# Patient Record
Sex: FEMALE | Race: WHITE | NOT HISPANIC OR LATINO | Employment: OTHER | URBAN - METROPOLITAN AREA
[De-identification: names, ages, dates, MRNs, and addresses within clinical notes are randomized per-mention and may not be internally consistent; named-entity substitution may affect disease eponyms.]

---

## 2017-01-03 ENCOUNTER — PATIENT MESSAGE (OUTPATIENT)
Dept: FAMILY MEDICINE | Facility: CLINIC | Age: 67
End: 2017-01-03

## 2017-01-03 DIAGNOSIS — F41.9 ANXIETY: ICD-10-CM

## 2017-01-03 DIAGNOSIS — G35 MULTIPLE SCLEROSIS: ICD-10-CM

## 2017-01-03 RX ORDER — CLONAZEPAM 1 MG/1
1 TABLET ORAL 2 TIMES DAILY
Qty: 60 TABLET | Refills: 5 | Status: CANCELLED | OUTPATIENT
Start: 2017-01-03

## 2017-01-05 ENCOUNTER — NURSE TRIAGE (OUTPATIENT)
Dept: ADMINISTRATIVE | Facility: CLINIC | Age: 67
End: 2017-01-05

## 2017-01-05 ENCOUNTER — PATIENT MESSAGE (OUTPATIENT)
Dept: NEUROLOGY | Facility: CLINIC | Age: 67
End: 2017-01-05

## 2017-01-05 DIAGNOSIS — F41.9 ANXIETY: ICD-10-CM

## 2017-01-05 DIAGNOSIS — G35 MULTIPLE SCLEROSIS: ICD-10-CM

## 2017-01-05 RX ORDER — CLONAZEPAM 1 MG/1
1 TABLET ORAL 2 TIMES DAILY
Qty: 60 TABLET | Refills: 5 | Status: SHIPPED | OUTPATIENT
Start: 2017-01-05 | End: 2017-01-05 | Stop reason: SDUPTHER

## 2017-01-05 NOTE — TELEPHONE ENCOUNTER
Called in the following prescription at the patient's request:    clonazePAM (KLONOPIN) 1 MG tablet 60 tablet 5 1/5/2017       Sig - Route: Take 1 tablet (1 mg total) by mouth 2 (two) times daily. - Oral

## 2017-01-05 NOTE — TELEPHONE ENCOUNTER
----- Message from Veronica Mccormick sent at 1/5/2017  1:16 PM CST -----  Contact: PT  Refill clonazePAM (KLONOPIN) 1 MG tablet    Pharmacy: [phone]    Patient is out of medication, and would like to have it called in today.

## 2017-01-06 RX ORDER — CLONAZEPAM 1 MG/1
TABLET ORAL
Qty: 60 TABLET | Refills: 2 | Status: SHIPPED | OUTPATIENT
Start: 2017-01-06 | End: 2017-06-22 | Stop reason: SDUPTHER

## 2017-01-06 NOTE — TELEPHONE ENCOUNTER
Reason for Disposition   Caller has medication question only, adult not sick, and triager answers question    Answer Assessment - Initial Assessment Questions   stated they have been having problems since xmas getting pt's scrip for clonazepam filled.    Protocols used: ST MEDICATION QUESTION CALL-A-AH    Advised per pt's chart the scrip for clonazepam was called to [facility] pharmacy this pm.

## 2017-01-10 ENCOUNTER — CLINICAL SUPPORT (OUTPATIENT)
Dept: REHABILITATION | Facility: HOSPITAL | Age: 67
End: 2017-01-10
Attending: PSYCHIATRY & NEUROLOGY
Payer: MEDICARE

## 2017-01-10 DIAGNOSIS — G35 MULTIPLE SCLEROSIS: ICD-10-CM

## 2017-01-10 DIAGNOSIS — R26.89 BALANCE PROBLEM: ICD-10-CM

## 2017-01-10 PROCEDURE — 97110 THERAPEUTIC EXERCISES: CPT | Mod: PN

## 2017-01-10 NOTE — PROGRESS NOTES
[id number]  Date of Treatment: 01/10/2017   Diagnosis:   Encounter Diagnoses   Name Primary?    Balance problem     Multiple sclerosis        Time in: 1307  Time Out: 1352  Total Treatment Time: 45      Subjective:    Alyssa reports no complaints.  Patient reports their pain to be 0/10 on a 0-10 scale with 0 being no pain and 10 being the worst pain imaginable.    Objective    Patient received individual therapy to increase strength, flexibility and balance with activities as follows:     Alyssa received therapeutic exercises to develop strength, flexibility and balance for 45 minutes including:     Bike x 15' level 2.5  Shuttle: 37# x 5 minutes, R, L, B  Sit-stand x 8, vc to lean trunk forward prior to standing  LAQ 3# x 30 B    Written Home Exercises Provided: cont HEP  Pt demo good understanding of the education provided. Alyssa demonstrated good return demonstration of activities.     Assessment:     Pt fatigued with therex today.  VC to lean forward prior to standing.  Pt able to return demonstrate.  Pt will continue to benefit from skilled PT intervention. Medical Necessity is demonstrated by:  Fall Risk, weakness.    Patient is making good progress towards established goals.      Plan:  Continue with established Plan of Care towards PT goals.

## 2017-01-13 ENCOUNTER — CLINICAL SUPPORT (OUTPATIENT)
Dept: REHABILITATION | Facility: HOSPITAL | Age: 67
End: 2017-01-13
Attending: PSYCHIATRY & NEUROLOGY
Payer: MEDICARE

## 2017-01-13 DIAGNOSIS — G35 MULTIPLE SCLEROSIS: ICD-10-CM

## 2017-01-13 DIAGNOSIS — R26.89 BALANCE PROBLEM: ICD-10-CM

## 2017-01-13 PROCEDURE — 97110 THERAPEUTIC EXERCISES: CPT | Mod: PN

## 2017-01-13 NOTE — PROGRESS NOTES
Name: Alyssa John  [facility] Number: [id number]  Date of Treatment: 01/13/2017   Diagnosis:   Encounter Diagnoses   Name Primary?    Balance problem     Multiple sclerosis        Time in: 1409  Time Out: 1457  Total Treatment Time: 43      Subjective:    Alyssa reports she loved the shuttle from last visit.  Patient reports their pain to be 0/10 on a 0-10 scale with 0 being no pain and 10 being the worst pain imaginable.    Objective    Patient received individual therapy to increase strength and balance with activities as follows:     Alyssa received therapeutic exercises to develop strength and balance for 43 minutes including:     Shuttle 31# x 12 minutes total, B, R, L  Bike x 15 minutes level 1.5  Sit-stand x 10, min-mod a  Clamshell BTB x 30      Written Home Exercises Provided: cont HEP  Pt demo good understanding of the education provided. Alyssa demonstrated good return demonstration of activities.     Assessment:     Pt was distracted today working with IPad, VC to start bike.  Pt also requested to continue to use shuttle, even with VC to stop so she would not fatigue.  Decreased activity today due to distraction.  Pt will continue to benefit from skilled PT intervention. Medical Necessity is demonstrated by:  Fall Risk, Unable to participate fully in daily activities and Weakness.    Patient is making fair progress towards established goals.      Plan:  Continue with established Plan of Care towards PT goals.

## 2017-01-17 ENCOUNTER — CLINICAL SUPPORT (OUTPATIENT)
Dept: REHABILITATION | Facility: HOSPITAL | Age: 67
End: 2017-01-17
Attending: PSYCHIATRY & NEUROLOGY
Payer: MEDICARE

## 2017-01-17 DIAGNOSIS — G35 MULTIPLE SCLEROSIS: ICD-10-CM

## 2017-01-17 DIAGNOSIS — R26.89 BALANCE PROBLEM: ICD-10-CM

## 2017-01-17 PROCEDURE — 97110 THERAPEUTIC EXERCISES: CPT | Mod: PN

## 2017-01-17 NOTE — PROGRESS NOTES
"Name: Alyssa John  [facility] Number: [id number]  Date of Treatment: 01/17/2017   Diagnosis:   Encounter Diagnoses   Name Primary?    Balance problem     Multiple sclerosis        Time in: 1408  Time Out: 1458  Total Treatment Time: 50      Subjective:    Alyssa reports no complaints. "I'm sleepy."  Patient reports their pain to be 0/10 on a 0-10 scale with 0 being no pain and 10 being the worst pain imaginable.    Objective    Patient received individual therapy to increase strength, endurance and balance with activities as follows:     Alyssa received therapeutic exercises to develop strength, endurance and balance for 50 minutes including:     Shuttle: 37# x 7 minutes, B, R, L  Bike x 15' level 2.5  Sit-stand x 10 trials total, min a to gain standing position  Ball squeeze x 30  LAq 2# x 30 B    Written Home Exercises Provided: cont HEP  Pt demo good understanding of the education provided. Alyssa demonstrated good return demonstration of activities.     Assessment:     L foot drop during ambulation.  Pt unable to maintain forward trunk with sit-stand.  Min a to bring trunk forward.  Pt will continue to benefit from skilled PT intervention. Medical Necessity is demonstrated by:  Fall Risk, Unable to participate fully in daily activities, Requires skilled supervision to complete and progress HEP and Weakness.    Patient is making good progress towards established goals.      Plan:  Continue with established Plan of Care towards PT goals.   "

## 2017-01-19 ENCOUNTER — LAB VISIT (OUTPATIENT)
Dept: LAB | Facility: HOSPITAL | Age: 67
End: 2017-01-19
Attending: PSYCHIATRY & NEUROLOGY
Payer: MEDICARE

## 2017-01-19 ENCOUNTER — INITIAL CONSULT (OUTPATIENT)
Dept: DERMATOLOGY | Facility: CLINIC | Age: 67
End: 2017-01-19
Payer: MEDICARE

## 2017-01-19 VITALS — HEIGHT: 63 IN | BODY MASS INDEX: 28.88 KG/M2 | WEIGHT: 163 LBS

## 2017-01-19 DIAGNOSIS — G35 MULTIPLE SCLEROSIS: ICD-10-CM

## 2017-01-19 DIAGNOSIS — L60.8: ICD-10-CM

## 2017-01-19 DIAGNOSIS — L72.0 EIC (EPIDERMAL INCLUSION CYST): Primary | ICD-10-CM

## 2017-01-19 LAB
ALBUMIN SERPL BCP-MCNC: 3.9 G/DL
ALP SERPL-CCNC: 115 U/L
ALT SERPL W/O P-5'-P-CCNC: 37 U/L
AST SERPL-CCNC: 25 U/L
BASOPHILS # BLD AUTO: 0.03 K/UL
BASOPHILS NFR BLD: 0.7 %
BILIRUB DIRECT SERPL-MCNC: 0.1 MG/DL
BILIRUB SERPL-MCNC: 0.3 MG/DL
DIFFERENTIAL METHOD: ABNORMAL
EOSINOPHIL # BLD AUTO: 0.3 K/UL
EOSINOPHIL NFR BLD: 5.5 %
ERYTHROCYTE [DISTWIDTH] IN BLOOD BY AUTOMATED COUNT: 13.5 %
HCT VFR BLD AUTO: 42.5 %
HGB BLD-MCNC: 13.5 G/DL
LYMPHOCYTES # BLD AUTO: 1.2 K/UL
LYMPHOCYTES NFR BLD: 27.2 %
MCH RBC QN AUTO: 28.6 PG
MCHC RBC AUTO-ENTMCNC: 31.8 %
MCV RBC AUTO: 90 FL
MONOCYTES # BLD AUTO: 0.4 K/UL
MONOCYTES NFR BLD: 7.7 %
NEUTROPHILS # BLD AUTO: 2.7 K/UL
NEUTROPHILS NFR BLD: 58.9 %
PLATELET # BLD AUTO: 173 K/UL
PMV BLD AUTO: 11.8 FL
PROT SERPL-MCNC: 6.8 G/DL
RBC # BLD AUTO: 4.72 M/UL
WBC # BLD AUTO: 4.52 K/UL

## 2017-01-19 PROCEDURE — 1126F AMNT PAIN NOTED NONE PRSNT: CPT | Mod: S$GLB,,, | Performed by: DERMATOLOGY

## 2017-01-19 PROCEDURE — 36415 COLL VENOUS BLD VENIPUNCTURE: CPT | Mod: PO

## 2017-01-19 PROCEDURE — 1160F RVW MEDS BY RX/DR IN RCRD: CPT | Mod: S$GLB,,, | Performed by: DERMATOLOGY

## 2017-01-19 PROCEDURE — 85025 COMPLETE CBC W/AUTO DIFF WBC: CPT

## 2017-01-19 PROCEDURE — 1159F MED LIST DOCD IN RCRD: CPT | Mod: S$GLB,,, | Performed by: DERMATOLOGY

## 2017-01-19 PROCEDURE — 99202 OFFICE O/P NEW SF 15 MIN: CPT | Mod: S$GLB,,, | Performed by: DERMATOLOGY

## 2017-01-19 PROCEDURE — 1157F ADVNC CARE PLAN IN RCRD: CPT | Mod: S$GLB,,, | Performed by: DERMATOLOGY

## 2017-01-19 PROCEDURE — 99999 PR PBB SHADOW E&M-EST. PATIENT-LVL II: CPT | Mod: PBBFAC,,, | Performed by: DERMATOLOGY

## 2017-01-19 PROCEDURE — 80076 HEPATIC FUNCTION PANEL: CPT

## 2017-01-19 NOTE — PROGRESS NOTES
Subjective:       Patient ID:  Alyssa John is a 66 y.o. female who presents for   Chief Complaint   Patient presents with    Cyst     Back of neck, 2 months, no tx    Nail Problem     Both hands, 3 months, redness, no tx     HPI Comments: Initial visit  Patient with MS and epilepsy  No h/o skin cancer  H/o moles removed decade ago, benign per patient      Cyst  - Initial  Affected locations: Back of neck.  Duration: 2 months  Signs / symptoms: asymptomatic  Severity: mild  Timing: constant  Aggravated by: nothing  Relieving factors/Treatments tried: nothing    Nail Problem  - Initial  Affected locations: left hand and right hand  Duration: 3 months  Signs / symptoms: redness  Severity: mild  Timing: constant  Aggravated by: nothing  Relieving factors/Treatments tried: nothing        Review of Systems   Constitutional: Negative for fever and chills.   Skin: Positive for activity-related sunscreen use. Negative for daily sunscreen use and recent sunburn.   Hematologic/Lymphatic: Bruises/bleeds easily (on plavix).        Objective:    Physical Exam   Constitutional: She appears well-developed and well-nourished. No distress.   Neurological: She is alert and oriented to person, place, and time. She is not disoriented.   Psychiatric: She has a normal mood and affect.   Skin:   Areas Examined (abnormalities noted in diagram):   Neck Inspection Performed  RUE Inspected  LUE Inspection Performed  Nails and Digits Inspection Performed                  Diagram Legend     Erythematous scaling macule/papule c/w actinic keratosis       Vascular papule c/w angioma      Pigmented verrucoid papule/plaque c/w seborrheic keratosis      Yellow umbilicated papule c/w sebaceous hyperplasia      Irregularly shaped tan macule c/w lentigo     1-2 mm smooth white papules consistent with Milia      Movable subcutaneous cyst with punctum c/w epidermal inclusion cyst      Subcutaneous movable cyst c/w pilar cyst      Firm pink to  brown papule c/w dermatofibroma      Pedunculated fleshy papule(s) c/w skin tag(s)      Evenly pigmented macule c/w junctional nevus     Mildly variegated pigmented, slightly irregular-bordered macule c/w mildly atypical nevus      Flesh colored to evenly pigmented papule c/w intradermal nevus       Pink pearly papule/plaque c/w basal cell carcinoma      Erythematous hyperkeratotic cursted plaque c/w SCC      Surgical scar with no sign of skin cancer recurrence      Open and closed comedones      Inflammatory papules and pustules      Verrucoid papule consistent consistent with wart     Erythematous eczematous patches and plaques     Dystrophic onycholytic nail with subungual debris c/w onychomycosis     Umbilicated papule    Erythematous-base heme-crusted tan verrucoid plaque consistent with inflamed seborrheic keratosis     Erythematous Silvery Scaling Plaque c/w Psoriasis     See annotation          Assessment / Plan:        EIC (epidermal inclusion cyst)  May elect to excise to avoid future rupture/inflammation\    Half-and-half nail  No h/o liver disease or CKD  Recent labs reviewed and reassuring  Longitudinal ridging and fragility is normal finding in aging           Return if symptoms worsen or fail to improve.

## 2017-01-19 NOTE — LETTER
January 19, 2017      April Edmondson MD  [address]  [place] [address]           [place] [facility]  [address]  [place] [address]  Phone: [phone]          Patient: Alyssa John   MR Number: [id number]   YOB: 1950   Date of Visit: 1/19/2017       Dear Dr. April Edmondson:    Thank you for referring Alyssa John to me for evaluation. Attached you will find relevant portions of my assessment and plan of care.    If you have questions, please do not hesitate to call me. I look forward to following Alyssa John along with you.    Sincerely,    Rachael Castillo MD    Enclosure  CC:  No Recipients    If you would like to receive this communication electronically, please contact [email or web address] or [phone] to request more information on [employer] Link access.    For providers and/or their staff who would like to refer a patient to [facility], please contact us through our one-stop-shop provider referral line, [facility], at [phone].    If you feel you have received this communication in error or would no longer like to receive these types of communications, please e-mail [email or web address]

## 2017-01-24 ENCOUNTER — INITIAL CONSULT (OUTPATIENT)
Dept: RHEUMATOLOGY | Facility: CLINIC | Age: 67
End: 2017-01-24
Payer: MEDICARE

## 2017-01-24 ENCOUNTER — DOCUMENTATION ONLY (OUTPATIENT)
Dept: FAMILY MEDICINE | Facility: CLINIC | Age: 67
End: 2017-01-24

## 2017-01-24 VITALS — TEMPERATURE: 99 F | DIASTOLIC BLOOD PRESSURE: 87 MMHG | SYSTOLIC BLOOD PRESSURE: 157 MMHG

## 2017-01-24 DIAGNOSIS — M79.10 MYALGIA: ICD-10-CM

## 2017-01-24 DIAGNOSIS — R76.8 RHEUMATOID FACTOR POSITIVE: ICD-10-CM

## 2017-01-24 DIAGNOSIS — R76.8 ANA POSITIVE: Primary | ICD-10-CM

## 2017-01-24 DIAGNOSIS — M65.321 TRIGGER INDEX FINGER OF RIGHT HAND: ICD-10-CM

## 2017-01-24 PROCEDURE — 3077F SYST BP >= 140 MM HG: CPT | Mod: S$GLB,,, | Performed by: INTERNAL MEDICINE

## 2017-01-24 PROCEDURE — 1157F ADVNC CARE PLAN IN RCRD: CPT | Mod: S$GLB,,, | Performed by: INTERNAL MEDICINE

## 2017-01-24 PROCEDURE — 1125F AMNT PAIN NOTED PAIN PRSNT: CPT | Mod: S$GLB,,, | Performed by: INTERNAL MEDICINE

## 2017-01-24 PROCEDURE — 3079F DIAST BP 80-89 MM HG: CPT | Mod: S$GLB,,, | Performed by: INTERNAL MEDICINE

## 2017-01-24 PROCEDURE — 1160F RVW MEDS BY RX/DR IN RCRD: CPT | Mod: S$GLB,,, | Performed by: INTERNAL MEDICINE

## 2017-01-24 PROCEDURE — 99204 OFFICE O/P NEW MOD 45 MIN: CPT | Mod: S$GLB,,, | Performed by: INTERNAL MEDICINE

## 2017-01-24 PROCEDURE — 99999 PR PBB SHADOW E&M-EST. PATIENT-LVL II: CPT | Mod: PBBFAC,,, | Performed by: INTERNAL MEDICINE

## 2017-01-24 PROCEDURE — 1159F MED LIST DOCD IN RCRD: CPT | Mod: S$GLB,,, | Performed by: INTERNAL MEDICINE

## 2017-01-24 RX ORDER — FERROUS SULFATE 325(65) MG
325 TABLET, DELAYED RELEASE (ENTERIC COATED) ORAL
COMMUNITY
End: 2018-12-11 | Stop reason: SDUPTHER

## 2017-01-24 ASSESSMENT — ROUTINE ASSESSMENT OF PATIENT INDEX DATA (RAPID3)
PAIN SCORE: 0
TOTAL RAPID3 SCORE: 1
PATIENT GLOBAL ASSESSMENT SCORE: 0
PSYCHOLOGICAL DISTRESS SCORE: 0
MDHAQ FUNCTION SCORE: .9

## 2017-01-24 NOTE — PROGRESS NOTES
Pre-Visit Chart Review  For Appointment Scheduled on 1/25/2017.    Health Maintenance Due   Topic Date Due    TETANUS VACCINE  09/21/1968    Pneumococcal (65+) (1 of 2 - PCV13) 09/21/2015

## 2017-01-24 NOTE — PROGRESS NOTES
01/24/17 1319   OTHER   MDHAQ Score 0.9   Psychologic Score 0   Pain Score 0   Global Health Score 0   RAPID3 Score 1

## 2017-01-24 NOTE — PROGRESS NOTES
Subjective:       Patient ID: Alyssa John is a 66 y.o. female.    Chief Complaint: Positive NAMITA 1:160 Homogeneous   HPI 66-year-old female with multiple co morbidities including multiple sclerosis, seizure disorder, coronary artery disease is seen in consultation for positive NAMITA 1:160 Homogeneous.   She c/o pain in hands for the last 2 years. Denies any joint pain and swelling. She does have R 2nd trigger finger    She denies fever, weight loss, dry eyes or mouth, photosensitivity, rash, ulcer, raynaud's phenomenon, alopecia, dysphagia, or blood in the stools  No miscarriages   Review of Systems   Constitutional: Positive for fatigue. Negative for fever.   HENT: Negative for ear discharge and ear pain.    Eyes: Negative for pain and redness.   Respiratory: Negative for cough and shortness of breath.    Cardiovascular: Negative for chest pain and palpitations.   Gastrointestinal: Negative for abdominal distention and abdominal pain.   Genitourinary: Negative for genital sores and hematuria.   Musculoskeletal: Positive for myalgias.   Neurological: Negative for tremors and seizures.   Psychiatric/Behavioral: Negative for agitation and hallucinations.         Objective:   There were no vitals taken for this visit.     Physical Exam   Constitutional: She is oriented to person, place, and time and well-developed, well-nourished, and in no distress.   HENT:   Head: Normocephalic and atraumatic.   Eyes: Conjunctivae and EOM are normal. Pupils are equal, round, and reactive to light.   Neck: Normal range of motion. Neck supple. No thyromegaly present.   Cardiovascular: Normal rate and regular rhythm.    Pulmonary/Chest: Effort normal and breath sounds normal.   Abdominal: Soft. Bowel sounds are normal. There is no hepatomegaly.   Neurological: She is alert and oriented to person, place, and time. She has normal sensation.   Skin: Skin is warm and dry.     Psychiatric: Memory and affect normal.   Musculoskeletal:  She exhibits no edema.   Right second trigger finger   ROM is within normal limits in all joints including shoulders, elbows, wrists, hands, hips, knees, ankles, feet and spine  Patient is non tender in all joints including shoulders, elbows, wrists, hands, hips, knees, ankles, feet and spine  No joint effusion  Strength is 5/5 in all ext, reflexes are 2+b/l                   Lab Results   Component Value Date    WBC 4.52 01/19/2017    HGB 13.5 01/19/2017    HCT 42.5 01/19/2017    MCV 90 01/19/2017     01/19/2017     CMP  Sodium   Date Value Ref Range Status   10/19/2016 143 136 - 145 mmol/L Final     Potassium   Date Value Ref Range Status   10/19/2016 3.7 3.5 - 5.1 mmol/L Final     Chloride   Date Value Ref Range Status   10/19/2016 113 (H) 95 - 110 mmol/L Final     CO2   Date Value Ref Range Status   10/19/2016 20 (L) 23 - 29 mmol/L Final     Carbon Monoxide, Blood   Date Value Ref Range Status   10/19/2016 2 % Final     Comment:     -------------------REFERENCE VALUE--------------------------  0-2 Normal (Non-smoker) , < or = 9 Normal (Smoker), > or   = 20 (Toxic concentration)  Test Performed by:  [place] [address]  : Adrien Norton II, M.D., Ph.D.       Glucose   Date Value Ref Range Status   10/19/2016 83 70 - 110 mg/dL Final     BUN, Bld   Date Value Ref Range Status   10/19/2016 16 8 - 23 mg/dL Final     Creatinine   Date Value Ref Range Status   12/07/2016 0.8 0.5 - 1.4 mg/dL Final   02/22/2013 0.8 0.5 - 1.4 mg/dL Final     Calcium   Date Value Ref Range Status   10/19/2016 8.5 (L) 8.7 - 10.5 mg/dL Final   02/22/2013 8.7 8.7 - 10.5 mg/dL Final     Total Protein   Date Value Ref Range Status   01/19/2017 6.8 6.0 - 8.4 g/dL Final     Albumin   Date Value Ref Range Status   01/19/2017 3.9 3.5 - 5.2 g/dL Final     ALBUMIN   Date Value Ref Range Status   02/22/2013 3.5 3.5 - 5.2 g/dL Final     Total Bilirubin    Date Value Ref Range Status   01/19/2017 0.3 0.1 - 1.0 mg/dL Final     Comment:     For infants and newborns, interpretation of results should be based  on gestational age, weight and in agreement with clinical  observations.  Premature Infant recommended reference ranges:  Up to 24 hours.............<8.0 mg/dL  Up to 48 hours............<12.0 mg/dL  3-5 days..................<15.0 mg/dL  6-29 days.................<15.0 mg/dL       Alkaline Phosphatase   Date Value Ref Range Status   01/19/2017 115 55 - 135 U/L Final   02/22/2013 107 55 - 135 U/L Final     AST   Date Value Ref Range Status   01/19/2017 25 10 - 40 U/L Final   02/22/2013 18 10 - 40 U/L Final     ALT   Date Value Ref Range Status   01/19/2017 37 10 - 44 U/L Final     Anion Gap   Date Value Ref Range Status   10/19/2016 10 8 - 16 mmol/L Final   02/22/2013 11 5 - 15 meq/L Final     eGFR if    Date Value Ref Range Status   12/07/2016 >60 >60 mL/min/1.73 m^2 Final     eGFR if non    Date Value Ref Range Status   12/07/2016 >60 >60 mL/min/1.73 m^2 Final     Comment:     Calculation used to obtain the estimated glomerular filtration  rate (eGFR) is the CKD-EPI equation. Since race is unknown   in our information system, the eGFR values for   -American and Non--American patients are given   for each creatinine result.       Lab Results   Component Value Date    SEDRATE 3 04/24/2014     No results found for: CRP  Results for JOHANNA LOPEZ (MRN [id number]) as of 1/24/2017 13:05   Ref. Range 10/19/2016 17:18   NAMITA HEP-2 Titer Unknown Positive 1:160 Ho...   Anti-SSA Antibody Latest Ref Range: 0.00 - 19.99 EU 0.68   Anti-SSA Interpretation Latest Ref Range: Negative  Negative   Anti-SSB Antibody Latest Ref Range: 0.00 - 19.99 EU 0.27   Anti-SSB Interpretation Latest Ref Range: Negative  Negative   ds DNA Ab Latest Ref Range: Negative 1:10  Negative 1:10   Anti Sm Antibody Latest Ref Range: 0.00 - 19.99 EU 1.86    Anti-Sm Interpretation Latest Ref Range: Negative  Negative   Anti Sm/RNP Antibody Latest Ref Range: 0.00 - 19.99 EU 0.70   Anti-Sm/RNP Interpretation Latest Ref Range: Negative  Negative   Rheumatoid Factor Latest Ref Range: 0.0 - 15.0 IU/mL 29.0 (H)     Radiology: I personally reviewed imaging  Reviewed right hand x-ray from 2016 in bilateral hand x-rays from 2013-revealed degenerative changes    Assessment:   Positive NAMITA 1:160 Homogeneous -no evidence of NAMITA associated connective tissue diseases on exam -will complete the blood work  Positive RF 29 -check CCP and anti-inflammatory markers  Right second trigger finger  Myalgia/fatigue-check CPK and aldolase  Plan:   Check , C3, C4, UA, UP/C,  antiphospholipid antibody,  Lupus anticoagulant beta-2 glycoprotein  Check CCP  Check CPK and aldolase  Advised to use for tape for trigger finger  Soak hands in warm water  Return to clinic after lab review

## 2017-01-24 NOTE — LETTER
January 24, 2017      April Edmondson MD  [address]  [place] [address]           [place] [facility]  [address]  [place] [address]  Phone: [phone]  Fax: [phone]          Patient: Alyssa John   MR Number: [id number]   YOB: 1950   Date of Visit: 1/24/2017       Dear Dr. April Edmondson:    Thank you for referring Alyssa John to me for evaluation. Attached you will find relevant portions of my assessment and plan of care.    If you have questions, please do not hesitate to call me. I look forward to following Alyssa John along with you.    Sincerely,    Jyotsna Dahl MD    Enclosure  CC:  No Recipients    If you would like to receive this communication electronically, please contact [email or web address] or [phone] to request more information on [facility] Link access.    For providers and/or their staff who would like to refer a patient to [facility], please contact us through our one-stop-shop provider referral line, [facility], at [phone].    If you feel you have received this communication in error or would no longer like to receive these types of communications, please e-mail [email or web address]

## 2017-01-24 NOTE — MR AVS SNAPSHOT
[place] [facility]  [address]  [place] [address]  Phone: [phone]  Fax: [phone]                  Alyssa John   2017 1:00 PM   Initial consult    Description:  Female : 1950   Provider:  Jyotsna Dahl MD   Department:  [place] [facility]           Diagnoses this Visit        Comments    NAMITA positive    -  Primary            To Do List           Future Appointments        Provider Department Dept Phone    2017 3:00 PM Sameera Osborne, PT [facility] [phone]    2017 1:45 PM GUILLERMO [facility] [phone]    2017 1:45 PM RESEARCH, NEUROLOGY-OP [facility] [phone]    2017 3:40 PM April Edmondson MD [place] [facility] [phone]      Goals (5 Years of Data)     None      [facility]     [facility] -  Assistance  Registered nurses in the [facility] provide clinical advisement, health education, appointment booking, and other advisory services.  Call for this free service at [phone].             Medications           Message regarding Medications     Verify the changes and/or additions to your medication regime listed below are the same as discussed with your clinician today.  If any of these changes or additions are incorrect, please notify your healthcare provider.             Verify that the below list of medications is an accurate representation of the medications you are currently taking.  If none reported, the list may be blank. If incorrect, please contact your healthcare provider. Carry this list with you in case of emergency.           Current Medications     ascorbic acid, vitamin C, (VITAMIN C) 100 MG tablet Take 100 mg by mouth once daily.    atorvastatin (LIPITOR) 40 MG tablet Take 1 tablet (40 mg total) by mouth once daily.    cholecalciferol, vitamin D3, (VITAMIN D3) 5,000 unit Tab Take 5,000 Units  by mouth once daily.    clonazePAM (KLONOPIN) 1 MG tablet TAKE 1 TABLET BY MOUTH TWICE A DAY    clopidogrel (PLAVIX) 75 mg tablet Take 75 mg by mouth once daily.      diazePAM (VALIUM) 5 MG tablet Take 1-2 tabs PO 1 hr prior to MRI as needed for anxiety    duloxetine (CYMBALTA) 20 MG capsule TAKE 1 CAPSULE EVERY DAY    famotidine (PEPCID) 20 MG tablet TAKE 1 TABLET BY MOUTH TWICE DAILY    ferrous sulfate 325 (65 FE) MG EC tablet Take 325 mg by mouth 3 (three) times daily with meals.    ibuprofen 200 mg Cap Take by mouth as needed.    modafinil (PROVIGIL) 100 MG Tab Take 1 tablet (100 mg total) by mouth 2 (two) times daily.    multivitamin with minerals (HAIR,SKIN AND NAILS) tablet Take 1 tablet by mouth once daily.    nitroGLYCERIN (NITROSTAT) 0.4 MG SL tablet Place 0.4 mg under the tongue every 5 (five) minutes as needed.      PSYLLIUM SEED, WITH DEXTROSE, (FIBER ORAL) Take by mouth 2 (two) times daily.    teriflunomide (AUBAGIO) 14 mg Tab Take 14 mg by mouth once daily.    topiramate (TOPAMAX) 50 MG tablet TAKE 1 TABLET (50 MG TOTAL) BY MOUTH 2 (TWO) TIMES DAILY.           Clinical Reference Information           Vital Signs - Last Recorded  Most recent update: 1/24/2017  1:15 PM by Heidi Oswald LPN    BP Temp                (!) 157/87 (BP Location: Right arm) 98.7 °F (37.1 °C) (Oral)          Blood Pressure          Most Recent Value    BP  (!)  157/87      Allergies as of 1/24/2017     Codeine    Decadron [Dexamethasone Sodium Phosphate]    Dilantin [Phenytoin Sodium Extended]    Tegretol [Carbamazepine]      Immunizations Administered on Date of Encounter - 1/24/2017     None      Orders Placed During Today's Visit     Future Labs/Procedures Expected by Expires    Aldolase  1/24/2017 3/25/2018    Beta-2 glycoprotein antibodies  1/24/2017 3/25/2018    C-reactive protein  1/24/2017 3/25/2018    C3 complement  1/24/2017 3/25/2018    C4 complement  1/24/2017 3/25/2018    Cardiolipin antibody  1/24/2017  3/25/2018    CK  1/24/2017 3/25/2018    Cyclic citrul peptide antibody, IgG  1/24/2017 3/25/2018    DRVVT  1/24/2017 3/25/2018    Protein / creatinine ratio, urine  1/24/2017 3/25/2018    Sedimentation rate, manual  1/24/2017 3/25/2018    Urinalysis  1/24/2017 3/25/2018

## 2017-01-25 ENCOUNTER — PATIENT MESSAGE (OUTPATIENT)
Dept: FAMILY MEDICINE | Facility: CLINIC | Age: 67
End: 2017-01-25

## 2017-01-25 ENCOUNTER — OFFICE VISIT (OUTPATIENT)
Dept: FAMILY MEDICINE | Facility: CLINIC | Age: 67
End: 2017-01-25
Payer: MEDICARE

## 2017-01-25 ENCOUNTER — HOSPITAL ENCOUNTER (OUTPATIENT)
Dept: RADIOLOGY | Facility: CLINIC | Age: 67
Discharge: HOME | End: 2017-01-25
Attending: FAMILY MEDICINE
Payer: MEDICARE

## 2017-01-25 VITALS
TEMPERATURE: 98 F | WEIGHT: 165.81 LBS | HEART RATE: 85 BPM | BODY MASS INDEX: 28.31 KG/M2 | DIASTOLIC BLOOD PRESSURE: 84 MMHG | HEIGHT: 64 IN | SYSTOLIC BLOOD PRESSURE: 127 MMHG

## 2017-01-25 DIAGNOSIS — J06.9 UPPER RESPIRATORY TRACT INFECTION, UNSPECIFIED TYPE: ICD-10-CM

## 2017-01-25 DIAGNOSIS — R53.82 CHRONIC FATIGUE: ICD-10-CM

## 2017-01-25 DIAGNOSIS — G35 MULTIPLE SCLEROSIS: ICD-10-CM

## 2017-01-25 PROCEDURE — 96372 THER/PROPH/DIAG INJ SC/IM: CPT | Mod: S$GLB,,, | Performed by: FAMILY MEDICINE

## 2017-01-25 PROCEDURE — 3079F DIAST BP 80-89 MM HG: CPT | Mod: S$GLB,,, | Performed by: FAMILY MEDICINE

## 2017-01-25 PROCEDURE — 3074F SYST BP LT 130 MM HG: CPT | Mod: S$GLB,,, | Performed by: FAMILY MEDICINE

## 2017-01-25 PROCEDURE — 99999 PR PBB SHADOW E&M-EST. PATIENT-LVL IV: CPT | Mod: PBBFAC,,, | Performed by: FAMILY MEDICINE

## 2017-01-25 PROCEDURE — 1159F MED LIST DOCD IN RCRD: CPT | Mod: S$GLB,,, | Performed by: FAMILY MEDICINE

## 2017-01-25 PROCEDURE — 99213 OFFICE O/P EST LOW 20 MIN: CPT | Mod: 25,S$GLB,, | Performed by: FAMILY MEDICINE

## 2017-01-25 PROCEDURE — 1125F AMNT PAIN NOTED PAIN PRSNT: CPT | Mod: S$GLB,,, | Performed by: FAMILY MEDICINE

## 2017-01-25 PROCEDURE — 1160F RVW MEDS BY RX/DR IN RCRD: CPT | Mod: S$GLB,,, | Performed by: FAMILY MEDICINE

## 2017-01-25 PROCEDURE — 1157F ADVNC CARE PLAN IN RCRD: CPT | Mod: S$GLB,,, | Performed by: FAMILY MEDICINE

## 2017-01-25 PROCEDURE — 71020 XR CHEST PA AND LATERAL: CPT | Mod: 26,,, | Performed by: RADIOLOGY

## 2017-01-25 RX ORDER — DEXAMETHASONE SODIUM PHOSPHATE 4 MG/ML
4 INJECTION, SOLUTION INTRA-ARTICULAR; INTRALESIONAL; INTRAMUSCULAR; INTRAVENOUS; SOFT TISSUE
Status: COMPLETED | OUTPATIENT
Start: 2017-01-25 | End: 2017-01-25

## 2017-01-25 RX ORDER — AZITHROMYCIN 250 MG/1
TABLET, FILM COATED ORAL
Qty: 6 TABLET | Refills: 0 | Status: SHIPPED | OUTPATIENT
Start: 2017-01-25 | End: 2017-03-10

## 2017-01-25 RX ORDER — MODAFINIL 100 MG/1
100 TABLET ORAL 2 TIMES DAILY
Qty: 60 TABLET | Refills: 5 | Status: SHIPPED | OUTPATIENT
Start: 2017-01-25 | End: 2017-08-06 | Stop reason: SDUPTHER

## 2017-01-25 RX ADMIN — DEXAMETHASONE SODIUM PHOSPHATE 4 MG: 4 INJECTION, SOLUTION INTRA-ARTICULAR; INTRALESIONAL; INTRAMUSCULAR; INTRAVENOUS; SOFT TISSUE at 04:01

## 2017-01-25 NOTE — MR AVS SNAPSHOT
[facility]  [address]  [place] [address]  Phone: [phone]  Fax: [phone]                  Alyssa John   2017 4:00 PM   Office Visit    Description:  Female : 1950   Provider:  Alvin Ansari MD   Department:  [facility]           Reason for Visit     Cough     Nasal Congestion           Diagnoses this Visit        Comments    Upper respiratory tract infection, unspecified type                To Do List           Future Appointments        Provider Department Dept Phone    2017 4:45 PM NMCH [id number] [facility] [phone]    2017 1:45 PM Risa Oshea PA-C [facility] Multiple S[facility] [phone]    2017 1:45 PM RESEARCH, NEUROLOGY-OP [facility] [phone]    2017 3:40 PM April Edmondson MD [facility] [phone]      Goals (5 Years of Data)     None       These Medications        Disp Refills Start End    azithromycin (Z-ANDREINA) 250 MG tablet 6 tablet 0 2017     Take 2 tablets by mouth on day 1; Take 1 tablet by mouth on days 2-5    Pharmacy: [facility]/pharmacy #[id number] - [place] - [address]  #: [phone]         [facility]     [facility] -  Assistance  Registered nurses in the [facility] provide clinical advisement, health education, appointment booking, and other advisory services.  Call for this free service at [phone].             Medications           Message regarding Medications     Verify the changes and/or additions to your medication regime listed below are the same as discussed with your clinician today.  If any of these changes or additions are incorrect, please notify your healthcare provider.        START taking these NEW medications        Refills    azithromycin (Z-ANDREINA) 250 MG tablet 0    Sig: Take 2 tablets by mouth on day 1; Take 1 tablet by mouth on days 2-5    Class: Normal      These  medications were administered today        Dose Freq    dexamethasone injection 4 mg 4 mg Clinic/HOD 1 time    Sig: Inject 1 mL (4 mg total) into the muscle one time.    Class: Normal    Route: Intramuscular      STOP taking these medications     diazePAM (VALIUM) 5 MG tablet Take 1-2 tabs PO 1 hr prior to MRI as needed for anxiety           Verify that the below list of medications is an accurate representation of the medications you are currently taking.  If none reported, the list may be blank. If incorrect, please contact your healthcare provider. Carry this list with you in case of emergency.           Current Medications     ascorbic acid, vitamin C, (VITAMIN C) 100 MG tablet Take 100 mg by mouth once daily.    atorvastatin (LIPITOR) 40 MG tablet Take 1 tablet (40 mg total) by mouth once daily.    cholecalciferol, vitamin D3, (VITAMIN D3) 5,000 unit Tab Take 5,000 Units by mouth once daily.    clonazePAM (KLONOPIN) 1 MG tablet TAKE 1 TABLET BY MOUTH TWICE A DAY    clopidogrel (PLAVIX) 75 mg tablet Take 75 mg by mouth once daily.      duloxetine (CYMBALTA) 20 MG capsule TAKE 1 CAPSULE EVERY DAY    famotidine (PEPCID) 20 MG tablet TAKE 1 TABLET BY MOUTH TWICE DAILY    ferrous sulfate 325 (65 FE) MG EC tablet Take 325 mg by mouth 3 (three) times daily with meals.    ibuprofen 200 mg Cap Take by mouth as needed.    multivitamin with minerals (HAIR,SKIN AND NAILS) tablet Take 1 tablet by mouth once daily.    nitroGLYCERIN (NITROSTAT) 0.4 MG SL tablet Place 0.4 mg under the tongue every 5 (five) minutes as needed.      PSYLLIUM SEED, WITH DEXTROSE, (FIBER ORAL) Take by mouth 2 (two) times daily.    teriflunomide (AUBAGIO) 14 mg Tab Take 14 mg by mouth once daily.    topiramate (TOPAMAX) 50 MG tablet TAKE 1 TABLET (50 MG TOTAL) BY MOUTH 2 (TWO) TIMES DAILY.    azithromycin (Z-ANRDEINA) 250 MG tablet Take 2 tablets by mouth on day 1; Take 1 tablet by mouth on days 2-5    modafinil (PROVIGIL) 100 MG Tab Take 1 tablet (100  "mg total) by mouth 2 (two) times daily.           Clinical Reference Information           Vital Signs - Last Recorded  Most recent update: 1/25/2017  4:14 PM by ZAKIYA An LPN    BP Pulse Temp Ht Wt BMI    127/84 (BP Location: Right arm, Patient Position: Sitting) 85 97.7 °F (36.5 °C) (Oral) 5' 3.5" (1.613 m) 75.2 kg (165 lb 12.6 oz) 28.91 kg/m2      Blood Pressure          Most Recent Value    BP  127/84      Allergies as of 1/25/2017     Codeine    Decadron [Dexamethasone Sodium Phosphate]    Dilantin [Phenytoin Sodium Extended]    Tegretol [Carbamazepine]      Immunizations Administered on Date of Encounter - 1/25/2017     None      Orders Placed During Today's Visit     Future Labs/Procedures Expected by Expires    X-Ray Chest PA And Lateral  1/25/2017 1/25/2018      "

## 2017-01-26 ENCOUNTER — DOCUMENTATION ONLY (OUTPATIENT)
Dept: NEUROLOGY | Facility: CLINIC | Age: 67
End: 2017-01-26

## 2017-01-26 NOTE — PROGRESS NOTES
"[facility]  Progress Note    Subjective:       Patient ID: Alyssa John is a 66 y.o. female.    Chief Complaint: Cough and Nasal Congestion    HPI66 y.o.female is here today complaining of cough, congestion, both is a drip, rhinorrhea, sinus pressure, and overall feeling ill.  A she has felt sick for the past 1 week.  Patient has been taking over-the-counter medications which have not helped her symptoms.  Patient denies any sick contacts.  Patient denies fever or chills.  No further complaints at today's visit.  Review of Systems   Constitutional: Negative for chills and fever.   HENT: Positive for congestion, postnasal drip, rhinorrhea and sinus pressure. Negative for ear pain, sneezing and sore throat.    Eyes: Negative for pain and visual disturbance.   Respiratory: Positive for cough. Negative for shortness of breath and wheezing.    Cardiovascular: Negative for chest pain, palpitations and leg swelling.   Gastrointestinal: Negative for abdominal pain, constipation, diarrhea, nausea and vomiting.   Endocrine: Negative.    Genitourinary: Negative for dysuria, frequency and urgency.   Musculoskeletal: Negative for arthralgias and myalgias.   Skin: Negative for rash.   Allergic/Immunologic: Negative.    Neurological: Negative for syncope and headaches.   Hematological: Negative.    Psychiatric/Behavioral: Negative.        Objective:      Vitals:    01/25/17 1613   BP: 127/84   BP Location: Right arm   Patient Position: Sitting   Pulse: 85   Temp: 97.7 °F (36.5 °C)   TempSrc: Oral   Weight: 75.2 kg (165 lb 12.6 oz)   Height: 5' 3.5" (1.613 m)     Body mass index is 28.91 kg/(m^2).  Physical Exam   Constitutional: She is oriented to person, place, and time. She appears well-developed and well-nourished.   Eyes: Conjunctivae are normal.   Cardiovascular: Normal rate, regular rhythm, normal heart sounds and intact distal pulses.  Exam reveals no gallop and no friction rub.    No murmur " heard.  Pulmonary/Chest: Effort normal and breath sounds normal. No respiratory distress. She has no wheezes. She has no rales. She exhibits no tenderness.   Abdominal: Soft.   Musculoskeletal: Normal range of motion.   Neurological: She is alert and oriented to person, place, and time.   Skin: She is not diaphoretic.   Psychiatric: She has a normal mood and affect. Her behavior is normal. Judgment and thought content normal.   Vitals reviewed.      Assessment:       1. Upper respiratory tract infection, unspecified type        Plan:       Upper respiratory tract infection, unspecified type  -     azithromycin (Z-ANDREINA) 250 MG tablet; Take 2 tablets by mouth on day 1; Take 1 tablet by mouth on days 2-5  Dispense: 6 tablet; Refill: 0  -     X-Ray Chest PA And Lateral; Future; Expected date: 1/25/17  -     dexamethasone injection 4 mg; Inject 1 mL (4 mg total) into the muscle one time.      Return if symptoms worsen or fail to improve.  Alvin Ansari MD  [facility]  1/26/2017 2:39 PM

## 2017-01-27 ENCOUNTER — TELEPHONE (OUTPATIENT)
Dept: FAMILY MEDICINE | Facility: CLINIC | Age: 67
End: 2017-01-27

## 2017-01-27 NOTE — TELEPHONE ENCOUNTER
----- Message from Alvin Ansari MD sent at 1/26/2017  2:55 PM CST -----  Please inform patient that chest x-ray is in within normal limits

## 2017-02-03 ENCOUNTER — CLINICAL SUPPORT (OUTPATIENT)
Dept: REHABILITATION | Facility: HOSPITAL | Age: 67
End: 2017-02-03
Attending: PSYCHIATRY & NEUROLOGY
Payer: MEDICARE

## 2017-02-03 DIAGNOSIS — R26.89 BALANCE PROBLEM: ICD-10-CM

## 2017-02-03 DIAGNOSIS — G35 MULTIPLE SCLEROSIS: ICD-10-CM

## 2017-02-03 PROCEDURE — G8979 MOBILITY GOAL STATUS: HCPCS | Mod: CI,PN

## 2017-02-03 PROCEDURE — 97116 GAIT TRAINING THERAPY: CPT | Mod: PN

## 2017-02-03 PROCEDURE — G8978 MOBILITY CURRENT STATUS: HCPCS | Mod: CJ,PN

## 2017-02-10 NOTE — PLAN OF CARE
Name: Alyssa John  Date:   2017  Primary Diagnosis: .  Problem List Items Addressed This Visit     Multiple sclerosis    Balance problem          Time in: 1010  Time Out: 1100  Total Treatment Time: 50  Group Time: 0      Subjective:    Patient reports improvement of symptoms. Reports she is feeling better after being unwell for a few weeks. Compliant with HEP.  Patient reports their pain to be 0/10 on a 0-10 scale with 0 being no pain and 10 being the worst pain imaginable.    Objective    Reassessment performed for POC update purposes:    Gait training: SPC at HHA x 1 with cues for step length and floor clearance     Lower Extremity Strength:  Right Lower Extremity: hip flex 4-/5, knee ext 4-/5, knee flex 4/5, ankle DF 3/5, PF 4/5  Left Lower Extremity: hip flex 4/5, knee ext/flex 4/5, ankle DF 3-/5, PF 4/5      Gait: c/ SPC x 200 ft at close SBA to light HHA; bilateral (L>R) foot drop notable     TU sec c/ SPC   5 time sit<>stand: 15 sec with bilateral UE support several attempts needed on 1 of the stands, no propping legs against chair      Lower Extremity Functional Scale (LEFS): 49/80  G code tool used: LEFS  Current modifier: CJ  Goal modifier: CI (updated from CJ)    Assessment:     Patient progressing with therapy, increasing TUG speed and 5x sit<>stand with improved transfer quality. Increased functional mobility per LEFS outcome measure as well. Pt continues with LE weakness, gait instability, impaired transfers, and impaired I/ADL's. Patient may benefit from skilled PT to address noted impairments and improve functional mobility.     Pt will continue to benefit from skilled PT intervention. Medical Necessity is demonstrated by: Fall Risk, Unable to participate fully in daily activities and Weakness.     Patient is making good progress towards established goals.    Plan:  Continue with established Plan of Care towards PT goals.     PHYSICAL THERAPY UPDATED PLAN OF CARE     Alyssa Flores  [place]  12/23/2016     Onset Date: Feb 2011  SOC Date: 11/03/16  Primary Diagnosis:        Problem List Items Addressed This Visit      Multiple sclerosis     Balance problem          Treatment Diagnosis: Gait instability  Precautions: falls  Visits from SOC: see EMR  Functional Level Prior to SOC: Assistive Device     Previous Short Term Goals Status: 1= ongoing 2= met  New Short Term Goals Status:  1=patient will improve hamstring flexibility by 3-5* 2= Patient will improve 5 time sit< <15 sec  Long Term Goal Status:  1) Pt will be I with HEP 2) Pt will return to community ambulation with strength improved by 1/2 muscle grade using LRAD 3) Pt will improve TUG to <14 sec 4) Pt will improve LEFS to <20% impairment  Reasons for Recertification of Therapy:  LE/trunk weakness, gait instability, impaired transfers, and impaired I/ADL's     Certification Period: 02/04/17 to 03/25/17  Recommended Treatment Plan: 2 times per week for 4 weeks: Gait Training, Group Therapy, Manual Therapy, Moist Heat/ Ice, Patient Education, Therapeutic Activites and Therapeutic Exercise     Thank you for referral.     Therapist's Name: Sameera Osborne, PT    I CERTIFY THE NEED FOR THESE SERVICES FURNISHED UNDER THIS PLAN OF TREATMENT AND WHILE UNDER MY CARE     Physician's comments: ________________________________________________________________________________________________________________________________________________        Physician's Name: ___________________________________

## 2017-02-13 RX ORDER — ATORVASTATIN CALCIUM 40 MG/1
TABLET, FILM COATED ORAL
Qty: 90 TABLET | Refills: 0 | Status: SHIPPED | OUTPATIENT
Start: 2017-02-13 | End: 2017-05-11 | Stop reason: SDUPTHER

## 2017-02-16 ENCOUNTER — RESEARCH ENCOUNTER (OUTPATIENT)
Dept: RESEARCH | Facility: HOSPITAL | Age: 67
End: 2017-02-16
Payer: MEDICARE

## 2017-02-16 ENCOUNTER — OFFICE VISIT (OUTPATIENT)
Dept: NEUROLOGY | Facility: CLINIC | Age: 67
End: 2017-02-16
Payer: MEDICARE

## 2017-02-16 VITALS — SYSTOLIC BLOOD PRESSURE: 134 MMHG | HEIGHT: 60 IN | DIASTOLIC BLOOD PRESSURE: 72 MMHG | HEART RATE: 80 BPM

## 2017-02-16 DIAGNOSIS — R56.9 SEIZURES: ICD-10-CM

## 2017-02-16 DIAGNOSIS — E55.9 VITAMIN D INSUFFICIENCY: ICD-10-CM

## 2017-02-16 DIAGNOSIS — Z79.899 ENCOUNTER FOR LONG-TERM (CURRENT) USE OF HIGH-RISK MEDICATION: ICD-10-CM

## 2017-02-16 DIAGNOSIS — R53.83 FATIGUE, UNSPECIFIED TYPE: ICD-10-CM

## 2017-02-16 DIAGNOSIS — G35 MULTIPLE SCLEROSIS: Primary | ICD-10-CM

## 2017-02-16 DIAGNOSIS — R26.89 BALANCE PROBLEM: ICD-10-CM

## 2017-02-16 DIAGNOSIS — Z71.89 COUNSELING REGARDING GOALS OF CARE: ICD-10-CM

## 2017-02-16 PROCEDURE — 1159F MED LIST DOCD IN RCRD: CPT | Mod: S$GLB,,, | Performed by: PHYSICIAN ASSISTANT

## 2017-02-16 PROCEDURE — 1160F RVW MEDS BY RX/DR IN RCRD: CPT | Mod: S$GLB,,, | Performed by: PHYSICIAN ASSISTANT

## 2017-02-16 PROCEDURE — 3078F DIAST BP <80 MM HG: CPT | Mod: S$GLB,,, | Performed by: PHYSICIAN ASSISTANT

## 2017-02-16 PROCEDURE — 1157F ADVNC CARE PLAN IN RCRD: CPT | Mod: S$GLB,,, | Performed by: PHYSICIAN ASSISTANT

## 2017-02-16 PROCEDURE — 99999 PR PBB SHADOW E&M-EST. PATIENT-LVL IV: CPT | Mod: PBBFAC,,, | Performed by: PHYSICIAN ASSISTANT

## 2017-02-16 PROCEDURE — 1126F AMNT PAIN NOTED NONE PRSNT: CPT | Mod: S$GLB,,, | Performed by: PHYSICIAN ASSISTANT

## 2017-02-16 PROCEDURE — 3075F SYST BP GE 130 - 139MM HG: CPT | Mod: S$GLB,,, | Performed by: PHYSICIAN ASSISTANT

## 2017-02-16 PROCEDURE — 99215 OFFICE O/P EST HI 40 MIN: CPT | Mod: S$GLB,,, | Performed by: PHYSICIAN ASSISTANT

## 2017-02-16 NOTE — PROGRESS NOTES
Subjective:       Patient ID: Alyssa John is a 66 y.o. female who presents today for a routine clinic visit for MS.    MS HPI:  · DMT: Aubagio  · Side effects from DMT? No  · Taking vitamin D3 as recommended? Yes -  Dose:  5,000 IU daily  · No longer have dizzy spells  · Feels like she is gaining weight  · She is going to PT  · She is using a pumice stone on the bottom of her feet nightly and feels this has helped with the pain in her feet at night--she is able to sleep better    SOCIAL HISTORY  Social History   Substance Use Topics    Smoking status: Former Smoker     Packs/day: 1.50     Quit date: 12/26/2006    Smokeless tobacco: Never Used    Alcohol use No     Living arrangements - the patient lives with her .  Employment Not Employed    MS ROS:  · Fatigue: Yes - taking 200mg Provigil in AM  · Bowel Dysfunction: Yes - bowels are improved, just taking fiber now not immodium   · Spasticity: No  · Visual Symptoms: No  · Cognitive: No  · Mood Disorder: Yes - Cymbalta  · Gait Disturbance: Yes-currently in PT  · Falls: No  · Hand Dysfunction: Yes - now seeing Rheumatology  · Sexual Dysfunction: Not Assessed  · Skin Breakdown: No  · Tremors: Yes - shaking noted mostly with meals  · Dysphagia: No  · Dysarthria: No  · Heat sensitivity: Yes   · Any un-met adaptive needs? No  · Copay Assist? Yes -   · Clinical Trial candidate? She is participating in the ESTEEM clinical trial at present      Objective:        1. 25 foot timed walk:14.2s with walker in October of 2014  Timed 25 Foot Walk: 10/27/2016 2/16/2017   Did patient wear an AFO? No No   Was assistive device used? Yes Yes   Assistive device used (toña one): Unilateral Assistance Unilateral Assistance   Unilateral device used Cane Cane   Time for 25 Foot Walk (seconds) 12.8 10.2   Time for 25 Foot Walk (seconds) 9 -       Neurologic Exam     Mental Status   Oriented to person, place, and time.   Follows 3 step commands.   Speech: speech is normal    Level of consciousness: alert  Normal comprehension.     Cranial Nerves     CN II   Visual acuity: (20/20 OU with Snellen hand held chart at 6 ft)    CN III, IV, VI   Pupils are equal, round, and reactive to light.  Extraocular motions are normal.     CN V   Facial sensation intact.     CN VII   Facial expression full, symmetric.     CN VIII   Hearing: intact (finger rub)    CN IX, X   Palate: symmetric    CN XI   CN XI normal.     CN XII   Tongue deviation: none    Motor Exam   Muscle bulk: normal  Overall muscle tone: normal  Right leg tone: increased  Left leg tone: increased    Strength   Right deltoid: 5/5  Left deltoid: 5/5  Right triceps: 5/5  Left triceps: 5/5  Right wrist extension: 5/5  Left wrist extension: 5/5  Right interossei: 5/5  Left interossei: 5/5  Right iliopsoas: 5/5  Left iliopsoas: 5/5  Right hamstrin/5  Left hamstrin/5  Right anterior tibial: 5/5  Left anterior tibial: 5/5  Right peroneal: 5/5  Left peroneal: 5/5    Sensory Exam   Light touch normal.   Right arm vibration: normal  Left arm vibration: normal  Right leg vibration: decreased from ankle  Left leg vibration: decreased from toes    Gait, Coordination, and Reflexes     Gait  Gait: wide-based    Coordination   Finger to nose coordination: abnormal  Tandem walking coordination: abnormal    Tremor   Resting tremor: absent  Action tremor: absent    Reflexes   Right brachioradialis: 2+  Left brachioradialis: 2+  Right biceps: 2+  Left biceps: 2+  Right triceps: 2+  Left triceps: 2+  Right patellar: 2+  Left patellar: 2+  Right achilles: 2+  Left achilles: 2+  Right plantar: normal  Left plantar: normal  Right ankle clonus: absent  Left ankle clonus: absent  Right pendular knee jerk: absent  Left pendular knee jerk: absent                  Imaging:     Results for orders placed during the hospital encounter of 16   MRI Brain W WO Contrast    Narrative Multiplanar, multisequence MR imaging of the brain was performed before  and after intravenous administration of Gadavist 7 mL.  The examination was acquired accordingly demyelinating disease protocol.    Comparison: 07/01/2015; no more recent comparison examinations are available.    FINDINGS: There is moderate, confluent T2 hyperintense white matter signal abnormality predominating within periventricular white matter as before.  Involvement along the callosal septal interface is present, and there is moderate thinning of the corpus callosum.  Numerous T1 hypointense foci are scattered within the periventricular white matter signal abnormality.  There has been no significant change in the distribution of involvement, and no new lesions are identified.  There is no associated enhancement or restricted diffusion to indicate active demyelination.    Moderate diffuse cerebral volume loss is present, above left expected for patient age.  There is compensatory enlargement of the ventricular system.  There is no mass or abnormal enhancement.  No evidence for hemorrhage.  The brain stem and cerebellum are unremarkable.  Bilateral cataract repair are noted.    Impression 1.  Stable, moderate white matter disease in a pattern compatible with multiple sclerosis.  No evidence for active demyelination.  2.  Moderate cerebral volume loss above that expected for patient age.      Electronically signed by: Baldo Salazar MD  Date:     12/07/16  Time:    13:51          Labs:     Lab Results   Component Value Date    MJDHYRKZ60ZT 53 10/19/2016    HQNCLGEJ84TC 78 06/13/2016    QVMOFUGJ91MR 59 04/07/2015     Lab Results   Component Value Date    JCVINDEX SEE COMMENT (A) 12/09/2015    JCVANTIBODY SEE COMMENT 12/09/2015     Lab Results   Component Value Date    FF5PXTOY 59.1 03/31/2016    ABSOLUTECD3 619 (L) 03/31/2016    NQ4VJTMB 19.9 03/31/2016    ABSOLUTECD8 208 03/31/2016    JU5JZKQE 39.1 03/31/2016    ABSOLUTECD4 409 03/31/2016    LABCD48 1.97 03/31/2016     Lab Results   Component Value Date    WBC  4.52 01/19/2017    RBC 4.72 01/19/2017    HGB 13.5 01/19/2017    HCT 42.5 01/19/2017    MCV 90 01/19/2017    MCH 28.6 01/19/2017    MCHC 31.8 (L) 01/19/2017    RDW 13.5 01/19/2017     01/19/2017    MPV 11.8 01/19/2017    GRAN 2.7 01/19/2017    GRAN 58.9 01/19/2017    LYMPH 1.2 01/19/2017    LYMPH 27.2 01/19/2017    MONO 0.4 01/19/2017    MONO 7.7 01/19/2017    EOS 0.3 01/19/2017    BASO 0.03 01/19/2017    EOSINOPHIL 5.5 01/19/2017    BASOPHIL 0.7 01/19/2017     Lab Results   Component Value Date    ALT 37 01/19/2017    AST 25 01/19/2017    ALKPHOS 115 01/19/2017    BILITOT 0.3 01/19/2017       Diagnosis/Assessment/Plan:    1. Multiple Sclerosis  · Assessment: Patient is stable both clinically and radiographically. In fact, her timed walk is improved from October 2014 to today by 4 sec.   · Imaging: MRI stable as above, with no new or enhancing lesions  · Disease Modifying Therapies: continue Aubagio and high dose Vit D3. Her safety labs are normal--we will continue to monitor. The patient was counseled about the importance of vitamin D supplementation in multiple sclerosis, and the fact that data suggests that high circulating blood levels of vitamin D has an ameliorating effect on the disease course in multiple sclerosis in general.      2. MS Symptom Assessment / Management  · Fatigue: continue Provigil  · Gait Disturbance: Continue with PT    Over 50% of this 40 minute visit was spent in direct face to face counseling of the patient and her  regarding her current symptoms and management of same.  Follow up in 4 months with Dr. Apodaca  Patient agreed to POC today.    Attending, Dr. Apodaca, was available during today's encounter. Any change to plan along with cosign to appear in the EMR.     Risa Oshea PA-C  [facility]      Encounter for long-term (current) use of high-risk medication    Counseling regarding goals of care    Multiple sclerosis    Seizures    Vitamin D insufficiency    Balance  problem    Fatigue, unspecified type

## 2017-02-16 NOTE — MR AVS SNAPSHOT
[facility]  [address]  [facility] [address]  Phone: [phone]                  Alyssa John   2017 1:45 PM   Office Visit    Description:  Female : 1950   Provider:  Risa Oshea PA-C   Department:  [facility]           Reason for Visit     Neurologic Problem           Diagnoses this Visit        Comments    Encounter for long-term (current) use of high-risk medication    -  Primary     Counseling regarding goals of care         Multiple sclerosis         Seizures         Vitamin D insufficiency         Balance problem         Fatigue, unspecified type                To Do List           Future Appointments        Provider Department Dept Phone    2017 3:00 PM Thao Coburn, PTA [facility] [phone]    2017 3:00 PM Gage Marcano, PT [facility] [phone]    2017 2:00 PM Thao Coburn, PTA [facility] [phone]    2017 4:00 PM Sameera Osborne, PT [facility] [phone]    3/3/2017 2:00 PM Danyelle Benoit, PTA [facility] [phone]      Goals (5 Years of Data)     None      Follow-Up and Disposition     Return in about 4 months (around 2017).      [facility]     [facility] -  Assistance  Registered nurses in the [facility] provide clinical advisement, health education, appointment booking, and other advisory services.  Call for this free service at [phone].             Medications           Message regarding Medications     Verify the changes and/or additions to your medication regime listed below are the same as discussed with your clinician today.  If any of these changes or additions are incorrect, please notify your healthcare provider.             Verify that the below list of medications is an accurate representation of the medications you are  "currently taking.  If none reported, the list may be blank. If incorrect, please contact your healthcare provider. Carry this list with you in case of emergency.           Current Medications     ascorbic acid, vitamin C, (VITAMIN C) 100 MG tablet Take 100 mg by mouth once daily.    atorvastatin (LIPITOR) 40 MG tablet TAKE 1 TABLET (40 MG TOTAL) BY MOUTH ONCE DAILY.    cholecalciferol, vitamin D3, (VITAMIN D3) 5,000 unit Tab Take 5,000 Units by mouth once daily.    clonazePAM (KLONOPIN) 1 MG tablet TAKE 1 TABLET BY MOUTH TWICE A DAY    clopidogrel (PLAVIX) 75 mg tablet Take 75 mg by mouth once daily.      duloxetine (CYMBALTA) 20 MG capsule TAKE 1 CAPSULE EVERY DAY    famotidine (PEPCID) 20 MG tablet TAKE 1 TABLET BY MOUTH TWICE DAILY    ferrous sulfate 325 (65 FE) MG EC tablet Take 325 mg by mouth 3 (three) times daily with meals.    ibuprofen 200 mg Cap Take by mouth as needed.    modafinil (PROVIGIL) 100 MG Tab Take 1 tablet (100 mg total) by mouth 2 (two) times daily.    multivitamin with minerals (HAIR,SKIN AND NAILS) tablet Take 1 tablet by mouth once daily.    nitroGLYCERIN (NITROSTAT) 0.4 MG SL tablet Place 0.4 mg under the tongue every 5 (five) minutes as needed.      PSYLLIUM SEED, WITH DEXTROSE, (FIBER ORAL) Take by mouth 2 (two) times daily.    teriflunomide (AUBAGIO) 14 mg Tab Take 14 mg by mouth once daily.    topiramate (TOPAMAX) 50 MG tablet TAKE 1 TABLET (50 MG TOTAL) BY MOUTH 2 (TWO) TIMES DAILY.    azithromycin (Z-ANDREINA) 250 MG tablet Take 2 tablets by mouth on day 1; Take 1 tablet by mouth on days 2-5           Clinical Reference Information           Your Vitals Were     BP Pulse Height             134/72 80 5' 0.35" (1.533 m)         Blood Pressure          Most Recent Value    BP  134/72      Allergies as of 2/16/2017     Codeine    Decadron [Dexamethasone Sodium Phosphate]    Dilantin [Phenytoin Sodium Extended]    Tegretol [Carbamazepine]      Immunizations Administered on Date of Encounter - " 2/16/2017     None      Language Assistance Services     ATTENTION: Language assistance services are available, free of charge. Please call [phone].      ATENCIÓN: Si habla diego, tiene a reeves disposición servicios gratuitos de asistencia lingüística. Llame al [phone].     CHÚ Ý: N?u b?n nói Ti?ng Vi?t, có các d?ch v? h? tr? ngôn ng? mi?n phí dành cho b?n. G?i s? [phone].         [facility] complies with applicable Federal civil rights laws and does not discriminate on the basis of race, color, national origin, age, disability, or sex.

## 2017-02-17 ENCOUNTER — CLINICAL SUPPORT (OUTPATIENT)
Dept: REHABILITATION | Facility: HOSPITAL | Age: 67
End: 2017-02-17
Attending: PSYCHIATRY & NEUROLOGY
Payer: MEDICARE

## 2017-02-17 DIAGNOSIS — G35 MULTIPLE SCLEROSIS: ICD-10-CM

## 2017-02-17 DIAGNOSIS — R26.89 BALANCE PROBLEM: ICD-10-CM

## 2017-02-17 PROCEDURE — 97110 THERAPEUTIC EXERCISES: CPT | Mod: PN

## 2017-02-17 NOTE — PROGRESS NOTES
Name: Alyssa John  [facility] Number: [id number]  Date of Treatment: 02/17/2017   Diagnosis:   Encounter Diagnoses   Name Primary?    Balance problem     Multiple sclerosis        Time in: 1508  Time Out: 1558  Total Treatment Time: 42      Subjective:    Alyssa reports no complaints.  Patient reports their pain to be 0/10 on a 0-10 scale with 0 being no pain and 10 being the worst pain imaginable.    Objective    Patient received individual therapy to increase strength, flexibility, posture and balance with activities as follows:     Alyssa received therapeutic exercises to develop strength, ROM, flexibility and balance for 42 minutes including:     Bike x 15 minutes level 1.5  Shuttle 37# x 8 minutes, B, R, L  Ball squeeze x 30  Clamshell BlkTB x 4 minutes  Sit-stand x 5, min a      Written Home Exercises Provided: cont HEP  Pt demo good understanding of the education provided. Alyssa demonstrated good return demonstration of activities.     Assessment:     Easily fatigued.    Pt will continue to benefit from skilled PT intervention. Medical Necessity is demonstrated by:  Fall Risk, Unable to participate fully in daily activities, Requires skilled supervision to complete and progress HEP and Weakness.    Patient is making good progress towards established goals.      Plan:  Continue with established Plan of Care towards PT goals.

## 2017-02-20 ENCOUNTER — TELEPHONE (OUTPATIENT)
Dept: RESEARCH | Facility: HOSPITAL | Age: 67
End: 2017-02-20

## 2017-02-20 NOTE — PROGRESS NOTES
..Esteem Scheduled  Visit  Subject seen in the clinic today by provider Risa Oshea.    Pregnancy  Subject did not report pregnancy at this visit    EDSS  Not completed at this visit     Diet and Exercise  Not addressed at this visit    MS Relapses  Subject did not have any MS relapses from     Hematology  Not completed at this visit     Chemistry  Not completed at this visit     Other Labs  Not completed at this visit     Urinalysis  Not completed at this visit     Con Meds   Reviewed with subject and updated     Adverse Events / Serious Adverse events   Subject did not repot any AE's or JAZMIN's    Esteem PRO booklet   Subject left without completing the  PRO's at this visit, will try to have subject come in and complete them or mail them to her home.

## 2017-02-21 ENCOUNTER — CLINICAL SUPPORT (OUTPATIENT)
Dept: REHABILITATION | Facility: HOSPITAL | Age: 67
End: 2017-02-21
Attending: PSYCHIATRY & NEUROLOGY
Payer: MEDICARE

## 2017-02-21 DIAGNOSIS — R26.81 GAIT INSTABILITY: Primary | ICD-10-CM

## 2017-02-21 PROCEDURE — 97110 THERAPEUTIC EXERCISES: CPT | Mod: PN

## 2017-02-21 NOTE — PROGRESS NOTES
TIME RECORD    Date:  02/21/2017    Start Time:  1508  Stop Time:  1553    PROCEDURES:    TIMED  Procedure Time Min.   TE Start:1508  Stop:1553 45    Start:  Stop:     Start:  Stop:     Start:  Stop:          UNTIMED  Procedure Time Min.    Start:  Stop:     Start:  Stop:      Total Timed Minutes:  45  Total Timed Units:  3  Total Untimed Units:  0  Charges Billed/# of units:  3      Progress/Current Status    Subjective:     Patient ID: Alyssa John is a 66 y.o. female.  Diagnosis:   1. Gait instability       Pain: 0 /10  No pain.    Objective:     TE for ROM, strength,gait, balance, gait training with cane.    Assessment:     Performed ex well.    Patient Education/Response:     Gait pattern ed, BIANCA as tolerated.    Plans and Goals:     Cont per POC.

## 2017-02-21 NOTE — PROGRESS NOTES
02/21/17 1600   Ankle Exercises   Double Leg Calf Raises Reps/Sets/Weight 30   Knee Exercises   Frontal Squats Reps/Sets/Weight 30   Tg/Shuttle/Reformer Squats Reps/Sets/Weight/Level 37# 6'   Additional Exercises   Additional Exercise bike 15' l2.5SHUTTLE 37# 6'.   Additional Exercise BALANCE EX, MARCHING 30/30   Additional Exercise GAIT TRAINING, SIT TO STAND

## 2017-02-24 ENCOUNTER — CLINICAL SUPPORT (OUTPATIENT)
Dept: REHABILITATION | Facility: HOSPITAL | Age: 67
End: 2017-02-24
Attending: PSYCHIATRY & NEUROLOGY
Payer: MEDICARE

## 2017-02-24 DIAGNOSIS — R26.89 BALANCE PROBLEM: ICD-10-CM

## 2017-02-24 DIAGNOSIS — G35 MULTIPLE SCLEROSIS: ICD-10-CM

## 2017-02-24 PROCEDURE — 97110 THERAPEUTIC EXERCISES: CPT | Mod: PN

## 2017-02-24 NOTE — PROGRESS NOTES
"Name: Alyssa John  [facility] Number: [id number]  Date of Treatment: 02/24/2017   Diagnosis:   Encounter Diagnoses   Name Primary?    Balance problem     Multiple sclerosis        Time in: 1105  Time Out: 1159  Total Treatment Time: 54      Subjective:    Alyssa reports "I'm tired today."  Patient reports their pain to be 0/10 on a 0-10 scale with 0 being no pain and 10 being the worst pain imaginable.    Objective    Patient received individual therapy to increase strength, flexibility and balance with activities as follows:     Alyssa received therapeutic exercises to develop strength, flexibility and balance for 54 minutes including:     Bike x 16 minutes level 2.5  Shuttle: 37# x 8 minutes, R, L, B  Clamshell BlkTB x 20  LAQ 2# x 30 B  Seated marches 2# x 30 B  Ball squeeze x 30  Sit-stand x  10, w/UE support    Written Home Exercises Provided: cont HEP  Pt demo good understanding of the education provided. Alyssa demonstrated good return demonstration of activities.     Assessment:     Pt did not want to participate in standing exercises in //bars today.  Pt will continue to benefit from skilled PT intervention. Medical Necessity is demonstrated by:  Fall Risk, Unable to participate fully in daily activities, Requires skilled supervision to complete and progress HEP and Weakness.    Patient is making good progress towards established goals.      Plan:  Continue with established Plan of Care towards PT goals.   "

## 2017-03-01 ENCOUNTER — DOCUMENTATION ONLY (OUTPATIENT)
Dept: NEUROLOGY | Facility: CLINIC | Age: 67
End: 2017-03-01

## 2017-03-03 ENCOUNTER — CLINICAL SUPPORT (OUTPATIENT)
Dept: REHABILITATION | Facility: HOSPITAL | Age: 67
End: 2017-03-03
Attending: PSYCHIATRY & NEUROLOGY
Payer: MEDICARE

## 2017-03-03 ENCOUNTER — OFFICE VISIT (OUTPATIENT)
Dept: OPTOMETRY | Facility: CLINIC | Age: 67
End: 2017-03-03
Payer: MEDICARE

## 2017-03-03 ENCOUNTER — PATIENT MESSAGE (OUTPATIENT)
Dept: FAMILY MEDICINE | Facility: CLINIC | Age: 67
End: 2017-03-03

## 2017-03-03 DIAGNOSIS — G35 MULTIPLE SCLEROSIS: ICD-10-CM

## 2017-03-03 DIAGNOSIS — R26.89 BALANCE PROBLEM: ICD-10-CM

## 2017-03-03 DIAGNOSIS — H04.123 DRY EYE SYNDROME, BILATERAL: Primary | ICD-10-CM

## 2017-03-03 PROCEDURE — 92002 INTRM OPH EXAM NEW PATIENT: CPT | Mod: S$GLB,,, | Performed by: OPTOMETRIST

## 2017-03-03 PROCEDURE — 97110 THERAPEUTIC EXERCISES: CPT | Mod: PN

## 2017-03-03 PROCEDURE — 99999 PR PBB SHADOW E&M-EST. PATIENT-LVL I: CPT | Mod: PBBFAC,,, | Performed by: OPTOMETRIST

## 2017-03-03 NOTE — PROGRESS NOTES
HPI     CC: Pt here today because over last 3 weeks had bump in left eye. Pt had   some irritation with the bump but now is better. Pt states left eye has   been watery. Pt left eye has been crusty when she wakes up.     (+) Cataract Sx  (+) DLE was when had cataract sx.     (-) pain / (+) discomfort  (-) headaches  (-) diplopia   (-) flashes / (-) floaters         Last edited by Kevin Eddy, OD on 3/3/2017 10:09 AM.     ROS     Positive for: Eyes    Negative for: Constitutional, Gastrointestinal, Neurological, Skin,   Genitourinary, Musculoskeletal, HENT, Endocrine, Cardiovascular,   Respiratory, Psychiatric, Allergic/Imm, Heme/Lymph    Last edited by Kevin Eddy, OD on 3/3/2017 10:09 AM. (History)        Assessment /Plan     For exam results, see Encounter Report.    Dry eye syndrome, bilateral      Bleph ou with RAVEN. No bump seen on eye today. Discussed findings and treatment options. Pt prefers to start with otc treatment. Start otc systane gel drop qid ou, caution transient blurring of vision with gel drops. Recommend lid scrubs qPM ou with baby shampoo or Ocusoft lid cleanser. Return if no improvement or worsening of symptoms. Otherwise, return when ready for comprehensive eye exam.

## 2017-03-03 NOTE — PROGRESS NOTES
Name: Alyssa John  [facility] Number: [id number]  Date of Treatment: 03/03/2017   Diagnosis:   Encounter Diagnoses   Name Primary?    Balance problem     Multiple sclerosis        Time in: 1415  Time Out: 1500  Total Treatment Time: 45      Subjective:    Alyssa reports pain R thigh after shuttle.  Patient reports their pain to be 0/10 on a 0-10 scale with 0 being no pain and 10 being the worst pain imaginable.  Pt arrived late for appt.  Objective    Patient received individual therapy to increase strength and endurance with activities as follows:     Alyssa received therapeutic exercises to develop strength and endurance for 45 minutes including:     Bike level 3 x 8 minutes  Shuttle: 43# x 2 minutes, 37# x 6 minutes B, R, L  Seated marches, laq 2# x 30 B each  Clamshell BTB x 30   Sit-stand 2 x 5, w/UE support  Ball squeeze x 30    Written Home Exercises Provided: cont HEP  Pt demo good understanding of the education provided. Alyssa demonstrated good return demonstration of activities.     Assessment:     Pt fatigued today.  Did not tolerate increased weight with shuttle.    Pt will continue to benefit from skilled PT intervention. Medical Necessity is demonstrated by:  Unable to participate fully in daily activities, Requires skilled supervision to complete and progress HEP and Weakness.    Patient is making good progress towards established goals.      Plan:  Continue with established Plan of Care towards PT goals.

## 2017-03-07 ENCOUNTER — CLINICAL SUPPORT (OUTPATIENT)
Dept: REHABILITATION | Facility: HOSPITAL | Age: 67
End: 2017-03-07
Attending: PSYCHIATRY & NEUROLOGY
Payer: MEDICARE

## 2017-03-07 DIAGNOSIS — R26.89 BALANCE PROBLEM: ICD-10-CM

## 2017-03-07 DIAGNOSIS — G35 MULTIPLE SCLEROSIS: ICD-10-CM

## 2017-03-07 PROCEDURE — 97110 THERAPEUTIC EXERCISES: CPT | Mod: PN

## 2017-03-07 NOTE — PROGRESS NOTES
Name: Alyssa John  [facility] Number: [id number]  Date of Treatment: 03/07/2017   Diagnosis:   Encounter Diagnoses   Name Primary?    Balance problem     Multiple sclerosis        Time in: 1405  Time Out: 1459  Total Treatment Time: 54      Subjective:    Alyssa reports no changes.  Patient reports their pain to be n/a/10 on a 0-10 scale with 0 being no pain and 10 being the worst pain imaginable.    Objective  Alyssa received therapeutic exercises to develop strength, endurance, ROM, flexibility, posture, core stabilization and balance for 54 minutes including:    Bike level 3, 2 miles, 15:40   Shuttle: 43# x 4 minutes B, 37# x 2 minutes R, L each   Seated marches, laq 2# x 30 B each   Clamshell BTB x 30    Sit-stand 2 x 5, w/UE support   Ball squeeze x 30    Written Home Exercises Provided: Patient reports non compliance with specific HEP, due to the fact that she has increased her activity level at home and she is doing laundry and is trying to dance safely and do more at home  Pt demo good understanding of the education provided. Alyssa demonstrated good return demonstration of activities.     Assessment:   Remains requiring CGA/Min A with sit to stand without UE A.    Pt will continue to benefit from skilled PT intervention. Medical Necessity is demonstrated by:  Continued inability to participate in vocational pursuits, Requires skilled supervision to complete and progress HEP and Weakness.    Patient is making fair progress towards established goals.    Plan:  Continue with established Plan of Care towards PT goals.

## 2017-03-09 ENCOUNTER — LAB VISIT (OUTPATIENT)
Dept: LAB | Facility: HOSPITAL | Age: 67
End: 2017-03-09
Attending: SPECIALIST
Payer: MEDICARE

## 2017-03-09 DIAGNOSIS — Z79.899 ENCOUNTER FOR LONG-TERM (CURRENT) USE OF OTHER MEDICATIONS: ICD-10-CM

## 2017-03-09 DIAGNOSIS — E78.9 UNSPECIFIED DISORDER OF LIPOID METABOLISM: ICD-10-CM

## 2017-03-09 LAB
ALT SERPL W/O P-5'-P-CCNC: 36 U/L
CHOLEST/HDLC SERPL: 3.9 {RATIO}
HDL/CHOLESTEROL RATIO: 25.7 %
HDLC SERPL-MCNC: 191 MG/DL
HDLC SERPL-MCNC: 49 MG/DL
LDLC SERPL CALC-MCNC: 101.8 MG/DL
NONHDLC SERPL-MCNC: 142 MG/DL
TRIGL SERPL-MCNC: 201 MG/DL

## 2017-03-09 PROCEDURE — 80061 LIPID PANEL: CPT

## 2017-03-09 PROCEDURE — 36415 COLL VENOUS BLD VENIPUNCTURE: CPT | Mod: PO

## 2017-03-09 PROCEDURE — 84460 ALANINE AMINO (ALT) (SGPT): CPT

## 2017-03-10 ENCOUNTER — TELEPHONE (OUTPATIENT)
Dept: FAMILY MEDICINE | Facility: CLINIC | Age: 67
End: 2017-03-10

## 2017-03-10 NOTE — TELEPHONE ENCOUNTER
Patient came to clinic complaints of lower leg left redness and pain in leg. No warmth noted to area. Notified patient to go to ER,patient states she will go to [place] urgent care where her insurance is accepted.

## 2017-03-14 ENCOUNTER — PATIENT MESSAGE (OUTPATIENT)
Dept: FAMILY MEDICINE | Facility: CLINIC | Age: 67
End: 2017-03-14

## 2017-03-14 NOTE — TELEPHONE ENCOUNTER
Called for pt--spoke to  ([facility]) scheduled ED follow up with Ms. Patel PA-C for this Thursday. Thanks, Qing

## 2017-03-15 ENCOUNTER — TELEPHONE (OUTPATIENT)
Dept: FAMILY MEDICINE | Facility: CLINIC | Age: 67
End: 2017-03-15

## 2017-03-15 ENCOUNTER — DOCUMENTATION ONLY (OUTPATIENT)
Dept: FAMILY MEDICINE | Facility: CLINIC | Age: 67
End: 2017-03-15

## 2017-03-15 NOTE — PROGRESS NOTES
Pre-Visit Chart Review  For Appointment Scheduled on 3/15/17    Health Maintenance Due   Topic Date Due    TETANUS VACCINE  09/21/1968    Pneumococcal (65+) (1 of 2 - PCV13) 09/21/2015

## 2017-03-15 NOTE — TELEPHONE ENCOUNTER
Patient appointment time was moved to 2:00 with Laee Breakfield on 3/15/17. Patient was okay with appointment time.

## 2017-03-16 ENCOUNTER — LAB VISIT (OUTPATIENT)
Dept: LAB | Facility: HOSPITAL | Age: 67
End: 2017-03-16
Attending: FAMILY MEDICINE
Payer: MEDICARE

## 2017-03-16 ENCOUNTER — OFFICE VISIT (OUTPATIENT)
Dept: FAMILY MEDICINE | Facility: CLINIC | Age: 67
End: 2017-03-16
Payer: MEDICARE

## 2017-03-16 ENCOUNTER — HOSPITAL ENCOUNTER (OUTPATIENT)
Dept: RADIOLOGY | Facility: HOSPITAL | Age: 67
Discharge: HOME | End: 2017-03-16
Attending: FAMILY MEDICINE
Payer: MEDICARE

## 2017-03-16 VITALS
DIASTOLIC BLOOD PRESSURE: 98 MMHG | HEIGHT: 63 IN | SYSTOLIC BLOOD PRESSURE: 142 MMHG | BODY MASS INDEX: 29.8 KG/M2 | HEART RATE: 92 BPM | WEIGHT: 168.19 LBS | TEMPERATURE: 98 F

## 2017-03-16 DIAGNOSIS — R60.0 PERIPHERAL EDEMA: ICD-10-CM

## 2017-03-16 DIAGNOSIS — R79.89 POSITIVE D DIMER: ICD-10-CM

## 2017-03-16 DIAGNOSIS — D69.6 DECREASED PLATELET COUNT: ICD-10-CM

## 2017-03-16 DIAGNOSIS — R60.0 PERIPHERAL EDEMA: Primary | ICD-10-CM

## 2017-03-16 DIAGNOSIS — I10 ESSENTIAL HYPERTENSION: ICD-10-CM

## 2017-03-16 LAB
BASOPHILS # BLD AUTO: 0.03 K/UL
BASOPHILS NFR BLD: 0.6 %
DIFFERENTIAL METHOD: NORMAL
EOSINOPHIL # BLD AUTO: 0.2 K/UL
EOSINOPHIL NFR BLD: 4.2 %
ERYTHROCYTE [DISTWIDTH] IN BLOOD BY AUTOMATED COUNT: 13.3 %
HCT VFR BLD AUTO: 42.7 %
HGB BLD-MCNC: 14 G/DL
LYMPHOCYTES # BLD AUTO: 1.3 K/UL
LYMPHOCYTES NFR BLD: 25.5 %
MCH RBC QN AUTO: 29.1 PG
MCHC RBC AUTO-ENTMCNC: 32.8 %
MCV RBC AUTO: 89 FL
MONOCYTES # BLD AUTO: 0.3 K/UL
MONOCYTES NFR BLD: 6.4 %
NEUTROPHILS # BLD AUTO: 3.1 K/UL
NEUTROPHILS NFR BLD: 63.1 %
PLATELET # BLD AUTO: 167 K/UL
PMV BLD AUTO: 12 FL
RBC # BLD AUTO: 4.81 M/UL
WBC # BLD AUTO: 4.98 K/UL

## 2017-03-16 PROCEDURE — 1160F RVW MEDS BY RX/DR IN RCRD: CPT | Mod: S$GLB,,, | Performed by: PHYSICIAN ASSISTANT

## 2017-03-16 PROCEDURE — 99999 PR PBB SHADOW E&M-EST. PATIENT-LVL IV: CPT | Mod: PBBFAC,,, | Performed by: PHYSICIAN ASSISTANT

## 2017-03-16 PROCEDURE — 1125F AMNT PAIN NOTED PAIN PRSNT: CPT | Mod: S$GLB,,, | Performed by: PHYSICIAN ASSISTANT

## 2017-03-16 PROCEDURE — 99213 OFFICE O/P EST LOW 20 MIN: CPT | Mod: S$GLB,,, | Performed by: PHYSICIAN ASSISTANT

## 2017-03-16 PROCEDURE — 93971 EXTREMITY STUDY: CPT | Mod: TC

## 2017-03-16 PROCEDURE — 1159F MED LIST DOCD IN RCRD: CPT | Mod: S$GLB,,, | Performed by: PHYSICIAN ASSISTANT

## 2017-03-16 PROCEDURE — 1157F ADVNC CARE PLAN IN RCRD: CPT | Mod: S$GLB,,, | Performed by: PHYSICIAN ASSISTANT

## 2017-03-16 PROCEDURE — 36415 COLL VENOUS BLD VENIPUNCTURE: CPT | Mod: PO

## 2017-03-16 PROCEDURE — 99499 UNLISTED E&M SERVICE: CPT | Mod: S$GLB,,, | Performed by: PHYSICIAN ASSISTANT

## 2017-03-16 PROCEDURE — 85379 FIBRIN DEGRADATION QUANT: CPT

## 2017-03-16 PROCEDURE — 3077F SYST BP >= 140 MM HG: CPT | Mod: S$GLB,,, | Performed by: PHYSICIAN ASSISTANT

## 2017-03-16 PROCEDURE — 93971 EXTREMITY STUDY: CPT | Mod: 26,,, | Performed by: RADIOLOGY

## 2017-03-16 PROCEDURE — 85025 COMPLETE CBC W/AUTO DIFF WBC: CPT

## 2017-03-16 PROCEDURE — 3080F DIAST BP >= 90 MM HG: CPT | Mod: S$GLB,,, | Performed by: PHYSICIAN ASSISTANT

## 2017-03-16 NOTE — PROGRESS NOTES
Subjective:       Patient ID: Alyssa John is a 66 y.o. female.    Chief Complaint: No chief complaint on file.    HPI Comments: Patient presents for ER follow up.  She was seen in the ER for unilateral leg swelling and redness.  She had elevated d dimer and ultrasound was negative for DVT.  She was told to have another US in 1 week.  She was given keflex .The leg is still swollen and red although it has improved slightly.     Review of Systems   Constitutional: Negative for appetite change, chills, diaphoresis, fatigue and fever.   Respiratory: Negative for chest tightness and shortness of breath.    Cardiovascular: Positive for leg swelling. Negative for chest pain and palpitations.   Musculoskeletal: Positive for myalgias.   Skin: Positive for color change.       Objective:      Physical Exam   Constitutional: She appears well-developed and well-nourished. No distress.   Cardiovascular: Normal rate, regular rhythm and normal heart sounds.    Pulses:       Dorsalis pedis pulses are 2+ on the right side, and 2+ on the left side.   Pulmonary/Chest: Effort normal and breath sounds normal.   Musculoskeletal:        Right lower leg: She exhibits edema (trace).        Left lower leg: She exhibits edema (1 + ).   Skin: There is erythema.   Mild erythema to bilateral shins    Vitals reviewed.      Assessment:       1. Peripheral edema, unilateral     2. Positive D dimer    3. Decreased platelet count    4. Essential hypertension        Plan:       Diagnoses and all orders for this visit:    Peripheral edema, unilateral   -     US Lower Extremity Veins Left; Future    Positive D dimer  -     D dimer, quantitative; Future    Decreased platelet count  -     CBC auto differential; Future  likely secondary to Plavix   Complete antibiotics as prescribed  If above is normal she will discuss further with her cardiologist (venous insufficiency suspected)    Further recommendations will be made based on results   Essential  hypertension   She declines nurse visit for repeat BP check as she states that her cardiologist manages BP   Patient readiness: acceptance and barriers:none    During the course of the visit the patient was educated and counseled about the following:     Hypertension:   Medication: no change.    Goals: Hypertension: Reduce Blood Pressure    Did patient meet goals/outcomes: No    The following self management tools provided: declined    Patient Instructions (the written plan) was given to the patient/family.     Time spent with patient: 30 minutes

## 2017-03-16 NOTE — MR AVS SNAPSHOT
[facility]  [address]  [place] [address]  Phone: [phone]  Fax: [phone]                  Alyssa John   3/16/2017 2:00 PM   Office Visit    Description:  Female : 1950   Provider:  LEO Virgen   Department:  [facility]           Diagnoses this Visit        Comments    Peripheral edema    -  Primary     Positive D dimer         Decreased platelet count                To Do List           Future Appointments        Provider Department Dept Phone    3/16/2017 3:30 PM LAB, [facility] [phone]    3/17/2017 3:00 PM Sameera Osborne PT [facility] [phone]    2017 3:40 PM April Edmondson MD [facility] [phone]    2017 1:30 PM RESEARCH, NEUROLOGY-OP [facility] [phone]    2017 1:40 PM Genny Apodaca MD [facility]- Multiple Sclerosis [phone]      Goals (5 Years of Data)     None      [facility]     [facility] Care [facility] -  Assistance  Registered nurses in the [facility] provide clinical advisement, health education, appointment booking, and other advisory services.  Call for this free service at [phone].             Medications           Message regarding Medications     Verify the changes and/or additions to your medication regime listed below are the same as discussed with your clinician today.  If any of these changes or additions are incorrect, please notify your healthcare provider.             Verify that the below list of medications is an accurate representation of the medications you are currently taking.  If none reported, the list may be blank. If incorrect, please contact your healthcare provider. Carry this list with you in case of emergency.           Current Medications     ascorbic acid, vitamin C, (VITAMIN C) 100 MG tablet Take 100 mg by mouth once daily.    atorvastatin (LIPITOR) 40  "MG tablet TAKE 1 TABLET (40 MG TOTAL) BY MOUTH ONCE DAILY.    cholecalciferol, vitamin D3, (VITAMIN D3) 5,000 unit Tab Take 5,000 Units by mouth once daily.    clonazePAM (KLONOPIN) 1 MG tablet TAKE 1 TABLET BY MOUTH TWICE A DAY    clopidogrel (PLAVIX) 75 mg tablet Take 75 mg by mouth once daily.      duloxetine (CYMBALTA) 20 MG capsule TAKE 1 CAPSULE EVERY DAY    famotidine (PEPCID) 20 MG tablet TAKE 1 TABLET BY MOUTH TWICE DAILY    ferrous sulfate 325 (65 FE) MG EC tablet Take 325 mg by mouth 3 (three) times daily with meals.    ibuprofen 200 mg Cap Take by mouth as needed.    modafinil (PROVIGIL) 100 MG Tab Take 1 tablet (100 mg total) by mouth 2 (two) times daily.    multivitamin with minerals (HAIR,SKIN AND NAILS) tablet Take 1 tablet by mouth once daily.    nitroGLYCERIN (NITROSTAT) 0.4 MG SL tablet Place 0.4 mg under the tongue every 5 (five) minutes as needed.      PSYLLIUM SEED, WITH DEXTROSE, (FIBER ORAL) Take by mouth 2 (two) times daily.    teriflunomide (AUBAGIO) 14 mg Tab Take 14 mg by mouth once daily.    topiramate (TOPAMAX) 50 MG tablet TAKE 1 TABLET (50 MG TOTAL) BY MOUTH 2 (TWO) TIMES DAILY.           Clinical Reference Information           Your Vitals Were     BP Pulse Temp Height Weight BMI    142/98 (BP Method: Manual) 92 97.7 °F (36.5 °C) (Oral) 5' 3" (1.6 m) 76.3 kg (168 lb 3.4 oz) 29.8 kg/m2      Blood Pressure          Most Recent Value    BP  (!)  142/98      Allergies as of 3/16/2017     Codeine    Decadron [Dexamethasone Sodium Phosphate]    Dilantin [Phenytoin Sodium Extended]    Tegretol [Carbamazepine]      Immunizations Administered on Date of Encounter - 3/16/2017     None      Orders Placed During Today's Visit     Future Labs/Procedures Expected by Expires    CBC auto differential  3/16/2017 3/16/2018    D dimer, quantitative  3/16/2017 5/15/2018    US Lower Extremity Veins Left  3/16/2017 3/16/2018      Language Assistance Services     ATTENTION: Language assistance services are " available, free of charge. Please call [phone].      ATENCIÓN: Si habla diego, tiene a reeves disposición servicios gratuitos de asistencia lingüística. Llame al [phone].     CHÚ Ý: N?u b?n nói Ti?ng Vi?t, có các d?ch v? h? tr? ngôn ng? mi?n phí dành cho b?n. G?i s? [phone].         [facility] complies with applicable Federal civil rights laws and does not discriminate on the basis of race, color, national origin, age, disability, or sex.

## 2017-03-17 LAB — D DIMER PPP IA.FEU-MCNC: 0.55 MG/L FEU

## 2017-03-31 ENCOUNTER — CLINICAL SUPPORT (OUTPATIENT)
Dept: REHABILITATION | Facility: HOSPITAL | Age: 67
End: 2017-03-31
Attending: PSYCHIATRY & NEUROLOGY
Payer: MEDICARE

## 2017-03-31 DIAGNOSIS — R26.89 BALANCE PROBLEM: ICD-10-CM

## 2017-03-31 DIAGNOSIS — G35 MULTIPLE SCLEROSIS: ICD-10-CM

## 2017-03-31 PROCEDURE — G8979 MOBILITY GOAL STATUS: HCPCS | Mod: CI,PN

## 2017-03-31 PROCEDURE — G8978 MOBILITY CURRENT STATUS: HCPCS | Mod: CJ,PN

## 2017-03-31 NOTE — PLAN OF CARE
Name: Alyssa John  Date:   2017  Primary Diagnosis: .  Problem List Items Addressed This Visit     Multiple sclerosis    Balance problem          Time in: 0808  Time Out: 0850  Total Treatment Time: 42  Group Time: 0      Subjective:    Patient reports she still does not know why she has LLE swelling. She has seen her PCP and cardiologist with no explanation. However, she is now starting to feel better and swelling is decreased. Tired today from being up most of the night.  Patient reports their pain to be 0/10 on a 0-10 scale with 0 being no pain and 10 being the worst pain imaginable.    Objective    Reassessment performed for POC update purposes:     Gait training: SPC at SBA to HHA x 1 with cues for step length and floor clearance, L>R foot drop notable with occasional scuffing of floor     Lower Extremity Strength:  Right Lower Extremity: hip flex 4-/5, knee ext 4/5, knee flex 4/5, ankle DF 3+/5, PF 4+/5  Left Lower Extremity: hip flex 4/5, knee ext/flex 4/5, ankle DF 3/5, PF 4/5     TU sec c/ SPC   5 time sit<>stand: 12 sec with bilateral UE support, no propping legs against chair. Pt c/ one stand not fully upright c/ LOB back to chair     Lower Extremity Functional Scale (LEFS): 54/80  G code tool used: LEFS  Current modifier: CJ  Goal modifier: CI     Assessment:      Patient progressing with therapy program, although she has been less active in the last 3 weeks due to LLE swelling, DVT ruled out and cleared by cardiologistand 5x sit<>stand with improved transfer quality. Increased functional mobility per LEFS outcome measure as well. Pt continues with LE weakness, gait instability, impaired transfers, and impaired I/ADL's. Patient may benefit from skilled PT to address noted impairments and improve functional mobility.      Pt will continue to benefit from skilled PT intervention. Medical Necessity is demonstrated by: Fall Risk, Unable to participate fully in daily activities and  Weakness.      Patient is making good progress towards established goals.     Plan:  Continue with established Plan of Care towards PT goals.      PHYSICAL THERAPY UPDATED PLAN OF CARE      Alyssa John  03/31/17      Onset Date: Feb 2011  SOC Date: 11/03/16  Primary Diagnosis:          Problem List Items Addressed This Visit       Multiple sclerosis      Balance problem            Treatment Diagnosis: Gait instability  Precautions: falls  Visits from SOC: see EMR  Functional Level Prior to SOC: Assistive Device      Previous Short Term Goals Status: 1= ongoing 2= met  New Short Term Goals Status:  1=patient will improve hamstring flexibility by 3-5* 2= Patient will improve 5 time sit< <12 sec  Long Term Goal Status:  1) Pt will be I with HEP 2) Pt will return to community ambulation with strength improved by 1/2 muscle grade using LRAD 3) Pt will improve TUG to <14 sec 4) Pt will improve LEFS to <20% impairment  Reasons for Recertification of Therapy:  LE/trunk weakness, gait instability, impaired transfers, and impaired I/ADL's      Certification Period: 03/31/17 to 05/26/17  Recommended Treatment Plan: 2 times per week for 4 weeks: Gait Training, Group Therapy, Manual Therapy, Moist Heat/ Ice, Patient Education, Therapeutic Activites and Therapeutic Exercise      Thank you for referral.      Therapist's Name: Sameera Osborne, PT     I CERTIFY THE NEED FOR THESE SERVICES FURNISHED UNDER THIS PLAN OF TREATMENT AND WHILE UNDER MY CARE      Physician's comments: ________________________________________________________________________________________________________________________________________________          Physician's Name: ___________________________________

## 2017-04-03 ENCOUNTER — DOCUMENTATION ONLY (OUTPATIENT)
Dept: NEUROLOGY | Facility: CLINIC | Age: 67
End: 2017-04-03

## 2017-04-17 ENCOUNTER — PATIENT MESSAGE (OUTPATIENT)
Dept: NEUROLOGY | Facility: CLINIC | Age: 67
End: 2017-04-17

## 2017-04-18 ENCOUNTER — OFFICE VISIT (OUTPATIENT)
Dept: DERMATOLOGY | Facility: CLINIC | Age: 67
End: 2017-04-18
Payer: MEDICARE

## 2017-04-18 VITALS — BODY MASS INDEX: 29.95 KG/M2 | WEIGHT: 169 LBS | HEIGHT: 63 IN

## 2017-04-18 DIAGNOSIS — L72.0 EIC (EPIDERMAL INCLUSION CYST): Primary | ICD-10-CM

## 2017-04-18 PROCEDURE — 12041 INTMD RPR N-HF/GENIT 2.5CM/<: CPT | Mod: S$GLB,,, | Performed by: DERMATOLOGY

## 2017-04-18 PROCEDURE — 88304 TISSUE EXAM BY PATHOLOGIST: CPT | Performed by: PATHOLOGY

## 2017-04-18 PROCEDURE — 11422 EXC H-F-NK-SP B9+MARG 1.1-2: CPT | Mod: 51,S$GLB,, | Performed by: DERMATOLOGY

## 2017-04-18 PROCEDURE — 99499 UNLISTED E&M SERVICE: CPT | Mod: S$GLB,,, | Performed by: DERMATOLOGY

## 2017-04-18 PROCEDURE — 99999 PR PBB SHADOW E&M-EST. PATIENT-LVL III: CPT | Mod: PBBFAC,,, | Performed by: DERMATOLOGY

## 2017-04-18 NOTE — PATIENT INSTRUCTIONS
Surgery Wound Care    Your doctor has performed an excision today.  A bandage and vaseline ointment has been placed over the site.  This should remain in place for 24 hours.  It is recommended that you keep the area dry for the first 24 hours.  After 24 hours, you may remove the band aid and wash the area with warm soap and water and apply Vaseline jelly or aquaphore.  Many patients prefer to use Neosporin or Bacitracin ointment.  This is acceptable; however know that you can develop an allergy to this medication even if you have used it safely for years.  It is important to keep the area moist.  Letting it dry out and get air slows healing time, will worsen the scar, and make it more difficult to remove the stitches if they were placed.        If you notice increasing redness, tenderness, pain, or yellow drainage at the biopsy or surgical site, please notify your doctor.  These are signs of an infection.    If your biopsy/surgical site is bleeding, apply firm pressure for 15 minutes straight.  Repeat for another 15 minutes, if it is still bleeding.   If the surgical site continues to bleed, then please contact your doctor.     [facility]  [facility] DERMATOLOGY  [address] [place] [address]  Dept: [phone]

## 2017-04-18 NOTE — PROGRESS NOTES
Subjective:       Patient ID:  Alyssa John is a 66 y.o. female who presents for   Chief Complaint   Patient presents with    Procedure     cyst on neck     HPI Comments: Pt here for excision of symptomatic EIC posterior neck.  Feeling well today.   Denies pacemaker, defibrillator ; on plavix   No additional cutaneous complaints.         Review of Systems   Constitutional: Negative for fever and chills.        Objective:    Physical Exam   Constitutional: She appears well-developed and well-nourished. No distress.   Neurological: She is alert and oriented to person, place, and time. She is not disoriented.   Psychiatric: She has a normal mood and affect.   Skin:   Areas Examined (abnormalities noted in diagram):   Neck Inspection Performed              Diagram Legend     Erythematous scaling macule/papule c/w actinic keratosis       Vascular papule c/w angioma      Pigmented verrucoid papule/plaque c/w seborrheic keratosis      Yellow umbilicated papule c/w sebaceous hyperplasia      Irregularly shaped tan macule c/w lentigo     1-2 mm smooth white papules consistent with Milia      Movable subcutaneous cyst with punctum c/w epidermal inclusion cyst      Subcutaneous movable cyst c/w pilar cyst      Firm pink to brown papule c/w dermatofibroma      Pedunculated fleshy papule(s) c/w skin tag(s)      Evenly pigmented macule c/w junctional nevus     Mildly variegated pigmented, slightly irregular-bordered macule c/w mildly atypical nevus      Flesh colored to evenly pigmented papule c/w intradermal nevus       Pink pearly papule/plaque c/w basal cell carcinoma      Erythematous hyperkeratotic cursted plaque c/w SCC      Surgical scar with no sign of skin cancer recurrence      Open and closed comedones      Inflammatory papules and pustules      Verrucoid papule consistent consistent with wart     Erythematous eczematous patches and plaques     Dystrophic onycholytic nail with subungual debris c/w  onychomycosis     Umbilicated papule    Erythematous-base heme-crusted tan verrucoid plaque consistent with inflamed seborrheic keratosis     Erythematous Silvery Scaling Plaque c/w Psoriasis     See annotation      Assessment / Plan:      Pathology Orders:      Normal Orders This Visit    Tissue Specimen To Pathology, Dermatology     Questions:    Directional Terms:  Other(comment)    Clinical information:  EIC vs other    Specific Site:  posterior neck        EIC (epidermal inclusion cyst)  -     Tissue Specimen To Pathology, Dermatology    PREPARATION: The diagnosis, procedure, alternatives, benefits and risks, including but not limited to: infection, bleeding/bruising, drug reactions, pain, scar or cosmetic defect, local sensation disturbances, wound dehiscence (separation of wound edges after sutures removed) and/or recurrence of present condition were explained to the patient. The patient elected to proceed.  Patient's identity was verified using 2 patient identifiers and the side and site was verified.  Time out period with surgeon, assistant and patient in surgical suite was taken.    PROCEDURE: The location noted above was prepped and draped in the usual sterile fashion. The area was anesthetized with intradermal buffered xylocaine. A fusiform elliptical excision was done with #15 blade carried down to cyst wall, and dissection was carried out around the cyst wall.  Electrocoagulation was used to obtain hemostasis. Blood loss was minimal. The wound was then approximated in a layered fashion with subcutaneous and intradermal sutures of 3.0 Monocryl, approximately 3 and 3 in number, and the wound was then superficially closed with simple interrupted sutures of 3.0 Prolene.   Lesion size: 1.5 cm  Final incision length: 1.5cm           Return in about 6 months (around 10/18/2017).

## 2017-04-25 ENCOUNTER — CLINICAL SUPPORT (OUTPATIENT)
Dept: REHABILITATION | Facility: HOSPITAL | Age: 67
End: 2017-04-25
Attending: PSYCHIATRY & NEUROLOGY
Payer: MEDICARE

## 2017-04-25 DIAGNOSIS — R26.89 BALANCE PROBLEM: ICD-10-CM

## 2017-04-25 DIAGNOSIS — G35 MULTIPLE SCLEROSIS: ICD-10-CM

## 2017-04-25 PROCEDURE — 97110 THERAPEUTIC EXERCISES: CPT | Mod: PN

## 2017-04-26 NOTE — PROGRESS NOTES
Name: Alyssa John  Date:   04/25/2017  Primary Diagnosis: .  Problem List Items Addressed This Visit     Multiple sclerosis    Balance problem          Time in: 1511  Time Out: 1552  Total Treatment Time: 42  Group Time: 0      Subjective:    Patient reports feeling very tired last night from staying up all night waiting for her  to get home from his job.  Patient reports their pain to be 0/10 on a 0-10 scale with 0 being no pain and 10 being the worst pain imaginable.    Objective    Patient received individual therapy to address strength and endurance with 0 other patients with activities as follows:     Patient received therapeutic exercises to develop strength and endurance for 42 minutes including:     Bike x 15 min at L3  Shuttle 43# B for 4 min, 37# for 2 min L/R each  Seated ball squeezes 3x10  Gait training: SPC at SBA to HHA x 1 with cues for step length and floor clearance, L>R foot drop notable with occasional scuffing of floor    Assessment:     Limited endurance today secondary to fatigue level.  Pt will continue to benefit from skilled PT intervention. Medical Necessity is demonstrated by:  Fall Risk and Weakness.    Patient is making good progress towards established goals.    Plan:  Continue with established Plan of Care towards PT goals.

## 2017-04-28 ENCOUNTER — CLINICAL SUPPORT (OUTPATIENT)
Dept: REHABILITATION | Facility: HOSPITAL | Age: 67
End: 2017-04-28
Attending: PSYCHIATRY & NEUROLOGY
Payer: MEDICARE

## 2017-04-28 ENCOUNTER — CLINICAL SUPPORT (OUTPATIENT)
Dept: DERMATOLOGY | Facility: CLINIC | Age: 67
End: 2017-04-28
Payer: MEDICARE

## 2017-04-28 VITALS — HEIGHT: 63 IN | WEIGHT: 169 LBS | BODY MASS INDEX: 29.95 KG/M2

## 2017-04-28 DIAGNOSIS — R26.89 BALANCE PROBLEM: ICD-10-CM

## 2017-04-28 DIAGNOSIS — G35 MULTIPLE SCLEROSIS: ICD-10-CM

## 2017-04-28 PROCEDURE — 99999 PR PBB SHADOW E&M-EST. PATIENT-LVL II: CPT | Mod: PBBFAC,,,

## 2017-04-28 PROCEDURE — 97110 THERAPEUTIC EXERCISES: CPT | Mod: PN

## 2017-04-28 NOTE — PROGRESS NOTES
Name: Alyssa John  [facility] Number: [id number]  Date of Treatment: 04/28/2017   Diagnosis:   Encounter Diagnoses   Name Primary?    Balance problem     Multiple sclerosis        Time in: 1402  Time Out: 1458  Total Treatment Time: 46      Subjective:    Alyssa reports no complaints.  Patient reports their pain to be 0/10 on a 0-10 scale with 0 being no pain and 10 being the worst pain imaginable.    Objective    Patient received individual therapy to increase strength, endurance, flexibility and balance with activities as follows:     Alyssa received therapeutic exercises to develop strength, endurance, flexibility and balance for 46 minutes including:     Bike x 15 minutes level 3  Shuttle x 2 minutes 37#, pt requested to use restroom, did not want to continue shuttle after restroom  Manual hamstring stretch 3 x 30 sec B  LAQ, marches 3# x 30 B each  Sit-stand 3 x 5 trials, min a for balance, VC to lean with nose over toes  UBE  3/1    Written Home Exercises Provided: cont HEP  Pt demo good understanding of the education provided. Alyssa demonstrated fair return demonstration of activities.     Assessment:     Pt with difficulty leaning forward prior to standing.  Pt will continue to benefit from skilled PT intervention. Medical Necessity is demonstrated by:  Fall Risk, Unable to participate fully in daily activities, Requires skilled supervision to complete and progress HEP and Weakness.    Patient is making good progress towards established goals.      Plan:  Continue with established Plan of Care towards PT goals.

## 2017-04-28 NOTE — PROGRESS NOTES
Patient presents for suture removal. The wound is well healed without signs of infection.  The sutures are removed. Wound care and activity instructions given. Return prn.        FINAL PATHOLOGIC DIAGNOSIS   1. Skin, posterior neck, excision:   - FOLLICULAR CYST, INFUNDIBULAR TYPE.   MICROSCOPIC DESCRIPTION: Sections show a cyst lined by keratinizing squamous epithelium showing a visible   granular layer and filled with loose laminated keratin.   Diagnosed by: Frances Preciado M.D.   (Electronically Signed: 2017-04-26 12:38:59)

## 2017-05-02 ENCOUNTER — CLINICAL SUPPORT (OUTPATIENT)
Dept: REHABILITATION | Facility: HOSPITAL | Age: 67
End: 2017-05-02
Attending: PSYCHIATRY & NEUROLOGY
Payer: MEDICARE

## 2017-05-02 DIAGNOSIS — G35 MULTIPLE SCLEROSIS: ICD-10-CM

## 2017-05-02 DIAGNOSIS — R26.89 BALANCE PROBLEM: ICD-10-CM

## 2017-05-02 PROCEDURE — 97110 THERAPEUTIC EXERCISES: CPT | Mod: PN

## 2017-05-02 NOTE — PROGRESS NOTES
"Name: Alyssa John  [facility] Number: [id number]  Date of Treatment: 05/02/2017   Diagnosis:   Encounter Diagnoses   Name Primary?    Balance problem     Multiple sclerosis        Time in: 3:15  Time Out: 4:00   Total Treatment Time: 45      Subjective:    Alyssa reports "I'm tired today."  Patient reports their pain to be 0/10 on a 0-10 scale with 0 being no pain and 10 being the worst pain imaginable.  Pt arrived late for appt.  Objective    Patient received individual therapy to increase strength and endurance with activities as follows:     Alyssa received therapeutic exercises to develop strength and endurance for 45 minutes including:     Shuttle #37 bilateral 6'  Shuttle #37 R/L each 2'  LAQ #2 cuff weight 30x bilateral  Seated March #2 cuff weight 30x bilateral  Bike level 3: 15'  Sit to Stand 2 x 5    Written Home Exercises Provided: Continue HEP  Pt demo good understanding of the education provided. Alyssa demonstrated good return demonstration of activities.     Assessment:       Pt will continue to benefit from skilled PT intervention. Medical Necessity is demonstrated by:  Unable to participate fully in daily activities and Weakness.    Patient is making good progress towards established goals.    Patient improved sit to stand by sitting 3x without UE. Pt required V/C and tactile cues to complete sit to stand due to fatigue.       Plan:  Continue with established Plan of Care towards PT goals.   I certify that I was present in the room directing the student in service delivery and guiding them using my skilled judgment. As the co-signing therapist I have reviewed the students documentation and am responsible for the treatment, assessment, and plan.       "

## 2017-05-05 ENCOUNTER — CLINICAL SUPPORT (OUTPATIENT)
Dept: REHABILITATION | Facility: HOSPITAL | Age: 67
End: 2017-05-05
Attending: PSYCHIATRY & NEUROLOGY
Payer: MEDICARE

## 2017-05-05 DIAGNOSIS — G35 MULTIPLE SCLEROSIS: ICD-10-CM

## 2017-05-05 DIAGNOSIS — R26.89 BALANCE PROBLEM: ICD-10-CM

## 2017-05-05 PROCEDURE — 97110 THERAPEUTIC EXERCISES: CPT | Mod: PN

## 2017-05-05 NOTE — PROGRESS NOTES
Name: Alyssa John  Date:   05/05/2017  Primary Diagnosis: .  Problem List Items Addressed This Visit     Multiple sclerosis    Balance problem          Time in: 1508  Time Out: 1458  Total Treatment Time: 1545  Group Time: 0      Subjective:    Patient reports feeling very tired since she is out of one of her medications.  Patient reports their pain to be 0/10 on a 0-10 scale with 0 being no pain and 10 being the worst pain imaginable.    Objective    Patient received individual therapy to address strength, endurance, ROM and flexibility with 0 other patients with activities as follows:     Patient received therapeutic exercises to develop strength, endurance, ROM and flexibility for 47 minutes including:     Bike x 15 min at L3  Shuttle 37# x 6 min BLE  Shuttle 37# x 2 min L/R each  Sit<>stand 5 x 2    Assessment:     Patient tolerating treatment with decreased endurance. Decreased stability notable with ambulation, more assist needed for safety.    Pt will continue to benefit from skilled PT intervention. Medical Necessity is demonstrated by:  Fall Risk, Unable to participate fully in daily activities and Weakness.    Patient is making good progress towards established goals.    Plan:  Continue with established Plan of Care towards PT goals.

## 2017-05-12 ENCOUNTER — CLINICAL SUPPORT (OUTPATIENT)
Dept: REHABILITATION | Facility: HOSPITAL | Age: 67
End: 2017-05-12
Attending: PSYCHIATRY & NEUROLOGY
Payer: MEDICARE

## 2017-05-12 DIAGNOSIS — R26.89 BALANCE PROBLEM: ICD-10-CM

## 2017-05-12 DIAGNOSIS — G35 MULTIPLE SCLEROSIS: ICD-10-CM

## 2017-05-12 PROCEDURE — 97110 THERAPEUTIC EXERCISES: CPT | Mod: PN

## 2017-05-12 RX ORDER — ATORVASTATIN CALCIUM 40 MG/1
TABLET, FILM COATED ORAL
Qty: 90 TABLET | Refills: 0 | Status: SHIPPED | OUTPATIENT
Start: 2017-05-12 | End: 2017-08-06 | Stop reason: SDUPTHER

## 2017-05-12 NOTE — PROGRESS NOTES
[id number]  Date of Treatment: 05/12/2017   Diagnosis:   Encounter Diagnoses   Name Primary?    Balance problem     Multiple sclerosis        Time in: 1415  Time Out: 1500  Total Treatment Time: 45      Subjective:    Alyssa reports she's feeling better today.  Pt reports she has received her new medication.  Patient reports their pain to be n/a/10 on a 0-10 scale with 0 being no pain and 10 being the worst pain imaginable.    Objective    Patient received individual therapy to increase strength, endurance and ROM with activities as follows:     Alyssa received therapeutic exercises to develop strength, endurance and ROM for 45 minutes including:     Bike level 3.5 x 16'  UBE 3' / 3'  Shuttle bilateral leg press #37 x 10'    Written Home Exercises Provided: Continue HEP  Pt demo good understanding of the education provided. Alyssa demonstrated good return demonstration of activities.     Assessment:     Pt was able to increase resistance on bike to 3.5 and rode 2.15 miles without increase of s/s. Pt began to fatigue on last minute of UBE. Pt required HHA to ambulate to shuttle. Pt tolerated exercises well and is continuing to progress toward goals.     Pt will continue to benefit from skilled PT intervention. Medical Necessity is demonstrated by:  Unable to participate fully in daily activities, Requires skilled supervision to complete and progress HEP and Weakness.    Patient is making good progress towards established goals.      Plan:  Continue with established Plan of Care towards PT goals.   I certify that I was present in the room directing the student in service delivery and guiding them using my skilled judgment. As the co-signing therapist I have reviewed the students documentation and am responsible for the treatment, assessment, and plan.

## 2017-05-16 ENCOUNTER — CLINICAL SUPPORT (OUTPATIENT)
Dept: REHABILITATION | Facility: HOSPITAL | Age: 67
End: 2017-05-16
Attending: PSYCHIATRY & NEUROLOGY
Payer: MEDICARE

## 2017-05-16 DIAGNOSIS — R26.89 BALANCE PROBLEM: ICD-10-CM

## 2017-05-16 DIAGNOSIS — G35 MULTIPLE SCLEROSIS: ICD-10-CM

## 2017-05-16 PROCEDURE — 97110 THERAPEUTIC EXERCISES: CPT | Mod: PN

## 2017-05-16 NOTE — PROGRESS NOTES
Name: Alyssa John  [facility] Number: [id number]  Date of Treatment: 05/16/2017   Diagnosis:   Encounter Diagnoses   Name Primary?    Balance problem     Multiple sclerosis        Time in: 1408  Time Out: 1500  Total Treatment Time: 48      Subjective:    Alyssa reports no complaints.  Patient reports their pain to be 0/10 on a 0-10 scale with 0 being no pain and 10 being the worst pain imaginable.    Objective    Patient received individual therapy to increase strength, endurance and balance, gait with activities as follows:     Alyssa received therapeutic exercises to develop strength, endurance and balance, gait for 48 minutes including:     Bike x 15 minutes level 3.5, 2.25 miles  UBE 3/3  Shuttle: 37# B x 4 minutes R/L x 2 minutes total  UBE 3' rero, 5' forward  Sit-stand x 10    Written Home Exercises Provided: cont HEP  Pt demo good understanding of the education provided. Alyssa demonstrated good return demonstration of activities.     Assessment:     Sit-stand cga-min a, UE support needed.  Pt tends to lose balance posteriorly with sit-stand.  Pt will continue to benefit from skilled PT intervention. Medical Necessity is demonstrated by:  Fall Risk, Unable to participate fully in daily activities, Requires skilled supervision to complete and progress HEP and Weakness.    Patient is making good progress towards established goals.      Plan:  Continue with established Plan of Care towards PT goals.

## 2017-05-20 DIAGNOSIS — M79.2 NEUROPATHIC PAIN: ICD-10-CM

## 2017-05-21 RX ORDER — DULOXETIN HYDROCHLORIDE 20 MG/1
CAPSULE, DELAYED RELEASE ORAL
Qty: 90 CAPSULE | Refills: 1 | Status: SHIPPED | OUTPATIENT
Start: 2017-05-21 | End: 2017-11-24 | Stop reason: SDUPTHER

## 2017-05-23 ENCOUNTER — TELEPHONE (OUTPATIENT)
Dept: NEUROLOGY | Facility: CLINIC | Age: 67
End: 2017-05-23

## 2017-05-25 DIAGNOSIS — R56.9 CONVULSIONS, UNSPECIFIED CONVULSION TYPE: ICD-10-CM

## 2017-05-25 RX ORDER — TOPIRAMATE 50 MG/1
TABLET, FILM COATED ORAL
Qty: 60 TABLET | Refills: 5 | Status: SHIPPED | OUTPATIENT
Start: 2017-05-25 | End: 2017-11-15 | Stop reason: SDUPTHER

## 2017-05-26 ENCOUNTER — CLINICAL SUPPORT (OUTPATIENT)
Dept: REHABILITATION | Facility: HOSPITAL | Age: 67
End: 2017-05-26
Attending: PSYCHIATRY & NEUROLOGY
Payer: MEDICARE

## 2017-05-26 DIAGNOSIS — R26.89 BALANCE PROBLEM: ICD-10-CM

## 2017-05-26 DIAGNOSIS — G35 MULTIPLE SCLEROSIS: ICD-10-CM

## 2017-05-26 PROCEDURE — G8979 MOBILITY GOAL STATUS: HCPCS | Mod: CI,PN

## 2017-05-26 PROCEDURE — 97530 THERAPEUTIC ACTIVITIES: CPT | Mod: PN

## 2017-05-26 PROCEDURE — G8978 MOBILITY CURRENT STATUS: HCPCS | Mod: CJ,PN

## 2017-05-29 NOTE — PLAN OF CARE
TIME RECORD    Date: 05/26/2017    Start Time: 0805  Stop Time: 0855    PROCEDURES:    TIMED  Procedure Time Min.   TA Start:0827  Stop:0855 28    Start:  Stop:     Start:  Stop:     Start:  Stop:          UNTIMED  Procedure Time Min.   Reassessment Start:0805  Stop:0826 21    Start:  Stop:      Total Timed Minutes: 28  Total Timed Units:  2  Total Untimed Units:  0  Charges Billed/# of units:  2    PHYSICAL THERAPY UPDATED PLAN OF CARE    Alyssa John  05/26/2017    Onset Date:  Feb 2011  SOC Date:  11/03/16  Primary Diagnosis:    Problem List Items Addressed This Visit     Multiple sclerosis    Balance problem      Other Visit Diagnoses    None.       Treatment Diagnosis:  Gait instability  Precautions:  Falls,  Visits from SOC:  See EMR  Functional Level Prior to SOC: Assistive Device    Updated Assessment:    S: Pt states continued improvement with therapy, feels her mobility is slowly getting better.    O: Reassessment performed for POC update purposes, f/b therapeutic activities to address functional mobility:     Lower Extremity Strength:  Right Lower Extremity: hip flex 4-/5, knee ext 4/5, knee flex 4+/5, ankle DF 4-/5, PF 4+/5  Left Lower Extremity: hip flex 4/5, knee ext/flex 4/5, ankle DF 3/5, PF 4/5    Lower Extremity Functional Scale (LEFS): 49/80  G code tool used: LEFS  Current modifier: CJ  Goal modifier: CI    Gait training: SPC at SBA initially, CGA/HHA at end of session 2/2 fatigue; cues for step length and floor clearance, L>R foot drop notable with occasional drag    TUG (3 trials): 14.56 sec c/ SPC   5 time sit<>stand (4 trials): 10.89 sec with bilateral UE support, several LOB/uncontrolled descents  **cueing needed for technique in sit<>stands, avoidance of propping LE's against chair        A: Patient progressing with therapy program, improvements notable with LE strength, 5x sit<>stand test, and TUG. Pt continues with LE weakness, gait instability, impaired transfers, and impaired  I/ADL's. Patient may benefit from skilled PT to address noted impairments and improve functional mobility.     Pt will continue to benefit from skilled PT intervention. Medical Necessity is demonstrated by: Fall Risk, Unable to participate fully in daily activities and Weakness.     Patient is making good progress towards established goals.     P: Continue with established Plan of Care towards PT goals      Goals from Previous POC:  New Short Term Goals Status:  1=patient will improve hamstring flexibility by 3-5* 2= Patient will improve 5 time sit< <12 sec  Long Term Goal Status:  1) Pt will be I with HEP 2) Pt will return to community ambulation with strength improved by 1/2 muscle grade using LRAD 3) Pt will improve TUG to <14 sec 4) Pt will improve LEFS to <20% impairment    Previous Short Term Goals Status:   1= ongoing 2= met  New Short Term Goals:   1) Pt will improve hamstring flexibility by 3-5* 2) Pt will achieve 5x sit<>stand test in <11 sec with <3 LOB  Long Term Goals:   modified:  1) Pt will be I with HEP 2) Pt will return to community ambulation with strength improved by 1/2 muscle grade using LRAD 3) Pt will improve TUG to <14 sec 4) Pt will improve LEFS to <20% impairment 5) Pt will report increased standing time  Reasons for Recertification of Therapy:   LE/trunk weakness, postural abnormality, gait instability, decreased static/dynamic balance, impaired transfers, and impaired I/ADL's       Certification Period: 05/27/17 to 06/30/17  Recommended Treatment Plan: 2 times per week for 4 weeks: Gait Training, Group Therapy, Manual Therapy, Moist Heat/ Ice, Patient Education, Therapeutic Activites and Therapeutic Exercise  Other Recommendations:     Thank you for referral.    Therapist's Name: Sameera Osborne, PT  05/26/2017     I CERTIFY THE NEED FOR THESE SERVICES FURNISHED UNDER THIS PLAN OF TREATMENT AND WHILE UNDER MY CARE    Physician's comments:  ________________________________________________________________________________________________________________________________________________      Physician's Name: ___________________________________

## 2017-06-03 ENCOUNTER — DOCUMENTATION ONLY (OUTPATIENT)
Dept: FAMILY MEDICINE | Facility: CLINIC | Age: 67
End: 2017-06-03

## 2017-06-03 NOTE — PROGRESS NOTES
Pre-Visit Chart Review  For Appointment Scheduled on 6/6/17.    Health Maintenance Due   Topic Date Due    TETANUS VACCINE  09/21/1968    Pneumococcal (65+) (1 of 2 - PCV13) 09/21/2015

## 2017-06-05 ENCOUNTER — CLINICAL SUPPORT (OUTPATIENT)
Dept: REHABILITATION | Facility: HOSPITAL | Age: 67
End: 2017-06-05
Attending: PSYCHIATRY & NEUROLOGY
Payer: MEDICARE

## 2017-06-05 DIAGNOSIS — G35 MULTIPLE SCLEROSIS: ICD-10-CM

## 2017-06-05 DIAGNOSIS — R26.89 BALANCE PROBLEM: ICD-10-CM

## 2017-06-05 PROCEDURE — 97110 THERAPEUTIC EXERCISES: CPT | Mod: PN

## 2017-06-05 RX ORDER — TERIFLUNOMIDE 14 MG/1
TABLET, FILM COATED ORAL
Qty: 84 TABLET | Refills: 4 | Status: SHIPPED | OUTPATIENT
Start: 2017-06-05 | End: 2017-11-28 | Stop reason: SDUPTHER

## 2017-06-05 NOTE — PROGRESS NOTES
2Name: Alyssa Flores [facility] Number: [id number]  Date of Treatment: 06/05/2017   Diagnosis:   Encounter Diagnoses   Name Primary?    Balance problem     Multiple sclerosis        Time in: 1410  Time Out: 1510  Total Treatment Time: 60    Subjective:    Alyssa reports improvement of symptoms.  Patient reports their pain to be 0/10 on a 0-10 scale with 0 being no pain and 10 being the worst pain imaginable.    Objective    Alyssa received therapeutic exercises to develop strength and endurance for 60 minutes including:     Bike x 15 minutes level 3.5, 2.21 miles   UBE 3/3   Shuttle: 37#  x 4 minutes B   Shuttle: R/L x 2 minutes side   UBE 3' rero, 5' forward   Sit-stand from chair x 10   Ball squeeze 30 x   Hip Abdwith BTB x 30       Written Home Exercises Provided: continue with cuirrent  Pt demo fair understanding of the education provided. Alyssa demonstrated fair return demonstration of activities.     Assessment:   Patient has low floor clearance with LLE. This makes her at risk for falls.    Pt will continue to benefit from skilled PT intervention. Medical Necessity is demonstrated by:  Unable to participate fully in daily activities, Requires skilled supervision to complete and progress HEP and Weakness.    Patient is making fair progress towards established goals.      Plan:  Continue with established Plan of Care towards PT goals.

## 2017-06-09 ENCOUNTER — TELEPHONE (OUTPATIENT)
Dept: NEUROLOGY | Facility: CLINIC | Age: 67
End: 2017-06-09

## 2017-06-09 ENCOUNTER — CLINICAL SUPPORT (OUTPATIENT)
Dept: REHABILITATION | Facility: HOSPITAL | Age: 67
End: 2017-06-09
Attending: PSYCHIATRY & NEUROLOGY
Payer: MEDICARE

## 2017-06-09 DIAGNOSIS — G35 MULTIPLE SCLEROSIS: ICD-10-CM

## 2017-06-09 DIAGNOSIS — R26.89 BALANCE PROBLEM: ICD-10-CM

## 2017-06-09 PROCEDURE — 97110 THERAPEUTIC EXERCISES: CPT | Mod: PN

## 2017-06-09 NOTE — PROGRESS NOTES
"Name: Alyssa John  [facility] Number: [id number]  Date of Treatment: 06/09/2017   Diagnosis: No diagnosis found.    Time in: 1405  Time Out: 1500  Total Treatment Time: 55      Subjective:    Alyssa reports she has been using RW around the house more.  "I can go faster with the walker."  Patient reports their pain to be 0/10 on a 0-10 scale with 0 being no pain and 10 being the worst pain imaginable.    Objective    Patient received individual therapy to increase strength, endurance and balance, gait with activities as follows:     Alyssa received therapeutic exercises to develop strength, endurance and balance, gait for 55 minutes including:     Bike x 15 minutes, level 3.5  Sit-stand x 15, with rest break  Manual hamstring stretch 3 x 30 sec B  UBE 4/4   Shuttle: 37# B x 4 minutes, R/L x 2 minutes each    Written Home Exercises Provided: cont HEP  Pt demo good understanding of the education provided. Alyssa demonstrated fair return demonstration of activities.     Assessment:     Good tolerance for activity.  Fatigues easily.  Pt will continue to benefit from skilled PT intervention. Medical Necessity is demonstrated by:  Fall Risk, Unable to participate fully in daily activities, Requires skilled supervision to complete and progress HEP and Weakness.    Patient is making good progress towards established goals.      Plan:  Continue with established Plan of Care towards PT goals.   "

## 2017-06-09 NOTE — TELEPHONE ENCOUNTER
----- Message from Coretta Simmons sent at 6/9/2017  2:42 PM CDT -----  Contact: Ap  [phone]  Ap is calling to let Kelsey know that his wife will be available Monday or Tuesday to get the Lab that needs to be scheduled.

## 2017-06-13 ENCOUNTER — LAB VISIT (OUTPATIENT)
Dept: LAB | Facility: HOSPITAL | Age: 67
End: 2017-06-13
Attending: PSYCHIATRY & NEUROLOGY
Payer: MEDICARE

## 2017-06-13 DIAGNOSIS — G35 MULTIPLE SCLEROSIS: ICD-10-CM

## 2017-06-13 LAB
ALBUMIN SERPL BCP-MCNC: 4 G/DL
ALP SERPL-CCNC: 131 U/L
ALT SERPL W/O P-5'-P-CCNC: 26 U/L
AST SERPL-CCNC: 21 U/L
BASOPHILS # BLD AUTO: 0.03 K/UL
BASOPHILS NFR BLD: 0.6 %
BILIRUB DIRECT SERPL-MCNC: 0.2 MG/DL
BILIRUB SERPL-MCNC: 0.3 MG/DL
DIFFERENTIAL METHOD: ABNORMAL
EOSINOPHIL # BLD AUTO: 0.3 K/UL
EOSINOPHIL NFR BLD: 4.6 %
ERYTHROCYTE [DISTWIDTH] IN BLOOD BY AUTOMATED COUNT: 13.2 %
HCT VFR BLD AUTO: 44.4 %
HGB BLD-MCNC: 14.1 G/DL
LYMPHOCYTES # BLD AUTO: 1.6 K/UL
LYMPHOCYTES NFR BLD: 30.3 %
MCH RBC QN AUTO: 28.9 PG
MCHC RBC AUTO-ENTMCNC: 31.8 %
MCV RBC AUTO: 91 FL
MONOCYTES # BLD AUTO: 0.4 K/UL
MONOCYTES NFR BLD: 7.9 %
NEUTROPHILS # BLD AUTO: 3.1 K/UL
NEUTROPHILS NFR BLD: 56.4 %
PLATELET # BLD AUTO: 163 K/UL
PMV BLD AUTO: 11.6 FL
PROT SERPL-MCNC: 7.2 G/DL
RBC # BLD AUTO: 4.88 M/UL
WBC # BLD AUTO: 5.41 K/UL

## 2017-06-13 PROCEDURE — 80076 HEPATIC FUNCTION PANEL: CPT

## 2017-06-13 PROCEDURE — 85025 COMPLETE CBC W/AUTO DIFF WBC: CPT

## 2017-06-13 PROCEDURE — 36415 COLL VENOUS BLD VENIPUNCTURE: CPT | Mod: PO

## 2017-06-16 ENCOUNTER — RESEARCH ENCOUNTER (OUTPATIENT)
Dept: RESEARCH | Facility: HOSPITAL | Age: 67
End: 2017-06-16
Payer: MEDICARE

## 2017-06-16 ENCOUNTER — OFFICE VISIT (OUTPATIENT)
Dept: NEUROLOGY | Facility: CLINIC | Age: 67
End: 2017-06-16
Payer: MEDICARE

## 2017-06-16 ENCOUNTER — CLINICAL SUPPORT (OUTPATIENT)
Dept: REHABILITATION | Facility: HOSPITAL | Age: 67
End: 2017-06-16
Attending: PSYCHIATRY & NEUROLOGY
Payer: MEDICARE

## 2017-06-16 VITALS
HEART RATE: 72 BPM | BODY MASS INDEX: 29.57 KG/M2 | HEIGHT: 63 IN | DIASTOLIC BLOOD PRESSURE: 78 MMHG | SYSTOLIC BLOOD PRESSURE: 119 MMHG | WEIGHT: 166.88 LBS

## 2017-06-16 DIAGNOSIS — G35 MULTIPLE SCLEROSIS: ICD-10-CM

## 2017-06-16 DIAGNOSIS — Z71.89 COUNSELING AND COORDINATION OF CARE: Primary | ICD-10-CM

## 2017-06-16 DIAGNOSIS — R26.89 BALANCE PROBLEM: ICD-10-CM

## 2017-06-16 DIAGNOSIS — F41.9 ANXIETY: ICD-10-CM

## 2017-06-16 DIAGNOSIS — Z29.89 PROPHYLACTIC IMMUNOTHERAPY: ICD-10-CM

## 2017-06-16 DIAGNOSIS — Z79.899 H/O LONG-TERM TREATMENT WITH HIGH-RISK MEDICATION: ICD-10-CM

## 2017-06-16 PROCEDURE — 1126F AMNT PAIN NOTED NONE PRSNT: CPT | Mod: S$GLB,,, | Performed by: PSYCHIATRY & NEUROLOGY

## 2017-06-16 PROCEDURE — 99215 OFFICE O/P EST HI 40 MIN: CPT | Mod: S$GLB,,, | Performed by: PSYCHIATRY & NEUROLOGY

## 2017-06-16 PROCEDURE — 1159F MED LIST DOCD IN RCRD: CPT | Mod: S$GLB,,, | Performed by: PSYCHIATRY & NEUROLOGY

## 2017-06-16 PROCEDURE — 99999 PR PBB SHADOW E&M-EST. PATIENT-LVL III: CPT | Mod: PBBFAC,,, | Performed by: PSYCHIATRY & NEUROLOGY

## 2017-06-16 PROCEDURE — 97110 THERAPEUTIC EXERCISES: CPT | Mod: PN

## 2017-06-16 PROCEDURE — 99499 UNLISTED E&M SERVICE: CPT | Mod: S$GLB,,, | Performed by: PSYCHIATRY & NEUROLOGY

## 2017-06-16 NOTE — PROGRESS NOTES
Subjective:       Patient ID: Alyssa John is a 66 y.o. female who presents today for a routine clinic visit for MS.  She is here with her .      MS HPI:  · DMT: Aubagio  · Side effects from DMT? No  · Taking vitamin D3 as recommended? Yes -  Dose: 5,000 IU daily  · She feels that PT ([facility] [place]) has been very helpful.  Now uses a cane mostly  · Overall feels pretty stable  · On Topamax and Klonopin for seizure prevention; has not had a seizure since 1990    SOCIAL HISTORY  Social History   Substance Use Topics    Smoking status: Former Smoker     Packs/day: 1.50     Quit date: 12/26/2006    Smokeless tobacco: Never Used    Alcohol use No     Living arrangements - the patient lives with her .  Employment N/A    MS ROS:  · Fatigue: Yes - on Provigil 200mg daily;  Very helpful;   · Sleep Disturbance: No  · Bladder Dysfunction: No  · Bowel Dysfunction: No  · Spasticity: No  · Visual Symptoms: No  · Cognitive: No  · Mood Disorder: Yes - on Cymbalta; feels mood is steady;   · Gait Disturbance: Yes - feels its better; walks with cane; in PT currently in [place];   · Falls: No  · Hand Dysfunction: Yes - the tremor;   · Pain: some mild pain in her feet; feels it may be arthritis;  Mild; just a nuissance;   · Sexual Dysfunction: Not Assessed  · Skin Breakdown: No  · Tremors: Yes - has tremor in both hands; feels it when she is eating;   · Dysphagia:  No  · Dysarthria:  No  · Heat sensitivity:  No  · Any un-met adaptive needs? No  · Copay Assist?  Yes - $0  · Clinical Trial candidate? No        Objective:        Neurologic Exam    1. 25 foot timed walk: 8.82 seconds with cane!  Was 10.2 seconds with cane last visit.   Timed 25 Foot Walk: 10/27/2016 2/16/2017   Did patient wear an AFO? No No   Was assistive device used? Yes Yes   Assistive device used (toña one): Unilateral Assistance Unilateral Assistance   Unilateral device used Cane Cane   Time for 25 Foot Walk (seconds) 12.8 10.2   Time for  25 Foot Walk (seconds) 9 -         Labs:     Lab Results   Component Value Date    XHCHXLSJ44SB 53 10/19/2016    LCJUTJCG95WZ 78 06/13/2016    RSDHPWZO55JA 59 04/07/2015     Lab Results   Component Value Date    JCVINDEX SEE COMMENT (A) 12/09/2015    JCVANTIBODY SEE COMMENT 12/09/2015     Lab Results   Component Value Date    EC4XYYLO 59.1 03/31/2016    ABSOLUTECD3 619 (L) 03/31/2016    WS1GEZKI 19.9 03/31/2016    ABSOLUTECD8 208 03/31/2016    QX4DKKOZ 39.1 03/31/2016    ABSOLUTECD4 409 03/31/2016    LABCD48 1.97 03/31/2016       Diagnosis/Assessment/Plan:    1. Multiple Sclerosis  · Assessment: Pt continues to improve; exercise has been very helpful to her.   · Imaging: will defer MRI brain this year given clinical improvement, history of stable MRIs, and age;   · Disease Modifying Therapies: continue Aubagio;  Labs today; The patient was counseled about the risks associated with immune suppressive therapy, including a higher risk of serious infections and malignancy, as well as the importance of avoiding all live virus vaccines while on immune suppressive medication.  Will continue to follow labs as per protocol    2. MS Symptom Assessment / Management  · Gait Disturbance: continue PT; gave info on Yoga on both [facility] and the [facility];       3. Seizure: on Topamax and Klonopin; stable ; requests refill on Klonopin;     Follow up with Risa Oshea PA-C in 4 mo    Over 50% of this 40 minute visit was spent in direct face to face counseling of the patient about her MS and the management of her various symptoms.        Anxiety    Multiple sclerosis  -     CBC auto differential; Future; Expected date: 06/16/2017  -     Hepatic function panel; Future

## 2017-06-16 NOTE — PROGRESS NOTES
[id number]  Date of Treatment: 06/16/2017   Diagnosis:   Encounter Diagnoses   Name Primary?    Balance problem     Multiple sclerosis        Time in: 1514  Time Out: 1600  Total Treatment Time: 45      Subjective:    Alyssa reports she is excited because she went to the doctor today and her timed walking test was 8.8.  She reports this is the best she has ever done.  Patient reports their pain to be 0/10 on a 0-10 scale with 0 being no pain and 10 being the worst pain imaginable.    Objective    Patient received individual therapy to increase strength, endurance and balance, gait with activities as follows:     Alyssa received therapeutic exercises to develop strength, endurance and balance, gait for 45 minutes including:     Bike x 10 minutes level 3.5  Sit-stand x 20, with rest breaks, vc to lean more forward with sitting  UBE 6/4  Shuttle 37# x 4' B, 2' R, 2' L    Written Home Exercises Provided: cont HEP  Pt demo good understanding of the education provided. Alyssa demonstrated good return demonstration of activities.     Assessment:     Good tolerance for activity.  UE support needed with sit-stand.    Pt will continue to benefit from skilled PT intervention. Medical Necessity is demonstrated by:  Fall Risk, Unable to participate fully in daily activities, Requires skilled supervision to complete and progress HEP and Weakness.    Patient is making good progress towards established goals.      Plan:  Continue with established Plan of Care towards PT goals.

## 2017-06-16 NOTE — Clinical Note
MEJIA Ulrich, pt requests more Klonopin BID for seizure; fine in general, but I see that we last refilled in Jan and gave 2 refills, so should have been done in early April. Asked her about this, and she cannot explain the gap; denies that anyone else has prescribed it; they are going home to look at the details on the prescription, and will let us know; I'm fine with prescribing the med in general, but concerned they are also getting from another prescriber or something;

## 2017-06-19 NOTE — PROGRESS NOTES
..Esteem Scheduled  Visit  Subject seen in the clinic today by provider Genny Apodaca MD.    Pregnancy  Subject did not report pregnancy at this visit.    EDSS  Not completed at this visit.    Diet and Exercise  Not addressed at this visit.    MS Relapses  Subject did not have any MS relapses from last visit 02/16/2017 with Risa Oshea PA-C.     Hematology  Completed on 06/13/2017; reviewed and entered into RDC by CRC.    Chemistry  Completed 03/10/2017; entered into RDC by CRC.     Other Labs  Not done at this visit.     Urinalysis  Not done at this visit.     Con Meds   Reviewed with subject and updated.    Adverse Events / Serious Adverse events   Subject did not repot any AE's or JAZMIN's.    Esteem PRO booklet   Subject completed, reviewed and sent to [facility]ste[facility] monitor by CRC.

## 2017-06-20 ENCOUNTER — CLINICAL SUPPORT (OUTPATIENT)
Dept: REHABILITATION | Facility: HOSPITAL | Age: 67
End: 2017-06-20
Attending: PSYCHIATRY & NEUROLOGY
Payer: MEDICARE

## 2017-06-20 DIAGNOSIS — G35 MULTIPLE SCLEROSIS: ICD-10-CM

## 2017-06-20 DIAGNOSIS — R26.89 BALANCE PROBLEM: ICD-10-CM

## 2017-06-20 PROCEDURE — 97110 THERAPEUTIC EXERCISES: CPT | Mod: PN

## 2017-06-20 NOTE — PROGRESS NOTES
Name: Alyssa John  [facility] Number: [id number]  Date of Treatment: 06/20/2017   Diagnosis:   Encounter Diagnoses   Name Primary?    Balance problem     Multiple sclerosis        Time in: 1410  Time Out: 1500  Total Treatment Time: 50      Subjective:    Alyssa reports no complaints.  Patient reports their pain to be n/a/10 on a 0-10 scale with 0 being no pain and 10 being the worst pain imaginable.    Objective    Patient received individual therapy to increase strength and balance, gait with activities as follows:     Alyssa received therapeutic exercises to develop strength and balance, gait for 50 minutes including:     Bike x 10 minutes level 4  Sit-stand x 20, with rest breaks, vc to lean more forward with sitting  UBE 5.5/4.5  Shuttle 37# x 4' B, 2' R, 2' L    Written Home Exercises Provided: cont HEP  Pt demo good understanding of the education provided. Alyssa demonstrated good return demonstration of activities.     Assessment:     Good tolerance for activity.  Pt continues with decreased step length, fall risk.  Strength and endurance improving.  Pt will continue to benefit from skilled PT intervention. Medical Necessity is demonstrated by:  Fall Risk, Unable to participate fully in daily activities, Requires skilled supervision to complete and progress HEP and Weakness.    Patient is making good progress towards established goals.      Plan:  Continue with established Plan of Care towards PT goals.

## 2017-06-22 DIAGNOSIS — F41.9 ANXIETY: ICD-10-CM

## 2017-06-22 DIAGNOSIS — G35 MULTIPLE SCLEROSIS: ICD-10-CM

## 2017-06-23 ENCOUNTER — CLINICAL SUPPORT (OUTPATIENT)
Dept: REHABILITATION | Facility: HOSPITAL | Age: 67
End: 2017-06-23
Attending: PSYCHIATRY & NEUROLOGY
Payer: MEDICARE

## 2017-06-23 DIAGNOSIS — G35 MULTIPLE SCLEROSIS: ICD-10-CM

## 2017-06-23 DIAGNOSIS — R26.89 BALANCE PROBLEM: ICD-10-CM

## 2017-06-23 PROCEDURE — 97110 THERAPEUTIC EXERCISES: CPT | Mod: KX,PN

## 2017-06-23 RX ORDER — CLONAZEPAM 1 MG/1
1 TABLET ORAL 2 TIMES DAILY
Qty: 60 TABLET | Refills: 5 | Status: SHIPPED | OUTPATIENT
Start: 2017-06-23 | End: 2017-11-13 | Stop reason: SDUPTHER

## 2017-06-23 NOTE — PROGRESS NOTES
"Name: Alyssa John  Date:   06/23/2017  Primary Diagnosis: .  Problem List Items Addressed This Visit     Multiple sclerosis    Balance problem      Other Visit Diagnoses    None.         Time in: 1420  Time Out: 1455  Total Treatment Time: 35  Group Time: 0      Subjective:    Patient reports no new s/s.  Patient reports their pain to be 0/10  on a 0-10 scale with 0 being no pain and 10 being the worst pain imaginable.    Objective    Patient received individual therapy to address strength, endurance, ROM and flexibility with 0 other patients with activities as follows:     Patient received therapeutic exercises to develop strength, endurance, ROM and flexibility for 35 minutes including:     Shuttle 37# x 4 min B, 37# x 2 min single, alternating L/R each  Sit<>stand 2x10 c/ cues on controlling descent  Bike x 10 min L4    Assessment:     Patient tolerating treatment well. Working on mechanics of stand<sit tranfers due to patient stating she feels "like a robot." Late for session today.    Pt will continue to benefit from skilled PT intervention. Medical Necessity is demonstrated by:  Fall Risk, Unable to participate fully in daily activities and Weakness.    Patient is making good progress towards established goals.    Plan:  Continue with established Plan of Care towards PT goals.     PT/PTA Danyelle GRAHAM met face to face to discuss patient's treatment plan and progress towards established goals.  Treatment will be continued as described in initial report/eval and progress notes.  Patient will be seen by physical therapist every sixth visit and minimally once per month.    Additional information: Pt likely to be discharged soon per results on reassessment   "

## 2017-06-30 ENCOUNTER — CLINICAL SUPPORT (OUTPATIENT)
Dept: REHABILITATION | Facility: HOSPITAL | Age: 67
End: 2017-06-30
Attending: PSYCHIATRY & NEUROLOGY
Payer: MEDICARE

## 2017-06-30 DIAGNOSIS — R26.89 BALANCE PROBLEM: ICD-10-CM

## 2017-06-30 DIAGNOSIS — G35 MULTIPLE SCLEROSIS: ICD-10-CM

## 2017-06-30 PROCEDURE — 97110 THERAPEUTIC EXERCISES: CPT | Mod: KX,PN

## 2017-06-30 NOTE — PLAN OF CARE
"REHAB SERVICES OUTPATIENT DISCHARGE SUMMARY  Physical Therapy      Name:  Alyssa John  Date:  06/30/2017  Date of Evaluation:  11/03/2016  Physician:  Dr. Apodaca  Total # Of Visits:  25  Cancelled:  See EMR  No Shows:  See EMR  Diagnosis:    1. Balance problem     2. Multiple sclerosis         Physical/Functional Status:      S: Pt reports she has more difficulty with sitting down than standing up, "I feel like a robot.". Reports compliance with HEP.     O: Patient participating in therapeutic exercise as follows to address strength, endurance for 20 minutes:    Shuttle 37# x 2 min B, 37# x 2 min single, alternating L/R each    5x sit<>stand (2 trials): 17 sec, then 15 sec with bilateral UE support, no LOB notable this date, no uncontrolled descents  TUG (2 trials) using SPC at SBA; 17 sec, 13 sec (with uncontrolled descent)    Gait: HHA c./ SPC ambulating lobby<>gym, SBA with SPC short distances in gym, decreased step length bilaterally with decreased L>R foot clearance with intermittent left foot drag    Lower Extremity Strength:  Right Lower Extremity: hip flex 4-/5, knee ext 4/5, knee flex 4+/5, ankle DF 4-/5, PF 4+/5  Left Lower Extremity: hip flex 4/5, knee ext/flex 4/5, ankle DF 3/5, PF 4/5     A: Patient has progressed well with therapy, TUG has improved by 1 second. Pt has now reached a plateau with strength and functional improvement. Pt agrees to go to a HEP and go to the gym ([employer]) for a few months and then return to PT as needed. Discussed d/c plans with patient and , who both verbalized understanding and agreement, all questions answered.     P: D/C to HEP    The patient is to be discharged from our Therapy service for the following reason(s):  Patient has reached the maximum rehab potential for the present time    Degree of Goal Achievement:  Patient has partially met goals secondary to functional plateau    Patient Education:  HEP    Discharge Plan:  Home Program:      "

## 2017-08-06 DIAGNOSIS — G35 MULTIPLE SCLEROSIS: ICD-10-CM

## 2017-08-06 DIAGNOSIS — R53.82 CHRONIC FATIGUE: ICD-10-CM

## 2017-08-07 RX ORDER — MODAFINIL 100 MG/1
TABLET ORAL
Qty: 60 TABLET | Refills: 5 | Status: SHIPPED | OUTPATIENT
Start: 2017-08-07 | End: 2018-02-19 | Stop reason: SDUPTHER

## 2017-08-07 RX ORDER — ATORVASTATIN CALCIUM 40 MG/1
TABLET, FILM COATED ORAL
Qty: 90 TABLET | Refills: 0 | Status: SHIPPED | OUTPATIENT
Start: 2017-08-07 | End: 2017-11-02 | Stop reason: SDUPTHER

## 2017-09-14 ENCOUNTER — LAB VISIT (OUTPATIENT)
Dept: LAB | Facility: HOSPITAL | Age: 67
End: 2017-09-14
Attending: PSYCHIATRY & NEUROLOGY
Payer: MEDICARE

## 2017-09-14 DIAGNOSIS — I25.2 OLD MYOCARDIAL INFARCTION: Primary | ICD-10-CM

## 2017-09-14 DIAGNOSIS — G35 MULTIPLE SCLEROSIS: ICD-10-CM

## 2017-09-14 DIAGNOSIS — R73.09 OTHER ABNORMAL GLUCOSE: ICD-10-CM

## 2017-09-14 DIAGNOSIS — E78.2 MIXED HYPERLIPIDEMIA: ICD-10-CM

## 2017-09-14 DIAGNOSIS — I49.8 NODAL RHYTHM DISORDER: ICD-10-CM

## 2017-09-14 LAB
ALBUMIN SERPL BCP-MCNC: 3.6 G/DL
ALP SERPL-CCNC: 173 U/L
ALT SERPL W/O P-5'-P-CCNC: 31 U/L
AST SERPL-CCNC: 21 U/L
BASOPHILS # BLD AUTO: 0.03 K/UL
BASOPHILS NFR BLD: 0.4 %
BILIRUB DIRECT SERPL-MCNC: 0.1 MG/DL
BILIRUB SERPL-MCNC: 0.4 MG/DL
DIFFERENTIAL METHOD: ABNORMAL
EOSINOPHIL # BLD AUTO: 0.3 K/UL
EOSINOPHIL NFR BLD: 3.8 %
ERYTHROCYTE [DISTWIDTH] IN BLOOD BY AUTOMATED COUNT: 13.4 %
HCT VFR BLD AUTO: 42.3 %
HGB BLD-MCNC: 13.4 G/DL
LYMPHOCYTES # BLD AUTO: 1.1 K/UL
LYMPHOCYTES NFR BLD: 16.1 %
MCH RBC QN AUTO: 29 PG
MCHC RBC AUTO-ENTMCNC: 31.7 G/DL
MCV RBC AUTO: 92 FL
MONOCYTES # BLD AUTO: 0.5 K/UL
MONOCYTES NFR BLD: 6.7 %
NEUTROPHILS # BLD AUTO: 5 K/UL
NEUTROPHILS NFR BLD: 73 %
PLATELET # BLD AUTO: 210 K/UL
PMV BLD AUTO: 11.3 FL
PROT SERPL-MCNC: 7 G/DL
RBC # BLD AUTO: 4.62 M/UL
WBC # BLD AUTO: 6.9 K/UL

## 2017-09-14 PROCEDURE — 80076 HEPATIC FUNCTION PANEL: CPT

## 2017-09-14 PROCEDURE — 85025 COMPLETE CBC W/AUTO DIFF WBC: CPT

## 2017-09-14 PROCEDURE — 36415 COLL VENOUS BLD VENIPUNCTURE: CPT | Mod: PO

## 2017-09-18 ENCOUNTER — PATIENT MESSAGE (OUTPATIENT)
Dept: NEUROLOGY | Facility: CLINIC | Age: 67
End: 2017-09-18

## 2017-10-11 ENCOUNTER — PATIENT MESSAGE (OUTPATIENT)
Dept: FAMILY MEDICINE | Facility: CLINIC | Age: 67
End: 2017-10-11

## 2017-10-12 ENCOUNTER — PATIENT MESSAGE (OUTPATIENT)
Dept: FAMILY MEDICINE | Facility: CLINIC | Age: 67
End: 2017-10-12

## 2017-10-12 ENCOUNTER — OFFICE VISIT (OUTPATIENT)
Dept: FAMILY MEDICINE | Facility: CLINIC | Age: 67
End: 2017-10-12
Payer: MEDICARE

## 2017-10-12 VITALS
HEIGHT: 63 IN | TEMPERATURE: 98 F | DIASTOLIC BLOOD PRESSURE: 87 MMHG | SYSTOLIC BLOOD PRESSURE: 140 MMHG | HEART RATE: 91 BPM | WEIGHT: 171.06 LBS | BODY MASS INDEX: 30.31 KG/M2

## 2017-10-12 DIAGNOSIS — M70.22 OLECRANON BURSITIS OF LEFT ELBOW: Primary | ICD-10-CM

## 2017-10-12 PROCEDURE — 99214 OFFICE O/P EST MOD 30 MIN: CPT | Mod: S$GLB,,, | Performed by: FAMILY MEDICINE

## 2017-10-12 PROCEDURE — 99499 UNLISTED E&M SERVICE: CPT | Mod: S$PBB,,, | Performed by: FAMILY MEDICINE

## 2017-10-12 PROCEDURE — 99999 PR PBB SHADOW E&M-EST. PATIENT-LVL III: CPT | Mod: PBBFAC,,, | Performed by: FAMILY MEDICINE

## 2017-10-12 NOTE — PROGRESS NOTES
Subjective:       Patient ID: Alyssa John is a 67 y.o. female.    Chief Complaint: lump on elbow (left)    Left elbow has swelling over it; noted 2 weeks ago.  She has MS and uses here elbow/forearam to help herself getup from sitting.  Otherwise, no overt trauma event.      Elbow Injury   This is a new problem. The current episode started 1 to 4 weeks ago. The problem occurs constantly. The problem has been unchanged. Pertinent negatives include no abdominal pain, chest pain, fever, nausea or rash. Exacerbated by: Pressure on area. She has tried NSAIDs for the symptoms. The treatment provided no relief.     Review of Systems   Constitutional: Negative for fever.   Respiratory: Negative for shortness of breath.    Cardiovascular: Negative for chest pain.   Gastrointestinal: Negative for abdominal pain and nausea.   Skin: Negative for rash.   All other systems reviewed and are negative.      Objective:      Physical Exam   Constitutional: She appears well-developed. No distress.   Cardiovascular: Normal rate and regular rhythm.    No murmur heard.  Pulmonary/Chest: Effort normal and breath sounds normal.   Musculoskeletal:        Left elbow: She exhibits swelling (Olecranon bursa is distended with fluid.). No radial head, no medial epicondyle and no lateral epicondyle tenderness noted.   Skin: Skin is warm and dry. No erythema.       Assessment:       1. Olecranon bursitis of left elbow        Plan:         Alyssa was seen today for lump on elbow.    Diagnoses and all orders for this visit:    Olecranon bursitis of left elbow  -     Ambulatory referral to Orthopedics    Rest arm/keep off elbow.RX given for a protective elbow pad/cup type orthotic.  Moist heat to area TID.  If no results in 2 weeks, consider Ortho appt.

## 2017-10-17 ENCOUNTER — TELEPHONE (OUTPATIENT)
Dept: FAMILY MEDICINE | Facility: CLINIC | Age: 67
End: 2017-10-17

## 2017-10-17 NOTE — TELEPHONE ENCOUNTER
----- Message from Osman Neff sent at 10/17/2017 12:34 PM CDT -----  Contact: [facility]- [facility] Laureano received referral for the patient. They do not have any demographics to schedule patient. Please fax to [phone] and phone [phone] thanks!

## 2017-11-02 RX ORDER — ATORVASTATIN CALCIUM 40 MG/1
TABLET, FILM COATED ORAL
Qty: 90 TABLET | Refills: 0 | Status: SHIPPED | OUTPATIENT
Start: 2017-11-02 | End: 2018-02-05 | Stop reason: SDUPTHER

## 2017-11-07 ENCOUNTER — OFFICE VISIT (OUTPATIENT)
Dept: FAMILY MEDICINE | Facility: CLINIC | Age: 67
End: 2017-11-07
Payer: MEDICARE

## 2017-11-07 VITALS
HEART RATE: 80 BPM | HEIGHT: 63 IN | SYSTOLIC BLOOD PRESSURE: 150 MMHG | DIASTOLIC BLOOD PRESSURE: 90 MMHG | TEMPERATURE: 98 F

## 2017-11-07 DIAGNOSIS — R05.9 COUGH: ICD-10-CM

## 2017-11-07 DIAGNOSIS — J40 BRONCHITIS: Primary | ICD-10-CM

## 2017-11-07 DIAGNOSIS — J30.89 ENVIRONMENTAL AND SEASONAL ALLERGIES: ICD-10-CM

## 2017-11-07 PROCEDURE — 99999 PR PBB SHADOW E&M-EST. PATIENT-LVL III: CPT | Mod: PBBFAC,,, | Performed by: NURSE PRACTITIONER

## 2017-11-07 PROCEDURE — 99213 OFFICE O/P EST LOW 20 MIN: CPT | Mod: S$GLB,,, | Performed by: NURSE PRACTITIONER

## 2017-11-07 RX ORDER — ALBUTEROL SULFATE 90 UG/1
2 AEROSOL, METERED RESPIRATORY (INHALATION) EVERY 6 HOURS PRN
Qty: 8 G | Refills: 0 | Status: SHIPPED | OUTPATIENT
Start: 2017-11-07 | End: 2017-12-15 | Stop reason: SDUPTHER

## 2017-11-07 RX ORDER — MONTELUKAST SODIUM 10 MG/1
10 TABLET ORAL NIGHTLY
Qty: 30 TABLET | Refills: 0 | Status: SHIPPED | OUTPATIENT
Start: 2017-11-07 | End: 2017-12-07

## 2017-11-07 RX ORDER — AZITHROMYCIN 250 MG/1
TABLET, FILM COATED ORAL
Qty: 6 TABLET | Refills: 0 | Status: SHIPPED | OUTPATIENT
Start: 2017-11-07 | End: 2017-11-13

## 2017-11-07 NOTE — PATIENT INSTRUCTIONS
What Is Acute Bronchitis?  Acute bronchitis is when the airways in your lungs (bronchial tubes) become red and swollen (inflamed). It is usually caused by a viral infection. But it can also occur because of a bacteria or allergen. Symptoms include a cough that produces yellow or greenish mucus and can last for days or sometimes weeks.  Inside healthy lungs    Air travels in and out of the lungs through the airways. The linings of these airways produce sticky mucus. This mucus traps particles that enter the lungs. Tiny structures called cilia then sweep the particles out of the airways.     Healthy airway: Airways are normally open. Air moves in and out easily.      Healthy cilia: Tiny, hairlike cilia sweep mucus and particles up and out of the airways.   Lungs with bronchitis  Bronchitis often occurs with a cold or the flu virus. The airways become inflamed (red and swollen). There is a deep hacking cough from the extra mucus. Other symptoms may include:  · Wheezing  · Chest discomfort  · Shortness of breath  · Mild fever  A second infection, this time due to bacteria, may then occur. And airways irritated by allergens or smoke are more likely to get infected.        Inflamed airway: Inflammation and extra mucus narrow the airway, causing shortness of breath.      Impaired cilia: Extra mucus impairs cilia, causing congestion and wheezing. Smoking makes the problem worse.   Making a diagnosis  A physical exam, health history, and certain tests help your healthcare provider make the diagnosis.  Health history  Your healthcare provider will ask you about your symptoms.  The exam  Your provider listens to your chest for signs of congestion. He or she may also check your ears, nose, and throat.  Possible tests  · A sputum test for bacteria. This requires a sample of mucus from your lungs.  · A nasal or throat swab. This tests to see if you have a bacterial infection.  · A chest X-ray. This is done if your healthcare  provider thinks you have pneumonia.  · Tests to check for an underlying condition. Other tests may be done to check for things such as allergies, asthma, or COPD (chronic obstructive pulmonary disease). You may need to see a specialist for more lung function testing.  Treating a cough  The main treatment for bronchitis is easing symptoms. Avoiding smoke, allergens, and other things that trigger coughing can often help. If the infection is bacterial, you may be given antibiotics. During the illness, it's important to get plenty of sleep. To ease symptoms:  · Dont smoke. Also avoid secondhand smoke.  · Use a humidifier. Or try breathing in steam from a hot shower. This may help loosen mucus.  · Drink a lot of water and juice. They can soothe the throat and may help thin mucus.  · Sit up or use extra pillows when in bed. This helps to lessen coughing and congestion.  · Ask your provider about using medicine. Ask about using cough medicine, pain and fever medicine, or a decongestant.  Antibiotics  Most cases of bronchitis are caused by cold or flu viruses. They dont need antibiotics to treat them, even if your mucus is thick and green or yellow. Antibiotics dont treat viral illness and antibiotics have not been shown to have any benefit in cases of acute bronchitis. Taking antibiotics when they are not needed increases your risk of getting an infection later that is antibiotic-resistant. Antibiotics can also cause severe cases of diarrhea that require other antibiotics to treat.  It is important that you accept your healthcare provider's opinion to not use antibiotics. Your provider will prescribe antibiotics if the infection is caused by bacteria. If they are prescribed:  · Take all of the medicine. Take the medicine until it is used up, even if symptoms have improved. If you dont, the bronchitis may come back.  · Take the medicines as directed. For instance, some medicines should be taken with food.  · Ask about  side effects. Ask your provider or pharmacist what side effects are common, and what to do about them.  Follow-up care  You should see your provider again in 2 to 3 weeks. By this time, symptoms should have improved. An infection that lasts longer may mean you have a more serious problem.  Prevention  · Avoid tobacco smoke. If you smoke, quit. Stay away from smoky places. Ask friends and family not to smoke around you, or in your home or car.  · Get checked for allergies.  · Ask your provider about getting a yearly flu shot. Also ask about pneumococcal or pneumonia shots.  · Wash your hands often. This helps reduce the chance of picking up viruses that cause colds and flu.  Call your healthcare provider if:  · Symptoms worsen, or you have new symptoms  · Breathing problems worsen or  become severe  · Symptoms dont get better within a week, or within 3 days of taking antibiotics   Date Last Reviewed: 2/1/2017  © 8845-2073 [employer], [facility]. [address] [place] [address], [place] [address]. All rights reserved. This information is not intended as a substitute for professional medical care. Always follow your healthcare professional's instructions.

## 2017-11-07 NOTE — PROGRESS NOTES
"Subjective:       Patient ID: Alyssa John is a 67 y.o. female.    Chief Complaint: Cough (x1 month)     is with patient in clinic and reports patient was previously taking Singulair however, has not taken it for several months.  Patient reports he throat discomfort is more "raspy voice" .  Denies any other acute complaints.  No changes in her chronic complaints of fatigue, muscle weakness, gait disturbance.        Cough   This is a new problem. The current episode started 1 to 4 weeks ago. The problem has been gradually worsening. The problem occurs every few hours. The cough is non-productive. Associated symptoms include myalgias, postnasal drip, a sore throat and wheezing. Pertinent negatives include no chest pain, chills, ear congestion, ear pain, fever, headaches, heartburn, hemoptysis, nasal congestion, rash, rhinorrhea, shortness of breath, sweats or weight loss. Nothing aggravates the symptoms. She has tried rest, body position changes and OTC cough suppressant (antihistamine) for the symptoms. The treatment provided no relief. Her past medical history is significant for environmental allergies. Multiple Sclerosis      Review of Systems   Constitutional: Positive for fatigue (chronic). Negative for chills, fever and weight loss.   HENT: Positive for postnasal drip, sore throat and voice change (raspy , hoarse voice. ). Negative for congestion, drooling, ear pain, rhinorrhea, sinus pain, sinus pressure, sneezing and trouble swallowing.    Eyes: Negative for pain and discharge.   Respiratory: Positive for cough and wheezing. Negative for hemoptysis, choking, chest tightness, shortness of breath and stridor.    Cardiovascular: Positive for leg swelling (intermittent left lower extremity swelling without change ). Negative for chest pain and palpitations.   Gastrointestinal: Negative for abdominal pain, blood in stool, constipation, diarrhea (intermittent chronic diarrhea), heartburn, nausea, " rectal pain and vomiting.   Genitourinary: Negative for difficulty urinating and dysuria.   Musculoskeletal: Positive for gait problem (walks with cane for support ) and myalgias.   Skin: Negative for rash.   Allergic/Immunologic: Positive for environmental allergies.   Neurological: Negative for dizziness and headaches.   Hematological: Negative for adenopathy.       Objective:      Physical Exam   Constitutional: She is oriented to person, place, and time. She appears well-developed and well-nourished. No distress.   HENT:   Head: Normocephalic and atraumatic.   Right Ear: External ear normal.   Left Ear: External ear normal.   Nose: Nose normal.   Mouth/Throat: No oropharyngeal exudate.   Oral mucosa pink and moist with uvula absent.  Scant amount PND posterior pharynx.  Airway patent, tongue midline.    Eyes: Pupils are equal, round, and reactive to light. Right eye exhibits no discharge. Left eye exhibits no discharge. No scleral icterus.   Neck: Normal range of motion. Neck supple. No tracheal deviation present.   Cardiovascular: Normal rate, regular rhythm, normal heart sounds and intact distal pulses.    Pulmonary/Chest: Effort normal. No respiratory distress. She has wheezes (few scattered inspiratory wheezes anteriorly ). She has no rales. She exhibits no tenderness.   Abdominal: Soft.   Lymphadenopathy:     She has no cervical adenopathy.   Neurological: She is alert and oriented to person, place, and time.   Skin: Skin is warm and dry. Capillary refill takes less than 2 seconds. She is not diaphoretic.   Psychiatric: She has a normal mood and affect.   Nursing note and vitals reviewed.      Assessment:       1. Bronchitis    2. Cough    3. Environmental and seasonal allergies        Plan:       Bronchitis  -     azithromycin (Z-ANDREINA) 250 MG tablet; Take 2 tablets by mouth on day 1; Take 1 tablet by mouth on days 2-5  Dispense: 6 tablet; Refill: 0  -     albuterol 90 mcg/actuation inhaler; Inhale 2 puffs  into the lungs every 6 (six) hours as needed for Wheezing or Shortness of Breath. Rescue  Dispense: 8 g; Refill: 0  -     montelukast (SINGULAIR) 10 mg tablet; Take 1 tablet (10 mg total) by mouth every evening.  Dispense: 30 tablet; Refill: 0    Cough  -     azithromycin (Z-ANDREINA) 250 MG tablet; Take 2 tablets by mouth on day 1; Take 1 tablet by mouth on days 2-5  Dispense: 6 tablet; Refill: 0  -     albuterol 90 mcg/actuation inhaler; Inhale 2 puffs into the lungs every 6 (six) hours as needed for Wheezing or Shortness of Breath. Rescue  Dispense: 8 g; Refill: 0  -     montelukast (SINGULAIR) 10 mg tablet; Take 1 tablet (10 mg total) by mouth every evening.  Dispense: 30 tablet; Refill: 0    Environmental and seasonal allergies  -     azithromycin (Z-ANDREINA) 250 MG tablet; Take 2 tablets by mouth on day 1; Take 1 tablet by mouth on days 2-5  Dispense: 6 tablet; Refill: 0  -     albuterol 90 mcg/actuation inhaler; Inhale 2 puffs into the lungs every 6 (six) hours as needed for Wheezing or Shortness of Breath. Rescue  Dispense: 8 g; Refill: 0  -     montelukast (SINGULAIR) 10 mg tablet; Take 1 tablet (10 mg total) by mouth every evening.  Dispense: 30 tablet; Refill: 0     Given patient's ongoing symptoms greater than 1 month and chronic comorbid conditions will treat with zithromax and provide refill on Singulair.  Instructed patient to use OTC antihistamine of her choice as per label instructions daily for allergy sxs.  Will provide albuterol inhaler to use for further symptom relief.  Pt. Is to increase oral fluids and rest today.  She is to f/u with her MS specialist on Monday as scheduled.      I have reviewed the patient's past medical/surgical and social histories and updated as appropriate. Medications were reviewed and discussed as appropriate including side effects and risks versus benefit.     Plan of care was reviewed and agreed upon with the patient.  An opportunity to ask questions was provided and  explanation given. Patient verbalized understanding on all information reviewed and discussed.  The patient will follow up at her routinely scheduled appointment with PCP or sooner if needed. If symptoms worsen patient may call for ASAP appointment or report to the emergency department for further evaluation.

## 2017-11-09 ENCOUNTER — PATIENT MESSAGE (OUTPATIENT)
Dept: FAMILY MEDICINE | Facility: CLINIC | Age: 67
End: 2017-11-09

## 2017-11-13 ENCOUNTER — RESEARCH ENCOUNTER (OUTPATIENT)
Dept: RESEARCH | Facility: HOSPITAL | Age: 67
End: 2017-11-13
Payer: MEDICARE

## 2017-11-13 ENCOUNTER — OFFICE VISIT (OUTPATIENT)
Dept: NEUROLOGY | Facility: CLINIC | Age: 67
End: 2017-11-13
Payer: MEDICARE

## 2017-11-13 VITALS — BODY MASS INDEX: 31.3 KG/M2 | WEIGHT: 176.63 LBS | HEIGHT: 63 IN

## 2017-11-13 DIAGNOSIS — Z79.899 ENCOUNTER FOR LONG-TERM (CURRENT) USE OF HIGH-RISK MEDICATION: ICD-10-CM

## 2017-11-13 DIAGNOSIS — F41.9 ANXIETY: ICD-10-CM

## 2017-11-13 DIAGNOSIS — R53.83 FATIGUE, UNSPECIFIED TYPE: ICD-10-CM

## 2017-11-13 DIAGNOSIS — Z71.89 COUNSELING REGARDING GOALS OF CARE: ICD-10-CM

## 2017-11-13 DIAGNOSIS — R26.9 GAIT DISTURBANCE: ICD-10-CM

## 2017-11-13 DIAGNOSIS — G35 MULTIPLE SCLEROSIS: Primary | ICD-10-CM

## 2017-11-13 DIAGNOSIS — R56.9 SEIZURES: ICD-10-CM

## 2017-11-13 PROCEDURE — 99999 PR PBB SHADOW E&M-EST. PATIENT-LVL III: CPT | Mod: PBBFAC,,, | Performed by: PHYSICIAN ASSISTANT

## 2017-11-13 PROCEDURE — 99214 OFFICE O/P EST MOD 30 MIN: CPT | Mod: S$GLB,,, | Performed by: PHYSICIAN ASSISTANT

## 2017-11-13 PROCEDURE — 99499 UNLISTED E&M SERVICE: CPT | Mod: S$GLB,,, | Performed by: PHYSICIAN ASSISTANT

## 2017-11-13 RX ORDER — CLONAZEPAM 1 MG/1
1 TABLET ORAL 2 TIMES DAILY
Qty: 180 TABLET | Refills: 1 | Status: SHIPPED | OUTPATIENT
Start: 2017-11-13 | End: 2018-06-06 | Stop reason: SDUPTHER

## 2017-11-13 RX ORDER — METHYLPREDNISOLONE 4 MG/1
TABLET ORAL
Qty: 1 PACKAGE | Refills: 0 | Status: SHIPPED | OUTPATIENT
Start: 2017-11-13 | End: 2018-12-11

## 2017-11-13 NOTE — PROGRESS NOTES
Subjective:       Patient ID: Alyssa John is a 67 y.o. female who presents today with her  for a routine clinic visit for MS.    MS HPI:  · DMT: Aubagio  · Side effects from DMT? No  · Taking vitamin D3 as recommended? Yes -  Dose: 5,000 IU daily  · Patient is not feeling any improvement from Z-jerson given by PCP office. She stated they were considering Medrol dose Jerson but not yet given   · Patient states that she is not able to return to  until the new year  · Patient has just returned from vacation in [place] and stated she didn't use her wheelchair at all.   · She feels she is having difficulty with L foot at present--arthritic pain/edema  · topamax and Klonopin for seizure management--no seizures    SOCIAL HISTORY  Social History   Substance Use Topics    Smoking status: Former Smoker     Packs/day: 1.50     Quit date: 12/26/2006    Smokeless tobacco: Never Used    Alcohol use No     Living arrangements - the patient lives with .  Employment N/A    MS ROS:  · Fatigue: Yes - Provigil daily 200mg  · Sleep Disturbance: NO  · Bladder Dysfunction: NO  · Bowel Dysfunction:NO  · Spasticity:NO  · Visual Symptoms: NO  · Cognitive: NO  · Mood Disorder:Stable on Cymbalta  · Gait Disturbance: Yes - as above. Walking primarily with cane  · Falls: No  · Hand Dysfunction: Yes, tremors  · Sexual Dysfunction: Not Assessed  · Skin Breakdown:NO  · Tremors: Yes,tremors in both hands  · Dysphagia: NO  · Dysarthria:  NO  · Heat sensitivity:  NO  · Any un-met adaptive needs? NO  · Copay Assist?  Yes            Objective:        1. 25 foot timed walk: 8.7s today with cane, was 8.82s last visit    Timed 25 Foot Walk: 10/27/2016 2/16/2017   Did patient wear an AFO? No No   Was assistive device used? Yes Yes   Assistive device used (toña one): Unilateral Assistance Unilateral Assistance   Unilateral device used Cane Cane   Time for 25 Foot Walk (seconds) 12.8 10.2   Time for 25 Foot Walk (seconds) 9 -      Neurologic Exam          Imaging:     Results for orders placed during the hospital encounter of 12/07/16   MRI Brain W WO Contrast    Impression 1.  Stable, moderate white matter disease in a pattern compatible with multiple sclerosis.  No evidence for active demyelination.  2.  Moderate cerebral volume loss above that expected for patient age.      Electronically signed by: Baldo Salazar MD  Date:     12/07/16  Time:    13:51      No results found for this or any previous visit.  No results found for this or any previous visit.      Labs:       Lab Results   Component Value Date    TTCBNIUI39YD 53 10/19/2016    EZKAOBRD27VH 78 06/13/2016    EUZACDYH68BN 59 04/07/2015     Lab Results   Component Value Date    JCVINDEX SEE COMMENT (A) 12/09/2015    JCVANTIBODY SEE COMMENT 12/09/2015     Lab Results   Component Value Date    IH0LZPSZ 59.1 03/31/2016    ABSOLUTECD3 619 (L) 03/31/2016    KJ1SCGZQ 19.9 03/31/2016    ABSOLUTECD8 208 03/31/2016    UZ1FJTSD 39.1 03/31/2016    ABSOLUTECD4 409 03/31/2016    LABCD48 1.97 03/31/2016     Lab Results   Component Value Date    WBC 6.90 09/14/2017    WBC 6.84 09/14/2017    RBC 4.62 09/14/2017    RBC 4.69 09/14/2017    HGB 13.4 09/14/2017    HGB 13.4 09/14/2017    HCT 42.3 09/14/2017    HCT 42.8 09/14/2017    MCV 92 09/14/2017    MCV 91 09/14/2017    MCH 29.0 09/14/2017    MCH 28.6 09/14/2017    MCHC 31.7 (L) 09/14/2017    MCHC 31.3 (L) 09/14/2017    RDW 13.4 09/14/2017    RDW 13.4 09/14/2017     09/14/2017     09/14/2017    MPV 11.3 09/14/2017    MPV 11.1 09/14/2017    GRAN 5.0 09/14/2017    GRAN 73.0 09/14/2017    GRAN 4.9 09/14/2017    GRAN 71.8 09/14/2017    LYMPH 1.1 09/14/2017    LYMPH 16.1 (L) 09/14/2017    LYMPH 1.1 09/14/2017    LYMPH 16.4 (L) 09/14/2017    MONO 0.5 09/14/2017    MONO 6.7 09/14/2017    MONO 0.5 09/14/2017    MONO 7.2 09/14/2017    EOS 0.3 09/14/2017    EOS 0.3 09/14/2017    BASO 0.03 09/14/2017    BASO 0.03 09/14/2017    EOSINOPHIL 3.8  09/14/2017    EOSINOPHIL 4.1 09/14/2017    BASOPHIL 0.4 09/14/2017    BASOPHIL 0.4 09/14/2017       Sodium   Date Value Ref Range Status   09/14/2017 149 (H) 136 - 145 mmol/L Final     Potassium   Date Value Ref Range Status   09/14/2017 4.2 3.5 - 5.1 mmol/L Final     Chloride   Date Value Ref Range Status   09/14/2017 115 (H) 95 - 110 mmol/L Final     CO2   Date Value Ref Range Status   09/14/2017 22 (L) 23 - 29 mmol/L Final     Carbon Monoxide, Blood   Date Value Ref Range Status   10/19/2016 2 % Final     Comment:     -------------------REFERENCE VALUE--------------------------  0-2 Normal (Non-smoker) , < or = 9 Normal (Smoker), > or   = 20 (Toxic concentration)  Test Performed by:  [facility] [place] [address]  : Adrien Norton II, M.D., Ph.D.       Glucose   Date Value Ref Range Status   09/14/2017 90 70 - 110 mg/dL Final     BUN, Bld   Date Value Ref Range Status   09/14/2017 19 8 - 23 mg/dL Final     Creatinine   Date Value Ref Range Status   09/14/2017 0.8 0.5 - 1.4 mg/dL Final   02/22/2013 0.8 0.5 - 1.4 mg/dL Final     Calcium   Date Value Ref Range Status   09/14/2017 9.0 8.7 - 10.5 mg/dL Final   02/22/2013 8.7 8.7 - 10.5 mg/dL Final     Total Protein   Date Value Ref Range Status   09/14/2017 7.0 6.0 - 8.4 g/dL Final   09/14/2017 7.0 6.0 - 8.4 g/dL Final     Albumin   Date Value Ref Range Status   09/14/2017 3.6 3.5 - 5.2 g/dL Final   09/14/2017 3.5 3.5 - 5.2 g/dL Final     Total Bilirubin   Date Value Ref Range Status   09/14/2017 0.4 0.1 - 1.0 mg/dL Final     Comment:     For infants and newborns, interpretation of results should be based  on gestational age, weight and in agreement with clinical  observations.  Premature Infant recommended reference ranges:  Up to 24 hours.............<8.0 mg/dL  Up to 48 hours............<12.0 mg/dL  3-5 days..................<15.0 mg/dL  6-29 days.................<15.0 mg/dL      09/14/2017 0.4 0.1 - 1.0 mg/dL Final     Comment:     For infants and newborns, interpretation of results should be based  on gestational age, weight and in agreement with clinical  observations.  Premature Infant recommended reference ranges:  Up to 24 hours.............<8.0 mg/dL  Up to 48 hours............<12.0 mg/dL  3-5 days..................<15.0 mg/dL  6-29 days.................<15.0 mg/dL       Alkaline Phosphatase   Date Value Ref Range Status   09/14/2017 173 (H) 55 - 135 U/L Final   09/14/2017 173 (H) 55 - 135 U/L Final   02/22/2013 107 55 - 135 U/L Final     AST   Date Value Ref Range Status   09/14/2017 21 10 - 40 U/L Final   09/14/2017 21 10 - 40 U/L Final   02/22/2013 18 10 - 40 U/L Final     ALT   Date Value Ref Range Status   09/14/2017 31 10 - 44 U/L Final   09/14/2017 30 10 - 44 U/L Final     Anion Gap   Date Value Ref Range Status   09/14/2017 12 8 - 16 mmol/L Final   02/22/2013 11 5 - 15 meq/L Final     eGFR if    Date Value Ref Range Status   09/14/2017 >60.0 >60 mL/min/1.73 m^2 Final     eGFR if non    Date Value Ref Range Status   09/14/2017 >60.0 >60 mL/min/1.73 m^2 Final     Comment:     Calculation used to obtain the estimated glomerular filtration  rate (eGFR) is the CKD-EPI equation. Since race is unknown   in our information system, the eGFR values for   -American and Non--American patients are given   for each creatinine result.           Diagnosis/Assessment/Plan:    1. Multiple Sclerosis  · Assessment: Pt has remained stable,  exercise has been very helpful to her and I have encouraged her to continue to exercise daily.   · Imaging: will defer MRI brain this year given clinical improvement, history of stable MRIs, and age;         Disease Modifying Therapies: continue Aubagio;  labs in Sept stable; The patient was counseled about the risks associated with immune suppressive therapy, including a higher risk of serious infections and  malignancy, as well as the importance of avoiding all live virus vaccines while on immune suppressive medication.  Will continue to follow labs as per protocol. Will check Vit D3 lab next visit  2. MS Symptom Assessment / Management   No changes to management of MS symptoms today           3.  Seizure: Patient requests refill on Klonopin today.     Over 50% of this 30 minute visit was spent in direct face to face counseling of the patient regarding her current symptoms and management of same, review of last labs.  Return in about 6 months (around 5/13/2018) for follow up with Dr. Apodaca.  Patient agreed to POC today.    Attending, Dr. Apodaca, was available during today's encounter. Any change to plan along with cosign to appear in the EMR.     Risa Oshea PA-C  [facility]        Multiple sclerosis  -     clonazePAM (KLONOPIN) 1 MG tablet; Take 1 tablet (1 mg total) by mouth 2 (two) times daily.  Dispense: 180 tablet; Refill: 1    Anxiety  -     clonazePAM (KLONOPIN) 1 MG tablet; Take 1 tablet (1 mg total) by mouth 2 (two) times daily.  Dispense: 180 tablet; Refill: 1    Counseling regarding goals of care    Encounter for long-term (current) use of high-risk medication    Gait disturbance    Fatigue, unspecified type    Seizures    Other orders  -     methylPREDNISolone (MEDROL DOSEPACK) 4 mg tablet; use as directed  Dispense: 1 Package; Refill: 0

## 2017-11-13 NOTE — PROGRESS NOTES
Esteem Scheduled  Visit  Subject was seen seen in the clinic today by provider Risa Oshea PA-C. Her  was also present for today's visit. Subject still wishes to participate in the ESTEEM trial. I informed the subject that there has been an amendment to the consent form. The changes were read aloud to the subject; she verbalized her understanding. Subject signed the consent form and was provided a copy for her records. Original document was scanned and uploaded into EPIC - available in [employer]. [employer] was assigned to the subject to receive reimbursement for participation in the trial. Payment was provided retrospectively.     Pregnancy  Subject did not report pregnancy at this visit.    EDSS  Not completed at this visit.    Diet and Exercise  Not addressed at this visit.    MS Relapses  Subject did not have any MS relapses from date of last visit, 06/16/2017.     Hematology  Completed on date of service, 09/14/2017 - reviewed and entered into RCD by [facility].    Chemistry  Completed on date of service, 09/14/2017 - reviewed and entered into RDC by [facility].     Other Labs  Lipid Panel/Hemoglobin A1C/Hepatic Function Panel  Completed on date of service, 09/14/2017 - reviewed and entered (as appropriate) into RDC by [facility].    Urinalysis  Not done.     Con Meds   Reviewed with subject and updated. See below:   There are no discontinued medications.    Current Outpatient Prescriptions:     albuterol 90 mcg/actuation inhaler, Inhale 2 puffs into the lungs every 6 (six) hours as needed for Wheezing or Shortness of Breath. Rescue, Disp: 8 g, Rfl: 0    ascorbic acid, vitamin C, (VITAMIN C) 100 MG tablet, Take 100 mg by mouth once daily., Disp: , Rfl:     atorvastatin (LIPITOR) 40 MG tablet, TAKE 1 TABLET (40 MG TOTAL) BY MOUTH ONCE DAILY., Disp: 90 tablet, Rfl: 0    AUBAGIO 14 mg Tab, TAKE 1 TABLET BY MOUTH ONCE DAILY, Disp: 84 tablet, Rfl: 4    azithromycin (Z-ANDREINA) 250 MG tablet, Take 2 tablets by mouth on day 1; Take 1  tablet by mouth on days 2-5, Disp: 6 tablet, Rfl: 0    cholecalciferol, vitamin D3, (VITAMIN D3) 5,000 unit Tab, Take 5,000 Units by mouth once daily., Disp: , Rfl:     clonazePAM (KLONOPIN) 1 MG tablet, Take 1 tablet (1 mg total) by mouth 2 (two) times daily., Disp: 180 tablet, Rfl: 1    clopidogrel (PLAVIX) 75 mg tablet, Take 75 mg by mouth once daily.  , Disp: , Rfl:     duloxetine (CYMBALTA) 20 MG capsule, TAKE 1 CAPSULE EVERY DAY, Disp: 90 capsule, Rfl: 1    famotidine (PEPCID) 20 MG tablet, TAKE 1 TABLET BY MOUTH TWICE DAILY (Patient taking differently: TAKE 1 TABLET BY MOUTH TWICE DAILY AS NEEDED), Disp: 60 tablet, Rfl: 0    ferrous sulfate 325 (65 FE) MG EC tablet, Take 325 mg by mouth 3 (three) times daily with meals., Disp: , Rfl:     ibuprofen 200 mg Cap, Take by mouth as needed., Disp: , Rfl:     methylPREDNISolone (MEDROL DOSEPACK) 4 mg tablet, use as directed, Disp: 1 Package, Rfl: 0    modafinil (PROVIGIL) 100 MG Tab, TAKE 1 TABLET BY MOUTH TWICE DAILY, Disp: 60 tablet, Rfl: 5    montelukast (SINGULAIR) 10 mg tablet, Take 1 tablet (10 mg total) by mouth every evening., Disp: 30 tablet, Rfl: 0    multivitamin with minerals (HAIR,SKIN AND NAILS) tablet, Take 1 tablet by mouth once daily., Disp: , Rfl:     nitroGLYCERIN (NITROSTAT) 0.4 MG SL tablet, Place 0.4 mg under the tongue every 5 (five) minutes as needed.  , Disp: , Rfl:     PSYLLIUM SEED, WITH DEXTROSE, (FIBER ORAL), Take by mouth 2 (two) times daily., Disp: , Rfl:     topiramate (TOPAMAX) 50 MG tablet, TAKE 1 TABLET (50 MG TOTAL) BY MOUTH 2 (TWO) TIMES DAILY., Disp: 60 tablet, Rfl: 5    Adverse Events / Serious Adverse events   Subject did report the following at this visit:   Bronchitis, Cough, Environmental/Seasonal Allergies, and Olecranon Bursitis of left elbow.     Esteem PRO booklet   Subject completed, with assistance from her . Reviewed and sent to Esteem monitor by [facility].

## 2017-11-13 NOTE — Clinical Note
Virgilio Edmondson, I have seen Alyssa in clinic today. She was recently seen in your clinic for bronchitis, and is still not feeling very well. She is wondering if she may have a Medrol Dose Jerson. From our standpoint we are certainly fine with this.   Risa

## 2017-11-14 ENCOUNTER — TELEPHONE (OUTPATIENT)
Dept: FAMILY MEDICINE | Facility: CLINIC | Age: 67
End: 2017-11-14

## 2017-11-14 NOTE — TELEPHONE ENCOUNTER
----- Message from April Edmondson MD sent at 11/13/2017  2:13 PM CST -----  Please see note from neurology, patient seen by delilah Gaytan medrol pack, sent to pharmacy. Needs to have follow up clinic appointment if still not feeling well.    April  ----- Message -----  From: Risa Oshea PA-C  Sent: 11/13/2017   2:10 PM  To: April Edmondson MD    Hi Dr. Edmondson,  I have seen Alyssa in clinic today. She was recently seen in your clinic for bronchitis, and is still not feeling very well. She is wondering if she may have a Medrol Dose Jerson. From our standpoint we are certainly fine with this.     Risa

## 2017-11-15 DIAGNOSIS — R56.9 CONVULSIONS, UNSPECIFIED CONVULSION TYPE: ICD-10-CM

## 2017-11-15 RX ORDER — TOPIRAMATE 50 MG/1
TABLET, FILM COATED ORAL
Qty: 60 TABLET | Refills: 5 | Status: SHIPPED | OUTPATIENT
Start: 2017-11-15 | End: 2018-03-28 | Stop reason: SDUPTHER

## 2017-11-16 DIAGNOSIS — M25.522 LEFT ELBOW PAIN: Primary | ICD-10-CM

## 2017-11-17 ENCOUNTER — OFFICE VISIT (OUTPATIENT)
Dept: ORTHOPEDICS | Facility: CLINIC | Age: 67
End: 2017-11-17
Payer: MEDICARE

## 2017-11-17 ENCOUNTER — HOSPITAL ENCOUNTER (OUTPATIENT)
Dept: RADIOLOGY | Facility: HOSPITAL | Age: 67
Discharge: HOME | End: 2017-11-17
Attending: ORTHOPAEDIC SURGERY
Payer: MEDICARE

## 2017-11-17 VITALS
HEIGHT: 63 IN | HEART RATE: 89 BPM | WEIGHT: 176.56 LBS | DIASTOLIC BLOOD PRESSURE: 72 MMHG | SYSTOLIC BLOOD PRESSURE: 152 MMHG | BODY MASS INDEX: 31.29 KG/M2

## 2017-11-17 DIAGNOSIS — M25.522 LEFT ELBOW PAIN: ICD-10-CM

## 2017-11-17 DIAGNOSIS — M70.22 OLECRANON BURSITIS OF LEFT ELBOW: Primary | ICD-10-CM

## 2017-11-17 PROCEDURE — 99203 OFFICE O/P NEW LOW 30 MIN: CPT | Mod: 25,S$GLB,, | Performed by: ORTHOPAEDIC SURGERY

## 2017-11-17 PROCEDURE — 99999 PR PBB SHADOW E&M-EST. PATIENT-LVL III: CPT | Mod: PBBFAC,,, | Performed by: ORTHOPAEDIC SURGERY

## 2017-11-17 PROCEDURE — 73080 X-RAY EXAM OF ELBOW: CPT | Mod: TC,PN,LT

## 2017-11-17 PROCEDURE — 73080 X-RAY EXAM OF ELBOW: CPT | Mod: 26,LT,, | Performed by: RADIOLOGY

## 2017-11-17 PROCEDURE — 20605 DRAIN/INJ JOINT/BURSA W/O US: CPT | Mod: LT,S$GLB,, | Performed by: ORTHOPAEDIC SURGERY

## 2017-11-17 RX ADMIN — TRIAMCINOLONE ACETONIDE 40 MG: 40 INJECTION, SUSPENSION INTRA-ARTICULAR; INTRAMUSCULAR at 10:11

## 2017-11-17 NOTE — LETTER
November 20, 2017      Ty Pimentel MD  [address]  [facility] [id number] [place] [address] [address]  [facility] [address]  Phone: [phone]          Patient: Alyssa John   MR Number: [id number]   YOB: 1950   Date of Visit: 11/17/2017       Dear Dr. Ty Pimentel:    Thank you for referring Alyssa John to me for evaluation. Attached you will find relevant portions of my assessment and plan of care.    If you have questions, please do not hesitate to call me. I look forward to following Alyssa John along with you.    Sincerely,    Gary Steinberg MD    Enclosure  CC:  No Recipients    If you would like to receive this communication electronically, please contact [email or web address] or [phone] to request more information on [employer] Link access.    For providers and/or their staff who would like to refer a patient to [facility], please contact us through our one-stop-shop provider referral line, [facility], at [phone].    If you feel you have received this communication in error or would no longer like to receive these types of communications, please e-mail [email or web address]

## 2017-11-20 RX ORDER — TRIAMCINOLONE ACETONIDE 40 MG/ML
40 INJECTION, SUSPENSION INTRA-ARTICULAR; INTRAMUSCULAR
Status: DISCONTINUED | OUTPATIENT
Start: 2017-11-17 | End: 2017-11-21 | Stop reason: HOSPADM

## 2017-11-21 NOTE — PROGRESS NOTES
Past Medical History:   Diagnosis Date    Arthritis     Coronary artery disease     Encounter for blood transfusion     Epilepsy     GERD (gastroesophageal reflux disease)     Hypertension     MI, old     MS (multiple sclerosis)     Seizures     epilepsy       Past Surgical History:   Procedure Laterality Date    APPENDECTOMY      BACK SURGERY      L5 discectomy    COLONOSCOPY N/A 9/16/2015    Procedure: COLONOSCOPY;  Surgeon: Henry Malcolm MD;  Location: [facility];  Service: Endoscopy;  Laterality: N/A;    CORONARY STENT PLACEMENT      right knee arthroscopy         Current Outpatient Prescriptions   Medication Sig    albuterol 90 mcg/actuation inhaler Inhale 2 puffs into the lungs every 6 (six) hours as needed for Wheezing or Shortness of Breath. Rescue    ascorbic acid, vitamin C, (VITAMIN C) 100 MG tablet Take 100 mg by mouth once daily.    atorvastatin (LIPITOR) 40 MG tablet TAKE 1 TABLET (40 MG TOTAL) BY MOUTH ONCE DAILY.    AUBAGIO 14 mg Tab TAKE 1 TABLET BY MOUTH ONCE DAILY    cholecalciferol, vitamin D3, (VITAMIN D3) 5,000 unit Tab Take 5,000 Units by mouth once daily.    clonazePAM (KLONOPIN) 1 MG tablet Take 1 tablet (1 mg total) by mouth 2 (two) times daily.    clopidogrel (PLAVIX) 75 mg tablet Take 75 mg by mouth once daily.      duloxetine (CYMBALTA) 20 MG capsule TAKE 1 CAPSULE EVERY DAY    famotidine (PEPCID) 20 MG tablet TAKE 1 TABLET BY MOUTH TWICE DAILY (Patient taking differently: TAKE 1 TABLET BY MOUTH TWICE DAILY AS NEEDED)    ferrous sulfate 325 (65 FE) MG EC tablet Take 325 mg by mouth 3 (three) times daily with meals.    ibuprofen 200 mg Cap Take by mouth as needed.    methylPREDNISolone (MEDROL DOSEPACK) 4 mg tablet use as directed    modafinil (PROVIGIL) 100 MG Tab TAKE 1 TABLET BY MOUTH TWICE DAILY    montelukast (SINGULAIR) 10 mg tablet Take 1 tablet (10 mg total) by mouth every evening.    multivitamin with minerals (HAIR,SKIN AND NAILS) tablet Take 1  tablet by mouth once daily.    nitroGLYCERIN (NITROSTAT) 0.4 MG SL tablet Place 0.4 mg under the tongue every 5 (five) minutes as needed.      PSYLLIUM SEED, WITH DEXTROSE, (FIBER ORAL) Take by mouth 2 (two) times daily.    topiramate (TOPAMAX) 50 MG tablet TAKE 1 TABLET (50 MG TOTAL) BY MOUTH 2 (TWO) TIMES DAILY.     No current facility-administered medications for this visit.        Review of patient's allergies indicates:   Allergen Reactions    Codeine     Decadron [dexamethasone sodium phosphate]      Caused intense mood swings after taking for a long period    Dilantin [phenytoin sodium extended]     Tegretol [carbamazepine]        Family History   Problem Relation Age of Onset    Scleroderma Mother     Heart disease Father      MI, CAD    Hyperlipidemia Sister     Cancer Neg Hx     Diabetes Neg Hx     Stroke Neg Hx     Melanoma Neg Hx     Psoriasis Neg Hx     Lupus Neg Hx     Eczema Neg Hx        Social History     Social History    Marital status:      Spouse name: N/A    Number of children: N/A    Years of education: N/A     Occupational History    Not on file.     Social History Main Topics    Smoking status: Former Smoker     Packs/day: 1.50     Quit date: 12/26/2006    Smokeless tobacco: Never Used    Alcohol use No    Drug use: No    Sexual activity: Not Currently     Partners: Male     Other Topics Concern    Not on file     Social History Narrative    No narrative on file       Chief Complaint:   Chief Complaint   Patient presents with    Left Elbow - Pain       Consulting Physician: Ty Pimentel MD    History of present illness:    This is a 67 y.o. female who complains of left elbow pain for months. Swelling. Pain 0/10 today unless she hits bursa. No injury.    Review of Systems:    Constitution: Denies chills, fever, and sweats.  HENT: Denies headaches or blurry vision.  Cardiovascular: Denies chest pain or irregular heart beat.  Respiratory: Denies cough  "or shortness of breath.  Gastrointestinal: Denies abdominal pain, nausea, or vomiting.  Musculoskeletal:  Denies muscle cramps.  Neurological: Denies dizziness or focal weakness.  Psychiatric/Behavioral: Normal mental status.  Hematologic/Lymphatic: Denies bleeding problem or easy bruising/bleeding.  Skin: Denies rash or suspicious lesions.    Examination:    Vital Signs:    Vitals:    11/17/17 1415   BP: (!) 152/72   Pulse: 89   Weight: 80.1 kg (176 lb 9.4 oz)   Height: 5' 3" (1.6 m)   PainSc: 0-No pain   PainLoc: Elbow       Body mass index is 31.28 kg/m².    This a well-developed, well nourished patient in no acute distress.    Alert and oriented x 3 and cooperative to examination.       Physical Exam: Left Elbow Exam    Skin  Scars:   None  Rash:   None    Inspection  Erythema:  None  Bruising:  None  Swelling:  olecranon bursa  Masses:  None  Lymphadenopathy: None    Range of Motion  Flexion:  160°  Extension:  0°  Supination:  80°  Pronation:  80°    Tenderness  Medial Epicondyle: None  Lateral Epicondyle: None  Olecranon:  yes    Stability  Valgus Stress:  Stable  Varus Stress:  Stable    Strength:  Normal  Sensation:  Intact  Cubital Tinel's:     Negative    Vascular  Pulses:  Palpable  Capillary Refill: Normal          Imaging: X-rays ordered and reviewed today personally of the left elbow are normal.        Assessment: Olecranon bursitis of left elbow  -     Intermediate Joint Aspiration/Injection        Plan:  We drained and injected the bursa and applied compression dressing. Would like her to change but continue dressing for a week. PRN.      DISCLAIMER: This note may have been dictated using voice recognition software and may contain grammatical errors.     NOTE: Consult report sent to referring provider via [employer] EMR.  "

## 2017-11-21 NOTE — PROCEDURES
Intermediate Joint Aspiration/Injection  Date/Time: 11/17/2017 10:12 PM  Performed by: YOLANDA RAMIREZ  Authorized by: YOLANDA RAMIREZ     Consent Done?:  Yes (Verbal)  Indications:  Pain  Timeout: Prior to procedure the correct patient, procedure, and site was verified      Location:  Elbow  Site:  L olecranon bursa  Prep: Patient was prepped and draped in usual sterile fashion    Approach:  Posterior  Medications:  40 mg triamcinolone acetonide 40 mg/mL  Aspirate amount (ml):  5  Aspirate:  Blood-tinged

## 2017-11-24 DIAGNOSIS — M79.2 NEUROPATHIC PAIN: ICD-10-CM

## 2017-11-27 RX ORDER — DULOXETIN HYDROCHLORIDE 20 MG/1
CAPSULE, DELAYED RELEASE ORAL
Qty: 90 CAPSULE | Refills: 1 | Status: SHIPPED | OUTPATIENT
Start: 2017-11-27 | End: 2018-05-16 | Stop reason: SDUPTHER

## 2017-11-28 ENCOUNTER — PATIENT MESSAGE (OUTPATIENT)
Dept: NEUROLOGY | Facility: CLINIC | Age: 67
End: 2017-11-28

## 2017-11-28 DIAGNOSIS — G35 MULTIPLE SCLEROSIS: Primary | ICD-10-CM

## 2017-11-28 RX ORDER — TERIFLUNOMIDE 14 MG/1
1 TABLET, FILM COATED ORAL DAILY
Qty: 30 TABLET | Refills: 0 | Status: SHIPPED | OUTPATIENT
Start: 2017-11-28 | End: 2018-12-11

## 2017-11-28 NOTE — TELEPHONE ENCOUNTER
Call placed to  and spoke with Ashlee. She states that pt is being evaluated for PAP Aubagio and once rx is received from this office will be dispensed to pt.    Call placed to pt and informed her of the above. States she is relieved. She will also get with her  regarding having Aubagio safety labs done in December and let us know.

## 2017-11-30 ENCOUNTER — DOCUMENTATION ONLY (OUTPATIENT)
Dept: NEUROLOGY | Facility: CLINIC | Age: 67
End: 2017-11-30

## 2017-12-05 ENCOUNTER — PATIENT MESSAGE (OUTPATIENT)
Dept: NEUROLOGY | Facility: CLINIC | Age: 67
End: 2017-12-05

## 2017-12-08 ENCOUNTER — PATIENT MESSAGE (OUTPATIENT)
Dept: NEUROLOGY | Facility: CLINIC | Age: 67
End: 2017-12-08

## 2017-12-14 ENCOUNTER — LAB VISIT (OUTPATIENT)
Dept: LAB | Facility: HOSPITAL | Age: 67
End: 2017-12-14
Attending: PSYCHIATRY & NEUROLOGY
Payer: MEDICARE

## 2017-12-14 DIAGNOSIS — G35 MULTIPLE SCLEROSIS: ICD-10-CM

## 2017-12-14 LAB
ALBUMIN SERPL BCP-MCNC: 3.9 G/DL
ALP SERPL-CCNC: 146 U/L
ALT SERPL W/O P-5'-P-CCNC: 39 U/L
AST SERPL-CCNC: 33 U/L
BASOPHILS # BLD AUTO: 0.06 K/UL
BASOPHILS NFR BLD: 1 %
BILIRUB DIRECT SERPL-MCNC: 0.2 MG/DL
BILIRUB SERPL-MCNC: 0.4 MG/DL
DIFFERENTIAL METHOD: ABNORMAL
EOSINOPHIL # BLD AUTO: 0.2 K/UL
EOSINOPHIL NFR BLD: 3.9 %
ERYTHROCYTE [DISTWIDTH] IN BLOOD BY AUTOMATED COUNT: 13.7 %
HCT VFR BLD AUTO: 43.6 %
HGB BLD-MCNC: 13.6 G/DL
IMM GRANULOCYTES # BLD AUTO: 0.01 K/UL
IMM GRANULOCYTES NFR BLD AUTO: 0.2 %
LYMPHOCYTES # BLD AUTO: 1.4 K/UL
LYMPHOCYTES NFR BLD: 21.8 %
MCH RBC QN AUTO: 28 PG
MCHC RBC AUTO-ENTMCNC: 31.2 G/DL
MCV RBC AUTO: 90 FL
MONOCYTES # BLD AUTO: 0.4 K/UL
MONOCYTES NFR BLD: 6.3 %
NEUTROPHILS # BLD AUTO: 4.1 K/UL
NEUTROPHILS NFR BLD: 66.8 %
NRBC BLD-RTO: 0 /100 WBC
PLATELET # BLD AUTO: 196 K/UL
PMV BLD AUTO: 11.8 FL
PROT SERPL-MCNC: 7.1 G/DL
RBC # BLD AUTO: 4.86 M/UL
WBC # BLD AUTO: 6.19 K/UL

## 2017-12-14 PROCEDURE — 80076 HEPATIC FUNCTION PANEL: CPT

## 2017-12-14 PROCEDURE — 36415 COLL VENOUS BLD VENIPUNCTURE: CPT | Mod: PO

## 2017-12-14 PROCEDURE — 85025 COMPLETE CBC W/AUTO DIFF WBC: CPT

## 2017-12-15 ENCOUNTER — OFFICE VISIT (OUTPATIENT)
Dept: FAMILY MEDICINE | Facility: CLINIC | Age: 67
End: 2017-12-15
Payer: MEDICARE

## 2017-12-15 ENCOUNTER — HOSPITAL ENCOUNTER (OUTPATIENT)
Dept: RADIOLOGY | Facility: CLINIC | Age: 67
Discharge: HOME | End: 2017-12-15
Attending: NURSE PRACTITIONER
Payer: MEDICARE

## 2017-12-15 VITALS
SYSTOLIC BLOOD PRESSURE: 125 MMHG | OXYGEN SATURATION: 98 % | WEIGHT: 176.56 LBS | TEMPERATURE: 98 F | DIASTOLIC BLOOD PRESSURE: 81 MMHG | HEIGHT: 63 IN | HEART RATE: 86 BPM | BODY MASS INDEX: 31.29 KG/M2

## 2017-12-15 DIAGNOSIS — I10 ESSENTIAL HYPERTENSION: ICD-10-CM

## 2017-12-15 DIAGNOSIS — R49.9 CHANGE IN VOICE: ICD-10-CM

## 2017-12-15 DIAGNOSIS — R05.9 COUGH: Primary | ICD-10-CM

## 2017-12-15 DIAGNOSIS — J30.89 ENVIRONMENTAL AND SEASONAL ALLERGIES: ICD-10-CM

## 2017-12-15 DIAGNOSIS — R05.9 COUGH: ICD-10-CM

## 2017-12-15 PROBLEM — J06.9 URI (UPPER RESPIRATORY INFECTION): Status: RESOLVED | Noted: 2017-01-25 | Resolved: 2017-12-15

## 2017-12-15 PROCEDURE — 99499 UNLISTED E&M SERVICE: CPT | Mod: S$GLB,,, | Performed by: NURSE PRACTITIONER

## 2017-12-15 PROCEDURE — 99999 PR PBB SHADOW E&M-EST. PATIENT-LVL V: CPT | Mod: PBBFAC,,, | Performed by: NURSE PRACTITIONER

## 2017-12-15 PROCEDURE — 71020 XR CHEST PA AND LATERAL: CPT | Mod: TC,PO

## 2017-12-15 PROCEDURE — 71020 XR CHEST PA AND LATERAL: CPT | Mod: 26,,, | Performed by: RADIOLOGY

## 2017-12-15 PROCEDURE — 99213 OFFICE O/P EST LOW 20 MIN: CPT | Mod: S$GLB,,, | Performed by: NURSE PRACTITIONER

## 2017-12-15 RX ORDER — CETIRIZINE HYDROCHLORIDE 10 MG/1
10 TABLET ORAL DAILY
COMMUNITY
Start: 2017-12-15 | End: 2019-04-29 | Stop reason: CLARIF

## 2017-12-15 RX ORDER — FLUTICASONE PROPIONATE 50 MCG
2 SPRAY, SUSPENSION (ML) NASAL DAILY
Qty: 1 BOTTLE | Refills: 5 | Status: SHIPPED | OUTPATIENT
Start: 2017-12-15 | End: 2018-06-13

## 2017-12-15 RX ORDER — ALBUTEROL SULFATE 90 UG/1
2 AEROSOL, METERED RESPIRATORY (INHALATION) EVERY 6 HOURS PRN
Qty: 8 G | Refills: 0 | Status: SHIPPED | OUTPATIENT
Start: 2017-12-15 | End: 2019-03-13 | Stop reason: SDUPTHER

## 2017-12-15 NOTE — PROGRESS NOTES
Subjective:       Patient ID: Alyssa John is a 67 y.o. female.    Chief Complaint: Sinus Problem and Cough (chest congestion)    Cough   This is a chronic problem. The current episode started more than 1 month ago. The problem has been gradually improving. The problem occurs every few minutes. The cough is non-productive. Associated symptoms include postnasal drip. Pertinent negatives include no chest pain, chills, ear congestion, ear pain, fever, headaches, rhinorrhea, sore throat, shortness of breath or wheezing. The symptoms are aggravated by lying down. She has tried a beta-agonist inhaler and leukotriene antagonists (azithromycin) for the symptoms. The treatment provided no relief. There is no history of asthma or COPD.     Review of Systems   Constitutional: Negative for chills and fever.   HENT: Positive for postnasal drip and voice change. Negative for congestion, ear discharge, ear pain, rhinorrhea, sinus pain, sinus pressure and sore throat.    Respiratory: Positive for cough. Negative for shortness of breath and wheezing.    Cardiovascular: Negative for chest pain.   Gastrointestinal: Negative for abdominal pain.        No acid reflux or indigestion   Neurological: Negative for headaches.       Objective:      Physical Exam   Constitutional: She is oriented to person, place, and time. She appears well-nourished. No distress.   HENT:   Head: Normocephalic and atraumatic.   Right Ear: Tympanic membrane and external ear normal.   Left Ear: Tympanic membrane and external ear normal.   Mouth/Throat: Oropharynx is clear and moist. No oropharyngeal exudate.   Eyes: Pupils are equal, round, and reactive to light. Right eye exhibits no discharge. Left eye exhibits no discharge.   Neck: Neck supple. No thyromegaly present.   Cardiovascular: Normal rate and regular rhythm.  Exam reveals no gallop and no friction rub.    No murmur heard.  Pulmonary/Chest: Effort normal and breath sounds normal. No  respiratory distress. She has no wheezes. She has no rales.   Abdominal: Soft. She exhibits no distension. There is no tenderness.   Lymphadenopathy:     She has no cervical adenopathy.   Neurological: She is alert and oriented to person, place, and time. Coordination normal.   Skin: Skin is warm and dry.   Psychiatric: She has a normal mood and affect. Her behavior is normal. Thought content normal.   Vitals reviewed.          Current Outpatient Prescriptions:     albuterol 90 mcg/actuation inhaler, Inhale 2 puffs into the lungs every 6 (six) hours as needed for Wheezing or Shortness of Breath. Rescue, Disp: 8 g, Rfl: 0    ascorbic acid, vitamin C, (VITAMIN C) 100 MG tablet, Take 100 mg by mouth once daily., Disp: , Rfl:     atorvastatin (LIPITOR) 40 MG tablet, TAKE 1 TABLET (40 MG TOTAL) BY MOUTH ONCE DAILY., Disp: 90 tablet, Rfl: 0    AUBAGIO 14 mg Tab, Take 1 tablet by mouth once daily., Disp: 30 tablet, Rfl: 0    cetirizine (ZYRTEC) 10 MG tablet, Take 1 tablet (10 mg total) by mouth once daily., Disp: , Rfl:     cholecalciferol, vitamin D3, (VITAMIN D3) 5,000 unit Tab, Take 5,000 Units by mouth once daily., Disp: , Rfl:     clonazePAM (KLONOPIN) 1 MG tablet, Take 1 tablet (1 mg total) by mouth 2 (two) times daily., Disp: 180 tablet, Rfl: 1    clopidogrel (PLAVIX) 75 mg tablet, Take 75 mg by mouth once daily.  , Disp: , Rfl:     DULoxetine (CYMBALTA) 20 MG capsule, TAKE 1 CAPSULE EVERY DAY, Disp: 90 capsule, Rfl: 1    famotidine (PEPCID) 20 MG tablet, TAKE 1 TABLET BY MOUTH TWICE DAILY (Patient taking differently: TAKE 1 TABLET BY MOUTH TWICE DAILY AS NEEDED), Disp: 60 tablet, Rfl: 0    ferrous sulfate 325 (65 FE) MG EC tablet, Take 325 mg by mouth 3 (three) times daily with meals., Disp: , Rfl:     fluticasone (FLONASE) 50 mcg/actuation nasal spray, 2 sprays by Each Nare route once daily., Disp: 1 Bottle, Rfl: 5    ibuprofen 200 mg Cap, Take by mouth as needed., Disp: , Rfl:     methylPREDNISolone  (MEDROL DOSEPACK) 4 mg tablet, use as directed, Disp: 1 Package, Rfl: 0    modafinil (PROVIGIL) 100 MG Tab, TAKE 1 TABLET BY MOUTH TWICE DAILY, Disp: 60 tablet, Rfl: 5    multivitamin with minerals (HAIR,SKIN AND NAILS) tablet, Take 1 tablet by mouth once daily., Disp: , Rfl:     nitroGLYCERIN (NITROSTAT) 0.4 MG SL tablet, Place 0.4 mg under the tongue every 5 (five) minutes as needed.  , Disp: , Rfl:     PSYLLIUM SEED, WITH DEXTROSE, (FIBER ORAL), Take by mouth 2 (two) times daily., Disp: , Rfl:     topiramate (TOPAMAX) 50 MG tablet, TAKE 1 TABLET (50 MG TOTAL) BY MOUTH 2 (TWO) TIMES DAILY., Disp: 60 tablet, Rfl: 5  Assessment:       1. Cough    2. Essential hypertension    3. Environmental and seasonal allergies    4. Change in voice        Plan:       Cough  Possibly due to post nasal drip.  If no improvement with treatment   -     X-Ray Chest PA And Lateral; Future; Expected date: 12/15/2017  -     albuterol 90 mcg/actuation inhaler; Inhale 2 puffs into the lungs every 6 (six) hours as needed for Wheezing or Shortness of Breath. Rescue  Dispense: 8 g; Refill: 0  -     cetirizine (ZYRTEC) 10 MG tablet; Take 1 tablet (10 mg total) by mouth once daily.  -     fluticasone (FLONASE) 50 mcg/actuation nasal spray; 2 sprays by Each Nare route once daily.  Dispense: 1 Bottle; Refill: 5    Essential hypertension  Stable on current medication.    Environmental and seasonal allergies  -     albuterol 90 mcg/actuation inhaler; Inhale 2 puffs into the lungs every 6 (six) hours as needed for Wheezing or Shortness of Breath. Rescue  Dispense: 8 g; Refill: 0    Change in voice  Will treat post nasal drip.  If no improvement consider ENT referral.    Patient readiness: acceptance and barriers:none    During the course of the visit the patient was educated and counseled about the following:     Hypertension:   Medication: no change.    Goals: Hypertension: Reduce Blood Pressure    Did patient meet goals/outcomes: Yes    The  following self management tools provided: declined    Patient Instructions (the written plan) was given to the patient/family.     Time spent with patient: 15 minutes    Barriers to medications present (no )    Adverse reactions to current medications (no)    Over the counter medications reviewed (Yes)

## 2017-12-27 ENCOUNTER — PATIENT MESSAGE (OUTPATIENT)
Dept: NEUROLOGY | Facility: CLINIC | Age: 67
End: 2017-12-27

## 2018-01-02 ENCOUNTER — PATIENT MESSAGE (OUTPATIENT)
Dept: NEUROLOGY | Facility: CLINIC | Age: 68
End: 2018-01-02

## 2018-01-15 ENCOUNTER — DOCUMENTATION ONLY (OUTPATIENT)
Dept: NEUROLOGY | Facility: CLINIC | Age: 68
End: 2018-01-15

## 2018-01-17 ENCOUNTER — PATIENT MESSAGE (OUTPATIENT)
Dept: NEUROLOGY | Facility: CLINIC | Age: 68
End: 2018-01-17

## 2018-01-23 ENCOUNTER — DOCUMENTATION ONLY (OUTPATIENT)
Dept: NEUROLOGY | Facility: CLINIC | Age: 68
End: 2018-01-23

## 2018-01-23 NOTE — PROGRESS NOTES
Received letter of approval for temporary supply of Augagio 14MG tablet while waiting on PA approval

## 2018-02-05 RX ORDER — ATORVASTATIN CALCIUM 40 MG/1
40 TABLET, FILM COATED ORAL DAILY
Qty: 90 TABLET | Refills: 3 | Status: SHIPPED | OUTPATIENT
Start: 2018-02-05 | End: 2019-01-22 | Stop reason: SDUPTHER

## 2018-02-06 ENCOUNTER — HOSPITAL ENCOUNTER (EMERGENCY)
Facility: HOSPITAL | Age: 68
Discharge: HOME | End: 2018-02-07
Attending: EMERGENCY MEDICINE
Payer: MEDICARE

## 2018-02-06 ENCOUNTER — PATIENT MESSAGE (OUTPATIENT)
Dept: NEUROLOGY | Facility: CLINIC | Age: 68
End: 2018-02-06

## 2018-02-06 DIAGNOSIS — K57.30 DIVERTICULOSIS OF LARGE INTESTINE WITHOUT HEMORRHAGE: ICD-10-CM

## 2018-02-06 DIAGNOSIS — I10 HYPERTENSION: ICD-10-CM

## 2018-02-06 DIAGNOSIS — R10.9 ACUTE LEFT FLANK PAIN: Primary | ICD-10-CM

## 2018-02-06 PROCEDURE — 99285 EMERGENCY DEPT VISIT HI MDM: CPT | Mod: 25

## 2018-02-06 PROCEDURE — 96365 THER/PROPH/DIAG IV INF INIT: CPT

## 2018-02-06 PROCEDURE — 96375 TX/PRO/DX INJ NEW DRUG ADDON: CPT

## 2018-02-07 VITALS
WEIGHT: 161 LBS | HEIGHT: 64 IN | SYSTOLIC BLOOD PRESSURE: 148 MMHG | RESPIRATION RATE: 18 BRPM | BODY MASS INDEX: 27.49 KG/M2 | TEMPERATURE: 98 F | OXYGEN SATURATION: 90 % | DIASTOLIC BLOOD PRESSURE: 70 MMHG | HEART RATE: 74 BPM

## 2018-02-07 LAB
ALBUMIN SERPL BCP-MCNC: 3.8 G/DL
ALP SERPL-CCNC: 152 U/L
ALT SERPL W/O P-5'-P-CCNC: 37 U/L
ANION GAP SERPL CALC-SCNC: 10 MMOL/L
AST SERPL-CCNC: 25 U/L
BASOPHILS # BLD AUTO: 0.1 K/UL
BASOPHILS NFR BLD: 1.8 %
BILIRUB SERPL-MCNC: 0.3 MG/DL
BILIRUB UR QL STRIP: NEGATIVE
BUN SERPL-MCNC: 19 MG/DL
CALCIUM SERPL-MCNC: 9 MG/DL
CHLORIDE SERPL-SCNC: 112 MMOL/L
CLARITY UR: CLEAR
CO2 SERPL-SCNC: 22 MMOL/L
COLOR UR: YELLOW
CREAT SERPL-MCNC: 0.8 MG/DL
DIFFERENTIAL METHOD: ABNORMAL
EOSINOPHIL # BLD AUTO: 0.3 K/UL
EOSINOPHIL NFR BLD: 4 %
ERYTHROCYTE [DISTWIDTH] IN BLOOD BY AUTOMATED COUNT: 14.3 %
EST. GFR  (AFRICAN AMERICAN): >60 ML/MIN/1.73 M^2
EST. GFR  (NON AFRICAN AMERICAN): >60 ML/MIN/1.73 M^2
GLUCOSE SERPL-MCNC: 107 MG/DL
GLUCOSE UR QL STRIP: NEGATIVE
HCT VFR BLD AUTO: 41.6 %
HGB BLD-MCNC: 13.5 G/DL
HGB UR QL STRIP: NEGATIVE
KETONES UR QL STRIP: NEGATIVE
LEUKOCYTE ESTERASE UR QL STRIP: NEGATIVE
LIPASE SERPL-CCNC: 23 U/L
LYMPHOCYTES # BLD AUTO: 1.3 K/UL
LYMPHOCYTES NFR BLD: 21.5 %
MCH RBC QN AUTO: 28.1 PG
MCHC RBC AUTO-ENTMCNC: 32.5 G/DL
MCV RBC AUTO: 87 FL
MONOCYTES # BLD AUTO: 0.4 K/UL
MONOCYTES NFR BLD: 6.7 %
NEUTROPHILS # BLD AUTO: 4.1 K/UL
NEUTROPHILS NFR BLD: 66 %
NITRITE UR QL STRIP: NEGATIVE
PH UR STRIP: 7 [PH] (ref 5–8)
PLATELET # BLD AUTO: 186 K/UL
PMV BLD AUTO: 8.8 FL
POTASSIUM SERPL-SCNC: 3.7 MMOL/L
PROT SERPL-MCNC: 7 G/DL
PROT UR QL STRIP: NEGATIVE
RBC # BLD AUTO: 4.8 M/UL
SODIUM SERPL-SCNC: 144 MMOL/L
SP GR UR STRIP: 1.02 (ref 1–1.03)
URN SPEC COLLECT METH UR: NORMAL
UROBILINOGEN UR STRIP-ACNC: NEGATIVE EU/DL
WBC # BLD AUTO: 6.3 K/UL

## 2018-02-07 PROCEDURE — 83690 ASSAY OF LIPASE: CPT

## 2018-02-07 PROCEDURE — 25000003 PHARM REV CODE 250: Performed by: EMERGENCY MEDICINE

## 2018-02-07 PROCEDURE — 85025 COMPLETE CBC W/AUTO DIFF WBC: CPT

## 2018-02-07 PROCEDURE — 80053 COMPREHEN METABOLIC PANEL: CPT

## 2018-02-07 PROCEDURE — 63600175 PHARM REV CODE 636 W HCPCS: Performed by: EMERGENCY MEDICINE

## 2018-02-07 PROCEDURE — 25500020 PHARM REV CODE 255

## 2018-02-07 PROCEDURE — 36415 COLL VENOUS BLD VENIPUNCTURE: CPT

## 2018-02-07 PROCEDURE — 81003 URINALYSIS AUTO W/O SCOPE: CPT

## 2018-02-07 PROCEDURE — 93005 ELECTROCARDIOGRAM TRACING: CPT

## 2018-02-07 RX ORDER — PROMETHAZINE HYDROCHLORIDE 25 MG/1
25 TABLET ORAL EVERY 6 HOURS PRN
Qty: 15 TABLET | Refills: 0 | Status: SHIPPED | OUTPATIENT
Start: 2018-02-07 | End: 2019-04-29 | Stop reason: CLARIF

## 2018-02-07 RX ORDER — ACETAMINOPHEN 10 MG/ML
1000 INJECTION, SOLUTION INTRAVENOUS EVERY 8 HOURS
Status: DISCONTINUED | OUTPATIENT
Start: 2018-02-07 | End: 2018-02-07 | Stop reason: HOSPADM

## 2018-02-07 RX ORDER — PROMETHAZINE HYDROCHLORIDE 12.5 MG/1
12.5 TABLET ORAL
Status: COMPLETED | OUTPATIENT
Start: 2018-02-07 | End: 2018-02-07

## 2018-02-07 RX ORDER — IBUPROFEN 600 MG/1
600 TABLET ORAL EVERY 6 HOURS PRN
Qty: 20 TABLET | Refills: 0 | Status: SHIPPED | OUTPATIENT
Start: 2018-02-07 | End: 2019-08-20 | Stop reason: ALTCHOICE

## 2018-02-07 RX ORDER — METRONIDAZOLE 500 MG/1
500 TABLET ORAL
Status: COMPLETED | OUTPATIENT
Start: 2018-02-07 | End: 2018-02-07

## 2018-02-07 RX ORDER — METRONIDAZOLE 500 MG/1
500 TABLET ORAL 3 TIMES DAILY
Qty: 21 TABLET | Refills: 0 | Status: SHIPPED | OUTPATIENT
Start: 2018-02-07 | End: 2018-02-14

## 2018-02-07 RX ORDER — SODIUM CHLORIDE 9 MG/ML
INJECTION, SOLUTION INTRAVENOUS
Status: DISCONTINUED
Start: 2018-02-07 | End: 2018-02-07 | Stop reason: HOSPADM

## 2018-02-07 RX ORDER — CIPROFLOXACIN 500 MG/1
500 TABLET ORAL
Status: COMPLETED | OUTPATIENT
Start: 2018-02-07 | End: 2018-02-07

## 2018-02-07 RX ORDER — OXYCODONE AND ACETAMINOPHEN 5; 325 MG/1; MG/1
1 TABLET ORAL EVERY 12 HOURS PRN
Qty: 6 TABLET | Refills: 0 | Status: SHIPPED | OUTPATIENT
Start: 2018-02-07 | End: 2018-02-10

## 2018-02-07 RX ORDER — ACETAMINOPHEN 10 MG/ML
INJECTION, SOLUTION INTRAVENOUS
Status: COMPLETED
Start: 2018-02-07 | End: 2018-02-07

## 2018-02-07 RX ORDER — CIPROFLOXACIN 500 MG/1
500 TABLET ORAL 2 TIMES DAILY
Qty: 20 TABLET | Refills: 0 | Status: SHIPPED | OUTPATIENT
Start: 2018-02-07 | End: 2018-02-17

## 2018-02-07 RX ORDER — KETOROLAC TROMETHAMINE 30 MG/ML
15 INJECTION, SOLUTION INTRAMUSCULAR; INTRAVENOUS
Status: COMPLETED | OUTPATIENT
Start: 2018-02-07 | End: 2018-02-07

## 2018-02-07 RX ORDER — HYDROMORPHONE HYDROCHLORIDE 2 MG/ML
0.5 INJECTION, SOLUTION INTRAMUSCULAR; INTRAVENOUS; SUBCUTANEOUS
Status: COMPLETED | OUTPATIENT
Start: 2018-02-07 | End: 2018-02-07

## 2018-02-07 RX ADMIN — HYDROMORPHONE HYDROCHLORIDE 0.5 MG: 2 INJECTION, SOLUTION INTRAMUSCULAR; INTRAVENOUS; SUBCUTANEOUS at 02:02

## 2018-02-07 RX ADMIN — PROMETHAZINE HYDROCHLORIDE 12.5 MG: 12.5 TABLET ORAL at 12:02

## 2018-02-07 RX ADMIN — METRONIDAZOLE 500 MG: 500 TABLET ORAL at 02:02

## 2018-02-07 RX ADMIN — IOHEXOL 75 ML: 350 INJECTION, SOLUTION INTRAVENOUS at 01:02

## 2018-02-07 RX ADMIN — CIPROFLOXACIN 500 MG: 500 TABLET, FILM COATED ORAL at 02:02

## 2018-02-07 RX ADMIN — SODIUM CHLORIDE 500 ML: 0.9 INJECTION, SOLUTION INTRAVENOUS at 12:02

## 2018-02-07 RX ADMIN — KETOROLAC TROMETHAMINE 15 MG: 30 INJECTION, SOLUTION INTRAMUSCULAR at 02:02

## 2018-02-07 RX ADMIN — ACETAMINOPHEN 1000 MG: 10 INJECTION, SOLUTION INTRAVENOUS at 12:02

## 2018-02-07 NOTE — ED NOTES
Pt presents to ER for evaluation of left lower abdominal pain started /20 minutes PTA. Pt also endorses nausea, diarrhea, and 9/10 pain. Pt denies chest pain, SOB, pancreatic hx.

## 2018-02-07 NOTE — ED NOTES
"Pt states pain is 6/10, and states "getting better". Pt asleep when walked into room. MD aware.   "

## 2018-02-07 NOTE — ED PROVIDER NOTES
Encounter Date: 2/6/2018    SCRIBE #1 NOTE: Carine MATA, am scribing for, and in the presence of, Dr. Hanson.       History     Chief Complaint   Patient presents with    Abdominal Pain     sudden and sharp on left side started 10 minutes ago       02/06/2018 11:53 PM     Chief complaint: Abdominal Pain      Alyssa John is a 67 y.o. female with PMHx of MS, seizures, CAD, MI, HTN, epilepsy, and GERD who presents to the ED with acute onset of left sided abdominal pain immediately PTA. Associating symptoms includes nausea. She states pain is sharp and denies pain radiates to her back or chest. She denies any recent diarrhea or constipation. She also denies any urinary symptoms. The patient endorses having a similar abdominal pain 2 weeks ago, but states pain was present for less than a minute. She endorses having normal bowel movement and last one was earlier today. The patient reports taking ibuprofen regularly for pain and had ibuprofen a few hours ago. She denies SOB, chest pain, and onset of any other symptoms. She denies history of kidney stones and vaginal discharge. Per , the patient is currently taking medications for MS and epilepsy. SHx includes appendectomy, coronary stent placement, and colonoscopy.       The history is provided by the patient and the spouse.     Review of patient's allergies indicates:   Allergen Reactions    Codeine     Dilantin [phenytoin sodium extended]     Tegretol [carbamazepine]      Past Medical History:   Diagnosis Date    Arthritis     Coronary artery disease     Encounter for blood transfusion     Epilepsy     GERD (gastroesophageal reflux disease)     Hypertension     MI, old     MS (multiple sclerosis)     Seizures     epilepsy     Past Surgical History:   Procedure Laterality Date    APPENDECTOMY      BACK SURGERY      L5 discectomy    COLONOSCOPY N/A 9/16/2015    Procedure: COLONOSCOPY;  Surgeon: Henry Malcolm MD;  Location: [facility]  ENDO;  Service: Endoscopy;  Laterality: N/A;    CORONARY STENT PLACEMENT      right knee arthroscopy       Family History   Problem Relation Age of Onset    Scleroderma Mother     Heart disease Father      MI, CAD    Hyperlipidemia Sister     Cancer Neg Hx     Diabetes Neg Hx     Stroke Neg Hx     Melanoma Neg Hx     Psoriasis Neg Hx     Lupus Neg Hx     Eczema Neg Hx      Social History   Substance Use Topics    Smoking status: Former Smoker     Packs/day: 1.50     Quit date: 12/26/2006    Smokeless tobacco: Never Used    Alcohol use No     Review of Systems   Constitutional: Negative for fever.   Eyes: Negative for visual disturbance.   Respiratory: Negative for apnea and shortness of breath.    Cardiovascular: Negative for chest pain and palpitations.   Gastrointestinal: Positive for abdominal pain and nausea. Negative for abdominal distention.   Genitourinary: Negative for difficulty urinating.   Musculoskeletal: Negative for neck pain.   Skin: Negative for pallor and rash.   Neurological: Negative for headaches.   Hematological: Does not bruise/bleed easily.   Psychiatric/Behavioral: Negative for agitation.       Physical Exam     Initial Vitals [02/06/18 2314]   BP Pulse Resp Temp SpO2   (!) 241/101 79 18 97.6 °F (36.4 °C) 98 %      MAP       147.67         Physical Exam    Nursing note and vitals reviewed.  Constitutional: She appears well-developed and well-nourished. She is not diaphoretic. She is Obese . No distress.   Appears stressed.    HENT:   Head: Normocephalic and atraumatic.   Eyes: Conjunctivae and EOM are normal.   Neck: Normal range of motion. Neck supple. No thyroid mass present.   Cardiovascular: Normal rate and regular rhythm. Exam reveals no friction rub.    No murmur heard.  Pulmonary/Chest: Breath sounds normal. No respiratory distress. She has no wheezes. She has no rales.   Abdominal: Soft. Normal appearance and bowel sounds are normal. There is tenderness in the left  lower quadrant.   LLQ and left lateral abdominal pain without overlying skin changes.   Musculoskeletal: Normal range of motion. She exhibits no edema or tenderness.   Neurological: She is alert and oriented to person, place, and time. She has normal strength. No cranial nerve deficit or sensory deficit.   Skin: Skin is warm and dry. No rash noted. No erythema.   Psychiatric: She has a normal mood and affect. Her speech is normal. Cognition and memory are normal.         ED Course   Procedures  Labs Reviewed - No data to display  EKG Readings: (Independently Interpreted)   Normal sinus rhythm, 79 bpm, borderline left axis deviation, normal intervals, anterior infarct, age undetermined, no STEMI          Medical Decision Making:   History:   Old Medical Records: I decided to obtain old medical records.  Differential Diagnosis:   DDX include, but are not limited to, diverticulitis, pancreatitis, atypical ACS, gastritis, colitis, kidney stone disease, pyelonephritis, prodromal shingles pain, AAA  Clinical Tests:   Lab Tests: Reviewed and Ordered  Radiological Study: Ordered and Reviewed  ED Management:    This patient was interviewed and examined emergently.  Vital signs are stable.  She has acute onset left flank pain without a surgical abdomen at this time.  Patient denies being able to take anything with codeine or hydrocodone and received multiple rounds of medication with significant improvement.  Workup, including CT scan of the abdomen and pelvis with IV dye is noted to be largely unremarkable with the exception of extensive diverticular disease without acute focal areas of information and cholelithiasis without evidence of progressing acute cholecystitis.  She'll be started on oral Flagyl and ciprofloxacin for potential coverage of early diverticulitis and tolerated these medications in the ER.  She'll be discharged with a perception for low-dose Percocet and will be asked to take his only as needed when  anti-inflammatories are not improving pain.  She'll be asked follow-up with her primary care doctor soon as possible regarding potential improvement or to return to the ER for any new, concerning, or worsening symptoms.  I discussed with patient and  that evaluation in the ED does not suggest any emergent or life threatening condition medical condition requiring immediate intervention beyond what was provided in the ED, and I believe patient is safe for discharge.  Regardless, an unremarkable evaluation in the ED does not preclude the development or presence of a serious of life threatening condition. As such, patient was instructed to return immediately for any worsening or change in current symptoms.  They expressed understanding and she was discharged in stable condition with a recommendation to follow-up with her primary care doctor as soon as possible.            Scribe Attestation:   Scribe #1: I performed the above scribed service and the documentation accurately describes the services I performed. I attest to the accuracy of the note.      I, Dr. Giuseppe Hanson, personally performed the services described in this documentation. All medical record entries made by the scribe were at my direction and in my presence.  I have reviewed the chart and agree that the record reflects my personal performance and is accurate and complete. Giuseppe Hanson MD.  12:42 AM 02/07/2018          ED Course      Clinical Impression:   The primary encounter diagnosis was Acute left flank pain. Diagnoses of Hypertension and Diverticulosis of large intestine without hemorrhage were also pertinent to this visit.    Disposition:   Disposition: Discharged  Condition: Stable                        Giuseppe Hanson MD  02/07/18 0621

## 2018-02-14 ENCOUNTER — DOCUMENTATION ONLY (OUTPATIENT)
Dept: NEUROLOGY | Facility: CLINIC | Age: 68
End: 2018-02-14

## 2018-02-19 DIAGNOSIS — R53.82 CHRONIC FATIGUE: ICD-10-CM

## 2018-02-19 DIAGNOSIS — G35 MULTIPLE SCLEROSIS: ICD-10-CM

## 2018-02-19 RX ORDER — MODAFINIL 100 MG/1
100 TABLET ORAL 2 TIMES DAILY
Qty: 60 TABLET | Refills: 5 | Status: SHIPPED | OUTPATIENT
Start: 2018-02-19 | End: 2018-02-22 | Stop reason: SDUPTHER

## 2018-02-22 ENCOUNTER — TELEPHONE (OUTPATIENT)
Dept: NEUROLOGY | Facility: CLINIC | Age: 68
End: 2018-02-22

## 2018-02-22 RX ORDER — MODAFINIL 200 MG/1
100 TABLET ORAL 2 TIMES DAILY
COMMUNITY
End: 2018-03-28

## 2018-02-22 NOTE — TELEPHONE ENCOUNTER
Received fax from [facility] informing us pt's insurance will not cover 2 tablets per day. They asked if they could dispense 200 mg tablets for her to take 1/2 tablet (100mg) BID. Risa okayed this. I called pharmacy and provided verbal for them to dispense modafinil 200 mg tablet with directions to take 1/2 tablet (100 mg) BID. Alyssa is also aware of the change and verbalized understanding.

## 2018-02-26 ENCOUNTER — TELEPHONE (OUTPATIENT)
Dept: NEUROLOGY | Facility: CLINIC | Age: 68
End: 2018-02-26

## 2018-02-26 NOTE — TELEPHONE ENCOUNTER
Call placed to [employer] and spoke with Martha. She states Aubagio has been denied. Denial letter and appeal instructions to be faxed to this office.

## 2018-02-28 NOTE — TELEPHONE ENCOUNTER
ELIS left for Leanne to call this office back.    Fax received from [employer]. Aubagio PA approved from 2/8/18-12/31/18.

## 2018-02-28 NOTE — TELEPHONE ENCOUNTER
----- Message from Kary Marshall sent at 2/28/2018  9:16 AM CST -----  Contact: Leanne ([facility]) [phone]  Leanne called to speak to someone regarding the patient's medication. Please contact her to discuss further.

## 2018-03-28 ENCOUNTER — PATIENT MESSAGE (OUTPATIENT)
Dept: NEUROLOGY | Facility: CLINIC | Age: 68
End: 2018-03-28

## 2018-03-28 DIAGNOSIS — G35 MULTIPLE SCLEROSIS: ICD-10-CM

## 2018-03-28 DIAGNOSIS — R56.9 CONVULSIONS, UNSPECIFIED CONVULSION TYPE: ICD-10-CM

## 2018-03-28 DIAGNOSIS — R53.82 CHRONIC FATIGUE: Primary | ICD-10-CM

## 2018-03-28 RX ORDER — MODAFINIL 100 MG/1
100 TABLET ORAL 2 TIMES DAILY
Qty: 180 TABLET | Refills: 1 | Status: SHIPPED | OUTPATIENT
Start: 2018-03-28 | End: 2018-10-03 | Stop reason: SDUPTHER

## 2018-03-28 RX ORDER — TOPIRAMATE 50 MG/1
50 TABLET, FILM COATED ORAL 2 TIMES DAILY
Qty: 180 TABLET | Refills: 1 | Status: SHIPPED | OUTPATIENT
Start: 2018-03-28 | End: 2018-09-29 | Stop reason: SDUPTHER

## 2018-05-08 ENCOUNTER — PATIENT MESSAGE (OUTPATIENT)
Dept: NEUROLOGY | Facility: CLINIC | Age: 68
End: 2018-05-08

## 2018-05-09 ENCOUNTER — PATIENT MESSAGE (OUTPATIENT)
Dept: NEUROLOGY | Facility: CLINIC | Age: 68
End: 2018-05-09

## 2018-05-10 ENCOUNTER — PATIENT MESSAGE (OUTPATIENT)
Dept: NEUROLOGY | Facility: CLINIC | Age: 68
End: 2018-05-10

## 2018-05-16 DIAGNOSIS — M79.2 NEUROPATHIC PAIN: ICD-10-CM

## 2018-05-16 RX ORDER — DULOXETIN HYDROCHLORIDE 20 MG/1
CAPSULE, DELAYED RELEASE ORAL
Qty: 90 CAPSULE | Refills: 1 | Status: SHIPPED | OUTPATIENT
Start: 2018-05-16 | End: 2018-05-21 | Stop reason: SDUPTHER

## 2018-05-21 ENCOUNTER — OFFICE VISIT (OUTPATIENT)
Dept: NEUROLOGY | Facility: CLINIC | Age: 68
End: 2018-05-21
Payer: MEDICARE

## 2018-05-21 VITALS
SYSTOLIC BLOOD PRESSURE: 116 MMHG | HEIGHT: 64 IN | HEART RATE: 78 BPM | BODY MASS INDEX: 28.17 KG/M2 | DIASTOLIC BLOOD PRESSURE: 79 MMHG | WEIGHT: 165 LBS

## 2018-05-21 DIAGNOSIS — F41.9 ANXIETY: ICD-10-CM

## 2018-05-21 DIAGNOSIS — Z29.89 PROPHYLACTIC IMMUNOTHERAPY: ICD-10-CM

## 2018-05-21 DIAGNOSIS — E55.9 VITAMIN D DEFICIENCY: ICD-10-CM

## 2018-05-21 DIAGNOSIS — R26.9 GAIT DISTURBANCE: ICD-10-CM

## 2018-05-21 DIAGNOSIS — R53.83 FATIGUE, UNSPECIFIED TYPE: ICD-10-CM

## 2018-05-21 DIAGNOSIS — M79.2 NEUROPATHIC PAIN: ICD-10-CM

## 2018-05-21 DIAGNOSIS — Z71.89 COUNSELING REGARDING GOALS OF CARE: ICD-10-CM

## 2018-05-21 DIAGNOSIS — Z79.899 ENCOUNTER FOR LONG-TERM (CURRENT) USE OF HIGH-RISK MEDICATION: ICD-10-CM

## 2018-05-21 DIAGNOSIS — E53.8 B12 DEFICIENCY: ICD-10-CM

## 2018-05-21 DIAGNOSIS — F32.89 OTHER DEPRESSION: Primary | ICD-10-CM

## 2018-05-21 DIAGNOSIS — G35 MULTIPLE SCLEROSIS: ICD-10-CM

## 2018-05-21 PROCEDURE — 3074F SYST BP LT 130 MM HG: CPT | Mod: CPTII,S$GLB,, | Performed by: PSYCHIATRY & NEUROLOGY

## 2018-05-21 PROCEDURE — 99999 PR PBB SHADOW E&M-EST. PATIENT-LVL III: CPT | Mod: PBBFAC,,, | Performed by: PSYCHIATRY & NEUROLOGY

## 2018-05-21 PROCEDURE — 99499 UNLISTED E&M SERVICE: CPT | Mod: S$GLB,,, | Performed by: PSYCHIATRY & NEUROLOGY

## 2018-05-21 PROCEDURE — 3078F DIAST BP <80 MM HG: CPT | Mod: CPTII,S$GLB,, | Performed by: PSYCHIATRY & NEUROLOGY

## 2018-05-21 PROCEDURE — 99215 OFFICE O/P EST HI 40 MIN: CPT | Mod: S$GLB,,, | Performed by: PSYCHIATRY & NEUROLOGY

## 2018-05-21 RX ORDER — GABAPENTIN 100 MG/1
CAPSULE ORAL
Qty: 120 CAPSULE | Refills: 11 | Status: SHIPPED | OUTPATIENT
Start: 2018-05-21 | End: 2018-12-11

## 2018-05-21 RX ORDER — DULOXETIN HYDROCHLORIDE 20 MG/1
60 CAPSULE, DELAYED RELEASE ORAL DAILY
Qty: 90 CAPSULE | Refills: 5 | Status: SHIPPED | OUTPATIENT
Start: 2018-05-21 | End: 2018-12-04 | Stop reason: SDUPTHER

## 2018-05-21 NOTE — PROGRESS NOTES
Subjective:       Patient ID: Alyssa John is a 67 y.o. female who presents today for a routine clinic visit for MS.        MS HPI:  · DMT: Aubagio  · Side effects from DMT? Foot pain  · Taking vitamin D3 as recommended? Yes;   · Pt states she's been depressed since September;  She feels isolated socially ;  · She is having new foot pain on Aubagio--new in the past 5 months; pain is severe; tried Lyrica, but became swollen;  Has not tried gabapentin;   · Pt states her sister recently was diagnosed with dementia; she is worried that she will get dementia    SOCIAL HISTORY  Social History   Substance Use Topics    Smoking status: Former Smoker     Packs/day: 1.50     Quit date: 12/26/2006    Smokeless tobacco: Never Used    Alcohol use No     Living arrangements - the patient lives with their spouse.  Employment : no        Objective:      Neurologic Exam      Pt defers exam today    Imaging:     Results for orders placed during the hospital encounter of 12/07/16   MRI Brain W WO Contrast    Impression 1.  Stable, moderate white matter disease in a pattern compatible with multiple sclerosis.  No evidence for active demyelination.  2.  Moderate cerebral volume loss above that expected for patient age.      Electronically signed by: Baldo Salazar MD  Date:     12/07/16  Time:    13:51              Labs:     Lab Results   Component Value Date    USKLZTVG87HV 53 10/19/2016    TSORTHJE14DH 78 06/13/2016    BYSFSIIF82KJ 59 04/07/2015     Lab Results   Component Value Date    JCVINDEX SEE COMMENT (A) 12/09/2015    JCVANTIBODY SEE COMMENT 12/09/2015     Lab Results   Component Value Date    IG8NOHDQ 59.1 03/31/2016    ABSOLUTECD3 619 (L) 03/31/2016    OO9HPDFF 19.9 03/31/2016    ABSOLUTECD8 208 03/31/2016    OH5SXXQB 39.1 03/31/2016    ABSOLUTECD4 409 03/31/2016    LABCD48 1.97 03/31/2016     Lab Results   Component Value Date    WBC 6.30 02/07/2018    RBC 4.80 02/07/2018    HGB 13.5 02/07/2018    HCT 41.6  02/07/2018    MCV 87 02/07/2018    MCH 28.1 02/07/2018    MCHC 32.5 02/07/2018    RDW 14.3 02/07/2018     02/07/2018    MPV 8.8 (L) 02/07/2018    GRAN 4.1 02/07/2018    GRAN 66.0 02/07/2018    LYMPH 1.3 02/07/2018    LYMPH 21.5 02/07/2018    MONO 0.4 02/07/2018    MONO 6.7 02/07/2018    EOS 0.3 02/07/2018    BASO 0.10 02/07/2018    EOSINOPHIL 4.0 02/07/2018    BASOPHIL 1.8 02/07/2018     Sodium   Date Value Ref Range Status   02/07/2018 144 136 - 145 mmol/L Final     Potassium   Date Value Ref Range Status   02/07/2018 3.7 3.5 - 5.1 mmol/L Final     Chloride   Date Value Ref Range Status   02/07/2018 112 (H) 95 - 110 mmol/L Final     CO2   Date Value Ref Range Status   02/07/2018 22 (L) 23 - 29 mmol/L Final     Carbon Monoxide, Blood   Date Value Ref Range Status   10/19/2016 2 % Final     Comment:     -------------------REFERENCE VALUE--------------------------  0-2 Normal (Non-smoker) , < or = 9 Normal (Smoker), > or   = 20 (Toxic concentration)  Test Performed by:  [place] [id number]  : Adrien Norton II, M.D., Ph.D.       Glucose   Date Value Ref Range Status   02/07/2018 107 70 - 110 mg/dL Final     BUN, Bld   Date Value Ref Range Status   02/07/2018 19 8 - 23 mg/dL Final     Creatinine   Date Value Ref Range Status   02/07/2018 0.8 0.5 - 1.4 mg/dL Final   02/22/2013 0.8 0.5 - 1.4 mg/dL Final     Calcium   Date Value Ref Range Status   02/07/2018 9.0 8.7 - 10.5 mg/dL Final   02/22/2013 8.7 8.7 - 10.5 mg/dL Final     Total Protein   Date Value Ref Range Status   02/07/2018 7.0 6.0 - 8.4 g/dL Final     Albumin   Date Value Ref Range Status   02/07/2018 3.8 3.5 - 5.2 g/dL Final     Total Bilirubin   Date Value Ref Range Status   02/07/2018 0.3 0.1 - 1.0 mg/dL Final     Comment:     For infants and newborns, interpretation of results should be based  on gestational age, weight and in agreement with  clinical  observations.  Premature Infant recommended reference ranges:  Up to 24 hours.............<8.0 mg/dL  Up to 48 hours............<12.0 mg/dL  3-5 days..................<15.0 mg/dL  6-29 days.................<15.0 mg/dL       Alkaline Phosphatase   Date Value Ref Range Status   02/07/2018 152 (H) 55 - 135 U/L Final   02/22/2013 107 55 - 135 U/L Final     AST   Date Value Ref Range Status   02/07/2018 25 10 - 40 U/L Final   02/22/2013 18 10 - 40 U/L Final     ALT   Date Value Ref Range Status   02/07/2018 37 10 - 44 U/L Final     Anion Gap   Date Value Ref Range Status   02/07/2018 10 8 - 16 mmol/L Final   02/22/2013 11 5 - 15 meq/L Final     eGFR if    Date Value Ref Range Status   02/07/2018 >60 >60 mL/min/1.73 m^2 Final     eGFR if non    Date Value Ref Range Status   02/07/2018 >60 >60 mL/min/1.73 m^2 Final     Comment:     Calculation used to obtain the estimated glomerular filtration  rate (eGFR) is the CKD-EPI equation.            Diagnosis/Assessment/Plan:    1. Multiple Sclerosis  · Assessment: Foot pain may be secondary to Aubagio; advised pt to hold Aubagio for now;   · Disease Modifying Therapies: hold Aubagio for now;  Check labs today; if pain improves over next 2 mo will change DMt    2. MS Symptom Assessment / Management  · Mood Disorder: she defers a referral to psychiatry;  Will increase Cymbalta to 60mg/day; Pt met with LCSGERARDO Shah; pt to start PT   · Gait Disturbance: PT ordered**   · Pain: nerve pain; d/c Aubagio; will start gabapentin; Cymbalta increased      Short-sequent f/u 2 mo with Risa Oshea PA-C      Over 50% of this 40 minute visit was spent in direct face to face counseling of the patient about MS, DMT considerations, and MS symptom management.         Other depression  -     DULoxetine (CYMBALTA) 20 MG capsule; Take 3 capsules (60 mg total) by mouth once daily.  Dispense: 90 capsule; Refill: 5    Neuropathic pain  -     DULoxetine  (CYMBALTA) 20 MG capsule; Take 3 capsules (60 mg total) by mouth once daily.  Dispense: 90 capsule; Refill: 5  -     gabapentin (NEURONTIN) 100 MG capsule; Take 1 tab po in AM and at noon, and take 3 tabs po qhs  Dispense: 120 capsule; Refill: 11    Gait disturbance  -     Ambulatory Referral to Physical/Occupational Therapy    Multiple sclerosis  -     Ambulatory Referral to Physical/Occupational Therapy  -     CBC auto differential; Future; Expected date: 05/21/2018  -     Comprehensive metabolic panel; Future  -     Vitamin B12; Future; Expected date: 05/21/2018  -     Vitamin D; Future    Vitamin D deficiency  -     Vitamin D; Future    B12 deficiency  -     CBC auto differential; Future; Expected date: 05/21/2018  -     Vitamin B12; Future; Expected date: 05/21/2018

## 2018-06-04 ENCOUNTER — CLINICAL SUPPORT (OUTPATIENT)
Dept: REHABILITATION | Facility: HOSPITAL | Age: 68
End: 2018-06-04
Payer: MEDICARE

## 2018-06-04 DIAGNOSIS — R26.89 BALANCE PROBLEM: ICD-10-CM

## 2018-06-04 DIAGNOSIS — G35 MULTIPLE SCLEROSIS: ICD-10-CM

## 2018-06-04 PROCEDURE — G8979 MOBILITY GOAL STATUS: HCPCS | Mod: CJ,PN

## 2018-06-04 PROCEDURE — G8978 MOBILITY CURRENT STATUS: HCPCS | Mod: CL,PN

## 2018-06-04 PROCEDURE — 97162 PT EVAL MOD COMPLEX 30 MIN: CPT | Mod: PN

## 2018-06-04 RX ORDER — TERIFLUNOMIDE 14 MG/1
TABLET, FILM COATED ORAL
Qty: 84 TABLET | Refills: 4 | OUTPATIENT
Start: 2018-06-04

## 2018-06-04 NOTE — PLAN OF CARE
TIME RECORD    06/04/2018    Start Time:  0911   Stop Time:  1001    PROCEDURES:    TIMED  Procedure Time Min.    Start:  Stop:     Start:  Stop:     Start:  Stop:     Start:  Stop:          UNTIMED  Procedure Time Min.   Evaluation Start:  Stop:     Start:  Stop:      Total Timed Minutes:  0  Total Timed Units:  0  Total Untimed Units:  1  Charges Billed/# of units:  1    OUTPATIENT PHYSICAL THERAPY   PATIENT EVALUATION  Onset Date: 2000  Problem List Items Addressed This Visit     Multiple sclerosis    Balance problem          Medical Diagnosis:   R26.9 (ICD-10-CM) - Gait disturbance   G35 (ICD-10-CM) - Multiple sclerosis     Treatment Diagnosis: Gait instability    Past Medical History:   Diagnosis Date    Arthritis     Coronary artery disease     Encounter for blood transfusion     Epilepsy     GERD (gastroesophageal reflux disease)     Hypertension     MI, old     MS (multiple sclerosis)     Seizures     epilepsy       Past Surgical History:   Procedure Laterality Date    APPENDECTOMY      BACK SURGERY      L5 discectomy    COLONOSCOPY N/A 9/16/2015    Procedure: COLONOSCOPY;  Surgeon: Henry Malcolm MD;  Location: [facility];  Service: Endoscopy;  Laterality: N/A;    CORONARY STENT PLACEMENT      right knee arthroscopy         has a current medication list which includes the following prescription(s): albuterol, ascorbic acid (vitamin c), atorvastatin, aubagio, cetirizine, cholecalciferol (vitamin d3), clonazepam, clopidogrel, duloxetine, famotidine, ferrous sulfate, fluticasone, gabapentin, ibuprofen, methylprednisolone, multivitamin with minerals, nitroglycerin, promethazine, psyllium seed (with dextrose), and topiramate.    Precautions: falls, cardiac, seizures  Surgical history: see above  Prior Therapy: yes  History of Present Illness: Pt presenting to physical therapy with c/o LE weakness and gait instability. Diagnosis MS in 2001. Recently taken off of disease-modifying medication  "Aubagio medication due to foot pain. States increased leg weakness and reduced functional mobility compared to last time she received physical therapy. Also reports recent depression in conjunction with increased stress. Has declined psychiatry referral.    Prior Level of Function: Assistive Device  Social History:                Lives with  and daughter who is handicapped              Living environment: [facility]                          Stairs to enter home: none                          Railings: N/A              Bathroom setup: tub shower with shower chair, grab bars present; elevated toilet              Transportation: family provides transportation               Leisure activities/hobbies:e Enjoys walking but unable to do so due to weakness/balance deficits     Current level of function: Ambulatory with SPC, also periodically uses RW. Home responsibilities include cleaning bedroom and bathroom (cleans counters and toilet). Unable to clean floors or any other heavy household chores  Current equipment: SPC, RW  Equipment owned (not used): w/c, rollator  Functional Deficits Leading to Referral/Nature of Injury:  weakness, impaired endurance, impaired self care skills, impaired functional mobility, gait instability, impaired balance, decreased lower extremity function and decreased ROM  Patient Therapy Goals: "I want to be physically better. I want to get happy again."     Patient Identified Problem List:    1) Decreased standing tolerance to 2-3 min; unable to stand long enough to brush her teeth due to leg fatigue  2) Increased difficulty with household chores like laundry and vacuuming ( has bought her a light sweeper)      Subjective     Alyssa Flores Dora states no pain at this time. Denies any suicidal or self-harming thoughts/intentions.     Objective     Posture/Appearance:Fwd head position, rounded shoulders; PPT in seated; mild trunk lean to left  Sensation: WFL  DTRs:    Range of " Motion/Strength:    Lower Extremity Range of Motion:  Right Lower Extremity: decreased ankle DF by ~10*  Left Lower Extremity: decreased ankle DF by ~10*     Lower Extremity Strength:  Right Lower Extremity: grossly 4-/5 to 4/5 except ankle DF 3-/5  Left Lower Extremity: grossly 4-/5 to 4/5 except ankle DF 2+/5     Flexibility: ROM as per above, tightness bilateral gastrocs, hamstrings  Gait: c/ SPC at close SBA to light HHA; bilateral foot drop notable  Transfers: Sit<>stand CGA  Special Tests:   5x sit to stand (timed) test: 14 sec  Functional Outcome Measure: Lower Extremity Functional Scale (LEFS): 23/80  G code tool used: LEFS (mobility)  Current modifier: CL  Goal modifier: CJ  Treatment: Educated on role/goal PT, POC.  Pt verbalizing understanding and agreement.     Assessment        Initial Assessment (Pertinent finding, problem list and factors affecting outcome): Patient is a 68 yo F presenting to PT with c/o bilateral LE weakness, decreased balance, and impaired functional mobility. Notable impairments include decreased strength, impaired balance, gait instability, and impaired functional mobility including transfers, standing balance, and gait. Patient would benefit from skilled PT to address notable impairments and improve functional mobility to [facility].     Rehab Potiential: good     Short Term Goals (3 Weeks): 1) Pt will initiate HEP 2) Patient will improve strength by 1/2 muscle grade   Long Term Goals (6 Weeks): 1) Pt will be I with HEP 2) Pt will be able to tolerate standing 5 min in order to facilitate ADL's like brushing teeth 3) Pt will improve 5x sit to stand (timed) test to 11 sec or better 4) Pt will improve LEFS score to <40% impairment      Plan     Certification Period: 06/04/2018 to 07/28/18  Recommended Treatment Plan: 2 times per week for 6 weeks: Gait Training, Manual Therapy, Moist Heat/ Ice, Neuromuscular Re-ed, Patient Education, Therapeutic Activites and Therapeutic  Exercise      Thank you for referral.    Therapist: IKER Montaño CERTIFY THE NEED FOR THESE SERVICES FURNISHED UNDER THIS PLAN OF TREATMENT AND WHILE UNDER MY CARE    Physician's comments: ________________________________________________________________________________________________________________________________________________      Physician's Name: ___________________________________

## 2018-06-06 DIAGNOSIS — F41.9 ANXIETY: ICD-10-CM

## 2018-06-06 DIAGNOSIS — G35 MULTIPLE SCLEROSIS: ICD-10-CM

## 2018-06-06 RX ORDER — CLONAZEPAM 1 MG/1
1 TABLET ORAL 2 TIMES DAILY
Qty: 180 TABLET | OUTPATIENT
Start: 2018-06-06

## 2018-06-06 RX ORDER — CLONAZEPAM 1 MG/1
1 TABLET ORAL 2 TIMES DAILY
Qty: 180 TABLET | Refills: 1 | Status: SHIPPED | OUTPATIENT
Start: 2018-06-06 | End: 2018-12-11 | Stop reason: SDUPTHER

## 2018-06-08 ENCOUNTER — CLINICAL SUPPORT (OUTPATIENT)
Dept: REHABILITATION | Facility: HOSPITAL | Age: 68
End: 2018-06-08
Payer: MEDICARE

## 2018-06-08 DIAGNOSIS — R26.89 BALANCE PROBLEM: ICD-10-CM

## 2018-06-08 DIAGNOSIS — G35 MULTIPLE SCLEROSIS: ICD-10-CM

## 2018-06-08 PROCEDURE — 97110 THERAPEUTIC EXERCISES: CPT | Mod: PN

## 2018-06-08 NOTE — PROGRESS NOTES
[id number]  Date of Treatment: 06/08/2018   Diagnosis:   Encounter Diagnoses   Name Primary?    Balance problem     Multiple sclerosis        Time in: 1005  Time Out: 1055  Total Treatment Time: 48      Subjective:    Alyssa reports no complaints.  Patient reports their pain to be 0/10 on a 0-10 scale with 0 being no pain and 10 being the worst pain imaginable.    Objective    Patient received individual therapy to increase strength and endurance with activities as follows:     Alyssa received therapeutic exercises to develop strength and endurance for 48 minutes including:     Bike x 14 minutes   Shuttle: 37# x 4 minutes  LAQ x 30 B  Marches x 30 B  Clamshell RTB x 30    Written Home Exercises Provided: cont HEP  Pt demo good understanding of the education provided. Alyssa demonstrated fair return demonstration of activities.     Assessment:     Pt fatigued with activity.  Pt needed HHA assistance with ambulation, along with her SC.  Pt will continue to benefit from skilled PT intervention. Medical Necessity is demonstrated by:  Fall Risk, Unable to participate fully in daily activities, Requires skilled supervision to complete and progress HEP and Weakness.    Patient is making fair progress towards established goals.      Plan:  Continue with established Plan of Care towards PT goals.

## 2018-06-12 ENCOUNTER — CLINICAL SUPPORT (OUTPATIENT)
Dept: REHABILITATION | Facility: HOSPITAL | Age: 68
End: 2018-06-12
Attending: TRANSPLANT SURGERY
Payer: MEDICARE

## 2018-06-12 DIAGNOSIS — R26.89 BALANCE PROBLEM: ICD-10-CM

## 2018-06-12 DIAGNOSIS — G35 MULTIPLE SCLEROSIS: ICD-10-CM

## 2018-06-12 PROCEDURE — 97110 THERAPEUTIC EXERCISES: CPT | Mod: PN

## 2018-06-13 NOTE — PROGRESS NOTES
Name: Alyssa John  Date:   06/12/2018  Primary Diagnosis: .  Problem List Items Addressed This Visit     Multiple sclerosis    Balance problem          Time in: 1512  Time Out: 1602  Total Treatment Time: 50  Group Time: 0      Subjective:    Patient reports falling a few days ago, has some soreness in her left hip. Did not seek medical attention. Patient reports their pain to be 1-2/10 on a 0-10 scale with 0 being no pain and 10 being the worst pain imaginable.    Objective  Patient received individual therapy to address strength, endurance, ROM and flexibility with 0 other patients with activities as follows:      Patient received therapeutic exercises to develop strength, endurance, ROM and flexibility for 50 minutes including:      Bike x 15 min   Sit<>stand 5 reps x 2  LAQ x 30 L/R  Marches x 30 L/R     Gait: 80 ft x 2 using SPC at min A    Assessment:     Improved tolerance to activity today.     Pt will continue to benefit from skilled PT intervention. Medical Necessity is demonstrated by:  Fall Risk, Unable to participate fully in daily activities, Requires skilled supervision to complete and progress HEP and Weakness.    Patient is making good progress towards established goals.    Plan:  Continue with established Plan of Care towards PT goals.

## 2018-06-15 ENCOUNTER — CLINICAL SUPPORT (OUTPATIENT)
Dept: REHABILITATION | Facility: HOSPITAL | Age: 68
End: 2018-06-15
Payer: MEDICARE

## 2018-06-15 DIAGNOSIS — G35 MULTIPLE SCLEROSIS: ICD-10-CM

## 2018-06-15 DIAGNOSIS — R26.89 BALANCE PROBLEM: ICD-10-CM

## 2018-06-15 PROCEDURE — 97110 THERAPEUTIC EXERCISES: CPT | Mod: PN

## 2018-06-15 NOTE — PROGRESS NOTES
Name: Alyssa John  [facility] Number: [id number]  Date of Treatment: 06/15/2018   Diagnosis:   Encounter Diagnoses   Name Primary?    Balance problem     Multiple sclerosis        Time in: 1105  Time Out: 1202  Total Treatment Time: 57      Subjective:    Alyssa reports she is feeling better.  Patient reports their pain to be 0/10 on a 0-10 scale with 0 being no pain and 10 being the worst pain imaginable.    Objective    Patient received individual therapy to increase strength, endurance and posture with activities as follows:     Alyssa received therapeutic exercises to develop strength, flexibility, posture, core stabilization and balance for 57 minutes including:     Bike x 10 minutes, 1.42 miles  Sit-stand x 10, min a for balance upon standing, rest break needed during exercise due to fatigue  dotCloudls GTB x 30  LAQ x 30 B  Ball squeeze x 30  Shuttle: 37# x 6 minutes: B, R, L        Written Home Exercises Provided: cont HEP  Pt demo good understanding of the education provided. Alyssa demonstrated fair return demonstration of activities.     Assessment:     Pt held onto therapists arm when ambulating.  VC to increase step length LLE.  Pt's L toes caught on floor during ambulation, min a to catch balance.  Pt will continue to benefit from skilled PT intervention. Medical Necessity is demonstrated by:  Fall Risk, Requires skilled supervision to complete and progress HEP and Weakness.    Patient is making fair progress towards established goals.      Plan:  Continue with established Plan of Care towards PT goals.

## 2018-06-18 ENCOUNTER — PATIENT MESSAGE (OUTPATIENT)
Dept: NEUROLOGY | Facility: CLINIC | Age: 68
End: 2018-06-18

## 2018-06-19 ENCOUNTER — CLINICAL SUPPORT (OUTPATIENT)
Dept: REHABILITATION | Facility: HOSPITAL | Age: 68
End: 2018-06-19
Payer: MEDICARE

## 2018-06-19 DIAGNOSIS — G35 MULTIPLE SCLEROSIS: ICD-10-CM

## 2018-06-19 DIAGNOSIS — R26.89 BALANCE PROBLEM: ICD-10-CM

## 2018-06-19 PROCEDURE — 97110 THERAPEUTIC EXERCISES: CPT | Mod: PN

## 2018-06-19 NOTE — PROGRESS NOTES
"Name: Alyssa John  [facility] Number: [id number]  Date of Treatment: 06/19/2018   Diagnosis:   Encounter Diagnoses   Name Primary?    Balance problem     Multiple sclerosis        Time in: 1410  Time Out: 1510  Total Treatment Time: 60 minutes    Subjective:    Alyssa states that as of 03/31/18, she has been taken off her medication.  She reports increased difficulty in walking and noticeable increases in weakness, "in my left leg since I stopped therapy last time."  She states that she wants to work on her posture, "to stop leaning to my left so much."  Patient reports their pain to be 0/10 on a 0-10 scale with 0 being no pain and 10 being the worst pain imaginable.   Brought to therapy per .    Objective:    Patient received individual therapy to increase strength, endurance, posture and core stabilization with activities as follows:     Alyssa received therapeutic exercises to develop strength, endurance, posture and core stabilization and balance for 60 minutes including:     Gait:  Patient ambulated from reception to therapy gym 80' using R spc with CGA to Min A holding PTA's arm    Patient demonstrated Step-to pattern, L LE with drag   Posture:  In standing and sitting:  Patient demonstrated R side QL weakness, L Trendelenberg lean.    Bike x 10'  Seated hamstring stretches 3/30s L/R  LAQ 10/3 L/R  Seated clamshells B 10/3 RTB  Seated ballsqueeze hip aDd 10/3  Sit-stands from armchair with B UE support and SBA with cues for improved posture, extra time for posture correction. NOTE that pt's R posterior LE was in contact with chair for support throughout.     Pt demo fair understanding of the education provided. Alyssa demonstrated fair return demonstration of activities.  Issued RTB  for HEP with instruction to keep away from small children, animals, and anyone with latex allergies. Pt verbalizes understanding.     Assessment:     Alyssa presents with postural and gait pattern weaknesses that put " her at increased fall risk.  See Objective.  Pt expressed concerned for her posture and walking. During seated exercises, SPTA educated pt on ways to improve sitting posture and gave instruction on core stabilization exercises in sitting to support lumbar spine and improve postural alignment.  Pt maintained improved postural awareness in seated exercises with frequent VC and demonstrations. PTA had to insist on gait belt use and safer assistance positions during therapy session today.  Pt demonstrated understanding and compliance with reluctance. Increased LE L drag on way out of clinic. Used w/c to get pt safely in car after seated rest in waiting room.        Pt will continue to benefit from skilled PT intervention. Medical Necessity is demonstrated by:  Fall Risk, Unable to participate fully in daily activities, Requires skilled supervision to complete and progress HEP and Weakness.    Patient is making good progress towards established goals.    Plan:  Continue with established Plan of Care towards PT goals.     I certify that I was present in the room directing the student in service delivery and guiding them using my skilled judgment. As the co-signing therapist I have reviewed the students documentation and am responsible for the treatment, assessment, and plan.    Thao Coburn, PTA

## 2018-06-20 ENCOUNTER — PATIENT MESSAGE (OUTPATIENT)
Dept: FAMILY MEDICINE | Facility: CLINIC | Age: 68
End: 2018-06-20

## 2018-06-22 ENCOUNTER — CLINICAL SUPPORT (OUTPATIENT)
Dept: REHABILITATION | Facility: HOSPITAL | Age: 68
End: 2018-06-22
Payer: MEDICARE

## 2018-06-22 DIAGNOSIS — R26.89 BALANCE PROBLEM: ICD-10-CM

## 2018-06-22 DIAGNOSIS — G35 MULTIPLE SCLEROSIS: ICD-10-CM

## 2018-06-22 PROCEDURE — 97110 THERAPEUTIC EXERCISES: CPT | Mod: PN

## 2018-06-22 NOTE — PROGRESS NOTES
Name: Alyssa John  Date:   06/22/2018  Primary Diagnosis: .  Problem List Items Addressed This Visit     Multiple sclerosis    Balance problem          Time in: 1015  Time Out: 1100  Total Treatment Time: 45  Group Time: 0      Subjective:    Patient reports continued fatigue after being off of her medication.  Patient reports their pain to be /10 on a 0-10 scale with 0 being no pain and 10 being the worst pain imaginable.    Objective    Patient received individual therapy to address strength, endurance, ROM and flexibility with 0 other patients with activities as follows:     Patient received therapeutic exercises to develop strength, endurance, ROM and flexibility for 45 minutes including:     Bike x 10 min (rest break after 5 min)   Sit<>stand 5 reps x 2  LAQ x 30 L/R  Marches x 30 L/R    Gait: using SPC at min/mod A from therapist; increased L foot drag    Assessment:     Pt with worsened mobility today and decreased tolerance to activity, needing increased rest breaks.     Pt will continue to benefit from skilled PT intervention. Medical Necessity is demonstrated by:  Fall Risk, Unable to participate fully in daily activities, Requires skilled supervision to complete and progress HEP and Weakness.    Patient is making fair progress towards established goals.    Plan:  Continue with established Plan of Care towards PT goals.

## 2018-06-23 ENCOUNTER — PATIENT MESSAGE (OUTPATIENT)
Dept: FAMILY MEDICINE | Facility: CLINIC | Age: 68
End: 2018-06-23

## 2018-06-26 ENCOUNTER — CLINICAL SUPPORT (OUTPATIENT)
Dept: REHABILITATION | Facility: HOSPITAL | Age: 68
End: 2018-06-26
Attending: PSYCHIATRY & NEUROLOGY
Payer: MEDICARE

## 2018-06-26 DIAGNOSIS — G35 MULTIPLE SCLEROSIS: ICD-10-CM

## 2018-06-26 DIAGNOSIS — R26.89 BALANCE PROBLEM: ICD-10-CM

## 2018-06-26 PROCEDURE — 97110 THERAPEUTIC EXERCISES: CPT | Mod: PN

## 2018-06-26 NOTE — PROGRESS NOTES
"Name: Alyssa John  [facility] Number: [id number]  Date of Treatment: 06/26/2018   Diagnosis:   Encounter Diagnoses   Name Primary?    Balance problem     Multiple sclerosis        Time in: 1410  Time Out: 1500  Total Treatment Time: 50      Subjective:    Alyssa reports "It's getting harder and harder to get around:.  Patient reports their pain to be 0/10 on a 0-10 scale with 0 being no pain and 10 being the worst pain imaginable.    Objective    Patient received individual therapy to increase strength and endurance with activities as follows:     Alyssa received therapeutic exercises to develop strength and endurance for 50 minutes including:     Bike x 10 minutes level 1  Shuttle: 37# x 5 minutes  Sit-stand x 7  laq x 30 B  Seated marches x 20 B  Clamshell GTB x 30  Ball squeeze x 30    Written Home Exercises Provided: cont HEP  Pt demo good understanding of the education provided. Alyssa demonstrated fair return demonstration of activities.     Assessment:     Pt leaning on therapists' arm during ambulation.  Pt with decreased heel strike LLE. VC to increase step length L, pt unable to perform.   Pt will continue to benefit from skilled PT intervention. Medical Necessity is demonstrated by:  Fall Risk, Unable to participate fully in daily activities, Requires skilled supervision to complete and progress HEP and Weakness.    Patient is making fair progress towards established goals.      Plan:  Continue with established Plan of Care towards PT goals.   "

## 2018-06-29 ENCOUNTER — CLINICAL SUPPORT (OUTPATIENT)
Dept: REHABILITATION | Facility: HOSPITAL | Age: 68
End: 2018-06-29
Payer: MEDICARE

## 2018-06-29 DIAGNOSIS — R26.89 BALANCE PROBLEM: ICD-10-CM

## 2018-06-29 DIAGNOSIS — G35 MULTIPLE SCLEROSIS: ICD-10-CM

## 2018-06-29 PROCEDURE — 97110 THERAPEUTIC EXERCISES: CPT | Mod: PN

## 2018-06-29 NOTE — PROGRESS NOTES
"Name: Alyssa John  [facility] Number: [id number]  Date of Treatment: 06/29/2018   Diagnosis:   Encounter Diagnoses   Name Primary?    Balance problem     Multiple sclerosis        Time in: 1510  Time Out: 1604  Total Treatment Time: 54      Subjective:    Alyssa reports "I've stopped taking some of my medicine".  Patient reports their pain to be 0/10 on a 0-10 scale with 0 being no pain and 10 being the worst pain imaginable.    Objective    Patient received individual therapy to increase strength and endurance with activities as follows:     Alyssa received therapeutic exercises to develop strength and endurance for 54 minutes including:     Bike x 10 minutes  Shuttle: 37# B x 5 minutes  LAQ x 30 B  Seated marches x 20 B  Ball squeeze x 30  Clamshells GTB x 30  HR/TR x 20 B  Hamstring sets with bolster on floor behind heels x 20 B    Written Home Exercises Provided: cont HEP  Pt demo fair understanding of the education provided. Alyssa demonstrated fair return demonstration of activities.     Assessment:     Pt holds therapists' arm during ambulation.  Decreased heel strike LLE, VC to increase step length.  Pt able to return demonstrate 3-4 steps before stepping with decreased step length again.  Pt will continue to benefit from skilled PT intervention. Medical Necessity is demonstrated by:  Fall Risk, Unable to participate fully in daily activities, Requires skilled supervision to complete and progress HEP and Weakness.    Patient is making fair progress towards established goals.      Plan:  Continue with established Plan of Care towards PT goals.   "

## 2018-07-03 ENCOUNTER — CLINICAL SUPPORT (OUTPATIENT)
Dept: REHABILITATION | Facility: HOSPITAL | Age: 68
End: 2018-07-03
Payer: MEDICARE

## 2018-07-03 DIAGNOSIS — G35 MULTIPLE SCLEROSIS: ICD-10-CM

## 2018-07-03 DIAGNOSIS — R26.89 BALANCE PROBLEM: ICD-10-CM

## 2018-07-03 PROCEDURE — 97110 THERAPEUTIC EXERCISES: CPT | Mod: PN

## 2018-07-03 NOTE — PROGRESS NOTES
Name: Alyssa John  [facility] Number: [id number]  Date of Treatment: 07/03/2018   Diagnosis:   Encounter Diagnoses   Name Primary?    Balance problem     Multiple sclerosis        Time in: 1600  Time Out: 1700  Total Treatment Time: 60      Subjective:    Alyssa reports no complaints.  Patient reports their pain to be 0/10 on a 0-10 scale with 0 being no pain and 10 being the worst pain imaginable.    Objective    Patient received individual therapy to increase strength, flexibility and balance, gait with activities as follows:     Alyssa received therapeutic exercises to develop strength, flexibility and balance, gait for 60 minutes including:   \  Bike x 10 minutes, level 1  Sit-stand x 8, min a  Ball squeeze x 30  Clamshell GTB, BTB x 30 each  LAQ x 30 B  Seated marches x 30 B  Hamstring sets x 30    Written Home Exercises Provided: cont HEP  Pt demo fair understanding of the education provided. Alyssa demonstrated fair return demonstration of activities.     Assessment:     Decreased step length LLE.  VC to take larger step L, pt able to take 2-3 steps before reverting to decreased step length again.  L>R weakness noted.  Pt will continue to benefit from skilled PT intervention. Medical Necessity is demonstrated by:  Fall Risk, Unable to participate fully in daily activities, Requires skilled supervision to complete and progress HEP and Weakness.    Patient is making fair progress towards established goals.      Plan:  Continue with established Plan of Care towards PT goals.

## 2018-07-06 ENCOUNTER — CLINICAL SUPPORT (OUTPATIENT)
Dept: REHABILITATION | Facility: HOSPITAL | Age: 68
End: 2018-07-06
Payer: MEDICARE

## 2018-07-06 DIAGNOSIS — G35 MULTIPLE SCLEROSIS: ICD-10-CM

## 2018-07-06 DIAGNOSIS — R26.89 BALANCE PROBLEM: ICD-10-CM

## 2018-07-06 PROCEDURE — G8978 MOBILITY CURRENT STATUS: HCPCS | Mod: CL,PN

## 2018-07-06 PROCEDURE — 97110 THERAPEUTIC EXERCISES: CPT | Mod: PN

## 2018-07-06 PROCEDURE — G8979 MOBILITY GOAL STATUS: HCPCS | Mod: CJ,PN

## 2018-07-06 NOTE — PROGRESS NOTES
Name: Alyssa John  [facility] Number: [id number]  Date of Treatment: 07/06/2018   Diagnosis:   Encounter Diagnoses   Name Primary?    Balance problem     Multiple sclerosis        Time in: 1110  Time Out: 1200  Total Treatment Time: 50      Subjective:    Alyssa reports no complaints.  Patient reports their pain to be 0/10 on a 0-10 scale with 0 being no pain and 10 being the worst pain imaginable.    Objective    Patient received individual therapy to increase strength, endurance, flexibility and balance, gait with activities as follows:     Alyssa received therapeutic exercises to develop strength, endurance, flexibility and balance, gait for 50 minutes including:     Bike x 10 minutes  Shuttle: 37# B x 5 minutes  LAQ x 30 B  Seated marches x 20 B  Ball squeeze x 30  Clamshells BTB x 30      Written Home Exercises Provided: cont HEP, reviewed lateral trunk and head shift with , possibly getting mirror while pt is sitting on couch to assist with midline head and trunk  Pt demo fair understanding of the education provided. Alyssa demonstrated fair return demonstration of activities.     Assessment:     Pt leans head to R with sitting.  Mirror for biofeedback needed to get to midline.  Extra time needed upon standing.  Decreased step length, L toes drag.  Difficulty making full step, heel strike LLE.    Pt will continue to benefit from skilled PT intervention. Medical Necessity is demonstrated by:  Fall Risk, Unable to participate fully in daily activities, Requires skilled supervision to complete and progress HEP and Weakness.    Patient is making fair progress towards established goals.      Plan:  Continue with established Plan of Care towards PT goals.

## 2018-07-10 ENCOUNTER — CLINICAL SUPPORT (OUTPATIENT)
Dept: REHABILITATION | Facility: HOSPITAL | Age: 68
End: 2018-07-10
Attending: PSYCHIATRY & NEUROLOGY
Payer: MEDICARE

## 2018-07-10 DIAGNOSIS — G35 MULTIPLE SCLEROSIS: ICD-10-CM

## 2018-07-10 DIAGNOSIS — R26.89 BALANCE PROBLEM: ICD-10-CM

## 2018-07-10 PROCEDURE — 97110 THERAPEUTIC EXERCISES: CPT | Mod: PN

## 2018-07-10 NOTE — PROGRESS NOTES
Name: Alyssa John  Date:   07/10/2018  Primary Diagnosis: .  Problem List Items Addressed This Visit     Multiple sclerosis    Balance problem          Time in: 1405  Time Out: 1500  Total Treatment Time: 55  Group Time: 0      Subjective:    Patient reports improvement of symptoms. Reports she is starting to feel better, was about ready to call Dr. Apodaca before that.  Patient reports their pain to be /10 on a 0-10 scale with 0 being no pain and 10 being the worst pain imaginable.    Objective    Patient received individual therapy to address strength, endurance, ROM and flexibility with 0 other patients with activities as follows:     Patient received therapeutic exercises to develop strength, endurance, ROM and flexibility for 55 minutes including:     Bike x 10 min L1    Shuttle leg press 37# B x 5 min    Seated therex:   Marches x 20 L/R   Hip adduction ball squeezes x 30   Hip abduction clamshells BTB  x 30    Sit<>stand 5 reps x 2 trials at CGA/min A      Assessment:     Improved gait pattern and tolerance to therex today. Pushing chair back with legs during sit<>stand practice, needing physical blocking for safety.    Pt will continue to benefit from skilled PT intervention. Medical Necessity is demonstrated by:  Fall Risk, Unable to participate in daily activities, Unable to participate fully in daily activities, Requires skilled supervision to complete and progress HEP and Weakness.    Patient is making good progress towards established goals.    Plan:  Continue with established Plan of Care towards PT goals.

## 2018-07-17 ENCOUNTER — CLINICAL SUPPORT (OUTPATIENT)
Dept: REHABILITATION | Facility: HOSPITAL | Age: 68
End: 2018-07-17
Payer: MEDICARE

## 2018-07-17 DIAGNOSIS — R26.89 BALANCE PROBLEM: ICD-10-CM

## 2018-07-17 DIAGNOSIS — G35 MULTIPLE SCLEROSIS: ICD-10-CM

## 2018-07-17 PROCEDURE — 97110 THERAPEUTIC EXERCISES: CPT | Mod: PN

## 2018-07-17 NOTE — PROGRESS NOTES
Name: Alyssa John  [facility] Number: [id number]  Date of Treatment: 07/17/2018   Diagnosis:   Encounter Diagnoses   Name Primary?    Balance problem     Multiple sclerosis        Time in: 1411  Time Out: 1458  Total Treatment Time: 47      Subjective:    Alyssa reports no changes.  Patient reports their pain to be 0/10 on a 0-10 scale with 0 being no pain and 10 being the worst pain imaginable.    Objective    Patient received individual therapy to increase strength and balance, gait with activities as follows:     Alyssa received therapeutic exercises to develop strength and balance, gait for 47 minutes including:     Bike x 11 minutes  LAQ x 30 B  Ball squeeze x 30  Seated marches x 30 B  Clamshells BlkTB x 30  Shuttle 37# x 5 minutes  Sit-stand x 12  Hamstring stretch 3 x 30 sec L manual      Written Home Exercises Provided: cont HEP  Pt demo good understanding of the education provided. Alyssa demonstrated fair return demonstration of activities.     Assessment:     Pt tends to pull on therapists' arm for balance during ambulation.  Decreased L hip flexion and heel strike L.  Knees flexed during standing and ambulation.   Pt will continue to benefit from skilled PT intervention. Medical Necessity is demonstrated by:  Fall Risk, Unable to participate fully in daily activities, Requires skilled supervision to complete and progress HEP and Weakness.    Patient is making fair progress towards established goals.      Plan:  Continue with established Plan of Care towards PT goals.

## 2018-07-19 ENCOUNTER — LAB VISIT (OUTPATIENT)
Dept: LAB | Facility: HOSPITAL | Age: 68
End: 2018-07-19
Attending: PSYCHIATRY & NEUROLOGY
Payer: MEDICARE

## 2018-07-19 DIAGNOSIS — G35 MULTIPLE SCLEROSIS: ICD-10-CM

## 2018-07-19 DIAGNOSIS — E55.9 VITAMIN D DEFICIENCY: ICD-10-CM

## 2018-07-19 DIAGNOSIS — E53.8 B12 DEFICIENCY: ICD-10-CM

## 2018-07-19 LAB
25(OH)D3+25(OH)D2 SERPL-MCNC: 68 NG/ML
ALBUMIN SERPL BCP-MCNC: 3.9 G/DL
ALP SERPL-CCNC: 123 U/L
ALT SERPL W/O P-5'-P-CCNC: 26 U/L
ANION GAP SERPL CALC-SCNC: 8 MMOL/L
AST SERPL-CCNC: 21 U/L
BASOPHILS # BLD AUTO: 0.05 K/UL
BASOPHILS NFR BLD: 0.8 %
BILIRUB SERPL-MCNC: 0.6 MG/DL
BUN SERPL-MCNC: 18 MG/DL
CALCIUM SERPL-MCNC: 9 MG/DL
CHLORIDE SERPL-SCNC: 110 MMOL/L
CO2 SERPL-SCNC: 23 MMOL/L
CREAT SERPL-MCNC: 0.9 MG/DL
DIFFERENTIAL METHOD: NORMAL
EOSINOPHIL # BLD AUTO: 0.3 K/UL
EOSINOPHIL NFR BLD: 5.6 %
ERYTHROCYTE [DISTWIDTH] IN BLOOD BY AUTOMATED COUNT: 12.8 %
EST. GFR  (AFRICAN AMERICAN): >60 ML/MIN/1.73 M^2
EST. GFR  (NON AFRICAN AMERICAN): >60 ML/MIN/1.73 M^2
GLUCOSE SERPL-MCNC: 155 MG/DL
HCT VFR BLD AUTO: 40.3 %
HGB BLD-MCNC: 13 G/DL
IMM GRANULOCYTES # BLD AUTO: 0.02 K/UL
IMM GRANULOCYTES NFR BLD AUTO: 0.3 %
LYMPHOCYTES # BLD AUTO: 1.4 K/UL
LYMPHOCYTES NFR BLD: 23.7 %
MCH RBC QN AUTO: 29.7 PG
MCHC RBC AUTO-ENTMCNC: 32.3 G/DL
MCV RBC AUTO: 92 FL
MONOCYTES # BLD AUTO: 0.3 K/UL
MONOCYTES NFR BLD: 5.1 %
NEUTROPHILS # BLD AUTO: 3.8 K/UL
NEUTROPHILS NFR BLD: 64.5 %
NRBC BLD-RTO: 0 /100 WBC
PLATELET # BLD AUTO: 181 K/UL
PMV BLD AUTO: 11.6 FL
POTASSIUM SERPL-SCNC: 3.7 MMOL/L
PROT SERPL-MCNC: 6.6 G/DL
RBC # BLD AUTO: 4.38 M/UL
SODIUM SERPL-SCNC: 141 MMOL/L
VIT B12 SERPL-MCNC: 506 PG/ML
WBC # BLD AUTO: 5.94 K/UL

## 2018-07-19 PROCEDURE — 82306 VITAMIN D 25 HYDROXY: CPT

## 2018-07-19 PROCEDURE — 80053 COMPREHEN METABOLIC PANEL: CPT

## 2018-07-19 PROCEDURE — 36415 COLL VENOUS BLD VENIPUNCTURE: CPT | Mod: PO

## 2018-07-19 PROCEDURE — 85025 COMPLETE CBC W/AUTO DIFF WBC: CPT

## 2018-07-19 PROCEDURE — 82607 VITAMIN B-12: CPT

## 2018-07-20 ENCOUNTER — CLINICAL SUPPORT (OUTPATIENT)
Dept: REHABILITATION | Facility: HOSPITAL | Age: 68
End: 2018-07-20
Attending: PSYCHIATRY & NEUROLOGY
Payer: MEDICARE

## 2018-07-20 ENCOUNTER — TELEPHONE (OUTPATIENT)
Dept: NEUROLOGY | Facility: CLINIC | Age: 68
End: 2018-07-20

## 2018-07-20 DIAGNOSIS — R26.89 BALANCE PROBLEM: ICD-10-CM

## 2018-07-20 DIAGNOSIS — G35 MULTIPLE SCLEROSIS: Primary | ICD-10-CM

## 2018-07-20 DIAGNOSIS — G35 MULTIPLE SCLEROSIS: ICD-10-CM

## 2018-07-20 PROCEDURE — G8979 MOBILITY GOAL STATUS: HCPCS | Mod: CJ,PN

## 2018-07-20 PROCEDURE — G8978 MOBILITY CURRENT STATUS: HCPCS | Mod: CL,PN

## 2018-07-20 NOTE — TELEPHONE ENCOUNTER
Hello,      I am working on a referral for continuation of therapy for mrn [id number]. I need current signed orders or cosigned/attestment POC with short and long term goals, (signed),   from the doctor.      Please in basket message me when orders are in system so I can process referral.        Thank you,   Vicky Cornejo

## 2018-07-20 NOTE — PLAN OF CARE
PHYSICAL THERAPY UPDATED PLAN OF CARE    Alyssa John  07/20/2018    Onset Date:  2000  SOC Date:  06/04/18  Primary Diagnosis:    Problem List Items Addressed This Visit     Multiple sclerosis    Balance problem        Treatment Diagnosis:  Gait instability  Precautions:  Falls, cardiac, seizures  Visits from SOC:  See EMR  Functional Level Prior to SOC:  Assistive Device    Updated Assessment:    S: Pt reporting improvement in energy level and strength compared to her physical status 2-3 weeks ago. Still feels very unsteady and needs her walker. Will discuss medication regimen at her next neurology followup, which is next week. Overall still feels significant functional decline since stopping Aubagio.    O: Reassessment performed for POC update purposes:    Gait: with SPC at A x 1, bilateral foot drop notable with decreased L>R heel strike and clearance. Less foot drag today compared to the last few weeks.    Lower Extremity Strength:  Right Lower Extremity: hip flex 4/5, knee ext 4-/5, knee flex 4/5, ankle DF 3-/5  Left Lower Extremity: hip flex 4/5, knee ext/flex 4-/5, ankle DF 2+/5     5x sit to stand (timed) test: 18 sec using UE's    Lower Extremity Functional Scale (LEFS): 23/80  G code tool used: LEFS (mobility)  Current modifier: CL  Goal modifier: CJ      A: Pt currently progressing with therapy program, improved strength and gait quality noted today. No improvement in timed sit<>stand test today, however pt demonstrated improved quality and speed at the last 2 treatment sessions. Over the course of the last 6 weeks of therapy, pt initially showing functional decline and requiring significantly more assistance walking. Over the past 2 weeks, pt has started to improve in these areas. Pt continues to demonstrate significant gait instability and reports overall reduced mobility since stopping Aubagio, although she feels an improvement in the last 2 weeks. Pt would benefit from further PT services  to continue addressing noted impairments and improve safe functional mobility towards PLOF.    P: Continue as per POC    Goals from Previous POC:  Short Term Goals (3 Weeks): 1) Pt will initiate HEP 2) Patient will improve strength by 1/2 muscle grade   Long Term Goals (6 Weeks): 1) Pt will be I with HEP 2) Pt will be able to tolerate standing 5 min in order to facilitate ADL's like brushing teeth 3) Pt will improve 5x sit to stand (timed) test to 11 sec or better 4) Pt will improve LEFS score to <40% impairment       Previous Short Term Goals Status:   1= not met 2= ongoing  New Short Term Goals:   1) Pt will increase LE strength by 1/2 muscle grade 2) Pt will initiate HEP  Long Term Goals:   continue per initial plan of care. 1) Pt will be I with HEP 2) Pt will be able to tolerate standing 5 min in order to facilitate ADL's like brushing teeth 3) Pt will improve 5x sit to stand (timed) test to 11 sec or better 4) Pt will improve LEFS score to <40% impairment  Reasons for Recertification of Therapy:    weakness, impaired endurance, impaired functional mobility, gait instability, impaired balance, decreased lower extremity function and impaired coordination    Certification Period: 07/29/18 to 09/10/18  Recommended Treatment Plan: 2 times per week for 6 weeks: Gait Training, Manual Therapy, Moist Heat/ Ice, Neuromuscular Re-ed, Patient Education, Therapeutic Activites and Therapeutic Exercise      Thank you for referral.    Therapist's Name: Sameera Osborne, PT  07/20/2018    I CERTIFY THE NEED FOR THESE SERVICES FURNISHED UNDER THIS PLAN OF TREATMENT AND WHILE UNDER MY CARE    Physician's comments: ________________________________________________________________________________________________________________________________________________      Physician's Name: ___________________________________

## 2018-07-26 ENCOUNTER — OFFICE VISIT (OUTPATIENT)
Dept: NEUROLOGY | Facility: CLINIC | Age: 68
End: 2018-07-26
Payer: MEDICARE

## 2018-07-26 VITALS
HEART RATE: 74 BPM | BODY MASS INDEX: 30.35 KG/M2 | SYSTOLIC BLOOD PRESSURE: 131 MMHG | WEIGHT: 171.31 LBS | HEIGHT: 63 IN | DIASTOLIC BLOOD PRESSURE: 70 MMHG

## 2018-07-26 DIAGNOSIS — R26.9 NEUROLOGIC GAIT DYSFUNCTION: ICD-10-CM

## 2018-07-26 DIAGNOSIS — F40.240 CLAUSTROPHOBIA: ICD-10-CM

## 2018-07-26 DIAGNOSIS — G35 MULTIPLE SCLEROSIS: Primary | ICD-10-CM

## 2018-07-26 DIAGNOSIS — Z71.89 COUNSELING REGARDING GOALS OF CARE: ICD-10-CM

## 2018-07-26 PROCEDURE — 99214 OFFICE O/P EST MOD 30 MIN: CPT | Mod: S$GLB,,, | Performed by: PHYSICIAN ASSISTANT

## 2018-07-26 PROCEDURE — 3078F DIAST BP <80 MM HG: CPT | Mod: CPTII,S$GLB,, | Performed by: PHYSICIAN ASSISTANT

## 2018-07-26 PROCEDURE — 3075F SYST BP GE 130 - 139MM HG: CPT | Mod: CPTII,S$GLB,, | Performed by: PHYSICIAN ASSISTANT

## 2018-07-26 PROCEDURE — 99999 PR PBB SHADOW E&M-EST. PATIENT-LVL III: CPT | Mod: PBBFAC,,, | Performed by: PHYSICIAN ASSISTANT

## 2018-07-26 RX ORDER — DIAZEPAM 5 MG/1
TABLET ORAL
Qty: 2 TABLET | Refills: 0 | Status: SHIPPED | OUTPATIENT
Start: 2018-07-26 | End: 2018-12-11 | Stop reason: SDUPTHER

## 2018-07-26 NOTE — PROGRESS NOTES
"Subjective:       Patient ID: Alyssa John is a 67 y.o. female who presents today for a routine clinic visit for MS.      MS HPI:  · DMT: Aubagio(not taking at present-held after last visit due to neuropathy symptoms)  · Side effects from DMT? Yes   · Taking vitamin D3 as recommended? Yes - 5,000IU Dose:   · One fall in the past year  · No pain--feels like stopping Aubagio has relieved many of her symptoms  · Currently in PT--feels very unsteady. She is unable to walk without walker, she is unable to stand for brief periods  · She has stopped gabapentin due to dizziness  · Getting more difficult to dress LE particularly L side. She continues to experience L LE edema    SOCIAL HISTORY  Social History   Substance Use Topics    Smoking status: Former Smoker     Packs/day: 1.50     Quit date: 12/26/2006    Smokeless tobacco: Never Used    Alcohol use No     Living arrangements - the patient lives with their spouse.  Employment  Not employed    MS ROS:  ·   · Sleep Disturbance: No--bit of a night person. Goes to bed until 1AM and will sleep for 10 hours  · Gait Disturbance: Yes - uses transport chair for long distances, walker in home,   · Hand Dysfunction: Yes - hand will intermittently "flip"  · Pain: Yes - Cymbalta 30mg QD only  · Tremors: Yes - tremor at times during feeding          Objective:        1. 25 foot timed walk: 8.7s today with cane, was 8.82s last visit; 13.5s with U-step  Timed 25 Foot Walk: 10/27/2016 2/16/2017   Did patient wear an AFO? No No   Was assistive device used? Yes Yes   Assistive device used (toña one): Unilateral Assistance Unilateral Assistance   Unilateral device used Cane Cane   Time for 25 Foot Walk (seconds) 12.8 10.2   Time for 25 Foot Walk (seconds) 9 -       Neurologic Exam          Imaging:     Results for orders placed during the hospital encounter of 12/07/16   MRI Brain W WO Contrast    Impression 1.  Stable, moderate white matter disease in a pattern compatible " with multiple sclerosis.  No evidence for active demyelination.  2.  Moderate cerebral volume loss above that expected for patient age.      Electronically signed by: Baldo Salazar MD  Date:     12/07/16  Time:    13:51        Labs:     Lab Results   Component Value Date    FKGSZYDC61YI 68 07/19/2018    MMNAAXGW45PY 53 10/19/2016    VTUSJXSN38HC 78 06/13/2016     Lab Results   Component Value Date    JCVINDEX SEE COMMENT (A) 12/09/2015    JCVANTIBODY SEE COMMENT 12/09/2015     Lab Results   Component Value Date    CH1WLJVS 59.1 03/31/2016    ABSOLUTECD3 619 (L) 03/31/2016    PV8DATXH 19.9 03/31/2016    ABSOLUTECD8 208 03/31/2016    AV3DUQPM 39.1 03/31/2016    ABSOLUTECD4 409 03/31/2016    LABCD48 1.97 03/31/2016     Lab Results   Component Value Date    WBC 5.94 07/19/2018    RBC 4.38 07/19/2018    HGB 13.0 07/19/2018    HCT 40.3 07/19/2018    MCV 92 07/19/2018    MCH 29.7 07/19/2018    MCHC 32.3 07/19/2018    RDW 12.8 07/19/2018     07/19/2018    MPV 11.6 07/19/2018    GRAN 3.8 07/19/2018    GRAN 64.5 07/19/2018    LYMPH 1.4 07/19/2018    LYMPH 23.7 07/19/2018    MONO 0.3 07/19/2018    MONO 5.1 07/19/2018    EOS 0.3 07/19/2018    BASO 0.05 07/19/2018    EOSINOPHIL 5.6 07/19/2018    BASOPHIL 0.8 07/19/2018     Sodium   Date Value Ref Range Status   07/19/2018 141 136 - 145 mmol/L Final     Potassium   Date Value Ref Range Status   07/19/2018 3.7 3.5 - 5.1 mmol/L Final     Chloride   Date Value Ref Range Status   07/19/2018 110 95 - 110 mmol/L Final     CO2   Date Value Ref Range Status   07/19/2018 23 23 - 29 mmol/L Final     Carbon Monoxide, Blood   Date Value Ref Range Status   10/19/2016 2 % Final     Comment:     -------------------REFERENCE VALUE--------------------------  0-2 Normal (Non-smoker) , < or = 9 Normal (Smoker), > or   = 20 (Toxic concentration)  Test Performed by:  [address] [address]  : Adrien BURRELL  HEMANTH Norton M.D., Ph.D.       Glucose   Date Value Ref Range Status   07/19/2018 155 (H) 70 - 110 mg/dL Final     BUN, Bld   Date Value Ref Range Status   07/19/2018 18 8 - 23 mg/dL Final     Creatinine   Date Value Ref Range Status   07/19/2018 0.9 0.5 - 1.4 mg/dL Final   02/22/2013 0.8 0.5 - 1.4 mg/dL Final     Calcium   Date Value Ref Range Status   07/19/2018 9.0 8.7 - 10.5 mg/dL Final   02/22/2013 8.7 8.7 - 10.5 mg/dL Final     Total Protein   Date Value Ref Range Status   07/19/2018 6.6 6.0 - 8.4 g/dL Final     Albumin   Date Value Ref Range Status   07/19/2018 3.9 3.5 - 5.2 g/dL Final     Total Bilirubin   Date Value Ref Range Status   07/19/2018 0.6 0.1 - 1.0 mg/dL Final     Comment:     For infants and newborns, interpretation of results should be based  on gestational age, weight and in agreement with clinical  observations.  Premature Infant recommended reference ranges:  Up to 24 hours.............<8.0 mg/dL  Up to 48 hours............<12.0 mg/dL  3-5 days..................<15.0 mg/dL  6-29 days.................<15.0 mg/dL       Alkaline Phosphatase   Date Value Ref Range Status   07/19/2018 123 55 - 135 U/L Final   02/22/2013 107 55 - 135 U/L Final     AST   Date Value Ref Range Status   07/19/2018 21 10 - 40 U/L Final   02/22/2013 18 10 - 40 U/L Final     ALT   Date Value Ref Range Status   07/19/2018 26 10 - 44 U/L Final     Anion Gap   Date Value Ref Range Status   07/19/2018 8 8 - 16 mmol/L Final   02/22/2013 11 5 - 15 meq/L Final     eGFR if    Date Value Ref Range Status   07/19/2018 >60.0 >60 mL/min/1.73 m^2 Final     eGFR if non    Date Value Ref Range Status   07/19/2018 >60.0 >60 mL/min/1.73 m^2 Final     Comment:     Calculation used to obtain the estimated glomerular filtration  rate (eGFR) is the CKD-EPI equation.        Diagnosis/Assessment/Plan:    1. Multiple Sclerosis  · Assessment: Her symptoms have improved off Aubagio; we will consider alternative DMT.  She will return to clinic in 2 weeks to discuss alternatives after MRI's.   · Imaging:Brain and C-spine MRI ordered(last MRI 2016)--will give Valium for MRI  · Disease Modifying Therapies: Not currently on DMT, but will continue with VitD3.    2. MS Symptom Assessment / Management  · Gait Disturbance: Will continue PT, recommended compression sock for L LE      Over 50% of this 30 minute visit was spent in direct face to face counseling of the patient about MS, DMT considerations, and MS symptom management.   Follow-up in about 2 weeks (around 8/9/2018) for follow up with me.  Patient agreed to POC today.    Attending, Dr. Apodaca, was available during today's encounter. Any change to plan along with cosign to appear in the EMR.     Risa Oshea PA-C  [facility]        Multiple sclerosis  -     MRI Brain Demyelinating W W/O Contrast; Future; Expected date: 07/26/2018  -     MRI Cervical Spine Demyelinating W W/O Contrast; Future; Expected date: 07/26/2018  -     diazePAM (VALIUM) 5 MG tablet; Take 1 to 2 tabs po 2 hours prior to MRI  Dispense: 2 tablet; Refill: 0    Counseling regarding goals of care    Neurologic gait dysfunction    Claustrophobia

## 2018-07-26 NOTE — Clinical Note
Diana Serrano  Alyssa and her  are here in clinic today and have voiced that they would like to return Alyssa's care to you if possible. They have messaged about this, but I suspect that it was not clear that they wanted to return under your care vs. just a visit temporarily.  They said they are not displeased with Dr. Edmondson, but just appreciate your attention to detail. They have asked if you wouldn't mind reaching out to discuss.  Thank you,  Risa Oshea PA-C [facility]

## 2018-07-31 ENCOUNTER — PATIENT MESSAGE (OUTPATIENT)
Dept: NEUROLOGY | Facility: CLINIC | Age: 68
End: 2018-07-31

## 2018-08-02 ENCOUNTER — HOSPITAL ENCOUNTER (OUTPATIENT)
Dept: RADIOLOGY | Facility: HOSPITAL | Age: 68
Discharge: HOME | End: 2018-08-02
Attending: PHYSICIAN ASSISTANT
Payer: MEDICARE

## 2018-08-02 DIAGNOSIS — G35 MULTIPLE SCLEROSIS: ICD-10-CM

## 2018-08-02 PROCEDURE — A9585 GADOBUTROL INJECTION: HCPCS | Performed by: PHYSICIAN ASSISTANT

## 2018-08-02 PROCEDURE — 72156 MRI NECK SPINE W/O & W/DYE: CPT | Mod: 26,,, | Performed by: RADIOLOGY

## 2018-08-02 PROCEDURE — 25500020 PHARM REV CODE 255: Performed by: PHYSICIAN ASSISTANT

## 2018-08-02 PROCEDURE — 70553 MRI BRAIN STEM W/O & W/DYE: CPT | Mod: TC

## 2018-08-02 PROCEDURE — 72156 MRI NECK SPINE W/O & W/DYE: CPT | Mod: TC

## 2018-08-02 PROCEDURE — 70553 MRI BRAIN STEM W/O & W/DYE: CPT | Mod: 26,,, | Performed by: RADIOLOGY

## 2018-08-02 RX ORDER — GADOBUTROL 604.72 MG/ML
8 INJECTION INTRAVENOUS
Status: COMPLETED | OUTPATIENT
Start: 2018-08-02 | End: 2018-08-02

## 2018-08-02 RX ADMIN — GADOBUTROL 8 ML: 604.72 INJECTION INTRAVENOUS at 05:08

## 2018-08-06 ENCOUNTER — PATIENT MESSAGE (OUTPATIENT)
Dept: NEUROLOGY | Facility: CLINIC | Age: 68
End: 2018-08-06

## 2018-08-09 ENCOUNTER — OFFICE VISIT (OUTPATIENT)
Dept: NEUROLOGY | Facility: CLINIC | Age: 68
End: 2018-08-09
Payer: MEDICARE

## 2018-08-09 VITALS
BODY MASS INDEX: 30.35 KG/M2 | HEIGHT: 63 IN | DIASTOLIC BLOOD PRESSURE: 80 MMHG | WEIGHT: 171.31 LBS | SYSTOLIC BLOOD PRESSURE: 152 MMHG | HEART RATE: 70 BPM

## 2018-08-09 DIAGNOSIS — Z71.89 COUNSELING REGARDING GOALS OF CARE: ICD-10-CM

## 2018-08-09 DIAGNOSIS — M48.02 NEURAL FORAMINAL STENOSIS OF CERVICAL SPINE: ICD-10-CM

## 2018-08-09 DIAGNOSIS — M19.90 ARTHRITIS: ICD-10-CM

## 2018-08-09 DIAGNOSIS — G35 MULTIPLE SCLEROSIS: Primary | ICD-10-CM

## 2018-08-09 DIAGNOSIS — I10 ESSENTIAL HYPERTENSION: ICD-10-CM

## 2018-08-09 DIAGNOSIS — K21.9 GASTROESOPHAGEAL REFLUX DISEASE, ESOPHAGITIS PRESENCE NOT SPECIFIED: ICD-10-CM

## 2018-08-09 DIAGNOSIS — I25.2 MI, OLD: ICD-10-CM

## 2018-08-09 DIAGNOSIS — G62.9 NEUROPATHY: ICD-10-CM

## 2018-08-09 DIAGNOSIS — R56.9 SEIZURES: ICD-10-CM

## 2018-08-09 PROCEDURE — 99499 UNLISTED E&M SERVICE: CPT | Mod: S$GLB,,, | Performed by: PHYSICIAN ASSISTANT

## 2018-08-09 PROCEDURE — 99214 OFFICE O/P EST MOD 30 MIN: CPT | Mod: S$GLB,,, | Performed by: PHYSICIAN ASSISTANT

## 2018-08-09 PROCEDURE — 3077F SYST BP >= 140 MM HG: CPT | Mod: CPTII,S$GLB,, | Performed by: PHYSICIAN ASSISTANT

## 2018-08-09 PROCEDURE — 99999 PR PBB SHADOW E&M-EST. PATIENT-LVL IV: CPT | Mod: PBBFAC,,, | Performed by: PHYSICIAN ASSISTANT

## 2018-08-09 PROCEDURE — 3079F DIAST BP 80-89 MM HG: CPT | Mod: CPTII,S$GLB,, | Performed by: PHYSICIAN ASSISTANT

## 2018-08-09 NOTE — PROGRESS NOTES
Subjective:       Patient ID: Alyssa John is a 67 y.o. female who presents today with her  for a routine clinic visit to review MRI      MS HPI:  · DMT: no DMT  · Side effects from DMT? N/A  · Taking vitamin D3 as recommended? No Dose:   She states again that her neuropathy symptom seems to have improved off [place], but are still present  She feels worse off DMT in general    SOCIAL HISTORY  Social History     Tobacco Use    Smoking status: Former Smoker     Packs/day: 1.50     Last attempt to quit: 2006     Years since quittin.6    Smokeless tobacco: Never Used   Substance Use Topics    Alcohol use: No     Alcohol/week: 0.0 oz    Drug use: No     Living arrangements - the patient lives with their spouse.  Employment     MS ROS:  As above        Objective:        1. 25 foot timed walk: 13.5s with U-step last visit; 13.2s with U-step today  Timed 25 Foot Walk: 10/27/2016 2017   Did patient wear an AFO? No No   Was assistive device used? Yes Yes   Assistive device used (toña one): Unilateral Assistance Unilateral Assistance   Unilateral device used Cane Cane   Time for 25 Foot Walk (seconds) 12.8 10.2   Time for 25 Foot Walk (seconds) 9 -       Neurologic Exam          Imaging:     Results for orders placed during the hospital encounter of 16   MRI Brain W WO Contrast    Impression 1.  Stable, moderate white matter disease in a pattern compatible with multiple sclerosis.  No evidence for active demyelination.  2.  Moderate cerebral volume loss above that expected for patient age.      Electronically signed by: Baldo Salzaar MD  Date:     16  Time:    13:51          Results for orders placed during the hospital encounter of 18   MRI Brain Demyelinating W W/O Contrast    Impression Brain appears stable from prior examination of 2016, again demonstrating findings consistent with the reported history of multiple sclerosis.  No new or enhancing lesions to  specifically indicate ongoing or active demyelination.    Electronically signed by resident: Preston Luong  Date:    08/03/2018  Time:    08:37    Electronically signed by: Jarad Martins MD  Date:    08/03/2018  Time:    10:40     Results for orders placed during the hospital encounter of 08/02/18   MRI Cervical Spine Demyelinating W W/O Contrast    Impression Craniocervical junction and cervical cord appear unchanged from prior examination although is July 2014 again demonstrating findings consistent with the reported history of multiple sclerosis.  No new enhancing lesions to indicate ongoing active demyelination.    Additional T2/STIR signal involving the right aspect thoracic cord posterior to T1 and T2 vertebrae without abnormal postcontrast enhancement to indicate active demyelination.    Electronically signed by resident: Preston Luong  Date:    08/03/2018  Time:    09:53    Electronically signed by: Anirudh Castellanos MD  Date:    08/03/2018  Time:    11:50     C4-C5: Posterior disc osteophyte complex formation and uncovertebral spurring resulting in mild spinal canal stenosis and severe left neural foraminal narrowing.    C5-C6: Posterior disc osteophyte complex formation and uncovertebral spurring resulting in severe right-sided neural foraminal narrowing and mild spinal canal stenosis.      Labs:     Lab Results   Component Value Date    OIMKMPQJ15LL 68 07/19/2018    XDBVHUZF74KB 53 10/19/2016    UXCQPQLV17QF 78 06/13/2016     Lab Results   Component Value Date    JCVINDEX SEE COMMENT (A) 12/09/2015    JCVANTIBODY SEE COMMENT 12/09/2015     Lab Results   Component Value Date    HT8MJKEK 59.1 03/31/2016    ABSOLUTECD3 619 (L) 03/31/2016    RX3QDUWJ 19.9 03/31/2016    ABSOLUTECD8 208 03/31/2016    PR5ZQXTT 39.1 03/31/2016    ABSOLUTECD4 409 03/31/2016    LABCD48 1.97 03/31/2016     Lab Results   Component Value Date    WBC 5.94 07/19/2018    RBC 4.38 07/19/2018    HGB 13.0 07/19/2018    HCT 40.3 07/19/2018     MCV 92 07/19/2018    MCH 29.7 07/19/2018    MCHC 32.3 07/19/2018    RDW 12.8 07/19/2018     07/19/2018    MPV 11.6 07/19/2018    GRAN 3.8 07/19/2018    GRAN 64.5 07/19/2018    LYMPH 1.4 07/19/2018    LYMPH 23.7 07/19/2018    MONO 0.3 07/19/2018    MONO 5.1 07/19/2018    EOS 0.3 07/19/2018    BASO 0.05 07/19/2018    EOSINOPHIL 5.6 07/19/2018    BASOPHIL 0.8 07/19/2018     Sodium   Date Value Ref Range Status   07/19/2018 141 136 - 145 mmol/L Final     Potassium   Date Value Ref Range Status   07/19/2018 3.7 3.5 - 5.1 mmol/L Final     Chloride   Date Value Ref Range Status   07/19/2018 110 95 - 110 mmol/L Final     CO2   Date Value Ref Range Status   07/19/2018 23 23 - 29 mmol/L Final     Carbon Monoxide, Blood   Date Value Ref Range Status   10/19/2016 2 % Final     Comment:     -------------------REFERENCE VALUE--------------------------  0-2 Normal (Non-smoker) , < or = 9 Normal (Smoker), > or   = 20 (Toxic concentration)  Test Performed by:  [place] [address]  : Adrien Norton II, M.D., Ph.D.       Glucose   Date Value Ref Range Status   07/19/2018 155 (H) 70 - 110 mg/dL Final     BUN, Bld   Date Value Ref Range Status   07/19/2018 18 8 - 23 mg/dL Final     Creatinine   Date Value Ref Range Status   07/19/2018 0.9 0.5 - 1.4 mg/dL Final   02/22/2013 0.8 0.5 - 1.4 mg/dL Final     Calcium   Date Value Ref Range Status   07/19/2018 9.0 8.7 - 10.5 mg/dL Final   02/22/2013 8.7 8.7 - 10.5 mg/dL Final     Total Protein   Date Value Ref Range Status   07/19/2018 6.6 6.0 - 8.4 g/dL Final     Albumin   Date Value Ref Range Status   07/19/2018 3.9 3.5 - 5.2 g/dL Final     Total Bilirubin   Date Value Ref Range Status   07/19/2018 0.6 0.1 - 1.0 mg/dL Final     Comment:     For infants and newborns, interpretation of results should be based  on gestational age, weight and in agreement with clinical  observations.  Premature  Infant recommended reference ranges:  Up to 24 hours.............<8.0 mg/dL  Up to 48 hours............<12.0 mg/dL  3-5 days..................<15.0 mg/dL  6-29 days.................<15.0 mg/dL       Alkaline Phosphatase   Date Value Ref Range Status   07/19/2018 123 55 - 135 U/L Final   02/22/2013 107 55 - 135 U/L Final     AST   Date Value Ref Range Status   07/19/2018 21 10 - 40 U/L Final   02/22/2013 18 10 - 40 U/L Final     ALT   Date Value Ref Range Status   07/19/2018 26 10 - 44 U/L Final     Anion Gap   Date Value Ref Range Status   07/19/2018 8 8 - 16 mmol/L Final   02/22/2013 11 5 - 15 meq/L Final     eGFR if    Date Value Ref Range Status   07/19/2018 >60.0 >60 mL/min/1.73 m^2 Final     eGFR if non    Date Value Ref Range Status   07/19/2018 >60.0 >60 mL/min/1.73 m^2 Final     Comment:     Calculation used to obtain the estimated glomerular filtration  rate (eGFR) is the CKD-EPI equation.          Diagnosis/Assessment/Plan:    1. Multiple Sclerosis  · Assessment: Her timed walk remains stable from last visit; however, is significantly slower than prior. Her Brain and C-spine MRI's are stable from an MS perspective. She does have severe foraminal stenosis noted in C-spine at C 4-5 and C 5-6 levels-I'd like to move ahead with NCS to assess for any radiculopathy in UE's and peripheral neuropathy in LE's.   · Imaging:as noted above, stable from MS perspective(reviewed by myself and Dr. Apodaca. Reviewed with patient and her  today in clinic  · Disease Modifying Therapies: We will hold on DMT for now, with her age and co-morbidities I am less inclined to initiate immunosuppressive therapies. I would like the results of NCS before proceeding with DMT      Over 50% of this 35 minute visit was spent in direct face to face counseling of the patient about MS, DMT considerations, and MS symptom management.     Follow up after EMG with myself or Dr. Apodaca  Patient agreed to POC  today.    Attending, Dr. Apodaca, was available during today's encounter and participated in the medical decision making. Any change to plan along with cosign to appear in the EMR.     Risa Oshea PA-C  [facility]      Multiple sclerosis  -     EMG W/ ULTRASOUND AND NERVE CONDUCTION TEST 4 Extremities; Future    Neural foraminal stenosis of cervical spine  -     EMG W/ ULTRASOUND AND NERVE CONDUCTION TEST 4 Extremities; Future    Neuropathy  -     EMG W/ ULTRASOUND AND NERVE CONDUCTION TEST 4 Extremities; Future    Counseling regarding goals of care    Seizures    Essential hypertension    MI, old    Gastroesophageal reflux disease, esophagitis presence not specified    Arthritis

## 2018-08-09 NOTE — PATIENT INSTRUCTIONS
Having EMG and NCS Tests  You will be having electromyography (EMG) and nerve conduction studies (NCS) to measure muscle and nerve function. In most cases, both tests are done. NCS is most often done first. You will be asked to lie on an exam table with a blanket over you. You may have one or both of the following:    Nerve conduction study (NCS)  During NCS, mild electrical currents are used to test how fast impulses move along your nerves. The healthcare provider will put small metal disks (electrodes) on your skin on the area of your body being tested. This will be done using water-based gel or paste. A doctor or technologist will apply mild electrical currents to your skin. Your muscles will twitch, but the test wont harm you. Currents are usually applied to the same area several times. Usually the intensity of the electrical stimulation is increased on each body part. Despite some increasing discomfort that varies from person to person, the electrical shock is not dangerous. The test may continue on other parts of your body unless the reason for doing the test is limited to a small part of the body.  Electromyography (EMG)  Most of the electrodes will be removed for EMG. The doctor will clean the area being tested with alcohol. A very fine needle will be put into the muscles in this region. When the needle is inserted, you may feel as if your skin is being pinched. Try to relax and do as instructed, since you will be asked to relax and contract the muscle being tested. Following instructions will allow your doctor to interpret the test results.  Let the technologist know  For your safety and for the success of your test, tell the technologist if you:  · Have any bleeding problems.  · Take blood thinners (anticoagulants) or other medications, including aspirin.  · Have any immune system problems.  · Have had neck or back surgery.  You may also be asked questions about your overall health.  Before the  test  Prepare for your test as instructed. Shower or bathe, but don't use powder, oil, or lotion. Your skin should be clean and free of excess oil. Wear loose clothes. But know that you may be asked to change into a hospital gown. The entire test will take about 60 minutes. Be sure to allow extra time to check in.  After your test  Before you leave, all electrodes will be removed. You can then get right back to your normal routine. If you feel tired or have some discomfort, take it easy. If you were told to stop taking any medicines for your test, ask when you can start taking them again. Your doctor will let you know when your test results are ready.  Date Last Reviewed: 9/12/2015 © 2000-2017 [employer]. [address] [place] [address], [place] [address]. All rights reserved. This information is not intended as a substitute for professional medical care. Always follow your healthcare professional's instructions.

## 2018-08-16 ENCOUNTER — PATIENT MESSAGE (OUTPATIENT)
Dept: NEUROLOGY | Facility: CLINIC | Age: 68
End: 2018-08-16

## 2018-08-16 DIAGNOSIS — Z12.39 BREAST CANCER SCREENING: ICD-10-CM

## 2018-08-17 ENCOUNTER — PATIENT MESSAGE (OUTPATIENT)
Dept: NEUROLOGY | Facility: CLINIC | Age: 68
End: 2018-08-17

## 2018-08-20 ENCOUNTER — CLINICAL SUPPORT (OUTPATIENT)
Dept: REHABILITATION | Facility: HOSPITAL | Age: 68
End: 2018-08-20
Attending: PSYCHIATRY & NEUROLOGY
Payer: MEDICARE

## 2018-08-20 DIAGNOSIS — R26.89 BALANCE PROBLEM: ICD-10-CM

## 2018-08-20 DIAGNOSIS — G35 MULTIPLE SCLEROSIS: ICD-10-CM

## 2018-08-20 PROCEDURE — 97110 THERAPEUTIC EXERCISES: CPT | Mod: PN

## 2018-08-20 NOTE — PROGRESS NOTES
Name: Alyssa John  Date:   08/20/2018  Primary Diagnosis: .  Problem List Items Addressed This Visit     Multiple sclerosis    Balance problem          Time in: 1412  Time Out: 1501  Total Treatment Time: 49  Group Time: 0      Subjective:    Patient reports functional decline during the weeks that she was waiting for authorization to return to PT. Now having a lot more weakness and decreased ability to stand and walk. Reports doctor will be ordering a nerve study for her.  Patient reports their pain to be 0/10 on a 0-10 scale with 0 being no pain and 10 being the worst pain imaginable.    Objective    Patient received individual therapy to address strength, endurance, ROM and flexibility with 0 other patients with activities as follows:     Patient received therapeutic exercises to develop strength, endurance, ROM and flexibility for 49 minutes including:     Bike x 10 min L1    Seated exercises:   Hip adduction ball squeezes x 30   LAQ x 30 L/R   Hip marches x 30 L/R    Assessment:     Pt arriving to session in w/c. Able to ambulate short distance in gym from w/c to bike at min A using SPC. Limited tolerance to exercises. Pt fatigued at end of session.    Pt will continue to benefit from skilled PT intervention. Medical Necessity is demonstrated by:  Fall Risk, Unable to participate fully in daily activities, Requires skilled supervision to complete and progress HEP and Weakness.    Patient is making poor progress towards established goals.    Plan:  Continue with established Plan of Care towards PT goals.     PT/PTA Danyelle Berkowitz met face to face to discuss patient's treatment plan and progress towards established goals.  Treatment will be modified due to patient's decline in strength and mobility during absence from PT--progression as tolerated.  Patient will be seen by physical therapist every sixth visit and minimally once per month.

## 2018-08-21 ENCOUNTER — PROCEDURE VISIT (OUTPATIENT)
Dept: NEUROLOGY | Facility: CLINIC | Age: 68
End: 2018-08-21
Payer: MEDICARE

## 2018-08-21 ENCOUNTER — PATIENT MESSAGE (OUTPATIENT)
Dept: NEUROLOGY | Facility: CLINIC | Age: 68
End: 2018-08-21

## 2018-08-21 DIAGNOSIS — M48.02 NEURAL FORAMINAL STENOSIS OF CERVICAL SPINE: ICD-10-CM

## 2018-08-21 DIAGNOSIS — G35 MULTIPLE SCLEROSIS: ICD-10-CM

## 2018-08-21 DIAGNOSIS — G62.9 NEUROPATHY: ICD-10-CM

## 2018-08-21 DIAGNOSIS — G56.01 CARPAL TUNNEL SYNDROME OF RIGHT WRIST: Primary | ICD-10-CM

## 2018-08-21 PROCEDURE — 95886 MUSC TEST DONE W/N TEST COMP: CPT | Mod: S$GLB,,, | Performed by: PSYCHIATRY & NEUROLOGY

## 2018-08-21 PROCEDURE — 95913 NRV CNDJ TEST 13/> STUDIES: CPT | Mod: S$GLB,,, | Performed by: PSYCHIATRY & NEUROLOGY

## 2018-08-24 ENCOUNTER — CLINICAL SUPPORT (OUTPATIENT)
Dept: REHABILITATION | Facility: HOSPITAL | Age: 68
End: 2018-08-24
Attending: PSYCHIATRY & NEUROLOGY
Payer: MEDICARE

## 2018-08-24 DIAGNOSIS — R26.89 BALANCE PROBLEM: ICD-10-CM

## 2018-08-24 DIAGNOSIS — G35 MULTIPLE SCLEROSIS: ICD-10-CM

## 2018-08-24 PROCEDURE — 97110 THERAPEUTIC EXERCISES: CPT | Mod: PN

## 2018-08-28 ENCOUNTER — CLINICAL SUPPORT (OUTPATIENT)
Dept: REHABILITATION | Facility: HOSPITAL | Age: 68
End: 2018-08-28
Attending: PSYCHIATRY & NEUROLOGY
Payer: MEDICARE

## 2018-08-28 DIAGNOSIS — G35 MULTIPLE SCLEROSIS: ICD-10-CM

## 2018-08-28 DIAGNOSIS — R26.89 BALANCE PROBLEM: ICD-10-CM

## 2018-08-28 PROCEDURE — 97110 THERAPEUTIC EXERCISES: CPT | Mod: PN

## 2018-08-28 NOTE — PROGRESS NOTES
Name: Alyssa John  Date:   08/28/2018  Primary Diagnosis: .  Problem List Items Addressed This Visit     Multiple sclerosis    Balance problem          Time in: 1415  Time Out: 1500  Total Treatment Time: 45  Group Time: 0      Subjective:    Patient reports continued weakness and fatigue.  Patient reports their pain to be 0/10 on a 0-10 scale with 0 being no pain and 10 being the worst pain imaginable.    Objective    Patient received individual therapy to address strength, endurance, ROM and flexibility with 0 other patients with activities as follows:     Patient received therapeutic exercises to develop strength, endurance, ROM and flexibility for 28 minutes including:     Scifit stepper x 10 min L1  Hip adduction ball squeezes x 30  LAQ x 30 L/R  Hip marches x 30 L/R    Assessment:     Pt with significant fatigue following scifit activity, unable to progress exercises this date. Pt refused to attempt gait training due to fatigue.    Pt will continue to benefit from skilled PT intervention. Medical Necessity is demonstrated by:  Fall Risk, Unable to participate fully in daily activities, Requires skilled supervision to complete and progress HEP and Weakness.    Patient is making fair progress towards established goals.    Plan:  Continue with established Plan of Care towards PT goals.

## 2018-09-04 ENCOUNTER — CLINICAL SUPPORT (OUTPATIENT)
Dept: REHABILITATION | Facility: HOSPITAL | Age: 68
End: 2018-09-04
Payer: MEDICARE

## 2018-09-04 DIAGNOSIS — G35 MULTIPLE SCLEROSIS: ICD-10-CM

## 2018-09-04 DIAGNOSIS — R26.89 BALANCE PROBLEM: ICD-10-CM

## 2018-09-04 PROCEDURE — 97110 THERAPEUTIC EXERCISES: CPT | Mod: PN

## 2018-09-07 ENCOUNTER — CLINICAL SUPPORT (OUTPATIENT)
Dept: REHABILITATION | Facility: HOSPITAL | Age: 68
End: 2018-09-07
Payer: MEDICARE

## 2018-09-07 DIAGNOSIS — G35 MULTIPLE SCLEROSIS: ICD-10-CM

## 2018-09-07 DIAGNOSIS — R26.89 BALANCE PROBLEM: ICD-10-CM

## 2018-09-07 PROCEDURE — 97110 THERAPEUTIC EXERCISES: CPT | Mod: PN

## 2018-09-07 NOTE — PROGRESS NOTES
Name: Alyssa John  Date:   09/07/2018  Primary Diagnosis: .  Problem List Items Addressed This Visit     Multiple sclerosis    Balance problem          Time in: 1400  Time Out: 1445  Total Treatment Time: 45  Group Time: 0      Subjective:    Patient reports she was so tired she had to take a 3 hr nap after last treatment session.  Patient reports their pain to be 0/10 on a 0-10 scale with 0 being no pain and 10 being the worst pain imaginable.    Objective    Patient received individual therapy to address strength, endurance, ROM and flexibility with 0 other patients with activities as follows:     Patient received therapeutic exercises to develop strength, endurance, ROM and flexibility for 45 minutes including:     Seated therex:     LAQ x 30 L/R  Hip marches x 30 L/R    Sit<>stand x 10 at CGA/min A    Gait: 170 ft c/ SPC at HHA/min A with L>R decreased step length and clearance    Shuttle leg press 37# x 5 min    Gait 2nd trial: 80 ft at HHA/min A to lobby at end of session    Assessment:     Improvement in gait quality and distance this date.     Pt will continue to benefit from skilled PT intervention. Medical Necessity is demonstrated by:  Fall Risk, Unable to participate fully in daily activities, Requires skilled supervision to complete and progress HEP and Weakness.    Patient is making good progress towards established goals.    Plan:  Continue with established Plan of Care towards PT goals.

## 2018-09-10 ENCOUNTER — CLINICAL SUPPORT (OUTPATIENT)
Dept: REHABILITATION | Facility: HOSPITAL | Age: 68
End: 2018-09-10
Payer: MEDICARE

## 2018-09-10 DIAGNOSIS — R26.89 BALANCE PROBLEM: ICD-10-CM

## 2018-09-10 DIAGNOSIS — G35 MULTIPLE SCLEROSIS: ICD-10-CM

## 2018-09-10 PROCEDURE — 97110 THERAPEUTIC EXERCISES: CPT | Mod: PN

## 2018-09-10 NOTE — PROGRESS NOTES
"Name: Alyssa John  [facility] Number: [id number]  Date of Treatment: 09/10/2018   Diagnosis:   Encounter Diagnoses   Name Primary?    Balance problem     Multiple sclerosis        Time in: 1405  Time Out: 1455  Total Treatment Time: 46      Subjective:    Alyssa reports "walking just wears me out".  Patient reports their pain to be n/a/10 on a 0-10 scale with 0 being no pain and 10 being the worst pain imaginable.    Objective    Patient received individual therapy to increase strength and gait, balance with activities as follows:     Alyssa received therapeutic exercises to develop strength and balance, gait for 46 minutes including:     Bike x 10 minutes level 1.5  Bridge x 15  Travis bridge x 15 R/L each  SLR 2 x 10 B  Ball squeeze x 30  Clamshell GTB x 30  LAQ x 30 B    Written Home Exercises Provided: cont HEP  Pt demo good understanding of the education provided. Alyssa demonstrated fair return demonstration of activities.     Assessment:     Pt leans shoulders to R and head to L while on bike.  VC/TC to adjust to midline.  Pt reported feeling "so crooked" when adjusted to proper position.  Pt ambulated around gym to restroom with SC and pt holding therapists' arm.  L>R weaker.    Pt will continue to benefit from skilled PT intervention. Medical Necessity is demonstrated by:  Fall Risk, Unable to participate fully in daily activities, Requires skilled supervision to complete and progress HEP and Weakness.    Patient is making good progress towards established goals.      Plan:  Continue with established Plan of Care towards PT goals.   "

## 2018-09-11 NOTE — PROGRESS NOTES
Name: Alyssa John  Date:   09/11/2018  Primary Diagnosis: .  Problem List Items Addressed This Visit     Multiple sclerosis    Balance problem          Time in: 1512  Time Out: 1602  Total Treatment Time: 50  Group Time: 0      Subjective:    Patient reports continued weakness and decreased endurance. Has been trying to start walking at home more.  Patient reports their pain to be 0/10 on a 0-10 scale with 0 being no pain and 10 being the worst pain imaginable.    Objective  Patient received individual therapy to address strength, endurance, ROM and flexibility with 0 other patients with activities as follows:      Patient received therapeutic exercises to develop strength, endurance, ROM and flexibility for 45 minutes including:      Seated therex:      Bike x 10 min     LAQ x 30 L/R  Hip marches x 30 L/R     Sit<>stand x 10 at CGA/min A     Gait: 170 ft c/ SPC at mod/min A with L>R decreased step length and clearance     Assessment:     Improvement in gait distance this session, however needing mod/min A due to instability.    Pt will continue to benefit from skilled PT intervention. Medical Necessity is demonstrated by:  Fall Risk, Unable to participate in daily activities, Unable to participate fully in daily activities and Weakness.    Patient is making good progress towards established goals.    Plan:  Continue with established Plan of Care towards PT goals.

## 2018-09-14 ENCOUNTER — CLINICAL SUPPORT (OUTPATIENT)
Dept: REHABILITATION | Facility: HOSPITAL | Age: 68
End: 2018-09-14
Attending: PSYCHIATRY & NEUROLOGY
Payer: MEDICARE

## 2018-09-14 DIAGNOSIS — R26.89 BALANCE PROBLEM: ICD-10-CM

## 2018-09-14 DIAGNOSIS — G35 MULTIPLE SCLEROSIS: ICD-10-CM

## 2018-09-14 PROCEDURE — 97110 THERAPEUTIC EXERCISES: CPT | Mod: PN

## 2018-09-14 NOTE — PROGRESS NOTES
"Name: Alyssa John  [facility] Number: [id number]  Date of Treatment: 09/14/2018   Diagnosis:   Encounter Diagnoses   Name Primary?    Balance problem     Multiple sclerosis        Time in: 1410  Time Out: 1500  Total Treatment Time: 50      Subjective:    Alyssa reports "I am so tired after my therapy session, I go home and sleep for hours."  Patient reports their pain to be 0/10 on a 0-10 scale with 0 being no pain and 10 being the worst pain imaginable.    Objective  Alyssa received therapeutic exercises to develop strength, endurance, posture and core stabilization for 50 minutes including:   Bike x 10 minutes level 1.5  Ball squeeze x 30  Clamshell GTB x 30  LAQ x 30 B  Shuttle 37# 6'    Written Home Exercises Provided: Gentle therex at home daily  Pt demo fair understanding of the education provided. Alyssa demonstrated fair return demonstration of activities.     Assessment:   Pt leans shoulders to R and head to L while on bike.  VC/TC to adjust to midline, continuously.  Frequent rest 2* fatigue.   Preferenece of WC over ambulation 2* to fatigue level.    Pt will continue to benefit from skilled PT intervention. Medical Necessity is demonstrated by:  Fall Risk, Continued inability to participate in vocational pursuits, Pain limits function of effected part for some activities, Unable to participate fully in daily activities, Requires skilled supervision to complete and progress HEP and Weakness.    Patient is making fair progress towards established goals.    Plan:  Continue with established Plan of Care towards PT goals.   "

## 2018-09-18 ENCOUNTER — CLINICAL SUPPORT (OUTPATIENT)
Dept: REHABILITATION | Facility: HOSPITAL | Age: 68
End: 2018-09-18
Payer: MEDICARE

## 2018-09-18 DIAGNOSIS — R26.89 BALANCE PROBLEM: ICD-10-CM

## 2018-09-18 DIAGNOSIS — G35 MULTIPLE SCLEROSIS: ICD-10-CM

## 2018-09-18 PROCEDURE — 97110 THERAPEUTIC EXERCISES: CPT | Mod: PN

## 2018-09-18 NOTE — PROGRESS NOTES
Name: Alyssa John  Date:   09/18/2018  Primary Diagnosis: .  Problem List Items Addressed This Visit     Multiple sclerosis    Balance problem          Time in: 1414  Time Out: 1505  Total Treatment Time: 51  Group Time: 0      Subjective:    Patient reports not feeling fatigued after last treatment session. However, after the visit before last, she was exhausted for over 2 days.  Patient reports their pain to be 0/10 on a 0-10 scale with 0 being no pain and 10 being the worst pain imaginable.    Objective    Patient received individual therapy to address strength, endurance, ROM and flexibility with 0 other patients with activities as follows:     Patient received therapeutic exercises to develop strength, endurance, ROM and flexibility for 51 minutes including:     Shuttle leg press 37# B x 5 min    Seated exercises:   Hip adduction ball squeezes x 30   LAQ 1# x 20 L/R   Hip marches 0# x 30 L/R    Sit<>stand with no UE support 5 reps x 3 trials at CGA/min A     Gait: 15 ft, 25 ft, 120 ft using SPC at HHA/min A with cues for L foot clearance and upright posture    Standing therex in // bars: minisquats x 10, marches x 20, side stepping 8 ft x 6    Assessment:     Occasional cueing needed for sitting upright in chair. Good tolerance for added exercises today, pt enjoying exercises in // bars. No sitting break needed during standing activities. However pt with significant fatigue walking to lobby. Upon approaching chair, pt reaching forward for armrests and turning to sit before fully reaching chair, resulting in uncontrolled descent and min A for safety.    Pt will continue to benefit from skilled PT intervention. Medical Necessity is demonstrated by:  Fall Risk, Unable to participate fully in daily activities, Requires skilled supervision to complete and progress HEP and Weakness.    Patient is making good progress towards established goals.    Plan:  Continue with established Plan of Care towards PT  goals.

## 2018-09-21 ENCOUNTER — CLINICAL SUPPORT (OUTPATIENT)
Dept: REHABILITATION | Facility: HOSPITAL | Age: 68
End: 2018-09-21
Payer: MEDICARE

## 2018-09-21 DIAGNOSIS — G35 MULTIPLE SCLEROSIS: ICD-10-CM

## 2018-09-21 DIAGNOSIS — R26.89 BALANCE PROBLEM: ICD-10-CM

## 2018-09-21 PROCEDURE — 97110 THERAPEUTIC EXERCISES: CPT | Mod: PN

## 2018-09-21 NOTE — PROGRESS NOTES
Name: Alyssa John  Date:   09/21/2018  Primary Diagnosis: .  Problem List Items Addressed This Visit     Multiple sclerosis    Balance problem          Time in: 1412  Time Out: 1500  Total Treatment Time: 48  Group Time: 0      Subjective:    Patient reports improvement of symptoms. Feels like the exercises she did last visit in the parallel bars really helped her. Patient reports their pain to be /10 on a 0-10 scale with 0 being no pain and 10 being the worst pain imaginable.    Objective    Patient received individual therapy to address strength, endurance, ROM and flexibility with 0 other patients with activities as follows:     Patient received therapeutic exercises to develop strength, endurance, ROM and flexibility for 48 minutes including:     Gait 25 ft, 120 ft at [facility]/min A using RW with cues for L foot clearance and posture    Shuttle leg press: 37# B x 5 min     Standing therex in // bars: sidestepping leading L/R 5 ft x 6, marches x 60 (with 2 standing rest breaks), L/R, hip ABD x 5 L/R    Seated therex: LAQ x 30 L/R    Assessment:     Cues needed for correcting lateral lean in chair. Pt with improved gait quality (L foot clearance) at end of session.     Pt will continue to benefit from skilled PT intervention. Medical Necessity is demonstrated by:  Fall Risk, Unable to participate fully in daily activities, Requires skilled supervision to complete and progress HEP and Weakness.    Patient is making good progress towards established goals.    Plan:  Continue with established Plan of Care towards PT goals.

## 2018-09-25 ENCOUNTER — CLINICAL SUPPORT (OUTPATIENT)
Dept: REHABILITATION | Facility: HOSPITAL | Age: 68
End: 2018-09-25
Payer: MEDICARE

## 2018-09-25 DIAGNOSIS — G35 MULTIPLE SCLEROSIS: ICD-10-CM

## 2018-09-25 DIAGNOSIS — R26.89 BALANCE PROBLEM: ICD-10-CM

## 2018-09-25 PROCEDURE — G8978 MOBILITY CURRENT STATUS: HCPCS | Mod: CL,PN

## 2018-09-25 PROCEDURE — 97110 THERAPEUTIC EXERCISES: CPT | Mod: KX,PN

## 2018-09-25 PROCEDURE — G8979 MOBILITY GOAL STATUS: HCPCS | Mod: CJ,PN

## 2018-09-25 NOTE — PROGRESS NOTES
Name: Alyssa John  Date:   09/25/2018  Primary Diagnosis: .  Problem List Items Addressed This Visit     Multiple sclerosis    Balance problem          Time in: 1417  Time Out: 1505  Total Treatment Time: 48  Group Time: 0      Subjective:    Patient reports low energy level today.  Patient reports their pain to be /10 on a 0-10 scale with 0 being no pain and 10 being the worst pain imaginable.    Objective    Patient received individual therapy to address strength, endurance, ROM and flexibility with 0 other patients with activities as follows:     Patient received therapeutic exercises to develop strength, endurance, ROM and flexibility for 48 minutes including:     Bike x 10 min L1.5  Hip adduction ball squeezes x 30  LAQ x 30 L/R  Hip marches x 30 L/R    Sit to stand: 5 x 2 with no UE support and cues for upright posture    Shuttle leg press squats 37# B x 3 min     // bars: side stepping 8 ft x 6, hip marches x 30, hip ABD x 5 L/R    Gait: small distances within clinic and 120 ft with SPC at [facility] x 1, cues for L foot clearance    Lower Extremity Functional Scale (LEFS): 30/80  G code tool used: LEFS  Current modifier: CL  Goal modifier: CJ    Assessment:     Good tolerance for therex. Fatigued with gait training. Needing cues for upright posture during seated activities.    Pt will continue to benefit from skilled PT intervention. Medical Necessity is demonstrated by:  Fall Risk, Unable to participate in daily activities, Unable to participate fully in daily activities, Requires skilled supervision to complete and progress HEP and Weakness.    Patient is making good progress towards established goals.    Plan:  Continue with established Plan of Care towards PT goals.

## 2018-09-26 DIAGNOSIS — M25.531 WRIST PAIN, RIGHT: Primary | ICD-10-CM

## 2018-09-27 ENCOUNTER — OFFICE VISIT (OUTPATIENT)
Dept: ORTHOPEDICS | Facility: CLINIC | Age: 68
End: 2018-09-27
Payer: MEDICARE

## 2018-09-27 ENCOUNTER — HOSPITAL ENCOUNTER (OUTPATIENT)
Dept: RADIOLOGY | Facility: OTHER | Age: 68
Discharge: HOME | End: 2018-09-27
Attending: PHYSICIAN ASSISTANT
Payer: MEDICARE

## 2018-09-27 VITALS
HEIGHT: 63 IN | BODY MASS INDEX: 30.3 KG/M2 | HEART RATE: 84 BPM | DIASTOLIC BLOOD PRESSURE: 83 MMHG | WEIGHT: 171 LBS | SYSTOLIC BLOOD PRESSURE: 165 MMHG

## 2018-09-27 DIAGNOSIS — G56.01 CARPAL TUNNEL SYNDROME ON RIGHT: Primary | ICD-10-CM

## 2018-09-27 DIAGNOSIS — M25.531 WRIST PAIN, RIGHT: ICD-10-CM

## 2018-09-27 PROCEDURE — 3079F DIAST BP 80-89 MM HG: CPT | Mod: CPTII,,, | Performed by: PHYSICIAN ASSISTANT

## 2018-09-27 PROCEDURE — 73110 X-RAY EXAM OF WRIST: CPT | Mod: TC,FY,RT

## 2018-09-27 PROCEDURE — 73110 X-RAY EXAM OF WRIST: CPT | Mod: 26,RT,, | Performed by: RADIOLOGY

## 2018-09-27 PROCEDURE — 99999 PR PBB SHADOW E&M-EST. PATIENT-LVL III: CPT | Mod: PBBFAC,,, | Performed by: PHYSICIAN ASSISTANT

## 2018-09-27 PROCEDURE — 3288F FALL RISK ASSESSMENT DOCD: CPT | Mod: CPTII,,, | Performed by: PHYSICIAN ASSISTANT

## 2018-09-27 PROCEDURE — 1100F PTFALLS ASSESS-DOCD GE2>/YR: CPT | Mod: CPTII,,, | Performed by: PHYSICIAN ASSISTANT

## 2018-09-27 PROCEDURE — 99213 OFFICE O/P EST LOW 20 MIN: CPT | Mod: PBBFAC,25 | Performed by: PHYSICIAN ASSISTANT

## 2018-09-27 PROCEDURE — 3077F SYST BP >= 140 MM HG: CPT | Mod: CPTII,,, | Performed by: PHYSICIAN ASSISTANT

## 2018-09-27 PROCEDURE — 99214 OFFICE O/P EST MOD 30 MIN: CPT | Mod: S$PBB,,, | Performed by: PHYSICIAN ASSISTANT

## 2018-09-27 NOTE — LETTER
September 28, 2018      Orville Soto MD  [address] [facility] [address]           [facility]  [address], [address]  [facility] [address]  Phone: [phone]          Patient: Alyssa John   MR Number: [id number]   YOB: 1950   Date of Visit: 9/27/2018       Dear Dr. Orville Soto:    Thank you for referring Alyssa John to me for evaluation. Attached you will find relevant portions of my assessment and plan of care.    If you have questions, please do not hesitate to call me. I look forward to following Alyssa John along with you.    Sincerely,    Chitra Larsen PA-C    Enclosure  CC:  No Recipients    If you would like to receive this communication electronically, please contact [email or web address] or [phone] to request more information on [employer] access.    For providers and/or their staff who would like to refer a patient to [facility], please contact us through our one-stop-shop provider referral line, [facility], at [phone].    If you feel you have received this communication in error or would no longer like to receive these types of communications, please e-mail [email or web address]

## 2018-09-27 NOTE — PROGRESS NOTES
Subjective:      Patient ID: Alyssa John is a 68 y.o. female.    Chief Complaint: Pain of the Right Hand      HPI  Alyssa John is a 68 y.o. female with MS presenting today for right carpal tunnel syndrome. An EMG was ordered by her neurologist which revealed mild to moderately severe right carpal tunnel. She denies numbness or tingling. She has noted muscle atrophy over the right thenar eminence. She does note difficulty writing. She denies pain in the hand. She occasionally has a tremor in the hand.      Review of patient's allergies indicates:   Allergen Reactions    Codeine     Dilantin [phenytoin sodium extended]     Tegretol [carbamazepine]          Current Outpatient Medications   Medication Sig Dispense Refill    albuterol 90 mcg/actuation inhaler Inhale 2 puffs into the lungs every 6 (six) hours as needed for Wheezing or Shortness of Breath. Rescue 8 g 0    ascorbic acid, vitamin C, (VITAMIN C) 100 MG tablet Take 100 mg by mouth once daily.      atorvastatin (LIPITOR) 40 MG tablet Take 1 tablet (40 mg total) by mouth once daily. 90 tablet 3    cetirizine (ZYRTEC) 10 MG tablet Take 1 tablet (10 mg total) by mouth once daily.      cholecalciferol, vitamin D3, (VITAMIN D3) 5,000 unit Tab Take 5,000 Units by mouth once daily.      clonazePAM (KLONOPIN) 1 MG tablet Take 1 tablet (1 mg total) by mouth 2 (two) times daily. 180 tablet 1    clopidogrel (PLAVIX) 75 mg tablet Take 75 mg by mouth once daily.        diazePAM (VALIUM) 5 MG tablet Take 1 to 2 tabs po 2 hours prior to MRI 2 tablet 0    DULoxetine (CYMBALTA) 20 MG capsule Take 3 capsules (60 mg total) by mouth once daily. (Patient taking differently: Take 60 mg by mouth once daily. Take 1 tablet daily) 90 capsule 5    famotidine (PEPCID) 20 MG tablet TAKE 1 TABLET BY MOUTH TWICE DAILY (Patient taking differently: TAKE 1 TABLET BY MOUTH TWICE DAILY AS NEEDED) 60 tablet 0    ferrous sulfate 325 (65 FE) MG EC tablet Take 325 mg  by mouth 3 (three) times daily with meals.      gabapentin (NEURONTIN) 100 MG capsule Take 1 tab po in AM and at noon, and take 3 tabs po qhs 120 capsule 11    ibuprofen (ADVIL,MOTRIN) 600 MG tablet Take 1 tablet (600 mg total) by mouth every 6 (six) hours as needed. 20 tablet 0    methylPREDNISolone (MEDROL DOSEPACK) 4 mg tablet use as directed 1 Package 0    multivitamin with minerals (HAIR,SKIN AND NAILS) tablet Take 1 tablet by mouth once daily.      nitroGLYCERIN (NITROSTAT) 0.4 MG SL tablet Place 0.4 mg under the tongue every 5 (five) minutes as needed.        promethazine (PHENERGAN) 25 MG tablet Take 1 tablet (25 mg total) by mouth every 6 (six) hours as needed. 15 tablet 0    PSYLLIUM SEED, WITH DEXTROSE, (FIBER ORAL) Take by mouth 2 (two) times daily.      topiramate (TOPAMAX) 50 MG tablet Take 1 tablet (50 mg total) by mouth 2 (two) times daily. 180 tablet 1    AUBAGIO 14 mg Tab Take 1 tablet by mouth once daily. 30 tablet 0     No current facility-administered medications for this visit.        Past Medical History:   Diagnosis Date    Arthritis     Coronary artery disease     Encounter for blood transfusion     Epilepsy     GERD (gastroesophageal reflux disease)     Hypertension     MI, old     MS (multiple sclerosis)     Seizures     epilepsy       Past Surgical History:   Procedure Laterality Date    APPENDECTOMY      BACK SURGERY      L5 discectomy    COLONOSCOPY N/A 9/16/2015    Procedure: COLONOSCOPY;  Surgeon: Henry Malcolm MD;  Location: [facility];  Service: Endoscopy;  Laterality: N/A;    COLONOSCOPY N/A 9/16/2015    Performed by Henry Malcolm MD at [facility] ENDO    CORONARY STENT PLACEMENT      FLUORO URODYNAMIC STUDY (FUDS) N/A 12/9/2014    Per[facility] at [facility] OR [facility] F[facility]    right knee arthroscopy         Review of Systems:  Constitutional: Negative for chills and fever.   Respiratory: Negative for cough and shortness of breath.   "  Gastrointestinal: Negative for nausea and vomiting.   Skin: Negative for rash.   Neurological: Negative for dizziness and headaches.   Psychiatric/Behavioral: Negative for depression.   MSK as in HPI       OBJECTIVE:     PHYSICAL EXAM:  BP (!) 165/83   Pulse 84   Ht 5' 3" (1.6 m)   Wt 77.6 kg (171 lb)   BMI 30.29 kg/m²     GEN:  NAD, well-developed, well-groomed.  NEURO: Awake, alert, and oriented. Normal attention and concentration.    PSYCH: Normal mood and affect. Behavior is normal.  HEENT: No cervical lymphadenopathy noted.  CARDIOVASCULAR: Radial pulses 2+ bilaterally. No LE edema noted.  PULMONARY: Breath sounds normal. No respiratory distress.  SKIN: Intact, no rashes.      MSK:   RUE:  Good active ROM of the wrist and fingers. AIN/PIN/Radial/Median/Ulnar Nerves assessed in isolation without deficit. Radial & Ulnar arteries palpated 2+. Capillary Refill <3s. Negative tinels and durkans on todays exam.      EMG 8/21/18  Impression   This is an abnormal study. There is electrophysiologic evidence of:   1. A mild to moderately severe right carpal tunnel syndrome;   2. Chronic denervation in the right C7 myotome;   3. Chronic denervation in the right L2 though L4 myotomes.       RADIOGRAPHS:  Xray bl wrists 9/27/18  FINDINGS:  No fracture or dislocation.  There is widening of the scapholunate interval with rotary subluxation of the lunate.  There are severe degenerative changes of the radiocarpal joint and moderate degenerative changes of the base of thumb joint.  Soft tissues are unremarkable.    Comments: I have personally reviewed the imaging and I agree with the above radiologist's report.    ASSESSMENT/PLAN:       ICD-10-CM ICD-9-CM   1. Carpal tunnel syndrome on right G56.01 354.0       Plan:   -treatment options reviewed. Pt does wish to proceed with right carpal tunnel release due to thenar atrophy. She understands EMG also revealed cervical problems. Consents reviewed and signed in clinic today " and all questions were answered.   -RTC post op        The patient indicates understanding of these issues and agrees to the plan.    Chitra Larsen PA-C  [facility]   [facility] [place]

## 2018-09-28 ENCOUNTER — CLINICAL SUPPORT (OUTPATIENT)
Dept: REHABILITATION | Facility: HOSPITAL | Age: 68
End: 2018-09-28
Payer: MEDICARE

## 2018-09-28 DIAGNOSIS — R26.89 BALANCE PROBLEM: ICD-10-CM

## 2018-09-28 DIAGNOSIS — G35 MULTIPLE SCLEROSIS: ICD-10-CM

## 2018-09-28 PROCEDURE — 97110 THERAPEUTIC EXERCISES: CPT | Mod: KX,PN

## 2018-09-29 DIAGNOSIS — R56.9 CONVULSIONS, UNSPECIFIED CONVULSION TYPE: ICD-10-CM

## 2018-10-01 RX ORDER — TOPIRAMATE 50 MG/1
50 TABLET, FILM COATED ORAL 2 TIMES DAILY
Qty: 180 TABLET | Refills: 1 | Status: SHIPPED | OUTPATIENT
Start: 2018-10-01 | End: 2019-02-15 | Stop reason: SDUPTHER

## 2018-10-01 NOTE — PLAN OF CARE
PHYSICAL THERAPY UPDATED PLAN OF CARE    Alyssa John  09/28/2018    Onset Date:  2000  SOC Date:  06/04/2018  Primary Diagnosis:    Problem List Items Addressed This Visit     Multiple sclerosis    Balance problem        Treatment Diagnosis:  Gait instability  Precautions:  Falls, cardiac, seizures  Visits from SOC:  See EMR  Functional Level Prior to SOC:  Assistive Device    Updated Assessment:    S: Pt reports gradual improvement in standing tolerance at home, for example standing at her sink to brush her teeth. Feels like the exercises she does in the parallel bars is really helping her. States her  mentioned she is starting to walk better at home.    O: Reassessment performed for POC update purposes:    Gait: small distances within clinic and 120 ft at end of session using SPC at [facility] x 1, bilateral foot drop notable with decreased L>R heel strike and clearance.     Lower Extremity Strength:  Right Lower Extremity: hip flex 4+/5, knee ext 4-/5, knee flex 4/5, ankle DF 3-/5  Left Lower Extremity: hip flex 4/5, knee ext 4-/5, knee flex 4/5, ankle DF 2+/5     5x sit to stand (timed) test: 20 sec using UE's     Patient participating in therapeutic exercise as follows to address strengthening/balance for 35 minutes:    Sit to stand without UE usage: 5 reps x 2 with cues for upright posture  Shuttle leg press squats 37# x 4 min B LE's  // bars: fwd/bwd walking 8 ft x 2, side steps 8 ft x 4, marches x 20 L/R       A: Following last POC update, patient with a 1 month delay waiting for insurance authorization and in the meantime experienced a functional decline. Upon return to clinic, pt with decreased standing and gait tolerance, using w/c for home and community mobility. She was able to ambulate only very short distances in PT and with much more assistance needed. She is now improving steadily with strength and functional mobility and getting closer to her previous level of activity. She is now able to  walk 120 ft using SPC at [facility] x 1 and progressed to standing exercises in the // bars. She continues to have difficulty ambulating around her house and with standing tolerance, for example feels very unsteady while standing brief periods of time at her sink to brush her teeth. Patient may benefit from skilled PT to address noted impairments and improve safe functional mobility.    P: Continue as per POC    Goals from Previous POC:  Short Term Goals:   1) Pt will increase LE strength by 1/2 muscle grade 2) Pt will initiate HEP  Long Term Goals:   continue per initial plan of care. 1) Pt will be I with HEP 2) Pt will be able to tolerate standing 5 min in order to facilitate ADL's like brushing teeth 3) Pt will improve 5x sit to stand (timed) test to 11 sec or better 4) Pt will improve LEFS score to <40% impairment    Previous Short Term Goals Status:   1= partially met 2= met  New Short Term Goals:   1) Pt will increase LE strength by 1/2 muscle grade 2) Pt will be able to ambulate 150 + ft using SPC at [facility] x 1  Long Term Goals:   continue per initial plan of care. 1) Pt will be I with HEP 2) Pt will be able to tolerate standing 5 min in order to facilitate ADL's like brushing teeth 3) Pt will improve 5x sit to stand (timed) test to 11 sec or better 4) Pt will improve LEFS score to <40% impairment  Reasons for Recertification of Therapy:    weakness, impaired endurance, impaired functional mobility, gait instability, impaired balance, decreased lower extremity function and decreased ROM    Certification Period: 10/1/18 to 11/23/18  Recommended Treatment Plan: 2 times per week for 6 weeks: Gait Training, Manual Therapy, Moist Heat/ Ice, Neuromuscular Re-ed, Patient Education, Therapeutic Activites and Therapeutic Exercise      Thank you for referral.    Therapist's Name: Sameera Osborne, PT  09/30/2018    I CERTIFY THE NEED FOR THESE SERVICES FURNISHED UNDER THIS PLAN OF TREATMENT AND WHILE UNDER MY CARE    Physician's  comments: ________________________________________________________________________________________________________________________________________________      Physician's Name: ___________________________________

## 2018-10-02 ENCOUNTER — CLINICAL SUPPORT (OUTPATIENT)
Dept: REHABILITATION | Facility: HOSPITAL | Age: 68
End: 2018-10-02
Attending: PSYCHIATRY & NEUROLOGY
Payer: MEDICARE

## 2018-10-02 DIAGNOSIS — R26.89 BALANCE PROBLEM: ICD-10-CM

## 2018-10-02 DIAGNOSIS — G35 MULTIPLE SCLEROSIS: ICD-10-CM

## 2018-10-02 PROCEDURE — 97530 THERAPEUTIC ACTIVITIES: CPT | Mod: PN

## 2018-10-02 PROCEDURE — 97116 GAIT TRAINING THERAPY: CPT | Mod: KX,PN,59

## 2018-10-02 PROCEDURE — 97110 THERAPEUTIC EXERCISES: CPT | Mod: PN

## 2018-10-02 NOTE — PROGRESS NOTES
"Name: Alyssa John  [facility] Number: [id number]  Date of Treatment: 10/02/2018   Diagnosis:   Encounter Diagnoses   Name Primary?    Balance problem     Multiple sclerosis        Time in: 1413  Time Out: 1458  Total Treatment Time: 45      Subjective:    Alyssa reports "I like walking on those things (//bars).  I think it helps me walk better".  Patient reports their pain to be 0/10 on a 0-10 scale with 0 being no pain and 10 being the worst pain imaginable.    Objective    Patient received individual therapy to increase strength and blaance with activities as follows:     Alyssa received therapeutic exercises to develop strength and balance, gait for 20 minutes including:     Bike x 10 minutes level 1.5  Sit-stand x 14, UE support needed    Patient participated in dynamic functional therapeutic activities to improve functional performance for 15 minutes. Including:     /'/bars:   Side stepping 5' x 2 R/L each  Forward hip flexion, backward hip extension x 10-15 B each  Marches x 10 B  HR x 10  B  Forward walking 5' x 4  Balance activities: walking and small lunges    Gait training 100' w/SC, min a- cga, VC to  LLE during ambulation x 10 minutes    Written Home Exercises Provided: cont HEP  Pt demo good understanding of the education provided. Alyssa demonstrated fair return demonstration of activities.     Assessment:     Lateral trunk shift to L, heal laterally shifts to R.  VC/TC several times while on bike to sit in neutral.  VC to  L LE during ambulation.  Pt will continue to benefit from skilled PT intervention. Medical Necessity is demonstrated by:  Fall Risk.    Patient is making fair progress towards established goals.      Plan:  Continue with established Plan of Care towards PT goals.   "

## 2018-10-03 DIAGNOSIS — R53.82 CHRONIC FATIGUE: ICD-10-CM

## 2018-10-03 DIAGNOSIS — G35 MULTIPLE SCLEROSIS: ICD-10-CM

## 2018-10-03 RX ORDER — MODAFINIL 100 MG/1
100 TABLET ORAL 2 TIMES DAILY
Qty: 180 TABLET | Refills: 0 | Status: SHIPPED | OUTPATIENT
Start: 2018-10-03 | End: 2019-01-07 | Stop reason: SDUPTHER

## 2018-10-09 ENCOUNTER — HOSPITAL ENCOUNTER (OUTPATIENT)
Dept: RADIOLOGY | Facility: CLINIC | Age: 68
Discharge: HOME | End: 2018-10-09
Attending: FAMILY MEDICINE
Payer: MEDICARE

## 2018-10-09 ENCOUNTER — CLINICAL SUPPORT (OUTPATIENT)
Dept: REHABILITATION | Facility: HOSPITAL | Age: 68
End: 2018-10-09
Attending: PSYCHIATRY & NEUROLOGY
Payer: MEDICARE

## 2018-10-09 DIAGNOSIS — Z12.39 BREAST CANCER SCREENING: ICD-10-CM

## 2018-10-09 DIAGNOSIS — G35 MULTIPLE SCLEROSIS: ICD-10-CM

## 2018-10-09 DIAGNOSIS — R26.89 BALANCE PROBLEM: ICD-10-CM

## 2018-10-09 PROCEDURE — 97110 THERAPEUTIC EXERCISES: CPT | Mod: KX,PN

## 2018-10-09 PROCEDURE — 77067 SCR MAMMO BI INCL CAD: CPT | Mod: 26,,, | Performed by: RADIOLOGY

## 2018-10-09 PROCEDURE — 77063 BREAST TOMOSYNTHESIS BI: CPT | Mod: TC,PO

## 2018-10-09 PROCEDURE — 77063 BREAST TOMOSYNTHESIS BI: CPT | Mod: 26,,, | Performed by: RADIOLOGY

## 2018-10-09 PROCEDURE — 77067 SCR MAMMO BI INCL CAD: CPT | Mod: TC,PO

## 2018-10-10 NOTE — PROGRESS NOTES
Name: Alyssa John  Date:   10/09/2018  Primary Diagnosis: .  Problem List Items Addressed This Visit     Multiple sclerosis    Balance problem          Time in: 1405  Time Out: 1455  Total Treatment Time: 50  Group Time: 0  No charge: 15 min      Subjective:    Patient reports improvement of symptoms.  Patient reports their pain to be /10 on a 0-10 scale with 0 being no pain and 10 being the worst pain imaginable.    Objective    Patient received individual therapy to address strength, endurance, ROM and flexibility with 0 other patients with activities as follows:      Patient received therapeutic exercises to develop strength, endurance, ROM and flexibility for 52 minutes including:      Bike x 10 min     Shuttle 37# B x 6 min, 25# x 30 L/R     Seated therex: Hip adduction ball squeezes x 30, LAQ x 30, hip marches x 30     5x sit to stand: x 2     Gait training c/ SPC 80  ft c/ cues for foot clearance    Assessment:         Pt will continue to benefit from skilled PT intervention. Medical Necessity is demonstrated by:  Fall Risk and Weakness.    Patient is making good progress towards established goals.    Plan:  Continue with established Plan of Care towards PT goals.

## 2018-10-16 ENCOUNTER — CLINICAL SUPPORT (OUTPATIENT)
Dept: REHABILITATION | Facility: HOSPITAL | Age: 68
End: 2018-10-16
Attending: PSYCHIATRY & NEUROLOGY
Payer: MEDICARE

## 2018-10-16 DIAGNOSIS — R26.89 BALANCE PROBLEM: ICD-10-CM

## 2018-10-16 DIAGNOSIS — G35 MULTIPLE SCLEROSIS: ICD-10-CM

## 2018-10-16 PROCEDURE — 97110 THERAPEUTIC EXERCISES: CPT | Mod: PN

## 2018-10-17 NOTE — PROGRESS NOTES
Name: Alyssa John  Date:   10/17/2018  Primary Diagnosis: .  Problem List Items Addressed This Visit     Multiple sclerosis    Balance problem          Time in: 1410  Time Out: 1500  Total Treatment Time: 50  Group Time: 0      Subjective:    Patient reports improvement of symptoms.  Patient reports their pain to be /10 on a 0-10 scale with 0 being no pain and 10 being the worst pain imaginable.    Objective      Patient received individual therapy to address strength, endurance, ROM and flexibility with 0 other patients with activities as follows:      Patient received therapeutic exercises to develop strength, endurance, ROM and flexibility for 52 minutes including:      Bike x 10 min     Shuttle 37# B x 6 min, 25# x 30 L/R    5x sit to stand: 2 reps     Seated therex: Hip adduction ball squeezes x 30, LAQ x 30, hip marches x 30     Gait training c/ SPC short distances in clinic c/ cues for foot clearance    Assessment:       Pt will continue to benefit from skilled PT intervention. Medical Necessity is demonstrated by:  Fall Risk and Weakness.    Patient is making good progress towards established goals.    Plan:  Continue with established Plan of Care towards PT goals.

## 2018-10-23 ENCOUNTER — CLINICAL SUPPORT (OUTPATIENT)
Dept: REHABILITATION | Facility: HOSPITAL | Age: 68
End: 2018-10-23
Attending: PSYCHIATRY & NEUROLOGY
Payer: MEDICARE

## 2018-10-23 DIAGNOSIS — R26.89 BALANCE PROBLEM: ICD-10-CM

## 2018-10-23 DIAGNOSIS — G35 MULTIPLE SCLEROSIS: ICD-10-CM

## 2018-10-23 PROCEDURE — 97110 THERAPEUTIC EXERCISES: CPT | Mod: PN

## 2018-10-23 NOTE — PROGRESS NOTES
Name: Alyssa John  Date:   10/23/2018  Primary Diagnosis: .  Problem List Items Addressed This Visit     Multiple sclerosis    Balance problem          Time in: 1408  Time Out: 1500  Total Treatment Time: 52  Group Time: 0      Subjective:    Patient reports improvement of symptoms.  Patient reports their pain to be /10 on a 0-10 scale with 0 being no pain and 10 being the worst pain imaginable.    Objective  Patient received individual therapy to address strength, endurance, ROM and flexibility with 0 other patients with activities as follows:      Patient received therapeutic exercises to develop strength, endurance, ROM and flexibility for 52 minutes including:      Bike x 10 min     Shuttle 37# B x 6 min, 25# x 30 L/R     Seated therex: Hip adduction ball squeezes x 30, LAQ x 30, hip marches x 30     // bars: fwd & retro walking 8 ft x 4, sidestepping 8 ft x 4, hip marches x 20     Gait training c/ SPC 40 ft c/ cues for foot clearance    Assessment:         Pt will continue to benefit from skilled PT intervention. Medical Necessity is demonstrated by:  Fall Risk and Weakness.    Patient is making good progress towards established goals.    Plan:  Continue with established Plan of Care towards PT goals.

## 2018-10-26 ENCOUNTER — CLINICAL SUPPORT (OUTPATIENT)
Dept: REHABILITATION | Facility: HOSPITAL | Age: 68
End: 2018-10-26
Attending: PSYCHIATRY & NEUROLOGY
Payer: MEDICARE

## 2018-10-26 DIAGNOSIS — R26.89 BALANCE PROBLEM: ICD-10-CM

## 2018-10-26 DIAGNOSIS — G35 MULTIPLE SCLEROSIS: ICD-10-CM

## 2018-10-26 PROCEDURE — 97110 THERAPEUTIC EXERCISES: CPT | Mod: PN

## 2018-10-26 NOTE — PROGRESS NOTES
Name: Alyssa John  Date:   10/26/2018  Primary Diagnosis: .  Problem List Items Addressed This Visit     Multiple sclerosis    Balance problem           Time in: 1406  Time Out: 1458  Total Treatment Time: 52  Group Time: 0      Subjective:    Patient reports improvement of symptoms.  Patient reports their pain to be /10 on a 0-10 scale with 0 being no pain and 10 being the worst pain imaginable.    Objective    Patient received individual therapy to address strength, endurance, ROM and flexibility with 0 other patients with activities as follows:     Patient received therapeutic exercises to develop strength, endurance, ROM and flexibility for 52 minutes including:     Bike x 10 min    Shuttle 37# B x 6 min, 25# x 30 L/R    Seated therex: Hip adduction ball squeezes x 30, LAQ x 30, hip marches x 30    // bars: fwd & retro walking 8 ft x 4, sidestepping 5 ft x 6, hip marches x 30    Gait training c/ SPC 40 ft c/ cues for foot clearance    Assessment:     Good quality on gait training at end of session.     Pt will continue to benefit from skilled PT intervention. Medical Necessity is demonstrated by:  Fall Risk, Unable to participate fully in daily activities and Weakness.    Patient is making good progress towards established goals.    Plan:  Continue with established Plan of Care towards PT goals.

## 2018-11-01 ENCOUNTER — CLINICAL SUPPORT (OUTPATIENT)
Dept: REHABILITATION | Facility: HOSPITAL | Age: 68
End: 2018-11-01
Attending: PSYCHIATRY & NEUROLOGY
Payer: MEDICARE

## 2018-11-01 DIAGNOSIS — G35 MULTIPLE SCLEROSIS: ICD-10-CM

## 2018-11-01 DIAGNOSIS — R26.89 BALANCE PROBLEM: ICD-10-CM

## 2018-11-01 PROCEDURE — 97110 THERAPEUTIC EXERCISES: CPT | Mod: PN

## 2018-11-01 NOTE — PROGRESS NOTES
Name: Alyssa John  Date:   11/01/2018  Primary Diagnosis: .  Problem List Items Addressed This Visit     Multiple sclerosis    Balance problem          Time in: 1515  Time Out: 1600  Total Treatment Time: 45  Group Time: 0      Subjective:    Patient reports increased fatigue today. Still hasn't contacted Dr. Apodaca's office for an appt.  Patient reports their pain to be /10 on a 0-10 scale with 0 being no pain and 10 being the worst pain imaginable.    Objective    Patient received individual therapy to address strength, endurance, ROM and flexibility with 0 other patients with activities as follows:     Patient received therapeutic exercises to develop strength, endurance, ROM and flexibility for 40 minutes including:     Shuttle leg press 37# B x 5 min, 25# x 30 L/R each    Sit to stand with no UE support: x 8 c/ occasional min A    // bars: sidestepping 8 ft x 4, fwd marches 8 ft x 4    Gait: 20 ft c SPC at [facility] c/ cues for LLE clearance    Assessment:     Pt fatigued this session. Verbal cueing for L foot clearance during // bars activity and pt demonstrating carryover in gait training following this.    Pt will continue to benefit from skilled PT intervention. Medical Necessity is demonstrated by:  Fall Risk, Unable to participate fully in daily activities and Weakness.    Patient is making fair and good progress towards established goals.    Plan:  Continue with established Plan of Care towards PT goals.

## 2018-11-06 ENCOUNTER — CLINICAL SUPPORT (OUTPATIENT)
Dept: REHABILITATION | Facility: HOSPITAL | Age: 68
End: 2018-11-06
Attending: PSYCHIATRY & NEUROLOGY
Payer: MEDICARE

## 2018-11-06 DIAGNOSIS — G35 MULTIPLE SCLEROSIS: ICD-10-CM

## 2018-11-06 DIAGNOSIS — R26.89 BALANCE PROBLEM: ICD-10-CM

## 2018-11-06 PROCEDURE — 97110 THERAPEUTIC EXERCISES: CPT | Mod: PN

## 2018-11-06 NOTE — PROGRESS NOTES
Name: Alyssa John  [facility] Number: [id number]  Date of Treatment: 11/06/2018   Diagnosis:   Encounter Diagnoses   Name Primary?    Balance problem     Multiple sclerosis        Time in: 1511  Time Out: 1600  Total Treatment Time: 49    Subjective:    Alyssa reports no pain. Has been having fatigue in LE's and hasn't discussed with Dr. Apodaca yet. Presents in w/c asnd accompanied by . Late arrival.     Objective:    Patient received individual therapy to increase strength, endurance, ROM, flexibility, posture and core stabilization with activities as follows:     Alyssa received therapeutic exercises to develop strength, endurance, ROM, flexibility, posture and core stabilization for 49 minutes including:     Transfers w/c-armchair min A    Sit-stands x 10 with min A and cues for fwd trunk incline and proper weight shifting    Seated TE 10/3 B LE's :     LAQ     Hip flexion     DF    High level balance ex's in // bars:     Sidestepping     Fwd walking     Backward walking      Gait training 30' with R spc and CGA L HHA and cues for improved posture and increased L LE clearance    Continue HEP daily.    Pt demo good understanding of the education provided. Patient demonstrated good return demonstration of activities.     Assessment:     Quickly fatigues with seated ex's but improves with standing ex's.     Pt will continue to benefit from skilled PT intervention. Medical Necessity is demonstrated by:  Requires skilled supervision to complete and progress HEP and Weakness.    Patient is making good progress towards established goals.    Plan:    Continue with established Plan of Care towards PT goals.

## 2018-11-09 ENCOUNTER — CLINICAL SUPPORT (OUTPATIENT)
Dept: REHABILITATION | Facility: HOSPITAL | Age: 68
End: 2018-11-09
Attending: PSYCHIATRY & NEUROLOGY
Payer: MEDICARE

## 2018-11-09 DIAGNOSIS — G35 MULTIPLE SCLEROSIS: ICD-10-CM

## 2018-11-09 DIAGNOSIS — R26.89 BALANCE PROBLEM: ICD-10-CM

## 2018-11-09 PROCEDURE — 97110 THERAPEUTIC EXERCISES: CPT | Mod: PN

## 2018-11-09 PROCEDURE — 97530 THERAPEUTIC ACTIVITIES: CPT | Mod: PN

## 2018-11-09 PROCEDURE — 97116 GAIT TRAINING THERAPY: CPT | Mod: PN

## 2018-11-09 NOTE — PROGRESS NOTES
"Name: Alyssa John  [facility] Number: [id number]  Date of Treatment: 11/09/2018   Diagnosis:   Encounter Diagnoses   Name Primary?    Balance problem     Multiple sclerosis        Time in: 1410  Time Out: 1455  Total Treatment Time: 45      Subjective:    Alyssa reports "I think I'm coming down with something".  Patient reports their pain to be 0/10 on a 0-10 scale with 0 being no pain and 10 being the worst pain imaginable.    Objective    Patient received individual therapy to increase strength with activities as follows:     Alyssa received therapeutic exercises to develop strength and balance for 15 minutes including:     Bike x 10 minutes  Shuttle 37# B    Gait training 60' w/SC, HHA x 10 minutes    Patient participated in dynamic functional therapeutic activities to improve functional performance for 20 minutes. Including:     Lateral stepping 5' x 4  Standing hip abd 2 x 15 B  Standing marches 2 x 15 B    Written Home Exercises Provided: cont HEP  Pt demo good understanding of the education provided. Alyssa demonstrated fair return demonstration of activities.     Assessment:     Pt fatigued today.  No lOB with activity.  Pt will continue to benefit from skilled PT intervention. Medical Necessity is demonstrated by:  Fall Risk, Unable to participate fully in daily activities, Requires skilled supervision to complete and progress HEP and Weakness.    Patient is making fair progress towards established goals.      Plan:  Continue with established Plan of Care towards PT goals.   "

## 2018-11-16 ENCOUNTER — CLINICAL SUPPORT (OUTPATIENT)
Dept: REHABILITATION | Facility: HOSPITAL | Age: 68
End: 2018-11-16
Attending: PSYCHIATRY & NEUROLOGY
Payer: MEDICARE

## 2018-11-16 DIAGNOSIS — G35 MULTIPLE SCLEROSIS: ICD-10-CM

## 2018-11-16 DIAGNOSIS — R26.89 BALANCE PROBLEM: ICD-10-CM

## 2018-11-16 PROCEDURE — 97110 THERAPEUTIC EXERCISES: CPT | Mod: KX,PN

## 2018-11-16 NOTE — PROGRESS NOTES
Name: Alyssa John  [facility] Number: [id number]  Date of Treatment: 11/16/2018   Diagnosis:   Encounter Diagnoses   Name Primary?    Balance problem     Multiple sclerosis        Time in: 1406  Time Out: 1500  Total Treatment Time: 54    Subjective:    Alyssa reports no pain. L LE weakness and edema which she states is her norm. Via w/c with . .    Objective:    Patient received individual therapy to increase strength, endurance, ROM, flexibility, posture and core stabilization with activities as follows:     Alyssa received therapeutic exercises to develop strength, endurance, ROM, flexibility, posture and core stabilization for 54 minutes including:     Transfers w/c-armchair min A     Sit-stands x 10 with min A and cues for fwd trunk incline and proper weight shifting     Seated TE 10/3 B LE's :     LAQ     Hip flexion     DF     Ballsqueezes     Clamshells B GTB     High level balance ex's in // bars:     Sidestepping     Fwd walking     Backward walking       Gait training 40' x 2, 20' x 2 with R spc and CGA L HHA and cues for improved posture and increased L LE clearance       Continue HEP daily.    Pt demo good understanding of the education provided. Patient demonstrated good return demonstration of activities.     Assessment:     Posterior LOB with stand-sit which improves with tc and vc for fwd trunk incline.     Pt will continue to benefit from skilled PT intervention. Medical Necessity is demonstrated by:  Requires skilled supervision to complete and progress HEP and Weakness.    Patient is making good progress towards established goals.    Plan:    Continue with established Plan of Care towards PT goals.

## 2018-11-20 ENCOUNTER — CLINICAL SUPPORT (OUTPATIENT)
Dept: REHABILITATION | Facility: HOSPITAL | Age: 68
End: 2018-11-20
Attending: PSYCHIATRY & NEUROLOGY
Payer: MEDICARE

## 2018-11-20 DIAGNOSIS — G35 MULTIPLE SCLEROSIS: ICD-10-CM

## 2018-11-20 DIAGNOSIS — R26.89 BALANCE PROBLEM: ICD-10-CM

## 2018-11-20 PROCEDURE — 97530 THERAPEUTIC ACTIVITIES: CPT | Mod: KX,PN

## 2018-11-20 PROCEDURE — G8978 MOBILITY CURRENT STATUS: HCPCS | Mod: CM,PN

## 2018-11-20 PROCEDURE — G8979 MOBILITY GOAL STATUS: HCPCS | Mod: CJ,PN

## 2018-12-02 DIAGNOSIS — M79.2 NEUROPATHIC PAIN: ICD-10-CM

## 2018-12-03 NOTE — PLAN OF CARE
PHYSICAL THERAPY UPDATED PLAN OF CARE    Alyssa John  11/20/2018    Onset Date:  2000  SOC Date:  06/04/18  Primary Diagnosis:    Problem List Items Addressed This Visit     Multiple sclerosis    Balance problem        Treatment Diagnosis:  Gait instability  Precautions:  Falls, cardiac, seizures  Visits from SOC:  See EMR  Functional Level Prior to SOC:  Assistive Device    Updated Assessment:    S: Reports she is starting to improve with standing tolerance at home, but still having a problem with leaning when sitting or standing. Reports better tolerance to therapy sessions recently compared to a few weeks ago, not feeling the same level fatigue lasting until the next day.     O: Reassessment performed for POC update purposes:    Static standing balance: 5 min at [facility] with mirror placement for visual cueing for posture    Gait: SPC at A x 1 with decreased L>R and clearance, improves with cueing.     Lower Extremity Strength:  Right Lower Extremity: hip flex 4+/5, knee ext 4/5, knee flex 4+/5, ankle DF 3/5  Left Lower Extremity: hip flex 4/5, knee ext 4/5, knee flex 4/5, ankle DF 3-/5     5x sit to stand (timed) test: 20 sec using UE's    Lower Extremity Functional Scale (LEFS): 14/80  G code tool used: LEFS  Current modifier: CM  Goal modifier: CJ     Patient participating in therapeutic activities as follows to address strengthening/balance for 35 minutes:     Sit to stand without UE usage: 5 reps x 3 with cues for upright posture  Static standing x 5 min at [facility] with mirror placement for visual cueing       A: Improvement noted today with LE strength and static standing tolerance with no UE support. She continues to feel unsteady when attempting to stand and brush her teeth, or other standing activities that require more dynamic balance. Pt improving with tolerance to therapy sessions, no longer having the same level of fatigue lasting through the next day. Education in therapy has been  emphasizing to patient the need for rest breaks to avoid exhaustion while at the same time focusing on quality of transfers and gait to increase safe mobility. Patient may benefit from continued skilled PT to address noted impairments and improve safe functional mobility.     P: Continue as per POC    Goals from Previous POC:  New Short Term Goals:   1) Pt will increase LE strength by 1/2 muscle grade 2) Pt will be able to ambulate 150 + ft using SPC at A x 1  Long Term Goals:   continue per initial plan of care. 1) Pt will be I with HEP 2) Pt will be able to tolerate standing 5 min in order to facilitate ADL's like brushing teeth 3) Pt will improve 5x sit to stand (timed) test to 11 sec or better 4) Pt will improve LEFS score to <40% impairment      Previous Short Term Goals Status:   1=partially met  2=not met   New Short Term Goals:   1)  Pt will increase LE strength by 1/2 muscle grade 3) Pt will be able to sit unsupported for 5 min with minimal lateral lean, mirror placed for verbal cues  Long Term Goals:   modified:  1) Pt will be I with HEP 2) Pt will be able to tolerate standing 5 min in order to facilitate ADL's like brushing teeth 3) Pt will improve 5x sit to stand (timed) test to 11 sec or better 4) Pt will improve LEFS score to <40% impairment  Reasons for Recertification of Therapy:    weakness, impaired endurance, impaired functional mobility, gait instability, impaired balance and decreased lower extremity function    Certification Period: 11/24/18 to 01/25/19  Recommended Treatment Plan: 2 times per week for 6 weeks: Gait Training, Manual Therapy, Moist Heat/ Ice, Neuromuscular Re-ed, Patient Education, Therapeutic Activites and Therapeutic Exercise      Thank you for referral.    Therapist's Name: Sameera Osborne, PT  11/20/2018    I CERTIFY THE NEED FOR THESE SERVICES FURNISHED UNDER THIS PLAN OF TREATMENT AND WHILE UNDER MY CARE    Physician's comments:  ________________________________________________________________________________________________________________________________________________      Physician's Name: ___________________________________

## 2018-12-04 RX ORDER — DULOXETIN HYDROCHLORIDE 20 MG/1
CAPSULE, DELAYED RELEASE ORAL
Qty: 90 CAPSULE | Refills: 1 | Status: SHIPPED | OUTPATIENT
Start: 2018-12-04 | End: 2019-05-20

## 2018-12-11 ENCOUNTER — HOSPITAL ENCOUNTER (OUTPATIENT)
Dept: RADIOLOGY | Facility: HOSPITAL | Age: 68
Discharge: HOME | End: 2018-12-11
Attending: PHYSICIAN ASSISTANT
Payer: MEDICARE

## 2018-12-11 ENCOUNTER — OFFICE VISIT (OUTPATIENT)
Dept: NEUROLOGY | Facility: CLINIC | Age: 68
End: 2018-12-11
Payer: MEDICARE

## 2018-12-11 ENCOUNTER — RESEARCH ENCOUNTER (OUTPATIENT)
Dept: RESEARCH | Facility: HOSPITAL | Age: 68
End: 2018-12-11
Payer: MEDICARE

## 2018-12-11 VITALS
SYSTOLIC BLOOD PRESSURE: 142 MMHG | HEART RATE: 75 BPM | DIASTOLIC BLOOD PRESSURE: 85 MMHG | BODY MASS INDEX: 30.11 KG/M2 | WEIGHT: 170 LBS

## 2018-12-11 DIAGNOSIS — F41.9 ANXIETY: ICD-10-CM

## 2018-12-11 DIAGNOSIS — R05.9 COUGH: ICD-10-CM

## 2018-12-11 DIAGNOSIS — G35 MULTIPLE SCLEROSIS: Primary | ICD-10-CM

## 2018-12-11 DIAGNOSIS — G35 MULTIPLE SCLEROSIS: ICD-10-CM

## 2018-12-11 DIAGNOSIS — R26.9 NEUROLOGIC GAIT DYSFUNCTION: ICD-10-CM

## 2018-12-11 DIAGNOSIS — R53.1 WEAKNESS: ICD-10-CM

## 2018-12-11 DIAGNOSIS — Z71.89 COUNSELING REGARDING GOALS OF CARE: ICD-10-CM

## 2018-12-11 DIAGNOSIS — Z91.81 HISTORY OF RECENT FALL: ICD-10-CM

## 2018-12-11 DIAGNOSIS — R56.9 SEIZURES: ICD-10-CM

## 2018-12-11 PROCEDURE — 71046 X-RAY EXAM CHEST 2 VIEWS: CPT | Mod: TC

## 2018-12-11 PROCEDURE — A9585 GADOBUTROL INJECTION: HCPCS | Performed by: PHYSICIAN ASSISTANT

## 2018-12-11 PROCEDURE — 25500020 PHARM REV CODE 255: Performed by: PHYSICIAN ASSISTANT

## 2018-12-11 PROCEDURE — 3077F SYST BP >= 140 MM HG: CPT | Mod: CPTII,S$GLB,, | Performed by: PHYSICIAN ASSISTANT

## 2018-12-11 PROCEDURE — 72157 MRI CHEST SPINE W/O & W/DYE: CPT | Mod: TC

## 2018-12-11 PROCEDURE — 72156 MRI NECK SPINE W/O & W/DYE: CPT | Mod: 26,,, | Performed by: RADIOLOGY

## 2018-12-11 PROCEDURE — 70553 MRI BRAIN STEM W/O & W/DYE: CPT | Mod: TC

## 2018-12-11 PROCEDURE — 71046 X-RAY EXAM CHEST 2 VIEWS: CPT | Mod: 26,,, | Performed by: RADIOLOGY

## 2018-12-11 PROCEDURE — 99499 UNLISTED E&M SERVICE: CPT | Mod: S$GLB,,, | Performed by: PHYSICIAN ASSISTANT

## 2018-12-11 PROCEDURE — 3288F FALL RISK ASSESSMENT DOCD: CPT | Mod: CPTII,S$GLB,, | Performed by: PHYSICIAN ASSISTANT

## 2018-12-11 PROCEDURE — 1100F PTFALLS ASSESS-DOCD GE2>/YR: CPT | Mod: CPTII,S$GLB,, | Performed by: PHYSICIAN ASSISTANT

## 2018-12-11 PROCEDURE — 99999 PR PBB SHADOW E&M-EST. PATIENT-LVL V: CPT | Mod: PBBFAC,,, | Performed by: PHYSICIAN ASSISTANT

## 2018-12-11 PROCEDURE — 72157 MRI CHEST SPINE W/O & W/DYE: CPT | Mod: 26,,, | Performed by: RADIOLOGY

## 2018-12-11 PROCEDURE — 72156 MRI NECK SPINE W/O & W/DYE: CPT | Mod: TC

## 2018-12-11 PROCEDURE — 70553 MRI BRAIN STEM W/O & W/DYE: CPT | Mod: 26,,, | Performed by: RADIOLOGY

## 2018-12-11 PROCEDURE — 99215 OFFICE O/P EST HI 40 MIN: CPT | Mod: S$GLB,,, | Performed by: PHYSICIAN ASSISTANT

## 2018-12-11 PROCEDURE — 3079F DIAST BP 80-89 MM HG: CPT | Mod: CPTII,S$GLB,, | Performed by: PHYSICIAN ASSISTANT

## 2018-12-11 RX ORDER — DIAZEPAM 5 MG/1
TABLET ORAL
Qty: 2 TABLET | Refills: 0 | Status: SHIPPED | OUTPATIENT
Start: 2018-12-11 | End: 2019-03-12 | Stop reason: ALTCHOICE

## 2018-12-11 RX ORDER — GADOBUTROL 604.72 MG/ML
8 INJECTION INTRAVENOUS
Status: COMPLETED | OUTPATIENT
Start: 2018-12-11 | End: 2018-12-11

## 2018-12-11 RX ORDER — CLONAZEPAM 1 MG/1
1 TABLET ORAL 2 TIMES DAILY
Qty: 180 TABLET | Refills: 1 | Status: SHIPPED | OUTPATIENT
Start: 2018-12-11 | End: 2019-06-20 | Stop reason: SDUPTHER

## 2018-12-11 RX ADMIN — GADOBUTROL 8 ML: 604.72 INJECTION INTRAVENOUS at 06:12

## 2018-12-11 NOTE — PROGRESS NOTES
Subjective:       Patient ID: Alyssa John is a 68 y.o. female who presents today for a routine clinic visit for MS.      MS HPI:  · DMT: none at present, discontinued Aubagio (May 21, 2018) secondary to PN  · Side effects from DMT? No  · Taking vitamin D3 as recommended? Yes - 5,000IU/ Dose:   · Has seen ortho regarding CTS and planning for release  · PT outpatient-last visit about 3 weeks ago.  Patient is planning to restart PT later this week  · She feels like she bruised ribs when she fell 3 weeks ago. She states they are improving but still very tender on L side.  and patient states she has had a cough of late.   · She feels like her walking is worse,  notices that she is not advancing the L LE when she walks. Therefore, it's taking longer to walk the same distance get worn out(functional endurance), having trouble with sitting balance --leans left in general. They state walk has gotten worse off DMT and not just in the last couple of weeks    SOCIAL HISTORY  Social History     Tobacco Use    Smoking status: Former Smoker     Packs/day: 1.50     Last attempt to quit: 2006     Years since quittin.9    Smokeless tobacco: Never Used   Substance Use Topics    Alcohol use: No     Alcohol/week: 0.0 oz    Drug use: No     Living arrangements - the patient lives with their spouse.  Employment     MS ROS:  · Fatigue: Yes - Provigil daily 200mg  · Sleep Disturbance: NO  · Bladder Dysfunction: NO  · Bowel Dysfunction:NO  · Spasticity:NO  · Visual Symptoms: NO  · Cognitive: NO  · Mood Disorder:Stable on Cymbalta  · Gait Disturbance: Yes - as above.   · Falls: No  · Hand Dysfunction: Yes, tremors  · Sexual Dysfunction: Not Assessed  · Skin Breakdown:NO  · Tremors: Yes,tremors in both hands  · Dysphagia: NO  · Dysarthria:  NO  · Heat sensitivity:  NO  · Any un-met adaptive needs? NO  · Copay Assist?  Yes        Objective:        1. 25 foot timed walk: 34.3s today with U-step; 13.5s with  U-step last visit; 13.2s with U-step today  Timed 25 Foot Walk: 10/27/2016 2017   Did patient wear an AFO? No No   Was assistive device used? Yes Yes   Assistive device used (toña one): Unilateral Assistance Unilateral Assistance   Unilateral device used Cane Cane   Time for 25 Foot Walk (seconds) 12.8 10.2   Time for 25 Foot Walk (seconds) 9 -       Neurologic Exam     Mental Status   Oriented to person, place, and time.   Speech: speech is normal   Level of consciousness: alert  Normal comprehension.     Motor Exam   Muscle bulk: normal  Right leg tone: increased  Left leg tone: increased    Strength   Right iliopsoas: 5/5  Left iliopsoas: 4/5  Right hamstrin/5  Left hamstrin/5  Right anterior tibial: 5/5  Left anterior tibial: 4/5  Right peroneal: 5/5  Left peroneal: 4/5    Sensory Exam   Right arm vibration: decreased from fingers  Left arm vibration: decreased from fingers  Right leg vibration: decreased from toes  Left leg vibration: decreased from toes  Right leg pinprick: impaired mid calf distal-more L than R.  Left leg pinprick: impaired mid-calf distal-greater L than R.    Gait, Coordination, and Reflexes     Gait  Gait: wide-based (step together gait with L. )    Reflexes   Right patellar: 3+  Left patellar: 3+  Right achilles: 3+  Left achilles: 3+  Right ankle clonus: absent  Left ankle clonus: present            Imaging:       Results for orders placed during the hospital encounter of 18   MRI Brain Demyelinating W W/O Contrast    Impression Brain appears stable from prior examination of 2016, again demonstrating findings consistent with the reported history of multiple sclerosis.  No new or enhancing lesions to specifically indicate ongoing or active demyelination.    Electronically signed by resident: Preston Luong  Date:    2018  Time:    08:37    Electronically signed by: Jarad Martins MD  Date:    2018  Time:    10:40     Results for orders placed during  the hospital encounter of 08/02/18   MRI Cervical Spine Demyelinating W W/O Contrast    Impression Craniocervical junction and cervical cord appear unchanged from prior examination although is July 2014 again demonstrating findings consistent with the reported history of multiple sclerosis.  No new enhancing lesions to indicate ongoing active demyelination.    Additional T2/STIR signal involving the right aspect thoracic cord posterior to T1 and T2 vertebrae without abnormal postcontrast enhancement to indicate active demyelination.    Electronically signed by resident: Preston Luong  Date:    08/03/2018  Time:    09:53    Electronically signed by: Anirudh Castellanos MD  Date:    08/03/2018  Time:    11:50     No results found for this or any previous visit.      Labs:     Lab Results   Component Value Date    YGHZPSXL69YI 68 07/19/2018    HQWYVQLE54QG 53 10/19/2016    MZMAYIUN35RJ 78 06/13/2016     Lab Results   Component Value Date    JCVINDEX SEE COMMENT (A) 12/09/2015    JCVANTIBODY SEE COMMENT 12/09/2015     Lab Results   Component Value Date    LB5SCERP 59.1 03/31/2016    ABSOLUTECD3 619 (L) 03/31/2016    FP3EUHDL 19.9 03/31/2016    ABSOLUTECD8 208 03/31/2016    GQ5HMLPX 39.1 03/31/2016    ABSOLUTECD4 409 03/31/2016    LABCD48 1.97 03/31/2016     Lab Results   Component Value Date    WBC 6.10 10/09/2018    RBC 4.59 10/09/2018    HGB 13.3 10/09/2018    HCT 42.3 10/09/2018    MCV 92 10/09/2018    MCH 29.0 10/09/2018    MCHC 31.4 (L) 10/09/2018    RDW 13.0 10/09/2018     10/09/2018    MPV 11.1 10/09/2018    GRAN 4.0 10/09/2018    GRAN 65.4 10/09/2018    LYMPH 1.4 10/09/2018    LYMPH 23.0 10/09/2018    MONO 0.4 10/09/2018    MONO 6.7 10/09/2018    EOS 0.2 10/09/2018    BASO 0.05 10/09/2018    EOSINOPHIL 3.6 10/09/2018    BASOPHIL 0.8 10/09/2018     Sodium   Date Value Ref Range Status   10/09/2018 146 (H) 136 - 145 mmol/L Final     Potassium   Date Value Ref Range Status   10/09/2018 4.1 3.5 - 5.1 mmol/L  Final     Chloride   Date Value Ref Range Status   10/09/2018 113 (H) 95 - 110 mmol/L Final     CO2   Date Value Ref Range Status   10/09/2018 24 23 - 29 mmol/L Final     Carbon Monoxide, Blood   Date Value Ref Range Status   10/19/2016 2 % Final     Comment:     -------------------REFERENCE VALUE--------------------------  0-2 Normal (Non-smoker) , < or = 9 Normal (Smoker), > or   = 20 (Toxic concentration)  Test Performed by:  [facility] [place] [address]  : Adrien Norton II, M.D., Ph.D.       Glucose   Date Value Ref Range Status   10/09/2018 96 70 - 110 mg/dL Final     BUN, Bld   Date Value Ref Range Status   10/09/2018 17 8 - 23 mg/dL Final     Creatinine   Date Value Ref Range Status   10/09/2018 0.8 0.5 - 1.4 mg/dL Final   02/22/2013 0.8 0.5 - 1.4 mg/dL Final     Calcium   Date Value Ref Range Status   10/09/2018 9.2 8.7 - 10.5 mg/dL Final   02/22/2013 8.7 8.7 - 10.5 mg/dL Final     Total Protein   Date Value Ref Range Status   10/09/2018 6.7 6.0 - 8.4 g/dL Final     Albumin   Date Value Ref Range Status   10/09/2018 3.8 3.5 - 5.2 g/dL Final     Total Bilirubin   Date Value Ref Range Status   10/09/2018 0.4 0.1 - 1.0 mg/dL Final     Comment:     For infants and newborns, interpretation of results should be based  on gestational age, weight and in agreement with clinical  observations.  Premature Infant recommended reference ranges:  Up to 24 hours.............<8.0 mg/dL  Up to 48 hours............<12.0 mg/dL  3-5 days..................<15.0 mg/dL  6-29 days.................<15.0 mg/dL       Alkaline Phosphatase   Date Value Ref Range Status   10/09/2018 132 55 - 135 U/L Final   02/22/2013 107 55 - 135 U/L Final     AST   Date Value Ref Range Status   10/09/2018 26 10 - 40 U/L Final   02/22/2013 18 10 - 40 U/L Final     ALT   Date Value Ref Range Status   10/09/2018 27 10 - 44 U/L Final     Anion Gap   Date Value Ref Range  Status   10/09/2018 9 8 - 16 mmol/L Final   02/22/2013 11 5 - 15 meq/L Final     eGFR if    Date Value Ref Range Status   10/09/2018 >60.0 >60 mL/min/1.73 m^2 Final     eGFR if non    Date Value Ref Range Status   10/09/2018 >60.0 >60 mL/min/1.73 m^2 Final     Comment:     Calculation used to obtain the estimated glomerular filtration  rate (eGFR) is the CKD-EPI equation.        Diagnosis/Assessment/Plan:    1. Multiple Sclerosis  · Assessment: Patients timed walk has significantly changed from last visit. Will assess for infection and get STAT MRI's of Brain, C and T spine. I am concerned for worsening MS as she is not on DMT. She also has history of recent fall 3 weeks ago which of course complicates exam.   · Imaging: STAT MRI's-sent Valium for MRI to [facility] pharmacy  · Disease Modifying Therapies: Patient is not on DMT currently apart from Vit D3. Will have quick follow up and will consider DMT at that time.     2. MS Symptom Assessment / Management  · Mood Disorder: refilled clonazepam today-sent to [facility] pharmacy per request  · Clinical Trial: met with CRC, Ty Lawton ([facility] trial)    Over 50% of this 60 minute visit was spent in direct face to face with the patient about MS, DMT considerations, need for urgent evaluation via MRI/labs, and MS symptom management.   Follow-up in about 1 week (around 12/18/2018) for follow up with me.  Patient agreed to POC today.    Attending, Dr. Apodaca, was available during today's encounter.     [facility]      Problem List Items Addressed This Visit        Neuro    Multiple sclerosis - Primary    Overview     Dr. Apodaca, neurology         Relevant Medications    clonazePAM (KLONOPIN) 1 MG tablet    Other Relevant Orders    CBC auto differential    Comprehensive metabolic panel    Urinalysis, Reflex to Urine Culture Urine, Clean Catch    MRI Brain Demyelinating W W/O Contrast    MRI Thoracic Spine Demyelinating W W/O  Contrast    MRI Cervical Spine Demyelinating W W/O Contrast    X-Ray Chest PA And Lateral    Urinalysis    Urine culture    Seizures    Overview     Epilepsy, Dr. Apodaca, neurology           Other Visit Diagnoses     Neurologic gait dysfunction        Relevant Orders    CBC auto differential    Comprehensive metabolic panel    Urinalysis, Reflex to Urine Culture Urine, Clean Catch    MRI Brain Demyelinating W W/O Contrast    MRI Thoracic Spine Demyelinating W W/O Contrast    MRI Cervical Spine Demyelinating W W/O Contrast    X-Ray Chest PA And Lateral    Cough        Relevant Orders    X-Ray Chest PA And Lateral    History of recent fall        Relevant Orders    X-Ray Chest PA And Lateral    Anxiety        Relevant Medications    clonazePAM (KLONOPIN) 1 MG tablet    Counseling regarding goals of care        Weakness         Relevant Orders    Urine culture

## 2018-12-11 NOTE — PROGRESS NOTES
[facility] Unplanned/Scheduled  Visit  Subject seen in the clinic today by Risa Oshea and Ty Lawton.     Pregnancy  Subject did not report pregnancy at this visit    EDSS  Not completed at this visit. 25 foot walk test completed per SOC.     Diet and Exercise  Not addressed at this visit    MS Relapses  Subject did not have any MS relapses from     Hematology  Completed on date of service and reviewed and entered into EDC by CRC    Chemistry  Completed and entered into EDC.    Other Labs  Not completed.    Urinalysis  Completed and entered into EDC.    Con Meds   Reviewed with subject and updated     Adverse Events / Serious Adverse events   Subject did not repot any AE's or JAZMIN's    [facility] PRO booklet   Subject completed, reviewed and sent to [facility] monitor by CRC

## 2018-12-14 ENCOUNTER — CLINICAL SUPPORT (OUTPATIENT)
Dept: REHABILITATION | Facility: HOSPITAL | Age: 68
End: 2018-12-14
Attending: PSYCHIATRY & NEUROLOGY
Payer: MEDICARE

## 2018-12-14 DIAGNOSIS — G35 MULTIPLE SCLEROSIS: ICD-10-CM

## 2018-12-14 DIAGNOSIS — R26.89 BALANCE PROBLEM: ICD-10-CM

## 2018-12-14 PROCEDURE — 97110 THERAPEUTIC EXERCISES: CPT | Mod: KX,PN

## 2018-12-14 NOTE — PROGRESS NOTES
Name: Alyssa John  Date:   12/14/2018  Primary Diagnosis: .  Problem List Items Addressed This Visit     Multiple sclerosis    Balance problem          Time in: 1520  Time Out: 1558  Total Treatment Time: 38  Group Time: 0      Subjective:    Patient reports falling 3 weeks ago. States she was walking out of her bathroom with her walker and fell while turning. She bruised her left ribs, which is causing ongoing pain. Chest xray showing no fractures. She also underwent MRI testing which show no progression of MS. States she continues to feel discouraged about her mobility.  Patient reports their pain to be /10 on a 0-10 scale with 0 being no pain and 10 being the worst pain imaginable.    Objective    Patient received individual therapy to address strength, endurance, ROM and flexibility with 0 other patients with activities as follows:     Patient received therapeutic exercises to develop strength, endurance, ROM and flexibility for 38 minutes including:     Seated therex: x 30 reps L/R   Ankle DF/PF   Hip adduction ball squeezes   LAQ   Hip marches    Gait: 28 ft at min/mod A using SPC    // bars: sidestepping, fwd/bwd gait with BUE support    Assessment:     Pt needing cues for L foot clearance and posture during gait and standing activities. Pt with decreased endurance this date as well as limitations due to rib pain.     Pt will continue to benefit from skilled PT intervention. Medical Necessity is demonstrated by:  Fall Risk, Pain limits function of effected part for some activities, Unable to participate fully in daily activities, Requires skilled supervision to complete and progress HEP and Weakness.    Patient is making fair progress towards established goals.    Plan:  Continue with established Plan of Care towards PT goals.

## 2018-12-18 ENCOUNTER — OFFICE VISIT (OUTPATIENT)
Dept: NEUROLOGY | Facility: CLINIC | Age: 68
End: 2018-12-18
Payer: MEDICARE

## 2018-12-18 ENCOUNTER — RESEARCH ENCOUNTER (OUTPATIENT)
Dept: RESEARCH | Facility: HOSPITAL | Age: 68
End: 2018-12-18
Payer: MEDICARE

## 2018-12-18 VITALS
HEIGHT: 63 IN | DIASTOLIC BLOOD PRESSURE: 77 MMHG | BODY MASS INDEX: 29.95 KG/M2 | SYSTOLIC BLOOD PRESSURE: 129 MMHG | HEART RATE: 77 BPM | WEIGHT: 169 LBS

## 2018-12-18 DIAGNOSIS — M48.02 NEURAL FORAMINAL STENOSIS OF CERVICAL SPINE: ICD-10-CM

## 2018-12-18 DIAGNOSIS — R26.9 NEUROLOGIC GAIT DYSFUNCTION: ICD-10-CM

## 2018-12-18 DIAGNOSIS — G56.01 CARPAL TUNNEL SYNDROME OF RIGHT WRIST: ICD-10-CM

## 2018-12-18 DIAGNOSIS — G35 MULTIPLE SCLEROSIS: Primary | ICD-10-CM

## 2018-12-18 DIAGNOSIS — I10 ESSENTIAL HYPERTENSION: ICD-10-CM

## 2018-12-18 DIAGNOSIS — R56.9 SEIZURES: ICD-10-CM

## 2018-12-18 DIAGNOSIS — Z91.81 HISTORY OF RECENT FALL: ICD-10-CM

## 2018-12-18 PROCEDURE — 3078F DIAST BP <80 MM HG: CPT | Mod: CPTII,S$GLB,, | Performed by: PHYSICIAN ASSISTANT

## 2018-12-18 PROCEDURE — 3288F FALL RISK ASSESSMENT DOCD: CPT | Mod: CPTII,S$GLB,, | Performed by: PHYSICIAN ASSISTANT

## 2018-12-18 PROCEDURE — 1100F PTFALLS ASSESS-DOCD GE2>/YR: CPT | Mod: CPTII,S$GLB,, | Performed by: PHYSICIAN ASSISTANT

## 2018-12-18 PROCEDURE — 99999 PR PBB SHADOW E&M-EST. PATIENT-LVL III: CPT | Mod: PBBFAC,,, | Performed by: PHYSICIAN ASSISTANT

## 2018-12-18 PROCEDURE — 3074F SYST BP LT 130 MM HG: CPT | Mod: CPTII,S$GLB,, | Performed by: PHYSICIAN ASSISTANT

## 2018-12-18 PROCEDURE — 99215 OFFICE O/P EST HI 40 MIN: CPT | Mod: S$GLB,,, | Performed by: PHYSICIAN ASSISTANT

## 2018-12-18 NOTE — PROGRESS NOTES
[facility] Unplanned/Scheduled  Visit  Subject seen in the clinic today by Risa Oshea and Ty Lawton. She returns to clinic as instructed for a 1 week F/U appointment.     Pregnancy  Subject did not report pregnancy at this visit    EDSS  Not completed at this visit     Diet and Exercise  Not addressed at this visit    MS Relapses  Subject did not have any MS relapses from     Hematology  Completed on date of service,                 reviewed and entered into RCD by CRC    Chemistry  Not done     Other Labs  Absoulte CD8   Completed on date of service,                 reviewed and entered into RDC by CRC    Urinalysis  Not done     Con Meds   Reviewed with subject and updated     Adverse Events / Serious Adverse events   Subject did not repot any AE's or JAZMIN's    [facility] PRO booklet   Subject completed, reviewed and sent to [facility] monitor by CRC

## 2018-12-18 NOTE — PROGRESS NOTES
[facility] Unplanned/Scheduled  Visit  Subject seen in the clinic today by Risa Oshea and Ty Lawton for a 1 week follow up from last visit.      Pregnancy  Subject did not report pregnancy at this visit    EDSS  Not completed at this visit    Diet and Exercise  Not addressed at this visit    MS Relapses  Subject did not have any MS relapses from     Hematology  Not done at this visit    Chemistry  Not done at this visit    Other Labs  Not done at this visit    Urinalysis  Not done     Con Meds   Reviewed with subject and updated     Adverse Events / Serious Adverse events   Subject did not repot any AE's or JAZMIN's    [facility] PRO booklet   Subject completed, reviewed and sent to [facility] monitor by Ty MOHR

## 2018-12-20 NOTE — PROGRESS NOTES
Subjective:       Patient ID: Alyssa John is a 68 y.o. female who presents today with her  for a routine clinic visit for MS.      MS HPI:  · DMT: not on DMT  · Side effects from DMT? N/A  · Taking vitamin D3 as recommended? Yes    · Patient states she is walking better now. Her ribs are feeling better as well but not completely back to normal from her fall  ·  states that she is not active at home, but does participate in a lot of computer games for cognitive stimulation.  · She is going to proceed with surgery for carpel tunnel in the new year, then has plans to see NS to evaluate degenerative changes in neck as well.    SOCIAL HISTORY  Social History     Tobacco Use    Smoking status: Former Smoker     Packs/day: 1.50     Last attempt to quit: 2006     Years since quittin.9    Smokeless tobacco: Never Used   Substance Use Topics    Alcohol use: No     Alcohol/week: 0.0 oz    Drug use: No     Living arrangements - the patient lives with their spouse.  Employment     MS ROS:  As above        Objective:        1. 25 foot timed walk:  Today: 14.6s with U-step; 34.3s last visit with U-step; 13.5s with U-step last visit; 13.2s with U-step today  Timed 25 Foot Walk: 10/27/2016 2017   Did patient wear an AFO? No No   Was assistive device used? Yes Yes   Assistive device used (toña one): Unilateral Assistance Unilateral Assistance   Unilateral device used Cane Cane   Time for 25 Foot Walk (seconds) 12.8 10.2   Time for 25 Foot Walk (seconds) 9 -       Neurologic Exam          Imaging:       Results for orders placed during the hospital encounter of 18   MRI Brain Demyelinating W W/O Contrast    Impression Unchanged pronounced burden of supratentorial white matter disease, with appearance in keeping with history of multiple sclerosis.  No new or enhancing lesions to suggest active demyelination.    Partially imaged lesion at the craniocervical junction, with spinal lesions  better delineated on concurrent dedicated spine MRIs.    Electronically signed by resident: Baldo Krause  Date:    12/12/2018  Time:    09:34    Electronically signed by: Moo Michele MD  Date:    12/12/2018  Time:    15:28     Results for orders placed during the hospital encounter of 12/11/18   MRI Cervical Spine Demyelinating W W/O Contrast    Impression Multiple unchanged foci of T2 hyperintense signal throughout the spinal cord extending from the cranial cervical junction throughout the thoracic spinal cord.  Lesion at T9-10 was not included in the field of view of prior imaging; however, all other lesions are unchanged from 08/02/2018.  No enhancing lesions to suggest active demyelination.    Mild multilevel disc/endplate degeneration, with mild spinal canal stenoses in the cervical spine and severe foraminal stenosis on the left at C4-5 and on the right at C5-6.    Electronically signed by resident: Baldo Krause  Date:    12/12/2018  Time:    09:12    Electronically signed by: Moo Michele MD  Date:    12/12/2018  Time:    15:24     Results for orders placed during the hospital encounter of 12/11/18   MRI Thoracic Spine Demyelinating W W/O Contrast    Impression Multiple unchanged foci of T2 hyperintense signal throughout the spinal cord extending from the cranial cervical junction throughout the thoracic spinal cord.  Lesion at T9-10 was not included in the field of view of prior imaging; however, all other lesions are unchanged from 08/02/2018.  No enhancing lesions to suggest active demyelination.    Mild multilevel disc/endplate degeneration, with mild spinal canal stenoses in the cervical spine and severe foraminal stenosis on the left at C4-5 and on the right at C5-6.    Electronically signed by resident: Baldo Krause  Date:    12/12/2018  Time:    09:12    Electronically signed by: Moo Michele MD  Date:    12/12/2018  Time:    15:24         Labs:     Lab Results   Component Value Date    PEDGMAMX26BZ  68 07/19/2018    OUUSIOAN34UO 53 10/19/2016    UULZGGOZ16IH 78 06/13/2016     Lab Results   Component Value Date    JCVINDEX SEE COMMENT (A) 12/09/2015    JCVANTIBODY SEE COMMENT 12/09/2015     Lab Results   Component Value Date    XL2ZSYQB 59.1 03/31/2016    ABSOLUTECD3 619 (L) 03/31/2016    PE0PLPJG 19.9 03/31/2016    ABSOLUTECD8 208 03/31/2016    QN4GACGQ 39.1 03/31/2016    ABSOLUTECD4 409 03/31/2016    LABCD48 1.97 03/31/2016     Lab Results   Component Value Date    WBC 6.95 12/11/2018    RBC 4.68 12/11/2018    HGB 13.8 12/11/2018    HCT 42.2 12/11/2018    MCV 90 12/11/2018    MCH 29.5 12/11/2018    MCHC 32.7 12/11/2018    RDW 12.5 12/11/2018     12/11/2018    MPV 10.7 12/11/2018    GRAN 4.6 12/11/2018    GRAN 66.7 12/11/2018    LYMPH 1.7 12/11/2018    LYMPH 23.7 12/11/2018    MONO 0.4 12/11/2018    MONO 5.8 12/11/2018    EOS 0.2 12/11/2018    BASO 0.05 12/11/2018    EOSINOPHIL 3.0 12/11/2018    BASOPHIL 0.7 12/11/2018     Sodium   Date Value Ref Range Status   12/11/2018 140 136 - 145 mmol/L Final     Potassium   Date Value Ref Range Status   12/11/2018 3.7 3.5 - 5.1 mmol/L Final     Chloride   Date Value Ref Range Status   12/11/2018 108 95 - 110 mmol/L Final     CO2   Date Value Ref Range Status   12/11/2018 23 23 - 29 mmol/L Final     Carbon Monoxide, Blood   Date Value Ref Range Status   10/19/2016 2 % Final     Comment:     -------------------REFERENCE VALUE--------------------------  0-2 Normal (Non-smoker) , < or = 9 Normal (Smoker), > or   = 20 (Toxic concentration)  Test Performed by:  [facility] - [address] [address]  : Adrien Norton II, M.D., Ph.D.       Glucose   Date Value Ref Range Status   12/11/2018 95 70 - 110 mg/dL Final     BUN, Bld   Date Value Ref Range Status   12/11/2018 17 8 - 23 mg/dL Final     Creatinine   Date Value Ref Range Status   12/11/2018 0.7 0.5 - 1.4 mg/dL Final   02/22/2013 0.8 0.5 -  1.4 mg/dL Final     Calcium   Date Value Ref Range Status   12/11/2018 9.5 8.7 - 10.5 mg/dL Final   02/22/2013 8.7 8.7 - 10.5 mg/dL Final     Total Protein   Date Value Ref Range Status   12/11/2018 7.1 6.0 - 8.4 g/dL Final     Albumin   Date Value Ref Range Status   12/11/2018 3.9 3.5 - 5.2 g/dL Final     Total Bilirubin   Date Value Ref Range Status   12/11/2018 0.3 0.1 - 1.0 mg/dL Final     Comment:     For infants and newborns, interpretation of results should be based  on gestational age, weight and in agreement with clinical  observations.  Premature Infant recommended reference ranges:  Up to 24 hours.............<8.0 mg/dL  Up to 48 hours............<12.0 mg/dL  3-5 days..................<15.0 mg/dL  6-29 days.................<15.0 mg/dL       Alkaline Phosphatase   Date Value Ref Range Status   12/11/2018 152 (H) 55 - 135 U/L Final   02/22/2013 107 55 - 135 U/L Final     AST   Date Value Ref Range Status   12/11/2018 29 10 - 40 U/L Final   02/22/2013 18 10 - 40 U/L Final     ALT   Date Value Ref Range Status   12/11/2018 40 10 - 44 U/L Final     Anion Gap   Date Value Ref Range Status   12/11/2018 9 8 - 16 mmol/L Final   02/22/2013 11 5 - 15 meq/L Final     eGFR if    Date Value Ref Range Status   12/11/2018 >60.0 >60 mL/min/1.73 m^2 Final     eGFR if non    Date Value Ref Range Status   12/11/2018 >60.0 >60 mL/min/1.73 m^2 Final     Comment:     Calculation used to obtain the estimated glomerular filtration  rate (eGFR) is the CKD-EPI equation.        Diagnosis/Assessment/Plan:    1. Multiple Sclerosis  · Assessment:Patient presents for follow up after MRI's. Her timed walk is much improved and her MRI's are stable from an MS perspective. However, she does overall feels worse off DMT. We will plan to follow her closely in clinic.   · Imaging: MRI's stable and reviewed with patient and  in clinic today. She does have fairly significant degenerative changes in C-spine  and recommend NS for further review. She wants to address carpal tunnel surgery first.   · Disease Modifying Therapies: We discussed that Copaxone would be a viable option as she feels that she is declining off DMT. To be recalled, she experienced NP pain on Aubagio and lymphopenia on Tecfidera. In consideration of her age and history of lymphopenia, Copaxone would be a viable option as it does not suppress the immune system. A PARQ discussion was held concerning use of Copaxone and all questions/concerns were addressed to patient's and 's satisfaction. I have provided written information for them to review. She will consider and let us know if she'd like to move forward. She is slightly hesitant due to the method of administration.   2. MS Symptom Assessment / Management       No changes to management of care today.       Over 50% of this 40 minute visit was spent in direct face to face with the patient about MS, DMT considerations, and MS symptom management.   Follow-up in about 4 weeks (around 1/15/2019) for follow up with me.  Patient agreed to POC today.    Attending, Dr. Apodaca, was available during today's encounter.     Risa Oshea PA-C  [facility]      Problem List Items Addressed This Visit        Neuro    Multiple sclerosis - Primary    Overview     Dr. Apodaca, neurology         Seizures    Overview     Epilepsy, Dr. Apodaca, neurology            Cardiac/Vascular    Hypertension      Other Visit Diagnoses     Neurologic gait dysfunction        History of recent fall        Neural foraminal stenosis of cervical spine        Carpal tunnel syndrome of right wrist

## 2018-12-26 ENCOUNTER — PATIENT MESSAGE (OUTPATIENT)
Dept: NEUROLOGY | Facility: CLINIC | Age: 68
End: 2018-12-26

## 2018-12-27 ENCOUNTER — CLINICAL SUPPORT (OUTPATIENT)
Dept: REHABILITATION | Facility: HOSPITAL | Age: 68
End: 2018-12-27
Attending: PSYCHIATRY & NEUROLOGY
Payer: MEDICARE

## 2018-12-27 DIAGNOSIS — G35 MULTIPLE SCLEROSIS: ICD-10-CM

## 2018-12-27 DIAGNOSIS — R26.89 BALANCE PROBLEM: ICD-10-CM

## 2018-12-27 PROCEDURE — 97110 THERAPEUTIC EXERCISES: CPT | Mod: KX,PN

## 2018-12-27 NOTE — PATIENT INSTRUCTIONS
High Stepping in Place (Sitting)        Sitting, alternately lift knees as high as possible. Keep torso erect.  Repeat ____ times, each leg.    Copyright © [employer]. All rights reserved.

## 2018-12-27 NOTE — PROGRESS NOTES
Name: Alyssa John  [facility] Number: [id number]  Date of Treatment: 12/27/2018   Diagnosis:   Encounter Diagnoses   Name Primary?    Balance problem     Multiple sclerosis        Time in: 1610   Time Out: 1705  Total Treatment Time: 55    Subjective:    Alyssa reports no pain. Presents in w/c with .     Objective:    Patient received individual therapy to increase strength, endurance, ROM, flexibility, posture and core stabilization  with activities as follows:     Alyssa received therapeutic exercises to develop strength, endurance, ROM, flexibility, posture and core stabilization for 55 minutes including:     Sit-stands 5 reps x 3 trials with min A  Standing acts in // bars:     Fwd walking     Backward walking     Side stepping     Hip aBd 10/3    Seated ex's 10/3:     MARGE     March     BARBARA Rudolph RTB       Gait training with spc and HHDENISE 20'    Written and pictorial HEP of ex's in EPIC reviewed and issued to pt. Pt instructed to perform HEP daily and stop if symptoms increase.     Pt demo good understanding of the education provided. Patient demonstrated good return demonstration of activities.     Assessment:     Gait limited by endurance.     Pt will continue to benefit from skilled PT intervention. Medical Necessity is demonstrated by:  Requires skilled supervision to complete and progress HEP and Weakness.    Patient is making good progress towards established goals.    Plan:    Continue with established Plan of Care towards PT goals.

## 2019-01-04 ENCOUNTER — CLINICAL SUPPORT (OUTPATIENT)
Dept: REHABILITATION | Facility: HOSPITAL | Age: 69
End: 2019-01-04
Attending: PSYCHIATRY & NEUROLOGY
Payer: MEDICARE

## 2019-01-04 DIAGNOSIS — G35 MULTIPLE SCLEROSIS: ICD-10-CM

## 2019-01-04 DIAGNOSIS — R26.89 BALANCE PROBLEM: ICD-10-CM

## 2019-01-04 PROCEDURE — 97116 GAIT TRAINING THERAPY: CPT | Mod: PN

## 2019-01-04 PROCEDURE — 97110 THERAPEUTIC EXERCISES: CPT | Mod: PN

## 2019-01-04 NOTE — PROGRESS NOTES
Name: Alyssa John  [facility] Number: [id number]  Date of Treatment: 01/04/2019   Diagnosis:   Encounter Diagnoses   Name Primary?    Balance problem     Multiple sclerosis        Time in: 1505  Time Out: 1600  Total Treatment Time: 55      Subjective:    Alyssa reports no significant changes, states she is performing her HEP.  Patient reports their pain to be n/a/10 on a 0-10 scale with 0 being no pain and 10 being the worst pain imaginable.    Objective  Alyssa received therapeutic exercises to develop strength and endurance for 25 minutes including:   LAQ 3/10  March 3/10  DF 3/10  Ball squeezes 3/10  Clams RTB 3/10           Patient participated in dynamic functional therapeutic activities to improve functional performance for 30  minutes. Including:   Sit-stands 5 reps x 3 trials with VC's for proper technique and CGA    Standing acts in // bars:     Fwd walking 10ft x 4     Backward walking 10ft x 4     Side stepping 10ft x 4     Hip aBd 10/3    Gait training with spc and HHA 80ft with close WC follow    Written Home Exercises Provided: Cont with HEP  Pt demo good understanding of the education provided. Alyssa demonstrated good return demonstration of activities.     Assessment:   Patient required frequent sitting rests with standing therex, which were performed first 2* patients known fatigue level.  No balance deficits in // bars. Weak with sit to stands, fatigued quickly, during sit to stand transfer patients B hips IR and knees touch.  Patient performed seated therex well, however 2* fatigue, she still remained with frequent rests/water breaks.    During rests and water breaks patient was educated on energy conservation with daily tasks from showering to trying tasks in the kitchen.    Tolerated treatment fairly well, with max fatigue at end.    Pt will continue to benefit from skilled PT intervention. Medical Necessity is demonstrated by:  Fall Risk, Continued inability to participate in vocational  pursuits, Pain limits function of effected part for some activities, Unable to participate fully in daily activities, Requires skilled supervision to complete and progress HEP and Weakness.    Patient is making fair progress towards established goals.    Plan:  Continue with established Plan of Care towards PT goals.

## 2019-01-07 DIAGNOSIS — G35 MULTIPLE SCLEROSIS: ICD-10-CM

## 2019-01-07 DIAGNOSIS — R53.82 CHRONIC FATIGUE: ICD-10-CM

## 2019-01-08 RX ORDER — MODAFINIL 100 MG/1
TABLET ORAL
Qty: 180 TABLET | Refills: 0 | Status: SHIPPED | OUTPATIENT
Start: 2019-01-08 | End: 2019-04-11 | Stop reason: SDUPTHER

## 2019-01-15 ENCOUNTER — OFFICE VISIT (OUTPATIENT)
Dept: URGENT CARE | Facility: CLINIC | Age: 69
End: 2019-01-15
Payer: MEDICARE

## 2019-01-15 ENCOUNTER — TELEPHONE (OUTPATIENT)
Dept: FAMILY MEDICINE | Facility: CLINIC | Age: 69
End: 2019-01-15

## 2019-01-15 VITALS
BODY MASS INDEX: 29.58 KG/M2 | HEART RATE: 113 BPM | OXYGEN SATURATION: 96 % | RESPIRATION RATE: 16 BRPM | DIASTOLIC BLOOD PRESSURE: 79 MMHG | TEMPERATURE: 101 F | WEIGHT: 167 LBS | SYSTOLIC BLOOD PRESSURE: 139 MMHG

## 2019-01-15 DIAGNOSIS — J40 BRONCHITIS: Primary | ICD-10-CM

## 2019-01-15 DIAGNOSIS — R50.9 FEVER, UNSPECIFIED FEVER CAUSE: ICD-10-CM

## 2019-01-15 LAB
CTP QC/QA: YES
FLUAV AG NPH QL: NEGATIVE
FLUBV AG NPH QL: NEGATIVE

## 2019-01-15 PROCEDURE — 3075F PR MOST RECENT SYSTOLIC BLOOD PRESS GE 130-139MM HG: ICD-10-PCS | Mod: CPTII,S$GLB,, | Performed by: NURSE PRACTITIONER

## 2019-01-15 PROCEDURE — 99214 OFFICE O/P EST MOD 30 MIN: CPT | Mod: 25,S$GLB,, | Performed by: NURSE PRACTITIONER

## 2019-01-15 PROCEDURE — 99214 PR OFFICE/OUTPT VISIT, EST, LEVL IV, 30-39 MIN: ICD-10-PCS | Mod: 25,S$GLB,, | Performed by: NURSE PRACTITIONER

## 2019-01-15 PROCEDURE — 96372 PR INJECTION,THERAP/PROPH/DIAG2ST, IM OR SUBCUT: ICD-10-PCS | Mod: S$GLB,,, | Performed by: NURSE PRACTITIONER

## 2019-01-15 PROCEDURE — 1100F PTFALLS ASSESS-DOCD GE2>/YR: CPT | Mod: CPTII,S$GLB,, | Performed by: NURSE PRACTITIONER

## 2019-01-15 PROCEDURE — 87804 POCT INFLUENZA A/B: ICD-10-PCS | Mod: QW,,, | Performed by: NURSE PRACTITIONER

## 2019-01-15 PROCEDURE — 71046 PR XRAY, CHEST, 2 VIEWS: ICD-10-PCS | Mod: S$GLB,,, | Performed by: NURSE PRACTITIONER

## 2019-01-15 PROCEDURE — 87804 INFLUENZA ASSAY W/OPTIC: CPT | Mod: QW,,, | Performed by: NURSE PRACTITIONER

## 2019-01-15 PROCEDURE — 3078F DIAST BP <80 MM HG: CPT | Mod: CPTII,S$GLB,, | Performed by: NURSE PRACTITIONER

## 2019-01-15 PROCEDURE — 3075F SYST BP GE 130 - 139MM HG: CPT | Mod: CPTII,S$GLB,, | Performed by: NURSE PRACTITIONER

## 2019-01-15 PROCEDURE — 3288F PR FALLS RISK ASSESSMENT DOCUMENTED: ICD-10-PCS | Mod: CPTII,S$GLB,, | Performed by: NURSE PRACTITIONER

## 2019-01-15 PROCEDURE — 3288F FALL RISK ASSESSMENT DOCD: CPT | Mod: CPTII,S$GLB,, | Performed by: NURSE PRACTITIONER

## 2019-01-15 PROCEDURE — 3078F PR MOST RECENT DIASTOLIC BLOOD PRESSURE < 80 MM HG: ICD-10-PCS | Mod: CPTII,S$GLB,, | Performed by: NURSE PRACTITIONER

## 2019-01-15 PROCEDURE — 71046 X-RAY EXAM CHEST 2 VIEWS: CPT | Mod: S$GLB,,, | Performed by: NURSE PRACTITIONER

## 2019-01-15 PROCEDURE — 1100F PR PT FALLS ASSESS DOC 2+ FALLS/FALL W/INJURY/YR: ICD-10-PCS | Mod: CPTII,S$GLB,, | Performed by: NURSE PRACTITIONER

## 2019-01-15 PROCEDURE — 96372 THER/PROPH/DIAG INJ SC/IM: CPT | Mod: S$GLB,,, | Performed by: NURSE PRACTITIONER

## 2019-01-15 RX ORDER — PREDNISONE 20 MG/1
20 TABLET ORAL 2 TIMES DAILY
Qty: 10 TABLET | Refills: 0 | Status: SHIPPED | OUTPATIENT
Start: 2019-01-15 | End: 2019-01-20

## 2019-01-15 RX ORDER — AZITHROMYCIN 250 MG/1
TABLET, FILM COATED ORAL
Qty: 6 TABLET | Refills: 0 | Status: SHIPPED | OUTPATIENT
Start: 2019-01-15 | End: 2019-02-05

## 2019-01-15 RX ORDER — FLUTICASONE PROPIONATE 50 MCG
2 SPRAY, SUSPENSION (ML) NASAL 2 TIMES DAILY
Qty: 1 BOTTLE | Refills: 0 | Status: SHIPPED | OUTPATIENT
Start: 2019-01-15 | End: 2020-03-08 | Stop reason: ALTCHOICE

## 2019-01-15 RX ORDER — PROMETHAZINE HYDROCHLORIDE AND DEXTROMETHORPHAN HYDROBROMIDE 6.25; 15 MG/5ML; MG/5ML
5 SYRUP ORAL EVERY 4 HOURS PRN
Qty: 240 ML | Refills: 0 | Status: SHIPPED | OUTPATIENT
Start: 2019-01-15 | End: 2019-01-25

## 2019-01-15 RX ORDER — DEXAMETHASONE SODIUM PHOSPHATE 4 MG/ML
4 INJECTION, SOLUTION INTRA-ARTICULAR; INTRALESIONAL; INTRAMUSCULAR; INTRAVENOUS; SOFT TISSUE
Status: COMPLETED | OUTPATIENT
Start: 2019-01-15 | End: 2019-01-15

## 2019-01-15 RX ORDER — CETIRIZINE HYDROCHLORIDE 10 MG/1
10 TABLET ORAL DAILY
Qty: 30 TABLET | Refills: 2 | Status: SHIPPED | OUTPATIENT
Start: 2019-01-15 | End: 2020-03-08 | Stop reason: ALTCHOICE

## 2019-01-15 RX ORDER — ALBUTEROL SULFATE 90 UG/1
2 AEROSOL, METERED RESPIRATORY (INHALATION) EVERY 6 HOURS PRN
Qty: 18 G | Refills: 0 | Status: SHIPPED | OUTPATIENT
Start: 2019-01-15 | End: 2019-02-19 | Stop reason: SDUPTHER

## 2019-01-15 RX ADMIN — DEXAMETHASONE SODIUM PHOSPHATE 4 MG: 4 INJECTION, SOLUTION INTRA-ARTICULAR; INTRALESIONAL; INTRAMUSCULAR; INTRAVENOUS; SOFT TISSUE at 12:01

## 2019-01-15 NOTE — TELEPHONE ENCOUNTER
Pt  stated he was going to take her to urgent care            ----- Message from RT Lisa sent at 1/15/2019  8:47 AM CST -----  Contact: Vi,,[phone]   Sol[place],,[phone], requesting an appt for the pt to be worked in today for being very congested, coughing up greenish mucus, and mobility issues getting worst, thanks.

## 2019-01-15 NOTE — PATIENT INSTRUCTIONS
Symptomatic treatment:    Alternate Tylenol and Ibuprofen every 3 hrs  salt water gargles to soothe throat  Honey/lemon water to soothe throat  Cold-eeze helps to reduce the duration of URI symptoms  Elderberry to reduce duration of URI symptoms  Cepachol helps to numb the discomfort in throat  Nasal saline spray reduces inflammation and dryness  Warm face compresses/hot showers as often as you can to open sinuses   Vicks vapor rub at night  Flonase OTC or Nasacort OTC  Simple foods like chicken noodle soup help hydrate  Delsym helps with coughing at night  Zyrtec/Claritin during the day and Benadryl at night may help if allergy component   Zantac will help if there is reflux from the post nasal drip  Rest as much as you can  Your symptoms will likely last 5-7 days, maybe longer depending on how it affects your body.  You are contagious 5-7, so minimize contact with others to reduce the spread to others and stay home from work or school as we discussed. Dehydration is preventable but is one of the main reasons why you will feel so badly. Drink pedialyte, gatorade or propel. Stay hydrated.  Antibiotics are not needed unless a complication(such as Otitis Media, Bacterial sinus infection or pneumonia). Taking antibiotics for Flu/Cold is not supported by evidencebased medicine and can expose you to unnecessary side effects of the medication, such as anaphylaxis.   If you experience any:  Chest pain, shortness of breath, wheezing or difficulty breathing  Severe headache, face, neck or ear pain  New rash  Fever over 101.5º F (38.6 C) for more than three days  Confusion, behavior change or seizure  Severe weakness or dizziness  Go to ER        Bronchitis, Antibiotic Treatment (Adult)    Bronchitis is an infection of the air passages (bronchial tubes) in your lungs. It often occurs when you have a cold. This illness is contagious during the first few days and is spread through the air by coughing and sneezing, or by direct  contact (touching the sick person and then touching your own eyes, nose, or mouth).  Symptoms of bronchitis include cough with mucus (phlegm) and low-grade fever. Bronchitis usually lasts 7 to 14 days. Mild cases can be treated with simple home remedies. More severe infection is treated with an antibiotic.  Home care  Follow these guidelines when caring for yourself at home:  · If your symptoms are severe, rest at home for the first 2 to 3 days. When you go back to your usual activities, don't let yourself get too tired.  · Do not smoke. Also avoid being exposed to secondhand smoke.  · You may use over-the-counter medicines to control fever or pain, unless another medicine was prescribed. (Note: If you have chronic liver or kidney disease or have ever had a stomach ulcer or gastrointestinal bleeding, talk with your healthcare provider before using these medicines. Also talk to your provider if you are taking medicine to prevent blood clots.) Aspirin should never be given to anyone younger than 18 years of age who is ill with a viral infection or fever. It may cause severe liver or brain damage.  · Your appetite may be poor, so a light diet is fine. Avoid dehydration by drinking 6 to 8 glasses of fluids per day (such as water, soft drinks, sports drinks, juices, tea, or soup). Extra fluids will help loosen secretions in the nose and lungs.  · Over-the-counter cough, cold, and sore-throat medicines will not shorten the length of the illness, but they may be helpful to reduce symptoms. (Note: Do not use decongestants if you have high blood pressure.)  · Finish all antibiotic medicine. Do this even if you are feeling better after only a few days.  Follow-up care  Follow up with your healthcare provider, or as advised. If you had an X-ray or ECG (electrocardiogram), a specialist will review it. You will be notified of any new findings that may affect your care.  Note: If you are age 65 or older, or if you have a chronic  lung disease or condition that affects your immune system, or you smoke, talk to your healthcare provider about having pneumococcal vaccinations and a yearly influenza vaccination (flu shot).  When to seek medical advice  Call your healthcare provider right away if any of these occur:  · Fever of 100.4°F (38°C) or higher  · Coughing up increased amounts of colored sputum  · Weakness, drowsiness, headache, facial pain, ear pain, or a stiff neck  Call 911, or get immediate medical care  Contact emergency services right away if any of these occur.  · Coughing up blood  · Worsening weakness, drowsiness, headache, or stiff neck  · Trouble breathing, wheezing, or pain with breathing  Date Last Reviewed: 9/13/2015  © 6349-2572 [employer]. [address] [place] [address], [place] [address]. All rights reserved. This information is not intended as a substitute for professional medical care. Always follow your healthcare professional's instructions.

## 2019-01-15 NOTE — PROGRESS NOTES
Subjective:       Patient ID: Alyssa John is a 68 y.o. female.    Vitals:  weight is 75.8 kg (167 lb). Her oral temperature is 100.9 °F (38.3 °C) (abnormal). Her blood pressure is 139/79 and her pulse is 113 (abnormal). Her respiration is 16 and oxygen saturation is 96%.     Chief Complaint: Sinusitis    Cough x 5 days, +fever and congestion      Sinusitis   This is a new problem. The current episode started in the past 7 days. The problem has been gradually worsening since onset. The maximum temperature recorded prior to her arrival was 101 - 101.9 F. Her pain is at a severity of 6/10. The pain is mild. Associated symptoms include chills, congestion, coughing, headaches, sinus pressure, sneezing and a sore throat. Pertinent negatives include no shortness of breath. (Productive cough , fatigue, unable to walk like usually, weakness, ) Treatments tried: ASA, nyquil, fexafenadrine,flonase,OTC  The treatment provided mild relief.       Constitution: Positive for chills and fatigue. Negative for fever.   HENT: Positive for congestion, postnasal drip, sinus pain, sinus pressure and sore throat.    Neck: Negative for painful lymph nodes.   Cardiovascular: Negative for chest pain and leg swelling.   Eyes: Negative for double vision and blurred vision.   Respiratory: Positive for cough. Negative for shortness of breath.    Gastrointestinal: Negative for nausea, vomiting and diarrhea.   Genitourinary: Negative for dysuria, frequency, urgency and history of kidney stones.   Musculoskeletal: Negative for joint pain, joint swelling, muscle cramps and muscle ache.   Skin: Negative for color change, pale, rash and bruising.   Allergic/Immunologic: Positive for sneezing. Negative for seasonal allergies.   Neurological: Positive for headaches. Negative for dizziness, history of vertigo, light-headedness and passing out.   Hematologic/Lymphatic: Negative for swollen lymph nodes.   Psychiatric/Behavioral: Negative for  nervous/anxious, sleep disturbance and depression. The patient is not nervous/anxious.        Objective:      Physical Exam   Constitutional: She is oriented to person, place, and time. Vital signs are normal. She appears well-developed and well-nourished. She is cooperative.   HENT:   Head: Normocephalic.   Right Ear: Hearing, external ear and ear canal normal. A middle ear effusion is present.   Left Ear: Hearing, external ear and ear canal normal. A middle ear effusion is present.   Nose: Mucosal edema and rhinorrhea present. Right sinus exhibits maxillary sinus tenderness. Left sinus exhibits maxillary sinus tenderness.   Mouth/Throat: Uvula is midline and mucous membranes are normal. Posterior oropharyngeal erythema present.   Eyes: Conjunctivae, EOM and lids are normal. Pupils are equal, round, and reactive to light.   Neck: Trachea normal, normal range of motion, full passive range of motion without pain and phonation normal. Neck supple.   Cardiovascular: Normal rate, regular rhythm, normal heart sounds, intact distal pulses and normal pulses.   Pulmonary/Chest: Effort normal and breath sounds normal.   Abdominal: Soft. Normal appearance, normal aorta and bowel sounds are normal. There is no tenderness.   Musculoskeletal: Normal range of motion.   Neurological: She is alert and oriented to person, place, and time. She has normal strength. GCS eye subscore is 4. GCS verbal subscore is 5. GCS motor subscore is 6.   Skin: Skin is warm, dry and intact. Capillary refill takes less than 2 seconds.   Psychiatric: She has a normal mood and affect. Her speech is normal and behavior is normal. Judgment and thought content normal. Cognition and memory are normal.       Assessment:       1. Bronchitis    2. Fever, unspecified fever cause        Plan:         Bronchitis  -     POCT Influenza A/B  -     XR CHEST PA AND LATERAL; Future; Expected date: 01/15/2019  -     dexamethasone injection 4 mg  -     albuterol  (VENTOLIN HFA) 90 mcg/actuation inhaler; Inhale 2 puffs into the lungs every 6 (six) hours as needed for Wheezing. Rescue  Dispense: 18 g; Refill: 0  -     azithromycin (Z-ANDREINA) 250 MG tablet; Take two tablets on day one by mouth, then one tablet by mouth on days 2-5  Dispense: 6 tablet; Refill: 0  -     cetirizine (ZYRTEC) 10 MG tablet; Take 1 tablet (10 mg total) by mouth once daily.  Dispense: 30 tablet; Refill: 2  -     fluticasone (FLONASE) 50 mcg/actuation nasal spray; 2 sprays (100 mcg total) by Each Nare route 2 (two) times daily.  Dispense: 1 Bottle; Refill: 0  -     predniSONE (DELTASONE) 20 MG tablet; Take 1 tablet (20 mg total) by mouth 2 (two) times daily. for 5 days  Dispense: 10 tablet; Refill: 0  -     promethazine-dextromethorphan (PROMETHAZINE-DM) 6.25-15 mg/5 mL Syrp; Take 5 mLs by mouth every 4 (four) hours as needed.  Dispense: 240 mL; Refill: 0    Fever, unspecified fever cause  -     POCT Influenza A/B  -     XR CHEST PA AND LATERAL; Future; Expected date: 01/15/2019

## 2019-01-22 RX ORDER — ATORVASTATIN CALCIUM 40 MG/1
40 TABLET, FILM COATED ORAL DAILY
Qty: 90 TABLET | Refills: 0 | Status: SHIPPED | OUTPATIENT
Start: 2019-01-22 | End: 2019-04-26 | Stop reason: SDUPTHER

## 2019-02-05 ENCOUNTER — OFFICE VISIT (OUTPATIENT)
Dept: FAMILY MEDICINE | Facility: CLINIC | Age: 69
End: 2019-02-05
Payer: MEDICARE

## 2019-02-05 VITALS
OXYGEN SATURATION: 95 % | TEMPERATURE: 98 F | HEIGHT: 63 IN | DIASTOLIC BLOOD PRESSURE: 79 MMHG | BODY MASS INDEX: 28.87 KG/M2 | HEART RATE: 86 BPM | SYSTOLIC BLOOD PRESSURE: 119 MMHG | WEIGHT: 162.94 LBS

## 2019-02-05 DIAGNOSIS — H65.91 RIGHT OTITIS MEDIA WITH EFFUSION: Primary | ICD-10-CM

## 2019-02-05 DIAGNOSIS — J02.9 SORE THROAT: ICD-10-CM

## 2019-02-05 LAB
INFLUENZA A, MOLECULAR: NEGATIVE
INFLUENZA B, MOLECULAR: NEGATIVE
SPECIMEN SOURCE: NORMAL

## 2019-02-05 PROCEDURE — 87502 INFLUENZA DNA AMP PROBE: CPT | Mod: PO

## 2019-02-05 PROCEDURE — 99212 OFFICE O/P EST SF 10 MIN: CPT | Mod: S$GLB,,, | Performed by: NURSE PRACTITIONER

## 2019-02-05 PROCEDURE — 99999 PR PBB SHADOW E&M-EST. PATIENT-LVL IV: ICD-10-PCS | Mod: PBBFAC,,, | Performed by: NURSE PRACTITIONER

## 2019-02-05 PROCEDURE — 3074F SYST BP LT 130 MM HG: CPT | Mod: CPTII,S$GLB,, | Performed by: NURSE PRACTITIONER

## 2019-02-05 PROCEDURE — 3074F PR MOST RECENT SYSTOLIC BLOOD PRESSURE < 130 MM HG: ICD-10-PCS | Mod: CPTII,S$GLB,, | Performed by: NURSE PRACTITIONER

## 2019-02-05 PROCEDURE — 1101F PR PT FALLS ASSESS DOC 0-1 FALLS W/OUT INJ PAST YR: ICD-10-PCS | Mod: CPTII,S$GLB,, | Performed by: NURSE PRACTITIONER

## 2019-02-05 PROCEDURE — 1101F PT FALLS ASSESS-DOCD LE1/YR: CPT | Mod: CPTII,S$GLB,, | Performed by: NURSE PRACTITIONER

## 2019-02-05 PROCEDURE — 99212 PR OFFICE/OUTPT VISIT, EST, LEVL II, 10-19 MIN: ICD-10-PCS | Mod: S$GLB,,, | Performed by: NURSE PRACTITIONER

## 2019-02-05 PROCEDURE — 3078F DIAST BP <80 MM HG: CPT | Mod: CPTII,S$GLB,, | Performed by: NURSE PRACTITIONER

## 2019-02-05 PROCEDURE — 99999 PR PBB SHADOW E&M-EST. PATIENT-LVL IV: CPT | Mod: PBBFAC,,, | Performed by: NURSE PRACTITIONER

## 2019-02-05 PROCEDURE — 3078F PR MOST RECENT DIASTOLIC BLOOD PRESSURE < 80 MM HG: ICD-10-PCS | Mod: CPTII,S$GLB,, | Performed by: NURSE PRACTITIONER

## 2019-02-05 RX ORDER — AMOXICILLIN AND CLAVULANATE POTASSIUM 875; 125 MG/1; MG/1
1 TABLET, FILM COATED ORAL 2 TIMES DAILY
Qty: 14 TABLET | Refills: 0 | Status: SHIPPED | OUTPATIENT
Start: 2019-02-05 | End: 2019-02-12

## 2019-02-05 NOTE — PROGRESS NOTES
"Subjective:       Patient ID: Alyssa John is a 68 y.o. female.    Chief Complaint: Otalgia    Ms. John is a 68 year old female who presents today for ear pain. She was seen 1/15/19 at an urgent care and treated for sinusitis. Prescribed z-pack, prednisone, promethazine for cough, zyrtec, Flonase, albuterol. Negative flu swab at that time. Completed medicine as prescribed. Is not using flonase or zyrtec. Right ear is very painful, started about 1-2 weeks ago. Sore throat began around the same time. Describes it as "scratchy". Pain 4/10. History of MS,  reports that since this started, she has not been able to ambulate with her walker as well. Using wheelchair at home to transfer from room to room. Vitals reviewed, afebrile.       Otalgia    There is pain in the right ear. This is a new problem. The current episode started 1 to 4 weeks ago. The problem occurs constantly. The problem has been gradually worsening. There has been no fever. The pain is at a severity of 4/10. The pain is mild. Associated symptoms include a sore throat. Pertinent negatives include no coughing or headaches. She has tried nothing for the symptoms.     Patient Active Problem List   Diagnosis    Multiple sclerosis    CAD (coronary artery disease)    Hyperlipidemia with target LDL less than 70    Vitamin D insufficiency    Balance problem    Fatigue    Seizures    MI, old    Hypertension    GERD (gastroesophageal reflux disease)    Arthritis    Encounter for long-term (current) use of high-risk medication    Gait instability       Review of Systems   Constitutional: Positive for activity change and fatigue. Negative for chills and fever.   HENT: Positive for ear pain and sore throat. Negative for sinus pressure and sinus pain.    Respiratory: Negative for cough, shortness of breath and wheezing.    Cardiovascular: Negative for chest pain and palpitations.   Musculoskeletal: Positive for gait problem.        Hx " of multiple sclerosis      Neurological: Positive for weakness. Negative for dizziness and headaches.       Objective:      Physical Exam   Constitutional: Vital signs are normal. She has a sickly appearance.   HENT:   Right Ear: External ear and ear canal normal.   Left Ear: Tympanic membrane, external ear and ear canal normal.   Dull TM  Grimacing during examination    Eyes: Pupils are equal, round, and reactive to light.   Cardiovascular: Normal rate, regular rhythm and normal heart sounds.   No murmur heard.  Pulmonary/Chest: Effort normal and breath sounds normal. She has no wheezes.   Lymphadenopathy:     She has cervical adenopathy.        Right cervical: Superficial cervical adenopathy present.   Neurological: She is alert.       Assessment:       1. Right otitis media with effusion    2. Sore throat        Plan:       Alyssa was seen today for otalgia.    Diagnoses and all orders for this visit:    Right otitis media with effusion  -     amoxicillin-clavulanate 875-125mg (AUGMENTIN) 875-125 mg per tablet; Take 1 tablet by mouth 2 (two) times daily. for 7 days  F/U 1 week  Continue Flonase and zyrtec     Sore throat  -     POCT Rapid Strep A  -     Influenza A & B by Molecular  Strep negative   Flu negative   Salt water gargles, throat lozenges for symptomatic relief     F/U 1 week

## 2019-02-15 ENCOUNTER — OFFICE VISIT (OUTPATIENT)
Dept: NEUROLOGY | Facility: CLINIC | Age: 69
End: 2019-02-15
Payer: MEDICARE

## 2019-02-15 VITALS
DIASTOLIC BLOOD PRESSURE: 73 MMHG | BODY MASS INDEX: 29.77 KG/M2 | HEART RATE: 77 BPM | HEIGHT: 63 IN | WEIGHT: 168 LBS | SYSTOLIC BLOOD PRESSURE: 125 MMHG

## 2019-02-15 DIAGNOSIS — R26.9 NEUROLOGIC GAIT DYSFUNCTION: ICD-10-CM

## 2019-02-15 DIAGNOSIS — R53.83 FATIGUE, UNSPECIFIED TYPE: ICD-10-CM

## 2019-02-15 DIAGNOSIS — Z29.89 PROPHYLACTIC IMMUNOTHERAPY: ICD-10-CM

## 2019-02-15 DIAGNOSIS — E55.9 VITAMIN D INSUFFICIENCY: ICD-10-CM

## 2019-02-15 DIAGNOSIS — Z71.89 COUNSELING REGARDING GOALS OF CARE: ICD-10-CM

## 2019-02-15 DIAGNOSIS — G35 MULTIPLE SCLEROSIS: Primary | ICD-10-CM

## 2019-02-15 DIAGNOSIS — R56.9 CONVULSIONS, UNSPECIFIED CONVULSION TYPE: ICD-10-CM

## 2019-02-15 PROCEDURE — 99999 PR PBB SHADOW E&M-EST. PATIENT-LVL V: ICD-10-PCS | Mod: PBBFAC,,, | Performed by: PHYSICIAN ASSISTANT

## 2019-02-15 PROCEDURE — 1101F PT FALLS ASSESS-DOCD LE1/YR: CPT | Mod: CPTII,S$GLB,, | Performed by: PHYSICIAN ASSISTANT

## 2019-02-15 PROCEDURE — 3074F SYST BP LT 130 MM HG: CPT | Mod: CPTII,S$GLB,, | Performed by: PHYSICIAN ASSISTANT

## 2019-02-15 PROCEDURE — 99499 RISK ADDL DX/OHS AUDIT: ICD-10-PCS | Mod: S$GLB,,, | Performed by: PHYSICIAN ASSISTANT

## 2019-02-15 PROCEDURE — 99999 PR PBB SHADOW E&M-EST. PATIENT-LVL V: CPT | Mod: PBBFAC,,, | Performed by: PHYSICIAN ASSISTANT

## 2019-02-15 PROCEDURE — 99499 UNLISTED E&M SERVICE: CPT | Mod: S$GLB,,, | Performed by: PHYSICIAN ASSISTANT

## 2019-02-15 PROCEDURE — 99215 PR OFFICE/OUTPT VISIT, EST, LEVL V, 40-54 MIN: ICD-10-PCS | Mod: S$GLB,,, | Performed by: PHYSICIAN ASSISTANT

## 2019-02-15 PROCEDURE — 99215 OFFICE O/P EST HI 40 MIN: CPT | Mod: S$GLB,,, | Performed by: PHYSICIAN ASSISTANT

## 2019-02-15 PROCEDURE — 3078F PR MOST RECENT DIASTOLIC BLOOD PRESSURE < 80 MM HG: ICD-10-PCS | Mod: CPTII,S$GLB,, | Performed by: PHYSICIAN ASSISTANT

## 2019-02-15 PROCEDURE — 3078F DIAST BP <80 MM HG: CPT | Mod: CPTII,S$GLB,, | Performed by: PHYSICIAN ASSISTANT

## 2019-02-15 PROCEDURE — 3074F PR MOST RECENT SYSTOLIC BLOOD PRESSURE < 130 MM HG: ICD-10-PCS | Mod: CPTII,S$GLB,, | Performed by: PHYSICIAN ASSISTANT

## 2019-02-15 PROCEDURE — 1101F PR PT FALLS ASSESS DOC 0-1 FALLS W/OUT INJ PAST YR: ICD-10-PCS | Mod: CPTII,S$GLB,, | Performed by: PHYSICIAN ASSISTANT

## 2019-02-15 RX ORDER — TOPIRAMATE 50 MG/1
50 TABLET, FILM COATED ORAL 2 TIMES DAILY
Qty: 180 TABLET | Refills: 1 | Status: SHIPPED | OUTPATIENT
Start: 2019-02-15 | End: 2019-10-06 | Stop reason: SDUPTHER

## 2019-02-15 RX ORDER — GLATIRAMER 40 MG/ML
40 INJECTION, SOLUTION SUBCUTANEOUS
Qty: 36 SYRINGE | Refills: 1 | Status: SHIPPED | OUTPATIENT
Start: 2019-02-15 | End: 2019-09-05

## 2019-02-15 NOTE — PROGRESS NOTES
"Subjective:       Patient ID: Alyssa John is a 68 y.o. female who presents today with her  for a routine clinic visit for MS.      MS HPI:  · DMT: none at present(discontinued Aubagio in May 2018)  · Side effects from DMT? N/A  · Taking vitamin D3 as recommended? No- stopped most vitamins   · Patient has had several falls, and has been ill for the last several weeks--bronchitis. She has an appointment for follow up next week.    · She was not able to attend therapy during the time of illness  · She is ready to start Copaxone  ·  has started on new diet limiting meat and gluten-she has lost some weight, but overall is not happy with new diet.  is concerned she may have "leaky gut syndrome"      SOCIAL HISTORY  Social History     Tobacco Use    Smoking status: Former Smoker     Packs/day: 1.50     Last attempt to quit: 2006     Years since quittin.1    Smokeless tobacco: Never Used   Substance Use Topics    Alcohol use: No     Alcohol/week: 0.0 oz    Drug use: No     Living arrangements - the patient lives with their spouse.  Employment     MS ROS:  As above        Objective:        1. 25 foot timed walk:  Timed 25 Foot Walk: 10/27/2016 2017   Did patient wear an AFO? No No   Was assistive device used? Yes Yes   Assistive device used (toña one): Unilateral Assistance Unilateral Assistance   Unilateral device used Cane Cane   Time for 25 Foot Walk (seconds) 12.8 10.2   Time for 25 Foot Walk (seconds) 9 -       Neurologic Exam      Stood with me today in clinic--min A for standing. L LE(Knee) does not fully extend. She stands with narrow base of support    Imaging:       Results for orders placed during the hospital encounter of 18   MRI Brain Demyelinating W W/O Contrast    Impression Unchanged pronounced burden of supratentorial white matter disease, with appearance in keeping with history of multiple sclerosis.  No new or enhancing lesions to suggest " active demyelination.    Partially imaged lesion at the craniocervical junction, with spinal lesions better delineated on concurrent dedicated spine MRIs.    Electronically signed by resident: Baldo Krause  Date:    12/12/2018  Time:    09:34    Electronically signed by: Moo Michele MD  Date:    12/12/2018  Time:    15:28     Results for orders placed during the hospital encounter of 12/11/18   MRI Cervical Spine Demyelinating W W/O Contrast    Impression Multiple unchanged foci of T2 hyperintense signal throughout the spinal cord extending from the cranial cervical junction throughout the thoracic spinal cord.  Lesion at T9-10 was not included in the field of view of prior imaging; however, all other lesions are unchanged from 08/02/2018.  No enhancing lesions to suggest active demyelination.    Mild multilevel disc/endplate degeneration, with mild spinal canal stenoses in the cervical spine and severe foraminal stenosis on the left at C4-5 and on the right at C5-6.    Electronically signed by resident: Baldo Krause  Date:    12/12/2018  Time:    09:12    Electronically signed by: Moo Michele MD  Date:    12/12/2018  Time:    15:24     Results for orders placed during the hospital encounter of 12/11/18   MRI Thoracic Spine Demyelinating W W/O Contrast    Impression Multiple unchanged foci of T2 hyperintense signal throughout the spinal cord extending from the cranial cervical junction throughout the thoracic spinal cord.  Lesion at T9-10 was not included in the field of view of prior imaging; however, all other lesions are unchanged from 08/02/2018.  No enhancing lesions to suggest active demyelination.    Mild multilevel disc/endplate degeneration, with mild spinal canal stenoses in the cervical spine and severe foraminal stenosis on the left at C4-5 and on the right at C5-6.    Electronically signed by resident: Baldo Krause  Date:    12/12/2018  Time:    09:12    Electronically signed by: Moo Michele  MD  Date:    12/12/2018  Time:    15:24         Labs:     Lab Results   Component Value Date    QTFDDCSS68DE 68 07/19/2018    YBRHATGH13EQ 53 10/19/2016    LSXDBFUF36JP 78 06/13/2016     Lab Results   Component Value Date    JCVINDEX SEE COMMENT (A) 12/09/2015    JCVANTIBODY SEE COMMENT 12/09/2015     Lab Results   Component Value Date    LS5IZMZD 59.1 03/31/2016    ABSOLUTECD3 619 (L) 03/31/2016    LT4OBKUC 19.9 03/31/2016    ABSOLUTECD8 208 03/31/2016    JS7GAULS 39.1 03/31/2016    ABSOLUTECD4 409 03/31/2016    LABCD48 1.97 03/31/2016     Lab Results   Component Value Date    WBC 6.95 12/11/2018    RBC 4.68 12/11/2018    HGB 13.8 12/11/2018    HCT 42.2 12/11/2018    MCV 90 12/11/2018    MCH 29.5 12/11/2018    MCHC 32.7 12/11/2018    RDW 12.5 12/11/2018     12/11/2018    MPV 10.7 12/11/2018    GRAN 4.6 12/11/2018    GRAN 66.7 12/11/2018    LYMPH 1.7 12/11/2018    LYMPH 23.7 12/11/2018    MONO 0.4 12/11/2018    MONO 5.8 12/11/2018    EOS 0.2 12/11/2018    BASO 0.05 12/11/2018    EOSINOPHIL 3.0 12/11/2018    BASOPHIL 0.7 12/11/2018     Sodium   Date Value Ref Range Status   12/11/2018 140 136 - 145 mmol/L Final     Potassium   Date Value Ref Range Status   12/11/2018 3.7 3.5 - 5.1 mmol/L Final     Chloride   Date Value Ref Range Status   12/11/2018 108 95 - 110 mmol/L Final     CO2   Date Value Ref Range Status   12/11/2018 23 23 - 29 mmol/L Final     Carbon Monoxide, Blood   Date Value Ref Range Status   10/19/2016 2 % Final     Comment:     -------------------REFERENCE VALUE--------------------------  0-2 Normal (Non-smoker) , < or = 9 Normal (Smoker), > or   = 20 (Toxic concentration)  Test Performed by:  [place] [address]  : Adrien Norton II, M.D., Ph.D.       Glucose   Date Value Ref Range Status   12/11/2018 95 70 - 110 mg/dL Final     BUN, Bld   Date Value Ref Range Status   12/11/2018 17 8 - 23 mg/dL Final      Creatinine   Date Value Ref Range Status   12/11/2018 0.7 0.5 - 1.4 mg/dL Final   02/22/2013 0.8 0.5 - 1.4 mg/dL Final     Calcium   Date Value Ref Range Status   12/11/2018 9.5 8.7 - 10.5 mg/dL Final   02/22/2013 8.7 8.7 - 10.5 mg/dL Final     Total Protein   Date Value Ref Range Status   12/11/2018 7.1 6.0 - 8.4 g/dL Final     Albumin   Date Value Ref Range Status   12/11/2018 3.9 3.5 - 5.2 g/dL Final     Total Bilirubin   Date Value Ref Range Status   12/11/2018 0.3 0.1 - 1.0 mg/dL Final     Comment:     For infants and newborns, interpretation of results should be based  on gestational age, weight and in agreement with clinical  observations.  Premature Infant recommended reference ranges:  Up to 24 hours.............<8.0 mg/dL  Up to 48 hours............<12.0 mg/dL  3-5 days..................<15.0 mg/dL  6-29 days.................<15.0 mg/dL       Alkaline Phosphatase   Date Value Ref Range Status   12/11/2018 152 (H) 55 - 135 U/L Final   02/22/2013 107 55 - 135 U/L Final     AST   Date Value Ref Range Status   12/11/2018 29 10 - 40 U/L Final   02/22/2013 18 10 - 40 U/L Final     ALT   Date Value Ref Range Status   12/11/2018 40 10 - 44 U/L Final     Anion Gap   Date Value Ref Range Status   12/11/2018 9 8 - 16 mmol/L Final   02/22/2013 11 5 - 15 meq/L Final     eGFR if    Date Value Ref Range Status   12/11/2018 >60.0 >60 mL/min/1.73 m^2 Final     eGFR if non    Date Value Ref Range Status   12/11/2018 >60.0 >60 mL/min/1.73 m^2 Final     Comment:     Calculation used to obtain the estimated glomerular filtration  rate (eGFR) is the CKD-EPI equation.        Diagnosis/Assessment/Plan:    1. Multiple Sclerosis  · Assessment: Patient presents today to further discuss DMT. She has not been walking much over the past month, stating she has been ill with bronchitis(she continues to have a cough in clinic) and is on antibiotics currently. I suspect due to her illness over this last  month that she is experiencing some pseudo relapse; however, she also feels like she has been worse off DMT. So, I do feel it's important to restart and Copaxone is viable choice for her as it does not suppress the immune system-to be recalled he experienced adverse side effects from Aubagio and Tecfidera.   · Imaging:full imaging in December 2018-stable  · Disease Modifying Therapies: Patient is ready to initiate Copaxone as discussed last visit. We reviewed dosing, insurance process, s/e profile again. Copaxone sent to [facility]. I also advised restarting Vit D3,  as more and more data suggests that high circulating blood levels of vitamin D has an ameliorating effect on the disease course in multiple sclerosis in general.        2. MS Symptom Assessment / Management  · Gait Disturbance: will restart PT. I encouraged standing at kitchen sink at home(with w/c behind) focusing on posture and standing endurance with  assist.  · Clinical Trials: provided website www.clinicaltrials.gov for their exploration--they are interested in clinical trials related to diet and MS        Over 50% of this 40 minute visit was spent in direct face to face with the patient about MS, DMT considerations, and MS symptom management.   Follow-up in about 3 months (around 5/15/2019) for follow up with Dr. Apodaca.  Patient agreed to POC today.    Attending, Dr. Apodaca, was available during today's encounter.     Risa Oshea PA-C  [facility]      Problem List Items Addressed This Visit        Neuro    Multiple sclerosis - Primary    Overview     Dr. Apodaca, neurology         Relevant Medications    glatiramer (COPAXONE) 40 mg/mL Syrg injection    Other Relevant Orders    Ambulatory Referral to Physical/Occupational Therapy       Endocrine    Vitamin D insufficiency       Other    Fatigue      Other Visit Diagnoses     Neurologic gait dysfunction        Relevant Orders    Ambulatory Referral to Physical/Occupational Therapy     Convulsions, unspecified convulsion type        Relevant Medications    topiramate (TOPAMAX) 50 MG tablet    Counseling regarding goals of care        Prophylactic immunotherapy

## 2019-02-19 ENCOUNTER — TELEPHONE (OUTPATIENT)
Dept: PHARMACY | Facility: CLINIC | Age: 69
End: 2019-02-19

## 2019-02-19 ENCOUNTER — OFFICE VISIT (OUTPATIENT)
Dept: FAMILY MEDICINE | Facility: CLINIC | Age: 69
End: 2019-02-19
Payer: MEDICARE

## 2019-02-19 VITALS
HEART RATE: 71 BPM | OXYGEN SATURATION: 96 % | TEMPERATURE: 98 F | DIASTOLIC BLOOD PRESSURE: 75 MMHG | SYSTOLIC BLOOD PRESSURE: 124 MMHG | WEIGHT: 170.44 LBS | HEIGHT: 63 IN | BODY MASS INDEX: 30.2 KG/M2

## 2019-02-19 DIAGNOSIS — I10 ESSENTIAL HYPERTENSION: ICD-10-CM

## 2019-02-19 DIAGNOSIS — J30.9 ALLERGIC RHINITIS, UNSPECIFIED SEASONALITY, UNSPECIFIED TRIGGER: ICD-10-CM

## 2019-02-19 DIAGNOSIS — J40 BRONCHITIS: Primary | ICD-10-CM

## 2019-02-19 PROCEDURE — 1101F PR PT FALLS ASSESS DOC 0-1 FALLS W/OUT INJ PAST YR: ICD-10-PCS | Mod: CPTII,S$GLB,, | Performed by: NURSE PRACTITIONER

## 2019-02-19 PROCEDURE — 3078F DIAST BP <80 MM HG: CPT | Mod: CPTII,S$GLB,, | Performed by: NURSE PRACTITIONER

## 2019-02-19 PROCEDURE — 1101F PT FALLS ASSESS-DOCD LE1/YR: CPT | Mod: CPTII,S$GLB,, | Performed by: NURSE PRACTITIONER

## 2019-02-19 PROCEDURE — 99999 PR PBB SHADOW E&M-EST. PATIENT-LVL III: ICD-10-PCS | Mod: PBBFAC,,, | Performed by: NURSE PRACTITIONER

## 2019-02-19 PROCEDURE — 3078F PR MOST RECENT DIASTOLIC BLOOD PRESSURE < 80 MM HG: ICD-10-PCS | Mod: CPTII,S$GLB,, | Performed by: NURSE PRACTITIONER

## 2019-02-19 PROCEDURE — 99212 OFFICE O/P EST SF 10 MIN: CPT | Mod: S$GLB,,, | Performed by: NURSE PRACTITIONER

## 2019-02-19 PROCEDURE — 99999 PR PBB SHADOW E&M-EST. PATIENT-LVL III: CPT | Mod: PBBFAC,,, | Performed by: NURSE PRACTITIONER

## 2019-02-19 PROCEDURE — 99212 PR OFFICE/OUTPT VISIT, EST, LEVL II, 10-19 MIN: ICD-10-PCS | Mod: S$GLB,,, | Performed by: NURSE PRACTITIONER

## 2019-02-19 PROCEDURE — 3074F PR MOST RECENT SYSTOLIC BLOOD PRESSURE < 130 MM HG: ICD-10-PCS | Mod: CPTII,S$GLB,, | Performed by: NURSE PRACTITIONER

## 2019-02-19 PROCEDURE — 3074F SYST BP LT 130 MM HG: CPT | Mod: CPTII,S$GLB,, | Performed by: NURSE PRACTITIONER

## 2019-02-19 RX ORDER — ALBUTEROL SULFATE 90 UG/1
2 AEROSOL, METERED RESPIRATORY (INHALATION) EVERY 6 HOURS PRN
Qty: 18 G | Refills: 0 | Status: SHIPPED | OUTPATIENT
Start: 2019-02-19 | End: 2019-03-13 | Stop reason: SDUPTHER

## 2019-02-19 RX ORDER — PREDNISONE 10 MG/1
TABLET ORAL
Qty: 22 TABLET | Refills: 0 | Status: SHIPPED | OUTPATIENT
Start: 2019-02-19 | End: 2019-04-29 | Stop reason: CLARIF

## 2019-02-19 NOTE — PROGRESS NOTES
Subjective:       Patient ID: Alyssa John is a 68 y.o. female.    Chief Complaint: Follow-up    Ms. John presents today for a F/U after being treated for otitis media. Her ear is feeling much better. She is still having a wet cough that is worse at night. Has been using cough drops, prescribed albuterol inhaler, zyrtec and nasal spray. None of this has helped. Her energy is better, but she is still not feeling herself.       Patient Active Problem List   Diagnosis    Multiple sclerosis    CAD (coronary artery disease)    Hyperlipidemia with target LDL less than 70    Vitamin D insufficiency    Balance problem    Fatigue    Seizures    MI, old    Hypertension    GERD (gastroesophageal reflux disease)    Arthritis    Encounter for long-term (current) use of high-risk medication    Gait instability       Review of Systems   Constitutional: Positive for fatigue. Negative for chills and fever.   HENT: Positive for postnasal drip. Negative for congestion, ear pain, sinus pressure and sinus pain.    Respiratory: Positive for cough (wet ). Negative for shortness of breath and wheezing.        Objective:      Physical Exam   Constitutional: She is oriented to person, place, and time. She appears well-developed and well-nourished.   HENT:   Head: Normocephalic and atraumatic.   Neck: Neck supple.   Cardiovascular: Normal rate, regular rhythm and normal heart sounds.   No murmur heard.  Pulmonary/Chest: Effort normal. She has wheezes in the right upper field, the right lower field, the left upper field and the left lower field.   Musculoskeletal: She exhibits no edema.   Lymphadenopathy:     She has no cervical adenopathy.   Neurological: She is alert and oriented to person, place, and time.   Skin: Skin is warm and dry.   Psychiatric: She has a normal mood and affect.   Nursing note and vitals reviewed.      Assessment:       1. Bronchitis    2. Allergic rhinitis, unspecified seasonality,  unspecified trigger    3. Essential hypertension        Plan:       Alyssa was seen today for follow-up.    Diagnoses and all orders for this visit:    Bronchitis  -     predniSONE (DELTASONE) 10 MG tablet; Take 4 tabs (40mg) for 3 days THEN 2 tabs (20mg) for 3 days THEN 1 tab (10mg) for 4 days  -     albuterol (VENTOLIN HFA) 90 mcg/actuation inhaler; Inhale 2 puffs into the lungs every 6 (six) hours as needed for Wheezing. Rescue  Longer course of tapering steriods  Continue albuterol inhaler every 4-6 hours    Allergic rhinitis, unspecified seasonality, unspecified trigger  Recommend otc non-sedating antihistamine such as Loratadine and/or steroid nasal spray such as Flonase as directed and as needed.  Please return to clinic if symptoms persist after these interventions.  Consider Claritin instead of zyrtec    Essential hypertension  Controlled, continue current drug regimen    F/U end of next week

## 2019-02-25 ENCOUNTER — OFFICE VISIT (OUTPATIENT)
Dept: URGENT CARE | Facility: CLINIC | Age: 69
End: 2019-02-25
Payer: MEDICARE

## 2019-02-25 VITALS
SYSTOLIC BLOOD PRESSURE: 117 MMHG | HEIGHT: 63 IN | DIASTOLIC BLOOD PRESSURE: 76 MMHG | RESPIRATION RATE: 16 BRPM | HEART RATE: 79 BPM | OXYGEN SATURATION: 93 % | BODY MASS INDEX: 29.77 KG/M2 | TEMPERATURE: 98 F | WEIGHT: 168 LBS

## 2019-02-25 DIAGNOSIS — R05.9 COUGH: Primary | ICD-10-CM

## 2019-02-25 DIAGNOSIS — J20.9 ACUTE BRONCHITIS, UNSPECIFIED ORGANISM: ICD-10-CM

## 2019-02-25 PROCEDURE — 3074F SYST BP LT 130 MM HG: CPT | Mod: CPTII,S$GLB,, | Performed by: NURSE PRACTITIONER

## 2019-02-25 PROCEDURE — 99214 PR OFFICE/OUTPT VISIT, EST, LEVL IV, 30-39 MIN: ICD-10-PCS | Mod: 25,S$GLB,, | Performed by: NURSE PRACTITIONER

## 2019-02-25 PROCEDURE — 1101F PT FALLS ASSESS-DOCD LE1/YR: CPT | Mod: CPTII,S$GLB,, | Performed by: NURSE PRACTITIONER

## 2019-02-25 PROCEDURE — 71046 X-RAY EXAM CHEST 2 VIEWS: CPT | Mod: S$GLB,,, | Performed by: NURSE PRACTITIONER

## 2019-02-25 PROCEDURE — 71046 PR XRAY, CHEST, 2 VIEWS: ICD-10-PCS | Mod: S$GLB,,, | Performed by: NURSE PRACTITIONER

## 2019-02-25 PROCEDURE — 94640 AIRWAY INHALATION TREATMENT: CPT | Mod: S$GLB,,, | Performed by: NURSE PRACTITIONER

## 2019-02-25 PROCEDURE — 3078F DIAST BP <80 MM HG: CPT | Mod: CPTII,S$GLB,, | Performed by: NURSE PRACTITIONER

## 2019-02-25 PROCEDURE — 3078F PR MOST RECENT DIASTOLIC BLOOD PRESSURE < 80 MM HG: ICD-10-PCS | Mod: CPTII,S$GLB,, | Performed by: NURSE PRACTITIONER

## 2019-02-25 PROCEDURE — 99214 OFFICE O/P EST MOD 30 MIN: CPT | Mod: 25,S$GLB,, | Performed by: NURSE PRACTITIONER

## 2019-02-25 PROCEDURE — 1101F PR PT FALLS ASSESS DOC 0-1 FALLS W/OUT INJ PAST YR: ICD-10-PCS | Mod: CPTII,S$GLB,, | Performed by: NURSE PRACTITIONER

## 2019-02-25 PROCEDURE — 3074F PR MOST RECENT SYSTOLIC BLOOD PRESSURE < 130 MM HG: ICD-10-PCS | Mod: CPTII,S$GLB,, | Performed by: NURSE PRACTITIONER

## 2019-02-25 PROCEDURE — 94640 PR INHAL RX, AIRWAY OBST/DX SPUTUM INDUCT: ICD-10-PCS | Mod: S$GLB,,, | Performed by: NURSE PRACTITIONER

## 2019-02-25 RX ORDER — AMOXICILLIN 875 MG/1
875 TABLET, FILM COATED ORAL 2 TIMES DAILY
Qty: 20 TABLET | Refills: 0 | Status: SHIPPED | OUTPATIENT
Start: 2019-02-25 | End: 2019-03-07

## 2019-02-25 RX ORDER — PROMETHAZINE HYDROCHLORIDE AND DEXTROMETHORPHAN HYDROBROMIDE 6.25; 15 MG/5ML; MG/5ML
5 SYRUP ORAL EVERY 6 HOURS PRN
Qty: 240 ML | Refills: 0 | Status: SHIPPED | OUTPATIENT
Start: 2019-02-25 | End: 2019-03-07

## 2019-02-25 RX ORDER — IPRATROPIUM BROMIDE AND ALBUTEROL SULFATE 2.5; .5 MG/3ML; MG/3ML
3 SOLUTION RESPIRATORY (INHALATION)
Status: COMPLETED | OUTPATIENT
Start: 2019-02-25 | End: 2019-02-25

## 2019-02-25 RX ADMIN — IPRATROPIUM BROMIDE AND ALBUTEROL SULFATE 3 ML: 2.5; .5 SOLUTION RESPIRATORY (INHALATION) at 04:02

## 2019-02-25 NOTE — PROGRESS NOTES
"Subjective:       Patient ID: Alyssa John is a 68 y.o. female.    Vitals:  height is 5' 3" (1.6 m) and weight is 76.2 kg (168 lb). Her oral temperature is 97.9 °F (36.6 °C). Her blood pressure is 117/76 and her pulse is 79. Her respiration is 16 and oxygen saturation is 93 (abnormal)%.     Chief Complaint: Cough and Otalgia    Cough   This is a new problem. The current episode started more than 1 month ago. The problem has been gradually worsening. The problem occurs constantly. The cough is productive of sputum. Associated symptoms include ear pain, headaches, nasal congestion and a sore throat. Nothing aggravates the symptoms. She has tried prescription cough suppressant, oral steroids and OTC cough suppressant for the symptoms. The treatment provided no relief.   Otalgia    There is pain in both ears. This is a new problem. The current episode started 1 to 4 weeks ago. The problem occurs constantly. The problem has been gradually worsening. There has been no fever. Associated symptoms include coughing, headaches and a sore throat. She has tried nothing for the symptoms. The treatment provided no relief.       Constitution: Negative.   HENT: Positive for ear pain and sore throat.    Neck: negative.   Cardiovascular: Negative.    Eyes: Negative.    Respiratory: Positive for cough and sputum production.    Gastrointestinal: Negative.    Endocrine: negative.   Genitourinary: Negative.    Musculoskeletal: Negative.    Skin: Negative.    Neurological: Positive for headaches.   Psychiatric/Behavioral: Negative.        Objective:      Physical Exam   Constitutional: She is oriented to person, place, and time. She appears well-developed and well-nourished. She is cooperative.  Non-toxic appearance. She does not appear ill. No distress.   HENT:   Head: Normocephalic and atraumatic.   Right Ear: Hearing, tympanic membrane, external ear and ear canal normal.   Left Ear: Hearing, tympanic membrane, external ear and " ear canal normal.   Nose: Nose normal. No mucosal edema, rhinorrhea or nasal deformity. No epistaxis. Right sinus exhibits no maxillary sinus tenderness and no frontal sinus tenderness. Left sinus exhibits no maxillary sinus tenderness and no frontal sinus tenderness.   Mouth/Throat: Uvula is midline, oropharynx is clear and moist and mucous membranes are normal. No trismus in the jaw. Normal dentition. No uvula swelling. No posterior oropharyngeal erythema.   Eyes: Conjunctivae and lids are normal. No scleral icterus.   Sclera clear bilat   Neck: Trachea normal, full passive range of motion without pain and phonation normal. Neck supple.   Cardiovascular: Normal rate, regular rhythm, normal heart sounds, intact distal pulses and normal pulses.   Pulmonary/Chest: Effort normal and breath sounds normal. No respiratory distress.   Abdominal: Soft. Normal appearance and bowel sounds are normal. She exhibits no distension. There is no tenderness.   Musculoskeletal: Normal range of motion. She exhibits no edema or deformity.   Neurological: She is alert and oriented to person, place, and time. She exhibits normal muscle tone. Coordination normal.   Skin: Skin is warm, dry and intact. Capillary refill takes less than 2 seconds. She is not diaphoretic. No pallor.   Psychiatric: She has a normal mood and affect. Her speech is normal and behavior is normal. Judgment and thought content normal. Cognition and memory are normal.   Nursing note and vitals reviewed.      Assessment:       1. Cough    2. Acute bronchitis, unspecified organism        Plan:     cxr negative. No respiratory distress or hypoxia. Influenza negative. Patient requesting amoxicillin. Rx: amoxicillin and promethazine dm. Oxygen saturation at discharge 97%.    Cough  -     XR CHEST PA AND LATERAL; Future; Expected date: 02/25/2019  -     albuterol-ipratropium 2.5 mg-0.5 mg/3 mL nebulizer solution 3 mL  -     amoxicillin (AMOXIL) 875 MG tablet; Take 1 tablet  (875 mg total) by mouth 2 (two) times daily. for 10 days  Dispense: 20 tablet; Refill: 0  -     promethazine-dextromethorphan (PROMETHAZINE-DM) 6.25-15 mg/5 mL Syrp; Take 5 mLs by mouth every 6 (six) hours as needed.  Dispense: 240 mL; Refill: 0    Acute bronchitis, unspecified organism  -     amoxicillin (AMOXIL) 875 MG tablet; Take 1 tablet (875 mg total) by mouth 2 (two) times daily. for 10 days  Dispense: 20 tablet; Refill: 0  -     promethazine-dextromethorphan (PROMETHAZINE-DM) 6.25-15 mg/5 mL Syrp; Take 5 mLs by mouth every 6 (six) hours as needed.  Dispense: 240 mL; Refill: 0

## 2019-02-28 NOTE — TELEPHONE ENCOUNTER
DOCUMENTATION ONLY:  Prior Authorization for Galtiramer approved from 02/26/2019 to 12/31/2019    Case Id: [id number]    Co-pay: $1,671.49    Patient Assistance IS required  [employer]

## 2019-03-01 ENCOUNTER — OFFICE VISIT (OUTPATIENT)
Dept: FAMILY MEDICINE | Facility: CLINIC | Age: 69
End: 2019-03-01
Payer: MEDICARE

## 2019-03-01 VITALS
OXYGEN SATURATION: 96 % | BODY MASS INDEX: 29.72 KG/M2 | SYSTOLIC BLOOD PRESSURE: 138 MMHG | TEMPERATURE: 98 F | HEART RATE: 78 BPM | DIASTOLIC BLOOD PRESSURE: 76 MMHG | HEIGHT: 63 IN | WEIGHT: 167.75 LBS

## 2019-03-01 DIAGNOSIS — J40 BRONCHITIS: Primary | ICD-10-CM

## 2019-03-01 DIAGNOSIS — R26.81 GAIT INSTABILITY: ICD-10-CM

## 2019-03-01 DIAGNOSIS — I10 ESSENTIAL HYPERTENSION: ICD-10-CM

## 2019-03-01 PROCEDURE — 3075F SYST BP GE 130 - 139MM HG: CPT | Mod: CPTII,S$GLB,, | Performed by: NURSE PRACTITIONER

## 2019-03-01 PROCEDURE — 99212 OFFICE O/P EST SF 10 MIN: CPT | Mod: S$GLB,,, | Performed by: NURSE PRACTITIONER

## 2019-03-01 PROCEDURE — 1101F PT FALLS ASSESS-DOCD LE1/YR: CPT | Mod: CPTII,S$GLB,, | Performed by: NURSE PRACTITIONER

## 2019-03-01 PROCEDURE — 3075F PR MOST RECENT SYSTOLIC BLOOD PRESS GE 130-139MM HG: ICD-10-PCS | Mod: CPTII,S$GLB,, | Performed by: NURSE PRACTITIONER

## 2019-03-01 PROCEDURE — 1101F PR PT FALLS ASSESS DOC 0-1 FALLS W/OUT INJ PAST YR: ICD-10-PCS | Mod: CPTII,S$GLB,, | Performed by: NURSE PRACTITIONER

## 2019-03-01 PROCEDURE — 3078F DIAST BP <80 MM HG: CPT | Mod: CPTII,S$GLB,, | Performed by: NURSE PRACTITIONER

## 2019-03-01 PROCEDURE — 99999 PR PBB SHADOW E&M-EST. PATIENT-LVL III: CPT | Mod: PBBFAC,,, | Performed by: NURSE PRACTITIONER

## 2019-03-01 PROCEDURE — 99212 PR OFFICE/OUTPT VISIT, EST, LEVL II, 10-19 MIN: ICD-10-PCS | Mod: S$GLB,,, | Performed by: NURSE PRACTITIONER

## 2019-03-01 PROCEDURE — 3078F PR MOST RECENT DIASTOLIC BLOOD PRESSURE < 80 MM HG: ICD-10-PCS | Mod: CPTII,S$GLB,, | Performed by: NURSE PRACTITIONER

## 2019-03-01 PROCEDURE — 99999 PR PBB SHADOW E&M-EST. PATIENT-LVL III: ICD-10-PCS | Mod: PBBFAC,,, | Performed by: NURSE PRACTITIONER

## 2019-03-02 NOTE — PROGRESS NOTES
Subjective:       Patient ID: Alyssa John is a 68 y.o. female.    Chief Complaint: Follow-up    Ms. John presents today for a 2 week F/U appointment. She completed prednisone taper prescribed at last appointment, but her  reports that she began to decline. Was seen at urgent care on Monday, prescribed amoxicillin and cough medication for acute bronchitis. She is feeling much better today. She is looking forward to getting back to PT, as she has not been able to participate for the last month.      Patient Active Problem List   Diagnosis    Multiple sclerosis    CAD (coronary artery disease)    Hyperlipidemia with target LDL less than 70    Vitamin D insufficiency    Balance problem    Fatigue    Seizures    MI, old    Hypertension    GERD (gastroesophageal reflux disease)    Arthritis    Encounter for long-term (current) use of high-risk medication    Gait instability     Review of Systems   Constitutional: Positive for fatigue (improving ). Negative for chills and fever.   HENT: Negative for congestion, ear pain, postnasal drip, sinus pressure and sinus pain.    Respiratory: Negative for cough, wheezing and stridor.    Cardiovascular: Negative for chest pain and palpitations.   Neurological: Positive for weakness (increased due to illness).       Objective:      Physical Exam   Constitutional: She is oriented to person, place, and time. She appears well-developed and well-nourished.   Cardiovascular: Normal rate, regular rhythm and normal heart sounds.   Pulmonary/Chest: Effort normal and breath sounds normal. She has no wheezes.   Neurological: She is alert and oriented to person, place, and time.   Skin: Skin is warm and dry.   Psychiatric: She has a normal mood and affect.   Nursing note and vitals reviewed.      Assessment:       1. Bronchitis    2. Gait instability    3. Essential hypertension        Plan:       Alyssa was seen today for follow-up.    Diagnoses and all orders  for this visit:    Bronchitis  Continue current regimen  Notify clinic if symptoms fail to improve    Gait instability  Participate in PT as able     Essential hypertension  Controlled     F/U PRN

## 2019-03-04 ENCOUNTER — TELEPHONE (OUTPATIENT)
Dept: PHARMACY | Facility: CLINIC | Age: 69
End: 2019-03-04

## 2019-03-04 NOTE — TELEPHONE ENCOUNTER
FOR DOCUMENTATION ONLY:    Financial Assistance for Copaxone approved from 1/1/2019 to 12/31/2019    Source: CALE David    ID: [id number]  BIN: [id number]  PCN: [id number]  GRP: [id number]    Amount: No Max

## 2019-03-12 ENCOUNTER — TELEPHONE (OUTPATIENT)
Dept: PHARMACY | Facility: CLINIC | Age: 69
End: 2019-03-12

## 2019-03-12 NOTE — TELEPHONE ENCOUNTER
Initial Glatiramer 40mg consult completed on 3/11. Glatiramer will be shipped on 3/12 to arrive at patient's home on 3/13 via [employer]. $0 copay. Patient intends to start Glatiramer once received.  Provided MYLAN Nurse Educator number: [phone]. Address confirmed, CC on file. Confirmed 2 patient identifiers - name and . Therapy Appropriate.    --Injection experience: none    Counseled patient on administration directions:   Inject Glatiramer into the skin 3 times per week (separate by 48 hours)   Missed doses: Patient may dose next day, shift all injections that week, then resume normal dose schedule next week. A partial dose should never be repeated.    Take out of the refrigerator 30-60 minutes prior to injection.   Wash hands before and after injection.   Monthly RX will come with gauze, Band-Aids, and alcohol swabs.   Patient may inject in either the tops of  thighs or abdomen- but at least 2 inches away from the belly button, upper hips, but always below the waist, or the outer or back part of his/ her upper arm.   Patient is to wipe down the injection site with the alcohol pad, wait to dry.  Pinch about a 2 inch fold of skin between the thumb and index finger, insert the needle at a 90 degree angle straight into the skin.  Hold the syringe steady and slowly push down the plunger, then remove the needle.    Patient will use sharps container; once full, per JOCELINE sosa, he/she may lock the sharps container and place in the trash. He/ She can then contact the Pharmacy and we will replace the sharps at no additional charge.    Patient was counseled on possible side effects:  · Injection site reaction: redness, soreness, itching, bruising, lipoatrophy, swelling, lumps, pain and rash  · Flushing, chest pain, palpitations, anxiety, dyspnea, constriction of the throat, and urticaria. These symptoms are generally transient and self-limited and do not require specific treatment.   · may occur early or may have  their onset several months after the initiation of treatment  · Nausea and vomiting, upset stomach, weakness    Consultation included: indication; goals of treatment; administration; storage and handling; side effects; how to handle side effects; the importance of compliance. Patient understands to report any medication changes to [facility] and provider. All questions answered and addressed to patients satisfaction. I will f/u with her in 1 week from start, [facility] to contact patient in 3 weeks for refills.     InBasket sent at 11:40 am on 3/12/19    Keron Medina, PharmD  Clinical Pharmacist   [facility]   P: [phone]

## 2019-03-13 DIAGNOSIS — J40 BRONCHITIS: ICD-10-CM

## 2019-03-13 RX ORDER — ALBUTEROL SULFATE 90 UG/1
AEROSOL, METERED RESPIRATORY (INHALATION)
Qty: 1 INHALER | Refills: 1 | Status: SHIPPED | OUTPATIENT
Start: 2019-03-13 | End: 2020-03-08 | Stop reason: ALTCHOICE

## 2019-03-19 ENCOUNTER — CLINICAL SUPPORT (OUTPATIENT)
Dept: REHABILITATION | Facility: HOSPITAL | Age: 69
End: 2019-03-19
Attending: PSYCHIATRY & NEUROLOGY
Payer: MEDICARE

## 2019-03-19 DIAGNOSIS — G35 MULTIPLE SCLEROSIS: ICD-10-CM

## 2019-03-19 DIAGNOSIS — R26.89 BALANCE PROBLEM: ICD-10-CM

## 2019-03-19 PROCEDURE — 97162 PT EVAL MOD COMPLEX 30 MIN: CPT | Mod: PN

## 2019-03-19 NOTE — PLAN OF CARE
TIME RECORD    03/19/2019    Start Time:  1210   Stop Time:  1258    PROCEDURES:    TIMED  Procedure Time Min.    Start:  Stop:     Start:  Stop:     Start:  Stop:     Start:  Stop:          UNTIMED  Procedure Time Min.   Evaluation Start:  Stop:     Start:  Stop:      Total Timed Minutes:  0  Total Timed Units:  0  Total Untimed Units:  1  Charges Billed/# of units:  1    OUTPATIENT PHYSICAL THERAPY   PATIENT EVALUATION  Onset Date: 2000  Problem List Items Addressed This Visit     Multiple sclerosis    Balance problem          Medical Diagnosis: G35 (ICD-10-CM) - Multiple sclerosis  Treatment Diagnosis: gait instability     Past Medical History:   Diagnosis Date    Arthritis     Coronary artery disease     Encounter for blood transfusion     Epilepsy     GERD (gastroesophageal reflux disease)     Hypertension     MI, old     MS (multiple sclerosis)     Seizures     epilepsy       Past Surgical History:   Procedure Laterality Date    APPENDECTOMY      BACK SURGERY      L5 discectomy    BREAST BIOPSY Right 15 yrs ago    benign    COLONOSCOPY N/A 9/16/2015    Performed by Henry Malcolm MD at [facility] ENDO    CORONARY STENT PLACEMENT      FLUORO URODYNAMIC STUDY (FUDS) N/A 12/9/2014    Performed by [facility] at [facility] OR [address] [place] [address]    right knee arthroscopy         has a current medication list which includes the following prescription(s): atorvastatin, cetirizine, cetirizine, cholecalciferol (vitamin d3), clonazepam, clopidogrel, duloxetine, famotidine, fluticasone, glatiramer, ibuprofen, modafinil, nitroglycerin, prednisone, promethazine, psyllium seed (with dextrose), topiramate, and ventolin hfa.    Precautions: falls, cardiac, seizures  Surgical history: see above  Prior Therapy: yes  History of Present Illness: Pt presenting to physical therapy with c/o LE weakness and gait instability. Diagnosis MS in 2001. She was taken off of disease-modifying medication Aubagio last year due to foot  pain. Over the last year, she has experienced decline in functional mobility. She will be starting a new immunomodulating medication next week.     Prior Level of Function: Assistive Device  Social History:                Lives with  and daughter who is handicapped              Living environment: [facility]                          Stairs to enter home: none                          Railings: N/A              Bathroom setup: tub shower with shower chair, grab bars present; elevated toilet              Transportation: family provides transportation               Leisure activities/hobbies:e Enjoys walking but unable to do so due to weakness/balance deficits     Current level of function: Ambulatory with RW household mobility; w/c for community mobility  Current equipment: RW, w/c  Equipment owned (not used): w/c, rollator  Functional Deficits Leading to Referral/Nature of Injury:  weakness, impaired endurance, impaired self care skills, impaired functional mobility, gait instability, impaired balance, decreased lower extremity function and decreased ROM  Patient Therapy Goals: Improve functional mobility/walkin      Subjective     Alyssa Sandra John states she is no longer using SPC at home due to weakness. Reports she is unable to walk from her bedroom to the living room with RW.      Objective     Posture/Appearance:Fwd head position, rounded shoulders; PPT in seated; trunk lean to left  Sensation: WFL  Range of Motion/Strength:     Lower Extremity Range of Motion:  Right Lower Extremity: decreased ankle DF by ~10*  Left Lower Extremity: decreased ankle DF by ~10*     Lower Extremity Strength:  Right Lower Extremity: grossly 4-/5 to 4/5  Left Lower Extremity: grossly 4-/5 to 4/5     Flexibility: ROM as per above, tightness bilateral gastrocs, hamstrings  Gait: c/ RW at CGA/min A; bilateral foot drop notable  Transfers: Sit<>stand CGA/min A  Functional Outcome Measure:   [place] Mobility Index:   FIM  mobility/gait: score=1  G code tool used: FIM mobility/gait  Current modifier: CN  Goal modifier: CM  Treatment: Educated on role/goal PT, POC.  Pt verbalizing understanding and agreement.        Assessment       Initial Assessment (Pertinent finding, problem list and factors affecting outcome): Patient presents to PT with c/o weakness and gait instability. Notable impairments include weakness, impaired endurance, impaired functional mobility, gait instability, impaired balance and decreased lower extremity function, and impaired functional mobility. Patient would benefit from skilled PT to address notable impairments and improve functional mobility to [facility].      Rehab Potiential: good      Short Term Goals (3 Weeks): 1) Pt will initiate HEP 2) Patient will improve strength 1/2 muscle grade    Long Term Goals (6 Weeks): 1) Pt will be I with HEP 2) Pt will ambulate 50 ft with RW at [facility] 3) Pt will improve sit to stand transfers to [facility]/SBA    Plan     Certification Period: 03/19/2019 to 05/20/2019  Recommended Treatment Plan: 2 times per week for 6 weeks: Gait Training, Manual Therapy, Moist Heat/ Ice, Patient Education, Therapeutic Activites and Therapeutic Exercise      Thank you for referral.    Therapist: Sameera Osborne, PT    I CERTIFY THE NEED FOR THESE SERVICES FURNISHED UNDER THIS PLAN OF TREATMENT AND WHILE UNDER MY CARE    Physician's comments: ________________________________________________________________________________________________________________________________________________      Physician's Name: ___________________________________

## 2019-03-26 ENCOUNTER — CLINICAL SUPPORT (OUTPATIENT)
Dept: REHABILITATION | Facility: HOSPITAL | Age: 69
End: 2019-03-26
Attending: PSYCHIATRY & NEUROLOGY
Payer: MEDICARE

## 2019-03-26 DIAGNOSIS — R26.89 BALANCE PROBLEM: ICD-10-CM

## 2019-03-26 DIAGNOSIS — G35 MULTIPLE SCLEROSIS: ICD-10-CM

## 2019-03-26 PROCEDURE — 97110 THERAPEUTIC EXERCISES: CPT | Mod: PN

## 2019-03-26 NOTE — PROGRESS NOTES
Name: Alyssa John  Date:   03/26/2019  Primary Diagnosis: .  Problem List Items Addressed This Visit     Multiple sclerosis    Balance problem          Time in: 1512  Time Out: 1549  Total Treatment Time: 37  Group Time: 0      Subjective:    Patient reports she has been trying to walk around the house as much as she can with her walker. Getting an injection for MS tomorrow and is nervous about it.  Patient reports their pain to be /10 on a 0-10 scale with 0 being no pain and 10 being the worst pain imaginable.    Objective    Patient received individual therapy to address strength, endurance, ROM and flexibility with 0 other patients with activities as follows:     Patient received therapeutic exercises to develop strength, endurance, ROM and flexibility for 37 minutes including:     Bike x 10 min (.59 mile)    Seated therex: x 30   HR/TR    LAQ   Hip adduction ball squeezes   Hip marches      Assessment:     Patient needing min A transferring from w/c to stationary bike. Session ended early due to patient needing to use the restroom.    Pt will continue to benefit from skilled PT intervention. Medical Necessity is demonstrated by:  Fall Risk, Unable to participate fully in daily activities and Weakness.    Patient is making good progress towards established goals.    Plan:  Continue with established Plan of Care towards PT goals.

## 2019-03-29 ENCOUNTER — CLINICAL SUPPORT (OUTPATIENT)
Dept: REHABILITATION | Facility: HOSPITAL | Age: 69
End: 2019-03-29
Attending: PSYCHIATRY & NEUROLOGY
Payer: MEDICARE

## 2019-03-29 DIAGNOSIS — G35 MULTIPLE SCLEROSIS: ICD-10-CM

## 2019-03-29 DIAGNOSIS — R26.89 BALANCE PROBLEM: ICD-10-CM

## 2019-03-29 PROCEDURE — 97110 THERAPEUTIC EXERCISES: CPT | Mod: PN

## 2019-03-29 NOTE — PROGRESS NOTES
"Name: Alyssa John  [facility] Number: [id number]  Date of Treatment: 03/29/2019   Diagnosis:   Encounter Diagnoses   Name Primary?    Balance problem     Multiple sclerosis        Time in: 1512  Time Out: 1558  Total Treatment Time: 46        Subjective:    Alyssa reports "I am having a hard time with sit to stand transfers."  Patient reports their pain to be n/a/10 on a 0-10 scale with 0 being no pain and 10 being the worst pain imaginable.    Objective  Alyssa received therapeutic exercises to develop strength, endurance, ROM, flexibility, posture and core stabilization for 46 minutes including:   Bike 10' .75mile  Seated therex: x 30              HR/TR               LAQ              Hip adduction ball squeezes              Hip marches   CLAM with RTB   Shuttle x 4' 31#   Calf raises on shuttle 2/10    Mod-Max A with t/f from WC<>bike and shuttle x 4    Written Home Exercises Provided: Cont with HEP  Pt demo good understanding of the education provided. Alyssa demonstrated good return demonstration of activities.     Assessment:   Patient remains with increased weakness and fatigue.  Very short steps with transfers and difficulty with weight shifting.    Pt will continue to benefit from skilled PT intervention. Medical Necessity is demonstrated by:  Fall Risk, Continued inability to participate in vocational pursuits, Unable to participate fully in daily activities, Requires skilled supervision to complete and progress HEP, Weakness and Edema.    Patient is making fair progress towards established goals.      Plan:  Continue with established Plan of Care towards PT goals.   "

## 2019-04-02 ENCOUNTER — TELEPHONE (OUTPATIENT)
Dept: REHABILITATION | Facility: HOSPITAL | Age: 69
End: 2019-04-02

## 2019-04-03 ENCOUNTER — TELEPHONE (OUTPATIENT)
Dept: PHARMACY | Facility: CLINIC | Age: 69
End: 2019-04-03

## 2019-04-03 NOTE — TELEPHONE ENCOUNTER
Called to follow up with patient, spoke with Giuseppe. The patient has just started on glatiramer last week (3/25/19) so does not need her next refill at this time. The lag time was due to scheduling nurse training for the whisperject auto injector which has been working well. Patient only had one instance of injection site reaction which went away quickly. [facility] will follow up with patient further at next refill.     Sean Mcmillan, PharmD  Clinical Pharmacist  [facility]  P: [phone]

## 2019-04-04 ENCOUNTER — CLINICAL SUPPORT (OUTPATIENT)
Dept: REHABILITATION | Facility: HOSPITAL | Age: 69
End: 2019-04-04
Attending: PSYCHIATRY & NEUROLOGY
Payer: MEDICARE

## 2019-04-04 DIAGNOSIS — R26.89 BALANCE PROBLEM: ICD-10-CM

## 2019-04-04 DIAGNOSIS — G35 MULTIPLE SCLEROSIS: ICD-10-CM

## 2019-04-04 PROCEDURE — 97110 THERAPEUTIC EXERCISES: CPT | Mod: PN

## 2019-04-04 NOTE — PROGRESS NOTES
"Name: Alyssa John  Date:   04/04/2019  Primary Diagnosis: .  Problem List Items Addressed This Visit     Multiple sclerosis    Balance problem          Time in: 1508  Time Out: 1557  Total Treatment Time: 49  Group Time: 0      Subjective:    Patient reports "I had a dream I was walking." Reports difficulty with sit to stand transfers.  Patient reports their pain to be 0/10 on a 0-10 scale with 0 being no pain and 10 being the worst pain imaginable.    Objective    Patient received individual therapy to address strength, endurance, ROM and flexibility with 0 other patients with activities as follows:     Patient received therapeutic exercises to develop strength, endurance, ROM and flexibility for 0 minutes including:     Bike x 13 min (.76 mile)  Gait training 24 ft at mod A using RW  LAQ x 30 L/R  Standing hip flexion x 30 L/R  Hip adduction ball squeezes x 30  // bars:    HR x 20   Standing hip ABD x 10 L/R   Fwd gait 10 ft x 2    Sit to stand with B UE support: x 5 at min/mod A    Assessment:     Patient with improved step length and clearance the further she ambulated during gait training, cueing for upright posture. Patient needing verbal and tactile cueing for scooting to edge of seat, placement of feet, and trunk momentum for sit to stand technique. Patient showing significant improvement with cueing.     Pt will continue to benefit from skilled PT intervention. Medical Necessity is demonstrated by:  Fall Risk, Unable to participate fully in daily activities, Requires skilled supervision to complete and progress HEP and Weakness.    Patient is making good progress towards established goals.    Plan:  Continue with established Plan of Care towards PT goals.   "

## 2019-04-09 ENCOUNTER — CLINICAL SUPPORT (OUTPATIENT)
Dept: REHABILITATION | Facility: HOSPITAL | Age: 69
End: 2019-04-09
Attending: PSYCHIATRY & NEUROLOGY
Payer: MEDICARE

## 2019-04-09 DIAGNOSIS — R26.89 BALANCE PROBLEM: ICD-10-CM

## 2019-04-09 DIAGNOSIS — G35 MULTIPLE SCLEROSIS: ICD-10-CM

## 2019-04-09 PROCEDURE — 97110 THERAPEUTIC EXERCISES: CPT | Mod: PN

## 2019-04-09 NOTE — PROGRESS NOTES
"Name: Alyssa John  [facility] Number: [id number]  Date of Treatment: 04/09/2019   Diagnosis:   Encounter Diagnoses   Name Primary?    Balance problem     Multiple sclerosis        Time in: 1505  Time Out: 1602  Total Treatment Time: 57      Subjective:    Alyssa reports "I couldn't lift my legs up to get into the tub today, I have a chair in there but I couldn't lift my legs, particularly the left one.  I fell walking out of the bathroom the other day, I have a grab bar that helps me stand up form the toilet, then I turn around and hold onto the counter to get out, but it was at the door, it took me too long to get to the wheelchair and I just couldn't make it."  Patient reports their pain to be 0/10 on a 0-10 scale with 0 being no pain and 10 being the worst pain imaginable.    Objective  Alyssa received therapeutic exercises to develop strength, endurance, ROM and flexibility for 57 minutes including:   NuStep 12' L3 502 steps. 50 steps per minute  Seated marching 2/10 R/L  Ball squeeze 2/10  Seated CLAM 2/10 with RTB  Sit to stand 5 x with mod max A  Shuttle 31# 3/10    Written Home Exercises Provided: Cont with HEP  Pt demo good understanding of the education provided. Alyssa demonstrated good return demonstration of activities.     Assessment:   Patients wheelchair locks not locking appropriately, discussed with .  Patient remains weak and quickly fatigued with activity.    Pt will continue to benefit from skilled PT intervention. Medical Necessity is demonstrated by:  Fall Risk, Continued inability to participate in vocational pursuits, Pain limits function of effected part for some activities, Unable to participate fully in daily activities, Requires skilled supervision to complete and progress HEP and Weakness.    Patient is making fair progress towards established goals.    Plan:  Continue with established Plan of Care towards PT goals.   "

## 2019-04-11 DIAGNOSIS — G35 MULTIPLE SCLEROSIS: ICD-10-CM

## 2019-04-11 DIAGNOSIS — R53.82 CHRONIC FATIGUE: ICD-10-CM

## 2019-04-12 ENCOUNTER — TELEPHONE (OUTPATIENT)
Dept: PHARMACY | Facility: CLINIC | Age: 69
End: 2019-04-12

## 2019-04-12 ENCOUNTER — CLINICAL SUPPORT (OUTPATIENT)
Dept: REHABILITATION | Facility: HOSPITAL | Age: 69
End: 2019-04-12
Attending: PSYCHIATRY & NEUROLOGY
Payer: MEDICARE

## 2019-04-12 DIAGNOSIS — G35 MULTIPLE SCLEROSIS: ICD-10-CM

## 2019-04-12 DIAGNOSIS — R26.89 BALANCE PROBLEM: ICD-10-CM

## 2019-04-12 PROCEDURE — 97110 THERAPEUTIC EXERCISES: CPT | Mod: PN

## 2019-04-12 RX ORDER — MODAFINIL 100 MG/1
TABLET ORAL
Qty: 180 TABLET | Refills: 0 | Status: SHIPPED | OUTPATIENT
Start: 2019-04-12 | End: 2019-08-14 | Stop reason: SDUPTHER

## 2019-04-12 NOTE — TELEPHONE ENCOUNTER
Refill call for Glatiramer. Verified to ship medication to patient's home for delivery on Thursday 4/18/19. $0.00 copay at 004.    Christopher Roque, PharmD  Clinical Pharmacist  [facility]  P: [phone]

## 2019-04-12 NOTE — PROGRESS NOTES
"Name: Alyssa John  [facility] Number: [id number]  Date of Treatment: 04/12/2019   Diagnosis:   Encounter Diagnoses   Name Primary?    Balance problem     Multiple sclerosis        Time in: 1410  Time Out: 1505  Total Treatment Time: 55      Subjective:    Alyssa reports "I don't usually hurt too bad."  Patient reports their pain to be 0/10 on a 0-10 scale with 0 being no pain and 10 being the worst pain imaginable.    Objective  Alyssa received therapeutic exercises to develop strength, endurance, ROM, flexibility and posture for 55 minutes including:   NuStep 12' L3 710 steps. 59 steps per minute   Seated marching 3/10 R/L   Ball squeeze 3/10   Seated CLAM 3/10 with RTB   LAQ 2/10   Shuttle 31# 3/10   Pt with CGA to Min A with tf's today      Written Home Exercises Provided: Cont with HEP  Pt demo good understanding of the education provided. Alyssa demonstrated good return demonstration of activities.     Assessment:   Patient with increased clearance of L LE with therex today, decreased assistance with transfers required today.      Pt will continue to benefit from skilled PT intervention. Medical Necessity is demonstrated by:  Fall Risk, Continued inability to participate in vocational pursuits, Unable to participate fully in daily activities, Requires skilled supervision to complete and progress HEP, Weakness and Edema.    Patient is making fair progress towards established goals.    Plan:  Continue with established Plan of Care towards PT goals.   "

## 2019-04-23 ENCOUNTER — CLINICAL SUPPORT (OUTPATIENT)
Dept: REHABILITATION | Facility: HOSPITAL | Age: 69
End: 2019-04-23
Attending: PSYCHIATRY & NEUROLOGY
Payer: MEDICARE

## 2019-04-23 DIAGNOSIS — R26.89 BALANCE PROBLEM: ICD-10-CM

## 2019-04-23 DIAGNOSIS — G35 MULTIPLE SCLEROSIS: ICD-10-CM

## 2019-04-23 PROCEDURE — 97110 THERAPEUTIC EXERCISES: CPT | Mod: PN

## 2019-04-23 NOTE — PROGRESS NOTES
"Name: Alyssa John  [facility] Number: [id number]  Date of Treatment: 2019   Diagnosis:   Encounter Diagnoses   Name Primary?    Balance problem     Multiple sclerosis        Time in: 1506  Time Out: 1605  Total Treatment Time: 55      Subjective:    Alyssa reports "My sister is going to be going out of town to house sit for someone so I am going to be home by myself and I need to do be able to do things for myself."  Patient reports their pain to be 0/10 on a 0-10 scale with 0 being no pain and 10 being the worst pain imaginable.    Objective  Alyssa received therapeutic exercises to develop strength, endurance, ROM, flexibility, posture and core stabilization for 55 minutes includin# on shuttle x 6'  12' on NuStep 570 steps, L3, with UE's  Ball squeeze 3/10  Seated CLAM with RTB 3/10  Marching 3/10  LAQ 3/10    Written Home Exercises Provided: Cont with HEP  Pt demo good understanding of the education provided. Alyssa demonstrated good return demonstration of activities.     Assessment:   Patient with decreased endurance performing NuStep today.  Requires min- max A to t/f sitting to standing, with assistance controlling descent.    Pt will continue to benefit from skilled PT intervention. Medical Necessity is demonstrated by:  Fall Risk, Continued inability to participate in vocational pursuits, Pain limits function of effected part for some activities, Unable to participate fully in daily activities, Requires skilled supervision to complete and progress HEP and Weakness.    Patient is making fair progress towards established goals.    Plan:  Continue with established Plan of Care towards PT goals.   "

## 2019-04-25 ENCOUNTER — DOCUMENTATION ONLY (OUTPATIENT)
Dept: NEUROLOGY | Facility: CLINIC | Age: 69
End: 2019-04-25

## 2019-04-25 NOTE — PROGRESS NOTES
Notified via fax from [facility] that p'ts glatiramer injection training was done on 3/27/19.

## 2019-04-26 ENCOUNTER — CLINICAL SUPPORT (OUTPATIENT)
Dept: REHABILITATION | Facility: HOSPITAL | Age: 69
End: 2019-04-26
Attending: PSYCHIATRY & NEUROLOGY
Payer: MEDICARE

## 2019-04-26 DIAGNOSIS — R26.89 BALANCE PROBLEM: ICD-10-CM

## 2019-04-26 DIAGNOSIS — G35 MULTIPLE SCLEROSIS: ICD-10-CM

## 2019-04-26 PROCEDURE — 97110 THERAPEUTIC EXERCISES: CPT | Mod: PN

## 2019-04-26 RX ORDER — ATORVASTATIN CALCIUM 40 MG/1
TABLET, FILM COATED ORAL
Qty: 90 TABLET | Refills: 0 | Status: SHIPPED | OUTPATIENT
Start: 2019-04-26 | End: 2019-09-04 | Stop reason: SDUPTHER

## 2019-04-26 NOTE — PROGRESS NOTES
Name: Alyssa John  [facility] Number: [id number]  Date of Treatment: 2019   Diagnosis:   Encounter Diagnoses   Name Primary?    Balance problem     Multiple sclerosis        Time in: 1504  Time Out: 1600  Total Treatment Time: 56      Subjective:    Alyssa reports she does not have a regular wheelchair with large wheels she can use her UE's to help propel, only has transportation wheelchair and she has to depend on someone else to push her or try using her LE's to propel.  Patient reports their pain to be n/a/10 on a 0-10 scale with 0 being no pain and 10 being the worst pain imaginable.    Objective  Alyssa received therapeutic exercises to develop strength, endurance, ROM, flexibility, posture and core stabilization for 56 minutes includin' on NuStep 803 steps, L3, with UE's  Gait training in // bars fwd/bwd 10 x 2 each; L/R side stepping 10 x each  Sit to stand 2 x 5 with max A to Dep A  37# on shuttle x 6'      Written Home Exercises Provided: Cont with HEP  Pt demo good understanding of the education provided. Alyssa demonstrated good return demonstration of activities.     Assessment:   Fatigued quickly with side stepping, and gait training in general.  Short shuffling steps with gait.  COG not within BELLE with static standing with sit to stands    Pt will continue to benefit from skilled PT intervention. Medical Necessity is demonstrated by:  Fall Risk, Continued inability to participate in vocational pursuits, Pain limits function of effected part for some activities, Requires skilled supervision to complete and progress HEP and Weakness.    Patient is making fair progress towards established goals.    Plan:  Continue with established Plan of Care towards PT goals.

## 2019-04-29 ENCOUNTER — HOSPITAL ENCOUNTER (OUTPATIENT)
Facility: HOSPITAL | Age: 69
Discharge: HOME | End: 2019-05-01
Attending: EMERGENCY MEDICINE | Admitting: INTERNAL MEDICINE
Payer: MEDICARE

## 2019-04-29 DIAGNOSIS — I25.10 CORONARY ARTERY DISEASE INVOLVING NATIVE CORONARY ARTERY OF NATIVE HEART WITHOUT ANGINA PECTORIS: ICD-10-CM

## 2019-04-29 DIAGNOSIS — G35 MULTIPLE SCLEROSIS: ICD-10-CM

## 2019-04-29 DIAGNOSIS — G89.29 CHRONIC PAIN: ICD-10-CM

## 2019-04-29 DIAGNOSIS — R07.9 CHEST PAIN: Primary | ICD-10-CM

## 2019-04-29 LAB
ALBUMIN SERPL BCP-MCNC: 3.9 G/DL (ref 3.5–5.2)
ALP SERPL-CCNC: 164 U/L (ref 55–135)
ALT SERPL W/O P-5'-P-CCNC: 24 U/L (ref 10–44)
ANION GAP SERPL CALC-SCNC: 13 MMOL/L (ref 8–16)
AST SERPL-CCNC: 20 U/L (ref 10–40)
BASOPHILS # BLD AUTO: 0 K/UL (ref 0–0.2)
BASOPHILS NFR BLD: 0.3 % (ref 0–1.9)
BILIRUB SERPL-MCNC: 0.6 MG/DL (ref 0.1–1)
BNP SERPL-MCNC: 11 PG/ML (ref 0–99)
BUN SERPL-MCNC: 13 MG/DL (ref 8–23)
CALCIUM SERPL-MCNC: 9.8 MG/DL (ref 8.7–10.5)
CHLORIDE SERPL-SCNC: 112 MMOL/L (ref 95–110)
CO2 SERPL-SCNC: 19 MMOL/L (ref 23–29)
CREAT SERPL-MCNC: 0.7 MG/DL (ref 0.5–1.4)
DIFFERENTIAL METHOD: ABNORMAL
EOSINOPHIL # BLD AUTO: 0.1 K/UL (ref 0–0.5)
EOSINOPHIL NFR BLD: 1 % (ref 0–8)
ERYTHROCYTE [DISTWIDTH] IN BLOOD BY AUTOMATED COUNT: 13.6 % (ref 11.5–14.5)
EST. GFR  (AFRICAN AMERICAN): >60 ML/MIN/1.73 M^2
EST. GFR  (NON AFRICAN AMERICAN): >60 ML/MIN/1.73 M^2
GLUCOSE SERPL-MCNC: 104 MG/DL (ref 70–110)
HCT VFR BLD AUTO: 46 % (ref 37–48.5)
HGB BLD-MCNC: 15 G/DL (ref 12–16)
LYMPHOCYTES # BLD AUTO: 1.3 K/UL (ref 1–4.8)
LYMPHOCYTES NFR BLD: 10.5 % (ref 18–48)
MCH RBC QN AUTO: 28.2 PG (ref 27–31)
MCHC RBC AUTO-ENTMCNC: 32.5 G/DL (ref 32–36)
MCV RBC AUTO: 87 FL (ref 82–98)
MONOCYTES # BLD AUTO: 0.4 K/UL (ref 0.3–1)
MONOCYTES NFR BLD: 3 % (ref 4–15)
NEUTROPHILS # BLD AUTO: 10.6 K/UL (ref 1.8–7.7)
NEUTROPHILS NFR BLD: 85.2 % (ref 38–73)
PLATELET # BLD AUTO: 216 K/UL (ref 150–350)
PMV BLD AUTO: 9 FL (ref 9.2–12.9)
POTASSIUM SERPL-SCNC: 3.6 MMOL/L (ref 3.5–5.1)
PROT SERPL-MCNC: 7.3 G/DL (ref 6–8.4)
RBC # BLD AUTO: 5.3 M/UL (ref 4–5.4)
SODIUM SERPL-SCNC: 144 MMOL/L (ref 136–145)
TROPONIN I SERPL DL<=0.01 NG/ML-MCNC: <0.006 NG/ML (ref 0–0.03)
WBC # BLD AUTO: 12.5 K/UL (ref 3.9–12.7)

## 2019-04-29 PROCEDURE — 93010 ELECTROCARDIOGRAM REPORT: CPT | Mod: ,,, | Performed by: INTERNAL MEDICINE

## 2019-04-29 PROCEDURE — 83880 ASSAY OF NATRIURETIC PEPTIDE: CPT

## 2019-04-29 PROCEDURE — 93010 EKG 12-LEAD: ICD-10-PCS | Mod: ,,, | Performed by: INTERNAL MEDICINE

## 2019-04-29 PROCEDURE — 93005 ELECTROCARDIOGRAM TRACING: CPT

## 2019-04-29 PROCEDURE — 36415 COLL VENOUS BLD VENIPUNCTURE: CPT

## 2019-04-29 PROCEDURE — 96360 HYDRATION IV INFUSION INIT: CPT

## 2019-04-29 PROCEDURE — 63600175 PHARM REV CODE 636 W HCPCS: Performed by: EMERGENCY MEDICINE

## 2019-04-29 PROCEDURE — 96361 HYDRATE IV INFUSION ADD-ON: CPT

## 2019-04-29 PROCEDURE — 99285 EMERGENCY DEPT VISIT HI MDM: CPT | Mod: 25

## 2019-04-29 PROCEDURE — 25500020 PHARM REV CODE 255: Performed by: EMERGENCY MEDICINE

## 2019-04-29 PROCEDURE — 85025 COMPLETE CBC W/AUTO DIFF WBC: CPT

## 2019-04-29 PROCEDURE — 80053 COMPREHEN METABOLIC PANEL: CPT

## 2019-04-29 PROCEDURE — G0378 HOSPITAL OBSERVATION PER HR: HCPCS

## 2019-04-29 PROCEDURE — 84484 ASSAY OF TROPONIN QUANT: CPT

## 2019-04-29 PROCEDURE — 25000003 PHARM REV CODE 250: Performed by: EMERGENCY MEDICINE

## 2019-04-29 RX ORDER — NITROGLYCERIN 0.4 MG/1
0.4 TABLET SUBLINGUAL
Status: COMPLETED | OUTPATIENT
Start: 2019-04-29 | End: 2019-04-29

## 2019-04-29 RX ORDER — ASPIRIN 325 MG
325 TABLET ORAL
Status: COMPLETED | OUTPATIENT
Start: 2019-04-29 | End: 2019-04-29

## 2019-04-29 RX ORDER — FAMOTIDINE 20 MG/1
20 TABLET, FILM COATED ORAL 2 TIMES DAILY PRN
COMMUNITY

## 2019-04-29 RX ADMIN — NITROGLYCERIN 0.4 MG: 0.4 TABLET, ORALLY DISINTEGRATING SUBLINGUAL at 08:04

## 2019-04-29 RX ADMIN — IOHEXOL 100 ML: 350 INJECTION, SOLUTION INTRAVENOUS at 10:04

## 2019-04-29 RX ADMIN — LIDOCAINE HYDROCHLORIDE: 20 SOLUTION ORAL; TOPICAL at 10:04

## 2019-04-29 RX ADMIN — ASPIRIN 325 MG ORAL TABLET 325 MG: 325 PILL ORAL at 08:04

## 2019-04-29 RX ADMIN — SODIUM CHLORIDE 1000 ML: 0.9 INJECTION, SOLUTION INTRAVENOUS at 09:04

## 2019-04-30 ENCOUNTER — TELEPHONE (OUTPATIENT)
Dept: NEUROLOGY | Facility: CLINIC | Age: 69
End: 2019-04-30

## 2019-04-30 LAB
ANION GAP SERPL CALC-SCNC: 9 MMOL/L (ref 8–16)
BASOPHILS # BLD AUTO: 0 K/UL (ref 0–0.2)
BASOPHILS NFR BLD: 0.3 % (ref 0–1.9)
BUN SERPL-MCNC: 13 MG/DL (ref 8–23)
CALCIUM SERPL-MCNC: 8.8 MG/DL (ref 8.7–10.5)
CHLORIDE SERPL-SCNC: 113 MMOL/L (ref 95–110)
CHOLEST SERPL-MCNC: 145 MG/DL (ref 120–199)
CHOLEST/HDLC SERPL: 3.8 {RATIO} (ref 2–5)
CO2 SERPL-SCNC: 21 MMOL/L (ref 23–29)
CREAT SERPL-MCNC: 0.7 MG/DL (ref 0.5–1.4)
D DIMER PPP IA.FEU-MCNC: 0.93 MG/L FEU
DIFFERENTIAL METHOD: ABNORMAL
EOSINOPHIL # BLD AUTO: 0.1 K/UL (ref 0–0.5)
EOSINOPHIL NFR BLD: 0.8 % (ref 0–8)
ERYTHROCYTE [DISTWIDTH] IN BLOOD BY AUTOMATED COUNT: 13.3 % (ref 11.5–14.5)
EST. GFR  (AFRICAN AMERICAN): >60 ML/MIN/1.73 M^2
EST. GFR  (NON AFRICAN AMERICAN): >60 ML/MIN/1.73 M^2
GLUCOSE SERPL-MCNC: 113 MG/DL (ref 70–110)
HCT VFR BLD AUTO: 39.5 % (ref 37–48.5)
HDLC SERPL-MCNC: 38 MG/DL (ref 40–75)
HDLC SERPL: 26.2 % (ref 20–50)
HGB BLD-MCNC: 12.9 G/DL (ref 12–16)
LDLC SERPL CALC-MCNC: 77.6 MG/DL (ref 63–159)
LYMPHOCYTES # BLD AUTO: 1.3 K/UL (ref 1–4.8)
LYMPHOCYTES NFR BLD: 15.4 % (ref 18–48)
MCH RBC QN AUTO: 28 PG (ref 27–31)
MCHC RBC AUTO-ENTMCNC: 32.7 G/DL (ref 32–36)
MCV RBC AUTO: 86 FL (ref 82–98)
MONOCYTES # BLD AUTO: 0.6 K/UL (ref 0.3–1)
MONOCYTES NFR BLD: 7.1 % (ref 4–15)
NEUTROPHILS # BLD AUTO: 6.4 K/UL (ref 1.8–7.7)
NEUTROPHILS NFR BLD: 76.4 % (ref 38–73)
NONHDLC SERPL-MCNC: 107 MG/DL
PLATELET # BLD AUTO: 192 K/UL (ref 150–350)
PMV BLD AUTO: 8.9 FL (ref 9.2–12.9)
POTASSIUM SERPL-SCNC: 3.3 MMOL/L (ref 3.5–5.1)
RBC # BLD AUTO: 4.61 M/UL (ref 4–5.4)
SODIUM SERPL-SCNC: 143 MMOL/L (ref 136–145)
TRIGL SERPL-MCNC: 147 MG/DL (ref 30–150)
TROPONIN I SERPL DL<=0.01 NG/ML-MCNC: <0.006 NG/ML (ref 0–0.03)
WBC # BLD AUTO: 8.4 K/UL (ref 3.9–12.7)

## 2019-04-30 PROCEDURE — G0378 HOSPITAL OBSERVATION PER HR: HCPCS

## 2019-04-30 PROCEDURE — 25500020 PHARM REV CODE 255: Performed by: SPECIALIST

## 2019-04-30 PROCEDURE — 85379 FIBRIN DEGRADATION QUANT: CPT

## 2019-04-30 PROCEDURE — 84484 ASSAY OF TROPONIN QUANT: CPT | Mod: 91

## 2019-04-30 PROCEDURE — 97162 PT EVAL MOD COMPLEX 30 MIN: CPT

## 2019-04-30 PROCEDURE — 94761 N-INVAS EAR/PLS OXIMETRY MLT: CPT

## 2019-04-30 PROCEDURE — 85025 COMPLETE CBC W/AUTO DIFF WBC: CPT

## 2019-04-30 PROCEDURE — 36415 COLL VENOUS BLD VENIPUNCTURE: CPT

## 2019-04-30 PROCEDURE — 93005 ELECTROCARDIOGRAM TRACING: CPT

## 2019-04-30 PROCEDURE — 97116 GAIT TRAINING THERAPY: CPT | Mod: 59

## 2019-04-30 PROCEDURE — 97530 THERAPEUTIC ACTIVITIES: CPT

## 2019-04-30 PROCEDURE — 25000003 PHARM REV CODE 250: Performed by: NURSE PRACTITIONER

## 2019-04-30 PROCEDURE — 25000003 PHARM REV CODE 250: Performed by: INTERNAL MEDICINE

## 2019-04-30 PROCEDURE — G8978 MOBILITY CURRENT STATUS: HCPCS | Mod: CL

## 2019-04-30 PROCEDURE — 80048 BASIC METABOLIC PNL TOTAL CA: CPT

## 2019-04-30 PROCEDURE — 80061 LIPID PANEL: CPT

## 2019-04-30 PROCEDURE — G8979 MOBILITY GOAL STATUS: HCPCS | Mod: CK

## 2019-04-30 RX ORDER — ONDANSETRON 2 MG/ML
4 INJECTION INTRAMUSCULAR; INTRAVENOUS EVERY 6 HOURS PRN
Status: DISCONTINUED | OUTPATIENT
Start: 2019-04-30 | End: 2019-05-01 | Stop reason: HOSPADM

## 2019-04-30 RX ORDER — FAMOTIDINE 20 MG/1
20 TABLET, FILM COATED ORAL 2 TIMES DAILY PRN
Status: DISCONTINUED | OUTPATIENT
Start: 2019-04-30 | End: 2019-05-01 | Stop reason: HOSPADM

## 2019-04-30 RX ORDER — CLOPIDOGREL BISULFATE 75 MG/1
75 TABLET ORAL DAILY
Status: DISCONTINUED | OUTPATIENT
Start: 2019-04-30 | End: 2019-05-01 | Stop reason: HOSPADM

## 2019-04-30 RX ORDER — POTASSIUM CHLORIDE 20 MEQ/1
40 TABLET, EXTENDED RELEASE ORAL ONCE
Status: COMPLETED | OUTPATIENT
Start: 2019-04-30 | End: 2019-04-30

## 2019-04-30 RX ORDER — AMOXICILLIN 250 MG
1 CAPSULE ORAL 2 TIMES DAILY PRN
Status: DISCONTINUED | OUTPATIENT
Start: 2019-04-30 | End: 2019-05-01 | Stop reason: HOSPADM

## 2019-04-30 RX ORDER — CLONAZEPAM 1 MG/1
1 TABLET ORAL 2 TIMES DAILY
Status: DISCONTINUED | OUTPATIENT
Start: 2019-04-30 | End: 2019-05-01 | Stop reason: HOSPADM

## 2019-04-30 RX ORDER — ATORVASTATIN CALCIUM 40 MG/1
40 TABLET, FILM COATED ORAL DAILY
Status: DISCONTINUED | OUTPATIENT
Start: 2019-04-30 | End: 2019-05-01 | Stop reason: HOSPADM

## 2019-04-30 RX ORDER — ACETAMINOPHEN 500 MG
5000 TABLET ORAL DAILY
Status: DISCONTINUED | OUTPATIENT
Start: 2019-04-30 | End: 2019-05-01 | Stop reason: HOSPADM

## 2019-04-30 RX ORDER — CETIRIZINE HYDROCHLORIDE 10 MG/1
10 TABLET ORAL DAILY PRN
Status: DISCONTINUED | OUTPATIENT
Start: 2019-04-30 | End: 2019-05-01 | Stop reason: HOSPADM

## 2019-04-30 RX ORDER — MODAFINIL 100 MG/1
100 TABLET ORAL 2 TIMES DAILY
Status: DISCONTINUED | OUTPATIENT
Start: 2019-04-30 | End: 2019-05-01 | Stop reason: HOSPADM

## 2019-04-30 RX ORDER — IBUPROFEN 600 MG/1
600 TABLET ORAL EVERY 6 HOURS PRN
Status: DISCONTINUED | OUTPATIENT
Start: 2019-04-30 | End: 2019-05-01 | Stop reason: HOSPADM

## 2019-04-30 RX ORDER — SODIUM CHLORIDE 0.9 % (FLUSH) 0.9 %
10 SYRINGE (ML) INJECTION
Status: DISCONTINUED | OUTPATIENT
Start: 2019-04-30 | End: 2019-05-01 | Stop reason: HOSPADM

## 2019-04-30 RX ORDER — NITROGLYCERIN 0.4 MG/1
0.4 TABLET SUBLINGUAL EVERY 5 MIN PRN
Status: DISCONTINUED | OUTPATIENT
Start: 2019-04-30 | End: 2019-05-01 | Stop reason: HOSPADM

## 2019-04-30 RX ORDER — DULOXETIN HYDROCHLORIDE 20 MG/1
20 CAPSULE, DELAYED RELEASE ORAL DAILY
Status: DISCONTINUED | OUTPATIENT
Start: 2019-04-30 | End: 2019-05-01 | Stop reason: HOSPADM

## 2019-04-30 RX ORDER — TOPIRAMATE 25 MG/1
50 TABLET ORAL 2 TIMES DAILY
Status: DISCONTINUED | OUTPATIENT
Start: 2019-04-30 | End: 2019-05-01 | Stop reason: HOSPADM

## 2019-04-30 RX ADMIN — TOPIRAMATE 50 MG: 25 TABLET, FILM COATED ORAL at 09:04

## 2019-04-30 RX ADMIN — SULFUR HEXAFLUORIDE 2.4 ML: KIT at 02:04

## 2019-04-30 RX ADMIN — MODAFINIL 100 MG: 100 TABLET ORAL at 10:04

## 2019-04-30 RX ADMIN — IBUPROFEN 600 MG: 600 TABLET, FILM COATED ORAL at 07:04

## 2019-04-30 RX ADMIN — CLONAZEPAM 1 MG: 1 TABLET ORAL at 01:04

## 2019-04-30 RX ADMIN — CHOLECALCIFEROL TAB 125 MCG (5000 UNIT) 5000 UNITS: 125 TAB at 09:04

## 2019-04-30 RX ADMIN — CLOPIDOGREL BISULFATE 75 MG: 75 TABLET ORAL at 09:04

## 2019-04-30 RX ADMIN — CLONAZEPAM 1 MG: 1 TABLET ORAL at 09:04

## 2019-04-30 RX ADMIN — CLONAZEPAM 1 MG: 1 TABLET ORAL at 10:04

## 2019-04-30 RX ADMIN — FAMOTIDINE 20 MG: 20 TABLET ORAL at 07:04

## 2019-04-30 RX ADMIN — TOPIRAMATE 50 MG: 25 TABLET, FILM COATED ORAL at 01:04

## 2019-04-30 RX ADMIN — ATORVASTATIN CALCIUM 40 MG: 40 TABLET, FILM COATED ORAL at 09:04

## 2019-04-30 RX ADMIN — POTASSIUM CHLORIDE 40 MEQ: 20 TABLET, EXTENDED RELEASE ORAL at 12:04

## 2019-04-30 RX ADMIN — DULOXETINE HYDROCHLORIDE 20 MG: 20 CAPSULE, DELAYED RELEASE ORAL at 09:04

## 2019-04-30 NOTE — TELEPHONE ENCOUNTER
----- Message from Mulugeta Pradhan sent at 4/30/2019  9:49 AM CDT -----  Needs Advice    Reason for call: Mr. John states pt was admitted to the hospital last night due to chest pains, Mr. John states the chest pains happened 3 hours after copaxone injection. Mr. John states ED wants to rule out blood clot in lung, due to pain being persistent, and wants to complete a test, but Mr. John states pt has claustrophobia and is asking which antianxiety medication was prescribed previously so they can let the hospital staff know so she can take it for the test        Communication Preference: Mr. John () @ [phone]    Additional Information:

## 2019-04-30 NOTE — TELEPHONE ENCOUNTER
Call returned to pt's  and advised pt normally takes valium 5mg-2 tabs for MRI claustrophobia.     States that pt started having chest pains 3 hours or more after her Copaxone injection yesterday. Advised that likely not related to injection since so much time passed before pain started. Pt is also still experiencing intermittent pain. States that tests are being run to determine cause.

## 2019-04-30 NOTE — ED PROVIDER NOTES
Encounter Date: 4/29/2019    SCRIBE #1 NOTE: I, Roberth Canas, am scribing for, and in the presence of, Dr. Campoverde.       History     Chief Complaint   Patient presents with    Chest Pain     chest hurts with a deep breath. No fever.      04/29/2019 7:59 PM    The pt is a 68 y.o. female with a past medical history of CAD, MI, HTN, MS, and seizures presenting to the ED with a sudden onset of acute chest pain beginning 2 hrs ago. She stated this is the first occurrence of symptoms and unlike her previous MI. The pt described the chest pain as a constant mid-sternal pain that is worsened with taking deep breaths. Associated symptom of SOB. The pt denied nausea, vomiting, dysuria, hematuria, and diaphoresis. She noted no improvement of symptoms with NTG and baby aspirin. The pt denied recent long travel. She denied history of heart failure and GERD. The pt noted she recently began taking copaxone within the past month. She has a history of cigarette smoking.The pt has a history of back surgery, coronary stent placement, and R knee surgery.    The history is provided by the patient.     Review of patient's allergies indicates:   Allergen Reactions    Codeine     Dilantin [phenytoin sodium extended]     Phenobarbital     Tegretol [carbamazepine]      Past Medical History:   Diagnosis Date    Arthritis     Coronary artery disease     Encounter for blood transfusion     Epilepsy     GERD (gastroesophageal reflux disease)     Hypertension     MI, old     MS (multiple sclerosis)     Seizures     epilepsy     Past Surgical History:   Procedure Laterality Date    APPENDECTOMY      BACK SURGERY      L5 discectomy    BREAST BIOPSY Right 15 yrs ago    benign    COLONOSCOPY N/A 9/16/2015    Performed by [facility]olm [facility]O    CORONARY STENT PLACEMENT      FLUORO URODYNAMIC STUDY (FUDS) N/A 12/9/2014    Per[facility]    right knee arthroscopy       Family  History   Problem Relation Age of Onset    Scleroderma Mother     Heart disease Father         MI, CAD    Hyperlipidemia Sister     Cancer Neg Hx     Diabetes Neg Hx     Stroke Neg Hx     Melanoma Neg Hx     Psoriasis Neg Hx     Lupus Neg Hx     Eczema Neg Hx      Social History     Tobacco Use    Smoking status: Former Smoker     Packs/day: 1.50     Last attempt to quit: 2006     Years since quittin.3    Smokeless tobacco: Never Used   Substance Use Topics    Alcohol use: No     Alcohol/week: 0.0 oz    Drug use: No     Review of Systems   Constitutional: Negative for activity change, diaphoresis and fever.   HENT: Negative for ear pain, rhinorrhea, sore throat and trouble swallowing.    Eyes: Negative for pain and visual disturbance.   Respiratory: Positive for shortness of breath. Negative for cough and stridor.    Cardiovascular: Positive for chest pain.   Gastrointestinal: Negative for abdominal pain, blood in stool, diarrhea, nausea and vomiting.   Genitourinary: Negative for dysuria, hematuria, vaginal bleeding and vaginal discharge.   Musculoskeletal: Negative for gait problem.   Skin: Negative for rash.   Neurological: Negative for seizures and headaches.   Psychiatric/Behavioral: Negative for hallucinations and suicidal ideas.       Physical Exam     Initial Vitals [19]   BP Pulse Resp Temp SpO2   132/69 86 -- 97.8 °F (36.6 °C) 98 %      MAP       --         Physical Exam    Nursing note and vitals reviewed.  Constitutional: She appears well-developed. No distress.   HENT:   Head: Normocephalic and atraumatic.   Nose: Nose normal.   Eyes: EOM are normal.   Neck: Normal range of motion. Neck supple.   Cardiovascular: Normal rate, regular rhythm, normal heart sounds and intact distal pulses. Exam reveals no gallop and no friction rub.    No murmur heard.  Pulmonary/Chest: Breath sounds normal. No stridor. No respiratory distress. She has no wheezes. She has no rhonchi. She  has no rales.   Abdominal: Soft. Bowel sounds are normal. There is no tenderness.   Musculoskeletal: Normal range of motion.   Neurological: She is alert and oriented to person, place, and time. No cranial nerve deficit.   Skin: Skin is warm and dry. No rash noted.   Psychiatric: She has a normal mood and affect.         ED Course   Procedures  Labs Reviewed   CBC W/ AUTO DIFFERENTIAL - Abnormal; Notable for the following components:       Result Value    MPV 9.0 (*)     Gran # (ANC) 10.6 (*)     Gran% 85.2 (*)     Lymph% 10.5 (*)     Mono% 3.0 (*)     All other components within normal limits   COMPREHENSIVE METABOLIC PANEL - Abnormal; Notable for the following components:    Chloride 112 (*)     CO2 19 (*)     Alkaline Phosphatase 164 (*)     All other components within normal limits   TROPONIN I   B-TYPE NATRIURETIC PEPTIDE   TROPONIN I   D DIMER, QUANTITATIVE     EKG Readings: (Independently Interpreted)   Initial Reading: No STEMI. Rhythm: Normal Sinus Rhythm. Heart Rate: 85.   T wave inversion V3-V5  Normal intervals       Imaging Results          X-Ray Chest AP Portable (In process)                  Medical Decision Making:   History:   Old Medical Records: I decided to obtain old medical records.  Independently Interpreted Test(s):   I have ordered and independently interpreted EKG Reading(s) - see summary below  Clinical Tests:   Lab Tests: Reviewed and Ordered  Radiological Study: Reviewed and Ordered  Medical Tests: Reviewed and Ordered            Scribe Attestation:   Scribe #1: I performed the above scribed service and the documentation accurately describes the services I performed. I attest to the accuracy of the note.      Attending Attestation:     Physician Attestation for Scribe:    I, Dr. Yobani Campoverde, personally performed the services described in this documentation.   All medical record entries made by the scribe were at my direction and in my presence.   I have reviewed the chart and agree  that the record is accurate and complete.   Yobani Campoverde MD  6:20 AM 04/30/2019     DISCLAIMER: This note was prepared with [employer] Naturally Speaking voice recognition transcription software. Garbled syntax, mangled pronouns, and other bizarre constructions may be attributed to that software system.          ED Course as of Apr 30 0000   Mon Apr 29, 2019 2032 68-year-old female past medical history of MS, epilepsy, CAD status post stent presents today with pleuritic chest pain x1 hour. Afebrile.  Vital signs stable. EKG with normal sinus rhythm no STEMI.  Patient reports chest pain is different than when she had her MI in the past however patient has significant history so will initiate ACS workup.  Given pleuritic nature and left lower extremity swelling patient is intermediate risk so will also get CT Pe.    [BD]   2216 Patient refused CT PE.  Will admit for ACS rule out and add on D-dimer.   [BD]      ED Course User Index  [BD] Yobani Campoverde MD     Clinical Impression:       ICD-10-CM ICD-9-CM   1. Chest pain R07.9 786.50         Disposition:   Disposition: Admitted                        Yobnai Campoverde MD  04/30/19 0622

## 2019-04-30 NOTE — PLAN OF CARE
Problem: Physical Therapy Goal  Goal: Physical Therapy Goal  Goals to be met by: 2019     Patient will increase functional independence with mobility by performin. Supine to sit with MInimal Assistance  2. Sit to stand transfer with Minimal Assistance  3. Bed to chair transfer with Minimal Assistance using Rolling Walker  4. Gait  x 50 feet with Moderate Assistance using Rolling Walker.   5. Lower extremity exercise program x20 reps    Outcome: Ongoing (interventions implemented as appropriate)  PT eval and treat completed. Gait 15 ft with HHA/ RW mod assist with small shuffling steps with her own foot wear. OOB to chair. Pt ambulated to bathroom with urge for BM but unsuccessful. Spouse present and assisting with care

## 2019-04-30 NOTE — CONSULTS
Dictated.  Musculoskeletal pain.   IWMI and RCA stent 2011. HLD.  EKG today.  Echo anterior fat pad . No effusion.

## 2019-04-30 NOTE — H&P
[facility] Hospital Medicine  History & Physical    Patient Name: Alyssa John  MRN: [id number]  Admission Date: 4/29/2019  Attending Physician: Vonda Mariano MD   Primary Care Provider: April Edmondson MD         Patient information was obtained from patient, spouse/SO, past medical records and ER records.     Subjective:     Principal Problem:Chest pain    Chief Complaint:   Chief Complaint   Patient presents with    Chest Pain     chest hurts with a deep breath. No fever.         HPI: Ms. John is a 69yo F with a PMH of CAD/MI, HTN, Seizure Disorder, GERD, and MS. She presents to the ED with c/o Chest pain. It started today after taking a shower to her mid chest. It's constant and non-radiating. Deep breathing makes it worst and nothing makes it better. She is unable to describe the pain. While in the ED, he labs and CXR were unremarkable. A Chest CTA was ordered, but she refused because a larger IV would need to be inserted. She currently c/o the same pain. The nurse is at bedside giving her a GI cocktail to drink. Her spouse is at bedside.    Past Medical History:   Diagnosis Date    Arthritis     Coronary artery disease     Encounter for blood transfusion     Epilepsy     GERD (gastroesophageal reflux disease)     Hypertension     MI, old     MS (multiple sclerosis)     Seizures     epilepsy       Past Surgical History:   Procedure Laterality Date    APPENDECTOMY      BACK SURGERY      L5 discectomy    BREAST BIOPSY Right 15 yrs ago    benign    COLONOSCOPY N/A 9/16/2015    [facility]    CORONARY STENT PLACEMENT      FLUORO URODYNAMIC STUDY (FUDS) N/A 12/9/2014    Performed by [facility] at [facility] OR [address] [place] [address]    right knee arthroscopy         Review of patient's allergies indicates:   Allergen Reactions    Codeine     Dilantin [phenytoin sodium extended]     Phenobarbital     Tegretol [carbamazepine]        No  current facility-administered medications on file prior to encounter.      Current Outpatient Medications on File Prior to Encounter   Medication Sig    atorvastatin (LIPITOR) 40 MG tablet TAKE 1 TABLET BY MOUTH EVERY DAY    cetirizine (ZYRTEC) 10 MG tablet Take 1 tablet (10 mg total) by mouth once daily. (Patient taking differently: Take 10 mg by mouth daily as needed for Allergies or Rhinitis. )    cholecalciferol, vitamin D3, (VITAMIN D3) 5,000 unit Tab Take 5,000 Units by mouth once daily.    clonazePAM (KLONOPIN) 1 MG tablet Take 1 tablet (1 mg total) by mouth 2 (two) times daily.    clopidogrel (PLAVIX) 75 mg tablet Take 75 mg by mouth once daily.      DULoxetine (CYMBALTA) 20 MG capsule TAKE 1 CAPSULE EVERY DAY    famotidine (PEPCID) 20 MG tablet Take 20 mg by mouth 2 (two) times daily as needed for Heartburn.    fluticasone (FLONASE) 50 mcg/actuation nasal spray 2 sprays (100 mcg total) by Each Nare route 2 (two) times daily. (Patient taking differently: 2 sprays by Each Nare route 2 (two) times daily as needed for Rhinitis. )    glatiramer (COPAXONE) 40 mg/mL Syrg injection Inject 40 mg into the skin 3 (three) times a week. (Patient taking differently: Inject 40 mg into the skin 3 (three) times a week. Mon Wed Fri)    ibuprofen (ADVIL,MOTRIN) 600 MG tablet Take 1 tablet (600 mg total) by mouth every 6 (six) hours as needed. (Patient taking differently: Take 600 mg by mouth every 6 (six) hours as needed for Pain. )    modafinil (PROVIGIL) 100 MG Tab TAKE 1 TABLET BY MOUTH TWICE A DAY    nitroGLYCERIN (NITROSTAT) 0.4 MG SL tablet Place 0.4 mg under the tongue every 5 (five) minutes as needed for Chest pain.     PSYLLIUM SEED, WITH DEXTROSE, (FIBER ORAL) Take by mouth 2 (two) times daily.    topiramate (TOPAMAX) 50 MG tablet Take 1 tablet (50 mg total) by mouth 2 (two) times daily.    VENTOLIN HFA 90 mcg/actuation inhaler INHALE 2 PUFFS INTO THE LUNGS EVERY 6 (SIX) HOURS AS NEEDED FOR WHEEZING.  RESCUE     Family History     Problem Relation (Age of Onset)    Mother:   Father:      Heart disease Father    Hyperlipidemia Sister    Scleroderma Mother        Tobacco Use    Smoking status: Former Smoker     Packs/day: 1.50     Last attempt to quit: 2006     Years since quittin.3    Smokeless tobacco: Never Used   Substance and Sexual Activity    Alcohol use: No     Alcohol/week: 0.0 oz    Drug use: No    Sexual activity: Not Currently     Partners: Male     Review of Systems   Constitutional: Negative for diaphoresis, fatigue and fever.   HENT: Negative for congestion.    Respiratory: Negative for cough and shortness of breath.    Cardiovascular: Positive for chest pain. Negative for palpitations.   Gastrointestinal: Negative for abdominal pain, diarrhea, nausea and vomiting.   Genitourinary: Negative for dysuria.   Musculoskeletal: Negative for arthralgias.   Skin: Negative for wound.   Neurological: Negative for dizziness, syncope and headaches.   Hematological: Does not bruise/bleed easily.   Psychiatric/Behavioral: Negative for confusion and hallucinations.     Objective:     Vital Signs (Most Recent):  Temp: 97.5 °F (36.4 °C) (19)  Pulse: 106 (19)  Resp: 20 (19)  BP: (!) 162/84 (19)  SpO2: 96 % (19) Vital Signs (24h Range):  Temp:  [97.5 °F (36.4 °C)-97.8 °F (36.6 °C)] 97.5 °F (36.4 °C)  Pulse:  [] 106  Resp:  [20] 20  SpO2:  [91 %-100 %] 96 %  BP: ()/(54-91) 162/84     Weight: 71.2 kg (157 lb)  Body mass index is 27.81 kg/m².    Physical Exam   Constitutional: She is oriented to person, place, and time. No distress.   HENT:   Head: Normocephalic.   Eyes: Pupils are equal, round, and reactive to light.   Neck: Normal range of motion. Neck supple. No JVD present. No tracheal deviation present.   Cardiovascular: Normal rate, regular rhythm, normal heart sounds and intact distal pulses.   No murmur heard.  NSR  on telemetry   Pulmonary/Chest: Effort normal and breath sounds normal. No respiratory distress.   Abdominal: Soft. Bowel sounds are normal. She exhibits no distension. There is no tenderness.   Musculoskeletal: Normal range of motion. She exhibits no edema.   Generalized weakness   Neurological: She is alert and oriented to person, place, and time. No cranial nerve deficit.   Skin: Skin is warm and dry. Capillary refill takes less than 2 seconds.   Psychiatric: She has a normal mood and affect. Her behavior is normal. Judgment and thought content normal.         CRANIAL NERVES     CN III, IV, VI   Pupils are equal, round, and reactive to light.       Significant Labs:   CBC:   Recent Labs   Lab 04/29/19 2103   WBC 12.50   HGB 15.0   HCT 46.0        CMP:   Recent Labs   Lab 04/29/19 2103      K 3.6   *   CO2 19*      BUN 13   CREATININE 0.7   CALCIUM 9.8   PROT 7.3   ALBUMIN 3.9   BILITOT 0.6   ALKPHOS 164*   AST 20   ALT 24   ANIONGAP 13   EGFRNONAA >60     Cardiac Markers:   Recent Labs   Lab 04/29/19 2103   BNP 11     Troponin:   Recent Labs   Lab 04/29/19 2103 04/30/19  0004   TROPONINI <0.006 <0.006       Significant Imaging:     CXR: Reviewed film-- looks ok.    Assessment/Plan:     * Chest pain  Given ASA 325mg in ED  Drinking GI Cocktail now  Troponin negative--continue to trend  Monitor on telemetry  Continue chronic statin and plavix  Not on chronic ASA  D-dimer pending  Obtain Fasting Lipid Panel    Hypertension  Chronic/Controlled. Continue chronic medications, adjusting as needed.      Seizures  Stable/Chronic. Continue Topamax and Klonopin      CAD (coronary artery disease)  Continue statin and plavix  Not on chronic ASA      Multiple sclerosis  Chronic  Gets Glatiramer Sq QMWF  PT Consult        VTE Risk Mitigation (From admission, onward)        Ordered     IP VTE LOW RISK PATIENT  Once      04/30/19 0050          Lucy Ball NP  Department of Hospital Medicine    [facility]    4/30/19: D-dimer 0.93. Appears to be chronically elevated. Do not see where she had any further testing. VQ scan ordered for this AM.    Addendum:  Patient seen and examined.  I agree with the findings, assessment and plan as outlined in the note by NP.  Patient is a 68-year-old  female with past medical history significant for multiple sclerosis, prior history of coronary artery disease status post acute myocardial infarction, status post coronary artery stent placement, hypertension, gastroesophageal reflux disease and history of seizure disorder.  Part of the history obtained from patient's  at bedside.  Patient was admitted with atypical chest pain which is mostly pleuritic, nonradiating, substernal in location.  Patient refused to have large bore IV access therefore did not agree for CT scan of the chest for pulmonary embolism evaluation.  D-dimer is mildly elevated.  Patient is scheduled for V/Q scan.  Will consult patient's cardiologist Dr. Koby Mcmillan and obtain 2D echocardiogram.  Patient denies any gastroesophageal reflux related symptoms at this time. Supplemental O2 via nasal canula; titrate O2 saturation to >92%. Serial Cardiac enzymes × 3. Tele-monitoring.   EKG shows normal sinus rhythm, 85 beats per minute, old inferior wall myocardial infarction changes. Use Nitroglycerine PRN Chest pain. Na restriction <2g/d.  Continue patient's routine medications as before.  Observe fall precautions.  Consult Physical therapy for evaluation and treatment.  Maintain gastric ulcer prophylaxis and deep venous thrombosis prophylaxis during this hospitalization.

## 2019-04-30 NOTE — ASSESSMENT & PLAN NOTE
Given ASA 325mg in ED  Drinking GI Cocktail now  Troponin negative--continue to trend  Monitor on telemetry  Continue chronic statin and plavix  Not on chronic ASA  D-dimer pending  Obtain Fasting Lipid Panel

## 2019-04-30 NOTE — PT/OT/SLP EVAL
Physical Therapy Evaluation    Patient Name:  Alyssa John   MRN:  [id number]    Recommendations:     Discharge Recommendations:  outpatient PT   Discharge Equipment Recommendations: none   Barriers to discharge: None    Assessment:     Alyssa John is a 68 y.o. female admitted with a medical diagnosis of Chest pain.  She presents with the following impairments/functional limitations:  weakness, impaired endurance, gait instability, impaired functional mobilty, impaired self care skills, impaired balance, decreased lower extremity function . Pt with hx of MS since 2010 and being cared for by her spouse at home. Pt attends OP PT at [facility] 2x week. Pt ambulatory short distance with assist from spouse. Pt has wheelchair at home. Pt to benefit from continued therapies.    Rehab Prognosis: Fair; patient would benefit from acute skilled PT services to address these deficits and reach maximum level of function.    Recent Surgery: * No surgery found *      Plan:     During this hospitalization, patient to be seen 6 x/week to address the identified rehab impairments via gait training, therapeutic activities, therapeutic exercises and progress toward the following goals:    · Plan of Care Expires:  05/17/19    Subjective   Pt denies CP  Chief Complaint: weakness, hot and does not tolerate socks on  Spouse also stated is claustrophobic  Patient/Family Comments/goals: get home today  Pain/Comfort:  · Pain Rating 1: 0/10    Patients cultural, spiritual, Congregation conflicts given the current situation:      Living Environment:  Home with spouse- assist for all mobility  Prior to admission, patients level of function was ambulatory short distance with spouse.  Equipment used at home: wheelchair.  DME owned (not currently used): .  Upon discharge, patient will have assistance from spouse.    Objective:     Communicated with nurse Bassett prior to session.  Patient found HOB elevated with telemetry  upon PT entry  to room.    General Precautions: Standard, fall   Orthopedic Precautions:N/A   Braces: N/A     Exams:  · RLE ROM: WFL  · RLE Strength: Deficits: 3+/5  · LLE ROM: WFL  · LLE Strength: Deficits: 3-/5    Functional Mobility:  · Bed Mobility:     · Rolling Right: minimum assistance  · Scooting: moderate assistance  · Supine to Sit: moderate assistance  · Transfers:     · Sit to Stand:  moderate assistance with hand-held assist  · Bed to Chair: moderate assistance with  hand-held assist  using  Stand Pivot  · Toilet Transfer: moderate assistance with  grab bars  using  Stand Pivot  · Gait: 15ft with RW mod assist with small shuffling steps      Therapeutic Activities and Exercises:   EOB sitting with assist for midline sitting, pt leans back with fatigue  Completed LE's thera ex with LLE weakness  Standing with mod assist with urge to use bathroom  Pt ambulated to bathroom with mod assist- no BM  Pt ambulated back with RW mod assist And allowed to sit in reclining chair  Spouse at bedside      AM-PAC 6 CLICK MOBILITY  Total Score:13     Patient left up in chair with all lines intact, call button in reach and spouse present.    GOALS:   Multidisciplinary Problems     Physical Therapy Goals        Problem: Physical Therapy Goal    Goal Priority Disciplines Outcome Goal Variances Interventions   Physical Therapy Goal     PT, PT/OT Ongoing (interventions implemented as appropriate)     Description:  Goals to be met by: 2019     Patient will increase functional independence with mobility by performin. Supine to sit with MInimal Assistance  2. Sit to stand transfer with Minimal Assistance  3. Bed to chair transfer with Minimal Assistance using Rolling Walker  4. Gait  x 50 feet with Moderate Assistance using Rolling Walker.   5. Lower extremity exercise program x20 reps                      History:     Past Medical History:   Diagnosis Date    Arthritis     Coronary artery disease     Encounter for blood  transfusion     Epilepsy     GERD (gastroesophageal reflux disease)     Hypertension     MI, old     MS (multiple sclerosis)     Seizures     epilepsy       Past Surgical History:   Procedure Laterality Date    APPENDECTOMY      BACK SURGERY      L5 discectomy    BREAST BIOPSY Right 15 yrs ago    benign    COLONOSCOPY N/A 9/16/2015    [facility]O    CORONARY STENT PLACEMENT      FLUORO URODYNAMIC STUDY (FUDS) N/A 12/9/2014    Per[facility] by [facility]    right knee arthroscopy         Time Tracking:     PT Received On: 04/30/19  PT Start Time: 0946     PT Stop Time: 1047  PT Total Time (min): 61 min     Billable Minutes: Evaluation 10, Gait Training 15 and Therapeutic Activity 36      Kaitlynn Frias, PT  04/30/2019

## 2019-04-30 NOTE — SUBJECTIVE & OBJECTIVE
Past Medical History:   Diagnosis Date    Arthritis     Coronary artery disease     Encounter for blood transfusion     Epilepsy     GERD (gastroesophageal reflux disease)     Hypertension     MI, old     MS (multiple sclerosis)     Seizures     epilepsy       Past Surgical History:   Procedure Laterality Date    APPENDECTOMY      BACK SURGERY      L5 discectomy    BREAST BIOPSY Right 15 yrs ago    benign    COLONOSCOPY N/A 9/16/2015    Performed by Henry Malcolm MD at [facility]    CORONARY STENT PLACEMENT      FLUORO URODYNAMIC STUDY (FUDS) N/A 12/9/2014    Performed by Ivy Brenner MD at [facility] OR [address] [place] [address]    right knee arthroscopy         Review of patient's allergies indicates:   Allergen Reactions    Codeine     Dilantin [phenytoin sodium extended]     Phenobarbital     Tegretol [carbamazepine]        No current facility-administered medications on file prior to encounter.      Current Outpatient Medications on File Prior to Encounter   Medication Sig    atorvastatin (LIPITOR) 40 MG tablet TAKE 1 TABLET BY MOUTH EVERY DAY    cetirizine (ZYRTEC) 10 MG tablet Take 1 tablet (10 mg total) by mouth once daily. (Patient taking differently: Take 10 mg by mouth daily as needed for Allergies or Rhinitis. )    cholecalciferol, vitamin D3, (VITAMIN D3) 5,000 unit Tab Take 5,000 Units by mouth once daily.    clonazePAM (KLONOPIN) 1 MG tablet Take 1 tablet (1 mg total) by mouth 2 (two) times daily.    clopidogrel (PLAVIX) 75 mg tablet Take 75 mg by mouth once daily.      DULoxetine (CYMBALTA) 20 MG capsule TAKE 1 CAPSULE EVERY DAY    famotidine (PEPCID) 20 MG tablet Take 20 mg by mouth 2 (two) times daily as needed for Heartburn.    fluticasone (FLONASE) 50 mcg/actuation nasal spray 2 sprays (100 mcg total) by Each Nare route 2 (two) times daily. (Patient taking differently: 2 sprays by Each Nare route 2 (two) times daily as needed for Rhinitis. )    glatiramer (COPAXONE) 40 mg/mL Syrg  injection Inject 40 mg into the skin 3 (three) times a week. (Patient taking differently: Inject 40 mg into the skin 3 (three) times a week. )    ibuprofen (ADVIL,MOTRIN) 600 MG tablet Take 1 tablet (600 mg total) by mouth every 6 (six) hours as needed. (Patient taking differently: Take 600 mg by mouth every 6 (six) hours as needed for Pain. )    modafinil (PROVIGIL) 100 MG Tab TAKE 1 TABLET BY MOUTH TWICE A DAY    nitroGLYCERIN (NITROSTAT) 0.4 MG SL tablet Place 0.4 mg under the tongue every 5 (five) minutes as needed for Chest pain.     PSYLLIUM SEED, WITH DEXTROSE, (FIBER ORAL) Take by mouth 2 (two) times daily.    topiramate (TOPAMAX) 50 MG tablet Take 1 tablet (50 mg total) by mouth 2 (two) times daily.    VENTOLIN HFA 90 mcg/actuation inhaler INHALE 2 PUFFS INTO THE LUNGS EVERY 6 (SIX) HOURS AS NEEDED FOR WHEEZING. RESCUE     Family History     Problem Relation (Age of Onset)    Heart disease Father    Hyperlipidemia Sister    Scleroderma Mother        Tobacco Use    Smoking status: Former Smoker     Packs/day: 1.50     Last attempt to quit: 2006     Years since quittin.3    Smokeless tobacco: Never Used   Substance and Sexual Activity    Alcohol use: No     Alcohol/week: 0.0 oz    Drug use: No    Sexual activity: Not Currently     Partners: Male     Review of Systems   Constitutional: Negative for diaphoresis, fatigue and fever.   HENT: Negative for congestion.    Respiratory: Negative for cough and shortness of breath.    Cardiovascular: Positive for chest pain. Negative for palpitations.   Gastrointestinal: Negative for abdominal pain, diarrhea, nausea and vomiting.   Genitourinary: Negative for dysuria.   Musculoskeletal: Negative for arthralgias.   Skin: Negative for wound.   Neurological: Negative for dizziness, syncope and headaches.   Hematological: Does not bruise/bleed easily.   Psychiatric/Behavioral: Negative for confusion and hallucinations.     Objective:      Vital Signs (Most Recent):  Temp: 97.5 °F (36.4 °C) (04/30/19 0048)  Pulse: 106 (04/30/19 0048)  Resp: 20 (04/30/19 0048)  BP: (!) 162/84 (04/30/19 0048)  SpO2: 96 % (04/30/19 0048) Vital Signs (24h Range):  Temp:  [97.5 °F (36.4 °C)-97.8 °F (36.6 °C)] 97.5 °F (36.4 °C)  Pulse:  [] 106  Resp:  [20] 20  SpO2:  [91 %-100 %] 96 %  BP: ()/(54-91) 162/84     Weight: 71.2 kg (157 lb)  Body mass index is 27.81 kg/m².    Physical Exam   Constitutional: She is oriented to person, place, and time. No distress.   HENT:   Head: Normocephalic.   Eyes: Pupils are equal, round, and reactive to light.   Neck: Normal range of motion. Neck supple. No JVD present. No tracheal deviation present.   Cardiovascular: Normal rate, regular rhythm, normal heart sounds and intact distal pulses.   No murmur heard.  NSR on telemetry   Pulmonary/Chest: Effort normal and breath sounds normal. No respiratory distress.   Abdominal: Soft. Bowel sounds are normal. She exhibits no distension. There is no tenderness.   Musculoskeletal: Normal range of motion. She exhibits no edema.   Generalized weakness   Neurological: She is alert and oriented to person, place, and time. No cranial nerve deficit.   Skin: Skin is warm and dry. Capillary refill takes less than 2 seconds.   Psychiatric: She has a normal mood and affect. Her behavior is normal. Judgment and thought content normal.         CRANIAL NERVES     CN III, IV, VI   Pupils are equal, round, and reactive to light.       Significant Labs:   CBC:   Recent Labs   Lab 04/29/19 2103   WBC 12.50   HGB 15.0   HCT 46.0        CMP:   Recent Labs   Lab 04/29/19 2103      K 3.6   *   CO2 19*      BUN 13   CREATININE 0.7   CALCIUM 9.8   PROT 7.3   ALBUMIN 3.9   BILITOT 0.6   ALKPHOS 164*   AST 20   ALT 24   ANIONGAP 13   EGFRNONAA >60     Cardiac Markers:   Recent Labs   Lab 04/29/19  2103   BNP 11     Troponin:   Recent Labs   Lab 04/29/19 2103 04/30/19  0004    TROPONINI <0.006 <0.006       Significant Imaging:     CXR: Reviewed film-- looks ok.

## 2019-04-30 NOTE — PLAN OF CARE
Problem: Adult Inpatient Plan of Care  Goal: Plan of Care Review  Outcome: Ongoing (interventions implemented as appropriate)  Patient alert and oriented resting in bed. NAD. Denies pain or SOB. VSS. Plan of care reviewed with patient. Verbalizes understanding.Call light in reach. Pt free from fall or injury. Will monitor.

## 2019-04-30 NOTE — HPI
Ms. John is a 67yo F with a PMH of CAD/MI, HTN, Seizure Disorder, GERD, and MS. She presents to the ED with c/o Chest pain. It started today after taking a shower to her mid chest. It's constant and non-radiating. Deep breathing makes it worst and nothing makes it better. She is unable to describe the pain. While in the ED, he labs and CXR were unremarkable. A Chest CTA was ordered, but she refused because a larger IV would need to be inserted. She currently c/o the same pain. The nurse is at bedside giving her a GI cocktail to drink. Her spouse is at bedside.

## 2019-04-30 NOTE — PLAN OF CARE
04/30/19 1235   LEUNG Message   Medicare Outpatient and Observation Notification regarding financial responsibility Given to patient/caregiver;Explained to patient/caregiver;Signed/date by patient/caregiver   Date LEUNG was signed 04/30/19   Time LEUNG was signed 1234

## 2019-04-30 NOTE — PLAN OF CARE
04/30/19 1609   PRE-TX-O2   O2 Device (Oxygen Therapy) room air   SpO2 100 %   Pulse Oximetry Type Intermittent   $ Pulse Oximetry - Multiple Charge Pulse Oximetry - Multiple

## 2019-04-30 NOTE — PLAN OF CARE
Problem: Adult Inpatient Plan of Care  Goal: Plan of Care Review  Outcome: Ongoing (interventions implemented as appropriate)  Pt lying in bed, respirations even and unlabored, no acute distress noted.  She had a VQ scan and TTE completed this shift.  Dr. Mcmillan consulted to follow.  She ambulated with physical therapy a short distance today, tolerated well.  She denies pain or discomfort at this time.  Side rails up times two, bed low and locked, call light within reach.

## 2019-05-01 VITALS
WEIGHT: 157 LBS | BODY MASS INDEX: 27.82 KG/M2 | HEIGHT: 63 IN | SYSTOLIC BLOOD PRESSURE: 124 MMHG | TEMPERATURE: 97 F | RESPIRATION RATE: 18 BRPM | DIASTOLIC BLOOD PRESSURE: 85 MMHG | HEART RATE: 73 BPM | OXYGEN SATURATION: 96 %

## 2019-05-01 LAB
AV INDEX (PROSTH): 0.89
AV MEAN GRADIENT: 3.53 MMHG
AV PEAK GRADIENT: 5.66 MMHG
AV VALVE AREA: 2.68 CM2
AV VELOCITY RATIO: 0.85
BSA FOR ECHO PROCEDURE: 1.78 M2
CV ECHO LV RWT: 0.42 CM
DOP CALC AO PEAK VEL: 1.19 M/S
DOP CALC AO VTI: 20.42 CM
DOP CALC LVOT AREA: 3.02 CM2
DOP CALC LVOT DIAMETER: 1.96 CM
DOP CALC LVOT PEAK VEL: 1.01 M/S
DOP CALC LVOT STROKE VOLUME: 54.67 CM3
DOP CALCLVOT PEAK VEL VTI: 18.13 CM
E WAVE DECELERATION TIME: 148.44 MSEC
E/A RATIO: 1.04
E/E' RATIO: 8.35
ECHO LV POSTERIOR WALL: 0.75 CM (ref 0.6–1.1)
FRACTIONAL SHORTENING: 34 % (ref 28–44)
INTERVENTRICULAR SEPTUM: 0.81 CM (ref 0.6–1.1)
IVRT: 0.11 MSEC
LEFT ATRIUM SIZE: 3.1 CM
LEFT INTERNAL DIMENSION IN SYSTOLE: 2.33 CM (ref 2.1–4)
LEFT VENTRICLE DIASTOLIC VOLUME INDEX: 29.89 ML/M2
LEFT VENTRICLE DIASTOLIC VOLUME: 52.15 ML
LEFT VENTRICLE MASS INDEX: 42.4 G/M2
LEFT VENTRICLE SYSTOLIC VOLUME INDEX: 10.7 ML/M2
LEFT VENTRICLE SYSTOLIC VOLUME: 18.7 ML
LEFT VENTRICULAR INTERNAL DIMENSION IN DIASTOLE: 3.54 CM (ref 3.5–6)
LEFT VENTRICULAR MASS: 74.06 G
LV LATERAL E/E' RATIO: 7.1
LV SEPTAL E/E' RATIO: 10.14
MV A" WAVE DURATION": 118 MSEC
MV PEAK A VEL: 0.68 M/S
MV PEAK E VEL: 0.71 M/S
PULM VEIN A" WAVE DURATION": 83 MSEC
PV PEAK VELOCITY: 0.89 CM/S
RA PRESSURE: 3 MMHG
RIGHT VENTRICULAR END-DIASTOLIC DIMENSION: 3.21 CM
TDI LATERAL: 0.1
TDI SEPTAL: 0.07
TDI: 0.09
TRICUSPID ANNULAR PLANE SYSTOLIC EXCURSION: 2.12 CM

## 2019-05-01 PROCEDURE — 94761 N-INVAS EAR/PLS OXIMETRY MLT: CPT

## 2019-05-01 PROCEDURE — G0378 HOSPITAL OBSERVATION PER HR: HCPCS

## 2019-05-01 PROCEDURE — 25000003 PHARM REV CODE 250: Performed by: NURSE PRACTITIONER

## 2019-05-01 PROCEDURE — 97116 GAIT TRAINING THERAPY: CPT

## 2019-05-01 RX ADMIN — CLONAZEPAM 1 MG: 1 TABLET ORAL at 09:05

## 2019-05-01 RX ADMIN — TOPIRAMATE 50 MG: 25 TABLET, FILM COATED ORAL at 09:05

## 2019-05-01 RX ADMIN — CHOLECALCIFEROL TAB 125 MCG (5000 UNIT) 5000 UNITS: 125 TAB at 09:05

## 2019-05-01 RX ADMIN — MODAFINIL 100 MG: 100 TABLET ORAL at 09:05

## 2019-05-01 RX ADMIN — CLOPIDOGREL BISULFATE 75 MG: 75 TABLET ORAL at 09:05

## 2019-05-01 RX ADMIN — ATORVASTATIN CALCIUM 40 MG: 40 TABLET, FILM COATED ORAL at 09:05

## 2019-05-01 RX ADMIN — DULOXETINE HYDROCHLORIDE 20 MG: 20 CAPSULE, DELAYED RELEASE ORAL at 09:05

## 2019-05-01 NOTE — PROGRESS NOTES
Progress Note  Cardiology    Admit Date: 4/29/2019   LOS: 0 days     Follow-up For:  Chest pain    Scheduled Meds:   atorvastatin  40 mg Oral Daily    cholecalciferol (vitamin D3)  5,000 Units Oral Daily    clonazePAM  1 mg Oral BID    clopidogrel  75 mg Oral Daily    DULoxetine  20 mg Oral Daily    modafinil  100 mg Oral BID    topiramate  50 mg Oral BID     Continuous Infusions:  PRN Meds:cetirizine, famotidine, ibuprofen, nitroGLYCERIN, ondansetron, senna-docusate 8.6-50 mg, sodium chloride 0.9%    Review of patient's allergies indicates:   Allergen Reactions    Codeine     Dilantin [phenytoin sodium extended]     Phenobarbital     Tegretol [carbamazepine]        SUBJECTIVE:     Interval History: Patient no cp.    Review of Systems  Respiratory: negative for cough, sputum and wheezing  Cardiovascular: negative for chest pain, chest pressure/discomfort, orthopnea and paroxysmal nocturnal dyspnea    OBJECTIVE:     Vital Signs (Most Recent)  Temp: 96.6 °F (35.9 °C) (05/01/19 0748)  Pulse: 77 (05/01/19 0748)  Resp: 20 (05/01/19 0748)  BP: 132/68 (05/01/19 0748)  SpO2: 100 % (05/01/19 0845)    Vital Signs Range (Last 24H):  Temp:  [96.1 °F (35.6 °C)-97.8 °F (36.6 °C)]   Pulse:  [65-85]   Resp:  [18-20]   BP: (101-135)/(57-68)   SpO2:  [92 %-100 %]       Physical Exam:  Neck: no carotid bruit, no JVD and supple, symmetrical, trachea midline  Lungs: clear to auscultation bilaterally, normal respiratory effort  Heart: regular rate and rhythm, S1, S2 normal, no murmur, click, rub or gallop and minimal tenderness on palpation of CC jtns.  Abdomen: soft, non-tender; bowel sounds normal; no masses,  no organomegaly  Extremities: Extremities normal, atraumatic, no cyanosis, clubbing, or edema    Recent Results (from the past 24 hour(s))   Transthoracic echo (TTE) 2D with Color Flow    Collection Time: 04/30/19  2:20 PM   Result Value Ref Range    BSA 1.78 m2    TDI SEPTAL 0.07     LV LATERAL E/E' RATIO 7.10     LV  "SEPTAL E/E' RATIO 10.14     TDI LATERAL 0.10     PV PEAK VELOCITY 0.89 cm/s    LVIDD 3.54 3.5 - 6.0 cm    IVS 0.81 0.6 - 1.1 cm    PW 0.75 0.6 - 1.1 cm    LVIDS 2.33 2.1 - 4.0 cm    FS 34 28 - 44 %    LV mass 74.06 g    RVDD 3.21 cm    TAPSE 2.12 cm    Left Ventricle Relative Wall Thickness 0.42 cm    AV mean gradient 3.53 mmHg    AV valve area 2.68 cm2    AV Velocity Ratio 0.85     AV index (prosthetic) 0.89     E/A ratio 1.04     Mean e' 0.09     E wave decelartion time 148.44 msec    IVRT 0.11 msec    MV "A" wave duration 118.00 msec    Pulm vein "A" wave 83.00 msec    LVOT diameter 1.96 cm    LVOT area 3.02 cm2    LVOT peak moshe 0.5524875182 m/s    LVOT peak VTI 18.13 cm    Ao peak moshe 1.19 m/s    Ao VTI 20.42 cm    LVOT stroke volume 54.67 cm3    AV peak gradient 5.66 mmHg    E/E' ratio 8.35     MV Peak E Moshe 0.71 m/s    MV Peak A Moshe 0.68 m/s    LV Systolic Volume 18.70 mL    LV Systolic Volume Index 10.7 mL/m2    LV Diastolic Volume 52.15 mL    LV Diastolic Volume Index 29.89 mL/m2    LV Mass Index 42.4 g/m2    Right Atrial Pressure (from IVC) 3 mmHg    LA size 3.10 cm       Diagnostic Results:  Labs: Reviewed    ASSESSMENT/PLAN:   Ambulated with PT. CV stable.    Plan: see me in office in 2 weeks. Call if recurrent symptoms.  "

## 2019-05-01 NOTE — DISCHARGE SUMMARY
Discharge Summary  Hospital Medicine    Admit Date: 4/29/2019    Date and Time: 5/1/20191:59 PM    Discharge Attending Physician: Vonda Mariano MD    Primary Care Physician: Aprli Edmondson MD    Diagnoses:  Active Hospital Problems    Diagnosis  POA    *Chest pain [R07.9], atypical  Yes    Seizures [R56.9]  Yes     Epilepsy, Dr. Apodaca, neurology    Hypertension [I10]  Yes    Multiple sclerosis [G35]  Yes     Dr. Apodaca, neurology    CAD (coronary artery disease) [I25.10]  Yes     Dr. Mcmillan, cardiology     Discharged Condition: Good    Hospital Course:   Ms. John is a 69yo F with a PMH of CAD/MI, HTN, Seizure Disorder, GERD, and MS. She presented to the ED with c/o Chest pain. It started on the day of presentation after taking a shower to her mid chest. It's constant and non-radiating. Deep breathing was making it worst and nothing makes it better. She was unable to describe the pain. Patient was admitted to Hospitalist medicine service. Patient was monitored on telemetry medical floor, serial cardiac enzymes remained negative. Patient's symptoms resolved.  Patient was cleared for discharge by Dr. Koby Mcmillan. Patient was discharged home in stable condition with following discharge plan of care.     Consults: Dr. ASHANTI Mcmillan    Significant Diagnostic Studies:   VQ scan: Low probability for pulmonary embolus.    CXR: No acute cardiopulmonary disease.    ECHO:  · Normal left ventricular systolic function. The estimated ejection fraction is 64%  · Normal LV diastolic function.  · Normal right ventricular systolic function.  · Anterior echo free space - probable pericardial fat pad,.  · Normal central venous pressure (3 mm Hg).    Special Treatments/Procedures: None  Disposition: Home or Self Care    Medications:  Reconciled Home Medications:   Current Discharge Medication List      CONTINUE these medications which have NOT CHANGED    Details   atorvastatin (LIPITOR) 40 MG tablet TAKE 1 TABLET BY MOUTH EVERY  DAY  Qty: 90 tablet, Refills: 0      cetirizine (ZYRTEC) 10 MG tablet Take 1 tablet (10 mg total) by mouth once daily.  Qty: 30 tablet, Refills: 2    Associated Diagnoses: Bronchitis      cholecalciferol, vitamin D3, (VITAMIN D3) 5,000 unit Tab Take 5,000 Units by mouth once daily.      clonazePAM (KLONOPIN) 1 MG tablet Take 1 tablet (1 mg total) by mouth 2 (two) times daily.  Qty: 180 tablet, Refills: 1    Comments: This request is for a new prescription for a controlled substance as required by Federal/State law..  Associated Diagnoses: Anxiety; Multiple sclerosis      clopidogrel (PLAVIX) 75 mg tablet Take 75 mg by mouth once daily.        DULoxetine (CYMBALTA) 20 MG capsule TAKE 1 CAPSULE EVERY DAY  Qty: 90 capsule, Refills: 1    Associated Diagnoses: Neuropathic pain      famotidine (PEPCID) 20 MG tablet Take 20 mg by mouth 2 (two) times daily as needed for Heartburn.      fluticasone (FLONASE) 50 mcg/actuation nasal spray 2 sprays (100 mcg total) by Each Nare route 2 (two) times daily.  Qty: 1 Bottle, Refills: 0    Associated Diagnoses: Bronchitis      glatiramer (COPAXONE) 40 mg/mL Syrg injection Inject 40 mg into the skin 3 (three) times a week.  Qty: 36 Syringe, Refills: 1    Associated Diagnoses: Multiple sclerosis      ibuprofen (ADVIL,MOTRIN) 600 MG tablet Take 1 tablet (600 mg total) by mouth every 6 (six) hours as needed.  Qty: 20 tablet, Refills: 0      modafinil (PROVIGIL) 100 MG Tab TAKE 1 TABLET BY MOUTH TWICE A DAY  Qty: 180 tablet, Refills: 0    Comments: Not to exceed 5 additional fills before 07/07/2019  Associated Diagnoses: Chronic fatigue; Multiple sclerosis      nitroGLYCERIN (NITROSTAT) 0.4 MG SL tablet Place 0.4 mg under the tongue every 5 (five) minutes as needed for Chest pain.       PSYLLIUM SEED, WITH DEXTROSE, (FIBER ORAL) Take by mouth 2 (two) times daily.      topiramate (TOPAMAX) 50 MG tablet Take 1 tablet (50 mg total) by mouth 2 (two) times daily.  Qty: 180 tablet, Refills: 1     Associated Diagnoses: Convulsions, unspecified convulsion type      VENTOLIN HFA 90 mcg/actuation inhaler INHALE 2 PUFFS INTO THE LUNGS EVERY 6 (SIX) HOURS AS NEEDED FOR WHEEZING. RESCUE  Qty: 1 Inhaler, Refills: 1    Associated Diagnoses: Bronchitis           Discharge Procedure Orders   Diet Cardiac     Other restrictions (specify):   Order Comments: PLEASE OBSERVE FALL PRECAUTIONS     Call MD for:   Order Comments: For worsening symptoms, chest pain, shortness of breath, increased abdominal pain, high grade fever, stroke or stroke like symptoms, immediately go to the nearest Emergency Room or call 911 as soon as possible.     Follow-up Information     April Edmondson MD In 1 week.    Specialty:  Family Medicine  Contact information:  [address] [facility]  [place] [id number] [phone]             Reji Mcmillan MD In 2 weeks.    Specialty:  Cardiology  Contact information:  [address] [facility]  [address]  [place] [address]  [phone]                 Time spent on the discharge of patient: 26 minutes  Patient was seen and examined on the date of discharge and determined to be suitable for discharge.

## 2019-05-01 NOTE — PLAN OF CARE
Problem: Physical Therapy Goal  Goal: Physical Therapy Goal  Goals to be met by: 2019     Patient will increase functional independence with mobility by performin. Supine to sit with MInimal Assistance  2. Sit to stand transfer with Minimal Assistance  3. Bed to chair transfer with Minimal Assistance using Rolling Walker  4. Gait  x 50 feet with Moderate Assistance using Rolling Walker.   5. Lower extremity exercise program x20 reps     Outcome: Ongoing (interventions implemented as appropriate)  PT for bed mobility, transfers and gait

## 2019-05-01 NOTE — CONSULTS
HISTORY OF PRESENT ILLNESS:  This 68-year-old lady is admitted with chest   discomfort.  The patient reports that she got off her shower bench and noted   severe retrosternal pushing type discomfort that persisted through the night and   earlier today; the pain is somewhat better today, but persists.  It is   aggravated by deep breathing.  She denies nausea, vomiting or water brash.  She   denies abdominal pain or diarrhea.  The patient reports that she has had   bronchitis and wheezing for approximately 3 months.  The cough, however, has   been better over the last 2 to 2 weeks; she has not been coughing anymore than   usual.  She has never had similar pain before.  A CTA was planned, but the   patient declined.  Lung V/Q scan is pending at this time.  She reports low-grade   temperature in the 99 degree F range for the last day or two.  She denies   myalgias.  No chills or rigors.  She states that the present pain is not similar   to her AMI pain.    The patient sustained an anterolateral wall myocardial infarction in 2011.  A   Promus 3.5/15.0 mm stent was deployed after a total proximal RCA occlusion was   recanalized.  She had a temporary pacemaker at that time, due to bradycardia and   hypotension, which was removed at the end of the PCI.  There was mild   hypokinesia of the posterobasal segment.  There was minimal disease in the left   coronary arterial system; left anterior descending artery was a relatively short   vessel.  A Lexiscan Myoview stress test in 2013, was unremarkable -- no scar or   thrombus.  She had ultrasound of the lower extremities in 2017, which was   unremarkable for -- no evidence of DVT.    PAST MEDICAL HISTORY AND PAST SURGICAL HISTORY:  Dyslipidemia.  Grand mal   seizures around 1998.  Right knee arthroscopy; appendectomy; benign breast   biopsies and C sections.  Normal colonoscopy.  Bilateral cataract extraction and   intraocular lens implantation.  ITP in 2008.  The patient has  multiple   sclerosis additionally.    SOCIAL HISTORY:  The patient stopped smoking around .  She used to be a   2-pack per day smoker; she started smoking at age 19.  She used to work as a   .  She has an occasional drink.    FAMILY HISTORY:  Father  at 65 of myocardial infarction.  Mother    at 66 of DM.  She has 3 sisters and a brother.    REVIEW OF SYSTEMS:  The patient has sinus headaches and postnasal drip.  Seizure   disorder.  No history of TIA or CVA.  Her chronic cough/bronchitis has   improved.  She denies any current wheezing.  She reports tingling and numbness   in the feet.  She ambulates with a walker.    MEDICATIONS:  Atorvastatin 40 mg daily, Zyrtec 10 mg daily, vitamin D3 5000   units daily, clonazepam 1 mg b.i.d., Plavix 75 mg daily, Cymbalta 20 mg daily,   famotidine 20 mg b.i.d. p.r.n., Flonase 2 puffs b.i.d., Glatiramer 40 mg subQ 3   times a week.  Ibuprofen 600 mg every 6 hours.  Modafinil 100 mg b.i.d.,   nitroglycerin 0.4 mg p.r.n., fiber oral b.i.d., Topamax 50 mg daily and Ventolin   2 puffs every 6 hours p.r.n.    PHYSICAL EXAMINATION:  GENERAL:  This is a well-built white lady in no acute distress.  VITAL SIGNS:  Her blood pressure is 154/67, respirations are 20 per minute,   temperature is 99.4 degrees F, O2 sats are 94% and heart rate is 100 per minute.  EYES:  No conjunctival pallor or scleral icterus.  THROAT:  A complete upper plate is noted.  There were no masses.  NECK:  Carotids are equal without bruits.  There is no jugular venous distention   or thyromegaly.  CHEST:  Air entry is equal bilaterally.  There were no rales or rhonchi.  There   is acute tenderness on palpation of the mid sternum and costochondral junctions   -- it does reproduce the patient's chest pain.  HEART:  S1 and S2 are well heard.  There were no murmurs, gallops or rubs.  ABDOMEN:  Soft.  Liver edge is not palpable.  EXTREMITIES:  No clubbing, cyanosis or edema.    DIAGNOSTIC  STUDIES AND LABORATORY DATA:  ECG reveals sinus rhythm, possible   inferior scar, T-wave inversion in V4 and V5; artifact in V6.  Chest x-ray was   reviewed and is unremarkable.  There is no evidence of pulmonary edema or   pleural effusion.  Hemoglobin is 12.9, hematocrit 39.5, WBC count at admit was   12,500 and this morning 8040, neutrophils 85% and lymphocytes 11%.  Sodium is   144, potassium 3.6, BUN 13, creatinine 0.7, glucose 104 and 113, alkaline   phosphatase is 164, total protein 7.3 and albumin 3.9.  AST and ALT are within   normal limits.  Cholesterol is 145, triglycerides 147, HDL 38 and LDL 78.    Troponins are 0.006 x3.  BNP is 11.    IMPRESSION:  1.  Chest pain -- probably musculoskeletal pain; mild temperature elevation.  2.  Inferior wall myocardial infarction in 2011, and Promus stent.  3.  Multiple sclerosis in 1990; grand mal seizures.  4.  Dyslipidemia.  5.  Hyperlipidemia; ITP in 2008.    PLAN:  Echocardiogram is being performed.  She appears to have an anterior echo free space -- probably pericardial fat pad rather than fluid.  EKG will be   repeated today.  I do not feel the patient has ACS; the pain is not of ischemic   origin.  She is acutely tender on the chest wall and it appears to reproduce the   patient's chest pain.  It is unclear if she strained her chest wall getting off   her shower bench; the patient is limited physically secondary to her multiple   sclerosis.  She might benefit from nonsteroidals.    Thank you for asking me to evaluate Ms. John.  Hopefully, she can be   discharged in the morning.      MT/IN  dd: 04/30/2019 14:03:54 (CDT)  td: 04/30/2019 23:13:15 (CDT)  Doc ID   #[id number]  Job ID #[id number]    CC:

## 2019-05-01 NOTE — PT/OT/SLP PROGRESS
"Physical Therapy Treatment    Patient Name:  Alyssa John   MRN:  [id number]    Recommendations:     Discharge Recommendations:  outpatient PT       Assessment:     Alyssa John is a 68 y.o. female admitted with a medical diagnosis of Chest pain.  She presents with the following impairments/functional limitations:  weakness, impaired endurance, impaired self care skills, impaired functional mobilty, gait instability, impaired balance, decreased lower extremity function, decreased ROM .    Rehab Prognosis: Good; patient would benefit from acute skilled PT services to address these deficits and reach maximum level of function.    Recent Surgery: * No surgery found *      Plan:     During this hospitalization, patient to be seen 6 x/week to address the identified rehab impairments via gait training, therapeutic activities, therapeutic exercises and progress toward the following goals:    · Plan of Care Expires:  05/17/19    Subjective     Chief Complaint: "I can't walk"  Patient/Family Comments/goals: agreeable to PT, reports she goes to outpatient therapy every Tuesday and Friday  Pain/Comfort:  · Pain Rating 1: 0/10      Objective:     Communicated with nurse Bassett prior to session.  Patient found supine with telemetry upon PT entry to room.     General Precautions: Standard, fall   Orthopedic Precautions:N/A   Braces: N/A     Functional Mobility:  · Bed Mobility:     · Scooting: moderate assistance  · Bridging: A of 2 utilizing draw sheet to HOB  · Supine to Sit: moderate assistance  · Sit to Supine: moderate assistance    · Transfers:     · Sit to Stand:  moderate assistance and maximal assistance with rolling walker    · Gait: 60' with min A using RW. assistance for RW navigation and cueing for increased step length.      Therapeutic Activities and Exercises:   pt assisted BTB post gait with mod A.     Patient left supine with all lines intact, call button in reach and nurse Danyelle " notified..    GOALS:   Multidisciplinary Problems     Physical Therapy Goals        Problem: Physical Therapy Goal    Goal Priority Disciplines Outcome Goal Variances Interventions   Physical Therapy Goal     PT, PT/OT Ongoing (interventions implemented as appropriate)     Description:  Goals to be met by: 2019     Patient will increase functional independence with mobility by performin. Supine to sit with MInimal Assistance  2. Sit to stand transfer with Minimal Assistance  3. Bed to chair transfer with Minimal Assistance using Rolling Walker  4. Gait  x 50 feet with Moderate Assistance using Rolling Walker.   5. Lower extremity exercise program x20 reps                      Time Tracking:     PT Received On: 19  PT Start Time: 1100     PT Stop Time: 1118  PT Total Time (min): 18 min     Billable Minutes: Gait Training 10 and Therapeutic Activity 8    Treatment Type: Treatment  PT/PTA: PTA     PTA Visit Number: 1     Holly Hopkins PTA  2019

## 2019-05-01 NOTE — NURSING
Pt's medication and discharge instructions given and reviewed, understanding verbalized.  PIV and telemetry monitor removed.  Vitals stable.  Discharged home with her .

## 2019-05-02 NOTE — PLAN OF CARE
05/02/19 1617   Final Note   Assessment Type Final Discharge Note   Anticipated Discharge Disposition Home

## 2019-05-03 ENCOUNTER — CLINICAL SUPPORT (OUTPATIENT)
Dept: REHABILITATION | Facility: HOSPITAL | Age: 69
End: 2019-05-03
Attending: PHYSICIAN ASSISTANT
Payer: MEDICARE

## 2019-05-03 DIAGNOSIS — G35 MULTIPLE SCLEROSIS: ICD-10-CM

## 2019-05-03 DIAGNOSIS — R26.89 BALANCE PROBLEM: ICD-10-CM

## 2019-05-03 PROCEDURE — 97110 THERAPEUTIC EXERCISES: CPT | Mod: PN

## 2019-05-03 NOTE — PROGRESS NOTES
Name: Alyssa John  Date:   05/03/2019  Primary Diagnosis: .  Problem List Items Addressed This Visit     Multiple sclerosis    Balance problem          Time in: 1520  Time Out: 1600  Total Treatment Time: 40  Group Time: 0      Subjective:    Patient reports improvement of symptoms.  Patient reports their pain to be 0/10 on a 0-10 scale with 0 being no pain and 10 being the worst pain imaginable.    Objective    Patient received individual therapy to address strength, endurance, ROM and flexibility with 0 other patients with activities as follows:     Patient received therapeutic exercises to develop strength, endurance, ROM and flexibility for 45 minutes including:     Nustep L3 x 12 min UE's/LE's; cues for upright posture    // bars: fwd/bwd gait 10 ft x 2, sidestepping 10 ft x 2; cues for food clearance/posture    Sit to stand: x 9 with mod/max A    Gait: 32 ft at min/mod A c/ w/c follow for safety; cues for L foot clearance    Assessment:     Good tolerance for activities this date, improved sit to stand transfers with cueing    Pt will continue to benefit from skilled PT intervention. Medical Necessity is demonstrated by:  Fall Risk, Unable to participate fully in daily activities, Requires skilled supervision to complete and progress HEP and Weakness.    Patient is making good progress towards established goals.    Plan:  Continue with established Plan of Care towards PT goals.

## 2019-05-07 ENCOUNTER — CLINICAL SUPPORT (OUTPATIENT)
Dept: REHABILITATION | Facility: HOSPITAL | Age: 69
End: 2019-05-07
Attending: PHYSICIAN ASSISTANT
Payer: MEDICARE

## 2019-05-07 DIAGNOSIS — R26.89 BALANCE PROBLEM: ICD-10-CM

## 2019-05-07 DIAGNOSIS — G35 MULTIPLE SCLEROSIS: ICD-10-CM

## 2019-05-07 PROCEDURE — 97110 THERAPEUTIC EXERCISES: CPT | Mod: PN

## 2019-05-07 NOTE — PROGRESS NOTES
Name: Alyssa John  [facility] Number: [id number]  Date of Treatment: 2019   Diagnosis:   Encounter Diagnoses   Name Primary?    Balance problem     Multiple sclerosis        Time in: 1513  Time Out: 1608  Total Treatment Time: 45      Subjective:    Alyssa reports having aching bones, arrived to therapy without SPC, reports she wouldn't be able to use it if she had it because her bones are aching so much from  and sister helping her with transfers.  Patient reports their pain to be 5/10 on a 0-10 scale with 0 being no pain and 10 being the worst pain imaginable.    Objective  Patient performed therapeutic exercises to develop strength, endurance, ROM, flexibility, posture and core stabilization for 45 minutes includin' on NuStep 403 steps, L3, with UE's   Ball squeeze 3/10   Seated CLAM with GTB 3/10   Passive hamstring, calf, and piriformis stretches 3/30s R/L       Written Home Exercises Provided: Cont with HEP within tolerance level; advised patient and  to obtain a gait belt to help with transfers and to avoid pullling on patients arms.  Pt demo fair understanding of the education provided. Alyssa demonstrated fair return demonstration of activities.     Assessment:   Decreased strength with transfers today, poor endurance.  Family appeared open to transfer education.    Pt will continue to benefit from skilled PT intervention. Medical Necessity is demonstrated by:  Fall Risk, Continued inability to participate in vocational pursuits, Pain limits function of effected part for some activities, Unable to participate fully in daily activities, Requires skilled supervision to complete and progress HEP and Weakness.    Patient is making fair progress towards established goals.    Plan:  Continue with established Plan of Care towards PT goals.

## 2019-05-08 ENCOUNTER — TELEPHONE (OUTPATIENT)
Dept: PHARMACY | Facility: CLINIC | Age: 69
End: 2019-05-08

## 2019-05-10 ENCOUNTER — CLINICAL SUPPORT (OUTPATIENT)
Dept: REHABILITATION | Facility: HOSPITAL | Age: 69
End: 2019-05-10
Attending: PHYSICIAN ASSISTANT
Payer: MEDICARE

## 2019-05-10 DIAGNOSIS — G35 MULTIPLE SCLEROSIS: ICD-10-CM

## 2019-05-10 DIAGNOSIS — R26.89 BALANCE PROBLEM: ICD-10-CM

## 2019-05-10 PROCEDURE — G8979 MOBILITY GOAL STATUS: HCPCS | Mod: CM,PN

## 2019-05-10 PROCEDURE — G8978 MOBILITY CURRENT STATUS: HCPCS | Mod: CN,PN

## 2019-05-10 PROCEDURE — 97530 THERAPEUTIC ACTIVITIES: CPT | Mod: PN

## 2019-05-10 NOTE — PLAN OF CARE
"PHYSICAL THERAPY UPDATED PLAN OF CARE    Alyssa Jhon  05/10/2019    Onset Date:  Dx MS in 2000, worsening mobility in last ~6 months  SOC Date:  03/19/2019  Primary Diagnosis:    Problem List Items Addressed This Visit     Multiple sclerosis    Balance problem        Treatment Diagnosis:  Gait instability   Precautions:  Falls, cardiac, seizures  Visits from SOC:  11  Functional Level Prior to SOC:  Assistive Device    Updated Assessment:    S: Pt reports difficulty and poor endurance with standing, particularly in the bathroom when  is assisting her to the toilet. When she stands up, her  has to support her while simultaneously assisting her with lower body dressing. She often ends up losing balance and sitting back in the w/c. Reports continued fatigue with ADL's and functional mobility. She is unsure if the new medication she is on is making a difference in her MS symptoms. Will be seeing Dr. Apodaca in the next week.    O: Reassessment performed for POC update purposes:    Lower Extremity Range of Motion:  Right Lower Extremity: decreased ankle DF by ~10*  Left Lower Extremity: decreased ankle DF by ~10*     Lower Extremity Strength:  Right Lower Extremity: 4-/5 to 4/5  Left Lower Extremity: 4-/5 to 4/5    Posture: with persistent left lateral lean in sitting; requires cues to correct as she has minimal insight into impairment  Transfers: sit to stand initially max A, improving to mod A with cues for scooting to edge of chair, placement of feet, and trunk translatio    Gait: As of treatment 05/03/2019, pt able to ambulate 32 ft at min/mod A c/ w/c follow for safety. Improved quality with frequent verbal cues for L foot clearance    FIM mobility/gait: score=1  G code tool used: FIM mobility/gait  Current modifier: CN  Goal modifier: CM      Patient participating in therapeutic activities as follows to address functional mobility/balance for 26 minutes:  Transfer w/c <> 18" chair at mod/max " "A  Sit to stand x 10 reps with 1 rest break 2* fatigue       A: Pt with improved gait distance compared to SOC. However, she requires more assistance with transfers in the past few visits 2* fatigue. Had discussion with pt and spouse today about moderating the intensity of PT treatments if she is getting too fatigued afterwards. Also educated pt and  on technique with gait belt for safety as well as importance of upright posture in standing to increase tolerance and stability. Reviewed sit to stand transfer technique as practiced in today's therapy session. Pt would benefit from further PT services to address functional mobility. We will focus treatment on sitting and standing balance as well as transfers in order to improve home safety. We will also incorporate more caregiver education to reduce risk of falls.     P: Continue as per POC, emphasis on sitting balance, standing tolerance, and transfers    Goals from Previous POC:     Short Term Goals (3 Weeks): 1) Pt will initiate HEP 2) Patient will improve strength 1/2 muscle grade    Long Term Goals (6 Weeks): 1) Pt will be I with HEP 2) Pt will ambulate 50 ft with RW at [facility] 3) Pt will improve sit to stand transfers to CGA/SBA    Previous Short Term Goals Status:   1= not met 2= partially met  New Short Term Goals:   1) Pt will initiate HEP 2) Patient will improve strength 1/2 muscle grade  3) Pt will perform sit to stand transfer at min A  Long Term Goals:   modified:  : 1) Pt will be I with HEP 2) Pt will be able to stand 3 min with B UE support at [facility] 3) Pt will improve sit to stand transfers to CGA/SBA from 18" chair 4) Pt will maintain sitting balance at SBA with B UE support x 5 min  Reasons for Recertification of Therapy:    weakness, impaired endurance, impaired functional mobility, gait instability, impaired balance, decreased lower extremity function and decreased safety awareness    Certification Period: 05/21/2019 to 07/21/2019  Recommended " Treatment Plan: 2 times per week for 6 weeks: Gait Training, Manual Therapy, Moist Heat/ Ice, Neuromuscular Re-ed, Patient Education, Therapeutic Activites and Therapeutic Exercise      Thank you for referral.    Therapist's Name: Sameera Osborne, PT  05/10/2019    I CERTIFY THE NEED FOR THESE SERVICES FURNISHED UNDER THIS PLAN OF TREATMENT AND WHILE UNDER MY CARE    Physician's comments: ________________________________________________________________________________________________________________________________________________      Physician's Name: ___________________________________

## 2019-05-15 ENCOUNTER — TELEPHONE (OUTPATIENT)
Dept: NEUROLOGY | Facility: CLINIC | Age: 69
End: 2019-05-15

## 2019-05-15 NOTE — TELEPHONE ENCOUNTER
Pt is currently admitted in [facility]. I had low BP, is disoriented, has diarrhea, cold and weak. Pt would try to get from the restroom at home and her legs would give out. MRI is planned for this afternoon. Labs/ua done. UA showed increased WBC, started on antibiotics. K low, getting K supplement. No fever. Pt's  questions if this could be related to copaxone. Informed pt's  the symptoms not likely due to Copaxone. He would like to ask Dr Apodaca if pt should continue her Copaxone while hospitalized.

## 2019-05-15 NOTE — TELEPHONE ENCOUNTER
----- Message from Melissa Hemphill sent at 5/15/2019  3:58 PM CDT -----  Contact: Joseph () @ [phone]  Calling to inform Dr. Apodaca that the patient has been admitted to [facility]. Please call.

## 2019-05-16 ENCOUNTER — PATIENT MESSAGE (OUTPATIENT)
Dept: NEUROLOGY | Facility: CLINIC | Age: 69
End: 2019-05-16

## 2019-05-20 ENCOUNTER — OFFICE VISIT (OUTPATIENT)
Dept: NEUROLOGY | Facility: CLINIC | Age: 69
End: 2019-05-20
Payer: MEDICARE

## 2019-05-20 VITALS
HEIGHT: 63 IN | SYSTOLIC BLOOD PRESSURE: 111 MMHG | HEART RATE: 99 BPM | DIASTOLIC BLOOD PRESSURE: 78 MMHG | BODY MASS INDEX: 27.81 KG/M2

## 2019-05-20 DIAGNOSIS — G35 MS (MULTIPLE SCLEROSIS): Primary | ICD-10-CM

## 2019-05-20 DIAGNOSIS — M79.2 NEUROPATHIC PAIN: ICD-10-CM

## 2019-05-20 DIAGNOSIS — F32.A DEPRESSION, UNSPECIFIED DEPRESSION TYPE: ICD-10-CM

## 2019-05-20 DIAGNOSIS — Z71.89 COUNSELING REGARDING GOALS OF CARE: ICD-10-CM

## 2019-05-20 DIAGNOSIS — Z74.09 IMMOBILITY: ICD-10-CM

## 2019-05-20 PROCEDURE — 1101F PR PT FALLS ASSESS DOC 0-1 FALLS W/OUT INJ PAST YR: ICD-10-PCS | Mod: CPTII,S$GLB,, | Performed by: PSYCHIATRY & NEUROLOGY

## 2019-05-20 PROCEDURE — 99999 PR PBB SHADOW E&M-EST. PATIENT-LVL IV: ICD-10-PCS | Mod: PBBFAC,,, | Performed by: PSYCHIATRY & NEUROLOGY

## 2019-05-20 PROCEDURE — G0180 PR HOME HEALTH MD CERTIFICATION: ICD-10-PCS | Mod: ,,, | Performed by: PSYCHIATRY & NEUROLOGY

## 2019-05-20 PROCEDURE — 3078F DIAST BP <80 MM HG: CPT | Mod: CPTII,S$GLB,, | Performed by: PSYCHIATRY & NEUROLOGY

## 2019-05-20 PROCEDURE — 3074F SYST BP LT 130 MM HG: CPT | Mod: CPTII,S$GLB,, | Performed by: PSYCHIATRY & NEUROLOGY

## 2019-05-20 PROCEDURE — G0180 MD CERTIFICATION HHA PATIENT: HCPCS | Mod: ,,, | Performed by: PSYCHIATRY & NEUROLOGY

## 2019-05-20 PROCEDURE — 99999 PR PBB SHADOW E&M-EST. PATIENT-LVL IV: CPT | Mod: PBBFAC,,, | Performed by: PSYCHIATRY & NEUROLOGY

## 2019-05-20 PROCEDURE — 99499 RISK ADDL DX/OHS AUDIT: ICD-10-PCS | Mod: S$GLB,,, | Performed by: PSYCHIATRY & NEUROLOGY

## 2019-05-20 PROCEDURE — 1101F PT FALLS ASSESS-DOCD LE1/YR: CPT | Mod: CPTII,S$GLB,, | Performed by: PSYCHIATRY & NEUROLOGY

## 2019-05-20 PROCEDURE — 99215 PR OFFICE/OUTPT VISIT, EST, LEVL V, 40-54 MIN: ICD-10-PCS | Mod: S$GLB,,, | Performed by: PSYCHIATRY & NEUROLOGY

## 2019-05-20 PROCEDURE — 99499 UNLISTED E&M SERVICE: CPT | Mod: S$GLB,,, | Performed by: PSYCHIATRY & NEUROLOGY

## 2019-05-20 PROCEDURE — 99215 OFFICE O/P EST HI 40 MIN: CPT | Mod: S$GLB,,, | Performed by: PSYCHIATRY & NEUROLOGY

## 2019-05-20 PROCEDURE — 3074F PR MOST RECENT SYSTOLIC BLOOD PRESSURE < 130 MM HG: ICD-10-PCS | Mod: CPTII,S$GLB,, | Performed by: PSYCHIATRY & NEUROLOGY

## 2019-05-20 PROCEDURE — 3078F PR MOST RECENT DIASTOLIC BLOOD PRESSURE < 80 MM HG: ICD-10-PCS | Mod: CPTII,S$GLB,, | Performed by: PSYCHIATRY & NEUROLOGY

## 2019-05-20 RX ORDER — METRONIDAZOLE 500 MG/1
TABLET ORAL
COMMUNITY
Start: 2019-05-18 | End: 2019-08-20 | Stop reason: ALTCHOICE

## 2019-05-20 RX ORDER — LEVOFLOXACIN 750 MG/1
750 TABLET ORAL DAILY
COMMUNITY
Start: 2019-05-18 | End: 2019-08-20 | Stop reason: ALTCHOICE

## 2019-05-20 RX ORDER — DULOXETIN HYDROCHLORIDE 30 MG/1
30 CAPSULE, DELAYED RELEASE ORAL DAILY
Qty: 30 CAPSULE | Refills: 5 | Status: SHIPPED | OUTPATIENT
Start: 2019-05-20 | End: 2019-06-04 | Stop reason: SDUPTHER

## 2019-05-20 NOTE — PROGRESS NOTES
Subjective:       Patient ID: Alyssa John is a 68 y.o. female who presents today for a routine clinic visit for MS.  Pt is accompanied by her .     MS HPI:  · DMT: copaxone for past 6-7 weeks;   · Side effects from DMT? Yes - injection site erythema that resolves with an ice pack  · Taking vitamin D3 as recommended? Yes Dose: 5000IU daily. Last vitamin D level was 68 in 2018  · Patient feels that she was more functional on Aubagio which was discontinued for neuropathic symptoms.   · Last fall, off DMT, she started becoming more fatigued, short winded, and started using the wheelchair inside the house. Prior to this, would use cane or walker at home. Earlier this year had bronchitis - symptoms reported to have lasted for couple of months which made symptoms much worse  · Was in outpatient PT but currently in home health PT  · Two hospital admissions since last visit - one in Late April for chest pain that seemed pleuritic, V/Q scan was negative and cardiac workup was otherwise negatuve  · She then had a second admission to [facility] for hypotension and was determined to leukocytosis and elevated lactic acid. Treated with levofloxacin and metronidazole.    SOCIAL HISTORY  Social History     Tobacco Use    Smoking status: Former Smoker     Packs/day: 1.50     Last attempt to quit: 2006     Years since quittin.4    Smokeless tobacco: Never Used   Substance Use Topics    Alcohol use: No     Alcohol/week: 0.0 oz    Drug use: No     Living arrangements - the patient lives with their family.    MS ROS:  · Fatigue: Yes - Provigil 200mg daily  · Sleep Disturbance: Yes - has trouble getting to sleep and patient thinks that impaired mobility in bed is probably contributing some. Does snore at night but has had sleep evaluation in the past which was reported to have been negative for PARMINDER   · Bladder Dysfunction: Yes. Has symptoms of both urgency but also trouble emptying her  bladder as well.  · Bowel Dysfunction: Yes. But much improved on antibiotics  · Spasticity: No  · Visual Symptoms: No  · Cognitive: Has some word finding difficulty and short term memory issues.  · Mood Disorder: Cymbalta 20mg daily  · Gait Disturbance: Yes -    · Falls: No  · Hand Dysfunction: Yes, tremors  · Sexual Dysfunction: Not Assessed  · Skin Breakdown:NO  · Tremors: Yes,tremors in both hands, action.   · Dysphagia: No  · Dysarthria:  No  · Heat sensitivity: Yes  · Any un-met adaptive needs? NO  · Copay Assist?  Yes. 0$ copay         Objective:      25 foot timed walk: unable tdoay    Neurologic Exam      No JOSE LUIS  Strength: 3/5 in flexors of LLE, 4/5 in flexors of RLE  SENS: moderate decrease vibration in all 4 limbs distally    Imaging:     Results for orders placed during the hospital encounter of 12/11/18   MRI Brain Demyelinating W W/O Contrast    Impression Unchanged pronounced burden of supratentorial white matter disease, with appearance in keeping with history of multiple sclerosis.  No new or enhancing lesions to suggest active demyelination.    Partially imaged lesion at the craniocervical junction, with spinal lesions better delineated on concurrent dedicated spine MRIs.    Electronically signed by resident: Baldo Krause  Date:    12/12/2018  Time:    09:34    Electronically signed by: Moo Micheel MD  Date:    12/12/2018  Time:    15:28     Results for orders placed during the hospital encounter of 12/11/18   MRI Cervical Spine Demyelinating W W/O Contrast    Impression Multiple unchanged foci of T2 hyperintense signal throughout the spinal cord extending from the cranial cervical junction throughout the thoracic spinal cord.  Lesion at T9-10 was not included in the field of view of prior imaging; however, all other lesions are unchanged from 08/02/2018.  No enhancing lesions to suggest active demyelination.    Mild multilevel disc/endplate degeneration, with mild spinal canal stenoses in the  cervical spine and severe foraminal stenosis on the left at C4-5 and on the right at C5-6.    Electronically signed by resident: Baldo Krause  Date:    12/12/2018  Time:    09:12    Electronically signed by: Moo Michele MD  Date:    12/12/2018  Time:    15:24     Results for orders placed during the hospital encounter of 12/11/18   MRI Thoracic Spine Demyelinating W W/O Contrast    Impression Multiple unchanged foci of T2 hyperintense signal throughout the spinal cord extending from the cranial cervical junction throughout the thoracic spinal cord.  Lesion at T9-10 was not included in the field of view of prior imaging; however, all other lesions are unchanged from 08/02/2018.  No enhancing lesions to suggest active demyelination.    Mild multilevel disc/endplate degeneration, with mild spinal canal stenoses in the cervical spine and severe foraminal stenosis on the left at C4-5 and on the right at C5-6.    Electronically signed by resident: Baldo Krause  Date:    12/12/2018  Time:    09:12    Electronically signed by: Moo Michele MD  Date:    12/12/2018  Time:    15:24         Labs:     Lab Results   Component Value Date    GVDYWRJK99BF 68 07/19/2018    JGBNMNXZ98MG 53 10/19/2016    AYVKDSGI24CY 78 06/13/2016     Lab Results   Component Value Date    JCVINDEX SEE COMMENT (A) 12/09/2015    JCVANTIBODY SEE COMMENT 12/09/2015     Lab Results   Component Value Date    VZ2MPSQC 59.1 03/31/2016    ABSOLUTECD3 619 (L) 03/31/2016    ZR8XDDPE 19.9 03/31/2016    ABSOLUTECD8 208 03/31/2016    ZH5WLEGD 39.1 03/31/2016    ABSOLUTECD4 409 03/31/2016    LABCD48 1.97 03/31/2016     Lab Results   Component Value Date    WBC 8.40 04/30/2019    RBC 4.61 04/30/2019    HGB 12.9 04/30/2019    HCT 39.5 04/30/2019    MCV 86 04/30/2019    MCH 28.0 04/30/2019    MCHC 32.7 04/30/2019    RDW 13.3 04/30/2019     04/30/2019    MPV 8.9 (L) 04/30/2019    GRAN 6.4 04/30/2019    GRAN 76.4 (H) 04/30/2019    LYMPH 1.3 04/30/2019    LYMPH  15.4 (L) 04/30/2019    MONO 0.6 04/30/2019    MONO 7.1 04/30/2019    EOS 0.1 04/30/2019    BASO 0.00 04/30/2019    EOSINOPHIL 0.8 04/30/2019    BASOPHIL 0.3 04/30/2019     Sodium   Date Value Ref Range Status   04/30/2019 143 136 - 145 mmol/L Final     Potassium   Date Value Ref Range Status   04/30/2019 3.3 (L) 3.5 - 5.1 mmol/L Final     Chloride   Date Value Ref Range Status   04/30/2019 113 (H) 95 - 110 mmol/L Final     CO2   Date Value Ref Range Status   04/30/2019 21 (L) 23 - 29 mmol/L Final     Carbon Monoxide, Blood   Date Value Ref Range Status   10/19/2016 2 % Final     Comment:     -------------------REFERENCE VALUE--------------------------  0-2 Normal (Non-smoker) , < or = 9 Normal (Smoker), > or   = 20 (Toxic concentration)  Test Performed by:  [facility] [place] [address]  : Adrien Norton II, M.D., Ph.D.       Glucose   Date Value Ref Range Status   04/30/2019 113 (H) 70 - 110 mg/dL Final     BUN, Bld   Date Value Ref Range Status   04/30/2019 13 8 - 23 mg/dL Final     Creatinine   Date Value Ref Range Status   04/30/2019 0.7 0.5 - 1.4 mg/dL Final   02/22/2013 0.8 0.5 - 1.4 mg/dL Final     Calcium   Date Value Ref Range Status   04/30/2019 8.8 8.7 - 10.5 mg/dL Final   02/22/2013 8.7 8.7 - 10.5 mg/dL Final     Total Protein   Date Value Ref Range Status   04/29/2019 7.3 6.0 - 8.4 g/dL Final     Albumin   Date Value Ref Range Status   04/29/2019 3.9 3.5 - 5.2 g/dL Final     Total Bilirubin   Date Value Ref Range Status   04/29/2019 0.6 0.1 - 1.0 mg/dL Final     Comment:     For infants and newborns, interpretation of results should be based  on gestational age, weight and in agreement with clinical  observations.  Premature Infant recommended reference ranges:  Up to 24 hours.............<8.0 mg/dL  Up to 48 hours............<12.0 mg/dL  3-5 days..................<15.0 mg/dL  6-29 days.................<15.0 mg/dL        Alkaline Phosphatase   Date Value Ref Range Status   04/29/2019 164 (H) 55 - 135 U/L Final   02/22/2013 107 55 - 135 U/L Final     AST   Date Value Ref Range Status   04/29/2019 20 10 - 40 U/L Final   02/22/2013 18 10 - 40 U/L Final     ALT   Date Value Ref Range Status   04/29/2019 24 10 - 44 U/L Final     Anion Gap   Date Value Ref Range Status   04/30/2019 9 8 - 16 mmol/L Final   02/22/2013 11 5 - 15 meq/L Final     eGFR if    Date Value Ref Range Status   04/30/2019 >60 >60 mL/min/1.73 m^2 Final     eGFR if non    Date Value Ref Range Status   04/30/2019 >60 >60 mL/min/1.73 m^2 Final     Comment:     Calculation used to obtain the estimated glomerular filtration  rate (eGFR) is the CKD-EPI equation.          Diagnosis/Assessment/Plan:    1. Multiple Sclerosis  · Assessment: Patient has had multiple hospitalizations since last visit, and feel strongly that much of her debility currently is deconditioning. continue Copaxone; I reminded her that her timed walk in December had improved significantly to 14.6 seconds with U-step walker; majority of visit today spend discussing importance of rehab  · Disease Modifying Therapies: continue Copaxone and vitamin D. She had severe neuropathy sx with Aubagio    2. MS Symptom Assessment / Management  · Sleep Disturbance: start MgGycinate; consider Trazodone if this does not work;    · Gait Disturbance: refer to [facility]  · Mood: will increase Cymbalta to 30mg/day  · No other changes to regimen described in ROS above         Our visit today lasted 40 minutes, and 100% of this time was spent face to face with the patient. Over 50% of this visit included discussion of the treatment plan/medication changes/symptom management/exam findings/imaging results/coordination of care. The patient agrees with the plan of care.         Problem List Items Addressed This Visit     None      Visit Diagnoses     MS (multiple sclerosis)    -  Primary     Relevant Orders    Ambulatory Referral to Physical Medicine Rehab    Immobility        Relevant Orders    Ambulatory Referral to Physical Medicine Rehab    Neuropathic pain        Relevant Medications    DULoxetine (CYMBALTA) 30 MG capsule    Depression, unspecified depression type        Relevant Medications    DULoxetine (CYMBALTA) 30 MG capsule

## 2019-06-03 DIAGNOSIS — M79.2 NEUROPATHIC PAIN: ICD-10-CM

## 2019-06-04 RX ORDER — DULOXETIN HYDROCHLORIDE 20 MG/1
CAPSULE, DELAYED RELEASE ORAL
Qty: 90 CAPSULE | Refills: 1 | Status: SHIPPED | OUTPATIENT
Start: 2019-06-04 | End: 2020-11-11 | Stop reason: SDUPTHER

## 2019-06-05 ENCOUNTER — TELEPHONE (OUTPATIENT)
Dept: PHARMACY | Facility: CLINIC | Age: 69
End: 2019-06-05

## 2019-06-17 ENCOUNTER — INITIAL CONSULT (OUTPATIENT)
Dept: NEUROLOGY | Facility: CLINIC | Age: 69
End: 2019-06-17
Payer: MEDICARE

## 2019-06-17 DIAGNOSIS — F32.A DEPRESSION, UNSPECIFIED DEPRESSION TYPE: ICD-10-CM

## 2019-06-17 DIAGNOSIS — R26.9 NEUROLOGIC GAIT DYSFUNCTION: ICD-10-CM

## 2019-06-17 DIAGNOSIS — G35 MULTIPLE SCLEROSIS: ICD-10-CM

## 2019-06-17 DIAGNOSIS — Z74.09 IMMOBILITY: ICD-10-CM

## 2019-06-17 DIAGNOSIS — G35 MS (MULTIPLE SCLEROSIS): Primary | ICD-10-CM

## 2019-06-17 DIAGNOSIS — R53.83 FATIGUE, UNSPECIFIED TYPE: ICD-10-CM

## 2019-06-17 PROCEDURE — 99205 PR OFFICE/OUTPT VISIT, NEW, LEVL V, 60-74 MIN: ICD-10-PCS | Mod: S$GLB,,, | Performed by: PHYSICAL MEDICINE & REHABILITATION

## 2019-06-17 PROCEDURE — 99205 OFFICE O/P NEW HI 60 MIN: CPT | Mod: S$GLB,,, | Performed by: PHYSICAL MEDICINE & REHABILITATION

## 2019-06-17 PROCEDURE — 1101F PR PT FALLS ASSESS DOC 0-1 FALLS W/OUT INJ PAST YR: ICD-10-PCS | Mod: CPTII,S$GLB,, | Performed by: PHYSICAL MEDICINE & REHABILITATION

## 2019-06-17 PROCEDURE — 1101F PT FALLS ASSESS-DOCD LE1/YR: CPT | Mod: CPTII,S$GLB,, | Performed by: PHYSICAL MEDICINE & REHABILITATION

## 2019-06-17 NOTE — LETTER
June 24, 2019      Genny Apodaca MD  [address]  [facility] [address]           [facility]  [address]  [facility] [address]  Phone: [phone]          Patient: Alyssa John   MR Number: [id number]   YOB: 1950   Date of Visit: 6/17/2019       Dear Dr. Genny Apodaca:    Thank you for referring Alyssa John to me for evaluation. Attached you will find relevant portions of my assessment and plan of care.    If you have questions, please do not hesitate to call me. I look forward to following Alyssa John along with you.    Sincerely,    Yessi Dash MD    Enclosure  CC:  No Recipients    If you would like to receive this communication electronically, please contact [email or web address] or [phone] to request more information on [employer] access.    For providers and/or their staff who would like to refer a patient to [facility], please contact us through our one-stop-shop provider referral line, [facility], at [phone].    If you feel you have received this communication in error or would no longer like to receive these types of communications, please e-mail [email or web address]

## 2019-06-17 NOTE — PROGRESS NOTES
"  Initial PM&R Clinic Evaluation  CC: Impaired mobility   Referral: Dr. Apodaca     HPI: Alyssa John is a 68 y.o. female who presents today for an initial evaluation of the above complaint.    MS Dx in 2000    Falls- last fall was Sat, trying to get out of bathroom, and sister was trying to help. Was on the bathroom floor, and crawled to bedroom floor.    Infrequent falls, usually associated with transfers.   Bowel - stable, continent. Accidents sometimes associated with urgency.   Bladder - stable, continent. Accidents sometimes associated with urgency.   Diet - eating well  - Anti Inflammatory diet, Plant Based   Mood - stable, on Cymbalta   Sleep - stable    Function - describes complete assistance for transfers.  washes hair, and full assistance for ADLs.   Uses Walker in therapy. Needs assistance to get from sit to stand, and then can walk maybe the distance of a hallway, and then needs assistance to come from stand to sit.     · 5/1 Transfers:   Sit to Stand:  moderate assistance and maximal assistance with rolling walker Gait: 60' with min A using RW. assistance for RW navigation and cueing for increased step length.        Patient and family reports a decline in function since beginning of year, and has had 2 visits to ED with admissions, one for infection and one for chest pain. Has start in Home Health.      Sister also lives at home and assists on the weekends.     Before the beginning of the year, could walk with a walker in the house, go to the bathroom, and dress her self.     DME - transport WC, Shower chair, walker, cane       Tobacco:  denies  ETOH:  denies  Other drug use: denies    For planning mobility device -   Door - 29"  Bedroom 271/2  Bath 22"      Past Medical History:   Diagnosis Date    Arthritis     Coronary artery disease     Encounter for blood transfusion     Epilepsy     GERD (gastroesophageal reflux disease)     Hypertension     MI, old     MS (multiple " sclerosis)     Seizures     epilepsy     Past Surgical History:   Procedure Laterality Date    APPENDECTOMY      BACK SURGERY      L5 discectomy    BREAST BIOPSY Right 15 yrs ago    benign    COLONOSCOPY N/A 9/16/2015    [facility]    CORONARY STENT PLACEMENT      FLUORO URODYNAMIC STUDY (FUDS) N/A 12/9/2014    Performed [facility] vIy Brenner [facility] at [facility] F[facility]    right knee arthroscopy       Current Outpatient Medications on File Prior to Visit   Medication Sig Dispense Refill    atorvastatin (LIPITOR) 40 MG tablet TAKE 1 TABLET BY MOUTH EVERY DAY 90 tablet 0    cetirizine (ZYRTEC) 10 MG tablet Take 1 tablet (10 mg total) by mouth once daily. (Patient taking differently: Take 10 mg by mouth daily as needed for Allergies or Rhinitis. ) 30 tablet 2    cholecalciferol, vitamin D3, (VITAMIN D3) 5,000 unit Tab Take 5,000 Units by mouth once daily.      clonazePAM (KLONOPIN) 1 MG tablet Take 1 tablet (1 mg total) by mouth 2 (two) times daily. 180 tablet 1    clopidogrel (PLAVIX) 75 mg tablet Take 75 mg by mouth once daily.        DULoxetine (CYMBALTA) 20 MG capsule TAKE 1 CAPSULE BY MOUTH EVERY DAY 90 capsule 1    famotidine (PEPCID) 20 MG tablet Take 20 mg by mouth 2 (two) times daily as needed for Heartburn.      fluticasone (FLONASE) 50 mcg/actuation nasal spray 2 sprays (100 mcg total) by Each Nare route 2 (two) times daily. (Patient taking differently: 2 sprays by Each Nare route 2 (two) times daily as needed for Rhinitis. ) 1 Bottle 0    glatiramer (COPAXONE) 40 mg/mL Syrg injection Inject 40 mg into the skin 3 (three) times a week. (Patient taking differently: Inject 40 mg into the skin 3 (three) times a week. Mon Wed Fri) 36 Syringe 1    ibuprofen (ADVIL,MOTRIN) 600 MG tablet Take 1 tablet (600 mg total) by mouth every 6 (six) hours as needed. (Patient taking differently: Take 600 mg by mouth every 6 (six) hours as needed for Pain. ) 20 tablet 0     levoFLOXacin (LEVAQUIN) 750 MG tablet Take 750 mg by mouth once daily.      metroNIDAZOLE (FLAGYL) 500 MG tablet       modafinil (PROVIGIL) 100 MG Tab TAKE 1 TABLET BY MOUTH TWICE A  tablet 0    nitroGLYCERIN (NITROSTAT) 0.4 MG SL tablet Place 0.4 mg under the tongue every 5 (five) minutes as needed for Chest pain.       PSYLLIUM SEED, WITH DEXTROSE, (FIBER ORAL) Take by mouth 2 (two) times daily.      topiramate (TOPAMAX) 50 MG tablet Take 1 tablet (50 mg total) by mouth 2 (two) times daily. 180 tablet 1    VENTOLIN HFA 90 mcg/actuation inhaler INHALE 2 PUFFS INTO THE LUNGS EVERY 6 (SIX) HOURS AS NEEDED FOR WHEEZING. RESCUE 1 Inhaler 1     No current facility-administered medications on file prior to visit.      Review of patient's allergies indicates:   Allergen Reactions    Codeine     Dilantin [phenytoin sodium extended]     Phenobarbital     Tegretol [carbamazepine]        Family History:   Family History   Problem Relation Age of Onset    Scleroderma Mother     Heart disease Father         MI, CAD    Hyperlipidemia Sister     Cancer Neg Hx     Diabetes Neg Hx     Stroke Neg Hx     Melanoma Neg Hx     Psoriasis Neg Hx     Lupus Neg Hx     Eczema Neg Hx            ROS:   CONSTITUTIONAL:  (-) weight change, fever, chills, night sweats   EYES:  (-)  double vision (-) blurry vision  EARS, NOSE, THROAT: (-) dysphagia (-) hearing loss  RESPIRATORY: (-)   shortness of breath  (-) cough  CARDIOVASCULAR (-) chest pain  (-) swelling  GENITOURINARY  (-) bladder incontinence  (-) hematuria  GASTROINTESTINAL(-)  bowel incontinence (-) nausea/vomiting   ENDOCRINE  (-)  heat or cold intolerance  (-) hair loss  PSYCHIATRIC  (-) depression  (-) anxiety  HEMATOL/LYMPHATIC (-) easy bruising (-) enlarged lymph nodes  ALLERGIC/IMMUN  (-) seasonal allergies (-) allergies to environmental stimuli    SKIN (-) changes (-) rashes (-) wounds  The rest of the review of systems is unremarkable.         Pertinent  Prior Work Up (Imaging/EMGs):  Study Date Pertinent findings                      Physical Exam -   Gen: Well developed female in no acute distress  Neuro: Awake, alert, oriented x 3   Speech - fluent   Followed all commands    Mood/affect: pleasant and appropriate      Cardiovascular:   (- ) edema             Skin:  no skin breakdown   MMT - ROM at shoulder near complete with increased time.   R HF 4/5, L 3/5   R KE 5/5, L 4/5   R DF / PF 5/5, L 4/5, DF/PF    - Hoffmans: Negative bilaterally     Impression: 68 y.o. female with impaired mobility and ADLs in setting of MS   - patient has had a recent decline       Plan:  Diagnostics: none at this time   Medications: No new Rx given     Function - Patient with functional decline noted (was Mod/Max for transfers and now TA). Unclear reason for this as well, as if there is another etiology causing falls. In addition will benefit from Mobility eval/planning. Will recommend inpatient rehab.      Other: Discussed fall prevention     Follow up:      Yessi Dash MD

## 2019-06-20 DIAGNOSIS — F41.9 ANXIETY: ICD-10-CM

## 2019-06-20 DIAGNOSIS — G35 MULTIPLE SCLEROSIS: ICD-10-CM

## 2019-06-21 RX ORDER — CLONAZEPAM 1 MG/1
TABLET ORAL
Qty: 180 TABLET | Refills: 1 | Status: SHIPPED | OUTPATIENT
Start: 2019-06-21 | End: 2020-01-16

## 2019-07-10 ENCOUNTER — HOSPITAL ENCOUNTER (OUTPATIENT)
Dept: RADIOLOGY | Facility: HOSPITAL | Age: 69
Discharge: HOME | End: 2019-07-10
Attending: NURSE PRACTITIONER
Payer: MEDICARE

## 2019-07-10 DIAGNOSIS — M79.89 LEG SWELLING: ICD-10-CM

## 2019-07-10 DIAGNOSIS — R22.42 LOCALIZED SWELLING, MASS AND LUMP, LEFT LOWER LIMB: ICD-10-CM

## 2019-07-10 PROCEDURE — 93971 EXTREMITY STUDY: CPT | Mod: TC,LT

## 2019-07-10 PROCEDURE — 93971 US LOWER EXTREMITY VEINS LEFT: ICD-10-PCS | Mod: 26,LT,, | Performed by: RADIOLOGY

## 2019-07-10 PROCEDURE — 93971 EXTREMITY STUDY: CPT | Mod: 26,LT,, | Performed by: RADIOLOGY

## 2019-07-17 ENCOUNTER — HOSPITAL ENCOUNTER (OUTPATIENT)
Dept: RADIOLOGY | Facility: HOSPITAL | Age: 69
Discharge: HOME | End: 2019-07-17
Attending: PHYSICAL MEDICINE & REHABILITATION
Payer: MEDICARE

## 2019-07-17 PROCEDURE — 74019 RADEX ABDOMEN 2 VIEWS: CPT | Mod: 26,,, | Performed by: RADIOLOGY

## 2019-07-17 PROCEDURE — 74019 XR ABDOMEN FLAT AND ERECT: ICD-10-PCS | Mod: 26,,, | Performed by: RADIOLOGY

## 2019-07-23 PROCEDURE — G0180 MD CERTIFICATION HHA PATIENT: HCPCS | Mod: ,,, | Performed by: PHYSICAL MEDICINE & REHABILITATION

## 2019-07-23 PROCEDURE — G0180 PR HOME HEALTH MD CERTIFICATION: ICD-10-PCS | Mod: ,,, | Performed by: PHYSICAL MEDICINE & REHABILITATION

## 2019-07-31 ENCOUNTER — TELEPHONE (OUTPATIENT)
Dept: HOME HEALTH SERVICES | Facility: HOSPITAL | Age: 69
End: 2019-07-31
Payer: MEDICARE

## 2019-07-31 ENCOUNTER — EXTERNAL HOME HEALTH (OUTPATIENT)
Dept: HOME HEALTH SERVICES | Facility: HOSPITAL | Age: 69
End: 2019-07-31
Payer: MEDICARE

## 2019-08-08 NOTE — TELEPHONE ENCOUNTER
FOR DOCUMENTATION ONLY:    Financial Assistance for Copaxone approved from 4/30/2019 to 4/29/2020    Source: ASHISH David     ID: [id number]  BIN: [id number]  PCN: [id number]  GRP: [id number]    Amount: $5,362

## 2019-08-14 ENCOUNTER — EXTERNAL HOME HEALTH (OUTPATIENT)
Dept: HOME HEALTH SERVICES | Facility: HOSPITAL | Age: 69
End: 2019-08-14
Payer: MEDICARE

## 2019-08-14 DIAGNOSIS — G35 MULTIPLE SCLEROSIS: ICD-10-CM

## 2019-08-14 DIAGNOSIS — R53.82 CHRONIC FATIGUE: ICD-10-CM

## 2019-08-16 RX ORDER — MODAFINIL 100 MG/1
TABLET ORAL
Qty: 180 TABLET | Refills: 0 | Status: SHIPPED | OUTPATIENT
Start: 2019-08-16 | End: 2019-12-03 | Stop reason: SDUPTHER

## 2019-08-20 ENCOUNTER — LAB VISIT (OUTPATIENT)
Dept: LAB | Facility: HOSPITAL | Age: 69
End: 2019-08-20
Payer: MEDICARE

## 2019-08-20 ENCOUNTER — OFFICE VISIT (OUTPATIENT)
Dept: NEUROLOGY | Facility: CLINIC | Age: 69
End: 2019-08-20
Payer: MEDICARE

## 2019-08-20 VITALS
WEIGHT: 134.94 LBS | DIASTOLIC BLOOD PRESSURE: 76 MMHG | HEIGHT: 63 IN | HEART RATE: 81 BPM | BODY MASS INDEX: 23.91 KG/M2 | SYSTOLIC BLOOD PRESSURE: 118 MMHG

## 2019-08-20 DIAGNOSIS — R26.9 NEUROLOGIC GAIT DYSFUNCTION: ICD-10-CM

## 2019-08-20 DIAGNOSIS — E55.9 VITAMIN D INSUFFICIENCY: ICD-10-CM

## 2019-08-20 DIAGNOSIS — R29.898 WEAKNESS OF LEFT UPPER EXTREMITY: ICD-10-CM

## 2019-08-20 DIAGNOSIS — G35 MULTIPLE SCLEROSIS: Primary | ICD-10-CM

## 2019-08-20 DIAGNOSIS — G35 MULTIPLE SCLEROSIS: ICD-10-CM

## 2019-08-20 DIAGNOSIS — R53.82 CHRONIC FATIGUE: ICD-10-CM

## 2019-08-20 DIAGNOSIS — Z29.89 PROPHYLACTIC IMMUNOTHERAPY: ICD-10-CM

## 2019-08-20 DIAGNOSIS — F32.A DEPRESSION, UNSPECIFIED DEPRESSION TYPE: ICD-10-CM

## 2019-08-20 DIAGNOSIS — Z71.89 COUNSELING REGARDING GOALS OF CARE: ICD-10-CM

## 2019-08-20 LAB
25(OH)D3+25(OH)D2 SERPL-MCNC: 97 NG/ML (ref 30–96)
TSH SERPL DL<=0.005 MIU/L-ACNC: 1.04 UIU/ML (ref 0.4–4)

## 2019-08-20 PROCEDURE — 99499 UNLISTED E&M SERVICE: CPT | Mod: S$GLB,,, | Performed by: PHYSICIAN ASSISTANT

## 2019-08-20 PROCEDURE — 99999 PR PBB SHADOW E&M-EST. PATIENT-LVL IV: ICD-10-PCS | Mod: PBBFAC,,, | Performed by: PHYSICIAN ASSISTANT

## 2019-08-20 PROCEDURE — 3078F DIAST BP <80 MM HG: CPT | Mod: CPTII,S$GLB,, | Performed by: PHYSICIAN ASSISTANT

## 2019-08-20 PROCEDURE — 99999 PR PBB SHADOW E&M-EST. PATIENT-LVL IV: CPT | Mod: PBBFAC,,, | Performed by: PHYSICIAN ASSISTANT

## 2019-08-20 PROCEDURE — 3078F PR MOST RECENT DIASTOLIC BLOOD PRESSURE < 80 MM HG: ICD-10-PCS | Mod: CPTII,S$GLB,, | Performed by: PHYSICIAN ASSISTANT

## 2019-08-20 PROCEDURE — 36415 COLL VENOUS BLD VENIPUNCTURE: CPT

## 2019-08-20 PROCEDURE — 99214 OFFICE O/P EST MOD 30 MIN: CPT | Mod: S$GLB,,, | Performed by: PHYSICIAN ASSISTANT

## 2019-08-20 PROCEDURE — 82306 VITAMIN D 25 HYDROXY: CPT

## 2019-08-20 PROCEDURE — 99214 PR OFFICE/OUTPT VISIT, EST, LEVL IV, 30-39 MIN: ICD-10-PCS | Mod: S$GLB,,, | Performed by: PHYSICIAN ASSISTANT

## 2019-08-20 PROCEDURE — 3074F SYST BP LT 130 MM HG: CPT | Mod: CPTII,S$GLB,, | Performed by: PHYSICIAN ASSISTANT

## 2019-08-20 PROCEDURE — 84443 ASSAY THYROID STIM HORMONE: CPT

## 2019-08-20 PROCEDURE — 1101F PR PT FALLS ASSESS DOC 0-1 FALLS W/OUT INJ PAST YR: ICD-10-PCS | Mod: CPTII,S$GLB,, | Performed by: PHYSICIAN ASSISTANT

## 2019-08-20 PROCEDURE — 3074F PR MOST RECENT SYSTOLIC BLOOD PRESSURE < 130 MM HG: ICD-10-PCS | Mod: CPTII,S$GLB,, | Performed by: PHYSICIAN ASSISTANT

## 2019-08-20 PROCEDURE — 99499 RISK ADDL DX/OHS AUDIT: ICD-10-PCS | Mod: S$GLB,,, | Performed by: PHYSICIAN ASSISTANT

## 2019-08-20 PROCEDURE — 1101F PT FALLS ASSESS-DOCD LE1/YR: CPT | Mod: CPTII,S$GLB,, | Performed by: PHYSICIAN ASSISTANT

## 2019-08-20 NOTE — PROGRESS NOTES
Subjective:       Patient ID: Alyssa John is a 68 y.o. female who presents today with her  for a routine clinic visit for MS.      MS HPI:  · DMT: glatiramer   · Side effects from DMT? No  · Taking vitamin D3 as recommended? Yes - Vit D3   · She is taking some steps with a walker and very close supervision/assist of   · Her HH is ending tomorrow and she is requesting to go to outpatient therapy  · Aaron with [facility] is coming out to home tomorrow for wheelchair discussion    SOCIAL HISTORY  Social History     Tobacco Use    Smoking status: Former Smoker     Packs/day: 1.50     Last attempt to quit: 2006     Years since quittin.6    Smokeless tobacco: Never Used   Substance Use Topics    Alcohol use: No     Alcohol/week: 0.0 oz    Drug use: No     Living arrangements - the patient lives with their family.  Employment     MS ROS:    · Fatigue: Yes - Provigil 200mg daily  · Sleep Disturbance: Yes - has trouble getting to sleep and patient thinks that impaired mobility in bed is probably contributing some. Does snore at night but has had sleep evaluation in the past which was reported to have been negative for PARMINDER   · Bladder Dysfunction: Yes. Has symptoms of both urgency but also trouble emptying her bladder as well.  · Bowel Dysfunction: Yes. But much improved on antibiotics  · Spasticity: No  ·   · Cognitive: Has some word finding difficulty and short term memory issues.  · Mood Disorder: Cymbalta 30 mg daily  · Gait Disturbance: Yes -as above--HH ending and she would like more therapy,  notes difficulty with trunk balance    · Falls: No  · Hand Dysfunction: Yes, tremors  · Sexual Dysfunction: Not Assessed  · Skin Breakdown:NO  · Tremors: Yes,tremors in both hands, action.   · Dysphagia: No  · Dysarthria:  No  · Heat sensitivity: Yes  · Any un-met adaptive needs? NO  · Copay Assist?  Yes. 0$ copay            Objective:        1. 25 foot timed walk: did not walk today,  but did stand three times with Min A and mod v/c's for proper positioning. Patient able to stand for 15-30s without assist of a person using U-step walker  Timed 25 Foot Walk: 10/27/2016 2017   Did patient wear an AFO? No No   Was assistive device used? Yes Yes   Assistive device used (toña one): Unilateral Assistance Unilateral Assistance   Unilateral device used Cane Cane   Time for 25 Foot Walk (seconds) 12.8 10.2   Time for 25 Foot Walk (seconds) 9 -       Neurologic Exam     Mental Status   Oriented to person, place, and time.   Speech: speech is normal   Level of consciousness: alert    Cranial Nerves     CN VIII   Hearing: intact (to conversation)    Motor Exam   Muscle bulk: normal    Strength   Right deltoid: 5/5  Left deltoid: 5/5  Right triceps: 5/5  Left triceps: 4/5  Right wrist extension: 5/5  Left wrist extension: 4/5  Right interossei: 5/5  Left interossei: 4/5  Right iliopsoas: 5/5  Left iliopsoas: 4/5  Right hamstrin/5  Left hamstrin/5  Right anterior tibial: 5/5  Left anterior tibial: 4/5            Imaging:       Results for orders placed during the hospital encounter of 18   MRI Brain Demyelinating W W/O Contrast    Impression Unchanged pronounced burden of supratentorial white matter disease, with appearance in keeping with history of multiple sclerosis.  No new or enhancing lesions to suggest active demyelination.    Partially imaged lesion at the craniocervical junction, with spinal lesions better delineated on concurrent dedicated spine MRIs.    Electronically signed by resident: Baldo Krause  Date:    2018  Time:    09:34    Electronically signed by: Moo Michele MD  Date:    2018  Time:    15:28     Results for orders placed during the hospital encounter of 18   MRI Cervical Spine Demyelinating W W/O Contrast    Impression Multiple unchanged foci of T2 hyperintense signal throughout the spinal cord extending from the cranial cervical junction throughout  the thoracic spinal cord.  Lesion at T9-10 was not included in the field of view of prior imaging; however, all other lesions are unchanged from 08/02/2018.  No enhancing lesions to suggest active demyelination.    Mild multilevel disc/endplate degeneration, with mild spinal canal stenoses in the cervical spine and severe foraminal stenosis on the left at C4-5 and on the right at C5-6.    Electronically signed by resident: Baldo Krause  Date:    12/12/2018  Time:    09:12    Electronically signed by: Moo Michele MD  Date:    12/12/2018  Time:    15:24     Results for orders placed during the hospital encounter of 12/11/18   MRI Thoracic Spine Demyelinating W W/O Contrast    Impression Multiple unchanged foci of T2 hyperintense signal throughout the spinal cord extending from the cranial cervical junction throughout the thoracic spinal cord.  Lesion at T9-10 was not included in the field of view of prior imaging; however, all other lesions are unchanged from 08/02/2018.  No enhancing lesions to suggest active demyelination.    Mild multilevel disc/endplate degeneration, with mild spinal canal stenoses in the cervical spine and severe foraminal stenosis on the left at C4-5 and on the right at C5-6.    Electronically signed by resident: Baldo Krause  Date:    12/12/2018  Time:    09:12    Electronically signed by: Moo Michele MD  Date:    12/12/2018  Time:    15:24       Labs:     Lab Results   Component Value Date    PBSPMIZJ88AF 68 07/19/2018    XEHGCJSH13QE 53 10/19/2016    HZZPSWSQ60AN 78 06/13/2016     Lab Results   Component Value Date    JCVINDEX SEE COMMENT (A) 12/09/2015    JCVANTIBODY SEE COMMENT 12/09/2015     Lab Results   Component Value Date    MI3URDQQ 59.1 03/31/2016    ABSOLUTECD3 619 (L) 03/31/2016    YQ3FXVCU 19.9 03/31/2016    ABSOLUTECD8 208 03/31/2016    AK9RNTWJ 39.1 03/31/2016    ABSOLUTECD4 409 03/31/2016    LABCD48 1.97 03/31/2016     Lab Results   Component Value Date    WBC 8.40 04/30/2019     RBC 4.61 04/30/2019    HGB 12.9 04/30/2019    HCT 39.5 04/30/2019    MCV 86 04/30/2019    MCH 28.0 04/30/2019    MCHC 32.7 04/30/2019    RDW 13.3 04/30/2019     04/30/2019    MPV 8.9 (L) 04/30/2019    GRAN 6.4 04/30/2019    GRAN 76.4 (H) 04/30/2019    LYMPH 1.3 04/30/2019    LYMPH 15.4 (L) 04/30/2019    MONO 0.6 04/30/2019    MONO 7.1 04/30/2019    EOS 0.1 04/30/2019    BASO 0.00 04/30/2019    EOSINOPHIL 0.8 04/30/2019    BASOPHIL 0.3 04/30/2019     Sodium   Date Value Ref Range Status   04/30/2019 143 136 - 145 mmol/L Final     Potassium   Date Value Ref Range Status   04/30/2019 3.3 (L) 3.5 - 5.1 mmol/L Final     Chloride   Date Value Ref Range Status   04/30/2019 113 (H) 95 - 110 mmol/L Final     CO2   Date Value Ref Range Status   04/30/2019 21 (L) 23 - 29 mmol/L Final     Carbon Monoxide, Blood   Date Value Ref Range Status   10/19/2016 2 % Final     Comment:     -------------------REFERENCE VALUE--------------------------  0-2 Normal (Non-smoker) , < or = 9 Normal (Smoker), > or   = 20 (Toxic concentration)  Test Performed by:  [facility] [place] [address]  : Adrien Norton II, M.D., Ph.D.       Glucose   Date Value Ref Range Status   04/30/2019 113 (H) 70 - 110 mg/dL Final     BUN, Bld   Date Value Ref Range Status   04/30/2019 13 8 - 23 mg/dL Final     Creatinine   Date Value Ref Range Status   04/30/2019 0.7 0.5 - 1.4 mg/dL Final   02/22/2013 0.8 0.5 - 1.4 mg/dL Final     Calcium   Date Value Ref Range Status   04/30/2019 8.8 8.7 - 10.5 mg/dL Final   02/22/2013 8.7 8.7 - 10.5 mg/dL Final     Total Protein   Date Value Ref Range Status   04/29/2019 7.3 6.0 - 8.4 g/dL Final     Albumin   Date Value Ref Range Status   04/29/2019 3.9 3.5 - 5.2 g/dL Final     Total Bilirubin   Date Value Ref Range Status   04/29/2019 0.6 0.1 - 1.0 mg/dL Final     Comment:     For infants and newborns, interpretation of results should  be based  on gestational age, weight and in agreement with clinical  observations.  Premature Infant recommended reference ranges:  Up to 24 hours.............<8.0 mg/dL  Up to 48 hours............<12.0 mg/dL  3-5 days..................<15.0 mg/dL  6-29 days.................<15.0 mg/dL       Alkaline Phosphatase   Date Value Ref Range Status   04/29/2019 164 (H) 55 - 135 U/L Final   02/22/2013 107 55 - 135 U/L Final     AST   Date Value Ref Range Status   04/29/2019 20 10 - 40 U/L Final   02/22/2013 18 10 - 40 U/L Final     ALT   Date Value Ref Range Status   04/29/2019 24 10 - 44 U/L Final     Anion Gap   Date Value Ref Range Status   04/30/2019 9 8 - 16 mmol/L Final   02/22/2013 11 5 - 15 meq/L Final     eGFR if    Date Value Ref Range Status   04/30/2019 >60 >60 mL/min/1.73 m^2 Final     eGFR if non    Date Value Ref Range Status   04/30/2019 >60 >60 mL/min/1.73 m^2 Final     Comment:     Calculation used to obtain the estimated glomerular filtration  rate (eGFR) is the CKD-EPI equation.        Diagnosis/Assessment/Plan:   1.  Multiple Sclerosis  · Assessment: Patient was able to stand today in clinic several times with assist --feel strongly that she needs intensive outpatient therapy both OT and PT due to L sided weakness and  trunk instability. To be recalled she was ambulating in December of last year.   · Disease Modifying Therapies: continue Copaxone and vitamin D. She had severe neuropathy sx with Aubagio     2.   MS Symptom Assessment / Management  ·   · Gait Disturbance: referral to outpatient PT [place]--L UE weakness, core stability, gait training, balance retraining  · UE dysfunction: referral placed to outpatient OT -L UE weakness, core stability  · No other changes to regimen described in ROS above       Our visit today lasted 35 minutes, and 100% of this time was spent face to face with the patient. Over 50% of this visit included discussion of the treatment  plan/medication changes/symptom management/exam findings/imaging results/coordination of care. The patient agrees with the plan of care.     Follow up in about 3 months (around 11/20/2019) for follow up with me.  Patient agreed to POC today.    Attending, Dr. Apodaca, was available during today's encounter.     Risa Oshea PA-C  [facility]      Problem List Items Addressed This Visit        Neuro    Multiple sclerosis - Primary    Overview     Dr. Apodaca, neurology         Relevant Orders    TSH    Vitamin D    Ambulatory Referral to Physical/Occupational Therapy    Ambulatory consult to Occupational Therapy       Endocrine    Vitamin D insufficiency    Relevant Orders    Vitamin D       Other    Fatigue    Relevant Orders    TSH      Other Visit Diagnoses     Neurologic gait dysfunction        Relevant Orders    Ambulatory Referral to Physical/Occupational Therapy    Ambulatory consult to Occupational Therapy    Weakness of left upper extremity        Relevant Orders    Ambulatory consult to Occupational Therapy    Counseling regarding goals of care        Prophylactic immunotherapy        Depression, unspecified depression type

## 2019-08-22 ENCOUNTER — TELEPHONE (OUTPATIENT)
Dept: HOME HEALTH SERVICES | Facility: HOSPITAL | Age: 69
End: 2019-08-22

## 2019-09-03 ENCOUNTER — OFFICE VISIT (OUTPATIENT)
Dept: OPTOMETRY | Facility: CLINIC | Age: 69
End: 2019-09-03
Payer: MEDICARE

## 2019-09-03 DIAGNOSIS — H01.006 BLEPHARITIS OF BOTH EYES, UNSPECIFIED EYELID, UNSPECIFIED TYPE: Primary | ICD-10-CM

## 2019-09-03 DIAGNOSIS — H01.003 BLEPHARITIS OF BOTH EYES, UNSPECIFIED EYELID, UNSPECIFIED TYPE: Primary | ICD-10-CM

## 2019-09-03 PROCEDURE — 92012 INTRM OPH EXAM EST PATIENT: CPT | Mod: S$GLB,,, | Performed by: OPTOMETRIST

## 2019-09-03 PROCEDURE — 99999 PR PBB SHADOW E&M-EST. PATIENT-LVL II: CPT | Mod: PBBFAC,,, | Performed by: OPTOMETRIST

## 2019-09-03 PROCEDURE — 99999 PR PBB SHADOW E&M-EST. PATIENT-LVL II: ICD-10-PCS | Mod: PBBFAC,,, | Performed by: OPTOMETRIST

## 2019-09-03 PROCEDURE — 92012 PR EYE EXAM, EST PATIENT,INTERMED: ICD-10-PCS | Mod: S$GLB,,, | Performed by: OPTOMETRIST

## 2019-09-03 RX ORDER — NEOMYCIN SULFATE, POLYMYXIN B SULFATE, AND DEXAMETHASONE 3.5; 10000; 1 MG/G; [USP'U]/G; MG/G
OINTMENT OPHTHALMIC
Qty: 1 TUBE | Refills: 0 | Status: SHIPPED | OUTPATIENT
Start: 2019-09-03 | End: 2019-11-27

## 2019-09-03 NOTE — PROGRESS NOTES
HPI     Eye Pain      Additional comments: pt c/o irritation, burning & pain on & off w/ some   crusting in morning x 2 weeks -- +Systane gtts qhs & cold compresses              Comments     Bump on lower left lid by the nose for few weeks.          Last edited by Kevin Eddy, OD on 9/3/2019  3:17 PM. (History)            Assessment /Plan     For exam results, see Encounter Report.    Blepharitis of both eyes, unspecified eyelid, unspecified type  -     neomycin-polymyxin-dexamethasone (DEXACINE) 3.5 mg/g-10,000 unit/g-0.1 % Oint; 1/4 inch ribbon on upper and lower affected lids twice daily  Dispense: 1 Tube; Refill: 0      Discussed findings and treatment options. Start gentle lid scrubs with warm water and baby shampoo twice daily. After lid scrub, apply maxitrol ointment on upper and lower affected lids twice daily x 1 week. Return if worsening or no resolution.

## 2019-09-04 ENCOUNTER — PATIENT MESSAGE (OUTPATIENT)
Dept: NEUROLOGY | Facility: CLINIC | Age: 69
End: 2019-09-04

## 2019-09-04 RX ORDER — ATORVASTATIN CALCIUM 40 MG/1
40 TABLET, FILM COATED ORAL DAILY
Qty: 90 TABLET | Refills: 0 | Status: SHIPPED | OUTPATIENT
Start: 2019-09-04 | End: 2019-11-30 | Stop reason: SDUPTHER

## 2019-09-05 ENCOUNTER — CLINICAL SUPPORT (OUTPATIENT)
Dept: REHABILITATION | Facility: HOSPITAL | Age: 69
End: 2019-09-05
Payer: MEDICARE

## 2019-09-05 DIAGNOSIS — R53.1 WEAKNESS GENERALIZED: ICD-10-CM

## 2019-09-05 DIAGNOSIS — Z78.9 IMPAIRED INSTRUMENTAL ACTIVITIES OF DAILY LIVING (IADL): ICD-10-CM

## 2019-09-05 DIAGNOSIS — G35 MULTIPLE SCLEROSIS: ICD-10-CM

## 2019-09-05 DIAGNOSIS — Z78.9 IMPAIRED MOBILITY AND ADLS: Primary | ICD-10-CM

## 2019-09-05 DIAGNOSIS — Z74.09 IMPAIRED MOBILITY AND ADLS: Primary | ICD-10-CM

## 2019-09-05 DIAGNOSIS — R27.8 DECREASED COORDINATION: ICD-10-CM

## 2019-09-05 PROCEDURE — 97165 OT EVAL LOW COMPLEX 30 MIN: CPT | Mod: PN

## 2019-09-05 RX ORDER — GLATIRAMER 40 MG/ML
40 INJECTION, SOLUTION SUBCUTANEOUS
Qty: 36 SYRINGE | Refills: 1 | Status: SHIPPED | OUTPATIENT
Start: 2019-09-06 | End: 2020-02-24

## 2019-09-05 NOTE — PLAN OF CARE
Occupational Therapy   Evaluation    Alyssa John   MRN: [id number]   Referring Physician: Risa Oshea PA-C     Date: 09/05/2019    Start Time:  1205  Stop Time:  1313        UNTIMED  Procedure Time Min.   OT eval (low complexity) Start:  Stop: 68   Total Timed Minutes:  0  Total Timed Units:  0  Total Untimed Units:  1  Charges Billed/# of units:  1    Past Medical History:   Diagnosis Date    Arthritis     Coronary artery disease     Encounter for blood transfusion     Epilepsy     GERD (gastroesophageal reflux disease)     Hypertension     MI, old     MS (multiple sclerosis)     Seizures     epilepsy     Past Surgical History:   Procedure Laterality Date    APPENDECTOMY      BACK SURGERY      L5 discectomy    BREAST BIOPSY Right 15 yrs ago    benign    CATARACT EXTRACTION Bilateral     COLONOSCOPY N/A 9/16/2015    Performed by [facility] Malcolm [facility] at [facility] END[facility]    CORONARY STENT PLACEMENT      FLUORO URODYNAMIC STUDY (FUDS) N/A 12/9/2014    [facility]    right knee arthroscopy       Review of patient's allergies indicates:   Allergen Reactions    Codeine      Other reaction(s): throat tightness    Dilantin [phenytoin sodium extended]     Phenobarbital     Tegretol [carbamazepine]        Current Outpatient Medications:     atorvastatin (LIPITOR) 40 MG tablet, Take 1 tablet (40 mg total) by mouth once daily., Disp: 90 tablet, Rfl: 0    cetirizine (ZYRTEC) 10 MG tablet, Take 1 tablet (10 mg total) by mouth once daily. (Patient taking differently: Take 10 mg by mouth daily as needed for Allergies or Rhinitis. ), Disp: 30 tablet, Rfl: 2    cholecalciferol, vitamin D3, (VITAMIN D3) 5,000 unit Tab, Take 5,000 Units by mouth once daily., Disp: , Rfl:     clonazePAM (KLONOPIN) 1 MG tablet, TAKE 1 TABLET BY MOUTH TWICE A DAY, Disp: 180 tablet, Rfl: 1    clopidogrel (PLAVIX) 75 mg tablet, Take 75 mg by mouth once daily.  , Disp: , Rfl:      DULoxetine (CYMBALTA) 20 MG capsule, TAKE 1 CAPSULE BY MOUTH EVERY DAY, Disp: 90 capsule, Rfl: 1    famotidine (PEPCID) 20 MG tablet, Take 20 mg by mouth 2 (two) times daily as needed for Heartburn., Disp: , Rfl:     fluticasone (FLONASE) 50 mcg/actuation nasal spray, 2 sprays (100 mcg total) by Each Nare route 2 (two) times daily. (Patient taking differently: 2 sprays by Each Nare route 2 (two) times daily as needed for Rhinitis. ), Disp: 1 Bottle, Rfl: 0    glatiramer (COPAXONE) 40 mg/mL Syrg injection, Inject 40 mg into the skin 3 (three) times a week. (Patient taking differently: Inject 40 mg into the skin 3 (three) times a week. Mon Wed Fri), Disp: 36 Syringe, Rfl: 1    modafinil (PROVIGIL) 100 MG Tab, TAKE 1 TABLET BY MOUTH TWICE A DAY, Disp: 180 tablet, Rfl: 0    neomycin-polymyxin-dexamethasone (DEXACINE) 3.5 mg/g-10,000 unit/g-0.1 % Oint, 1/4 inch ribbon on upper and lower affected lids twice daily, Disp: 1 Tube, Rfl: 0    nitroGLYCERIN (NITROSTAT) 0.4 MG SL tablet, Place 0.4 mg under the tongue every 5 (five) minutes as needed for Chest pain. , Disp: , Rfl:     PSYLLIUM SEED, WITH DEXTROSE, (FIBER ORAL), Take by mouth 2 (two) times daily., Disp: , Rfl:     topiramate (TOPAMAX) 50 MG tablet, Take 1 tablet (50 mg total) by mouth 2 (two) times daily., Disp: 180 tablet, Rfl: 1    VENTOLIN HFA 90 mcg/actuation inhaler, INHALE 2 PUFFS INTO THE LUNGS EVERY 6 (SIX) HOURS AS NEEDED FOR WHEEZING. RESCUE, Disp: 1 Inhaler, Rfl: 1    Signs of Abuse: none    Nutrition: WFL    Diagnosis: Multiple Sclerosis, neurologic gait, L UE weakness  Encounter Diagnoses   Name Primary?    Impaired mobility and ADLs Yes    Decreased coordination     Weakness generalized     Impaired instrumental activities of daily living (IADL)      Onset date: January 2019  Orders: Eval and Treat    Precautions: universal, fall  MRI Brain 12/11/18 Impression: Unchanged pronounced burden of supratentorial white matter disease, with  "appearance in keeping with history of multiple sclerosis.  No new or enhancing lesions to suggest active demyelination.    Partially imaged lesion at the craniocervical junction, with spinal lesions better delineated on concurrent dedicated spine MRIs.    Dominant hand: right    Subjective:     Chief Complaint: loss of independence compared to pre-bronchitis    History of Current Situation, including Past Treatment:     Per pt, "I have MS, I was doing fine until I got bronchitis (about the first of the year). I was coming to therapy and I was doing OK, everything was going fine." Difficulty with shaking interferes with ability to eat.    Per spouse, "She went from being able to sometimes going without the walker to not being able to stand on her own, transfer on her own, complete dependency. The bronchitis lasted 2-3 months and she had to be hospitalized a couple of times. She had some home health, was rehospitalized, went to inpatient rehab for 2 weeks ([facility]), did some more home health and now she's here. The doctor thinks since there are no new lesions, we should be able to get back where she was."    Social Hx: Lives with spouse, 1 story home, 0 SHERYL, w/c accessible (except for bathroom)    Prior Level of Function: independent with BADLs with walker prior to bronchitis except for assist with bra    Current Level of Function: assist with dressing; more help if time constrained; trunk control needs improvement; assist with bath    Occupation/hobbies/homemaking: play skip-abbi, reading, iPad games, pays bills on iPad, going out to eat on Thursday's; she assists with dishes  Driving status: stopped driving prior to bronchitis    DME: trying to get a standard wheelchair. She has a transport chair; rolling walker; single point cane; has 3in1 but not used; TTB   Home access: handicap height toilet, grab bar in bathroom; tub/shower combo w/ TTB w/ curtain, HHSH and grab bars    Pain: 0 /10 at start/end of tx, though " "she did feel sore this morning - when she woke, she thought she had maybe fallen out of bed and pulled herself back into bed based on soreness    Patient goals: per spouse, get back to pre-bronchitis functioning  Be able to go to the bathroom jay (pt ID'd this as a goal with prompting from OT)    Objective:     Cognition/Perception:   Hearing:  Grossly intact  Communication:  Grossly intact, some increased time for processing. Spouse will answer questions for her.  Vision: See Below  Oriented: Person, Place and Situation, month & year  Attention:  Min decreased  Follows Commands: Follows one-step commands  Memory:  impaired  Problem Solving:  Min decreased  Neglect:  None noted  Safety/Judgment/Insight:  Appears grossly intact; to be further assessed as appropriate  Coping skills/emotional control: Appropriate to situation    Visual/perceptual: Alyssa reports no visual changes related to MS, no recent visual changes. She had cataract surgery "years ago." To be further assessed as necessary.     Physical:  Posture: Rounded shoulder, Head forward, Posterior pelvic tilt, Slouched posture and Trunk deviated right  Joint integrity/ Subluxation: no subluxation noted  Skin integrity: Visible skin intact  Edema: none noted    Sensation: Alyssa reports no concerns with sensation  Light touch: pavan UE intact and symmetrical    UE Movement and Coordination:  Hand Domination:  right  Affected Extremity:  Bilateral though she c/o more L concerns than R    Tone:  Normal - no increase in muscle noted noted during eval. She does not c/o waking at night with either hand in a fist or other concerns indicating an increase in tone.    GMC: Pavan UE max impaired  Box & Block Test: R=26; L=27  [norms for women aged 65-69: R=72.0 +/-6.2; L=71.3 +/-7.7 (Parveen et al, 1985)]    FMC: impaired  9-Hole Peg Test: will assess as GMC improves  [norms for women aged 66-70: R=19.90 +/- 3.15s; L=21.44s +/-3.97s (Dora et al, 2003)]    Range of " "Motion/ Strength (tested seated):  R UE: sh flex=135a, 145p; min decreased fxl ER; abd moves into scaption ~105*  L UE: sh flex=108a, 120p; mod decreased fxl ER; abd moves into scaption ~105*    Spouse notes tightness in thighs and adductors are noted to be very tight. Please see PT eval for details.  Mod-max decreased AROM in pelvic tilting a/p; max impaired lateral tilting (worse on L than R)    Hand Strength   (lbs) Right Left    1 10   14     2 11   15     3 13  16    AVG 11.33  15     [norms for women aged 65-69: R=49.6 +/-9.7; L=41.0 +/-8.2 (Parveen et al, 1985)]    Pinch testing deferred. Will assess at status update as coordination improves.    Balance:  Static Sitting:  poor w/ posterior R lean  Static Standing:  Poor - requires at least min A    Functional Mobility:  Mod A w/c<>EOM SPT      AM-PAC 6 CLICK ADL  How much help from another person does this patient currently need?  1 = Unable, Total/Dependent Assistance  2 = A lot, Maximum/Moderate Assistance  3 = A little, Minimum/Contact Guard/Supervision  4 = None, Modified [place]/Independent    Eating: 3       Grooming: 3      UB Dressing: 3     LB Dressin      Bathin      Toiletin  Total: 15    AM-PAC Raw Score CMS "G-Code Modifier Level of Impairment Assistance   6 % Total / Unable   7 - 9 CM 80 - 100% Maximal Assist   10-14 CL 60 - 80% Moderate Assist   15 - 19 CK 40 - 60% Moderate Assist   20 - 22 CJ 20 - 40% Minimal Assist   23 CI 1-20% SBA / CGA   24 CH 0% Independent/ Mod I      Very slow with BADLs, though I did not time today. CGA R shoe, max A L shoe; SBA t-shirt    IADL's:  Limited participation in homecare/cooking/laundry tasks at this time. To be further assessed.  Use of telephone: to be assessed   Money management:  Pt and spouse state pt pays bills and they are good with this arrangement. Spouse insists that she pays the bills "the day they come in" and he has no concerns with her ability in this area. "   Medication management: spouse sets up weekly med organizer. She goes to organizer to take her meds, but has forgotten a couple of times in the past week.    Assessment:       Alyssa John presents with a low complexity OT evaluation. She required expanded review of medical history and occupational profile. Pt w/ deficits as noted below limit her ability to engage at her prior level of function. Clinical decision making required low analytical complexity due to occupational profile factors, data from problem-focused assessments, several treatment options and comorbidities affecting occupational performance, including (but not limited to) arthritis, hypertension, CAD and epilepsy. No modifications were required to complete the assessment. It is believed that with skilled occupational therapy and a progressive home program that Alyssa can improve her function to allow for increased participation in her valued roles of independent adult, spouse.     PROBLEM LIST/IMPAIRMENTS:   weakness, impaired endurance, impaired self care skills, impaired functional mobility, gait instability, impaired balance, impaired cognition, decreased coordination and decreased upper extremity function    Patient Education/Response:     1. Pt/family ed re: OT role and POC.     Plans and Goals:     LTG GOALS:  Time frame: 12 weeks (9/5/19-11/28/19)  1) Pt will be mod I with dressing for clothing except for fasteners (buttons/zippers/hooks)  2) Pt will be supervision with bathing, seated; SBA for TTB t/f.  3) Pt will be mod I and related t/f.  4) Pt will improve GMC, as evidenced by box and block scores of 45 or better on pavna UE.  5) Pt will participate in at least one IADL task in the home on a daily basis (dishes, laundry, etc)    9HPT to be assessed and FMC goal to be added.      STG Goals:  Time frame: 4 weeks (9/5/19-10/3/19)  1) Alyssa will be mod I with initial HEP.  2) Pt will t/f to toilet w/ min A or better.  3) Alyssa will  be able to stand and manage her clothing for toileting w/ CGA  4) Pavan UE GMC to improve, as evidenced by box & block scores of 31 or better, pavan UE.       Patient/caregiver understands and agrees with plan of care.    Certification Period: 12 weeks (9/5/19-11/28/19)  Outpatient occupational therapy 2  times weekly for 12 weeks  to include: Therapeutic Exercise, Functional Activities, Patient Education, Home Exercise Program, ADL Training, Transfer/Mobility Training, Electrical Stimulation/TENS/Interferential, Moist Heat/Ice/Paraffin, Sensory/Neuromuscular Reeducation, Functional Standing, Cognitive Preceptual Retraining and Manual Therapy, as well as any other treatment approaches deemed necessary to assist Alyssa in meeting her goals.    Sameera Montague, KIKE, LOTR  9/5/2019        I certify the need for these services furnished under this plan of treatment and while under my care.  ____________________________________ Physician/Referring     ____________________   Practitioner Date of Signature

## 2019-09-06 ENCOUNTER — CLINICAL SUPPORT (OUTPATIENT)
Dept: REHABILITATION | Facility: HOSPITAL | Age: 69
End: 2019-09-06
Payer: MEDICARE

## 2019-09-06 ENCOUNTER — TELEPHONE (OUTPATIENT)
Dept: PHARMACY | Facility: CLINIC | Age: 69
End: 2019-09-06

## 2019-09-06 DIAGNOSIS — Z74.09 IMPAIRED FUNCTIONAL MOBILITY, BALANCE, AND ENDURANCE: ICD-10-CM

## 2019-09-06 DIAGNOSIS — R26.81 GAIT INSTABILITY: ICD-10-CM

## 2019-09-06 DIAGNOSIS — R26.9 NEUROLOGIC GAIT DYSFUNCTION: Primary | ICD-10-CM

## 2019-09-06 PROCEDURE — 97163 PT EVAL HIGH COMPLEX 45 MIN: CPT | Mod: PN

## 2019-09-06 NOTE — PLAN OF CARE
"  TIME RECORD    Date: 09/06/2019    Start Time:  1415  Stop Time:  1500    PROCEDURES:    TIMED  Procedure Time Min.     Therex Start: 1452  Stop: 1500   8    Start:  Stop:     Start:  Stop:     Start:  Stop:          UNTIMED  Procedure Time Min.     Initial evaluation Start:  1415  Stop:  1450   35    Start:  Stop:      Total Timed Minutes:  8  Total Timed Units:  1  Total Untimed Units:  1  Charges Billed/# of units:  2    OUTPATIENT PHYSICAL THERAPY   PATIENT EVALUATION    Onset Date: 6/2019  Primary Diagnosis:   1. Neurologic gait dysfunction     2. Impaired functional mobility, balance, and endurance       Treatment Diagnosis: Functional mobility/gait deficits,   Past Medical History:   Diagnosis Date    Arthritis     Coronary artery disease     Encounter for blood transfusion     Epilepsy     GERD (gastroesophageal reflux disease)     Hypertension     MI, old     MS (multiple sclerosis)     Seizures     epilepsy     Precautions: Fall risk  Prior Therapy: Yes  Medications: Alyssa John has a current medication list which includes the following prescription(s): atorvastatin, cetirizine, cholecalciferol (vitamin d3), clonazepam, clopidogrel, duloxetine, famotidine, fluticasone propionate, glatiramer, modafinil, neomycin-polymyxin-dexamethasone, nitroglycerin, psyllium seed (with dextrose), topiramate, and ventolin hfa.  Nutrition:  Normal  History of Present Illness: Pt presents to PT with history of MS.  She has undergone several bouts of PT over the years with the most recent OP being at this facility ending mid May 2019.  Shortly after her last visit, she was admitted to hospital with chest pain.  No specific cause found at that time but was thought to be due to "soreness from all the coughing caused by the bronchitis" she had been dealing with since 1/2019.  Hospitalized ~ 1 week.  About 2 weeks after discharge she was readmitted to hospital with "some sort of infection".  Was hospitalized " "~ 1 week.  Follow up with Dr. Minaya.  Was subsequently admitted to inpatient rehab unit for 2 weeks (received PT, OT, and ST).  Was discharged to home with her family and home health therapies.  Was discharged from home health therapies about 3 weeks ago.  She is now referred to PT.      Prior Level of Function: Assistive Device Rolling walker or cane.    Social History: Lives with  (caregiver).  Also has pt's sister and adult daughter living with them.    Place of Residence (Steps/Adaptations): 1 story home with no steps or ramp.    Functional Deficits Leading to Referral/Nature of Injury: Unable to walk, increased difficulty with transfers, Difficulty with sitting balance/trunk control, tendency to lean backward when standing.    Patient Therapy Goals: To get out of the house, to be able to walk and do for myself.      Subjective     Alyssa John states "I don't like the diet he's feeding me because it is whole foods/plant based".  Pt reports that she is taking a new medication for MS but "it hasn't kicked in yet".    Pain: No pain reported.    Objective     Posture: Sitting pelvic obliquity with R pelvis higher than L, L lateral shift, decreased lumbar lordosis, increased thoracic kyphosis, moderate rounded shoulder and forward head posture.  Standing:  Requires assistance.  Tendency to lean backward  Palpation: N/A  Sensation: No reported paresthesias.    DTRs:  Not tested this date  Range of Motion/Strength: B LE grossly WFL in sitting, except hip external rotation which is significantly limited B L>R.  Supine hip abd   R active 17*, passive 20*, MMT 3/5; L active 12*, passive 15*, MMT 3-/5 - 3/5  In Hook lying pt is unable to externally rotate hips beyond 15*.    Strength trunk grossly 3/5, hips grossly 3/5-3+/5, knees 3/5-3+/5, ankles 2+/5-3-/5.      Flexibility: Hamstring length R 120*, L 118*; piriformis R minimal to moderate tightness, L moderate tightness.  Gastocs R moderate tightness, " L severe tightness.    Gait: Unable to ambulate at this time  Analysis: Unable to assess.    Bed Mobility:Assistance - moderate assistance for rolling and scooting. Increased assistance required for mobility on narrow surface.    Transfers: Assistance - Mod assistance for sit <> stand and for stand pivot required.    Special Tests: Balance Static sitting WFL,  Dynamic balance fair.  Static standing:  Mod assistance required to achieve and maintain standing posture, dynamic unable to assess.    Other: N/A  Treatment: PT evaluation completed.  Educated pt/ in role of PT and proposed POC.  Verbalized understanding.  Initiate exercise instruction in Hooklying butterfly stretch 3x30s Pt received passive hip intermal rotators.  Educated pt in importance of consistent low load activity, stretching.  Verbalized understanding.      Assessment       Initial Assessment (Pertinent finding, problem list and factors affecting outcome): Pt presents to PT with recent decline of function due to chronic medical condition.  Problems include impaired posture, decreased LE flexibility, decreased LE strength, decreased hip ROM, decreased ankle ROM.  These problems contribute to increased impairment in functional mobility and prevents ambulation.  She should benefit from skilled PT services to address these deficits.    Rehab Potiential: fair    Short Term Goals (4 Weeks):    1) Facilitate improved sitting posture   2) Facilitate improved LE flexibility   3) Pt will perform sit<>stand with minimal to moderate assistance   4) Pt will stand without posterior lean 5 of 10 trials  Long Term Goals (8 Weeks):    1) Pt will progress to ambulation over level surface x 15'    2) Pt will stand with slight UE support   3) Pt will perform toilet transfer with minimal assistance   4) Pt/family will be independent in HEP.      Plan     Certification Period: 09/06/2019 to 11/1/2019  Recommended Treatment Plan: 2 times per week for 8 weeks: Gait  Training, Manual Therapy, Moist Heat/ Ice, Neuromuscular Re-ed, Orthotic Management and Training, Patient Education, Therapeutic Activites and Therapeutic Exercise  Other Recommendations: N/A      Therapist: Juliana Marin PT    I CERTIFY THE NEED FOR THESE SERVICES FURNISHED UNDER THIS PLAN OF TREATMENT AND WHILE UNDER MY CARE    Physician's comments: ________________________________________________________________________________________________________________________________________________      Physician's Name: ___________________________________

## 2019-09-06 NOTE — PROGRESS NOTES
Occupational Therapy   Evaluation    Alyssa John   MRN: [id number]     OT eval complete. Please see attached POC for details. Thank you for consult!    G-Codes: self-care ([facility]=15)  Current: 40%-60% (CK)  Goal: 20%-40% (CJ)    KIKE Oneil LOTR  9/5/2019

## 2019-09-09 ENCOUNTER — CLINICAL SUPPORT (OUTPATIENT)
Dept: REHABILITATION | Facility: HOSPITAL | Age: 69
End: 2019-09-09
Attending: PHYSICIAN ASSISTANT
Payer: MEDICARE

## 2019-09-09 DIAGNOSIS — Z78.9 IMPAIRED INSTRUMENTAL ACTIVITIES OF DAILY LIVING (IADL): ICD-10-CM

## 2019-09-09 DIAGNOSIS — R53.1 WEAKNESS GENERALIZED: ICD-10-CM

## 2019-09-09 DIAGNOSIS — Z74.09 IMPAIRED MOBILITY AND ADLS: Primary | ICD-10-CM

## 2019-09-09 DIAGNOSIS — Z78.9 IMPAIRED MOBILITY AND ADLS: Primary | ICD-10-CM

## 2019-09-09 DIAGNOSIS — R27.8 DECREASED COORDINATION: ICD-10-CM

## 2019-09-09 PROCEDURE — 97530 THERAPEUTIC ACTIVITIES: CPT | Mod: PN

## 2019-09-09 NOTE — PROGRESS NOTES
"Occupational Therapy    Date:  09/09/2019  Start Time:  1105  Stop Time:  1208    TIMED  Procedure Time Min.   TherAct (4) Start:  Stop: 63   Total Timed Minutes:  63  Total Timed Units:  4  Total Untimed Units:  0  Charges Billed/# of units:  4    Progress/Current Status    Subjective:     Patient ID: Alyssa John is a 68 y.o. female.  Diagnosis: MS  1. Impaired mobility and ADLs     2. Decreased coordination     3. Weakness generalized     4. Impaired instrumental activities of daily living (IADL)       Pain: 0 /10 "I really don't ever have pain."  Alyssa reports having a busy weekend. She states she helped with meal prep by telling her , "I would eat what he was cooking." Nonetheless, she also stated, "I did my own bath today." Spouse helped pt w/ in/out on TTB.    Objective:     Appears to have had a busy weekend. She states she helped with meal prep by telling her  she would eat what he was cooking. "I did my own bath today." Spouse helped pt w/ in/out on TTB    Mod A SPT w/c<>mat w/ max cues for sequencing and advancing feet. Posterior COG noted and pt was unable to correct with cues.   Seated at EOM, facilitation of a/p pelvic tilt, R/L lateral pelvic tilt. Points of contact at low back for anterior tilt, superior shoulder for posterior tilt, appropriate QL and opposite shoulder for lateral tilting. Hands at sides in position of support to facilitate scapulo-pelvic rhythm.    Posterior lean continues and L lateral flare noted at ribcage.     Sit->supine w/ min A, [place]-CGA supine->sit    R UE: PROM ER@0*abd=87, ER@45*abd=81, abd=WFL, ER@90*abd=79; SLIR=50; fxl IR=L3  L UE: PROM ER@0*abd=81, ER@45*abd=78, abd=98, ER@90*abd=86; SLIR=52; fxl IR=L4    Gentle mobilization and stretching of soft-tissues throughout the shoulder, including levator, serratus anterior, subscapularis, anterior complex, & lats.  Mobilization to improve R ribcage vertical alignment. She was able to improve sitting " position briefly after mobilization but unable to sustain.     AROM mandeep scap retraction w/ tactile facilitation x10.    Assessment:     Poor core strength and coordination limits function in all activiites, including transfers and use of mandeep UE. Improving core mobility and strength should allow for improved stability in transfers, as well as improved function in mandeep UE during ADLs. Alyssa demo'd the ability to complete the pelvic exercises today, though with decreased ROM. We will continue to address this. It is believed that with skilled occupational therapy and a progressive home program that Alyssa can improve her function to allow for increased participation in her valued roles of independent adult, spouse.      PROBLEM LIST/IMPAIRMENTS:   weakness, impaired endurance, impaired self care skills, impaired functional mobility, gait instability, impaired balance, impaired cognition, decreased coordination and decreased upper extremity function     LTG GOALS:  Time frame: 12 weeks (9/5/19-11/28/19)  1) Pt will be mod I with dressing for clothing except for fasteners (buttons/zippers/hooks)  2) Pt will be supervision with bathing, seated; SBA for TTB t/f.  3) Pt will be mod I and related t/f.  4) Pt will improve GMC, as evidenced by box and block scores of 45 or better on mandeep UE.  5) Pt will participate in at least one IADL task in the home on a daily basis (dishes, laundry, etc)  9HPT to be assessed and FMC goal to be added.      STG Goals:  Time frame: 4 weeks (9/5/19-10/3/19)  1) Alyssa will be mod I with initial HEP.  2) Pt will t/f to toilet w/ min A or better.  3) Alyssa will be able to stand and manage her clothing for toileting w/ CGA  4) Mandeep UE GMC to improve, as evidenced by box & block scores of 31 or better, mandeep UE.    Patient Education/Response:     Pelvic tilting exs. Handout given. She v/u.    Plans and Goals:     Review pelvic tilting exs. Mobilize pelvis to increase ROM; continue mobilization of  mumtaz.    Sameera Montague, MOT, LOTR  9/9/2019

## 2019-09-13 ENCOUNTER — CLINICAL SUPPORT (OUTPATIENT)
Dept: REHABILITATION | Facility: HOSPITAL | Age: 69
End: 2019-09-13
Payer: MEDICARE

## 2019-09-13 DIAGNOSIS — Z74.09 IMPAIRED FUNCTIONAL MOBILITY, BALANCE, AND ENDURANCE: ICD-10-CM

## 2019-09-13 PROCEDURE — 97110 THERAPEUTIC EXERCISES: CPT | Mod: PN

## 2019-09-13 NOTE — PROGRESS NOTES
"Name: Alyssa John  Date:   09/13/2019  Primary Diagnosis: .  Problem List Items Addressed This Visit     Impaired functional mobility, balance, and endurance          Time in: 0915  Time Out: 1000  Total Treatment Time: 45  Group Time: 0      Subjective:    Patient reports "I can't even walk now." Pt reports working on transfers and leg exercises with HH PT, had not initiated any gait training. Reports she is going to be getting a wheelchair soon, unsure of delivery date. States fatigue and soreness following OT session earlier this week.  Patient reports their pain to be 0/10 on a 0-10 scale with 0 being no pain and 10 being the worst pain imaginable.    Objective    Patient received individual therapy to address strength, endurance and posture with 0 other patients with activities as follows:     Patient received therapeutic exercises to develop strength, endurance and posture for 45 minutes including:     Transfers mod A c/ cues for technique/sequencing  Scooting on stationary bike mod/max A to correct positioning and decrease L lateral lean     Stationary bike x 10 min L1    Seated therex: x 20   Heel/toe raises   Hip adduction ball squeezes   LAQ    Hip marches    // bars: sit to stand mod A, 3 static standing trials x 1 min each at mod A      Assessment:     Pt needing rest breaks enforced due to fatigue.     Pt will continue to benefit from skilled PT intervention. Medical Necessity is demonstrated by:  Fall Risk, Unable to participate fully in daily activities, Requires skilled supervision to complete and progress HEP and Weakness.    Patient is making good progress towards established goals.    Plan:  Continue with established Plan of Care towards PT goals.     "

## 2019-09-16 ENCOUNTER — CLINICAL SUPPORT (OUTPATIENT)
Dept: REHABILITATION | Facility: HOSPITAL | Age: 69
End: 2019-09-16
Attending: PHYSICIAN ASSISTANT
Payer: MEDICARE

## 2019-09-16 DIAGNOSIS — Z74.09 IMPAIRED FUNCTIONAL MOBILITY, BALANCE, AND ENDURANCE: ICD-10-CM

## 2019-09-16 PROCEDURE — 97110 THERAPEUTIC EXERCISES: CPT | Mod: PN

## 2019-09-16 NOTE — PROGRESS NOTES
Name: Alyssa John  Date:   09/16/2019  Primary Diagnosis: .  Problem List Items Addressed This Visit     Impaired functional mobility, balance, and endurance          Time in: 1510  Time Out: 1600  Total Treatment Time: 50  Group Time: 0      Subjective:    Patient reports being very sore all over her body after her last treatment session.  Patient reports their pain to be 0/10 on a 0-10 scale with 0 being no pain and 10 being the worst pain imaginable.    Objective    Patient received individual therapy to address strength, endurance, ROM and flexibility with 0 other patients with activities as follows:     Patient received therapeutic exercises to develop strength, endurance, ROM and flexibility for 50 minutes including:     Transfers mod A c/ cues for technique/sequencing  Scooting on stationary bike mod/max A to correct positioning and decrease L lateral lean     Stationary bike x 10 min L1    Seated therex: x 20   Heel/toe raises   Hip adduction ball squeezes   LAQ    Hip marches    Shuttle leg press 37# B LE's 2 min x 4 trials    // bars: sit to stand mod A, 3 static standing trials x 1 min each at mod A      Assessment:     Cues for sequencing/hand placement and turning feet for transfers.     Pt will continue to benefit from skilled PT intervention. Medical Necessity is demonstrated by:  Fall Risk, Unable to participate fully in daily activities, Requires skilled supervision to complete and progress HEP and Weakness.    Patient is making good progress towards established goals.    Plan:  Continue with established Plan of Care towards PT goals.

## 2019-09-19 ENCOUNTER — CLINICAL SUPPORT (OUTPATIENT)
Dept: REHABILITATION | Facility: HOSPITAL | Age: 69
End: 2019-09-19
Attending: PHYSICIAN ASSISTANT
Payer: MEDICARE

## 2019-09-19 DIAGNOSIS — Z74.09 IMPAIRED FUNCTIONAL MOBILITY, BALANCE, AND ENDURANCE: ICD-10-CM

## 2019-09-19 DIAGNOSIS — R53.1 WEAKNESS GENERALIZED: ICD-10-CM

## 2019-09-19 DIAGNOSIS — Z78.9 IMPAIRED INSTRUMENTAL ACTIVITIES OF DAILY LIVING (IADL): ICD-10-CM

## 2019-09-19 DIAGNOSIS — R27.8 DECREASED COORDINATION: ICD-10-CM

## 2019-09-19 DIAGNOSIS — Z74.09 IMPAIRED MOBILITY AND ADLS: Primary | ICD-10-CM

## 2019-09-19 DIAGNOSIS — Z78.9 IMPAIRED MOBILITY AND ADLS: Primary | ICD-10-CM

## 2019-09-19 PROCEDURE — 97110 THERAPEUTIC EXERCISES: CPT | Mod: KX,PN

## 2019-09-19 PROCEDURE — 97530 THERAPEUTIC ACTIVITIES: CPT | Mod: PN

## 2019-09-19 NOTE — PROGRESS NOTES
"Occupational Therapy    Date:  09/19/2019  Start Time:  1200  Stop Time:  1305    TIMED  Procedure Time Min.   TherAct (4) Start:  Stop: 65   Total Timed Minutes:  65  Total Timed Units:  4  Total Untimed Units:  0  Charges Billed/# of units:  4    Progress/Current Status    Subjective:     Patient ID: Alyssa John is a 68 y.o. female.  Diagnosis: MS  1. Impaired mobility and ADLs     2. Impaired functional mobility, balance, and endurance     3. Decreased coordination     4. Weakness generalized     5. Impaired instrumental activities of daily living (IADL)       Pain:     "I'm feeling really good today. I find it so hard to sit up straight. I had a hard time with the exercises you wanted me to do."  Spouse asks how he can better help her do the pelvic exercises.  He also asks about continued posterior lean during sit<>stand.    Objective:     Seated at EOM, facilitation of a/p pelvic tilt, R/L lateral pelvic tilt. Points of contact at low back for anterior tilt, superior shoulder for posterior tilt, appropriate QL and opposite shoulder for lateral tilting. Hands at sides in position of support to facilitate scapulo-pelvic rhythm. Ed spouse how exercise should look and how he can facilitate the right movements at home. Increased time spent on pelvic tilting to ensure that pt/spouse could safely and effectively complete at home.    Sit<>stand w/ mod A - Alyssa unable to come to full stand w/ posterior COG and hip/knee flexion. Positioned chair or stool in front of pt to encourage increased forward lean. Repeated 2x5.  T/f w/c<>mat w/ min-mod A; pt's hands on OT's elbows, cues to advance feet.  Hamstring stretching - trialed 2 positions and issued for HEP. Completed 3x10s on each leg.  Hip flexor stretch seated & supine. Seated stretch required max A from OT for positioning. She was able to complete supine stretch without difficulty. Ed pt and spouse how to set-up at home.    Assessment:     Improved AROM " noted in pelvic tilting today, though she still has difficulty coordinating the movements. Spouse and pt seem to have a better understanding of how to do pelvic tilting exs after education. As previously noted, poor core strength and coordination limits function in all activiites, including transfers and use of mandeep UE. Improving core mobility and strength should allow for improved stability in transfers, as well as improved function in mandeep UE during ADLs. Alyssa demo'd the ability to complete the pelvic exercises today, though with decreased ROM. We will continue to address this. It is believed that with skilled occupational therapy and a progressive home program that Alyssa can improve her function to allow for increased participation in her valued roles of independent adult, spouse.      PROBLEM LIST/IMPAIRMENTS:   weakness, impaired endurance, impaired self care skills, impaired functional mobility, gait instability, impaired balance, impaired cognition, decreased coordination and decreased upper extremity function     LTG GOALS:  Time frame: 12 weeks (9/5/19-11/28/19)  1) Pt will be mod I with dressing for clothing except for fasteners (buttons/zippers/hooks)  2) Pt will be supervision with bathing, seated; SBA for TTB t/f.  3) Pt will be mod I and related t/f.  4) Pt will improve GMC, as evidenced by box and block scores of 45 or better on mandeep UE.  5) Pt will participate in at least one IADL task in the home on a daily basis (dishes, laundry, etc)  9HPT to be assessed and FMC goal to be added.      STG Goals:  Time frame: 4 weeks (9/5/19-10/3/19)  1) Alyssa will be mod I with initial HEP.  2) Pt will t/f to toilet w/ min A or better.  3) Alyssa will be able to stand and manage her clothing for toileting w/ CGA  4) Mandeep UE GMC to improve, as evidenced by box & block scores of 31 or better, mandeep UE.    Patient Education/Response:     As noted above. New handout for hamstring and hip flexor stretching.     Plans and  Goals:     Review new stretches. Continue working on pelvic facilitation and mobilization.     KIKE Oneil, SIMA  9/19/2019

## 2019-09-19 NOTE — PROGRESS NOTES
Name: Alyssa John  Date:   09/19/2019  Primary Diagnosis: .  Problem List Items Addressed This Visit     Impaired functional mobility, balance, and endurance          Time in: 1106  Time Out: 1200  Total Treatment Time: 54  Group Time: 0      Subjective:    Patient reports improvement of symptoms.  reports that after last treatment, she was wiped out for the rest of the day but fine the next day. Patient reports their pain to be 0/10 on a 0-10 scale with 0 being no pain and 10 being the worst pain imaginable.    Objective    Patient received individual therapy to address strength, endurance, ROM and flexibility with 0 other patients with activities as follows:     Patient received therapeutic exercises to develop strength, endurance, ROM and flexibility for 54 minutes including:     Transfers: mod A with cues for hand placement/sequencing    Bike x 10 min seat 3    Seated therex: x 20   HR/TR   LAQ   Hip marches   Hip adduction ball squeezes    Gait: // bars 6 ft x 2 trials, min/mod A with bilateral UE support and w/c follow    Sit to stand: x 5 min>mod A    Static standing 1 min x 3 trials; mod A with cues for upright posture    Assessment:     Posterior lean with standing trials in // bars, needing verbal/tactile cues to partially correct    Pt will continue to benefit from skilled PT intervention. Medical Necessity is demonstrated by:  Fall Risk, Unable to participate fully in daily activities, Requires skilled supervision to complete and progress HEP and Weakness.    Patient is making good progress towards established goals.    Plan:  Continue with established Plan of Care towards PT goals.

## 2019-09-23 ENCOUNTER — CLINICAL SUPPORT (OUTPATIENT)
Dept: REHABILITATION | Facility: HOSPITAL | Age: 69
End: 2019-09-23
Payer: MEDICARE

## 2019-09-23 DIAGNOSIS — Z78.9 IMPAIRED INSTRUMENTAL ACTIVITIES OF DAILY LIVING (IADL): ICD-10-CM

## 2019-09-23 DIAGNOSIS — Z74.09 IMPAIRED FUNCTIONAL MOBILITY, BALANCE, AND ENDURANCE: ICD-10-CM

## 2019-09-23 DIAGNOSIS — R27.8 DECREASED COORDINATION: ICD-10-CM

## 2019-09-23 DIAGNOSIS — Z78.9 IMPAIRED MOBILITY AND ADLS: Primary | ICD-10-CM

## 2019-09-23 DIAGNOSIS — Z74.09 IMPAIRED MOBILITY AND ADLS: Primary | ICD-10-CM

## 2019-09-23 DIAGNOSIS — R53.1 WEAKNESS GENERALIZED: ICD-10-CM

## 2019-09-23 PROCEDURE — 97110 THERAPEUTIC EXERCISES: CPT | Mod: PN

## 2019-09-23 PROCEDURE — 97530 THERAPEUTIC ACTIVITIES: CPT | Mod: PN

## 2019-09-23 NOTE — PATIENT INSTRUCTIONS
Antiemboli: Isometric        Pull toes toward left knee, tense muscles on front of thigh and simultaneously squeeze buttocks. Keep leg and buttock flat on floor. Hold _5___ seconds.  Repeat __10__ times per set. Do _2___ sets per session. Do _1-2___ sessions per day.     [email or web address]     Copyright © [employer]. All rights reserved.   Self-Mobilization: Heel Slide (Supine)        Slide left heel toward buttocks until a gentle stretch is felt.   Repeat __10__ times per set. Do __2__ sets per session. Do __1-2__ sessions per day.     [email or web address]     Copyright © [employer]. All rights reserved.   Hip Abduction / Adduction: with Extended Knee (Supine)        Bring left leg out to side and return. Keep knee straight.  Repeat __10__ times per set. Do __2__ sets per session. Do _1-2___ sessions per day.     [email or web address]     Copyright © [employer]. All rights reserved.   Strengthening: Hip Adduction - Isometric        With ball or folded pillow between knees, squeeze knees together. Hold _5___ seconds.  Repeat _10___ times per set. Do __2__ sets per session. Do _1-2___ sessions per day.     [email or web address]     Copyright © [employer]. All rights reserved.   Strengthening: Hip Abductor - Resisted        With band looped around both legs above knees, push thighs apart.  Repeat _10___ times per set. Do _2___ sets per session. Do __1-2__ sessions per day.     https://[email or web address]     Copyright © [employer]. All rights reserved.   Stretching: Inner Thigh / Groin        DO THIS EXERCISE LYING ON YOUR BACK. Place heels together and pull feet toward groin until stretch is felt in groin and inner thigh. Hold _30___ seconds.  Repeat __3__ times per set. Do __1__ sets per session. Do _1-2___ sessions per day.     [email or web address]     Copyright © [employer]. All rights reserved.

## 2019-09-23 NOTE — PROGRESS NOTES
Name: Alyssa John  [facility] Number: [id number]  Date of Treatment: 09/23/2019   Diagnosis:   Encounter Diagnosis   Name Primary?    Impaired functional mobility, balance, and endurance        Time in: 1210  Time Out: 1305  Total Treatment Time: 55    Subjective:    Alyssa reports no pain. Presents in w/c with .      Objective:    Patient received individual therapy to increase strength, endurance, ROM, flexibility, posture and core stabilization with activities as follows:     Alyssa received therapeutic exercises to develop strength, endurance, ROM, flexibility, posture and core stabilization for 55 minutes including:     Transfers sit-stand from w/c with min A  Transfers w/c-bike min A  Static standing in // bars CGA to min A with tc, vc for midline to correct posterior drift  Lateral standing weight shifting 30  Gait training fwd/back in // bars 16' fwd/back, no seated rest in between  Seated LAQ 10/3 L/R  Shuttle B leg press 37# 10/3  Gait training with rw x 15' with CG/min A and vc for increased L LE clearance.     Continue HEP daily. Written and pictorial HEP of ex's in [employer] and will issue paper handout next session. Pt instructed to perform HEP daily and stop if symptoms increase.     Pt demo good understanding of the education provided. Patient demonstrated good return demonstration of activities.     Assessment:     Weight shifting improves with rw usage vs // bars. Progressing well without pain reported.     Pt will continue to benefit from skilled PT intervention. Medical Necessity is demonstrated by:  Requires skilled supervision to complete and progress HEP and Weakness.    Patient is making good progress towards established goals.    Plan:    Continue with established Plan of Care towards PT goals.

## 2019-09-23 NOTE — PROGRESS NOTES
"Occupational Therapy    Date:  09/23/2019  Start Time:  1205  Stop Time:  1305    TIMED  Procedure Time Min.   TherAct (4) Start:  Stop: 60   Total Timed Minutes:  60  Total Timed Units:  4  Total Untimed Units:  0  Charges Billed/# of units:  4    Progress/Current Status    Subjective:     Patient ID: Alyssa John is a 69 y.o. female.  Diagnosis: MS  1. Impaired mobility and ADLs     2. Decreased coordination     3. Weakness generalized     4. Impaired instrumental activities of daily living (IADL)       Pain:   "I did some of the stretches. I don't feel as sore as I did last week. I tried to do the pelvic tilts, but I just don't think I'm doing them right."    Objective:     T/f w/c<>mat, stand-pivot, w/ min A w/ mod cues for bring hips over feet to improve center of gravity  review of stretches given last week: Hamstring stretches, 3x20s per LE, sitting at EOM; Hip flexor stretches, supine w/ LE off edge of mat. During stretch to hip flexors, Alyssa completed snow angels with min A to work to max available ROM. OT also completed gentle soft-tissue mobilization to release pectoral mm and increase ROM toward abduction, repeating snow handles again after mobilization.    Seated at EOM, facilitation of a/p pelvic tilt, R/L lateral pelvic tilt. Points of contact at low back for anterior tilt, superior shoulder for posterior tilt, appropriate QL and opposite shoulder for lateral tilting. Hands at sides in position of support to facilitate scapulo-pelvic rhythm.     Mandeep scap retraction seated at EOM w/ min tactile cues, 3x10.     Assessment:     Despite reported difficulty with pelvic exercises at home, Alyssa does demo increased ROM today and improved indep w/ completing the exercises. Introduced snow angles and scap retraction. L sh abd is limited by tightness in the pectoral mm. As previously noted, poor core strength and coordination limits function in all activiites, including transfers and use of mandeep UE. " Improving core mobility and strength should allow for improved stability in transfers, as well as improved function in mandeep UE during ADLs. Alyssa demo'd the ability to complete the pelvic exercises today, though with decreased ROM. We will continue to address this. It is believed that with skilled occupational therapy and a progressive home program that Alyssa can improve her function to allow for increased participation in her valued roles of independent adult, spouse.      PROBLEM LIST/IMPAIRMENTS:   weakness, impaired endurance, impaired self care skills, impaired functional mobility, gait instability, impaired balance, impaired cognition, decreased coordination and decreased upper extremity function     LTG GOALS:  Time frame: 12 weeks (9/5/19-11/28/19)  1) Pt will be mod I with dressing for clothing except for fasteners (buttons/zippers/hooks)  2) Pt will be supervision with bathing, seated; SBA for TTB t/f.  3) Pt will be mod I and related t/f.  4) Pt will improve GMC, as evidenced by box and block scores of 45 or better on mandeep UE.  5) Pt will participate in at least one IADL task in the home on a daily basis (dishes, laundry, etc)  9HPT to be assessed and FMC goal to be added.      STG Goals:  Time frame: 4 weeks (9/5/19-10/3/19)  1) Alyssa will be mod I with initial HEP.  2) Pt will t/f to toilet w/ min A or better.  3) Alyssa will be able to stand and manage her clothing for toileting w/ CGA  4) Mandeep UE GMC to improve, as evidenced by box & block scores of 31 or better, mandeep UE.    Patient Education/Response:     As noted above. New handout snow angels and mandeep scap retraction.     Plans and Goals:     Review new exs. Continue working on pelvic facilitation and mobilization.     Sameera Montague, MOT, LOTR  9/23/2019

## 2019-09-25 ENCOUNTER — PATIENT MESSAGE (OUTPATIENT)
Dept: NEUROLOGY | Facility: CLINIC | Age: 69
End: 2019-09-25

## 2019-09-26 ENCOUNTER — CLINICAL SUPPORT (OUTPATIENT)
Dept: REHABILITATION | Facility: HOSPITAL | Age: 69
End: 2019-09-26
Payer: MEDICARE

## 2019-09-26 DIAGNOSIS — Z78.9 IMPAIRED MOBILITY AND ADLS: Primary | ICD-10-CM

## 2019-09-26 DIAGNOSIS — R27.8 DECREASED COORDINATION: ICD-10-CM

## 2019-09-26 DIAGNOSIS — Z78.9 IMPAIRED INSTRUMENTAL ACTIVITIES OF DAILY LIVING (IADL): ICD-10-CM

## 2019-09-26 DIAGNOSIS — Z74.09 IMPAIRED MOBILITY AND ADLS: Primary | ICD-10-CM

## 2019-09-26 DIAGNOSIS — Z74.09 IMPAIRED FUNCTIONAL MOBILITY, BALANCE, AND ENDURANCE: ICD-10-CM

## 2019-09-26 DIAGNOSIS — R53.1 WEAKNESS GENERALIZED: ICD-10-CM

## 2019-09-26 PROCEDURE — 97530 THERAPEUTIC ACTIVITIES: CPT | Mod: PN

## 2019-09-26 PROCEDURE — 97110 THERAPEUTIC EXERCISES: CPT | Mod: KX,PN

## 2019-09-26 NOTE — PROGRESS NOTES
Name: Alyssa John  Date:   09/26/2019  Primary Diagnosis: .  Problem List Items Addressed This Visit     Impaired functional mobility, balance, and endurance          Time in: 1205  Time Out: 1255  Total Treatment Time: 50  Group Time: 0      Subjective:    Patient reports she was denied a manual w/c since the doctor who assessed her recommended a power chair. Patient reports their pain to be 0/10 on a 0-10 scale with 0 being no pain and 10 being the worst pain imaginable.    Objective    Patient received individual therapy to address strength, endurance, ROM and flexibility with 0 other patients with activities as follows:     Patient received therapeutic exercises to develop strength, endurance, ROM and flexibility for 50 minutes including:     Transfers w/c<>bike, w/c<>shuttle min/mod A with cues for sequencing    Shuttle leg press 37# x 30 B    Stationary bike x 10 min at seat 3 with verbal/tactile cues for midlne sitting     Sit to stand transfers min/mod A with cues for scooting to edge of chair and positioning of feet    // bars static stand x 1 min with lateral weight shifting at min/mod A    Gait: 55 ft c/ RW at CGA/min A with w/c follow and cues for upright posture and L foot clearance      Assessment:     More assistance needed for transfers this date, possibly due to participating in OT prior to session. Improved posture with decreased posterior lean while gait training compared to standing in // bars. Improved LLE clearance initially with gait training, which decreased as she covered more distance & became fatigued.     Pt will continue to benefit from skilled PT intervention. Medical Necessity is demonstrated by:  Fall Risk, Unable to participate fully in daily activities, Requires skilled supervision to complete and progress HEP and Weakness.    Patient is making good progress towards established goals.    Plan:  Continue with established Plan of Care towards PT goals.

## 2019-09-26 NOTE — PROGRESS NOTES
"Occupational Therapy    Date:  09/26/2019  Start Time:  1205  Stop Time:  1305    TIMED  Procedure Time Min.   TherAct (4) Start:  Stop: 60   Total Timed Minutes:  60  Total Timed Units:  4  Total Untimed Units:  0  Charges Billed/# of units:  4    Progress/Current Status    Subjective:     Patient ID: Alyssa John is a 69 y.o. female.  Diagnosis: MS  1. Impaired mobility and ADLs     2. Impaired functional mobility, balance, and endurance     3. Decreased coordination     4. Weakness generalized     5. Impaired instrumental activities of daily living (IADL)       Pain:   Alyssa states she feels good today. She worked on snow angles and pavan scap retraction. She did not work "very much" on pelvic movements.  Alyssa states she likes to garden and would be interested in doing some of her therapy at our raised garden beds.      Objective:     T/f w/c->mat, stand-pivot, w/ max A w/ mod cues for bring hips over feet to improve center of gravity  Hamstring stretch at EOM, 3x30s per leg   Sit<>stand with increased hip extension compared to prior to hamstring stretches.  Gentle mobilization and stretching of soft-tissues throughout pavan shoulders, including levator, serratus anterior, subscapularis, anterior complex, pec major/minor, lats and combined internal rotators to allow for increased PROM.  Supine snow angles w/ AAROM as necessary to encourage increased movement throughout full range, 4x5 reps  Scooting at EOM w/ CGA, cues to push through feet to lift buttocks - moved down ~3/4 length of mat to the right and to the left.  T/F back to w/ w/ min-mod A, cues for being "up tall"  Standing at garden w/ max cues for straightening legs and tucking her bottom under.    Assessment:     Increased ability to produce "up tall" positioning in standing at garden compared to inside clinic. Alyssa costa's increased ROM (on visual inspection) for shoulder abduction compared to previous session. However, seated she remains very " tight in her shoulders and very rounded. As previously noted, poor core strength and coordination limits function in all activiites, including transfers and use of pavan UE. Improving core mobility and strength should allow for improved stability in transfers, as well as improved function in pavan UE during ADLs. Alyssa demo'd the ability to complete the pelvic exercises today, though with decreased ROM. We will continue to address this. It is believed that with skilled occupational therapy and a progressive home program that Alyssa can improve her function to allow for increased participation in her valued roles of independent adult, spouse.      PROBLEM LIST/IMPAIRMENTS:   weakness, impaired endurance, impaired self care skills, impaired functional mobility, gait instability, impaired balance, impaired cognition, decreased coordination and decreased upper extremity function     LTG GOALS:  Time frame: 12 weeks (9/5/19-11/28/19)  1) Pt will be mod I with dressing for clothing except for fasteners (buttons/zippers/hooks)  2) Pt will be supervision with bathing, seated; SBA for TTB t/f.  3) Pt will be mod I and related t/f.  4) Pt will improve GMC, as evidenced by box and block scores of 45 or better on pavan UE.  5) Pt will participate in at least one IADL task in the home on a daily basis (dishes, laundry, etc)  9HPT to be assessed and FMC goal to be added.      STG Goals:  Time frame: 4 weeks (9/5/19-10/3/19)  1) Alyssa will be mod I with initial HEP.  2) Pt will t/f to toilet w/ min A or better.  3) Alyssa will be able to stand and manage her clothing for toileting w/ CGA  4) Pavan UE GMC to improve, as evidenced by box & block scores of 31 or better, pavan UE.    Patient Education/Response:     Continue HEP as previous.     Plans and Goals:     Continue stretches, UB ROM; continue standing for work in raised bed gardens.     Sameera Montague, KIKE, LOTR  9/26/2019

## 2019-09-30 ENCOUNTER — CLINICAL SUPPORT (OUTPATIENT)
Dept: REHABILITATION | Facility: HOSPITAL | Age: 69
End: 2019-09-30
Payer: MEDICARE

## 2019-09-30 DIAGNOSIS — Z74.09 IMPAIRED MOBILITY AND ADLS: Primary | ICD-10-CM

## 2019-09-30 DIAGNOSIS — R27.8 DECREASED COORDINATION: ICD-10-CM

## 2019-09-30 DIAGNOSIS — R53.1 WEAKNESS GENERALIZED: ICD-10-CM

## 2019-09-30 DIAGNOSIS — Z74.09 IMPAIRED FUNCTIONAL MOBILITY, BALANCE, AND ENDURANCE: ICD-10-CM

## 2019-09-30 DIAGNOSIS — Z78.9 IMPAIRED MOBILITY AND ADLS: Primary | ICD-10-CM

## 2019-09-30 DIAGNOSIS — Z78.9 IMPAIRED INSTRUMENTAL ACTIVITIES OF DAILY LIVING (IADL): ICD-10-CM

## 2019-09-30 PROCEDURE — 97110 THERAPEUTIC EXERCISES: CPT | Mod: PN

## 2019-09-30 PROCEDURE — 97530 THERAPEUTIC ACTIVITIES: CPT | Mod: PN

## 2019-09-30 NOTE — PROGRESS NOTES
"Name: Alyssa John  [facility] Number: [id number]  Date of Treatment: 09/30/2019   Diagnosis:   Encounter Diagnosis   Name Primary?    Impaired functional mobility, balance, and endurance        Time in: 1305  Time Out: 1358  Total Treatment Time: 53      Subjective:    Alyssa reports "No pain today, I was sore over the weekend from trying to make the motion of jumping jacks when laying in bed."  Patient reports their pain to be 0/10 on a 0-10 scale with 0 being no pain and 10 being the worst pain imaginable.    Objective  Alyssa received therapeutic exercises to develop strength, endurance, ROM, flexibility and posture for 53 minutes including:   Transfers w/c<>bike, w/c<>shuttle min/mod A with cues for sequencing     Shuttle leg press 37# x 30 B     Stationary bike x 10 min at seat 3 with verbal/tactile cues for midlne sitting      Sit to stand transfers min/mod A with cues for scooting to edge of chair and positioning of feet     // bars static stand x 1 min with lateral weight shifting at min/mod A; Gait in //  Bars 10ft fwd x 3, 15ft bwd     Written Home Exercises Provided: Reviewed HEP with patient and distributed RTB  Pt demo good understanding of the education provided. Alyssa demonstrated good return demonstration of activities.     Assessment:   Patient remains requiring VC's for foot placement with transfer techniques and gait training     Pt will continue to benefit from skilled PT intervention. Medical Necessity is demonstrated by:  Continued inability to participate in vocational pursuits, Unable to participate fully in daily activities, Requires skilled supervision to complete and progress HEP and Weakness.    Patient is making fair progress towards established goals.    Plan:  Continue with established Plan of Care towards PT goals.   "

## 2019-09-30 NOTE — PROGRESS NOTES
Occupational Therapy    Date:  09/30/2019  Start Time:  1405  Stop Time:  1505    TIMED  Procedure Time Min.   TherAct (4) Start:  Stop: 60   Total Timed Minutes:  60  Total Timed Units:  4  Total Untimed Units:  0  Charges Billed/# of units:  4    Progress/Current Status    Subjective:     Patient ID: Alyssa John is a 69 y.o. female.  Diagnosis: MS  1. Impaired mobility and ADLs     2. Decreased coordination     3. Weakness generalized     4. Impaired instrumental activities of daily living (IADL)       Pain:   Alyssa stated that she was trying snow angels at home on the mat that her  built for her that is in her living room.    Objective:     -T/f w/c->mat, step-transfer, w/ min>mod A w/ mod cues for bring hips over feet to improve center of gravity, stand tall, and sequencing of feet and when to step with which foot. Alyssa took 3 steps w/ R foot and 2 steps w/ L foot during her t/f to the mat and then again back to the w/c.  -Hamstring stretch at EOM, 3x30s per leg   -Gentle mobilization and stretching of soft-tissues including levator, pec major/minor, and hip flexors to allow for increased AROM during snow angels.  -stretching of hip flexors one a time supine w/ one leg bent w/ heel on the mat and the working leg off the mat and bent at the knee to facilitate max stretch against gravity. 5 mins each side.  -Supine snow angles w/ AROM and stopping when arms came off the mat 4x 5 reps.Alyssa was able to self correct when her arms came off of the mat. RAMOS instructed Alyssa to incorporate LE through snow hannah mvmt but she was unable due to rubber soles of shoes not being able to slide on the mat. Alyssa will bring socks to next tx so we can explore LE abduction on the mat.  -During snow angels and being supine on mat bolster applied between the LE to facilitate correct posture and abd of L LE and to counteract the IR that is natural for the L hip.  -Scooting at EOM w/ points of contact to bring  "one hip forward at a time to get to EOM. RAMOS w/ vc for Alyssa to lean to R and push L hip forward, lean to L and push R hip forward, hand placement closer to EOM to facilitate nose over toes.        Assessment:     Alyssa retains a "C" shaped posture when lying supine on the mat. Her LE and neck both curve to the R, creating extension along her L side and flexion along her R side. RAMOS instructed Alyssa to have a mindful thought to tilt her head to the L to counteract the flexion that is occurring. Seated she remains very tight in her shoulders and very rounded. As previously noted, poor core strength and coordination limits function in all activiites, including transfers and use of pavan UE. Improving core mobility and strength should allow for improved stability in transfers, as well as improved function in pavan UE during ADLs.  It is believed that with skilled occupational therapy and a progressive home program that Alyssa can improve her function to allow for increased participation in her valued roles of independent adult, spouse.      PROBLEM LIST/IMPAIRMENTS:   weakness, impaired endurance, impaired self care skills, impaired functional mobility, gait instability, impaired balance, impaired cognition, decreased coordination and decreased upper extremity function     LTG GOALS:  Time frame: 12 weeks (9/5/19-11/28/19)  1) Pt will be mod I with dressing for clothing except for fasteners (buttons/zippers/hooks)  2) Pt will be supervision with bathing, seated; SBA for TTB t/f.  3) Pt will be mod I and related t/f.  4) Pt will improve GMC, as evidenced by box and block scores of 45 or better on pavan UE.  5) Pt will participate in at least one IADL task in the home on a daily basis (dishes, laundry, etc)  9HPT to be assessed and FMC goal to be added.      STG Goals:  Time frame: 4 weeks (9/5/19-10/3/19)  1) Alyssa will be mod I with initial HEP.  2) Pt will t/f to toilet w/ min A or better.  3) Alyssa will be able to " stand and manage her clothing for toileting w/ CGA  4) Pavan UE GMC to improve, as evidenced by box & block scores of 31 or better, pavan UE.    Patient Education/Response:     Continue HEP as previous. Bring socks to next session.    Plans and Goals:     Continue stretches, UB ROM; continue standing for work in raised bed garden.    Jesika RAMOS/L9/30/2019      I certify that I completed a client care conference with the [facility] and provided close client care during this treatment.  KIKE Oneil, SIMA

## 2019-10-01 ENCOUNTER — PATIENT MESSAGE (OUTPATIENT)
Dept: NEUROLOGY | Facility: CLINIC | Age: 69
End: 2019-10-01

## 2019-10-01 DIAGNOSIS — G35 MULTIPLE SCLEROSIS: Primary | ICD-10-CM

## 2019-10-02 ENCOUNTER — CLINICAL SUPPORT (OUTPATIENT)
Dept: REHABILITATION | Facility: HOSPITAL | Age: 69
End: 2019-10-02
Payer: MEDICARE

## 2019-10-02 ENCOUNTER — CLINICAL SUPPORT (OUTPATIENT)
Dept: REHABILITATION | Facility: HOSPITAL | Age: 69
End: 2019-10-02
Attending: PHYSICIAN ASSISTANT
Payer: MEDICARE

## 2019-10-02 DIAGNOSIS — Z74.09 IMPAIRED FUNCTIONAL MOBILITY, BALANCE, AND ENDURANCE: ICD-10-CM

## 2019-10-02 DIAGNOSIS — Z74.09 IMPAIRED MOBILITY AND ADLS: Primary | ICD-10-CM

## 2019-10-02 DIAGNOSIS — Z78.9 IMPAIRED MOBILITY AND ADLS: Primary | ICD-10-CM

## 2019-10-02 PROCEDURE — 97530 THERAPEUTIC ACTIVITIES: CPT | Mod: PN

## 2019-10-02 PROCEDURE — 97110 THERAPEUTIC EXERCISES: CPT | Mod: PN

## 2019-10-02 NOTE — PROGRESS NOTES
"Occupational Therapy    Date:  10/02/2019  Start Time:  1205  Stop Time:  1305    TIMED  Procedure Time Min.   TherAct (4) Start:  Stop: 60   Total Timed Minutes:  60  Total Timed Units:  4  Total Untimed Units:  0  Charges Billed/# of units:  4    Progress/Current Status    Subjective:     Patient ID: Alyssa John is a 69 y.o. female.  Diagnosis: MS  No diagnosis found.  Pain:    -L stated that she did the snow hannah arms and hip extension stretches (one LE at a time hanging off mat and 1 LE w/ knee bent and foot on mat for support) at home.    Objective:   -chair>mat transfer w/o RW, Mod A to rise 2/2 transport chair arm rests too high for proper rise, and Mod A to maintain standing, vc for foot placement, and to reach back for mat before descent.   -While seated EOM we trained on proper rising technique, nose over toes, both hands push off from starting surface, follow forward with the head before standing up and placing hands on RW, and stand tall once standing. pt v/u and completed 5 sit<>stand correctly with more fatigue at the 5th attempt.   -Pt then completed a full cycle of mat<>chair w/ step transfer w/ RW and CGA and vc for foot placement and to pull hips under torso to keep standing tall.   -pt supine for stretching of hip flexors one a time supine w/ one leg bent w/ heel on the mat and the working leg off the mat and bent at the knee to facilitate max stretch against gravity. 5 mins each side.  -in order for pt to move from laying supine horizontally to laying supine vertically on the mat RAMOS assisted her to quadriped. L was able to get up onto her hands and push her elbows straight; required physical assist, and points of contact at lateral aspect of R knee and lateral aspect of L hip to facilitate rising to knees and full quadriped. L was able to take 4 "steps" w/ each limb to situate herself verticaly on the mat and then lower down to it.   -once L sidelying on may pt required vc to open " "and drop R LE to the R side to facilitate rolling and supine position.   -supine snow angels w/ only LE x 5 (pt did bring socks to session to be able to complete this activity) and obtaining abd of 45* on the 5th trial.  -Scooting at EOM w/ points of contact to bring one hip forward at a time to get to EOM. RAMOS w/ vc for Alyssa to lean to R and push L hip forward, lean to L and push R hip forward, hand placement closer to EOM to facilitate nose over toes.  -mat>chair w/o RW, Min A to rise and Mod A to maintain standing during step t/f w. VC for foot placement.        Assessment:     -pt is not using a RW at home for tranfers, only to ambulate down the schaffer (w/  follwing w/ transport chair behind her). Pt began to correct form of sit<>stand w/ RW and would benefit from practice reps at every OT tx to enforce proper sequencing and technique and to increase (I) in ADLs and IADLs in the home. Pt still requiring VC to "stand up tall" and needs VC for foot placement and stepping during step transfer. Pt would benefit from  coming to nect tx session and seeing RAMOS take pt through transfer and then replicating it for RAMOS numerous times so that it can be accomplished at home in the same manner, providing consistent transfer pattern for the pt to follow and perform.     . As previously noted, poor core strength and coordination limits function in all activiites, including transfers and use of pavan UE. Improving core mobility and strength should allow for improved stability in transfers, as well as improved function in pavan UE during ADLs.  It is believed that with skilled occupational therapy and a progressive home program that Alsysa can improve her function to allow for increased participation in her valued roles of independent adult, spouse.      PROBLEM LIST/IMPAIRMENTS:   weakness, impaired endurance, impaired self care skills, impaired functional mobility, gait instability, impaired balance, impaired " cognition, decreased coordination and decreased upper extremity function     LTG GOALS:  Time frame: 12 weeks (9/5/19-11/28/19)  1) Pt will be mod I with dressing for clothing except for fasteners (buttons/zippers/hooks)  2) Pt will be supervision with bathing, seated; SBA for TTB t/f.  3) Pt will be mod I and related t/f.  4) Pt will improve GMC, as evidenced by box and block scores of 45 or better on pavan UE.  5) Pt will participate in at least one IADL task in the home on a daily basis (dishes, laundry, etc)  9HPT to be assessed and FMC goal to be added.      STG Goals:  Time frame: 4 weeks (9/5/19-10/3/19)  1) Alyssa will be mod I with initial HEP.  2) Pt will t/f to toilet w/ min A or better.  3) Alyssa will be able to stand and manage her clothing for toileting w/ CGA  4) Pavan UE GMC to improve, as evidenced by box & block scores of 31 or better, paavn UE.    Patient Education/Response:     -RAMOS ed pt that she is completing hip flexor stretching correctly.   -RAMOS ed pt on pushing from start surface to rise and nose over toes follow through to standing; imporance of this and demo'd for pt that it is impossible to try to stand without leaning forward at the waist and having nose over toes, she won't fall as long as she stands up after pushing forward off start surface.   -pt v/u and will continue HEP.      Plans and Goals:     Continue stretches, UE ROM; continue standing to prepare  for work in raised bed garden.    Jesika RAMOS/L10/2/2019      I certify that I completed a client care conference with the hospitals and provided close client care during this treatment.  KIKE Oneil, SIMA

## 2019-10-02 NOTE — PROGRESS NOTES
Name: Alyssa John  [facility] Number: [id number]  Date of Treatment: 10/02/2019   Diagnosis:   Encounter Diagnosis   Name Primary?    Impaired functional mobility, balance, and endurance        Time in: 1302  Time Out: 1357  Total Treatment Time: 55      Subjective:    Alyssa reports no changes in health, however states she has convinced her  she cannot follow the vegan diet he has her on and she needs the protein to gain strength.  Patient reports their pain to be 0/10 on a 0-10 scale with 0 being no pain and 10 being the worst pain imaginable.    Objective  Alyssa received therapeutic exercises to develop strength, endurance, ROM, flexibility, posture and core stabilization for 55 minutes including:  Stationary bike x 13 min at seat 3 with verbal/tactile cues for midlne sitting      Sit to stand transfers min/mod A with cues for scooting to edge of chair and positioning of feet, mod A to t/f WC<>EOM for 15':  ALt UE flexion  Ball squeeze 30  Seated CLAM 30 with GTB  Marching 30   TrA sitting in WC with tball 30     // bars static stand x 1 min with lateral weight shifting at min/mod A; Gait in //  Bars 10ft fwd x 3, 10ft bwd, lateral stepping 10ft L/R x 3.      Written Home Exercises Provided: Cont with HEP  Pt demo good understanding of the education provided. Alyssa demonstrated good return demonstration of activities.     Assessment:   Patient is completely dependent in WC as it is a transport chair with no way for patient to propel self, would benefit from manual wheelchair where she can use B UE and LE's to assist with increased I with mobility.  Core stability very weak with EOM sitting, flexed trunk, weak abdominals and trunk    Pt will continue to benefit from skilled PT intervention. Medical Necessity is demonstrated by:  Fall Risk, Continued inability to participate in vocational pursuits, Unable to participate fully in daily activities, Requires skilled supervision to complete and progress  HEP and Weakness.    Patient is making fair progress towards established goals.    Plan:  Continue with established Plan of Care towards PT goals.

## 2019-10-04 NOTE — TELEPHONE ENCOUNTER
SW contacted [facility] [phone] regarding pt's concerns with her power wheelchair.  Spoke to Mirna in the rehab department and she states pt would need a new eval and order from a physician for custom manual wheelchair per further evaluation of PT.  This would need to go through [facility], first, then to [facility].  Will communicate with pt.

## 2019-10-06 DIAGNOSIS — R56.9 CONVULSIONS, UNSPECIFIED CONVULSION TYPE: ICD-10-CM

## 2019-10-07 ENCOUNTER — CLINICAL SUPPORT (OUTPATIENT)
Dept: REHABILITATION | Facility: HOSPITAL | Age: 69
End: 2019-10-07
Attending: PHYSICIAN ASSISTANT
Payer: MEDICARE

## 2019-10-07 DIAGNOSIS — R26.89 BALANCE PROBLEM: ICD-10-CM

## 2019-10-07 DIAGNOSIS — Z78.9 IMPAIRED MOBILITY AND ADLS: Primary | ICD-10-CM

## 2019-10-07 DIAGNOSIS — Z74.09 IMPAIRED FUNCTIONAL MOBILITY, BALANCE, AND ENDURANCE: ICD-10-CM

## 2019-10-07 DIAGNOSIS — Z74.09 IMPAIRED MOBILITY AND ADLS: Primary | ICD-10-CM

## 2019-10-07 DIAGNOSIS — Z78.9 IMPAIRED INSTRUMENTAL ACTIVITIES OF DAILY LIVING (IADL): ICD-10-CM

## 2019-10-07 DIAGNOSIS — R27.8 DECREASED COORDINATION: ICD-10-CM

## 2019-10-07 DIAGNOSIS — R53.1 WEAKNESS GENERALIZED: ICD-10-CM

## 2019-10-07 PROCEDURE — 97530 THERAPEUTIC ACTIVITIES: CPT | Mod: PN

## 2019-10-07 PROCEDURE — G8987 SELF CARE CURRENT STATUS: HCPCS | Mod: CK,PN

## 2019-10-07 PROCEDURE — G8988 SELF CARE GOAL STATUS: HCPCS | Mod: CJ,PN

## 2019-10-07 PROCEDURE — 97110 THERAPEUTIC EXERCISES: CPT | Mod: PN

## 2019-10-07 NOTE — PROGRESS NOTES
"Name: Alyssa John  [facility] Number: [id number]  Date of Treatment: 10/07/2019   Diagnosis:   Encounter Diagnosis   Name Primary?    Impaired functional mobility, balance, and endurance        Time in: 1305  Time Out: 1403  Total Treatment Time: 58      Subjective:    Alyssa reports having a headache today, declined offering of water, stated "I am fine."  Patient reports their pain to be 0/10 on a 0-10 scale with 0 being no pain and 10 being the worst pain imaginable.    Objective  Alyssa received therapeutic exercises to develop strength, endurance, ROM, flexibility, posture and core stabilization for 58 minutes including:   Stationary bike x 13 min at seat 3 with verbal/tactile cues for midlne sitting      Sit to stand transfers min/mod A with cues for scooting to edge of chair and positioning of feet, mod A to t/f WC<>EOM for 15':  Ball squeeze 30  Seated CLAM 30 with GTB  Marching in sititng 30   Ball squeeze 30  TrA sitting in WC with tball 30     // bars static stand x 1 min with lateral weight shifting at min/mod A; Gait in //  Bars 10ft fwd x 3, 10ft bwd x 3, lateral stepping 10ft L/R x 3.    Written Home Exercises Provided: Cont with HEP  Pt demo good understanding of the education provided. Alyssa demonstrated fair return demonstration of activities.     Assessment:   Patient very easily distracted with slow processing of details.  Weak in B LEs with decreased coordination as well as slow pace with movement, with increased risk for falls.  Posterior lean with standing.    Pt will continue to benefit from skilled PT intervention. Medical Necessity is demonstrated by:  Fall Risk, Continued inability to participate in vocational pursuits, Pain limits function of effected part for some activities, Unable to participate fully in daily activities, Requires skilled supervision to complete and progress HEP and Weakness.    Patient is making fair progress towards established goal    Plan:  Continue with " established Plan of Care towards PT goals.

## 2019-10-07 NOTE — PROGRESS NOTES
"Occupational Therapy- Treatment and Status Update    Date:  10/07/2019  Start Time:  1215  Stop Time:  1300    TIMED  Procedure Time Min.   TherAct (3) Start:  Stop: 45   Total Timed Minutes:  45  Total Timed Units:  3  Total Untimed Units:  0  Charges Billed/# of units:  3    Progress/Current Status    Subjective:     Patient ID: Alyssa John is a 69 y.o. female.  Diagnosis: MS  1. Impaired mobility and ADLs     2. Impaired functional mobility, balance, and endurance     3. Decreased coordination     4. Weakness generalized     5. Impaired instrumental activities of daily living (IADL)     6. Balance problem       Pain:   No c/o pain. She states she is doing stretches at home.    Objective:     OT again mentioned using a pillow behind the back "at all times" while in transport chair w/ pt and spouse to encourage upright posture and increased awareness of hip positioning.  Stand-pivot T/f w/ mod A, unable to straighten legs/ align hips/knees/ankels  pavan LE hamstring stretch w/ cues for technique, 3x30s each  Gentle mobilization and stretching of soft-tissues throughout the L shoulder, including levator, serratus anterior, subscapularis, anterior complex, pec major, lats and combined internal rotators to allow for increased PROM; also perf'd stretching to R lats.  Doffed shoes, donned socks w/ increased time and SBA at EOM except for max assist for donning L sock. Min A for doffing L sock.  Sit->supine w/ SBA  Snow angels for arms and legs, having her move arms first and then legs, allowing her to focus on each one at a time for increased overall movement.  Supine->sit w/ min A and cues  Stand-pivot t/f back to w/c w/ min A, mod cues for sequencing. Improved ability to straighten legs.       AM-PAC 6 CLICK ADL  How much help from another person does this patient currently need?  1 = Unable, Total/Dependent Assistance  2 = A lot, Maximum/Moderate Assistance  3 = A little, Minimum/Contact Guard/Supervision  4 " "= None, Modified Bell/Independent    Eating: 3       Grooming: 3      UB Dressing: 3     LB Dressin      Bathin      Toiletin  Total: 15    AM-PAC Raw Score CMS "G-Code Modifier Level of Impairment Assistance   6 % Total / Unable   7 - 9 CM 80 - 100% Maximal Assist   10-14 CL 60 - 80% Moderate Assist   15 - 19 CK 40 - 60% Moderate Assist   20 - 22 CJ 20 - 40% Minimal Assist   23 CI 1-20% SBA / CGA   24 CH 0% Independent/ Mod I       Assessment:     At this point, Alyssa has demonstrated small amounts of progress. She has met 1/4 STGs. Alyssa remains extremely tight in her hamstrings and hips, limiting independence in transfers and LB dressing. As previously noted, poor core strength and coordination limits function in all activities, including transfers and use of pavan UE. Improving core mobility and strength should allow for improved stability in transfers, as well as improved function in pavan UE during ADLs.  It is believed that with skilled occupational therapy and a progressive home program that Alyssa can improve her function to allow for increased participation in her valued roles of independent adult, spouse.      PROBLEM LIST/IMPAIRMENTS:   weakness, impaired endurance, impaired self care skills, impaired functional mobility, gait instability, impaired balance, impaired cognition, decreased coordination and decreased upper extremity function     LTG GOALS:  Time frame: 12 weeks (19-19)  1) Pt will be mod I with dressing for clothing except for fasteners (buttons/zippers/hooks)  2) Pt will be supervision with bathing, seated; SBA for TTB t/f.  3) Pt will be mod I and related t/f.  4) Pt will improve GMC, as evidenced by box and block scores of 45 or better on pavan UE.  5) Pt will participate in at least one IADL task in the home on a daily basis (dishes, laundry, etc)  9HPT to be assessed and FMC goal to be added.      STG Goals:  Time frame: 4 weeks (19-10/3/19)  1) " Alyssa will be mod I with initial HEP. (MET with assistance of spouse)  2) Pt will t/f to toilet w/ min A or better.  3) Alyssa will be able to stand and manage her clothing for toileting w/ CGA  4) Pavan UE GMC to improve, as evidenced by box & block scores of 31 or better, pavan UE.    Patient Education/Response:     Continue HEP as previous.     Plans and Goals:     Continue stretches, UB ROM; continue standing for work in raised bed garden.    G-Codes: self-care ([facility])  Current: 40%-60% (CK)  Goal: 20%-40% (CJ)    Sameera Montague, KIKE, JOSIAHR  10/7/2019

## 2019-10-08 ENCOUNTER — TELEPHONE (OUTPATIENT)
Dept: PHARMACY | Facility: CLINIC | Age: 69
End: 2019-10-08

## 2019-10-14 ENCOUNTER — CLINICAL SUPPORT (OUTPATIENT)
Dept: REHABILITATION | Facility: HOSPITAL | Age: 69
End: 2019-10-14
Attending: PHYSICIAN ASSISTANT
Payer: MEDICARE

## 2019-10-14 DIAGNOSIS — Z74.09 IMPAIRED MOBILITY AND ADLS: Primary | ICD-10-CM

## 2019-10-14 DIAGNOSIS — Z78.9 IMPAIRED INSTRUMENTAL ACTIVITIES OF DAILY LIVING (IADL): ICD-10-CM

## 2019-10-14 DIAGNOSIS — Z74.09 IMPAIRED FUNCTIONAL MOBILITY, BALANCE, AND ENDURANCE: ICD-10-CM

## 2019-10-14 DIAGNOSIS — R53.1 WEAKNESS GENERALIZED: ICD-10-CM

## 2019-10-14 DIAGNOSIS — R27.8 DECREASED COORDINATION: ICD-10-CM

## 2019-10-14 DIAGNOSIS — Z78.9 IMPAIRED MOBILITY AND ADLS: Primary | ICD-10-CM

## 2019-10-14 PROCEDURE — 97110 THERAPEUTIC EXERCISES: CPT | Mod: KX,PN

## 2019-10-14 PROCEDURE — 97530 THERAPEUTIC ACTIVITIES: CPT | Mod: PN

## 2019-10-14 RX ORDER — TOPIRAMATE 50 MG/1
TABLET, FILM COATED ORAL
Qty: 180 TABLET | Refills: 1 | Status: SHIPPED | OUTPATIENT
Start: 2019-10-14 | End: 2020-07-08 | Stop reason: SDUPTHER

## 2019-10-14 NOTE — PROGRESS NOTES
"Occupational Therapy- Treatment     Date:  10/14/2019  Start Time:  1315  Stop Time:  1400    TIMED  Procedure Time Min.   TherAct (3) Start:  Stop: 45   Total Timed Minutes:  45  Total Timed Units:  3  Total Untimed Units:  0  Charges Billed/# of units:  3    Progress/Current Status    Subjective:     Patient ID: Alyssa John is a 69 y.o. female.  Diagnosis: MS  1. Impaired mobility and ADLs     2. Impaired functional mobility, balance, and endurance     3. Decreased coordination     4. Weakness generalized     5. Impaired instrumental activities of daily living (IADL)       Pain:   No c/o pain, feeling better, but she does feel weaker than she did before getting sick last week.     Objective:     Assist for repositioning in chair multiple times throughout session.  Outside, standing at raised-bed garden x~3, ~3, ~2, ~2 minutes each with rest breaks between. Min A and max cues for increasing knee extension and tucking hips under to stand "up tall". During stand, she used one hand to support herself on garden bed and used the other to pull weeds with and without tools.  Due to fatigue, returned indoors. Mod A t/f w/c<>UBE chair, stand-pivot t/f w/ max cues for straightening legs and sequencing steps. UBE x3.5 min forward/ 3.5 min back w/ pavan UE, level 1.0 to increase activity tolerance.    Assessment:     Alyssa missed her 2nd session last week due to illness and presents today more weak and with increased tightness in her hamstrings and hip flexors than at her last visit, which affected her ability to stand. However, due to enjoyment in the garden, we tried to spend as much time as possible standing outside. As previously noted, poor core strength and coordination limits function in all activities, including transfers and use of pavan UE. Improving core mobility and strength should allow for improved stability in transfers, as well as improved function in pavan UE during ADLs.  It is believed that with skilled " occupational therapy and a progressive home program that lAyssa can improve her function to allow for increased participation in her valued roles of independent adult, spouse.      PROBLEM LIST/IMPAIRMENTS:   weakness, impaired endurance, impaired self care skills, impaired functional mobility, gait instability, impaired balance, impaired cognition, decreased coordination and decreased upper extremity function     LTG GOALS:  Time frame: 12 weeks (9/5/19-11/28/19)  1) Pt will be mod I with dressing for clothing except for fasteners (buttons/zippers/hooks)  2) Pt will be supervision with bathing, seated; SBA for TTB t/f.  3) Pt will be mod I and related t/f.  4) Pt will improve GMC, as evidenced by box and block scores of 45 or better on pavan UE.  5) Pt will participate in at least one IADL task in the home on a daily basis (dishes, laundry, etc)  9HPT to be assessed and FMC goal to be added.      STG Goals:  Time frame: 4 weeks (9/5/19-10/3/19)  1) Alyssa will be mod I with initial HEP. (MET with assistance of spouse)  2) Pt will t/f to toilet w/ min A or better.  3) Alyssa will be able to stand and manage her clothing for toileting w/ CGA  4) Pavan UE GMC to improve, as evidenced by box & block scores of 31 or better, pavan UE.    Patient Education/Response:     Continue HEP as previous.     Plans and Goals:     Continue stretches, UB ROM; continue standing for work in raised bed garden.    Sameera Montague, KIKE, LOTR  10/14/2019

## 2019-10-15 NOTE — PROGRESS NOTES
Name: Alyssa John  Date:   10/14/2019  Primary Diagnosis: .  Problem List Items Addressed This Visit     Impaired functional mobility, balance, and endurance          Time in: 1405  Time Out: 1555  Total Treatment Time: 50  Group Time: 0      Subjective:    Patient reports she was not approved for a manual wheelchair, instead a power chair was recommended to weakness. Reports she does not want a power chair due to concerns over damaging walls in house as well as being afraid she will become weaker.  Patient reports their pain to be 0/10 on a 0-10 scale with 0 being no pain and 10 being the worst pain imaginable.    Objective    Patient received individual therapy to address strength, endurance, ROM and flexibility with 0 other patients with activities as follows:     Patient received therapeutic exercises to develop strength, endurance, ROM and flexibility for 50 minutes including:     Transfers transport chair <>stationary bike min/mod A with cues for sequencing, scooting to edge of seat, and placement of feet    Stationary bike x 11 min at L1 with seat position 3, verbal/tactile cues for lateral trunk lean    Transfers transport chair<>standard w/c mod A with cues for sequencing, scooting to edge of seat, and placement of feet    W/c mobility: 20 ft, 15 ft at mod A with verbal/manual cues for technique, navigation    // bars: static standing 30 sec with cueing to correct posterior lean   Gait fwd 10 ft min/mod A with B UE support and w/c follow, cues for floor clearance and posture      Assessment:     Pt fatiguing quickly with activities this date. Informed by OT that patient had been working on standing at raised [id number] bed for gardening and then used the arm bike prior to PT appointment. Difficulty maintaining constant motion on stationary bike, which improved mildly after raising seat to top position. Patient expressing goal of being able to use a standard w/c, therefore w/c mobility training  initiated. Patient needing mod to max cueing for technique and increased time for cognitive processing. Moderately improved performance after adding leg rests from patient's transport chair. Unable to lock brakes on either side. Discussed gait training with walker next visit, as pt demonstrating significant fatigue after OT and PT activities this date.     Pt will continue to benefit from skilled PT intervention. Medical Necessity is demonstrated by:  Fall Risk, Unable to participate fully in daily activities, Requires skilled supervision to complete and progress HEP and Weakness.    Patient is making fair progress towards established goals.    Plan:  Continue with established Plan of Care towards PT goals.

## 2019-10-16 ENCOUNTER — CLINICAL SUPPORT (OUTPATIENT)
Dept: REHABILITATION | Facility: HOSPITAL | Age: 69
End: 2019-10-16
Attending: PHYSICIAN ASSISTANT
Payer: MEDICARE

## 2019-10-16 DIAGNOSIS — Z78.9 IMPAIRED INSTRUMENTAL ACTIVITIES OF DAILY LIVING (IADL): ICD-10-CM

## 2019-10-16 DIAGNOSIS — Z78.9 IMPAIRED MOBILITY AND ADLS: Primary | ICD-10-CM

## 2019-10-16 DIAGNOSIS — Z74.09 IMPAIRED FUNCTIONAL MOBILITY, BALANCE, AND ENDURANCE: ICD-10-CM

## 2019-10-16 DIAGNOSIS — R53.1 WEAKNESS GENERALIZED: ICD-10-CM

## 2019-10-16 DIAGNOSIS — Z74.09 IMPAIRED MOBILITY AND ADLS: Primary | ICD-10-CM

## 2019-10-16 DIAGNOSIS — R27.8 DECREASED COORDINATION: ICD-10-CM

## 2019-10-16 PROCEDURE — 97530 THERAPEUTIC ACTIVITIES: CPT | Mod: PN

## 2019-10-16 PROCEDURE — 97110 THERAPEUTIC EXERCISES: CPT | Mod: PN

## 2019-10-16 NOTE — PROGRESS NOTES
"Name: Alyssa John  [facility] Number: [id number]  Date of Treatment: 10/16/2019   Diagnosis:   Encounter Diagnosis   Name Primary?    Impaired functional mobility, balance, and endurance        Time in: 1505  Time Out: 1600  Total Treatment Time: 55    Subjective:    Alyssa reports no pain at present. Presents in transport w/c with , just finished OT session. "I want to walk again."    Objective:    Patient received individual therapy to increase strength, endurance, ROM, flexibility, posture and core stabilization with activities as follows:     Alyssa received therapeutic exercises to develop strength, endurance, ROM, flexibility, posture and core stabilization for 55 minutes including:     Bike 10' L1  Transfers w/c-bike-w/c-shuttle with min to CGA with rw and vc for fwd weight translation for COG over BELLE.   Standing balance static in // bars CGA   Balance and gait training in // bars with CGA/min A:     Fwd walking     Backward walking     Sidestepping     Hip aBd L/R 10/10  Gait training with rw and CG/min A and vc for technique and safety x 30' and 20'  Shuttle B leg press 31# 10/3     Continue HEP daily.    Pt demo good understanding of the education provided. Patient demonstrated good return demonstration of activities.     Assessment:     Self-guided ex's today per pt tolerance. Limited standing tolerance and requires vc for pacing. Tendency to place her feet anterior to BELLE when standing in // bars.    Pt will continue to benefit from skilled PT intervention. Medical Necessity is demonstrated by:  Requires skilled supervision to complete and progress HEP and Weakness.    Patient is making good progress towards established goals.    Plan:    Continue with established Plan of Care towards PT goals.     "

## 2019-10-16 NOTE — PROGRESS NOTES
"Occupational Therapy- Treatment     Date:  10/16/2019  Start Time:  1310  Stop Time:  1400    TIMED  Procedure Time Min.   TherAct (3) Start:  Stop: 50   Total Timed Minutes:  50  Total Timed Units:  3  Total Untimed Units:  0  Charges Billed/# of units:  3    Progress/Current Status    Subjective:     Patient ID: Alyssa John is a 69 y.o. female.  Diagnosis: MS  1. Impaired mobility and ADLs     2. Impaired functional mobility, balance, and endurance     3. Decreased coordination     4. Weakness generalized     5. Impaired instrumental activities of daily living (IADL)       Pain:   No c/o pain, feeling better, but she does feel weaker than she did before getting sick last week.     Objective:     Hamstring stretch 5x10s per LE to increase indep w/ stand  SPT w/ min A w/c->mat to the R, pt holding OT's elbows.  EOM stretch to hip adductors; turn for supported lunge to stretch hip flexors in each direction, 2x~15sec.  Facilitation of a/p pelvic tilt, R/L lateral pelvic tilt. Increased time spent with this activity to increase pelvic AROM control. Points of contact at low back and sternum for anterior tilting, superior shoulder for posterior tilting, appropriate QL and opposite rib cage for lateral tilting. Hands at sides in position of support to facilitate scapulo-pelvic rhythm.  Seated shldr abd/add, pavan UE AROM 2x10 w/ max cues to keep arms "back" and not move toward flexion as she raises her arms.  Lateral shifting of trunk w/ max A, limited by pelvic positioning despite max cues and assist to optimize positioning.     Assessment:     Good participation today. Improved form in standing after stretching compared to performance at last visit. Pelvic movement remains limited and weak. As previously noted, poor core strength and coordination limits function in all activities, including transfers and use of pavan UE. Improving core mobility and strength should allow for improved stability in transfers, as well " as improved function in pavan UE during ADLs.  It is believed that with skilled occupational therapy and a progressive home program that Alyssa can improve her function to allow for increased participation in her valued roles of independent adult, spouse.      PROBLEM LIST/IMPAIRMENTS:   weakness, impaired endurance, impaired self care skills, impaired functional mobility, gait instability, impaired balance, impaired cognition, decreased coordination and decreased upper extremity function     LTG GOALS:  Time frame: 12 weeks (9/5/19-11/28/19)  1) Pt will be mod I with dressing for clothing except for fasteners (buttons/zippers/hooks)  2) Pt will be supervision with bathing, seated; SBA for TTB t/f.  3) Pt will be mod I and related t/f.  4) Pt will improve GMC, as evidenced by box and block scores of 45 or better on pavan UE.  5) Pt will participate in at least one IADL task in the home on a daily basis (dishes, laundry, etc)  9HPT to be assessed and FMC goal to be added.      STG Goals:  Time frame: 4 weeks (9/5/19-10/3/19)  1) Alyssa will be mod I with initial HEP. (MET with assistance of spouse)  2) Pt will t/f to toilet w/ min A or better.  3) Alyssa will be able to stand and manage her clothing for toileting w/ CGA  4) Pavan UE GMC to improve, as evidenced by box & block scores of 31 or better, pavan UE.    Patient Education/Response:     Continue HEP as previous.     Plans and Goals:     Continue stretches, standing for work in raised bed garden.    Sameera Montague, KIKE, LOTR  10/16/2019

## 2019-10-18 ENCOUNTER — TELEPHONE (OUTPATIENT)
Dept: REHABILITATION | Facility: HOSPITAL | Age: 69
End: 2019-10-18

## 2019-10-21 ENCOUNTER — CLINICAL SUPPORT (OUTPATIENT)
Dept: REHABILITATION | Facility: HOSPITAL | Age: 69
End: 2019-10-21
Attending: PHYSICIAN ASSISTANT
Payer: MEDICARE

## 2019-10-21 DIAGNOSIS — R27.8 DECREASED COORDINATION: ICD-10-CM

## 2019-10-21 DIAGNOSIS — Z78.9 IMPAIRED INSTRUMENTAL ACTIVITIES OF DAILY LIVING (IADL): ICD-10-CM

## 2019-10-21 DIAGNOSIS — Z74.09 IMPAIRED FUNCTIONAL MOBILITY, BALANCE, AND ENDURANCE: ICD-10-CM

## 2019-10-21 DIAGNOSIS — R26.89 BALANCE PROBLEM: ICD-10-CM

## 2019-10-21 DIAGNOSIS — Z74.09 IMPAIRED MOBILITY AND ADLS: Primary | ICD-10-CM

## 2019-10-21 DIAGNOSIS — R53.1 WEAKNESS GENERALIZED: ICD-10-CM

## 2019-10-21 DIAGNOSIS — Z78.9 IMPAIRED MOBILITY AND ADLS: Primary | ICD-10-CM

## 2019-10-21 PROCEDURE — 97530 THERAPEUTIC ACTIVITIES: CPT | Mod: PN

## 2019-10-21 PROCEDURE — 97110 THERAPEUTIC EXERCISES: CPT | Mod: PN

## 2019-10-21 NOTE — PROGRESS NOTES
"Name: Alyssa John  [facility] Number: [id number]  Date of Treatment: 10/21/2019   Diagnosis:   Encounter Diagnosis   Name Primary?    Impaired functional mobility, balance, and endurance        Time in: 1403  Time Out: 1505  Total Treatment Time: 62    Subjective:    Alyssa reports no pain. "I feel good." Presents in manual w/c with , just finished OT session.     Objective:    Patient received individual therapy to increase strength, endurance, ROM, flexibility, posture and core stabilization with activities as follows:     Alyssa received therapeutic exercises to develop strength, endurance, ROM, flexibility, posture and core stabilization for 62 minutes including:     Bike 10' L1  Transfers w/c-bike-w/c with min to CGA with rw and vc for fwd weight translation for COG over BELLE.   Standing balance static in // bars CGA   Balance and gait training in // bars with CGA/min A:     Fwd walking     Sidestepping     Hip aBd L/R 10/10  Seated LAQ 10/3  Seated DF 10/3  Gait training with rw and CG/min A and vc for technique and safety x 30' and 100'    Continue HEP daily.    Pt demo good understanding of the education provided. Patient demonstrated good return demonstration of activities.     Assessment:     Decreased activity tolerance today although pt reports she is feeling good. Pt with increased LE dragging L>R, and knee flexion with stance with minimal correction with vc.     Pt will continue to benefit from skilled PT intervention. Medical Necessity is demonstrated by:  Requires skilled supervision to complete and progress HEP and Weakness.    Patient is making good progress towards established goals.    Plan:    Continue with established Plan of Care towards PT goals.     "

## 2019-10-21 NOTE — PROGRESS NOTES
"Occupational Therapy- Treatment     Date:  10/21/2019  Start Time:  1232  Stop Time:  1340    TIMED  Procedure Time Min.   TherAct (4) Start:  Stop: 68   Total Timed Minutes:  68  Total Timed Units:  5  Total Untimed Units:  0  Charges Billed/# of units:  5    Progress/Current Status    Subjective:     Patient ID: Alyssa John is a 69 y.o. female.  Diagnosis: MS  1. Impaired mobility and ADLs     2. Decreased coordination     3. Weakness generalized     4. Balance problem     5. Impaired instrumental activities of daily living (IADL)       Pain:   No c/o pain, only tightness. Alyssa stated that "I have to do these every day or I'd be so tight." In reference to her hamstring stretches.    Objective:     -Step transfer w/ min A w/c->mat to the L, pt holding RAMOS's elbows and with 10 steps utilized to center herself to the mat before sitting.  -Hamstring stretch 3x30s per LE to increase indep w/ stand  -Alyssa demo'd a/p pelvic tilt incorrectly x 10 stating that "I though I was doing it" with points of contact at low back and sternum for anterior tilting and superior shoulder for posterior tilting Alyssa was not able to correctly perform the a/p pelvic tilt.   -Facilitation, R/L lateral pelvic tilt. Increased time (>20min) spent with this activity to increase pelvic AROM control with points of contact at appropriate QL and opposite rib cage for lateral tilting. Hands at sides in position of support to facilitate scapulo-pelvic rhythm.   -EOM bridging x 10 trials to position herself further up on the mat, ensuring the mat was at the back of her knees to facilitate a deeper stretch in her hip adductors.  -Supine EOM stretch to hip flexors while stretching hip adductors w/ bolster x 10 min. While supine on the mat Alyssa completed 3 sets of 5 each snow angels w/ her UE, slowly and controlled and keeping them on the mat, attaining ~100* abduction.  -sit>supine Min A; supine>sit Mod A, w/ vc to find the ground w/ " her feet and to breath slowly (she was scared that I would let go of her hands while she was trying to pull up).  - transfer from mat to w/c Mod A 2* fatigue from session and requiring max verbal cues for foot placement and sequence: stop leaning back, tuck pelvis under, stand tall, look up.      Assessment:     Alyssa required extra time today to process directional instruction; she even stated the direction aloud before she attempted the exercise and was not moving the correct (L or R) hip, shoulder, head lean, etc, each time. Alyssa's posterior lean is inhibiting her from attaining correct posture in sitting and standing and is a deficit to her transfers and self care. She was performing her lateral and a/p pelvic tilting incorrectly at home. We reviewed and practiced all pelvic tilting today in session and focused ~15 mins on L lateral pelvic tilt. Alyssa required Max verbal cues and points of contact as well as a full length mirror in front of her to be able to complete L lateral pelvic tilting during exercises. When Alyssa scooted, one hip at a time to the EOM she was able to actively and independently perform the L lateral pelvic tilt correctly. I pointed this out to her and made her watch herself do it correctly in the mirror. Alyssa v/u of the difference in the her correct and incorrect form. Her first transfer from w/c>mat was greatly improved and required less vc than any prior t/f seen. Eulalios pelvic movement remains limited and weak.  It is believed that with skilled occupational therapy and a progressive home program that Alyssa can improve her function to allow for increased participation in her valued roles of independent adult, spouse.      PROBLEM LIST/IMPAIRMENTS:   weakness, impaired endurance, impaired self care skills, impaired functional mobility, gait instability, impaired balance, impaired cognition, decreased coordination and decreased upper extremity function     LTG GOALS:  Time frame:  12 weeks (9/5/19-11/28/19)  1) Pt will be mod I with dressing for clothing except for fasteners (buttons/zippers/hooks)  2) Pt will be supervision with bathing, seated; SBA for TTB t/f.  3) Pt will be mod I and related t/f.  4) Pt will improve GMC, as evidenced by box and block scores of 45 or better on mandeep UE.  5) Pt will participate in at least one IADL task in the home on a daily basis (dishes, laundry, etc)  9HPT to be assessed and FMC goal to be added.      STG Goals:  Time frame: 4 weeks (9/5/19-10/3/19)  1) Alyssa will be mod I with initial HEP. (MET with assistance of spouse)  2) Pt will t/f to toilet w/ min A or better.  3) Alyssa will be able to stand and manage her clothing for toileting w/ CGA  4) Mandeep UE GMC to improve, as evidenced by box & block scores of 31 or better, mandeep UE.    Patient Education/Response:     Pt educated of correct L/R lateral pelvic tilting and to be aware that she is completing them properly at home.  Continue HEP as previous.     Plans and Goals:     Continue stretches, standing for work in raised bed garden.    BUCKY Umana  10/21/2019

## 2019-10-23 ENCOUNTER — CLINICAL SUPPORT (OUTPATIENT)
Dept: REHABILITATION | Facility: HOSPITAL | Age: 69
End: 2019-10-23
Attending: PHYSICIAN ASSISTANT
Payer: MEDICARE

## 2019-10-23 DIAGNOSIS — R53.1 WEAKNESS GENERALIZED: ICD-10-CM

## 2019-10-23 DIAGNOSIS — Z78.9 IMPAIRED MOBILITY AND ADLS: Primary | ICD-10-CM

## 2019-10-23 DIAGNOSIS — R27.8 DECREASED COORDINATION: ICD-10-CM

## 2019-10-23 DIAGNOSIS — Z74.09 IMPAIRED FUNCTIONAL MOBILITY, BALANCE, AND ENDURANCE: ICD-10-CM

## 2019-10-23 DIAGNOSIS — R26.89 BALANCE PROBLEM: ICD-10-CM

## 2019-10-23 DIAGNOSIS — Z78.9 IMPAIRED INSTRUMENTAL ACTIVITIES OF DAILY LIVING (IADL): ICD-10-CM

## 2019-10-23 DIAGNOSIS — Z74.09 IMPAIRED MOBILITY AND ADLS: Primary | ICD-10-CM

## 2019-10-23 PROCEDURE — 97110 THERAPEUTIC EXERCISES: CPT | Mod: PN

## 2019-10-23 PROCEDURE — 97530 THERAPEUTIC ACTIVITIES: CPT | Mod: PN

## 2019-10-23 NOTE — PROGRESS NOTES
Name: Alyssa John  [facility] Number: [id number]  Date of Treatment: 10/23/2019   Diagnosis:   Encounter Diagnosis   Name Primary?    Impaired functional mobility, balance, and endurance        Time in: 1307  Time Out: 1400  Total Treatment Time: 53        Subjective:    Alyssa reports no pain today.  Patient reports their pain to be 0/10 on a 0-10 scale with 0 being no pain and 10 being the worst pain imaginable.    Objective    Alyssa received therapeutic exercises to develop strength, endurance, ROM, flexibility, posture and core stabilization for 53 minutes including:    FOTO completed visit #10    Sit to stand min/CGA  Gait training in // bars 10' x 3 forward/back with SBA  Side stepping // bars 10' x 2  Standing ham curls 2 x 10 reps  Standing abd 2 x 10 reps  HR 2 x 10 reps  Seated Ball squeezes x 20 reps  LAQ x 20 reps 2# B LE  hip abd RTB x 20 reps  Hip flex x 20 reps  Shuttle 37# x 30 reps        Pt demo good understanding of the education provided. Alyssa demonstrated good return demonstration of activities.     Assessment:   Verbal cues to lean forward when standing. Increased time needed for transfers.    Pt will continue to benefit from skilled PT intervention. Medical Necessity is demonstrated by:  Fall Risk, Unable to participate in daily activities, Continued inability to participate in vocational pursuits, Pain limits function of effected part for some activities, Unable to participate fully in daily activities, Requires skilled supervision to complete and progress HEP, Weakness and Edema.    Patient is making fair progress towards established goals.          Plan:  Continue with established Plan of Care towards PT goals.

## 2019-10-23 NOTE — PROGRESS NOTES
"Occupational Therapy- Treatment     Date:  10/23/2019  Start Time:  1410  Stop Time:  1500    TIMED  Procedure Time Min.   TherAct (3) Start:  Stop: 50   Total Timed Minutes:  50  Total Timed Units:  3  Total Untimed Units:  0  Charges Billed/# of units:  3    Progress/Current Status    Subjective:     Patient ID: Alyssa John is a 69 y.o. female.  Diagnosis: MS  1. Impaired mobility and ADLs     2. Decreased coordination     3. Weakness generalized     4. Balance problem     5. Impaired instrumental activities of daily living (IADL)       Pain:   Alyssa is upset and distracted because Giuseppe is supposed to have gall bladder sx, but she doesn't know when.  "I would like to be able to stand up from the w/c, because when I go to the bathroom, I can not stand up by myself."    Objective:     SPT w/ min A w/c->mat to the R, pt holding OT's elbows, leaning back, not standing fully upright.  pavan scap retraction AROM w/ facilitation of rhomboids, 2x10 at EOM.  Seated warrior I x2 in each direction w/ max A for positioning to stretch hip flexors and increase strength/coordination of core.  Throughout tx, worked on shifting COG to the front and R to improve postural control. She needed max cues to correct posture.  Sit<>stand at EOM x3. Ed pt re: concerns with decreased alignment of shoulder, hips, knees, ankles limiting ability to stand without assistance.   Hamstring stretches at EOM 2x20s per LE.   Repeated sit<>stand with no significant change in standing ability.  Squat-pivot t/f's mat<>w/c x2 w/ CGA-min A to practice ability to get to toilet without assistance. Pt ed that OT did a significant amount of work to stabilize the w/c and she is not to stand at home without assistance. She v/u.     Assessment:     Despite stretches and continued ed about correcting seated and standing COG for improved postural control, Alyssa continues to require max cues to be aware of her sitting positions/ standing posture. Pelvic " movement remains limited and weak. As previously noted, poor core strength and coordination limits function in all activities, including transfers and use of pavan UE. Improving core mobility and strength should allow for improved stability in transfers, as well as improved function in pavan UE during ADLs.  It is believed that with skilled occupational therapy and a progressive home program that Alyssa can improve her function to allow for increased participation in her valued roles of independent adult, spouse.      PROBLEM LIST/IMPAIRMENTS:   weakness, impaired endurance, impaired self care skills, impaired functional mobility, gait instability, impaired balance, impaired cognition, decreased coordination and decreased upper extremity function     LTG GOALS:  Time frame: 12 weeks (9/5/19-11/28/19)  1) Pt will be mod I with dressing for clothing except for fasteners (buttons/zippers/hooks)  2) Pt will be supervision with bathing, seated; SBA for TTB t/f.  3) Pt will be mod I and related t/f.  4) Pt will improve GMC, as evidenced by box and block scores of 45 or better on pavan UE.  5) Pt will participate in at least one IADL task in the home on a daily basis (dishes, laundry, etc)  9HPT to be assessed and FMC goal to be added.      STG Goals:  Time frame: 4 weeks (9/5/19-10/3/19)  1) Alyssa will be mod I with initial HEP. (MET with assistance of spouse)  2) Pt will t/f to toilet w/ min A or better.  3) Alyssa will be able to stand and manage her clothing for toileting w/ CGA  4) Pavan UE GMC to improve, as evidenced by box & block scores of 31 or better, pavan UE.    Patient Education/Response:     Continue HEP as previous.     Plans and Goals:     Continue stretches to increase ability to stand with CGA; when appropriate, standing for work in raised bed garden.  Status Update next week.    KIKE Oneil, SIMA  10/23/2019

## 2019-10-28 ENCOUNTER — CLINICAL SUPPORT (OUTPATIENT)
Dept: REHABILITATION | Facility: HOSPITAL | Age: 69
End: 2019-10-28
Attending: PHYSICIAN ASSISTANT
Payer: MEDICARE

## 2019-10-28 DIAGNOSIS — Z74.09 IMPAIRED FUNCTIONAL MOBILITY, BALANCE, AND ENDURANCE: ICD-10-CM

## 2019-10-28 PROCEDURE — 97110 THERAPEUTIC EXERCISES: CPT | Mod: KX,PN

## 2019-10-28 PROCEDURE — 97116 GAIT TRAINING THERAPY: CPT | Mod: KX,PN

## 2019-10-30 ENCOUNTER — CLINICAL SUPPORT (OUTPATIENT)
Dept: REHABILITATION | Facility: HOSPITAL | Age: 69
End: 2019-10-30
Attending: PHYSICIAN ASSISTANT
Payer: MEDICARE

## 2019-10-30 ENCOUNTER — TELEPHONE (OUTPATIENT)
Dept: NEUROLOGY | Facility: CLINIC | Age: 69
End: 2019-10-30

## 2019-10-30 DIAGNOSIS — R26.89 BALANCE PROBLEM: ICD-10-CM

## 2019-10-30 DIAGNOSIS — Z78.9 IMPAIRED MOBILITY AND ADLS: Primary | ICD-10-CM

## 2019-10-30 DIAGNOSIS — R53.1 WEAKNESS GENERALIZED: ICD-10-CM

## 2019-10-30 DIAGNOSIS — Z78.9 IMPAIRED INSTRUMENTAL ACTIVITIES OF DAILY LIVING (IADL): ICD-10-CM

## 2019-10-30 DIAGNOSIS — Z74.09 IMPAIRED MOBILITY AND ADLS: Primary | ICD-10-CM

## 2019-10-30 DIAGNOSIS — R27.8 DECREASED COORDINATION: ICD-10-CM

## 2019-10-30 PROCEDURE — G8988 SELF CARE GOAL STATUS: HCPCS | Mod: CJ,PN

## 2019-10-30 PROCEDURE — G8987 SELF CARE CURRENT STATUS: HCPCS | Mod: CK,PN

## 2019-10-30 PROCEDURE — 97530 THERAPEUTIC ACTIVITIES: CPT | Mod: PN

## 2019-10-30 NOTE — PROGRESS NOTES
"Occupational Therapy- Treatment and Status Update    Date:  10/30/2019  Start Time:  1412  Stop Time:  1508    TIMED  Procedure Time Min.   TherAct (4) Start:  Stop: 56   Total Timed Minutes:  56  Total Timed Units:  4  Total Untimed Units:  0  Charges Billed/# of units:  4    Progress/Current Status    Subjective:     Patient ID: Alyssa John is a 69 y.o. female.  Diagnosis: MS  1. Impaired mobility and ADLs     2. Decreased coordination     3. Weakness generalized     4. Impaired instrumental activities of daily living (IADL)     5. Balance problem       Pain:     No c/o pain.  She states that she is doing better and feels "more confident" with going to the bathroom and "I'm not so afraid; I don't feel as tired." She complains that she gets water on the floor when showering with TTB.    Objective:     SPT w/ min A w/c->mat to the R, pt holding OT's elbows, leaning back, not standing fully upright.  Ed that she can cut 2 vertical slits in her shower curtain liner to slip into TTB, decreasing spillage. She v/u.    Box and Block [facility] assessment: (R) 37 (L) 32 (compared to 26 & 27, respectively at eval)  [norms for women aged 65-69: R=72.0 +/-6.2; L=71.3 +/-7.7 (Parveen et al, 1985)]    9 Hole Peg Test: (R) 45.31 tremor noted; (L) 50.55 (this is first assessment of 9HPT)  [norms for women aged 66-70: R=19.90 +/- 3.15s; L=21.44s +/-3.97s (Dora et al, 2003)]    R sh flex AROM=140 (compared to 135 at eval)  L sh flex AROM=113 (compared to 108 at eval)    Pelvic AROM: Min  Decreased anterior/ posterior pelvic tilting; mod-max decr R lateral tilting; min decreased L lateral tilting (improved from mod-max throughout)    Hand Strength   (lbs) Right Left   1 15 16   2 22 19   3 25 20   avg 17.3 (compared to 11.33 on eval) 18.3 (compared to 15 on eval)   [norms for women aged 65-69: R=49.6 +/-9.7; L=41.0 +/-8.2 (Parveen et al, 1985)]    Static sitting balance: fair (-) as she is still with a posterior lean, " SBAR Zero Suicide Handoff Report    Situation-  · Silviano Herrmann, 28 year old, 1991.  Primary reason for admission:  Pt admitted voluntarily for alchool detox, presenting in ER with AMBER .409, sharing drinking 1.75L vodka/day \"easily.\" Pt shares he began drinking alcohol around age 17 after a break up with a girlfriend, although use increased over the past 3 years. Pt shares there had been times at work where alcohol had been smelled on his breath and has had several instances of \"tragedies\" or incidents of risky behaviors due to his alcohol use. Pt's major stressor recently has been work, feeling under-appreciated and over-worked.   · [facility], Dr. Garzon is referring and may be contacted at [phone] questions  · Is the transportation arranged for the patient to get the level of care? No    Background-  PRIMARY DIAGNOSIS: Alcohol use disorder, severe   SECONDARY DIAGNOSIS: Alcohol withdrawal, uncomplicated  Major depressive disorder, recurrent, moderate  Unspecified anxiety disorder  · Legal status: Voluntary  · Symptoms on admission: Anxiety, Indecision, Worry, Irritability, Lack of Emotional Support, Social Withdrawl, Negative Thoughts, Sad/Tearful, Urges to Use Alcohol/Drugs, Poor Judgement/Impulsive Behavior  · Treatment response: Pt began on sertraline and hydroxyzine, responding well. Pt was disengaged in group programming and somewhat engaged individually.   · Past medical history that may be affecting behavioral health care: None  · Allergies: has No Known Allergies.  · Pain level/approaches to pain: Pain level undetermined - sleeping/no report of pain  · Mental status/emotional state:   · Insight: (wdl)  · Judgment: (wdl)  · Reliability: (wdl)  · Affect/Behavior: Anxious, Depressed, Isolative  · Mood: Anxious  · Code status: Prior  · Vitals signs trends & at the time of report / Oxygen level (for inpatient treatment only)   Temp: 97.6 °F (36.4 °C)  Pulse: 84  BP: 138/88  Resp: 16  SpO2:  "but it is better than noted on eval.    AM-PAC 6 CLICK ADL  How much help from another person does this patient currently need?  1 = Unable, Total/Dependent Assistance  2 = A lot, Maximum/Moderate Assistance  3 = A little, Minimum/Contact Guard/Supervision  4 = None, Modified [place]/Independent    Eating: 3   (she complains of the R hand shaking when she eats, so she uses the L hand to hold the right arm still while she feeds herself w/ the R UE. She demo'd this to OT.)  Grooming: 3      UB Dressing: 3     LB Dressin      Bathing: 3      Toiletin  Total: 16    AM-PAC Raw Score CMS "G-Code Modifier Level of Impairment Assistance   6 % Total / Unable   7 - 9 CM 80 - 100% Maximal Assist   10-14 CL 60 - 80% Moderate Assist   15 - 19 CK 40 - 60% Moderate Assist   20 - 22 CJ 20 - 40% Minimal Assist   23 CI 1-20% SBA / CGA   24 CH 0% Independent/ Mod I      Stand-pivot t/f w/c <> toilet w/ min A.  Standing for clothing management w/ min A for balance, max cues to shift COG forward to increase stability in stand.  Attempted t/f to a facility jesus w/c today. It was quite short for her and she required mod A to rise from the chair.    Assessment:     Alyssa has met 3/4 STGs and is progressing, though slowly, toward all LTGs. She continues to have difficulty monitoring and correcting her posture, which affects her participation in all activities. Nonetheless, her posture is improving, as are all other measurements assessed today. It is believed that with skilled occupational therapy and a progressive home program that Alyssa can improve her function to allow for increased participation in her valued roles of independent adult, spouse.      PROBLEM LIST/IMPAIRMENTS:   weakness, impaired endurance, impaired self care skills, impaired functional mobility, gait instability, impaired balance, impaired cognition, decreased coordination and decreased upper extremity function     LTG GOALS:  Time frame: 12 weeks " 100 %  · Medical/physical limitations that may affect treatment: None     Assessment-  Overall, Silviano Herrmann is improving in mood, is less tearful today. Pt's detox symptoms are resolved although reported trouble sleeping night prior to discharge and minimized his symptoms during this hospitalization.  Pt was isolative to his room, reading and playing solitaire, sharing \"this place is misery\". Pt somewhat disengaged from reality, has a major focus on obtaining his phone and shares having 30,000 followers on Twitter. Pt did not engage in group programming, but did engage somewhat individually. Pt ultimately decided on a goal of total sobriety which is a change in motivation from admission although did not agree to be referred for treatment programming. Pt was provided a work excuse from Dr. Garzon and also submitted disability claim paperwork which was given to case management.    Recommendation-  · Refer to patients safety plan  · Refer to AVS for current medications and follow up appointments  · Activity/mobility requirements: ambulatory  Pt agrees to follow-up with Charan Dawson MD at [facility] for medications although is undecided about further treatment. Pt was provided information on [facility], IOP, individual therapists and AA meetings. Pt discusses wanting to attend several AA meetings throughout the week and connecting with a sober friend. Pt's parents are fairly supportive.     KANDI Giron   (9/5/19-11/28/19)  1) Pt will be mod I with dressing for clothing except for fasteners (buttons/zippers/hooks) (progressing)  2) Pt will be supervision with bathing, seated; SBA for TTB t/f. (progressing)  3) Pt will be mod I for toileting and related t/f. (progressing)  4) Pt will improve GMC, as evidenced by box and block scores of 45 or better on pavan UE. (progressing)  5) Pt will participate in at least one IADL task in the home on a daily basis (dishes, laundry, etc) (progressing)  New Goal:  6) Pt to improve FMC bilaterally, as evidenced by a 5 sec improvement in times on the 9HPT - R=40s or better; L=45s or better.        STG Goals:  Time frame: 4 weeks (9/5/19-10/3/19)  1) Alyssa will be mod I with initial HEP. (MET with assistance of spouse)  2) Pt will t/f to toilet w/ min A or better. (MET)  3) Alyssa will be able to stand and manage her clothing for toileting w/ CGA (progressing)  4) Pavan UE GMC to improve, as evidenced by box & block scores of 31 or better, pavan UE. (MET)    Patient Education/Response:     Progress toward goals; continue HEP as previous.     Plans and Goals:       Continue working on postural control in sit and stand; stretches to increase ability to stand with CGA; when appropriate, standing for work in raised bed garden.      Sameera Montague, KIKE, LOTR  10/30/2019

## 2019-10-30 NOTE — TELEPHONE ENCOUNTER
SW phoned pt and pt's  answered.  He was aware of pt's request to [facility] for an order for a standard WC and believes pt may be confused or forgetting her upcoming WC evaluation with Dr. Jarquin in early February.  SW explained that they could seek financial assistance for a standard [facility] or [facility], but that going through insurance would likely mean that [facility] would not pay for the custom chair that she'll likely be recommended for in February.   expressed understanding and SW agreed to send a portal message to them with contact information for the [facility] and [facility].

## 2019-10-30 NOTE — TELEPHONE ENCOUNTER
----- Message from Melita Blanca sent at 10/30/2019  9:52 AM CDT -----  Contact: [facility] at [phone]//[place]/member services dept  Paulie pt-Pt is requestng a standard wheel chair.  Will need most recent clinical notes and an order faxed to them  Fax is [phone]  Directly to member services

## 2019-10-31 ENCOUNTER — TELEPHONE (OUTPATIENT)
Dept: PHARMACY | Facility: CLINIC | Age: 69
End: 2019-10-31

## 2019-11-01 NOTE — PLAN OF CARE
PHYSICAL THERAPY UPDATED PLAN OF CARE    Alyssa John  10/28/2019    Onset Date:  6/2019  SOC Date:  9/6/2019  Primary Diagnosis:    Problem List Items Addressed This Visit     Impaired functional mobility, balance, and endurance        Treatment Diagnosis:  Functional mobility/gait deficits  Precautions:  falls  Visits from SOC:  14  Functional Level Prior to SOC:  Unable to walk, assistance for transfers, difficulty with sitting balance/trunk control, tendency to lean backwards when standing    Updated Assessment:    S: Reports improvement in functional mobility, now able to stand up in the bathroom with/without assist and ambulate to the doorway using grab bar and sink/vanity to her transport chair. Reports pain in tailbone area after falling the other day. Rates pain as up to 6-7/10 with certain movements Reports she was walking while holding onto her transport chair as support when she fell down onto her buttocks. Reports she did not get medical attention since she didn't start to hurt right away.     O: Reassessment performed for POC update purposes:    LE strength:  R hip flex 3+/5, knee ext 3+/5, knee flex 3/5, ankle DF 3/5  L hip flex 3/5, knee ext 3+/5, knee flex 3/5, ankle DF 3-/5    Transfers: min to mod A with cues for hand placement/sequencing and weight translation center of gravity over base of support  Gait: 62 ft using RW at CGA/min A with transport chair follow, cues for LLE clearance    Patient participating in therapeutic exercise as follows to address strengthening/gait for 9 minutes including shuttle leg press 37# 30 x 2 B LE's with rest breaks    A: Pt with improving strength and functional mobility compared to SOC. Patient may benefit from continued PT to address noted impairments and improve functional mobility.    P: Continue as per POC    Goals from Previous POC:  Short Term Goals (4 Weeks):               1) Facilitate improved sitting posture              2) Facilitate improved  LE flexibility              3) Pt will perform sit<>stand with minimal to moderate assistance              4) Pt will stand without posterior lean 5 of 10 trials  Long Term Goals (8 Weeks):               1) Pt will progress to ambulation over level surface x 15'               2) Pt will stand with slight UE support              3) Pt will perform toilet transfer with minimal assistance              4) Pt/family will be independent in HEP.       Previous Short Term Goals Status:   1= met 2= met 3=met 4=not met  New Short Term Goals:   1) Pt will stand without posterior lean 5 of 10 trials   Long Term Goals:   modified:     1) Pt will perform sit to stand transfer with minimum to contact guard assistance              2) Pt will stand with slight UE support              3) Pt will perform toilet transfer with minimal assistance              4) Pt/family will be independent in HEP.    Reasons for Recertification of Therapy:    weakness, impaired endurance, impaired functional mobility, gait instability, impaired balance, decreased lower extremity function, decreased safety awareness and decreased ROM    Certification Period: 11/4/2019 to 12/13/2019  Recommended Treatment Plan: 2 times per week for 6 weeks: Gait Training, Manual Therapy, Moist Heat/ Ice, Neuromuscular Re-ed, Patient Education, Therapeutic Activites and Therapeutic Exercise      Thank you for referral.    Therapist's Name: Sameera Osborne, PT  10/28/2019  I CERTIFY THE NEED FOR THESE SERVICES FURNISHED UNDER THIS PLAN OF TREATMENT AND WHILE UNDER MY CARE    Physician's comments: ________________________________________________________________________________________________________________________________________________      Physician's Name: ___________________________________

## 2019-11-04 ENCOUNTER — CLINICAL SUPPORT (OUTPATIENT)
Dept: REHABILITATION | Facility: HOSPITAL | Age: 69
End: 2019-11-04
Attending: PHYSICIAN ASSISTANT
Payer: MEDICARE

## 2019-11-04 DIAGNOSIS — Z78.9 IMPAIRED INSTRUMENTAL ACTIVITIES OF DAILY LIVING (IADL): ICD-10-CM

## 2019-11-04 DIAGNOSIS — R26.89 BALANCE PROBLEM: ICD-10-CM

## 2019-11-04 DIAGNOSIS — Z78.9 IMPAIRED MOBILITY AND ADLS: Primary | ICD-10-CM

## 2019-11-04 DIAGNOSIS — Z74.09 IMPAIRED MOBILITY AND ADLS: Primary | ICD-10-CM

## 2019-11-04 DIAGNOSIS — R27.8 DECREASED COORDINATION: ICD-10-CM

## 2019-11-04 DIAGNOSIS — R53.1 WEAKNESS GENERALIZED: ICD-10-CM

## 2019-11-04 PROCEDURE — 97530 THERAPEUTIC ACTIVITIES: CPT | Mod: PN

## 2019-11-04 NOTE — PROGRESS NOTES
"Occupational Therapy- Treatment     Date:  11/04/2019  Start Time: 1306   Stop Time:  1408    TIMED  Procedure Time Min.   TherAct (4) Start:  Stop: 62 min   Total Timed Minutes:  62  Total Timed Units:  4  Total Untimed Units:  0  Charges Billed/# of units:  4    Progress/Current Status    Subjective:     Patient ID: Alyssa John is a 69 y.o. female.  Diagnosis: MS  1. Impaired mobility and ADLs     2. Weakness generalized     3. Decreased coordination     4. Impaired instrumental activities of daily living (IADL)     5. Balance problem       Pain:   "I got a new chair!"  No c/o pain.  Pt stated that to get to the grab bar on the wall in front of the toilet its a "far piece". Family training with , Giuseppe for keeping RW in the bathroom to aid in ambulation in midline and safety.    Objective:     -w/c>mat w/ RW, Min A and verbal cues to push from w/c, tuck pelvis under midline and stand tall. Once Alyssa was standing tall with the RW she side stepped x3 toward that mat and turned to align herself to sit. Upon decent required verbal cues for hand placement, to reach back toward surface before sitting. Alyssa v/u.  -Hamstring stretch 3 x 30 sec each LE using a 10 in stool.  -Modified warrior one, seated on mat w/ rear arm flexed and front arm on mat to support an "up tall" posture. 3 x 1-2 mins each side. Holding the stretch did not start until Alyssa was in proper position with trunk rotated, hips square to the working side, rear LE reaching distally with hip dropping to facilitate hip abduction; RAMOS with points of contact at corresponding QL, lateral flexed shoulder, dorsal rear ankle and at medial distal rear thigh.  - Hip flexor stretch supine on mat with knees bent allowing feet to dangle to floor x 5 mins. RAMOS placed R UE in 90*abd, L UE adducted to trunk, verbal cues for Alyssa to tilt her head to the left, and physical assist at lateral R hip and superior L shoulder to move pt to midline. " "Pressure applied to Mandeep distal thigh to facilitate lenghtening of hip flexors. Alyssa was able to complete supine>sit w/ supervision and no assistive devices, with hands on lap, after stretch was complete.  -Sit<>stand training, from mat w/ RW and using technique for hands under thighs (while on the toilet) to facilitate momentum to rise. Alyssa required Max verbal cues and tactile cues at QLs, and top of the L quad to decrease fear/anxiety and facilitate transfer. 5 trials, each trail less verbal and tactile cues required.  -mat>w/c squat pivot technique supervision, verbal cues to facilitate proper sitting posture and alignment once seated in w/c. Alyssa is able to open and close her leg rests on her new chair without assist.      Assessment:     Alyssa requires less assist to stand from her new transport chair than in her previous transport chair. The arm rests are at an appropriate level for her height and facilitate a proper rise. Hamstring stretch of the L LE is more difficult 2* Alyssa's R posterolateral lean and she requires VC (and intermittent actile cues at lateral R shoulder) to focus on leaning to the L to counteract this. RAMOS ed pt that she must FOCUS to over correct to the L to address this poor posture. Pt v/u.  Alyssa responded well to the modified warrior one seated stretch and with each trial became more able to attain the correct positioning with less physical assist from RAMOS. She likes the stretch and states "I feel like I can do anything." Alyssa is more confident during sit<>stand when she has RW as a destination to reach for and when she has hands at either side pushing from start surface. CGA also decreases her fear, but is not necessary after 3rd trial due to proper technique and increased confidence. Alyssa will continue to benefit from skilled therapy services to increase daily function and fulfill rewarding occupational roles at home.       PROBLEM LIST/IMPAIRMENTS:   weakness, " "impaired endurance, impaired self care skills, impaired functional mobility, gait instability, impaired balance, impaired cognition, decreased coordination and decreased upper extremity function     LTG GOALS:  Time frame: 12 weeks (9/5/19-11/28/19)  1) Pt will be mod I with dressing for clothing except for fasteners (buttons/zippers/hooks) (progressing)  2) Pt will be supervision with bathing, seated; SBA for TTB t/f. (progressing)  3) Pt will be mod I for toileting and related t/f. (progressing)  4) Pt will improve GMC, as evidenced by box and block scores of 45 or better on pavan UE. (progressing)  5) Pt will participate in at least one IADL task in the home on a daily basis (dishes, laundry, etc) (progressing)  New Goal:  6) Pt to improve FMC bilaterally, as evidenced by a 5 sec improvement in times on the 9HPT - R=40s or better; L=45s or better.        STG Goals:  Time frame: 4 weeks (9/5/19-10/3/19)  1) Alyssa will be mod I with initial HEP. (MET with assistance of spouse)  2) Pt will t/f to toilet w/ min A or better. (MET)  3) Alyssa will be able to stand and manage her clothing for toileting w/ CGA (progressing)  4) Pavan UE GMC to improve, as evidenced by box & block scores of 31 or better, pavan UE. (MET)    Patient Education/Response:   -RW in bathroom to increase sit<>stand at midline, increase safety, and decrease "furniture walking"; Giuseppe to supervise, provide CGA>Callie as needed. Both pt and  v/u and agree to trial this at home and provide feedback at next session.  Progress toward goals; continue HEP as previous.     Plans and Goals:     Continue working on postural control in sit and stand; stretches to increase ability to stand with CGA; when appropriate, standing for work in raised bed garden.    BUCKY Umana    11/4/2019   "

## 2019-11-06 ENCOUNTER — CLINICAL SUPPORT (OUTPATIENT)
Dept: REHABILITATION | Facility: HOSPITAL | Age: 69
End: 2019-11-06
Attending: PHYSICIAN ASSISTANT
Payer: MEDICARE

## 2019-11-06 DIAGNOSIS — Z78.9 IMPAIRED MOBILITY AND ADLS: Primary | ICD-10-CM

## 2019-11-06 DIAGNOSIS — Z74.09 IMPAIRED MOBILITY AND ADLS: Primary | ICD-10-CM

## 2019-11-06 DIAGNOSIS — R53.1 WEAKNESS GENERALIZED: ICD-10-CM

## 2019-11-06 DIAGNOSIS — Z78.9 IMPAIRED INSTRUMENTAL ACTIVITIES OF DAILY LIVING (IADL): ICD-10-CM

## 2019-11-06 DIAGNOSIS — R26.89 BALANCE PROBLEM: ICD-10-CM

## 2019-11-06 DIAGNOSIS — R27.8 DECREASED COORDINATION: ICD-10-CM

## 2019-11-06 PROCEDURE — 97530 THERAPEUTIC ACTIVITIES: CPT | Mod: PN

## 2019-11-06 NOTE — PROGRESS NOTES
"Occupational Therapy- Treatment     Date:  11/06/2019  Start Time: 1406   Stop Time:  1436    TIMED  Procedure Time Min.   TherAct (2) Start:  Stop: 25 min   Total Timed Minutes:  25  Total Timed Units:  2  Total Untimed Units:  0  Charges Billed/# of units:  2    Progress/Current Status    Subjective:     Patient ID: Alyssa John is a 69 y.o. female.  Diagnosis: MS  1. Impaired mobility and ADLs     2. Weakness generalized     3. Decreased coordination     4. Impaired instrumental activities of daily living (IADL)     5. Balance problem       Pain:   Pt/spouse report that they used technique from RAMOS at last visit to position w/c at bathroom door, stand with RW in bathroom and then amb to toilet w/ RW. They said it worked well.  "I don't feel A-1 right now. My stomach is giving me aches... I would call it worry" (that she is going to have diarrhea.) This started today while she was taking a bath (about an hour ago).    Objective:     136/80, HR=77    OT suggested toileting and transfer 1) for practice and 2) just in case she actually needed to go. With w/c positioned in doorway, pt stood with RW in bathroom (to simulate home set-up) w/ CGA, amb to toilet w/ CGA. Stood w/ RW and min A for pants management as she could not unbutton/unzip pants.  Seated toileting w/ mod I for hygiene. Standing for pants management w/ min A for balance and min A for button/zipper. CGA-min A amb w/ RW to sink and then to w/c. CGA for balance at sink to clean hands. After sitting, pt stated she thought she needed to go home and session was terminated. Spouse ed re: pt's condition and concerns.    Assessment:     Limited activities completed in session as pt not feeling well. Use of RW in bathroom appears to be effective for encouraging increased safety and stability during amb to toilet as long as spouse is with her and with mod cues for standing "up tall". Alyssa will continue to benefit from skilled therapy services to " increase daily function and fulfill rewarding occupational roles at home.       PROBLEM LIST/IMPAIRMENTS:   weakness, impaired endurance, impaired self care skills, impaired functional mobility, gait instability, impaired balance, impaired cognition, decreased coordination and decreased upper extremity function     LTG GOALS:  Time frame: 12 weeks (9/5/19-11/28/19)  1) Pt will be mod I with dressing for clothing except for fasteners (buttons/zippers/hooks) (progressing)  2) Pt will be supervision with bathing, seated; SBA for TTB t/f. (progressing)  3) Pt will be mod I for toileting and related t/f. (progressing)  4) Pt will improve GMC, as evidenced by box and block scores of 45 or better on mandeep UE. (progressing)  5) Pt will participate in at least one IADL task in the home on a daily basis (dishes, laundry, etc) (progressing)  New Goal:  6) Pt to improve FMC bilaterally, as evidenced by a 5 sec improvement in times on the 9HPT - R=40s or better; L=45s or better.        STG Goals:  Time frame: 4 weeks (9/5/19-10/3/19)  1) Alyssa will be mod I with initial HEP. (MET with assistance of spouse)  2) Pt will t/f to toilet w/ min A or better. (MET)  3) Alyssa will be able to stand and manage her clothing for toileting w/ CGA (progressing)  4) Mandeep UE GMC to improve, as evidenced by box & block scores of 31 or better, mandeep UE. (MET)    Patient Education/Response:   -continue use of RW in bathroom   Progress toward goals; continue HEP as previous.     Plans and Goals:     Continue working on postural control in sit and stand; stretches to increase ability to stand with CGA; when appropriate, standing for work in raised bed garden.    Sameera Montague OT  11/6/2019

## 2019-11-08 ENCOUNTER — TELEPHONE (OUTPATIENT)
Dept: NEUROLOGY | Facility: CLINIC | Age: 69
End: 2019-11-08

## 2019-11-08 NOTE — TELEPHONE ENCOUNTER
SW phoned Diana of [facility] and explained why will not provide order for standard WC; pt is about to have a mobility evaluation for a custom manual WC and we don't want insurance to deny payment for that because of recent payment for a standard WC.

## 2019-11-08 NOTE — TELEPHONE ENCOUNTER
----- Message from Maisha Risa sent at 11/8/2019 10:40 AM CST -----  Contact: Jil wren/ [employer] tel:[phone] / fax no:[phone]   Caller is requesting a wheelchair .   Standard Wheelchair.    Pls fax the order to [employer]  [phone] .

## 2019-11-12 ENCOUNTER — CLINICAL SUPPORT (OUTPATIENT)
Dept: REHABILITATION | Facility: HOSPITAL | Age: 69
End: 2019-11-12
Attending: PHYSICIAN ASSISTANT
Payer: MEDICARE

## 2019-11-12 ENCOUNTER — PATIENT MESSAGE (OUTPATIENT)
Dept: OPTOMETRY | Facility: CLINIC | Age: 69
End: 2019-11-12

## 2019-11-12 DIAGNOSIS — Z78.9 IMPAIRED MOBILITY AND ADLS: Primary | ICD-10-CM

## 2019-11-12 DIAGNOSIS — R26.89 BALANCE PROBLEM: ICD-10-CM

## 2019-11-12 DIAGNOSIS — Z74.09 IMPAIRED FUNCTIONAL MOBILITY, BALANCE, AND ENDURANCE: ICD-10-CM

## 2019-11-12 DIAGNOSIS — R53.1 WEAKNESS GENERALIZED: ICD-10-CM

## 2019-11-12 DIAGNOSIS — Z74.09 IMPAIRED MOBILITY AND ADLS: Primary | ICD-10-CM

## 2019-11-12 DIAGNOSIS — Z78.9 IMPAIRED INSTRUMENTAL ACTIVITIES OF DAILY LIVING (IADL): ICD-10-CM

## 2019-11-12 DIAGNOSIS — R27.8 DECREASED COORDINATION: ICD-10-CM

## 2019-11-12 PROCEDURE — 97530 THERAPEUTIC ACTIVITIES: CPT | Mod: PN

## 2019-11-12 PROCEDURE — 97110 THERAPEUTIC EXERCISES: CPT | Mod: PN

## 2019-11-12 NOTE — PROGRESS NOTES
"Occupational Therapy- Treatment     Date:  11/12/2019  Start Time: 1115   Stop Time:  1200    TIMED  Procedure Time Min.   TherAct (3) Start:  Stop: 45 min   Total Timed Minutes:  45  Total Timed Units:  3  Total Untimed Units:  0  Charges Billed/# of units:  3      Progress/Current Status    Subjective:     Patient ID: Alyssa John is a 69 y.o. female.  Diagnosis: MS  1. Impaired mobility and ADLs     2. Weakness generalized     3. Decreased coordination     4. Impaired instrumental activities of daily living (IADL)     5. Balance problem       Pain:   "I hate the cold weather."   "I've been using the walker a lot at home. I've been getting around the house a little bit."  She feels much better than last week. No c/o pain.    Objective:     Cues to wait until on the mat for doffing jacket so she didn't have to work around the wheelchair.  SPT w/ RW and CGA w/c->mat to the right.  Pt doffed jacket w/ mod I seated at the EOM. And then folded it.    Ed re: goal to increase IADL participation. Pt v/u and in agreement.    Standing w/ RW and CGA x3m30s and sorting playing cards w/ the L UE.    Modified [place] II seated at EOM - starting with wide stance, turning to one side and positioning in lunge, allowing for stretch to hip flexors, 2x10s in each direction    Standing w/ RW and min A x3m30s to play solitaire w/ pavan UE and only intermittent UE support.    Mod verbal and visual cues (via mirror) for sitting posture as she was consistently leaning back to the L. Pt encouraged to pay attention to whether her weight even when seated (as opposed to being "heavy on the left" due to left lean.     Gentle mobilization and stretching of soft-tissues throughout the shoulder, including levator, serratus anterior, subscapularis, anterior complex, pec major, lats and combined internal rotators to allow for increased PROM.    Seated PNF reaching w/ L UE D2 (x15) and D1 (x10) for moving targets w/ focus on starting with " weight centered and then shifting weight to appropriately for retrieving or placing the target item (as opposed to leaning).    SPT back to w/c to the left w/ RW, CGA and min cues.    Assessment:     Significant improvement in participation compared to previous session as pt feeling better. Increased indep in t/f's w/ RW as compared to having pt hold the elbows of someone assisting her - she is better able to keep her weight centered, keeping her on her feet, as opposed to having a posterior COG. She does still need CGA for safety. Alyssa will continue to benefit from skilled therapy services to increase daily function and fulfill rewarding occupational roles at home.     PROBLEM LIST/IMPAIRMENTS:   weakness, impaired endurance, impaired self care skills, impaired functional mobility, gait instability, impaired balance, impaired cognition, decreased coordination and decreased upper extremity function     LTG GOALS:  Time frame: 12 weeks (9/5/19-11/28/19)  1) Pt will be mod I with dressing for clothing except for fasteners (buttons/zippers/hooks) (progressing 11/12/2019)  2) Pt will be supervision with bathing, seated; SBA for TTB t/f. (progressing 11/12/2019)  3) Pt will be mod I for toileting and related t/f. (progressing 11/12/2019)  4) Pt will improve GMC, as evidenced by box and block scores of 45 or better on mandeep UE. (progressing 11/12/2019)  5) Pt will participate in at least one IADL task in the home on a daily basis (dishes, laundry, etc) (progressing 11/12/2019)  6) Pt to improve FMC bilaterally, as evidenced by a 5 sec improvement in times on the 9HPT - R=40s or better; L=45s or better. (progressing 11/12/2019)      STG Goals:  Time frame: 4 weeks (9/5/19-10/3/19)  1) Alyssa will be mod I with initial HEP. (MET with assistance of spouse)  2) Pt will t/f to toilet w/ min A or better. (MET)  3) Alyssa will be able to stand and manage her clothing for toileting w/ CGA (progressing)  4) Mandeep UE GMC to improve,  as evidenced by box & block scores of 31 or better, pavan HOSKINS. (MET)    Patient Education/Response:   -continue use of RW in bathroom   Progress toward goals; continue HEP as previous.     Plans and Goals:     Continue working on postural control in sit and stand; stretches to increase ability to stand with CGA; when warmer, standing for work in raised bed garden.    Sameera Montague OT  11/12/2019

## 2019-11-12 NOTE — PROGRESS NOTES
"Name: Alyssa John  [facility] Number: [id number]  Date of Treatment: 11/12/2019   Diagnosis:   Encounter Diagnosis   Name Primary?    Impaired functional mobility, balance, and endurance        Time in: 1405  Time Out: 1500  Total Treatment Time: 55      Subjective:    Alyssa reports "I feel good today."  Patient reports their pain to be 0/10 on a 0-10 scale with 0 being no pain and 10 being the worst pain imaginable.    Objective  Alyssa received therapeutic exercises to develop strength, endurance, ROM and flexibility for 55 minutes including:  Bike 15'  // bars:    Fwd steps 10ft x 4              Side steps 10ft x 4  Toe raises x 20  Sit to stand x 5 with min A and VC's to stand upright vs flexed trunk positioning  Shuttle 31# 5'    Written Home Exercises Provided: Cont with HEP  Pt demo good understanding of the education provided. Alyssa demonstrated good return demonstration of activities.     Assessment:   Patient with increased endurance with todays exercises/gait training. Remains with flexed trunk posture that requires verbal and tactile cues to correct    Pt will continue to benefit from skilled PT intervention. Medical Necessity is demonstrated by:  Fall Risk, Continued inability to participate in vocational pursuits, Pain limits function of effected part for some activities, Requires skilled supervision to complete and progress HEP and Weakness.    Patient is making good progress towards established goals.    Plan:  Continue with established Plan of Care towards PT goals.   "

## 2019-11-14 ENCOUNTER — CLINICAL SUPPORT (OUTPATIENT)
Dept: REHABILITATION | Facility: HOSPITAL | Age: 69
End: 2019-11-14
Attending: PHYSICIAN ASSISTANT
Payer: MEDICARE

## 2019-11-14 DIAGNOSIS — R27.8 DECREASED COORDINATION: ICD-10-CM

## 2019-11-14 DIAGNOSIS — Z78.9 IMPAIRED INSTRUMENTAL ACTIVITIES OF DAILY LIVING (IADL): ICD-10-CM

## 2019-11-14 DIAGNOSIS — Z74.09 IMPAIRED MOBILITY AND ADLS: Primary | ICD-10-CM

## 2019-11-14 DIAGNOSIS — R53.1 WEAKNESS GENERALIZED: ICD-10-CM

## 2019-11-14 DIAGNOSIS — Z78.9 IMPAIRED MOBILITY AND ADLS: Primary | ICD-10-CM

## 2019-11-14 DIAGNOSIS — R26.89 BALANCE PROBLEM: ICD-10-CM

## 2019-11-14 DIAGNOSIS — Z74.09 IMPAIRED FUNCTIONAL MOBILITY, BALANCE, AND ENDURANCE: ICD-10-CM

## 2019-11-14 PROCEDURE — 97110 THERAPEUTIC EXERCISES: CPT | Mod: KX,PN

## 2019-11-14 PROCEDURE — 97530 THERAPEUTIC ACTIVITIES: CPT | Mod: KX,PN

## 2019-11-14 NOTE — PROGRESS NOTES
"Occupational Therapy- Treatment     Date:  11/14/2019  Start Time: 1107   Stop Time:  1200    TIMED  Procedure Time Min.   TherAct (4) Start:  Stop: 53   Total Timed Minutes:  53   Total Timed Units:  4  Total Untimed Units:  0  Charges Billed/# of units:  4    Progress/Current Status    Subjective:     Patient ID: Alyssa John is a 69 y.o. female.  Diagnosis: MS  1. Impaired mobility and ADLs     2. Weakness generalized     3. Decreased coordination     4. Impaired instrumental activities of daily living (IADL)     5. Balance problem       Pain:   No c/o pain today.   She cleaned the vanity yesterday (as part of IADL goal).  She continues to be more active at home with the use of the RW.    Objective:     SPT w/ RW and CGA w/c->mat to the right.  Hamstring stretch, 3x30s w/ each LE to increase indep w/ stand and sitting posture. Min cues for positioning self during stretch, leg propped onto 10" stool.  Stretch to R lats via sidelying w/ head propped on R hand.  Modified [place] II seated at EOM - starting with wide stance, turning to one side and positioning in lunge, allowing for stretch to hip flexors, 2x10s in each direction.  Standing x3 min during L UE fxl use for serial opposition to move large marge one at a time (x8 groups), as well as for picking up and placing large marge one at a time in 8 groups of 4 to increase finger<>palm translation for in-hand manipulation.  Trials oval-8 orthoses to L UE IF, LF, RF and SF due to pt's PIP hyperextension during functional use of the L UE. However, while they prevented hyperextension, they did not increase flexion, but then PIP's remained in neutral.  Amb about 15' to hallway w/ RW, CGA and mod cues to  the L LE and keep her hips tucked under.     Assessment:     Sit<>stand and t/f's continue to become steadier w/ consistent use of the RW. During sustained standing, she does continue to have a posterior COG, which decreases her safety and " independence. She may benefit from a modified oval-8 type of orthosis that places the PIP in slight flexion (as opposed to neutral) to increase functional use of the L UE w/ proper mechanics. She still needs CGA for safety. Alyssa will continue to benefit from skilled therapy services to increase daily function and fulfill rewarding occupational roles at home. Continuation of therapy at this point is medically necessary to increase postural control during ADLs, increasing safety and decreasing risk of fall.     PROBLEM LIST/IMPAIRMENTS:   weakness, impaired endurance, impaired self care skills, impaired functional mobility, gait instability, impaired balance, impaired cognition, decreased coordination and decreased upper extremity function     LTG GOALS:  Time frame: 12 weeks (9/5/19-11/28/19)  1) Pt will be mod I with dressing for clothing except for fasteners (buttons/zippers/hooks) (progressing 11/14/2019)  2) Pt will be supervision with bathing, seated; SBA for TTB t/f. (progressing 11/14/2019)  3) Pt will be mod I for toileting and related t/f. (progressing 11/14/2019)  4) Pt will improve GMC, as evidenced by box and block scores of 45 or better on mandeep UE. (progressing 11/14/2019)  5) Pt will participate in at least one IADL task in the home on a daily basis (dishes, laundry, etc) (progressing 11/14/2019)  6) Pt to improve FMC bilaterally, as evidenced by a 5 sec improvement in times on the 9HPT - R=40s or better; L=45s or better. (progressing 11/14/2019)      STG Goals:  Time frame: 4 weeks (9/5/19-10/3/19)  1) Alyssa will be mod I with initial HEP. (MET with assistance of spouse)  2) Pt will t/f to toilet w/ min A or better. (MET)  3) Alyssa will be able to stand and manage her clothing for toileting w/ CGA (progressing)  4) Mandeep UE GMC to improve, as evidenced by box & block scores of 31 or better, mandeep UE. (MET)    Patient Education/Response:     -continue use of RW at home, as well as daily participation in  at least one IADL task.   Progress toward goals; continue HEP as previous.     Plans and Goals:     Continue working on postural control in sit and stand, as well as L UE function.     Sameera Montague OT  11/14/2019

## 2019-11-15 NOTE — PROGRESS NOTES
Name: Alyssa John  Date:   11/14/2019  Primary Diagnosis: .  Problem List Items Addressed This Visit     Impaired functional mobility, balance, and endurance          Time in: 1205  Time Out: 1259  Total Treatment Time: 54  Group Time: 0      Subjective:    Patient reports doing better on transfers at home. She is no longer ambulating without assistance at home.  Patient reports their pain to be 0/10 on a 0-10 scale with 0 being no pain and 10 being the worst pain imaginable.    Objective    Patient received individual therapy to address strength, endurance, ROM and flexibility with 0 other patients with activities as follows:     Patient received therapeutic exercises to develop strength, endurance, ROM and flexibility for 54 minutes including:     Wc<>shuttle transfer min A with cues for hand placement/technique    Shuttle leg press: 31 lb x 5 min B LE's    // bars:    Standing weight shifts    Fwd gait 8 ft x 2   Side stepping 8 ft x 2    Nustep L2 x 10 min UE's/LE's    Sit to stand transfer x 5 min to mod A with cues for scooting to edge of seat, feet placement, and trunk momentum    Assessment:     Continues to need cues for hand placement/sequencing for safe transfers. Improved sit to stand transfers after practicing with cueing. Cues needed frequently for upright posture during standing activities.  Pt will continue to benefit from skilled PT intervention. Medical Necessity is demonstrated by:  Fall Risk, Unable to participate fully in daily activities, Requires skilled supervision to complete and progress HEP and Weakness.    Patient is making good progress towards established goals.    Plan:  Continue with established Plan of Care towards PT goals.

## 2019-11-19 ENCOUNTER — CLINICAL SUPPORT (OUTPATIENT)
Dept: REHABILITATION | Facility: HOSPITAL | Age: 69
End: 2019-11-19
Attending: PHYSICIAN ASSISTANT
Payer: MEDICARE

## 2019-11-19 DIAGNOSIS — Z74.09 IMPAIRED FUNCTIONAL MOBILITY, BALANCE, AND ENDURANCE: ICD-10-CM

## 2019-11-19 DIAGNOSIS — Z74.09 IMPAIRED MOBILITY AND ADLS: Primary | ICD-10-CM

## 2019-11-19 DIAGNOSIS — Z78.9 IMPAIRED INSTRUMENTAL ACTIVITIES OF DAILY LIVING (IADL): ICD-10-CM

## 2019-11-19 DIAGNOSIS — R26.89 BALANCE PROBLEM: ICD-10-CM

## 2019-11-19 DIAGNOSIS — R27.8 DECREASED COORDINATION: ICD-10-CM

## 2019-11-19 DIAGNOSIS — Z78.9 IMPAIRED MOBILITY AND ADLS: Primary | ICD-10-CM

## 2019-11-19 DIAGNOSIS — R53.1 WEAKNESS GENERALIZED: ICD-10-CM

## 2019-11-19 PROCEDURE — 97530 THERAPEUTIC ACTIVITIES: CPT | Mod: KX,PN

## 2019-11-19 PROCEDURE — 97110 THERAPEUTIC EXERCISES: CPT | Mod: PN

## 2019-11-19 NOTE — PROGRESS NOTES
"Name: Alyssa John  [facility] Number: [id number]  Date of Treatment: 11/19/2019   Diagnosis:   Encounter Diagnosis   Name Primary?    Impaired functional mobility, balance, and endurance        Time in: 1403  Time Out: 1505  Total Treatment Time: 62    Subjective:    Alyssa reports no pain. "I feel good." Presents in manual w/c with , just finished OT session.     Objective:    Patient received individual therapy to increase strength, endurance, ROM, flexibility, posture and core stabilization with activities as follows:     Alyssa received therapeutic exercises to develop strength, endurance, ROM, flexibility, posture and core stabilization for 62 minutes including:     Bike 10' L1  Standing balance static in // bars CGA-min A  Hip aBd L/R 10/2 standing  Static standing balance in // bars CGA to min A  Minisquats 15 B in // bars  Lat weight shifting in // bars 15 L/R  Standing march 15 L/R in // bars  Seated march 15 L/R  Seated LAQ 15 L/R  Seated DF 15 B  Gait training with rw and CG/min A and vc for technique and safety x 114' and vc, tc for fwd weight translation    Continue HEP daily.    Pt demo good understanding of the education provided. Patient demonstrated good return demonstration of activities.     Assessment:     Decreased activity tolerance today although pt reports she is feeling good. Pt with increased LE dragging L>R, and knee flexion with stance with minimal correction with vc.     Pt will continue to benefit from skilled PT intervention. Medical Necessity is demonstrated by:  Requires skilled supervision to complete and progress HEP and Weakness.    Patient is making good progress towards established goals.    Plan:    Continue with established Plan of Care towards PT goals.     "

## 2019-11-19 NOTE — PROGRESS NOTES
"Occupational Therapy- Treatment     Date:  11/19/2019  Start Time: 1305   Stop Time:  1400    TIMED  Procedure Time Min.   TherAct (4) Start:  Stop: 55   Total Timed Minutes:  55  Total Timed Units:  4  Total Untimed Units:  0  Charges Billed/# of units:  4    Progress/Current Status    Subjective:     Patient ID: Alyssa John is a 69 y.o. female.  Diagnosis: MS  1. Impaired mobility and ADLs     2. Weakness generalized     3. Decreased coordination     4. Impaired instrumental activities of daily living (IADL)     5. Balance problem       Pain:   No c/o pain today.   "I helped my sister  - brought her water, medicine and turned on the TV for her. I'm trying to walk with the walker more at home." Giuseppe has been with her when she's been walking.    Objective:     SPT w/ RW and CGA w/c->mat to the Right.  Pt ed re: "face & brace" and "2 hands or hands" on RW during functional retrieval of items.  Retrieved items x3 w/ CGA and min cues for hand placement and then returned them to the same place, making two trips around the room w/ the RW. She required mod cues to  the L foot during amb and to not allow feet to scissor.    Seated at EOM - Ipsilateral/contralateral reaching w/ R UE requiring trunk rotation posterior and R/ forward and L (providing weight shifting to the right and elongation of the R lats) to retrieve and place items. 4x10 reps.    Confirmed sizing for oval-8 orthoses:   L IF=7; L LF=8; L RF=6; L SF=4  R LF =9 (at pt's request)  Gave pt sizing information so she could order.  Wearing oval-8's, pt completed lock and key board to increase FMC in the L UE as well as pavan UE integration.  On t/f back to w/c to the left, pt w/ posterior LOB requiring mod A to recover. Pt ed that she continues to need someone with her when she is up with the walker. She v/u.     Assessment:       Alyssa continues to have improved stand balance and tolerance with the RW than she does without. Nonetheless, when " fatigued, her posterior COG increases, leading to high potential for falls. CGA is still needed for safety when up w/ RW. L UE function is somewhat improved with oval-8 orthoses providing improved mechanics and preventing PIP hyperextension. Alyssa will continue to benefit from skilled therapy services to increase daily function and fulfill rewarding occupational roles at home. Continuation of therapy at this point is medically necessary to increase postural control during ADLs, increasing safety and decreasing risk of fall.     PROBLEM LIST/IMPAIRMENTS:   weakness, impaired endurance, impaired self care skills, impaired functional mobility, gait instability, impaired balance, impaired cognition, decreased coordination and decreased upper extremity function     LTG GOALS:  Time frame: 12 weeks (9/5/19-11/28/19)  1) Pt will be mod I with dressing for clothing except for fasteners (buttons/zippers/hooks) (progressing 11/19/2019)  2) Pt will be supervision with bathing, seated; SBA for TTB t/f. (progressing 11/19/2019)  3) Pt will be mod I for toileting and related t/f. (progressing 11/19/2019)  4) Pt will improve GMC, as evidenced by box and block scores of 45 or better on mandeep UE. (progressing 11/19/2019)  5) Pt will participate in at least one IADL task in the home on a daily basis (dishes, laundry, etc) (progressing 11/19/2019)  6) Pt to improve FMC bilaterally, as evidenced by a 5 sec improvement in times on the 9HPT - R=40s or better; L=45s or better. (progressing 11/19/2019)      STG Goals:  Time frame: 4 weeks (9/5/19-10/3/19)  1) Alyssa will be mod I with initial HEP. (MET with assistance of spouse)  2) Pt will t/f to toilet w/ min A or better. (MET)  3) Alyssa will be able to stand and manage her clothing for toileting w/ CGA (progressing)  4) Mandeep UE GMC to improve, as evidenced by box & block scores of 31 or better, mandeep UE. (MET)    Patient Education/Response:     -continue use of RW at home, as well as daily  participation in at least one IADL task.   Progress toward goals; continue HEP as previous.     Plans and Goals:     Continue working on postural control in sit and stand, as well as L UE function and lengthening R side of trunk during functional tasks.     Sameera Montague OT  11/19/2019

## 2019-11-21 ENCOUNTER — OFFICE VISIT (OUTPATIENT)
Dept: NEUROLOGY | Facility: CLINIC | Age: 69
End: 2019-11-21
Payer: MEDICARE

## 2019-11-21 VITALS
WEIGHT: 134.94 LBS | BODY MASS INDEX: 23.91 KG/M2 | HEIGHT: 63 IN | HEART RATE: 81 BPM | SYSTOLIC BLOOD PRESSURE: 136 MMHG | DIASTOLIC BLOOD PRESSURE: 81 MMHG

## 2019-11-21 DIAGNOSIS — G35 MULTIPLE SCLEROSIS: Primary | ICD-10-CM

## 2019-11-21 DIAGNOSIS — E55.9 VITAMIN D INSUFFICIENCY: ICD-10-CM

## 2019-11-21 DIAGNOSIS — I10 ESSENTIAL HYPERTENSION: ICD-10-CM

## 2019-11-21 DIAGNOSIS — R26.9 NEUROLOGIC GAIT DYSFUNCTION: ICD-10-CM

## 2019-11-21 DIAGNOSIS — Z71.89 COUNSELING REGARDING GOALS OF CARE: ICD-10-CM

## 2019-11-21 DIAGNOSIS — R56.9 SEIZURES: ICD-10-CM

## 2019-11-21 DIAGNOSIS — Z29.89 PROPHYLACTIC IMMUNOTHERAPY: ICD-10-CM

## 2019-11-21 DIAGNOSIS — R53.82 CHRONIC FATIGUE: ICD-10-CM

## 2019-11-21 PROCEDURE — 1100F PR PT FALLS ASSESS DOC 2+ FALLS/FALL W/INJURY/YR: ICD-10-PCS | Mod: CPTII,S$GLB,, | Performed by: PHYSICIAN ASSISTANT

## 2019-11-21 PROCEDURE — 1126F PR PAIN SEVERITY QUANTIFIED, NO PAIN PRESENT: ICD-10-PCS | Mod: S$GLB,,, | Performed by: PHYSICIAN ASSISTANT

## 2019-11-21 PROCEDURE — 3288F FALL RISK ASSESSMENT DOCD: CPT | Mod: CPTII,S$GLB,, | Performed by: PHYSICIAN ASSISTANT

## 2019-11-21 PROCEDURE — 99215 OFFICE O/P EST HI 40 MIN: CPT | Mod: S$GLB,,, | Performed by: PHYSICIAN ASSISTANT

## 2019-11-21 PROCEDURE — 99215 PR OFFICE/OUTPT VISIT, EST, LEVL V, 40-54 MIN: ICD-10-PCS | Mod: S$GLB,,, | Performed by: PHYSICIAN ASSISTANT

## 2019-11-21 PROCEDURE — 1159F PR MEDICATION LIST DOCUMENTED IN MEDICAL RECORD: ICD-10-PCS | Mod: S$GLB,,, | Performed by: PHYSICIAN ASSISTANT

## 2019-11-21 PROCEDURE — 1126F AMNT PAIN NOTED NONE PRSNT: CPT | Mod: S$GLB,,, | Performed by: PHYSICIAN ASSISTANT

## 2019-11-21 PROCEDURE — 99499 UNLISTED E&M SERVICE: CPT | Mod: S$GLB,,, | Performed by: PHYSICIAN ASSISTANT

## 2019-11-21 PROCEDURE — 1100F PTFALLS ASSESS-DOCD GE2>/YR: CPT | Mod: CPTII,S$GLB,, | Performed by: PHYSICIAN ASSISTANT

## 2019-11-21 PROCEDURE — 3288F PR FALLS RISK ASSESSMENT DOCUMENTED: ICD-10-PCS | Mod: CPTII,S$GLB,, | Performed by: PHYSICIAN ASSISTANT

## 2019-11-21 PROCEDURE — 3079F PR MOST RECENT DIASTOLIC BLOOD PRESSURE 80-89 MM HG: ICD-10-PCS | Mod: CPTII,S$GLB,, | Performed by: PHYSICIAN ASSISTANT

## 2019-11-21 PROCEDURE — 3075F PR MOST RECENT SYSTOLIC BLOOD PRESS GE 130-139MM HG: ICD-10-PCS | Mod: CPTII,S$GLB,, | Performed by: PHYSICIAN ASSISTANT

## 2019-11-21 PROCEDURE — 99999 PR PBB SHADOW E&M-EST. PATIENT-LVL III: ICD-10-PCS | Mod: PBBFAC,,, | Performed by: PHYSICIAN ASSISTANT

## 2019-11-21 PROCEDURE — 3075F SYST BP GE 130 - 139MM HG: CPT | Mod: CPTII,S$GLB,, | Performed by: PHYSICIAN ASSISTANT

## 2019-11-21 PROCEDURE — 99499 RISK ADDL DX/OHS AUDIT: ICD-10-PCS | Mod: S$GLB,,, | Performed by: PHYSICIAN ASSISTANT

## 2019-11-21 PROCEDURE — 1159F MED LIST DOCD IN RCRD: CPT | Mod: S$GLB,,, | Performed by: PHYSICIAN ASSISTANT

## 2019-11-21 PROCEDURE — 99999 PR PBB SHADOW E&M-EST. PATIENT-LVL III: CPT | Mod: PBBFAC,,, | Performed by: PHYSICIAN ASSISTANT

## 2019-11-21 PROCEDURE — 3079F DIAST BP 80-89 MM HG: CPT | Mod: CPTII,S$GLB,, | Performed by: PHYSICIAN ASSISTANT

## 2019-11-21 NOTE — Clinical Note
Just wanted to thank you gusean for all the great work you are doing with Alyssa!  She is here in clinic today and is doing soooooo much better than our last visit!!Risa

## 2019-11-21 NOTE — PROGRESS NOTES
Subjective:        Patient ID: Alyssa John is a 69 y.o. female who presents today with her  for a routine clinic visit for MS.  [facility] visit to [facility] in August 2019 with this provider.     MS HPI:  · DMT: glatiramer acetate  · Side effects from DMT? No  · Taking vitamin D3 as recommended? 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 Prior episodes several weeks prior. Feeling urgency    Medications:  Current Outpatient Medications   Medication Sig    atorvastatin (LIPITOR) 40 MG tablet Take 1 tablet (40 mg total) by mouth once daily.    cetirizine (ZYRTEC) 10 MG tablet Take 1 tablet (10 mg total) by mouth once daily. (Patient taking differently: Take 10 mg by mouth daily as needed for Allergies or Rhinitis. )    cholecalciferol, vitamin D3, (VITAMIN D3) 5,000 unit Tab Take 5,000 Units by mouth once daily.    clonazePAM (KLONOPIN) 1 MG tablet TAKE 1 TABLET BY MOUTH TWICE A DAY    clopidogrel (PLAVIX) 75 mg tablet Take 75 mg by mouth once daily.      DULoxetine (CYMBALTA) 20 MG capsule TAKE 1 CAPSULE BY MOUTH EVERY DAY    famotidine (PEPCID) 20 MG tablet Take 20 mg by mouth 2 (two) times daily as needed for Heartburn.    fluticasone (FLONASE) 50 mcg/actuation nasal spray 2 sprays (100 mcg total) by Each Nare route 2 (two) times daily. (Patient taking differently: 2 sprays by Each Nare route 2 (two) times daily as needed for Rhinitis. )    glatiramer (COPAXONE) 40 mg/mL injection Inject 40 mg into the skin 3 (three) times a week.    modafinil (PROVIGIL) 100 MG Tab TAKE 1 TABLET BY MOUTH TWICE A DAY    neomycin-polymyxin-dexamethasone (DEXACINE) 3.5 mg/g-10,000 unit/g-0.1 % Oint 1/4 inch ribbon on upper and lower affected  lids twice daily    topiramate (TOPAMAX) 50 MG tablet TAKE 1 TABLET BY MOUTH TWICE A DAY    nitroGLYCERIN (NITROSTAT) 0.4 MG SL tablet Place 0.4 mg under the tongue every 5 (five) minutes as needed for Chest pain.     PSYLLIUM SEED, WITH DEXTROSE, (FIBER ORAL) Take by mouth 2 (two) times daily.    VENTOLIN HFA 90 mcg/actuation inhaler INHALE 2 PUFFS INTO THE LUNGS EVERY 6 (SIX) HOURS AS NEEDED FOR WHEEZING. RESCUE (Patient not taking: Reported on 2019)     No current facility-administered medications for this visit.        SOCIAL HISTORY  Social History     Tobacco Use    Smoking status: Former Smoker     Packs/day: 1.50     Last attempt to quit: 2006     Years since quittin.9    Smokeless tobacco: Never Used   Substance Use Topics    Alcohol use: No     Alcohol/week: 0.0 standard drinks    Drug use: No       Living arrangements - the patient lives with their spouse.    MS ROS:    As above-         Objective:        1. 25 foot timed walk:did not perform timed walk previous visit, but did stand several times with assist.   Timed 25 Foot Walk: 2019   Did patient wear an AFO? No No   Was assistive device used? Yes Yes   Assistive device used (toña one): Unilateral Assistance Bilateral Assistance   Unilateral device used Cane -   Bilateral device used - Walker/Rollator   Time for 25 Foot Walk (seconds) 10.2 30.7   Time for 25 Foot Walk (seconds) - -         Neurologic Exam     Mental Status   Oriented to person, place, and time.   Speech: speech is normal   Level of consciousness: alert    Motor Exam   Muscle bulk: normal    Strength   Right iliopsoas: 5/5  Left iliopsoas: 4/5  Right quadriceps: 5/5  Left quadriceps: 5/5  Right hamstrin/5  Left hamstrin/5  Right anterior tibial: 5/5  Left anterior tibial: 4/5        Imaging:       Results for orders placed during the hospital encounter of 18   MRI Brain Demyelinating W W/O Contrast    Impression Unchanged pronounced  burden of supratentorial white matter disease, with appearance in keeping with history of multiple sclerosis.  No new or enhancing lesions to suggest active demyelination.    Partially imaged lesion at the craniocervical junction, with spinal lesions better delineated on concurrent dedicated spine MRIs.    Electronically signed by resident: Baldo Krause  Date:    12/12/2018  Time:    09:34    Electronically signed by: Moo Michele MD  Date:    12/12/2018  Time:    15:28     Results for orders placed during the hospital encounter of 12/11/18   MRI Cervical Spine Demyelinating W W/O Contrast    Impression Multiple unchanged foci of T2 hyperintense signal throughout the spinal cord extending from the cranial cervical junction throughout the thoracic spinal cord.  Lesion at T9-10 was not included in the field of view of prior imaging; however, all other lesions are unchanged from 08/02/2018.  No enhancing lesions to suggest active demyelination.    Mild multilevel disc/endplate degeneration, with mild spinal canal stenoses in the cervical spine and severe foraminal stenosis on the left at C4-5 and on the right at C5-6.    Electronically signed by resident: Baldo Krause  Date:    12/12/2018  Time:    09:12    Electronically signed by: Moo Michele MD  Date:    12/12/2018  Time:    15:24     Results for orders placed during the hospital encounter of 12/11/18   MRI Thoracic Spine Demyelinating W W/O Contrast    Impression Multiple unchanged foci of T2 hyperintense signal throughout the spinal cord extending from the cranial cervical junction throughout the thoracic spinal cord.  Lesion at T9-10 was not included in the field of view of prior imaging; however, all other lesions are unchanged from 08/02/2018.  No enhancing lesions to suggest active demyelination.    Mild multilevel disc/endplate degeneration, with mild spinal canal stenoses in the cervical spine and severe foraminal stenosis on the left at C4-5 and on the  right at C5-6.    Electronically signed by resident: Baldo Krause  Date:    12/12/2018  Time:    09:12    Electronically signed by: Moo Michele MD  Date:    12/12/2018  Time:    15:24     External MRI done 5/2019(see media)    Labs:     Lab Results   Component Value Date    MDPANCNO31IG 97 (H) 08/20/2019    RHQCRJWU00TW 68 07/19/2018    CFZQTNPD21JJ 53 10/19/2016     Lab Results   Component Value Date    JCVINDEX SEE COMMENT (A) 12/09/2015    JCVANTIBODY SEE COMMENT 12/09/2015     Lab Results   Component Value Date    JO2HHURD 59.1 03/31/2016    ABSOLUTECD3 619 (L) 03/31/2016    RF6YSPLG 19.9 03/31/2016    ABSOLUTECD8 208 03/31/2016    MM3VSLNR 39.1 03/31/2016    ABSOLUTECD4 409 03/31/2016    LABCD48 1.97 03/31/2016     Lab Results   Component Value Date    WBC 8.40 04/30/2019    RBC 4.61 04/30/2019    HGB 12.9 04/30/2019    HCT 39.5 04/30/2019    MCV 86 04/30/2019    MCH 28.0 04/30/2019    MCHC 32.7 04/30/2019    RDW 13.3 04/30/2019     04/30/2019    MPV 8.9 (L) 04/30/2019    GRAN 6.4 04/30/2019    GRAN 76.4 (H) 04/30/2019    LYMPH 1.3 04/30/2019    LYMPH 15.4 (L) 04/30/2019    MONO 0.6 04/30/2019    MONO 7.1 04/30/2019    EOS 0.1 04/30/2019    BASO 0.00 04/30/2019    EOSINOPHIL 0.8 04/30/2019    BASOPHIL 0.3 04/30/2019     Sodium   Date Value Ref Range Status   04/30/2019 143 136 - 145 mmol/L Final     Potassium   Date Value Ref Range Status   04/30/2019 3.3 (L) 3.5 - 5.1 mmol/L Final     Chloride   Date Value Ref Range Status   04/30/2019 113 (H) 95 - 110 mmol/L Final     CO2   Date Value Ref Range Status   04/30/2019 21 (L) 23 - 29 mmol/L Final     Carbon Monoxide, Blood   Date Value Ref Range Status   10/19/2016 2 % Final     Comment:     -------------------REFERENCE VALUE--------------------------  0-2 Normal (Non-smoker) , < or = 9 Normal (Smoker), > or   = 20 (Toxic concentration)  Test Performed by:  [facility] - [address]  [address]  : Adrien Norton II, M.D., Ph.D.       Glucose   Date Value Ref Range Status   04/30/2019 113 (H) 70 - 110 mg/dL Final     BUN, Bld   Date Value Ref Range Status   04/30/2019 13 8 - 23 mg/dL Final     Creatinine   Date Value Ref Range Status   04/30/2019 0.7 0.5 - 1.4 mg/dL Final   02/22/2013 0.8 0.5 - 1.4 mg/dL Final     Calcium   Date Value Ref Range Status   04/30/2019 8.8 8.7 - 10.5 mg/dL Final   02/22/2013 8.7 8.7 - 10.5 mg/dL Final     Total Protein   Date Value Ref Range Status   04/29/2019 7.3 6.0 - 8.4 g/dL Final     Albumin   Date Value Ref Range Status   04/29/2019 3.9 3.5 - 5.2 g/dL Final     Total Bilirubin   Date Value Ref Range Status   04/29/2019 0.6 0.1 - 1.0 mg/dL Final     Comment:     For infants and newborns, interpretation of results should be based  on gestational age, weight and in agreement with clinical  observations.  Premature Infant recommended reference ranges:  Up to 24 hours.............<8.0 mg/dL  Up to 48 hours............<12.0 mg/dL  3-5 days..................<15.0 mg/dL  6-29 days.................<15.0 mg/dL       Alkaline Phosphatase   Date Value Ref Range Status   04/29/2019 164 (H) 55 - 135 U/L Final   02/22/2013 107 55 - 135 U/L Final     AST   Date Value Ref Range Status   04/29/2019 20 10 - 40 U/L Final   02/22/2013 18 10 - 40 U/L Final     ALT   Date Value Ref Range Status   04/29/2019 24 10 - 44 U/L Final     Anion Gap   Date Value Ref Range Status   04/30/2019 9 8 - 16 mmol/L Final   02/22/2013 11 5 - 15 meq/L Final     eGFR if    Date Value Ref Range Status   04/30/2019 >60 >60 mL/min/1.73 m^2 Final     eGFR if non    Date Value Ref Range Status   04/30/2019 >60 >60 mL/min/1.73 m^2 Final     Comment:     Calculation used to obtain the estimated glomerular filtration  rate (eGFR) is the CKD-EPI equation.         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 Patient was able to perform timed walk today where she was not last visit 3 months ago. No doubt outpatient therapy is proving very beneficial for her. She does continue to fatigue with ambulation(noted L LE fatigue with multiple attempts of timed walk) and unsupported sitting. Continue OT and PT outpatient   Change in Symptom Management Comments:  Discussed bowel dysfunction--increased water, fiber, positioning.    Additional Impression:      Over 50% of this 40 minute visit was spent in direct face to face counseling of the patient about MS, DMT considerations, and MS symptom management.     Problem List Items Addressed This Visit     None      Follow up in about 3 months (around 2/21/2020) for follow up with Dr. Apodaca.  Patient agreed to POC today.    Attending, Dr. Apodaca, was available during today's encounter.     Risa Oshea PA-C  [facility]]      Risa Oshea PA-C

## 2019-11-22 ENCOUNTER — CLINICAL SUPPORT (OUTPATIENT)
Dept: REHABILITATION | Facility: HOSPITAL | Age: 69
End: 2019-11-22
Attending: PHYSICIAN ASSISTANT
Payer: MEDICARE

## 2019-11-22 ENCOUNTER — RESEARCH ENCOUNTER (OUTPATIENT)
Dept: RESEARCH | Facility: HOSPITAL | Age: 69
End: 2019-11-22

## 2019-11-22 DIAGNOSIS — Z74.09 IMPAIRED MOBILITY AND ADLS: Primary | ICD-10-CM

## 2019-11-22 DIAGNOSIS — Z78.9 IMPAIRED INSTRUMENTAL ACTIVITIES OF DAILY LIVING (IADL): ICD-10-CM

## 2019-11-22 DIAGNOSIS — Z74.09 IMPAIRED FUNCTIONAL MOBILITY, BALANCE, AND ENDURANCE: ICD-10-CM

## 2019-11-22 DIAGNOSIS — R27.8 DECREASED COORDINATION: ICD-10-CM

## 2019-11-22 DIAGNOSIS — R26.89 BALANCE PROBLEM: ICD-10-CM

## 2019-11-22 DIAGNOSIS — R53.1 WEAKNESS GENERALIZED: ICD-10-CM

## 2019-11-22 DIAGNOSIS — Z78.9 IMPAIRED MOBILITY AND ADLS: Primary | ICD-10-CM

## 2019-11-22 PROCEDURE — 97110 THERAPEUTIC EXERCISES: CPT | Mod: KX,PN

## 2019-11-22 PROCEDURE — 97530 THERAPEUTIC ACTIVITIES: CPT | Mod: KX,PN

## 2019-11-22 NOTE — PROGRESS NOTES
Name: Alyssa John  [facility] Number: [id number]  Date of Treatment: 11/22/2019   Diagnosis:   Encounter Diagnosis   Name Primary?    Impaired functional mobility, balance, and endurance        Time in: 1510  Time Out: 1600  Total Treatment Time: 50  Group Time: 0      Subjective:    Alyssa reports she fell in her bathroom 2 days ago. Reports she found out she can take her walker into the bathroom, so decided she would try walking out of the bathroom on her own. She stepped out of the shower and was ambulating across the floor when she lost her balance. Denies any injury from the fall.  Patient reports their pain to be 0/10 on a 0-10 scale with 0 being no pain and 10 being the worst pain imaginable.    Objective    Patient received individual therapy to increase strength, endurance, ROM and flexibility with 0 patients with activities as follows:     Alyssa received therapeutic exercises to develop strength, endurance, ROM and flexibility for 50 minutes including:     Transfers w/c<>nustep min A with cues for sequencing and hand placement    Nustep L1 x 10 min UEs/LE's    Shuttle leg press: 31# x 5 min LE's only    // bars:   Static standing min/mod A   Standing marches x 15 L/R   Standing hip ABD x 15 L/R   Minisquats x 15   Side stepping 8 ft x 2   Gait fwd 8 ft x 4 CGA/min A    Seated therex:   LAQ x 20 L/R   Hip adduction ball squeezes x 20    Gait: 60 ft c/ RW at CGA/min A with cues for LLE clearance and upright posture, w/c follow for safety    Assessment:     Improved posture with cueing during // bars standing activities.  Improved L foot clearance with increased distance.   Pt will continue to benefit from skilled PT intervention. Medical Necessity is demonstrated by:  Fall Risk, Unable to participate fully in daily activities, Requires skilled supervision to complete and progress HEP and Weakness.    Patient is making good progress towards established goals.      Plan:  Continue with established Plan  of Care towards PT goals.

## 2019-11-22 NOTE — PROGRESS NOTES
[facility] Unplanned/Scheduled Visit   Subject seen at clinic on 11/21/2019 by provider, Risa Oshea, and by CRC, April Oreilly    Pregnancy Status  Subject did not report pregnancy at this visit.     EDSS  Not completed at this visit. 25 foot walk was completed: 30.7s  (with bilateral assistive device/walker)     Diet and Exercise    Not discussed during this visit.    MS Relapse  Subject has not experienced any MS relapses since last visit.     Hematology, Chemistry, and Other Labs   Not completed at this visit.     Urinalysis   Not completed at this visit.     Con Meds  Subject has no new medications since last visit.     Adverse Events/ Serious Adverse Events   Subject did not report any AE's or JAZMIN's.     [facility] PRO Booklet  Subject completed, reviewed and sent to [facility] monitor by CRC, April Oreilly, on date of service.

## 2019-11-23 NOTE — PROGRESS NOTES
"Occupational Therapy- Treatment     Date:  11/22/2019  Start Time:  1405   Stop Time:  1505    TIMED  Procedure Time Min.   TherAct (4) Start:  Stop: 60   Total Timed Minutes:  60  Total Timed Units:  4  Total Untimed Units:  0  Charges Billed/# of units:  4    Progress/Current Status    Subjective:     Patient ID: Alyssa John is a 69 y.o. female.  Diagnosis: MS  1. Impaired mobility and ADLs     2. Weakness generalized     3. Decreased coordination     4. Impaired instrumental activities of daily living (IADL)     5. Balance problem       Pain:   No c/o pain today.   "Did you see the note from Risa?!" Alyssa was excited that her neurology team noted how much she had improved since the last visit.     Objective:     T/F w/c<>mat w/ RW and CGA    Modified Clinton II seated at EOM - starting with wide stance, turning to one side and positioning in lunge, front arm reaching overhead (as well as back arm if tolerated) allowing for stretch to hip flexors, 4x10s in each direction. Two sets completed with OT while spouse filmed and was educated to technique. Two sets completed with spouse to ensure that he was comfortable with the technique. Min cues given for the sets that pt and spouse completed together.    L UE forward & contralateral reaching to move items and increase functional use of the L UE, engaging obliques, 2x12 reps    Seated at EOM - Ipsilateral/contralateral reaching w/ R UE requiring trunk rotation posterior and R/ forward and L (providing weight shifting to the right, elongation of the R lats, and engagement of the obliques) to retrieve and place items. 2x12 reps.     strengthening with yellow putty.    Amb to PT gym w/ RW and CGA, mod cues for L LE step through.    Assessment:     Improved balance noted today compared to previous session. Alyssa seemed excited about her spouse getting involved with the modified yoga-type stretching that we've been doing. She may actually benefit from " additional similar exercises as she seems motivated to work on these and they will improve core strength and control, which she needs to increase overall function. Alyssa will continue to benefit from skilled therapy services to increase daily function and fulfill rewarding occupational roles at home. Continuation of therapy at this point is medically necessary to increase postural control during ADLs, increasing safety and decreasing risk of fall.     PROBLEM LIST/IMPAIRMENTS:   weakness, impaired endurance, impaired self care skills, impaired functional mobility, gait instability, impaired balance, impaired cognition, decreased coordination and decreased upper extremity function     LTG GOALS:  Time frame: 12 weeks (9/5/19-11/28/19)  1) Pt will be mod I with dressing for clothing except for fasteners (buttons/zippers/hooks) (progressing 11/22/2019)  2) Pt will be supervision with bathing, seated; SBA for TTB t/f. (progressing 11/22/2019)  3) Pt will be mod I for toileting and related t/f. (progressing 11/22/2019)  4) Pt will improve GMC, as evidenced by box and block scores of 45 or better on mandeep UE. (progressing 11/22/2019)  5) Pt will participate in at least one IADL task in the home on a daily basis (dishes, laundry, etc) (progressing 11/22/2019)  6) Pt to improve FMC bilaterally, as evidenced by a 5 sec improvement in times on the 9HPT - R=40s or better; L=45s or better. (progressing 11/22/2019)      STG Goals:  Time frame: 4 weeks (9/5/19-10/3/19)  1) Alyssa will be mod I with initial HEP. (MET with assistance of spouse)  2) Pt will t/f to toilet w/ min A or better. (MET)  3) Alyssa will be able to stand and manage her clothing for toileting w/ CGA (progressing)  4) Mandeep UE GMC to improve, as evidenced by box & block scores of 31 or better, mandeep UE. (MET)    Patient Education/Response:     -modified yoga pose at home;  strengthening with yellow putty.  Progress toward goals; also continue HEP as previous.      Plans and Goals:     POC update next session.   Continue working on postural control in sit and stand, as well as L UE function and lengthening R side of trunk during functional tasks.     Sameera Montague OT  11/22/2019

## 2019-11-26 ENCOUNTER — CLINICAL SUPPORT (OUTPATIENT)
Dept: REHABILITATION | Facility: HOSPITAL | Age: 69
End: 2019-11-26
Attending: PHYSICIAN ASSISTANT
Payer: MEDICARE

## 2019-11-26 DIAGNOSIS — Z78.9 IMPAIRED MOBILITY AND ADLS: Primary | ICD-10-CM

## 2019-11-26 DIAGNOSIS — Z74.09 IMPAIRED MOBILITY AND ADLS: Primary | ICD-10-CM

## 2019-11-26 DIAGNOSIS — R26.89 BALANCE PROBLEM: ICD-10-CM

## 2019-11-26 DIAGNOSIS — Z78.9 IMPAIRED INSTRUMENTAL ACTIVITIES OF DAILY LIVING (IADL): ICD-10-CM

## 2019-11-26 DIAGNOSIS — R27.8 DECREASED COORDINATION: ICD-10-CM

## 2019-11-26 DIAGNOSIS — R53.1 WEAKNESS GENERALIZED: ICD-10-CM

## 2019-11-26 PROCEDURE — G8988 SELF CARE GOAL STATUS: HCPCS | Mod: KX,CJ,PN

## 2019-11-26 PROCEDURE — 97530 THERAPEUTIC ACTIVITIES: CPT | Mod: KX,PN

## 2019-11-26 PROCEDURE — G8987 SELF CARE CURRENT STATUS: HCPCS | Mod: KX,CK,PN

## 2019-11-26 NOTE — PROGRESS NOTES
Occupational Therapy  Updated Plan of Care    Alyssa John  MRN: [id number]    Plan of care update completed. Please see attached POC for details. Thank you for consult!    G-Codes: self-care ([facility])  Current: 40%-60% (CK)  Goal: 20%-40% (CJ)    KIKE Oneil LOTR  11/26/2019

## 2019-11-26 NOTE — PLAN OF CARE
"Date: 2019    Start Time:  1303  Stop Time:  1405    TIMED  Procedure Time Min.   TherAct (4) Start:  Stop: 62   Total Timed Minutes:  62  Total Timed Units:  4  Total Untimed Units:  0  Charges Billed/# of units:  4    OCCUPATIONAL THERAPY UPDATED PLAN OF TREATMENT    Patient name: Alyssa John  Onset Date:  2019  SOC Date:  2019  Primary Diagnosis:  Multiple Sclerosis, neurologic gait, L UE weakness  Encounter Diagnoses   Name Primary?    Impaired mobility and ADLs Yes    Weakness generalized     Decreased coordination     Impaired instrumental activities of daily living (IADL)     Balance problem      Certification Period:  2019 to 2019  Precautions:  Standard, fall  Visits from SOC:  20  Functional Level Prior to SOC:  Prior to her illness, Alyssa was able to stand and transfer on her own (sometimes without the walker). She was able to toilet and dress without assistance (except for her bra). At time of eval, Alyssa required assist with all ADLs: mod-max A w/ dressing, bathing & toileting; total A for bathroom mobility. AM-PAC=15. She consistently had poor posture in her w/c.    Updated Assessment:    Alyssa presented to therapy not feeling well today. She c/o gastric issues this morning and cancelled the PT appointment that was scheduled just prior to her OT appointment because she wasn't feeling well. Regarding ADLs, she stated, "this morning was not good."    AM-PAC 6 CLICK ADL  How much help from another person does this patient currently need?  1 = Unable, Total/Dependent Assistance  2 = A lot, Maximum/Moderate Assistance  3 = A little, Minimum/Contact Guard/Supervision  4 = None, Modified [place]/Independent    Eating: 3       Grooming: 3      UB Dressing: 3     LB Dressin      Bathing: 3      Toiletin (she did complete pants management w/ CGA in clinic today.)  Total: 16    AM-PAC Raw Score CMS "G-Code Modifier Level of Impairment Assistance   6 CN " "100% Total / Unable   7 - 9 CM 80 - 100% Maximal Assist   10-14 CL 60 - 80% Moderate Assist   15 - 19 CK 40 - 60% Moderate Assist   20 - 22 CJ 20 - 40% Minimal Assist   23 CI 1-20% SBA / CGA   24 CH 0% Independent/ Mod I     IADL's: she is following OT instructions and participating in at least one 3IADL activity daily, including tasks like cleaning the counter, folding clothes, etc.    Med Management: Giuseppe still sets up her weekly med organizer. Alyssa states she is remember to take her pills as prescribed from the organizer more consistently than she was 12 weeks ago.    Functional Mobility: Min A amb in/out of bathroom w/ RW, mod cues to stand "up tall".    Box and Block GMC assessment: (R) 43 (L) 36  [norms for women aged 65-69: R=72.0 +/-6.2; L=71.3 +/-7.7 (Parveen et al, 1985)]   (Improved from R=26 & L=27 on eval.)    9 Hole Peg Test: (R) 39.25s (L) 62.33s  [norms for women aged 66-70: R=19.90 +/- 3.15s; L=21.44s +/-3.97s (Dora et al, 2003)]  (Compared to R=45.31s & L=50.55s on 10/31/19)    Range of Motion:  L sh flex=128a, 140p; she is still moving from abduction to scaption ~105* AROM.    Pelvic mobility is still min decreased with anterior, posterior, R lateral and L lateral tilting.    Hand Strength   (lbs) Right Left   1 13 21   2 16 19   3 14 23   avg 14.3 21   [norms for women aged 65-69: R=49.6 +/-9.7; L=41.0 +/-8.2 (Erroltz et al, 1985)]   (compared to R=11.33 & L=15 on eval)    Pinch Right Left   Lateral 8.5 5.5   Tip (2 point) 3.5 3   3 jaw zohra 5.5 4     Balance:  Static Sitting = fair (+) at EOM and requiring min-mod cues for "up tall"  Dynamic Sitting = fair (+)  Static Standing = poor - she requires CGA (for ~ the 1st 2 minutes)-min A and max cues for keeping her weight centered over her feet and keeping her knees straight with the RW, freddy when fatigued.    Previous Goals' Status:   LTG GOALS:  Time frame: 12 weeks (9/5/19-11/28/19)  1) Pt will be mod I with dressing for clothing " except for fasteners (buttons/zippers/hooks) (progressing 11/26/2019)  2) Pt will be supervision with bathing, seated; SBA for TTB t/f. (progressing 11/26/2019)  3) Pt will be mod I for toileting and related t/f. (progressing 11/26/2019)  4) Pt will improve GMC, as evidenced by box and block scores of 45 or better on mandeep UE. (progressing 11/26/2019)  5) Pt will participate in at least one IADL task in the home on a daily basis (dishes, laundry, etc) (progressing 11/26/2019)  6) Pt to improve FMC bilaterally, as evidenced by a 5 sec improvement in times on the 9HPT - R=40s or better; L=45s or better. (progressing 11/26/2019)      STG Goals:  Time frame: 4 weeks (9/5/19-10/3/19)  1) Alyssa will be mod I with initial HEP. (MET with assistance of spouse)  2) Pt will t/f to toilet w/ min A or better. (MET)  3) Alyssa will be able to stand and manage her clothing for toileting w/ CGA (MET 11/26/2019)  4) Mandeep UE GMC to improve, as evidenced by box & block scores of 31 or better, mandeep UE. (MET)    New Goals (to be achieved by end of cert. Period):    1) Alyssa will complete bathroom mobility for toileting w/ SBA and RW.  2) Alyssa will be able to stand w/ SBA and unilateral support for 10 minutes to increase indep w/ IADLs.  3) Alyssa will be able to complete a 5-pose flow of chair yoga with mod I to increase core control.    Long Term Goal Status: Continue per initial plan of care    Reasons for Recertification of Therapy:  Alyssa had made slow but consistent progress over the past 12 weeks. She seems genuinely excited by participation in OT, especially when doing exercises that are modifications of yoga poses. She has met 4/4 STGs and demonstrated progress on all LTG's. Her independence with transfers has improved significantly and she is now able to complete bathroom mobility with her RW and min A from her spouse. Continuation of therapy at this point is medically necessary to increase postural control during ADLs,  increasing safety and decreasing risk of fall, as well as to maximize her independence with all activities.    Recommended Treatment Plan: Therapeutic Exercise, Functional Activities, Therapeutic taping, chair yoga, Patient Education, Home Exercise Program, ADL Training, Transfer/Mobility Training, Moist Heat/Ice/Paraffin, Sensory/Neuromuscular Reeducation, Functional Standing, Cognitive Preceptual Retraining and Manual Therapy    Therapist's Name: KIKE Oneil LOTR       Date: 11/26/2019       I CERTIFY THE NEED FOR THESE SERVICES FURNISHED UNDER THIS PLAN OF TREATMENT AND WHILE UNDER MY CARE    Physician's comments: ________________________________________________________________________________________________________________________________________________      Physician's Name (print): ___________________________________    Physician's Signature: _______________________________________________    Date: ________________________

## 2019-11-27 ENCOUNTER — OFFICE VISIT (OUTPATIENT)
Dept: OPTOMETRY | Facility: CLINIC | Age: 69
End: 2019-11-27
Payer: MEDICARE

## 2019-11-27 DIAGNOSIS — H57.89 EYE IRRITATION: Primary | ICD-10-CM

## 2019-11-27 PROCEDURE — 92012 PR EYE EXAM, EST PATIENT,INTERMED: ICD-10-PCS | Mod: S$GLB,,, | Performed by: OPTOMETRIST

## 2019-11-27 PROCEDURE — 99999 PR PBB SHADOW E&M-EST. PATIENT-LVL II: CPT | Mod: PBBFAC,,, | Performed by: OPTOMETRIST

## 2019-11-27 PROCEDURE — 99999 PR PBB SHADOW E&M-EST. PATIENT-LVL II: ICD-10-PCS | Mod: PBBFAC,,, | Performed by: OPTOMETRIST

## 2019-11-27 PROCEDURE — 92012 INTRM OPH EXAM EST PATIENT: CPT | Mod: S$GLB,,, | Performed by: OPTOMETRIST

## 2019-11-27 RX ORDER — PREDNISOLONE ACETATE 10 MG/ML
SUSPENSION/ DROPS OPHTHALMIC
Qty: 5 ML | Refills: 1 | Status: SHIPPED | OUTPATIENT
Start: 2019-11-27 | End: 2020-02-03 | Stop reason: ALTCHOICE

## 2019-11-27 NOTE — PROGRESS NOTES
HPI     DLS: 9/3/19      Pt c/o red, itchy, and irritated OU. States OS is worse than OD. Rubbing   them a lot because they are bothering her. Using systane gtts OU QHS. W/   relief. Using a warm compress sometimes but not consistent.      Last edited by Argelia Jensen on 11/27/2019  1:57 PM. (History)            Assessment /Plan     For exam results, see Encounter Report.    Eye irritation  -     prednisoLONE acetate (PRED FORTE) 1 % DrpS; 1 gt qid OU x 1 wk, decrease to 1 gt tid OU x 1 wk, decrease to 1 gt bid OU x 1 wk,  decrease to 1 gt qd OU x 1 wk, then d/c gtt  Dispense: 5 mL; Refill: 1      Discussed findings and treatment options. Continue otc systane four times daily in both eyes. Start Pred Forte: 1 gt qid OU x 1 week, then decrease to 1 gt tid OU x 1 week, then decrease to 1 gt bid OU x 1 week, then decrease to 1 gt qd OU x 1 week, then d/c drops. Pt states BP well controlled. Recommend short term dose of otc sudafed as directed for sinus congestion. F/u with pcp or ENT prn. Return if worsening or no resolution.

## 2019-11-29 ENCOUNTER — CLINICAL SUPPORT (OUTPATIENT)
Dept: REHABILITATION | Facility: HOSPITAL | Age: 69
End: 2019-11-29
Attending: PHYSICIAN ASSISTANT
Payer: MEDICARE

## 2019-11-29 DIAGNOSIS — R27.8 DECREASED COORDINATION: ICD-10-CM

## 2019-11-29 DIAGNOSIS — R53.1 WEAKNESS GENERALIZED: ICD-10-CM

## 2019-11-29 DIAGNOSIS — Z78.9 IMPAIRED MOBILITY AND ADLS: Primary | ICD-10-CM

## 2019-11-29 DIAGNOSIS — Z74.09 IMPAIRED MOBILITY AND ADLS: Primary | ICD-10-CM

## 2019-11-29 DIAGNOSIS — Z78.9 IMPAIRED INSTRUMENTAL ACTIVITIES OF DAILY LIVING (IADL): ICD-10-CM

## 2019-11-29 DIAGNOSIS — R26.89 BALANCE PROBLEM: ICD-10-CM

## 2019-11-29 DIAGNOSIS — Z74.09 IMPAIRED FUNCTIONAL MOBILITY, BALANCE, AND ENDURANCE: ICD-10-CM

## 2019-11-29 PROCEDURE — 97110 THERAPEUTIC EXERCISES: CPT | Mod: KX,PN

## 2019-11-29 PROCEDURE — 97530 THERAPEUTIC ACTIVITIES: CPT | Mod: KX,PN

## 2019-11-29 NOTE — PROGRESS NOTES
"Occupational Therapy- Treatment     Date:  11/29/2019  Start Time:  1310   Stop Time:  1405    TIMED  Procedure Time Min.   TherAct (2) Start:  Stop: 30   Total Timed Minutes:  30 (30 min 1:1; 25 min w/ supervision)  Total Timed Units:  2  Total Untimed Units:  0  Charges Billed/# of units:  2    Progress/Current Status    Subjective:     Patient ID: Alyssa John is a 69 y.o. female.  Diagnosis: MS  1. Impaired mobility and ADLs     2. Weakness generalized     3. Decreased coordination     4. Impaired instrumental activities of daily living (IADL)     5. Balance problem       Pain:   No c/o pain today.   Alyssa expressed that she wants to show her friends her "yoga" video.    Objective:     T/F w/c<>mat w/ RW and min A on the 3rd attempt. On the 1st two attempts she had a posterior LOB requiring assist to lower back to the chair and her right hip flexed, bringing the foot forward.     R & L LE hamstring stretching, 3x10s on each LE.    Modified [place] I seated at EOM - starting with wide stance, turning to one side and positioning in lunge, front arm reaching overhead  allowing for stretch to hip flexors, 2x10s in each direction.  Max A given for positioning of the legs and turning of the hips (which is typical for her in this activity.) She was able to reach overhead w/ the front arm w/ max cues to maximize her reaching.    L UE FMC and in-hand manipulation:  Serial opposition 10x4 w/ large marge, 12x4 w/ small marge & 11x4 w/ marbles.  Picking up and placing items one at a time in groups to increase finger<>palm translation for improved in-hand manipulation: large marge 5x4, small marge 8x6, marbles 8.5.    Seated at EOM, pavan UE reaching overhead x10s w/ attention to activation of core x4.    Assessment:     Alyssa tolerated the FMC activities well today but had a decrease in coordination for transfer, limiting her safety. She appeared to enjoy the overhead reaching activity and we will " "incorporate it into her "yoga flow". We need to continue to work on Alyssa's ability to reposition herself so that she can move through these poses without assistance, as well as to improve her safety and independence with functional mobility. Alyssa will continue to benefit from skilled therapy services to increase daily function and fulfill rewarding occupational roles at home. Continuation of therapy at this point is medically necessary to increase postural control during ADLs, increasing safety and decreasing risk of fall.     PROBLEM LIST/IMPAIRMENTS:   weakness, impaired endurance, impaired self care skills, impaired functional mobility, gait instability, impaired balance, impaired cognition, decreased coordination and decreased upper extremity function     LTG GOALS:  Time frame: 12 weeks (9/5/19-11/28/19)  1) Pt will be mod I with dressing for clothing except for fasteners (buttons/zippers/hooks) (progressing 11/29/2019)  2) Pt will be supervision with bathing, seated; SBA for TTB t/f. (progressing 11/29/2019)  3) Pt will be mod I for toileting and related t/f. (progressing 11/29/2019)  4) Pt will improve GMC, as evidenced by box and block scores of 45 or better on mandeep UE. (progressing 11/29/2019)  5) Pt will participate in at least one IADL task in the home on a daily basis (dishes, laundry, etc) (progressing 11/29/2019)  6) Pt to improve FMC bilaterally, as evidenced by a 5 sec improvement in times on the 9HPT - R=40s or better; L=45s or better. (progressing 11/29/2019)      STG Goals:  Time frame: 4 weeks (9/5/19-10/3/19)  1) Alyssa will be mod I with initial HEP. (MET with assistance of spouse)  2) Pt will t/f to toilet w/ min A or better. (MET)  3) Alyssa will be able to stand and manage her clothing for toileting w/ CGA (progressing)  4) Mandeep UE GMC to improve, as evidenced by box & block scores of 31 or better, mandeep UE. (MET)    New Goals (to be achieved by end of cert. Period):  (11/26/19-2/18/19)  1) " Alyssa will complete bathroom mobility for toileting w/ SBA and RW.  2) Alyssa will be able to stand w/ SBA and unilateral support for 10 minutes to increase indep w/ IADLs.  3) Alyssa will be able to complete a 5-pose flow of chair yoga with mod I to increase core control.        Patient Education/Response:     Progress toward goals; also continue HEP as previous.     Plans and Goals:     Continue working on postural control in sit and stand, as well as L UE function and lengthening R side of trunk during functional tasks.     Sameera Montague OT  11/29/2019

## 2019-11-29 NOTE — PROGRESS NOTES
Name: Alyssa John  [facility] Number: [id number]  Date of Treatment: 11/29/2019   Diagnosis:   Encounter Diagnosis   Name Primary?    Impaired functional mobility, balance, and endurance        Time in: 1410  Time Out: 1455  Total Treatment Time: 45  Group Time: 0      Subjective:    Alyssa reports no new complaints.  Patient reports their pain to be 0/10 on a 0-10 scale with 0 being no pain and 10 being the worst pain imaginable.    Objective    Patient received individual therapy to increase strength, endurance, ROM and flexibility with 0 patients with activities as follows:     Alyssa received therapeutic exercises to develop strength, endurance, ROM and flexibility for 45 minutes including:     Transfer w/c<>nustep min/mod A with cues for sequencing/hand placement    Nustep L1 x 10 min UE's/LE's    Standing // bars:   Static standing balance min A with bilateral UE support   Gait fwd 8 ft x 2 with cues for trunk/LE extension, LLE clearance   Marches x 15   Hip ABD x 20 L/R      Seated therex:   LAQ x 20 L/R   Hip adduction ball squeezes x 20    Assessment:     Patient with increased distractibility and fatigue this date, limited tolerance to exercise. Pt c/o R lateral knee/lateral lower leg pain following standing hip abduction. No pain after sitting down. Pt reports she did have arthroscopic surgery on that knee years ago, but felt like the surgery was performed closer to her kneecap. Non-tender to palpation R lateral joint line. Mildly tender at fibular head and proximal peroneal. Educated patient and spouse to apply cold pack x 10-20 min if symptoms continue later today. They verbalized understanding/agreement.    Pt will continue to benefit from skilled PT intervention. Medical Necessity is demonstrated by:  Fall Risk, Pain limits function of effected part for some activities, Unable to participate fully in daily activities and Weakness.    Patient is making good progress towards established  goals.      Plan:  Continue with established Plan of Care towards PT goals.

## 2019-12-02 RX ORDER — ATORVASTATIN CALCIUM 40 MG/1
TABLET, FILM COATED ORAL
Qty: 90 TABLET | Refills: 0 | Status: SHIPPED | OUTPATIENT
Start: 2019-12-02

## 2019-12-03 ENCOUNTER — CLINICAL SUPPORT (OUTPATIENT)
Dept: REHABILITATION | Facility: HOSPITAL | Age: 69
End: 2019-12-03
Attending: PHYSICIAN ASSISTANT
Payer: MEDICARE

## 2019-12-03 DIAGNOSIS — R26.89 BALANCE PROBLEM: ICD-10-CM

## 2019-12-03 DIAGNOSIS — R27.8 DECREASED COORDINATION: ICD-10-CM

## 2019-12-03 DIAGNOSIS — G35 MULTIPLE SCLEROSIS: ICD-10-CM

## 2019-12-03 DIAGNOSIS — R53.82 CHRONIC FATIGUE: ICD-10-CM

## 2019-12-03 DIAGNOSIS — Z74.09 IMPAIRED MOBILITY AND ADLS: Primary | ICD-10-CM

## 2019-12-03 DIAGNOSIS — Z74.09 IMPAIRED FUNCTIONAL MOBILITY, BALANCE, AND ENDURANCE: ICD-10-CM

## 2019-12-03 DIAGNOSIS — R53.1 WEAKNESS GENERALIZED: ICD-10-CM

## 2019-12-03 DIAGNOSIS — Z78.9 IMPAIRED MOBILITY AND ADLS: Primary | ICD-10-CM

## 2019-12-03 DIAGNOSIS — Z78.9 IMPAIRED INSTRUMENTAL ACTIVITIES OF DAILY LIVING (IADL): ICD-10-CM

## 2019-12-03 PROCEDURE — 97110 THERAPEUTIC EXERCISES: CPT | Mod: PN

## 2019-12-03 PROCEDURE — 97530 THERAPEUTIC ACTIVITIES: CPT | Mod: KX,PN

## 2019-12-03 RX ORDER — MODAFINIL 100 MG/1
TABLET ORAL
Qty: 180 TABLET | Refills: 0 | Status: SHIPPED | OUTPATIENT
Start: 2019-12-03 | End: 2020-03-19

## 2019-12-03 NOTE — PROGRESS NOTES
Name: Alyssa John  [facility] Number: [id number]  Date of Treatment: 12/03/2019   Diagnosis:   Encounter Diagnosis   Name Primary?    Impaired functional mobility, balance, and endurance        Time in: 1400  Time Out: 1457  Total Treatment Time: 57  Group Time: 0      Subjective:    Alyssa reports no new complaints.  Patient reports their pain to be 0/10 on a 0-10 scale with 0 being no pain and 10 being the worst pain imaginable.    Objective    Patient received individual therapy to increase strength, endurance, ROM and flexibility with 0 patients with activities as follows:     Alyssa received therapeutic exercises to develop strength, endurance, ROM and flexibility for 57 minutes including:   Gait with RW with WC follow 75ft, slow pace, hips IR  Transfer w/c<>nustep min/mod A with cues for sequencing/hand placement  Nustep L8 x 15 min UE's/LE's  Standing // bars:   Static standing balance min A with bilateral UE support   Gait fwd 8 ft x 2 with cues for trunk/LE extension, LLE clearance  Shuttle 62# 5'      Assessment:   Increased endurance with therex and gait, with decreased fatigue today.  Remains requiring max VCs for safety    Pt will continue to benefit from skilled PT intervention. Medical Necessity is demonstrated by:  Fall Risk, Pain limits function of effected part for some activities, Unable to participate fully in daily activities and Weakness.    Patient is making good progress towards established goals.      Plan:  Continue with established Plan of Care towards PT goals.    ANXIOUS/AGITATED

## 2019-12-03 NOTE — PROGRESS NOTES
"Occupational Therapy- Treatment     Date:  12/03/2019  Start Time:  1307   Stop Time:  1400    TIMED  Procedure Time Min.   TherAct (4) Start:  Stop: 53   Total Timed Minutes:  53  Total Timed Units:  4  Total Untimed Units:  0  Charges Billed/# of units:  4    Progress/Current Status    Subjective:     Patient ID: Alyssa John is a 69 y.o. female.  Diagnosis: MS  1. Impaired mobility and ADLs     2. Weakness generalized     3. Decreased coordination     4. Impaired instrumental activities of daily living (IADL)     5. Balance problem       Pain:   No c/o pain today.   Alyssa expressed that she is still pleased with the "yoga" activities we have been doing in therapy.     Objective:     T/F w/c<>mat w/ RW and min A. Increased time required for sit-> stand from w/c and from mat. Still having concerns with posterior COG.    R & L LE hamstring stretching, 3x20s on each LE.    Modified [place] I seated at EOM - starting with wide stance, turning to one side and positioning in lunge, front arm reaching overhead  allowing for stretch to hip flexors, 3x10s in each direction.  Mod A given for positioning of the legs and turning of the hips (which is typical for her in this activity.) She was able to reach overhead w/ the front arm w/ mod cues to maximize her reaching. Alyssa was encouraged to take a more active role in repositioning herself today and we reviewed her optimal positioning at each stage of the movement.    Seated at EOM, pt reached pavan forward and then overhead x3, holding position x~10s. She was encouraged to reach for the ceiling and to look up at the ceiling while she was reaching. She was also encouraged to keep her elbows straight and arms close to her head. Decreased control noted in the L UE compared to the R UE.    Supervision for seated scooting on the mat to move forward and backward. Attempted cross-leg sitting on mat, but she was unable.    Min A for sit<>quadruped<>kneel on " "mat.    Quadruped for single limb raise, 2x5s each w/ CGA-min A w/ support at L posterior elbow and trunk. She was unable to lift the R LE on the 2nd attempt. When she asked why, OT ed re: use of the L side for stabilization and if the L side is weak, then it can't stabilize sufficiently to raise the R LE. She v/u.    Kneel w/ CGA, mod cues to tuck hips in, allowing for an "up tall" posture. She brought arms overhead x3 for 10s w/ CGA in effort to work towards a modified baby back bend, increasing core strength and stability.    L L LE positioned for hamstring stretching while w/ L UE completed forward and contralateral reaching 8x3 with a 1/2# wrist weight.      Assessment:     Alyssa did remarkably well with very challenging activities today, with assistance. She has limited activation in her hamstrings and glutes, as well as difficulty stabilizing herself with her core. Overall, this limits her independence and contributes to her safety deficits. She continues to enjoy the modified yoga poses, which increases her active participation in therapy. We need to continue to work on Alyssa's ability to reposition herself so that she can move through these poses without assistance, as well as to improve her safety and independence with functional mobility. Alyssa will continue to benefit from skilled therapy services to increase daily function and fulfill rewarding occupational roles at home. Continuation of therapy at this point is medically necessary to increase postural control during ADLs, increasing safety and decreasing risk of fall.     PROBLEM LIST/IMPAIRMENTS:   weakness, impaired endurance, impaired self care skills, impaired functional mobility, gait instability, impaired balance, impaired cognition, decreased coordination and decreased upper extremity function     LTG GOALS:  Time frame: 12 weeks (9/5/19-11/28/19)  1) Pt will be mod I with dressing for clothing except for fasteners (buttons/zippers/hooks) " (progressing 12/3/2019)  2) Pt will be supervision with bathing, seated; SBA for TTB t/f. (progressing 12/3/2019)  3) Pt will be mod I for toileting and related t/f. (progressing 12/3/2019)  4) Pt will improve GMC, as evidenced by box and block scores of 45 or better on mandeep UE. (progressing 12/3/2019)  5) Pt will participate in at least one IADL task in the home on a daily basis (dishes, laundry, etc) (progressing 12/3/2019)  6) Pt to improve FMC bilaterally, as evidenced by a 5 sec improvement in times on the 9HPT - R=40s or better; L=45s or better. (progressing 12/3/2019)      STG Goals:  Time frame: 4 weeks (9/5/19-10/3/19)  1) Alyssa will be mod I with initial HEP. (MET with assistance of spouse)  2) Pt will t/f to toilet w/ min A or better. (MET)  3) Alyssa will be able to stand and manage her clothing for toileting w/ CGA (progressing 12/4/2019 )  4) Mandeep UE GMC to improve, as evidenced by box & block scores of 31 or better, mandeep UE. (MET)    New Goals (to be achieved by end of cert. Period):  (11/26/19-2/18/19)  1) Alyssa will complete bathroom mobility for toileting w/ SBA and RW.  2) Alyssa will be able to stand w/ SBA and unilateral support for 10 minutes to increase indep w/ IADLs.  3) Alyssa will be able to complete a 5-pose flow of chair yoga with mod I to increase core control.        Patient Education/Response:     Progress toward goals; also continue HEP as previous. DO NOT ATTEMPT kneeling or quadruped at home. She v/u.    Plans and Goals:     Continue working on postural control in sit, stand, kneeling and quadruped as well as L UE function and lengthening R side of trunk and L hamstrings during functional tasks.     Sameera Montague, OT  12/3/2019

## 2019-12-05 ENCOUNTER — CLINICAL SUPPORT (OUTPATIENT)
Dept: REHABILITATION | Facility: HOSPITAL | Age: 69
End: 2019-12-05
Attending: PHYSICIAN ASSISTANT
Payer: MEDICARE

## 2019-12-05 DIAGNOSIS — R26.89 BALANCE PROBLEM: ICD-10-CM

## 2019-12-05 DIAGNOSIS — Z78.9 IMPAIRED INSTRUMENTAL ACTIVITIES OF DAILY LIVING (IADL): ICD-10-CM

## 2019-12-05 DIAGNOSIS — R53.1 WEAKNESS GENERALIZED: ICD-10-CM

## 2019-12-05 DIAGNOSIS — Z78.9 IMPAIRED MOBILITY AND ADLS: Primary | ICD-10-CM

## 2019-12-05 DIAGNOSIS — R27.8 DECREASED COORDINATION: ICD-10-CM

## 2019-12-05 DIAGNOSIS — Z74.09 IMPAIRED MOBILITY AND ADLS: Primary | ICD-10-CM

## 2019-12-05 DIAGNOSIS — Z74.09 IMPAIRED FUNCTIONAL MOBILITY, BALANCE, AND ENDURANCE: ICD-10-CM

## 2019-12-05 PROCEDURE — 97110 THERAPEUTIC EXERCISES: CPT | Mod: PN

## 2019-12-05 PROCEDURE — 97530 THERAPEUTIC ACTIVITIES: CPT | Mod: KX,PN

## 2019-12-05 PROCEDURE — 97116 GAIT TRAINING THERAPY: CPT | Mod: PN,59

## 2019-12-05 NOTE — PROGRESS NOTES
Name: Alyssa John  Clinic Number: [id number]  Date of Treatment: 12/05/2019   Diagnosis:   Encounter Diagnosis   Name Primary?    Impaired functional mobility, balance, and endurance        Time in: 1400  Time Out: 1501  Total Treatment Time: 61  Group Time: 0      Subjective:    Alyssa reports no new complaints, OT reports to PTA that patient is more fatigued today than previous visit.  Patient reports their pain to be 0/10 on a 0-10 scale with 0 being no pain and 10 being the worst pain imaginable.    Objective  Alyssa received therapeutic exercises to develop strength, endurance, ROM and flexibility for 61 minutes including:    Gait with RW with WC follow 175ft, slow pace, hips IR, max VC's to increase L step length and make even as R step length   Transfer w/c<>nustep min/mod A with cues to scoot to edge of seat and push from arm rests before reaching for RW   Nustep L8 x 15 min UE's/LE's  Standing // bars:   Static standing balance min A with bilateral UE support    Gait fwd/bwd 8 ft x 2 with cues for trunk/LE extension, LLE clearance  Seated TE:   Ball squeeze 30   Seated CLAM with RTB 30    Assessment:   Increased fatigue with therex and gait today vs last tx session.  Remains requiring max VCs for safety    Pt will continue to benefit from skilled PT intervention. Medical Necessity is demonstrated by:  Fall Risk, Pain limits function of effected part for some activities, Unable to participate fully in daily activities and Weakness.    Patient is making good progress towards established goals.      Plan:  Continue with established Plan of Care towards PT goals.

## 2019-12-05 NOTE — PROGRESS NOTES
"Occupational Therapy- Treatment     Date:  12/05/2019  Start Time:  1310   Stop Time:  1400    TIMED  Procedure Time Min.   TherAct (3) Start:  Stop: 50   Total Timed Minutes:  50  Total Timed Units:  3  Total Untimed Units:  0  Charges Billed/# of units:  3    Progress/Current Status    Subjective:     Patient ID: Alyssa John is a 69 y.o. female.  Diagnosis: MS  1. Impaired mobility and ADLs     2. Weakness generalized     3. Decreased coordination     4. Impaired instrumental activities of daily living (IADL)     5. Balance problem       Pain:   No c/o pain today.   She c/o min fatigue.    Objective:     T/F w/c<>mat w/ RW and min A. Still having concerns with posterior COG.    R & L LE hamstring stretching, 3x20s on each LE.    Modified [place] I seated at EOM - starting with wide stance, turning to one side and positioning in lunge, front arm reaching overhead, allowing for stretch to hip flexors, 3x15s in each direction.  Mod A given for positioning of the legs and turning of the hips (which is typical for her in this activity.) She was able to reach overhead w/ the front arm w/ mod cues to maximize her reaching. Alyssa was encouraged to take a more active role in repositioning herself today and we reviewed her optimal positioning at each stage of the movement.    Seated at EOM, pt reached pavan forward and then overhead x3, holding position x~10s. She was encouraged to reach for the ceiling and to look up at the ceiling while she was reaching. She was also encouraged to keep her elbows straight and arms close to her head. Decreased control noted in the L UE compared to the R UE.    Supervision for seated scooting on the mat to move forward and backward.     Min A for sit->supine; mod A for supine->prone; total A for prone->quadruped.  Due to effort required to get to quadruped, we did not attempt limb raises.     Kneel w/ CGA, mod cues to tuck hips in, allowing for an "up tall" posture. Did not bring " "arms overhead today due to fatigue.    Assessment:     Despite Alyssa's positive attitude and no c/o fatigue, she appeared more physically fatigued during today's treatment compared to the prior session. Ed w/ pt and spouse to take a little more time to rest and recover from today's session. Ed re: looking for "tell" sign that indicates she is getting close to fatigue. They v/u.     As previously noted, Alyssa has limited activation in her hamstrings and glutes, as well as difficulty stabilizing herself with her core. Overall, this limits her independence and contributes to her safety deficits. She continues to enjoy the modified yoga poses, which increases her active participation in therapy. We need to continue to work on Alyssa's ability to reposition herself so that she can move through these poses without assistance, as well as to improve her safety and independence with functional mobility. Alyssa will continue to benefit from skilled therapy services to increase daily function and fulfill rewarding occupational roles at home. Continuation of therapy at this point is medically necessary to increase postural control during ADLs, increasing safety and decreasing risk of fall.     PROBLEM LIST/IMPAIRMENTS:   weakness, impaired endurance, impaired self care skills, impaired functional mobility, gait instability, impaired balance, impaired cognition, decreased coordination and decreased upper extremity function     LTG GOALS:    1) Pt will be mod I with dressing for clothing except for fasteners (buttons/zippers/hooks) (progressing 12/5/2019)  2) Pt will be supervision with bathing, seated; SBA for TTB t/f. (progressing 12/5/2019)  3) Pt will be mod I for toileting and related t/f. (progressing 12/5/2019)  4) Pt will improve GMC, as evidenced by box and block scores of 45 or better on pavan UE. (progressing 12/5/2019)  5) Pt will participate in at least one IADL task in the home on a daily basis (dishes, laundry, " etc) (progressing 12/5/2019)  6) Pt to improve FMC bilaterally, as evidenced by a 5 sec improvement in times on the 9HPT - R=40s or better; L=45s or better. (progressing 12/5/2019)      STG Goals:   1) Alyssa will be mod I with initial HEP. (MET with assistance of spouse)  2) Pt will t/f to toilet w/ min A or better. (MET)  3) Alyssa will be able to stand and manage her clothing for toileting w/ CGA (progressing 12/5/2019 )  4) Mandeep UE GMC to improve, as evidenced by box & block scores of 31 or better, mandeep UE. (MET)    New Goals (to be achieved by end of cert. Period):  (11/26/19-2/18/19)  1) Alyssa will complete bathroom mobility for toileting w/ SBA and RW.  2) Alyssa will be able to stand w/ SBA and unilateral support for 10 minutes to increase indep w/ IADLs.  3) Alyssa will be able to complete a 5-pose flow of chair yoga with mod I to increase core control.      Patient Education/Response:     Progress toward goals; also continue HEP as previous. DO NOT ATTEMPT kneeling or quadruped at home. Ed as above w/ spouse and pt re: fatigue. They v/u.     Plans and Goals:     Continue working on postural control in sit, stand, kneeling and quadruped as well as L UE function and lengthening R side of trunk and L hamstrings during functional tasks.     Sameera Montague, OT  12/5/2019

## 2019-12-12 ENCOUNTER — CLINICAL SUPPORT (OUTPATIENT)
Dept: REHABILITATION | Facility: HOSPITAL | Age: 69
End: 2019-12-12
Attending: PHYSICIAN ASSISTANT
Payer: MEDICARE

## 2019-12-12 DIAGNOSIS — Z74.09 IMPAIRED FUNCTIONAL MOBILITY, BALANCE, AND ENDURANCE: ICD-10-CM

## 2019-12-12 PROCEDURE — 97110 THERAPEUTIC EXERCISES: CPT | Mod: KX,PN

## 2019-12-12 PROCEDURE — 97530 THERAPEUTIC ACTIVITIES: CPT | Mod: PN

## 2019-12-12 NOTE — PROGRESS NOTES
"Occupational Therapy- Treatment     Date:  12/12/2019  Start Time:  1310   Stop Time:  1420    TIMED  Procedure Time Min.   TherAct (4) Start:  Stop: 70   Total Timed Minutes:  70  Total Timed Units:  4  Total Untimed Units: 10  Charges Billed/# of units:  4    Progress/Current Status    Subjective:     Patient ID: Alyssa John is a 69 y.o. female.  Diagnosis: MS  No diagnosis found.  Pain:   No c/o pain today.   Patient reports: "I really like the yoga poses. I really want to be able to do them at home."  Functional change: RAMOS noted that sit<>stand is better with RW and demo than without.  Concerns since last visit:     Objective:     Modified [place] I seated at EOM - starting with wide stance, turning to one side and positioning in lunge, front arm reaching overhead, allowing for stretch to hip flexors, 10x15s in each direction.  Mod A given for positioning of the legs and turning of the hips (which is typical for her in this activity.) She was able to reach overhead w/ the front arm w/ mod cues to maximize her reaching. Alyssa was encouraged to take a more active role in repositioning herself today.. and we reviewed her optimal positioning at each stage of the movement.    Seated at EOM, pt reached B UE forward and then overhead x5, holding position x~10s. She was encouraged to reach for the ceiling and to look up at the ceiling while she was reaching. She was also encouraged to keep her elbows straight and arms close to her head. She required intermittent assist to keep feet flat on the ground by applying downward pressure at the tops of the knee(s). Decreased control noted in the L UE compared to the R UE. Alyssa required constant verbal cues today for up tall posture.  Full length mirrors placed for alyssa to have a front and profile view of herself to assess and correct her posture before beginning the pose. Despite the mirrors being present Alyssa requires Mod tactile cues and verbal cues for " "posture.    R & L LE hamstring stretching, 2 x 30s on each LE.    w/c<>mat w/ RW and min A. Still having concerns with posterior COG.      Assessment:        As previously noted, Alyssa has limited activation in her hamstrings and glutes, as well as difficulty stabilizing herself with her core. Overall, this limits her independence and contributes to her safety deficits. She requires more assist to rise during her transfers than to move after rising. She moves slowly and carefully during her transfers usually, but when transferring back to her w/c today at end of session she did not flex at the hips before sitting and had a "scary" sit. She continues to enjoy the modified yoga poses, which increases her active participation in therapy. We need to continue to work on Alyssa's ability to reposition herself so that she can move through these poses without assistance, as well as to improve her safety and independence with functional mobility. Alyssa has poor recall of the sequencing of these poses and every session requires demo and assist to perform them, thus the addition of the profile full length mirror today.  Alyssa will continue to benefit from skilled therapy services to increase daily function and fulfill rewarding occupational roles at home. Continuation of therapy at this point is medically necessary to increase postural control during ADLs, increasing safety and decreasing risk of fall.     PROBLEM LIST/IMPAIRMENTS:   weakness, impaired endurance, impaired self care skills, impaired functional mobility, gait instability, impaired balance, impaired cognition, decreased coordination and decreased upper extremity function     LTG GOALS:    1) Pt will be mod I with dressing for clothing except for fasteners (buttons/zippers/hooks) (progressing 12/12/2019)  2) Pt will be supervision with bathing, seated; SBA for TTB t/f. (progressing 12/12/2019)  3) Pt will be mod I for toileting and related t/f. (progressing " 12/12/2019)  4) Pt will improve GMC, as evidenced by box and block scores of 45 or better on mandeep UE. (progressing 12/12/2019)  5) Pt will participate in at least one IADL task in the home on a daily basis (dishes, laundry, etc) (progressing 12/12/2019)  6) Pt to improve FMC bilaterally, as evidenced by a 5 sec improvement in times on the 9HPT - R=40s or better; L=45s or better. (progressing 12/12/2019)      STG Goals:   1) Alyssa will be mod I with initial HEP. (MET with assistance of spouse)  2) Pt will t/f to toilet w/ min A or better. (MET)  3) Alyssa will be able to stand and manage her clothing for toileting w/ CGA (progressing 12/12/2019 )  4) Mandeep UE GMC to improve, as evidenced by box & block scores of 31 or better, mandeep UE. (MET)    New Goals (to be achieved by end of cert. Period):  (11/26/19-2/18/19)  1) Alyssa will complete bathroom mobility for toileting w/ SBA and RW.  2) Alyssa will be able to stand w/ SBA and unilateral support for 10 minutes to increase indep w/ IADLs.  3) Alyssa will be able to complete a 5-pose flow of chair yoga with mod I to increase core control.      Patient Education/Response:     Progress toward goals; also continue HEP as previous. DO NOT ATTEMPT kneeling, quadruped, or modified yoga poses at home.     Plans and Goals:     Continue working on postural control in sit, stand, kneeling and quadruped as well as L UE function and lengthening R side of trunk and L hamstrings during functional tasks.     BUCKY Umana  12/12/2019

## 2019-12-13 NOTE — PLAN OF CARE
PHYSICAL THERAPY UPDATED PLAN OF CARE    Alyssa John  2019    Onset Date:  2019  SOC Date:  2019  Primary Diagnosis:         Problem List Items Addressed This Visit           Impaired functional mobility, balance, and endurance           Treatment Diagnosis:  Functional mobility/gait deficits  Precautions:  falls  Visits from SOC:  14  Functional Level Prior to SOC:  Unable to walk, assistance for transfers, difficulty with sitting balance/trunk control, tendency to lean backwards when standing     Updated Assessment:    S: Continues to improve with transfers at home per patient and .    O: Reassessment performed for POC update purposes:    LE strength:  R hip flex 3+/5, knee ext 4-/5, knee flex 3+/5, ankle DF 3/5  L hip flex 3+/5, knee ext 3+/5, knee flex 3+/5, ankle DF 3-/5    Patient participating in therapeutic exercise as follows to address strength/balance/endurance for 40 minutes:    Transfer w/c<>nustep min A with cues for sequencing, weight translation  Nustep L1 x 10 min LE's only wit cues for midline sitting  // bars: static standing balance, marches, sidestepping  Seated LAQ x 30  Seated ankle DF x 30  Gait trainin ft CGA/min A using RW, w/c follow for safety, cues for L foot clearance    A: Pt with improving strength and functional mobility compared to SOC. Able to ambulate up to 114 ft in previous session. Slowly improving safety awareness. Patient may benefit from continued PT to address noted impairments and improve safe functional mobility.    P: Continue as per POC    Goals from Previous POC:  New Short Term Goals:   1) Pt will stand without posterior lean 5 of 10 trials   Long Term Goals:   modified:                1) Pt will perform sit to stand transfer with minimum to contact guard assistance              2) Pt will stand with slight UE support              3) Pt will perform toilet transfer with minimal assistance              4) Pt/family will be independent  in HEP.      Previous Short Term Goals Status:   1= partially met/ongoing   New Short Term Goals:   1) Pt will stand without posterior lean 5 of 10 trials  2) Pt will improve LE strength 1/2 muscle grade  Long Term Goals:   continue per initial plan of care.  Reasons for Recertification of Therapy:   weakness, impaired endurance, impaired functional mobility, gait instability, impaired balance, decreased lower extremity function, decreased safety awareness and decreased ROM    Certification Period: 12/14/2019 to 2/30/2020  Recommended Treatment Plan: 2 times per week for 6 weeks: Gait Training, Manual Therapy, Moist Heat/ Ice, Neuromuscular Re-ed, Patient Education, Therapeutic Activites and Therapeutic Exercise      Thank you for referral.    Therapist's Name: Sameera Osborne, PT  12/12/2019    I CERTIFY THE NEED FOR THESE SERVICES FURNISHED UNDER THIS PLAN OF TREATMENT AND WHILE UNDER MY CARE    Physician's comments: ________________________________________________________________________________________________________________________________________________      Physician's Name: ___________________________________

## 2019-12-16 ENCOUNTER — CLINICAL SUPPORT (OUTPATIENT)
Dept: REHABILITATION | Facility: HOSPITAL | Age: 69
End: 2019-12-16
Attending: PHYSICIAN ASSISTANT
Payer: MEDICARE

## 2019-12-16 ENCOUNTER — TELEPHONE (OUTPATIENT)
Dept: PHARMACY | Facility: CLINIC | Age: 69
End: 2019-12-16

## 2019-12-16 DIAGNOSIS — Z74.09 IMPAIRED MOBILITY AND ADLS: Primary | ICD-10-CM

## 2019-12-16 DIAGNOSIS — Z78.9 IMPAIRED MOBILITY AND ADLS: Primary | ICD-10-CM

## 2019-12-16 DIAGNOSIS — R27.8 DECREASED COORDINATION: ICD-10-CM

## 2019-12-16 DIAGNOSIS — R53.1 WEAKNESS GENERALIZED: ICD-10-CM

## 2019-12-16 DIAGNOSIS — Z78.9 IMPAIRED INSTRUMENTAL ACTIVITIES OF DAILY LIVING (IADL): ICD-10-CM

## 2019-12-16 DIAGNOSIS — R26.89 BALANCE PROBLEM: ICD-10-CM

## 2019-12-16 PROCEDURE — 97530 THERAPEUTIC ACTIVITIES: CPT | Mod: KX,PN

## 2019-12-16 NOTE — PROGRESS NOTES
"Occupational Therapy- Treatment     Date:  12/16/2019  Start Time:  1315   Stop Time:  1410    TIMED  Procedure Time Min.   TherAct (4) Start:  Stop: 55   Total Timed Minutes:  55  Total Timed Units:  4  Total Untimed Units: 0  Charges Billed/# of units:  4    Progress/Current Status    Subjective:     Patient ID: Alyssa John is a 69 y.o. female.  Diagnosis: MS  1. Impaired mobility and ADLs     2. Weakness generalized     3. Decreased coordination     4. Impaired instrumental activities of daily living (IADL)     5. Balance problem       Pain: No c/o pain today.   Patient reports: "I thought I couldn't do any of the yoga poses at home!"   Functional change: Alyssa relies on her spouse to assist with having difficulty with shoes   Concerns since last visit: none    Objective:     Hamstring stretch to each LE 3x30s  Piriformis stretch to each LE, 3x30s to increase ease of LB dressing  Doff/don shoes from figure-4 position. She was able to successfully don the R shoe twice. However, she had significant difficulty with 1) keeping the L LE in figure-4 positioning, 2) pushing her foot into the shoe without pushing down the back of the shoe. OT demo'd and issued medium shoe horn. However, even after demo, Alyssa required mod-max A for use of the shoe-horn w/ the L shoe. OT ed spouse how to use shoe-horn and also showed what Alyssa was doing wrong so he would be able to help her correct this at home.    Modified [place] I seated at EOM - starting with wide stance, turning to one side and positioning in lunge, front arm reaching overhead, allowing for stretch to hip flexors, 10x15s in each direction.  Min-mod A given for positioning of the legs and turning of the hips. She was able to reach overhead w/ the front arm w/ mod cues to maximize her reaching. Due to tightness in the hip flexors, she was limited in how far back she could extend the front leg. However, she demo'd increased independence with positioning " herself compared to the previous visit.     Min A sit->stand from w/c and SPT to mat w/ RW.  CGA sit->stand from mat and SPT to w/c w/ RW.    Ed pt and spouse that she can do the modified [place] I at home since we have trained her spouse on how to help with this. They v/u.     Assessment:        Alyssa roe improved indep w/ positioning herself for the modified [place] I positioning today, indicating increasing mobility and strength compared to previous visits. However, tightness and decreased activation in her hamstrings and hip flexors limits her core control and stabilization and decreases her independence and safety with all activities. [place] with LB dressing is limited by these same concerns and she had difficulty sequencing successful use of the shoe horn. Alyssa will continue to benefit from skilled therapy services to increase daily function and fulfill rewarding occupational roles at home. Continuation of therapy at this point is medically necessary to increase postural control during ADLs, increasing safety and decreasing risk of fall.     PROBLEM LIST/IMPAIRMENTS:   weakness, impaired endurance, impaired self care skills, impaired functional mobility, gait instability, impaired balance, impaired cognition, decreased coordination and decreased upper extremity function     LTG GOALS:    1) Pt will be mod I with dressing for clothing except for fasteners (buttons/zippers/hooks) (progressing 12/16/2019)  2) Pt will be supervision with bathing, seated; SBA for TTB t/f. (progressing 12/16/2019)  3) Pt will be mod I for toileting and related t/f. (progressing 12/16/2019)  4) Pt will improve GMC, as evidenced by box and block scores of 45 or better on pavan UE. (progressing 12/16/2019)  5) Pt will participate in at least one IADL task in the home on a daily basis (dishes, laundry, etc) (progressing 12/16/2019)  6) Pt to improve FMC bilaterally, as evidenced by a 5 sec improvement in times on the 9HPT -  R=40s or better; L=45s or better. (progressing 12/16/2019)      STG Goals:   1) Alyssa will be mod I with initial HEP. (MET with assistance of spouse)  2) Pt will t/f to toilet w/ min A or better. (MET)  3) Alyssa will be able to stand and manage her clothing for toileting w/ CGA (progressing 12/16/2019 )  4) Mandeep UE GMC to improve, as evidenced by box & block scores of 31 or better, mandeep UE. (MET)    New Goals (to be achieved by end of cert. Period):  (11/26/19-2/18/19)  1) Alyssa will complete bathroom mobility for toileting w/ SBA and RW.  2) Alyssa will be able to stand w/ SBA and unilateral support for 10 minutes to increase indep w/ IADLs.  3) Alyssa will be able to complete a 5-pose flow of chair yoga with mod I to increase core control.      Patient Education/Response:     Progress toward goals; also continue HEP as previous. DO NOT ATTEMPT kneeling or quadruped. OK to do seated modified yoga poses at home with help from spouse as necessary.    Plans and Goals:     Continue working on postural control in sit, stand, kneeling and quadruped as well as L UE function and lengthening R side of trunk and L hamstrings during functional tasks. LB dressing.    Sameera Montague OT  12/16/2019

## 2019-12-23 ENCOUNTER — CLINICAL SUPPORT (OUTPATIENT)
Dept: REHABILITATION | Facility: HOSPITAL | Age: 69
End: 2019-12-23
Attending: PHYSICIAN ASSISTANT
Payer: MEDICARE

## 2019-12-23 DIAGNOSIS — Z74.09 IMPAIRED FUNCTIONAL MOBILITY, BALANCE, AND ENDURANCE: ICD-10-CM

## 2019-12-23 PROCEDURE — 97530 THERAPEUTIC ACTIVITIES: CPT | Mod: KX,PN

## 2019-12-23 NOTE — PROGRESS NOTES
"Occupational Therapy- Treatment     Date:  12/23/2019  Start Time:  1505   Stop Time:  1610    TIMED  Procedure Time Min.   TherAct (4) Start:  Stop: 65   Total Timed Minutes:  65  Total Timed Units:  4  Total Untimed Units: 0  Charges Billed/# of units:  4    Progress/Current Status    Subjective:     Patient ID: Alyssa John is a 69 y.o. female.  Diagnosis: MS  1. Impaired functional mobility, balance, and endurance       Pain: No c/o pain today.   Patient reports: "It's been difficult since Giuseppe got home from the hospital."  Functional change: Alyssa relies on her spouse to assist with having difficulty with shoes   Concerns since last visit: none    Objective:     Hamstring stretch to each LE 3x30s  LB dress: She was able to successfully doff the R and L shoes before beginning EOM modified yoga poses. She remarked that she liked doing the poses in her socks today because it assisted her in sliding her front leg back further, opening her hips wider and allowing the front hip to really drop down off the mat before pivoting to extend front arm and then back arm over head.    Modified [place] I seated at EOM - starting with wide stance, turning to one side and positioning in lunge, front arm reaching overhead, allowing for stretch to hip flexors, 5x15s in each direction.  Min A given for positioning of the legs and turning of the hips to the L. She was able to reach overhead w/ the front arm and back arm toward the L today w/o losing midline and w/ 1 vc to maximize her reaching.  Due to tightness in the hip flexors, she was limited in how far back she could extend the front leg, though it is more limited in the L hip flexor, visible in the warrior I pose facing the R. Alyssa required Mod VC and tactile cues for positioning of the pose to the R. However, she demo'd increased independence with positioning herself compared to the previous visit.     After AROM was complete Alsysa donned shoes using the medium " "shoe horn. Mod I to the R LE but had significant difficulty with 1) keeping the L LE in figure-4 positioning, 2) pushing her foot into the shoe without pushing down the back of the shoe. However, even after demo, Alyssa required mod-max A for use of the shoe-horn w/ the L shoe. She was unable to maintain figure 4 position w/ L LE today and required increased time with this ADL.    CGA sit->stand from w/c and SPT to mat w/ RW w/ vc for up tall posture and to turn and sit on the mat (as she was trying to take a walk around the room).  CGA sit->stand from mat and SPT to w/c w/ RW w/ vc for up tall posture; "look up, shoulders back, tuck your pelvis"     Assessment:        Alyssa demo'd improved indep w/ positioning herself for the modified Warrior I positioning today when facing L, but was unable to get herself into start position facing the R without vc. She was also able to maintain midline during hamstring stretch w/o verbal or tactile cues to do so, indicating increasing mobility and strength compared to previous visits. However, tightness and decreased activation in her hamstrings and hip flexors limits her core control and stabilization and decreases her independence and safety with all activities. [place] with LB dressing is limited by these same concerns and she had difficulty sequencing successful use of the shoe horn. She is most hindered by the dead weight of her L foot as it bends down the back of the shoe, creating a barrier that the shoe horn cannot fix, and requiring Mod/Max A for completion. She is unable to figure 4 with the L LE, and this is something we are addressing each session. Alyssa will continue to benefit from skilled therapy services to increase daily function and fulfill rewarding occupational roles at home. Continuation of therapy at this point is medically necessary to increase postural control during ADLs, increasing safety and decreasing risk of fall.     PROBLEM LIST/IMPAIRMENTS: "   weakness, impaired endurance, impaired self care skills, impaired functional mobility, gait instability, impaired balance, impaired cognition, decreased coordination and decreased upper extremity function     LTG GOALS:    1) Pt will be mod I with dressing for clothing except for fasteners (buttons/zippers/hooks) (progressing 12/23/2019)  2) Pt will be supervision with bathing, seated; SBA for TTB t/f. (progressing 12/23/2019)  3) Pt will be mod I for toileting and related t/f. (progressing 12/23/2019)  4) Pt will improve GMC, as evidenced by box and block scores of 45 or better on mandeep UE. (progressing 12/23/2019)  5) Pt will participate in at least one IADL task in the home on a daily basis (dishes, laundry, etc) (progressing 12/23/2019)  6) Pt to improve FMC bilaterally, as evidenced by a 5 sec improvement in times on the 9HPT - R=40s or better; L=45s or better. (progressing 12/23/2019)      STG Goals:   1) Alyssa will be mod I with initial HEP. (MET with assistance of spouse)  2) Pt will t/f to toilet w/ min A or better. (MET)  3) Alyssa will be able to stand and manage her clothing for toileting w/ CGA (progressing 12/23/2019 )  4) Mandeep UE GMC to improve, as evidenced by box & block scores of 31 or better, mandeep UE. (MET)    New Goals (to be achieved by end of cert. Period):  (11/26/19-2/18/19)  1) Alyssa will complete bathroom mobility for toileting w/ SBA and RW.  2) Alyssa will be able to stand w/ SBA and unilateral support for 10 minutes to increase indep w/ IADLs.  3) Alyssa will be able to complete a 5-pose flow of chair yoga with mod I to increase core control.      Patient Education/Response:     Progress toward goals; also continue HEP as previous. DO NOT ATTEMPT kneeling or quadruped. OK to do seated modified yoga poses at home with help from spouse as necessary.    Plans and Goals:     Continue working on postural control in sit, stand, kneeling and quadruped as well as L UE function and lengthening R  side of trunk and L hamstrings during functional tasks. LB dressing.    BUCKY Umana  12/23/2019

## 2019-12-24 ENCOUNTER — CLINICAL SUPPORT (OUTPATIENT)
Dept: REHABILITATION | Facility: HOSPITAL | Age: 69
End: 2019-12-24
Attending: PHYSICIAN ASSISTANT
Payer: MEDICARE

## 2019-12-24 DIAGNOSIS — Z74.09 IMPAIRED MOBILITY AND ADLS: ICD-10-CM

## 2019-12-24 DIAGNOSIS — R27.8 DECREASED COORDINATION: ICD-10-CM

## 2019-12-24 DIAGNOSIS — Z74.09 IMPAIRED FUNCTIONAL MOBILITY, BALANCE, AND ENDURANCE: Primary | ICD-10-CM

## 2019-12-24 DIAGNOSIS — R26.89 BALANCE PROBLEM: ICD-10-CM

## 2019-12-24 DIAGNOSIS — Z78.9 IMPAIRED INSTRUMENTAL ACTIVITIES OF DAILY LIVING (IADL): ICD-10-CM

## 2019-12-24 DIAGNOSIS — Z78.9 IMPAIRED MOBILITY AND ADLS: ICD-10-CM

## 2019-12-24 DIAGNOSIS — R53.1 WEAKNESS GENERALIZED: ICD-10-CM

## 2019-12-24 PROCEDURE — 97530 THERAPEUTIC ACTIVITIES: CPT | Mod: KX,PN

## 2019-12-24 NOTE — PROGRESS NOTES
"Occupational Therapy- Treatment     Date:  12/24/2019  Start Time:  1315   Stop Time:  1410    TIMED  Procedure Time Min.   TherAct (4) Start:  Stop: 55   Total Timed Minutes:  55  Total Timed Units:  4  Total Untimed Units: 0  Charges Billed/# of units:  4    Progress/Current Status    Subjective:     Patient ID: Alyssa John is a 69 y.o. female.  Diagnosis: MS  1. Impaired functional mobility, balance, and endurance     2. Impaired mobility and ADLs     3. Weakness generalized     4. Decreased coordination     5. Impaired instrumental activities of daily living (IADL)     6. Balance problem       Pain: No c/o pain today.   Patient reports: She frustrated with her spouse.   Functional change: Spouse reports she is better able to raise her legs to get them over the edge of the tub!  Concerns since last visit: none    Objective:     Hamstring stretch to each LE 3x30s  Repositioning feet forward and back x10 to increase strength and coordination in hip flexors. Progressed to 2" step and completed 3x10 w/ cues to lift feet high enough that they didn't drag on the corner of the step.  Seated baby back bend 5x5s w/ pavan UE overhead, mod cues for anterior pelvic tilt and up tall posture.  Doffed shoes w/ supervision.  Scooting back and forward on mat w/ pavan UE and LE to lift bottom.  pavan hamstring stretching in long sit, 2x15s, w/ min A  Sit to quadruped w/ min A. In quadruped, perf'd cat/cow x10 w/ min A and max cues. Attempted baby back bend x2 from knees. However, shortening of hip flexors limited her ability to tuck her hips under and extend at her low back, as well as decreasing the height she was able to raise her arms.  Cosmo R shoe w/ shoe horn x2 and min A; ocsmo L shoe w/ shoe horn x2 and mod-min A.    Modified [place] I seated at EOM - with shoe on plant foot and sock on foot sliding back to increase ability to extend the knee. Started with wide stance, turned to one side and positioned in lunge, " front arm reaching overhead, allowing for stretch to hip flexors, 3x15s in each direction.  Min A given for positioning of the legs and turning of the hips to the L. She was able to reach overhead w/ the front arm and back arm toward the L today w/o losing midline and w/ 1 vc to maximize her reaching with stabilization to the R leg. To the right, she was only able to raise the left arm and keep her balance.     Assessment:      Up tall posture limited by shortening of L LE hamstring and R lats. [place] in yoga positioning is limited by decreased muscle length and weakness in her hip flexors, hamstrings, glutes. These concerns also decrease independence in all other activities as well, especially related to transfers. Nonetheless, Alyssa's spouse notes increased independence with tub transfers at home and Alyssa is better able to more difficult activities like quadruped in therapy today compared to at start of care. Additionally, her ability to complete figure-4 positioning for LB dressing is improving with pavan LE. Alyssa will continue to benefit from skilled therapy services to increase daily function and fulfill rewarding occupational roles at home. Continuation of therapy at this point is medically necessary to increase postural control during ADLs, increasing safety and decreasing risk of fall.     PROBLEM LIST/IMPAIRMENTS:   weakness, impaired endurance, impaired self care skills, impaired functional mobility, gait instability, impaired balance, impaired cognition, decreased coordination and decreased upper extremity function     LTG GOALS:    1) Pt will be mod I with dressing for clothing except for fasteners (buttons/zippers/hooks) (progressing 12/24/2019)  2) Pt will be supervision with bathing, seated; SBA for TTB t/f. (progressing 12/24/2019)  3) Pt will be mod I for toileting and related t/f. (progressing 12/24/2019)  4) Pt will improve GMC, as evidenced by box and block scores of 45 or better on pavan  UE. (progressing 12/24/2019)  5) Pt will participate in at least one IADL task in the home on a daily basis (dishes, laundry, etc) (progressing 12/24/2019)  6) Pt to improve FMC bilaterally, as evidenced by a 5 sec improvement in times on the 9HPT - R=40s or better; L=45s or better. (progressing 12/24/2019)      STG Goals:   1) Alyssa will be mod I with initial HEP. (MET with assistance of spouse)  2) Pt will t/f to toilet w/ min A or better. (MET)  3) Alyssa will be able to stand and manage her clothing for toileting w/ CGA (progressing 12/24/2019 )  4) Mandeep UE GMC to improve, as evidenced by box & block scores of 31 or better, mandeep UE. (MET)    New Goals (to be achieved by end of cert. Period):  (11/26/19-2/18/19)  1) Alyssa will complete bathroom mobility for toileting w/ SBA and RW. (progressing 12/24/2019)  2) Alyssa will be able to stand w/ SBA and unilateral support for 10 minutes to increase indep w/ IADLs. (progressing 12/24/2019)  3) Alyssa will be able to complete a 5-pose flow of chair yoga with mod I to increase core control. (progressing 12/24/2019 )      Patient Education/Response:     Progress toward goals; also continue HEP as previous. DO NOT ATTEMPT kneeling or quadruped. OK to do seated modified yoga poses at home with help from spouse as necessary.    Plans and Goals:     Continue working on postural control in sit, stand, kneeling and quadruped as well as L UE function and lengthening R side of trunk and L hamstrings during functional tasks. LB dressing.    Sameera Montague OT  12/24/2019

## 2020-01-02 ENCOUNTER — TELEPHONE (OUTPATIENT)
Dept: PHARMACY | Facility: CLINIC | Age: 70
End: 2020-01-02

## 2020-01-02 NOTE — TELEPHONE ENCOUNTER
Copaxone refill call. Patient confirmed the need of refill, but has new insurance. I advise the patient once their new insurance information has beed updated [facility] will call back to confirm medication order. Patient has 1 dose on hand for 1/3.

## 2020-01-03 NOTE — TELEPHONE ENCOUNTER
FOR DOCUMENTATION ONLY:    Financial Assistance for [place] approved from 12/4/2019 to 12/3/2020    Source: [email or web address]    ID: [id number]  BIN: [id number]  PCN: [id number]  GRP: [id number]    Amount: $6,000/yr

## 2020-01-10 ENCOUNTER — TELEPHONE (OUTPATIENT)
Dept: FAMILY MEDICINE | Facility: CLINIC | Age: 70
End: 2020-01-10

## 2020-01-10 NOTE — TELEPHONE ENCOUNTER
Patient has only seen Ms Toledo for an illness in 3/2019. Advised she should get this order from her PCP.    Nam states she received a card stating her PCP would be Dr Altman. Informed patient that she would need to have an OV with Dr Altman to [facility] before he would be able to order any testing tor her. Scheduled OV for patient on 3/9/20.

## 2020-01-10 NOTE — TELEPHONE ENCOUNTER
----- Message from Anisa Lund sent at 1/10/2020  2:52 PM CST -----  Contact: self  Patient is requesting that you put orders in the system for her to have a mammo and then call her back to let her know at [phone] (home).  Thanks

## 2020-01-14 ENCOUNTER — CLINICAL SUPPORT (OUTPATIENT)
Dept: REHABILITATION | Facility: HOSPITAL | Age: 70
End: 2020-01-14
Attending: PHYSICIAN ASSISTANT
Payer: MEDICARE

## 2020-01-14 DIAGNOSIS — R27.8 DECREASED COORDINATION: ICD-10-CM

## 2020-01-14 DIAGNOSIS — R26.89 BALANCE PROBLEM: ICD-10-CM

## 2020-01-14 DIAGNOSIS — Z74.09 IMPAIRED MOBILITY AND ADLS: Primary | ICD-10-CM

## 2020-01-14 DIAGNOSIS — Z78.9 IMPAIRED MOBILITY AND ADLS: Primary | ICD-10-CM

## 2020-01-14 DIAGNOSIS — R53.1 WEAKNESS GENERALIZED: ICD-10-CM

## 2020-01-14 DIAGNOSIS — Z78.9 IMPAIRED INSTRUMENTAL ACTIVITIES OF DAILY LIVING (IADL): ICD-10-CM

## 2020-01-14 PROCEDURE — 97530 THERAPEUTIC ACTIVITIES: CPT | Mod: PN

## 2020-01-14 NOTE — PROGRESS NOTES
"Occupational Therapy- Treatment     Date:  01/14/2020  Start Time:  1410   Stop Time:  1500    TIMED  Procedure Time Min.   TherAct (3) Start:  Stop: 50   Total Timed Minutes:  50  Total Timed Units:  3  Total Untimed Units: 0  Charges Billed/# of units:  3    Progress/Current Status    Subjective:     Patient ID: Alyssa John is a 69 y.o. female.  Diagnosis: MS  1. Impaired mobility and ADLs     2. Weakness generalized     3. Decreased coordination     4. Impaired instrumental activities of daily living (IADL)     5. Balance problem       Pain: No c/o pain today.   Patient reports: She has been working on walking to the bathroom with the walker (distance unknown)   Functional change: she has had difficulty doing her "yoga" because her spouse has been having some abdominal discomfort since his surgery. She has been working on stretching.   Concerns since last visit: none    Objective:     W/c<>mat SPT w/ RW and CGA (start of session) - min A (end of session w/ increased posterior lean)   -amb ~20' w/ CGA from w/c to mat  -Mod cues for postural control at EOM.  -Simulated TTB t/f at EOM w/ CGA-supervision for scooting, improved performance with mod cues for technique. Two trials. She was able to lift her legs on both trials in each direction without assistance.  -Facilitation of a/p pelvic tilt, R/L lateral pelvic tilt. Points of contact primarily at L QL for R lateral tilting. Otherwise, she required mod cues for moving through max available range of motion. Hands at sides in position of support to facilitate scapulo-pelvic rhythm.  -Mandeep overhead reaching seated at EOM w/ focus on posture and holding position for 5 seconds to increase trunk control.  -Repeated lateral tilting w/ max A for R lateral tilting.  -attempted stretching R lats with leaning to the right on mat and "sweeping" legs to the left. She did not notice any sensation of stretching.  -Repeated lateral tilting w/ movement of contralateral " hand to glutes to simulate toileting hygiene. She initially required max A for the lateral tilt, but improved with practice and practical application of the task.    Assessment:      Alyssa continues to have difficulty with postural control. Despite this being her first therapy visit since 12/24, she does demo significant improvement in the ability to left her legs into and out of the tub. She has had some difficulty with follow-through of HEP due to spouse's medical concerns and decreased ability to assist her. She also shows the ability to progress in independence with toileting hygiene based on her performance today. Alyssa will continue to benefit from skilled therapy services to increase daily function and fulfill rewarding occupational roles at home. Continuation of therapy at this point is medically necessary to increase postural control during ADLs, increasing safety and decreasing risk of fall.     PROBLEM LIST/IMPAIRMENTS:   weakness, impaired endurance, impaired self care skills, impaired functional mobility, gait instability, impaired balance, impaired cognition, decreased coordination and decreased upper extremity function     LTG GOALS:    1) Pt will be mod I with dressing for clothing except for fasteners (buttons/zippers/hooks) (progressing 1/14/2020)  2) Pt will be supervision with bathing, seated; SBA for TTB t/f. (progressing 1/14/2020)  3) Pt will be mod I for toileting and related t/f. (progressing 1/14/2020)  4) Pt will improve GMC, as evidenced by box and block scores of 45 or better on pavan UE. (progressing 1/14/2020)  5) Pt will participate in at least one IADL task in the home on a daily basis (dishes, laundry, etc) (progressing 1/14/2020)  6) Pt to improve FMC bilaterally, as evidenced by a 5 sec improvement in times on the 9HPT - R=40s or better; L=45s or better. (progressing 1/14/2020)      STG Goals:   1) Alyssa will be mod I with initial HEP. (MET with assistance of spouse)  2) Pt will  t/f to toilet w/ min A or better. (MET)  3) Alyssa will be able to stand and manage her clothing for toileting w/ CGA (progressing 1/14/2020 )  4) Mandeep UE GMC to improve, as evidenced by box & block scores of 31 or better, mandeep UE. (MET)    New Goals (to be achieved by end of cert. Period):  (11/26/19-2/18/19)  1) Alyssa will complete bathroom mobility for toileting w/ SBA and RW. (progressing 1/14/2020)  2) Alyssa will be able to stand w/ SBA and unilateral support for 10 minutes to increase indep w/ IADLs. (progressing 1/14/2020)  3) Alyssa will be able to complete a 5-pose flow of chair yoga with mod I to increase core control. (progressing 1/14/2020 )      Patient Education/Response:     Ed that she still needs someone with her at all times when standing. She v/u.   Progress toward goals; also continue HEP as previous. DO NOT ATTEMPT kneeling or quadruped. OK to do seated modified yoga poses at home with help from spouse as necessary.    Plans and Goals:     Continue working on postural control in sit, stand, kneeling and quadruped as well as L UE function and lengthening R side of trunk and L hamstrings during functional tasks. LB dressing. Repeat practice for toileting hygiene.    Sameera Montague, OT  1/14/2020

## 2020-01-16 ENCOUNTER — CLINICAL SUPPORT (OUTPATIENT)
Dept: REHABILITATION | Facility: HOSPITAL | Age: 70
End: 2020-01-16
Attending: PHYSICIAN ASSISTANT
Payer: MEDICARE

## 2020-01-16 DIAGNOSIS — Z78.9 IMPAIRED MOBILITY AND ADLS: Primary | ICD-10-CM

## 2020-01-16 DIAGNOSIS — Z78.9 IMPAIRED INSTRUMENTAL ACTIVITIES OF DAILY LIVING (IADL): ICD-10-CM

## 2020-01-16 DIAGNOSIS — R53.1 WEAKNESS GENERALIZED: ICD-10-CM

## 2020-01-16 DIAGNOSIS — R27.8 DECREASED COORDINATION: ICD-10-CM

## 2020-01-16 DIAGNOSIS — R26.89 BALANCE PROBLEM: ICD-10-CM

## 2020-01-16 DIAGNOSIS — Z74.09 IMPAIRED MOBILITY AND ADLS: Primary | ICD-10-CM

## 2020-01-16 DIAGNOSIS — Z74.09 IMPAIRED FUNCTIONAL MOBILITY, BALANCE, AND ENDURANCE: ICD-10-CM

## 2020-01-16 DIAGNOSIS — G35 MULTIPLE SCLEROSIS: ICD-10-CM

## 2020-01-16 DIAGNOSIS — F41.9 ANXIETY: ICD-10-CM

## 2020-01-16 PROCEDURE — 97530 THERAPEUTIC ACTIVITIES: CPT | Mod: PN

## 2020-01-16 PROCEDURE — 97110 THERAPEUTIC EXERCISES: CPT | Mod: PN

## 2020-01-16 RX ORDER — CLONAZEPAM 1 MG/1
TABLET ORAL
Qty: 180 TABLET | Refills: 1 | Status: SHIPPED | OUTPATIENT
Start: 2020-01-16 | End: 2020-02-24 | Stop reason: SDUPTHER

## 2020-01-16 NOTE — PROGRESS NOTES
[id number]  Date of Treatment: 01/16/2020   Diagnosis:   Encounter Diagnosis   Name Primary?    Impaired functional mobility, balance, and endurance        Time in: 1416  Time Out: 1455  Total Treatment Time: 39  Group Time: 0      Subjective:    Alyssa reports feeling tired from OT treatment.  Patient reports their pain to be /10 on a 0-10 scale with 0 being no pain and 10 being the worst pain imaginable.    Objective    Patient received individual therapy to increase strength, endurance, ROM and flexibility with 0 patients with activities as follows:     Alyssa received therapeutic exercises to develop strength, endurance, ROM and flexibility for 39 minutes including:     Transfer w/c<>nustep min/mod A with cues for sequencing, weight translation  Nustep L1 x 10 min LE's only with cues for midline sitting  Shuttle leg press squats 31# x 6 min B LE's  LAQ x 20 L/R    Assessment:     Patient later to start PT session secondary to toileting after OT treatment. Patient with decreased tolerance to activity, requiring more frequent rest breaks due to fatigue.   Pt will continue to benefit from skilled PT intervention. Medical Necessity is demonstrated by:  Fall Risk, Unable to participate fully in daily activities and Weakness.    Patient is making good progress towards established goals.      Plan:  Continue with established Plan of Care towards PT goals.

## 2020-01-16 NOTE — PROGRESS NOTES
Occupational Therapy- Treatment     Date:  01/16/2020  Start Time:  1306   Stop Time:  1415    TIMED  Procedure Time Min.   TherAct (5) Start:  Stop: 69   Total Timed Minutes:  68  Total Timed Units:  5  Total Untimed Units: 0  Charges Billed/# of units:  5    Progress/Current Status    Subjective:     Patient ID: Alyssa John is a 69 y.o. female.  Diagnosis: MS  1. Impaired mobility and ADLs     2. Weakness generalized     3. Decreased coordination     4. Impaired instrumental activities of daily living (IADL)     5. Balance problem       Pain: No c/o pain today.   Patient reports: She used the hip hike method to clean her bottom in the shower yesterday!   Functional change: see above   Concerns since last visit: none    Objective:     -W/c<>mat SPT w/ RW and CGA (start of session).   -scooting up/down length of mat x1 in each direction w/ mod cues for sequencing and making large and effective movements.  -Facilitation of a/p pelvic tilt, R/L lateral pelvic tilt. Points of contact at L QL for lateral tilting. Hands at sides in position of support to facilitate scapulo-pelvic rhythm. Mod cues for moving through full available range of motion.  -simulated dickson-anal hygiene via lateral pelvic tilting and reaching back/under bottom as far as possible.  -mobilization to improve R lateral tilt  -pavan scap retraction, 3x5 AROM w/ max cues for moving through FULL available ROM  -doffed shoes w/ SBA and min cues for most efficient technique. Doffed & donned R sock w/ SBA. Donned R shoe w/ LHSH. Deferred L sock due to need for toileting. She donned the L shoe w/ min A and mod cues for best techniques with LHSH.   -amb to toilet w/ CGA and RW. Toilet t/f w/ min A and RW. Min A hiking clothing over hips w/ min A for balance due to posterior COG. Min A for BM hygiene. OT provided increased assistance for threading clothes over feet due to time limitation and pt late for PT.  -She completed grooming seated w/ set-up  assist.  -amb back to w/c w/ RW and min A due to posterior COG and mod cues for picking up the L LE, as well as for completing her turn in order to safely sit    Assessment:      Despite continued concerns with postural control, she demo'd good participation today. Decreased independence and safety at end of session before/after toileting due to fatigue and stress related to incontinent BM. However, today's performance was improved compared to previous session toileting. Alyssa will continue to benefit from skilled therapy services to increase daily function and fulfill rewarding occupational roles at home. Continuation of therapy at this point is medically necessary to increase postural control during ADLs, increasing safety and decreasing risk of fall.     PROBLEM LIST/IMPAIRMENTS:   weakness, impaired endurance, impaired self care skills, impaired functional mobility, gait instability, impaired balance, impaired cognition, decreased coordination and decreased upper extremity function     LTG GOALS:    1) Pt will be mod I with dressing for clothing except for fasteners (buttons/zippers/hooks) (progressing 1/16/2020)  2) Pt will be supervision with bathing, seated; SBA for TTB t/f. (progressing 1/16/2020)  3) Pt will be mod I for toileting and related t/f. (progressing 1/16/2020)  4) Pt will improve GMC, as evidenced by box and block scores of 45 or better on mandeep UE. (progressing 1/16/2020)  5) Pt will participate in at least one IADL task in the home on a daily basis (dishes, laundry, etc) (progressing 1/16/2020)  6) Pt to improve FMC bilaterally, as evidenced by a 5 sec improvement in times on the 9HPT - R=40s or better; L=45s or better. (progressing 1/16/2020)      STG Goals:   1) Alyssa will be mod I with initial HEP. (MET with assistance of spouse)  2) Pt will t/f to toilet w/ min A or better. (MET)  3) Alyssa will be able to stand and manage her clothing for toileting w/ CGA (progressing 1/16/2020 )  4) Mandeep  UE GMC to improve, as evidenced by box & block scores of 31 or better, pavan UE. (MET)    New Goals (to be achieved by end of cert. Period):  (11/26/19-2/18/19)  1) Alyssa will complete bathroom mobility for toileting w/ SBA and RW. (progressing 1/16/2020)  2) Alyssa will be able to stand w/ SBA and unilateral support for 10 minutes to increase indep w/ IADLs. (progressing 1/16/2020)  3) Alyssa will be able to complete a 5-pose flow of chair yoga with mod I to increase core control. (progressing 1/16/2020 )      Patient Education/Response:     As completed in today's session, she needs to attempt all hygiene before asking for assist from family. She v/u.  Progress toward goals; also continue HEP as previous. DO NOT ATTEMPT kneeling or quadruped. OK to do seated modified yoga poses at home with help from spouse as necessary.    Plans and Goals:     Continue working on postural control in sit, stand, kneeling and quadruped as well as L UE function and lengthening R side of trunk and L hamstrings during functional tasks. LB dressing. Repeat practice for toileting hygiene.    Sameera Montague, OT  1/16/2020

## 2020-01-20 NOTE — PLAN OF CARE
Monitor Summary:     SR 63-75  .16/.08/.34     PT/PTA Thao Coburn met face to face to discuss patient's treatment plan and progress towards established goals.  Treatment will be continued as described in initial report/eval and progress notes.  Patient will be seen by physical therapist every sixth visit and minimally once per month.

## 2020-01-21 ENCOUNTER — CLINICAL SUPPORT (OUTPATIENT)
Dept: REHABILITATION | Facility: HOSPITAL | Age: 70
End: 2020-01-21
Attending: PHYSICIAN ASSISTANT
Payer: MEDICARE

## 2020-01-21 DIAGNOSIS — Z78.9 IMPAIRED MOBILITY AND ADLS: Primary | ICD-10-CM

## 2020-01-21 DIAGNOSIS — Z74.09 IMPAIRED FUNCTIONAL MOBILITY, BALANCE, AND ENDURANCE: ICD-10-CM

## 2020-01-21 DIAGNOSIS — Z74.09 IMPAIRED MOBILITY AND ADLS: Primary | ICD-10-CM

## 2020-01-21 DIAGNOSIS — R27.8 DECREASED COORDINATION: ICD-10-CM

## 2020-01-21 DIAGNOSIS — R53.1 WEAKNESS GENERALIZED: ICD-10-CM

## 2020-01-21 DIAGNOSIS — Z78.9 IMPAIRED INSTRUMENTAL ACTIVITIES OF DAILY LIVING (IADL): ICD-10-CM

## 2020-01-21 DIAGNOSIS — R26.89 BALANCE PROBLEM: ICD-10-CM

## 2020-01-21 PROCEDURE — 97110 THERAPEUTIC EXERCISES: CPT | Mod: PN,CQ

## 2020-01-21 PROCEDURE — 97530 THERAPEUTIC ACTIVITIES: CPT | Mod: PN

## 2020-01-21 NOTE — PROGRESS NOTES
Occupational Therapy- Treatment     Date:  01/21/2020  Start Time:  1408   Stop Time:  1500    TIMED  Procedure Time Min.   TherAct (3) Start:  Stop: 52   Total Timed Minutes:  52  Total Timed Units:  3  Total Untimed Units: 0  Charges Billed/# of units:  3    Progress/Current Status    Subjective:     Patient ID: Alyssa John is a 69 y.o. female.  Diagnosis: MS  1. Impaired mobility and ADLs     2. Weakness generalized     3. Decreased coordination     4. Impaired instrumental activities of daily living (IADL)     5. Balance problem       Pain: No c/o pain today.   Patient reports: she wasn't able to work on some of her exercises because Giuseppe couldn't help   Functional change: see above   Concerns since last visit: soreness in L shoulder with movement; no c/o pain.     Objective:     -W/c<>mat SPT w/ RW and CGA (start of session). On return to w/c at end of session, she required CGA except for posterior lean/ LOB requring total A to control before she was positioned to sit in her chair.  -Seated EOM, OT perf'd gentle mobilization and stretching of soft-tissues throughout the shoulder, including levator, serratus anterior, subscapularis, anterior complex, lats and combined internal rotators to allow for increased PROM.  -Seated baby back-bend x5, holding for about 15s. Mod verbal and tactile cues for best position and full effort in reaching.  -modified seated Warrior I positioning at EOM w/ min-mod A for moving into best position, 2x30s in each position.  AROM a/p pelvic tilt, R/L lateral pelvic tilt. Points of contact at low back for anterior tilt. Hands at sides in position of support to facilitate scapulo-pelvic rhythm.   -gentle mobilization to increase lumbar and thoracic extension. Slight improvement in AROM of pelvic tilting after mobs.  -repeated baby back-bend x5 w/ slight improvement in performance.   -Pt still w/ posterior L lean at EOM despite all efforts. So, with assist of a 2nd person for  "safety, we positioned Alyssa on the orange therapy ball and worked on maintaining center of gravity to improve trunk control. Overall, she required min A and still had a fairly posterior orientation. With cues, she was able to complete a L hip hike on the ball, pushing her weight more to the R hip and improving her COG. Max A for t/f on/off ball w/ assist of 2nd for safety as noted.  -seated at EOM, continued working on hip hike to shift weight to the right, improving COG and "up tall" positioning.  -AROM pavan scap retraction x10.    Assessment:      Alyssa was fatigued by end of today's session. She continues to have posterior L leaning in her sitting posture, but was able to recognize having increased weight through the R pelvis today, possibly indicating increased awareness of her COG and therefore, an increased ability to improve her seated positioning. OT ed Alyssa that she needs to complete the HEP she can safely complete even if Giuseppe is not available to assist. She v/u, but may benefit from making a daily schedule for her HEP, which we can addressed in our next session.  Alyssa will continue to benefit from skilled therapy services to increase daily function and fulfill rewarding occupational roles at home. Continuation of therapy at this point is medically necessary to increase postural control during ADLs, increasing safety and decreasing risk of fall.     PROBLEM LIST/IMPAIRMENTS:   weakness, impaired endurance, impaired self care skills, impaired functional mobility, gait instability, impaired balance, impaired cognition, decreased coordination and decreased upper extremity function     LTG GOALS:    1) Pt will be mod I with dressing for clothing except for fasteners (buttons/zippers/hooks) (progressing 1/21/2020)  2) Pt will be supervision with bathing, seated; SBA for TTB t/f. (progressing 1/21/2020)  3) Pt will be mod I for toileting and related t/f. (progressing 1/21/2020)  4) Pt will improve GMC, as " "evidenced by box and block scores of 45 or better on mandeep UE. (progressing 1/21/2020)  5) Pt will participate in at least one IADL task in the home on a daily basis (dishes, laundry, etc) (progressing 1/21/2020)  6) Pt to improve FMC bilaterally, as evidenced by a 5 sec improvement in times on the 9HPT - R=40s or better; L=45s or better. (progressing 1/21/2020)      STG Goals:   1) Alyssa will be mod I with initial HEP. (MET with assistance of spouse)  2) Pt will t/f to toilet w/ min A or better. (MET)  3) Alyssa will be able to stand and manage her clothing for toileting w/ CGA (progressing 1/21/2020 )  4) Mandeep UE GMC to improve, as evidenced by box & block scores of 31 or better, mandeep UE. (MET)    New Goals (to be achieved by end of cert. Period):  (11/26/19-2/18/19)  1) Alyssa will complete bathroom mobility for toileting w/ SBA and RW. (progressing 1/21/2020)  2) Alyssa will be able to stand w/ SBA and unilateral support for 10 minutes to increase indep w/ IADLs. (progressing 1/21/2020)  3) Alyssa will be able to complete a 5-pose flow of chair yoga with mod I to increase core control. (progressing 1/21/2020 )      Patient Education/Response:     Increase participation in HEP for "safe" activities, even if spouse is not available to assist. She v/u.  Progress toward goals; also continue HEP as previous. DO NOT ATTEMPT kneeling or quadruped. OK to do seated modified yoga poses at home with help from spouse as necessary.    Plans and Goals:     Continue working on postural control in sit, stand, kneeling and quadruped as well as L UE function and lengthening R side of trunk and L hamstrings during functional tasks. LB dressing. Repeat practice for toileting hygiene. Write out an HEP schedule to increase consistency in compliance?    Sameera Montague, OT  1/21/2020   "

## 2020-01-21 NOTE — PROGRESS NOTES
Name: Alyssa John  Clinic Number: [id number]  Date of Treatment: 01/21/2020   Diagnosis:   Encounter Diagnosis   Name Primary?    Impaired functional mobility, balance, and endurance        Time in: 1307  Time Out: 1405  Total Treatment Time: 58    Subjective:    Via w/c, brought to clinic per . Patient reports no pain.     Objective:    Patient received individual therapy to increase strength, endurance, ROM and flexibility with activities as follows:     Alyssa received therapeutic exercises to develop strength, endurance, ROM and flexibility for 58 minutes including:     Transfer w/c<>nustep min/mod A with cues for sequencing, weight translation  Nustep L1 x 10 min LE's only with cues for midline sitting  Static standing in // bars CG/SBA with vc for weight shifting  // bars:     Sidestepping     Fwd tandem     Backward tandem      Fwd walking     Backward walking     Lateral weight shifting     Fwd weight shifting  LAQ x 20 L/R  Gait training 75' with rw and CG/min A and constant vc for fwd weight shifting and increased clearance L LE, improved posture to correct fwd trunk fleixon    Assessment:     Limited B TKE with standing but does correct with extensive vc but limited carryover. Improved endurance today with minimal seated rests and able to perform seated ex's with rests. Extra time spent in standing balance, working on fwd weight translation for COG over BELLE.      Pt will continue to benefit from skilled PT intervention. Medical Necessity is demonstrated by:  Fall Risk, Unable to participate fully in daily activities and Weakness.    Patient is making good progress towards established goals.      Plan:  Continue with established Plan of Care towards PT goals.

## 2020-01-28 ENCOUNTER — CLINICAL SUPPORT (OUTPATIENT)
Dept: REHABILITATION | Facility: HOSPITAL | Age: 70
End: 2020-01-28
Attending: PHYSICIAN ASSISTANT
Payer: MEDICARE

## 2020-01-28 DIAGNOSIS — Z74.09 IMPAIRED FUNCTIONAL MOBILITY, BALANCE, AND ENDURANCE: Primary | ICD-10-CM

## 2020-01-28 DIAGNOSIS — Z74.09 IMPAIRED FUNCTIONAL MOBILITY, BALANCE, AND ENDURANCE: ICD-10-CM

## 2020-01-28 PROCEDURE — 97110 THERAPEUTIC EXERCISES: CPT | Mod: PN

## 2020-01-28 PROCEDURE — 97530 THERAPEUTIC ACTIVITIES: CPT | Mod: PN,CO

## 2020-01-29 NOTE — PROGRESS NOTES
Name: Alyssa John  [facility] Number: [id number]  Date of Treatment: 1/28/2020  Diagnosis:   Encounter Diagnosis   Name Primary?    Impaired functional mobility, balance, and endurance        Time in: 1302  Time Out: 1400  Total Treatment Time: 58  Group Time: 0      Subjective:    Alyssa reports improvement of symptoms. Feeling really good toay  Patient reports their pain to be /10 on a 0-10 scale with 0 being no pain and 10 being the worst pain imaginable.    Objective    Patient received individual therapy to increase strength, endurance and ROM with 0 patients with activities as follows:     Alyssa received therapeutic exercises to develop strength, endurance, ROM, posture and core stabilization for 58 minutes including:     Transfer w/c<>stationary bike min/mod A with cues for sequencing and safety  Stationary bike x 15 min L1    // bars: static standing CGA/min A with cues for upright posture and weight shift    Standing activities in // bars:   Fwd gait 8 ft x 4   Bwd gait 8 ft x 4   Side stepping 8 ft x 4   Lateral weight shifting x 20    LAQ 2 x 10 with cues for terminal knee extension    Gait: 56  ft with RW at CGA/min A with cues for maintaining L foot clearance, posture    Assessment:     Patient with improvement of L foot clearance initially with gait, but quickly needing cues to maintain. Good tolerance for exercises, decreased posterior lean noted in standing activities. Also improved sitting posture compared to previous visits.     Pt will continue to benefit from skilled PT intervention. Medical Necessity is demonstrated by:  Fall Risk, Unable to participate in daily activities and Weakness.    Patient is making good progress towards established goals.      Plan:  Continue with established Plan of Care towards PT goals.

## 2020-01-29 NOTE — PROGRESS NOTES
"Occupational Therapy- Treatment     Date:  01/29/2020  Start Time:  1405    Stop Time:  1505    TIMED  Procedure Time Min.   TherAct (4) Start:  Stop: 60   Total Timed Minutes:  60  Total Timed Units:  4  Total Untimed Units: 0  Charges Billed/# of units:  4    Progress/Current Status    Subjective:     Patient ID: Alyssa John is a 69 y.o. female.  Diagnosis: MS  1. Impaired functional mobility, balance, and endurance       Pain: No c/o pain today.   Patient reports: "I have been avoiding coke and drinking only water. I am going to that bathroom a lot more."  Functional change: Alyssa states that transfers seem easier and she would like to walk down the schaffer with RW more often but has to wait for Giuseppe to be there with her.  Concerns since last visit: none.    Objective:     -Alyssa transitioned from PT ambulating w/ RW and CGA and then seated on the mat.  -hamstring stretch on 4" stool 2 x 30s R/L LE.  -modified seated Warrior I positioning at EOM w/ min-mod A for moving into best position, 5x30s in each position. Pt requires constanst vc for breathing through holding poses rather than holding her breath.   -Pt still w/ posterior L lean at EOM despite all efforts. We incorporated a seated spinal twist x 5 to each side, with hand placement one at opposite knee and one behind her and a 15s hold. VC for rounded shoulders, hiked shoulders, hip rotation, and breathing.  -supine, knees at 90* off mat with feet touching the floor, bolster between thighs to keep them from adducting and to maintain hip width, one pillow under traps and shoulders and another on top of that one behind head, RAMOS facilitating midline with trunk and shoulders; Alyssa tolerated 5 mins.   -mat>w/c CGA without AE, RAMOS with tactile cues at underarms and hips for uptall posture and verbal cues for foot sequencing as well as motivation that she could complete without AE as long as she pushed off from start surface.    Assessment:    " "  Alyssa felt really good about today's session. She has decreased the contracted "C" shape that was present in her torso upon beginning sessions with OT. During supine midline training today her "C curve" was much improved (more like a parenthesis instead of a C) and RAMOS more easily facilitated midline with tactile cues at the shoulders, hips, and Alyssa maintained 5 mins. Alyssa learned a new seated pose today that may prove helpful in attaining her COG and decreasing posterior lean. She has improved seated Warrior I with the exception that she doesn't remember how to "start it" without verbal and tactile cues for recall.  RAMOS ed Alyssa that she needs to complete the HEP she can safely complete even if Giuseppe is not available to assist. She v/u, but may benefit from making a daily schedule for her HEP, which can be addressed in next session. Alyssa will continue to benefit from skilled therapy services to increase daily function and fulfill rewarding occupational roles at home. Continuation of therapy at this point is medically necessary to increase postural control during ADLs, increasing safety and decreasing risk of fall.     PROBLEM LIST/IMPAIRMENTS:   weakness, impaired endurance, impaired self care skills, impaired functional mobility, gait instability, impaired balance, impaired cognition, decreased coordination and decreased upper extremity function     LTG GOALS:    1) Pt will be mod I with dressing for clothing except for fasteners (buttons/zippers/hooks) (progressing 1/29/2020)  2) Pt will be supervision with bathing, seated; SBA for TTB t/f. (progressing 1/29/2020)  3) Pt will be mod I for toileting and related t/f. (progressing 1/29/2020)  4) Pt will improve GMC, as evidenced by box and block scores of 45 or better on pavan UE. (progressing 1/29/2020)  5) Pt will participate in at least one IADL task in the home on a daily basis (dishes, laundry, etc) (progressing 1/29/2020)  6) Pt to improve FMC " "bilaterally, as evidenced by a 5 sec improvement in times on the 9HPT - R=40s or better; L=45s or better. (progressing 1/29/2020)      STG Goals:   1) Alyssa will be mod I with initial HEP. (MET with assistance of spouse)  2) Pt will t/f to toilet w/ min A or better. (MET)  3) Alyssa will be able to stand and manage her clothing for toileting w/ CGA (progressing 1/29/2020 )  4) Pavan UE GMC to improve, as evidenced by box & block scores of 31 or better, pavan UE. (MET)    New Goals (to be achieved by end of cert. Period):  (11/26/19-2/18/19)  1) Alyssa will complete bathroom mobility for toileting w/ SBA and RW. (progressing 1/29/2020)  2) Alyssa will be able to stand w/ SBA and unilateral support for 10 minutes to increase indep w/ IADLs. (progressing 1/29/2020)  3) Alyssa will be able to complete a 5-pose flow of chair yoga with mod I to increase core control. (progressing 1/29/2020 )      Patient Education/Response:     Increase participation in HEP for "safe" activities, even if spouse is not available to assist. She v/u.  Progress toward goals; also continue HEP as previous. DO NOT ATTEMPT kneeling or quadruped. OK to do seated modified yoga poses at home with help from spouse as necessary.    Plans and Goals:     Continue working on postural control in sit, stand, kneeling and quadruped as well as L UE function and lengthening R side of trunk and L hamstrings during functional tasks. LB dressing. Repeat practice for toileting hygiene. Write out an HEP schedule to increase consistency in compliance?    RACHEL Umana/LUIGI  1/29/2020   "

## 2020-01-30 ENCOUNTER — CLINICAL SUPPORT (OUTPATIENT)
Dept: REHABILITATION | Facility: HOSPITAL | Age: 70
End: 2020-01-30
Attending: PHYSICIAN ASSISTANT
Payer: MEDICARE

## 2020-01-30 DIAGNOSIS — Z78.9 IMPAIRED MOBILITY AND ADLS: Primary | ICD-10-CM

## 2020-01-30 DIAGNOSIS — R26.89 BALANCE PROBLEM: ICD-10-CM

## 2020-01-30 DIAGNOSIS — Z78.9 IMPAIRED INSTRUMENTAL ACTIVITIES OF DAILY LIVING (IADL): ICD-10-CM

## 2020-01-30 DIAGNOSIS — Z74.09 IMPAIRED FUNCTIONAL MOBILITY, BALANCE, AND ENDURANCE: ICD-10-CM

## 2020-01-30 DIAGNOSIS — Z74.09 IMPAIRED MOBILITY AND ADLS: Primary | ICD-10-CM

## 2020-01-30 DIAGNOSIS — R53.1 WEAKNESS GENERALIZED: ICD-10-CM

## 2020-01-30 DIAGNOSIS — R27.8 DECREASED COORDINATION: ICD-10-CM

## 2020-01-30 PROCEDURE — 97110 THERAPEUTIC EXERCISES: CPT | Mod: PN,CQ

## 2020-01-30 PROCEDURE — 97530 THERAPEUTIC ACTIVITIES: CPT | Mod: PN

## 2020-01-30 NOTE — PROGRESS NOTES
Name: Alyssa John  [facility] Number: [id number]  Date of Treatment: 01/30/2020   Diagnosis:   Encounter Diagnosis   Name Primary?    Impaired functional mobility, balance, and endurance        Time in: 1405  Time Out: 1500  Total Treatment Time: 55    Subjective:    Patient seen today post OT visit, no complaints of pain, using transport WC.    Objective:    Alyssa received therapeutic exercises to develop strength, endurance, ROM and flexibility for 55 minutes including:     Transfer w/c<>bike min/mod A with cues for scooting to edge of chair, sequence required for transfer, weight translation with transfer L<>R feet to advance contralateral LE  Bike L1.5 x 15 min LE's only, with verbal and tactile cues for midline sitting, to increase core strength and endurance with less support than WC  Static standing in // bars CG/SBA with vc/tc for weight shifting  // bars 10ft x 3 each:     Sidestepping     Fwd walking     Backward walking     Lateral weight shifting     Fwd weight shifting  LAQ x 20 L/R  Marching in sitting x 20 L/R    Assessment:   Pt insistent on riding bike vs. nustep prior to therex. L foot coming out of foot pedal with strap several times requiring assist to replace it.  Patient requiring VC's frequently with gait to fully extend knee and hips with weight shifting     Pt will continue to benefit from skilled PT intervention. Medical Necessity is demonstrated by:  Fall Risk, Unable to participate fully in daily activities and Weakness.    Patient is making good progress towards established goals.      Plan:  Continue with therex, balance training, endurance training, as well as transfer training per POC

## 2020-01-30 NOTE — PROGRESS NOTES
"Occupational Therapy- Treatment     Date:  01/30/2020  Start Time:  1315    Stop Time:  1400    TIMED  Procedure Time Min.   TherAct (3) Start:  Stop: 45   Total Timed Minutes:  45  Total Timed Units:  3  Total Untimed Units: 0  Charges Billed/# of units:  3    Progress/Current Status    Subjective:     Patient ID: Alyssa John is a 69 y.o. female.  Diagnosis: MS  1. Impaired mobility and ADLs     2. Weakness generalized     3. Decreased coordination     4. Impaired instrumental activities of daily living (IADL)     5. Balance problem       Pain: No c/o pain today.   Patient reports: "I have so much trouble getting up. I keep falling backward."   Functional change: "That chair is so uncomfortable" (re: her w/c). Alyssa states she has been trying to not sit in her w/c.   Concerns since last visit: none.    Objective:     Alyssa attempted to describe her daily schedule:   most days, she gets up at 11 except Tuesdays at 0730 to meet friends for breakfast. She tries to complete showers on off-therapy days.They try to go to bed by 12.  Rise, try to eat breakfast, watch some of "morning arlyn" which was recorded earlier that morning, get dressed ("depending on how I feel"). "I don't have much of a schedule to be honest with you."    During discussion, she was unable to consistently divide her attention between describing her day and monitoring/ correcting her posture, requiring mod verbal and tactile cues for posture correction, including bringing chest up and out to facilitate anterior pelvic tilt, small R lateral pelvic tilt, bringing pavan shoulders (but R more than L) back and down.    Seated baby back-bend 5x10s.  Seated thoracic rotation, 5x5s in each direction  Seated spinal extension, 5x5s  Seated side angle pose, 5x10s in each direction.     W/c<>mat t/f w/ RW and CGA.    Assessment:      Increased awareness of COG in today's session during t/f. Slowly improving awareness of posture, though Alyssa still has " a very difficult time monitoring her posture during other tasks. She seemed very pleased with the fact that we were able to add the remainder of her chair yoga movements today. She did require max cues for form during the exercises, but gave great effort and seemed to enjoy today's session. Alyssa will continue to benefit from skilled therapy services to increase daily function and fulfill rewarding occupational roles at home. Continuation of therapy at this point is medically necessary to increase postural control during ADLs, increasing safety and decreasing risk of fall.     PROBLEM LIST/IMPAIRMENTS:   weakness, impaired endurance, impaired self care skills, impaired functional mobility, gait instability, impaired balance, impaired cognition, decreased coordination and decreased upper extremity function     LTG GOALS:    1) Pt will be mod I with dressing for clothing except for fasteners (buttons/zippers/hooks) (progressing 1/30/2020)  2) Pt will be supervision with bathing, seated; SBA for TTB t/f. (progressing 1/30/2020)  3) Pt will be mod I for toileting and related t/f. (progressing 1/30/2020)  4) Pt will improve GMC, as evidenced by box and block scores of 45 or better on mandeep UE. (progressing 1/30/2020)  5) Pt will participate in at least one IADL task in the home on a daily basis (dishes, laundry, etc) (progressing 1/30/2020)  6) Pt to improve FMC bilaterally, as evidenced by a 5 sec improvement in times on the 9HPT - R=40s or better; L=45s or better. (progressing 1/30/2020)      STG Goals:   1) Alyssa will be mod I with initial HEP. (MET with assistance of spouse)  2) Pt will t/f to toilet w/ min A or better. (MET)  3) Alyssa will be able to stand and manage her clothing for toileting w/ CGA (progressing 1/30/2020 )  4) Mandeep UE GMC to improve, as evidenced by box & block scores of 31 or better, mandeep UE. (MET)    New Goals (to be achieved by end of cert. Period):  (11/26/19-2/18/19)  1) Alyssa will complete  "bathroom mobility for toileting w/ SBA and RW. (progressing 1/30/2020)  2) Alyssa will be able to stand w/ SBA and unilateral support for 10 minutes to increase indep w/ IADLs. (progressing 1/30/2020)  3) Alyssa will be able to complete a 5-pose flow of chair yoga with mod I to increase core control. (progressing 1/30/2020 )      Patient Education/Response:     Written HEP given for all yoga exs. See "patient instructions."   Progress toward goals; also continue HEP as previous. DO NOT ATTEMPT kneeling or quadruped.   Ed pt and spouse to work on making a scheduled/ dedicating a time at home to work on HEP, since she was fairly vague about her daily routine in today's session.    Plans and Goals:     Try yoga 'flow' of the 6 exercises and work on remembering routine. See if Giuseppe and Alyssa were able to designate a time for therapy exercises and work that into her daily schedule. Continue working on postural control in sit, stand, kneeling and quadruped as well as L UE function and lengthening R side of trunk and L hamstrings during functional tasks. LB dressing. Repeat practice for toileting hygiene.   Sameera Montague, OT  1/30/2020   "

## 2020-02-03 ENCOUNTER — OFFICE VISIT (OUTPATIENT)
Dept: OPHTHALMOLOGY | Facility: CLINIC | Age: 70
End: 2020-02-03
Payer: MEDICARE

## 2020-02-03 DIAGNOSIS — H02.886 MEIBOMIAN GLAND DYSFUNCTION (MGD) OF BOTH EYES: Primary | ICD-10-CM

## 2020-02-03 DIAGNOSIS — H02.883 MEIBOMIAN GLAND DYSFUNCTION (MGD) OF BOTH EYES: Primary | ICD-10-CM

## 2020-02-03 PROCEDURE — 92012 PR EYE EXAM, EST PATIENT,INTERMED: ICD-10-PCS | Mod: S$GLB,,, | Performed by: OPHTHALMOLOGY

## 2020-02-03 PROCEDURE — 92012 INTRM OPH EXAM EST PATIENT: CPT | Mod: S$GLB,,, | Performed by: OPHTHALMOLOGY

## 2020-02-03 PROCEDURE — 99999 PR PBB SHADOW E&M-EST. PATIENT-LVL III: ICD-10-PCS | Mod: PBBFAC,,, | Performed by: OPHTHALMOLOGY

## 2020-02-03 PROCEDURE — 99999 PR PBB SHADOW E&M-EST. PATIENT-LVL III: CPT | Mod: PBBFAC,,, | Performed by: OPHTHALMOLOGY

## 2020-02-03 RX ORDER — DOXYCYCLINE HYCLATE 100 MG
100 TABLET ORAL 2 TIMES DAILY
Qty: 28 TABLET | Refills: 0 | Status: SHIPPED | OUTPATIENT
Start: 2020-02-03 | End: 2020-02-17

## 2020-02-03 NOTE — PATIENT INSTRUCTIONS
Long-term:  1. Continue warm compresses and eyelid cleansing - see handout.   2. Flaxseed oil capsules by mouth - up to 6 per day  3. Continue artificial tears.     Short term:  1. Doxycycline pills 2x per day for 2 weeks  2. Antibiotic eye ointment at bedtime until gone.

## 2020-02-03 NOTE — PROGRESS NOTES
HPI     Pt c/o eye pain, pressure, red, crusty, scratchy OU. States comes and   goes x 1 month. Using refresh w/ relief. Just not constant relief. Using   gtts BID OU.     Agree with above. Stopped using baby shampoo to clean the lids, not sure   when, at least a month ago. Using cold compresses, not warm anymore.     Last edited by Karma Santana MD on 2/3/2020 11:39 AM.   (History)            Assessment /Plan     For exam results, see Encounter Report.    Meibomian gland dysfunction (MGD) of both eyes  -     doxycycline (VIBRA-TABS) 100 MG tablet; Take 1 tablet (100 mg total) by mouth 2 (two) times daily. for 14 days  Dispense: 28 tablet; Refill: 0  -     sulfacetaminde-prednisolone 10-0.2% (BLEPHAMIDE) 10-0.2 % Oint; Place into both eyes every evening.  Dispense: 1 Tube; Refill: 0        Too tender today to attempt lid massage.     Long-term:  1. Continue warm compresses and eyelid cleansing - see handout.   2. Flaxseed oil capsules by mouth - up to 6 per day  3. Continue artificial tears.     Short term:  1. Doxycycline pills 2x per day for 2 weeks  2. Antibiotic eye ointment at bedtime until gone. (Maxitrol is on backorder).

## 2020-02-04 ENCOUNTER — CLINICAL SUPPORT (OUTPATIENT)
Dept: REHABILITATION | Facility: HOSPITAL | Age: 70
End: 2020-02-04
Attending: PHYSICIAN ASSISTANT
Payer: MEDICARE

## 2020-02-04 DIAGNOSIS — R27.8 DECREASED COORDINATION: ICD-10-CM

## 2020-02-04 DIAGNOSIS — Z74.09 IMPAIRED MOBILITY AND ADLS: Primary | ICD-10-CM

## 2020-02-04 DIAGNOSIS — Z78.9 IMPAIRED MOBILITY AND ADLS: Primary | ICD-10-CM

## 2020-02-04 DIAGNOSIS — R26.89 BALANCE PROBLEM: ICD-10-CM

## 2020-02-04 DIAGNOSIS — Z78.9 IMPAIRED INSTRUMENTAL ACTIVITIES OF DAILY LIVING (IADL): ICD-10-CM

## 2020-02-04 DIAGNOSIS — R53.1 WEAKNESS GENERALIZED: ICD-10-CM

## 2020-02-04 DIAGNOSIS — Z74.09 IMPAIRED FUNCTIONAL MOBILITY, BALANCE, AND ENDURANCE: ICD-10-CM

## 2020-02-04 PROCEDURE — 97530 THERAPEUTIC ACTIVITIES: CPT | Mod: PN

## 2020-02-04 PROCEDURE — 97110 THERAPEUTIC EXERCISES: CPT | Mod: PN

## 2020-02-04 NOTE — PROGRESS NOTES
"Occupational Therapy- Treatment     Date:  02/04/2020  Start Time:  1407    Stop Time:  1502    TIMED  Procedure Time Min.   TherAct (4) Start:  Stop: 55   Total Timed Minutes:  55  Total Timed Units:  4  Total Untimed Units: 0  Charges Billed/# of units:  4    Progress/Current Status    Subjective:     Patient ID: Alyssa John is a 69 y.o. female.  Diagnosis: MS  1. Impaired mobility and ADLs     2. Weakness generalized     3. Decreased coordination     4. Impaired instrumental activities of daily living (IADL)     5. Balance problem       Pain: No c/o pain today.   Patient reports: She does c/o stomach upset after starting new supplement last night. Also, she says, "it's real hard to make a schedule."  Functional change: "I did all my exercises except for one of them" (though she can't remember which). However, she only did all of the exercises one day.    Concerns since last visit: she c/o fatigue after tx Thursday.     Objective:     Seated at EOM, OT explained the seated "yoga flow" with 6 different positions that she will move through (all of which she already knows), including hamstring stretch, baby back bend, thoracic rotation, crescent lunge (previously referred to as the modified Warrior I), side angle pose and spinal extension. She held each position for a count of 10, except for spinal extension for 5. She was encouraged to breathe throughout.    She completed each of these in order for the right side and then for the left side x2 sets.    Mod cues for positioning for hamstring stretching to increase anterior pelvic tilt and keeping back straight when leaning down and not to round into stretch.  Verbal encouragement for maximizing effort and reach during baby back bend.  Min cues for maintaining upright positioning during thoracic rotation.  Mod A for crescent lunge  Mod cues for best positioning during side angle pose.  Min verbal cues for safest positioning during spinal extension.    After 2 " "sets, she c/o fatigue.    Due to difficulty with recalling order of the exercises and her professed memory difficulties, we played "memory" seated w/ the table positioned to her right. She used the L UE to reach contralaterally, requiring a weight shift forward and right, to turn the cards. She participating in one game with 8 pairs and one with 12 pairs and we completed the game in an appropriate amount of time and she was able to recall card positioning as well as OT.    Cues throughout resting moments for monitoring posture.     W/c<>mat t/f w/ RW and CGA.    Assessment:      Awareness of COG during t/f w/ RW continues to improve, but Alyssa has minimal awareness of when she reverts to posterior pelvic tilt & posterior/L lean while seated. Nonetheless, she is completely engaged during the yoga flow exercises and reports much enjoyment with these exercises. She may benefit from doing some of the flow exercises or functional activity on the therapy ball to increase core control and posture during sitting. Alyssa will continue to benefit from skilled therapy services to increase daily function and fulfill rewarding occupational roles at home. Continuation of therapy at this point is medically necessary to increase postural control during ADLs, increasing safety and decreasing risk of fall.     PROBLEM LIST/IMPAIRMENTS:   weakness, impaired endurance, impaired self care skills, impaired functional mobility, gait instability, impaired balance, impaired cognition, decreased coordination and decreased upper extremity function     LTG GOALS:    1) Pt will be mod I with dressing for clothing except for fasteners (buttons/zippers/hooks) (progressing 2/4/2020)  2) Pt will be supervision with bathing, seated; SBA for TTB t/f. (progressing 2/4/2020)  3) Pt will be mod I for toileting and related t/f. (progressing 2/4/2020)  4) Pt will improve GMC, as evidenced by box and block scores of 45 or better on pavan UE. (progressing " "2/4/2020)  5) Pt will participate in at least one IADL task in the home on a daily basis (dishes, laundry, etc) (progressing 2/4/2020)  6) Pt to improve FMC bilaterally, as evidenced by a 5 sec improvement in times on the 9HPT - R=40s or better; L=45s or better. (progressing 2/4/2020)      STG Goals:   1) Alyssa will be mod I with initial HEP. (MET with assistance of spouse)  2) Pt will t/f to toilet w/ min A or better. (MET)  3) Alyssa will be able to stand and manage her clothing for toileting w/ CGA (progressing 2/4/2020 )  4) Pavan UE GMC to improve, as evidenced by box & block scores of 31 or better, pavan UE. (MET)    New Goals (to be achieved by end of cert. Period):  (11/26/19-2/18/19)  1) Alyssa will complete bathroom mobility for toileting w/ SBA and RW. (progressing 2/4/2020)  2) Alyssa will be able to stand w/ SBA and unilateral support for 10 minutes to increase indep w/ IADLs. (progressing 2/4/2020)  3) Alyssa will be able to complete a 5-pose flow of chair yoga with mod I to increase core control. (progressing 2/4/2020 )      Patient Education/Response:     Written HEP given for all yoga exs at last visit. See "patient instructions."   Progress toward goals; also continue HEP as previous. DO NOT ATTEMPT kneeling or quadruped.   Ed pt and spouse to work on making a scheduled/ dedicating a time at home to work on HEP, since she was fairly vague about her daily routine in today's session.    Plans and Goals:     Continue yoga 'flow' of the 6 exercises and work on remembering routine. See if Giuseppe and Alyssa were able to designate a time for therapy exercises and work that into her daily schedule. Consider seated tasks on therapy ball to work on awareness of sitting posture. LB dressing. Repeat practice for toileting hygiene.     Sameera Montague OT  2/4/2020   "

## 2020-02-05 ENCOUNTER — TELEPHONE (OUTPATIENT)
Dept: PHARMACY | Facility: CLINIC | Age: 70
End: 2020-02-05

## 2020-02-05 ENCOUNTER — INITIAL CONSULT (OUTPATIENT)
Dept: PHYSICAL MEDICINE AND REHAB | Facility: CLINIC | Age: 70
End: 2020-02-05
Payer: MEDICARE

## 2020-02-05 VITALS
BODY MASS INDEX: 23.98 KG/M2 | HEIGHT: 64 IN | WEIGHT: 140.44 LBS | DIASTOLIC BLOOD PRESSURE: 74 MMHG | SYSTOLIC BLOOD PRESSURE: 136 MMHG | HEART RATE: 77 BPM

## 2020-02-05 DIAGNOSIS — Z78.9 IMPAIRED MOBILITY AND ADLS: ICD-10-CM

## 2020-02-05 DIAGNOSIS — G35 MS (MULTIPLE SCLEROSIS): Primary | ICD-10-CM

## 2020-02-05 DIAGNOSIS — R29.6 FREQUENT FALLS: ICD-10-CM

## 2020-02-05 DIAGNOSIS — R60.0 BILATERAL LEG EDEMA: ICD-10-CM

## 2020-02-05 DIAGNOSIS — R26.9 GAIT DISORDER: ICD-10-CM

## 2020-02-05 DIAGNOSIS — Z74.09 IMPAIRED MOBILITY AND ADLS: ICD-10-CM

## 2020-02-05 PROCEDURE — 3288F PR FALLS RISK ASSESSMENT DOCUMENTED: ICD-10-PCS | Mod: CPTII,S$GLB,, | Performed by: PHYSICAL MEDICINE & REHABILITATION

## 2020-02-05 PROCEDURE — 3075F SYST BP GE 130 - 139MM HG: CPT | Mod: CPTII,S$GLB,, | Performed by: PHYSICAL MEDICINE & REHABILITATION

## 2020-02-05 PROCEDURE — 1100F PTFALLS ASSESS-DOCD GE2>/YR: CPT | Mod: CPTII,S$GLB,, | Performed by: PHYSICAL MEDICINE & REHABILITATION

## 2020-02-05 PROCEDURE — 3078F DIAST BP <80 MM HG: CPT | Mod: CPTII,S$GLB,, | Performed by: PHYSICAL MEDICINE & REHABILITATION

## 2020-02-05 PROCEDURE — 3288F FALL RISK ASSESSMENT DOCD: CPT | Mod: CPTII,S$GLB,, | Performed by: PHYSICAL MEDICINE & REHABILITATION

## 2020-02-05 PROCEDURE — 1159F PR MEDICATION LIST DOCUMENTED IN MEDICAL RECORD: ICD-10-PCS | Mod: S$GLB,,, | Performed by: PHYSICAL MEDICINE & REHABILITATION

## 2020-02-05 PROCEDURE — 99999 PR PBB SHADOW E&M-EST. PATIENT-LVL II: CPT | Mod: PBBFAC,,, | Performed by: PHYSICAL MEDICINE & REHABILITATION

## 2020-02-05 PROCEDURE — 3075F PR MOST RECENT SYSTOLIC BLOOD PRESS GE 130-139MM HG: ICD-10-PCS | Mod: CPTII,S$GLB,, | Performed by: PHYSICAL MEDICINE & REHABILITATION

## 2020-02-05 PROCEDURE — 1100F PR PT FALLS ASSESS DOC 2+ FALLS/FALL W/INJURY/YR: ICD-10-PCS | Mod: CPTII,S$GLB,, | Performed by: PHYSICAL MEDICINE & REHABILITATION

## 2020-02-05 PROCEDURE — 1159F MED LIST DOCD IN RCRD: CPT | Mod: S$GLB,,, | Performed by: PHYSICAL MEDICINE & REHABILITATION

## 2020-02-05 PROCEDURE — 99215 PR OFFICE/OUTPT VISIT, EST, LEVL V, 40-54 MIN: ICD-10-PCS | Mod: S$GLB,,, | Performed by: PHYSICAL MEDICINE & REHABILITATION

## 2020-02-05 PROCEDURE — 99215 OFFICE O/P EST HI 40 MIN: CPT | Mod: S$GLB,,, | Performed by: PHYSICAL MEDICINE & REHABILITATION

## 2020-02-05 PROCEDURE — 3078F PR MOST RECENT DIASTOLIC BLOOD PRESSURE < 80 MM HG: ICD-10-PCS | Mod: CPTII,S$GLB,, | Performed by: PHYSICAL MEDICINE & REHABILITATION

## 2020-02-05 PROCEDURE — 99999 PR PBB SHADOW E&M-EST. PATIENT-LVL II: ICD-10-PCS | Mod: PBBFAC,,, | Performed by: PHYSICAL MEDICINE & REHABILITATION

## 2020-02-05 NOTE — LETTER
February 5, 2020      Risa Oshea PA-C  [address]  [facility] [address]           [facility]  [address]  [facility] [address]  Phone: [phone]          Patient: Alyssa John   MR Number: [id number]   YOB: 1950   Date of Visit: 2/5/2020       Dear Risa Oshea:    Thank you for referring Alyssa John to me for evaluation. Attached you will find relevant portions of my assessment and plan of care.    If you have questions, please do not hesitate to call me. I look forward to following Alyssa John along with you.    Sincerely,    Raimundo Jarquin MD    Enclosure  CC:  No Recipients    If you would like to receive this communication electronically, please contact [email or web address] or [phone] to request more information on [employer] Link access.    For providers and/or their staff who would like to refer a patient to [facility], please contact us through our one-stop-shop provider referral line, [facility], at [phone].    If you feel you have received this communication in error or would no longer like to receive these types of communications, please e-mail [email or web address]

## 2020-02-05 NOTE — TELEPHONE ENCOUNTER
Call to complete followup for Glatopa. Patient's  answered the phone and was able to get patient on the line via speakerphone. Patient states she is doing well overall, with no side effects or missed doses. She has continued to take her medication three times weekly. She prefers to use the Glatopa autoinjector to administer her Glatopa. She is having no pain with her MS at this time. She has being going to her physical therapy, which she attended this week. She feels her condition is stable at this time. She is having no increase in symptoms or MS flares lately. She rates her QoL highly, a 10/10. Patient and  had no further questions or concerns.     Sean Mcmillan, PharmD  Clinical Pharmacist  [facility]  P: [phone]

## 2020-02-05 NOTE — PROGRESS NOTES
Name: Alyssa John  [facility] Number: [id number]  Date of Treatment: 02/04/2020   Diagnosis:   Encounter Diagnosis   Name Primary?    Impaired functional mobility, balance, and endurance        Time in: 1507  Time Out: 1550  Total Treatment Time: 43  Group Time: 0      Subjective:    Alyssa reports feeling well today. Has an appointment to get a new wheelchair tomorrow. Reports she has trouble with losing balance backwards when walking at home.  Patient reports their pain to be 0/10 on a 0-10 scale with 0 being no pain and 10 being the worst pain imaginable.    Objective    Patient received individual therapy to increase strength, endurance, ROM and flexibility with 0 patients with activities as follows:     Alyssa received therapeutic exercises to develop strength, endurance, ROM, flexibility and posture for 43 minutes including:     Transfer transport w/c<>stationary bike min/mod A with cues for sequencing: scooting to edge of seat, hand and feet placement, weight translation to shift feet   Stationary bike x 15 min L 1.5 seat position 1, verbal and tactile cues for midline sitting  Shuttle leg press squats 37# x 5 min B LE's  // bars: 10 ft x 4 each for side stepping, fwd/bwd gait with frequent cueing for posture, floor clearance, and attention to task      Assessment:     L foot slipping from stationary bike pedal once, needing assistance to return to position. Patient declining gait training at end of session due to fatigue level, treatment ended 10 minutes early. Reminded patient to only ambulate at home with spouse present for safety reasons.    Pt will continue to benefit from skilled PT intervention. Medical Necessity is demonstrated by:  Fall Risk, Unable to participate fully in daily activities, Requires skilled supervision to complete and progress HEP and Weakness.    Patient is making good progress towards established goals.      Plan:  Continue with established Plan of Care towards PT goals.

## 2020-02-05 NOTE — PROGRESS NOTES
Subjective:       Patient ID: Alyssa John is a 69 y.o. female.    Chief Complaint: No chief complaint on file.    HPI     Mrs. John is a 69-year-old white female who is presenting to the Physical Medicine Clinic for prescription of a wheelchair.  She has history of multiple sclerosis diagnosed in 2000, she use disorder, CAD status post MI and arthritis.    The patient lives with her  in a single-story home with 1 small threshold to enter.  She is independent with feeding herself but cannot prepare the meals.  She is independent with dressing toileting and bathing with transfer tub bench but it takes her a long time.  She can ambulate around the house with a rolling walker.  She is restricted in her distance by her lower extremity weakness and by fatigue.  She has history of frequent falls but so far without any significant injuries.  She was able to propel a manual wheelchair with physical therapy reportedly about  feet.  She is looking to get her own.  She is not interested in getting a power wheelchair at this point..        Past Medical History:   Diagnosis Date    Arthritis     Coronary artery disease     Encounter for blood transfusion     Epilepsy     GERD (gastroesophageal reflux disease)     Hypertension     MI, old     MS (multiple sclerosis)     Seizures     epilepsy       Review of Systems   Constitutional: Positive for fatigue.   Eyes: Positive for visual disturbance.   Respiratory: Negative for shortness of breath.    Cardiovascular: Negative for chest pain.   Gastrointestinal: Negative for nausea and vomiting.   Genitourinary: Negative for difficulty urinating.   Musculoskeletal: Positive for gait problem. Negative for arthralgias, back pain and neck pain.   Neurological: Positive for dizziness and weakness. Negative for headaches.   Psychiatric/Behavioral: Negative for behavioral problems and sleep disturbance.       Objective:      Physical Exam   Constitutional:  She is oriented to person, place, and time. She appears well-developed. No distress.   Coming to the clinic in at[place] wheelchair propelled by      ANJEL:   Head: Normocephalic and atraumatic.   Neck:   Mild decrease ROM.   Cardiovascular: Normal rate, regular rhythm and normal heart sounds.   Pulmonary/Chest: Effort normal and breath sounds normal.   Abdominal: Soft.   Musculoskeletal:   BUE:  ROM:   RUE: full.   LUE: full.  Strength:    RUE: 4/5 at shoulder abduction, 4+ elbow flexion, 4+ elbow extension, 4+ hand .   LUE: 4/5 at shoulder abduction, 4+ elbow flexion, 4+ elbow extension, 4+ hand .  Sensation to pinprick:   RUE: intact.   LUE: intact.  DTR:    RUE: +1 biceps, +1 triceps.   LUE:  +1 biceps, +1 triceps.      BLE:  ROM:   RLE: full.   LLE: full.  Strength:    RLE: 4+/5 at hip flexion, 4+ knee extension, 4+ ankle DF, 4+ ankle PF.   LLE: 4+/5 at hip flexion, 4+ knee extension, 4+ ankle DF,  4+ ankle PF.  Sensation to pinprick:     RLE: intact.      LLE: intact.   DTR:     RLE: +1 knee, +1 ankle.    LLE: +1 knee, +1 ankle.  Mild BLE edema.    -ve tenderness over lumbar spine.     Neurological: She is alert and oriented to person, place, and time.   Skin: Skin is warm.   Psychiatric: She has a normal mood and affect.   Vitals reviewed.      Assessment:       1. MS (multiple sclerosis)    2. Impaired mobility and ADLs    3. Gait disorder    4. Frequent falls    5. Bilateral leg edema        Plan:       - A prescription for a manual wheelchair was generated, with elevating/swing away leg rests due to edema, desk length/removal arm rests for easy year feeding and desktop activities, and gel cushion due to weakness/fatigue and inability to do efficient pressure reliefs consistently.  - She can return to the [facility] Clinic as needed.

## 2020-02-07 ENCOUNTER — OFFICE VISIT (OUTPATIENT)
Dept: FAMILY MEDICINE | Facility: CLINIC | Age: 70
End: 2020-02-07
Payer: MEDICARE

## 2020-02-07 VITALS
TEMPERATURE: 98 F | WEIGHT: 139 LBS | HEIGHT: 64 IN | BODY MASS INDEX: 23.73 KG/M2 | SYSTOLIC BLOOD PRESSURE: 124 MMHG | HEART RATE: 85 BPM | DIASTOLIC BLOOD PRESSURE: 68 MMHG

## 2020-02-07 DIAGNOSIS — F39 UNSPECIFIED MOOD (AFFECTIVE) DISORDER: ICD-10-CM

## 2020-02-07 DIAGNOSIS — D69.6 DECREASED PLATELET COUNT: ICD-10-CM

## 2020-02-07 DIAGNOSIS — R56.9 SEIZURES: ICD-10-CM

## 2020-02-07 DIAGNOSIS — H65.01 RIGHT ACUTE SEROUS OTITIS MEDIA, RECURRENCE NOT SPECIFIED: Primary | ICD-10-CM

## 2020-02-07 PROCEDURE — 1101F PT FALLS ASSESS-DOCD LE1/YR: CPT | Mod: CPTII,S$GLB,, | Performed by: NURSE PRACTITIONER

## 2020-02-07 PROCEDURE — 1125F PR PAIN SEVERITY QUANTIFIED, PAIN PRESENT: ICD-10-PCS | Mod: S$GLB,,, | Performed by: NURSE PRACTITIONER

## 2020-02-07 PROCEDURE — 3074F SYST BP LT 130 MM HG: CPT | Mod: CPTII,S$GLB,, | Performed by: NURSE PRACTITIONER

## 2020-02-07 PROCEDURE — 99999 PR PBB SHADOW E&M-EST. PATIENT-LVL III: ICD-10-PCS | Mod: PBBFAC,,, | Performed by: NURSE PRACTITIONER

## 2020-02-07 PROCEDURE — 1125F AMNT PAIN NOTED PAIN PRSNT: CPT | Mod: S$GLB,,, | Performed by: NURSE PRACTITIONER

## 2020-02-07 PROCEDURE — 3078F DIAST BP <80 MM HG: CPT | Mod: CPTII,S$GLB,, | Performed by: NURSE PRACTITIONER

## 2020-02-07 PROCEDURE — 99499 RISK ADDL DX/OHS AUDIT: ICD-10-PCS | Mod: S$GLB,,, | Performed by: NURSE PRACTITIONER

## 2020-02-07 PROCEDURE — 3078F PR MOST RECENT DIASTOLIC BLOOD PRESSURE < 80 MM HG: ICD-10-PCS | Mod: CPTII,S$GLB,, | Performed by: NURSE PRACTITIONER

## 2020-02-07 PROCEDURE — 1159F MED LIST DOCD IN RCRD: CPT | Mod: S$GLB,,, | Performed by: NURSE PRACTITIONER

## 2020-02-07 PROCEDURE — 99214 OFFICE O/P EST MOD 30 MIN: CPT | Mod: S$GLB,,, | Performed by: NURSE PRACTITIONER

## 2020-02-07 PROCEDURE — 1159F PR MEDICATION LIST DOCUMENTED IN MEDICAL RECORD: ICD-10-PCS | Mod: S$GLB,,, | Performed by: NURSE PRACTITIONER

## 2020-02-07 PROCEDURE — 99999 PR PBB SHADOW E&M-EST. PATIENT-LVL III: CPT | Mod: PBBFAC,,, | Performed by: NURSE PRACTITIONER

## 2020-02-07 PROCEDURE — 99214 PR OFFICE/OUTPT VISIT, EST, LEVL IV, 30-39 MIN: ICD-10-PCS | Mod: S$GLB,,, | Performed by: NURSE PRACTITIONER

## 2020-02-07 PROCEDURE — 1101F PR PT FALLS ASSESS DOC 0-1 FALLS W/OUT INJ PAST YR: ICD-10-PCS | Mod: CPTII,S$GLB,, | Performed by: NURSE PRACTITIONER

## 2020-02-07 PROCEDURE — 3074F PR MOST RECENT SYSTOLIC BLOOD PRESSURE < 130 MM HG: ICD-10-PCS | Mod: CPTII,S$GLB,, | Performed by: NURSE PRACTITIONER

## 2020-02-07 PROCEDURE — 99499 UNLISTED E&M SERVICE: CPT | Mod: S$GLB,,, | Performed by: NURSE PRACTITIONER

## 2020-02-07 NOTE — PATIENT INSTRUCTIONS

## 2020-02-07 NOTE — PROGRESS NOTES
Subjective:       Patient ID: Alyssa John is a 69 y.o. female.    Chief Complaint: Otalgia    Otalgia    There is pain in the right ear. This is a new problem. The current episode started in the past 7 days. The problem occurs constantly. The problem has been unchanged. There has been no fever. The pain is at a severity of 4/10. The pain is mild. Pertinent negatives include no coughing, ear discharge, headaches, hearing loss, neck pain, rash, rhinorrhea, sore throat or vomiting. She has tried nothing for the symptoms.       Past Medical History:   Diagnosis Date    Arthritis     Coronary artery disease     Encounter for blood transfusion     Epilepsy     GERD (gastroesophageal reflux disease)     Hypertension     MI, old     MS (multiple sclerosis)     Seizures     epilepsy       Review of patient's allergies indicates:   Allergen Reactions    Codeine      Other reaction(s): throat tightness    Dilantin [phenytoin sodium extended]     Phenobarbital     Tegretol [carbamazepine]          Current Outpatient Medications:     atorvastatin (LIPITOR) 40 MG tablet, TAKE 1 TABLET BY MOUTH EVERY DAY, Disp: 90 tablet, Rfl: 0    cholecalciferol, vitamin D3, (VITAMIN D3) 5,000 unit Tab, Take 5,000 Units by mouth once daily., Disp: , Rfl:     clonazePAM (KLONOPIN) 1 MG tablet, TAKE 1 TABLET BY MOUTH TWICE A DAY, Disp: 180 tablet, Rfl: 1    clopidogrel (PLAVIX) 75 mg tablet, Take 75 mg by mouth once daily.  , Disp: , Rfl:     doxycycline (VIBRA-TABS) 100 MG tablet, Take 1 tablet (100 mg total) by mouth 2 (two) times daily. for 14 days, Disp: 28 tablet, Rfl: 0    DULoxetine (CYMBALTA) 20 MG capsule, TAKE 1 CAPSULE BY MOUTH EVERY DAY, Disp: 90 capsule, Rfl: 1    glatiramer (COPAXONE) 40 mg/mL injection, Inject 40 mg into the skin 3 (three) times a week., Disp: 36 Syringe, Rfl: 1    modafinil (PROVIGIL) 100 MG Tab, TAKE 1 TABLET BY MOUTH TWICE A DAY, Disp: 180 tablet, Rfl: 0    nitroGLYCERIN  (NITROSTAT) 0.4 MG SL tablet, Place 0.4 mg under the tongue every 5 (five) minutes as needed for Chest pain. , Disp: , Rfl:     PSYLLIUM SEED, WITH DEXTROSE, (FIBER ORAL), Take by mouth 2 (two) times daily., Disp: , Rfl:     topiramate (TOPAMAX) 50 MG tablet, TAKE 1 TABLET BY MOUTH TWICE A DAY, Disp: 180 tablet, Rfl: 1    cetirizine (ZYRTEC) 10 MG tablet, Take 1 tablet (10 mg total) by mouth once daily. (Patient taking differently: Take 10 mg by mouth daily as needed for Allergies or Rhinitis. ), Disp: 30 tablet, Rfl: 2    famotidine (PEPCID) 20 MG tablet, Take 20 mg by mouth 2 (two) times daily as needed for Heartburn., Disp: , Rfl:     fluticasone (FLONASE) 50 mcg/actuation nasal spray, 2 sprays (100 mcg total) by Each Nare route 2 (two) times daily. (Patient taking differently: 2 sprays by Each Nare route 2 (two) times daily as needed for Rhinitis. ), Disp: 1 Bottle, Rfl: 0    neomycin-polymyxin-hydrocortisone (CORTISPORIN) 3.5-10,000-1 mg/mL-unit/mL-% otic suspension, Place 3 drops into the right ear 4 (four) times daily. for 7 days, Disp: 6 mL, Rfl: 0    sulfacetaminde-prednisolone 10-0.2% (BLEPHAMIDE) 10-0.2 % Oint, Place into both eyes every evening., Disp: 1 Tube, Rfl: 0    VENTOLIN HFA 90 mcg/actuation inhaler, INHALE 2 PUFFS INTO THE LUNGS EVERY 6 (SIX) HOURS AS NEEDED FOR WHEEZING. RESCUE (Patient not taking: Reported on 11/21/2019), Disp: 1 Inhaler, Rfl: 1    Review of Systems   Constitutional: Negative for unexpected weight change.   HENT: Positive for ear pain. Negative for ear discharge, hearing loss, rhinorrhea, sore throat and trouble swallowing.    Eyes: Negative for visual disturbance.   Respiratory: Negative for cough and shortness of breath.    Cardiovascular: Negative for chest pain, palpitations and leg swelling.   Gastrointestinal: Negative for blood in stool and vomiting.   Genitourinary: Negative for hematuria.   Musculoskeletal: Negative for neck pain.   Skin: Negative for rash.  "  Allergic/Immunologic: Negative for immunocompromised state.   Neurological: Negative for headaches.   Hematological: Does not bruise/bleed easily.   Psychiatric/Behavioral: Negative for agitation. The patient is not nervous/anxious.        Objective:      /68 (BP Location: Right arm, Patient Position: Sitting, BP Method: Medium (Manual))   Pulse 85   Temp 97.5 °F (36.4 °C) (Oral)   Ht 5' 3.5" (1.613 m)   Wt 63 kg (139 lb)   BMI 24.24 kg/m²   Physical Exam   Constitutional: She is oriented to person, place, and time. She appears well-developed and well-nourished.   Eyes: Pupils are equal, round, and reactive to light. EOM are normal.   Neck: Normal range of motion.   Cardiovascular: Normal rate, regular rhythm and normal heart sounds.   Pulmonary/Chest: Effort normal and breath sounds normal.   Abdominal: Soft. Bowel sounds are normal.   Musculoskeletal: Normal range of motion.   Neurological: She is alert and oriented to person, place, and time.   Skin: Skin is warm and dry.   Psychiatric: She has a normal mood and affect. Her behavior is normal. Judgment and thought content normal.       Assessment:       1. Right acute serous otitis media, recurrence not specified    2. Decreased platelet count    3. Unspecified mood (affective) disorder    4. Seizures    5. BMI 24.0-24.9, adult        Plan:       Right acute serous otitis media, recurrence not specified  -     Discontinue: ciprofloxacin-hydrocortisone 0.2-1% (CIPRO HC OTIC) otic suspension; Place 3 drops into the left ear 2 (two) times daily.  Dispense: 10 mL; Refill: 1    Decreased platelet count  Stable,   Unspecified mood (affective) disorder  Stable, continue medication  Seizures  Stable, continue medication  BMI 24.0-24.9, adult  Healthy weight in patinet        Time spent with patient: 20 minutes    Patient with be reevaluated in 3 months or sooner prn    Greater than 50% of this visit was spent counseling as described in above " documentation:Yes

## 2020-02-08 ENCOUNTER — TELEPHONE (OUTPATIENT)
Dept: FAMILY MEDICINE | Facility: CLINIC | Age: 70
End: 2020-02-08

## 2020-02-08 DIAGNOSIS — H65.01 RIGHT ACUTE SEROUS OTITIS MEDIA, RECURRENCE NOT SPECIFIED: Primary | ICD-10-CM

## 2020-02-08 NOTE — TELEPHONE ENCOUNTER
----- Message from Renate Carrasco sent at 2/8/2020  8:16 AM CST -----  Contact: Joseph  Type: Needs Medical Advice    Who Called:  Patient's  Joseph  Symptoms (please be specific):    How long has patient had these symptoms:    Pharmacy name and phone #:  [facility]  Best Call Back Number: [phone]  Additional Information: requesting a call back stated ear drop medication is too expensive requesting a cheaper medication

## 2020-02-10 RX ORDER — NEOMYCIN SULFATE, POLYMYXIN B SULFATE AND HYDROCORTISONE 10; 3.5; 1 MG/ML; MG/ML; [USP'U]/ML
3 SUSPENSION/ DROPS AURICULAR (OTIC) 4 TIMES DAILY
Qty: 6 ML | Refills: 0 | Status: SHIPPED | OUTPATIENT
Start: 2020-02-10 | End: 2020-02-17

## 2020-02-11 ENCOUNTER — CLINICAL SUPPORT (OUTPATIENT)
Dept: REHABILITATION | Facility: HOSPITAL | Age: 70
End: 2020-02-11
Attending: PHYSICIAN ASSISTANT
Payer: MEDICARE

## 2020-02-11 DIAGNOSIS — Z74.09 IMPAIRED FUNCTIONAL MOBILITY, BALANCE, AND ENDURANCE: ICD-10-CM

## 2020-02-11 DIAGNOSIS — Z78.9 IMPAIRED INSTRUMENTAL ACTIVITIES OF DAILY LIVING (IADL): ICD-10-CM

## 2020-02-11 DIAGNOSIS — R27.8 DECREASED COORDINATION: ICD-10-CM

## 2020-02-11 DIAGNOSIS — R26.89 BALANCE PROBLEM: ICD-10-CM

## 2020-02-11 DIAGNOSIS — Z78.9 IMPAIRED MOBILITY AND ADLS: Primary | ICD-10-CM

## 2020-02-11 DIAGNOSIS — Z74.09 IMPAIRED MOBILITY AND ADLS: Primary | ICD-10-CM

## 2020-02-11 DIAGNOSIS — R53.1 WEAKNESS GENERALIZED: ICD-10-CM

## 2020-02-11 PROCEDURE — 97530 THERAPEUTIC ACTIVITIES: CPT | Mod: PN

## 2020-02-11 PROCEDURE — 97116 GAIT TRAINING THERAPY: CPT | Mod: PN,CQ,59

## 2020-02-11 PROCEDURE — 97110 THERAPEUTIC EXERCISES: CPT | Mod: PN,CQ

## 2020-02-11 NOTE — PROGRESS NOTES
"Occupational Therapy- Treatment     Date:  02/11/2020  Start Time:  1300    Stop Time:  1400    TIMED  Procedure Time Min.   TherAct (4) Start:  Stop: 55   Total Timed Minutes:  55  Total Timed Units:  4  Total Untimed Units: 0  Charges Billed/# of units:  4    Progress/Current Status    Subjective:     Patient ID: Alyssa John is a 69 y.o. female.  Diagnosis: MS  1. Impaired mobility and ADLs     2. Weakness generalized     3. Decreased coordination     4. Impaired instrumental activities of daily living (IADL)     5. Balance problem       Pain: No c/o pain today.   Patient reports: Feeling "much better" today (as she missed last visit for an earache.) She reports doing her yoga exercises only on one day due not feeling well overall since last visit with earache.  Functional change: "I helped Giuseppe with the dishes."    Concerns since last visit: she c/o fatigue after tx Thursday.     Objective:     T/F w/c<>mat w/ CGA and RW, cues to stand tall.    Seated at EOM, OT reviewed the seated "yoga flow" for 6 different positions, including hamstring stretch, baby back bend, thoracic rotation, crescent lunge (previously referred to as the modified Warrior I), side angle pose and spinal extension. She held each position for a count of 10, except for spinal extension for 5. She was encouraged to breathe throughout.    She completed each of these in order with increased time.  However, after 1.5 sets, she c/o fatigue.     Mod cues for positioning for hamstring stretching to increase anterior pelvic tilt and keeping back straight when leaning down and not to round into stretch.  Verbal encouragement for maximizing effort and reach during baby back bend.  Used block for front hand to increase upright positioning during thoracic rotation.  Mod A for crescent lunge  Mod cues for best positioning during side angle pose.  Min verbal cues for safest positioning during spinal extension.    Due to her c/o fatigue, we " completed the session playing jenga w/ mod cues for postural control (including R/L weight shift) and Alyssa alternating hands each time it was her turn.    Assessment:      Decreased tolerance for yoga flow compared to last visit, but she has been ill and thus, no compliant with her HEP. She does appear to continue to enjoy the exercises and can continue using them to improve self-awareness, core control, posture and readiness for all other activities. Furthermore, despite being ill, she does report increased function at home, assisting with washing the dishes!    Alyssa will continue to benefit from skilled therapy services to increase daily function and fulfill rewarding occupational roles at home. Continuation of therapy at this point is medically necessary to increase postural control during ADLs, increasing safety and decreasing risk of fall.     PROBLEM LIST/IMPAIRMENTS:   weakness, impaired endurance, impaired self care skills, impaired functional mobility, gait instability, impaired balance, impaired cognition, decreased coordination and decreased upper extremity function     LTG GOALS:    1) Pt will be mod I with dressing for clothing except for fasteners (buttons/zippers/hooks) (progressing 2/11/2020)  2) Pt will be supervision with bathing, seated; SBA for TTB t/f. (progressing 2/11/2020)  3) Pt will be mod I for toileting and related t/f. (progressing 2/11/2020)  4) Pt will improve GMC, as evidenced by box and block scores of 45 or better on pavan UE. (progressing 2/11/2020)  5) Pt will participate in at least one IADL task in the home on a daily basis (dishes, laundry, etc) (progressing 2/11/2020)  6) Pt to improve FMC bilaterally, as evidenced by a 5 sec improvement in times on the 9HPT - R=40s or better; L=45s or better. (progressing 2/11/2020)      STG Goals:   1) Alyssa will be mod I with initial HEP. (MET with assistance of spouse)  2) Pt will t/f to toilet w/ min A or better. (MET)  3) Alyssa will  "be able to stand and manage her clothing for toileting w/ CGA (progressing 2/11/2020 )  4) Mandeep UE GMC to improve, as evidenced by box & block scores of 31 or better, mandeep UE. (MET)    New Goals (to be achieved by end of cert. Period):   1) Alyssa will complete bathroom mobility for toileting w/ SBA and RW. (progressing 2/11/2020)  2) Alyssa will be able to stand w/ SBA and unilateral support for 10 minutes to increase indep w/ IADLs. (progressing 2/11/2020)  3) Alyssa will be able to complete a 5-pose flow of chair yoga with mod I to increase core control. (progressing 2/11/2020 )      Patient Education/Response:     Written HEP given for all yoga exs at last visit. See "patient instructions."   Progress toward goals; also continue HEP as previous. DO NOT ATTEMPT kneeling or quadruped.   Ed pt and spouse to work on making a scheduled/ dedicating a time at home to work on HEP, since she was fairly vague about her daily routine in today's session.    Plans and Goals:     Continue yoga 'flow' of the 6 exercises and work on remembering routine. LB dressing. Repeat practice for toileting hygiene.     Sameera Montague, OT  2/11/2020   "

## 2020-02-11 NOTE — PROGRESS NOTES
Name: Alyssa John  [facility] Number: [id number]  Date of Treatment: 02/11/2020   Diagnosis:   Encounter Diagnosis   Name Primary?    Impaired functional mobility, balance, and endurance        Time in: 1400  Time Out: 1500  Total Treatment Time: 60      Subjective:    Alyssa reports feeling good after OT visit, reports wanting to walk after doing yoga in OT. Patient reports their pain to be 0/10 on a 0-10 scale with 0 being no pain and 10 being the worst pain imaginable.    Objective    Patient received individual therapy to increase strength, endurance, ROM and flexibility with 0 patients with activities as follows:     Alyssa received therapeutic exercises to develop strength, endurance, ROM, flexibility and posture for 50 minutes including:     Transfer transport w/c<>stationary bike min/mod A with cues for sequencing: scooting to edge of seat, hand and feet placement, weight translation to shift feet   Stationary bike x 15 min L 1.5 seat position 1, verbal and tactile cues for midline sitting  Shuttle leg press squats 37# x 30 B LE's    Gait x 10' with RW and CGA/Min A 35ft, VC's to advance L LE step and shorten R LE step to advance in RW. Sit to stand from  with gait x 4 with Min/Mod A      Assessment:   L foot without slipping from pedal, became fatigued with gait which was performed before bike to focus on gait and save energy for bike, quickly fatigued after gait and bike, and after shuttle patient was transferred back to  with mod A with increased fatigue.     Pt will continue to benefit from skilled PT intervention. Medical Necessity is demonstrated by:  Fall Risk, Unable to participate fully in daily activities, Requires skilled supervision to complete and progress HEP and Weakness.    Patient is making good progress towards established goals.      Plan:  Continue with established Plan of Care towards PT goals.

## 2020-02-13 ENCOUNTER — CLINICAL SUPPORT (OUTPATIENT)
Dept: REHABILITATION | Facility: HOSPITAL | Age: 70
End: 2020-02-13
Attending: PHYSICIAN ASSISTANT
Payer: MEDICARE

## 2020-02-13 DIAGNOSIS — Z74.09 IMPAIRED FUNCTIONAL MOBILITY, BALANCE, AND ENDURANCE: Primary | ICD-10-CM

## 2020-02-13 DIAGNOSIS — Z74.09 IMPAIRED FUNCTIONAL MOBILITY, BALANCE, AND ENDURANCE: ICD-10-CM

## 2020-02-13 PROCEDURE — 97530 THERAPEUTIC ACTIVITIES: CPT | Mod: PN,CO

## 2020-02-13 PROCEDURE — 97110 THERAPEUTIC EXERCISES: CPT | Mod: PN,CQ

## 2020-02-13 PROCEDURE — 97116 GAIT TRAINING THERAPY: CPT | Mod: PN,CQ

## 2020-02-13 NOTE — PROGRESS NOTES
"Name: Alyssa John  [facility] Number: [id number]  Date of Treatment: 02/13/2020   Diagnosis:   Encounter Diagnosis   Name Primary?    Impaired functional mobility, balance, and endurance        Time in: 1400  Time Out: 1500  Total Treatment Time: 60      Subjective:    Alyssa reports feeling good after OT visit, reports wanting to walk after doing yoga in OT. Patient reports their pain to be 0/10 on a 0-10 scale with 0 being no pain and 10 being the worst pain imaginable.    Objective    Patient received individual therapy to increase strength, endurance, ROM and flexibility with 0 patients with activities as follows:     Alyssa received therapeutic exercises to develop strength, endurance, ROM, flexibility and posture for 45 minutes including:     Transfer transport w/c<>stationary bike min/mod A with cues for sequencing: scooting to edge of seat, hand and feet placement, weight translation to shift feet   Stationary bike x 15 min L 1.5 seat position 1, verbal and tactile cues for midline sitting for core stability and endurance improvement  Shuttle leg press squats 37# x 30 B LE's    Wheelchair mobility with alternating UE/LE assistance alternating x 100ft    Patient participated in gait training x 15' as follows:  Gait x 357', 30' with RW and CGA VC's to advance L LE step and shorten R LE step to advance in RW.  Ascend 6 4" steps, descend 4 6" steps with Min A for safety    Sit to stand from WC with gait x 4 with Min/Mod A 2* on fatigue level    Assessment:   L foot without slipping from pedal x 1, gait performed before bike again this visit.  Patient with significantly increased endurance and safety.  Remains requiring WC follow, and CGA/VC's to advance L LE fwd of R LE and to extend LE's as to not walk with B knees in flexion and IR.  Tolerated steps with min A for safety.  Patient highly motivated with todays progress. Decreased rest breaks required today.    Pt will continue to benefit from skilled PT " intervention. Medical Necessity is demonstrated by:  Fall Risk, Unable to participate fully in daily activities, Requires skilled supervision to complete and progress HEP and Weakness.    Patient is making good progress towards established goals.      Plan:  Continue with established Plan of Care towards PT goals.

## 2020-02-14 NOTE — PROGRESS NOTES
"Occupational Therapy- Treatment     Date:  02/13/2020  Start Time:  1308    Stop Time:  1402    TIMED  Procedure Time Min.   TherAct (3) Start:  Stop: 50   Total Timed Minutes:  50  Total Timed Units:  3  Total Untimed Units: 0  Charges Billed/# of units:  3    Progress/Current Status    Subjective:     Patient ID: Alyssa John is a 69 y.o. female.  Diagnosis: MS  1. Impaired functional mobility, balance, and endurance       Pain: No c/o pain today.   Patient reports: "I really like my yoga flow! I feel accomplished, though it is so hard to remember the order."  Functional change: nothing new   Concerns since last visit: none.     Objective:     T/F w/c<>mat w/ CGA and RW, no VC today! Much improved!    Seated at EOM, RAMOS reviewed the seated "yoga flow" for 6 different positions, including hamstring stretch, baby back bend, thoracic rotation, crescent lunge (previously referred to as the modified Warrior I), side angle pose and spinal extension. She held each position for a count of 10, except for spinal extension for 5. She was encouraged to breathe throughout. Alyssa required clues/cues to remind her of the next position in her flow.  She completed two full flows, with increased time and verbal and tactile cues to obtain correct positioning; during the last flow increased her spinal extension to 10sec both times.    -Used block for front hand to increase upright positioning during thoracic rotation; required VC each time to only place front hand on block and keep  aligned with trunk.  -Min A for crescent lunge- POC at hips to facilitate full rotation.  -Min cues for best positioning during side angle pose.  -Min verbal cues for safest positioning during spinal extension.    -2 rounds of memory using 28 cards each trial to improve memory recall for seated yoga flow. 2nd trial improved by winning 7 pairs as opposed to first trial of winning 3 pairs.  -transitioned to PT from EOM.    Assessment:    "    Increased tolerance for yoga flow compared to last visit. Alyssa required Mod VC to find midline throughout session and instructed to look in mirror to self correct, especially while seated EOM for memory games. She verbally expressed her enjoyment of the exercises and can continue using them to improve self-awareness, core control, posture and readiness for all other activities. Yoga Flow handout was given to Alyssa, she read and reviewed instructions/positions with RAMOS.    Alyssa will continue to benefit from skilled therapy services to increase daily function and fulfill rewarding occupational roles at home. Continuation of therapy at this point is medically necessary to increase postural control during ADLs, increasing safety and decreasing risk of fall.     PROBLEM LIST/IMPAIRMENTS:   weakness, impaired endurance, impaired self care skills, impaired functional mobility, gait instability, impaired balance, impaired cognition, decreased coordination and decreased upper extremity function     LTG GOALS:    1) Pt will be mod I with dressing for clothing except for fasteners (buttons/zippers/hooks) (progressing 2/14/2020)  2) Pt will be supervision with bathing, seated; SBA for TTB t/f. (progressing 2/14/2020)  3) Pt will be mod I for toileting and related t/f. (progressing 2/14/2020)  4) Pt will improve GMC, as evidenced by box and block scores of 45 or better on pavan UE. (progressing 2/14/2020)  5) Pt will participate in at least one IADL task in the home on a daily basis (dishes, laundry, etc) (progressing 2/14/2020)  6) Pt to improve FMC bilaterally, as evidenced by a 5 sec improvement in times on the 9HPT - R=40s or better; L=45s or better. (progressing 2/14/2020)      STG Goals:   1) Alyssa will be mod I with initial HEP. (MET with assistance of spouse)  2) Pt will t/f to toilet w/ min A or better. (MET)  3) Alyssa will be able to stand and manage her clothing for toileting w/ CGA (progressing 2/14/2020  ")  4) Mandeep UE GMC to improve, as evidenced by box & block scores of 31 or better, mandeep UE. (MET)    New Goals (to be achieved by end of cert. Period):   1) Alyssa will complete bathroom mobility for toileting w/ SBA and RW. (progressing 2/14/2020)  2) Alyssa will be able to stand w/ SBA and unilateral support for 10 minutes to increase indep w/ IADLs. (progressing 2/14/2020)  3) Alyssa will be able to complete a 5-pose flow of chair yoga with mod I to increase core control. (progressing 2/14/2020 )      Patient Education/Response:     Written HEP given for all yoga exs at last visit. See "patient instructions."   Progress toward goals; also continue HEP as previous. DO NOT ATTEMPT kneeling or quadruped.   Ed pt and spouse to work on making a scheduled/ dedicating a time at home to work on HEP, since she was fairly vague about her daily routine in today's session.    Plans and Goals:     Continue yoga 'flow' of the 6 exercises and work on remembering routine. LB dressing. Repeat practice for toileting hygiene.     BUCKY Umana  2/13/2020   "

## 2020-02-17 PROBLEM — F39 UNSPECIFIED MOOD (AFFECTIVE) DISORDER: Status: ACTIVE | Noted: 2020-02-17

## 2020-02-17 PROBLEM — D69.6 DECREASED PLATELET COUNT: Status: ACTIVE | Noted: 2020-02-17

## 2020-02-18 ENCOUNTER — CLINICAL SUPPORT (OUTPATIENT)
Dept: REHABILITATION | Facility: HOSPITAL | Age: 70
End: 2020-02-18
Attending: PHYSICIAN ASSISTANT
Payer: MEDICARE

## 2020-02-18 DIAGNOSIS — Z74.09 IMPAIRED FUNCTIONAL MOBILITY, BALANCE, AND ENDURANCE: Primary | ICD-10-CM

## 2020-02-18 DIAGNOSIS — Z74.09 IMPAIRED FUNCTIONAL MOBILITY, BALANCE, AND ENDURANCE: ICD-10-CM

## 2020-02-18 PROCEDURE — 97530 THERAPEUTIC ACTIVITIES: CPT | Mod: PN,CO

## 2020-02-18 PROCEDURE — 97110 THERAPEUTIC EXERCISES: CPT | Mod: PN

## 2020-02-18 NOTE — PROGRESS NOTES
"Occupational Therapy- Treatment     Date:  02/18/2020  Start Time:  1310   Stop Time:  1355    TIMED  Procedure Time Min.   TherAct (3) Start:  Stop: 45   Total Timed Minutes:  45  Total Timed Units:  3  Total Untimed Units: 0  Charges Billed/# of units:  3    Progress/Current Status    Subjective:     Patient ID: Alyssa John is a 69 y.o. female.  Diagnosis: MS  1. Impaired functional mobility, balance, and endurance       Pain: No c/o pain today.   Patient reports: "I stand the most when I am at therapy."  Functional change: I am helping Giuseppe with the laundry.  Concerns since last visit: none.     Objective:     T/F w/c<>mat w/ CGA and RW, no VC today! Much improved!    Seated at EOM, RACHEL reviewed the seated "yoga flow" for 6 different positions, including hamstring stretch, baby back bend, thoracic rotation, crescent lunge (previously referred to as the modified Warrior I), side angle pose and spinal extension. She held each position for a count of 10 and spinal extension for a count of 15. She was encouraged to breathe throughout. Alyssa required clues/cues to remind her of the next position in her flow.  She completed two full flows, with increased time and verbal and tactile cues to obtain correct positioning.    -Used block for front hand to increase upright positioning during thoracic rotation; required VC each time to only place front hand on block and keep  aligned with trunk.  -Min A for crescent lunge- POC at hips to facilitate full rotation, VC for outside raised arm in forward flexion and inside support arm on the mat closer to trunk to facilitate lengthening up tall through the spine during the pose.  -Mod cues for best positioning during side angle pose (Alyssa keeps trying to cross support arm to opposite thigh and is not opening hips wide enough).  -Min verbal cues for safest positioning during spinal extension.    -transferred to PT from EOM.    Assessment:       Alyssa's first " flow (to the R and then to the L) was more automatic and required less corrections/VC for proper technique. Before she entered into her second full flow RAMOS lead her in PLB at EOM with visualization of the flow.The second flow proved to be more time consuming and difficult to compete as Alyssa was over thinking it. Alyssa required Mod VC to find midline throughout session and instructed to look in mirror to self correct, especially while seated EOM after each spinal extension. Alyssa continues to enjoy and benefit from the flow exercises as she increases core stability, strength, control, and balance, as well as UE and LE meaningful function and mobility. Her static and synamic posture is improving. She reports that she is helping with the Laundry in her lap and that some one pushes her in her transport chair to the machines to carry out the laundering task. She insists that due to the falls she has had in the house with the RW on the carpet her  and sister do not like her to use the RW in the house. She stated there are plans to replace the carpet when finances allow.    Alyssa will continue to benefit from skilled therapy services to increase daily function and fulfill rewarding occupational roles at home. Continuation of therapy at this point is medically necessary to increase postural control during ADLs, increasing safety and decreasing risk of fall.     PROBLEM LIST/IMPAIRMENTS:   weakness, impaired endurance, impaired self care skills, impaired functional mobility, gait instability, impaired balance, impaired cognition, decreased coordination and decreased upper extremity function     LTG GOALS:    1) Pt will be mod I with dressing for clothing except for fasteners (buttons/zippers/hooks) (progressing 2/18/2020)  2) Pt will be supervision with bathing, seated; SBA for TTB t/f. (progressing 2/18/2020)  3) Pt will be mod I for toileting and related t/f. (progressing 2/18/2020)  4) Pt will improve GMC,  "as evidenced by box and block scores of 45 or better on mandeep UE. (progressing 2/18/2020)  5) Pt will participate in at least one IADL task in the home on a daily basis (dishes, laundry, etc) (progressing 2/18/2020)  6) Pt to improve FMC bilaterally, as evidenced by a 5 sec improvement in times on the 9HPT - R=40s or better; L=45s or better. (progressing 2/18/2020)      STG Goals:   1) Alyssa will be mod I with initial HEP. (MET with assistance of spouse)  2) Pt will t/f to toilet w/ min A or better. (MET)  3) Alyssa will be able to stand and manage her clothing for toileting w/ CGA (progressing 2/18/2020 )  4) Mandeep UE GMC to improve, as evidenced by box & block scores of 31 or better, mandeep UE. (MET)    New Goals (to be achieved by end of cert. Period):   1) Alyssa will complete bathroom mobility for toileting w/ SBA and RW. (progressing 2/18/2020)  2) Alyssa will be able to stand w/ SBA and unilateral support for 10 minutes to increase indep w/ IADLs. (progressing 2/18/2020)  3) Alyssa will be able to complete a 5-pose flow of chair yoga with mod I to increase core control. (progressing 2/18/2020 )      Patient Education/Response:     Written HEP given for all yoga exs at last visit. See "patient instructions."   Progress toward goals; also continue HEP as previous. DO NOT ATTEMPT kneeling or quadruped.   Ed pt and spouse to work on making a scheduled/ dedicating a time at home to work on HEP, since she was fairly vague about her daily routine in today's session.    Plans and Goals:     Continue yoga 'flow' of the 6 exercises and work on remembering routine. LB dressing. Repeat practice for toileting hygiene.     BUCKY Umana  2/18/2020   "

## 2020-02-19 NOTE — PLAN OF CARE
PHYSICAL THERAPY UPDATED PLAN OF CARE    Alyssa John  2020    Onset Date:  2019  SOC Date:   2019  Primary Diagnosis:    Problem List Items Addressed This Visit     Impaired functional mobility, balance, and endurance        Treatment Diagnosis:  Functional mobility/gait deficits  Precautions:  falls  Visits from SOC:  22  Functional Level Prior to SOC:  Unable to walk, assistance for transfers, difficulty with sitting balance/trunk control, tendency to lean backwards when standing    Updated Assessment:    S: Continues to improve with transfers and balance at home per patient and . Patient states she still has a lot of trouble walking at home due to carpets. No falls since last update.       O:  Reassessment performed for POC update purposes:     LE strength:  R hip flex 4/5, knee ext 4-/5, knee flex 4/5, ankle DF 3+/5  L hip flex 4-/5, knee ext 3+/5, knee flex 4-/5, ankle DF 3/5    Tinetti combined score 3/28=high fall risk     Patient participating in therapeutic exercise as follows to address strength/balance/endurance for 41 minutes:     Transfer w/c<>stationary bike min/mod A with cues for sequencing, weight translation  Stationary bike L1.5 x 15 min with verbal/tactile cues for midline sitting  Gait trainin ft CGA using RW, w/c follow for safety, cues for L foot clearance and posture     A: Patient has attended 8 out of 12 prescribed visits since last POC update. Pt continues to demonstrate overall improvement with strength and functional mobility with therapy program. Able to ambulate up to 357 ft in previous therapy session. She has also initiated modified stair training. Patient with slowly improving safety awareness and core/trunk strength, contributing to improving balance. Patient may benefit from continued PT to address noted impairments and improve safe functional mobility.     P: Continue as per POC    Goals from Previous POC:  Short term goals:    1) Pt will stand  without posterior lean 5 of 10 trials    2) Pt will improve LE strength 1/2 muscle grade    Long term goals:  1) Pt will perform sit to stand transfer with minimum to contact guard assistance  2) Pt will stand with slight UE support  3) Pt will perform toilet transfer with minimal assistance  4) Pt/family will be independent in HEP.       Previous Short Term Goals Status:   1= partially met/ongoing 2= partially met/ongoing  New Short Term Goals:     1) Pt will stand without posterior lean 5 of 10 trials    2) Pt will perform sit to stand x 5 reps with min A  3) Pt will improve LE strength by 1/2 muscle grade  Long Term Goals:     1) Pt will consistently perform sit to stand transfers with minimum to contact guard assistance  2) Pt will stand with slight UE support  3) Pt will perform toilet transfer with minimal assistance  4) Pt/family will be independent in HEP.  Reasons for Recertification of Therapy:    weakness, impaired endurance, impaired functional mobility, gait instability, impaired balance, decreased lower extremity function and decreased safety awareness    Certification Period: 3/1/2020 to 4/24/2020  Recommended Treatment Plan: 2 times per week for 6 weeks: Gait Training, Manual Therapy, Moist Heat/ Ice, Neuromuscular Re-ed, Orthotic Management and Training, Patient Education, Therapeutic Activites and Therapeutic Exercise      Thank you for referral.    Therapist's Name: Sameera Osborne, PT  02/18/2020    I CERTIFY THE NEED FOR THESE SERVICES FURNISHED UNDER THIS PLAN OF TREATMENT AND WHILE UNDER MY CARE    Physician's comments: ________________________________________________________________________________________________________________________________________________      Physician's Name: ___________________________________

## 2020-02-20 ENCOUNTER — LAB VISIT (OUTPATIENT)
Dept: LAB | Facility: HOSPITAL | Age: 70
End: 2020-02-20
Attending: SPECIALIST
Payer: MEDICARE

## 2020-02-20 ENCOUNTER — CLINICAL SUPPORT (OUTPATIENT)
Dept: REHABILITATION | Facility: HOSPITAL | Age: 70
End: 2020-02-20
Attending: PHYSICIAN ASSISTANT
Payer: MEDICARE

## 2020-02-20 DIAGNOSIS — Z79.899 ENCOUNTER FOR LONG-TERM (CURRENT) USE OF OTHER MEDICATIONS: ICD-10-CM

## 2020-02-20 DIAGNOSIS — Z78.9 IMPAIRED INSTRUMENTAL ACTIVITIES OF DAILY LIVING (IADL): ICD-10-CM

## 2020-02-20 DIAGNOSIS — R27.8 DECREASED COORDINATION: ICD-10-CM

## 2020-02-20 DIAGNOSIS — Z74.09 IMPAIRED MOBILITY AND ADLS: Primary | ICD-10-CM

## 2020-02-20 DIAGNOSIS — E78.9 DISORDER OF LIPOPROTEIN AND LIPID METABOLISM: ICD-10-CM

## 2020-02-20 DIAGNOSIS — R53.1 WEAKNESS GENERALIZED: ICD-10-CM

## 2020-02-20 DIAGNOSIS — R07.89 OTHER CHEST PAIN: ICD-10-CM

## 2020-02-20 DIAGNOSIS — R26.89 BALANCE PROBLEM: ICD-10-CM

## 2020-02-20 DIAGNOSIS — I20.0 INTERMEDIATE CORONARY SYNDROME: Primary | ICD-10-CM

## 2020-02-20 DIAGNOSIS — Z78.9 IMPAIRED MOBILITY AND ADLS: Primary | ICD-10-CM

## 2020-02-20 LAB
ALBUMIN SERPL BCP-MCNC: 3 G/DL (ref 3.5–5.2)
ALP SERPL-CCNC: 142 U/L (ref 55–135)
ALT SERPL W/O P-5'-P-CCNC: 15 U/L (ref 10–44)
ANION GAP SERPL CALC-SCNC: 7 MMOL/L (ref 8–16)
AST SERPL-CCNC: 16 U/L (ref 10–40)
BASOPHILS # BLD AUTO: 0.05 K/UL (ref 0–0.2)
BASOPHILS NFR BLD: 0.9 % (ref 0–1.9)
BILIRUB SERPL-MCNC: 0.3 MG/DL (ref 0.1–1)
BUN SERPL-MCNC: 20 MG/DL (ref 8–23)
CALCIUM SERPL-MCNC: 7.9 MG/DL (ref 8.7–10.5)
CHLORIDE SERPL-SCNC: 111 MMOL/L (ref 95–110)
CHOLEST SERPL-MCNC: 165 MG/DL (ref 120–199)
CHOLEST/HDLC SERPL: 2.8 {RATIO} (ref 2–5)
CO2 SERPL-SCNC: 24 MMOL/L (ref 23–29)
CREAT SERPL-MCNC: 0.7 MG/DL (ref 0.5–1.4)
DIFFERENTIAL METHOD: ABNORMAL
EOSINOPHIL # BLD AUTO: 0.2 K/UL (ref 0–0.5)
EOSINOPHIL NFR BLD: 3.2 % (ref 0–8)
ERYTHROCYTE [DISTWIDTH] IN BLOOD BY AUTOMATED COUNT: 13.5 % (ref 11.5–14.5)
EST. GFR  (AFRICAN AMERICAN): >60 ML/MIN/1.73 M^2
EST. GFR  (NON AFRICAN AMERICAN): >60 ML/MIN/1.73 M^2
GLUCOSE SERPL-MCNC: 81 MG/DL (ref 70–110)
HCT VFR BLD AUTO: 44.6 % (ref 37–48.5)
HDLC SERPL-MCNC: 60 MG/DL (ref 40–75)
HDLC SERPL: 36.4 % (ref 20–50)
HGB BLD-MCNC: 13.5 G/DL (ref 12–16)
IMM GRANULOCYTES # BLD AUTO: 0.02 K/UL (ref 0–0.04)
IMM GRANULOCYTES NFR BLD AUTO: 0.4 % (ref 0–0.5)
LDLC SERPL CALC-MCNC: 84 MG/DL (ref 63–159)
LYMPHOCYTES # BLD AUTO: 1.4 K/UL (ref 1–4.8)
LYMPHOCYTES NFR BLD: 27.2 % (ref 18–48)
MCH RBC QN AUTO: 27.6 PG (ref 27–31)
MCHC RBC AUTO-ENTMCNC: 30.3 G/DL (ref 32–36)
MCV RBC AUTO: 91 FL (ref 82–98)
MONOCYTES # BLD AUTO: 0.3 K/UL (ref 0.3–1)
MONOCYTES NFR BLD: 6 % (ref 4–15)
NEUTROPHILS # BLD AUTO: 3.3 K/UL (ref 1.8–7.7)
NEUTROPHILS NFR BLD: 62.3 % (ref 38–73)
NONHDLC SERPL-MCNC: 105 MG/DL
NRBC BLD-RTO: 0 /100 WBC
PLATELET # BLD AUTO: 176 K/UL (ref 150–350)
PMV BLD AUTO: 10.8 FL (ref 9.2–12.9)
POTASSIUM SERPL-SCNC: 3.9 MMOL/L (ref 3.5–5.1)
PROT SERPL-MCNC: 6 G/DL (ref 6–8.4)
RBC # BLD AUTO: 4.9 M/UL (ref 4–5.4)
SODIUM SERPL-SCNC: 142 MMOL/L (ref 136–145)
TRIGL SERPL-MCNC: 105 MG/DL (ref 30–150)
WBC # BLD AUTO: 5.3 K/UL (ref 3.9–12.7)

## 2020-02-20 PROCEDURE — 85025 COMPLETE CBC W/AUTO DIFF WBC: CPT

## 2020-02-20 PROCEDURE — 97530 THERAPEUTIC ACTIVITIES: CPT | Mod: PN

## 2020-02-20 PROCEDURE — 36415 COLL VENOUS BLD VENIPUNCTURE: CPT | Mod: PO

## 2020-02-20 PROCEDURE — 80053 COMPREHEN METABOLIC PANEL: CPT

## 2020-02-20 PROCEDURE — 80061 LIPID PANEL: CPT

## 2020-02-24 ENCOUNTER — OFFICE VISIT (OUTPATIENT)
Dept: NEUROLOGY | Facility: CLINIC | Age: 70
End: 2020-02-24
Payer: MEDICARE

## 2020-02-24 VITALS
WEIGHT: 138.31 LBS | DIASTOLIC BLOOD PRESSURE: 75 MMHG | HEIGHT: 64 IN | HEART RATE: 77 BPM | BODY MASS INDEX: 23.61 KG/M2 | SYSTOLIC BLOOD PRESSURE: 153 MMHG

## 2020-02-24 DIAGNOSIS — G35 MULTIPLE SCLEROSIS: ICD-10-CM

## 2020-02-24 DIAGNOSIS — R26.9 GAIT DISTURBANCE: ICD-10-CM

## 2020-02-24 DIAGNOSIS — F41.9 ANXIETY: ICD-10-CM

## 2020-02-24 DIAGNOSIS — G89.29 CHRONIC NONINTRACTABLE HEADACHE, UNSPECIFIED HEADACHE TYPE: Primary | ICD-10-CM

## 2020-02-24 DIAGNOSIS — I65.21 INTERNAL CAROTID ARTERY STENOSIS, RIGHT: ICD-10-CM

## 2020-02-24 DIAGNOSIS — R25.1 TREMOR: ICD-10-CM

## 2020-02-24 DIAGNOSIS — Z87.898 HISTORY OF SEIZURE: ICD-10-CM

## 2020-02-24 DIAGNOSIS — Z71.89 COUNSELING REGARDING GOALS OF CARE: ICD-10-CM

## 2020-02-24 DIAGNOSIS — R51.9 CHRONIC NONINTRACTABLE HEADACHE, UNSPECIFIED HEADACHE TYPE: Primary | ICD-10-CM

## 2020-02-24 PROCEDURE — 1126F PR PAIN SEVERITY QUANTIFIED, NO PAIN PRESENT: ICD-10-PCS | Mod: S$GLB,,, | Performed by: PSYCHIATRY & NEUROLOGY

## 2020-02-24 PROCEDURE — 1101F PT FALLS ASSESS-DOCD LE1/YR: CPT | Mod: CPTII,S$GLB,, | Performed by: PSYCHIATRY & NEUROLOGY

## 2020-02-24 PROCEDURE — 1126F AMNT PAIN NOTED NONE PRSNT: CPT | Mod: S$GLB,,, | Performed by: PSYCHIATRY & NEUROLOGY

## 2020-02-24 PROCEDURE — 99499 RISK ADDL DX/OHS AUDIT: ICD-10-PCS | Mod: S$GLB,,, | Performed by: PSYCHIATRY & NEUROLOGY

## 2020-02-24 PROCEDURE — 1101F PR PT FALLS ASSESS DOC 0-1 FALLS W/OUT INJ PAST YR: ICD-10-PCS | Mod: CPTII,S$GLB,, | Performed by: PSYCHIATRY & NEUROLOGY

## 2020-02-24 PROCEDURE — 99215 PR OFFICE/OUTPT VISIT, EST, LEVL V, 40-54 MIN: ICD-10-PCS | Mod: S$GLB,,, | Performed by: PSYCHIATRY & NEUROLOGY

## 2020-02-24 PROCEDURE — 3077F SYST BP >= 140 MM HG: CPT | Mod: CPTII,S$GLB,, | Performed by: PSYCHIATRY & NEUROLOGY

## 2020-02-24 PROCEDURE — 99999 PR PBB SHADOW E&M-EST. PATIENT-LVL IV: ICD-10-PCS | Mod: PBBFAC,,, | Performed by: PSYCHIATRY & NEUROLOGY

## 2020-02-24 PROCEDURE — 1159F MED LIST DOCD IN RCRD: CPT | Mod: S$GLB,,, | Performed by: PSYCHIATRY & NEUROLOGY

## 2020-02-24 PROCEDURE — 99215 OFFICE O/P EST HI 40 MIN: CPT | Mod: S$GLB,,, | Performed by: PSYCHIATRY & NEUROLOGY

## 2020-02-24 PROCEDURE — 3078F DIAST BP <80 MM HG: CPT | Mod: CPTII,S$GLB,, | Performed by: PSYCHIATRY & NEUROLOGY

## 2020-02-24 PROCEDURE — 3078F PR MOST RECENT DIASTOLIC BLOOD PRESSURE < 80 MM HG: ICD-10-PCS | Mod: CPTII,S$GLB,, | Performed by: PSYCHIATRY & NEUROLOGY

## 2020-02-24 PROCEDURE — 99499 UNLISTED E&M SERVICE: CPT | Mod: S$GLB,,, | Performed by: PSYCHIATRY & NEUROLOGY

## 2020-02-24 PROCEDURE — 99999 PR PBB SHADOW E&M-EST. PATIENT-LVL IV: CPT | Mod: PBBFAC,,, | Performed by: PSYCHIATRY & NEUROLOGY

## 2020-02-24 PROCEDURE — 1159F PR MEDICATION LIST DOCUMENTED IN MEDICAL RECORD: ICD-10-PCS | Mod: S$GLB,,, | Performed by: PSYCHIATRY & NEUROLOGY

## 2020-02-24 PROCEDURE — 3077F PR MOST RECENT SYSTOLIC BLOOD PRESSURE >= 140 MM HG: ICD-10-PCS | Mod: CPTII,S$GLB,, | Performed by: PSYCHIATRY & NEUROLOGY

## 2020-02-24 RX ORDER — CLONAZEPAM 1 MG/1
1 TABLET ORAL 2 TIMES DAILY
Qty: 180 TABLET | Refills: 0 | Status: SHIPPED | OUTPATIENT
Start: 2020-02-24 | End: 2020-07-24

## 2020-02-24 NOTE — PROGRESS NOTES
Subjective:       Patient ID: Alyssa John is a 69 y.o. female who presents today for a routine clinic visit for MS.      MS HPI:  · DMT: glatiramer acetate  · Side effects from DMT? No  · Taking vitamin D3 as recommended? Yes -  Dose: 5000 units daily  · She is in physical and occupational therapy 2 times per week but in the process of having a re-evaluation. Her last session was last week. She believes therapy has been very helpful.   · She has not had any falls since last visit.  · She reports bowel incontinence had improved but last night had an episode of incontinence with loose stool.   · Her  thinks that her gait and transfers are better since therapy. She is able to bathe without assistance. She was not able to do this prior to the therapy.   · She reports headaches for many years that occur daily. She takes aspirin 325mg (2-4 tablets per day for headache).  · She reports taking topamax and clonazepam for seizures. She recalls being on tegretol in the past which was switched to clonazepam and topamax due to decrease in blood cell counts.     SOCIAL HISTORY  Social History     Tobacco Use    Smoking status: Former Smoker     Packs/day: 1.50     Last attempt to quit: 2006     Years since quittin.1    Smokeless tobacco: Never Used   Substance Use Topics    Alcohol use: No     Alcohol/week: 0.0 standard drinks    Drug use: No     REVIEW OF SYMPTOMS 2020   Do you feel abnormally tired on most days? Yes   Do you feel you generally sleep well? Yes   Do you have difficulty controlling your bladder?  No   Do you have difficulty controlling your bowels?  Yes   Do you have frequent muscle cramps, tightness or spasms in your limbs?  No   Do you have new visual symptoms?  No   Do you have worsening difficulty with your memory or thinking? Yes   Do you have worsening symptoms of anxiety or depression?  No   For patients who walk, Do you have more difficulty walking?  No   Have you  fallen since your last visit?  No   For patients who use wheelchairs: Do you have any skin wounds or breakdown? No   Do you have difficulty using your hands?  Yes   Do you have shooting or burning pain? No   If you are sexually active, are you using birth control? Y/N  N/A Not Applicable   Do you often choke when swallowing liquids or solid food?  No   Do you experience worsening symptoms when overheated? No   Do you need any new equipment such as a wheelchair, walker or shower chair? No   Do you receive co-pay financial assistance for your principal MS medicine? Yes   Would you be interested in participating in an MS research trial in the future? Not Applicable   For patients on Gilenya, Tecfidera, Aubagio, Rituxan, Ocrevus, Tysabri, Lemtrada or Methotrexate, are you aware that you should NOT receive live virus vaccines?  Not Applicable   Do you feel you have adequate family/friend support?  Yes   Do you have health insurance?   Yes   Are you currently employed? No   Do you receive SSDI/SSI?  No   Do you use marijuana or cannabis products? No   Have you been diagnosed with a urinary tract infection since your last visit here? No   Have you been diagnosed with a respiratory tract infection since your last visit here? No   Have you been to the emergency room since your last visit here? No   Have you been hospitalized since your last visit here?  No           Objective:        1. 25 foot timed walk:12.79 (left leg drags)  Timed 25 Foot Walk: 2/16/2017 11/21/2019   Did patient wear an AFO? No No   Was assistive device used? Yes Yes   Assistive device used (toña one): Unilateral Assistance Bilateral Assistance   Unilateral device used Cane -   Bilateral device used - Walker/Rollator   Time for 25 Foot Walk (seconds) 10.2 30.7   Time for 25 Foot Walk (seconds) - -     Neurologic Exam        In general, the patient is well nourished.    No bruits. Fundi are normal bilaterally.    MENTAL STATUS: language is fluent, normal  verbal comprehension, short-term and remote memory is intact, attention is normal, patient is alert and oriented x 3    CRANIAL NERVE EXAM:  There is no JOSE LUIS.  Extraocular muscles are intact. Pupils are equal, round, and reactive to light. No facial asymmetry. Facial sensation is intact bilaterally. There is no dysarthria. Uvula is midline, and palate moves symmetrically. Shoulder shrug intact bilaterlly. Tongue protrusion is midline. Hearing is grossly intact. Neck is supple.     MOTOR EXAM: 5/5 strength in upper extremities except mild weakness distally in left hand; 5/5 in left hip flexion.     REFLEXES: 2+ and symmetric throughout in all four extremeties    SENSORY EXAM: Normal to light touch    COORDINATION: Normal finger-to-nose exam; mild postural and action tremor in upper extremities L>R    GAIT: Uses rolling walker; Left leg drags     Imaging:     No results found for this or any previous visit.  No results found for this or any previous visit.  No results found for this or any previous visit.  Results for orders placed during the hospital encounter of 12/11/18   MRI Brain Demyelinating W W/O Contrast    Impression Unchanged pronounced burden of supratentorial white matter disease, with appearance in keeping with history of multiple sclerosis.  No new or enhancing lesions to suggest active demyelination.    Partially imaged lesion at the craniocervical junction, with spinal lesions better delineated on concurrent dedicated spine MRIs.    Electronically signed by resident: Baldo Krause  Date:    12/12/2018  Time:    09:34    Electronically signed by: Moo Michele MD  Date:    12/12/2018  Time:    15:28     Results for orders placed during the hospital encounter of 12/11/18   MRI Cervical Spine Demyelinating W W/O Contrast    Impression Multiple unchanged foci of T2 hyperintense signal throughout the spinal cord extending from the cranial cervical junction throughout the thoracic spinal cord.  Lesion at T9-10  was not included in the field of view of prior imaging; however, all other lesions are unchanged from 08/02/2018.  No enhancing lesions to suggest active demyelination.    Mild multilevel disc/endplate degeneration, with mild spinal canal stenoses in the cervical spine and severe foraminal stenosis on the left at C4-5 and on the right at C5-6.    Electronically signed by resident: Baldo Krause  Date:    12/12/2018  Time:    09:12    Electronically signed by: Moo Michele MD  Date:    12/12/2018  Time:    15:24     Results for orders placed during the hospital encounter of 12/11/18   MRI Thoracic Spine Demyelinating W W/O Contrast    Impression Multiple unchanged foci of T2 hyperintense signal throughout the spinal cord extending from the cranial cervical junction throughout the thoracic spinal cord.  Lesion at T9-10 was not included in the field of view of prior imaging; however, all other lesions are unchanged from 08/02/2018.  No enhancing lesions to suggest active demyelination.    Mild multilevel disc/endplate degeneration, with mild spinal canal stenoses in the cervical spine and severe foraminal stenosis on the left at C4-5 and on the right at C5-6.    Electronically signed by resident: Baldo Krause  Date:    12/12/2018  Time:    09:12    Electronically signed by: Moo Michele MD  Date:    12/12/2018  Time:    15:24       Labs:     Lab Results   Component Value Date    GIDUWQNX41KL 97 (H) 08/20/2019    KMRCJFIQ96OM 68 07/19/2018    VYTLLKQT54CN 53 10/19/2016     Lab Results   Component Value Date    JCVINDEX SEE COMMENT (A) 12/09/2015    JCVANTIBODY SEE COMMENT 12/09/2015     Lab Results   Component Value Date    YD1UDGIA 59.1 03/31/2016    ABSOLUTECD3 619 (L) 03/31/2016    KN8NPAAV 19.9 03/31/2016    ABSOLUTECD8 208 03/31/2016    SU0RBPPF 39.1 03/31/2016    ABSOLUTECD4 409 03/31/2016    LABCD48 1.97 03/31/2016     Lab Results   Component Value Date    WBC 5.30 02/20/2020    RBC 4.90 02/20/2020    HGB 13.5  02/20/2020    HCT 44.6 02/20/2020    MCV 91 02/20/2020    MCH 27.6 02/20/2020    MCHC 30.3 (L) 02/20/2020    RDW 13.5 02/20/2020     02/20/2020    MPV 10.8 02/20/2020    GRAN 3.3 02/20/2020    GRAN 62.3 02/20/2020    LYMPH 1.4 02/20/2020    LYMPH 27.2 02/20/2020    MONO 0.3 02/20/2020    MONO 6.0 02/20/2020    EOS 0.2 02/20/2020    BASO 0.05 02/20/2020    EOSINOPHIL 3.2 02/20/2020    BASOPHIL 0.9 02/20/2020     Sodium   Date Value Ref Range Status   02/20/2020 142 136 - 145 mmol/L Final     Potassium   Date Value Ref Range Status   02/20/2020 3.9 3.5 - 5.1 mmol/L Final     Chloride   Date Value Ref Range Status   02/20/2020 111 (H) 95 - 110 mmol/L Final     CO2   Date Value Ref Range Status   02/20/2020 24 23 - 29 mmol/L Final     Carbon Monoxide, Blood   Date Value Ref Range Status   10/19/2016 2 % Final     Comment:     -------------------REFERENCE VALUE--------------------------  0-2 Normal (Non-smoker) , < or = 9 Normal (Smoker), > or   = 20 (Toxic concentration)  Test Performed by:  [facility] [place] [address]  : Adrien Norton II, M.D., Ph.D.       Glucose   Date Value Ref Range Status   02/20/2020 81 70 - 110 mg/dL Final     BUN, Bld   Date Value Ref Range Status   02/20/2020 20 8 - 23 mg/dL Final     Creatinine   Date Value Ref Range Status   02/20/2020 0.7 0.5 - 1.4 mg/dL Final   02/22/2013 0.8 0.5 - 1.4 mg/dL Final     Calcium   Date Value Ref Range Status   02/20/2020 7.9 (L) 8.7 - 10.5 mg/dL Final   02/22/2013 8.7 8.7 - 10.5 mg/dL Final     Total Protein   Date Value Ref Range Status   02/20/2020 6.0 6.0 - 8.4 g/dL Final     Albumin   Date Value Ref Range Status   02/20/2020 3.0 (L) 3.5 - 5.2 g/dL Final     Total Bilirubin   Date Value Ref Range Status   02/20/2020 0.3 0.1 - 1.0 mg/dL Final     Comment:     For infants and newborns, interpretation of results should be based  on gestational age, weight and in  agreement with clinical  observations.  Premature Infant recommended reference ranges:  Up to 24 hours.............<8.0 mg/dL  Up to 48 hours............<12.0 mg/dL  3-5 days..................<15.0 mg/dL  6-29 days.................<15.0 mg/dL       Alkaline Phosphatase   Date Value Ref Range Status   02/20/2020 142 (H) 55 - 135 U/L Final   02/22/2013 107 55 - 135 U/L Final     AST   Date Value Ref Range Status   02/20/2020 16 10 - 40 U/L Final   02/22/2013 18 10 - 40 U/L Final     ALT   Date Value Ref Range Status   02/20/2020 15 10 - 44 U/L Final     Anion Gap   Date Value Ref Range Status   02/20/2020 7 (L) 8 - 16 mmol/L Final   02/22/2013 11 5 - 15 meq/L Final     eGFR if    Date Value Ref Range Status   02/20/2020 >60.0 >60 mL/min/1.73 m^2 Final     eGFR if non    Date Value Ref Range Status   02/20/2020 >60.0 >60 mL/min/1.73 m^2 Final     Comment:     Calculation used to obtain the estimated glomerular filtration  rate (eGFR) is the CKD-EPI equation.                    Diagnosis/Assessment/Plan:    1. Multiple Sclerosis  · Assessment:stable on Copaxone. She has had a significant improvement in her timed walk this visit compared to the last visit which might suggest a functional component.   · Imaging:Images in May 2021  · Disease Modifying Therapies:copaxone and vit D    2. MS Symptom Assessment / Management  · Gait disturbance - continue PT  · Anxiety: clonazepam    3. Chronic headaches  · Referral to neurology  · She reports topamax is for seizures but we discussed it could potentially help for headaches  · Advised against aspirin use for headache management     4. History of seizures  · Continue topamax  · We discussed that clonazepam is not ideal for treatment of epilepsy     5. Tremor  · Referral to neurology  · Clonazepam could be helping.       6. Right internal carotid artery stenosis, asymptomatic  · Continue follow-up with PCP for continued modification of stroke risk  factors.     Our visit today lasted 40 minutes, and 100% of this time was spent face to face with the patient. Over 50% of this visit included discussion of the treatment plan/medication changes/symptom management/exam findings/imaging results/coordination of care. The patient agrees with the plan of care.         Problem List Items Addressed This Visit     None      Visit Diagnoses     Chronic nonintractable headache, unspecified headache type    -  Primary    Relevant Orders    Ambulatory referral/consult to Neurology    Gait disturbance        Relevant Orders    Ambulatory referral/consult to Physical/Occupational Therapy    Multiple sclerosis        Relevant Medications    clonazePAM (KLONOPIN) 1 MG tablet    Other Relevant Orders    Ambulatory referral/consult to Physical/Occupational Therapy    Tremor        Relevant Medications    clonazePAM (KLONOPIN) 1 MG tablet    Other Relevant Orders    Ambulatory referral/consult to Physical/Occupational Therapy    Internal carotid artery stenosis, right        Anxiety        History of seizure        Relevant Medications    clonazePAM (KLONOPIN) 1 MG tablet          Damion Charles MD  Neuroimmunology/MS Fellow  [facility]    MS CENTER STAFF ATTESTATION  I have personally seen and examined the patient along with MS Fellow Dr. Charles, and agree with assessment and recommendations.      Genny Apodaca MD  [facility]

## 2020-02-26 NOTE — PROGRESS NOTES
Occupational Therapy- Treatment     Date:  2/20/2020  Start Time:  1310    Stop Time:  1415    TIMED  Procedure Time Min.   TherAct (4) Start:  Stop: 55   Total Timed Minutes:  55  Total Timed Units:  4  Total Untimed Units: 0  Charges Billed/# of units:  4    Progress/Current Status    Subjective:     Patient ID: Alyssa John is a 69 y.o. female.  Diagnosis: MS  1. Impaired mobility and ADLs     2. Weakness generalized     3. Decreased coordination     4. Impaired instrumental activities of daily living (IADL)     5. Balance problem       Pain: No c/o pain today.   Patient reports: No significant concerns today.  Functional change: She reports actively participating in at least one IADL every day. Often, more than one.   Concerns since last visit: n/a    Objective:     Measurements for POC update. See attached.     Assessment:      Alyssa continues to slowly progress and is appropriate for continuation of skilled occupational therapy. Please see attached POC.     Alyssa will continue to benefit from skilled therapy services to increase daily function and fulfill rewarding occupational roles at home. Continuation of therapy at this point is medically necessary to increase postural control during ADLs, increasing safety and decreasing risk of fall.     PROBLEM LIST/IMPAIRMENTS:   weakness, impaired endurance, impaired self care skills, impaired functional mobility, gait instability, impaired balance, impaired cognition, decreased coordination and decreased upper extremity function     LTG GOALS:    1) Pt will be mod I with dressing for clothing except for fasteners (buttons/zippers/hooks) (progressing 2/26/2020)  2) Pt will be supervision with bathing, seated; SBA for TTB t/f. (progressing 2/26/2020)  3) Pt will be mod I for toileting and related t/f. (progressing 2/26/2020)  4) Pt will improve GMC, as evidenced by box and block scores of 45 or better on pavan UE. (progressing 2/26/2020)  5) Pt will  participate in at least one IADL task in the home on a daily basis (dishes, laundry, etc) (progressing 2/26/2020)  6) Pt to improve FMC bilaterally, as evidenced by a 5 sec improvement in times on the 9HPT - R=40s or better; L=45s or better. (progressing 2/26/2020)      STG Goals:   1) Alyssa will be mod I with initial HEP. (MET with assistance of spouse)  2) Pt will t/f to toilet w/ min A or better. (MET)  3) Alyssa will be able to stand and manage her clothing for toileting w/ CGA (progressing 2/26/2020 )  4) Mandeep UE GMC to improve, as evidenced by box & block scores of 31 or better, mandeep UE. (MET)    New Goals (to be achieved by end of cert. Period):   1) Alyssa will complete bathroom mobility for toileting w/ SBA and RW. (progressing 2/26/2020)  2) Alyssa will be able to stand w/ SBA and unilateral support for 10 minutes to increase indep w/ IADLs. (progressing 2/26/2020)  3) Alyssa will be able to complete a 5-pose flow of chair yoga with mod I to increase core control. (progressing 2/26/2020 )      Patient Education/Response:     Continue HEP for IADL participation, yoga flow completion.    Plans and Goals:     See attached POC update.     Sameera Montague OT  2/20/2020

## 2020-02-26 NOTE — PLAN OF CARE
"  Outpatient Therapy Updated Plan of Care     Visit Date: 2020    Name: Alyssa John  Clinic Number: [id number]    Therapy Diagnosis:   Encounter Diagnoses   Name Primary?    Impaired mobility and ADLs Yes    Weakness generalized     Decreased coordination     Impaired instrumental activities of daily living (IADL)     Balance problem      Physician: Risa Oshea PA-C    Physician Orders: OT Eval & Treat  Medical Diagnosis from Referral: Multiple Sclerosis, neurologic gait, L UE weakness  Evaluation Date: 2019  Current Certification Period:    Authorization Period Expiration: 2020  Plan of Care Expiration: 2020  Visit # / Visits authorized:     Precautions: Standard and Fall  Functional Level Prior to Evaluation:  Prior to her illness, Alyssa was able to stand and transfer on her own (sometimes without the walker.) She was able to toilet & dress without assistance (except for her bra). At time of eval, Alyssa required assist with all ADLs: mod-max A w/ dressing, bathing & toileting; total A for bathroom mobility. AM-PAC=15. She consistently had poor posture in her w/c.    Subjective     Update: Alyssa reports she enjoys her "yoga" and she feels like she has better posture. HEP compliance for the yoga flow is inconsistent as she feels most comfortable when Giuseppe is home. She reports daily participation in IADL tasks at home. She has been using the putty for  and pinch strengthening.    Objective     Update:  AM-PAC 6 CLICK ADL  How much help from another person does this patient currently need?  1 = Unable, Total/Dependent Assistance  2 = A lot, Maximum/Moderate Assistance  3 = A little, Minimum/Contact Guard/Supervision  4 = None, Modified Iron/Independent    Eating: 3 (sometimes assist to cut food)       Grooming: 3      UB Dressing: 3     LB Dressin      Bathing: 3      Toileting: 3 (CGA to hike pants)  Total: 17    AM-PAC Raw Score CMS " ""G-Code Modifier Level of Impairment Assistance   6 % Total / Unable   7 - 9 CM 80 - 100% Maximal Assist   10-14 CL 60 - 80% Moderate Assist   15 - 19 CK 40 - 60% Moderate Assist   20 - 22 CJ 20 - 40% Minimal Assist   23 CI 1-20% SBA / CGA   24 CH 0% Independent/ Mod I     Box and Block [facility] [place]: (R) 39 w/ tremor, two trials (L) 38  [norms for women aged 65-69: R=72.0 +/-6.2; L=71.3 +/-7.7 (Mathiowetz et al, 1985)]    9 Hole Peg Test: (R) 47.12s w/ tremor (L) 48.54s  [norms for women aged 66-70: R=19.90 +/- 3.15s; L=21.44s +/-3.97s (Dora et al, 2003)]     Hand Strength   (lbs) Right Left   1 32 37   2 30 38   3 30 35   avg 30.67 16.67   [norms for women aged 65-69: R=49.6 +/-9.7; L=41.0 +/-8.2 (Sundarwetz et al, 1985)]    Standing x10 min w/ bilateral and intermittent unilateral support to play [employer]uaidi Dache. Max cues for calculations of score (but she reports never having played [employer] prior to today).    Though we did not do it today, she is physically able to complete a 6-position yoga flow (one set on the R and one set on the L: 12 total moves). However, she requires mod-max cues for sequencing the moves and for best performance throughout.    Assessment     Update: Alyssa's  bilaterally has improved significantly. Coordination in the L UE has progressed and is currently close to reaching her related goals. Of concern is a tremor in the R UE during GMC and FMC tasks. She is able to stand for 10 minutes with CGA though she requires max cues for encouragement and sustaining posture. She also still required bilateral UE support mostly w/ intermittent unilateral UE support. Alyssa can continue to benefit from skilled OT to maximize her functional independence so that she can safely go to the bathroom without assist, decreasing her burden of care.    Previous Goals Status:    LTG GOALS:    1) Pt will be mod I with dressing for clothing except for fasteners (buttons/zippers/hooks) (progressing " 2/20/2020)  2) Pt will be supervision with bathing, seated; SBA for TTB t/f. (Divided into 2 goals)   Pt will be supervision for bathing (met 2/20/2020)   Pt will be SBA for TTB t/f (progressing 2/20/2020)  3) Pt will be mod I for toileting and related t/f. (progressing 2/20/2020)  4) Pt will improve GMC, as evidenced by box and block scores of 45 or better on mandeep UE. (progressing 2/20/2020)  5) Pt will participate in at least one IADL task in the home on a daily basis (dishes, laundry, etc) (MET per pt report 2/20/2020)   6) Pt to improve FMC bilaterally, as evidenced by a 5 sec improvement in times on the 9HPT - R=40s or better; L=45s or better. (progressing 2/20/2020)      STG Goals:   1) Alyssa will be mod I with initial HEP. (MET with assistance of spouse)  2) Pt will t/f to toilet w/ min A or better. (MET)  3) Alyssa will be able to stand and manage her clothing for toileting w/ CGA (MET 2/20/2020)   4) Mandeep UE GMC to improve, as evidenced by box & block scores of 31 or better, mandeep UE. (MET)    New Goals (to be achieved by end of cert. Period):   1) Alyssa will complete bathroom mobility for toileting w/ SBA and RW. (progressing 2/20/2020)  2) Alyssa will be able to stand w/ SBA and unilateral support for 10 minutes to increase indep w/ IADLs. (progressing 2/20/2020)  3) Alyssa will be able to complete a 5-pose flow of chair yoga with mod I to increase core control. (progressing 2/20/2020 )    Long Term Goal Status:   continue per initial plan of care.  Add new goal that Alyssa will be able to cut her own food with mandeep UE using fork and knife.    Reasons for Recertification of Therapy:   Alyssa has met 3 additional goals. The R UE tremor is new and can continue to be addressed by OT to ensure independence with feeding as pt is right handed. As noted above, Alyssa is slowly progressing toward goals. Postural support and standing balance are slowly improving, but continued therapy would allow Alyssa to further  improve so that she can go to the bathroom without assistance, as well as continuing to address coordination.    Plan     Updated Certification Period: 2/18/2020 to 5/12/2020  Recommended Treatment Plan: 2 times per week for 12 weeks: Manual Therapy, Therapeutic Taping, Moist Heat/ Ice, Neuromuscular Re-ed, Orthotic Management and Training, Paraffin, Patient Education, Self Care, Therapeutic Activites and Therapeutic Exercise    Sameera Montague, OT  2/20/2020      I CERTIFY THE NEED FOR THESE SERVICES FURNISHED UNDER THIS PLAN OF TREATMENT AND WHILE UNDER MY CARE    Physician's comments:        Physician's Signature: ___________________________________________________

## 2020-02-27 ENCOUNTER — TELEPHONE (OUTPATIENT)
Dept: PHARMACY | Facility: CLINIC | Age: 70
End: 2020-02-27

## 2020-02-27 RX ORDER — GLATIRAMER 40 MG/ML
40 INJECTION, SOLUTION SUBCUTANEOUS
Qty: 36 SYRINGE | Refills: 1 | Status: SHIPPED | OUTPATIENT
Start: 2020-02-28 | End: 2021-12-10 | Stop reason: SDUPTHER

## 2020-02-27 NOTE — TELEPHONE ENCOUNTER
RX call attempt 1 regarding Glatopa refill from [facility]. Patients  reached-- shipping out  for  arrival with patients husbands consent. Copay of 0.00 @ 004. Patients  stated they were not in need of any supplies with this refill shipment. Address and  confirmed. Patient has 1 dose of medication on hand at this time for Friday, . Patient has not started any new medications, has had no missed doses and no side effects present. Patient is currently taking the medication as directed by doctors instruction, Inject 40 mg into the skin 3 (three) times a week [M,W,F]. Patient does have a safe place in their residence to keep medication at desired temperature away from small children and pets. Patient also does have the capability of contacting 911 in the event of an emergency. Patients  states they do not have any questions or concerns at this time. Patients next needed injection is scheduled for Monday, 3/2.

## 2020-02-28 ENCOUNTER — CLINICAL SUPPORT (OUTPATIENT)
Dept: REHABILITATION | Facility: HOSPITAL | Age: 70
End: 2020-02-28
Attending: PHYSICIAN ASSISTANT
Payer: MEDICARE

## 2020-02-28 DIAGNOSIS — Z74.09 IMPAIRED FUNCTIONAL MOBILITY, BALANCE, AND ENDURANCE: ICD-10-CM

## 2020-02-28 DIAGNOSIS — Z78.9 IMPAIRED MOBILITY AND ADLS: Primary | ICD-10-CM

## 2020-02-28 DIAGNOSIS — R53.1 WEAKNESS GENERALIZED: ICD-10-CM

## 2020-02-28 DIAGNOSIS — R26.89 BALANCE PROBLEM: ICD-10-CM

## 2020-02-28 DIAGNOSIS — Z78.9 IMPAIRED INSTRUMENTAL ACTIVITIES OF DAILY LIVING (IADL): ICD-10-CM

## 2020-02-28 DIAGNOSIS — R27.8 DECREASED COORDINATION: ICD-10-CM

## 2020-02-28 DIAGNOSIS — Z74.09 IMPAIRED MOBILITY AND ADLS: Primary | ICD-10-CM

## 2020-02-28 PROCEDURE — 97530 THERAPEUTIC ACTIVITIES: CPT | Mod: PN

## 2020-02-28 PROCEDURE — 97110 THERAPEUTIC EXERCISES: CPT | Mod: PN,CQ

## 2020-02-28 PROCEDURE — 97116 GAIT TRAINING THERAPY: CPT | Mod: PN,CQ

## 2020-02-28 NOTE — PROGRESS NOTES
Name: Alyssa John  [facility] Number: [id number]  Date of Treatment: 02/28/2020   Diagnosis:   Encounter Diagnosis   Name Primary?    Impaired functional mobility, balance, and endurance        Time in: 1400  Time Out: 1500  Total Treatment Time: 60      Subjective:    Alyssa reports feeling good after OT visit, reports wanting to walk after doing yoga in OT. Patient reports their pain to be 0/10 on a 0-10 scale with 0 being no pain and 10 being the worst pain imaginable.    Objective    Patient received individual therapy to increase strength, endurance, ROM and flexibility with 0 patients with activities as follows:     Alyssa received therapeutic exercises to develop strength, endurance, ROM, flexibility and posture for 45 minutes including:     Transfer transport w/c<>stationary bike min/mod A with cues for sequencing: scooting to edge of seat, hand and feet placement, weight translation to shift feet   Stationary bike x 10 min L 1.5 seat position 1, verbal and tactile cues for midline sitting for core stability and endurance improvement  Passive hamstring stretch 3/30s R/L      Wheelchair mobility with alternating UE/LE assistance alternating x 100ft x 8'    Patient participated in gait training x 15' as follows:  Gait x 360'RW and CGA VC's to advance L LE step and shorten R LE step to advance in RW.    Sit to stand from WC with gait x 4 with Min/Mod A 2* on fatigue level    Assessment:   Remains requiring WC follow, and CGA/VC's to advance L LE fwd of R LE and to extend LE's as to not walk with B knees in flexion and IR.  Tolerated steps with min A for safety.  Patient highly motivated with todays progress. Max VC's when patient in WC to sit evenly without shifting upper body to L and LE's to the R.     Pt will continue to benefit from skilled PT intervention. Medical Necessity is demonstrated by:  Fall Risk, Unable to participate fully in daily activities, Requires skilled supervision to complete and  progress HEP and Weakness.    Patient is making good progress towards established goals.      Plan:  Continue with established Plan of Care towards PT goals.

## 2020-02-28 NOTE — PROGRESS NOTES
"Occupational Therapy- Treatment     Date:  2/28/2020   Start Time:  1305  Stop Time:  1410    TIMED  Procedure Time Min.   TherAct (4) Start:  Stop: 60   Total Timed Minutes:  60  Total Timed Units:  4  Total Untimed Units: 0  Charges Billed/# of units:  4    Progress/Current Status    Subjective:     Patient ID: Alyssa John is a 69 y.o. female.  Diagnosis: MS  1. Impaired mobility and ADLs     2. Weakness generalized     3. Decreased coordination     4. Impaired instrumental activities of daily living (IADL)     5. Balance problem       Pain: No c/o pain today.   Patient reports: No significant concerns today.  Functional change: Alyssa reports that Dr. Apodaca was pleased with her progress. No significant functional change compared to previous session.   Concerns since last visit: n/a    Objective:     Therapeutic Activities x60 minutes    W/C<>mat SPT w/ RW and CGA. Max cues for positioning prior to stand to ensure nose over toes. Practiced forward and backward scooting in the w/c to improve control, ensuring she was lifting her bottom when she scooted in each direction.  At EOM, Alyssa doffed shoes and socks w/ SBA and increased time.  In bare feet on yoga mat, Alyssa compelted 6-position yoga flow w/ max verbal and mod tactile cues to move through sequence correctly, to position correctly for each. Photo used to explain positioning in hamstring stretch as she tends to round her back and bend her knee. Ed with photo of pt doing exercise how she needs to straighten the leg more and keep the trunk straight, allowing for an "L" shape for a more effective stretch. She demo'd increased understanding with improved performance after ed with photo.   Standing pavan UE overhead reach w/ mod A for stand due to hip flexion, decreased activation of quads and knee flexion resulting in a posterior COG, max v/c for correcting posture and fully reaching over head.  With min A, Alyssa was able to scoot backward and up on " to a block. From there, she sat cross-legged with assist for maintaining the postiion due to tightness throughout the pelvis.  At EOM, she was encouraged to assume figure 4 position to don socks and shoes, rather than bending down to her feet. She was able to don the R sock/shoe w/ SBA, but required min A for keeping the L ankle over the R knee and for donning the L sock, even after positioning was modified to prop leg on mat in hip ER.  CGA for stand balance to adjust pants, though we had to stand x3 to ensure good standing posture.    Assessment:      Alyssa had difficulty with yoga flow today. We spent a lot of time ensuring that she could position correctly for each move. This was also the first time she's done it with bare feet. Anticipate that bare feet may have thrown her off a little, but she was fully engaged and gave full effort to all activities.     Alyssa will continue to benefit from skilled therapy services to increase daily function and fulfill rewarding occupational roles at home. Continuation of therapy at this point is medically necessary to increase postural control during ADLs, increasing safety and decreasing risk of fall.     PROBLEM LIST/IMPAIRMENTS:   weakness, impaired endurance, impaired self care skills, impaired functional mobility, gait instability, impaired balance, impaired cognition, decreased coordination and decreased upper extremity function     LTG GOALS:    1) Pt will be mod I with dressing for clothing except for fasteners (buttons/zippers/hooks) (progressing 2/28/2020)  2) SBA for TTB t/f. (progressing 2/28/2020)  3) Pt will be mod I for toileting and related t/f. (progressing 2/28/2020)  4) Pt will improve GMC, as evidenced by box and block scores of 45 or better on pavan UE. (progressing 2/28/2020)  5) Pt to improve FMC bilaterally, as evidenced by a 5 sec improvement in times on the 9HPT - R=40s or better; L=45s or better. (progressing 2/28/2020)  6) Alyssa will complete  bathroom mobility for toileting w/ SBA and RW. (progressing 2/28/2020)  7) Alyssa will be able to stand w/ SBA and unilateral support for 10 minutes to increase indep w/ IADLs. (progressing 2/28/2020)  8) Alyssa will be able to complete a 5-pose flow of chair yoga with mod I to increase core control. (progressing 2/28/2020 )  9) Alyssa will be able to cut her own food with mandeep UE using fork and knife. (progressing 2/28/2020)  10) Pt will participate in at least one IADL task in the home on a daily basis (dishes, laundry, etc) (MET per pt report 2/20/2020)   11) Pt will be supervision with bathing, seated (MET 2/20/2020)    STG Goals:   1) Alyssa will be able to stand and manage her clothing for toileting w/ CGA (progressing 2/28/2020 )  2) Mandeep UE GMC to improve, as evidenced by box & block scores of 31 or better, mandeep UE. (MET)  3) Alyssa will be mod I with initial HEP. (MET with assistance of spouse)  4) Pt will t/f to toilet w/ min A or better. (MET)        Patient Education/Response:     Continue HEP for IADL participation, yoga flow completion.    Plans and Goals:     Standing tolerance; yoga; cutting food.    Sameera Montague, OT  2/20/2020

## 2020-03-03 ENCOUNTER — CLINICAL SUPPORT (OUTPATIENT)
Dept: REHABILITATION | Facility: HOSPITAL | Age: 70
End: 2020-03-03
Attending: PHYSICIAN ASSISTANT
Payer: MEDICARE

## 2020-03-03 ENCOUNTER — TELEPHONE (OUTPATIENT)
Dept: OPHTHALMOLOGY | Facility: CLINIC | Age: 70
End: 2020-03-03

## 2020-03-03 DIAGNOSIS — Z74.09 IMPAIRED FUNCTIONAL MOBILITY, BALANCE, AND ENDURANCE: ICD-10-CM

## 2020-03-03 DIAGNOSIS — Z74.09 IMPAIRED MOBILITY AND ADLS: Primary | ICD-10-CM

## 2020-03-03 DIAGNOSIS — R27.8 DECREASED COORDINATION: ICD-10-CM

## 2020-03-03 DIAGNOSIS — Z78.9 IMPAIRED MOBILITY AND ADLS: Primary | ICD-10-CM

## 2020-03-03 DIAGNOSIS — Z78.9 IMPAIRED INSTRUMENTAL ACTIVITIES OF DAILY LIVING (IADL): ICD-10-CM

## 2020-03-03 DIAGNOSIS — R26.89 BALANCE PROBLEM: ICD-10-CM

## 2020-03-03 DIAGNOSIS — R53.1 WEAKNESS GENERALIZED: ICD-10-CM

## 2020-03-03 PROCEDURE — 97530 THERAPEUTIC ACTIVITIES: CPT | Mod: PN

## 2020-03-03 PROCEDURE — 97110 THERAPEUTIC EXERCISES: CPT | Mod: PN

## 2020-03-03 RX ORDER — NEOMYCIN SULFATE, POLYMYXIN B SULFATE, AND DEXAMETHASONE 3.5; 10000; 1 MG/G; [USP'U]/G; MG/G
OINTMENT OPHTHALMIC NIGHTLY
Qty: 1 TUBE | Refills: 0 | Status: SHIPPED | OUTPATIENT
Start: 2020-03-03 | End: 2020-06-26

## 2020-03-03 NOTE — PROGRESS NOTES
"Occupational Therapy- Treatment     Date:  3/3/2020   Start Time:  1405  Stop Time:  1500    TIMED  Procedure Time Min.   TherAct (4) Start:  Stop: 55   Total Timed Minutes:  55  Total Timed Units:  4  Total Untimed Units: 0  Charges Billed/# of units:  4    Progress/Current Status    Subjective:     Patient ID: Alyssa John is a 69 y.o. female.  Diagnosis: MS  1. Impaired mobility and ADLs     2. Weakness generalized     3. Decreased coordination     4. Impaired instrumental activities of daily living (IADL)     5. Balance problem       Pain: No c/o pain today.   Patient reports: No significant concerns today.  Functional change: "I have done so much! I have done 5 loads of laundry. I cleaned the kitchen. I mean, I worked!"   Concerns since last visit: n/a    Objective:     Therapeutic Activities x55 minutes to improve activity tolerance, balance, range of motion for increased independence in all functional activities.    W/C<>mat SPT w/ RW and CGA. Max cues for positioning prior to stand to ensure nose over toes. However, she scooted to edge of chair w/ mod I.  At EOM, OT encouraged pt to attempt child's pose yoga positioned. With CGA-min A for balance, she stood, turned and climbed onto the mat into quadruped. However, limited PROM in knee flexion, hip flexion and thoracic extension prevented her from going fully into this position.  Seated on mat with soles of feet touching/ hips in external rotation, we worked on forward reaching w/ pavan UE as a hip opener.  Seated at EOM, she completed her 6-position yoga flow w/ max cues. OT introduced slow & deep breathing into the exercises today, which may have served as a distraction for her. Extensive ed re: functional implications of each of the yoga positions that were chosen and how they help her in her everyday life and why she needs to ensure she's doing them as correctly as possible. Standing w/ CGA and RW, min cues for standing posture.  She doffed shoes " and socks easily, but needed significantly increased time to don them (L more than R), even with cue to prop LUIGI FRANCISCO on mat if she was having difficulty attaining figure-4 positioning. OT eventually assisted because pt needed to get to PT.    Assessment:      Alyssa is still requiring mod-max cues for yoga flow. Though she was better able to state which move came next as we progressed through the exercises, she had difficulty with moving through full range of motion throughout the move. She's easily distracted. Additionally, on several moves (hamstring stretch in particular), OT had her focus more on breathing than counting in an attempt to help her move further into the stretch. She continues to require cues about positioning for the hamstring stretch. Focus on breath and not just counting may have decreased her independence with the activity. However, stand and transfer were improved today.     Alyssa will continue to benefit from skilled therapy services to increase daily function and fulfill rewarding occupational roles at home. Continuation of therapy at this point is medically necessary to increase postural control during ADLs, increasing safety and decreasing risk of fall.     PROBLEM LIST/IMPAIRMENTS:   weakness, impaired endurance, impaired self care skills, impaired functional mobility, gait instability, impaired balance, impaired cognition, decreased coordination and decreased upper extremity function     LTG GOALS:    1) Pt will be mod I with dressing for clothing except for fasteners (buttons/zippers/hooks) (progressing 3/3/2020)  2) SBA for TTB t/f. (progressing 3/3/2020)  3) Pt will be mod I for toileting and related t/f. (progressing 3/3/2020)  4) Pt will improve GMC, as evidenced by box and block scores of 45 or better on pavan UE. (progressing 3/3/2020)  5) Pt to improve FMC bilaterally, as evidenced by a 5 sec improvement in times on the 9HPT - R=40s or better; L=45s or better. (progressing  3/3/2020)  6) Alyssa will complete bathroom mobility for toileting w/ SBA and RW. (progressing 2/28/2020)  7) Alyssa will be able to stand w/ SBA and unilateral support for 10 minutes to increase indep w/ IADLs. (progressing 2/28/2020)  8) Alyssa will be able to complete a 5-pose flow of chair yoga with mod I to increase core control. (progressing 2/28/2020 )  9) Alyssa will be able to cut her own food with mandeep UE using fork and knife. (progressing 3/3/2020)  10) Pt will participate in at least one IADL task in the home on a daily basis (dishes, laundry, etc) (MET per pt report 2/20/2020)   11) Pt will be supervision with bathing, seated (MET 2/20/2020)    STG Goals:   1) Alyssa will be able to stand and manage her clothing for toileting w/ CGA (progressing 3/3/2020 )  2) Mandeep UE GMC to improve, as evidenced by box & block scores of 31 or better, mandeep UE. (MET)  3) Alyssa will be mod I with initial HEP. (MET with assistance of spouse)  4) Pt will t/f to toilet w/ min A or better. (MET)        Patient Education/Response:     Continue HEP for IADL participation, yoga flow completion.    Plans and Goals:     Standing tolerance for activity; yoga; cutting food.    Sameera Montague, OT  3/3/2020

## 2020-03-03 NOTE — TELEPHONE ENCOUNTER
----- Message from Patt Hassan sent at 3/3/2020  1:24 PM CST -----  Contact: Pt  Type:  Pharmacy Calling to Clarify an RX    Name of Caller:  Bhumi  Pharmacy Name:  [facility]  Prescription Name:   sulfacetaminde-prednisolone 10-0.2% (BLEPHAMIDE) 10-0.2 % Oint  What do they need to clarify?:  can not supply this med. Please send in a med to replace   Best Call Back Number:  [phone]  Additional Information:       Please advise. Thank you

## 2020-03-04 NOTE — PROGRESS NOTES
Name: Alyssa John  [facility] Number: [id number]  Date of Treatment: 03/10/2020   Diagnosis:   Encounter Diagnosis   Name Primary?    Impaired functional mobility, balance, and endurance        Time in: 1511  Time Out: 1600  Total Treatment Time: 49  Group Time: 0      Subjective:    Alyssa reports improvement of symptoms.  Patient reports their pain to be /10 on a 0-10 scale with 0 being no pain and 10 being the worst pain imaginable.    Objective    Patient received individual therapy to increase strength, endurance, ROM and flexibility with 0 patients with activities as follows:     Alyssa received therapeutic exercises to develop strength and endurance for 49 minutes including:     Transport w/c<>stationary bike min A with cues for sequencing; scooting to edge of seat, hand/feet placement, weight shifting    Stationary bike L1.5 x 11 min with occasional cues for midline sitting    // bars:    Sit to stand x 4 with min/mod A from w/c with cueing   fwd/bwd gait 10 ft x 4, side stepping 10 ft x 4 with cues for LLE clearance, upright posture, marches  Seated ball squeezes x 30      Assessment:     Improved transfer onto/off of stationary bike at beginning of session. Pt fatigued at end of session, declined gait training using RW after standing activities in // bars.     Patient is making good progress towards established goals.    Plan:  Continue with established Plan of Care towards PT goals.

## 2020-03-06 ENCOUNTER — CLINICAL SUPPORT (OUTPATIENT)
Dept: REHABILITATION | Facility: HOSPITAL | Age: 70
End: 2020-03-06
Attending: PHYSICIAN ASSISTANT
Payer: MEDICARE

## 2020-03-06 DIAGNOSIS — R26.89 BALANCE PROBLEM: ICD-10-CM

## 2020-03-06 DIAGNOSIS — Z78.9 IMPAIRED MOBILITY AND ADLS: Primary | ICD-10-CM

## 2020-03-06 DIAGNOSIS — Z74.09 IMPAIRED FUNCTIONAL MOBILITY, BALANCE, AND ENDURANCE: ICD-10-CM

## 2020-03-06 DIAGNOSIS — R26.9 GAIT DISTURBANCE: ICD-10-CM

## 2020-03-06 DIAGNOSIS — R27.8 DECREASED COORDINATION: ICD-10-CM

## 2020-03-06 DIAGNOSIS — G35 MULTIPLE SCLEROSIS: ICD-10-CM

## 2020-03-06 DIAGNOSIS — Z74.09 IMPAIRED MOBILITY AND ADLS: Primary | ICD-10-CM

## 2020-03-06 DIAGNOSIS — R25.1 TREMOR: ICD-10-CM

## 2020-03-06 DIAGNOSIS — Z78.9 IMPAIRED INSTRUMENTAL ACTIVITIES OF DAILY LIVING (IADL): ICD-10-CM

## 2020-03-06 DIAGNOSIS — R53.1 WEAKNESS GENERALIZED: ICD-10-CM

## 2020-03-06 PROCEDURE — 97530 THERAPEUTIC ACTIVITIES: CPT | Mod: PN

## 2020-03-06 PROCEDURE — 97110 THERAPEUTIC EXERCISES: CPT | Mod: PN,CQ

## 2020-03-06 NOTE — PROGRESS NOTES
Occupational Therapy- Treatment     Date:  3/6/2020   Start Time:  1410  Stop Time:  1500    TIMED  Procedure Time Min.   TherAct (3) Start:  Stop: 50   Total Timed Minutes:  50  Total Timed Units:  3  Total Untimed Units: 0  Charges Billed/# of units:  3    Progress/Current Status    Subjective:     Patient ID: Alyssa John is a 69 y.o. female.  Diagnosis: MS  1. Impaired mobility and ADLs     2. Weakness generalized     3. Decreased coordination     4. Impaired instrumental activities of daily living (IADL)     5. Balance problem       Pain: No c/o pain today.   Patient reports: Her sister passed away on Wednesday and she learned of her passing yesterday. At this point, there are no services planned.   Functional change: Giuseppe reports she got herself out of bed and used the walker to go to the bathroom, complete her toileting, and return to the bed without any assistance at all this morning.    Concerns since last visit: n/a    Objective:     Therapeutic Activities x50 minutes to improve activity tolerance, balance, range of motion for increased independence in all functional activities.    W/C->mat SPT w/ RW and supervision.    Seated at EOM, she completed her 6-position yoga flow w/ mod verbal and tactile cues for maximizing best positioning, making sure she was counting and/or breathing deeply as appropriate. Cues to scoot forward for hamstring stretch, but then she completed w/ mod I.    Seated at EOM for cutting yellow putty x10 w/ pavan UE and fork/knife. Repeated w/ red putty to increase challenge. When R UE tremor increased during cutting task, we used WB and finger flicking to attempt to control tremor. No significant difference noted with the use of either strategy noted during today's task.     Mat->w/c SPT w/ RW and SBA at end of session.     Assessment:      Slight improvement for yoga flow today with mod cues overall. R UE tremor interferes with functional tasks like cutting her food. Although  she didn't demo a significant difference in tremor with the use of WB or finger flicking for this task, we will continue to assess benefit during other tasks. Continued tightness in L hamstrings and hip flexors interfere with independence in all activities and decrease safety. Will continue to address.     Alyssa will continue to benefit from skilled therapy services to increase daily function and fulfill rewarding occupational roles at home. Continuation of therapy at this point is medically necessary to increase postural control during ADLs, increasing safety and decreasing risk of fall.     PROBLEM LIST/IMPAIRMENTS:   weakness, impaired endurance, impaired self care skills, impaired functional mobility, gait instability, impaired balance, impaired cognition, decreased coordination and decreased upper extremity function     LTG GOALS:    1) Pt will be mod I with dressing for clothing except for fasteners (buttons/zippers/hooks) (progressing 3/6/2020)  2) SBA for TTB t/f. (progressing 3/6/2020)  3) Pt will be mod I for toileting and related t/f. (progressing 3/6/2020)  4) Pt will improve GMC, as evidenced by box and block scores of 45 or better on pavan UE. (progressing 3/6/2020)  5) Pt to improve FMC bilaterally, as evidenced by a 5 sec improvement in times on the 9HPT - R=40s or better; L=45s or better. (progressing 3/6/2020)  6) Alyssa will complete bathroom mobility for toileting w/ SBA and RW. (progressing 2/28/2020)  7) Alyssa will be able to stand w/ SBA and unilateral support for 10 minutes to increase indep w/ IADLs. (progressing 2/28/2020)  8) Alyssa will be able to complete a 5-pose flow of chair yoga with mod I to increase core control. (progressing 2/28/2020 )  9) Alyssa will be able to cut her own food with pavan UE using fork and knife. (progressing 3/6/2020)  10) Pt will participate in at least one IADL task in the home on a daily basis (dishes, laundry, etc) (MET per pt report 2/20/2020)   11) Pt will  be supervision with bathing, seated (MET 2/20/2020)    STG Goals:   1) Alyssa will be able to stand and manage her clothing for toileting w/ CGA (progressing 3/6/2020 )  2) Mandeep UE GMC to improve, as evidenced by box & block scores of 31 or better, mandeep UE. (MET)  3) Alyssa will be mod I with initial HEP. (MET with assistance of spouse)  4) Pt will t/f to toilet w/ min A or better. (MET)        Patient Education/Response:     Ed to ensure she does a daily self-assessment upon getting out of bed as MS symptoms are variable and we want to make sure she is safe when getting up and going to the bathroom. She v/u.  Continue HEP for IADL participation, yoga flow completion.    Plans and Goals:     Standing tolerance for activity; yoga.     Sameera Montague, OT  3/6/2020

## 2020-03-06 NOTE — PROGRESS NOTES
"Name: Alyssa John  [facility] Number: [id number]  Date of Treatment: 03/06/2020   Diagnosis:   Encounter Diagnoses   Name Primary?    Gait disturbance     Multiple sclerosis     Tremor     Impaired functional mobility, balance, and endurance        Time in: 1400  Time Out: 1500  Total Treatment Time: 60      Subjective:    Alyssa reports feeling good after OT visit, reports wanting to walk after doing yoga in OT. Patient reports their pain to be 0/10 on a 0-10 scale with 0 being no pain and 10 being the worst pain imaginable.    Objective    Patient received individual therapy to increase strength, endurance, ROM and flexibility with 0 patients with activities as follows:     Alyssa received therapeutic exercises to develop strength, endurance, ROM, flexibility and posture for 55' minutes including:     Transfer transport w/c<>stationary bike min/mod A with cues for sequencing: scooting to edge of seat, hand and feet placement, weight translation to shift feet      Patient participated in gait training x 40' as follows:  Gait x 602'RW and CGA VC's to advance L LE step and shorten R LE step to advance in RW, with 1 sitting rest. And ascending 6-4" steps, and descending 4-6" steps    Bathroom mobility with mod A into and out of, and CGA while patient washed hands, S for donning/doffing LE clothing.    Sit to stand from WC with gait x 4 with Min/Mod A 2* on fatigue level    Assessment:   Remains requiring WC follow, with CGA, using VC's to advance L LE fwd of R LE and to extend LE's as to not walk with B knees in flexion and IR as patient begins to fatigue.  Tolerated steps with min A for safety.  Patient highly motivated with todays progress. Max VC's cont to be req'd when patient in WC to sit evenly without shifting upper body to L and LE's to the R.     Pt will continue to benefit from skilled PT intervention. Medical Necessity is demonstrated by:  Fall Risk, Unable to participate fully in daily activities, " Requires skilled supervision to complete and progress HEP and Weakness.    Patient is making good progress towards established goals.      Plan:  Continue with gait and LE strengthening/stretching to meet goals set in POC

## 2020-03-08 PROBLEM — R51.9 CHRONIC NONINTRACTABLE HEADACHE: Status: ACTIVE | Noted: 2020-03-08

## 2020-03-08 PROBLEM — R25.1 TREMOR: Status: ACTIVE | Noted: 2020-03-08

## 2020-03-08 PROBLEM — D69.6 DECREASED PLATELET COUNT: Status: RESOLVED | Noted: 2020-02-17 | Resolved: 2020-03-08

## 2020-03-08 PROBLEM — Z87.891 FORMER SMOKER, STOPPED SMOKING IN DISTANT PAST: Status: ACTIVE | Noted: 2020-03-08

## 2020-03-08 PROBLEM — I25.2 HISTORY OF MI (MYOCARDIAL INFARCTION): Status: ACTIVE | Noted: 2020-03-08

## 2020-03-08 PROBLEM — F32.A ANXIETY AND DEPRESSION: Status: ACTIVE | Noted: 2020-03-08

## 2020-03-08 PROBLEM — I65.21 INTERNAL CAROTID ARTERY STENOSIS, RIGHT: Status: ACTIVE | Noted: 2020-03-08

## 2020-03-08 PROBLEM — M85.80 OSTEOPENIA DETERMINED BY X-RAY: Status: ACTIVE | Noted: 2020-03-08

## 2020-03-08 PROBLEM — F41.9 ANXIETY AND DEPRESSION: Status: ACTIVE | Noted: 2020-03-08

## 2020-03-08 PROBLEM — G89.29 CHRONIC NONINTRACTABLE HEADACHE: Status: ACTIVE | Noted: 2020-03-08

## 2020-03-09 ENCOUNTER — HOSPITAL ENCOUNTER (OUTPATIENT)
Dept: RADIOLOGY | Facility: CLINIC | Age: 70
Discharge: HOME | End: 2020-03-09
Attending: FAMILY MEDICINE
Payer: MEDICARE

## 2020-03-09 DIAGNOSIS — Z12.31 ENCOUNTER FOR SCREENING MAMMOGRAM FOR MALIGNANT NEOPLASM OF BREAST: ICD-10-CM

## 2020-03-09 DIAGNOSIS — Z12.39 SCREENING FOR BREAST CANCER: Primary | ICD-10-CM

## 2020-03-09 DIAGNOSIS — Z12.39 SCREENING FOR BREAST CANCER: ICD-10-CM

## 2020-03-09 PROCEDURE — 77063 BREAST TOMOSYNTHESIS BI: CPT | Mod: 26,,, | Performed by: RADIOLOGY

## 2020-03-09 PROCEDURE — 77063 MAMMO DIGITAL SCREENING BILAT WITH TOMOSYNTHESIS_CAD: ICD-10-PCS | Mod: 26,,, | Performed by: RADIOLOGY

## 2020-03-09 PROCEDURE — 77067 MAMMO DIGITAL SCREENING BILAT WITH TOMOSYNTHESIS_CAD: ICD-10-PCS | Mod: 26,,, | Performed by: RADIOLOGY

## 2020-03-09 PROCEDURE — 77067 SCR MAMMO BI INCL CAD: CPT | Mod: 26,,, | Performed by: RADIOLOGY

## 2020-03-09 PROCEDURE — 77067 SCR MAMMO BI INCL CAD: CPT | Mod: TC,PO

## 2020-03-10 ENCOUNTER — CLINICAL SUPPORT (OUTPATIENT)
Dept: REHABILITATION | Facility: HOSPITAL | Age: 70
End: 2020-03-10
Attending: PHYSICIAN ASSISTANT
Payer: MEDICARE

## 2020-03-10 DIAGNOSIS — Z74.09 IMPAIRED MOBILITY AND ADLS: Primary | ICD-10-CM

## 2020-03-10 DIAGNOSIS — Z78.9 IMPAIRED MOBILITY AND ADLS: Primary | ICD-10-CM

## 2020-03-10 DIAGNOSIS — R26.89 BALANCE PROBLEM: ICD-10-CM

## 2020-03-10 DIAGNOSIS — R27.8 DECREASED COORDINATION: ICD-10-CM

## 2020-03-10 DIAGNOSIS — R53.1 WEAKNESS GENERALIZED: ICD-10-CM

## 2020-03-10 DIAGNOSIS — Z78.9 IMPAIRED INSTRUMENTAL ACTIVITIES OF DAILY LIVING (IADL): ICD-10-CM

## 2020-03-10 DIAGNOSIS — Z74.09 IMPAIRED FUNCTIONAL MOBILITY, BALANCE, AND ENDURANCE: ICD-10-CM

## 2020-03-10 PROCEDURE — 97530 THERAPEUTIC ACTIVITIES: CPT | Mod: PN

## 2020-03-10 PROCEDURE — 97110 THERAPEUTIC EXERCISES: CPT | Mod: PN,CQ

## 2020-03-10 PROCEDURE — 97116 GAIT TRAINING THERAPY: CPT | Mod: PN,CQ

## 2020-03-10 NOTE — PROGRESS NOTES
"Name: Alyssa John  [facility] Number: [id number]  Date of Treatment: 03/10/2020   Diagnosis:   Encounter Diagnosis   Name Primary?    Impaired functional mobility, balance, and endurance        Time in: 1300  Time Out: 1400  Total Treatment Time: 60      Subjective:    Alyssa reports no increase in s/s. Patient reports their pain to be 0/10 on a 0-10 scale with 0 being no pain and 10 being the worst pain imaginable.    Objective    Patient received individual therapy to increase strength, endurance, ROM and flexibility with 0 patients with activities as follows:     Alyssa received therapeutic exercises to develop strength, endurance, ROM, flexibility and posture for 20' minutes including:     Transfer transport w/c<>stationary bike min/mod A with cues for sequencing: scooting to edge of seat and cues to lean forward and rode 10' without rest breaks      Patient participated in gait training x 40' as follows:  Gait x 604' RW and CGA VC's to advance L LE step and shorten R LE step to advance in RW, with 1 sitting rest. And ascending 6-4" steps, and descending 4-6" steps    Sit to stand from WC with gait x 4 with Min/Mod A 2* on fatigue level    Assessment:   Remains requiring WC follow, with CGA, using VC's to advance L LE fwd of R LE and to extend LE's as to not walk with B knees in flexion and IR as patient begins to fatigue.  Tolerated steps with min A for safety.  Patient highly motivated with todays progress. Max VC's cont to be req'd when patient in WC to sit evenly without shifting upper body to L and LE's to the R. 1 seated rest with gait, endurance improving with pace slowly improving.    Pt will continue to benefit from skilled PT intervention. Medical Necessity is demonstrated by:  Fall Risk, Unable to participate fully in daily activities, Requires skilled supervision to complete and progress HEP and Weakness.    Patient is making good progress towards established goals.      Plan:  Continue with gait " and LE strengthening/stretching to meet goals set in POC

## 2020-03-11 NOTE — PROGRESS NOTES
Occupational Therapy- Treatment     Date:  3/10/2020   Start Time:  1405  Stop Time:  1500    TIMED  Procedure Time Min.   TherAct (4) Start:  Stop: 55   Total Timed Minutes:  55  Total Timed Units:  4  Total Untimed Units: 0  Charges Billed/# of units:  4    Progress/Current Status    Subjective:     Patient ID: Alyssa John is a 69 y.o. female.  Diagnosis: MS  1. Impaired mobility and ADLs     2. Weakness generalized     3. Decreased coordination     4. Impaired instrumental activities of daily living (IADL)     5. Balance problem       Pain: No c/o pain today.   Patient reports: She has been switching IADL activity days and therapy activity days. On the therapy activity days, she does do 1 or 2 ADLs.  PTA reports pt walked >600' in PT today.  Functional change: Standing better at home per pt report.  Concerns since last visit: n/a    Objective:     Therapeutic Activities x55 minutes to improve activity tolerance, balance, range of motion for increased independence in all functional activities.    W/C->mat SPT w/ RW and SBA.     Seated at EOM w/ mod cues for awareness of posture as she was leaning L & posterior.   Sidelying resting on elbow for stretching of R lats w/ cues for deep breathing and allowing R side of ribs to expand.  Seated at EOM, she completed her 6-position yoga flow x2 in each direction w/ max verbal and tactile cues for maximizing best positioning, making sure she was counting and/or breathing deeply as appropriate.     Standing x7 min w/ pavan UE support on RW and intermittent unilateral support to allow for playing Clever[employer] (to work on dividing attention between standing balance and task, as well as to work on pavan UE FMC and R UE handwriting). She required CGA-min A for balance, as well as max cues for attention to balance, increasing knee extension and alignment during stand. sk.     Mat->w/c SPT w/ RW and CGA at end of session.     Assessment:      Alyssa appeared fatigued in today's  session. She had difficulty with her yoga flow today, requiring increased cues compared to previous session. She continues to have difficulty dividing her attention between standing balance and any other activity. However, she participated in all activities and continues to appear eager to get stronger. Decreased memory is an impediment to progress with independence in her yoga flow. Continued tightness in L hamstrings and hip flexors interfere with independence in all activities and decrease safety. Will continue to address.     Alyssa will continue to benefit from skilled therapy services to increase daily function and fulfill rewarding occupational roles at home. Continuation of therapy at this point is medically necessary to increase postural control during ADLs, increasing safety and decreasing risk of fall.     PROBLEM LIST/IMPAIRMENTS:   weakness, impaired endurance, impaired self care skills, impaired functional mobility, gait instability, impaired balance, impaired cognition, decreased coordination and decreased upper extremity function     LTG GOALS:    1) Pt will be mod I with dressing for clothing except for fasteners (buttons/zippers/hooks) (progressing 3/10/2020)  2) SBA for TTB t/f. (progressing 3/10/2020)  3) Pt will be mod I for toileting and related t/f. (progressing 3/10/2020)  4) Pt will improve GMC, as evidenced by box and block scores of 45 or better on pavan UE. (progressing 3/10/2020)  5) Pt to improve FMC bilaterally, as evidenced by a 5 sec improvement in times on the 9HPT - R=40s or better; L=45s or better. (progressing 3/10/2020)  6) Alyssa will complete bathroom mobility for toileting w/ SBA and RW. (progressing 2/28/2020)  7) Alyssa will be able to stand w/ SBA and unilateral support for 10 minutes to increase indep w/ IADLs. (progressing 2/28/2020)  8) Alyssa will be able to complete a 5-pose flow of chair yoga with mod I to increase core control. (progressing 2/28/2020 )  9) Alyssa will  be able to cut her own food with mandeep UE using fork and knife. (progressing 3/10/2020)  10) Pt will participate in at least one IADL task in the home on a daily basis (dishes, laundry, etc) (MET per pt report 2/20/2020)   11) Pt will be supervision with bathing, seated (MET 2/20/2020)    STG Goals:   1) Alyssa will be able to stand and manage her clothing for toileting w/ CGA (progressing 3/10/2020 )  2) Mandeep UE GMC to improve, as evidenced by box & block scores of 31 or better, mandeep UE. (MET)  3) Alyssa will be mod I with initial HEP. (MET with assistance of spouse)  4) Pt will t/f to toilet w/ min A or better. (MET)        Patient Education/Response:     Continue HEP for IADL participation, yoga flow completion.    Plans and Goals:     Standing tolerance for activity; yoga.     Sameera Montague OT  3/10/2020

## 2020-03-13 ENCOUNTER — CLINICAL SUPPORT (OUTPATIENT)
Dept: REHABILITATION | Facility: HOSPITAL | Age: 70
End: 2020-03-13
Attending: PHYSICIAN ASSISTANT
Payer: MEDICARE

## 2020-03-13 DIAGNOSIS — Z74.09 IMPAIRED FUNCTIONAL MOBILITY, BALANCE, AND ENDURANCE: ICD-10-CM

## 2020-03-13 DIAGNOSIS — R26.89 BALANCE PROBLEM: ICD-10-CM

## 2020-03-13 DIAGNOSIS — R53.1 WEAKNESS GENERALIZED: ICD-10-CM

## 2020-03-13 DIAGNOSIS — Z78.9 IMPAIRED INSTRUMENTAL ACTIVITIES OF DAILY LIVING (IADL): ICD-10-CM

## 2020-03-13 DIAGNOSIS — Z78.9 IMPAIRED MOBILITY AND ADLS: Primary | ICD-10-CM

## 2020-03-13 DIAGNOSIS — Z74.09 IMPAIRED MOBILITY AND ADLS: Primary | ICD-10-CM

## 2020-03-13 DIAGNOSIS — R27.8 DECREASED COORDINATION: ICD-10-CM

## 2020-03-13 PROCEDURE — 97110 THERAPEUTIC EXERCISES: CPT | Mod: PN

## 2020-03-13 PROCEDURE — 97530 THERAPEUTIC ACTIVITIES: CPT | Mod: PN

## 2020-03-13 NOTE — PROGRESS NOTES
"Occupational Therapy- Treatment     Date:  3/13/2020   Start Time:  1305  Stop Time:  1400    TIMED  Procedure Time Min.   TherAct (4) Start:  Stop: 55   Total Timed Minutes:  55  Total Timed Units:  4  Total Untimed Units: 0  Charges Billed/# of units:  4    Progress/Current Status    Subjective:     Patient ID: Alyssa John is a 69 y.o. female.  Diagnosis: MS  1. Impaired mobility and ADLs     2. Weakness generalized     3. Decreased coordination     4. Impaired instrumental activities of daily living (IADL)     5. Balance problem       Pain: No c/o pain today.   Patient reports: She got her "sit up tall" device, but apparently it has to be calibrated so she wants to bring it to her next session so I can help her set it up. "I really enjoy our time together."  Functional change: She is trying to sit up taller, states she has worked on HEP.   Concerns since last visit: n/a    Objective:     Therapeutic Activities x55 minutes to improve activity tolerance, balance, range of motion for increased independence in all functional activities.    W/C->mat SPT w/ RW and SBA.     Seated at EOM, she completed her 6-position yoga flow x1 in each direction w/ max verbal and mod tactile cues for maximizing best positioning, making sure she was counting and/or breathing deeply as appropriate.     Sit<>stand from low mat x3 w/ SBA & RW.    Standing to simulate movements necessary for clothing management during toileting. Min cues to ensure "balance first", but overall, she completed tasks w/ SBA.    Repeated yoga flow.    Assessment:      Alyssa costa'karolyn improved balance and independence during sit<>stand today, as well as during a simulation of clothing management in stand. She continues to have difficulty with her yoga flow, requiring mod-max cues for positioning and max effort, though she clearly enjoys this aspect of our therapy. Decreased memory is an impediment to progress with independence in her yoga flow. Continued " tightness in L hamstrings and hip flexors interfere with independence in all activities and decrease safety. Will continue to address.     Alyssa will continue to benefit from skilled therapy services to increase daily function and fulfill rewarding occupational roles at home. Continuation of therapy at this point is medically necessary to increase postural control during ADLs, increasing safety and decreasing risk of fall.     PROBLEM LIST/IMPAIRMENTS:   weakness, impaired endurance, impaired self care skills, impaired functional mobility, gait instability, impaired balance, impaired cognition, decreased coordination and decreased upper extremity function     LTG GOALS:    1) Pt will be mod I with dressing for clothing except for fasteners (buttons/zippers/hooks) (progressing 3/13/2020)  2) SBA for TTB t/f. (progressing 3/13/2020)  3) Pt will be mod I for toileting and related t/f. (progressing 3/13/2020)  4) Pt will improve GMC, as evidenced by box and block scores of 45 or better on pavan UE. (progressing 3/13/2020)  5) Pt to improve FMC bilaterally, as evidenced by a 5 sec improvement in times on the 9HPT - R=40s or better; L=45s or better. (progressing 3/13/2020)  6) Alyssa will complete bathroom mobility for toileting w/ SBA and RW. (progressing 2/28/2020)  7) Alyssa will be able to stand w/ SBA and unilateral support for 10 minutes to increase indep w/ IADLs. (progressing 2/28/2020)  8) Alyssa will be able to complete a 5-pose flow of chair yoga with mod I to increase core control. (progressing 2/28/2020 )  9) Alyssa will be able to cut her own food with pavan UE using fork and knife. (progressing 3/13/2020)  10) Pt will participate in at least one IADL task in the home on a daily basis (dishes, laundry, etc) (MET per pt report 2/20/2020)   11) Pt will be supervision with bathing, seated (MET 2/20/2020)    STG Goals:   1) Alyssa will be able to stand and manage her clothing for toileting w/ CGA (progressing  3/13/2020 )  2) Mandeep UE GMC to improve, as evidenced by box & block scores of 31 or better, mandeep UE. (MET)  3) Alyssa will be mod I with initial HEP. (MET with assistance of spouse)  4) Pt will t/f to toilet w/ min A or better. (MET)        Patient Education/Response:     Continue HEP for IADL participation, yoga flow completion.    Plans and Goals:   Actual clothing management for toileting; yoga.     Sameera Montague OT  3/13/2020

## 2020-03-18 ENCOUNTER — TELEPHONE (OUTPATIENT)
Dept: REHABILITATION | Facility: HOSPITAL | Age: 70
End: 2020-03-18

## 2020-03-18 NOTE — TELEPHONE ENCOUNTER
"OT called pt, discussed with spouse. OT recommended Alyssa not return to therapy until the Covid-19 pandemic has passed as she is at high risk for illness and physical decline if she becomes ill.    She has an extensive HEP from OT (including her yoga flow) and a PT HEP as well. I also recommended that she walk in the house & participate in IADLs on a daily basis.    OT will continue to check in with them.    Giuseppe did say that Alyssa has had what appears to be a cold, but is doing better today. OT encouraged them to call MD if she experiences any difficulty breathing. He v/u.    OT notified  to cancel all appointments and placed pt's plan of care on "hold."  "

## 2020-03-19 DIAGNOSIS — G35 MULTIPLE SCLEROSIS: ICD-10-CM

## 2020-03-19 DIAGNOSIS — R53.82 CHRONIC FATIGUE: ICD-10-CM

## 2020-03-19 RX ORDER — MODAFINIL 100 MG/1
TABLET ORAL
Qty: 180 TABLET | Refills: 0 | Status: SHIPPED | OUTPATIENT
Start: 2020-03-19 | End: 2020-06-28

## 2020-03-23 ENCOUNTER — TELEPHONE (OUTPATIENT)
Dept: PHARMACY | Facility: CLINIC | Age: 70
End: 2020-03-23

## 2020-03-26 ENCOUNTER — TELEPHONE (OUTPATIENT)
Dept: REHABILITATION | Facility: HOSPITAL | Age: 70
End: 2020-03-26

## 2020-03-26 ENCOUNTER — DOCUMENTATION ONLY (OUTPATIENT)
Dept: NEUROLOGY | Facility: CLINIC | Age: 70
End: 2020-03-26

## 2020-03-26 NOTE — TELEPHONE ENCOUNTER
"OT spoke with Giuseppe and Alyssa. They have been doing exercises. She got her "yoga shoes" and she loves them. She also got a yoga block.    She just got done showering and she washed herself and "mostly" dried herself.     She is going to do a virtual visit for her MD visit.  Alyssa understands to stay active at home and engage in activity to keep herself busy and work on endurance.    Alyssa is agreeable to f/u next week.  "

## 2020-03-27 ENCOUNTER — TELEPHONE (OUTPATIENT)
Dept: REHABILITATION | Facility: HOSPITAL | Age: 70
End: 2020-03-27

## 2020-03-27 NOTE — TELEPHONE ENCOUNTER
LONG ARC QUAD - LAQ - HIGH SEAT -  Repeat 10 Times, Hold 2 Seconds, Complete 2 Sets, Perform 1, Times a Day        SEATED MARCHING -  Repeat 10 Times, Hold 2 Seconds, Complete 2 Sets, Perform 1, Times a Day        BALL SQUEEZE - SEATED -  Repeat 10 Times, Hold 5 Seconds, Complete 2 Sets, Perform 1, Times a Day        ELASTIC BAND - SEATED CLAMS - HIP ABDUCTION -  Repeat 10 Times, Hold 2 Seconds, Complete 2 Sets, Perform 1, Times a Day        TOES RAISES - DORSIFLEXION - BOTH -  Repeat 10 Times, Hold 2 Seconds, Complete 2 Sets, Perform 1, Times a Day        HEEL RAISES - PLANTARFLEXION - BILATERAL -  Repeat 10 Times, Hold 2 Seconds, Complete 2 Sets, Perform 1, Times a Day      Copyright © 4882-2153 [employer]

## 2020-04-01 ENCOUNTER — TELEPHONE (OUTPATIENT)
Dept: FAMILY MEDICINE | Facility: CLINIC | Age: 70
End: 2020-04-01

## 2020-04-01 NOTE — TELEPHONE ENCOUNTER
Call Reason: Rescheduling Due to COVID-19     Patient appointment has been converted into video visit

## 2020-04-03 ENCOUNTER — TELEPHONE (OUTPATIENT)
Dept: REHABILITATION | Facility: HOSPITAL | Age: 70
End: 2020-04-03

## 2020-04-03 ENCOUNTER — DOCUMENTATION ONLY (OUTPATIENT)
Dept: NEUROLOGY | Facility: CLINIC | Age: 70
End: 2020-04-03

## 2020-04-06 ENCOUNTER — TELEPHONE (OUTPATIENT)
Dept: REHABILITATION | Facility: HOSPITAL | Age: 70
End: 2020-04-06

## 2020-04-08 ENCOUNTER — TELEPHONE (OUTPATIENT)
Dept: REHABILITATION | Facility: HOSPITAL | Age: 70
End: 2020-04-08

## 2020-04-15 ENCOUNTER — TELEPHONE (OUTPATIENT)
Dept: REHABILITATION | Facility: HOSPITAL | Age: 70
End: 2020-04-15

## 2020-04-20 ENCOUNTER — TELEPHONE (OUTPATIENT)
Dept: PHARMACY | Facility: CLINIC | Age: 70
End: 2020-04-20

## 2020-04-21 NOTE — TELEPHONE ENCOUNTER
RX incoming call regarding Copaxone @ [facility]. Shipping out  for  arrival with patients consent. Copay of $0.00 @ 004. Address and  confirmed. Patient has 2 doses on hand at this time. Patient has not started any new medications, has had no missed doses and no side effects present. Patient is currently taking the medication as directed by doctors instruction. Patient states they do not have any questions or concerns at this time. Patients next injection is scheduled for Wednesday,.

## 2020-04-22 ENCOUNTER — TELEPHONE (OUTPATIENT)
Dept: REHABILITATION | Facility: HOSPITAL | Age: 70
End: 2020-04-22

## 2020-04-23 NOTE — TELEPHONE ENCOUNTER
Addendum 4/23/20: Pt left message last evening stating she was feeling good, doing some exercises (but not all the yoga ones) and anxious to return to therapy.

## 2020-04-28 ENCOUNTER — TELEPHONE (OUTPATIENT)
Dept: PHARMACY | Facility: CLINIC | Age: 70
End: 2020-04-28

## 2020-04-28 NOTE — TELEPHONE ENCOUNTER
"Call for Glatopa follow up. Patient's  answered, verified name and .  states that patient is doing well, having missed no doses. They are still administering with autoinjector. No pain associated with MS.  states that they are staying holed up at home.  reports no new medications, no new symptoms, or flares. He rates the patient's QoL a 10/10 as far has "other health conditions" but rates her MS a 7/10. No other questions or concerns.    Sean Mcmillan, PharmD  Clinical Pharmacist  [facility]  P: [phone]    "

## 2020-05-13 ENCOUNTER — TELEPHONE (OUTPATIENT)
Dept: REHABILITATION | Facility: HOSPITAL | Age: 70
End: 2020-05-13

## 2020-05-13 NOTE — TELEPHONE ENCOUNTER
OT called pt re: potential return to therapy. Pt is interested in returning to in-person therapy and expresses no concerns re: safety. OT explained changes in schedule and check-in procedure to decrease potential for viral exposure. Pt confirmed that she wants to be here and would like to come on Tuesday.    OT confirmed with PT that pt is also appropriate for return to therapy and requested  to call pt tomorrow.    Sameera Montague, KIKE, SIMA

## 2020-05-19 ENCOUNTER — CLINICAL SUPPORT (OUTPATIENT)
Dept: REHABILITATION | Facility: HOSPITAL | Age: 70
End: 2020-05-19
Payer: MEDICARE

## 2020-05-19 DIAGNOSIS — R53.1 WEAKNESS GENERALIZED: ICD-10-CM

## 2020-05-19 DIAGNOSIS — R27.8 COORDINATION IMPAIRMENT: Chronic | ICD-10-CM

## 2020-05-19 DIAGNOSIS — Z78.9 IMPAIRED MOBILITY AND ADLS: Primary | ICD-10-CM

## 2020-05-19 DIAGNOSIS — Z74.09 IMPAIRED MOBILITY AND ADLS: Primary | ICD-10-CM

## 2020-05-19 DIAGNOSIS — Z78.9 IMPAIRED INSTRUMENTAL ACTIVITIES OF DAILY LIVING (IADL): ICD-10-CM

## 2020-05-19 DIAGNOSIS — R27.8 DECREASED COORDINATION: ICD-10-CM

## 2020-05-19 DIAGNOSIS — Z74.09 IMPAIRED FUNCTIONAL MOBILITY, BALANCE, AND ENDURANCE: ICD-10-CM

## 2020-05-19 DIAGNOSIS — R26.89 BALANCE PROBLEM: ICD-10-CM

## 2020-05-19 PROCEDURE — 97530 THERAPEUTIC ACTIVITIES: CPT | Mod: PN

## 2020-05-19 NOTE — PLAN OF CARE
"  Outpatient Therapy Updated Plan of Care     Visit Date: 2020    Name: Alyssa John  Clinic Number: [id number]    Therapy Diagnosis:   Encounter Diagnoses   Name Primary?    Impaired mobility and ADLs Yes    Weakness generalized     Decreased coordination     Impaired instrumental activities of daily living (IADL)     Balance problem     Coordination impairment      Physician: Risa Oshea PA-C    Physician Orders: OT Eval & Treat  Medical Diagnosis from Referral: Multiple Sclerosis, neurologic gait, L UE weakness  Evaluation Date: 2019  Current Certification Period:    Authorization Period Expiration: 2020  Plan of Care Expiration: 2020  Visit # / Visits authorized:     Precautions: Standard & Fall   Functional Level Prior to Evaluation:  Prior to her illness, Alyssa was able to stand and transfer on her own (sometimes without the walker.) She was able to toilet & dress without assistance (except for her bra). At time of eval, Alyssa required assist with all ADLs: mod-max A w/ dressing, bathing & toileting; total A for bathroom mobility. AM-PAC=15. She consistently had poor posture in her w/c.    Subjective     Update: "We got a dog. I throw the ball to her. I'm getting a bidet installed. It's my mother's day gift. I've been spending time on the patio." Doing HEP "some, but not a lot."  She's been having an earache, mostly at night, which is interrupting her sleep.     I can't put on my necklaces.  I can sweep from the chair, but I can't use the dustpan.    Objective     Update:   AM-PAC 6 CLICK ADL  How much help from another person does this patient currently need?  1 = Unable, Total/Dependent Assistance  2 = A lot, Maximum/Moderate Assistance  3 = A little, Minimum/Contact Guard/Supervision  4 = None, Modified Heard/Independent    Eating: 3       Grooming: 3      UB Dressing: 3     LB Dressin      Bathing: 3      Toileting: 3  Total: 17  Re: " "toileting, pt states she is able to hike pants, but then also states she requires help to pull them up when dressing    AM-PAC Raw Score CMS "G-Code Modifier Level of Impairment Assistance   6 % Total / Unable   7 - 9 CM 80 - 100% Maximal Assist   10-14 CL 60 - 80% Moderate Assist   15 - 19 CK 40 - 60% Moderate Assist   20 - 22 CJ 20 - 40% Minimal Assist   23 CI 1-20% SBA / CGA   24 CH 0% Independent/ Mod I     Box and Block GMC assessment: (R) 46 w/ tremor (L) 46 (improved from 39 & 38, respectively)  [norms for women aged 65-69: R=72.0 +/-6.2; L=71.3 +/-7.7 (Sundarwetz et al, 1985)]    9 Hole Peg Test: (R) 53.43s w/ tremor (L) 45.17s (R w/ decline from 47.12s; L w/ improvement from 48.54s)  [norms for women aged 66-70: R=19.90 +/- 3.15s; L=21.44s +/-3.97s (Dora et al, 2003)]     Hand Strength   (lbs) Right Left   1 14 20   2 11 24   3 13 18   avg 12.67 20.67   [norms for women aged 65-69: R=49.6 +/-9.7; L=41.0 +/-8.2 (Mathiowetz et al, 1985)]    Transfer w/ CGA, stooped posture. Pt seated with asymmetrical posture that appears more pronounced than in March.    Assessment     Update: Alyssa has not been seen by OT since 3/13/2020. While OT was able to keep in contact with her via telephone during the time of quarantine, ability to follow through with HEP was limited due to concerns with balance making it unsafe for her to complete yoga flow without assistance. Due to decreased cognition, especially for memory and initiation, compliance with remainder of HEP was limited. Surprisingly, she demo'd improvement in gross motor coordination in pavan UE. However, she demonstrated functional declines with FMC in the R UE, bilateral , postural control and stability in her transfer with the walker.    Previous Goals Status:     LTG GOALS:    1) Pt will be mod I with dressing for clothing except for fasteners (buttons/zippers/hooks) (progressing 5/19/2020)  2) SBA for TTB t/f. (progressing 5/19/2020)  3) Pt will " be mod I for toileting and related t/f. (progressing 5/19/2020)  4) Pt will improve GMC, as evidenced by box and block scores of 45 or better on mandeep UE. (MET 5/19/2020)  5) Pt to improve FMC bilaterally, as evidenced by a 5 sec improvement in times on the 9HPT - R=40s or better; L=45s or better. (progressing 5/19/2020)  6) Alyssa will complete bathroom mobility for toileting w/ SBA and RW. (progressing 5/19/2020)  7) Alyssa will be able to stand w/ SBA and unilateral support for 10 minutes to increase indep w/ IADLs. (progressing 5/19/2020)  8) Alyssa will be able to complete a 5-pose flow of chair yoga with mod I to increase core control. (progressing 5/19/2020)  9) Alyssa will be able to cut her own food with mandeep UE using fork and knife. (progressing 5/19/2020)  10) Pt will participate in at least one IADL task in the home on a daily basis (dishes, laundry, etc) (MET per pt report 2/20/2020)   11) Pt will be supervision with bathing, seated (MET 2/20/2020)     STG Goals:   1) Alyssa will be able to stand and manage her clothing for toileting w/ CGA (progressing 5/19/2020)  2) Mandeep UE GMC to improve, as evidenced by box & block scores of 31 or better, mandeep UE. (MET)  3) Alyssa will be mod I with initial HEP. (MET with assistance of spouse)  4) Pt will t/f to toilet w/ min A or better. (MET)    Long Term Goal Status:   continue per initial plan of care.    Reasons for Recertification of Therapy:   As noted, Alyssa has not been seen for occupational therapy since 3/13/20 due to the COVID-19 pandemic. Despite this, she met her goal for GMC. Nonetheless, she also demo'd a significant decline  strength, a decline in R UE FMC (with increased tremor) and a decline in activity tolerance. Additionally, her trunk alignment and control appear worse than they did in March. Alyssa benefits from the consistency of coming to therapy and, as she has demonstrated in the past, she shows functional improvement during participation  in therapy. We will work toward previously set goals.    Plan     Updated Certification Period: 5/19/2020 to 7/17/20   Recommended Treatment Plan: 2 times per week for 8 weeks: Manual Therapy, Moist Heat/ Ice, Neuromuscular Re-ed, Orthotic Management and Training, Patient Education, Self Care, Therapeutic Activites and Therapeutic Exercise      Sameera Montague OT  5/19/2020      I CERTIFY THE NEED FOR THESE SERVICES FURNISHED UNDER THIS PLAN OF TREATMENT AND WHILE UNDER MY CARE    Physician's comments:        Physician's Signature: ___________________________________________________

## 2020-05-19 NOTE — PROGRESS NOTES
Occupational Therapy  Updated Plan of Care    Alyssa John  MRN: [id number]    Plan of care update completed. Please see attached POC for details. Thank you for consult!    KIKE Oneil LOTR  5/19/2020

## 2020-05-20 ENCOUNTER — TELEPHONE (OUTPATIENT)
Dept: PHARMACY | Facility: CLINIC | Age: 70
End: 2020-05-20

## 2020-05-20 NOTE — PLAN OF CARE
"TIME RECORD    Date: 2020    Start Time:  1350  Stop Time:  1425    PROCEDURES:    TIMED  Procedure Time Min.    Start:  Stop:     Start:  Stop:     Start:  Stop:     Start:  Stop:          UNTIMED  Procedure Time Min.    Start:  Stop:     Start:  Stop:      Total Timed Minutes:  0  Total Timed Units:  0  Total Untimed Units:  0  Charges Billed/# of units:  0    PHYSICAL THERAPY UPDATED PLAN OF TREATMENT    Patient name: Alyssa John  Onset Date:  2019  SOC Date:   2019  Primary Diagnosis:    1. Impaired functional mobility, balance, and endurance       Treatment Diagnosis:  Functional mobility/gait deficits  Certification Period:  3/1/2020 to 2020  Precautions:  Falls, immunocompromised  Visits from SOC:  See EMR  Functional Level Prior to SOC:  Unable to walk, assistance for transfers, difficulty with sitting balance/trunk control, tendency to lean backwards when standing    Subjective: Patient reports she has been moving around "ok" at home, not using w/c because she doesn't like it. No pain reported today.    Objective: Reassessment for POC update purposes    LE strength:  R hip flex 4/5, knee ext 4-/5, knee flex 4-/5, ankle DF 3+/5  L hip flex 4-/5, knee ext 3+/5, knee flex 4-/5, ankle DF 3/5     Tinetti combined score 3/28=high fall risk     Patient participating in therapeutic exercise as follows to address strength/balance/endurance for 41 minutes:     Transfer sit to stand mod A with RW, cues for fwd trunk motion, sitting edge of seat, and placement of feet. Once standing, vc's for correcting posterior lean    // bars: sit t<>stand with mod A and cues to push from arrests, reaching back for chair before sitting    8 ft fwd/bwd gait with min A and cues    8 ft sidestepping L/R with CGA/min A and cues  Gait trainin ft CGA/min A using RW, w/c follow for safety, cues for L foot clearance and posture. Very slow wu additional cues for AD management and turns.     Updated " Assessment: Patient unable to attend PT since 3/13/20 due to covid-19 crisis and not appropriate for virtual visits due to fall risk. Pt would benefit from continued physical therapy to address weakness, impaired endurance, gait instability, and decreased functional mobility.      Previous Short Term Goals Status:   Not met secondary to hiatus in PT visits  New Short Term Goals:     1) Pt will stand without posterior lean 5 of 10 trials    2) Pt will perform sit to stand x 5 reps with min A  3) Pt will improve LE strength by 1/2 muscle grade  Long Term Goals:     1) Pt will consistently perform sit to stand transfers with minimum to contact guard assistance  2) Pt will stand with slight UE support  3) Pt will perform toilet transfer with minimal assistance  4) Pt/family will be independent in HEP.  Reasons for Recertification of Therapy:     weakness, impaired endurance, impaired functional mobility, gait instability, impaired balance, decreased lower extremity function and decreased safety awareness    Certification Period: 4/25/2020 to 7/24/2020  Recommended Treatment Plan: 2 times per week for 8 weeks: Gait Training, Manual Therapy, Moist Heat/ Ice, Patient Education, Therapeutic Activites and Therapeutic Exercise        Therapist's Name: Sameera Osborne, PT   Date: 05/20/2020    I CERTIFY THE NEED FOR THESE SERVICES FURNISHED UNDER THIS PLAN OF TREATMENT AND WHILE UNDER MY CARE    Physician's comments: ________________________________________________________________________________________________________________________________________________      Physician's Name: ___________________________________

## 2020-05-20 NOTE — TELEPHONE ENCOUNTER
Prior Authorization has been obtained for the patients Glatopa. We will be assisting the patient in applying to [employer] Patient Assistance Program. Will send message to Dr Risa Oshea and staff that we will be faxing the Doctor portion of application for required signature.

## 2020-05-20 NOTE — TELEPHONE ENCOUNTER
Refill call regarding copaxone, spoke to . Patient in need of a refill, will ship  to arrive  copay $0 (one time use credit card). Patient has not started any new medications including OTC drugs. Patient has not had any medication/ dose or instruction changes. No new allergies or side effects reported with this shipment. Medication is being taken as prescribed by physician and properly stored. Two patient identifiers:  and Address verified. Patient has no questions or concerns for RPH. No sharps needed. Patient has two injections on hand for WF. HAI training with [facility]AN[facility]

## 2020-05-24 PROBLEM — R27.8 COORDINATION IMPAIRMENT: Chronic | Status: ACTIVE | Noted: 2020-05-24

## 2020-05-24 PROBLEM — Z78.9 IMPAIRED MOBILITY AND ADLS: Chronic | Status: ACTIVE | Noted: 2020-05-24

## 2020-05-24 PROBLEM — Z74.09 IMPAIRED MOBILITY AND ADLS: Chronic | Status: ACTIVE | Noted: 2020-05-24

## 2020-05-24 PROBLEM — Z78.9 IMPAIRED INSTRUMENTAL ACTIVITIES OF DAILY LIVING (IADL): Chronic | Status: ACTIVE | Noted: 2020-05-24

## 2020-05-27 ENCOUNTER — OFFICE VISIT (OUTPATIENT)
Dept: NEUROLOGY | Facility: CLINIC | Age: 70
End: 2020-05-27
Payer: MEDICARE

## 2020-05-27 VITALS
BODY MASS INDEX: 23.64 KG/M2 | TEMPERATURE: 98 F | DIASTOLIC BLOOD PRESSURE: 70 MMHG | HEART RATE: 68 BPM | RESPIRATION RATE: 14 BRPM | HEIGHT: 64 IN | WEIGHT: 138.44 LBS | SYSTOLIC BLOOD PRESSURE: 100 MMHG

## 2020-05-27 DIAGNOSIS — G35 MULTIPLE SCLEROSIS: ICD-10-CM

## 2020-05-27 DIAGNOSIS — I25.10 CORONARY ARTERY DISEASE WITHOUT ANGINA PECTORIS, UNSPECIFIED VESSEL OR LESION TYPE, UNSPECIFIED WHETHER NATIVE OR TRANSPLANTED HEART: ICD-10-CM

## 2020-05-27 DIAGNOSIS — I25.2 HISTORY OF MI (MYOCARDIAL INFARCTION): ICD-10-CM

## 2020-05-27 DIAGNOSIS — G43.719 INTRACTABLE CHRONIC MIGRAINE WITHOUT AURA AND WITHOUT STATUS MIGRAINOSUS: Primary | ICD-10-CM

## 2020-05-27 DIAGNOSIS — R56.9 SEIZURES: ICD-10-CM

## 2020-05-27 PROCEDURE — 3078F DIAST BP <80 MM HG: CPT | Mod: CPTII,S$GLB,, | Performed by: PSYCHIATRY & NEUROLOGY

## 2020-05-27 PROCEDURE — 3074F SYST BP LT 130 MM HG: CPT | Mod: CPTII,S$GLB,, | Performed by: PSYCHIATRY & NEUROLOGY

## 2020-05-27 PROCEDURE — 99999 PR PBB SHADOW E&M-EST. PATIENT-LVL V: CPT | Mod: PBBFAC,,, | Performed by: PSYCHIATRY & NEUROLOGY

## 2020-05-27 PROCEDURE — 99215 PR OFFICE/OUTPT VISIT, EST, LEVL V, 40-54 MIN: ICD-10-PCS | Mod: S$GLB,,, | Performed by: PSYCHIATRY & NEUROLOGY

## 2020-05-27 PROCEDURE — 3074F PR MOST RECENT SYSTOLIC BLOOD PRESSURE < 130 MM HG: ICD-10-PCS | Mod: CPTII,S$GLB,, | Performed by: PSYCHIATRY & NEUROLOGY

## 2020-05-27 PROCEDURE — 99215 OFFICE O/P EST HI 40 MIN: CPT | Mod: S$GLB,,, | Performed by: PSYCHIATRY & NEUROLOGY

## 2020-05-27 PROCEDURE — 1126F PR PAIN SEVERITY QUANTIFIED, NO PAIN PRESENT: ICD-10-PCS | Mod: S$GLB,,, | Performed by: PSYCHIATRY & NEUROLOGY

## 2020-05-27 PROCEDURE — 1101F PT FALLS ASSESS-DOCD LE1/YR: CPT | Mod: CPTII,S$GLB,, | Performed by: PSYCHIATRY & NEUROLOGY

## 2020-05-27 PROCEDURE — 1126F AMNT PAIN NOTED NONE PRSNT: CPT | Mod: S$GLB,,, | Performed by: PSYCHIATRY & NEUROLOGY

## 2020-05-27 PROCEDURE — 1159F PR MEDICATION LIST DOCUMENTED IN MEDICAL RECORD: ICD-10-PCS | Mod: S$GLB,,, | Performed by: PSYCHIATRY & NEUROLOGY

## 2020-05-27 PROCEDURE — 1159F MED LIST DOCD IN RCRD: CPT | Mod: S$GLB,,, | Performed by: PSYCHIATRY & NEUROLOGY

## 2020-05-27 PROCEDURE — 1101F PR PT FALLS ASSESS DOC 0-1 FALLS W/OUT INJ PAST YR: ICD-10-PCS | Mod: CPTII,S$GLB,, | Performed by: PSYCHIATRY & NEUROLOGY

## 2020-05-27 PROCEDURE — 3078F PR MOST RECENT DIASTOLIC BLOOD PRESSURE < 80 MM HG: ICD-10-PCS | Mod: CPTII,S$GLB,, | Performed by: PSYCHIATRY & NEUROLOGY

## 2020-05-27 PROCEDURE — 99999 PR PBB SHADOW E&M-EST. PATIENT-LVL V: ICD-10-PCS | Mod: PBBFAC,,, | Performed by: PSYCHIATRY & NEUROLOGY

## 2020-05-27 RX ORDER — PROCHLORPERAZINE MALEATE 10 MG
10 TABLET ORAL EVERY 6 HOURS PRN
Qty: 120 TABLET | Refills: 3 | Status: SHIPPED | OUTPATIENT
Start: 2020-05-27 | End: 2021-03-29

## 2020-05-27 NOTE — PATIENT INSTRUCTIONS
Please call our clinic at [phone] or send a message to Dr. Perea on the [facility] portal if there are any changes to the plan described below, for example,if you are not contacted for the requested tests, referral(s) within one week, if you are unable to receive the medications prescribed, or if you feel you need to change the treatment course for any reason.     TESTING:  -- none    REFERRALS:  -- none    PREVENTION (use daily regardless of headache):  -- seek authorization to being Botox treatments for chronic migraine    AS-NEEDED TREATMENT (use total no more than 10 days per month unless otherwise stated):  -- start Ubrelvy 50mg at onset of headache - may repeat once in 2 hours - safe to take every day but you will only receive 10 per month  -- Start Compazine 10 mg, half or full tablet up to 4 times a day as needed for mild headache (nausea pill that also helps headache) - OK to use every day

## 2020-05-27 NOTE — TELEPHONE ENCOUNTER
Good afternoon Risa Oshea and staff,    I wanted to check and see if this patient has a Service Request Form on file for her Glatopa? She is in the process of obtaining 's assistance for this prescription and they do require the service request form to be on file.     Let me know if you need for me to complete a form and fax it to the office for a signature. Thank you!    Deysi Sharif  [facility] Financial Assistance Team  [phone]

## 2020-05-27 NOTE — LETTER
May 27, 2020      Damion Charles MD  [address]  [facility] [address]           [facility]  [address] [place] [address]  Phone: [phone]  Fax: [phone]          Patient: Alyssa John   MR Number: [id number]   YOB: 1950   Date of Visit: 5/27/2020       Dear Dr. Damion Charles:    Thank you for referring Alyssa John to me for evaluation. Attached you will find relevant portions of my assessment and plan of care.    If you have questions, please do not hesitate to call me. I look forward to following Alyssa John along with you.    Sincerely,    Janet Perea MD    Enclosure  CC:  No Recipients    If you would like to receive this communication electronically, please contact [email or web address] or [phone] to request more information on [employer] Link access.    For providers and/or their staff who would like to refer a patient to [facility], please contact us through our one-stop-shop provider referral line, [facility], at [phone].    If you feel you have received this communication in error or would no longer like to receive these types of communications, please e-mail [email or web address]

## 2020-05-27 NOTE — PROGRESS NOTES
Date of service: 5/27/2020  Referring provider: Dr. Damion Charles    Subjective:      Chief complaint: Migraine       Patient ID: Alyssa John is a 69 y.o. lady with multiple sclerosis on Copaxone, CAD, hypertension, hyperlipidemia, presenting for new patient evaluation of headache, also followed by [facility]    History of Present Illness    ORIGINAL HEADACHE HISTORY - 05/27/2020  Age at onset and course over time:  Reports the headaches began many years ago, seems like for ever.  She reports that have been daily from onset.  She thinks they started around the time that she developed epilepsy and had multiple grand mal seizures with head trauma.  She had been seizure-free since the 1980s.  She has no family history of migraine that she knows of.  Location:  Center of the forehead  Quality:  Pressure, tight, throbbing  Severity:  Current 8, best 8, worst 8  Duration:  Min before medication  Frequency:  Daily; she has never pain-free  Headaches awaken at night?:    Worst time of day:   Associated with: [x] photophobia [x]  phonophobia [] osmophobia [x] blurred vision  [] double vision [x] loss of appetite [] nausea [] vomiting [] dizziness [x] vertigo  [] tinnitus [x] irritability [x] sinus pressure [] problems with concentration   [] neck tightness   Alleviated by:  [] sleep [x] darkness [x] massage [] heat [] ice [x] medication  Exacerbated by:  [x] fatigue [x] light [x] noise [] smells [] coughing [] sneezing  [] bending over [] ovulation [] menses [] alcohol [x] change in weather [x]  stress  Ipsilateral autonomic: [] nasal congestion [] lacrimation [] ptosis [] injection [] edema [] foreign body sensation [] ear fullness   ICP:  [] transient visual obscurations  [] tinnitus   [] positional headache  [x] non-positional   Sleep habits:  She describes her sleep is good  Caffeine intake:  Less than 1 cup of coffee a day  Gyn status (if female):  Menopausal  MIDAS: 60    Current acute  treatment:  Aspirin 7 days a week    (plavix)    Current prevention:  Topiramate 50 mg twice a day (seizures)  Duloxetine 20 mg daily    Previously tried/failed acute treatment:  Aspirin  Tylenol  Ibuprofen     Previously tried/failed preventative treatment:  Carbamazepine  Dilantin  Phenobarbital  Gabapentin  Metoprolol    Review of patient's allergies indicates:   Allergen Reactions    Codeine      Other reaction(s): throat tightness    Dilantin [phenytoin sodium extended]     Phenobarbital     Tegretol [carbamazepine]      Current Outpatient Medications   Medication Sig Dispense Refill    atorvastatin (LIPITOR) 40 MG tablet TAKE 1 TABLET BY MOUTH EVERY DAY 90 tablet 0    cholecalciferol, vitamin D3, (VITAMIN D3) 5,000 unit Tab Take 5,000 Units by mouth once daily.      clonazePAM (KLONOPIN) 1 MG tablet Take 1 tablet (1 mg total) by mouth 2 (two) times daily. 180 tablet 0    clopidogrel (PLAVIX) 75 mg tablet Take 75 mg by mouth once daily.        DULoxetine (CYMBALTA) 20 MG capsule TAKE 1 CAPSULE BY MOUTH EVERY DAY 90 capsule 1    famotidine (PEPCID) 20 MG tablet Take 20 mg by mouth 2 (two) times daily as needed for Heartburn.      glatiramer (COPAXONE) 40 mg/mL injection Inject 40 mg into the skin 3 (three) times a week. 36 Syringe 1    modafiniL (PROVIGIL) 100 MG Tab TAKE 1 TABLET BY MOUTH TWICE A  tablet 0    nitroGLYCERIN (NITROSTAT) 0.4 MG SL tablet Place 0.4 mg under the tongue every 5 (five) minutes as needed for Chest pain.       topiramate (TOPAMAX) 50 MG tablet TAKE 1 TABLET BY MOUTH TWICE A  tablet 1    neomycin-polymyxin-dexamethasone (DEXACINE) 3.5 mg/g-10,000 unit/g-0.1 % Oint Place into both eyes every evening. (Patient not taking: Reported on 5/27/2020) 1 Tube 0    prochlorperazine (COMPAZINE) 10 MG tablet Take 1 tablet (10 mg total) by mouth every 6 (six) hours as needed (migraine or nausea). 120 tablet 3    PSYLLIUM SEED, WITH DEXTROSE, (FIBER ORAL) Take by mouth 2  (two) times daily.      ubrogepant (UBRELVY)tablet 50 mg Take 1 tablet (50 mg total) by mouth once as needed for Migraine (may repeat once in 2 hours). 10 tablet 11     No current facility-administered medications for this visit.        Past Medical History  Past Medical History:   Diagnosis Date    Arthritis     Coronary artery disease     Encounter for blood transfusion     Epilepsy     GERD (gastroesophageal reflux disease)     Headache     Hypertension     MI, old     MS (multiple sclerosis)     Seizures     epilepsy       Past Surgical History  Past Surgical History:   Procedure Laterality Date    APPENDECTOMY      BACK SURGERY      L5 discectomy    BREAST BIOPSY Right 15 yrs ago    benign    CATARACT EXTRACTION Bilateral     COLONOSCOPY N/A 2015    Procedure: COLONOSCOPY;  Surgeon: Henry Malcolm MD;  Location: [facility];  Service: Endoscopy;  Laterality: N/A;    CORONARY STENT PLACEMENT      right knee arthroscopy         Family History  Family History   Problem Relation Age of Onset    Scleroderma Mother     Heart disease Father         MI, CAD    Hyperlipidemia Sister     Cancer Neg Hx     Diabetes Neg Hx     Stroke Neg Hx     Melanoma Neg Hx     Psoriasis Neg Hx     Lupus Neg Hx     Eczema Neg Hx        Social History  Social History     Socioeconomic History    Marital status:      Spouse name: Not on file    Number of children: Not on file    Years of education: Not on file    Highest education level: Not on file   Occupational History    Not on file   Social Needs    Financial resource strain: Not on file    Food insecurity:     Worry: Not on file     Inability: Not on file    Transportation needs:     Medical: Not on file     Non-medical: Not on file   Tobacco Use    Smoking status: Former Smoker     Packs/day: 1.50     Last attempt to quit: 2006     Years since quittin.4    Smokeless tobacco: Never Used   Substance and Sexual Activity     Alcohol use: No     Alcohol/week: 0.0 standard drinks    Drug use: No    Sexual activity: Not Currently     Partners: Male   Lifestyle    Physical activity:     Days per week: Not on file     Minutes per session: Not on file    Stress: Not on file   Relationships    Social connections:     Talks on phone: Not on file     Gets together: Not on file     Attends [employer] service: Not on file     Active member of club or organization: Not on file     Attends meetings of clubs or organizations: Not on file     Relationship status: Not on file   Other Topics Concern    Are you pregnant or think you may be? Not Asked    Breast-feeding Not Asked   Social History Narrative    Not on file        Review of Systems  14-point review of systems as follows:   No check toña indicates NEGATIVE response   Constitutional: [] weight loss, [] change to appetite   Eyes: [] change in vision, [] double vision   Ears, nose, mouth, throat: [] frequent nose bleeds, [] ringing in the ears   Respiratory: [x] cough, [] wheezing   Cardiovascular: [] chest pain, [] palpitations   Gastrointestinal: [] jaundice, [] nausea/vomiting   Genitourinary: [x] incontinence, [] burning with urination   Hematologic/lymphatic: [x] easy bruising/bleeding, [] night sweats   Neurological: [x] numbness, [x] weakness   Endocrine: [] fatigue, [x] heat/cold intolerance   Allergy/Immunologic: [] fevers, [] chills   Musculoskeletal: [] muscle pain, [x] joint pain   Psychiatric: [] thoughts of harming self/others, [x] depression   Integumentary: [] rashes, [x] sores that do not heal     Objective:        Vitals:    05/27/20 1013   BP: 100/70   Pulse: 68   Resp: 14   Temp: 97.8 °F (36.6 °C)     Body mass index is 24.14 kg/m².    05/27/2020  Constitutional:  Mild psychomotor slowing evident    Eyes: optic discs are flat and sharp bilaterally     Cardiovascular: regular rate and rhythm, no murmurs appreciated    Respiratory: unlabored respirations, breath  sounds normal bilaterally    Gastrointestinal: no visible abdominal masses, no guarding, no visible hernia    Musculoskeletal:  The patient is in a wheelchair     Spine:   CERVICAL SPINE:  ROM:  Poor  MUSCLE SPASM:  Mild diffuse   FACET LOADING: no   SPURLING: no  DIRK / PIERCE tender:  Mild bilateral  The supraorbital, supratrochlear, regular temporal nerves are mildly tender bilateral     Neurologic:   Cortical functions: recent and remote memory intact, normal attention span and concentration, speech fluent, adequate fund of knowledge   Tremor present in the upper extremities    Data Review:     I have personally reviewed the referring provider's notes, labs, & imaging made available to me today.      RADIOLOGY STUDIES:  I have personally reviewed the pertinent images performed.       Results for orders placed or performed during the hospital encounter of 12/07/16   MRI Brain W WO Contrast    Narrative    Multiplanar, multisequence MR imaging of the brain was performed before and after intravenous administration of Gadavist 7 mL.  The examination was acquired accordingly demyelinating disease protocol.    Comparison: 07/01/2015; no more recent comparison examinations are available.    FINDINGS: There is moderate, confluent T2 hyperintense white matter signal abnormality predominating within periventricular white matter as before.  Involvement along the callosal septal interface is present, and there is moderate thinning of the corpus callosum.  Numerous T1 hypointense foci are scattered within the periventricular white matter signal abnormality.  There has been no significant change in the distribution of involvement, and no new lesions are identified.  There is no associated enhancement or restricted diffusion to indicate active demyelination.    Moderate diffuse cerebral volume loss is present, above left expected for patient age.  There is compensatory enlargement of the ventricular system.  There is no mass or  abnormal enhancement.  No evidence for hemorrhage.  The brain stem and cerebellum are unremarkable.  Bilateral cataract repair are noted.    Impression    1.  Stable, moderate white matter disease in a pattern compatible with multiple sclerosis.  No evidence for active demyelination.  2.  Moderate cerebral volume loss above that expected for patient age.      Electronically signed by: Baldo Salazar MD  Date:     12/07/16  Time:    13:51      *Note: Due to a large number of results and/or encounters for the requested time period, some results have not been displayed. A complete set of results can be found in Results Review.       Lab Results   Component Value Date    BNP 11 04/29/2019     02/20/2020    K 3.9 02/20/2020     (H) 02/20/2020    CO2 24 02/20/2020    CO2 2 10/19/2016    BUN 20 02/20/2020    CREATININE 0.7 02/20/2020    CREATININE 0.8 02/22/2013    GLU 81 02/20/2020    HGBA1C 4.9 09/14/2017    AST 16 02/20/2020    AST 18 02/22/2013    ALT 15 02/20/2020    ALBUMIN 3.0 (L) 02/20/2020    PROT 6.0 02/20/2020    BILITOT 0.3 02/20/2020    CHOL 165 02/20/2020    HDL 60 02/20/2020    LDLCALC 84.0 02/20/2020    TRIG 105 02/20/2020       Lab Results   Component Value Date    WBC 5.30 02/20/2020    HGB 13.5 02/20/2020    HCT 44.6 02/20/2020    MCV 91 02/20/2020     02/20/2020       Lab Results   Component Value Date    TSH 1.042 08/20/2019           Assessment & Plan:       Problem List Items Addressed This Visit        Neuro    Intractable chronic migraine without aura and without status migrainosus - Primary    Overview     Given failure of multiple or preventives in the past, I recommended either monoclonal antibody or Botox especially given longstanding daily frequency.  She had a difficult time deciding between the 2 and I extensively explained the risks and benefits and expected course for both.  Ultimately she decided to proceed with Botox.    The patient has chronic migraines (G43.719)  and suffers from headaches more than 15 days a month lasting more than 4 hours a day with no relief of symptoms despite trying multiple medications for at least 3 months, if tolerated, including but not limited to   anti-epileptics - topiramate, carbamazepine, Dilantin, phenobarbital, gabapentin  beta blockers  - metoprolol  calcium channel blockers - blood pressure and pulse too low, contraindicated  Antidepressants - duloxetine    Botox treatment was approved for chronic migraines in October 2010. The patient will be an ideal candidate for Botox. We are planning for 3 treatments 3 months apart and aiming for at least 50% improvement in the symptoms. If we see no improvement after 3 treatments, we will discontinue the injections.    ---    She has history of myocardial infarction precluding the use of triptans or DHE, for this reason we will pursue Ubrelvy which carries no Cardiovascular warnings.  As she is having daily headaches which risks medication overuse headache, I will also prescribe Compazine which may be used on a daily basis without rebound risk however we have to watch her for worsening tremor           Relevant Medications    ubrogepant (UBRELVY)tablet 50 mg    prochlorperazine (COMPAZINE) 10 MG tablet    Other Relevant Orders    Prior authorization Order    Multiple sclerosis    Overview     Dr. Apodaca, neurology  On copaxone         Seizures    Overview     Epilepsy, Dr. Apodaca, neurology            Cardiac/Vascular    CAD (coronary artery disease)    Overview     Dr. Mcmillan, cardiology         Current Assessment & Plan     Limits the use for triptans, proceed with Ubrelvy         History of MI (myocardial infarction)    Current Assessment & Plan     Limits use of triptans, proceed with Ubrelvy  [place]mit[place] may be too strong but also option                   Please call our clinic at [phone] or send a message to Dr. Perea on the [employer] portal if there are any changes to the plan described  below, for example,if you are not contacted for the requested tests, referral(s) within one week, if you are unable to receive the medications prescribed, or if you feel you need to change the treatment course for any reason.     TESTING:  -- none    REFERRALS:  -- none    PREVENTION (use daily regardless of headache):  -- seek authorization to being Botox treatments for chronic migraine    AS-NEEDED TREATMENT (use total no more than 10 days per month unless otherwise stated):  -- start Ubrelvy 50mg at onset of headache - may repeat once in 2 hours - safe to take every day but you will only receive 10 per month  -- Start Compazine 10 mg, half or full tablet up to 4 times a day as needed for mild headache (nausea pill that also helps headache) - OK to use every day      Follow up in about 1 week (around 6/3/2020) for next botox.     A total of 45 minutes were spent face to face with the patient and more than half of this time was spent coordinating care and/or counseling.       Janet Perea MD

## 2020-05-28 ENCOUNTER — TELEPHONE (OUTPATIENT)
Dept: PHARMACY | Facility: CLINIC | Age: 70
End: 2020-05-28

## 2020-05-28 NOTE — TELEPHONE ENCOUNTER
The prior authorization for Alyssa Sandra Dora's Ubrelvy 50mg has been submitted on 5/28/2020 @ 2:53pm.  It can take up to 72 hours for a decision to be rendered from the insurance.  Patient is aware of PA and process.  Please let me know if you have any questions.    Thank You!   Sridevi Asif CPhT, B.A  Patient Care Advocate   [facility]  Phone: [phone] Ext [phone]  Fax: [phone]

## 2020-05-28 NOTE — TELEPHONE ENCOUNTER
Good Afternoon,     The prior authorization for Alyssa John's Ubrelvy 50mg prescription has been APPROVED FROM 5/28/2020 TO 12/31/2020 with copayment of $120.64.       We were unable to reach patient on 5/28/2020.  A voicemail was left for the patient of the prior authorization status along with an appropriate pharmacy phone number should he/she have questions.     If there are any additional questions or concerns, please contact me.    Thank You!   Sridevi Asif CPhT, ANALILIA.A  Patient Care Advocate   [facility]  Phone: [phone] Ext [phone]  Fax: [phone]

## 2020-05-29 ENCOUNTER — OFFICE VISIT (OUTPATIENT)
Dept: FAMILY MEDICINE | Facility: CLINIC | Age: 70
End: 2020-05-29
Payer: MEDICARE

## 2020-05-29 VITALS
TEMPERATURE: 97 F | WEIGHT: 151.44 LBS | HEIGHT: 63 IN | SYSTOLIC BLOOD PRESSURE: 138 MMHG | OXYGEN SATURATION: 96 % | BODY MASS INDEX: 26.83 KG/M2 | DIASTOLIC BLOOD PRESSURE: 70 MMHG | HEART RATE: 75 BPM

## 2020-05-29 DIAGNOSIS — H61.22 IMPACTED CERUMEN OF LEFT EAR: ICD-10-CM

## 2020-05-29 DIAGNOSIS — H92.02 LEFT EAR PAIN: Primary | ICD-10-CM

## 2020-05-29 PROCEDURE — 1125F AMNT PAIN NOTED PAIN PRSNT: CPT | Mod: S$GLB,,, | Performed by: NURSE PRACTITIONER

## 2020-05-29 PROCEDURE — 99999 PR PBB SHADOW E&M-EST. PATIENT-LVL IV: ICD-10-PCS | Mod: PBBFAC,,, | Performed by: NURSE PRACTITIONER

## 2020-05-29 PROCEDURE — 1159F MED LIST DOCD IN RCRD: CPT | Mod: S$GLB,,, | Performed by: NURSE PRACTITIONER

## 2020-05-29 PROCEDURE — 1101F PR PT FALLS ASSESS DOC 0-1 FALLS W/OUT INJ PAST YR: ICD-10-PCS | Mod: CPTII,S$GLB,, | Performed by: NURSE PRACTITIONER

## 2020-05-29 PROCEDURE — 1101F PT FALLS ASSESS-DOCD LE1/YR: CPT | Mod: CPTII,S$GLB,, | Performed by: NURSE PRACTITIONER

## 2020-05-29 PROCEDURE — 3075F PR MOST RECENT SYSTOLIC BLOOD PRESS GE 130-139MM HG: ICD-10-PCS | Mod: CPTII,S$GLB,, | Performed by: NURSE PRACTITIONER

## 2020-05-29 PROCEDURE — 1159F PR MEDICATION LIST DOCUMENTED IN MEDICAL RECORD: ICD-10-PCS | Mod: S$GLB,,, | Performed by: NURSE PRACTITIONER

## 2020-05-29 PROCEDURE — 3078F DIAST BP <80 MM HG: CPT | Mod: CPTII,S$GLB,, | Performed by: NURSE PRACTITIONER

## 2020-05-29 PROCEDURE — 3078F PR MOST RECENT DIASTOLIC BLOOD PRESSURE < 80 MM HG: ICD-10-PCS | Mod: CPTII,S$GLB,, | Performed by: NURSE PRACTITIONER

## 2020-05-29 PROCEDURE — 99214 PR OFFICE/OUTPT VISIT, EST, LEVL IV, 30-39 MIN: ICD-10-PCS | Mod: S$GLB,,, | Performed by: NURSE PRACTITIONER

## 2020-05-29 PROCEDURE — 1125F PR PAIN SEVERITY QUANTIFIED, PAIN PRESENT: ICD-10-PCS | Mod: S$GLB,,, | Performed by: NURSE PRACTITIONER

## 2020-05-29 PROCEDURE — 99214 OFFICE O/P EST MOD 30 MIN: CPT | Mod: S$GLB,,, | Performed by: NURSE PRACTITIONER

## 2020-05-29 PROCEDURE — 3075F SYST BP GE 130 - 139MM HG: CPT | Mod: CPTII,S$GLB,, | Performed by: NURSE PRACTITIONER

## 2020-05-29 PROCEDURE — 99999 PR PBB SHADOW E&M-EST. PATIENT-LVL IV: CPT | Mod: PBBFAC,,, | Performed by: NURSE PRACTITIONER

## 2020-05-29 NOTE — PROGRESS NOTES
This dictation has been generated using Modal Fluency Dictation some phonetic errors may occur. Please contact author for clarification if needed.     Problem List Items Addressed This Visit     None      Visit Diagnoses     Left ear pain    -  Primary    Impacted cerumen of left ear            Patient's symptoms improved with ear irrigation.  No further meds at this time.  I gave her directions on a follow-up with us Monday if needed.  We do have virtual visits available on Saturday.  I would consider a Ciprodex or antibiotic steroid combination drop as next option.  Did not see anything behind the eardrum not sure the benefit of any oral antibiotic without fever developing.    No follow-ups on file.    ________________________________________________________________  ________________________________________________________________      Chief Complaint   Patient presents with    Otalgia     History of present illness  This 69 y.o. presents today for complaint of left ear pain.  Patient was originally seen February 7th for this issue.  Has been treated since then with ear drop antibiotic and an oral antibiotic.  Had some brief improvement.  Notes relapsing symptoms since the COVID locked down.  Recent flare of ear pain.  Denies drainage.  Denies any to problems.  Has had some sinus congestion.  Limited review of systems negative.  No fever chills.  No chest pain or shortness of breath.  No cough.  No loss of taste or smell  Past medical and social history reviewed    Past Medical History:   Diagnosis Date    Arthritis     Coronary artery disease     Encounter for blood transfusion     Epilepsy     GERD (gastroesophageal reflux disease)     Headache     Hypertension     MI, old     MS (multiple sclerosis)     Seizures     epilepsy       Past Surgical History:   Procedure Laterality Date    APPENDECTOMY      BACK SURGERY      L5 discectomy    BREAST BIOPSY Right 15 yrs ago    benign    CATARACT  EXTRACTION Bilateral     COLONOSCOPY N/A 2015    Procedure: COLONOSCOPY;  Surgeon: Henry Malcolm MD;  Location: [facility];  Service: Endoscopy;  Laterality: N/A;    CORONARY STENT PLACEMENT      right knee arthroscopy         Family History   Problem Relation Age of Onset    Scleroderma Mother     Heart disease Father         MI, CAD    Hyperlipidemia Sister     Cancer Neg Hx     Diabetes Neg Hx     Stroke Neg Hx     Melanoma Neg Hx     Psoriasis Neg Hx     Lupus Neg Hx     Eczema Neg Hx        Social History     Socioeconomic History    Marital status:      Spouse name: Not on file    Number of children: Not on file    Years of education: Not on file    Highest education level: Not on file   Occupational History    Not on file   Social Needs    Financial resource strain: Not on file    Food insecurity:     Worry: Not on file     Inability: Not on file    Transportation needs:     Medical: Not on file     Non-medical: Not on file   Tobacco Use    Smoking status: Former Smoker     Packs/day: 1.50     Last attempt to quit: 2006     Years since quittin.4    Smokeless tobacco: Never Used   Substance and Sexual Activity    Alcohol use: No     Alcohol/week: 0.0 standard drinks    Drug use: No    Sexual activity: Not Currently     Partners: Male   Lifestyle    Physical activity:     Days per week: Not on file     Minutes per session: Not on file    Stress: Not on file   Relationships    Social connections:     Talks on phone: Not on file     Gets together: Not on file     Attends [employer] service: Not on file     Active member of club or organization: Not on file     Attends meetings of clubs or organizations: Not on file     Relationship status: Not on file   Other Topics Concern    Are you pregnant or think you may be? Not Asked    Breast-feeding Not Asked   Social History Narrative    Not on file       Current Outpatient Medications   Medication Sig Dispense  Refill    atorvastatin (LIPITOR) 40 MG tablet TAKE 1 TABLET BY MOUTH EVERY DAY 90 tablet 0    cholecalciferol, vitamin D3, (VITAMIN D3) 5,000 unit Tab Take 5,000 Units by mouth once daily.      clonazePAM (KLONOPIN) 1 MG tablet Take 1 tablet (1 mg total) by mouth 2 (two) times daily. 180 tablet 0    clopidogrel (PLAVIX) 75 mg tablet Take 75 mg by mouth once daily.        DULoxetine (CYMBALTA) 20 MG capsule TAKE 1 CAPSULE BY MOUTH EVERY DAY 90 capsule 1    famotidine (PEPCID) 20 MG tablet Take 20 mg by mouth 2 (two) times daily as needed for Heartburn.      glatiramer (COPAXONE) 40 mg/mL injection Inject 40 mg into the skin 3 (three) times a week. 36 Syringe 1    modafiniL (PROVIGIL) 100 MG Tab TAKE 1 TABLET BY MOUTH TWICE A  tablet 0    neomycin-polymyxin-dexamethasone (DEXACINE) 3.5 mg/g-10,000 unit/g-0.1 % Oint Place into both eyes every evening. 1 Tube 0    nitroGLYCERIN (NITROSTAT) 0.4 MG SL tablet Place 0.4 mg under the tongue every 5 (five) minutes as needed for Chest pain.       prochlorperazine (COMPAZINE) 10 MG tablet Take 1 tablet (10 mg total) by mouth every 6 (six) hours as needed (migraine or nausea). 120 tablet 3    PSYLLIUM SEED, WITH DEXTROSE, (FIBER ORAL) Take by mouth 2 (two) times daily.      topiramate (TOPAMAX) 50 MG tablet TAKE 1 TABLET BY MOUTH TWICE A  tablet 1     No current facility-administered medications for this visit.        Review of patient's allergies indicates:   Allergen Reactions    Codeine      Other reaction(s): throat tightness    Dilantin [phenytoin sodium extended]     Phenobarbital     Tegretol [carbamazepine]        Physical examination  Vitals Reviewed  Gen. Well-dressed well-nourished   Skin warm dry and intact.  No rashes noted.  HEENT.  Scant cerumen noted in the lower portion of the auditory canal on the left.  Unable to completely visualize the TM.  Post irrigation  TM intact bilateral with normal light reflex.  No mastoid tenderness  during percussion.  Nares patent bilateral.  Pharynx is unremarkable.  No maxillary or frontal sinus tenderness when percussed.    Neck is supple without adenopathy  Chest.  Respirations are even unlabored.  Lungs are clear to auscultation.  Cardiac regular rate and rhythm.  No chest wall adenopathy noted.  Neuro. Awake alert oriented x4.  Normal judgment and cognition noted.  Extremities no clubbing cyanosis or edema noted.     Call or return to clinic prn if these symptoms worsen or fail to improve as anticipated.  In excessive of 25 minutes spent with patient with greater than 50% of time dedicated to education on symptoms, diagnosis, treatments, and coordination of care. Ear care. Ear cleaning.

## 2020-06-02 ENCOUNTER — CLINICAL SUPPORT (OUTPATIENT)
Dept: REHABILITATION | Facility: HOSPITAL | Age: 70
End: 2020-06-02
Payer: MEDICARE

## 2020-06-02 DIAGNOSIS — Z78.9 IMPAIRED INSTRUMENTAL ACTIVITIES OF DAILY LIVING (IADL): ICD-10-CM

## 2020-06-02 DIAGNOSIS — Z74.09 IMPAIRED MOBILITY AND ADLS: ICD-10-CM

## 2020-06-02 DIAGNOSIS — Z74.09 IMPAIRED FUNCTIONAL MOBILITY, BALANCE, AND ENDURANCE: Primary | ICD-10-CM

## 2020-06-02 DIAGNOSIS — Z78.9 IMPAIRED MOBILITY AND ADLS: ICD-10-CM

## 2020-06-02 DIAGNOSIS — Z74.09 IMPAIRED FUNCTIONAL MOBILITY, BALANCE, AND ENDURANCE: ICD-10-CM

## 2020-06-02 DIAGNOSIS — R53.82 CHRONIC FATIGUE: ICD-10-CM

## 2020-06-02 DIAGNOSIS — R27.8 COORDINATION IMPAIRMENT: ICD-10-CM

## 2020-06-02 PROCEDURE — 97530 THERAPEUTIC ACTIVITIES: CPT | Mod: PN

## 2020-06-02 PROCEDURE — 97110 THERAPEUTIC EXERCISES: CPT | Mod: PN

## 2020-06-02 PROCEDURE — 97112 NEUROMUSCULAR REEDUCATION: CPT | Mod: PN

## 2020-06-02 NOTE — PROGRESS NOTES
"  Occupational Therapy Treatment Note     Date: 6/2/2020  Name: Alyssa John  Clinic Number: [id number]    Therapy Diagnosis:   Encounter Diagnoses   Name Primary?    Impaired mobility and ADLs     Coordination impairment     Impaired instrumental activities of daily living (IADL)     Impaired functional mobility, balance, and endurance Yes    Chronic fatigue      Physician: Risa Oshea PA-C    Physician Orders: OT Eval & Treat  Medical Diagnosis from Referral: Multiple Sclerosis, neurologic gait, L UE weakness  Evaluation Date: 9/5/2019  Surgical Procedure and Date: n/a, n/a  Insurance Authorization Period Expiration: ?  Plan of Care Certification Period: 7/17/2020  Date of Return to MD: 6/17 w/ Dr. Perea for Botox for headaches; 6/24 w/ Risa Oshea NP    Visit # / Visits authorized: ?  Time In:1045  Time Out: 1130  Total Billable Time: 45 minutes    Precautions:  Standard and Fall      Subjective     Pt reports: she was not compliant with home exercise program given last session - "I've been having an earache for months." She says she needs to see an ENT and is going to make an appointment. OT educated pt to call Dr. Spencer to get a referral.    Response to previous treatment: no concerns  Functional change: ear pain is waking her at night    Pain: 0/10 at time of visit  Location:  n/a     Objective     Alyssa received the following manual therapy techniques for 0 minutes:   -n/a    lAyssa received therapeutic exercises for 0 minutes including:  -n/a    Alyssa participated in dynamic functional therapeutic activities to improve functional performance for 15 minutes, including:  -cutting yellow putty to increase indep w/ self-feeding. Used built-up utensils with good results. OT printed information on obtaining built-up utensils and gave info to pt. Also ed spouse.     Alyssa participated in neuromuscular re-education activities to improve: Balance, Coordination, Kinesthetic Sense, " Proprioception and Posture for 30 minutes. The following activities were included:  -Completed 6-step yoga flow in each direction x2 w/ mod cues and min assist to maximize accuracy of positioning and give full effort. Please see previous notes for exact sequence of yoga flow. Increased time is required.    Home Exercises and Education Provided     Education provided:   - Extensive ed re: need to be compliant with HEP. She is able to do yoga flow without physical assistance, though her form could be improved. She needs to ask family to assist with making sure she is doing the exercises correctly and in the right order. She v/u.  - Progress towards goals     Written Home Exercises Provided: Patient instructed to cont prior HEP.  Exercises were reviewed and Alyssa was able to demonstrate them prior to the end of the session.  Alyssa demonstrated good  understanding of the HEP provided.   .   See EMR under Patient Instructions for exercises provided previously.        Assessment     Pt would continue to benefit from skilled OT. Though she hasn't been consistent with her HEP over the quarantine, she is able to complete the yoga flow without physical assistance, though she does require mod cues for sequencing the flow.     Alyssa is progressing slowly towards her goals and there are no updates to goals at this time. Pt prognosis is Fair.     Pt will continue to benefit from skilled outpatient occupational therapy to address the deficits listed in the problem list on initial evaluation provide pt/family education and to maximize pt's level of independence in the home and community environment.     Anticipated barriers to occupational therapy: decreased memory    Pt's spiritual, cultural and educational needs considered and pt agreeable to plan of care and goals.    Goals:  LTG GOALS:    1) Pt will be mod I with dressing for clothing except for fasteners (buttons/zippers/hooks) (progressing 6/2/2020)   2) SBA for TTB t/f.  (progressing 6/2/20)   3) Pt will be mod I for toileting and related t/f. (progressing 6/2/20)   4) Pt will improve GMC, as evidenced by box and block scores of 45 or better on mandeep UE. (MET 5/19/2020)  5) Pt to improve FMC bilaterally, as evidenced by a 5 sec improvement in times on the 9HPT - R=40s or better; L=45s or better. (progressing 6/2/20)   6) Alyssa will complete bathroom mobility for toileting w/ SBA and RW. (progressing 6/2/20)   7) Alyssa will be able to stand w/ SBA and unilateral support for 10 minutes to increase indep w/ IADLs. (progressing 6/2/20)   8) Alyssa will be able to complete a 5-pose flow of chair yoga with mod I to increase core control. (progressing 6/2/20)  9) Alyssa will be able to cut her own food with mandeep UE using fork and knife. (progressing 6/2/20)   10) Pt will participate in at least one IADL task in the home on a daily basis (dishes, laundry, etc) (MET per pt report 2/20/2020)   11) Pt will be supervision with bathing, seated (MET 2/20/2020)     STG Goals:   1) Alyssa will be able to stand and manage her clothing for toileting w/ CGA (progressing 6/2/20)   2) Mandeep UE GMC to improve, as evidenced by box & block scores of 31 or better, mandeep UE. (MET)  3) Alyssa will be mod I with initial HEP. (MET with assistance of spouse)  4) Pt will t/f to toilet w/ min A or better. (MET)    Plan     Updates/Grading for next session: decrease assistance for flow; standing tolerance.      Sameera Montague, OT

## 2020-06-02 NOTE — PROGRESS NOTES
Name: Alyssa John  [facility] Number: [id number]  Date of Treatment: 06/02/2020   Diagnosis:   Encounter Diagnosis   Name Primary?    Impaired functional mobility, balance, and endurance        Time in: 1015  Time Out: 1045  Total Treatment Time: 30  Group Time: 0      Subjective:    Alyssa reports she seems to be tolerating recent therapy visits better than in the past, not as fatigued.  Patient reports their pain to be 0/10 on a 0-10 scale with 0 being no pain and 10 being the worst pain imaginable.    Objective    Patient received individual therapy to increase strength and endurance with 0 patients with activities as follows:     Alyssa received therapeutic exercises to develop strength and endurance for 30 minutes including:     Transfer transport chair<>stationary bike min A with cues for trunk motion  Stationary bike L1 x 10 min for endurance purposes, cues for midline sitting  Hip adduction ball squeezes x 20    // bars:   Sit to stand transfer min A with cues   Fwd gait   Sidestepping with manual/verbal cues    Sit to stand transfer: min/mod A with RW, cues for trunk motion     Gait: 55 ft ft with RW, CGA with w/c follow for safety; cues for L foot clearance, posture    Assessment:     Good tolerance for today's activities, improved L foot clearance.  Pt will continue to benefit from skilled PT intervention. Medical Necessity is demonstrated by:  Fall Risk and Weakness.    Patient is making good progress towards established goals.        Plan:  Continue with established Plan of Care towards PT goals.

## 2020-06-03 NOTE — TELEPHONE ENCOUNTER
MEJIA: Glatopa assistance approved by [facility] until 12/31/20.    The patient has been notified of their approval, and confirmed that they did receive her first delivery of the medication. Please follow up with [employer] for all future refills through the patient's enrollment.      Thanks,  Deysi Sharif CPhT.  OSP [phone]

## 2020-06-08 ENCOUNTER — TELEPHONE (OUTPATIENT)
Dept: NEUROLOGY | Facility: CLINIC | Age: 70
End: 2020-06-08

## 2020-06-08 NOTE — TELEPHONE ENCOUNTER
Called for pt, spoke to . Advised that we need to reschedule appt with Dr. Perea on 6/17/2020. Dr. Perea will not be in the office that day.  states that he needs to call back to reschedule, he was not at home. Can reschedule to either Dr. Perea or Amparo El NP.Thanks, Qing

## 2020-06-09 ENCOUNTER — TELEPHONE (OUTPATIENT)
Dept: NEUROLOGY | Facility: CLINIC | Age: 70
End: 2020-06-09

## 2020-06-09 ENCOUNTER — CLINICAL SUPPORT (OUTPATIENT)
Dept: REHABILITATION | Facility: HOSPITAL | Age: 70
End: 2020-06-09
Payer: MEDICARE

## 2020-06-09 DIAGNOSIS — Z78.9 IMPAIRED MOBILITY AND ADLS: ICD-10-CM

## 2020-06-09 DIAGNOSIS — Z74.09 IMPAIRED FUNCTIONAL MOBILITY, BALANCE, AND ENDURANCE: ICD-10-CM

## 2020-06-09 DIAGNOSIS — R27.8 COORDINATION IMPAIRMENT: ICD-10-CM

## 2020-06-09 DIAGNOSIS — R53.82 CHRONIC FATIGUE: ICD-10-CM

## 2020-06-09 DIAGNOSIS — Z78.9 IMPAIRED INSTRUMENTAL ACTIVITIES OF DAILY LIVING (IADL): ICD-10-CM

## 2020-06-09 DIAGNOSIS — Z74.09 IMPAIRED FUNCTIONAL MOBILITY, BALANCE, AND ENDURANCE: Primary | ICD-10-CM

## 2020-06-09 DIAGNOSIS — Z74.09 IMPAIRED MOBILITY AND ADLS: ICD-10-CM

## 2020-06-09 PROCEDURE — 97110 THERAPEUTIC EXERCISES: CPT | Mod: PN

## 2020-06-09 PROCEDURE — 97535 SELF CARE MNGMENT TRAINING: CPT | Mod: PN

## 2020-06-09 PROCEDURE — 97112 NEUROMUSCULAR REEDUCATION: CPT | Mod: PN

## 2020-06-09 NOTE — TELEPHONE ENCOUNTER
Called for pt, spoke to  again. Advised that we need to reschedule appt with Dr. Perea on 6/17/2020. Dr. Perea will not be in the office that day. Rescheduled to 7/8/2020. Thanks, Qing

## 2020-06-09 NOTE — PROGRESS NOTES
Physical Therapy Daily Treatment Note     Time In: 1019  Time Out: 1045  Total Billable Time: 36 minutes    Name: Alyssa John  Clinic Number: [id number]    Therapy Diagnosis:   Encounter Diagnosis   Name Primary?    Impaired functional mobility, balance, and endurance      Physician: Risa Oshea PA-C    Visit Date: 6/9/2020    Physician Orders: PT Eval and Treat  Medical Diagnosis: G35 (ICD-10-CM) - Multiple sclerosis  Evaluation Date: 03/19/2019  Authorization Period Expiration: 06/26/2020  Plan of Care Certification Period: 07/24/2020  Visit #/Visits authorized: 2/ 12     Precautions: Falls, immunocompromised    Subjective     Pt reports: feeling good. States she has started doing squats using grab bar after stepping out of the shower. Reports spouse is sometimes with her when she does this.  She  compliant with home exercise program.  Response to previous treatment: no complaints  Functional change: no    Pain: 0/10  Location:     Objective     Alyssa received therapeutic exercises to develop strength, balance, and endurance for 45 minutes including:    Transfer transport chair<>stationary bike min A with cues for trunk motion  Stationary bike L1 x 10 min for endurance purposes, cues for midline sitting     // bars:              Sit to stand transfer min A with cues              Fwd gait/Bwd gait              Sidestepping with manual/verbal cues     Sit to stand transfer: min/mod A with RW, cues for trunk motion                Gait: 52 ft ft with RW, CGA with w/c follow for safety; cues for L foot clearance, posture  Stationary bike x 10 min L1.5 for endurance purposes, 2-3 cues for midline sitting      Education provided:     - Educated patient on avoiding squats while unsupervised due to safety concerns  - Continue with current home exercise program as instructed  - Discussed monitoring symptoms and stopping activities which cause increased pain      Assessment     Good tolerance for exercises  with improved L foot clearance while ambulating with cues.  Alyssa is progressing well towards her goals.   Pt prognosis is Good.     Pt will continue to benefit from skilled outpatient physical therapy to address the deficits listed in the problem list box on initial evaluation, provide pt/family education and to maximize pt's level of independence in the home and community environment.     Pt's spiritual, cultural and educational needs considered and pt agreeable to plan of care and goals.     Anticipated barriers to physical therapy:     New Short Term Goals:     1) Pt will stand without posterior lean 5 of 10 trials    2) Pt will perform sit to stand x 5 reps with min A  3) Pt will improve LE strength by 1/2 muscle grade  Long Term Goals:     1) Pt will consistently perform sit to stand transfers with minimum to contact guard assistance  2) Pt will stand with slight UE support  3) Pt will perform toilet transfer with minimal assistance  4) Pt/family will be independent in HEP.    Short Term Goal Status:  1) Not met  2) Ongoing  3) Ongoing    Long Term Goal Status:  1) Not met  2) Not met  3) Not met  4) Not met      Plan     Continue with established Plan of Care towards PT goals. Progression of strengthening, balance, transfers/gait as per patient tolerance.

## 2020-06-09 NOTE — PROGRESS NOTES
"  Occupational Therapy Treatment Note     Date: 6/9/2020  Name: Alyssa John  Clinic Number: [id number]    Therapy Diagnosis:   Encounter Diagnoses   Name Primary?    Impaired mobility and ADLs     Coordination impairment     Impaired instrumental activities of daily living (IADL)     Impaired functional mobility, balance, and endurance Yes    Chronic fatigue      Physician: Risa Oshea PA-C    Physician Orders: OT Eval & Treat  Medical Diagnosis from Referral: Multiple Sclerosis, neurologic gait, L UE weakness  Evaluation Date: 9/5/2019  Surgical Procedure and Date: n/a, n/a  Insurance Authorization Period Expiration: 6/30/2020  Plan of Care Certification Period: 7/17/2020  Date of Return to MD: 6/17 w/ Dr. Perea for Botox for headaches; 6/24 w/ Risa Oshea NP    Visit # / Visits authorized: 7/12  Time In:1047  Time Out: 1130  Total Billable Time: 43 minutes    Precautions:  Standard and Fall      Subjective     Pt reports: she was compliant with home exercise program given last session!!!  She also got a set of built-up utensils and states they are working well. Ear pain is improved.    Response to previous treatment: no concerns  Functional change: sleeping better at night; she was able to cut chicken/dumplings, lasagna, ribs    Pain: 0/10 at time of visit "my hands don't hurt when I have these things on" (oval-8 orthoses)  Location:  n/a     Objective     Alyssa received the following manual therapy techniques for 0 minutes:   -n/a    Alyssa received therapeutic exercises for 0 minutes including:  -n/a    Alyssa participated in dynamic functional therapeutic activities to improve functional performance for 0 minutes, including:  -n/a    Alyssa participated in neuromuscular re-education activities to improve: Balance, Coordination, Kinesthetic Sense, Proprioception and Posture for 30 minutes. The following activities were included:  -Completed 6-step yoga flow in each direction x1 w/ mod cues " and min assist to maximize accuracy of positioning. Please see previous notes for exact sequence of yoga flow. Increased time is required.      Alyssa participated in functional self-care activities to improve functional performance for 13 minutes, including:  -doff/don slip-on shoes w/ SBA for the R shoe and min A for the L  -Pants management for toileting w/ min A and multiple sits due to decreased standing balance and activity tolerance.    Home Exercises and Education Provided     Education provided:   - Reviewed extensive ed re: need to be compliant with HEP. She is able to do yoga flow without physical assistance, though her form could be improved. She needs to ask family to assist with making sure she is doing the exercises correctly and in the right order. She v/u.  - Progress towards goals     Written Home Exercises Provided: Patient instructed to cont prior HEP.  Exercises were reviewed and Alyssa was able to demonstrate them prior to the end of the session.  Alyssa demonstrated good  understanding of the HEP provided.   .   See EMR under Patient Instructions for exercises provided previously.        Assessment     Pt would continue to benefit from skilled OT. Improved compliance with the HEP over the weekend. Nonetheless, Alyssa continues to require mod cues as recall decreases, especially when she is fatigued.    Alyssa is progressing slowly towards her goals and there are no updates to goals at this time. Pt prognosis is Fair.     Pt will continue to benefit from skilled outpatient occupational therapy to address the deficits listed in the problem list on initial evaluation provide pt/family education and to maximize pt's level of independence in the home and community environment.     Anticipated barriers to occupational therapy: decreased memory    Pt's spiritual, cultural and educational needs considered and pt agreeable to plan of care and goals.    Goals:  LTG GOALS:    1) Pt will be mod I with  dressing for clothing except for fasteners (buttons/zippers/hooks) (progressing 6/2/2020)   2) SBA for TTB t/f. (progressing 6/2/20)   3) Pt will be mod I for toileting and related t/f. (progressing 6/2/20)   4) Pt will improve GMC, as evidenced by box and block scores of 45 or better on mandeep UE. (MET 5/19/2020)  5) Pt to improve FMC bilaterally, as evidenced by a 5 sec improvement in times on the 9HPT - R=40s or better; L=45s or better. (progressing 6/2/20)   6) Alyssa will complete bathroom mobility for toileting w/ SBA and RW. (progressing 6/2/20)   7) Alyssa will be able to stand w/ SBA and unilateral support for 10 minutes to increase indep w/ IADLs. (progressing 6/2/20)   8) Alyssa will be able to complete a 5-pose flow of chair yoga with mod I to increase core control. (progressing 6/2/20)  9) Alyssa will be able to cut her own food with mandeep UE using fork and knife. (progressing 6/2/20)   10) Pt will participate in at least one IADL task in the home on a daily basis (dishes, laundry, etc) (MET per pt report 2/20/2020)   11) Pt will be supervision with bathing, seated (MET 2/20/2020)     STG Goals:   1) Alyssa will be able to stand and manage her clothing for toileting w/ CGA (progressing 6/2/20)   2) Mandeep UE GMC to improve, as evidenced by box & block scores of 31 or better, mandeep UE. (MET)  3) Alyssa will be mod I with initial HEP. (MET with assistance of spouse)  4) Pt will t/f to toilet w/ min A or better. (MET)    Plan     Updates/Grading for next session: decrease assistance for flow; standing tolerance; consider prone (OT discussed with PT today).      Sameera Montague, OT

## 2020-06-12 ENCOUNTER — CLINICAL SUPPORT (OUTPATIENT)
Dept: REHABILITATION | Facility: HOSPITAL | Age: 70
End: 2020-06-12
Payer: MEDICARE

## 2020-06-12 DIAGNOSIS — Z78.9 IMPAIRED MOBILITY AND ADLS: ICD-10-CM

## 2020-06-12 DIAGNOSIS — R53.82 CHRONIC FATIGUE: ICD-10-CM

## 2020-06-12 DIAGNOSIS — Z74.09 IMPAIRED FUNCTIONAL MOBILITY, BALANCE, AND ENDURANCE: ICD-10-CM

## 2020-06-12 DIAGNOSIS — Z74.09 IMPAIRED MOBILITY AND ADLS: ICD-10-CM

## 2020-06-12 DIAGNOSIS — Z74.09 IMPAIRED FUNCTIONAL MOBILITY, BALANCE, AND ENDURANCE: Primary | ICD-10-CM

## 2020-06-12 DIAGNOSIS — Z78.9 IMPAIRED INSTRUMENTAL ACTIVITIES OF DAILY LIVING (IADL): ICD-10-CM

## 2020-06-12 DIAGNOSIS — R27.8 COORDINATION IMPAIRMENT: ICD-10-CM

## 2020-06-12 PROCEDURE — 97530 THERAPEUTIC ACTIVITIES: CPT | Mod: PN

## 2020-06-12 PROCEDURE — 97112 NEUROMUSCULAR REEDUCATION: CPT | Mod: PN

## 2020-06-12 NOTE — PROGRESS NOTES
Physical Therapy Daily Treatment Note     Time In: 1230  Time Out: 1315  Total Billable Time: 45 minutes    Name: Alyssa John  Clinic Number: [id number]    Therapy Diagnosis:   Encounter Diagnosis   Name Primary?    Impaired functional mobility, balance, and endurance      Physician: Risa Oshea PA-C    Visit Date: 6/12/2020    Physician Orders: PT Eval and Treat  Medical Diagnosis: G35 (ICD-10-CM) - Multiple sclerosis  Evaluation Date: 03/19/2019  Authorization Period Expiration: 06/26/2020  Plan of Care Certification Period: 07/24/2020  Visit #/Visits authorized: 3/ 12      Precautions: Falls, immunocompromised       Subjective     Pt reports: tired from OT session.   Response to previous treatment: no complaints  Functional change: no    Pain: 0/10  Location:     Objective     Alyssa received therapeutic activities to develop strength, endurance, ROM and flexibility for 45 minutes including:    Prone to sitting transition min A with verbal/manual cues  Transfer high-low mat<transport chair min A with cues for sequencing    Transfer transport chair<>shuttle leg press min A with cues for sequencing  Shuttle leg press squats 37# x 4 min B LE's, 25# x 20 unilateral L/R    // bars:              Sit <>stand transfer min A with cues for pushing from start surface and backing up fully/ reaching back to seating surface              Fwd gait/Bwd gait 10 ft x 2 with cues for step length, floor clearance, upright posture              Sidestepping with manual/verbal cues 10 ft x 4 with cues for maintaining sideways orientation, floor clearance, increased amplitude of steps, midline orientation of feet between bars    Marches with verbal cues 5 reps x 2 trials L/R      Education provided:     - Discussed monitoring symptoms and stopping activities which cause increased pain      Assessment     Improvement of sit to stand transfer with increased knee/hip extension immediately following prone stretch (see OT  note). Deferred stationary bike secondary to fatigue following OT session. Good tolerance of activities with occasional redirection to task.  Alyssa is progressing well towards her goals.   Pt prognosis is Good.     Pt will continue to benefit from skilled outpatient physical therapy to address the deficits listed in the problem list box on initial evaluation, provide pt/family education and to maximize pt's level of independence in the home and community environment.     Pt's spiritual, cultural and educational needs considered and pt agreeable to plan of care and goals.     Anticipated barriers to physical therapy:     New Short Term Goals:     1) Pt will stand without posterior lean 5 of 10 trials    2) Pt will perform sit to stand x 5 reps with min A  3) Pt will improve LE strength by 1/2 muscle grade  Long Term Goals:     1) Pt will consistently perform sit to stand transfers with minimum to contact guard assistance  2) Pt will stand with slight UE support  3) Pt will perform toilet transfer with minimal assistance  4) Pt/family will be independent in HEP.     Short Term Goal Status:  1) Not met  2) Ongoing  3) Ongoing     Long Term Goal Status:  1) Not met  2) Not met  3) Not met  4) Not met         Plan     Continue with established Plan of Care towards PT goals. Progression of strengthening, endurance, transfers, and gait for safe mobility.

## 2020-06-12 NOTE — PROGRESS NOTES
Occupational Therapy Treatment Note     Date: 6/12/2020  Name: Alyssa John  Clinic Number: [id number]    Therapy Diagnosis:   Encounter Diagnoses   Name Primary?    Impaired mobility and ADLs     Coordination impairment     Impaired instrumental activities of daily living (IADL)     Impaired functional mobility, balance, and endurance Yes    Chronic fatigue      Physician: Risa Oshea PA-C    Physician Orders: OT Eval & Treat  Medical Diagnosis from Referral: Multiple Sclerosis, neurologic gait, L UE weakness  Evaluation Date: 9/5/2019  Surgical Procedure and Date: n/a, n/a  Insurance Authorization Period Expiration: 6/30/2020  Plan of Care Certification Period: 7/17/2020  Date of Return to MD: 6/17 w/ Dr. Perea for Botox for headaches; 6/24 w/ Risa Oshea NP    Visit # / Visits authorized: 8/12  Time In:1142  Time Out: 1218  Total Billable Time: 35 minutes    Precautions:  Standard and Fall      Subjective     Pt reports: she was compliant with home exercise program given last session    Response to previous treatment: no concerns  Functional change: no change compared to previous session     Pain: 0/10 at time of visit   Location:  n/a     Objective     Alyssa received the following manual therapy techniques for 0 minutes:   -n/a    Alyssa received therapeutic exercises for 5 minutes including:  -prone for stretching hip flexors, progressed from having 2 pillows under hips to laying flat  -pt laying prone at end of session for stretching during 15 min break between OT and PT.    Alyssa participated in dynamic functional therapeutic activities to improve functional performance for 5 minutes, including:  -Prone reaching activities to improve strength and coordination in scapular stabilizers    Alyssa participated in neuromuscular re-education activities to improve: Balance, Coordination, Kinesthetic Sense, Proprioception and Posture for 20 minutes. The following activities were included:  -t/f  w/c->mat, SPT w/ RW and SBA  -Completed 6-step yoga flow in each direction x1 w/ mod cues and min assist to maximize accuracy of positioning. Please see previous notes for exact sequence of yoga flow. Increased time is required.  -sit<>prone x2 w/ min-mod A     Alyssa participated in functional self-care activities to improve functional performance for 5 minutes, including:    -Pants management for toileting w/ CGA-SBA & increased time, elastic waist pants  -After hip flexor stretch, she required SBA & increased time    Home Exercises and Education Provided     Education provided:   - Reviewed extensive ed re: need to be compliant with HEP. She is able to do yoga flow without physical assistance, though her form could be improved. She needs to ask family to assist with making sure she is doing the exercises correctly and in the right order. She v/u.  - Progress towards goals     Written Home Exercises Provided: Patient instructed to cont prior HEP.  Exercises were reviewed and Alyssa was able to demonstrate them prior to the end of the session.  Alyssa demonstrated good  understanding of the HEP provided.   .   See EMR under Patient Instructions for exercises provided previously.        Assessment     Pt would continue to benefit from skilled OT. Improved indep w/ pants today, wearing elastic waist. Increased indep as well after stretching to hip flexors. She was fatigued after prone (like due to sit<>prone x2), but stood straighter after prone.     Alyssa is progressing slowly towards her goals and there are no updates to goals at this time. Pt prognosis is Fair.     Pt will continue to benefit from skilled outpatient occupational therapy to address the deficits listed in the problem list on initial evaluation provide pt/family education and to maximize pt's level of independence in the home and community environment.     Anticipated barriers to occupational therapy: decreased memory    Pt's spiritual, cultural and  educational needs considered and pt agreeable to plan of care and goals.    Goals:  LTG GOALS:    1) Pt will be mod I with dressing for clothing except for fasteners (buttons/zippers/hooks) (progressing 6/2/2020)   2) SBA for TTB t/f. (progressing 6/2/20)   3) Pt will be mod I for toileting and related t/f. (progressing 6/2/20)   4) Pt will improve GMC, as evidenced by box and block scores of 45 or better on mandeep UE. (MET 5/19/2020)  5) Pt to improve FMC bilaterally, as evidenced by a 5 sec improvement in times on the 9HPT - R=40s or better; L=45s or better. (progressing 6/2/20)   6) Alyssa will complete bathroom mobility for toileting w/ SBA and RW. (progressing 6/2/20)   7) Alyssa will be able to stand w/ SBA and unilateral support for 10 minutes to increase indep w/ IADLs. (progressing 6/2/20)   8) Alyssa will be able to complete a 5-pose flow of chair yoga with mod I to increase core control. (progressing 6/2/20)  9) Alyssa will be able to cut her own food with mandeep UE using fork and knife. (progressing 6/2/20)   10) Pt will participate in at least one IADL task in the home on a daily basis (dishes, laundry, etc) (MET per pt report 2/20/2020)   11) Pt will be supervision with bathing, seated (MET 2/20/2020)     STG Goals:   1) Alyssa will be able to stand and manage her clothing for toileting w/ CGA (progressing 6/2/20)   2) Mandeep UE GMC to improve, as evidenced by box & block scores of 31 or better, mandeep UE. (MET)  3) Alyssa will be mod I with initial HEP. (MET with assistance of spouse)  4) Pt will t/f to toilet w/ min A or better. (MET)    Plan     Updates/Grading for next session: incorporate stand into yoga flow as tolerated.       Sameera Montague, OT

## 2020-06-16 ENCOUNTER — CLINICAL SUPPORT (OUTPATIENT)
Dept: REHABILITATION | Facility: HOSPITAL | Age: 70
End: 2020-06-16
Payer: MEDICARE

## 2020-06-16 DIAGNOSIS — Z78.9 IMPAIRED INSTRUMENTAL ACTIVITIES OF DAILY LIVING (IADL): ICD-10-CM

## 2020-06-16 DIAGNOSIS — R53.82 CHRONIC FATIGUE: ICD-10-CM

## 2020-06-16 DIAGNOSIS — R27.8 COORDINATION IMPAIRMENT: ICD-10-CM

## 2020-06-16 DIAGNOSIS — Z78.9 IMPAIRED MOBILITY AND ADLS: ICD-10-CM

## 2020-06-16 DIAGNOSIS — Z74.09 IMPAIRED FUNCTIONAL MOBILITY, BALANCE, AND ENDURANCE: Primary | ICD-10-CM

## 2020-06-16 DIAGNOSIS — Z74.09 IMPAIRED MOBILITY AND ADLS: ICD-10-CM

## 2020-06-16 DIAGNOSIS — Z74.09 IMPAIRED FUNCTIONAL MOBILITY, BALANCE, AND ENDURANCE: ICD-10-CM

## 2020-06-16 PROCEDURE — 97110 THERAPEUTIC EXERCISES: CPT | Mod: PN

## 2020-06-16 PROCEDURE — 97112 NEUROMUSCULAR REEDUCATION: CPT | Mod: PN

## 2020-06-16 NOTE — PROGRESS NOTES
Physical Therapy Daily Treatment Note     Time In: 1055  Time Out: 1125  Total Billable Time: 30 minutes    Name: Alyssa John  Clinic Number: [id number]    Therapy Diagnosis:   Encounter Diagnosis   Name Primary?    Impaired functional mobility, balance, and endurance      Physician: Risa Oshea PA-C    Visit Date: 6/16/2020    Physician Orders: PT Eval and Treat  Medical Diagnosis: G35 (ICD-10-CM) - Multiple sclerosis  Evaluation Date: 03/19/2019  Authorization Period Expiration: 06/26/2020  Plan of Care Certification Period: 07/24/2020  Visit #/Visits authorized: 3/ 12      Precautions: Falls, immunocompromised       Subjective     Pt reports: tired from OT session.  She was compliant with home exercise program.  Response to previous treatment: no complaints  Functional change: no    Pain: 0/10  Location:     Objective     Alyssa received therapeutic exercises to develop strength, endurance, ROM and flexibility for 30 minutes including:    Transfer transport chair<>shuttle leg press min A with cues for sequencing  Shuttle leg press squats 37# x 5 min B LE's, 25# x 30 unilateral L/R     Seated therex:   LAQ x 20 L/R with cues for proper execution   Hip adduction ball squeezes x 20   Hip marches x 20 L/R    Gait: 25 ft c/ RW at CGA, cues for LLE clearance and posture  // bars:              Sit <>stand transfer min/mod A with cues for pushing from start surface and backing up fully/ reaching back to seating surface   Gastroc stretches using 1/2 foam roll 3/30 sec L/R with cues              Fwd gait/Bwd gait 10 ft x 2 with cues for step length, floor clearance, upright posture              Sidestepping with manual/verbal cues 10 ft x 2 with cues for maintaining sideways orientation, floor clearance, increased amplitude of steps, midline orientation of feet between bars   Minisquats x 10 with verbal/tactile cues for proper execution, requiring min to mod A for balance     Education provided:     -  Continue with current home exercise program as instructed  - Discussed monitoring symptoms and stopping activities which cause increased pain    Assessment     Fatiguing quickly with therex, stationary bike deferred. Pt resistant to cues for proper execution of minisquats, heavy posterior lean despite cueing and requiring mod/min A for balance.  Alyssa is progressing well towards her goals.   Pt prognosis is Good.     Pt will continue to benefit from skilled outpatient physical therapy to address the deficits listed in the problem list box on initial evaluation, provide pt/family education and to maximize pt's level of independence in the home and community environment.     Pt's spiritual, cultural and educational needs considered and pt agreeable to plan of care and goals.     Anticipated barriers to physical therapy: none    New Short Term Goals:     1) Pt will stand without posterior lean 5 of 10 trials    2) Pt will perform sit to stand x 5 reps with min A  3) Pt will improve LE strength by 1/2 muscle grade  Long Term Goals:     1) Pt will consistently perform sit to stand transfers with minimum to contact guard assistance  2) Pt will stand with slight UE support  3) Pt will perform toilet transfer with minimal assistance  4) Pt/family will be independent in HEP.     Short Term Goal Status:  1) Not met  2) Ongoing  3) Ongoing     Long Term Goal Status:  1) Not met  2) Not met  3) Not met  4) Not met       Plan     Continue with established Plan of Care towards PT goals. Progression of LE/trunk strengthening, flexibility, transfers, and gait per patient tolerance.

## 2020-06-16 NOTE — PROGRESS NOTES
"    Occupational Therapy Treatment Note     Date: 6/16/2020  Name: Alyssa John  Clinic Number: [id number]    Therapy Diagnosis:   Encounter Diagnoses   Name Primary?    Impaired functional mobility, balance, and endurance Yes    Impaired mobility and ADLs     Coordination impairment     Chronic fatigue     Impaired instrumental activities of daily living (IADL)      Physician: Risa Oshea PA-C    Physician Orders: OT Eval & Treat  Medical Diagnosis from Referral: Multiple Sclerosis, neurologic gait, L UE weakness  Evaluation Date: 9/5/2019  Surgical Procedure and Date: n/a, n/a  Insurance Authorization Period Expiration: 6/30/2020  Plan of Care Certification Period: 7/17/2020  Date of Return to MD: 6/17 w/ Dr. Perea for Botox for headaches; 6/24 w/ Risa Oshea NP    Visit # / Visits authorized: 9/12  Time In:1008  Time Out: 1050  Total Billable Time: 42 minutes    Precautions:  Standard and Fall      Subjective     Pt reports: she was intermittently compliant with home exercise program given last session. Giuseppe's dad passed this weekend. They will have a service, "but not right away." She did "some" exercises this weekend.    Response to previous treatment: no concerns  Functional change: no change compared to previous session     Pain: 0/10 at time of visit   Location:  n/a     Objective     Alyssa received the following manual therapy techniques for 0 minutes:   -n/a    Alyssa received therapeutic exercises for 0 minutes including:  -n/a    Alyssa participated in dynamic functional therapeutic activities to improve functional performance for 0 minutes, including:  -n/a    Alyssa participated in neuromuscular re-education activities to improve: Balance, Coordination, Kinesthetic Sense, Proprioception and Posture for 42 minutes. The following activities were included:  -t/f w/c->mat, SPT w/ RW and SBA  -Yoga flow x2 - completed baby back bends and for modified crescent lunges in stand w/ max " "A  -fxl mob w/ RW from OT to PT gym w/ CGA and max cues for keeping walker close, picking up L LE sufficiently  -max cues throughout session to "push the right hip down" without bending over to the right to improve sitting posture/ lengthen R lats.    Alyssa participated in functional self-care activities to improve functional performance for 0 minutes, including:  -n/a    Home Exercises and Education Provided     Education provided:   - Reviewed that yoga flow must be done seated at home.   - Progress towards goals     Written Home Exercises Provided: Patient instructed to cont prior HEP.  Exercises were reviewed and Alyssa was able to demonstrate them prior to the end of the session.  Alyssa demonstrated good  understanding of the HEP provided.   .   See EMR under Patient Instructions for exercises provided previously.        Assessment     Pt would continue to benefit from skilled OT. She tolerated the introduction of standing in 2 of the 6 positions, but required max A and max cues and she was clearly fatigued at the end of the session.     Alyssa is progressing slowly towards her goals and there are no updates to goals at this time. Pt prognosis is Fair.     Pt will continue to benefit from skilled outpatient occupational therapy to address the deficits listed in the problem list on initial evaluation provide pt/family education and to maximize pt's level of independence in the home and community environment.     Anticipated barriers to occupational therapy: decreased memory    Pt's spiritual, cultural and educational needs considered and pt agreeable to plan of care and goals.    Goals:  LTG GOALS:    1) Pt will be mod I with dressing for clothing except for fasteners (buttons/zippers/hooks) (progressing 6/2/2020)   2) SBA for TTB t/f. (progressing 6/2/20)   3) Pt will be mod I for toileting and related t/f. (progressing 6/2/20)   4) Pt will improve GMC, as evidenced by box and block scores of 45 or better on " mandeep UE. (MET 5/19/2020)  5) Pt to improve FMC bilaterally, as evidenced by a 5 sec improvement in times on the 9HPT - R=40s or better; L=45s or better. (progressing 6/2/20)   6) Alyssa will complete bathroom mobility for toileting w/ SBA and RW. (progressing 6/2/20)   7) Alyssa will be able to stand w/ SBA and unilateral support for 10 minutes to increase indep w/ IADLs. (progressing 6/2/20)   8) Alyssa will be able to complete a 5-pose flow of chair yoga with mod I to increase core control. (progressing 6/2/20)  9) Alyssa will be able to cut her own food with mandeep UE using fork and knife. (progressing 6/2/20)   10) Pt will participate in at least one IADL task in the home on a daily basis (dishes, laundry, etc) (MET per pt report 2/20/2020)   11) Pt will be supervision with bathing, seated (MET 2/20/2020)     STG Goals:   1) Alyssa will be able to stand and manage her clothing for toileting w/ CGA (progressing 6/2/20)   2) Mandeep UE GMC to improve, as evidenced by box & block scores of 31 or better, mandeep UE. (MET)  3) Alyssa will be mod I with initial HEP. (MET with assistance of spouse)  4) Pt will t/f to toilet w/ min A or better. (MET)    Plan     Updates/Grading for next session: return to prone       Sameera Montague OT

## 2020-06-18 ENCOUNTER — TELEPHONE (OUTPATIENT)
Dept: PHARMACY | Facility: AMBULARY SURGERY CENTER | Age: 70
End: 2020-06-18

## 2020-06-19 ENCOUNTER — CLINICAL SUPPORT (OUTPATIENT)
Dept: REHABILITATION | Facility: HOSPITAL | Age: 70
End: 2020-06-19
Payer: MEDICARE

## 2020-06-19 DIAGNOSIS — Z74.09 IMPAIRED FUNCTIONAL MOBILITY, BALANCE, AND ENDURANCE: ICD-10-CM

## 2020-06-19 PROCEDURE — 97110 THERAPEUTIC EXERCISES: CPT | Mod: PN

## 2020-06-19 NOTE — PROGRESS NOTES
"  Physical Therapy Daily Treatment Note     Time In: 1110  Time Out: 1145  Total Billable Time: 35 minutes    Name: Alyssa John  Clinic Number: [id number]    Therapy Diagnosis:   Encounter Diagnosis   Name Primary?    Impaired functional mobility, balance, and endurance      Physician: Risa Oshea PA-C    Visit Date: 6/19/2020    Physician Orders: PT Eval and Treat  Medical Diagnosis: G35 (ICD-10-CM) - Multiple sclerosis  Evaluation Date: 03/19/2019  Authorization Period Expiration: 06/26/2020  Plan of Care Certification Period: 07/24/2020  Visit #/Visits authorized: 5/ 12      Precautions: Falls, immunocompromised       Subjective     Pt reports: "I can just do little stuff today." Reports she was prescribed muscle relaxers for jaw arthritis which she took last evening and went to sleep quickly. Woke up at 4 am and went to bathroom without spouse and was "doing fine with the walker and then I just fell." Reports falling onto her back and hitting her head on rug, but that she is not hurting anywhere following this. She did not seek medical attention. Denies any dizziness/lightheadedness/nausea or any other symptoms. She continues to feel tired today.  She was compliant with home exercise program.  Response to previous treatment: no complaints  Functional change: no    Pain: 0/10  Location:     Objective     Alyssa received therapeutic exercises to develop strength, endurance, ROM and flexibility for 35 minutes including:    Transfer transport chair<>shuttle leg press min A with cues for sequencing/hand placement    Shuttle bilateral leg press squats 37# x 5 min B LE's  Shuttle unilateral leg press squats 25# x 20 L/R each    Sit to stand transfers: x 5 trials at min to mod A using RW, cues for scooting to edge of seat, positioning of feet, trunk shifting      Education provided:     - Discussed spouse supervision for bathroom visits at nighttime for safety  - Continue with current home exercise program " as instructed  - Discussed monitoring symptoms and stopping activities which cause increased pain      Assessment     Pt with limited tolerance to activities today secondary to fatigue. No complaints of pain with exercises. Strong posterior lean with achievement of standing position during sit to stand practice, which improved with cueing.  Alyssa is progressing well towards her goals.   Pt prognosis is Good.     Pt will continue to benefit from skilled outpatient physical therapy to address the deficits listed in the problem list box on initial evaluation, provide pt/family education and to maximize pt's level of independence in the home and community environment.     Pt's spiritual, cultural and educational needs considered and pt agreeable to plan of care and goals.     Anticipated barriers to physical therapy: none     New Short Term Goals:     1) Pt will stand without posterior lean 5 of 10 trials    2) Pt will perform sit to stand x 5 reps with min A  3) Pt will improve LE strength by 1/2 muscle grade  Long Term Goals:     1) Pt will consistently perform sit to stand transfers with minimum to contact guard assistance  2) Pt will stand with slight UE support  3) Pt will perform toilet transfer with minimal assistance  4) Pt/family will be independent in HEP.     Short Term Goal Status:  1) Not met  2) Ongoing  3) Ongoing     Long Term Goal Status:  1) Not met  2) Not met  3) Not met  4) Not met    Plan     Continue with established Plan of Care towards PT goals. Progression of strengthening, balance, and gait activities for safe functional mobility.

## 2020-06-23 ENCOUNTER — CLINICAL SUPPORT (OUTPATIENT)
Dept: REHABILITATION | Facility: HOSPITAL | Age: 70
End: 2020-06-23
Payer: MEDICARE

## 2020-06-23 DIAGNOSIS — Z78.9 IMPAIRED INSTRUMENTAL ACTIVITIES OF DAILY LIVING (IADL): ICD-10-CM

## 2020-06-23 DIAGNOSIS — R27.8 COORDINATION IMPAIRMENT: ICD-10-CM

## 2020-06-23 DIAGNOSIS — Z74.09 IMPAIRED FUNCTIONAL MOBILITY, BALANCE, AND ENDURANCE: Primary | ICD-10-CM

## 2020-06-23 DIAGNOSIS — R53.82 CHRONIC FATIGUE: ICD-10-CM

## 2020-06-23 DIAGNOSIS — Z78.9 IMPAIRED MOBILITY AND ADLS: ICD-10-CM

## 2020-06-23 DIAGNOSIS — Z74.09 IMPAIRED MOBILITY AND ADLS: ICD-10-CM

## 2020-06-23 DIAGNOSIS — Z74.09 IMPAIRED FUNCTIONAL MOBILITY, BALANCE, AND ENDURANCE: ICD-10-CM

## 2020-06-23 PROCEDURE — 97112 NEUROMUSCULAR REEDUCATION: CPT | Mod: KX,PN

## 2020-06-23 PROCEDURE — 97110 THERAPEUTIC EXERCISES: CPT | Mod: PN

## 2020-06-23 PROCEDURE — 97530 THERAPEUTIC ACTIVITIES: CPT | Mod: KX,PN

## 2020-06-23 NOTE — PROGRESS NOTES
"    Occupational Therapy Treatment Note     Date: 6/23/2020  Name: Alyssa John  Clinic Number: [id number]    Therapy Diagnosis:   Encounter Diagnoses   Name Primary?    Impaired functional mobility, balance, and endurance Yes    Impaired mobility and ADLs     Coordination impairment     Chronic fatigue     Impaired instrumental activities of daily living (IADL)      Physician: Risa Oshea PA-C    Physician Orders: OT Eval & Treat  Medical Diagnosis from Referral: Multiple Sclerosis, neurologic gait, L UE weakness  Evaluation Date: 9/5/2019  Surgical Procedure and Date: n/a, n/a  Insurance Authorization Period Expiration: 6/30/2020  Plan of Care Certification Period: 7/17/2020  Date of Return to MD: 6/17 w/ Dr. Perea for Botox for headaches; 6/24 w/ Risa Oshea NP    Visit # / Visits authorized: 10/12  Time In:1225  Time Out: 1255  Total Billable Time: 30 minutes    Precautions:  Standard and Fall      Subjective     Pt reports: she was intermittently compliant with home exercise program given last session. She was late to today's session  Response to previous treatment: no concerns  Functional change: no change compared to previous session     Pain: 0/10 at time of visit   Location:  n/a     Objective     Alyssa received the following manual therapy techniques for 0 minutes:   -n/a    Alyssa received therapeutic exercises for 0 minutes including:  -n/a    Alyssa participated in dynamic functional therapeutic activities to improve functional performance for 10 minutes, including:  -t/f w/c<>mat w/ RW and SBA  -standing at EOM w/ hand-held assist to "dance" to music, encouraging upright posture    Alyssa participated in neuromuscular re-education activities to improve: Balance, Coordination, Kinesthetic Sense, Proprioception and Posture for 20 minutes. The following activities were included:  --Facilitation of R lateral pelvic tilt to increase strength and coordination in core, as well as to " "elongate the R ribcage. Points of contact at L QL. Hands at sides in position of support to facilitate scapulo-pelvic rhythm.  -Sit->prone w/ min A; 2 pillows under hips; progressed to 1 pillow and then to full prone. While prone, she completed R UE forward and contralteral reaching to expand R ribcage to improve trunk alignment - 3x5  -throughout seated activity, max cues for keeping the R hip "down" but also engaging L QL to keep trunk midline.    Alyssa participated in functional self-care activities to improve functional performance for 0 minutes, including:  -n/a    Home Exercises and Education Provided     Education provided:   - Progress towards goals     Written Home Exercises Provided: Patient instructed to cont prior HEP.  Exercises were reviewed and Alyssa was able to demonstrate them prior to the end of the session.  Alyssa demonstrated good  understanding of the HEP provided.   .   See EMR under Patient Instructions for exercises provided previously.        Assessment     Pt would continue to benefit from skilled OT. She is tolerating prone and was more indep w/ sit<>prone today than at last attempt. Skilled therapy continues to be medically necessary to maximize functional independence, decreasing fall risk.     Alyssa is progressing slowly towards her goals and there are no updates to goals at this time. Pt prognosis is Fair.     Pt will continue to benefit from skilled outpatient occupational therapy to address the deficits listed in the problem list on initial evaluation provide pt/family education and to maximize pt's level of independence in the home and community environment.     Anticipated barriers to occupational therapy: decreased memory    Pt's spiritual, cultural and educational needs considered and pt agreeable to plan of care and goals.    Goals:  LTG GOALS:    1) Pt will be mod I with dressing for clothing except for fasteners (buttons/zippers/hooks) (progressing 6/2/2020)   2) SBA for " TTB t/f. (progressing 6/2/20)   3) Pt will be mod I for toileting and related t/f. (progressing 6/2/20)   4) Pt will improve GMC, as evidenced by box and block scores of 45 or better on mandeep UE. (MET 5/19/2020)  5) Pt to improve FMC bilaterally, as evidenced by a 5 sec improvement in times on the 9HPT - R=40s or better; L=45s or better. (progressing 6/2/20)   6) Alyssa will complete bathroom mobility for toileting w/ SBA and RW. (progressing 6/2/20)   7) Alyssa will be able to stand w/ SBA and unilateral support for 10 minutes to increase indep w/ IADLs. (progressing 6/2/20)   8) Alyssa will be able to complete a 5-pose flow of chair yoga with mod I to increase core control. (progressing 6/2/20)  9) Alyssa will be able to cut her own food with mandeep UE using fork and knife. (progressing 6/2/20)   10) Pt will participate in at least one IADL task in the home on a daily basis (dishes, laundry, etc) (MET per pt report 2/20/2020)   11) Pt will be supervision with bathing, seated (MET 2/20/2020)     STG Goals:   1) Alyssa will be able to stand and manage her clothing for toileting w/ CGA (progressing 6/2/20)   2) Mandeep UE GMC to improve, as evidenced by box & block scores of 31 or better, mandeep UE. (MET)  3) Alyssa will be mod I with initial HEP. (MET with assistance of spouse)  4) Pt will t/f to toilet w/ min A or better. (MET)    Plan     Updates/Grading for next session: Status Update      Sameera Montague, OT

## 2020-06-23 NOTE — PROGRESS NOTES
Physical Therapy Daily Treatment Note     Time In: 1304  Time Out: 1346  Total Billable Time: 42 minutes    Name: Alyssa John  Clinic Number: [id number]    Therapy Diagnosis:   Encounter Diagnosis   Name Primary?    Impaired functional mobility, balance, and endurance      Physician: Risa Oshea PA-C    Visit Date: 6/23/2020    Physician Orders: PT Eval and Treat  Medical Diagnosis: G35 (ICD-10-CM) - Multiple sclerosis  Evaluation Date: 03/19/2019  Authorization Period Expiration: 06/26/2020  Plan of Care Certification Period: 07/24/2020  Visit #/Visits authorized: 6/ 12      Precautions: Falls, immunocompromised    Subjective     Pt reports: has a neurologist appt tomorrow. Reports the muscle relaxer she took last week wiped her out for the whole weekend.  She was compliant with home exercise program.  Response to previous treatment: fatigue  Functional change: no    Pain: 0/10  Location:     Objective       Alyssa received therapeutic exercises to develop strength, endurance, and balance/transfers for 42 minutes including:     Transfer transport chair<>stationary bike min A with cues for sequencing/hand placement  Stationary bike L1.5 x 15 min with cues for midline sitting    Sit to stand transfers: x 5 min/mod A using RW with cues for setup, weight shifting, upright posture at stand position; 49 sec (3 repetitions with incomplete stand position)    Transfer transport chair<>shuttle leg press min A with cues for sequencing  Shuttle leg press squats 43# x 5 min B LE's, 25# x 30 unilateral L/R     Gait: 62 ft CGA using RW, cues for L foot clearance and step length    Education provided:   - safety education regarding transfers/AD management      Assessment     Cues for proper execution of shuttle squats and attention to task.  Alyssa is progressing well towards her goals.   Pt prognosis is Good.     Pt will continue to benefit from skilled outpatient physical therapy to address the deficits listed in  the problem list box on initial evaluation, provide pt/family education and to maximize pt's level of independence in the home and community environment.     Pt's spiritual, cultural and educational needs considered and pt agreeable to plan of care and goals.     Anticipated barriers to physical therapy: none    New Short Term Goals:     1) Pt will stand without posterior lean 5 of 10 trials    2) Pt will perform sit to stand x 5 reps with min A  3) Pt will improve LE strength by 1/2 muscle grade  Long Term Goals:     1) Pt will consistently perform sit to stand transfers with minimum to contact guard assistance  2) Pt will stand with slight UE support  3) Pt will perform toilet transfer with minimal assistance  4) Pt/family will be independent in HEP.     Short Term Goal Status:  1) Not met  2) Ongoing  3) Ongoing     Long Term Goal Status:  1) Not met  2) Not met  3) Not met  4) Not met      Plan     Continue with established Plan of Care towards PT goals. Progression of strengthening, balance, and gait activities for safe functional mobility

## 2020-06-24 ENCOUNTER — OFFICE VISIT (OUTPATIENT)
Dept: NEUROLOGY | Facility: CLINIC | Age: 70
End: 2020-06-24
Payer: MEDICARE

## 2020-06-24 VITALS
DIASTOLIC BLOOD PRESSURE: 78 MMHG | BODY MASS INDEX: 27.01 KG/M2 | HEIGHT: 63 IN | HEART RATE: 80 BPM | SYSTOLIC BLOOD PRESSURE: 122 MMHG | WEIGHT: 152.44 LBS

## 2020-06-24 DIAGNOSIS — R53.83 FATIGUE, UNSPECIFIED TYPE: ICD-10-CM

## 2020-06-24 DIAGNOSIS — G43.719 INTRACTABLE CHRONIC MIGRAINE WITHOUT AURA AND WITHOUT STATUS MIGRAINOSUS: ICD-10-CM

## 2020-06-24 DIAGNOSIS — Z74.09 IMPAIRED MOBILITY AND ADLS: Chronic | ICD-10-CM

## 2020-06-24 DIAGNOSIS — G35 MULTIPLE SCLEROSIS: Primary | ICD-10-CM

## 2020-06-24 DIAGNOSIS — R26.9 NEUROLOGIC GAIT DYSFUNCTION: ICD-10-CM

## 2020-06-24 DIAGNOSIS — Z78.9 IMPAIRED MOBILITY AND ADLS: Chronic | ICD-10-CM

## 2020-06-24 DIAGNOSIS — I10 ESSENTIAL HYPERTENSION: ICD-10-CM

## 2020-06-24 DIAGNOSIS — Z71.89 COUNSELING REGARDING GOALS OF CARE: ICD-10-CM

## 2020-06-24 DIAGNOSIS — E55.9 VITAMIN D INSUFFICIENCY: ICD-10-CM

## 2020-06-24 PROCEDURE — 99215 PR OFFICE/OUTPT VISIT, EST, LEVL V, 40-54 MIN: ICD-10-PCS | Mod: S$GLB,,, | Performed by: PHYSICIAN ASSISTANT

## 2020-06-24 PROCEDURE — 99499 UNLISTED E&M SERVICE: CPT | Mod: S$GLB,,, | Performed by: PHYSICIAN ASSISTANT

## 2020-06-24 PROCEDURE — 99215 OFFICE O/P EST HI 40 MIN: CPT | Mod: S$GLB,,, | Performed by: PHYSICIAN ASSISTANT

## 2020-06-24 PROCEDURE — 1159F PR MEDICATION LIST DOCUMENTED IN MEDICAL RECORD: ICD-10-PCS | Mod: S$GLB,,, | Performed by: PHYSICIAN ASSISTANT

## 2020-06-24 PROCEDURE — 1101F PT FALLS ASSESS-DOCD LE1/YR: CPT | Mod: CPTII,S$GLB,, | Performed by: PHYSICIAN ASSISTANT

## 2020-06-24 PROCEDURE — 1159F MED LIST DOCD IN RCRD: CPT | Mod: S$GLB,,, | Performed by: PHYSICIAN ASSISTANT

## 2020-06-24 PROCEDURE — 3078F DIAST BP <80 MM HG: CPT | Mod: CPTII,S$GLB,, | Performed by: PHYSICIAN ASSISTANT

## 2020-06-24 PROCEDURE — 3074F PR MOST RECENT SYSTOLIC BLOOD PRESSURE < 130 MM HG: ICD-10-PCS | Mod: CPTII,S$GLB,, | Performed by: PHYSICIAN ASSISTANT

## 2020-06-24 PROCEDURE — 1101F PR PT FALLS ASSESS DOC 0-1 FALLS W/OUT INJ PAST YR: ICD-10-PCS | Mod: CPTII,S$GLB,, | Performed by: PHYSICIAN ASSISTANT

## 2020-06-24 PROCEDURE — 3078F PR MOST RECENT DIASTOLIC BLOOD PRESSURE < 80 MM HG: ICD-10-PCS | Mod: CPTII,S$GLB,, | Performed by: PHYSICIAN ASSISTANT

## 2020-06-24 PROCEDURE — 1126F PR PAIN SEVERITY QUANTIFIED, NO PAIN PRESENT: ICD-10-PCS | Mod: S$GLB,,, | Performed by: PHYSICIAN ASSISTANT

## 2020-06-24 PROCEDURE — 1126F AMNT PAIN NOTED NONE PRSNT: CPT | Mod: S$GLB,,, | Performed by: PHYSICIAN ASSISTANT

## 2020-06-24 PROCEDURE — 3008F BODY MASS INDEX DOCD: CPT | Mod: CPTII,S$GLB,, | Performed by: PHYSICIAN ASSISTANT

## 2020-06-24 PROCEDURE — 3074F SYST BP LT 130 MM HG: CPT | Mod: CPTII,S$GLB,, | Performed by: PHYSICIAN ASSISTANT

## 2020-06-24 PROCEDURE — 99999 PR PBB SHADOW E&M-EST. PATIENT-LVL III: CPT | Mod: PBBFAC,,, | Performed by: PHYSICIAN ASSISTANT

## 2020-06-24 PROCEDURE — 99499 RISK ADDL DX/OHS AUDIT: ICD-10-PCS | Mod: S$GLB,,, | Performed by: PHYSICIAN ASSISTANT

## 2020-06-24 PROCEDURE — 99999 PR PBB SHADOW E&M-EST. PATIENT-LVL III: ICD-10-PCS | Mod: PBBFAC,,, | Performed by: PHYSICIAN ASSISTANT

## 2020-06-24 PROCEDURE — 3008F PR BODY MASS INDEX (BMI) DOCUMENTED: ICD-10-PCS | Mod: CPTII,S$GLB,, | Performed by: PHYSICIAN ASSISTANT

## 2020-06-24 RX ORDER — CYCLOBENZAPRINE HCL 5 MG
5 TABLET ORAL
COMMUNITY
Start: 2020-06-17 | End: 2021-03-29

## 2020-06-24 NOTE — Clinical Note
I just wanted to say thanks for all you are doing for her. I just recently saw her in clinic, and although her walk was a bit slower that previous looking back to almost a year ago she was not able to walk at all. She has really made such improvements overall thanks to you both!  I think her not having therapy due to COVID has set her back a bit, but no doubt she will improve.    Have a great weekend!  Risa

## 2020-06-24 NOTE — PROGRESS NOTES
Subjective:          Patient ID: Alyssa John is a 69 y.o. female who presents today with her  for a routine clinic visit for MS.      MS HPI:  · DMT: glatiramer acetate  · Side effects from DMT? No  · Taking vitamin D3 as recommended? Yes - 5,000IU/d   · Is seeing Dr. Perea for headache management-will be trying Botox for migraines   · Currently in PT and OT, doing yoga with OT and really finds this helpful    Medications:  Current Outpatient Medications   Medication Sig    atorvastatin (LIPITOR) 40 MG tablet TAKE 1 TABLET BY MOUTH EVERY DAY    cholecalciferol, vitamin D3, (VITAMIN D3) 5,000 unit Tab Take 5,000 Units by mouth once daily.    clonazePAM (KLONOPIN) 1 MG tablet Take 1 tablet (1 mg total) by mouth 2 (two) times daily.    clopidogrel (PLAVIX) 75 mg tablet Take 75 mg by mouth once daily.      DULoxetine (CYMBALTA) 20 MG capsule TAKE 1 CAPSULE BY MOUTH EVERY DAY    famotidine (PEPCID) 20 MG tablet Take 20 mg by mouth 2 (two) times daily as needed for Heartburn.    glatiramer (COPAXONE) 40 mg/mL injection Inject 40 mg into the skin 3 (three) times a week.    modafiniL (PROVIGIL) 100 MG Tab TAKE 1 TABLET BY MOUTH TWICE A DAY    neomycin-polymyxin-dexamethasone (DEXACINE) 3.5 mg/g-10,000 unit/g-0.1 % Oint Place into both eyes every evening.    nitroGLYCERIN (NITROSTAT) 0.4 MG SL tablet Place 0.4 mg under the tongue every 5 (five) minutes as needed for Chest pain.     prochlorperazine (COMPAZINE) 10 MG tablet Take 1 tablet (10 mg total) by mouth every 6 (six) hours as needed (migraine or nausea).    PSYLLIUM SEED, WITH DEXTROSE, (FIBER ORAL) Take by mouth 2 (two) times daily.    topiramate (TOPAMAX) 50 MG tablet TAKE 1 TABLET BY MOUTH TWICE A DAY     No current facility-administered medications for this visit.        SOCIAL HISTORY  Social History     Tobacco Use    Smoking status: Former Smoker     Packs/day: 1.50     Quit date: 2006     Years since quittin.5     Smokeless tobacco: Never Used   Substance Use Topics    Alcohol use: No     Alcohol/week: 0.0 standard drinks    Drug use: No       Living arrangements - the patient lives with their spouse.    ROS:    REVIEW OF SYMPTOMS 6/24/2020   Do you feel abnormally tired on most days? yes   Do you feel you generally sleep well? yes   Do you have difficulty controlling your bladder?  No   Do you have difficulty controlling your bowels?  No   Do you have frequent muscle cramps, tightness or spasms in your limbs?  No   Do you have new visual symptoms?  No   Do you have worsening difficulty with your memory or thinking? Yes   Do you have worsening symptoms of anxiety or depression?  Yes   For patients who walk, Do you have more difficulty walking?  No   Have you fallen since your last visit?  Yes   For patients who use wheelchairs: Do you have any skin wounds or breakdown? No   Do you have difficulty using your hands?  Yes   Do you have shooting or burning pain? No   If you are sexually active, are you using birth control? Y/N  N/A Not Applicable   Do you often choke when swallowing liquids or solid food?  No   Do you experience worsening symptoms when overheated? Yes   Do you need any new equipment such as a wheelchair, walker or shower chair? No   Do you receive co-pay financial assistance for your principal MS medicine? Yes                Objective:        1. 25 foot timed walk: 12.7s February 2020  Timed 25 Foot Walk: 11/21/2019 6/24/2020   Did patient wear an AFO? No No   Was assistive device used? Yes Yes   Assistive device used (toña one): Bilateral Assistance Bilateral Assistance   Unilateral device used - -   Bilateral device used Walker/Rollator Walker/Rollator   Time for 25 Foot Walk (seconds) 30.7 18   Time for 25 Foot Walk (seconds) - -     Neurologic Exam  MENTAL STATUS: language is fluent, normal verbal comprehension, short-term and remote memory is intact, attention is normal, patient is alert and oriented x  3     CRANIAL NERVE EXAM:  There is no JOSE LUIS.  Extraocular muscles are intact. Pupils are equal, round, and reactive to light. No facial asymmetry. Facial sensation is intact bilaterally. There is no dysarthria. Uvula is midline, and palate moves symmetrically. Shoulder shrug intact bilaterlly. Tongue protrusion is midline. Hearing is grossly intact. Neck is supple.      MOTOR EXAM: 5/5 strength in upper extremities except mild weakness distally in left hand; 5/5 in left hip flexion.     Imaging:       Results for orders placed during the hospital encounter of 12/11/18   MRI Brain Demyelinating W W/O Contrast    Impression Unchanged pronounced burden of supratentorial white matter disease, with appearance in keeping with history of multiple sclerosis.  No new or enhancing lesions to suggest active demyelination.    Partially imaged lesion at the craniocervical junction, with spinal lesions better delineated on concurrent dedicated spine MRIs.    Electronically signed by resident: Baldo Krause  Date:    12/12/2018  Time:    09:34    Electronically signed by: Moo Michele MD  Date:    12/12/2018  Time:    15:28     Results for orders placed during the hospital encounter of 12/11/18   MRI Cervical Spine Demyelinating W W/O Contrast    Impression Multiple unchanged foci of T2 hyperintense signal throughout the spinal cord extending from the cranial cervical junction throughout the thoracic spinal cord.  Lesion at T9-10 was not included in the field of view of prior imaging; however, all other lesions are unchanged from 08/02/2018.  No enhancing lesions to suggest active demyelination.    Mild multilevel disc/endplate degeneration, with mild spinal canal stenoses in the cervical spine and severe foraminal stenosis on the left at C4-5 and on the right at C5-6.    Electronically signed by resident: Baldo Krause  Date:    12/12/2018  Time:    09:12    Electronically signed by: Moo Michele  MD  Date:    12/12/2018  Time:    15:24     Results for orders placed during the hospital encounter of 12/11/18   MRI Thoracic Spine Demyelinating W W/O Contrast    Impression Multiple unchanged foci of T2 hyperintense signal throughout the spinal cord extending from the cranial cervical junction throughout the thoracic spinal cord.  Lesion at T9-10 was not included in the field of view of prior imaging; however, all other lesions are unchanged from 08/02/2018.  No enhancing lesions to suggest active demyelination.    Mild multilevel disc/endplate degeneration, with mild spinal canal stenoses in the cervical spine and severe foraminal stenosis on the left at C4-5 and on the right at C5-6.    Electronically signed by resident: Baldo Krause  Date:    12/12/2018  Time:    09:12    Electronically signed by: Moo Mcihele MD  Date:    12/12/2018  Time:    15:24         Labs:     Lab Results   Component Value Date    CBBXXREG08YS 97 (H) 08/20/2019    PDJJWUFW27PY 68 07/19/2018    MNFCHXOP73YL 53 10/19/2016     Lab Results   Component Value Date    JCVINDEX SEE COMMENT (A) 12/09/2015    JCVANTIBODY SEE COMMENT 12/09/2015     Lab Results   Component Value Date    LS2UKKEI 59.1 03/31/2016    ABSOLUTECD3 619 (L) 03/31/2016    XN6OPBTK 19.9 03/31/2016    ABSOLUTECD8 208 03/31/2016    XH4RPZLR 39.1 03/31/2016    ABSOLUTECD4 409 03/31/2016    LABCD48 1.97 03/31/2016     Lab Results   Component Value Date    WBC 5.30 02/20/2020    RBC 4.90 02/20/2020    HGB 13.5 02/20/2020    HCT 44.6 02/20/2020    MCV 91 02/20/2020    MCH 27.6 02/20/2020    MCHC 30.3 (L) 02/20/2020    RDW 13.5 02/20/2020     02/20/2020    MPV 10.8 02/20/2020    GRAN 3.3 02/20/2020    GRAN 62.3 02/20/2020    LYMPH 1.4 02/20/2020    LYMPH 27.2 02/20/2020    MONO 0.3 02/20/2020    MONO 6.0 02/20/2020    EOS 0.2 02/20/2020    BASO 0.05 02/20/2020    EOSINOPHIL 3.2 02/20/2020    BASOPHIL 0.9 02/20/2020     Sodium   Date Value Ref Range Status   02/20/2020 142  136 - 145 mmol/L Final     Potassium   Date Value Ref Range Status   02/20/2020 3.9 3.5 - 5.1 mmol/L Final     Chloride   Date Value Ref Range Status   02/20/2020 111 (H) 95 - 110 mmol/L Final     CO2   Date Value Ref Range Status   02/20/2020 24 23 - 29 mmol/L Final     Carbon Monoxide, Blood   Date Value Ref Range Status   10/19/2016 2 % Final     Comment:     -------------------REFERENCE VALUE--------------------------  0-2 Normal (Non-smoker) , < or = 9 Normal (Smoker), > or   = 20 (Toxic concentration)  Test Performed by:  [place] [address]  : Adrien Norton II, M.D., Ph.D.       Glucose   Date Value Ref Range Status   02/20/2020 81 70 - 110 mg/dL Final     BUN, Bld   Date Value Ref Range Status   02/20/2020 20 8 - 23 mg/dL Final     Creatinine   Date Value Ref Range Status   02/20/2020 0.7 0.5 - 1.4 mg/dL Final   02/22/2013 0.8 0.5 - 1.4 mg/dL Final     Calcium   Date Value Ref Range Status   02/20/2020 7.9 (L) 8.7 - 10.5 mg/dL Final   02/22/2013 8.7 8.7 - 10.5 mg/dL Final     Total Protein   Date Value Ref Range Status   02/20/2020 6.0 6.0 - 8.4 g/dL Final     Albumin   Date Value Ref Range Status   02/20/2020 3.0 (L) 3.5 - 5.2 g/dL Final     Total Bilirubin   Date Value Ref Range Status   02/20/2020 0.3 0.1 - 1.0 mg/dL Final     Comment:     For infants and newborns, interpretation of results should be based  on gestational age, weight and in agreement with clinical  observations.  Premature Infant recommended reference ranges:  Up to 24 hours.............<8.0 mg/dL  Up to 48 hours............<12.0 mg/dL  3-5 days..................<15.0 mg/dL  6-29 days.................<15.0 mg/dL       Alkaline Phosphatase   Date Value Ref Range Status   02/20/2020 142 (H) 55 - 135 U/L Final   02/22/2013 107 55 - 135 U/L Final     AST   Date Value Ref Range Status   02/20/2020 16 10 - 40 U/L Final   02/22/2013 18 10 - 40 U/L  Final     ALT   Date Value Ref Range Status   02/20/2020 15 10 - 44 U/L Final     Anion Gap   Date Value Ref Range Status   02/20/2020 7 (L) 8 - 16 mmol/L Final   02/22/2013 11 5 - 15 meq/L Final     eGFR if    Date Value Ref Range Status   02/20/2020 >60.0 >60 mL/min/1.73 m^2 Final     eGFR if non    Date Value Ref Range Status   02/20/2020 >60.0 >60 mL/min/1.73 m^2 Final     Comment:     Calculation used to obtain the estimated glomerular filtration  rate (eGFR) is the CKD-EPI equation.        No results found for: HEPBSAG, HEPBSAB, HEPBCAB        MS Impression and Plan:     NEURO MULTIPLE SCLEROSIS IMPRESSION:   MS Status:     Number of relapses in the past year?:  0    Clinical Progression comment:    Patients timed walk is slower from last visit; however, she just recently restarted outpatient therapy. Looking back to almost  a year ago, she is much improved(she was not able to walk a year ago-she was only able to  clinic with me-with support). With intensive outpatient therapy she improved significantly, and recently due to COVID her therapy was stopped-I highly suspect her decreased timed walk is due to this.     MRI Progression:  N/A  Plan:     DMT:  No change in management    DMT comment:  Continue glatiramer    Symptom Management:  Implement change in symptom management    Implement Change in Symptom Management:  Gait and Upper Extermity Dysfunction (continue outpatient therapy this is essential for her ongoing function)     Next Imaging Due: 12/26/2020     Next Labs Due: 8/26/2020    Our visit today lasted 40 minutes, and 100% of this time was spent face to face with the patient. Over 50% of this visit included discussion of the treatment plan/medication changes/symptom management/exam findings/imaging results/coordination of care. The patient agrees with the plan of care.    Problem List Items Addressed This Visit        Neuro    Multiple sclerosis - Primary     Relevant Orders    Vitamin D      Other Visit Diagnoses     Vitamin D insufficiency        Relevant Orders    Vitamin D          Follow up in about 4 months (around 10/24/2020) for follow up with me.  Patient agreed to POC today.    Attending, Dr. Apodaca, was available during today's encounter.     Risa Oshea PA-C  [facility]

## 2020-06-26 ENCOUNTER — CLINICAL SUPPORT (OUTPATIENT)
Dept: REHABILITATION | Facility: HOSPITAL | Age: 70
End: 2020-06-26
Payer: MEDICARE

## 2020-06-26 DIAGNOSIS — Z74.09 IMPAIRED FUNCTIONAL MOBILITY, BALANCE, AND ENDURANCE: ICD-10-CM

## 2020-06-26 PROCEDURE — 97116 GAIT TRAINING THERAPY: CPT | Mod: PN

## 2020-06-26 PROCEDURE — 97110 THERAPEUTIC EXERCISES: CPT | Mod: PN

## 2020-06-26 NOTE — PROGRESS NOTES
Physical Therapy Daily Treatment Note     Time In: 1108  Time Out: 1150  Total Billable Time: 42 minutes    Name: Alyssa John  Clinic Number: [id number]    Therapy Diagnosis:   Encounter Diagnosis   Name Primary?    Impaired functional mobility, balance, and endurance      Physician: Risa Oshea PA-C    Visit Date: 6/26/2020    Physician Orders: PT Eval and Treat  Medical Diagnosis: G35 (ICD-10-CM) - Multiple sclerosis  Evaluation Date: 03/19/2019  Authorization Period Expiration: 06/26/2020  Plan of Care Certification Period: 07/24/2020  Visit #/Visits authorized: 7/ 12      Precautions: Falls, immunocompromised    Subjective     Pt reports: feeling well today. Had neurology followup yesterday. She feels that she walks better without her RW and asks about using SPC for ambulation.  She was compliant with home exercise program.  Response to previous treatment: no complaints  Functional change: no    Pain: 0/10      Objective     Alyssa received therapeutic exercises to develop strength, endurance, balance, and transfers for 25 minutes including:    Transfer transport chair<>stationary bike min A with cues for sequencing/hand placement  Stationary bike L1.5 x 15 min with cues for midline sitting  Sit to stand transfers: x 5 min A using RW with cues for setup, weight shifting, upright posture at stand position    Alyssa participated in gait training to improve functional mobility and safety for 17 minutes, including:  Standing // bars:    Fwd/bwd gait 10 ft x 2   Sidestepping 10 ft x 2 L/R    Gait: 90 ft with RW at CGA with cues for L foot clearance and attention to task, w/c follow for safety      Education provided:     - Safety education regarding use of RW      Assessment     Improved upright posture during sit to stand transfer and standing/gait activities with fewer cues. Continues to need cues for correct hand placement/sequencing for safe transfers. Fewer cues for midline sitting during  stationary bike.  Alyssa is progressing well towards her goals.   Pt prognosis is Good.     Pt will continue to benefit from skilled outpatient physical therapy to address the deficits listed in the problem list box on initial evaluation, provide pt/family education and to maximize pt's level of independence in the home and community environment.     Pt's spiritual, cultural and educational needs considered and pt agreeable to plan of care and goals.     Anticipated barriers to physical therapy: none    New Short Term Goals:     1) Pt will stand without posterior lean 5 of 10 trials    2) Pt will perform sit to stand x 5 reps with min A  3) Pt will improve LE strength by 1/2 muscle grade  Long Term Goals:     1) Pt will consistently perform sit to stand transfers with minimum to contact guard assistance  2) Pt will stand with slight UE support  3) Pt will perform toilet transfer with minimal assistance  4) Pt/family will be independent in HEP.     Short Term Goal Status:  1) Not met  2) Ongoing  3) Ongoing     Long Term Goal Status:  1) Not met  2) Not met  3) Not met  4) Not met    Plan     Continue with established Plan of Care towards PT goals. Progression of strengthening, balance, and gait activities for safe functional mobility.

## 2020-07-07 ENCOUNTER — CLINICAL SUPPORT (OUTPATIENT)
Dept: REHABILITATION | Facility: HOSPITAL | Age: 70
End: 2020-07-07
Payer: MEDICARE

## 2020-07-07 DIAGNOSIS — Z74.09 IMPAIRED FUNCTIONAL MOBILITY, BALANCE, AND ENDURANCE: ICD-10-CM

## 2020-07-07 DIAGNOSIS — Z78.9 IMPAIRED MOBILITY AND ADLS: ICD-10-CM

## 2020-07-07 DIAGNOSIS — Z74.09 IMPAIRED FUNCTIONAL MOBILITY, BALANCE, AND ENDURANCE: Primary | ICD-10-CM

## 2020-07-07 DIAGNOSIS — Z74.09 IMPAIRED MOBILITY AND ADLS: ICD-10-CM

## 2020-07-07 DIAGNOSIS — Z78.9 IMPAIRED INSTRUMENTAL ACTIVITIES OF DAILY LIVING (IADL): ICD-10-CM

## 2020-07-07 DIAGNOSIS — R27.8 COORDINATION IMPAIRMENT: ICD-10-CM

## 2020-07-07 DIAGNOSIS — R53.82 CHRONIC FATIGUE: ICD-10-CM

## 2020-07-07 PROCEDURE — 97112 NEUROMUSCULAR REEDUCATION: CPT | Mod: KX,PN

## 2020-07-07 PROCEDURE — 97535 SELF CARE MNGMENT TRAINING: CPT | Mod: KX,PN,59

## 2020-07-07 PROCEDURE — 97530 THERAPEUTIC ACTIVITIES: CPT | Mod: KX,PN

## 2020-07-07 PROCEDURE — 97110 THERAPEUTIC EXERCISES: CPT | Mod: PN

## 2020-07-07 NOTE — PROGRESS NOTES
Physical Therapy Daily Treatment Note     Time In: 1051  Time Out: 1130  Total Billable Time: 39 minutes    Name: Alyssa John  Clinic Number: [id number]    Therapy Diagnosis:   Encounter Diagnosis   Name Primary?    Impaired functional mobility, balance, and endurance      Physician: Risa Oshea PA-C    Visit Date: 7/7/2020    Physician Orders: PT Eval and Treat  Medical Diagnosis: G35 (ICD-10-CM) - Multiple sclerosis  Evaluation Date: 03/19/2019  Authorization Period Expiration: 06/26/2020  Plan of Care Certification Period: 07/24/2020  Visit #/Visits authorized: 7/ 12      Precautions: Falls, immunocompromised      Subjective     Pt reports: wearing posture feedback device. Instructions are to wear 1 hr daily.  Response to previous treatment: no complaints  Functional change: no    Pain: 0/10    Objective     Alyssa received therapeutic exercises to develop strength and endurance for 39 minutes including:    Transfer transport chair<>shuttle min A with cues for sequencing/hand placement  Shuttle leg press squats 37# x 5 min B LE's    Sit to stand transfers:  min A using // bars and RW with cues for setup, weight shifting, upright posture at stand position     Standing // bars:               Fwd/bwd gait               Sidestepping   HR x 20   Minisquats x 20 c/ min/mod A and cues for correct execution     Gait: 78 ft with RW at CGA with cues for L foot clearance and attention to task, w/c follow for safety    Education provided:   - Transfer education/safety  - postural awareness        Assessment     Improved upright posture with fewer cues during // bars activities and gait with RW. Improved technique and assist level with minisquats in // bars. Needed 2 seated rest breaks during // bars activities.  Alyssa is progressing well towards her goals.   Pt prognosis is Good.     Pt will continue to benefit from skilled outpatient physical therapy to address the deficits listed in the problem list box on  initial evaluation, provide pt/family education and to maximize pt's level of independence in the home and community environment.     Pt's spiritual, cultural and educational needs considered and pt agreeable to plan of care and goals.     Anticipated barriers to physical therapy: none    New Short Term Goals:     1) Pt will stand without posterior lean 5 of 10 trials    2) Pt will perform sit to stand x 5 reps with min A  3) Pt will improve LE strength by 1/2 muscle grade  Long Term Goals:     1) Pt will consistently perform sit to stand transfers with minimum to contact guard assistance  2) Pt will stand with slight UE support  3) Pt will perform toilet transfer with minimal assistance  4) Pt/family will be independent in HEP.     Short Term Goal Status:  1) Not met  2) Ongoing  3) Ongoing     Long Term Goal Status:  1) Not met  2) Not met  3) Not met  4) Not met    Plan     Continue with established Plan of Care towards PT goals. Continue with established Plan of Care towards PT goals. Progression of strengthening, balance, and gait activities for safe functional mobility

## 2020-07-07 NOTE — PLAN OF CARE
"  Outpatient Therapy Updated Plan of Care     Visit Date: 7/7/2020    Name: Alyssa John  Clinic Number: [id number]    Therapy Diagnosis:   Encounter Diagnoses   Name Primary?    Impaired functional mobility, balance, and endurance Yes    Impaired mobility and ADLs     Coordination impairment     Chronic fatigue     Impaired instrumental activities of daily living (IADL)      Physician: Risa Oshea PA-C    Physician Orders: OT Eval & Treat  Medical Diagnosis from Referral: Multiple Sclerosis, neurologic gait, L UE weakness  Evaluation Date: 9/5/2019  Current Certification Period: 5/19/2020-7/17/2020  Authorization Period Expiration: 6/30/2020  Plan of Care Expiration: 7/17/2020  Visit # / Visits authorized: 11/ 12    Precautions: Standard and Fall  Functional Level Prior to Evaluation:  Prior to her illness, Alyssa was able to stand and transfer on her own (sometimes without the walker.) She was able to toilet & dress without assistance (except for her bra). At time of eval, Alyssa required assist with all ADLs: mod-max A w/ dressing, bathing & toileting; total A for bathroom mobility. AM-PAC=15. She consistently had poor posture in her w/c.     Subjective     Update: Alyssa states her tremor "comes and goes." She continues to have difficulty with donning her bra. She states she is consistently participating in IADLs at home. She has some concerns because her daughter came to visit, but they also went to visit friends and she is worried about the Covid pandemic.    Objective     Update:   AM-PAC 6 CLICK ADL  How much help from another person does this patient currently need?  1 = Unable, Total/Dependent Assistance  2 = A lot, Maximum/Moderate Assistance  3 = A little, Minimum/Contact Guard/Supervision  4 = None, Modified [place]/Independent    Eating: 3       Grooming: 3      UB Dressing: 3     LB Dressing: 3      Bathing: 3      Toileting: 3  Total: 18    AM-PAC Raw Score CMS "G-Code Modifier " "Level of Impairment Assistance   6 % Total / Unable   7 - 9 CM 80 - 100% Maximal Assist   10-14 CL 60 - 80% Moderate Assist   15 - 19 CK 40 - 60% Moderate Assist   20 - 22 CJ 20 - 40% Minimal Assist   23 CI 1-20% SBA / CGA   24 CH 0% Independent/ Mod I     Mod cues for yoga flow, min tactile cues for maximizing accuracy and effort in poses.     Box and Block GMC assessment: (R) 46 w/ tremor (L) 48  [norms for women aged 65-69: R=72.0 +/-6.2; L=71.3 +/-7.7 (Mathiowetz et al, 1985)]    9 Hole Peg Test: (R) 40.22s (L) 47.06s  [norms for women aged 66-70: R=19.90 +/- 3.15s; L=21.44s +/-3.97s (Droa et al, 2003)]    Hand Strength   (lbs) Right Left   1 15 28   2 16 27   3 17 26   avg 16 27   [norms for women aged 65-69: R=49.6 +/-9.7; L=41.0 +/-8.2 (Mathiowetz et al, 1985)]    Transfer from mat w/ SBA-CGA. Min cues for "upright" posture when at EOM.     -t/f and clothing management for toileting - CGA for clothing mgt; min A for t/f to rise from toilet; CGA for stand->sit  -fxl amb from mat to toilet w/ SBA and RW, mod cues to fully lift the L LE.  -CGA for grooming in stand at sink w/ posterior lean and difficulty maintaining upright positioning due to fatigue    Assessment     Update: Over the past 7 visits, (in 8 weeks), Alyssa has demonstrated progress in coordination and standing balance during toileting, allowing her to meet 2 additional goals. Not being able to attend therapy in the spring due to the Covid-19 pandemic delayed her progress. Alyssa can continue to benefit from skilled OT. Skilled therapy continues to be medically necessary to maximize functional independence, decreasing fall risk.     Previous Goals Status:     LTG GOALS:    1) Pt will be mod I with dressing for clothing except for fasteners (buttons/zippers/hooks) (progressing 7/7/2020)   2) SBA for TTB t/f. (progressing 7/7/2020)   3) Pt will be mod I for toileting and related t/f. (progressing 7/7/2020)   4) Pt will improve GMC, as " evidenced by box and block scores of 45 or better on mandeep UE. (MET 5/19/2020)  5) Pt to improve FMC bilaterally, as evidenced by a 5 sec improvement in times on the 9HPT - R=40s or better; L=45s or better. (progressing 7/7/2020)   6) Alyssa will complete bathroom mobility for toileting w/ SBA and RW. (progressing 7/7/2020)   7) Alyssa will be able to stand w/ SBA and unilateral support for 10 minutes to increase indep w/ IADLs. (progressing 7/7/2020)   8) Alyssa will be able to complete a 5-pose flow of chair yoga with mod I to increase core control. (progressing 7/7/2020)   9) Alyssa will be able to cut her own food with mandeep UE using fork and knife. (progressing 7/7/2020)   10) Pt will participate in at least one IADL task in the home on a daily basis (dishes, laundry, etc) (MET per pt report 2/20/2020)   11) Pt will be supervision with bathing, seated (MET 2/20/2020)     STG Goals:   1) Alyssa will be able to stand and manage her clothing for toileting w/ CGA (MET 7/7/2020)    2) Mandeep UE GMC to improve, as evidenced by box & block scores of 31 or better, mandeep UE. (MET)  3) Alyssa will be mod I with initial HEP. (MET with assistance of spouse)  4) Pt will t/f to toilet w/ min A or better. (MET)     Long Term Goal Status:   continue per initial plan of care.    Reasons for Recertification of Therapy:  Skilled therapy continues to be medically necessary to maximize functional independence, decreasing fall risk. Decreased memory limits lAyssa's initiation of HEP at home. Additionally, at this point, she continues to require physical assistance for safety. However, as she gets stronger, she should be more able to implement her HEP independently.      Plan     Updated Certification Period: 7/7/2020 to 9/1/2020  Recommended Treatment Plan: 2 times per week for 8 weeks: Therapeutic Taping, Manual Therapy, Moist Heat/ Ice, Neuromuscular Re-ed, Orthotic Management and Training, Patient Education, Self Care, Therapeutic  Activites and Therapeutic Exercise    Sameera Montague OT  7/7/2020      I CERTIFY THE NEED FOR THESE SERVICES FURNISHED UNDER THIS PLAN OF TREATMENT AND WHILE UNDER MY CARE    Physician's comments:        Physician's Signature: ___________________________________________________

## 2020-07-07 NOTE — PROGRESS NOTES
Occupational Therapy Treatment Note     Date: 7/7/2020  Name: Alyssa John  Clinic Number: [id number]    Therapy Diagnosis:   Encounter Diagnoses   Name Primary?    Impaired functional mobility, balance, and endurance Yes    Impaired mobility and ADLs     Coordination impairment     Chronic fatigue     Impaired instrumental activities of daily living (IADL)      Physician: Risa Oshea PA-C    Physician Orders: OT Eval & Treat  Medical Diagnosis from Referral: Multiple Sclerosis, neurologic gait, L UE weakness  Evaluation Date: 9/5/2019  Surgical Procedure and Date: n/a, n/a  Insurance Authorization Period Expiration: 6/30/2020  Plan of Care Certification Period: 7/17/2020  Date of Return to MD: tba    Visit # / Visits authorized: 11/12  Time In: 1130  Time Out: 1215  Total Billable Time: 45 minutes    Precautions:  Standard and Fall    Subjective     Pt reports: she was compliant with home exercise program - she states she has asked her spouse to assist her with the exercises.  Response to previous treatment: no concerns  Functional change: no change compared to previous session; she requires assist with her sports bra    Pain: 0/10 at time of visit   Location:  n/a     Objective     Alyssa received the following manual therapy techniques for 0 minutes:   -n/a    Alyssa received therapeutic exercises for 0 minutes including:  -n/a    Alyssa participated in dynamic functional therapeutic activities to improve functional performance for 20 minutes, including:  -measurements for POC Update; see attached    Alyssa participated in neuromuscular re-education activities to improve: Balance, Coordination, Kinesthetic Sense, Proprioception and Posture for 15 minutes. The following activities were included:  -Completed 6-step yoga flow in each direction x1 w/ mod cues and min assist to maximize accuracy of positioning. Please see previous notes for exact sequence of yoga flow. Increased time is  required.    Alyssa participated in functional self-care activities to improve functional performance for 10 minutes, including:  -t/f and clothing management for toileting - CGA for clothing mgt; min A for t/f to rise from toilet; CGA for stand->sit  -fxl amb from mat to toilet w/ SBA and RW, mod cues to fully lift the L LE.  -CGA for grooming in stand at sink    Home Exercises and Education Provided     Education provided:   - Progress towards goals     Written Home Exercises Provided: Patient instructed to cont prior HEP.  Exercises were reviewed and Alyssa was able to demonstrate them prior to the end of the session.  Alyssa demonstrated good  understanding of the HEP provided.   .   See EMR under Patient Instructions for exercises provided previously.        Assessment     Pt would continue to benefit from skilled OT. Please see updated POC. Skilled therapy continues to be medically necessary to maximize functional independence, decreasing fall risk.     Alyssa is progressing slowly towards her goals and there are no updates to goals at this time. Pt prognosis is Fair.     Pt will continue to benefit from skilled outpatient occupational therapy to address the deficits listed in the problem list on initial evaluation provide pt/family education and to maximize pt's level of independence in the home and community environment.     Anticipated barriers to occupational therapy: decreased memory    Pt's spiritual, cultural and educational needs considered and pt agreeable to plan of care and goals.    Goals:  LTG GOALS:    1) Pt will be mod I with dressing for clothing except for fasteners (buttons/zippers/hooks) (progressing 7/7/2020)   2) SBA for TTB t/f. (progressing 7/7/2020)   3) Pt will be mod I for toileting and related t/f. (progressing 7/7/2020)   4) Pt will improve GMC, as evidenced by box and block scores of 45 or better on pavan UE. (MET 5/19/2020)  5) Pt to improve FMC bilaterally, as evidenced by a 5 sec  improvement in times on the 9HPT - R=40s or better; L=45s or better. (progressing 7/7/2020)   6) Alyssa will complete bathroom mobility for toileting w/ SBA and RW. (progressing 7/7/2020)   7) Alyssa will be able to stand w/ SBA and unilateral support for 10 minutes to increase indep w/ IADLs. (progressing 7/7/2020)   8) Alyssa will be able to complete a 5-pose flow of chair yoga with mod I to increase core control. (progressing 7/7/2020)   9) Alyssa will be able to cut her own food with mandeep UE using fork and knife. (progressing 7/7/2020)   10) Pt will participate in at least one IADL task in the home on a daily basis (dishes, laundry, etc) (MET per pt report 2/20/2020)   11) Pt will be supervision with bathing, seated (MET 2/20/2020)     STG Goals:   1) Alyssa will be able to stand and manage her clothing for toileting w/ CGA (MET 7/7/2020)    2) Mandeep UE GMC to improve, as evidenced by box & block scores of 31 or better, mandeep UE. (MET)  3) Alyssa will be mod I with initial HEP. (MET with assistance of spouse)  4) Pt will t/f to toilet w/ min A or better. (MET)    Plan     Updates/Grading for next session: see POC update; work on standing tolerance and balance    Sameera Montague, OT

## 2020-07-08 ENCOUNTER — PROCEDURE VISIT (OUTPATIENT)
Dept: NEUROLOGY | Facility: CLINIC | Age: 70
End: 2020-07-08
Payer: MEDICARE

## 2020-07-08 VITALS
BODY MASS INDEX: 27.11 KG/M2 | HEIGHT: 63 IN | DIASTOLIC BLOOD PRESSURE: 86 MMHG | WEIGHT: 153 LBS | SYSTOLIC BLOOD PRESSURE: 145 MMHG | HEART RATE: 79 BPM | RESPIRATION RATE: 16 BRPM

## 2020-07-08 DIAGNOSIS — R56.9 CONVULSIONS, UNSPECIFIED CONVULSION TYPE: ICD-10-CM

## 2020-07-08 DIAGNOSIS — G43.719 INTRACTABLE CHRONIC MIGRAINE WITHOUT AURA AND WITHOUT STATUS MIGRAINOSUS: Primary | ICD-10-CM

## 2020-07-08 PROCEDURE — 99499 UNLISTED E&M SERVICE: CPT | Mod: S$GLB,,, | Performed by: PSYCHIATRY & NEUROLOGY

## 2020-07-08 PROCEDURE — 99499 RISK ADDL DX/OHS AUDIT: ICD-10-PCS | Mod: S$GLB,,, | Performed by: PSYCHIATRY & NEUROLOGY

## 2020-07-08 PROCEDURE — 64615 CHEMODENERV MUSC MIGRAINE: CPT | Mod: S$GLB,,, | Performed by: PSYCHIATRY & NEUROLOGY

## 2020-07-08 PROCEDURE — 64615 PR CHEMODENERVATION OF MUSCLE FOR CHRONIC MIGRAINE: ICD-10-PCS | Mod: S$GLB,,, | Performed by: PSYCHIATRY & NEUROLOGY

## 2020-07-08 NOTE — TELEPHONE ENCOUNTER
----- Message from Roxy Colón sent at 7/8/2020 10:49 AM CDT -----  Pt need a refill on topiramate (TOPAMAX) 50 MG tablet 180 tablet 1 10/14/2019  No  Sig: TAKE 1 TABLET BY MOUTH TWICE A DAY  Sent to pharmacy as: topiramate (TOPAMAX) 50 MG tablet  Class: Normal  Order: [id number]  Date/Time Signed: 10/14/2019 12:48      E-Prescribing Status: Receipt confirmed by pharmacy (10/14/2019 12:48 PM CDT)  Pharmacy      [id number] - [place], [place] - [address]      Pt has no more refills on this medication

## 2020-07-08 NOTE — PROCEDURES
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  She had a difficult time deciding between the 2 and I extensively explained the risks and benefits and expected course for both.  Ultimately she decided to proceed with Botox.    The patient has chronic migraines (G43.719) and suffers from headaches more than 15 days a month lasting more than 4 hours a day with no relief of symptoms despite trying multiple medications for at least 3 months, if tolerated, including but not limited to   anti-epileptics - topiramate, carbamazepine, Dilantin, phenobarbital, gabapentin  beta blockers  - metoprolol  calcium channel blockers - blood pressure and pulse too low, contraindicated  Antidepressants - duloxetine    Botox treatment was approved for chronic migraines in October 2010. The patient will be an ideal candidate for Botox. We are planning for 3 treatments 3 months apart and aiming for at least 50% improvement in the symptoms. If we see no improvement after 3 treatments, we will discontinue the injections.    ---    She has history of myocardial infarction precluding the use of triptans or DHE, for this reason we will pursue Ubrelvy which carries no Cardiovascular warnings.  As she is having daily headaches which risks medication overuse headache, I will also prescribe Compazine which may be used on a daily basis without rebound risk however we have to watch her for worsening tremor. Compazine made her tired - advised to reduce dose by half. Ubrelvy approved as non-formulary exception, with $120 copay. Gave nurtec to try - if works well, this may change their mind  about trying Ubrelvy.     Botox started 7/8/2020                     A time out was conducted just before the start of the procedure to verify the correct patient and procedure, procedure location, and all relevant critical information.     Conventional methods of treatment such as multiple medications, both on and   off label have been tried including:    The patient has been unresponsive and refractory.The patient meets criteria for chronic headaches according to the ICHD-II, the patient has more than 15 headaches a month which last for more than 4 hours a day.    Botox session number: 1  Last session was 12 weeks ago and resulted in improvement of: NA    I am aiming for at least 50%  improvement in the patient's symptoms. Frequency of treatment is every 3 months unless no response to the treatments, at which time we will discontinue the injections.     DESCRIPTION OF PROCEDURE: After obtaining informed consent and under   aseptic technique, a total of 145 units of botulinum toxin type A were   injected in the following muscles:     -- Procerus 5 units  --  5 units bilaterally  -- Frontalis 20 units  -- Temporalis 20 units bilaterally  -- Occipitalis 15 units bilaterally  -- Upper cervical paraspinals 5 units bilaterally - reduced due to anatomy   -- Trapezius 15 units bilaterally.     The patient tolerated the procedure well. There were no complications. The patient was given a prescription for repeat treatment in 12 weeks     Unavoidable waste 55 units    Janet Perea MD

## 2020-07-09 DIAGNOSIS — R56.9 CONVULSIONS, UNSPECIFIED CONVULSION TYPE: ICD-10-CM

## 2020-07-10 RX ORDER — TOPIRAMATE 50 MG/1
50 TABLET, FILM COATED ORAL 2 TIMES DAILY
Qty: 180 TABLET | Refills: 1 | Status: SHIPPED | OUTPATIENT
Start: 2020-07-10 | End: 2020-12-16

## 2020-07-10 RX ORDER — TOPIRAMATE 50 MG/1
50 TABLET, FILM COATED ORAL 2 TIMES DAILY
Qty: 180 TABLET | Refills: 1 | OUTPATIENT
Start: 2020-07-10

## 2020-07-14 ENCOUNTER — CLINICAL SUPPORT (OUTPATIENT)
Dept: REHABILITATION | Facility: HOSPITAL | Age: 70
End: 2020-07-14
Payer: MEDICARE

## 2020-07-14 DIAGNOSIS — R53.82 CHRONIC FATIGUE: ICD-10-CM

## 2020-07-14 DIAGNOSIS — Z74.09 IMPAIRED MOBILITY AND ADLS: ICD-10-CM

## 2020-07-14 DIAGNOSIS — Z78.9 IMPAIRED INSTRUMENTAL ACTIVITIES OF DAILY LIVING (IADL): ICD-10-CM

## 2020-07-14 DIAGNOSIS — R27.8 COORDINATION IMPAIRMENT: ICD-10-CM

## 2020-07-14 DIAGNOSIS — Z74.09 IMPAIRED FUNCTIONAL MOBILITY, BALANCE, AND ENDURANCE: ICD-10-CM

## 2020-07-14 DIAGNOSIS — Z78.9 IMPAIRED MOBILITY AND ADLS: ICD-10-CM

## 2020-07-14 DIAGNOSIS — Z74.09 IMPAIRED FUNCTIONAL MOBILITY, BALANCE, AND ENDURANCE: Primary | ICD-10-CM

## 2020-07-14 PROCEDURE — 97110 THERAPEUTIC EXERCISES: CPT | Mod: PN

## 2020-07-14 PROCEDURE — 97112 NEUROMUSCULAR REEDUCATION: CPT | Mod: PN

## 2020-07-14 PROCEDURE — 97116 GAIT TRAINING THERAPY: CPT | Mod: PN,59

## 2020-07-14 PROCEDURE — 97530 THERAPEUTIC ACTIVITIES: CPT | Mod: PN

## 2020-07-14 NOTE — PROGRESS NOTES
Physical Therapy Daily Treatment Note     Time In: 1220  Time Out: 1300  Total Billable Time: 40 minutes    Name: Alyssa John  Clinic Number: [id number]    Therapy Diagnosis:   Encounter Diagnosis   Name Primary?    Impaired functional mobility, balance, and endurance      Physician: Risa Oshea PA-C    Visit Date: 7/14/2020    Physician Orders: PT Eval and Treat  Medical Diagnosis: G35 (ICD-10-CM) - Multiple sclerosis  Evaluation Date: 03/19/2019  Authorization Period Expiration: 06/26/2020  Plan of Care Certification Period: 07/24/2020  Visit #/Visits authorized: 9/ 12      Precautions: Falls, immunocompromised       Subjective     Pt reports: feeling well today. Reports sometimes she walks with her  at home with just HHA.  Response to previous treatment: no complaints  Functional change: no    Pain: 0/10  Location:     Objective     Alyssa received therapeutic exercises to develop strength and endurance for 15 minutes including:     Transfer transport chair<>bike min A with cues for sequencing/hand placement  Stationary bike L1.5 x 15 min with verbal and manual cues for midline positioning     Alyssa participated in dynamic functional therapeutic activities to improve functional performance for 17  minutes, including:  Sit to stand transfers: x 8 with RW, ranging min to mod A, cues for scooting to edge of seat, positioning of feet, and trunk motion  Standing // bars:               Sidestepping 10 ft L/R with cues for clearance of feet and step length     Alyssa participated in gait training to improve functional mobility and safety for 8  minutes, including:  Gait: 30 ft, 38 ft with RW at CGA with cues for L foot clearance       Education provided:     - Transfer education/safety  - postural awareness       Assessment     Improved quality of sit to stand transfers with cues for scooting to edge of seat, positioning of feet, and trunk momentum from mod A to min A for standing posterior  caterina Shah is progressing well towards her goals.   Pt prognosis is Good.     Pt will continue to benefit from skilled outpatient physical therapy to address the deficits listed in the problem list box on initial evaluation, provide pt/family education and to maximize pt's level of independence in the home and community environment.     Pt's spiritual, cultural and educational needs considered and pt agreeable to plan of care and goals.     Anticipated barriers to physical therapy: none    New Short Term Goals:     1) Pt will stand without posterior lean 5 of 10 trials    2) Pt will perform sit to stand x 5 reps with min A  3) Pt will improve LE strength by 1/2 muscle grade  Long Term Goals:     1) Pt will consistently perform sit to stand transfers with minimum to contact guard assistance  2) Pt will stand with slight UE support  3) Pt will perform toilet transfer with minimal assistance  4) Pt/family will be independent in HEP.     Short Term Goal Status:  1) Not met  2) Ongoing  3) Ongoing     Long Term Goal Status:  1) Not met  2) Not met  3) Not met  4) Not met    Plan     Continue with established Plan of Care towards PT goals. Progression of strengthening, balance, and gait activities for safe functional mobility

## 2020-07-14 NOTE — PROGRESS NOTES
Occupational Therapy Treatment Note     Date: 7/14/2020  Name: Alyssa John  Clinic Number: [id number]    Therapy Diagnosis:   Encounter Diagnoses   Name Primary?    Impaired mobility and ADLs     Coordination impairment     Impaired instrumental activities of daily living (IADL)     Impaired functional mobility, balance, and endurance Yes    Chronic fatigue      Physician: Risa Oshea PA-C    Physician Orders: OT Eval & Treat  Medical Diagnosis from Referral: Multiple Sclerosis, neurologic gait, L UE weakness  Evaluation Date: 9/5/2019  Surgical Procedure and Date: n/a, n/a  Insurance Authorization Period Expiration: 6/30/2020  Plan of Care Certification Period: 7/17/2020  Date of Return to MD: tba    Visit # / Visits authorized: 12/12  Time In: 1050  Time Out: 1130  Total Billable Time: 40 minutes    Precautions:  Standard and Fall    Subjective     Pt reports: She was compliant with home exercise program. Patient reported she would like to do what the therapist worked on last time.  Response to previous treatment: no concerns  Functional change: no change compared to previous session    Pain: 0/10  Location:  n/a     Objective     Alyssa received the following manual therapy techniques for 0 minutes:   -n/a    Alyssa received therapeutic exercises for 0 minutes including:  -n/a    Alyssa participated in dynamic functional therapeutic activities to improve functional performance for 0 minutes, including:    Alyssa participated in neuromuscular re-education activities to improve: Balance, Coordination, Kinesthetic Sense, Proprioception and Posture for 40 minutes. The following activities were included:  The following activities were included:  transport chair <> mat with RW with CGA  mod cues to encourage upright posture during t/f and to  her L LE during pivot  Per patient request   -Completed 6-step yoga flow in each direction x3 w/ mod cues and min assist to maximize accuracy of  positioning  -Yoga flow x3 completed baby back bends and for modified crescent lunges  Min cues to elongate the R ribcage and for hands at sides in position of support to facilitate scapulo-pelvic rhythm.  B UE forward and contralateral reaching to expand ribcage to improve trunk alignment - x5  seated EOM min cues to keep trunk in midline    Alyssa participated in functional self-care activities to improve functional performance for 0 minutes, including:  -n/a    Home Exercises and Education Provided     Education provided:   - Progress towards goals     Written Home Exercises Provided: Patient instructed to cont prior HEP.  Exercises were reviewed and Alyssa was able to demonstrate them prior to the end of the session.  Alyssa demonstrated good  understanding of the HEP provided.   .   See EMR under Patient Instructions for exercises provided previously.        Assessment     Pt would continue to benefit from skilled OT. Patient required mod cues to encourage upright posture during stand pivot transfer and to  her L LE during pivot to decrease risk of falls and prevent injury.     Alyssa is progressing slowly towards her goals and there are no updates to goals at this time. Pt prognosis is Fair.     Pt will continue to benefit from skilled outpatient occupational therapy to address the deficits listed in the problem list on initial evaluation provide pt/family education and to maximize pt's level of independence in the home and community environment.     Anticipated barriers to occupational therapy: decreased memory    Pt's spiritual, cultural and educational needs considered and pt agreeable to plan of care and goals.    Goals:  LTG GOALS:    1) Pt will be mod I with dressing for clothing except for fasteners (buttons/zippers/hooks) (progressing 7/15/2020)   2) SBA for TTB t/f. (progressing 7/15/2020)   3) Pt will be mod I for toileting and related t/f. (progressing 7/15/2020)   4) Pt will improve GMC, as  evidenced by box and block scores of 45 or better on mandeep UE. (MET 5/19/2020)  5) Pt to improve FMC bilaterally, as evidenced by a 5 sec improvement in times on the 9HPT - R=40s or better; L=45s or better. (progressing 7/15/2020)   6) Alyssa will complete bathroom mobility for toileting w/ SBA and RW. (progressing 7/15/2020)   7) Alyssa will be able to stand w/ SBA and unilateral support for 10 minutes to increase indep w/ IADLs. (progressing 7/15/2020)   8) Alyssa will be able to complete a 5-pose flow of chair yoga with mod I to increase core control. (progressing 7/15/2020)   9) Alyssa will be able to cut her own food with mnadeep UE using fork and knife. (progressing 7/15/2020)   10) Pt will participate in at least one IADL task in the home on a daily basis (dishes, laundry, etc) (MET per pt report 2/20/2020)   11) Pt will be supervision with bathing, seated (MET 2/20/2020)     STG Goals:   1) Alyssa will be able to stand and manage her clothing for toileting w/ CGA (MET 7/7/2020)    2) Mandeep UE GMC to improve, as evidenced by box & block scores of 31 or better, mandeep UE. (MET)  3) Alyssa will be mod I with initial HEP. (MET with assistance of spouse)  4) Pt will t/f to toilet w/ min A or better. (MET)    Plan     Updates/Grading for next session: work on standing tolerance, balance, endurance and functional transfer training      Estrella Wray, OTR

## 2020-07-17 ENCOUNTER — CLINICAL SUPPORT (OUTPATIENT)
Dept: REHABILITATION | Facility: HOSPITAL | Age: 70
End: 2020-07-17
Payer: MEDICARE

## 2020-07-17 DIAGNOSIS — Z74.09 IMPAIRED FUNCTIONAL MOBILITY, BALANCE, AND ENDURANCE: ICD-10-CM

## 2020-07-17 PROCEDURE — 97530 THERAPEUTIC ACTIVITIES: CPT | Mod: PN

## 2020-07-17 PROCEDURE — 97116 GAIT TRAINING THERAPY: CPT | Mod: PN,59

## 2020-07-17 PROCEDURE — 97110 THERAPEUTIC EXERCISES: CPT | Mod: PN

## 2020-07-18 NOTE — PLAN OF CARE
Outpatient Therapy Updated Plan of Care     Visit Date: 7/17/2020    Name: Alyssa John  Clinic Number: [id number]    Therapy Diagnosis:   Encounter Diagnosis   Name Primary?    Impaired functional mobility, balance, and endurance      Physician: Risa Oshea, GUILLERMO    Physician Orders: PT Eval and Treat  Medical Diagnosis: G35 (ICD-10-CM) - Multiple sclerosis  Evaluation Date: 03/19/2019  Authorization Period Expiration:08/09/2020  Plan of Care Certification Period: 07/24/2020  Visit #/Visits authorized: 10/ 12      Precautions: Falls, immunocompromised  Functional Level Prior to Evaluation:  Unable to walk, assistance for transfers, difficulty with sitting balance/trunk control, tendency to lean backwards when standing    Subjective     Update: Patient reports improvement in functional mobility at home, is now walking hand-in-hand with her  down the hallway, which she was not able to do before now.    Objective     Update:   LE strength:  R hip flex 4/5, knee ext 4-/5, knee flex 4+/5, ankle DF 3+/5  L hip flex 4-/5, knee ext 3+/5, knee flex 4-/5, ankle DF 3/5     Tinetti combined score 3/28=high fall risk     Patient participating in therapeutic exercise as follows to address strength/balance/endurance for 15 minutes:     Transfer transport chair<>bike min A with cues for sequencing/hand placement  Stationary bike L1.5 x 15 min with verbal and manual cues for midline positioning     Alyssa participated in dynamic functional therapeutic activities to improve functional performance for 16  minutes, including:  Sit to stand transfers: x 5 with RW, ranging min to mod A, cues for scooting to edge of seat, positioning of feet, and trunk motion    5 x sit to stand (timed) test from armchair using RW:   1st trial 48 seconds mod to max A with frequent cues for pushing from sequencing, reaching back before sitting, 4 uncontrolled descents  2nd trial: 54 seconds with mod A with continuous cues for  sequencing, 2 uncontrolled descents     Alyssa participated in gait training to improve functional mobility and safety for 9 minutes, including:  Gait: 80 ft with RW at CGA with cues for L foot clearance       Assessment     Update: Overall improvement in gait and transfers since last POC update, although patient does have inconsistent performance depending on fatigue level and distractibility. Continues to require cues for correct transfer technique/sequencing. Patient able to perform 5x sit to stand with improved technique last visit, but unable to replicate quality today when being timed.  Patient will benefit from continued physical therapy to address noted impairments and improve safe functional mobility.    New Short Term Goals:     1) Pt will stand without posterior lean 5 of 10 trials    2) Pt will perform sit to stand x 5 reps with min A  3) Pt will improve LE strength by 1/2 muscle grade  4) Pt will be able to verbalize key points of proper transfer technique  Long Term Goals:     1) Pt will consistently perform sit to stand transfers with minimum to contact guard assistance  2) Pt will stand with slight UE support  3) Pt will perform toilet transfer with minimal assistance  4) Pt/family will be independent in HEP.    Previous Short Term Goals Status:   1=ongoing/partially met 2=ongoing/partially met 3=ongoing/partially met  New Short Term Goals:     New Short Term Goals:     1) Pt will stand without posterior lean 5 of 10 trials    2) Pt will perform sit to stand x 5 reps with min A  3) Pt will improve LE strength by 1/2 muscle grade  Long Term Goals:   continue per most recent plan of care.  Reasons for Recertification of Therapy:    weakness, impaired endurance, impaired functional mobility, gait instability, impaired balance, decreased lower extremity function and decreased safety awareness       Plan     Updated Certification Period: 7/17/2020 to 09/04/2020  Recommended Treatment Plan: 2 times per  week for 4 weeks: Gait Training, Manual Therapy, Moist Heat/ Ice, Neuromuscular Re-ed, Patient Education, Therapeutic Activites and Therapeutic Exercise  Other Recommendations: Continued emphasis on proper transfer technique and reduction of distractibility    Sameera Osborne, PT  7/17/2020      I CERTIFY THE NEED FOR THESE SERVICES FURNISHED UNDER THIS PLAN OF TREATMENT AND WHILE UNDER MY CARE    Physician's comments:        Physician's Signature: ___________________________________________________

## 2020-07-21 ENCOUNTER — CLINICAL SUPPORT (OUTPATIENT)
Dept: REHABILITATION | Facility: HOSPITAL | Age: 70
End: 2020-07-21
Payer: MEDICARE

## 2020-07-21 DIAGNOSIS — Z78.9 IMPAIRED INSTRUMENTAL ACTIVITIES OF DAILY LIVING (IADL): ICD-10-CM

## 2020-07-21 DIAGNOSIS — Z74.09 IMPAIRED MOBILITY AND ADLS: ICD-10-CM

## 2020-07-21 DIAGNOSIS — Z74.09 IMPAIRED FUNCTIONAL MOBILITY, BALANCE, AND ENDURANCE: Primary | ICD-10-CM

## 2020-07-21 DIAGNOSIS — Z78.9 IMPAIRED MOBILITY AND ADLS: ICD-10-CM

## 2020-07-21 DIAGNOSIS — R27.8 COORDINATION IMPAIRMENT: ICD-10-CM

## 2020-07-21 DIAGNOSIS — R53.82 CHRONIC FATIGUE: ICD-10-CM

## 2020-07-21 PROCEDURE — 97535 SELF CARE MNGMENT TRAINING: CPT | Mod: PN

## 2020-07-21 PROCEDURE — 97110 THERAPEUTIC EXERCISES: CPT | Mod: PN

## 2020-07-21 PROCEDURE — 97530 THERAPEUTIC ACTIVITIES: CPT | Mod: PN

## 2020-07-21 NOTE — PROGRESS NOTES
Occupational Therapy Treatment Note     Date: 7/21/2020  Name: Alyssa John  Clinic Number: [id number]    Therapy Diagnosis:   Encounter Diagnoses   Name Primary?    Impaired mobility and ADLs     Coordination impairment     Impaired instrumental activities of daily living (IADL)     Impaired functional mobility, balance, and endurance Yes    Chronic fatigue      Physician: Risa Oshea PA-C    Physician Orders: OT Eval & Treat  Medical Diagnosis from Referral: Multiple Sclerosis, neurologic gait, L UE weakness  Evaluation Date: 9/5/2019  Surgical Procedure and Date: n/a, n/a  Insurance Authorization Period Expiration: 6/30/2020  Plan of Care Certification Period: 7/17/2020  Date of Return to MD: tba    Visit # / Visits authorized: 2/12  Time In: 10:09  Time Out: 10:50  Total Billable Time: 41 minutes    Precautions:  Standard and Fall    Subjective     Pt reports: She was compliant with home exercise program. Patient reported she has been trying to do more at home.  Response to previous treatment: no concerns  Functional change: no change compared to previous session    Pain: 0/10  Location:  n/a     Objective     Alyssa received the following manual therapy techniques for 0 minutes:   -n/a    Alyssa received therapeutic exercises for 10 minutes including:  -Mandeep UBE 5 minutes forward, 5 minutes backward 1.5 level seated    Alyssa participated in dynamic functional therapeutic activities to improve functional performance for 15 minutes, including:  Sit <> stand with CGA and unilateral support to increase independence with IADLs  Folding towels standing at table with CGA for 2 x 3 minutes  -forearm supination/pronation with light resistance flex bar x 5  Min A to stand    Alyssa participated in neuromuscular re-education activities to improve: Balance, Coordination, Kinesthetic Sense, Proprioception and Posture for 0 minutes.  -n/a    Alyssa participated in functional self-care activities to improve  functional performance for 15 minutes, including:  - transport chair <> toilet transfer with CGA  max cues to encourage upright posture during t/f and to  her L LE during pivot  - washing/drying her hands standing at sink  Min A with standing while washing/drying hands   - bathroom mobility for toileting with CGA and RW.    Home Exercises and Education Provided     Education provided:   - Progress towards goals     Written Home Exercises Provided: Patient instructed to cont prior HEP.    See EMR under Patient Instructions for exercises provided previously.        Assessment     Pt would continue to benefit from skilled OT. Patient required max cues to encourage upright posture during stand pivot transfer and bathroom mobility. Patient required verbal cues to  her L LE during pivot to decrease risk of falls and prevent injury. Patient required CGA for 1st, 2nd and 3rd sit <> stand transfers and min A for 4th sit <> stand transfer. Patient demonstrates poor standing tolerance during IADL activity standing at table and required min A after standing for 3 minutes.      Alyssa is progressing slowly towards her goals and there are no updates to goals at this time. Pt prognosis is Fair.     Pt will continue to benefit from skilled outpatient occupational therapy to address the deficits listed in the problem list on initial evaluation provide pt/family education and to maximize pt's level of independence in the home and community environment.     Anticipated barriers to occupational therapy: decreased memory    Pt's spiritual, cultural and educational needs considered and pt agreeable to plan of care and goals.    Goals:  LTG GOALS:    1) Pt will be mod I with dressing for clothing except for fasteners (buttons/zippers/hooks) (progressing 7/21/2020)   2) SBA for TTB t/f. (progressing 7/21/2020)   3) Pt will be mod I for toileting and related t/f. (progressing 7/21/2020)   4) Pt will improve GMC, as evidenced  by box and block scores of 45 or better on mandeep UE. (MET 5/19/2020)  5) Pt to improve FMC bilaterally, as evidenced by a 5 sec improvement in times on the 9HPT - R=40s or better; L=45s or better. (progressing 7/21/2020)   6) Alyssa will complete bathroom mobility for toileting w/ SBA and RW. (progressing 7/21/2020)   7) Alyssa will be able to stand w/ SBA and unilateral support for 10 minutes to increase indep w/ IADLs. (progressing 7/21/2020)   8) Alyssa will be able to complete a 5-pose flow of chair yoga with mod I to increase core control. (progressing 7/21/2020)   9) Alyssa will be able to cut her own food with mandeep UE using fork and knife. (progressing 7/21/2020)   10) Pt will participate in at least one IADL task in the home on a daily basis (dishes, laundry, etc) (MET per pt report 2/20/2020)   11) Pt will be supervision with bathing, seated (MET 2/20/2020)     STG Goals:   1) Alyssa will be able to stand and manage her clothing for toileting w/ CGA (MET 7/7/2020)    2) Mandeep UE GMC to improve, as evidenced by box & block scores of 31 or better, mandeep UE. (MET)  3) Alyssa will be mod I with initial HEP. (MET with assistance of spouse)  4) Pt will t/f to toilet w/ min A or better. (MET)    Plan     Updates/Grading for next session: work on standing tolerance, balance, endurance and functional transfer training      Estrella Wray, OTR

## 2020-07-24 ENCOUNTER — CLINICAL SUPPORT (OUTPATIENT)
Dept: REHABILITATION | Facility: HOSPITAL | Age: 70
End: 2020-07-24
Payer: MEDICARE

## 2020-07-24 DIAGNOSIS — R27.8 COORDINATION IMPAIRMENT: ICD-10-CM

## 2020-07-24 DIAGNOSIS — Z78.9 IMPAIRED MOBILITY AND ADLS: ICD-10-CM

## 2020-07-24 DIAGNOSIS — Z74.09 IMPAIRED MOBILITY AND ADLS: ICD-10-CM

## 2020-07-24 DIAGNOSIS — R53.82 CHRONIC FATIGUE: ICD-10-CM

## 2020-07-24 DIAGNOSIS — Z74.09 IMPAIRED FUNCTIONAL MOBILITY, BALANCE, AND ENDURANCE: Primary | ICD-10-CM

## 2020-07-24 DIAGNOSIS — Z78.9 IMPAIRED INSTRUMENTAL ACTIVITIES OF DAILY LIVING (IADL): ICD-10-CM

## 2020-07-24 PROCEDURE — 97530 THERAPEUTIC ACTIVITIES: CPT | Mod: PN

## 2020-07-24 PROCEDURE — 97110 THERAPEUTIC EXERCISES: CPT | Mod: PN

## 2020-07-24 PROCEDURE — 97535 SELF CARE MNGMENT TRAINING: CPT | Mod: PN

## 2020-07-24 NOTE — PROGRESS NOTES
Occupational Therapy Treatment Note     Date: 7/24/2020  Name: Alyssa John  Clinic Number: [id number]    Therapy Diagnosis:   Encounter Diagnoses   Name Primary?    Impaired mobility and ADLs     Coordination impairment     Impaired instrumental activities of daily living (IADL)     Impaired functional mobility, balance, and endurance Yes    Chronic fatigue      Physician: Risa Oshea PA-C    Physician Orders: OT Eval & Treat  Medical Diagnosis from Referral: Multiple Sclerosis, neurologic gait, L UE weakness  Evaluation Date: 9/5/2019  Surgical Procedure and Date: n/a, n/a  Insurance Authorization Period Expiration: 6/30/2020  Plan of Care Certification Period: 7/17/2020  Date of Return to MD: tba    Visit # / Visits authorized: 3/12  Time In: 10:04  Time Out: 10:45  Total Billable Time: 41 minutes    Precautions:  Standard and Fall    Subjective     Pt reports: She was compliant with home exercise program. Patient reported she is trying to do more.  Response to previous treatment: no concerns  Functional change: no change compared to previous session    Pain: 0/10  Location:  n/a     Objective     Alyssa received the following manual therapy techniques for 0 minutes:   -n/a    Alyssa received therapeutic exercises for 10 minutes including:  -Mandeep UBE 5 minutes forward, 5 minutes backward 1.5 level seated    Alyssa participated in dynamic functional therapeutic activities to improve functional performance for 14 minutes, including:  Sit <> stand with CGA and unilateral support to increase independence with IADLs  Folding towels standing at table for x 3 minutes  Min A to stand while folding towels  Stood for 7 minutes at table with bilateral UE support CGA   Verbal cues to encourage upright posture    Alyssa participated in neuromuscular re-education activities to improve: Balance, Coordination, Kinesthetic Sense, Proprioception and Posture for 0 minutes.  -n/a    Alyssa participated in  functional self-care activities to improve functional performance for 17 minutes, including:  Sit <> stand transfer   mod cues to encourage upright posture  - washing/drying her hands standing at sink  CGA A with standing while washing/drying hands    Fastening/unfastening medium and small size buttons  Increased time with small buttons    FOTO SCORE = 35    Home Exercises and Education Provided     Education provided:   - Progress towards goals     Written Home Exercises Provided: Patient instructed to cont prior HEP.    See EMR under Patient Instructions for exercises provided previously.        Assessment     Pt would continue to benefit from skilled OT. Patient demonstrates progress and was able to stand longer at table than prior session. She also shows progress and required CGA while standing at sink washing and drying her hands from min A at last session. Patient required mod cues to encourage upright posture while standing.     Alyssa is progressing slowly towards her goals and there are no updates to goals at this time. Pt prognosis is Fair.     Pt will continue to benefit from skilled outpatient occupational therapy to address the deficits listed in the problem list on initial evaluation provide pt/family education and to maximize pt's level of independence in the home and community environment.     Anticipated barriers to occupational therapy: decreased memory    Pt's spiritual, cultural and educational needs considered and pt agreeable to plan of care and goals.    Goals:  LTG GOALS:    1) Pt will be mod I with dressing for clothing except for fasteners (buttons/zippers/hooks) (progressing 7/24/2020)   2) SBA for TTB t/f. (progressing 7/24/2020)   3) Pt will be mod I for toileting and related t/f. (progressing 7/24/2020)   4) Pt will improve GMC, as evidenced by box and block scores of 45 or better on pavan UE. (MET 5/19/2020)  5) Pt to improve FMC bilaterally, as evidenced by a 5 sec improvement in times  on the 9HPT - R=40s or better; L=45s or better. (progressing 7/24/2020)   6) Alyssa will complete bathroom mobility for toileting w/ SBA and RW. (progressing 7/24/2020)   7) Alyssa will be able to stand w/ SBA and unilateral support for 10 minutes to increase indep w/ IADLs. (progressing 7/24/2020)   8) Alyssa will be able to complete a 5-pose flow of chair yoga with mod I to increase core control. (progressing 7/24/2020)   9) Alyssa will be able to cut her own food with mandeep UE using fork and knife. (progressing 7/24/2020)   10) Pt will participate in at least one IADL task in the home on a daily basis (dishes, laundry, etc) (MET per pt report 2/20/2020)   11) Pt will be supervision with bathing, seated (MET 2/20/2020)     STG Goals:   1) Alyssa will be able to stand and manage her clothing for toileting w/ CGA (MET 7/7/2020)    2) Mandeep UE GMC to improve, as evidenced by box & block scores of 31 or better, mandeep UE. (MET)  3) Alyssa will be mod I with initial HEP. (MET with assistance of spouse)  4) Pt will t/f to toilet w/ min A or better. (MET)    Plan     Updates/Grading for next session: work on standing tolerance, balance, endurance and functional transfer training      Estrella Wray, OTR

## 2020-07-28 ENCOUNTER — CLINICAL SUPPORT (OUTPATIENT)
Dept: REHABILITATION | Facility: HOSPITAL | Age: 70
End: 2020-07-28
Payer: MEDICARE

## 2020-07-28 DIAGNOSIS — R27.8 COORDINATION IMPAIRMENT: ICD-10-CM

## 2020-07-28 DIAGNOSIS — Z74.09 IMPAIRED FUNCTIONAL MOBILITY, BALANCE, AND ENDURANCE: Primary | ICD-10-CM

## 2020-07-28 DIAGNOSIS — Z78.9 IMPAIRED MOBILITY AND ADLS: ICD-10-CM

## 2020-07-28 DIAGNOSIS — Z78.9 IMPAIRED INSTRUMENTAL ACTIVITIES OF DAILY LIVING (IADL): ICD-10-CM

## 2020-07-28 DIAGNOSIS — R53.82 CHRONIC FATIGUE: ICD-10-CM

## 2020-07-28 DIAGNOSIS — Z74.09 IMPAIRED MOBILITY AND ADLS: ICD-10-CM

## 2020-07-28 DIAGNOSIS — Z74.09 IMPAIRED FUNCTIONAL MOBILITY, BALANCE, AND ENDURANCE: ICD-10-CM

## 2020-07-28 PROCEDURE — 97530 THERAPEUTIC ACTIVITIES: CPT | Mod: PN

## 2020-07-28 PROCEDURE — 97535 SELF CARE MNGMENT TRAINING: CPT | Mod: PN

## 2020-07-28 PROCEDURE — 97110 THERAPEUTIC EXERCISES: CPT | Mod: PN,CQ

## 2020-07-28 PROCEDURE — 97116 GAIT TRAINING THERAPY: CPT | Mod: PN,CQ,59

## 2020-07-28 PROCEDURE — 97112 NEUROMUSCULAR REEDUCATION: CPT | Mod: PN

## 2020-07-28 NOTE — PROGRESS NOTES
Physical Therapy Treatment Note     Name: Alyssa John  Clinic Number: [id number]    Therapy Diagnosis:   Encounter Diagnosis   Name Primary?    Impaired functional mobility, balance, and endurance      Physician: Risa Oshea PA-C    Visit Date: 7/28/2020    Physician Orders: PT Eval and Treat  Medical Diagnosis: G35 (ICD-10-CM) - Multiple sclerosis  Evaluation Date: 03/19/2019  Authorization Period Expiration:08/09/2020  Plan of Care Certification Period: 7/17/2020 to 09/04/2020  Visit #/Visits authorized:  11 /12     PTA visit # 1      Time In: 1050  Time Out: 1130  Total Billable Time: 40 minutes    Precautions: Fall, MS    Subjective     Pt reports: no complaints.  Response to previous treatment: no complaints  Functional change: n/a    Pain: 0/10      Objective     Alyssa received therapeutic exercises to develop strength, endurance, ROM and flexibility for 30 minutes including:    Bike x 15 min L-1.5  Sit to stand transfers with min  A  W/c to nustep bike t/f with min A      Alyssa participated in gait training to improve functional mobility and safety for 10  minutes, including:  Gt training with RW min A/CGA 25' and 92'   Verbal cues for L foot clearance during swing phase of gait    Home Exercises Provided and Patient Education Provided       Written Home Exercises Provided: Patient instructed to cont prior HEP.  Exercises were reviewed and Alyssa was able to demonstrate them prior to the end of the session.  Alyssa demonstrated fair  understanding of the education provided.     See EMR under Patient Instructions for exercises provided prior visit.    Assessment     Pt high fall risk.  Pt may benefit from an AFO to L LE to assist with decreased L toe clearance during swing.  Alyssa is progressing well towards her goals.   Pt prognosis is Fair.     Pt will continue to benefit from skilled outpatient physical therapy to address the deficits listed in the problem list box on initial evaluation,  provide pt/family education and to maximize pt's level of independence in the home and community environment.     Pt's spiritual, cultural and educational needs considered and pt agreeable to plan of care and goals.     Anticipated barriers to physical therapy:     Goals: per Sameera Osborne, PT  Short Term Goals:     1) Pt will stand without posterior lean 5 of 10 trials    2) Pt will perform sit to stand x 5 reps with min A  3) Pt will improve LE strength by 1/2 muscle grade  4) Pt will be able to verbalize key points of proper transfer technique  Long Term Goals:     1) Pt will consistently perform sit to stand transfers with minimum to contact guard assistance  2) Pt will stand with slight UE support  3) Pt will perform toilet transfer with minimal assistance  4) Pt/family will be independent in HEP.       Plan     Cont with there ex, stretching, gait training and balance ex to improve strength, transfers, and gait deficits per POC.    Camila Griffith, PTA

## 2020-07-28 NOTE — PROGRESS NOTES
"  Occupational Therapy Treatment Note     Date: 7/28/2020  Name: Alyssa John  Clinic Number: [id number]    Therapy Diagnosis:   Encounter Diagnoses   Name Primary?    Impaired functional mobility, balance, and endurance Yes    Impaired mobility and ADLs     Coordination impairment     Chronic fatigue     Impaired instrumental activities of daily living (IADL)      Physician: Risa Oshea PA-C    Physician Orders: OT Eval & Treat  Medical Diagnosis from Referral: Multiple Sclerosis, neurologic gait, L UE weakness  Evaluation Date: 9/5/2019  Surgical Procedure and Date: n/a, n/a  Insurance Authorization Period Expiration: 6/30/2020  Plan of Care Certification Period: 7/17/2020  Date of Return to MD: tba    Visit # / Visits authorized: 4/12  Time In: 10:05  Time Out: 1050  Total Billable Time: 45 minutes    Precautions:  Standard and Fall    Subjective     Pt reports: She was compliant with home exercise program. - she has been doing "some" yoga - "the ones I can do without Giuseppe there." She also worked on standing at home. She brought a button-up shirt with her to practice the buttons in today's session.  Response to previous treatment: no concerns  Functional change: states she is doing a better job clearing the table (seated in the wheelchair); She states she is completing TTB t/f w/ SBA-supervision at home using grab bars.    Pain: 0/10  Location:  n/a     Objective     Alyssa received the following manual therapy techniques for 0 minutes:   -n/a    Alyssa received therapeutic exercises for 10 minutes including:  -R =15  -L =32  - strengthening with red putty - issued for HEP and encouraged her to focus on 1 tight squeeze followed by 1 turn.    Alyssa participated in dynamic functional therapeutic activities to improve functional performance for 15 minutes, including:  -Standing to button her final button; CGA and cues to keep COG over feet rather than posterior  -cutting red putty w/ " built-up knife and fork to simulate cutting chicken     Alyssa participated in neuromuscular re-education activities to improve: Balance, Coordination, Kinesthetic Sense, Proprioception and Posture for 10 minutes.  -yoga flow x1 set as per previous notes. Max cues to keep R hip on mat, elongating R trunk. Cues for best positioning    Alyssa participated in functional self-care activities to improve functional performance for 10 minutes, including:  -W/c->mat t/f w/ RW and SBA; unsuccessful on 1st attempt as she did not maintain her forward COG  -Button up shirt with significantly increased time (>4 min for 6 buttons)     Home Exercises and Education Provided     Education provided:   - Progress towards goals     Written Home Exercises Provided: Patient instructed to cont prior HEP.    See EMR under Patient Instructions for exercises provided previously.        Assessment     Pt would continue to benefit from skilled OT. Increased time required to fasten buttons & increased difficulty with cutting food due to proximal weakness and tremor R UE. Alyssa continues to be motivated to give full effort in clinic. Continued participation in skilled therapy is medically necessary to increase safety and independence in ADLs, which is currently limited by decreased core strength, stand tolerance/balance, tendency toward posterior COG, pavan UE weakness, R UE tremor, decreased memory.    Alyssa is progressing slowly towards her goals and there are no updates to goals at this time. Pt prognosis is Fair.     Pt will continue to benefit from skilled outpatient occupational therapy to address the deficits listed in the problem list on initial evaluation provide pt/family education and to maximize pt's level of independence in the home and community environment.     Anticipated barriers to occupational therapy: decreased memory    Pt's spiritual, cultural and educational needs considered and pt agreeable to plan of care and  goals.    Goals:  LTG GOALS:    1) Pt will be mod I with dressing for clothing except for fasteners (buttons/zippers/hooks) (progressing 7/28/2020)   2) SBA for TTB t/f. (progressing 7/28/2020)   3) Pt will be mod I for toileting and related t/f. (progressing 7/28/2020)   4) Pt will improve GMC, as evidenced by box and block scores of 45 or better on mandeep UE. (MET 5/19/2020)  5) Pt to improve FMC bilaterally, as evidenced by a 5 sec improvement in times on the 9HPT - R=40s or better; L=45s or better. (progressing 7/28/2020)   6) Alyssa will complete bathroom mobility for toileting w/ SBA and RW. (progressing 7/28/2020)   7) Alyssa will be able to stand w/ SBA and unilateral support for 10 minutes to increase indep w/ IADLs. (progressing 7/28/2020)   8) Alyssa will be able to complete a 5-pose flow of chair yoga with mod I to increase core control. (progressing 7/28/2020)   9) Alyssa will be able to cut her own food with mandeep UE using fork and knife. (progressing 7/28/2020)   10) Pt will participate in at least one IADL task in the home on a daily basis (dishes, laundry, etc) (MET per pt report 2/20/2020)   11) Pt will be supervision with bathing, seated (MET 2/20/2020)     STG Goals:   1) Alyssa will be able to stand and manage her clothing for toileting w/ CGA (MET 7/7/2020)    2) Mandeep UE GMC to improve, as evidenced by box & block scores of 31 or better, mandeep UE. (MET)  3) Alyssa will be mod I with initial HEP. (MET with assistance of spouse)  4) Pt will t/f to toilet w/ min A or better. (MET)    Plan     Updates/Grading for next session: work on standing tolerance/balance, activity tolerance    Sameera Montague, OT

## 2020-08-04 ENCOUNTER — CLINICAL SUPPORT (OUTPATIENT)
Dept: REHABILITATION | Facility: HOSPITAL | Age: 70
End: 2020-08-04
Payer: MEDICARE

## 2020-08-04 DIAGNOSIS — Z78.9 IMPAIRED INSTRUMENTAL ACTIVITIES OF DAILY LIVING (IADL): ICD-10-CM

## 2020-08-04 DIAGNOSIS — R53.82 CHRONIC FATIGUE: ICD-10-CM

## 2020-08-04 DIAGNOSIS — Z74.09 IMPAIRED FUNCTIONAL MOBILITY, BALANCE, AND ENDURANCE: Primary | ICD-10-CM

## 2020-08-04 DIAGNOSIS — Z74.09 IMPAIRED MOBILITY AND ADLS: ICD-10-CM

## 2020-08-04 DIAGNOSIS — R27.8 COORDINATION IMPAIRMENT: ICD-10-CM

## 2020-08-04 DIAGNOSIS — Z78.9 IMPAIRED MOBILITY AND ADLS: ICD-10-CM

## 2020-08-04 PROCEDURE — 97530 THERAPEUTIC ACTIVITIES: CPT | Mod: KX,PN

## 2020-08-04 NOTE — PROGRESS NOTES
"  Occupational Therapy Treatment Note     Date: 8/4/2020  Name: Alyssa John  Clinic Number: [id number]    Therapy Diagnosis:   Encounter Diagnoses   Name Primary?    Impaired functional mobility, balance, and endurance Yes    Impaired mobility and ADLs     Coordination impairment     Chronic fatigue     Impaired instrumental activities of daily living (IADL)      Physician: Risa Oshea PA-C    Physician Orders: OT Eval & Treat  Medical Diagnosis from Referral: Multiple Sclerosis, neurologic gait, L UE weakness  Evaluation Date: 9/5/2019  Surgical Procedure and Date: n/a, n/a  Insurance Authorization Period Expiration: 6/30/2020  Plan of Care Certification Period: 7/17/2020  Date of Return to MD: tba    Visit # / Visits authorized: 4/12  Time In: 1003  Time Out: 1045  Total Billable Time: 42 minutes    Precautions:  Standard and Fall    Subjective     Pt reports: She was not compliant with home exercise program due to limitations with headache - Migraine caused cancellation of last Thursday appt and lasted all weekend.    She c/o feeling increased sensitivity in her hands today, which started sometime last week, though she is not able to pinpoint. "They feel like they were burned or something. Do you notice that they are red?"     Response to previous treatment: no concerns  Functional change: She needed more assistance at home this weekend due to migraine, especially for functional ambulation.    Pain: 1/10  Location: headache - she did take aspirin prior to therapy.     Objective     Alyssa received the following manual therapy techniques for 0 minutes:   -n/a    Alyssa received therapeutic exercises for 0 minutes including:  -n/a    Alyssa participated in dynamic functional therapeutic activities to improve functional performance for 42 minutes, including:  -W/c->mat t/f w/ RW and SBA; unsuccessful on 1st attempt - after cues she was able to correct COG to be more forward, increasing independence " and safety with the t/f.   -No c/o pain in hands with light rubbing. She has burning with sharp input and pain with deep pressure, pavan UE. There is redness in her palms and fingers.   -max cues to correct posture at EOM as pt leaning back with feet off the floor. Tactile and verbal cues given for her to scoot forward and place feet on floor. She continued to lean back. OT placed wedge under pt to encourage increased anterior pelvic tilt, though she continued to lean back and required max cues for sitting up tall.  -Pavan scap retraction 2x10.  -AAROM R lateral shift of rib cage to improve alignment/ posture. We used visual cues with pt's t-shirt in mirror to increase success, though performance actually declined with progressive sets    Box and Block [facility] assessment: (R) 37 (L) 36; repeated R w/ 1# weight = 34  [norms for women aged 65-69: R=72.0 +/-6.2; L=71.3 +/-7.7 (Parveen et al, 1985)]    -fxl amb "[employer]s gym to building exit w/ RW, CGA and max cues for picking up the L MARYAM Shah participated in neuromuscular re-education activities to improve: Balance, Coordination, Kinesthetic Sense, Proprioception and Posture for 0 minutes.  -n/a    Alyssa participated in functional self-care activities to improve functional performance for 0 minutes, including:  -n/a    Home Exercises and Education Provided     Education provided:   - Progress towards goals     Written Home Exercises Provided: Patient instructed to cont prior HEP.    See EMR under Patient Instructions for exercises provided previously.        Assessment     Pt would continue to benefit from skilled OT. Impaired motor planning noted, especially with fatigued. New redness and sensitivity in palms. Will continue to monitor. Decline in function compared to previous session following multiple day migraine. Continued participation in skilled therapy is medically necessary to increase safety and independence in ADLs, which is currently limited by decreased  core strength, stand tolerance/balance, tendency toward posterior COG, pavan UE weakness, R UE tremor, decreased memory.    Alyssa is progressing slowly towards her goals and there are no updates to goals at this time. Pt prognosis is Fair.     Pt will continue to benefit from skilled outpatient occupational therapy to address the deficits listed in the problem list on initial evaluation provide pt/family education and to maximize pt's level of independence in the home and community environment.     Anticipated barriers to occupational therapy: decreased memory    Pt's spiritual, cultural and educational needs considered and pt agreeable to plan of care and goals.    Goals:  LTG GOALS:    1) Pt will be mod I with dressing for clothing except for fasteners (buttons/zippers/hooks) (progressing 8/4/2020)   2) SBA for TTB t/f. (progressing 8/4/2020)   3) Pt will be mod I for toileting and related t/f. (progressing 8/4/2020)   4) Pt will improve GMC, as evidenced by box and block scores of 45 or better on pavan UE. (MET 5/19/2020, but currently in high 30's as of 8/4/2020)  5) Pt to improve FMC bilaterally, as evidenced by a 5 sec improvement in times on the 9HPT - R=40s or better; L=45s or better. (progressing 8/4/2020)   6) Alyssa will complete bathroom mobility for toileting w/ SBA and RW. (progressing 8/4/2020)   7) Alyssa will be able to stand w/ SBA and unilateral support for 10 minutes to increase indep w/ IADLs. (progressing 8/4/2020)   8) Alyssa will be able to complete a 5-pose flow of chair yoga with mod I to increase core control. (progressing 8/4/2020)   9) Alyssa will be able to cut her own food with pavan UE using fork and knife. (progressing 8/4/2020)   10) Pt will participate in at least one IADL task in the home on a daily basis (dishes, laundry, etc) (MET per pt report 2/20/2020)   11) Pt will be supervision with bathing, seated (MET 2/20/2020)     STG Goals:   1) Alyssa will be able to stand and manage her  clothing for toileting w/ CGA (MET 7/7/2020)    2) Mandeep UE GMC to improve, as evidenced by box & block scores of 31 or better, mandeep UE. (MET)  3) Alyssa will be mod I with initial HEP. (MET with assistance of spouse)  4) Pt will t/f to toilet w/ min A or better. (MET)    Plan     Updates/Grading for next session: work on standing tolerance/balance, activity tolerance    Sameera Montague, OT

## 2020-08-07 ENCOUNTER — CLINICAL SUPPORT (OUTPATIENT)
Dept: REHABILITATION | Facility: HOSPITAL | Age: 70
End: 2020-08-07
Payer: MEDICARE

## 2020-08-07 DIAGNOSIS — R27.8 COORDINATION IMPAIRMENT: ICD-10-CM

## 2020-08-07 DIAGNOSIS — Z74.09 IMPAIRED MOBILITY AND ADLS: ICD-10-CM

## 2020-08-07 DIAGNOSIS — Z78.9 IMPAIRED INSTRUMENTAL ACTIVITIES OF DAILY LIVING (IADL): ICD-10-CM

## 2020-08-07 DIAGNOSIS — Z78.9 IMPAIRED MOBILITY AND ADLS: ICD-10-CM

## 2020-08-07 DIAGNOSIS — Z74.09 IMPAIRED FUNCTIONAL MOBILITY, BALANCE, AND ENDURANCE: Primary | ICD-10-CM

## 2020-08-07 DIAGNOSIS — R53.82 CHRONIC FATIGUE: ICD-10-CM

## 2020-08-07 PROCEDURE — 97110 THERAPEUTIC EXERCISES: CPT | Mod: KX,PN

## 2020-08-07 PROCEDURE — 97530 THERAPEUTIC ACTIVITIES: CPT | Mod: PN

## 2020-08-07 PROCEDURE — 97112 NEUROMUSCULAR REEDUCATION: CPT | Mod: PN

## 2020-08-07 NOTE — PROGRESS NOTES
"  Occupational Therapy Treatment Note     Date: 8/7/2020  Name: Alyssa John  Clinic Number: [id number]    Therapy Diagnosis:   Encounter Diagnoses   Name Primary?    Impaired functional mobility, balance, and endurance Yes    Impaired mobility and ADLs     Coordination impairment     Chronic fatigue     Impaired instrumental activities of daily living (IADL)      Physician: Risa Oshea PA-C    Physician Orders: OT Eval & Treat  Medical Diagnosis from Referral: Multiple Sclerosis, neurologic gait, L UE weakness  Evaluation Date: 9/5/2019  Surgical Procedure and Date: n/a, n/a  Insurance Authorization Period Expiration: 6/30/2020  Plan of Care Certification Period: 7/17/2020  Date of Return to MD: enriquea    Visit # / Visits authorized: 5/12  Time In: 1007  Time Out: 1047  Total Billable Time: 40 minutes    Precautions:  Standard and Fall    Subjective     Pt reports: She was intermittently compliant with home exercise program. When asked if she was working on her pelvic tilting/ core exercises at home (to improve postural control), she replied, "I though the new sofa would do that for me."     Response to previous treatment: no concerns  Functional change: No change noted compared to previous session.     Pain: 0/10  Location: headache - she did take aspirin prior to therapy.     Objective     Alyssa received the following manual therapy techniques for 0 minutes:   -n/a    Alyssa received therapeutic exercises for 10 minutes including:  -UBE x5 min forward/ 5 min back w/ pavan UE, level 1.0, seated. A pillow was placed behind her back to improve postural control and positioning during UBE.    Alyssa participated in dynamic functional therapeutic activities to improve functional performance for 20 minutes, including:  -W/c->UBE->mat t/f, including amb w/ RW and SBA; min cues for hand placement during sit->stand  -fxl amb at end of session from [employer] gym->lobby w/ SBA and RW, max cues to fully lift the L " MARYAM.  -seated with wedge under L hip to improve proper trunk alignment, pt worked on [facility] w/ flat marbles, picking them up and placing them one at a time in groups of 5 to improve finger<>palm translation for in-hand manipulation.    Alyssa participated in neuromuscular re-education activities to improve: Balance, Coordination, Kinesthetic Sense, Proprioception and Posture for 10 minutes.  -seated with wedge under L hip to improve proper trunk alignment, pt perf'd AROM a/p pelvic tilt w/ min tactile cues for facilitation. Hands at sides in position of support to facilitate scapulo-pelvic rhythm.     Alyssa participated in functional self-care activities to improve functional performance for 0 minutes, including:  -n/a    Home Exercises and Education Provided     Education provided:   - Progress towards goals     Written Home Exercises Provided: Patient instructed to cont prior HEP.    See EMR under Patient Instructions for exercises provided previously.        Assessment     Pt would continue to benefit from skilled OT. Impaired motor planning continues when fatigued. She is weaker in pelvic muscles and appears to be using her upper traps for postural control as she has not been completing pelvic HEP consistently. Will continue to monitor. Decline in function compared to previous session following multiple day migraine. Continued participation in skilled therapy is medically necessary to increase safety and independence in ADLs, which is currently limited by decreased core strength, stand tolerance/balance, tendency toward posterior COG, pavan UE weakness, R UE tremor, decreased memory.    Alyssa is progressing slowly towards her goals and there are no updates to goals at this time. Pt prognosis is Fair.     Pt will continue to benefit from skilled outpatient occupational therapy to address the deficits listed in the problem list on initial evaluation provide pt/family education and to maximize pt's level of  independence in the home and community environment.     Anticipated barriers to occupational therapy: decreased memory    Pt's spiritual, cultural and educational needs considered and pt agreeable to plan of care and goals.    Goals:  LTG GOALS:    1) Pt will be mod I with dressing for clothing except for fasteners (buttons/zippers/hooks) (progressing 8/7/2020)   2) SBA for TTB t/f. (progressing 8/7/2020)   3) Pt will be mod I for toileting and related t/f. (progressing 8/7/2020)   4) Pt will improve GMC, as evidenced by box and block scores of 45 or better on mandeep UE. (MET 5/19/2020, but currently in high 30's as of 8/4/2020)  5) Pt to improve FMC bilaterally, as evidenced by a 5 sec improvement in times on the 9HPT - R=40s or better; L=45s or better. (progressing 8/7/2020)   6) Alyssa will complete bathroom mobility for toileting w/ SBA and RW. (progressing 8/7/2020)   7) Alyssa will be able to stand w/ SBA and unilateral support for 10 minutes to increase indep w/ IADLs. (progressing 8/7/2020)   8) Alyssa will be able to complete a 5-pose flow of chair yoga with mod I to increase core control. (progressing 8/7/2020)   9) Alyssa will be able to cut her own food with mandeep UE using fork and knife. (progressing 8/7/2020)   10) Pt will participate in at least one IADL task in the home on a daily basis (dishes, laundry, etc) (MET per pt report 2/20/2020)   11) Pt will be supervision with bathing, seated (MET 2/20/2020)     STG Goals:   1) Alyssa will be able to stand and manage her clothing for toileting w/ CGA (MET 7/7/2020)    2) Mandeep UE GMC to improve, as evidenced by box & block scores of 31 or better, mandeep UE. (MET)  3) Alyssa will be mod I with initial HEP. (MET with assistance of spouse)  4) Pt will t/f to toilet w/ min A or better. (MET)    Plan     Updates/Grading for next session: work on standing tolerance/balance, activity tolerance    Sameera Montague, OT

## 2020-08-18 ENCOUNTER — CLINICAL SUPPORT (OUTPATIENT)
Dept: REHABILITATION | Facility: HOSPITAL | Age: 70
End: 2020-08-18
Payer: MEDICARE

## 2020-08-18 DIAGNOSIS — R27.8 COORDINATION IMPAIRMENT: ICD-10-CM

## 2020-08-18 DIAGNOSIS — Z74.09 IMPAIRED MOBILITY AND ADLS: ICD-10-CM

## 2020-08-18 DIAGNOSIS — R53.82 CHRONIC FATIGUE: ICD-10-CM

## 2020-08-18 DIAGNOSIS — Z78.9 IMPAIRED MOBILITY AND ADLS: ICD-10-CM

## 2020-08-18 DIAGNOSIS — Z78.9 IMPAIRED INSTRUMENTAL ACTIVITIES OF DAILY LIVING (IADL): ICD-10-CM

## 2020-08-18 DIAGNOSIS — Z74.09 IMPAIRED FUNCTIONAL MOBILITY, BALANCE, AND ENDURANCE: Primary | ICD-10-CM

## 2020-08-18 PROCEDURE — 97112 NEUROMUSCULAR REEDUCATION: CPT | Mod: KX,PN

## 2020-08-18 NOTE — PROGRESS NOTES
"  Occupational Therapy Treatment Note     Date: 8/18/2020  Name: Alyssa John  Clinic Number: [id number]    Therapy Diagnosis:   Encounter Diagnoses   Name Primary?    Impaired functional mobility, balance, and endurance Yes    Impaired mobility and ADLs     Coordination impairment     Chronic fatigue     Impaired instrumental activities of daily living (IADL)      Physician: Risa Oshea PA-C    Physician Orders: OT Eval & Treat  Medical Diagnosis from Referral: Multiple Sclerosis, neurologic gait, L UE weakness  Evaluation Date: 9/5/2019  Surgical Procedure and Date: n/a, n/a  Insurance Authorization Period Expiration: 6/30/2020  Plan of Care Certification Period: 7/17/2020  Date of Return to MD: tba    Visit # / Visits authorized: 6/12  Time In: 1040  Time Out: 1125  Total Billable Time: 45 minutes    Precautions:  Standard and Fall    Subjective     Pt reports: She was intermittently compliant with home exercise program.   She arrives wearing wrist weights and states that they are "helping sometimes" to decrease her tremors. She states she worked on her posture and sitting up tall on the sofa. She also practiced walking at home.     Response to previous treatment: no concerns  Functional change: No change noted compared to previous session.     Pain: 0/10  Location: n/a     Objective     Alyssa received the following manual therapy techniques for 0 minutes:   -n/a    Alyssa received therapeutic exercises for 0 minutes including:  -n/a     Alyssa participated in dynamic functional therapeutic activities to improve functional performance for 0 minutes, including:  -n/a    Alyssa participated in neuromuscular re-education activities to improve: Balance, Coordination, Kinesthetic Sense, Proprioception and Posture for 40 minutes.  -w/c->mat squat/stand-pivot t/f w/ min A and max cues for sequencing  -pt stood for ~3.5 minutes w/ max cues & min A for postural control: she was able to reach to the left " to retrieve a container w/ the L UE, open & close it w/ pavan UE, then reach to the R to place it on the table. She completed 2.  -Min A for sit->stand, but max A to maintain standing with fxl amb to move forward x4 feet.  -Min A for stand->sit on therapy ball.  -Min A for facilitation of pelvic tilting on therapy ball to allow weight shift for functional reaching  - Stand from ball/ stand-pivot t/f to w/c w/ max A  -W/c<>mat stand-pivot t/f w/ mod A  -KT to facilitate pavan rhomboids (X-cut, ~35% tension, applied O->I).  -KT to facilitate L low thoracic iliocostalis (Y-cut, ~35% tension, applied O->I).   -pavan scap retraction AROM 2x10  -Max cues for posture.   Alyssa participated in functional self-care activities to improve functional performance for 5 minutes, including:  --pt doffed t-shirt w/ supervision; donned t-shirt w/ CGA to pull down in the back.    Home Exercises and Education Provided     Education provided:   - Progress towards goals     Written Home Exercises Provided: Patient instructed to cont prior HEP.    See EMR under Patient Instructions for exercises provided previously.        Assessment     Pt would continue to benefit from skilled OT. She had a very difficult time sitting on the ball, maintaining balance and completing functional reaching task; as well as standing, maintaining posture and completing similar task. Nonetheless, improved compliance with HEP today compared to previous visit. Alyssa's independence is limited by decreased memory, sustained and divided attention. Continued participation in skilled therapy is medically necessary to increase safety and independence in ADLs, which is currently limited by decreased core strength, stand tolerance/balance, tendency toward posterior COG, pavan UE weakness, R UE tremor, decreased memory.    Alyssa is progressing slowly towards her goals and there are no updates to goals at this time. Pt prognosis is Fair.     Pt will continue to benefit from  skilled outpatient occupational therapy to address the deficits listed in the problem list on initial evaluation provide pt/family education and to maximize pt's level of independence in the home and community environment.     Anticipated barriers to occupational therapy: decreased memory    Pt's spiritual, cultural and educational needs considered and pt agreeable to plan of care and goals.    Goals:  LTG GOALS:    1) Pt will be mod I with dressing for clothing except for fasteners (buttons/zippers/hooks) (progressing 8/18/2020)   2) SBA for TTB t/f. (progressing 8/18/2020)   3) Pt will be mod I for toileting and related t/f. (progressing 8/18/2020)   4) Pt will improve GMC, as evidenced by box and block scores of 45 or better on mandeep UE. (MET 5/19/2020, but currently in high 30's as of 8/4/2020)  5) Pt to improve FMC bilaterally, as evidenced by a 5 sec improvement in times on the 9HPT - R=40s or better; L=45s or better. (progressing 8/18/2020)   6) Alyssa will complete bathroom mobility for toileting w/ SBA and RW. (progressing 8/18/2020)   7) Alyssa will be able to stand w/ SBA and unilateral support for 10 minutes to increase indep w/ IADLs. (progressing 8/18/2020)   8) Alyssa will be able to complete a 5-pose flow of chair yoga with mod I to increase core control. (progressing 8/18/2020)   9) Alyssa will be able to cut her own food with mandeep UE using fork and knife. (progressing 8/18/2020)   10) Pt will participate in at least one IADL task in the home on a daily basis (dishes, laundry, etc) (MET per pt report 2/20/2020)   11) Pt will be supervision with bathing, seated (MET 2/20/2020)     STG Goals:   1) Alyssa will be able to stand and manage her clothing for toileting w/ CGA (MET 7/7/2020)    2) Mandeep UE GMC to improve, as evidenced by box & block scores of 31 or better, mandeep UE. (MET)  3) Alysas will be mod I with initial HEP. (MET with assistance of spouse)  4) Pt will t/f to toilet w/ min A or better.  (MET)    Plan     Updates/Grading for next session: activity tolerance - try therapy ball again with task requiring reaching up to facilitate posture.    Sameera Montague, OT

## 2020-08-21 ENCOUNTER — CLINICAL SUPPORT (OUTPATIENT)
Dept: REHABILITATION | Facility: HOSPITAL | Age: 70
End: 2020-08-21
Payer: MEDICARE

## 2020-08-21 DIAGNOSIS — R53.82 CHRONIC FATIGUE: ICD-10-CM

## 2020-08-21 DIAGNOSIS — Z78.9 IMPAIRED MOBILITY AND ADLS: ICD-10-CM

## 2020-08-21 DIAGNOSIS — Z78.9 IMPAIRED INSTRUMENTAL ACTIVITIES OF DAILY LIVING (IADL): ICD-10-CM

## 2020-08-21 DIAGNOSIS — Z74.09 IMPAIRED FUNCTIONAL MOBILITY, BALANCE, AND ENDURANCE: Primary | ICD-10-CM

## 2020-08-21 DIAGNOSIS — Z74.09 IMPAIRED MOBILITY AND ADLS: ICD-10-CM

## 2020-08-21 DIAGNOSIS — R27.8 COORDINATION IMPAIRMENT: ICD-10-CM

## 2020-08-21 PROCEDURE — 97112 NEUROMUSCULAR REEDUCATION: CPT | Mod: PN

## 2020-08-21 NOTE — PROGRESS NOTES
"  Occupational Therapy Treatment Note     Date: 8/21/2020  Name: Alyssa John  Clinic Number: [id number]    Therapy Diagnosis:   Encounter Diagnoses   Name Primary?    Impaired functional mobility, balance, and endurance Yes    Impaired mobility and ADLs     Coordination impairment     Chronic fatigue     Impaired instrumental activities of daily living (IADL)      Physician: Risa Oshea PA-C    Physician Orders: OT Eval & Treat  Medical Diagnosis from Referral: Multiple Sclerosis, neurologic gait, L UE weakness  Evaluation Date: 9/5/2019  Surgical Procedure and Date: n/a, n/a  Insurance Authorization Period Expiration: 6/30/2020  Plan of Care Certification Period: 7/17/2020  Date of Return to MD: tba    Visit # / Visits authorized: 7/12  Time In: 1006  Time Out: 1050  Total Billable Time: 40 minutes    Precautions:  Standard and Fall    Subjective     Pt reports: She was intermittently compliant with home exercise program.     Response to previous treatment: no concerns  Functional change: Mid session, she noted that she thinks her hands hurt because she is using the walker more at home.    Pain: 7/10   Location: hand and fingers    She notes that she can't make a fist, it's been this way for much of the week, she definitely c/o issues "on the surface", but is unable to state if she feels pain in her joints, bones or muscles.     Objective     Alyssa received the following manual therapy techniques for 0 minutes:   -n/a    Alyssa received therapeutic exercises for 0 minutes including:  -n/a     Alyssa participated in dynamic functional therapeutic activities to improve functional performance for 0 minutes, including:  -n/a    Alyssa participated in neuromuscular re-education activities to improve: Balance, Coordination, Kinesthetic Sense, Proprioception and Posture for 40 minutes.  -CGA w/c<>mat t/f w/ RW  -Facilitation of a/p pelvic tilt w/ ed re: importance of developing strength in the anterior " tilt as increased core strength will improve indep in standing and ambulation, decreasing amount of pressure she places through her hands and then also increasing balance. Points of contact at sternum and low back for anterior tilt. Hands at sides in position of support to facilitate scapulo-pelvic rhythm.   -Pt ed re: benefit of diaphragmatic breathing to facilitate relaxation and increase benefit from the yoga positions we have been working on.  -completed seated baby back bend and twists with diaphragmatic breathing incorporated to allow her to focus on use of core muscles.    Alyssa participated in functional self-care activities to improve functional performance for 0 minutes, including:  -n/a    Home Exercises and Education Provided     Education provided:   - Progress towards goals   -recommendation for ST eval  -need to work core and pelvis because they are necessary to keep her upright when standing - which will decrease pressure on her hands when using the RW.    Written Home Exercises Provided: Patient instructed to cont prior HEP.    See EMR under Patient Instructions for exercises provided previously.        Assessment     Pt would continue to benefit from skilled OT. She has 2-3 more sessions OT in this POC. We will focus on diaphragmatic breathing and HEP. Balance and posterior COG continue to be a significant safety problem in transfers and ambulation. Alyssa's independence is limited by decreased memory, sustained and divided attention. She would benefit from an ST evaluation and is agreeable to this. Eval has been requested from referring provider. Continued participation in skilled therapy is medically necessary to increase safety and independence in ADLs, which is currently limited by decreased core strength, stand tolerance/balance, tendency toward posterior COG, pavan UE weakness, R UE tremor, decreased memory.    Alyssa is progressing slowly towards her goals and there are no updates to goals at  this time. Pt prognosis is Fair.     Pt will continue to benefit from skilled outpatient occupational therapy to address the deficits listed in the problem list on initial evaluation provide pt/family education and to maximize pt's level of independence in the home and community environment.     Anticipated barriers to occupational therapy: decreased memory    Pt's spiritual, cultural and educational needs considered and pt agreeable to plan of care and goals.    Goals:  LTG GOALS:    1) Pt will be mod I with dressing for clothing except for fasteners (buttons/zippers/hooks) (progressing 8/21/2020)   2) SBA for TTB t/f. (progressing 8/21/2020)   3) Pt will be mod I for toileting and related t/f. (progressing 8/21/2020)   4) Pt will improve GMC, as evidenced by box and block scores of 45 or better on mandeep UE. (MET 5/19/2020, but currently in high 30's as of 8/4/2020)  5) Pt to improve FMC bilaterally, as evidenced by a 5 sec improvement in times on the 9HPT - R=40s or better; L=45s or better. (progressing 8/21/2020)   6) Alyssa will complete bathroom mobility for toileting w/ SBA and RW. (progressing 8/21/2020)   7) Alyssa will be able to stand w/ SBA and unilateral support for 10 minutes to increase indep w/ IADLs. (progressing 8/21/2020)   8) Alyssa will be able to complete a 5-pose flow of chair yoga with mod I to increase core control. (progressing 8/21/2020)   9) Alyssa will be able to cut her own food with mandeep UE using fork and knife. (progressing 8/21/2020)   10) Pt will participate in at least one IADL task in the home on a daily basis (dishes, laundry, etc) (MET per pt report 2/20/2020)   11) Pt will be supervision with bathing, seated (MET 2/20/2020)     STG Goals:   1) Alyssa will be able to stand and manage her clothing for toileting w/ CGA (MET 7/7/2020)    2) Mandeep UE GMC to improve, as evidenced by box & block scores of 31 or better, mandeep UE. (MET)  3) Alyssa will be mod I with initial HEP. (MET with  assistance of spouse)  4) Pt will t/f to toilet w/ min A or better. (MET)    Plan     Updates/Grading for next session: activity tolerance - try therapy ball again with diaphragmatic breathing; consider adding baby back bend and twist.     Sameera Montague, OT

## 2020-08-25 ENCOUNTER — TELEPHONE (OUTPATIENT)
Dept: NEUROLOGY | Facility: CLINIC | Age: 70
End: 2020-08-25

## 2020-08-25 ENCOUNTER — CLINICAL SUPPORT (OUTPATIENT)
Dept: REHABILITATION | Facility: HOSPITAL | Age: 70
End: 2020-08-25
Payer: MEDICARE

## 2020-08-25 DIAGNOSIS — Z78.9 IMPAIRED MOBILITY AND ADLS: ICD-10-CM

## 2020-08-25 DIAGNOSIS — Z74.09 IMPAIRED MOBILITY AND ADLS: ICD-10-CM

## 2020-08-25 DIAGNOSIS — G35 COGNITIVE DYSFUNCTION ACCOMPANYING MULTIPLE SCLEROSIS: Primary | ICD-10-CM

## 2020-08-25 DIAGNOSIS — Z74.09 IMPAIRED FUNCTIONAL MOBILITY, BALANCE, AND ENDURANCE: Primary | ICD-10-CM

## 2020-08-25 DIAGNOSIS — R53.82 CHRONIC FATIGUE: ICD-10-CM

## 2020-08-25 DIAGNOSIS — F09 COGNITIVE DYSFUNCTION ACCOMPANYING MULTIPLE SCLEROSIS: Primary | ICD-10-CM

## 2020-08-25 DIAGNOSIS — Z78.9 IMPAIRED INSTRUMENTAL ACTIVITIES OF DAILY LIVING (IADL): ICD-10-CM

## 2020-08-25 DIAGNOSIS — R27.8 COORDINATION IMPAIRMENT: ICD-10-CM

## 2020-08-25 PROCEDURE — 97530 THERAPEUTIC ACTIVITIES: CPT | Mod: PN

## 2020-08-25 PROCEDURE — 97112 NEUROMUSCULAR REEDUCATION: CPT | Mod: PN

## 2020-08-25 NOTE — TELEPHONE ENCOUNTER
----- Message from Sameera Montague OT sent at 8/25/2020 12:54 PM CDT -----  Risa,  I'm so sorry I forgot to mention that... yes.  Giuseppe Shah & I discussed it at the beginning of last week. She thought about it for a couple of days and on Friday, said she was willing to participate in ST.    -Graciela  ----- Message -----  From: Risa Oshea PA-C  Sent: 8/25/2020  11:47 AM CDT  To: Sameera Montague OT, Florecita Samaniego, CF-SLP    Virgilio Owens,    I'm certainly open to this idea, did you discuss ST with Alyssa and her ? If not, we can reach out to discuss to make sure they are on board.  Thanks for all your help!    Risa Oshea PA-C  [facility]    ----- Message -----  From: Sameera Montague OT  Sent: 8/25/2020   9:28 AM CDT  To: Risa Oshea PA-C, Florecita Samaniego, CF-SLP    Alyssa Pederson's progress with therapy has slowed significantly this summer. One of the primary limiting factors is her cognition, including decreased memory and decreased initiation. If you're in agreement, can you place an order for ST eval & treat for cognition.     If we can improve her thinking skills, she may be able to demonstrate more consistent compliance with her HEP and therefore, increased independence and safety at home. I think OT has 2 more visits in this plan of care, which we will use to finalize her HEP. I'd like to see her return after she has had a couple of months with ST to see if cognition improves. Please let me know if you have questions or concerns.    Thanks so much!    -KIKE Claire LOTR

## 2020-08-25 NOTE — PROGRESS NOTES
"  Occupational Therapy Treatment Note     Date: 8/25/2020  Name: Alyssa John  Clinic Number: [id number]    Therapy Diagnosis:   Encounter Diagnoses   Name Primary?    Impaired functional mobility, balance, and endurance Yes    Impaired mobility and ADLs     Coordination impairment     Chronic fatigue     Impaired instrumental activities of daily living (IADL)      Physician: Risa Oshea PA-C    Physician Orders: OT Eval & Treat  Medical Diagnosis from Referral: Multiple Sclerosis, neurologic gait, L UE weakness  Evaluation Date: 9/5/2019  Surgical Procedure and Date: n/a, n/a  Insurance Authorization Period Expiration: 6/30/2020  Plan of Care Certification Period: 7/17/2020  Date of Return to MD: tba    Visit # / Visits authorized: 8/12  Time In: 1303  Time Out: 1350  Total Billable Time: 45 minutes    Precautions:  Standard and Fall    Subjective     Pt reports: She was intermittently compliant with home exercise program.     Response to previous treatment: no concerns  Functional change: Mid session, she noted that she thinks her hands hurt because she is using the walker more at home.    Pain: 7/10   Location: hand and fingers    She notes that she can't make a fist, it's been this way for much of the week, she definitely c/o issues "on the surface", but is unable to state if she feels pain in her joints, bones or muscles.     Objective     Alyssa received the following manual therapy techniques for 0 minutes:   -n/a    Alyssa received therapeutic exercises for 0 minutes including:  -n/a     Alyssa participated in dynamic functional therapeutic activities to improve functional performance for 15 minutes, including:  -seated EOM for FMC tasks w/ wedge under the L hip to improve sitting positioning. Ipsilateral and forward reaching w/ L UE x10 and R UE x5, picking up and twirling pencil to increase in-hand manipulation.     Alyssa participated in neuromuscular re-education activities to improve: " "Balance, Coordination, Kinesthetic Sense, Proprioception and Posture for 30 minutes.  -CGA sit->stand from w/c and amb to therapy ball.  -min A stand->sit on therapy ball  -seated on therapy ball, worked on anterior/posterior weight shifting, as well as right/left weight shift w/ mod A and max cues. Increased focus on shifting hips to the left for activation of L QL and centering gravity. Also focused on "up tall" positioning and maintaining that position during the R/L shifting. OT encouraged diaphragmatic breathing intermittently throughout time on ball.  -Total A for sit->stand from ball and to step back to the mat, with RW, posterior COG, unable to stand up tall despite verbal and tactile cues. Max A to maintain standing balance and min A for stand->sit at mat.    Alyssa participated in functional self-care activities to improve functional performance for 0 minutes, including:  -n/a    Home Exercises and Education Provided     Education provided:   - Progress towards goals   -recommendation for ST eval  -need to work core and pelvis because they are necessary to keep her upright when standing - which will decrease pressure on her hands when using the RW.    Written Home Exercises Provided: Patient instructed to cont prior HEP.    See EMR under Patient Instructions for exercises provided previously.        Assessment     Pt would continue to benefit from skilled OT. Alyssa demo'd a significant improvement in core control and tolerance for the therapy ball today, though after standing she required max to total A to return to the EOM and she was unable to shift her posterior COG to a more centered position. Alyssa's independence continues to be limited by decreased memory, sustained and divided attention. She would benefit from an ST evaluation and is agreeable to this. Eval has been requested from referring provider. Continued participation in skilled therapy is medically necessary to increase safety and " independence in ADLs, which is currently limited by decreased core strength, stand tolerance/balance, tendency toward posterior COG, pavan UE weakness, R UE tremor, decreased memory.    Alyssa is progressing slowly towards her goals and there are no updates to goals at this time. Pt prognosis is Fair.     Pt will continue to benefit from skilled outpatient occupational therapy to address the deficits listed in the problem list on initial evaluation provide pt/family education and to maximize pt's level of independence in the home and community environment.     Anticipated barriers to occupational therapy: decreased memory    Pt's spiritual, cultural and educational needs considered and pt agreeable to plan of care and goals.    Goals:  LTG GOALS:    1) Pt will be mod I with dressing for clothing except for fasteners (buttons/zippers/hooks) (progressing 8/25/2020)   2) SBA for TTB t/f. (progressing 8/25/2020)   3) Pt will be mod I for toileting and related t/f. (progressing 8/25/2020)   4) Pt will improve GMC, as evidenced by box and block scores of 45 or better on pavan UE. (MET 5/19/2020, but currently in high 30's as of 8/4/2020)  5) Pt to improve FMC bilaterally, as evidenced by a 5 sec improvement in times on the 9HPT - R=40s or better; L=45s or better. (progressing 8/25/2020)   6) Alyssa will complete bathroom mobility for toileting w/ SBA and RW. (progressing 8/25/2020)   7) Alyssa will be able to stand w/ SBA and unilateral support for 10 minutes to increase indep w/ IADLs. (progressing 8/25/2020)   8) Alyssa will be able to complete a 5-pose flow of chair yoga with mod I to increase core control. (progressing 8/25/2020)   9) Alyssa will be able to cut her own food with pavan UE using fork and knife. (progressing 8/25/2020)   10) Pt will participate in at least one IADL task in the home on a daily basis (dishes, laundry, etc) (MET per pt report 2/20/2020)   11) Pt will be supervision with bathing, seated (MET  2/20/2020)     STG Goals:   1) Alyssa will be able to stand and manage her clothing for toileting w/ CGA (MET 7/7/2020)    2) Mandeep UE GMC to improve, as evidenced by box & block scores of 31 or better, mandeep UE. (MET)  3) Alyssa will be mod I with initial HEP. (MET with assistance of spouse)  4) Pt will t/f to toilet w/ min A or better. (MET)    Plan     Updates/Grading for next session: continue activity tolerance on therapy ball; FMC; initiate discussion re: d/c HEP    Sameera Montague, OT

## 2020-08-26 ENCOUNTER — LAB VISIT (OUTPATIENT)
Dept: LAB | Facility: HOSPITAL | Age: 70
End: 2020-08-26
Attending: PHYSICIAN ASSISTANT
Payer: MEDICARE

## 2020-08-26 DIAGNOSIS — E55.9 VITAMIN D INSUFFICIENCY: ICD-10-CM

## 2020-08-26 DIAGNOSIS — G35 MULTIPLE SCLEROSIS: ICD-10-CM

## 2020-08-26 LAB — 25(OH)D3+25(OH)D2 SERPL-MCNC: 35 NG/ML (ref 30–96)

## 2020-08-26 PROCEDURE — 36415 COLL VENOUS BLD VENIPUNCTURE: CPT | Mod: PO

## 2020-08-26 PROCEDURE — 82306 VITAMIN D 25 HYDROXY: CPT

## 2020-08-28 ENCOUNTER — CLINICAL SUPPORT (OUTPATIENT)
Dept: REHABILITATION | Facility: HOSPITAL | Age: 70
End: 2020-08-28
Attending: PHYSICIAN ASSISTANT
Payer: MEDICARE

## 2020-08-28 ENCOUNTER — CLINICAL SUPPORT (OUTPATIENT)
Dept: REHABILITATION | Facility: HOSPITAL | Age: 70
End: 2020-08-28
Payer: MEDICARE

## 2020-08-28 DIAGNOSIS — Z74.09 IMPAIRED FUNCTIONAL MOBILITY, BALANCE, AND ENDURANCE: Primary | ICD-10-CM

## 2020-08-28 DIAGNOSIS — R53.82 CHRONIC FATIGUE: ICD-10-CM

## 2020-08-28 DIAGNOSIS — Z78.9 IMPAIRED MOBILITY AND ADLS: ICD-10-CM

## 2020-08-28 DIAGNOSIS — G35 COGNITIVE DYSFUNCTION ACCOMPANYING MULTIPLE SCLEROSIS: ICD-10-CM

## 2020-08-28 DIAGNOSIS — F09 COGNITIVE DYSFUNCTION ACCOMPANYING MULTIPLE SCLEROSIS: ICD-10-CM

## 2020-08-28 DIAGNOSIS — R41.841 COGNITIVE COMMUNICATION DISORDER: Primary | ICD-10-CM

## 2020-08-28 DIAGNOSIS — Z78.9 IMPAIRED INSTRUMENTAL ACTIVITIES OF DAILY LIVING (IADL): ICD-10-CM

## 2020-08-28 DIAGNOSIS — R27.8 COORDINATION IMPAIRMENT: ICD-10-CM

## 2020-08-28 DIAGNOSIS — Z74.09 IMPAIRED MOBILITY AND ADLS: ICD-10-CM

## 2020-08-28 PROCEDURE — 97530 THERAPEUTIC ACTIVITIES: CPT | Mod: PN

## 2020-08-28 PROCEDURE — 97535 SELF CARE MNGMENT TRAINING: CPT | Mod: PN,59

## 2020-08-28 PROCEDURE — 96125 COGNITIVE TEST BY HC PRO: CPT | Mod: PN

## 2020-08-28 NOTE — PROGRESS NOTES
Occupational Therapy Treatment Note     Date: 8/28/2020  Name: Alyssa John  Clinic Number: [id number]    Therapy Diagnosis:   Encounter Diagnoses   Name Primary?    Impaired functional mobility, balance, and endurance Yes    Impaired mobility and ADLs     Coordination impairment     Chronic fatigue     Impaired instrumental activities of daily living (IADL)      Physician: Risa Oshea PA-C    Physician Orders: OT Eval & Treat  Medical Diagnosis from Referral: Multiple Sclerosis, neurologic gait, L UE weakness  Evaluation Date: 9/5/2019  Surgical Procedure and Date: n/a, n/a  Insurance Authorization Period Expiration: 6/30/2020  Plan of Care Certification Period: 7/17/2020  Date of Return to MD: tba    Visit # / Visits authorized: 9/12  Time In: 1235  Time Out: 1315  Total Billable Time: 40 minutes    Precautions:  Standard and Fall    Subjective     Pt reports: She was intermittently compliant with home exercise program. She doesn't want to stop coming to occupational therapy.    Response to previous treatment: no concerns  Functional change: Mid session, she noted that she thinks her hands hurt because she is using the walker more at home.    Pain: Alyssa did c/o pain in hands, but did not quantify today.   Location: hand and fingers    Objective     Alyssa received the following manual therapy techniques for 0 minutes:   -n/a    Alyssa received therapeutic exercises for 0 minutes including:  -n/a     Alyssa participated in dynamic functional therapeutic activities to improve functional performance for 20 minutes, including:  -Stand x14 min outside, mod tactile cues and verbal cues for up tall  - w/ tactile input at distal quads and low back  -Sit<>stand w/ CGA and min cues to lean more forward    Alyssa participated in neuromuscular re-education activities to improve: Balance, Coordination, Kinesthetic Sense, Proprioception and Posture for 0 minutes.    Alyssa participated in functional  self-care activities to improve functional performance for 20 minutes, including:  -Bathroom mobility w/ RW and SBA-mod A. She initially required SBA, but by end of time in bathroom, she was fatigued, with posterior BELLE and required mod A and max cues for being up tall.  -she stood to wash hands at sink w/ CGA for stand balance/ postural control  -she completed toilet t/f w/ CGA-min A  -clothing management with CGA for balance, no assist for the clothing  -she repeated washing hands at sink w/ min A for stand balance/ postural control     Home Exercises and Education Provided     Education provided:   - Progress towards goals   -continued recommendation for ST eval  -need to work core and pelvis because they are necessary to keep her upright when standing - which will decrease pressure on her hands when using the RW.    Written Home Exercises Provided: Patient instructed to cont prior HEP.    See EMR under Patient Instructions for exercises provided previously.        Assessment     Pt would benefit from one additional visit with skilled OT at this time to ensure she understands HEP. Due to memory, attention and initiation deficits, she has limited compliance with her HEP, which minimizes her potential for functional improvement. Pt has ST eval scheduled today. OT would like to see her participate in ST to improve her current cognitive deficits and then possibly return to occupational therapy. She is not happy with this decision, but states she understands that while she had made progress prior to the pandemic start, she has not made any significant progress since returning.  One final visit in skilled therapy is medically necessary to increase safety and independence in ADLs, which is currently limited by decreased core strength, stand tolerance/balance, tendency toward posterior COG, pavan UE weakness, R UE tremor, decreased memory.    Alyssa is progressing slowly towards her goals and there are no updates to goals  at this time. Pt prognosis is Fair.     Pt will continue to benefit from skilled outpatient occupational therapy to address the deficits listed in the problem list on initial evaluation provide pt/family education and to maximize pt's level of independence in the home and community environment.     Anticipated barriers to occupational therapy: decreased memory    Pt's spiritual, cultural and educational needs considered and pt agreeable to plan of care and goals.    Goals:  LTG GOALS:    1) Pt will be mod I with dressing for clothing except for fasteners (buttons/zippers/hooks) (progressing 8/28/2020)   2) SBA for TTB t/f. (progressing 8/28/2020)   3) Pt will be mod I for toileting and related t/f. (progressing 8/28/2020)   4) Pt will improve GMC, as evidenced by box and block scores of 45 or better on mandeep UE. (MET 5/19/2020, but currently in high 30's as of 8/4/2020)  5) Pt to improve FMC bilaterally, as evidenced by a 5 sec improvement in times on the 9HPT - R=40s or better; L=45s or better. (progressing 8/28/2020)   6) Alyssa will complete bathroom mobility for toileting w/ SBA and RW. (progressing 8/28/2020)   7) Alyssa will be able to stand w/ SBA and unilateral support for 10 minutes to increase indep w/ IADLs. (progressing 8/28/2020)   8) Alyssa will be able to complete a 5-pose flow of chair yoga with mod I to increase core control. (progressing 8/28/2020)   9) Alyssa will be able to cut her own food with mandeep UE using fork and knife. (progressing 8/28/2020)   10) Pt will participate in at least one IADL task in the home on a daily basis (dishes, laundry, etc) (MET per pt report 2/20/2020)   11) Pt will be supervision with bathing, seated (MET 2/20/2020)     STG Goals:   1) Alyssa will be able to stand and manage her clothing for toileting w/ CGA (MET 7/7/2020)    2) Mandeep UE GMC to improve, as evidenced by box & block scores of 31 or better, mandeep UE. (MET)  3) Alyssa will be mod I with initial HEP. (MET with  assistance of spouse)  4) Pt will t/f to toilet w/ min A or better. (MET)    Plan     Updates/Grading for next session: finalize d/c HEP.     Sameera Montague OT

## 2020-08-28 NOTE — PROGRESS NOTES
See initial evaluation in Plan of Care.    Florecita Samaniego M.A. CF-SLP  Speech Language Pathologist  8/31/2020

## 2020-08-28 NOTE — PLAN OF CARE
"Outpatient Neurological Rehabilitation  Speech and Language Therapy Evaluation    Date: 2020     Name: Alyssa John   MRN: [id number]    Therapy Diagnosis:   Encounter Diagnoses   Name Primary?    Cognitive communication disorder Yes    Cognitive dysfunction accompanying multiple sclerosis     Physician: Risa Oshea, GUILLERMO  Physician Orders: Ambulatory Referral to speech therapy  Medical Diagnosis: Cognitive dysfunction accompanying multiple sclerosis     Visit # / Visits Authorized:     Date of Evaluation:  2020   Insurance Authorization Period: 2020 to 2020  Plan of Care Certification:    2020 to 10/22/20     Time In:1320   Time Out: 1400   Procedure Min.   Cognitive Communication Evaluation  40     Precautions:Standard and Fall  Subjective   Date of Onset: "I have had MS for several years; however, I have noticed the memory changes in the past year."  History of Current Condition:   Pt reports her memory has changed over "the last year or so". She reports her sister  from early onset dementia and she is very concerned regarding her memory. She has been seen at [facility] for PT and OT for several years on and off. She was referred to  by her OT who noted cognitive deficits which were impacting initiation to complete HEP and impeding overall progress. Alyssa is inconsistently aware of her cognitive deficits. OT reports she is able to compensate for her cognitive deficits better when seated. Her problem solving and attention decreases when motor demands are placed on the patient.  Past Medical History: Alyssa John  has a past medical history of Arthritis, Coronary artery disease, Encounter for blood transfusion, Epilepsy, GERD (gastroesophageal reflux disease), Headache, Hypertension, MI, old, MS (multiple sclerosis), and Seizures.  Alyssa John  has a past surgical history that includes Appendectomy; Back surgery; " "Coronary stent placement; Colonoscopy (N/A, 9/16/2015); right knee arthroscopy; Cataract extraction (Bilateral); and Breast biopsy (Right, 15 yrs ago).  Medical Hx and Allergies: Alyssa has a current medication list which includes the following prescription(s): atorvastatin, cholecalciferol (vitamin d3), clonazepam, clopidogrel, cyclobenzaprine, duloxetine, famotidine, glatiramer, modafinil, nitroglycerin, prochlorperazine, psyllium seed (with dextrose), and topiramate, and the following Facility-Administered Medications: onabotulinumtoxina.   Review of patient's allergies indicates:   Allergen Reactions    Codeine      Other reaction(s): throat tightness    Dilantin [phenytoin sodium extended]     Phenobarbital     Tegretol [carbamazepine]      Prior Therapy:  Pt has received PT and OT at [facility].  Social History:  Lives with , Pt reports she currently pays the bills,  currently drives  Prior Level of Function: Pt reports she was independent prior to a diagnosis of bronchitis over a year ao   Current Level of Function: Dependent, cognitive deficits  Pain:   5/10  Pain Location / Description: Hands  Nutrition:  Oral, Thin liquids (IDDSI 0) and Regular consistencies (IDDSI 7)  Patient's Therapy Goals:  "See where my memory is"  Objective   Formal Assessment:  Scales of Cognitive and Communicative Ability for Neurorehabilitation (SCCAN) was administered to assess cognitive and communicative functioning.        Raw Scores  Percentage Score  Oral Expression (OE)  16/19   84   Orientation (OR)   10/12   83  Memory (ME)    11/19   57  Speech Comprehension (SP)  10/13   76  Reading Comprehension (RD)  10/12   83  Writing (WR)    CNT/7   Could not Test secondary to time constraints   Attention (AT)    15/16   93  Problem Solving (PS)   20/23   86    Description:  Language Skills: Pt presents with deficits in auditory comprehension as demonstrated by her scores on the SCCAN. Pt able to " "communicate wants/needs with ease.    Cognition: See results of the SCCAN above. Pt with disproportionate deficits in memory.    Motor Speech Skills: Pt presents with clear, intelligible speech. Her speech fluency is WNL. She presents with a clear voice with adequate volume and monopitch. Pt reports she talks "like she always has." No concerns at this time.    Hearing / Vision: Pt wears readers, hearing informally judged to be within functional limits    BABS National Outcome Measures System (NOMS):   Attention  LEVEL 5: The individual maintains attention within simple living activities with occasional minimal cues within distracting environments. The individual requires increased cueing to start, continue, and change attention during complex activities.  Memory  LEVEL 3: The individual usually requires maximum cues to recall or use external aids for simple routine and personal information (e.g., schedule, names of familiar staff, location of therapy areas, etc.) in structured environments.  Problem Solving  LEVEL 5: The individual demonstrates functional problem solving skills in routine daily activities. He/she rarely requires minimal cueing/assistance or additional time to recognize problems, identify various solutions and carry out steps to complete simple problem solving tasks.  The individual usually requires moderate cues/assistance to identify salient features of complex problems and occasionally provides appropriate solutions. He/she usually needs additional time to complete complex problem solving tasks and occasionally self-monitors effectiveness ofperformance and uses strategies when encountering difficulty. Distant supervision may be required to complete complex problem solving tasks.    Treatment   Treatment Time In: n/a  Treatment Time Out: n/a  Total Treatment Time: n/a  no treatment performed 2/2 time to complete evaluation.    Education: Plan of Care, role of SLP in care, scheduling/ cancellation " policy and insurance limitations / visit limit  were discussed with pt. Patient and family members expressed understanding.     Home Program: Not yet established  Assessment     Alyssa presents to [facility] s/p medical diagnosis of Cognitive dysfunction accompanying multiple sclerosis.  Demonstrates impairments including limitations as described in the problem list.She presents with Mild cognitive communication disorder c/b deficits in attention, memory, and executive functions. Positive prognostic factors include Pt's awareness of memory deficits. Negative prognostic factors include pt's decreased accuracy when physical activity is place with a problem solving task.Barriers to therapy include pt's current decreased initiation to complete OT HEP Patient will benefit from skilled, outpatient neurological rehabilitation speech therapy.    Rehab Potential: fair-good  Pt's spiritual, cultural and educational needs considered and pt agreeable to plan of care and goals.    Short Term Goals (5 week):   1. Pt will recall the first names of 3 people at 3 minutes across 3 sessions.  2. Pt will recall the location of 3 items in a room following a 20 minute delay across 2 sessions.   3. Pt will participate in moderate level problem solving tasks with moderate A across 2 sessions.  4. Pt will participate in high level attention tasks with 80% accuracy in a quiet environment across 3 sessions.  5. Pt will sort medications with 80% accuracy and minimal cues.   6. Pt will complete calendar and map tasks with min A across 3 sessions.  7. Pt will recall 2-3 memory strategies with minimal cues 3 times overall.    Long Term Goals (8 weeks):   1. She will use appropriate memory strategies to schedule and recall weekly activities, express needs and recall names to maintain safety and participate socially in functional living environment.     Plan     Plan of Care Certification Period: 8/28/2020  to  10/22/2020    Recommended Treatment Plan:  Patient will participate in the [facility] program for speech therapy 2 times per week for 8 weeks to address her Cognition deficits, to educate patient and their family, and to participate in a home exercise program.    Other Recommendations: n/a    Therapist's Name:   NETO Knapp   8/28/2020     Physician Name: _______________________________    Physician Signature: ____________________________

## 2020-08-31 NOTE — PATIENT INSTRUCTIONS
"WRAP Write, Repeat, Associate, Picture - group of strategies for recall   Mental Rehearsal For "thinking through" your plan for the next day   Lists Create a list each night for the next day   Sticky Notes Bright and with specific instructions   Alarms  For things you need to remember regularly (i.e. Meals)   Mental Review How did I do with my strategies today?   Goal  Plan  Action  Review Goal - what is my goal?  Plan - how will I do it?  Action - try to complete goal  Review- how did it go?       "

## 2020-09-01 ENCOUNTER — CLINICAL SUPPORT (OUTPATIENT)
Dept: REHABILITATION | Facility: HOSPITAL | Age: 70
End: 2020-09-01
Payer: MEDICARE

## 2020-09-01 DIAGNOSIS — Z78.9 IMPAIRED INSTRUMENTAL ACTIVITIES OF DAILY LIVING (IADL): ICD-10-CM

## 2020-09-01 DIAGNOSIS — R27.8 COORDINATION IMPAIRMENT: ICD-10-CM

## 2020-09-01 DIAGNOSIS — Z74.09 IMPAIRED FUNCTIONAL MOBILITY, BALANCE, AND ENDURANCE: Primary | ICD-10-CM

## 2020-09-01 DIAGNOSIS — R53.82 CHRONIC FATIGUE: ICD-10-CM

## 2020-09-01 DIAGNOSIS — Z78.9 IMPAIRED MOBILITY AND ADLS: ICD-10-CM

## 2020-09-01 DIAGNOSIS — Z74.09 IMPAIRED MOBILITY AND ADLS: ICD-10-CM

## 2020-09-01 DIAGNOSIS — R41.841 COGNITIVE COMMUNICATION DISORDER: ICD-10-CM

## 2020-09-01 PROCEDURE — 97129 THER IVNTJ 1ST 15 MIN: CPT | Mod: PN

## 2020-09-01 PROCEDURE — 97130 THER IVNTJ EA ADDL 15 MIN: CPT | Mod: PN

## 2020-09-01 PROCEDURE — 97530 THERAPEUTIC ACTIVITIES: CPT | Mod: PN

## 2020-09-01 NOTE — PATIENT INSTRUCTIONS
Putty :     Squeeze putty in hand trying to keep it round by rotating putty after each squeeze. Push fingers through putty to palm each time. Avoid excessive pain. 3 sets of 20 squeezes, every other day.     1/2 of the yellow putty and throw it away. Then take have of the red putty and mix it with the yellow putty.    RIGHT SIDE      LEFT

## 2020-09-01 NOTE — PROGRESS NOTES
"  Occupational Therapy Treatment Note  & Discharge Summary     Date: 9/1/2020  Name: Alyssa John  Clinic Number: [id number]    Therapy Diagnosis:   Encounter Diagnoses   Name Primary?    Impaired functional mobility, balance, and endurance Yes    Impaired mobility and ADLs     Coordination impairment     Chronic fatigue     Impaired instrumental activities of daily living (IADL)      Physician: Risa Oshea PA-C    Physician Orders: OT Eval & Treat  Medical Diagnosis from Referral: Multiple Sclerosis, neurologic gait, L UE weakness  Evaluation Date: 9/5/2019  Surgical Procedure and Date: n/a, n/a  Insurance Authorization Period Expiration: 6/30/2020  Plan of Care Certification Period: 7/17/2020  Date of Return to MD: tba    Visit # / Visits authorized: 10/12  Time In: 1050  Time Out: 1135  Total Billable Time: 40 minutes    Precautions:  Standard and Fall    Subjective     Pt reports: Alyssa is upset about stopping occupational therapy. She states that she is trying to do HEP and that she is walking with Giuseppe at home. She was intermittently compliant with home exercise program.     Response to previous treatment: no concerns  Functional change: She states, "sometimes I can cut my foot (like light food), but sometimes Giuseppe cuts it (meat, like pork-chops and steak)." She is also needs some help with dressing herself. She consistently needs help with a sports bra. She needs help with buttons, but she notes that zippers are "easy". Underwear is difficult, but not pants.    Pain: Alyssa did not c/o pain in hands today, but states that the L small finger has been bothering her the most., but did not quantify today.   Location: hand and fingers    Objective     She arrived not wearing figure-8 splints     Alyssa received the following manual therapy techniques for 0 minutes:   -n/a    Alyssa received therapeutic exercises for 0 minutes including:  -n/a     Alyssa participated in dynamic functional " therapeutic activities to improve functional performance for 20 minutes, including:  -Box and Block GMC assessment: (R) 41 (L) 41 (compared to 46 & 48, respectively on 7/7/2020)  [norms for women aged 65-69: R=72.0 +/-6.2; L=71.3 +/-7.7 (Parveen et al, 1985)]    9 Hole Peg Test: (R) 52.33 (L) 45.49s (compared to 40.22s & 47.06s, respectively on 7/7/2020)  [norms for women aged 66-70: R=19.90 +/- 3.15s; L=21.44s +/-3.97s (Dora et al, 2003)]    Completed 6-step Yoga Flow on each side w/ focus on diaphragmatic breathing during each position. She was able to complete positions 1, 2, 3, & 6 independently, but required min verbal & tactile cues for positions 4 & 5.    Ed re: sitting up tall at edge of w/c when she is in the waiting room or in ST to improve strength for trunk control. She v/u.    W/c<>mat t/f w/ RW & SBA.    Alyssa participated in neuromuscular re-education activities to improve: Balance, Coordination, Kinesthetic Sense, Proprioception and Posture for 0 minutes.    Alyssa participated in functional self-care activities to improve functional performance for 0 minutes, including:  -n/a    Home Exercises and Education Provided     Education provided:   - Progress towards goals   -continued recommendation for ST eval  -need to work core and pelvis because they are necessary to keep her upright when standing - which will decrease pressure on her hands when using the RW.    Written Home Exercises Provided: yes.    See EMR under Patient Instructions for exercises provided 9/1/2020.     Assessment     Alyssa demonstrates limited progress with therapy since returning from the break due to the initial phase of the Covid pandemic. Despite max ed that she needs to be consistently compliant with her HEP and working on her posture, her performance is variable. She does report increased independence at home, likely with compensatory strategies (like pulling up on the grab bar rather than pushing up from her seat when  standing) as she is unable to replicate her reported independence in the clinic.     Alyssa has started speech therapy to address the memory, attention and initiation deficits that are interfering with progress in occupational therapy. OT would like to see her participate in ST to improve her current cognitive deficits and then possibly return to occupational therapy. She is not happy with this decision, but states she understands that while she had made progress prior to the pandemic start, she has not made any significant progress since returning.    Anticipated barriers to occupational therapy: decreased memory    Pt's spiritual, cultural and educational needs considered and pt agreeable to plan of care and goals.    Goals:  LTG GOALS:    1) Pt will be mod I with dressing for clothing except for fasteners (buttons/zippers/hooks) (NOT MET)   2) SBA for TTB t/f. (NOT MET)   3) Pt will be mod I for toileting and related t/f. (NOT MET in clinic - CGA; met at home per pt report w/ grab bar)   4) Pt will improve GMC, as evidenced by box and block scores of 45 or better on pavan UE. (MET 5/19/2020, but w/ recent decline)   5) Pt to improve FMC bilaterally, as evidenced by a 5 sec improvement in times on the 9HPT - R=40s or better; L=45s or better. (NOT MET) 6) Alyssa will complete bathroom mobility for toileting w/ SBA and RW. (NOT MET)   7) Alyssa will be able to stand w/ SBA and unilateral support for 10 minutes to increase indep w/ IADLs. (NOT MET - 14 min w/ CGA)   8) Alyssa will be able to complete a 5-pose flow of chair yoga with mod I to increase core control. (NOT MET - min A for moves 4 & 5)   9) Alyssa will be able to cut her own food with pavan UE using fork and knife. (NOT MET)   10) Pt will participate in at least one IADL task in the home on a daily basis (dishes, laundry, etc) (MET per pt report 2/20/2020)   11) Pt will be supervision with bathing, seated (MET 2/20/2020)     STG Goals:   1) Alyssa will be  able to stand and manage her clothing for toileting w/ CGA (MET 7/7/2020)    2) Mandeep UE GMC to improve, as evidenced by box & block scores of 31 or better, mandeep UE. (MET)  3) Alyssa will be mod I with initial HEP. (MET with assistance of spouse)  4) Pt will t/f to toilet w/ min A or better. (MET)    Plan     D/C skilled OT.  Total Visits this Episode: 59 (27 in 2019; 32 in 2020)  Cx: 7  No Show: 1    Sameera Montague, OT

## 2020-09-02 NOTE — PROGRESS NOTES
"Outpatient Neurological Rehabilitation   Speech and Language Therapy Treatment Note  Date:  9/1/2020     Name: Alyssa John   MRN: [id number]   Therapy Diagnosis:   Encounter Diagnosis   Name Primary?    Cognitive communication disorder    Physician: Risa Oshea PA-C  Physician Orders: Ambulatory Referral to Speech Therapy  Medical Diagnosis: Cognitive dysfunction accompanying multiple sclerosis [G35, F09]      Visit #/ Visits Authorized: 2/ 6  Date of Evaluation:  8/28/2020  Insurance Authorization Period:  8/28/2020 to 9/11/2020  Plan of Care Expiration Date:    10/22/2020  Extended POC:   n/a  Progress Note: due 9/28/2020   Visits Cancelled: 0  Visits No Show: 0    Time In:  1005  Time Out:  1045  Total Billable Time: 40     Precautions: Standard, Fall and Memory  Subjective:   Pt reports: "I was very hesitant to come to speech therapy."   She was not given a  home exercise program 2/2 evaluation.  Response to previous treatment: n/a   Pain Scale:  0/10 on VAS currently.   Pain Location: n/a  Objective:   TIMED  Procedure Min.   Cognitive Therapeutic Interventions, first 15 minutes CPT 09241  15   Cognitive Therapeutic Interventions, each additional 15 minutes CPT 96615 25     Total Timed Units: 3  Total Untimed Units: 0  Charges Billed/# of units: 3    Short Term Goals: (5 weeks) Current Progress:   Pt will recall the first names of 3 people at 3 minutes across 3 sessions.    Progressing/ Not Met 9/1/2020   Task introduced  Pt recalled 1/3 following 3 minute delay  Memory strategies discussed with the patient included association.    Pt will recall the location of 3 items in a room following a 20 minute delay across 2 sessions.      Progressing/ Not Met 9/1/2020   Not addressed in today's session.     Pt will participate in moderate level problem solving tasks with moderate A across 2 sessions.     Progressing/ Not Met 9/1/2020   Not addressed in today's session.     Pt will participate in high " "level attention tasks with 80% accuracy in a quiet environment across 3 sessions.     Progressing/ Not Met 2020   Not addressed in today's session.     Pt will sort medications with 80% accuracy and minimal cues.     Progressing/ Not Met 2020   Not addressed in today's session.     Pt will complete calendar and map tasks with min A across 3 sessions.     Progressing/ Not Met 2020   Not addressed in today's session.     Pt will recall 2-3 memory strategies with minimal cues 3 times overall.     Progressing/ Not Met 2020   Memory strategies introduced and discussed at length with the patient. Pt reported she uses set location for her sunglasses. Lengthy discussion as patient became emotional regarding her memory status      Patient Education/Response:   Pt educated on POC, goals, and memory strategies. Pt verbalized understanding.    Written Home Exercises Provided: yes.  Exercises were reviewed and Alyssa was able to demonstrate them prior to the end of the session.  Alyssa demonstrated good  understanding of the education provided.     See EMR under Patient Instructions for exercises provided prior visit.  Assessment:   Alyssa is progressing well towards her goals. Pt was introduced to memory strategies today. She became emotional several times throughout the session as her sister  of dementia. Pt verbalized initial hesitance to participate in speech therapy, but noted "I like this plan for me." Current goals remain appropriate. Goals to be updated as necessary.     Pt prognosis is fair to Good. Pt will continue to benefit from skilled outpatient speech and language therapy to address the deficits listed in the problem list on initial evaluation, provide pt/family education and to maximize pt's level of independence in the home and community environment.   Medical necessity is demonstrated by the following IMPAIRMENTS:  Deficits in executive functioning, attention, and memory prevent the pt from " relaying medically and safety relevant information in a timely manner in a state of emergency.  Barriers to Therapy: pt decreased initiation to complete OT HEP warranting the consult to speech  Pt's spiritual, cultural and educational needs considered and pt agreeable to plan of care and goals.  Plan:   Continue POC with focus on rehabilitation and compensation for cognitive communication deficits including attention, problem solving, executive functions, and memory.     JOSE E Knapp-SLP   9/1/2020

## 2020-09-04 ENCOUNTER — CLINICAL SUPPORT (OUTPATIENT)
Dept: REHABILITATION | Facility: HOSPITAL | Age: 70
End: 2020-09-04
Payer: MEDICARE

## 2020-09-04 DIAGNOSIS — R41.841 COGNITIVE COMMUNICATION DISORDER: ICD-10-CM

## 2020-09-04 PROCEDURE — 97130 THER IVNTJ EA ADDL 15 MIN: CPT | Mod: PN

## 2020-09-04 PROCEDURE — 97129 THER IVNTJ 1ST 15 MIN: CPT | Mod: PN

## 2020-09-04 NOTE — PROGRESS NOTES
"Outpatient Neurological Rehabilitation   Speech and Language Therapy Treatment Note  Date:  9/4/2020     Name: Alyssa John   MRN: [id number]   Therapy Diagnosis:   Encounter Diagnosis   Name Primary?   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   Response to previous treatment:Tolerated well.  Pain Scale:  9/10 on VAS currently.   Pain Location: "All over"  Objective:   TIMED  Procedure Min.   Cognitive Therapeutic Interventions, first 15 minutes CPT 49442  15   Cognitive Therapeutic Interventions, each additional 15 minutes CPT 59885 25     Total Timed Units: 3  Total Untimed Units: 0  Charges Billed/# of units: 3    Short Term Goals: (5 weeks) Current Progress:   Pt will recall the first names of 3 people at 3 minutes across 3 sessions.    Progressing/ Not Met 9/4/2020   Pt recalled 3/3 following 3 minute delay  Memory strategies discussed with the patient included association.   This is session 1 of 3 to address this goal.   Pt will recall the location of 3 items in a room following a 20 minute delay across 2 sessions.      Progressing/ Not Met 9/4/2020   Not addressed in today's session.     Pt will participate in moderate level problem solving tasks with moderate A across 2 sessions.     Progressing/ Not Met 9/4/2020   Not addressed in today's session.     Pt will " "participate in high level attention tasks with 80% accuracy in a quiet environment across 3 sessions.     Progressing/ Not Met 9/4/2020   -Moderate level attention tasks  -85% accuracy  -minimal cues  -distracting environment   Pt will sort medications with 80% accuracy and minimal cues.     Progressing/ Not Met 9/4/2020   Not addressed in today's session.     Pt will complete calendar and map tasks with min A across 3 sessions.     Progressing/ Not Met 9/4/2020   Calendar task with min A  This is session 1 of 3 to address this goal   Pt will recall 2-3 memory strategies with minimal cues 3 times overall.     Progressing/ Not Met 9/4/2020   Not addressed in today's session.      Patient Education/Response:   Pt educated on energy conservation. Pt verbalized understanding.    Written Home Exercises Provided: yes.  Exercises were reviewed and Alyssa was able to demonstrate them prior to the end of the session.  Alyssa demonstrated good  understanding of the education provided.     See EMR under Patient Instructions for exercises provided prior visit.  Assessment:   Alyssa is progressing well towards her goals. Pt and SLP discussed energy conservation at length. Pt noted she does not do much that she loves anymore. Pt noted she may be having a MS "flare up" that started at 8 this morning. Current goals remain appropriate. Goals to be updated as necessary.     Pt prognosis is fair to Good. Pt will continue to benefit from skilled outpatient speech and language therapy to address the deficits listed in the problem list on initial evaluation, provide pt/family education and to maximize pt's level of independence in the home and community environment.   Medical necessity is demonstrated by the following IMPAIRMENTS:  Deficits in executive functioning, attention, and memory prevent the pt from relaying medically and safety relevant information in a timely manner in a state of emergency.  Barriers to Therapy: pt decreased " initiation to complete OT HEP warranting the consult to speech  Pt's spiritual, cultural and educational needs considered and pt agreeable to plan of care and goals.  Plan:   Continue POC with focus on rehabilitation and compensation for cognitive communication deficits including attention, problem solving, executive functions, and memory.     JOSE E Knapp-SLP   9/4/2020

## 2020-09-09 ENCOUNTER — CLINICAL SUPPORT (OUTPATIENT)
Dept: REHABILITATION | Facility: HOSPITAL | Age: 70
End: 2020-09-09
Payer: MEDICARE

## 2020-09-09 DIAGNOSIS — R41.841 COGNITIVE COMMUNICATION DISORDER: ICD-10-CM

## 2020-09-09 PROCEDURE — 97129 THER IVNTJ 1ST 15 MIN: CPT | Mod: PN

## 2020-09-09 PROCEDURE — 97130 THER IVNTJ EA ADDL 15 MIN: CPT | Mod: PN

## 2020-09-09 NOTE — PROGRESS NOTES
"Outpatient Neurological Rehabilitation   Speech and Language Therapy Treatment Note  Date:  9/9/2020     Name: Alyssa John   MRN: [id number]   Therapy Diagnosis:   Encounter Diagnosis   Name Primary?    Cognitive communication disorder    Physician: Risa Oshea PA-C  Physician Orders: Ambulatory Referral to Speech Therapy  Medical Diagnosis: Cognitive dysfunction accompanying multiple sclerosis [G35, F09]    Visit #/ Visits Authorized: 4/ 6  Date of Evaluation:  8/28/2020  Insurance Authorization Period:  8/28/2020 to 9/11/2020  Plan of Care Expiration Date:    10/22/2020  Extended POC:   n/a  Progress Note: due 9/28/2020   Visits Cancelled: 0  Visits No Show: 0    Time In:  1425  Time Out:  1500  Total Billable Time: 35    Precautions: Standard, Fall and Memory  Subjective:   Pt reports: "I have been doing more around the house"  She was compliant with  given home exercise program.   Response to previous treatment:Tolerated well.  Pain Scale:  3/10 on VAS currently.   Pain Location: "All over"  Objective:   TIMED  Procedure Min.   Cognitive Therapeutic Interventions, first 15 minutes CPT 64353  15   Cognitive Therapeutic Interventions, each additional 15 minutes CPT 81711 20     Total Timed Units: 2  Total Untimed Units: 0  Charges Billed/# of units: 2    Short Term Goals: (5 weeks) Current Progress:   Pt will recall the first names of 3 people at 3 minutes across 3 sessions.    Progressing/ Not Met 9/9/2020   This is session 1 of 3 to address this goal.   Pt will recall the location of 3 items in a room following a 20 minute delay across 2 sessions.      Progressing/ Not Met 9/9/2020   Recalled the item and location of 2/3 in a room following   Pt was frustrated regarding her performance stating "this was really eye opening for me."   Pt will participate in moderate level problem solving tasks with moderate A across 2 sessions.     Progressing/ Not Met 9/9/2020   Money manipulation task x6  Minimal " cues  This is session 1 of 2 to address this goal     Pt will participate in high level attention tasks with 80% accuracy in a quiet environment across 3 sessions.     Progressing/ Not Met 9/9/2020   -high level attention tasks  -alternating shapes  -70% accuracy  -quiet environment   Pt will sort medications with 80% accuracy and minimal cues.     Progressing/ Not Met 9/9/2020   Not addressed in today's session.     Pt will complete calendar and map tasks with min A across 3 sessions.     Progressing/ Not Met 9/9/2020   Not addressed in today's session.   This is session 1 of 3 to address this goal   Pt will recall 2-3 memory strategies with minimal cues 3 times overall.     Progressing/ Not Met 9/9/2020   Pt recalled 3 memory strategies   Minimal cues  This is session 1 of 3 to address this goal.     Patient Education/Response:   Pt educated on role of MS in memory and cognition. Cognitive fatigue management. Pt verbalized understanding.    Written Home Exercises Provided: yes.  Exercises were reviewed and Alyssa was able to demonstrate them prior to the end of the session.  Alyssa demonstrated good  understanding of the education provided.     See EMR under Patient Instructions for exercises provided prior visit.  Assessment:   Alyssa is progressing well towards her goals. Memory strategies discussed at length as they relate to the patient's desire for increased independence.  Current goals remain appropriate. Goals to be updated as necessary.     Pt prognosis is fair to Good. Pt will continue to benefit from skilled outpatient speech and language therapy to address the deficits listed in the problem list on initial evaluation, provide pt/family education and to maximize pt's level of independence in the home and community environment.   Medical necessity is demonstrated by the following IMPAIRMENTS:  Deficits in executive functioning, attention, and memory prevent the pt from relaying medically and safety  relevant information in a timely manner in a state of emergency.  Barriers to Therapy: pt decreased initiation to complete OT HEP warranting the consult to speech  Pt's spiritual, cultural and educational needs considered and pt agreeable to plan of care and goals.  Plan:   Continue POC with focus on rehabilitation and compensation for cognitive communication deficits including attention, problem solving, executive functions, and memory.     JOSE E Knapp-SLP   9/9/2020

## 2020-09-11 ENCOUNTER — CLINICAL SUPPORT (OUTPATIENT)
Dept: REHABILITATION | Facility: HOSPITAL | Age: 70
End: 2020-09-11
Payer: MEDICARE

## 2020-09-11 DIAGNOSIS — R41.841 COGNITIVE COMMUNICATION DISORDER: ICD-10-CM

## 2020-09-11 PROCEDURE — 97130 THER IVNTJ EA ADDL 15 MIN: CPT | Mod: PN

## 2020-09-11 PROCEDURE — 97129 THER IVNTJ 1ST 15 MIN: CPT | Mod: PN

## 2020-09-11 NOTE — PROGRESS NOTES
"Outpatient Neurological Rehabilitation   Speech and Language Therapy Treatment Note  Date:  9/11/2020     Name: Alyssa John   MRN: [id number]   Therapy Diagnosis:   Encounter Diagnosis   Name Primary?    Cognitive communication disorder    Physician: Risa Oshea PA-C  Physician Orders: Ambulatory Referral to Speech Therapy  Medical Diagnosis: Cognitive dysfunction accompanying multiple sclerosis [G35, F09]    Visit #/ Visits Authorized: 5/ 6  Date of Evaluation:  8/28/2020  Insurance Authorization Period:  8/28/2020 to 9/11/2020  Plan of Care Expiration Date:    10/22/2020  Extended POC:   n/a  Progress Note: due 9/28/2020   Visits Cancelled: 0  Visits No Show: 0    Time In:  1535  Time Out:  1620  Total Billable Time: 45    Precautions: Standard, Fall and Memory  Subjective:   Pt reports: "I have been doing more around the house"  She was compliant with  given home exercise program.   Response to previous treatment:Tolerated well.  Pain Scale:  3/10 on VAS currently.   Pain Location: "All over"  Objective:   TIMED  Procedure Min.   Cognitive Therapeutic Interventions, first 15 minutes CPT 56057  15   Cognitive Therapeutic Interventions, each additional 15 minutes CPT 00364 30     Total Timed Units: 3  Total Untimed Units: 0  Charges Billed/# of units: 3    Short Term Goals: (5 weeks) Current Progress:   Pt will recall the first names of 3 people at 3 minutes across 3 sessions.    Progressing/ Not Met 9/11/2020   Recalled 2/3 first names at 3 minutes  This is session 1 of 3 to address this goal.   Pt will recall the location of 3 items in a room following a 20 minute delay across 2 sessions.      Progressing/ Not Met 9/11/2020   Recalled the item and location of 2/3 in a room following   3/3 given minimal cues     Pt will participate in moderate level problem solving tasks with moderate A across 2 sessions.     Goal Met 9/11/2020   Interpreting problems from a voicemail  Minimal cues  Goal Met- " 9/11/2020     Pt will participate in high level attention tasks with 80% accuracy in a quiet environment across 3 sessions.     Progressing/ Not Met 9/11/2020   -Not addressed in today's session.    Pt will sort medications with 80% accuracy and minimal cues.     Progressing/ Not Met 9/11/2020   Not addressed in today's session.     Pt will complete calendar and map tasks with min A across 3 sessions.     Progressing/ Not Met 9/11/2020   Not addressed in today's session.   This is session 1 of 3 to address this goal   Pt will recall 2-3 memory strategies with minimal cues 3 times overall.     Progressing/ Not Met 9/11/2020   Pt recalled 3 memory strategies   Minimal cues  This is session 1 of 3 to address this goal.     Patient Education/Response:   Pt educated on memory strategies. Pt verbalized understanding.    Written Home Exercises Provided: yes. Math sheets to return to use memory strategies for carryover of PT/OT HEP  Exercises were reviewed and Alyssa was able to demonstrate them prior to the end of the session.  Alyssa demonstrated good  understanding of the education provided.     See EMR under Patient Instructions for exercises provided prior visit.  Assessment:   Alyssa is progressing well towards her goals. Pt demonstrating progress in awareness of deficits and functional changes she can implement to improve memory. Current goals remain appropriate. Goals to be updated as necessary.     Pt prognosis is fair to Good. Pt will continue to benefit from skilled outpatient speech and language therapy to address the deficits listed in the problem list on initial evaluation, provide pt/family education and to maximize pt's level of independence in the home and community environment.   Medical necessity is demonstrated by the following IMPAIRMENTS:  Deficits in executive functioning, attention, and memory prevent the pt from relaying medically and safety relevant information in a timely manner in a state of  emergency.  Barriers to Therapy: pt decreased initiation to complete OT HEP warranting the consult to speech  Pt's spiritual, cultural and educational needs considered and pt agreeable to plan of care and goals.  Plan:   Continue POC with focus on rehabilitation and compensation for cognitive communication deficits including attention, problem solving, executive functions, and memory.     JOSE E Knapp-SLP   9/11/2020

## 2020-09-23 ENCOUNTER — CLINICAL SUPPORT (OUTPATIENT)
Dept: REHABILITATION | Facility: HOSPITAL | Age: 70
End: 2020-09-23
Payer: MEDICARE

## 2020-09-23 DIAGNOSIS — R41.841 COGNITIVE COMMUNICATION DISORDER: ICD-10-CM

## 2020-09-23 PROCEDURE — 97129 THER IVNTJ 1ST 15 MIN: CPT | Mod: PN

## 2020-09-23 PROCEDURE — 97130 THER IVNTJ EA ADDL 15 MIN: CPT | Mod: PN

## 2020-09-23 NOTE — PROGRESS NOTES
"Outpatient Neurological Rehabilitation   Speech and Language Therapy Treatment Note  Date:  9/23/2020     Name: Alyssa John   MRN: [id number]   Therapy Diagnosis:   Encounter Diagnosis   Name Primary?    Cognitive communication disorder    Physician: Risa Oshea PA-C  Physician Orders: Ambulatory Referral to Speech Therapy  Medical Diagnosis: Cognitive dysfunction accompanying multiple sclerosis [G35, F09]    Visit #/ Visits Authorized: 1/9 (5 prior to this authorization)  Date of Evaluation:  8/28/2020  Insurance Authorization Period:  8/28/2020 to 9/11/2020  Plan of Care Expiration Date:    10/22/2020  Extended POC:   n/a  Progress Note: due 9/28/2020   Visits Cancelled: 0  Visits No Show: 0    Time In:  1420  Time Out:  1510  Total Billable Time: 50    Precautions: Standard, Fall and Memory  Subjective:   Pt reports: "I remembered all my homework"  She was compliant with  given home exercise program.   Response to previous treatment:Tolerated well.  Pain Scale:  0/10 on VAS currently.   Pain Location: n/a  Objective:   TIMED  Procedure Min.   Cognitive Therapeutic Interventions, first 15 minutes CPT 26613  15   Cognitive Therapeutic Interventions, each additional 15 minutes CPT 63061 30     Total Timed Units: 3  Total Untimed Units: 0  Charges Billed/# of units: 3    Short Term Goals: (5 weeks) Current Progress:   Pt will recall the first names of 3 people at 3 minutes across 3 sessions.    Progressing/ Not Met 9/23/2020   Recalled 3/3 first names at 3 minutes given extended time  This is session 2 of 3 to address this goal.   Pt will recall the location of 3 items in a room following a 20 minute delay across 2 sessions.      Progressing/ Not Met 9/23/2020   Recalled the item and location of 2/3 in a room following   3/3 given minimal cues     Pt will participate in moderate level problem solving tasks with moderate A across 2 sessions.     Goal Met 9/11/2020   Goal Met- 9/11/2020     Pt will " participate in high level attention tasks with 80% accuracy in a quiet environment across 3 sessions.     Progressing/ Not Met 9/23/2020   -Not addressed in today's session.    Pt will sort medications with 80% accuracy and minimal cues.     Progressing/ Not Met 9/23/2020   Not addressed in today's session.     Pt will complete calendar and map tasks with min A across 3 sessions.     Progressing/ Not Met 9/23/2020   Completed calendar task with 100% accuracy independently    This is session 2 of 3 to address this goal   Pt will recall 2-3 memory strategies with minimal cues 3 times overall.     Progressing/ Not Met 9/23/2020   Pt recalled 3 memory strategies   Minimal cues  This is session 2 of 3 to address this goal.   GOAL ADDED: Pt will participate in high level problem solving tasks with moderate A across 3 sessions.     Progressing/Not Met 9/23/2020  Pt completed a deductive reasoning puzzle with max cues for completion.    Goal introduced today.     Patient Education/Response:   HEP strategies to remember to complete HEPs. Pt verbalized understanding.    Written Home Exercises Provided: yes.   Exercises were reviewed and Alyssa was able to demonstrate them prior to the end of the session.  Alyssa demonstrated good  understanding of the education provided.     See EMR under Patient Instructions for exercises provided prior visit.  Assessment:   Alyssa is progressing well towards her goals. Higher level goals for increased functional carryover to PT/OT with use of memory and problem solving strategies. New goal introduced. Current goals remain appropriate. Goals to be updated as necessary.     Pt prognosis is fair to Good. Pt will continue to benefit from skilled outpatient speech and language therapy to address the deficits listed in the problem list on initial evaluation, provide pt/family education and to maximize pt's level of independence in the home and community environment.   Medical necessity is  demonstrated by the following IMPAIRMENTS:  Deficits in executive functioning, attention, and memory prevent the pt from relaying medically and safety relevant information in a timely manner in a state of emergency.  Barriers to Therapy: pt decreased initiation to complete OT HEP warranting the consult to speech  Pt's spiritual, cultural and educational needs considered and pt agreeable to plan of care and goals.  Plan:   Continue POC with focus on rehabilitation and compensation for cognitive communication deficits including attention, problem solving, executive functions, and memory.     JOSE E Knapp-SLP   9/23/2020

## 2020-10-06 ENCOUNTER — CLINICAL SUPPORT (OUTPATIENT)
Dept: REHABILITATION | Facility: HOSPITAL | Age: 70
End: 2020-10-06
Payer: MEDICARE

## 2020-10-06 DIAGNOSIS — R41.841 COGNITIVE COMMUNICATION DISORDER: ICD-10-CM

## 2020-10-06 PROCEDURE — 97130 THER IVNTJ EA ADDL 15 MIN: CPT | Mod: PN

## 2020-10-06 PROCEDURE — 97129 THER IVNTJ 1ST 15 MIN: CPT | Mod: PN

## 2020-10-06 NOTE — PROGRESS NOTES
"Outpatient Neurological Rehabilitation   Speech and Language Therapy Treatment Note  Date:  10/6/2020     Name: Alyssa John   MRN: [id number]   Therapy Diagnosis:   Encounter Diagnosis   Name Primary?    Cognitive communication disorder    Physician: Risa Oshea PA-C  Physician Orders: Ambulatory Referral to Speech Therapy  Medical Diagnosis: Cognitive dysfunction accompanying multiple sclerosis [G35, F09]    Visit #/ Visits Authorized: 2/9 (5 prior to this authorization)  Date of Evaluation:  8/28/2020  Insurance Authorization Period:  8/28/2020 to 9/11/2020  Plan of Care Expiration Date:    10/22/2020  Extended POC:   n/a  Progress Note: due 10/28/2020   Visits Cancelled: 0  Visits No Show: 0    Time In:  1517  Time Out:  1602  Total Billable Time: 45    Precautions: Standard, Fall and Memory  Subjective:   Pt reports: "I can see my memory improving"  She was compliant with  given home exercise program.   Response to previous treatment: "im glad to be back."  Pain Scale:  0/10 on VAS currently.   Pain Location: n/a  Objective:   TIMED  Procedure Min.   Cognitive Therapeutic Interventions, first 15 minutes CPT 76108  15   Cognitive Therapeutic Interventions, each additional 15 minutes CPT 06497 30     Total Timed Units: 3  Total Untimed Units: 0  Charges Billed/# of units: 3    Short Term Goals: (5 weeks) Current Progress:   Pt will recall the first names of 3 people at 3 minutes across 3 sessions.    Progressing/ Not Met 10/6/2020   Not addressed in today's session.   Previously met x2   Pt will recall the location of 3 items in a room following a 20 minute delay across 2 sessions.      Progressing/ Not Met 10/6/2020   Recalled the item and location of 3/3 in a room following 20 minute delay independently    Met x1     Pt will participate in moderate level problem solving tasks with moderate A across 2 sessions.     Goal Met 9/11/2020   Goal Met- 9/11/2020     Pt will participate in high level " attention tasks with 80% accuracy in a quiet environment across 3 sessions.     Progressing/ Not Met 10/6/2020   -Not addressed in today's session.    Pt will sort medications with 80% accuracy and minimal cues.     Progressing/ Not Met 10/6/2020   Not addressed in today's session.     Pt will complete calendar and map tasks with min A across 3 sessions.     Goal Met 10/6/2020   Completed calendar task with 100% accuracy independently    Goal Met- 10/6/2020    Pt will recall 2-3 memory strategies with minimal cues 3 times overall.     Goal Met 10/6/2020   Pt recalled 3 memory strategies   Minimal cues  Goal Met- 10/6/2020    Pt will participate in high level problem solving tasks with moderate A across 3 sessions.     Progressing/Not Met 10/6/2020  Pt completed a deductive reasoning puzzle with moderate cues for completion.    Met x1     Patient Education/Response:   HEP strategies to remember to complete HEPs. Planning for discharge in 3-4 weeks. Pt verbalized understanding.    Written Home Exercises Provided: yes.   Exercises were reviewed and Alyssa was able to demonstrate them prior to the end of the session.  Alyssa demonstrated good  understanding of the education provided.     See EMR under Patient Instructions for exercises provided prior visit.  Assessment:   Alyssa is progressing well towards her goals. Pt with increased cognitive functioning. Pt reduced to x1 per week secondary to progress. Current goals remain appropriate. Goals to be updated as necessary.     Pt prognosis is fair to Good. Pt will continue to benefit from skilled outpatient speech and language therapy to address the deficits listed in the problem list on initial evaluation, provide pt/family education and to maximize pt's level of independence in the home and community environment.   Medical necessity is demonstrated by the following IMPAIRMENTS:  Deficits in executive functioning, attention, and memory prevent the pt from relaying  medically and safety relevant information in a timely manner in a state of emergency.  Barriers to Therapy: pt decreased initiation to complete OT HEP warranting the consult to speech  Pt's spiritual, cultural and educational needs considered and pt agreeable to plan of care and goals.  Plan:   Continue POC with focus on rehabilitation and compensation for cognitive communication deficits including attention, problem solving, executive functions, and memory.     JOSE E Knapp-SLP   10/6/2020

## 2020-10-07 ENCOUNTER — TELEPHONE (OUTPATIENT)
Dept: NEUROLOGY | Facility: CLINIC | Age: 70
End: 2020-10-07

## 2020-10-07 NOTE — TELEPHONE ENCOUNTER
----- Message from Gabe Walker sent at 10/7/2020  3:17 PM CDT -----  Contact: [place] [employer]  Please call [place] at [phone] x [phone]    Fax# [phone]    [facility] is requesting physical therapy extension orders, clinicals and a medical necessity form    If any other questions please contact the patient at [phone]    Thank you

## 2020-10-08 NOTE — TELEPHONE ENCOUNTER
Left VM for Camden Jung at [facility] [phone] x [phone], Fax# [phone].  Advised that our most recent clinicals are from 6/24/20 and requested call back to see if this would suffice until pt is seen again on 10/28/20.  Awaiting return call.

## 2020-10-12 ENCOUNTER — TELEPHONE (OUTPATIENT)
Dept: NEUROLOGY | Facility: CLINIC | Age: 70
End: 2020-10-12

## 2020-10-12 ENCOUNTER — PATIENT MESSAGE (OUTPATIENT)
Dept: REHABILITATION | Facility: HOSPITAL | Age: 70
End: 2020-10-12

## 2020-10-12 DIAGNOSIS — G35 MULTIPLE SCLEROSIS: ICD-10-CM

## 2020-10-12 DIAGNOSIS — R26.9 GAIT DISTURBANCE: Primary | ICD-10-CM

## 2020-10-12 NOTE — TELEPHONE ENCOUNTER
SW phoned  again to advise we do not have current clinicals on pt but will sent PT continuation orders and medical necessity form.  Requested return call to discuss case.

## 2020-10-12 NOTE — TELEPHONE ENCOUNTER
----- Message from Taurus Caceres sent at 10/12/2020  1:20 PM CDT -----  Contact: Noris wren [employer] @  [phone]  Caller calling to the PT order sent to "[employer] @ [phone]

## 2020-10-13 ENCOUNTER — CLINICAL SUPPORT (OUTPATIENT)
Dept: REHABILITATION | Facility: HOSPITAL | Age: 70
End: 2020-10-13
Payer: MEDICARE

## 2020-10-13 DIAGNOSIS — R41.841 COGNITIVE COMMUNICATION DISORDER: ICD-10-CM

## 2020-10-13 PROCEDURE — 97130 THER IVNTJ EA ADDL 15 MIN: CPT | Mod: PN

## 2020-10-13 PROCEDURE — 97129 THER IVNTJ 1ST 15 MIN: CPT | Mod: PN

## 2020-10-13 NOTE — PROGRESS NOTES
"Outpatient Neurological Rehabilitation   Speech and Language Therapy Treatment Note  Date:  10/13/2020     Name: Alyssa John   MRN: [id number]   Therapy Diagnosis:   Encounter Diagnosis   Name Primary?   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   Response to previous treatment: "We are almost with therapy."  Pain Scale:  0/10 on VAS currently.   Pain Location: n/a  Objective:   TIMED  Procedure Min.   Cognitive Therapeutic Interventions, first 15 minutes CPT 38209  15   Cognitive Therapeutic Interventions, each additional 15 minutes CPT 60257 30     Total Timed Units: 3  Total Untimed Units: 0  Charges Billed/# of units: 3    Short Term Goals: (5 weeks) Current Progress:   Pt will recall the first names of 3 people at 3 minutes across 3 sessions.    Progressing/ Not Met 10/13/2020   Not addressed in today's session.   Previously met x2   Pt will recall the location of 3 items in a room following a 20 minute delay across 2 sessions.      Progressing/ Not Met 10/13/2020   Recalled the item and location of 3/3 in a room following 20 minute delay minimal cues.  Met x1     Pt will participate in moderate level problem solving tasks with moderate A across 2 sessions.     Goal Met 9/11/2020   Goal Met- 9/11/2020     Pt will participate in high level " attention tasks with 80% accuracy in a quiet environment across 3 sessions.     Progressing/ Not Met 10/13/2020   -Not addressed in today's session.    Pt will sort medications with 80% accuracy and minimal cues.     Progressing/ Not Met 10/13/2020   Pt sorted medications with 60% accuracy independently. Pt reports her pill sorter is different at home.   Pt will complete calendar and map tasks with min A across 3 sessions.     Goal Met 10/6/2020   Goal Met- 10/6/2020    Pt will recall 2-3 memory strategies with minimal cues 3 times overall.     Goal Met 10/6/2020   Goal Met- 10/6/2020    Pt will participate in high level problem solving tasks with moderate A across 3 sessions.     Progressing/Not Met 10/13/2020  Pt completed a deductive reasoning puzzle with dates with moderate cues for completion.    Met x2     Patient Education/Response:   HEP strategies to remember to complete HEPs. Planning for discharge 2-3 weeks. Pt verbalized understanding.    Written Home Exercises Provided: yes.   Exercises were reviewed and Alyssa was able to demonstrate them prior to the end of the session.  Alyssa demonstrated good  understanding of the education provided.     See EMR under Patient Instructions for exercises provided prior visit.  Assessment:   Alyssa is progressing well towards her goals.Pt reports increased memory at home secondary to HEP. Patient with difficulty sorting pills. Task to be re attempted next session. Current goals remain appropriate. Goals to be updated as necessary.     Pt prognosis is fair to Good. Pt will continue to benefit from skilled outpatient speech and language therapy to address the deficits listed in the problem list on initial evaluation, provide pt/family education and to maximize pt's level of independence in the home and community environment.   Medical necessity is demonstrated by the following IMPAIRMENTS:  Deficits in executive functioning, attention, and memory prevent the pt from  relaying medically and safety relevant information in a timely manner in a state of emergency.  Barriers to Therapy: pt decreased initiation to complete OT HEP warranting the consult to speech  Pt's spiritual, cultural and educational needs considered and pt agreeable to plan of care and goals.  Plan:   Continue POC with focus on rehabilitation and compensation for cognitive communication deficits including attention, problem solving, executive functions, and memory.     JOSE E Knapp-SLP   10/13/2020

## 2020-10-14 NOTE — TELEPHONE ENCOUNTER
LATE ENTRY: On 10/13/20 ~ [facility] PT [facility]  Confirmed today that they were received.

## 2020-10-19 ENCOUNTER — PES CALL (OUTPATIENT)
Dept: ADMINISTRATIVE | Facility: CLINIC | Age: 70
End: 2020-10-19

## 2020-10-20 ENCOUNTER — CLINICAL SUPPORT (OUTPATIENT)
Dept: REHABILITATION | Facility: HOSPITAL | Age: 70
End: 2020-10-20
Payer: MEDICARE

## 2020-10-20 DIAGNOSIS — G35 MULTIPLE SCLEROSIS: ICD-10-CM

## 2020-10-20 DIAGNOSIS — R26.9 GAIT DISTURBANCE: ICD-10-CM

## 2020-10-20 DIAGNOSIS — R41.841 COGNITIVE COMMUNICATION DISORDER: ICD-10-CM

## 2020-10-20 DIAGNOSIS — Z74.09 IMPAIRED FUNCTIONAL MOBILITY, BALANCE, AND ENDURANCE: ICD-10-CM

## 2020-10-20 PROCEDURE — 97116 GAIT TRAINING THERAPY: CPT | Mod: KX,PN

## 2020-10-20 PROCEDURE — 97130 THER IVNTJ EA ADDL 15 MIN: CPT | Mod: PN

## 2020-10-20 PROCEDURE — 97129 THER IVNTJ 1ST 15 MIN: CPT | Mod: PN

## 2020-10-20 NOTE — PLAN OF CARE
"  Outpatient Therapy Discharge Summary   Discharge Date: 10/20/2020   Name: Alyssa John  Clinic Number: [id number]  Therapy Diagnosis:   Encounter Diagnosis   Name Primary?    Cognitive communication disorder      Physician: Risa Oshea PA-C  Physician Orders: Ambulatory Referral to Speech Therapy  Medical Diagnosis: Cognitive dysfunction accompanying multiple sclerosis [G35, F09]  Evaluation Date: 8/28/2020    Date of Last visit: 10/20/2020   Total Visits Received: 9  Cancelled Visits: 0  No Show Visits: 0    Assessment    Assessment of Current Status: Alyssa has made significant progress since initial evaluations. Memory strategies have been implemented into her daily life for increased initiation to complete daily HEPs given by PT and OT. Pt states she does not feel "anxous about her memory anymore." She notes she can complete what she needs to around the house again as well.     Goals:   Short Term Goals: (5 weeks) Current Progress:   Pt will recall the first names of 3 people at 3 minutes across 3 sessions. Goal met- 10/20/2020    Pt will recall the location of 3 items in a room following a 20 minute delay across 2 sessions.   Goal met- 10/20/2020       Pt will participate in moderate level problem solving tasks with moderate A across 2 sessions.  Goal Met- 9/11/2020     Pt will participate in high level attention tasks with 80% accuracy in a quiet environment across 3 sessions.  -Not met   Pt will sort medications with 80% accuracy and minimal cues.  -Not met   Pt will complete calendar and map tasks with min A across 3 sessions.  Goal Met- 10/6/2020    Pt will recall 2-3 memory strategies with minimal cues 3 times overall.  Goal Met- 10/6/2020    Pt will participate in high level problem solving tasks with moderate A across 3 sessions.  Goal met- 10/20/2020      Long Term Goals:  1. She will use appropriate memory strategies to schedule and recall weekly activities, express needs and recall " names to maintain safety and participate socially in functional living environment. -GOAL MET    Discharge reason: Patient has reached the maximum rehab potential for the present time and Patient has completed allowable visits authorized by insurance    Plan   This patient is discharged from Speech Therapy    JOSE E Knapp-BEENA   10/20/2020

## 2020-10-20 NOTE — PROGRESS NOTES
"Outpatient Neurological Rehabilitation   Speech and Language Therapy Treatment Note  Date:  10/20/2020     Name: Alyssa John   MRN: [id number]   Therapy Diagnosis:   Encounter Diagnosis   Name Primary?    Cognitive communication disorder    Physician: Risa Oshea PA-C  Physician Orders: Ambulatory Referral to Speech Therapy  Medical Diagnosis: Cognitive dysfunction accompanying multiple sclerosis [G35, F09]    Visit #/ Visits Authorized: 4/9 (5 prior to this authorization)  Date of Evaluation:  8/28/2020  Insurance Authorization Period:  9/21/2020 - 10/21/2020  Plan of Care Expiration Date:    10/22/2020  Extended POC:   n/a  Progress Note: due 10/22/2020 - see discharge note  Visits Cancelled: 0  Visits No Show: 0    Time In:  1515  Time Out:  1600  Total Billable Time: 45    Precautions: Standard, Fall and Memory  Subjective:   Pt reports: "I'm having an arthritis flare up"  She was compliant with  given home exercise program.   Response to previous treatment: "[place] been doing good."  Pain Scale:  0/10 on VAS currently.   Pain Location: n/a  Objective:   TIMED  Procedure Min.   Cognitive Therapeutic Interventions, first 15 minutes CPT 95743  15   Cognitive Therapeutic Interventions, each additional 15 minutes CPT 06419 30     Total Timed Units: 3  Total Untimed Units: 0  Charges Billed/# of units: 3    Short Term Goals: (5 weeks) Current Progress:   Pt will recall the first names of 3 people at 3 minutes across 3 sessions.    Goal Met 10/20/2020   Pt recalled the names of 3 people at 3 minute delay independently    Goal met- 10/21/2020    Pt will recall the location of 3 items in a room following a 20 minute delay across 2 sessions.      Goal met 10/20/2020   Recalled the item and location of 3/3 in a room following 20 minute delay independently      Goal met- 10/20/2020    Pt will participate in moderate level problem solving tasks with moderate A across 2 sessions.     Goal Met 9/11/2020   Goal Met- " 9/11/2020     Pt will participate in high level attention tasks with 80% accuracy in a quiet environment across 3 sessions.     Progressing/ Not Met 10/20/2020   -Not addressed in today's session.    Pt will sort medications with 80% accuracy and minimal cues.     Progressing/ Not Met 10/20/2020   Not addressed in today's session.    Pt will complete calendar and map tasks with min A across 3 sessions.     Goal Met 10/6/2020   Goal Met- 10/6/2020    Pt will recall 2-3 memory strategies with minimal cues 3 times overall.     Goal Met 10/6/2020   Goal Met- 10/6/2020    Pt will participate in high level problem solving tasks with moderate A across 3 sessions.     Goal Met 10/20/2020  Pt completed a deductive reasoning puzzle with dates with minimal cues for completion.     Goal met- 10/20/2020      Patient Education/Response:   Pt educated on discharge and was to contact clinician if needed. HEP discussed.    Written Home Exercises Provided: yes.   Exercises were reviewed and Alyssa was able to demonstrate them prior to the end of the session.  Alyssa demonstrated good  understanding of the education provided.     See EMR under Patient Instructions for exercises provided prior visit.  Assessment:   Alyssa has progressed well towards her goals.Pt is discharged from speech therapy. See discharge note in plan of care. Current goals remain appropriate. Goals to be updated as necessary.     Pt prognosis is fair to Good. Pt has benefited from skilled outpatient speech and language therapy to address the deficits listed in the problem list on initial evaluation, provide pt/family education and to maximize pt's level of independence in the home and community environment.   Medical necessity is demonstrated by the following IMPAIRMENTS:  Deficits in executive functioning, attention, and memory prevent the pt from relaying medically and safety relevant information in a timely manner in a state of emergency.  Barriers to Therapy:  pt decreased initiation to complete OT HEP warranting the consult to speech  Pt's spiritual, cultural and educational needs considered and pt agreeable to plan of care and goals.  Plan:   Discharge from speech therapy.    JOSE E Knapp-SLP   10/20/2020

## 2020-10-20 NOTE — PLAN OF CARE
Outpatient Therapy Updated Plan of Care     Visit Date: 10/20/2020    Name: Alyssa John  Clinic Number: [id number]    Therapy Diagnosis:   Encounter Diagnoses   Name Primary?              Impaired functional mobility, balance, and endurance      Physician: Risa Oshea, GUILLERMO    Physician Orders: PT Eval and Treat   Medical Diagnosis from Referral:   R26.9 (ICD-10-CM) - Gait disturbance   G35 (ICD-10-CM) - Multiple sclerosis   Authorization Period Expiration: 10/12/2021  Plan of Care Expiration: 2020  Visit # / Visits authorized:     Precautions: Falls, immunocompromised  Functional Level Prior to Evaluation:   primarily uses w/c for mobility; assistance needed for transfers, difficulty with sitting balance/trunk control, tendency to lean backwards when standing, assistance needed with gait short distances using RW       Subjective     Update: Patient reports she called insurance company regarding insurance denial and they said records showed she was getting therapy at [facility]. She reports worsened mobility since last seen in therapy. She just completed ST.    Objective     LE strength:  R hip flex 4/5, knee ext 4-/5, knee flex 4/5, ankle DF 3+/5  L hip flex 4-/5, knee ext 3+/5, knee flex 4-/5, ankle DF 3/5    Alyssa participated in dynamic functional therapeutic activities to improve functional performance for 10 minutes, includin x sit to stand (timed) test from armchair using RW: 1 min 59 sec with cues for pushing from sequencing, reaching back before sitting, mild to moderately uncontrolled descents, ranging min to mod A     Alyssa participated in gait training to improve functional mobility and safety for 10 minutes, including:  Gait: 65 ft with RW at [employer] with cues for L foot clearance and upright posture        Assessment     Update: Patient has not attended PT in several months due to insurance authorization. She reports worsening of functional mobility, including transfers  and gait. Patient will benefit from continued physical therapy to address noted impairments and improve functional mobility.      Previous Short Term Goals Status:   1=not met 2=not met 3=not met  New Short Term Goals:     1) Pt will stand without posterior lean 5 of 10 trials    2) Pt will perform sit to stand x 5 reps with min A  3) Pt will improve LE strength by 1/2 muscle grade  Long Term Goals:     1) Pt will consistently perform sit to stand transfers with minimum to contact guard assistance  2) Pt will stand with slight UE support  3) Pt will perform toilet transfer with minimal assistance  4) Pt/family will be independent in HEP.  Reasons for Recertification of Therapy:   Weakness, decreased endurance, impaired functional mobility including transfers and gait, decreased safety awareness    Plan     Updated Certification Period: 10/20/2020 to 12/30/2020  Recommended Treatment Plan: 2 times per week for 8 weeks: Gait Training, Manual Therapy, Moist Heat/ Ice, Neuromuscular Re-ed, Patient Education, Therapeutic Activites and Therapeutic Exercise    Sameera Osborne, PT  10/20/2020      I CERTIFY THE NEED FOR THESE SERVICES FURNISHED UNDER THIS PLAN OF TREATMENT AND WHILE UNDER MY CARE    Physician's comments:        Physician's Signature: ___________________________________________________

## 2020-10-28 ENCOUNTER — TELEPHONE (OUTPATIENT)
Dept: NEUROLOGY | Facility: CLINIC | Age: 70
End: 2020-10-28

## 2020-10-28 ENCOUNTER — CLINICAL SUPPORT (OUTPATIENT)
Dept: REHABILITATION | Facility: HOSPITAL | Age: 70
End: 2020-10-28
Payer: MEDICARE

## 2020-10-28 DIAGNOSIS — Z74.09 IMPAIRED FUNCTIONAL MOBILITY, BALANCE, AND ENDURANCE: ICD-10-CM

## 2020-10-28 PROCEDURE — 97110 THERAPEUTIC EXERCISES: CPT | Mod: KX,PN

## 2020-10-28 NOTE — TELEPHONE ENCOUNTER
----- Message from Tabby Salmeron LPN sent at 10/27/2020  4:48 PM CDT -----  Contact: Joseph ( spouse ) [phone]    ----- Message -----  From: Taurus Caceres  Sent: 10/27/2020   4:31 PM CDT  To: Dayo Lord Staff    [facility], pls call

## 2020-10-28 NOTE — PROGRESS NOTES
Physical Therapy Daily Treatment Note     Time In: 1103 (patient late for appt)  Time Out: 1130  Total Billable Time: 27 minutes    Name: Alyssa John  Clinic Number: [id number]    Therapy Diagnosis:   Encounter Diagnosis   Name Primary?    Impaired functional mobility, balance, and endurance      Physician: Risa Oshea PA-C    Visit Date: 10/28/2020    Physician Orders: PT Eval and Treat   Medical Diagnosis from Referral:   R26.9 (ICD-10-CM) - Gait disturbance   G35 (ICD-10-CM) - Multiple sclerosis   Authorization Period Expiration: 10/12/2021  Plan of Care Expiration: 12/30/2020  Visit # / Visits authorized: 1/ 9     Precautions: Falls, immunocompromised      Subjective     Pt reports: being deconditioned from being away from therapy so long.  She was partially compliant with home exercise program.  Response to previous treatment: N/A  Functional change: no    Pain: 0/10  Location:     Objective     Alyssa received therapeutic exercises to develop strength, endurance, ROM and flexibility for 27 minutes including:  LAQ x 20  Hip adduction ball squeezes x 20  Seated marches x 20    Stationary bike x 12 min L1    // bars: sit to stand x 1 mod/max A   Standing weight shifts x 10    Education provided:     - Continue with current home exercise program as instructed  - Discussed monitoring symptoms and stopping activities which cause increased pain      Written Home Exercises Provided: Needs to be updated    Assessment     Patient fatiguing with therapy session.  Alyssa is progressing well towards her goals.   Pt prognosis is Fair.     Pt will continue to benefit from skilled outpatient physical therapy to address the deficits listed in the problem list box on initial evaluation, provide pt/family education and to maximize pt's level of independence in the home and community environment.     Pt's spiritual, cultural and educational needs considered and pt agreeable to plan of care and goals.      Anticipated barriers to physical therapy:     New Short Term Goals:     1) Pt will stand without posterior lean 5 of 10 trials    2) Pt will perform sit to stand x 5 reps with min A  3) Pt will improve LE strength by 1/2 muscle grade  Long Term Goals:     1) Pt will consistently perform sit to stand transfers with minimum to contact guard assistance  2) Pt will stand with slight UE support  3) Pt will perform toilet transfer with minimal assistance  4) Pt/family will be independent in HEP.    Short Term Goal Status:  1) Not met  2) Not met  3) Not met    Long Term Goal Status:  1) Not met  2) Not met  3) Not met  4) Not met      Plan     Continue with established Plan of Care towards PT goals. Gradual progression of strengthening/transfer/gait activities per pt tolerance.

## 2020-10-30 ENCOUNTER — TELEPHONE (OUTPATIENT)
Dept: REHABILITATION | Facility: HOSPITAL | Age: 70
End: 2020-10-30

## 2020-11-02 ENCOUNTER — PATIENT MESSAGE (OUTPATIENT)
Dept: PSYCHIATRY | Facility: CLINIC | Age: 70
End: 2020-11-02

## 2020-11-10 ENCOUNTER — CLINICAL SUPPORT (OUTPATIENT)
Dept: REHABILITATION | Facility: HOSPITAL | Age: 70
End: 2020-11-10
Payer: MEDICARE

## 2020-11-10 DIAGNOSIS — Z74.09 IMPAIRED FUNCTIONAL MOBILITY, BALANCE, AND ENDURANCE: ICD-10-CM

## 2020-11-10 PROCEDURE — 97110 THERAPEUTIC EXERCISES: CPT | Mod: KX,PN

## 2020-11-11 ENCOUNTER — OFFICE VISIT (OUTPATIENT)
Dept: NEUROLOGY | Facility: CLINIC | Age: 70
End: 2020-11-11
Payer: MEDICARE

## 2020-11-11 VITALS
HEART RATE: 79 BPM | SYSTOLIC BLOOD PRESSURE: 142 MMHG | DIASTOLIC BLOOD PRESSURE: 86 MMHG | BODY MASS INDEX: 28.93 KG/M2 | HEIGHT: 63 IN | WEIGHT: 163.25 LBS

## 2020-11-11 DIAGNOSIS — M19.90 ARTHRITIS: ICD-10-CM

## 2020-11-11 DIAGNOSIS — R26.9 NEUROLOGIC GAIT DYSFUNCTION: ICD-10-CM

## 2020-11-11 DIAGNOSIS — E55.9 VITAMIN D INSUFFICIENCY: ICD-10-CM

## 2020-11-11 DIAGNOSIS — Z71.89 COUNSELING REGARDING GOALS OF CARE: ICD-10-CM

## 2020-11-11 DIAGNOSIS — I10 ESSENTIAL HYPERTENSION: ICD-10-CM

## 2020-11-11 DIAGNOSIS — R25.1 TREMOR: ICD-10-CM

## 2020-11-11 DIAGNOSIS — M79.2 NEUROPATHIC PAIN: ICD-10-CM

## 2020-11-11 DIAGNOSIS — Z74.09 IMPAIRED MOBILITY AND ADLS: Chronic | ICD-10-CM

## 2020-11-11 DIAGNOSIS — G35 MULTIPLE SCLEROSIS: Primary | ICD-10-CM

## 2020-11-11 DIAGNOSIS — F32.A ANXIETY AND DEPRESSION: ICD-10-CM

## 2020-11-11 DIAGNOSIS — R56.9 SEIZURES: ICD-10-CM

## 2020-11-11 DIAGNOSIS — R53.82 CHRONIC FATIGUE: ICD-10-CM

## 2020-11-11 DIAGNOSIS — G43.719 INTRACTABLE CHRONIC MIGRAINE WITHOUT AURA AND WITHOUT STATUS MIGRAINOSUS: ICD-10-CM

## 2020-11-11 DIAGNOSIS — Z78.9 IMPAIRED MOBILITY AND ADLS: Chronic | ICD-10-CM

## 2020-11-11 DIAGNOSIS — F41.9 ANXIETY AND DEPRESSION: ICD-10-CM

## 2020-11-11 PROCEDURE — 3288F PR FALLS RISK ASSESSMENT DOCUMENTED: ICD-10-PCS | Mod: CPTII,S$GLB,, | Performed by: PHYSICIAN ASSISTANT

## 2020-11-11 PROCEDURE — 99999 PR PBB SHADOW E&M-EST. PATIENT-LVL III: ICD-10-PCS | Mod: PBBFAC,,, | Performed by: PHYSICIAN ASSISTANT

## 2020-11-11 PROCEDURE — 99499 UNLISTED E&M SERVICE: CPT | Mod: S$GLB,,, | Performed by: PHYSICIAN ASSISTANT

## 2020-11-11 PROCEDURE — 1126F AMNT PAIN NOTED NONE PRSNT: CPT | Mod: S$GLB,,, | Performed by: PHYSICIAN ASSISTANT

## 2020-11-11 PROCEDURE — 99215 OFFICE O/P EST HI 40 MIN: CPT | Mod: S$GLB,,, | Performed by: PHYSICIAN ASSISTANT

## 2020-11-11 PROCEDURE — 3079F PR MOST RECENT DIASTOLIC BLOOD PRESSURE 80-89 MM HG: ICD-10-PCS | Mod: CPTII,S$GLB,, | Performed by: PHYSICIAN ASSISTANT

## 2020-11-11 PROCEDURE — 3077F PR MOST RECENT SYSTOLIC BLOOD PRESSURE >= 140 MM HG: ICD-10-PCS | Mod: CPTII,S$GLB,, | Performed by: PHYSICIAN ASSISTANT

## 2020-11-11 PROCEDURE — 1101F PR PT FALLS ASSESS DOC 0-1 FALLS W/OUT INJ PAST YR: ICD-10-PCS | Mod: CPTII,S$GLB,, | Performed by: PHYSICIAN ASSISTANT

## 2020-11-11 PROCEDURE — 1101F PT FALLS ASSESS-DOCD LE1/YR: CPT | Mod: CPTII,S$GLB,, | Performed by: PHYSICIAN ASSISTANT

## 2020-11-11 PROCEDURE — 1159F PR MEDICATION LIST DOCUMENTED IN MEDICAL RECORD: ICD-10-PCS | Mod: S$GLB,,, | Performed by: PHYSICIAN ASSISTANT

## 2020-11-11 PROCEDURE — 99499 RISK ADDL DX/OHS AUDIT: ICD-10-PCS | Mod: S$GLB,,, | Performed by: PHYSICIAN ASSISTANT

## 2020-11-11 PROCEDURE — 3288F FALL RISK ASSESSMENT DOCD: CPT | Mod: CPTII,S$GLB,, | Performed by: PHYSICIAN ASSISTANT

## 2020-11-11 PROCEDURE — 3008F PR BODY MASS INDEX (BMI) DOCUMENTED: ICD-10-PCS | Mod: CPTII,S$GLB,, | Performed by: PHYSICIAN ASSISTANT

## 2020-11-11 PROCEDURE — 1126F PR PAIN SEVERITY QUANTIFIED, NO PAIN PRESENT: ICD-10-PCS | Mod: S$GLB,,, | Performed by: PHYSICIAN ASSISTANT

## 2020-11-11 PROCEDURE — 3008F BODY MASS INDEX DOCD: CPT | Mod: CPTII,S$GLB,, | Performed by: PHYSICIAN ASSISTANT

## 2020-11-11 PROCEDURE — 99215 PR OFFICE/OUTPT VISIT, EST, LEVL V, 40-54 MIN: ICD-10-PCS | Mod: S$GLB,,, | Performed by: PHYSICIAN ASSISTANT

## 2020-11-11 PROCEDURE — 3079F DIAST BP 80-89 MM HG: CPT | Mod: CPTII,S$GLB,, | Performed by: PHYSICIAN ASSISTANT

## 2020-11-11 PROCEDURE — 1159F MED LIST DOCD IN RCRD: CPT | Mod: S$GLB,,, | Performed by: PHYSICIAN ASSISTANT

## 2020-11-11 PROCEDURE — 3077F SYST BP >= 140 MM HG: CPT | Mod: CPTII,S$GLB,, | Performed by: PHYSICIAN ASSISTANT

## 2020-11-11 PROCEDURE — 99999 PR PBB SHADOW E&M-EST. PATIENT-LVL III: CPT | Mod: PBBFAC,,, | Performed by: PHYSICIAN ASSISTANT

## 2020-11-11 RX ORDER — DULOXETIN HYDROCHLORIDE 20 MG/1
20 CAPSULE, DELAYED RELEASE ORAL DAILY
Qty: 90 CAPSULE | Refills: 1 | Status: SHIPPED | OUTPATIENT
Start: 2020-11-11 | End: 2021-03-29

## 2020-11-11 RX ORDER — MODAFINIL 100 MG/1
100 TABLET ORAL 2 TIMES DAILY
Qty: 180 TABLET | Refills: 1 | Status: SHIPPED | OUTPATIENT
Start: 2020-11-11 | End: 2021-06-28

## 2020-11-11 NOTE — PROGRESS NOTES
Physical Therapy Daily Treatment Note     Time In: 1525 (patient late for appt)  Time Out: 1555  Total Billable Time: 30 minutes    Name: Alyssa John  Clinic Number: [id number]    Therapy Diagnosis:   Encounter Diagnosis   Name Primary?    Impaired functional mobility, balance, and endurance      Physician: Risa Oshea PA-C    Visit Date: 11/10/2020    Physician Orders: PT Eval and Treat   Medical Diagnosis from Referral:   R26.9 (ICD-10-CM) - Gait disturbance   G35 (ICD-10-CM) - Multiple sclerosis   Authorization Period Expiration: 10/12/2021  Plan of Care Expiration: 12/30/2020  Visit # / Visits authorized: 2/ 9     Precautions: Falls, immunocompromised      Subjective     Pt reports: having bronchitis issues last week, had a fever for several days. She has been afebrile for at least the last 3 days.  She was not compliant with home exercise program.  Response to previous treatment: fatigue  Functional change: no    Pain: /10  Location:     Objective     Alyssa received therapeutic exercises to develop strength and endurance for 30 minutes including:    Stand pivot transfer transport chair<>stationary bike with min A and max verbal/tactile cueing for correct sequencing  Stationary bike L1.5 x 12 min with verbal and tactile cues for midline positioning    // bars:    Sit to stand min A with cues for sequencing   Standing postural correction 30 sec x 2   Marches x 15   Hip ABD x 10 L/R   Side stepping 6 ft x 2     Education provided:     - Importance of posture and trunk control for balance and mobility  - Discussed monitoring symptoms and stopping activities which cause increased pain    Written Home Exercises Provided: Needs to be updated    Assessment     Fatigued with activities.  Alyssa is progressing well towards her goals.   Pt prognosis is Fair.     Pt will continue to benefit from skilled outpatient physical therapy to address the deficits listed in the problem list box on initial  evaluation, provide pt/family education and to maximize pt's level of independence in the home and community environment.     Pt's spiritual, cultural and educational needs considered and pt agreeable to plan of care and goals.     Anticipated barriers to physical therapy: memory deficits    New Short Term Goals:     1) Pt will stand without posterior lean 5 of 10 trials    2) Pt will perform sit to stand x 5 reps with min A  3) Pt will improve LE strength by 1/2 muscle grade  Long Term Goals:     1) Pt will consistently perform sit to stand transfers with minimum to contact guard assistance  2) Pt will stand with slight UE support  3) Pt will perform toilet transfer with minimal assistance  4) Pt/family will be independent in HEP.     Short Term Goal Status:  1) Not met  2) Not met  3) Not met     Long Term Goal Status:  1) Not met  2) Not met  3) Not met  4) Not met        Plan      Continue with established Plan of Care towards PT goals. Gradual progression of strengthening/transfer/gait activities per pt tolerance.

## 2020-11-11 NOTE — PROGRESS NOTES
Subjective:          Patient ID: Alyssa John is a 70 y.o. female who presents today with her  for a routine clinic visit for MS.  [facility] in June 2020 with this provider.     MS HPI:  · DMT: glatiramer acetate  · Side effects from DMT? No-but would prefer non-injectable therapy  · Taking vitamin D3 as recommended? Yes - 5,000IU/d   · She feels a great benefit from outpatient therapy  · Patient c/o tremor with activities such as feeding, grooming, etc.-no tremor at rest    Medications:  Current Outpatient Medications   Medication Sig    atorvastatin (LIPITOR) 40 MG tablet TAKE 1 TABLET BY MOUTH EVERY DAY    cholecalciferol, vitamin D3, (VITAMIN D3) 5,000 unit Tab Take 5,000 Units by mouth once daily.    clonazePAM (KLONOPIN) 1 MG tablet Take 1 tablet (1 mg total) by mouth 2 (two) times daily.    clopidogrel (PLAVIX) 75 mg tablet Take 75 mg by mouth once daily.      cyclobenzaprine (FLEXERIL) 5 MG tablet Take 5 mg by mouth as needed.    DULoxetine (CYMBALTA) 20 MG capsule TAKE 1 CAPSULE BY MOUTH EVERY DAY    famotidine (PEPCID) 20 MG tablet Take 20 mg by mouth 2 (two) times daily as needed for Heartburn.    glatiramer (COPAXONE) 40 mg/mL injection Inject 40 mg into the skin 3 (three) times a week.    modafiniL (PROVIGIL) 100 MG Tab Take 1 tablet (100 mg total) by mouth 2 (two) times daily.    topiramate (TOPAMAX) 50 MG tablet Take 1 tablet (50 mg total) by mouth 2 (two) times daily.    nitroGLYCERIN (NITROSTAT) 0.4 MG SL tablet Place 0.4 mg under the tongue every 5 (five) minutes as needed for Chest pain.     prochlorperazine (COMPAZINE) 10 MG tablet Take 1 tablet (10 mg total) by mouth every 6 (six) hours as needed (migraine or nausea). (Patient not taking: Reported on 11/11/2020)    PSYLLIUM SEED, WITH DEXTROSE, (FIBER ORAL) Take by mouth 2 (two) times daily.     Current Facility-Administered Medications   Medication Frequency    onabotulinumtoxina injection 200 Units  Q90 Days       SOCIAL HISTORY  Social History     Tobacco Use    Smoking status: Former Smoker     Packs/day: 1.50     Quit date: 2006     Years since quittin.8    Smokeless tobacco: Never Used   Substance Use Topics    Alcohol use: No     Alcohol/week: 0.0 standard drinks    Drug use: No       Living arrangements - the patient lives with their spouse.    ROS:    REVIEW OF SYMPTOMS 2020   Do you feel abnormally tired on most days? Yes-Provigil 200mg -requests refill   Do you feel you generally sleep well? -   Do you have difficulty controlling your bladder?  No   Do you have difficulty controlling your bowels?  No   Do you have frequent muscle cramps, tightness or spasms in your limbs?  No   Do you have new visual symptoms?  No   Do you have worsening difficulty with your memory or thinking? Yes   Do you have worsening symptoms of anxiety or depression?  Yes   For patients who walk, Do you have more difficulty walking?  No-feels slow gradual improvement with therapy   Have you fallen since your last visit?  Yes   For patients who use wheelchairs: Do you have any skin wounds or breakdown? No   Do you have difficulty using your hands?  Yes-arthritic changes   Do you have shooting or burning pain? No   If you are sexually active, are you using birth control? Y/N  N/A Not Applicable   Do you often choke when swallowing liquids or solid food?  No   Do you experience worsening symptoms when overheated? No   Do you need any new equipment such as a wheelchair, walker or shower chair? No   Do you receive co-pay financial assistance for your principal MS medicine? Yes   Would you be interested in participating in an MS research trial in the future? -   For patients on Gilenya, Tecfidera, Aubagio, Rituxan, Ocrevus, Tysabri, Lemtrada or Methotrexate, are you aware that you should NOT receive live virus vaccines?  N/A   Do you feel you have adequate family/friend support?  Yes   Do you have health insurance?    Yes   Are you currently employed? No   Do you receive SSDI/SSI?  No   Do you use marijuana or cannabis products? No   Have you been diagnosed with a urinary tract infection since your last visit here? No   Have you been diagnosed with a respiratory tract infection since your last visit here? No   Have you been to the emergency room since your last visit here? No   Have you been hospitalized since your last visit here?  No                Objective:        1. 25 foot timed walk:12.7s in 2020  Timed 25 Foot Walk: 2020   Did patient wear an AFO? No No   Was assistive device used? Yes Yes   Assistive device used (toña one): Bilateral Assistance Bilateral Assistance   Unilateral device used - -   Bilateral device used Walker/Rollator Walker/Rollator   Time for 25 Foot Walk (seconds) 18 13.7   Time for 25 Foot Walk (seconds) - -         Neurologic Exam     Mental Status   Oriented to person, place, and time.   Speech: speech is normal   Level of consciousness: alert    Motor Exam   Muscle bulk: normal    Strength   Right iliopsoas: 5/5  Left iliopsoas: 4/5  Right quadriceps: 5/5  Left quadriceps: 5/5  Right hamstrin/5  Left hamstrin/5  Right anterior tibial: 5/5  Left anterior tibial: 4/5        Imaging:     No results found for this or any previous visit.  No results found for this or any previous visit.  No results found for this or any previous visit.  Results for orders placed during the hospital encounter of 18   MRI Brain Demyelinating W W/O Contrast    Impression Unchanged pronounced burden of supratentorial white matter disease, with appearance in keeping with history of multiple sclerosis.  No new or enhancing lesions to suggest active demyelination.    Partially imaged lesion at the craniocervical junction, with spinal lesions better delineated on concurrent dedicated spine MRIs.    Electronically signed by resident: Baldo  Nelida  Date:    12/12/2018  Time:    09:34    Electronically signed by: Moo Michele MD  Date:    12/12/2018  Time:    15:28     Results for orders placed during the hospital encounter of 12/11/18   MRI Cervical Spine Demyelinating W W/O Contrast    Impression Multiple unchanged foci of T2 hyperintense signal throughout the spinal cord extending from the cranial cervical junction throughout the thoracic spinal cord.  Lesion at T9-10 was not included in the field of view of prior imaging; however, all other lesions are unchanged from 08/02/2018.  No enhancing lesions to suggest active demyelination.    Mild multilevel disc/endplate degeneration, with mild spinal canal stenoses in the cervical spine and severe foraminal stenosis on the left at C4-5 and on the right at C5-6.    Electronically signed by resident: Baldo Krause  Date:    12/12/2018  Time:    09:12    Electronically signed by: Moo Michele MD  Date:    12/12/2018  Time:    15:24     Results for orders placed during the hospital encounter of 12/11/18   MRI Thoracic Spine Demyelinating W W/O Contrast    Impression Multiple unchanged foci of T2 hyperintense signal throughout the spinal cord extending from the cranial cervical junction throughout the thoracic spinal cord.  Lesion at T9-10 was not included in the field of view of prior imaging; however, all other lesions are unchanged from 08/02/2018.  No enhancing lesions to suggest active demyelination.    Mild multilevel disc/endplate degeneration, with mild spinal canal stenoses in the cervical spine and severe foraminal stenosis on the left at C4-5 and on the right at C5-6.    Electronically signed by resident: Baldo Krause  Date:    12/12/2018  Time:    09:12    Electronically signed by: Moo Michele MD  Date:    12/12/2018  Time:    15:24         Labs:     Lab Results   Component Value Date    DEYRUEKZ69BM 35 08/26/2020    CXQSZDNG37FC 97 (H) 08/20/2019    UIMQQCRL09NI 68 07/19/2018     Lab Results    Component Value Date    JCVINDEX SEE COMMENT (A) 12/09/2015    JCVANTIBODY SEE COMMENT 12/09/2015     Lab Results   Component Value Date    EN3IUOJS 59.1 03/31/2016    ABSOLUTECD3 619 (L) 03/31/2016    XF5DAIDI 19.9 03/31/2016    ABSOLUTECD8 208 03/31/2016    CW9DHPCX 39.1 03/31/2016    ABSOLUTECD4 409 03/31/2016    LABCD48 1.97 03/31/2016     Lab Results   Component Value Date    WBC 5.75 08/26/2020    RBC 5.02 08/26/2020    HGB 13.7 08/26/2020    HCT 44.5 08/26/2020    MCV 89 08/26/2020    MCH 27.3 08/26/2020    MCHC 30.8 (L) 08/26/2020    RDW 13.5 08/26/2020     08/26/2020    MPV 11.1 08/26/2020    GRAN 3.8 08/26/2020    GRAN 66.5 08/26/2020    LYMPH 1.4 08/26/2020    LYMPH 23.5 08/26/2020    MONO 0.3 08/26/2020    MONO 5.0 08/26/2020    EOS 0.2 08/26/2020    BASO 0.05 08/26/2020    EOSINOPHIL 3.8 08/26/2020    BASOPHIL 0.9 08/26/2020     Sodium   Date Value Ref Range Status   08/26/2020 142 136 - 145 mmol/L Final     Potassium   Date Value Ref Range Status   08/26/2020 3.6 3.5 - 5.1 mmol/L Final     Chloride   Date Value Ref Range Status   08/26/2020 114 (H) 95 - 110 mmol/L Final     CO2   Date Value Ref Range Status   08/26/2020 20 (L) 23 - 29 mmol/L Final     Carbon Monoxide, Blood   Date Value Ref Range Status   10/19/2016 2 % Final     Comment:     -------------------REFERENCE VALUE--------------------------  0-2 Normal (Non-smoker) , < or = 9 Normal (Smoker), > or   = 20 (Toxic concentration)  Test Performed by:  [address] [id number]  : Adrien Norton II, M.D., Ph.D.       Glucose   Date Value Ref Range Status   08/26/2020 80 70 - 110 mg/dL Final     BUN   Date Value Ref Range Status   08/26/2020 20 8 - 23 mg/dL Final     Creatinine   Date Value Ref Range Status   08/26/2020 0.7 0.5 - 1.4 mg/dL Final   02/22/2013 0.8 0.5 - 1.4 mg/dL Final     Calcium   Date Value Ref Range Status   08/26/2020 8.5 (L) 8.7 -  10.5 mg/dL Final   02/22/2013 8.7 8.7 - 10.5 mg/dL Final     Total Protein   Date Value Ref Range Status   08/26/2020 6.6 6.0 - 8.4 g/dL Final     Albumin   Date Value Ref Range Status   08/26/2020 3.6 3.5 - 5.2 g/dL Final     Total Bilirubin   Date Value Ref Range Status   08/26/2020 0.4 0.1 - 1.0 mg/dL Final     Comment:     For infants and newborns, interpretation of results should be based  on gestational age, weight and in agreement with clinical  observations.  Premature Infant recommended reference ranges:  Up to 24 hours.............<8.0 mg/dL  Up to 48 hours............<12.0 mg/dL  3-5 days..................<15.0 mg/dL  6-29 days.................<15.0 mg/dL       Alkaline Phosphatase   Date Value Ref Range Status   08/26/2020 138 (H) 55 - 135 U/L Final   02/22/2013 107 55 - 135 U/L Final     AST   Date Value Ref Range Status   08/26/2020 18 10 - 40 U/L Final   02/22/2013 18 10 - 40 U/L Final     ALT   Date Value Ref Range Status   08/26/2020 26 10 - 44 U/L Final     Anion Gap   Date Value Ref Range Status   08/26/2020 8 8 - 16 mmol/L Final   02/22/2013 11 5 - 15 meq/L Final     eGFR if    Date Value Ref Range Status   08/26/2020 >60.0 >60 mL/min/1.73 m^2 Final     eGFR if non    Date Value Ref Range Status   08/26/2020 >60.0 >60 mL/min/1.73 m^2 Final     Comment:     Calculation used to obtain the estimated glomerular filtration  rate (eGFR) is the CKD-EPI equation.        No results found for: HEPBSAG, HEPBSAB, HEPBCAB        MS Impression and Plan:     NEURO MULTIPLE SCLEROSIS IMPRESSION:   MS Status:     Number of relapses in the past year?:  0    Clinical Progression:  Improved    Clinical Progression comment:  Timed walk significantly improved(almost 5s)-outpatient therapy has been very beneficial for patients overall function.   Plan:     DMT:  No change in management    DMT comment:  Continue Glatiramer and high dose Vit D3. Patient advised to continue to follow CDC  and MS Society recommendations regarding COVID-ie. hand hygiene, social distancing, masks, etc. Will plan to recheck Vit D3 next visit as last lab showed significant decrease -will ensure improvement  as more and more data suggests that high circulating blood levels of vitamin D has an ameliorating effect on the disease course in multiple sclerosis in general.          Symptom Management:  Implement change in symptom management    Implement Change in Symptom Management:  Fatigue, Pain, Gait and Adaptive Needs (refilled Provigil and Cymbalta; continue PT -therapy is essential to her overall functioning; recommended weighted utensils-gave info on [employer]/heather)     Next Labs Due: 3/13/2021    Our visit today lasted 40 minutes, and 100% of this time was spent face to face with the patient. Over 50% of this visit included discussion of the treatment plan/medication changes/symptom management/exam findings/imaging/coordination of care.     Problem List Items Addressed This Visit        Neuro    Multiple sclerosis - Primary     Stable on Glatiramer and high dose Vit D3         Relevant Medications    modafiniL (PROVIGIL) 100 MG Tab    Seizures    Intractable chronic migraine without aura and without status migrainosus    Tremor       Psychiatric    Anxiety and depression       Cardiac/Vascular    Essential hypertension       Orthopedic    Arthritis     Recommended adaptive feeding devices            Other    Impaired mobility and ADLs (Chronic)    Chronic fatigue    Relevant Medications    modafiniL (PROVIGIL) 100 MG Tab      Other Visit Diagnoses     Neuropathic pain        Relevant Medications    DULoxetine (CYMBALTA) 20 MG capsule    Counseling regarding goals of care        Vitamin D insufficiency        Neurologic gait dysfunction            Follow up in about 4 months (around 3/11/2021) for follow up with Dr. Apodaca.  Patient agreed to POC today.    Attending, Dr. Apodaca, was available during today's encounter.      Risa Oshea PA-C  [facility]

## 2020-11-11 NOTE — Clinical Note
Diana!  Recently saw Alyssa in clinic and her timed 25ft walk is almost 5 sec faster! This is so great for her overall functioning. Thank you so much for all your hard work with Alyssa--please continue PT!  Risa

## 2020-11-13 ENCOUNTER — CLINICAL SUPPORT (OUTPATIENT)
Dept: REHABILITATION | Facility: HOSPITAL | Age: 70
End: 2020-11-13
Attending: PHYSICIAN ASSISTANT
Payer: MEDICARE

## 2020-11-13 DIAGNOSIS — Z74.09 IMPAIRED FUNCTIONAL MOBILITY, BALANCE, AND ENDURANCE: ICD-10-CM

## 2020-11-13 PROCEDURE — 97110 THERAPEUTIC EXERCISES: CPT | Mod: KX,PN

## 2020-11-16 NOTE — PROGRESS NOTES
Physical Therapy Daily Treatment Note     Time In: 1510  Time Out: 1545  Total Billable Time: 35 minutes (10 min NC)    Name: Alyssa John  Clinic Number: [id number]    Therapy Diagnosis:   Encounter Diagnosis   Name Primary?    Impaired functional mobility, balance, and endurance      Physician: Risa Oshea PA-C    Visit Date: 11/13/2020    Physician Orders: PT Eval and Treat   Medical Diagnosis from Referral:   R26.9 (ICD-10-CM) - Gait disturbance   G35 (ICD-10-CM) - Multiple sclerosis   Authorization Period Expiration: 10/12/2021  Plan of Care Expiration: 12/30/2020  Visit # / Visits authorized: 3/ 9     Precautions: Falls, immunocompromised         Subjective     Pt reports: not feeling well today, still suffering with bronchitis.  She was compliant with home exercise program.  Response to previous treatment: fatigue  Functional change: no    Pain: /10  Location:     Objective     Alyssa received therapeutic exercises to develop strength and endurance for 25 minutes including:    Seated therex: x 20   Ankle pumps    LAQ   Hip marches   Hip adduction ball sqeezes    // bars:   Sit to stand mod A x 3   Standing weight shifts    Side stepping   Fwd/bwd gait    Education provided:     - Continue with current home exercise program as instructed  - Discussed monitoring symptoms and stopping activities which cause increased pain  - Confirmed date/time of next appointment    Education provided:      - Importance of posture and trunk control for balance and mobility  - Discussed monitoring symptoms and stopping activities which cause increased pain     Written Home Exercises Provided: Needs to be updated    Assessment     Patient with limited activity tolerance today, although insisting she wanted to participate in therapy. Recommended that patient see her doctor if she continues to feel unwell.  Alyssa is not progressing well towards her goals.   Pt prognosis is .     Pt will continue to benefit from  skilled outpatient physical therapy to address the deficits listed in the problem list box on initial evaluation, provide pt/family education and to maximize pt's level of independence in the home and community environment.     Pt's spiritual, cultural and educational needs considered and pt agreeable to plan of care and goals.     Anticipated barriers to physical therapy:   New Short Term Goals:     1) Pt will stand without posterior lean 5 of 10 trials    2) Pt will perform sit to stand x 5 reps with min A  3) Pt will improve LE strength by 1/2 muscle grade  Long Term Goals:     1) Pt will consistently perform sit to stand transfers with minimum to contact guard assistance  2) Pt will stand with slight UE support  3) Pt will perform toilet transfer with minimal assistance  4) Pt/family will be independent in HEP.     Short Term Goal Status:  1) Not met  2) Not met  3) Not met     Long Term Goal Status:  1) Not met  2) Not met  3) Not met  4) Not met        Plan      Continue with established Plan of Care towards PT goals. Gradual progression of strengthening/transfer/gait activities per pt tolerance.

## 2020-11-17 ENCOUNTER — CLINICAL SUPPORT (OUTPATIENT)
Dept: REHABILITATION | Facility: HOSPITAL | Age: 70
End: 2020-11-17
Payer: MEDICARE

## 2020-11-17 DIAGNOSIS — Z74.09 IMPAIRED FUNCTIONAL MOBILITY, BALANCE, AND ENDURANCE: Primary | ICD-10-CM

## 2020-11-17 DIAGNOSIS — R26.9 GAIT DISTURBANCE: ICD-10-CM

## 2020-11-17 PROCEDURE — 97110 THERAPEUTIC EXERCISES: CPT | Mod: PN,CQ

## 2020-11-17 NOTE — PROGRESS NOTES
"  Physical Therapy Daily Treatment Note     Name: Alyssa John  Clinic Number: [id number]    Therapy Diagnosis:   Encounter Diagnoses   Name Primary?    Impaired functional mobility, balance, and endurance Yes    Gait disturbance      Physician: Risa Oshea PA-C    Visit Date: 11/17/2020    Physician Orders: PT Eval and Treat   Medical Diagnosis from Referral:   R26.9 (ICD-10-CM) - Gait disturbance   G35 (ICD-10-CM) - Multiple sclerosis   Authorization Period Expiration: 10/12/2021  Plan of Care Expiration: 12/30/2020  Visit # / Visits authorized: 4/ 9     Precautions: Falls, immunocompromised    Time In: 1446  Time Out: 1535  Total Billable Time: 49    Subjective     Pt reports: "I am doing good today"  She was compliant with home exercise program.  Response to previous treatment: I am feeling better  Functional change: transferring better    Pain: 7/10  Location: L 5th digit    Objective     Alyssa received therapeutic exercises to develop strength and endurance for 49 minutes including:  Bike 15' L1.5  // bars:   Sit to stand mod A x 3   Standing weight shifts Fwd/bwd   Gait 10ft x 2 fwd with mod A    Education provided:   - Continue with current home exercise program as instructed  - Discussed monitoring symptoms and stopping activities which cause increased pain  - Confirmed date/time of next appointment    Education provided:    - Importance of posture and trunk control for balance and mobility  - Discussed monitoring symptoms and stopping activities which cause increased pain     Written Home Exercises Provided: Needs to be updated    Assessment   Patient leaning left with trunk and tilting head right. Seated in WC as well as on the bike, as well as standing in // bars. VC's to tuck buttocks when standing up to increase equal weight shifting L/R/fwd/bwd.  Patients endurance remains limited.    Alyssa is not progressing well towards her goals.   Pt prognosis is .     Pt will continue to benefit " from skilled outpatient physical therapy to address the deficits listed in the problem list box on initial evaluation, provide pt/family education and to maximize pt's level of independence in the home and community environment.     Pt's spiritual, cultural and educational needs considered and pt agreeable to plan of care and goals.     Anticipated barriers to physical therapy: Progressive debilitating disorder  New Short Term Goals:     1) Pt will stand without posterior lean 5 of 10 trials (progressing)  2) Pt will perform sit to stand x 5 reps with min A (progressing)  3) Pt will improve LE strength by 1/2 muscle grade (progressing)  Long Term Goals:     1) Pt will consistently perform sit to stand transfers with minimum to contact guard assistance (progressing)  2) Pt will stand with slight UE support (progressing)  3) Pt will perform toilet transfer with minimal assistance (progressing)  4) Pt/family will be independent in HEP. (progressing)      Plan      Continue with established Plan of Care towards PT goals. Gradual progression of strengthening/transfer/gait activities per pt tolerance.

## 2020-11-20 ENCOUNTER — OFFICE VISIT (OUTPATIENT)
Dept: FAMILY MEDICINE | Facility: CLINIC | Age: 70
End: 2020-11-20
Payer: MEDICARE

## 2020-11-20 DIAGNOSIS — M79.10 MYALGIA: ICD-10-CM

## 2020-11-20 DIAGNOSIS — R05.8 COUGH PRODUCTIVE OF PURULENT SPUTUM: Primary | ICD-10-CM

## 2020-11-20 DIAGNOSIS — R19.7 DIARRHEA, UNSPECIFIED TYPE: ICD-10-CM

## 2020-11-20 DIAGNOSIS — E66.3 OVERWEIGHT (BMI 25.0-29.9): ICD-10-CM

## 2020-11-20 DIAGNOSIS — G44.209 TENSION-TYPE HEADACHE, NOT INTRACTABLE, UNSPECIFIED CHRONICITY PATTERN: ICD-10-CM

## 2020-11-20 PROBLEM — J40 BRONCHITIS: Status: ACTIVE | Noted: 2020-11-20

## 2020-11-20 PROCEDURE — 1159F MED LIST DOCD IN RCRD: CPT | Mod: 95,,, | Performed by: NURSE PRACTITIONER

## 2020-11-20 PROCEDURE — 1159F PR MEDICATION LIST DOCUMENTED IN MEDICAL RECORD: ICD-10-PCS | Mod: 95,,, | Performed by: NURSE PRACTITIONER

## 2020-11-20 PROCEDURE — 99213 PR OFFICE/OUTPT VISIT, EST, LEVL III, 20-29 MIN: ICD-10-PCS | Mod: 95,,, | Performed by: NURSE PRACTITIONER

## 2020-11-20 PROCEDURE — 99213 OFFICE O/P EST LOW 20 MIN: CPT | Mod: 95,,, | Performed by: NURSE PRACTITIONER

## 2020-11-20 RX ORDER — ALBUTEROL SULFATE 90 UG/1
2 AEROSOL, METERED RESPIRATORY (INHALATION) EVERY 6 HOURS PRN
Qty: 6.7 G | Refills: 1 | Status: SHIPPED | OUTPATIENT
Start: 2020-11-20 | End: 2022-08-05

## 2020-11-20 RX ORDER — PROMETHAZINE HYDROCHLORIDE AND DEXTROMETHORPHAN HYDROBROMIDE 6.25; 15 MG/5ML; MG/5ML
5 SYRUP ORAL 4 TIMES DAILY PRN
Qty: 118 ML | Refills: 1 | Status: SHIPPED | OUTPATIENT
Start: 2020-11-20 | End: 2020-11-30

## 2020-11-20 RX ORDER — DOXYCYCLINE 100 MG/1
100 CAPSULE ORAL 2 TIMES DAILY
Qty: 14 CAPSULE | Refills: 0 | Status: SHIPPED | OUTPATIENT
Start: 2020-11-20 | End: 2020-11-27

## 2020-11-20 NOTE — PROGRESS NOTES
Subjective:       Patient ID: Alyssa John is a 70 y.o. female.    Chief Complaint: No chief complaint on file.    URI   The current episode started 1 to 4 weeks ago. Associated symptoms include coughing, diarrhea, ear pain and headaches. Pertinent negatives include no abdominal pain, chest pain, rash or sore throat.   Cough  The current episode started 1 to 4 weeks ago. The cough is productive of purulent sputum. Associated symptoms include ear pain, headaches and myalgias. Pertinent negatives include no chest pain, fever, postnasal drip, rash, sore throat or shortness of breath.   Diarrhea   This is a new problem. The current episode started in the past 7 days. The problem occurs 2 to 4 times per day. The stool consistency is described as watery. Associated symptoms include coughing, headaches, myalgias and a URI. Pertinent negatives include no abdominal pain or fever.       Past Medical History:   Diagnosis Date    Arthritis     Coronary artery disease     Encounter for blood transfusion     Epilepsy     GERD (gastroesophageal reflux disease)     Headache     Hypertension     MI, old     MS (multiple sclerosis)     Seizures     epilepsy       Review of patient's allergies indicates:   Allergen Reactions    Codeine      Other reaction(s): throat tightness    Dilantin [phenytoin sodium extended]     Phenobarbital     Tegretol [carbamazepine]          Current Outpatient Medications:     albuterol (PROAIR HFA) 90 mcg/actuation inhaler, Inhale 2 puffs into the lungs every 6 (six) hours as needed for Wheezing. Rescue, Disp: 6.7 g, Rfl: 1    atorvastatin (LIPITOR) 40 MG tablet, TAKE 1 TABLET BY MOUTH EVERY DAY, Disp: 90 tablet, Rfl: 0    cholecalciferol, vitamin D3, (VITAMIN D3) 5,000 unit Tab, Take 5,000 Units by mouth once daily., Disp: , Rfl:     clonazePAM (KLONOPIN) 1 MG tablet, Take 1 tablet (1 mg total) by mouth 2 (two) times daily., Disp: 28 tablet, Rfl: 0    clopidogrel (PLAVIX) 75  mg tablet, Take 75 mg by mouth once daily.  , Disp: , Rfl:     cyclobenzaprine (FLEXERIL) 5 MG tablet, Take 5 mg by mouth as needed., Disp: , Rfl:     doxycycline (MONODOX) 100 MG capsule, Take 1 capsule (100 mg total) by mouth 2 (two) times daily. for 7 days, Disp: 14 capsule, Rfl: 0    DULoxetine (CYMBALTA) 20 MG capsule, Take 1 capsule (20 mg total) by mouth once daily., Disp: 90 capsule, Rfl: 1    famotidine (PEPCID) 20 MG tablet, Take 20 mg by mouth 2 (two) times daily as needed for Heartburn., Disp: , Rfl:     glatiramer (COPAXONE) 40 mg/mL injection, Inject 40 mg into the skin 3 (three) times a week., Disp: 36 Syringe, Rfl: 1    modafiniL (PROVIGIL) 100 MG Tab, Take 1 tablet (100 mg total) by mouth 2 (two) times daily., Disp: 180 tablet, Rfl: 1    nitroGLYCERIN (NITROSTAT) 0.4 MG SL tablet, Place 0.4 mg under the tongue every 5 (five) minutes as needed for Chest pain. , Disp: , Rfl:     prochlorperazine (COMPAZINE) 10 MG tablet, Take 1 tablet (10 mg total) by mouth every 6 (six) hours as needed (migraine or nausea). (Patient not taking: Reported on 11/11/2020), Disp: 120 tablet, Rfl: 3    promethazine-dextromethorphan (PROMETHAZINE-DM) 6.25-15 mg/5 mL Syrp, Take 5 mLs by mouth 4 (four) times daily as needed., Disp: 118 mL, Rfl: 1    PSYLLIUM SEED, WITH DEXTROSE, (FIBER ORAL), Take by mouth 2 (two) times daily., Disp: , Rfl:     topiramate (TOPAMAX) 50 MG tablet, Take 1 tablet (50 mg total) by mouth 2 (two) times daily., Disp: 180 tablet, Rfl: 1    Current Facility-Administered Medications:     onabotulinumtoxina injection 200 Units, 200 Units, Intramuscular, Q90 Days, Janet Perea MD, 200 Units at 07/08/20 1707    Review of Systems   Constitutional: Negative for fever and unexpected weight change.   HENT: Positive for ear pain. Negative for postnasal drip, sore throat and trouble swallowing.    Eyes: Negative for visual disturbance.   Respiratory: Positive for cough. Negative for shortness of  breath.    Cardiovascular: Negative for chest pain, palpitations and leg swelling.   Gastrointestinal: Positive for diarrhea. Negative for abdominal pain and blood in stool.   Genitourinary: Negative for hematuria.   Musculoskeletal: Positive for myalgias.   Skin: Negative for rash.   Allergic/Immunologic: Negative for immunocompromised state.   Neurological: Positive for headaches.   Hematological: Does not bruise/bleed easily.   Psychiatric/Behavioral: Negative for agitation. The patient is not nervous/anxious.        Objective:      There were no vitals taken for this visit.  Physical Exam  Constitutional:       Appearance: She is obese. She is ill-appearing.   Pulmonary:      Effort: Pulmonary effort is normal.   Neurological:      Mental Status: She is alert and oriented to person, place, and time.         Assessment:       1. Cough productive of purulent sputum    2. Myalgia    3. Diarrhea, unspecified type    4. Tension-type headache, not intractable, unspecified chronicity pattern    5. Overweight (BMI 25.0-29.9)        Plan:       Cough productive of purulent sputum  -     doxycycline (MONODOX) 100 MG capsule; Take 1 capsule (100 mg total) by mouth 2 (two) times daily. for 7 days  Dispense: 14 capsule; Refill: 0  -     albuterol (PROAIR HFA) 90 mcg/actuation inhaler; Inhale 2 puffs into the lungs every 6 (six) hours as needed for Wheezing. Rescue  Dispense: 6.7 g; Refill: 1  -     promethazine-dextromethorphan (PROMETHAZINE-DM) 6.25-15 mg/5 mL Syrp; Take 5 mLs by mouth 4 (four) times daily as needed.  Dispense: 118 mL; Refill: 1  -     X-Ray Chest PA And Lateral; Future; Expected date: 11/20/2020    Myalgia  -     COVID-19 Routine Screening; Future; Expected date: 11/20/2020    Diarrhea, unspecified type  -     COVID-19 Routine Screening; Future; Expected date: 11/20/2020    Tension-type headache, not intractable, unspecified chronicity pattern  -     COVID-19 Routine Screening; Future; Expected date:  "11/20/2020    Overweight (BMI 25.0-29.9)  Counseled patient on his ideal body weight, health consequences of being obese and current recommendations including weekly exercise and a heart healthy diet.  Current BMI is:Estimated body mass index is 28.92 kg/m² as calculated from the following:    Height as of 11/11/20: 5' 3" (1.6 m).    Weight as of 11/11/20: 74 kg (163 lb 4 oz)..  Patient is aware that ideal BMI < 25 or  .            Time spent with patient: 20 minutes    Patient with be reevaluated in 4 weeks or sooner prn    Greater than 50% of this visit was spent counseling as described in above documentation:Yes  "

## 2020-11-23 DIAGNOSIS — J40 BRONCHITIS: ICD-10-CM

## 2020-11-23 DIAGNOSIS — R05.8 COUGH PRODUCTIVE OF PURULENT SPUTUM: Primary | ICD-10-CM

## 2020-11-23 RX ORDER — METHYLPREDNISOLONE 4 MG/1
TABLET ORAL
Qty: 1 PACKAGE | Refills: 0 | Status: SHIPPED | OUTPATIENT
Start: 2020-11-23 | End: 2020-12-14

## 2020-11-24 ENCOUNTER — CLINICAL SUPPORT (OUTPATIENT)
Dept: REHABILITATION | Facility: HOSPITAL | Age: 70
End: 2020-11-24
Payer: MEDICARE

## 2020-11-24 DIAGNOSIS — Z74.09 IMPAIRED FUNCTIONAL MOBILITY, BALANCE, AND ENDURANCE: Primary | ICD-10-CM

## 2020-11-24 DIAGNOSIS — R26.9 GAIT DISTURBANCE: ICD-10-CM

## 2020-11-24 DIAGNOSIS — G35 MULTIPLE SCLEROSIS: ICD-10-CM

## 2020-11-24 PROCEDURE — 97110 THERAPEUTIC EXERCISES: CPT | Mod: PN,CQ

## 2020-11-24 NOTE — PROGRESS NOTES
"  Physical Therapy Daily Treatment Note     Name: Alyssa John  Clinic Number: [id number]    Therapy Diagnosis:   Encounter Diagnoses   Name Primary?    Impaired functional mobility, balance, and endurance Yes    Gait disturbance     Multiple sclerosis      Physician: Risa Oshea PA-C    Visit Date: 11/24/2020    Physician Orders: PT Eval and Treat   Medical Diagnosis from Referral:   R26.9 (ICD-10-CM) - Gait disturbance   G35 (ICD-10-CM) - Multiple sclerosis   Authorization Period Expiration: 10/12/2021  Plan of Care Expiration: 12/30/2020  Visit # / Visits authorized: 6/ 9     Precautions: Falls, immunocompromised    Time In: 1535  Time Out: 1620  Total Billable Time: 45    Subjective     Pt reports: "I don't feel well, but I am doing better than last week"  She was compliant with home exercise program.  Response to previous treatment: No complaints  Functional change: Transfers are improving    Pain: 3/10  Location: L 5th digit    Objective     Alyssa received therapeutic exercises to develop strength and endurance for 45 minutes including:  NuStep 12' L2  // bars:   Sit to stand mod A x 3   Standing weight shifts Fwd/bwd   Gait 10ft x 2 fwd and bwd with mod A  6-4" steps, ascend/descend x 1 with max A for fwd balance step thru technique    Education provided:   - Continue with current home exercise program as instructed  - Discussed monitoring symptoms and stopping activities which cause increased pain  - Confirmed date/time of next appointment    Education provided:    - Importance of posture and trunk control for balance and mobility  - Discussed monitoring symptoms and stopping activities which cause increased pain     Written Home Exercises Provided: Needs to be updated    Assessment   Patient L trunk leaning in sittng and req's mod/max VC and mod TC to correct.  Patient with posterior leaning with sit to stand and in standing, VC's to shift weight anteriorly.      Alyssa is not progressing " well towards her goals.   Pt prognosis is .     Pt will continue to benefit from skilled outpatient physical therapy to address the deficits listed in the problem list box on initial evaluation, provide pt/family education and to maximize pt's level of independence in the home and community environment.     Pt's spiritual, cultural and educational needs considered and pt agreeable to plan of care and goals.     Anticipated barriers to physical therapy: Progressive debilitating disorder  New Short Term Goals:     1) Pt will stand without posterior lean 5 of 10 trials (progressing)  2) Pt will perform sit to stand x 5 reps with min A (progressing)  3) Pt will improve LE strength by 1/2 muscle grade (progressing)  Long Term Goals:     1) Pt will consistently perform sit to stand transfers with minimum to contact guard assistance (progressing)  2) Pt will stand with slight UE support (progressing)  3) Pt will perform toilet transfer with minimal assistance (progressing)  4) Pt/family will be independent in HEP. (progressing)      Plan      Continue with established Plan of Care towards PT goals. Gradual progression of strengthening/transfer/gait activities per pt tolerance.

## 2020-11-27 ENCOUNTER — CLINICAL SUPPORT (OUTPATIENT)
Dept: REHABILITATION | Facility: HOSPITAL | Age: 70
End: 2020-11-27
Payer: MEDICARE

## 2020-11-27 DIAGNOSIS — Z74.09 IMPAIRED FUNCTIONAL MOBILITY, BALANCE, AND ENDURANCE: ICD-10-CM

## 2020-11-27 PROCEDURE — 97110 THERAPEUTIC EXERCISES: CPT | Mod: PN

## 2020-11-27 NOTE — PROGRESS NOTES
Physical Therapy Daily Treatment Note     Time In: 1553  Time Out: 1632  Total Billable Time: 39 minutes    Name: Alyssa John  Clinic Number: [id number]    Therapy Diagnosis:   Encounter Diagnosis   Name Primary?    Impaired functional mobility, balance, and endurance      Physician: Risa Oshea PA-C    Visit Date: 11/27/2020    Physician Orders: PT Eval and Treat   Medical Diagnosis from Referral:   R26.9 (ICD-10-CM) - Gait disturbance   G35 (ICD-10-CM) - Multiple sclerosis   Authorization Period Expiration: 10/12/2021  Plan of Care Expiration: 12/30/2020  Visit # / Visits authorized: 7/ 9     Precautions: Falls, immunocompromised      Subjective     Pt reports: feeling a little better than last week.  She was compliant with home exercise program.  Response to previous treatment: no complaints  Functional change: no    Pain: 0/10  Location:     Objective     Alyssa received therapeutic exercises to develop strength and endurance for 39 minutes including:      Transfer transport chair<>bike min/mod A  Stationary bike x 12 min L1.5 with manual/verbal cues for posture/maintaining midline      // bars:     Fwd/bwd gait 10 ft x 2 each   Sidestepping 10 ft x 2   Standing hip abduction x 10 L/R    Shuttle leg press squats 43# 30 x 2, 37# heel raises x 10      Education provided:     - Continue with current home exercise program as instructed  - Discussed monitoring symptoms and stopping activities which cause increased pain  - Confirmed date/time of next appointment    Education provided:      - Importance of posture and trunk control for balance and mobility  - Discussed monitoring symptoms and stopping activities which cause increased pain     Written Home Exercises Provided: Needs to be updated  Assessment     Alyssa is progressing well towards her goals.   Pt prognosis is Good.     Pt will continue to benefit from skilled outpatient physical therapy to address the deficits listed in the problem list  box on initial evaluation, provide pt/family education and to maximize pt's level of independence in the home and community environment.     Pt's spiritual, cultural and educational needs considered and pt agreeable to plan of care and goals.     Anticipated barriers to physical therapy:     New Short Term Goals:     1) Pt will stand without posterior lean 5 of 10 trials    2) Pt will perform sit to stand x 5 reps with min A  3) Pt will improve LE strength by 1/2 muscle grade  Long Term Goals:     1) Pt will consistently perform sit to stand transfers with minimum to contact guard assistance  2) Pt will stand with slight UE support  3) Pt will perform toilet transfer with minimal assistance  4) Pt/family will be independent in HEP.     Short Term Goal Status:  1) Not met  2) Not met  3) Not met     Long Term Goal Status:  1) Not met  2) Not met  3) Not met  4) Not met        Plan      Continue with established Plan of Care towards PT goals. Gradual progression of strengthening/transfer/gait activities per pt tolerance.

## 2020-12-01 ENCOUNTER — CLINICAL SUPPORT (OUTPATIENT)
Dept: REHABILITATION | Facility: HOSPITAL | Age: 70
End: 2020-12-01
Payer: MEDICARE

## 2020-12-01 DIAGNOSIS — Z74.09 IMPAIRED FUNCTIONAL MOBILITY, BALANCE, AND ENDURANCE: Primary | ICD-10-CM

## 2020-12-01 DIAGNOSIS — R26.9 GAIT DISTURBANCE: ICD-10-CM

## 2020-12-01 PROCEDURE — 97110 THERAPEUTIC EXERCISES: CPT | Mod: PN,CQ

## 2020-12-01 NOTE — PROGRESS NOTES
"  Physical Therapy Daily Treatment Note     Name: Alyssa John  Clinic Number: [id number]    Therapy Diagnosis:   Encounter Diagnoses   Name Primary?    Impaired functional mobility, balance, and endurance Yes    Gait disturbance      Physician: Risa Oshea PA-C    Visit Date: 12/1/2020    Physician Orders: PT Eval and Treat   Medical Diagnosis from Referral:   R26.9 (ICD-10-CM) - Gait disturbance   G35 (ICD-10-CM) - Multiple sclerosis   Authorization Period Expiration: 10/12/2021  Plan of Care Expiration: 12/30/2020  Visit # / Visits authorized: 8/ 9     Precautions: Falls, immunocompromised    Time In: 1412  Time Out: 1500  Total Billable Time: 48    Subjective     Pt reports: "I do feel like my transfers are getting so much better"  She was compliant with home exercise program.  Response to previous treatment: No complaints  Functional change: Transfers are improving    Pain: 3/10  Location: L 5th digit    Objective     Alyssa received therapeutic exercises to develop strength and endurance for 48 minutes including:  Bike 10' L1.5  T/f x4 with CGA/Min from sit<>stand<>pivot<>sitting  Sitting EOM with B feet on floor, mirror in front, cues for postural correction   Reaching fwd with tball x 10, pulling back with tball x 10, sitting EOM with tball B UE flexion x 10, L lateral lean with tball x 10, R lateral lean with tball x 10    Education provided:   - Continue with current home exercise program as instructed  - Discussed monitoring symptoms and stopping activities which cause increased pain  - Confirmed date/time of next appointment    Education provided:    - Importance of posture and trunk control for balance and mobility  - Discussed monitoring symptoms and stopping activities which cause increased pain     Written Home Exercises Provided: Needs to be updated    Assessment   Patient remains with decreased postural awareness unless she has a mirror to see her positioning, feels she is unable to " correct without visual cues, which is why lateral lean with theraball was added to increase WB L/R and teach patient the feel of lateral weight shifting     Alyssa is not progressing well towards her goals.   Pt prognosis is .     Pt will continue to benefit from skilled outpatient physical therapy to address the deficits listed in the problem list box on initial evaluation, provide pt/family education and to maximize pt's level of independence in the home and community environment.     Pt's spiritual, cultural and educational needs considered and pt agreeable to plan of care and goals.     Anticipated barriers to physical therapy: Progressive debilitating disorder  New Short Term Goals:     1) Pt will stand without posterior lean 5 of 10 trials (progressing)  2) Pt will perform sit to stand x 5 reps with min A (progressing)  3) Pt will improve LE strength by 1/2 muscle grade (progressing)  Long Term Goals:     1) Pt will consistently perform sit to stand transfers with minimum to contact guard assistance (progressing)  2) Pt will stand with slight UE support (progressing)  3) Pt will perform toilet transfer with minimal assistance (progressing)  4) Pt/family will be independent in HEP. (progressing)      Plan      Continue with established Plan of Care towards PT goals. Gradual progression of strengthening/transfer/gait activities per pt tolerance.

## 2020-12-04 ENCOUNTER — CLINICAL SUPPORT (OUTPATIENT)
Dept: REHABILITATION | Facility: HOSPITAL | Age: 70
End: 2020-12-04
Payer: MEDICARE

## 2020-12-04 DIAGNOSIS — R26.9 GAIT DISTURBANCE: ICD-10-CM

## 2020-12-04 DIAGNOSIS — Z74.09 IMPAIRED FUNCTIONAL MOBILITY, BALANCE, AND ENDURANCE: Primary | ICD-10-CM

## 2020-12-04 PROCEDURE — 97116 GAIT TRAINING THERAPY: CPT | Mod: PN,CQ

## 2020-12-04 PROCEDURE — 97110 THERAPEUTIC EXERCISES: CPT | Mod: PN,CQ

## 2020-12-04 NOTE — PROGRESS NOTES
"  Physical Therapy Daily Treatment Note     Name: Alyssa John  Clinic Number: [id number]    Therapy Diagnosis:   Encounter Diagnoses   Name Primary?    Impaired functional mobility, balance, and endurance Yes    Gait disturbance      Physician: Risa Oshea PA-C    Visit Date: 12/4/2020    Physician Orders: PT Eval and Treat   Medical Diagnosis from Referral:   R26.9 (ICD-10-CM) - Gait disturbance   G35 (ICD-10-CM) - Multiple sclerosis   Authorization Period Expiration: 10/12/2021  Plan of Care Expiration: 12/30/2020  Visit # / Visits authorized: 9/ 9     Precautions: Falls, immunocompromised    Time In: 1400  Time Out: 1450  Total Billable Time: 50    Subjective     Pt reports: "I am doing alright"  She was compliant with home exercise program.  Response to previous treatment: No complaints  Functional change: Transfers are improving    Pain: 1/10  Location: L 5th digit    Objective     Alyssa received therapeutic exercises to develop strength and endurance for 35 minutes including:  T/f x4 with CGA/Min from sit<>stand<>pivot<>sitting  Sitting EOM with B feet on airex, mirror in front, cues for postural correction   Reaching fwd with tball x 10, pulling back with tball x 10, sitting EOM with tball B UE flexion x 10, L lateral lean with tball x 10, R lateral lean with tball x 10    Gait training x 15' with RW and min A for correct posturing, 103 ft, cues for posturing with upright head and trunk.    Education provided:   - Continue with current home exercise program as instructed  - Discussed monitoring symptoms and stopping activities which cause increased pain  - Confirmed date/time of next appointment    Education provided:    - Importance of posture and trunk control for balance and mobility  - Discussed monitoring symptoms and stopping activities which cause increased pain     Written Home Exercises Provided: Needs to be updated    Assessment   Patient fatigued with gait after therex today, " increased gait and quality of gait, patients feet clearing floor until patient became fatigued, then patients gait became short and staggered steps    Alyssa is not progressing well towards her goals.   Pt prognosis is .     Pt will continue to benefit from skilled outpatient physical therapy to address the deficits listed in the problem list box on initial evaluation, provide pt/family education and to maximize pt's level of independence in the home and community environment.     Pt's spiritual, cultural and educational needs considered and pt agreeable to plan of care and goals.     Anticipated barriers to physical therapy: Progressive debilitating disorder  New Short Term Goals:     1) Pt will stand without posterior lean 5 of 10 trials (progressing)  2) Pt will perform sit to stand x 5 reps with min A (progressing)  3) Pt will improve LE strength by 1/2 muscle grade (progressing)  Long Term Goals:     1) Pt will consistently perform sit to stand transfers with minimum to contact guard assistance (progressing)  2) Pt will stand with slight UE support (progressing)  3) Pt will perform toilet transfer with minimal assistance (progressing)  4) Pt/family will be independent in HEP. (progressing)      Plan      Continue with established Plan of Care towards PT goals. Gradual progression of strengthening/transfer/gait activities per pt tolerance.

## 2020-12-09 ENCOUNTER — PATIENT MESSAGE (OUTPATIENT)
Dept: NEUROLOGY | Facility: CLINIC | Age: 70
End: 2020-12-09

## 2020-12-10 NOTE — TELEPHONE ENCOUNTER
"Faxed [employer] "prescriber application" to [facility] at [phone] for Glatopa 40 mg.  "

## 2020-12-11 ENCOUNTER — CLINICAL SUPPORT (OUTPATIENT)
Dept: REHABILITATION | Facility: HOSPITAL | Age: 70
End: 2020-12-11
Payer: MEDICARE

## 2020-12-11 DIAGNOSIS — Z74.09 IMPAIRED FUNCTIONAL MOBILITY, BALANCE, AND ENDURANCE: ICD-10-CM

## 2020-12-11 PROCEDURE — 97530 THERAPEUTIC ACTIVITIES: CPT | Mod: PN

## 2020-12-11 NOTE — PLAN OF CARE
Outpatient Therapy Discharge Summary     Name: Alyssa John  Clinic Number: [id number]    Therapy Diagnosis:   Encounter Diagnosis   Name Primary?    Impaired functional mobility, balance, and endurance      Physician: Risa Oshea, GUILLERMO    Physician Orders: PT Eval and Treat   Medical Diagnosis from Referral:   R26.9 (ICD-10-CM) - Gait disturbance   G35 (ICD-10-CM) - Multiple sclerosis         Date of Last visit: 12/11/2020  Total Visits Received: see EMR  Cancelled Visits: see EMR  No Show Visits: see EMR      Subjective     Patient reports feeling like she is moving around better at home.     Objective     Reassessment:    LE strength:  R hip flex 4/5, knee ext 4-/5, knee flex 4/5, ankle DF 4-/5  L hip flex 4-/5, knee ext 3+/5, knee flex 4-/5, ankle DF 3/5     Alyssa participated in dynamic functional therapeutic activities to improve functional performance for 10 minutes, including:     Transfer w/c<>chair CGA/min A   Sit<>stand<>pivot<>sitting    5 x sit to stand (timed) test from armchair using RW:   1st trial--1 min 20 sec with poor mechanics - not scooting forward to edge of seat, needing mod A and not coming up to full standing, some uncontrolled descents. Unable to correct with cueing due to patient trying to improve her score    2nd trial after reviewing proper mechanics for sit to stand - 2 min 50 sec with improved quality    Gait training x 9 min: 89 ft, 20 ft with RW at CGA with cues for L foot clearance and posture      Assessment      Patient with slight improvement in strength compared to her last POC update. She continues to transfer with CGA to min A and requires cueing every visit for correct sequencing, hand placement. Pt initially with faster 5x sit to stand time but with poor quality despite cueing. After resting with verbal review of sequencing, pt able to partially correct quality with increased time compared to last POC update. Patient continues to have fluctuating endurance in  regards to gait, with distance varying from visit to visit. Patient appears to have reached a plateau with functional mobility and is appropriate for discharge to a home program. Reviewed correct sequencing for sit to stands and transfers with patient and spouse and provided a written/pictorial handout to use for reference at home. Encouraged daily exercises and gait training with spouse assistance. Discussed discharge with patient and spouse, all questions answered.     Goals: Partially met    Discharge reason: Patient has reached the maximum rehab potential for the present time    Plan   This patient is discharged from Physical Therapy

## 2021-01-22 ENCOUNTER — PATIENT MESSAGE (OUTPATIENT)
Dept: ADMINISTRATIVE | Facility: OTHER | Age: 71
End: 2021-01-22

## 2021-02-04 ENCOUNTER — IMMUNIZATION (OUTPATIENT)
Dept: PRIMARY CARE CLINIC | Facility: CLINIC | Age: 71
End: 2021-02-04
Payer: MEDICARE

## 2021-02-04 DIAGNOSIS — Z23 NEED FOR VACCINATION: Primary | ICD-10-CM

## 2021-02-04 PROCEDURE — 91300 COVID-19, MRNA, LNP-S, PF, 30 MCG/0.3 ML DOSE VACCINE: CPT | Mod: S$GLB,,, | Performed by: FAMILY MEDICINE

## 2021-02-04 PROCEDURE — 0001A COVID-19, MRNA, LNP-S, PF, 30 MCG/0.3 ML DOSE VACCINE: ICD-10-PCS | Mod: CV19,S$GLB,, | Performed by: FAMILY MEDICINE

## 2021-02-04 PROCEDURE — 0001A COVID-19, MRNA, LNP-S, PF, 30 MCG/0.3 ML DOSE VACCINE: CPT | Mod: CV19,S$GLB,, | Performed by: FAMILY MEDICINE

## 2021-02-04 PROCEDURE — 91300 COVID-19, MRNA, LNP-S, PF, 30 MCG/0.3 ML DOSE VACCINE: ICD-10-PCS | Mod: S$GLB,,, | Performed by: FAMILY MEDICINE

## 2021-02-05 ENCOUNTER — PATIENT MESSAGE (OUTPATIENT)
Dept: NEUROLOGY | Facility: CLINIC | Age: 71
End: 2021-02-05

## 2021-02-18 ENCOUNTER — PATIENT MESSAGE (OUTPATIENT)
Dept: NEUROLOGY | Facility: CLINIC | Age: 71
End: 2021-02-18

## 2021-02-25 ENCOUNTER — IMMUNIZATION (OUTPATIENT)
Dept: PRIMARY CARE CLINIC | Facility: CLINIC | Age: 71
End: 2021-02-25
Payer: MEDICARE

## 2021-02-25 DIAGNOSIS — Z23 NEED FOR VACCINATION: Primary | ICD-10-CM

## 2021-02-25 PROCEDURE — 0002A COVID-19, MRNA, LNP-S, PF, 30 MCG/0.3 ML DOSE VACCINE: ICD-10-PCS | Mod: CV19,S$GLB,, | Performed by: FAMILY MEDICINE

## 2021-02-25 PROCEDURE — 91300 COVID-19, MRNA, LNP-S, PF, 30 MCG/0.3 ML DOSE VACCINE: CPT | Mod: S$GLB,,, | Performed by: FAMILY MEDICINE

## 2021-02-25 PROCEDURE — 91300 COVID-19, MRNA, LNP-S, PF, 30 MCG/0.3 ML DOSE VACCINE: ICD-10-PCS | Mod: S$GLB,,, | Performed by: FAMILY MEDICINE

## 2021-02-25 PROCEDURE — 0002A COVID-19, MRNA, LNP-S, PF, 30 MCG/0.3 ML DOSE VACCINE: CPT | Mod: CV19,S$GLB,, | Performed by: FAMILY MEDICINE

## 2021-03-11 ENCOUNTER — OFFICE VISIT (OUTPATIENT)
Dept: FAMILY MEDICINE | Facility: CLINIC | Age: 71
End: 2021-03-11
Payer: MEDICARE

## 2021-03-11 ENCOUNTER — TELEPHONE (OUTPATIENT)
Dept: FAMILY MEDICINE | Facility: CLINIC | Age: 71
End: 2021-03-11

## 2021-03-11 VITALS
DIASTOLIC BLOOD PRESSURE: 82 MMHG | WEIGHT: 158.06 LBS | SYSTOLIC BLOOD PRESSURE: 152 MMHG | HEART RATE: 68 BPM | TEMPERATURE: 97 F | HEIGHT: 63 IN | BODY MASS INDEX: 28 KG/M2 | OXYGEN SATURATION: 97 %

## 2021-03-11 DIAGNOSIS — H92.01 PAIN OF RIGHT MASTOID: ICD-10-CM

## 2021-03-11 DIAGNOSIS — H92.01 RIGHT EAR PAIN: Primary | ICD-10-CM

## 2021-03-11 PROCEDURE — 1100F PTFALLS ASSESS-DOCD GE2>/YR: CPT | Mod: CPTII,S$GLB,, | Performed by: STUDENT IN AN ORGANIZED HEALTH CARE EDUCATION/TRAINING PROGRAM

## 2021-03-11 PROCEDURE — 3008F BODY MASS INDEX DOCD: CPT | Mod: CPTII,S$GLB,, | Performed by: STUDENT IN AN ORGANIZED HEALTH CARE EDUCATION/TRAINING PROGRAM

## 2021-03-11 PROCEDURE — 99999 PR PBB SHADOW E&M-EST. PATIENT-LVL III: ICD-10-PCS | Mod: PBBFAC,,, | Performed by: STUDENT IN AN ORGANIZED HEALTH CARE EDUCATION/TRAINING PROGRAM

## 2021-03-11 PROCEDURE — 3288F PR FALLS RISK ASSESSMENT DOCUMENTED: ICD-10-PCS | Mod: CPTII,S$GLB,, | Performed by: STUDENT IN AN ORGANIZED HEALTH CARE EDUCATION/TRAINING PROGRAM

## 2021-03-11 PROCEDURE — 1159F PR MEDICATION LIST DOCUMENTED IN MEDICAL RECORD: ICD-10-PCS | Mod: S$GLB,,, | Performed by: STUDENT IN AN ORGANIZED HEALTH CARE EDUCATION/TRAINING PROGRAM

## 2021-03-11 PROCEDURE — 3077F SYST BP >= 140 MM HG: CPT | Mod: CPTII,S$GLB,, | Performed by: STUDENT IN AN ORGANIZED HEALTH CARE EDUCATION/TRAINING PROGRAM

## 2021-03-11 PROCEDURE — 1125F AMNT PAIN NOTED PAIN PRSNT: CPT | Mod: S$GLB,,, | Performed by: STUDENT IN AN ORGANIZED HEALTH CARE EDUCATION/TRAINING PROGRAM

## 2021-03-11 PROCEDURE — 1159F MED LIST DOCD IN RCRD: CPT | Mod: S$GLB,,, | Performed by: STUDENT IN AN ORGANIZED HEALTH CARE EDUCATION/TRAINING PROGRAM

## 2021-03-11 PROCEDURE — 3008F PR BODY MASS INDEX (BMI) DOCUMENTED: ICD-10-PCS | Mod: CPTII,S$GLB,, | Performed by: STUDENT IN AN ORGANIZED HEALTH CARE EDUCATION/TRAINING PROGRAM

## 2021-03-11 PROCEDURE — 1100F PR PT FALLS ASSESS DOC 2+ FALLS/FALL W/INJURY/YR: ICD-10-PCS | Mod: CPTII,S$GLB,, | Performed by: STUDENT IN AN ORGANIZED HEALTH CARE EDUCATION/TRAINING PROGRAM

## 2021-03-11 PROCEDURE — 3079F DIAST BP 80-89 MM HG: CPT | Mod: CPTII,S$GLB,, | Performed by: STUDENT IN AN ORGANIZED HEALTH CARE EDUCATION/TRAINING PROGRAM

## 2021-03-11 PROCEDURE — 99214 OFFICE O/P EST MOD 30 MIN: CPT | Mod: S$GLB,,, | Performed by: STUDENT IN AN ORGANIZED HEALTH CARE EDUCATION/TRAINING PROGRAM

## 2021-03-11 PROCEDURE — 99214 PR OFFICE/OUTPT VISIT, EST, LEVL IV, 30-39 MIN: ICD-10-PCS | Mod: S$GLB,,, | Performed by: STUDENT IN AN ORGANIZED HEALTH CARE EDUCATION/TRAINING PROGRAM

## 2021-03-11 PROCEDURE — 3079F PR MOST RECENT DIASTOLIC BLOOD PRESSURE 80-89 MM HG: ICD-10-PCS | Mod: CPTII,S$GLB,, | Performed by: STUDENT IN AN ORGANIZED HEALTH CARE EDUCATION/TRAINING PROGRAM

## 2021-03-11 PROCEDURE — 99999 PR PBB SHADOW E&M-EST. PATIENT-LVL III: CPT | Mod: PBBFAC,,, | Performed by: STUDENT IN AN ORGANIZED HEALTH CARE EDUCATION/TRAINING PROGRAM

## 2021-03-11 PROCEDURE — 3288F FALL RISK ASSESSMENT DOCD: CPT | Mod: CPTII,S$GLB,, | Performed by: STUDENT IN AN ORGANIZED HEALTH CARE EDUCATION/TRAINING PROGRAM

## 2021-03-11 PROCEDURE — 1125F PR PAIN SEVERITY QUANTIFIED, PAIN PRESENT: ICD-10-PCS | Mod: S$GLB,,, | Performed by: STUDENT IN AN ORGANIZED HEALTH CARE EDUCATION/TRAINING PROGRAM

## 2021-03-11 PROCEDURE — 3077F PR MOST RECENT SYSTOLIC BLOOD PRESSURE >= 140 MM HG: ICD-10-PCS | Mod: CPTII,S$GLB,, | Performed by: STUDENT IN AN ORGANIZED HEALTH CARE EDUCATION/TRAINING PROGRAM

## 2021-03-11 RX ORDER — DOXYCYCLINE 100 MG/1
100 CAPSULE ORAL EVERY 12 HOURS
Qty: 14 CAPSULE | Refills: 0 | Status: SHIPPED | OUTPATIENT
Start: 2021-03-11 | End: 2021-03-18

## 2021-03-11 RX ORDER — ALPRAZOLAM 0.25 MG/1
0.25 TABLET ORAL ONCE AS NEEDED
Qty: 2 TABLET | Refills: 0 | Status: SHIPPED | OUTPATIENT
Start: 2021-03-11 | End: 2021-03-29

## 2021-03-16 ENCOUNTER — PATIENT MESSAGE (OUTPATIENT)
Dept: NEUROLOGY | Facility: CLINIC | Age: 71
End: 2021-03-16

## 2021-03-16 DIAGNOSIS — Z87.898 HISTORY OF SEIZURE: ICD-10-CM

## 2021-03-16 DIAGNOSIS — R25.1 TREMOR: ICD-10-CM

## 2021-03-16 DIAGNOSIS — G35 MULTIPLE SCLEROSIS: ICD-10-CM

## 2021-03-18 RX ORDER — CLONAZEPAM 1 MG/1
1 TABLET ORAL 2 TIMES DAILY
Qty: 180 TABLET | Refills: 0 | Status: SHIPPED | OUTPATIENT
Start: 2021-03-18 | End: 2021-06-25

## 2021-03-19 ENCOUNTER — HOSPITAL ENCOUNTER (OUTPATIENT)
Dept: RADIOLOGY | Facility: HOSPITAL | Age: 71
Discharge: HOME | End: 2021-03-19
Attending: STUDENT IN AN ORGANIZED HEALTH CARE EDUCATION/TRAINING PROGRAM
Payer: MEDICARE

## 2021-03-19 DIAGNOSIS — H92.01 RIGHT EAR PAIN: ICD-10-CM

## 2021-03-19 DIAGNOSIS — H92.01 PAIN OF RIGHT MASTOID: ICD-10-CM

## 2021-03-19 PROCEDURE — 70480 CT ORBIT/EAR/FOSSA W/O DYE: CPT | Mod: TC

## 2021-03-19 PROCEDURE — 70480 CT TEMPORAL BONE WITHOUT CONTRAST: ICD-10-PCS | Mod: 26,,, | Performed by: RADIOLOGY

## 2021-03-19 PROCEDURE — 70480 CT ORBIT/EAR/FOSSA W/O DYE: CPT | Mod: 26,,, | Performed by: RADIOLOGY

## 2021-03-29 ENCOUNTER — OFFICE VISIT (OUTPATIENT)
Dept: NEUROLOGY | Facility: CLINIC | Age: 71
End: 2021-03-29
Payer: MEDICARE

## 2021-03-29 VITALS
HEART RATE: 70 BPM | BODY MASS INDEX: 28.88 KG/M2 | TEMPERATURE: 98 F | DIASTOLIC BLOOD PRESSURE: 82 MMHG | WEIGHT: 163 LBS | SYSTOLIC BLOOD PRESSURE: 136 MMHG | HEIGHT: 63 IN

## 2021-03-29 DIAGNOSIS — G35 MULTIPLE SCLEROSIS: ICD-10-CM

## 2021-03-29 DIAGNOSIS — Z74.09 IMPAIRED MOBILITY AND ADLS: ICD-10-CM

## 2021-03-29 DIAGNOSIS — Z74.09 IMMOBILITY: ICD-10-CM

## 2021-03-29 DIAGNOSIS — G56.01 CARPAL TUNNEL SYNDROME OF RIGHT WRIST: Primary | ICD-10-CM

## 2021-03-29 DIAGNOSIS — F32.A DEPRESSION, UNSPECIFIED DEPRESSION TYPE: ICD-10-CM

## 2021-03-29 DIAGNOSIS — M79.2 NEUROPATHIC PAIN: ICD-10-CM

## 2021-03-29 DIAGNOSIS — Z71.89 COUNSELING REGARDING GOALS OF CARE: ICD-10-CM

## 2021-03-29 DIAGNOSIS — Z78.9 IMPAIRED MOBILITY AND ADLS: ICD-10-CM

## 2021-03-29 PROCEDURE — 3075F PR MOST RECENT SYSTOLIC BLOOD PRESS GE 130-139MM HG: ICD-10-PCS | Mod: CPTII,S$GLB,, | Performed by: PSYCHIATRY & NEUROLOGY

## 2021-03-29 PROCEDURE — 99999 PR PBB SHADOW E&M-EST. PATIENT-LVL IV: ICD-10-PCS | Mod: PBBFAC,,, | Performed by: PSYCHIATRY & NEUROLOGY

## 2021-03-29 PROCEDURE — 99499 UNLISTED E&M SERVICE: CPT | Mod: S$GLB,,, | Performed by: PSYCHIATRY & NEUROLOGY

## 2021-03-29 PROCEDURE — 1101F PT FALLS ASSESS-DOCD LE1/YR: CPT | Mod: CPTII,S$GLB,, | Performed by: PSYCHIATRY & NEUROLOGY

## 2021-03-29 PROCEDURE — 1159F MED LIST DOCD IN RCRD: CPT | Mod: S$GLB,,, | Performed by: PSYCHIATRY & NEUROLOGY

## 2021-03-29 PROCEDURE — 3288F PR FALLS RISK ASSESSMENT DOCUMENTED: ICD-10-PCS | Mod: CPTII,S$GLB,, | Performed by: PSYCHIATRY & NEUROLOGY

## 2021-03-29 PROCEDURE — 99999 PR PBB SHADOW E&M-EST. PATIENT-LVL IV: CPT | Mod: PBBFAC,,, | Performed by: PSYCHIATRY & NEUROLOGY

## 2021-03-29 PROCEDURE — 1159F PR MEDICATION LIST DOCUMENTED IN MEDICAL RECORD: ICD-10-PCS | Mod: S$GLB,,, | Performed by: PSYCHIATRY & NEUROLOGY

## 2021-03-29 PROCEDURE — 3079F DIAST BP 80-89 MM HG: CPT | Mod: CPTII,S$GLB,, | Performed by: PSYCHIATRY & NEUROLOGY

## 2021-03-29 PROCEDURE — 3288F FALL RISK ASSESSMENT DOCD: CPT | Mod: CPTII,S$GLB,, | Performed by: PSYCHIATRY & NEUROLOGY

## 2021-03-29 PROCEDURE — 3008F PR BODY MASS INDEX (BMI) DOCUMENTED: ICD-10-PCS | Mod: CPTII,S$GLB,, | Performed by: PSYCHIATRY & NEUROLOGY

## 2021-03-29 PROCEDURE — 3008F BODY MASS INDEX DOCD: CPT | Mod: CPTII,S$GLB,, | Performed by: PSYCHIATRY & NEUROLOGY

## 2021-03-29 PROCEDURE — 1101F PR PT FALLS ASSESS DOC 0-1 FALLS W/OUT INJ PAST YR: ICD-10-PCS | Mod: CPTII,S$GLB,, | Performed by: PSYCHIATRY & NEUROLOGY

## 2021-03-29 PROCEDURE — 3079F PR MOST RECENT DIASTOLIC BLOOD PRESSURE 80-89 MM HG: ICD-10-PCS | Mod: CPTII,S$GLB,, | Performed by: PSYCHIATRY & NEUROLOGY

## 2021-03-29 PROCEDURE — 3075F SYST BP GE 130 - 139MM HG: CPT | Mod: CPTII,S$GLB,, | Performed by: PSYCHIATRY & NEUROLOGY

## 2021-03-29 PROCEDURE — 99215 OFFICE O/P EST HI 40 MIN: CPT | Mod: S$GLB,,, | Performed by: PSYCHIATRY & NEUROLOGY

## 2021-03-29 PROCEDURE — 1125F PR PAIN SEVERITY QUANTIFIED, PAIN PRESENT: ICD-10-PCS | Mod: S$GLB,,, | Performed by: PSYCHIATRY & NEUROLOGY

## 2021-03-29 PROCEDURE — 1125F AMNT PAIN NOTED PAIN PRSNT: CPT | Mod: S$GLB,,, | Performed by: PSYCHIATRY & NEUROLOGY

## 2021-03-29 PROCEDURE — 99499 RISK ADDL DX/OHS AUDIT: ICD-10-PCS | Mod: S$GLB,,, | Performed by: PSYCHIATRY & NEUROLOGY

## 2021-03-29 PROCEDURE — 99215 PR OFFICE/OUTPT VISIT, EST, LEVL V, 40-54 MIN: ICD-10-PCS | Mod: S$GLB,,, | Performed by: PSYCHIATRY & NEUROLOGY

## 2021-03-29 RX ORDER — DULOXETINE 40 MG/1
40 CAPSULE, DELAYED RELEASE ORAL DAILY
Qty: 30 CAPSULE | Refills: 5 | Status: SHIPPED | OUTPATIENT
Start: 2021-03-29 | End: 2021-04-05

## 2021-03-29 RX ORDER — ALPRAZOLAM 0.25 MG/1
TABLET ORAL
Qty: 2 TABLET | Refills: 0 | Status: SHIPPED | OUTPATIENT
Start: 2021-03-29 | End: 2021-06-28

## 2021-03-30 ENCOUNTER — LAB VISIT (OUTPATIENT)
Dept: LAB | Facility: HOSPITAL | Age: 71
End: 2021-03-30
Attending: PSYCHIATRY & NEUROLOGY
Payer: MEDICARE

## 2021-03-30 DIAGNOSIS — G35 MULTIPLE SCLEROSIS: ICD-10-CM

## 2021-03-30 LAB
ANION GAP SERPL CALC-SCNC: 8 MMOL/L (ref 8–16)
BUN SERPL-MCNC: 16 MG/DL (ref 8–23)
CALCIUM SERPL-MCNC: 8 MG/DL (ref 8.7–10.5)
CHLORIDE SERPL-SCNC: 111 MMOL/L (ref 95–110)
CO2 SERPL-SCNC: 20 MMOL/L (ref 23–29)
CREAT SERPL-MCNC: 0.8 MG/DL (ref 0.5–1.4)
EST. GFR  (AFRICAN AMERICAN): >60 ML/MIN/1.73 M^2
EST. GFR  (NON AFRICAN AMERICAN): >60 ML/MIN/1.73 M^2
GLUCOSE SERPL-MCNC: 83 MG/DL (ref 70–110)
POTASSIUM SERPL-SCNC: 3.6 MMOL/L (ref 3.5–5.1)
SODIUM SERPL-SCNC: 139 MMOL/L (ref 136–145)

## 2021-03-30 PROCEDURE — 86359 T CELLS TOTAL COUNT: CPT | Performed by: PSYCHIATRY & NEUROLOGY

## 2021-03-30 PROCEDURE — 80048 BASIC METABOLIC PNL TOTAL CA: CPT | Performed by: PSYCHIATRY & NEUROLOGY

## 2021-03-30 PROCEDURE — 86360 T CELL ABSOLUTE COUNT/RATIO: CPT | Performed by: PSYCHIATRY & NEUROLOGY

## 2021-03-31 LAB
ABSOLUTE CD3: 1099 CELLS/UL (ref 700–2100)
ABSOLUTE CD8: 353 CELLS/UL (ref 200–900)
CD3%: 73.7 % (ref 55–83)
CD3+CD4+ CELLS # BLD: 729 CELLS/UL (ref 300–1400)
CD3+CD4+ CELLS NFR BLD: 48.9 % (ref 28–57)
CD4/CD8 RATIO: 2.06 (ref 0.9–3.6)
CD8 % SUPPRESSOR T CELL: 23.7 % (ref 10–39)

## 2021-04-01 ENCOUNTER — HOSPITAL ENCOUNTER (OUTPATIENT)
Dept: RADIOLOGY | Facility: HOSPITAL | Age: 71
Discharge: HOME | End: 2021-04-01
Attending: ORTHOPAEDIC SURGERY
Payer: MEDICARE

## 2021-04-01 DIAGNOSIS — M79.641 BILATERAL HAND PAIN: ICD-10-CM

## 2021-04-01 DIAGNOSIS — M79.641 BILATERAL HAND PAIN: Primary | ICD-10-CM

## 2021-04-01 DIAGNOSIS — M79.642 BILATERAL HAND PAIN: ICD-10-CM

## 2021-04-01 DIAGNOSIS — M79.642 BILATERAL HAND PAIN: Primary | ICD-10-CM

## 2021-04-01 PROCEDURE — 73130 X-RAY EXAM OF HAND: CPT | Mod: TC,50,PO

## 2021-04-05 ENCOUNTER — OFFICE VISIT (OUTPATIENT)
Dept: ORTHOPEDICS | Facility: CLINIC | Age: 71
End: 2021-04-05
Attending: PSYCHIATRY & NEUROLOGY
Payer: MEDICARE

## 2021-04-05 VITALS — WEIGHT: 163 LBS | BODY MASS INDEX: 28.88 KG/M2 | HEIGHT: 63 IN

## 2021-04-05 DIAGNOSIS — R25.1 TREMOR OF RIGHT HAND: Primary | ICD-10-CM

## 2021-04-05 DIAGNOSIS — R29.898 HAND WEAKNESS: ICD-10-CM

## 2021-04-05 PROBLEM — G56.01 CARPAL TUNNEL SYNDROME OF RIGHT WRIST: Status: RESOLVED | Noted: 2021-04-05 | Resolved: 2021-04-05

## 2021-04-05 PROBLEM — G56.01 CARPAL TUNNEL SYNDROME OF RIGHT WRIST: Status: ACTIVE | Noted: 2021-04-05

## 2021-04-05 PROCEDURE — 3288F FALL RISK ASSESSMENT DOCD: CPT | Mod: CPTII,S$GLB,, | Performed by: ORTHOPAEDIC SURGERY

## 2021-04-05 PROCEDURE — 1159F MED LIST DOCD IN RCRD: CPT | Mod: S$GLB,,, | Performed by: ORTHOPAEDIC SURGERY

## 2021-04-05 PROCEDURE — 1100F PTFALLS ASSESS-DOCD GE2>/YR: CPT | Mod: CPTII,S$GLB,, | Performed by: ORTHOPAEDIC SURGERY

## 2021-04-05 PROCEDURE — 1100F PR PT FALLS ASSESS DOC 2+ FALLS/FALL W/INJURY/YR: ICD-10-PCS | Mod: CPTII,S$GLB,, | Performed by: ORTHOPAEDIC SURGERY

## 2021-04-05 PROCEDURE — 99999 PR PBB SHADOW E&M-EST. PATIENT-LVL III: ICD-10-PCS | Mod: PBBFAC,,, | Performed by: ORTHOPAEDIC SURGERY

## 2021-04-05 PROCEDURE — 3008F PR BODY MASS INDEX (BMI) DOCUMENTED: ICD-10-PCS | Mod: CPTII,S$GLB,, | Performed by: ORTHOPAEDIC SURGERY

## 2021-04-05 PROCEDURE — 3288F PR FALLS RISK ASSESSMENT DOCUMENTED: ICD-10-PCS | Mod: CPTII,S$GLB,, | Performed by: ORTHOPAEDIC SURGERY

## 2021-04-05 PROCEDURE — 99214 PR OFFICE/OUTPT VISIT, EST, LEVL IV, 30-39 MIN: ICD-10-PCS | Mod: S$GLB,,, | Performed by: ORTHOPAEDIC SURGERY

## 2021-04-05 PROCEDURE — 3008F BODY MASS INDEX DOCD: CPT | Mod: CPTII,S$GLB,, | Performed by: ORTHOPAEDIC SURGERY

## 2021-04-05 PROCEDURE — 99214 OFFICE O/P EST MOD 30 MIN: CPT | Mod: S$GLB,,, | Performed by: ORTHOPAEDIC SURGERY

## 2021-04-05 PROCEDURE — 1126F PR PAIN SEVERITY QUANTIFIED, NO PAIN PRESENT: ICD-10-PCS | Mod: S$GLB,,, | Performed by: ORTHOPAEDIC SURGERY

## 2021-04-05 PROCEDURE — 1159F PR MEDICATION LIST DOCUMENTED IN MEDICAL RECORD: ICD-10-PCS | Mod: S$GLB,,, | Performed by: ORTHOPAEDIC SURGERY

## 2021-04-05 PROCEDURE — 99999 PR PBB SHADOW E&M-EST. PATIENT-LVL III: CPT | Mod: PBBFAC,,, | Performed by: ORTHOPAEDIC SURGERY

## 2021-04-05 PROCEDURE — 1126F AMNT PAIN NOTED NONE PRSNT: CPT | Mod: S$GLB,,, | Performed by: ORTHOPAEDIC SURGERY

## 2021-04-10 ENCOUNTER — HOSPITAL ENCOUNTER (OUTPATIENT)
Dept: RADIOLOGY | Facility: HOSPITAL | Age: 71
Discharge: HOME | End: 2021-04-10
Attending: PSYCHIATRY & NEUROLOGY
Payer: MEDICARE

## 2021-04-10 DIAGNOSIS — G35 MULTIPLE SCLEROSIS: ICD-10-CM

## 2021-04-10 PROCEDURE — 25500020 PHARM REV CODE 255: Performed by: PSYCHIATRY & NEUROLOGY

## 2021-04-10 PROCEDURE — 72156 MRI CERVICAL SPINE DEMYELINATING W W/O CONTRAST: ICD-10-PCS | Mod: 26,,, | Performed by: RADIOLOGY

## 2021-04-10 PROCEDURE — 72156 MRI NECK SPINE W/O & W/DYE: CPT | Mod: TC

## 2021-04-10 PROCEDURE — 70553 MRI BRAIN STEM W/O & W/DYE: CPT | Mod: TC

## 2021-04-10 PROCEDURE — 72156 MRI NECK SPINE W/O & W/DYE: CPT | Mod: 26,,, | Performed by: RADIOLOGY

## 2021-04-10 PROCEDURE — 70553 MRI BRAIN DEMYELINATING W/ WO CONTRAST: ICD-10-PCS | Mod: 26,,, | Performed by: RADIOLOGY

## 2021-04-10 PROCEDURE — 70553 MRI BRAIN STEM W/O & W/DYE: CPT | Mod: 26,,, | Performed by: RADIOLOGY

## 2021-04-10 PROCEDURE — A9585 GADOBUTROL INJECTION: HCPCS | Performed by: PSYCHIATRY & NEUROLOGY

## 2021-04-10 RX ORDER — GADOBUTROL 604.72 MG/ML
8 INJECTION INTRAVENOUS
Status: COMPLETED | OUTPATIENT
Start: 2021-04-10 | End: 2021-04-10

## 2021-04-10 RX ADMIN — GADOBUTROL 8 ML: 604.72 INJECTION INTRAVENOUS at 11:04

## 2021-04-20 ENCOUNTER — PROCEDURE VISIT (OUTPATIENT)
Dept: PHYSICAL MEDICINE AND REHAB | Facility: CLINIC | Age: 71
End: 2021-04-20
Payer: MEDICARE

## 2021-04-20 DIAGNOSIS — G56.20 ULNAR NEUROPATHY AT ELBOW, UNSPECIFIED LATERALITY: Primary | ICD-10-CM

## 2021-04-20 DIAGNOSIS — R29.898 HAND WEAKNESS: ICD-10-CM

## 2021-04-20 DIAGNOSIS — G56.01 CARPAL TUNNEL SYNDROME ON RIGHT: ICD-10-CM

## 2021-04-20 PROCEDURE — 95886 MUSC TEST DONE W/N TEST COMP: CPT | Mod: S$GLB,,, | Performed by: PHYSICAL MEDICINE & REHABILITATION

## 2021-04-20 PROCEDURE — 95912 NRV CNDJ TEST 11-12 STUDIES: CPT | Mod: S$GLB,,, | Performed by: PHYSICAL MEDICINE & REHABILITATION

## 2021-04-20 PROCEDURE — 95912 PR NERVE CONDUCTION STUDY; 11 -12 STUDIES: ICD-10-PCS | Mod: S$GLB,,, | Performed by: PHYSICAL MEDICINE & REHABILITATION

## 2021-04-20 PROCEDURE — 95886 PR EMG COMPLETE, W/ NERVE CONDUCTION STUDIES, 5+ MUSCLES: ICD-10-PCS | Mod: S$GLB,,, | Performed by: PHYSICAL MEDICINE & REHABILITATION

## 2021-04-22 ENCOUNTER — OFFICE VISIT (OUTPATIENT)
Dept: ORTHOPEDICS | Facility: CLINIC | Age: 71
End: 2021-04-22
Payer: MEDICARE

## 2021-04-22 VITALS — HEIGHT: 63 IN | BODY MASS INDEX: 28.88 KG/M2 | WEIGHT: 163 LBS

## 2021-04-22 DIAGNOSIS — G56.03 BILATERAL CARPAL TUNNEL SYNDROME: ICD-10-CM

## 2021-04-22 DIAGNOSIS — R25.1 TREMOR OF RIGHT HAND: Primary | ICD-10-CM

## 2021-04-22 PROCEDURE — 99213 OFFICE O/P EST LOW 20 MIN: CPT | Mod: S$GLB,,, | Performed by: ORTHOPAEDIC SURGERY

## 2021-04-22 PROCEDURE — 1159F PR MEDICATION LIST DOCUMENTED IN MEDICAL RECORD: ICD-10-PCS | Mod: S$GLB,,, | Performed by: ORTHOPAEDIC SURGERY

## 2021-04-22 PROCEDURE — 1126F AMNT PAIN NOTED NONE PRSNT: CPT | Mod: S$GLB,,, | Performed by: ORTHOPAEDIC SURGERY

## 2021-04-22 PROCEDURE — 3288F PR FALLS RISK ASSESSMENT DOCUMENTED: ICD-10-PCS | Mod: CPTII,S$GLB,, | Performed by: ORTHOPAEDIC SURGERY

## 2021-04-22 PROCEDURE — 1101F PR PT FALLS ASSESS DOC 0-1 FALLS W/OUT INJ PAST YR: ICD-10-PCS | Mod: CPTII,S$GLB,, | Performed by: ORTHOPAEDIC SURGERY

## 2021-04-22 PROCEDURE — 3288F FALL RISK ASSESSMENT DOCD: CPT | Mod: CPTII,S$GLB,, | Performed by: ORTHOPAEDIC SURGERY

## 2021-04-22 PROCEDURE — 99999 PR PBB SHADOW E&M-EST. PATIENT-LVL III: CPT | Mod: PBBFAC,,, | Performed by: ORTHOPAEDIC SURGERY

## 2021-04-22 PROCEDURE — 3008F PR BODY MASS INDEX (BMI) DOCUMENTED: ICD-10-PCS | Mod: CPTII,S$GLB,, | Performed by: ORTHOPAEDIC SURGERY

## 2021-04-22 PROCEDURE — 99999 PR PBB SHADOW E&M-EST. PATIENT-LVL III: ICD-10-PCS | Mod: PBBFAC,,, | Performed by: ORTHOPAEDIC SURGERY

## 2021-04-22 PROCEDURE — 3008F BODY MASS INDEX DOCD: CPT | Mod: CPTII,S$GLB,, | Performed by: ORTHOPAEDIC SURGERY

## 2021-04-22 PROCEDURE — 99213 PR OFFICE/OUTPT VISIT, EST, LEVL III, 20-29 MIN: ICD-10-PCS | Mod: S$GLB,,, | Performed by: ORTHOPAEDIC SURGERY

## 2021-04-22 PROCEDURE — 1126F PR PAIN SEVERITY QUANTIFIED, NO PAIN PRESENT: ICD-10-PCS | Mod: S$GLB,,, | Performed by: ORTHOPAEDIC SURGERY

## 2021-04-22 PROCEDURE — 1159F MED LIST DOCD IN RCRD: CPT | Mod: S$GLB,,, | Performed by: ORTHOPAEDIC SURGERY

## 2021-04-22 PROCEDURE — 1101F PT FALLS ASSESS-DOCD LE1/YR: CPT | Mod: CPTII,S$GLB,, | Performed by: ORTHOPAEDIC SURGERY

## 2021-05-31 ENCOUNTER — PATIENT MESSAGE (OUTPATIENT)
Dept: PSYCHIATRY | Facility: CLINIC | Age: 71
End: 2021-05-31

## 2021-06-11 ENCOUNTER — TELEPHONE (OUTPATIENT)
Dept: NEUROLOGY | Facility: CLINIC | Age: 71
End: 2021-06-11

## 2021-06-28 ENCOUNTER — PATIENT OUTREACH (OUTPATIENT)
Dept: ADMINISTRATIVE | Facility: OTHER | Age: 71
End: 2021-06-28

## 2021-06-28 ENCOUNTER — OFFICE VISIT (OUTPATIENT)
Dept: NEUROLOGY | Facility: CLINIC | Age: 71
End: 2021-06-28
Payer: MEDICARE

## 2021-06-28 ENCOUNTER — PATIENT MESSAGE (OUTPATIENT)
Dept: ADMINISTRATIVE | Facility: OTHER | Age: 71
End: 2021-06-28

## 2021-06-28 VITALS
HEART RATE: 82 BPM | HEIGHT: 63 IN | SYSTOLIC BLOOD PRESSURE: 106 MMHG | BODY MASS INDEX: 28.88 KG/M2 | WEIGHT: 163 LBS | DIASTOLIC BLOOD PRESSURE: 68 MMHG

## 2021-06-28 DIAGNOSIS — Z71.89 COUNSELING REGARDING GOALS OF CARE: ICD-10-CM

## 2021-06-28 DIAGNOSIS — R25.1 TREMOR: ICD-10-CM

## 2021-06-28 DIAGNOSIS — R53.82 CHRONIC FATIGUE: ICD-10-CM

## 2021-06-28 DIAGNOSIS — R26.9 GAIT DISTURBANCE: ICD-10-CM

## 2021-06-28 DIAGNOSIS — Z12.31 ENCOUNTER FOR SCREENING MAMMOGRAM FOR MALIGNANT NEOPLASM OF BREAST: Primary | ICD-10-CM

## 2021-06-28 DIAGNOSIS — F32.A DEPRESSION, UNSPECIFIED DEPRESSION TYPE: Primary | ICD-10-CM

## 2021-06-28 DIAGNOSIS — G35 MS (MULTIPLE SCLEROSIS): ICD-10-CM

## 2021-06-28 DIAGNOSIS — R53.83 FATIGUE, UNSPECIFIED TYPE: ICD-10-CM

## 2021-06-28 DIAGNOSIS — Z29.89 PROPHYLACTIC IMMUNOTHERAPY: ICD-10-CM

## 2021-06-28 PROCEDURE — 3008F BODY MASS INDEX DOCD: CPT | Mod: CPTII,S$GLB,, | Performed by: PSYCHIATRY & NEUROLOGY

## 2021-06-28 PROCEDURE — 99499 RISK ADDL DX/OHS AUDIT: ICD-10-PCS | Mod: S$GLB,,, | Performed by: PSYCHIATRY & NEUROLOGY

## 2021-06-28 PROCEDURE — 3288F FALL RISK ASSESSMENT DOCD: CPT | Mod: CPTII,S$GLB,, | Performed by: PSYCHIATRY & NEUROLOGY

## 2021-06-28 PROCEDURE — 1159F PR MEDICATION LIST DOCUMENTED IN MEDICAL RECORD: ICD-10-PCS | Mod: S$GLB,,, | Performed by: PSYCHIATRY & NEUROLOGY

## 2021-06-28 PROCEDURE — 3008F PR BODY MASS INDEX (BMI) DOCUMENTED: ICD-10-PCS | Mod: CPTII,S$GLB,, | Performed by: PSYCHIATRY & NEUROLOGY

## 2021-06-28 PROCEDURE — 3288F PR FALLS RISK ASSESSMENT DOCUMENTED: ICD-10-PCS | Mod: CPTII,S$GLB,, | Performed by: PSYCHIATRY & NEUROLOGY

## 2021-06-28 PROCEDURE — 1159F MED LIST DOCD IN RCRD: CPT | Mod: S$GLB,,, | Performed by: PSYCHIATRY & NEUROLOGY

## 2021-06-28 PROCEDURE — 99215 OFFICE O/P EST HI 40 MIN: CPT | Mod: S$GLB,,, | Performed by: PSYCHIATRY & NEUROLOGY

## 2021-06-28 PROCEDURE — 1126F AMNT PAIN NOTED NONE PRSNT: CPT | Mod: S$GLB,,, | Performed by: PSYCHIATRY & NEUROLOGY

## 2021-06-28 PROCEDURE — 99999 PR PBB SHADOW E&M-EST. PATIENT-LVL V: CPT | Mod: PBBFAC,,, | Performed by: PSYCHIATRY & NEUROLOGY

## 2021-06-28 PROCEDURE — 99499 UNLISTED E&M SERVICE: CPT | Mod: S$GLB,,, | Performed by: PSYCHIATRY & NEUROLOGY

## 2021-06-28 PROCEDURE — 1100F PR PT FALLS ASSESS DOC 2+ FALLS/FALL W/INJURY/YR: ICD-10-PCS | Mod: CPTII,S$GLB,, | Performed by: PSYCHIATRY & NEUROLOGY

## 2021-06-28 PROCEDURE — 1126F PR PAIN SEVERITY QUANTIFIED, NO PAIN PRESENT: ICD-10-PCS | Mod: S$GLB,,, | Performed by: PSYCHIATRY & NEUROLOGY

## 2021-06-28 PROCEDURE — 99215 PR OFFICE/OUTPT VISIT, EST, LEVL V, 40-54 MIN: ICD-10-PCS | Mod: S$GLB,,, | Performed by: PSYCHIATRY & NEUROLOGY

## 2021-06-28 PROCEDURE — 99999 PR PBB SHADOW E&M-EST. PATIENT-LVL V: ICD-10-PCS | Mod: PBBFAC,,, | Performed by: PSYCHIATRY & NEUROLOGY

## 2021-06-28 PROCEDURE — 1100F PTFALLS ASSESS-DOCD GE2>/YR: CPT | Mod: CPTII,S$GLB,, | Performed by: PSYCHIATRY & NEUROLOGY

## 2021-06-28 RX ORDER — ARMODAFINIL 150 MG/1
150 TABLET ORAL DAILY
Qty: 30 TABLET | Refills: 0 | Status: SHIPPED | OUTPATIENT
Start: 2021-06-28 | End: 2021-07-28

## 2021-06-28 RX ORDER — PREDNISOLONE ACETATE 10 MG/ML
SUSPENSION/ DROPS OPHTHALMIC
COMMUNITY
Start: 2021-04-03

## 2021-07-12 NOTE — TELEPHONE ENCOUNTER
Lm for patient to return my call in regards to her her last appointment in the clinic.   73yo female with leg laceration, xr, tdap, suture

## 2021-08-13 ENCOUNTER — PATIENT MESSAGE (OUTPATIENT)
Dept: NEUROLOGY | Facility: CLINIC | Age: 71
End: 2021-08-13

## 2021-09-01 ENCOUNTER — PATIENT MESSAGE (OUTPATIENT)
Dept: PSYCHIATRY | Facility: CLINIC | Age: 71
End: 2021-09-01

## 2021-09-02 ENCOUNTER — PATIENT MESSAGE (OUTPATIENT)
Dept: PSYCHIATRY | Facility: CLINIC | Age: 71
End: 2021-09-02

## 2021-09-04 ENCOUNTER — PATIENT MESSAGE (OUTPATIENT)
Dept: NEUROLOGY | Facility: CLINIC | Age: 71
End: 2021-09-04

## 2021-09-27 ENCOUNTER — IMMUNIZATION (OUTPATIENT)
Dept: PRIMARY CARE CLINIC | Facility: CLINIC | Age: 71
End: 2021-09-27
Payer: MEDICARE

## 2021-09-27 DIAGNOSIS — Z23 NEED FOR VACCINATION: Primary | ICD-10-CM

## 2021-09-27 PROCEDURE — 91300 COVID-19, MRNA, LNP-S, PF, 30 MCG/0.3 ML DOSE VACCINE: ICD-10-PCS | Mod: S$GLB,,, | Performed by: FAMILY MEDICINE

## 2021-09-27 PROCEDURE — 0003A COVID-19, MRNA, LNP-S, PF, 30 MCG/0.3 ML DOSE VACCINE: ICD-10-PCS | Mod: S$GLB,,, | Performed by: FAMILY MEDICINE

## 2021-09-27 PROCEDURE — 0003A COVID-19, MRNA, LNP-S, PF, 30 MCG/0.3 ML DOSE VACCINE: CPT | Mod: S$GLB,,, | Performed by: FAMILY MEDICINE

## 2021-09-27 PROCEDURE — 91300 COVID-19, MRNA, LNP-S, PF, 30 MCG/0.3 ML DOSE VACCINE: CPT | Mod: S$GLB,,, | Performed by: FAMILY MEDICINE

## 2021-09-29 ENCOUNTER — PATIENT MESSAGE (OUTPATIENT)
Dept: NEUROLOGY | Facility: CLINIC | Age: 71
End: 2021-09-29

## 2021-09-29 DIAGNOSIS — R25.1 TREMOR: ICD-10-CM

## 2021-09-29 DIAGNOSIS — G35 MULTIPLE SCLEROSIS: ICD-10-CM

## 2021-09-29 DIAGNOSIS — Z87.898 HISTORY OF SEIZURE: ICD-10-CM

## 2021-09-30 RX ORDER — CLONAZEPAM 1 MG/1
1 TABLET ORAL 2 TIMES DAILY
Qty: 180 TABLET | Refills: 0 | Status: SHIPPED | OUTPATIENT
Start: 2021-09-30 | End: 2022-01-06 | Stop reason: SDUPTHER

## 2021-10-01 ENCOUNTER — LAB VISIT (OUTPATIENT)
Dept: LAB | Facility: HOSPITAL | Age: 71
End: 2021-10-01
Attending: STUDENT IN AN ORGANIZED HEALTH CARE EDUCATION/TRAINING PROGRAM
Payer: MEDICARE

## 2021-10-01 DIAGNOSIS — G35 MULTIPLE SCLEROSIS: ICD-10-CM

## 2021-10-01 DIAGNOSIS — Z95.1 POSTSURGICAL AORTOCORONARY BYPASS STATUS: Primary | ICD-10-CM

## 2021-10-01 DIAGNOSIS — E78.2 MIXED HYPERLIPIDEMIA: ICD-10-CM

## 2021-10-01 DIAGNOSIS — R60.9 EDEMA: ICD-10-CM

## 2021-10-01 LAB
ALBUMIN SERPL BCP-MCNC: 3.4 G/DL (ref 3.5–5.2)
ALP SERPL-CCNC: 130 U/L (ref 55–135)
ALT SERPL W/O P-5'-P-CCNC: 25 U/L (ref 10–44)
ANION GAP SERPL CALC-SCNC: 10 MMOL/L (ref 8–16)
AST SERPL-CCNC: 18 U/L (ref 10–40)
BASOPHILS # BLD AUTO: 0.05 K/UL (ref 0–0.2)
BASOPHILS NFR BLD: 0.9 % (ref 0–1.9)
BILIRUB SERPL-MCNC: 0.4 MG/DL (ref 0.1–1)
BNP SERPL-MCNC: 16 PG/ML (ref 0–99)
BUN SERPL-MCNC: 19 MG/DL (ref 8–23)
CALCIUM SERPL-MCNC: 8.5 MG/DL (ref 8.7–10.5)
CHLORIDE SERPL-SCNC: 113 MMOL/L (ref 95–110)
CHOLEST SERPL-MCNC: 166 MG/DL (ref 120–199)
CHOLEST/HDLC SERPL: 3.5 {RATIO} (ref 2–5)
CO2 SERPL-SCNC: 20 MMOL/L (ref 23–29)
CREAT SERPL-MCNC: 0.7 MG/DL (ref 0.5–1.4)
DIFFERENTIAL METHOD: ABNORMAL
EOSINOPHIL # BLD AUTO: 0.4 K/UL (ref 0–0.5)
EOSINOPHIL NFR BLD: 6.1 % (ref 0–8)
ERYTHROCYTE [DISTWIDTH] IN BLOOD BY AUTOMATED COUNT: 14.4 % (ref 11.5–14.5)
EST. GFR  (AFRICAN AMERICAN): >60 ML/MIN/1.73 M^2
EST. GFR  (NON AFRICAN AMERICAN): >60 ML/MIN/1.73 M^2
GLUCOSE SERPL-MCNC: 83 MG/DL (ref 70–110)
HCT VFR BLD AUTO: 41.8 % (ref 37–48.5)
HDLC SERPL-MCNC: 48 MG/DL (ref 40–75)
HDLC SERPL: 28.9 % (ref 20–50)
HGB BLD-MCNC: 13 G/DL (ref 12–16)
IMM GRANULOCYTES # BLD AUTO: 0.02 K/UL (ref 0–0.04)
IMM GRANULOCYTES NFR BLD AUTO: 0.3 % (ref 0–0.5)
LDLC SERPL CALC-MCNC: 88.4 MG/DL (ref 63–159)
LYMPHOCYTES # BLD AUTO: 1.7 K/UL (ref 1–4.8)
LYMPHOCYTES NFR BLD: 28.9 % (ref 18–48)
MCH RBC QN AUTO: 26.5 PG (ref 27–31)
MCHC RBC AUTO-ENTMCNC: 31.1 G/DL (ref 32–36)
MCV RBC AUTO: 85 FL (ref 82–98)
MONOCYTES # BLD AUTO: 0.5 K/UL (ref 0.3–1)
MONOCYTES NFR BLD: 7.7 % (ref 4–15)
NEUTROPHILS # BLD AUTO: 3.3 K/UL (ref 1.8–7.7)
NEUTROPHILS NFR BLD: 56.1 % (ref 38–73)
NONHDLC SERPL-MCNC: 118 MG/DL
NRBC BLD-RTO: 0 /100 WBC
PLATELET # BLD AUTO: 209 K/UL (ref 150–450)
PMV BLD AUTO: 11.4 FL (ref 9.2–12.9)
POTASSIUM SERPL-SCNC: 3.8 MMOL/L (ref 3.5–5.1)
PROT SERPL-MCNC: 6.5 G/DL (ref 6–8.4)
RBC # BLD AUTO: 4.9 M/UL (ref 4–5.4)
SODIUM SERPL-SCNC: 143 MMOL/L (ref 136–145)
TRIGL SERPL-MCNC: 148 MG/DL (ref 30–150)
WBC # BLD AUTO: 5.88 K/UL (ref 3.9–12.7)

## 2021-10-01 PROCEDURE — 80061 LIPID PANEL: CPT | Performed by: SPECIALIST

## 2021-10-01 PROCEDURE — 36415 COLL VENOUS BLD VENIPUNCTURE: CPT | Mod: PO | Performed by: SPECIALIST

## 2021-10-01 PROCEDURE — 83880 ASSAY OF NATRIURETIC PEPTIDE: CPT | Performed by: SPECIALIST

## 2021-10-01 PROCEDURE — 85025 COMPLETE CBC W/AUTO DIFF WBC: CPT | Performed by: SPECIALIST

## 2021-10-01 PROCEDURE — 80053 COMPREHEN METABOLIC PANEL: CPT | Performed by: SPECIALIST

## 2021-10-19 ENCOUNTER — OFFICE VISIT (OUTPATIENT)
Dept: FAMILY MEDICINE | Facility: CLINIC | Age: 71
End: 2021-10-19
Payer: MEDICARE

## 2021-10-19 ENCOUNTER — TELEPHONE (OUTPATIENT)
Dept: FAMILY MEDICINE | Facility: CLINIC | Age: 71
End: 2021-10-19

## 2021-10-19 ENCOUNTER — HOSPITAL ENCOUNTER (OUTPATIENT)
Dept: RADIOLOGY | Facility: HOSPITAL | Age: 71
Discharge: HOME | End: 2021-10-19
Attending: STUDENT IN AN ORGANIZED HEALTH CARE EDUCATION/TRAINING PROGRAM
Payer: MEDICARE

## 2021-10-19 VITALS
SYSTOLIC BLOOD PRESSURE: 124 MMHG | BODY MASS INDEX: 28.87 KG/M2 | TEMPERATURE: 98 F | HEIGHT: 63 IN | OXYGEN SATURATION: 94 % | DIASTOLIC BLOOD PRESSURE: 74 MMHG | RESPIRATION RATE: 16 BRPM | HEART RATE: 85 BPM

## 2021-10-19 DIAGNOSIS — R60.0 LOCALIZED EDEMA: ICD-10-CM

## 2021-10-19 DIAGNOSIS — G35 MULTIPLE SCLEROSIS: ICD-10-CM

## 2021-10-19 DIAGNOSIS — M25.572 ACUTE LEFT ANKLE PAIN: ICD-10-CM

## 2021-10-19 DIAGNOSIS — M79.89 LEG SWELLING: ICD-10-CM

## 2021-10-19 DIAGNOSIS — L03.116 LEFT LEG CELLULITIS: ICD-10-CM

## 2021-10-19 DIAGNOSIS — R53.81 PHYSICAL DECONDITIONING: ICD-10-CM

## 2021-10-19 DIAGNOSIS — G40.909 SEIZURE DISORDER: ICD-10-CM

## 2021-10-19 DIAGNOSIS — M25.572 ACUTE LEFT ANKLE PAIN: Primary | ICD-10-CM

## 2021-10-19 DIAGNOSIS — M79.605 PAIN OF LEFT LOWER EXTREMITY: ICD-10-CM
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
## 2021-10-19 PROCEDURE — 99999 PR PBB SHADOW E&M-EST. PATIENT-LVL V: ICD-10-PCS | Mod: PBBFAC,,, | Performed by: STUDENT IN AN ORGANIZED HEALTH CARE EDUCATION/TRAINING PROGRAM

## 2021-10-19 PROCEDURE — 93971 EXTREMITY STUDY: CPT | Mod: 26,LT,, | Performed by: RADIOLOGY

## 2021-10-19 PROCEDURE — 93971 US LOWER EXTREMITY VEINS LEFT: ICD-10-PCS | Mod: 26,LT,, | Performed by: RADIOLOGY

## 2021-10-19 PROCEDURE — 73590 X-RAY EXAM OF LOWER LEG: CPT | Mod: 26,LT,, | Performed by: RADIOLOGY

## 2021-10-19 PROCEDURE — 73590 X-RAY EXAM OF LOWER LEG: CPT | Mod: TC,FY,LT

## 2021-10-19 PROCEDURE — 3288F PR FALLS RISK ASSESSMENT DOCUMENTED: ICD-10-PCS | Mod: CPTII,S$GLB,, | Performed by: STUDENT IN AN ORGANIZED HEALTH CARE EDUCATION/TRAINING PROGRAM

## 2021-10-19 PROCEDURE — 3078F DIAST BP <80 MM HG: CPT | Mod: CPTII,S$GLB,, | Performed by: STUDENT IN AN ORGANIZED HEALTH CARE EDUCATION/TRAINING PROGRAM

## 2021-10-19 PROCEDURE — 73610 X-RAY EXAM OF ANKLE: CPT | Mod: TC,FY,LT

## 2021-10-19 PROCEDURE — 1100F PR PT FALLS ASSESS DOC 2+ FALLS/FALL W/INJURY/YR: ICD-10-PCS | Mod: CPTII,S$GLB,, | Performed by: STUDENT IN AN ORGANIZED HEALTH CARE EDUCATION/TRAINING PROGRAM

## 2021-10-19 PROCEDURE — 73590 XR TIBIA FIBULA 2 VIEW LEFT: ICD-10-PCS | Mod: 26,LT,, | Performed by: RADIOLOGY

## 2021-10-19 PROCEDURE — 93971 EXTREMITY STUDY: CPT | Mod: TC,LT

## 2021-10-19 PROCEDURE — 1100F PTFALLS ASSESS-DOCD GE2>/YR: CPT | Mod: CPTII,S$GLB,, | Performed by: STUDENT IN AN ORGANIZED HEALTH CARE EDUCATION/TRAINING PROGRAM

## 2021-10-19 PROCEDURE — 3008F PR BODY MASS INDEX (BMI) DOCUMENTED: ICD-10-PCS | Mod: CPTII,S$GLB,, | Performed by: STUDENT IN AN ORGANIZED HEALTH CARE EDUCATION/TRAINING PROGRAM

## 2021-10-19 PROCEDURE — 73610 XR ANKLE COMPLETE 3 VIEW LEFT: ICD-10-PCS | Mod: 26,LT,, | Performed by: RADIOLOGY

## 2021-10-19 PROCEDURE — 99214 PR OFFICE/OUTPT VISIT, EST, LEVL IV, 30-39 MIN: ICD-10-PCS | Mod: S$GLB,,, | Performed by: STUDENT IN AN ORGANIZED HEALTH CARE EDUCATION/TRAINING PROGRAM

## 2021-10-19 PROCEDURE — 73610 X-RAY EXAM OF ANKLE: CPT | Mod: 26,LT,, | Performed by: RADIOLOGY

## 2021-10-19 PROCEDURE — 3074F SYST BP LT 130 MM HG: CPT | Mod: CPTII,S$GLB,, | Performed by: STUDENT IN AN ORGANIZED HEALTH CARE EDUCATION/TRAINING PROGRAM

## 2021-10-19 PROCEDURE — 3078F PR MOST RECENT DIASTOLIC BLOOD PRESSURE < 80 MM HG: ICD-10-PCS | Mod: CPTII,S$GLB,, | Performed by: STUDENT IN AN ORGANIZED HEALTH CARE EDUCATION/TRAINING PROGRAM

## 2021-10-19 PROCEDURE — 1159F PR MEDICATION LIST DOCUMENTED IN MEDICAL RECORD: ICD-10-PCS | Mod: CPTII,S$GLB,, | Performed by: STUDENT IN AN ORGANIZED HEALTH CARE EDUCATION/TRAINING PROGRAM

## 2021-10-19 PROCEDURE — 3288F FALL RISK ASSESSMENT DOCD: CPT | Mod: CPTII,S$GLB,, | Performed by: STUDENT IN AN ORGANIZED HEALTH CARE EDUCATION/TRAINING PROGRAM

## 2021-10-19 PROCEDURE — 3008F BODY MASS INDEX DOCD: CPT | Mod: CPTII,S$GLB,, | Performed by: STUDENT IN AN ORGANIZED HEALTH CARE EDUCATION/TRAINING PROGRAM

## 2021-10-19 PROCEDURE — 99214 OFFICE O/P EST MOD 30 MIN: CPT | Mod: S$GLB,,, | Performed by: STUDENT IN AN ORGANIZED HEALTH CARE EDUCATION/TRAINING PROGRAM

## 2021-10-19 PROCEDURE — 3074F PR MOST RECENT SYSTOLIC BLOOD PRESSURE < 130 MM HG: ICD-10-PCS | Mod: CPTII,S$GLB,, | Performed by: STUDENT IN AN ORGANIZED HEALTH CARE EDUCATION/TRAINING PROGRAM

## 2021-10-19 PROCEDURE — 1159F MED LIST DOCD IN RCRD: CPT | Mod: CPTII,S$GLB,, | Performed by: STUDENT IN AN ORGANIZED HEALTH CARE EDUCATION/TRAINING PROGRAM

## 2021-10-19 PROCEDURE — 99999 PR PBB SHADOW E&M-EST. PATIENT-LVL V: CPT | Mod: PBBFAC,,, | Performed by: STUDENT IN AN ORGANIZED HEALTH CARE EDUCATION/TRAINING PROGRAM

## 2021-10-19 RX ORDER — DOXYCYCLINE 100 MG/1
100 CAPSULE ORAL EVERY 12 HOURS
Qty: 14 CAPSULE | Refills: 0 | Status: SHIPPED | OUTPATIENT
Start: 2021-10-19 | End: 2021-10-26

## 2021-11-01 ENCOUNTER — TELEPHONE (OUTPATIENT)
Dept: PSYCHIATRY | Facility: CLINIC | Age: 71
End: 2021-11-01

## 2021-11-01 ENCOUNTER — OFFICE VISIT (OUTPATIENT)
Dept: NEUROLOGY | Facility: CLINIC | Age: 71
End: 2021-11-01
Payer: MEDICARE

## 2021-11-01 ENCOUNTER — TELEPHONE (OUTPATIENT)
Dept: NEUROLOGY | Facility: CLINIC | Age: 71
End: 2021-11-01
Payer: MEDICARE

## 2021-11-01 VITALS
SYSTOLIC BLOOD PRESSURE: 154 MMHG | HEIGHT: 63 IN | HEART RATE: 73 BPM | BODY MASS INDEX: 29.2 KG/M2 | WEIGHT: 164.81 LBS | DIASTOLIC BLOOD PRESSURE: 82 MMHG

## 2021-11-01 DIAGNOSIS — Z71.89 COUNSELING REGARDING GOALS OF CARE: ICD-10-CM

## 2021-11-01 DIAGNOSIS — R56.9 SEIZURES: ICD-10-CM

## 2021-11-01 DIAGNOSIS — G35 MS (MULTIPLE SCLEROSIS): Primary | ICD-10-CM

## 2021-11-01 DIAGNOSIS — R26.9 NEUROLOGIC GAIT DYSFUNCTION: ICD-10-CM

## 2021-11-01 DIAGNOSIS — Z29.89 PROPHYLACTIC IMMUNOTHERAPY: ICD-10-CM

## 2021-11-01 DIAGNOSIS — R56.9 SEIZURE: ICD-10-CM

## 2021-11-01 DIAGNOSIS — R26.9 GAIT DISTURBANCE: ICD-10-CM

## 2021-11-01 PROCEDURE — 3008F BODY MASS INDEX DOCD: CPT | Mod: CPTII,S$GLB,, | Performed by: PSYCHIATRY & NEUROLOGY

## 2021-11-01 PROCEDURE — 99215 PR OFFICE/OUTPT VISIT, EST, LEVL V, 40-54 MIN: ICD-10-PCS | Mod: S$GLB,,, | Performed by: PSYCHIATRY & NEUROLOGY

## 2021-11-01 PROCEDURE — 1160F RVW MEDS BY RX/DR IN RCRD: CPT | Mod: CPTII,S$GLB,, | Performed by: PSYCHIATRY & NEUROLOGY

## 2021-11-01 PROCEDURE — 3077F SYST BP >= 140 MM HG: CPT | Mod: CPTII,S$GLB,, | Performed by: PSYCHIATRY & NEUROLOGY

## 2021-11-01 PROCEDURE — 3077F PR MOST RECENT SYSTOLIC BLOOD PRESSURE >= 140 MM HG: ICD-10-PCS | Mod: CPTII,S$GLB,, | Performed by: PSYCHIATRY & NEUROLOGY

## 2021-11-01 PROCEDURE — 3288F PR FALLS RISK ASSESSMENT DOCUMENTED: ICD-10-PCS | Mod: CPTII,S$GLB,, | Performed by: PSYCHIATRY & NEUROLOGY

## 2021-11-01 PROCEDURE — 99215 OFFICE O/P EST HI 40 MIN: CPT | Mod: S$GLB,,, | Performed by: PSYCHIATRY & NEUROLOGY

## 2021-11-01 PROCEDURE — 99499 RISK ADDL DX/OHS AUDIT: ICD-10-PCS | Mod: S$GLB,,, | Performed by: PSYCHIATRY & NEUROLOGY

## 2021-11-01 PROCEDURE — 99499 UNLISTED E&M SERVICE: CPT | Mod: S$GLB,,, | Performed by: PSYCHIATRY & NEUROLOGY

## 2021-11-01 PROCEDURE — 99999 PR PBB SHADOW E&M-EST. PATIENT-LVL IV: CPT | Mod: PBBFAC,,, | Performed by: PSYCHIATRY & NEUROLOGY

## 2021-11-01 PROCEDURE — 3008F PR BODY MASS INDEX (BMI) DOCUMENTED: ICD-10-PCS | Mod: CPTII,S$GLB,, | Performed by: PSYCHIATRY & NEUROLOGY

## 2021-11-01 PROCEDURE — 99999 PR PBB SHADOW E&M-EST. PATIENT-LVL IV: ICD-10-PCS | Mod: PBBFAC,,, | Performed by: PSYCHIATRY & NEUROLOGY

## 2021-11-01 PROCEDURE — 3288F FALL RISK ASSESSMENT DOCD: CPT | Mod: CPTII,S$GLB,, | Performed by: PSYCHIATRY & NEUROLOGY

## 2021-11-01 PROCEDURE — 3079F PR MOST RECENT DIASTOLIC BLOOD PRESSURE 80-89 MM HG: ICD-10-PCS | Mod: CPTII,S$GLB,, | Performed by: PSYCHIATRY & NEUROLOGY

## 2021-11-01 PROCEDURE — 3079F DIAST BP 80-89 MM HG: CPT | Mod: CPTII,S$GLB,, | Performed by: PSYCHIATRY & NEUROLOGY

## 2021-11-01 PROCEDURE — 1159F MED LIST DOCD IN RCRD: CPT | Mod: CPTII,S$GLB,, | Performed by: PSYCHIATRY & NEUROLOGY

## 2021-11-01 PROCEDURE — 1160F PR REVIEW ALL MEDS BY PRESCRIBER/CLIN PHARMACIST DOCUMENTED: ICD-10-PCS | Mod: CPTII,S$GLB,, | Performed by: PSYCHIATRY & NEUROLOGY

## 2021-11-01 PROCEDURE — 1100F PTFALLS ASSESS-DOCD GE2>/YR: CPT | Mod: CPTII,S$GLB,, | Performed by: PSYCHIATRY & NEUROLOGY

## 2021-11-01 PROCEDURE — 1100F PR PT FALLS ASSESS DOC 2+ FALLS/FALL W/INJURY/YR: ICD-10-PCS | Mod: CPTII,S$GLB,, | Performed by: PSYCHIATRY & NEUROLOGY

## 2021-11-01 PROCEDURE — 1159F PR MEDICATION LIST DOCUMENTED IN MEDICAL RECORD: ICD-10-PCS | Mod: CPTII,S$GLB,, | Performed by: PSYCHIATRY & NEUROLOGY

## 2021-11-01 NOTE — TELEPHONE ENCOUNTER
SW received referral from JOSSIE Apodaca MD, who advised that pt received a manual WC (renting) after a mobility evaluation with Dr. Jarquin last year.  Pt and neurologist believe pt would benefit from a power mobility device.  Phoned pt/ and LVM for a call back to discuss the situation.

## 2021-11-12 ENCOUNTER — OFFICE VISIT (OUTPATIENT)
Dept: NEUROLOGY | Facility: CLINIC | Age: 71
End: 2021-11-12
Payer: MEDICARE

## 2021-11-12 VITALS
WEIGHT: 168 LBS | HEART RATE: 70 BPM | DIASTOLIC BLOOD PRESSURE: 64 MMHG | RESPIRATION RATE: 17 BRPM | TEMPERATURE: 98 F | SYSTOLIC BLOOD PRESSURE: 172 MMHG | BODY MASS INDEX: 29.76 KG/M2

## 2021-11-12 DIAGNOSIS — R56.9 SEIZURE: ICD-10-CM

## 2021-11-12 DIAGNOSIS — G40.909 SEIZURE DISORDER: Primary | ICD-10-CM

## 2021-11-12 PROCEDURE — 3008F PR BODY MASS INDEX (BMI) DOCUMENTED: ICD-10-PCS | Mod: CPTII,S$GLB,, | Performed by: PSYCHIATRY & NEUROLOGY

## 2021-11-12 PROCEDURE — 99999 PR PBB SHADOW E&M-EST. PATIENT-LVL IV: ICD-10-PCS | Mod: PBBFAC,,, | Performed by: PSYCHIATRY & NEUROLOGY

## 2021-11-12 PROCEDURE — 3077F SYST BP >= 140 MM HG: CPT | Mod: CPTII,S$GLB,, | Performed by: PSYCHIATRY & NEUROLOGY

## 2021-11-12 PROCEDURE — 99499 RISK ADDL DX/OHS AUDIT: ICD-10-PCS | Mod: S$GLB,,, | Performed by: PSYCHIATRY & NEUROLOGY

## 2021-11-12 PROCEDURE — 99215 PR OFFICE/OUTPT VISIT, EST, LEVL V, 40-54 MIN: ICD-10-PCS | Mod: S$GLB,,, | Performed by: PSYCHIATRY & NEUROLOGY

## 2021-11-12 PROCEDURE — 1126F AMNT PAIN NOTED NONE PRSNT: CPT | Mod: CPTII,S$GLB,, | Performed by: PSYCHIATRY & NEUROLOGY

## 2021-11-12 PROCEDURE — 99999 PR PBB SHADOW E&M-EST. PATIENT-LVL IV: CPT | Mod: PBBFAC,,, | Performed by: PSYCHIATRY & NEUROLOGY

## 2021-11-12 PROCEDURE — 3078F DIAST BP <80 MM HG: CPT | Mod: CPTII,S$GLB,, | Performed by: PSYCHIATRY & NEUROLOGY

## 2021-11-12 PROCEDURE — 3288F PR FALLS RISK ASSESSMENT DOCUMENTED: ICD-10-PCS | Mod: CPTII,S$GLB,, | Performed by: PSYCHIATRY & NEUROLOGY

## 2021-11-12 PROCEDURE — 1126F PR PAIN SEVERITY QUANTIFIED, NO PAIN PRESENT: ICD-10-PCS | Mod: CPTII,S$GLB,, | Performed by: PSYCHIATRY & NEUROLOGY

## 2021-11-12 PROCEDURE — 3077F PR MOST RECENT SYSTOLIC BLOOD PRESSURE >= 140 MM HG: ICD-10-PCS | Mod: CPTII,S$GLB,, | Performed by: PSYCHIATRY & NEUROLOGY

## 2021-11-12 PROCEDURE — 99499 UNLISTED E&M SERVICE: CPT | Mod: S$GLB,,, | Performed by: PSYCHIATRY & NEUROLOGY

## 2021-11-12 PROCEDURE — 3288F FALL RISK ASSESSMENT DOCD: CPT | Mod: CPTII,S$GLB,, | Performed by: PSYCHIATRY & NEUROLOGY

## 2021-11-12 PROCEDURE — 1100F PTFALLS ASSESS-DOCD GE2>/YR: CPT | Mod: CPTII,S$GLB,, | Performed by: PSYCHIATRY & NEUROLOGY

## 2021-11-12 PROCEDURE — 3008F BODY MASS INDEX DOCD: CPT | Mod: CPTII,S$GLB,, | Performed by: PSYCHIATRY & NEUROLOGY

## 2021-11-12 PROCEDURE — 99215 OFFICE O/P EST HI 40 MIN: CPT | Mod: S$GLB,,, | Performed by: PSYCHIATRY & NEUROLOGY

## 2021-11-12 PROCEDURE — 1100F PR PT FALLS ASSESS DOC 2+ FALLS/FALL W/INJURY/YR: ICD-10-PCS | Mod: CPTII,S$GLB,, | Performed by: PSYCHIATRY & NEUROLOGY

## 2021-11-12 PROCEDURE — 3078F PR MOST RECENT DIASTOLIC BLOOD PRESSURE < 80 MM HG: ICD-10-PCS | Mod: CPTII,S$GLB,, | Performed by: PSYCHIATRY & NEUROLOGY

## 2021-11-12 PROCEDURE — 99417 PR PROLONGED SVC, OUTPT, W/WO DIRECT PT CONTACT,  EA ADDTL 15 MIN: ICD-10-PCS | Mod: S$GLB,,, | Performed by: PSYCHIATRY & NEUROLOGY

## 2021-11-12 PROCEDURE — 99417 PROLNG OP E/M EACH 15 MIN: CPT | Mod: S$GLB,,, | Performed by: PSYCHIATRY & NEUROLOGY

## 2021-11-16 ENCOUNTER — HOSPITAL ENCOUNTER (OUTPATIENT)
Dept: NEUROLOGY | Facility: HOSPITAL | Age: 71
Discharge: HOME | End: 2021-11-16
Attending: PSYCHIATRY & NEUROLOGY
Payer: MEDICARE

## 2021-11-16 DIAGNOSIS — G40.909 SEIZURE DISORDER: ICD-10-CM

## 2021-11-16 PROCEDURE — 95816 PR EEG,W/AWAKE & DROWSY RECORD: ICD-10-PCS | Mod: 26,,, | Performed by: PSYCHIATRY & NEUROLOGY

## 2021-11-16 PROCEDURE — 95816 EEG AWAKE AND DROWSY: CPT | Mod: 26,,, | Performed by: PSYCHIATRY & NEUROLOGY

## 2021-11-16 PROCEDURE — 95819 EEG AWAKE AND ASLEEP: CPT

## 2021-11-17 ENCOUNTER — LAB VISIT (OUTPATIENT)
Dept: LAB | Facility: HOSPITAL | Age: 71
End: 2021-11-17
Attending: PSYCHIATRY & NEUROLOGY
Payer: MEDICARE

## 2021-11-17 DIAGNOSIS — G40.909 SEIZURE DISORDER: ICD-10-CM

## 2021-11-17 LAB
ALBUMIN SERPL BCP-MCNC: 3.6 G/DL (ref 3.5–5.2)
ALP SERPL-CCNC: 140 U/L (ref 55–135)
ALT SERPL W/O P-5'-P-CCNC: 20 U/L (ref 10–44)
ANION GAP SERPL CALC-SCNC: 8 MMOL/L (ref 8–16)
AST SERPL-CCNC: 16 U/L (ref 10–40)
BASOPHILS # BLD AUTO: 0.05 K/UL (ref 0–0.2)
BASOPHILS NFR BLD: 0.7 % (ref 0–1.9)
BILIRUB SERPL-MCNC: 0.6 MG/DL (ref 0.1–1)
BUN SERPL-MCNC: 17 MG/DL (ref 8–23)
CALCIUM SERPL-MCNC: 8.7 MG/DL (ref 8.7–10.5)
CHLORIDE SERPL-SCNC: 111 MMOL/L (ref 95–110)
CO2 SERPL-SCNC: 21 MMOL/L (ref 23–29)
CREAT SERPL-MCNC: 0.8 MG/DL (ref 0.5–1.4)
DIFFERENTIAL METHOD: ABNORMAL
EOSINOPHIL # BLD AUTO: 0.2 K/UL (ref 0–0.5)
EOSINOPHIL NFR BLD: 3.5 % (ref 0–8)
ERYTHROCYTE [DISTWIDTH] IN BLOOD BY AUTOMATED COUNT: 14.1 % (ref 11.5–14.5)
EST. GFR  (AFRICAN AMERICAN): >60 ML/MIN/1.73 M^2
EST. GFR  (NON AFRICAN AMERICAN): >60 ML/MIN/1.73 M^2
GLUCOSE SERPL-MCNC: 87 MG/DL (ref 70–110)
HCT VFR BLD AUTO: 42.1 % (ref 37–48.5)
HGB BLD-MCNC: 13 G/DL (ref 12–16)
IMM GRANULOCYTES # BLD AUTO: 0.02 K/UL (ref 0–0.04)
IMM GRANULOCYTES NFR BLD AUTO: 0.3 % (ref 0–0.5)
LYMPHOCYTES # BLD AUTO: 1.5 K/UL (ref 1–4.8)
LYMPHOCYTES NFR BLD: 21 % (ref 18–48)
MCH RBC QN AUTO: 26.8 PG (ref 27–31)
MCHC RBC AUTO-ENTMCNC: 30.9 G/DL (ref 32–36)
MCV RBC AUTO: 87 FL (ref 82–98)
MONOCYTES # BLD AUTO: 0.4 K/UL (ref 0.3–1)
MONOCYTES NFR BLD: 6.2 % (ref 4–15)
NEUTROPHILS # BLD AUTO: 4.7 K/UL (ref 1.8–7.7)
NEUTROPHILS NFR BLD: 68.3 % (ref 38–73)
NRBC BLD-RTO: 0 /100 WBC
PLATELET # BLD AUTO: 216 K/UL (ref 150–450)
PMV BLD AUTO: 11.5 FL (ref 9.2–12.9)
POTASSIUM SERPL-SCNC: 3.6 MMOL/L (ref 3.5–5.1)
PROT SERPL-MCNC: 6.7 G/DL (ref 6–8.4)
RBC # BLD AUTO: 4.85 M/UL (ref 4–5.4)
SODIUM SERPL-SCNC: 140 MMOL/L (ref 136–145)
WBC # BLD AUTO: 6.9 K/UL (ref 3.9–12.7)

## 2021-11-17 PROCEDURE — 80053 COMPREHEN METABOLIC PANEL: CPT | Performed by: PSYCHIATRY & NEUROLOGY

## 2021-11-17 PROCEDURE — 80201 ASSAY OF TOPIRAMATE: CPT | Performed by: PSYCHIATRY & NEUROLOGY

## 2021-11-17 PROCEDURE — 85025 COMPLETE CBC W/AUTO DIFF WBC: CPT | Performed by: PSYCHIATRY & NEUROLOGY

## 2021-11-17 PROCEDURE — 36415 COLL VENOUS BLD VENIPUNCTURE: CPT | Mod: PO | Performed by: PSYCHIATRY & NEUROLOGY

## 2021-11-18 ENCOUNTER — PATIENT OUTREACH (OUTPATIENT)
Dept: ADMINISTRATIVE | Facility: OTHER | Age: 71
End: 2021-11-18
Payer: MEDICARE

## 2021-11-20 LAB — TOPIRAMATE SERPL-MCNC: 4.5 MCG/ML

## 2021-11-29 ENCOUNTER — CLINICAL SUPPORT (OUTPATIENT)
Dept: REHABILITATION | Facility: HOSPITAL | Age: 71
End: 2021-11-29
Payer: MEDICARE

## 2021-11-29 DIAGNOSIS — R26.9 GAIT DISTURBANCE: ICD-10-CM

## 2021-11-29 DIAGNOSIS — Z74.09 IMPAIRED FUNCTIONAL MOBILITY, BALANCE, AND ENDURANCE: ICD-10-CM

## 2021-11-29 DIAGNOSIS — G35 MS (MULTIPLE SCLEROSIS): Primary | ICD-10-CM

## 2021-11-29 PROCEDURE — 97161 PT EVAL LOW COMPLEX 20 MIN: CPT | Mod: PN

## 2021-11-29 PROCEDURE — 97110 THERAPEUTIC EXERCISES: CPT | Mod: PN

## 2021-11-30 ENCOUNTER — HOSPITAL ENCOUNTER (OUTPATIENT)
Dept: RADIOLOGY | Facility: CLINIC | Age: 71
Discharge: HOME | End: 2021-11-30
Attending: STUDENT IN AN ORGANIZED HEALTH CARE EDUCATION/TRAINING PROGRAM
Payer: MEDICARE

## 2021-11-30 DIAGNOSIS — Z12.31 ENCOUNTER FOR SCREENING MAMMOGRAM FOR MALIGNANT NEOPLASM OF BREAST: ICD-10-CM

## 2021-11-30 PROCEDURE — 77067 MAMMO DIGITAL SCREENING BILAT WITH TOMO: ICD-10-PCS | Mod: 26,,, | Performed by: RADIOLOGY

## 2021-11-30 PROCEDURE — 77063 BREAST TOMOSYNTHESIS BI: CPT | Mod: 26,,, | Performed by: RADIOLOGY

## 2021-11-30 PROCEDURE — 77063 MAMMO DIGITAL SCREENING BILAT WITH TOMO: ICD-10-PCS | Mod: 26,,, | Performed by: RADIOLOGY

## 2021-11-30 PROCEDURE — 77067 SCR MAMMO BI INCL CAD: CPT | Mod: TC,PO

## 2021-11-30 PROCEDURE — 77067 SCR MAMMO BI INCL CAD: CPT | Mod: 26,,, | Performed by: RADIOLOGY

## 2021-12-05 ENCOUNTER — OFFICE VISIT (OUTPATIENT)
Dept: URGENT CARE | Facility: CLINIC | Age: 71
End: 2021-12-05
Payer: MEDICARE

## 2021-12-05 VITALS
HEIGHT: 64 IN | DIASTOLIC BLOOD PRESSURE: 86 MMHG | BODY MASS INDEX: 28.68 KG/M2 | HEART RATE: 73 BPM | WEIGHT: 168 LBS | TEMPERATURE: 97 F | RESPIRATION RATE: 16 BRPM | OXYGEN SATURATION: 97 % | SYSTOLIC BLOOD PRESSURE: 147 MMHG

## 2021-12-05 DIAGNOSIS — M79.605 LEFT LEG PAIN: Primary | ICD-10-CM

## 2021-12-05 DIAGNOSIS — G62.9 NEUROPATHY: ICD-10-CM

## 2021-12-05 PROCEDURE — 99213 PR OFFICE/OUTPT VISIT, EST, LEVL III, 20-29 MIN: ICD-10-PCS | Mod: 25,S$GLB,, | Performed by: NURSE PRACTITIONER

## 2021-12-05 PROCEDURE — 96372 THER/PROPH/DIAG INJ SC/IM: CPT | Mod: S$GLB,,, | Performed by: NURSE PRACTITIONER

## 2021-12-05 PROCEDURE — 96372 PR INJECTION,THERAP/PROPH/DIAG2ST, IM OR SUBCUT: ICD-10-PCS | Mod: S$GLB,,, | Performed by: NURSE PRACTITIONER

## 2021-12-05 PROCEDURE — 99213 OFFICE O/P EST LOW 20 MIN: CPT | Mod: 25,S$GLB,, | Performed by: NURSE PRACTITIONER

## 2021-12-05 RX ORDER — DEXAMETHASONE SODIUM PHOSPHATE 4 MG/ML
4 INJECTION, SOLUTION INTRA-ARTICULAR; INTRALESIONAL; INTRAMUSCULAR; INTRAVENOUS; SOFT TISSUE
Status: COMPLETED | OUTPATIENT
Start: 2021-12-05 | End: 2021-12-05

## 2021-12-05 RX ORDER — DEXAMETHASONE SODIUM PHOSPHATE 4 MG/ML
4 INJECTION, SOLUTION INTRA-ARTICULAR; INTRALESIONAL; INTRAMUSCULAR; INTRAVENOUS; SOFT TISSUE
Status: DISCONTINUED | OUTPATIENT
Start: 2021-12-05 | End: 2021-12-05

## 2021-12-05 RX ADMIN — DEXAMETHASONE SODIUM PHOSPHATE 4 MG: 4 INJECTION, SOLUTION INTRA-ARTICULAR; INTRALESIONAL; INTRAMUSCULAR; INTRAVENOUS; SOFT TISSUE at 06:12

## 2021-12-06 ENCOUNTER — CLINICAL SUPPORT (OUTPATIENT)
Dept: REHABILITATION | Facility: HOSPITAL | Age: 71
End: 2021-12-06
Payer: MEDICARE

## 2021-12-06 DIAGNOSIS — R26.9 GAIT DISTURBANCE: ICD-10-CM

## 2021-12-06 DIAGNOSIS — Z74.09 IMPAIRED FUNCTIONAL MOBILITY, BALANCE, AND ENDURANCE: ICD-10-CM

## 2021-12-06 DIAGNOSIS — G35 MS (MULTIPLE SCLEROSIS): Primary | ICD-10-CM

## 2021-12-06 PROCEDURE — 97530 THERAPEUTIC ACTIVITIES: CPT | Mod: PN,CQ

## 2021-12-06 PROCEDURE — 97110 THERAPEUTIC EXERCISES: CPT | Mod: PN,CQ

## 2021-12-06 PROCEDURE — 97116 GAIT TRAINING THERAPY: CPT | Mod: PN,CQ

## 2021-12-09 ENCOUNTER — OFFICE VISIT (OUTPATIENT)
Dept: URGENT CARE | Facility: CLINIC | Age: 71
End: 2021-12-09
Payer: MEDICARE

## 2021-12-09 VITALS
RESPIRATION RATE: 16 BRPM | BODY MASS INDEX: 28.68 KG/M2 | SYSTOLIC BLOOD PRESSURE: 115 MMHG | WEIGHT: 168 LBS | HEART RATE: 82 BPM | TEMPERATURE: 97 F | OXYGEN SATURATION: 98 % | DIASTOLIC BLOOD PRESSURE: 72 MMHG | HEIGHT: 64 IN

## 2021-12-09 DIAGNOSIS — Z11.52 ENCOUNTER FOR SCREENING FOR COVID-19: Primary | ICD-10-CM

## 2021-12-09 LAB
CTP QC/QA: YES
SARS-COV-2 RDRP RESP QL NAA+PROBE: NEGATIVE

## 2021-12-09 PROCEDURE — 99213 PR OFFICE/OUTPT VISIT, EST, LEVL III, 20-29 MIN: ICD-10-PCS | Mod: S$GLB,,, | Performed by: NURSE PRACTITIONER

## 2021-12-09 PROCEDURE — 99213 OFFICE O/P EST LOW 20 MIN: CPT | Mod: S$GLB,,, | Performed by: NURSE PRACTITIONER

## 2021-12-09 PROCEDURE — U0002: ICD-10-PCS | Mod: QW,S$GLB,, | Performed by: NURSE PRACTITIONER

## 2021-12-09 PROCEDURE — U0002 COVID-19 LAB TEST NON-CDC: HCPCS | Mod: QW,S$GLB,, | Performed by: NURSE PRACTITIONER

## 2021-12-09 PROCEDURE — U0003 INFECTIOUS AGENT DETECTION BY NUCLEIC ACID (DNA OR RNA); SEVERE ACUTE RESPIRATORY SYNDROME CORONAVIRUS 2 (SARS-COV-2) (CORONAVIRUS DISEASE [COVID-19]), AMPLIFIED PROBE TECHNIQUE, MAKING USE OF HIGH THROUGHPUT TECHNOLOGIES AS DESCRIBED BY CMS-2020-01-R: HCPCS | Performed by: NURSE PRACTITIONER

## 2021-12-09 PROCEDURE — U0005 INFEC AGEN DETEC AMPLI PROBE: HCPCS | Performed by: NURSE PRACTITIONER

## 2021-12-10 LAB
SARS-COV-2 RNA RESP QL NAA+PROBE: NOT DETECTED
SARS-COV-2- CYCLE NUMBER: NORMAL

## 2021-12-13 ENCOUNTER — CLINICAL SUPPORT (OUTPATIENT)
Dept: REHABILITATION | Facility: HOSPITAL | Age: 71
End: 2021-12-13
Payer: MEDICARE

## 2021-12-13 DIAGNOSIS — Z74.09 IMPAIRED FUNCTIONAL MOBILITY, BALANCE, AND ENDURANCE: ICD-10-CM

## 2021-12-13 DIAGNOSIS — R26.9 GAIT DISORDER: Primary | ICD-10-CM

## 2021-12-13 PROCEDURE — 97116 GAIT TRAINING THERAPY: CPT | Mod: PN,CQ

## 2021-12-13 PROCEDURE — 97110 THERAPEUTIC EXERCISES: CPT | Mod: PN,CQ

## 2021-12-20 ENCOUNTER — CLINICAL SUPPORT (OUTPATIENT)
Dept: REHABILITATION | Facility: HOSPITAL | Age: 71
End: 2021-12-20
Payer: MEDICARE

## 2021-12-20 PROCEDURE — 97110 THERAPEUTIC EXERCISES: CPT | Mod: KX,PN

## 2021-12-21 ENCOUNTER — PATIENT MESSAGE (OUTPATIENT)
Dept: NEUROLOGY | Facility: CLINIC | Age: 71
End: 2021-12-21
Payer: MEDICARE

## 2021-12-23 ENCOUNTER — CLINICAL SUPPORT (OUTPATIENT)
Dept: REHABILITATION | Facility: HOSPITAL | Age: 71
End: 2021-12-23
Payer: MEDICARE

## 2021-12-23 DIAGNOSIS — R26.9 GAIT DISTURBANCE: Primary | ICD-10-CM

## 2021-12-23 PROCEDURE — 97116 GAIT TRAINING THERAPY: CPT | Mod: KX,PN

## 2021-12-23 PROCEDURE — 97530 THERAPEUTIC ACTIVITIES: CPT | Mod: KX,PN

## 2021-12-27 ENCOUNTER — CLINICAL SUPPORT (OUTPATIENT)
Dept: REHABILITATION | Facility: HOSPITAL | Age: 71
End: 2021-12-27
Payer: MEDICARE

## 2021-12-27 PROCEDURE — 97110 THERAPEUTIC EXERCISES: CPT | Mod: KU,PN

## 2021-12-30 ENCOUNTER — CLINICAL SUPPORT (OUTPATIENT)
Dept: REHABILITATION | Facility: HOSPITAL | Age: 71
End: 2021-12-30
Payer: MEDICARE

## 2021-12-30 DIAGNOSIS — Z74.09 IMPAIRED FUNCTIONAL MOBILITY, BALANCE, AND ENDURANCE: ICD-10-CM

## 2021-12-30 PROCEDURE — 97110 THERAPEUTIC EXERCISES: CPT | Mod: PN,CQ

## 2021-12-30 PROCEDURE — 97116 GAIT TRAINING THERAPY: CPT | Mod: PN,CQ

## 2022-01-03 ENCOUNTER — CLINICAL SUPPORT (OUTPATIENT)
Dept: REHABILITATION | Facility: HOSPITAL | Age: 72
End: 2022-01-03
Payer: MEDICARE

## 2022-01-03 PROCEDURE — 97116 GAIT TRAINING THERAPY: CPT | Mod: PN,CQ

## 2022-01-03 PROCEDURE — 97110 THERAPEUTIC EXERCISES: CPT | Mod: PN,CQ

## 2022-01-03 NOTE — PROGRESS NOTES
[facility]   Physical Therapy Treatment Note     Name: Alyssa John  Clinic Number: [id number]    Therapy Diagnosis:   No diagnosis found.  Physician: Genny Apodaca MD    Visit Date: 1/3/2022    Physician Orders: PT Eval and Treat   Medical Diagnosis from Referral: Multiple sclerosis; Gait disturbance  Evaluation Date: 11/29/2021  Authorization Period Expiration:02/16/22  Plan of Care Expiration: 1/18/22  Visit # / Visits authorized: 1/12    PTA Visit #: 2/5     Time In: 1500  Time Out: 1545  Total Billable Time: 45 minutes    Precautions: Fall, Hx of seizures     SUBJECTIVE     Pt reports: Having soreness Bilateral trunk, had been coughing a lot and was found to be negative for COVID, reports improvement with increased mobility.  She was compliant with home exercise program.  Response to previous treatment: no problem stated  Functional change: ongoing    Pain: 0/10  Location: Not Applicable     OBJECTIVE     Objective Measures updated at progress report unless specified.     Treatment     Alyssa received the treatments listed below:      therapeutic exercises to develop strength, endurance and range of motion  for 20 minutes including:    NuStep Level 2 11 minutes with Bilateral Upper Extremities with occasional cues for increased range    Sit < > stand fro Wheelchair 5 times minimal assistance with maximal verbal cues to anterior lean to increase lower extremity weight bear     gait training to improve functional mobility and safety for 25  minutes, including:    (Activity time includes skilled set-up and positioning as well as therapeutic rest)    Gait with rolling walker 25 feet with contact guard assistance, moderate verbal cues for increased Left foot clearance    Gait with rolling walker 160 feet with Wheelchair to follow contact guard assistance with moderate verbal cues to increase foot clearance Bilaterally.    Patient Education and Home Exercises     Home  Exercises Provided and Patient Education Provided     Education provided:   - Patient provided with verbal and demonstrative instruction for all activities performed in today's session.  - Safety with stand with use of upper extremities on seat or arms of chair    Written Home Exercises Provided: Patient instructed to cont prior HEP. Exercises were reviewed and Alyssa was able to demonstrate them prior to the end of the session.  Alyssa demonstrated good  understanding of the education provided. See EMR under Patient Instructions for exercises provided during therapy sessions    ASSESSMENT     Alyssa provided good participation and effort during today's session without complaints, treatment focused on Bilateral lower extremity strength and endurance as well as gait endurance.     Alyssa is progressing towards her goals.   Pt prognosis is Fair.     Pt will continue to benefit from skilled outpatient physical therapy to address the deficits listed in the problem list box on initial evaluation, provide pt/family education and to maximize pt's level of independence in the home and community environment.     Pt's spiritual, cultural and educational needs considered and pt agreeable to plan of care and goals.     Anticipated barriers to physical therapy: time schedule    Goals:     Short Term Goals: 3 weeks:  1. Patient will report compliance to Home Exercises. (progressing)  2. Patient will ambulate on RW 50 ft with min A. (progressing)  3. Patient will tolerate bike x 10' (ongoing)     Long Term Goals: 8 weeks   1. Patient will ambulate with  ft. (ongoing)  2. Patient will have gross strength to BLE's/BUE's  4/5 (ongoing)  3. Improved sit to stand to 5 reps in 30 second. (ongoing)  4. FOTO >40/100 (ongoing)      PLAN     Continue with plan of care to increase activities as patient tolerates with increasing ambulation distance..    Danyelle Benoit, PTA

## 2022-01-05 ENCOUNTER — CLINICAL SUPPORT (OUTPATIENT)
Dept: REHABILITATION | Facility: HOSPITAL | Age: 72
End: 2022-01-05
Payer: MEDICARE

## 2022-01-05 PROCEDURE — 97110 THERAPEUTIC EXERCISES: CPT | Mod: PN

## 2022-01-05 NOTE — PROGRESS NOTES
[facility]   Physical Therapy Treatment Note     Name: Alyssa John  Clinic Number: [id number]    Therapy Diagnosis:   Gait instability, generalized weakness.  Physician: Genny Apodaca MD    Visit Date: 1/5/2022    Physician Orders: PT Eval and Treat   Medical Diagnosis from Referral: Multiple sclerosis; Gait disturbance  Evaluation Date: 11/29/2021  Authorization Period Expiration: 12/13/21  Plan of Care Expiration: 1/18/22  Visit # / Visits authorized: 7/12 (pending)     Time In: 0920  Time Out: 1000  Total Billable Time: 40 minutes    Precautions: Fall, Hx of seizures     SUBJECTIVE     Pt reports:  discomforts on stretch  She was compliant with home exercise program.  Response to previous treatment:tolerated Rx  Functional change: improved sit to stand.     Pain: 010  Location: N/A    OBJECTIVE     Knee extension R 10  Left 5 deg  SLR  Left 65 deg   R 70 deg    Treatment     Alyssa received the treatments listed below:      therapeutic exercises to develop strength, endurance and ROM for 40 minutes including:  Hamstring/gastroc stretch   Quad sets 20/20  Hamstring set 20/20  Ankle dorsiflexion against manual resist 20/20  Short arc quads 20  Straight leg raising 20/20  Bridging 20  Sit to stand technique  GT exercises using RW 90 ft emphasis on wider steps and left leg swing/floor clearance.    Patient Education and Home Exercises     Home Exercises Provided and Patient Education Provided     Education provided:   -Trunk flexion on sit to stand.  Written Home Exercises Provided: Patient instructed to cont prior HEP.   Exercises were reviewed and Alyssa was able to demonstrate them prior to the end of the session.  Alyssa demonstrated good  understanding of the education provided. See EMR under Patient Instructions for exercises provided during therapy sessions    ASSESSMENT     Alyssa has tendency to lag behind the walker. Tight hamstrings.  Tolerated Rx.    Alyssa is  progressing towards her goals.   Pt prognosis is Fair.     Pt will continue to benefit from skilled outpatient physical therapy to address the deficits listed in the problem list box on initial evaluation, provide pt/family education and to maximize pt's level of independence in the home and community environment.     Pt's spiritual, cultural and educational needs considered and pt agreeable to plan of care and goals.     Anticipated barriers to physical therapy: time schedule    Goals:   Ongoing  1. Patient will ambulate with  ft.  2. Patient will have gross strength to BLE's/BUE's  4/5  3. Improved sit to stand to 5 reps in 30 second.  4. FOTO >40/100       PLAN     Stretching  Postural extension.  Continue Plan of care.    Jb Frias, PT

## 2022-01-06 ENCOUNTER — OFFICE VISIT (OUTPATIENT)
Dept: NEUROLOGY | Facility: CLINIC | Age: 72
End: 2022-01-06
Payer: MEDICARE

## 2022-01-06 VITALS
RESPIRATION RATE: 18 BRPM | DIASTOLIC BLOOD PRESSURE: 84 MMHG | TEMPERATURE: 98 F | SYSTOLIC BLOOD PRESSURE: 144 MMHG | BODY MASS INDEX: 29.76 KG/M2 | HEART RATE: 73 BPM | HEIGHT: 63 IN

## 2022-01-06 DIAGNOSIS — Z51.81 THERAPEUTIC DRUG MONITORING: Primary | ICD-10-CM

## 2022-01-06 DIAGNOSIS — R56.9 CONVULSIONS, UNSPECIFIED CONVULSION TYPE: ICD-10-CM

## 2022-01-06 DIAGNOSIS — G35 MS (MULTIPLE SCLEROSIS): ICD-10-CM

## 2022-01-06 DIAGNOSIS — M85.80 OSTEOPENIA DETERMINED BY X-RAY: ICD-10-CM

## 2022-01-06 DIAGNOSIS — R25.1 TREMOR: ICD-10-CM

## 2022-01-06 DIAGNOSIS — I65.21 INTERNAL CAROTID ARTERY STENOSIS, RIGHT: ICD-10-CM

## 2022-01-06 DIAGNOSIS — R53.82 CHRONIC FATIGUE: ICD-10-CM

## 2022-01-06 DIAGNOSIS — G35 MULTIPLE SCLEROSIS: ICD-10-CM

## 2022-01-06 DIAGNOSIS — E78.5 HYPERLIPIDEMIA WITH TARGET LDL LESS THAN 70: Chronic | ICD-10-CM

## 2022-01-06 DIAGNOSIS — I10 ESSENTIAL HYPERTENSION: ICD-10-CM

## 2022-01-06 DIAGNOSIS — Z87.898 HISTORY OF SEIZURE: ICD-10-CM

## 2022-01-06 DIAGNOSIS — G40.909 SEIZURE DISORDER: ICD-10-CM

## 2022-01-06 PROCEDURE — 3077F SYST BP >= 140 MM HG: CPT | Mod: CPTII,S$GLB,, | Performed by: NURSE PRACTITIONER

## 2022-01-06 PROCEDURE — 3008F PR BODY MASS INDEX (BMI) DOCUMENTED: ICD-10-PCS | Mod: CPTII,S$GLB,, | Performed by: NURSE PRACTITIONER

## 2022-01-06 PROCEDURE — 99499 UNLISTED E&M SERVICE: CPT | Mod: S$GLB,,, | Performed by: NURSE PRACTITIONER

## 2022-01-06 PROCEDURE — 99499 RISK ADDL DX/OHS AUDIT: ICD-10-PCS | Mod: S$GLB,,, | Performed by: NURSE PRACTITIONER

## 2022-01-06 PROCEDURE — 3079F PR MOST RECENT DIASTOLIC BLOOD PRESSURE 80-89 MM HG: ICD-10-PCS | Mod: CPTII,S$GLB,, | Performed by: NURSE PRACTITIONER

## 2022-01-06 PROCEDURE — 99215 OFFICE O/P EST HI 40 MIN: CPT | Mod: S$GLB,,, | Performed by: NURSE PRACTITIONER

## 2022-01-06 PROCEDURE — 1126F PR PAIN SEVERITY QUANTIFIED, NO PAIN PRESENT: ICD-10-PCS | Mod: CPTII,S$GLB,, | Performed by: NURSE PRACTITIONER

## 2022-01-06 PROCEDURE — 99215 PR OFFICE/OUTPT VISIT, EST, LEVL V, 40-54 MIN: ICD-10-PCS | Mod: S$GLB,,, | Performed by: NURSE PRACTITIONER

## 2022-01-06 PROCEDURE — 99999 PR PBB SHADOW E&M-EST. PATIENT-LVL IV: CPT | Mod: PBBFAC,,, | Performed by: NURSE PRACTITIONER

## 2022-01-06 PROCEDURE — 3008F BODY MASS INDEX DOCD: CPT | Mod: CPTII,S$GLB,, | Performed by: NURSE PRACTITIONER

## 2022-01-06 PROCEDURE — 3079F DIAST BP 80-89 MM HG: CPT | Mod: CPTII,S$GLB,, | Performed by: NURSE PRACTITIONER

## 2022-01-06 PROCEDURE — 1159F MED LIST DOCD IN RCRD: CPT | Mod: CPTII,S$GLB,, | Performed by: NURSE PRACTITIONER

## 2022-01-06 PROCEDURE — 1159F PR MEDICATION LIST DOCUMENTED IN MEDICAL RECORD: ICD-10-PCS | Mod: CPTII,S$GLB,, | Performed by: NURSE PRACTITIONER

## 2022-01-06 PROCEDURE — 99999 PR PBB SHADOW E&M-EST. PATIENT-LVL IV: ICD-10-PCS | Mod: PBBFAC,,, | Performed by: NURSE PRACTITIONER

## 2022-01-06 PROCEDURE — 3077F PR MOST RECENT SYSTOLIC BLOOD PRESSURE >= 140 MM HG: ICD-10-PCS | Mod: CPTII,S$GLB,, | Performed by: NURSE PRACTITIONER

## 2022-01-06 PROCEDURE — 1126F AMNT PAIN NOTED NONE PRSNT: CPT | Mod: CPTII,S$GLB,, | Performed by: NURSE PRACTITIONER

## 2022-01-06 RX ORDER — TOPIRAMATE 100 MG/1
TABLET, FILM COATED ORAL
Qty: 60 TABLET | Refills: 11 | Status: SHIPPED | OUTPATIENT
Start: 2022-01-06 | End: 2023-01-10

## 2022-01-06 RX ORDER — CLONAZEPAM 1 MG/1
1 TABLET ORAL 2 TIMES DAILY
Qty: 60 TABLET | Refills: 1 | Status: SHIPPED | OUTPATIENT
Start: 2022-01-06 | End: 2022-03-24 | Stop reason: SDUPTHER

## 2022-01-06 NOTE — ASSESSMENT & PLAN NOTE
Increase Topamax to goal of 100 mg BID gradually (50 mg  / 100 mg x 2 weeks, then 100 mg BID). Repeat trough in 4 weeks.   Then, will try to slowly wean Klonopin (0.5 mg / 1 mg x 1 month, then 0.5 mg BID).   Consider Onfi as an alternative if she has breakthrough.   Discussed possible Rx side effects with these changes & provided written Rx instruction.   Seizure safety reviewed.   Case discussed with Dr. Trimble

## 2022-01-06 NOTE — ASSESSMENT & PLAN NOTE
Will need to update DEXA and monitor closely if she requires higher doses of Topamax for seizure control

## 2022-01-06 NOTE — PROGRESS NOTES
"  NEUROLOGY  Outpatient Follow Up    [facility]  [address] [facility] [address], [place] [address]  [phone] (office) / [phone] (fax)    Patient Name:  Alyssa John  :  1950  MR #:  [id number]  Acct #:  [id number]    Date of Neurology Visit: 2022  Name of Provider: KERRY Marr    Other Physicians:  Nick Faye MD (Primary Care Physician); No ref. provider found (Referring)      Chief Complaint: No chief complaint on file.      History of Present Illness (HPI):  Prior HPI  "The patient is a 71 y.o. female with a PMH of MS (diagnosed in ) we have been asked to see for evaluation of episodes suspicious for seizures and medication management.  The patient reports a longstanding diagnosis of epilepsy and that she has a history of 2 types of seizures.     Seizure type 1: "Petit mal"  These began some time prior to . With respect to aura, the patient reports nothing.  Her seizure is characterized by several jerks in either shoulder.  This component of this spell lasts for approximately several seconds.  Afterwards, she reports no postictal state.  The patient's frequency of events was roughly daily prior to starting AEDs.  She reports that she has not had one of these in years.     Seizure type 2: "Grand mal"  These began in . With respect to warning, the patient would have "a lot of the petite mal" seizures.  Her seizure is characterized by loss of awareness, generalized stiffening, and generalized shaking.  She would have foaming of the mouth, urinary incontinence, and tongue biting.  This component of this spell lasts for approximately 2-3 minutes.  Afterwards, she was fatigued.  The patient's last event of this type was in .  Prior to starting medication, they happened about once a month on average.     She does not give a history of events suspicious for absence seizures.     Regarding medications, the patient was tried on phenobarbital, " "Dilantin, and Depakote without success.  Tegretol was then given, and she continued to have seizures.  In 1982, Klonopin was added.  She became seizure free on this regimen, aside from a seizure in 1990 when she tried to wean herself off her AEDs.  The patient's Tegretol was changed to Topamax at some point after 2000 when she became thrombocytopenic while also receiving Tysabri.  They also report that at some point, her Klonopin was increased from 0.5 mg BID to 1 mg BID.  They indicate that there was no clear reason why this was done.  They deny that she was having seizures.  They do report significant issues with fatigue, and they indicate that this prompted Dr. Apodaca to refer her here for revision of her AED regimen.     Potential Epilepsy Risk Factors:   Pregnancy/Labor/Delivery - none  Febrile seizures - none  Head injury  - none  CNS infection - none   Stroke - none  Family Hx of Sz - none"                Interval Hx 1/6/2022:   Patient is new to me          The patient is a 71 y.o. female we have been asked to see for evaluation for events worrisome for seizures.  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  Given that her diagnosis of MS was made at age 50, I am less suspicious that she developed epilepsy secondary to her MS.  I think a reasonable workup at this point would include EEG and serologies.  As far as medications go, we will try to reduce her Klonopin; however, I would like to have the EEG and a level on the Topamax back before we start to make changes.  In all likelihood, our first step will be to make sure she is therapeutic on Topamax and then reduce the Klonopin to 0.5 mg BID.  We can them observe to see if she is drowsy on the new dose of Klonopin.  While Klonopin is generally not my first choice as an AED, in " listening to the history of medications tried, it sounds as though the addition of Klonopin is probably what lead to her seizure freedom.  I am, therefore, concerned that she may require a benzodiazepine.  It should be noted, on the other hand, that the only other AED typically used for PGE that she was tried on prior to starting Klonopin was Depakote. Medication side effects were discussed with the patient.  State law as it pertains to driving for individuals with seizures was discussed.  The patient was also counseled on seizure safety. We will plan on seeing the patient back in a few weeks.      Treatment to date:    AED Treatments  Present regimen  Topamax 50 mg BID  Klonopin 1 mg BID     Prior treatments  Tegretol (discontinued due to thrombocytopenia in the setting of Tysabri administration)  Phenobarital (ineffective)  Dilantin (ineffective)  Depakote (ineffective)     Not tried  acetazolamide (Diamox, AZM)  Amantadine  brivitiracetam (Briviact, BRV)  clobazam (Onfi or Frizium, CLB)  ethosuximide (Zarontin, ESM)  eslicarbazine (Aptiom, ESL)  felbamate (felbatol, FBM)  gabapentin (Neurontin, GPN)  lacosamide (Vimpat, LCS)   lamotrigine (Lamictal, LTG)   levetiracetam (Keppra, LEV)  methsuximide (Celontin, MSM)  oxcarbazepine (Trileptal OXC)  perampanel (Fycompa, FCP)   pregabalin (Lyrica, PGB)  primidone (Mysoline, PRM)  rufinamide (Banzel, RUF)  tiagabine (Gabatril,  TGB)  viagabatrin, (Sabril, VGB)  vagal nerve stimulator (VNS)  valproic acid (Depakote, VPA)  zonisamide (Zonegran, ZNA)  Benzodiazepines  diazepam - rectal (Diastatl)  diazepam - oral (Valium, DZ)  clorazepate (Tranxene, CLZ)  Ativan  Brain Stimulation  Vagal Nerve Stimulation-n/a  DBS- n/a                  Past Medical, Surgical, Family & Social History:   Reviewed and updated.    Home Medications:     Current Outpatient Medications:     albuterol (PROAIR HFA) 90 mcg/actuation inhaler, Inhale 2 puffs into the lungs every 6 (six) hours as  needed for Wheezing. Rescue, Disp: 6.7 g, Rfl: 1    atorvastatin (LIPITOR) 40 MG tablet, TAKE 1 TABLET BY MOUTH EVERY DAY, Disp: 90 tablet, Rfl: 0    cholecalciferol, vitamin D3, 125 mcg (5,000 unit) Tab, Take 5,000 Units by mouth once daily., Disp: , Rfl:     clonazePAM (KLONOPIN) 1 MG tablet, Take 1 tablet (1 mg total) by mouth 2 (two) times daily., Disp: 180 tablet, Rfl: 0    clopidogrel (PLAVIX) 75 mg tablet, Take 75 mg by mouth once daily., Disp: , Rfl:     DULoxetine (CYMBALTA) 20 MG capsule, TAKE 2 CAPSULES BY MOUTH ONCE DAILY (Patient taking differently: TAKE 1 CAPSULES BY MOUTH ONCE DAILY), Disp: 180 capsule, Rfl: 1    famotidine (PEPCID) 20 MG tablet, Take 20 mg by mouth 2 (two) times daily as needed for Heartburn., Disp: , Rfl:     GLATOPA 40 mg/mL injection, Inject 40 mg into the skin 3 (three) times a week., Disp: 12 mL, Rfl: 5    nitroGLYCERIN (NITROSTAT) 0.4 MG SL tablet, Place 0.4 mg under the tongue every 5 (five) minutes as needed for Chest pain., Disp: , Rfl:     prednisoLONE acetate (PRED FORTE) 1 % DrpS, INSTILL 1 DROP THREE TIMES A DAY INTO BOTH EYES, Disp: , Rfl:     PSYLLIUM SEED, WITH DEXTROSE, (FIBER ORAL), Take by mouth 2 (two) times daily., Disp: , Rfl:     topiramate (TOPAMAX) 50 MG tablet, TAKE 1 TABLET BY MOUTH TWICE A DAY, Disp: 180 tablet, Rfl: 1    Physical Examination:  There were no vitals taken for this visit.    GENERAL:  General appearance: Well, non-toxic appearing.  No apparent distress.  Neck: supple.    MENTAL STATUS:  Alertness, attention span & concentration: normal.  Language: normal.  Orientation to self, place & time:  normal.  Memory, recent & remote: normal.  Fund of knowledge: normal.  MMSE:    SPEECH:  Clear and fluent.  Follows complex commands.    CRANIAL NERVES:  Cranial Nerves II-XII were examined.  II - Visual fields: normal.  III, IV, VI: PERRL, EOMI, No ptosis, No nystagmus.  V - Facial sensation: normal.  VII - Face symmetry & mobility: not  assessed d/t COVID precautions  VIII - Hearing: normal.  IX, X - Palate: mobile & midline.  XI - Shoulder shrug: normal.  XII - Tongue protrusion: not assessed d/t COVID precautions    GROSS MOTOR:  Gait & station: normal.  Tone: normal.  Abnormal movements: none.  Finger-nose & Heel-knee-shin: normal.  Rapid alternating movements: normal.  Pronator drift: normal  Romberg: absent    MUSCLE STRENGTH:     Fascics Atrophy RIGHT    LEFT Atrophy Fascics     5 Neck Ext. 5       5 Neck Flex 5       5 Deltoids 5       5 Sh.Ext.Rot. 5       5 Sh.Int.Rot. 5       5 Biceps 5       5 Triceps 5       5 Forearm.Pr. 5       5 Wrist Ext. 5       5 Wrist Flex 5       5 Finger Ext. 5       5 Finger Flex 5                5 Iliopsoas flex    5       5 Hip Abduct 5       5 Hip Adduct 5       5 Quads 5       5 Hams 5       5 Dorsiflex 5       5 Plantar Flex 5       5 Ankle Moises 5       5 Ankle Invert 5       5 Toe Ext. 5       5 Toe Flex 5                         REFLEXES:    RIGHT Reflex   LEFT   2+ Biceps 2+   2+ Brachiorad. 2+   2+ Triceps 2+        2+ Patellar 2+   2+ Ankle 2+        Down PLANTAR Down     SENSORY:  Light touch: Normal throughout. (LF)  Sharp touch: Normal throughout.  Vibration: Normal throughout. (LF)  Temperature: Normal throughout. (SF)  Joint Position: Normal throughout.  Proprioception: Intact              Diagnostic Data Reviewed:   Component      Latest Ref Rng & Units 11/17/2021   WBC      3.90 - 12.70 K/uL 6.90   RBC      4.00 - 5.40 M/uL 4.85   Hemoglobin      12.0 - 16.0 g/dL 13.0   Hematocrit      37.0 - 48.5 % 42.1   MCV      82 - 98 fL 87   MCH      27.0 - 31.0 pg 26.8 (L)   MCHC      32.0 - 36.0 g/dL 30.9 (L)   RDW      11.5 - 14.5 % 14.1   Platelets      150 - 450 K/uL 216   MPV      9.2 - 12.9 fL 11.5   Immature Granulocytes      0.0 - 0.5 % 0.3   Gran # (ANC)      1.8 - 7.7 K/uL 4.7   Immature Grans (Abs)      0.00 - 0.04 K/uL 0.02   Lymph #      1.0 - 4.8 K/uL 1.5   Mono #      0.3 - 1.0 K/uL 0.4  "  Eos #      0.0 - 0.5 K/uL 0.2   Baso #      0.00 - 0.20 K/uL 0.05   nRBC      0 /100 WBC 0   Gran %      38.0 - 73.0 % 68.3   Lymph %      18.0 - 48.0 % 21.0   Mono %      4.0 - 15.0 % 6.2   Eosinophil %      0.0 - 8.0 % 3.5   Basophil %      0.0 - 1.9 % 0.7   Differential Method       Automated   Sodium      136 - 145 mmol/L 140   Potassium      3.5 - 5.1 mmol/L 3.6   Chloride      95 - 110 mmol/L 111 (H)   CO2      23 - 29 mmol/L 21 (L)   Glucose      70 - 110 mg/dL 87   BUN      8 - 23 mg/dL 17   Creatinine      0.5 - 1.4 mg/dL 0.8   Calcium      8.7 - 10.5 mg/dL 8.7   PROTEIN TOTAL      6.0 - 8.4 g/dL 6.7   Albumin      3.5 - 5.2 g/dL 3.6   BILIRUBIN TOTAL      0.1 - 1.0 mg/dL 0.6   Alkaline Phosphatase      55 - 135 U/L 140 (H)   AST      10 - 40 U/L 16   ALT      10 - 44 U/L 20   Anion Gap      8 - 16 mmol/L 8   eGFR if African American      >60 mL/min/1.73 m:2 >60.0   eGFR if non African American      >60 mL/min/1.73 m:2 >60.0   Topiramate Lvl      mcg/mL 4.5       MRI brain (4/21):  "1. There is a stable marked burden of white matter disease consistent with the provided diagnosis of multiple sclerosis.  These findings are other extensive.  There are no regions of restricted diffusion and there is no abnormal enhancement.  There are no findings to suggest interval progression of disease or active demyelination."  I independently visualized the images of this study and interpreted them.  There is diffuse atrophy noted involving the cerebrum, cerebellum, and brainstem.  She has a significant burden of white matter disease.  The areas of T2/FLAIR hyperintensity are most pronounced in a periventricular distribution, and some lesions appear to be oriented perpendicular to the ventricles.  While less pronounced, there are some juxtacortical lesion seen.  There is no abnormal contrast enhancement.  This study appears consistent with the patient's diagnosis of longstanding MS, and there is no evidence of active " demyelination appreciated.                   Assessment and Plan:                        Important to note, also  has a past medical history of Arthritis, Coronary artery disease, Encounter for blood transfusion, Epilepsy, GERD (gastroesophageal reflux disease), Headache, Hypertension, MI, old, MS (multiple sclerosis), and Seizures.          The patient will return to clinic in *** months.    All questions were answered and patient is comfortable with the plan.         Thank you very much for the opportunity to assist in this patient's care.    If you have any questions or concerns, please do not hesitate to contact me at any time.      Sincerely,     KERRY Marr  [facility] [place]         I spent a total of *** minutes on the day of the visit.This includes face to face time and non-face to face time preparing to see the patient (eg, review of tests), Obtaining and/or reviewing separately obtained history, Documenting clinical information in the electronic or other health record, Independently interpreting resultsand communicating results to the patient/family/caregiver, or Care coordination.

## 2022-01-06 NOTE — PATIENT INSTRUCTIONS
We will increase the Topamax gradually and then try to slowly wean you down on the Klonopin.     Start by increasing Topamax:  Take 50 mg in the AM and 100 mg in the PM for 2 weeks, then increase to 100 mg twice per day thereafter  - potential side effects include drowsiness, fogginess or change in sense of taste     In about 4 weeks (when you are on the 100 mg dose of Topamax), we will check another blood trough level.     Once you are on the 100 mg dose of Topamax, we will slowly start to wean the Klonopin as follows:  Take 0.5 mg in the AM and 1 mg in the PM for 1 month, then  Decrease to 0.5 mg twice per day thereafter    We will set you up for a follow up visit with Dr. Trimble in about 3 months    Please feel free to contact me or Dr. Trimble if you have any issues in the meantime!           During a seizure:  - Ensure the person is in a safe place, remove hard or sharp objects from the area  - Turn the person on their side  - Never force anything into their mouth  - Keep on eye on the time, if the jerking/shaking/tensing of the muscles lasts > 5 minutes, call 911.    After a seizure:  - Allow them to lie quietly, gently call them to see if they wake up  - If there is injury or if they are not waking up within 5 minutes, call 911    Safety:  - Take showers, not baths  - Take care when around bodies of water (swimming pools, lakes, etc) and wear protective gear. Do not swim alone  - Use equipment with automatic shutoff safety features  - Do not climb ladders or use heavy machinery until seizure-free for 6 months  - Use the back burners when cooking  - If you have children, change diapers on the floor and avoid holding them while taking stairs  - Do not drive until seizure-free for 6 months    Triggers:  - Be sure to take you medication as scheduled without missed doses  - Be sure to get 8 hours of sleep each night  - Certain medications to avoid:   - Benadryl (diphenhydramine)   - Tramadol (Ultram)   - Certain  antibiotics in the fluoroquinolone class (levaquin or cipro)   - Wellbutrin    - Chantix   - Alcohol   - Other illegal drugs or stimulant medications  - Herbal supplements to avoid that can lower the seizure threshold:   - Asafoetida, Borage, Ephedra, Ergot, Eucalyptus, Evening primrose, Ginkgo biloba, Ginseng, Pennyroyal, Joseph, Shankpushpi, Star anise, Star fruit, Wormwood, and Yohimbine

## 2022-01-06 NOTE — PROGRESS NOTES
"NEUROLOGY  Outpatient Follow Up Visit     [facility]  [address] [facility], [place] [address]  [phone] (office) / [phone] (fax)    Patient Name:  Alyssa John  :  1950  MR #:  [id number]  Acct #:  [id number]    Date of  Visit: 2022    Other Physicians:  Nick Faye MD (Primary Care Physician); No ref. provider found (Referring)      CHIEF COMPLAINT: Seizures    INTERVAL HISTORY:  Alyssa John is a 71 y.o.  female seen in follow up for epilepsy. The patient is new to me today, but was evaluated by Dr. Trimble in 2021. She is established with Dr. Apodaca in MS clinic as well.     Medical history is otherwise significant for MS, CAD, GERD, HTN    Alyssa is here with her  today, who assists with history.     She has had drowsiness and fatigue for a couple of years. They aren't sure when or why the Klonopin dose was increased from 0.5 mg to 1 mg or if this caused the increased drowsiness. She has been on Topamax for several years, perhaps 10 years, at the same dose of 50 mg BID without obvious SE.     The EEG showed focal L temporal slowing, but no epileptic changes. The topiramate level was on the low side (4.5).  indicates that the levels were drawn about 20 hours from her last dose, however.     She has not had any seizures since her last visit with Dr. Trimble. She does not drive.     PRIOR HISTORY:  [facility] 21 - Atilio:  "The patient is a 71 y.o. female with a PMH of MS (diagnosed in ) we have been asked to see for evaluation of episodes suspicious for seizures and medication management.  The patient reports a longstanding diagnosis of epilepsy and that she has a history of 2 types of seizures.     Seizure type 1: "Petit mal"  These began some time prior to . With respect to aura, the patient reports nothing.  Her seizure is characterized by several jerks in either shoulder.  This component of this spell lasts for approximately " "several seconds.  Afterwards, she reports no postictal state.  The patient's frequency of events was roughly daily prior to starting AEDs.  She reports that she has not had one of these in years.     Seizure type 2: "Grand mal"  These began in 1971. With respect to warning, the patient would have "a lot of the petite mal" seizures.  Her seizure is characterized by loss of awareness, generalized stiffening, and generalized shaking.  She would have foaming of the mouth, urinary incontinence, and tongue biting.  This component of this spell lasts for approximately 2-3 minutes.  Afterwards, she was fatigued.  The patient's last event of this type was in 1990.  Prior to starting medication, they happened about once a month on average.     She does not give a history of events suspicious for absence seizures.     Regarding medications, the patient was tried on phenobarbital, Dilantin, and Depakote without success.  Tegretol was then given, and she continued to have seizures.  In 1982, Klonopin was added.  She became seizure free on this regimen, aside from a seizure in 1990 when she tried to wean herself off her AEDs.  The patient's Tegretol was changed to Topamax at some point after 2000 when she became thrombocytopenic while also receiving Tysabri.  They also report that at some point, her Klonopin was increased from 0.5 mg BID to 1 mg BID.  They indicate that there was no clear reason why this was done.  They deny that she was having seizures.  They do report significant issues with fatigue, and they indicate that this prompted Dr. Apodaca to refer her here for revision of her AED regimen.     Potential Epilepsy Risk Factors:   Pregnancy/Labor/Delivery - none  Febrile seizures - none  Head injury  - none  CNS infection - none   Stroke - none  Family Hx of Sz - none     AED Treatments  Present regimen  Topamax 50 mg BID  Klonopin 1 mg BID     Prior treatments  Tegretol (discontinued due to thrombocytopenia in the setting " [facility] administration)  Phenobarital (ineffective)  Dilantin (ineffective)  Depakote (ineffective)    Allergies:  Review of patient's allergies indicates:   Allergen Reactions    Codeine      Other reaction(s): throat tightness    Dilantin [phenytoin sodium extended]     Phenobarbital     Tegretol [carbamazepine]        Current Medications:  Current Outpatient Medications   Medication Sig Dispense Refill    atorvastatin (LIPITOR) 40 MG tablet TAKE 1 TABLET BY MOUTH EVERY DAY 90 tablet 0    cholecalciferol, vitamin D3, 125 mcg (5,000 unit) Tab Take 5,000 Units by mouth once daily.      clopidogrel (PLAVIX) 75 mg tablet Take 75 mg by mouth once daily.      DULoxetine (CYMBALTA) 20 MG capsule TAKE 2 CAPSULES BY MOUTH ONCE DAILY (Patient taking differently: TAKE 1 CAPSULES BY MOUTH ONCE DAILY) 180 capsule 1    famotidine (PEPCID) 20 MG tablet Take 20 mg by mouth 2 (two) times daily as needed for Heartburn.      GLATOPA 40 mg/mL injection Inject 40 mg into the skin 3 (three) times a week. 12 mL 5    nitroGLYCERIN (NITROSTAT) 0.4 MG SL tablet Place 0.4 mg under the tongue every 5 (five) minutes as needed for Chest pain.      prednisoLONE acetate (PRED FORTE) 1 % DrpS INSTILL 1 DROP THREE TIMES A DAY INTO BOTH EYES      PSYLLIUM SEED, WITH DEXTROSE, (FIBER ORAL) Take by mouth 2 (two) times daily.      albuterol (PROAIR HFA) 90 mcg/actuation inhaler Inhale 2 puffs into the lungs every 6 (six) hours as needed for Wheezing. Rescue 6.7 g 1    clonazePAM (KLONOPIN) 1 MG tablet Take 1 tablet (1 mg total) by mouth 2 (two) times daily. 60 tablet 1    topiramate (TOPAMAX) 100 MG tablet Take 50 mg in the AM and 100 mg in the PM for 2 weeks, then increase to 100 mg twice per day 60 tablet 11     No current facility-administered medications for this visit.       Past Medical History:  Past Medical History:   Diagnosis Date    Arthritis     Coronary artery disease     Encounter for blood transfusion     Epilepsy  "    GERD (gastroesophageal reflux disease)     Headache     Hypertension     MI, old     MS (multiple sclerosis)     Seizures     epilepsy       Past Surgical History:  Past Surgical History:   Procedure Laterality Date    APPENDECTOMY      BACK SURGERY      L5 discectomy    BREAST BIOPSY Right 15 yrs ago    benign    CATARACT EXTRACTION Bilateral     COLONOSCOPY N/A 9/16/2015    Procedure: COLONOSCOPY;  Surgeon: Henry Malcolm MD;  Location: [facility];  Service: Endoscopy;  Laterality: N/A;    CORONARY STENT PLACEMENT      right knee arthroscopy         Family History:  family history includes Heart disease in her father; Hyperlipidemia in her sister; Scleroderma in her mother.    Social History:   reports that she quit smoking about 15 years ago. She smoked 1.50 packs per day. She has never used smokeless tobacco. She reports that she does not drink alcohol and does not use drugs.      PHYSICAL EXAM:  BP (!) 144/84 (BP Location: Right arm, Patient Position: Sitting, BP Method: Medium (Automatic))   Pulse 73   Temp 97.7 °F (36.5 °C) (Temporal)   Resp 18   Ht 5' 3" (1.6 m)   BMI 29.76 kg/m²     General: Well groomed. NAD. In WC.   Pulmonary: Normal effort and rate.   Musculoskeletal: No obvious joint deformities, moves all extremities well.  Extremities: No clubbing, cyanosis. Mild BLE edema with chronic skin changes.     Neurological exam:  Mental status: Awake and alert.  Oriented to person, place, time and situation. Fund of knowledge normal.  Speech/Language: Fluent and appropriate. No dysarthria or aphasia on conversation.   Cranial nerves (II-XII): Extraocular movements intact, no ptosis, no nystagmus. Facial sensation and symmetry intact bilaterally. Hearing grossly intact. Shoulder shrug normal bilaterally. Normal tongue protrusion.   Motor: 5 out of 5 strength throughout the upper and lower extremities bilaterally except for 4/5 in the L IP. Mild spasticity. No abnormal movements " "noted. No drift appreciated.   Sensation: intact to light touch, decreased to temperature in LLE  DTR: 3+ at the knees and biceps bilaterally. No clonus.   Coordination: Finger-nose-finger testing intact bilaterally. BUE action tremor with FNF  Gait: In WC, deferred.     DIAGNOSTIC DATA:  I have personally reviewed provider notes, labs and imaging made available to me today.     Imaging:  MRI brain 4/2021:  "1. There is a stable marked burden of white matter disease consistent with the provided diagnosis of multiple sclerosis.  These findings are other extensive.  There are no regions of restricted diffusion and there is no abnormal enhancement.  There are no findings to suggest interval progression of disease or active demyelination."    DEXA 6/2016:  IMPRESSION:    Osteopenia -- there is a 10% risk of a major osteoporotic fracture and a 1.4% risk of hip fracture in the next 10 years (FRAX).    EEG 11/16/21:  IMPRESSION:  This is an abnormal EEG during wakefulness and drowsiness.  Left temporal focal polymorphic mixed delta range slowing was noted     CLINICAL CORRELATION:  The patient is a 71 -year-old female with a history of epilepsy who is currently maintained on topiramate and clonazepam.  This is an abnormal EEG during wakefulness and drowsiness.  The presence of left temporal focal intermittent slowing suggestive focal neural dysfunction in this region.  There is no evidence of an epileptic process on this recording.  No seizures recorded during this study"    Labs:  CBC:   Lab Results   Component Value Date    WBC 6.90 11/17/2021    HGB 13.0 11/17/2021    HCT 42.1 11/17/2021     11/17/2021    MCV 87 11/17/2021    RDW 14.1 11/17/2021     BMP:   Lab Results   Component Value Date     11/17/2021    K 3.6 11/17/2021     (H) 11/17/2021    CO2 21 (L) 11/17/2021    BUN 17 11/17/2021    CREATININE 0.8 11/17/2021    GLU 87 11/17/2021    CALCIUM 8.7 11/17/2021     LFTS;   Lab Results   Component " Value Date    PROT 6.7 11/17/2021    ALBUMIN 3.6 11/17/2021    BILITOT 0.6 11/17/2021    AST 16 11/17/2021    ALKPHOS 140 (H) 11/17/2021    ALT 20 11/17/2021     COAGS:   Lab Results   Component Value Date    INR 0.92 02/22/2013     FLP:   Lab Results   Component Value Date    CHOL 166 10/01/2021    HDL 48 10/01/2021    LDLCALC 88.4 10/01/2021    TRIG 148 10/01/2021    CHOLHDL 28.9 10/01/2021     Component      Latest Ref Rng & Units 11/17/2021   Topiramate Lvl      mcg/mL 4.5         ASSESSMENT & PLAN:  Alyssa John is a 71 y.o.  female seen in follow up for epilepsy.     Problem List Items Addressed This Visit        Neuro    MS (multiple sclerosis)    Overview     Established with Dr. Apodaca         Seizure disorder    Overview     Suspect PGE, possibly KIESHA  Pt with comorbid migraines & MS         Current Assessment & Plan     Increase Topamax to goal of 100 mg BID gradually (50 mg  / 100 mg x 2 weeks, then 100 mg BID). Repeat trough in 4 weeks.   Then, will try to slowly wean Klonopin (0.5 mg / 1 mg x 1 month, then 0.5 mg BID).   Consider Onfi as an alternative if she has breakthrough.   Discussed possible Rx side effects with these changes & provided written Rx instruction.   Seizure safety reviewed.   Case discussed with Dr. Atilio Li       Cardiac/Vascular    Hyperlipidemia with target LDL less than 70 (Chronic)    Essential hypertension    Internal carotid artery stenosis, right       Orthopedic    Osteopenia determined by x-ray    Current Assessment & Plan     Will need to update DEXA and monitor closely if she requires higher doses of Topamax for seizure control             Other    Chronic fatigue      Other Visit Diagnoses     Therapeutic drug monitoring    -  Primary    Convulsions, unspecified convulsion type        Multiple sclerosis        History of seizure              Follow up: 3-4 months with Dr. Trimble     I spent a total of 42 minutes on the day of the visit.    This includes  face to face time with the patient, as well as non-face to face time preparing for and completing the visit (review of prior diagnostic testing and clinical notes, obtaining or reviewing history, documenting clinical information in the EMR, independently interpreting and communicating results to the patient/family and coordinating ongoing care).               I appreciate the opportunity to participate in the care of this patient. Please feel free to contact me with any questions or concerns.       Camila Cabrone, [facility]  [facility]  [address] [facility] [place] [address]

## 2022-01-10 ENCOUNTER — CLINICAL SUPPORT (OUTPATIENT)
Dept: REHABILITATION | Facility: HOSPITAL | Age: 72
End: 2022-01-10
Payer: MEDICARE

## 2022-01-10 PROCEDURE — 97112 NEUROMUSCULAR REEDUCATION: CPT | Mod: PN,CQ

## 2022-01-10 PROCEDURE — 97116 GAIT TRAINING THERAPY: CPT | Mod: PN,CQ

## 2022-01-10 PROCEDURE — 97110 THERAPEUTIC EXERCISES: CPT | Mod: PN,CQ

## 2022-01-10 NOTE — PROGRESS NOTES
"[facility]   Physical Therapy Treatment Note     Name: Alyssa John  Clinic Number: [id number]    Therapy Diagnosis:   No diagnosis found.  Physician: Genny Apodaca MD    Visit Date: 1/10/2022    Physician Orders: PT Eval and Treat   Medical Diagnosis from Referral: Multiple sclerosis; Gait disturbance  Evaluation Date: 11/29/2021  Authorization Period Expiration:02/16/22  Plan of Care Expiration: 1/18/22  Visit # / Visits authorized: 3/12    PTA Visit #: 1/5     Time In: 1415  Time Out: 1500  Total Billable Time: 45 minutes    Precautions: Fall, Hx of seizures     SUBJECTIVE     Pt reports: Doing well overall, reports she feels like she is taking giant steps when following verbal cues for increased step length with improved foot clearance, "I look silly".  She was compliant with home exercise program.  Response to previous treatment: no problem stated  Functional change: ongoing    Pain: 0/10  Location: Not Applicable     OBJECTIVE     Objective Measures updated at progress report unless specified.     Treatment     Alyssa received the treatments listed below:      therapeutic exercises to develop strength, endurance and range of motion for 10 minutes including:    NuStep Level 3 10 minutes with Bilateral Upper Extremities with occasional cues for increased range and increased steps per minute to 40+    Therapeutic Activities for improved form, safety and function for 15 minutes to include the following:    Up platform steps step through with 4 inch steps and down 6 inch steps step to leading with Left with contact guard assistance     Wheelchair < > NuStep with contact guard assistance and cues for hand placement    gait training to improve functional mobility and safety for 20 minutes, including:    Gait with rolling walker 90 feet and 30 feet feet with Wheelchair to follow contact guard assistance with moderate verbal cues to increase foot clearance on Left     (Activity " "time includes therapeutic rest)  Patient Education and Home Exercises     Home Exercises Provided and Patient Education Provided     Education provided:   - Patient provided with verbal and demonstrative instruction for all activities performed in today's session.  - Safety with stand with use of upper extremities on seat or arms of chair    Written Home Exercises Provided: Patient instructed to cont prior HEP. Exercises were reviewed and Alyssa was able to demonstrate them prior to the end of the session.  Alyssa demonstrated good  understanding of the education provided. See EMR under Patient Instructions for exercises provided during therapy sessions    ASSESSMENT     Alyssa provided good participation and effort during today's session without complaints, treatment focused on gait endurance, lower extremity strengthening and overall safety with ambulation and transfers. Patient needing encouragement to improve stepping with ambulation in that her stepping looks normal and not "silly".    Alyssa is progressing towards her goals.   Pt prognosis is Fair.     Pt will continue to benefit from skilled outpatient physical therapy to address the deficits listed in the problem list box on initial evaluation, provide pt/family education and to maximize pt's level of independence in the home and community environment.     Pt's spiritual, cultural and educational needs considered and pt agreeable to plan of care and goals.     Anticipated barriers to physical therapy: time schedule    Goals:     Short Term Goals: 3 weeks:  1. Patient will report compliance to Home Exercises. (progressing)  2. Patient will ambulate on RW 50 ft with min A. (progressing)  3. Patient will tolerate bike x 10' (ongoing)     Long Term Goals: 8 weeks   1. Patient will ambulate with  ft. (ongoing)  2. Patient will have gross strength to BLE's/BUE's  4/5 (ongoing)  3. Improved sit to stand to 5 reps in 30 second. (ongoing)  4. FOTO >40/100 " (ongoing)      PLAN     Outpatient Physical Therapy 2 times weekly for 10 weeks to include the following interventions: Gait Training, Manual Therapy, Neuromuscular Re-ed, Therapeutic Activites and Therapeutic Exercise.    Danyelle Benoit, PTA

## 2022-01-13 ENCOUNTER — CLINICAL SUPPORT (OUTPATIENT)
Dept: REHABILITATION | Facility: HOSPITAL | Age: 72
End: 2022-01-13
Payer: MEDICARE

## 2022-01-13 PROCEDURE — 97110 THERAPEUTIC EXERCISES: CPT | Mod: KX,PN

## 2022-01-13 NOTE — PROGRESS NOTES
[facility]   Physical Therapy Treatment Note     Name: Alyssa John  Clinic Number: [id number]    Therapy Diagnosis:   Gait instability, generalized weakness.  Physician: Genny Apodaca MD    Visit Date: 1/13/2022    Physician Orders: PT Eval and Treat   Medical Diagnosis from Referral: Multiple sclerosis; Gait disturbance  Evaluation Date: 11/29/2021  Authorization Period Expiration: 12/13/21  Plan of Care Expiration: 1/18/22  Visit # / Visits authorized: 9/10      Time In: 1418  Time Out: 1500  Total Billable Time: 42 minutes    Precautions: Fall, Hx of seizures     SUBJECTIVE     Pt reports:  No pain except on stretch  She was compliant with home exercise program.  Response to previous treatment:tolerated Rx  Functional change: improved sit to stand.     Pain: 010  Location: N/A    OBJECTIVE     Left foot drag when fatigues during ambulation.    Treatment     Alyssa received the treatments listed below:      therapeutic exercises to develop strength, endurance and ROM for 42 minutes including:  Hamstring/gastroc stretch   Quad sets 20/20  Hamstring set 20/20  Ankle dorsiflexion against manual resist 20/20  Short arc quads 20  Straight leg raising 20/20  Bridging 20  Sit to stand technique  GT exercises using RW 90 ft emphasis on wider steps and left leg swing/floor clearance.    Patient Education and Home Exercises     Home Exercises Provided and Patient Education Provided     Education provided:   -Importance of fully extended knee for weight bearing.  Written Home Exercises Provided: Patient instructed to cont prior HEP.   Exercises were reviewed and Alyssa was able to demonstrate them prior to the end of the session.  Alyssa demonstrated good  understanding of the education provided. See EMR under Patient Instructions for exercises provided during therapy sessions    ASSESSMENT     Alyssa has initial knee extension lag on flatlying.. Pain discomforts on hamstring stretch.    Tolerated Rx.    Alyssa is progressing towards her goals.   Pt prognosis is Fair.     Pt will continue to benefit from skilled outpatient physical therapy to address the deficits listed in the problem list box on initial evaluation, provide pt/family education and to maximize pt's level of independence in the home and community environment.     Pt's spiritual, cultural and educational needs considered and pt agreeable to plan of care and goals.     Anticipated barriers to physical therapy: time schedule    Goals:   Ongoing  1. Patient will ambulate with  ft.  2. Patient will have gross strength to BLE's/BUE's  4/5  3. Improved sit to stand to 5 reps in 30 second.  4. FOTO >40/100       PLAN     Stretching  Gait and transfers training.  Continue Plan of care.    Jb Frias, PT

## 2022-01-18 ENCOUNTER — CLINICAL SUPPORT (OUTPATIENT)
Dept: REHABILITATION | Facility: HOSPITAL | Age: 72
End: 2022-01-18
Payer: MEDICARE

## 2022-01-18 ENCOUNTER — TELEPHONE (OUTPATIENT)
Dept: ADMINISTRATIVE | Facility: CLINIC | Age: 72
End: 2022-01-18
Payer: MEDICARE

## 2022-01-18 ENCOUNTER — PES CALL (OUTPATIENT)
Dept: ADMINISTRATIVE | Facility: CLINIC | Age: 72
End: 2022-01-18
Payer: MEDICARE

## 2022-01-18 PROCEDURE — 97530 THERAPEUTIC ACTIVITIES: CPT | Mod: PN

## 2022-01-18 PROCEDURE — 97116 GAIT TRAINING THERAPY: CPT | Mod: PN

## 2022-01-18 NOTE — PROGRESS NOTES
"[facility]   Physical Therapy Treatment Note     Name: Alyssa John  Clinic Number: [id number]    Therapy Diagnosis:   Gait instability, generalized weakness.  Physician: Genny Apodaca MD    Visit Date: 1/18/2022    Physician Orders: PT Eval and Treat   Medical Diagnosis from Referral: Multiple sclerosis; Gait disturbance  Evaluation Date: 11/29/2021  Authorization Period Expiration: 12/13/21  Plan of Care Expiration: 1/18/22  Visit # / Visits authorized: 9/10      Time In: 1417  Time Out: 1500  Total Billable Time: 43 minutes    Precautions: Fall, Hx of seizures     SUBJECTIVE     Pt reports:  Having a slip and fall in the bathroom 3 days ago. No known injury.   She was not compliant with home exercise program.  Response to previous treatment:tolerated Rx  Functional change: decreased distance ambulated.    Pain: 0/10  Location: N/A    OBJECTIVE     Fatigues with activity.    Treatment     Alyssa received the treatments listed below:      therapeutic activities to develop strength, endurance and ROM for 25 minutes including:  Standing gastroc stretch   Postural erect standing  Sit to stand 10  Step up on 3" stool  10  Step through 3" wedge  10    GT 12"  using RW 30 ft emphasis on  left leg swing/floor clearance.  GT // bars emphasis on center of mass orientation, increased speed and step length    Patient Education and Home Exercises     Home Exercises Provided and Patient Education Provided     Education provided:   -Postural orientation in relation to energy conservation.  Written Home Exercises Provided: Patient instructed to cont prior HEP.   Exercises were reviewed and Alyssa was able to demonstrate them prior to the end of the session.  Alyssa demonstrated good  understanding of the education provided. See EMR under Patient Instructions for exercises provided during therapy sessions    ASSESSMENT     Alyssa is contradicting herself when she didn't want to walk on RW " because she was tired but insist on walking in the // bars to practice left leg step placement.  No reference to pain after fall.  Tolerated Rx.    Alyssa is progressing towards her goals.   Pt prognosis is Fair.     Pt will continue to benefit from skilled outpatient physical therapy to address the deficits listed in the problem list box on initial evaluation, provide pt/family education and to maximize pt's level of independence in the home and community environment.     Pt's spiritual, cultural and educational needs considered and pt agreeable to plan of care and goals.     Anticipated barriers to physical therapy: time schedule    Goals:   Ongoing  1. Patient will ambulate with  ft.  2. Patient will have gross strength to BLE's/BUE's  4/5  3. Improved sit to stand to 5 reps in 30 second.  4. FOTO >40/100       PLAN     Postural alignment.  Gait and endurance training.  Continue Plan of care.    Jb Frias, PT

## 2022-01-19 ENCOUNTER — OFFICE VISIT (OUTPATIENT)
Dept: HOME HEALTH SERVICES | Facility: CLINIC | Age: 72
End: 2022-01-19
Payer: MEDICARE

## 2022-01-19 ENCOUNTER — TELEPHONE (OUTPATIENT)
Dept: ADMINISTRATIVE | Facility: CLINIC | Age: 72
End: 2022-01-19
Payer: MEDICARE

## 2022-01-19 VITALS — WEIGHT: 169 LBS | BODY MASS INDEX: 29.95 KG/M2 | HEIGHT: 63 IN

## 2022-01-19 DIAGNOSIS — G40.909 SEIZURE DISORDER: ICD-10-CM

## 2022-01-19 DIAGNOSIS — Z74.09 IMPAIRED MOBILITY AND ADLS: Chronic | ICD-10-CM

## 2022-01-19 DIAGNOSIS — F32.A ANXIETY AND DEPRESSION: ICD-10-CM

## 2022-01-19 DIAGNOSIS — F41.9 ANXIETY AND DEPRESSION: ICD-10-CM

## 2022-01-19 DIAGNOSIS — Z99.3 DEPENDENCE ON WHEELCHAIR: ICD-10-CM

## 2022-01-19 DIAGNOSIS — E78.5 HYPERLIPIDEMIA WITH TARGET LDL LESS THAN 70: Chronic | ICD-10-CM

## 2022-01-19 DIAGNOSIS — Z00.00 ENCOUNTER FOR PREVENTIVE HEALTH EXAMINATION: Primary | ICD-10-CM

## 2022-01-19 DIAGNOSIS — I10 ESSENTIAL HYPERTENSION: ICD-10-CM

## 2022-01-19 DIAGNOSIS — G35 MS (MULTIPLE SCLEROSIS): ICD-10-CM

## 2022-01-19 DIAGNOSIS — I25.10 CORONARY ARTERY DISEASE WITHOUT ANGINA PECTORIS, UNSPECIFIED VESSEL OR LESION TYPE, UNSPECIFIED WHETHER NATIVE OR TRANSPLANTED HEART: ICD-10-CM

## 2022-01-19 DIAGNOSIS — Z78.9 IMPAIRED MOBILITY AND ADLS: Chronic | ICD-10-CM

## 2022-01-19 PROCEDURE — 1100F PR PT FALLS ASSESS DOC 2+ FALLS/FALL W/INJURY/YR: ICD-10-PCS | Mod: CPTII,S$GLB,, | Performed by: NURSE PRACTITIONER

## 2022-01-19 PROCEDURE — 3288F PR FALLS RISK ASSESSMENT DOCUMENTED: ICD-10-PCS | Mod: CPTII,S$GLB,, | Performed by: NURSE PRACTITIONER

## 2022-01-19 PROCEDURE — 1160F RVW MEDS BY RX/DR IN RCRD: CPT | Mod: CPTII,S$GLB,, | Performed by: NURSE PRACTITIONER

## 2022-01-19 PROCEDURE — G0439 PPPS, SUBSEQ VISIT: HCPCS | Mod: 95,,, | Performed by: NURSE PRACTITIONER

## 2022-01-19 PROCEDURE — 3008F PR BODY MASS INDEX (BMI) DOCUMENTED: ICD-10-PCS | Mod: CPTII,S$GLB,, | Performed by: NURSE PRACTITIONER

## 2022-01-19 PROCEDURE — G0439 PR MEDICARE ANNUAL WELLNESS SUBSEQUENT VISIT: ICD-10-PCS | Mod: 95,,, | Performed by: NURSE PRACTITIONER

## 2022-01-19 PROCEDURE — 1100F PTFALLS ASSESS-DOCD GE2>/YR: CPT | Mod: CPTII,S$GLB,, | Performed by: NURSE PRACTITIONER

## 2022-01-19 PROCEDURE — 1159F MED LIST DOCD IN RCRD: CPT | Mod: CPTII,S$GLB,, | Performed by: NURSE PRACTITIONER

## 2022-01-19 PROCEDURE — 1159F PR MEDICATION LIST DOCUMENTED IN MEDICAL RECORD: ICD-10-PCS | Mod: CPTII,S$GLB,, | Performed by: NURSE PRACTITIONER

## 2022-01-19 PROCEDURE — 1160F PR REVIEW ALL MEDS BY PRESCRIBER/CLIN PHARMACIST DOCUMENTED: ICD-10-PCS | Mod: CPTII,S$GLB,, | Performed by: NURSE PRACTITIONER

## 2022-01-19 PROCEDURE — 3008F BODY MASS INDEX DOCD: CPT | Mod: CPTII,S$GLB,, | Performed by: NURSE PRACTITIONER

## 2022-01-19 PROCEDURE — 3288F FALL RISK ASSESSMENT DOCD: CPT | Mod: CPTII,S$GLB,, | Performed by: NURSE PRACTITIONER

## 2022-01-20 ENCOUNTER — CLINICAL SUPPORT (OUTPATIENT)
Dept: REHABILITATION | Facility: HOSPITAL | Age: 72
End: 2022-01-20
Payer: MEDICARE

## 2022-01-20 PROCEDURE — 97110 THERAPEUTIC EXERCISES: CPT | Mod: KX,PN

## 2022-01-20 PROCEDURE — 97116 GAIT TRAINING THERAPY: CPT | Mod: KX,PN

## 2022-01-20 NOTE — PROGRESS NOTES
[facility]   Physical Therapy Treatment Note     Name: Alyssa John  Clinic Number: [id number]    Therapy Diagnosis:   Gait instability, generalized weakness.  Physician: Genny Apodaca MD    Visit Date: 1/20/2022    Physician Orders: PT Eval and Treat   Medical Diagnosis from Referral: Multiple sclerosis; Gait disturbance  Evaluation Date: 11/29/2021  Authorization Period Expiration: 12/13/21  Plan of Care Expiration: 1/18/22  Visit # / Visits authorized: 10/10      Time In: 1415  Time Out: 1500  Total Billable Time: 45 minutes    Precautions: Fall, Hx of seizures     SUBJECTIVE     Pt reports: being  tired  She was compliant with home exercise program.  Response to previous treatment:tolerated Rx  Functional change: increased dorsokyphotic posture; decreased distance ambulated    Pain: 0/10  Location: N/A    OBJECTIVE     BLE's edematous  Tight hamstings  Stooped standing posture lagging behind RW..    Treatment     Alyssa received the treatments listed below:      therapeutic exercises to develop strength, endurance and ROM for 30 minutes including:  Hamstring/gastroc stretch   Quad sets 20/20  Hamstring set 20/20  Ankle dorsiflexion against manual resist 20/20  Straight leg raising 20/20  Bridging 20  Sit to stand technique    Gait training 15 exercises using RW 30 ft emphasis on wider steps and left leg swing/floor clearance.   Step up on stool.    Patient Education and Home Exercises     Home Exercises Provided and Patient Education Provided     Education provided:   -Importance of fully extended knee for weight bearing.  Written Home Exercises Provided: Patient instructed to cont prior HEP.   Exercises were reviewed and Alyssa was able to demonstrate them prior to the end of the session.  Alyssa demonstrated good  understanding of the education provided. See EMR under Patient Instructions for exercises provided during therapy sessions    ASSESSMENT     Alyssa  Increased  knee extension lag with painful stretch and positioning in flatlying. Slow gait speed with less distance  Tolerated Rx.    Alyssa is progressing towards her goals.   Pt prognosis is Fair.     Pt will continue to benefit from skilled outpatient physical therapy to address the deficits listed in the problem list box on initial evaluation, provide pt/family education and to maximize pt's level of independence in the home and community environment.     Pt's spiritual, cultural and educational needs considered and pt agreeable to plan of care and goals.     Anticipated barriers to physical therapy: time schedule    Goals:   Ongoing  1. Patient will ambulate with  ft.  2. Patient will have gross strength to BLE's/BUE's  4/5  3. Improved sit to stand to 5 reps in 30 second.  4. FOTO >40/100       PLAN     Stretching  Postural orientation  Endurance training.  Continue Plan of care.    Jb Frias, PT

## 2022-01-21 NOTE — PATIENT INSTRUCTIONS
Counseling and Referral of Other Preventative  (Italic type indicates deductible and co-insurance are waived)    Patient Name: Alyssa John  Today's Date: 1/20/2022    Health Maintenance       Date Due Completion Date    TETANUS VACCINE Never done ---    Shingles Vaccine (1 of 2) Never done ---    Pneumococcal Vaccines (Age 65+) (1 of 1 - PPSV23) Never done ---    DEXA SCAN 06/13/2018 6/13/2016    Influenza Vaccine (1) Never done ---    Lipid Panel 10/01/2022 10/1/2021    Mammogram 11/30/2022 11/30/2021    High Dose Statin 01/19/2023 1/19/2022    Aspirin/Antiplatelet Therapy 01/19/2023 1/19/2022    Colorectal Cancer Screening 12/11/2025 8/19/2016        No orders of the defined types were placed in this encounter.    The following information is provided to all patients.  This information is to help you find resources for any of the problems found today that may be affecting your health:                Living healthy guide: www.[facility].[place].gov      Understanding Diabetes: www.diabetes.org      Eating healthy: www.cdc.gov/healthyweight      CDC home safety checklist: www.cdc.gov/steadi/patient.html      Agency on Aging: www.goea.[place].gov      Alcoholics anonymous (AA): www.aa.org      Physical Activity: www.klever.nih.gov/ek7iikf      Tobacco use: www.quitwithusla.org

## 2022-01-21 NOTE — PROGRESS NOTES
"The patient location is: [place]  The chief complaint leading to consultation is: Health Risk Assessment    Visit type: audiovisual    Face to Face time with patient: 20  35 minutes of total time spent on the encounter, which includes face to face time and non-face to face time preparing to see the patient (eg, review of tests), Obtaining and/or reviewing separately obtained history, Documenting clinical information in the electronic or other health record, Independently interpreting results (not separately reported) and communicating results to the patient/family/caregiver, or Care coordination (not separately reported).         Each patient to whom he or she provides medical services by telemedicine is:  (1) informed of the relationship between the physician and patient and the respective role of any other health care provider with respect to management of the patient; and (2) notified that he or she may decline to receive medical services by telemedicine and may withdraw from such care at any time.    Notes:       Alyssa John presented for a  Medicare AWV and comprehensive Health Risk Assessment today. The following components were reviewed and updated:    · Medical history  · Family History  · Social history  · Allergies and Current Medications  · Health Risk Assessment  · Health Maintenance  · Care Team         ** See Completed Assessments for Annual Wellness Visit within the encounter summary.**         The following assessments were completed:  · Living Situation  · CAGE  · Depression Screening  · Fall Risk Assessment (MACH 10)  · Hearing Assessment(HHI)  · Cognitive Function Screening  · Nutrition Screening  · ADL Screening  · PAQ Screening      Vitals:    01/19/22 1310   Weight: 76.7 kg (169 lb)   Height: 5' 3" (1.6 m)     Body mass index is 29.94 kg/m².  Physical Exam  Constitutional:       Appearance: Normal appearance.   Neurological:      General: No focal deficit present.      Mental Status: She " is alert and oriented to person, place, and time.               Diagnoses and health risks identified today and associated recommendations/orders:    1. Encounter for preventive health examination  Assessments completed. Preventive measures and health maintenance reviewed with patient.     2. Seizure disorder  Stable, followed by Neurology.    3. MS (multiple sclerosis)  Stable, followed by Neurology.    4. Anxiety and depression  Stable, followed by PCP.    5. Hyperlipidemia with target LDL less than 70  Stable, followed by PCP.    6. Essential hypertension  Stable, followed by PCP.    7. Coronary artery disease without angina pectoris, unspecified vessel or lesion type, unspecified whether native or transplanted heart  Stable, followed by Cardiology.    8. Dependence on wheelchair  Stable, followed by PCP.    9. Impaired mobility and ADLs  Stable, followed by PCP.    Provided Alyssa with a 5-10 year written screening schedule and personal prevention plan. Recommendations were developed using the USPSTF age appropriate recommendations. Education, counseling, and referrals were provided as needed. After Visit Summary printed and given to patient which includes a list of additional screenings\tests needed.    Follow up in about 1 year (around 1/19/2023) for your next annual wellness visit.    Aure Morris NP  I offered to discuss advanced care planning, including how to pick a person who would make decisions for you if you were unable to make them for yourself, called a health care power of , and what kind of decisions you might make such as use of life sustaining treatments such as ventilators and tube feeding when faced with a life limiting illness recorded on a living will that they will need to know. (How you want to be cared for as you near the end of your natural life)     X Patient is interested in learning more about how to make advanced directives.  I provided them paperwork and offered to  discuss this with them.

## 2022-01-31 ENCOUNTER — CLINICAL SUPPORT (OUTPATIENT)
Dept: REHABILITATION | Facility: HOSPITAL | Age: 72
End: 2022-01-31
Payer: MEDICARE

## 2022-01-31 PROCEDURE — 97110 THERAPEUTIC EXERCISES: CPT | Mod: KX,PN

## 2022-01-31 PROCEDURE — 97116 GAIT TRAINING THERAPY: CPT | Mod: KX,PN

## 2022-01-31 NOTE — PROGRESS NOTES
[facility]   Physical Therapy Treatment Note     Name: Alyssa John  Clinic Number: [id number]    Therapy Diagnosis:   Gait instability, generalized weakness.  Physician: Genny Apodaca MD    Visit Date: 1/31/2022    Physician Orders: PT Eval and Treat   Medical Diagnosis from Referral: Multiple sclerosis; Gait disturbance  Evaluation Date: 11/29/2021  Authorization Period Expiration: 12/13/21  Plan of Care Expiration: 1/18/22  Visit # / Visits authorized: 10/10      Time In: 1415  Time Out: 1500  Total Billable Time: 45 minutes    Precautions: Fall, Hx of seizures     SUBJECTIVE     Pt reports: no opportunity at home to practice ambulating  She was compliant with home exercise program.  Response to previous treatment:tolerated Rx  Functional change: decreased endurance; increased flexion of both knees in standing    Pain: 5/10  Location: hamstrings on stretch    OBJECTIVE     Poor endurance to gait..    Treatment     Alyssa received the treatments listed below:      therapeutic exercises to develop strength, endurance and ROM for 30 minutes including:  Hamstring/gastroc stretch   Hamstring set 20/20  Ankle dorsiflexion against manual resist 20/20  Straight leg raising 20/20  Bridging 20  Seated hamstring curls 10/10  Sit to stand technique    Gait training using RW 35 ft x 3 emphasis on wider steps and erect posture.     Patient Education and Home Exercises     Home Exercises Provided and Patient Education Provided     Education provided:   -Discussed with  reinfrocement of learned skills at home I.e. gait.  Written Home Exercises Provided: Patient instructed to cont prior HEP.   Exercises were reviewed and Alyssa was able to demonstrate them prior to the end of the session.  Alyssa demonstrated good  understanding of the education provided. See EMR under Patient Instructions for exercises provided during therapy sessions    ASSESSMENT     Alyssa  Is easily fatigued and  more flexor stiffness.  Tolerated Rx.    Alyssa is progressing towards her goals.   Pt prognosis is Fair.     Pt will continue to benefit from skilled outpatient physical therapy to address the deficits listed in the problem list box on initial evaluation, provide pt/family education and to maximize pt's level of independence in the home and community environment.     Pt's spiritual, cultural and educational needs considered and pt agreeable to plan of care and goals.     Anticipated barriers to physical therapy: time schedule    Goals:   Ongoing  1. Patient will ambulate with  ft.  2. Patient will have gross strength to BLE's/BUE's  4/5  3. Improved sit to stand to 5 reps in 30 second.  4. FOTO >40/100       PLAN     Stretching  Postural alignment  Endurance training.  Continue Plan of care.    Jb Frias, PT

## 2022-02-01 ENCOUNTER — PATIENT MESSAGE (OUTPATIENT)
Dept: NEUROLOGY | Facility: CLINIC | Age: 72
End: 2022-02-01
Payer: MEDICARE

## 2022-02-02 ENCOUNTER — LAB VISIT (OUTPATIENT)
Dept: LAB | Facility: HOSPITAL | Age: 72
End: 2022-02-02
Attending: STUDENT IN AN ORGANIZED HEALTH CARE EDUCATION/TRAINING PROGRAM
Payer: MEDICARE

## 2022-02-02 DIAGNOSIS — Z51.81 THERAPEUTIC DRUG MONITORING: ICD-10-CM

## 2022-02-02 PROCEDURE — 80201 ASSAY OF TOPIRAMATE: CPT | Performed by: NURSE PRACTITIONER

## 2022-02-02 PROCEDURE — 36415 COLL VENOUS BLD VENIPUNCTURE: CPT | Mod: PO | Performed by: NURSE PRACTITIONER

## 2022-02-03 ENCOUNTER — CLINICAL SUPPORT (OUTPATIENT)
Dept: REHABILITATION | Facility: HOSPITAL | Age: 72
End: 2022-02-03
Payer: MEDICARE

## 2022-02-03 PROCEDURE — 97116 GAIT TRAINING THERAPY: CPT | Mod: KX,PN

## 2022-02-03 PROCEDURE — 97110 THERAPEUTIC EXERCISES: CPT | Mod: KX,PN

## 2022-02-03 NOTE — PROGRESS NOTES
[facility]   Physical Therapy Treatment Note     Name: Alyssa John  Clinic Number: [id number]    Therapy Diagnosis:   Gait instability, generalized weakness.  Physician: Genny Apodaca MD    Visit Date: 2/3/2022    Physician Orders: PT Eval and Treat   Medical Diagnosis from Referral: Multiple sclerosis; Gait disturbance  Evaluation Date: 11/29/2021  Authorization Period Expiration: 12/13/21  Plan of Care Expiration: 1/18/22  Visit # / Visits authorized: 8/22     Time In: 1420  Time Out: 06517  Total Billable Time: 40 minutes    Precautions: Fall, Hx of seizures     SUBJECTIVE     Pt reports: she had been walking more at home.  She was compliant with home exercise program.  Response to previous treatment:tolerated Rx  Functional change: resumed same average distance ambulated    Pain: 3/10  Location: hamstrings on stretch    OBJECTIVE     Occasional left foot drag on gait..    Treatment     Alyssa received the treatments listed below:      therapeutic exercises to develop strength, endurance and ROM for 30 minutes including:  Hamstring/gastroc stretch   Hamstring set 20/20  Ankle dorsiflexion against manual resist 20/20  Straight leg raising 20/20  Bridging 20  Seated hamstring curls 10/10  Sit to stand    Gait training 15 using  ft  emphasis on wider steps and erect posture. Ambulated on Rw for transfers and turns short distance.    Patient Education and Home Exercises     Home Exercises Provided and Patient Education Provided     Education provided:   -Reiterated supine flatlying to stretch both hips/ knees in extension.  Written Home Exercises Provided: Patient instructed to cont prior HEP.   Exercises were reviewed and Alyssa was able to demonstrate them prior to the end of the session.  Alyssa demonstrated good  understanding of the education provided. See EMR under Patient Instructions for exercises provided during therapy sessions    ASSESSMENT     Alyssa  Is  motivated and participated with all Exercises. Tight hamstrings..  Tolerated Rx.    Alyssa is progressing towards her goals.   Pt prognosis is Fair.     Pt will continue to benefit from skilled outpatient physical therapy to address the deficits listed in the problem list box on initial evaluation, provide pt/family education and to maximize pt's level of independence in the home and community environment.     Pt's spiritual, cultural and educational needs considered and pt agreeable to plan of care and goals.     Anticipated barriers to physical therapy: time schedule    Goals:   Ongoing  1. Patient will ambulate with  ft.  2. Patient will have gross strength to BLE's/BUE's  4/5  3. Improved sit to stand to 5 reps in 30 second.  4. FOTO >40/100       PLAN     Update plan of care vs. Discharge.    Jb Frias, PT

## 2022-02-07 ENCOUNTER — CLINICAL SUPPORT (OUTPATIENT)
Dept: REHABILITATION | Facility: HOSPITAL | Age: 72
End: 2022-02-07
Payer: MEDICARE

## 2022-02-07 DIAGNOSIS — R53.81 DEBILITY: ICD-10-CM

## 2022-02-07 DIAGNOSIS — M25.60 LIMITATION OF JOINT MOTION: ICD-10-CM

## 2022-02-07 LAB — TOPIRAMATE SERPL-MCNC: 11.4 MCG/ML

## 2022-02-07 PROCEDURE — 97110 THERAPEUTIC EXERCISES: CPT | Mod: KX,PN

## 2022-02-07 PROCEDURE — 97116 GAIT TRAINING THERAPY: CPT | Mod: KX,PN

## 2022-02-07 NOTE — PLAN OF CARE
[facility]  Physical Therapy Plan of Care Note    Name: Alyssa John  Clinic Number: [id number]    Therapy Diagnosis: Debility, gait abnormality, joint limitation  Physician: Genny Apodaca MD    Visit Date: 2/7/2022    Physician Orders: PT Eval and Treat   Medical Diagnosis from Referral: Multiple Sclerosis; Gait disturbance  Evaluation Date: 2/7/2022  Authorization Period Expiration:2/28/22   Plan of Care Expiration: 4/31/22  Progress Note Due: 3/8/22   Visit # / Visits authorized: 15/ 22  FOTO: 50/100    Precautions: Fall and Hx of seizures  Functional Level Prior to Evaluation:  on wheelchair, needs assist with RW ambulation short distances.    SUBJECTIVE     Update: c/o not feeling well today. Tired from lack of a serena sleep.    OBJECTIVE     Update: Pain level:  0/10 at rest     5/10 both hamstrings on stretch.  + edema BLE's  ROM: R knee 10 - 110 deg extension to flexion             L knee  5 - 110 deg extension to flexion  MMT: gross strength 3/5 BLE/s  Tight both hamstrings  Transfers - CGA to facilitate trunk forward flexion on push off.  Gait 90 ft on RW with supervision. Stooped posture.    ASSESSMENT     Update: Patient had been inconsistent in progressing functional mobility. Improved function fluctuates each visit.    Previous Short Term Goals Status:     Short Term Goals: 3 weeks:  1. Patient will report compliance to Home Exercises.  (ongoing)  2. Patient will ambulate on RW 50 ft with min A. (met)  3. Patient will tolerate bike x 10'  (unable-discontinued)     Long Term Goals: 8 weeks   1. Patient will ambulate with  ft.  (unmet-100 ft inconsistent)  2. Patient will have gross strength to BLE's/BUE's  4/5  (unmet 3/5)  3. Improved sit to stand to 5 reps in 30 second. (unmet-inconsistent)  4. FOTO >40/100  (met 50/100)    Long Term Goal Status: modified:    1. Patient will ambulate 150 ft distance consistently on RW.  2. Sit to stand and transfers  consistent with modified independence > 5 reps in 30 seconds.  3. Both knees on full extension 0 degrees.  4. FOTO > 55/100  Reasons for Recertification of Therapy:  Inconsistent level of function, has room for improvement.    PLAN     Updated Certification Period: 2/9/22 to 4/31/22   Recommended Treatment Plan: 2 times per week for 10 weeks:  Gait Training, Manual Therapy, Patient Education, Therapeutic Activities and Therapeutic Exercise  Other Recommendations: Lymphedema management.    Jb Frias, PT    I CERTIFY THE NEED FOR THESE SERVICES FURNISHED UNDER THIS PLAN OF TREATMENT AND WHILE UNDER MY CARE  Physician's comments:      Physician's Signature: ___________________________________________________

## 2022-02-07 NOTE — PROGRESS NOTES
[facility]   Physical Therapy Treatment Note     Name: Alysas John  Clinic Number: [id number]    Therapy Diagnosis:   Gait instability, generalized weakness.  Physician: Genny Apodaca MD    Visit Date: 2/7/2022    Physician Orders: PT Eval and Treat   Medical Diagnosis from Referral: Multiple sclerosis; Gait disturbance  Evaluation Date: 11/29/2021  Authorization Period Expiration: 12/13/21  Plan of Care Expiration: 1/18/22  Visit # / Visits authorized: 15/22     Time In: 1430  Time Out: 1515  Total Billable Time: 45 minutes    Precautions: Fall, Hx of seizures     SUBJECTIVE     Pt reports: feeling tired from not getting enough sleep from stomach upset  She was compliant with home exercise program.  Response to previous treatment:tolerated Rx  Functional change: increased knee extension lag; increased stooped posture when walking.    Pain: 5/10  Location: hamstrings on stretch    OBJECTIVE     See Updated Plan of care.    Treatment     Alyssa received the treatments listed below:      therapeutic exercises to develop strength, endurance and ROM for 30 minutes including:  Hamstring/gastroc stretch   Hamstring set 20/20  Ankle dorsiflexion against manual resist 20/20  Straight leg raising 20/20  Bridging 20  Seated hamstring curls 10/10    Gait training 15 using RW 65 ft, 25 ft  emphasis on floor clearance  and erect posture.   Patient Education and Home Exercises     Home Exercises Provided and Patient Education Provided     Education provided:   -Discussed Updated Plan of care.  Written Home Exercises Provided: Patient instructed to cont prior HEP.   Exercises were reviewed and Alyssa was able to demonstrate them prior to the end of the session.  Alyssa demonstrated good  understanding of the education provided. See EMR under Patient Instructions for exercises provided during therapy sessions    ASSESSMENT     Alyssa  Is upset why her therapy had to be limited to 10 weeks  only.. Decreased flexibility and distance ambulated today. Not feeling well..  Tolerated Rx fair.     Alyssa is progressing towards her goals.   Pt prognosis is Fair.     Pt will continue to benefit from skilled outpatient physical therapy to address the deficits listed in the problem list box on initial evaluation, provide pt/family education and to maximize pt's level of independence in the home and community environment.     Pt's spiritual, cultural and educational needs considered and pt agreeable to plan of care and goals.     Anticipated barriers to physical therapy: time schedule    Goals:   See Updated Plan of care       PLAN     Update plan of care.  Extend therapy 10 more weeks.    Jb Frias, PT

## 2022-02-14 ENCOUNTER — CLINICAL SUPPORT (OUTPATIENT)
Dept: REHABILITATION | Facility: HOSPITAL | Age: 72
End: 2022-02-14
Payer: MEDICARE

## 2022-02-14 DIAGNOSIS — R53.81 DEBILITY: ICD-10-CM

## 2022-02-14 DIAGNOSIS — M25.60 LIMITATION OF JOINT MOTION: ICD-10-CM

## 2022-02-14 PROCEDURE — 97530 THERAPEUTIC ACTIVITIES: CPT | Mod: PN,CQ

## 2022-02-14 PROCEDURE — 97110 THERAPEUTIC EXERCISES: CPT | Mod: PN,CQ

## 2022-02-14 PROCEDURE — 97116 GAIT TRAINING THERAPY: CPT | Mod: PN,CQ

## 2022-02-14 NOTE — PROGRESS NOTES
[facility]   Physical Therapy Treatment Note     Name: Alyssa John  Clinic Number: [id number]    Therapy Diagnosis:   Encounter Diagnoses   Name Primary?    Debility     Limitation of joint motion      Physician: Genny Apodaca MD    Visit Date: 2/14/2022    Physician Orders: PT Eval and Treat   Medical Diagnosis from Referral: Multiple Sclerosis; Gait disturbance  Evaluation Date: 2/7/2022  Authorization Period Expiration:2/28/22   Plan of Care Expiration: 4/31/22  Progress Note Due: 3/8/22   Visit # / Visits authorized: 15/22  FOTO: 50/100    Precautions: Fall and Hx of seizures     PTA Visit #: 1/5     Time In: 1500  Time Out: 1600  Total Billable Time: 60 minutes    SUBJECTIVE     Pt reports: Had a fall but did not hurt self. Reports wanting to do some core strengthening.  She was compliant with home exercise program.  Response to previous treatment: no problems  Functional change: ongoing    Pain: 0/10  Location:       OBJECTIVE     Objective Measures updated at progress report unless specified.     Treatment     Alyssa received the treatments listed below:      therapeutic exercises to develop strength, endurance, range of motion , flexibility and core stabilization for 20 minutes including:    SciFit Level 1 12 minutes with Bilateral Upper Extremities     Supine: Ball squeeze 20 times    Posterior pelvic tilt 20 times    Lateral trunk rotation 20 times    (Activity time includes skilled set-up and positioning as well as therapeutic rest)    therapeutic activities to improve functional performance for 25 minutes, including:    Sit to stand x1 moderate assistance for anterior lean from Wheelchair to rolling walker     Sit to stand x2 with moderate verbal cues for scoot to edge of seat, open foot stance, slide feet under knees, and lean forward with standby assistance     Stand to sit with verbal cues to feel the seat behind legs and then reach for seat with Bilateral  hands    Sit < >  supine moderate assistance for log roll    gait training to improve functional mobility and safety for 15  minutes, including:    Gait with rolling walker 40 feet and 30 feet with maximal verbal cues for maintaining safe distance as well as increasing step length    Patient Education and Home Exercises     Home Exercises Provided and Patient Education Provided     Education provided:   - Patient provided with verbal and demonstrative instruction for all activities performed in today's session.  - Proper foot placement to improve sit > stand safety    Written Home Exercises Provided: yes. Exercises were reviewed and Alyssa was able to demonstrate them prior to the end of the session.  Alyssa demonstrated fair  understanding of the education provided. See EMR under Patient Instructions for exercises provided during therapy sessions    ASSESSMENT     Alyssa provided fair participation and effort during today's session without complaints.  Treatment focused on transfer safety with patient needing cues to remain on task. Tolerated SciFit stepper and needed some cues to improve range.      Alyssa Is progressing towards her goals.   Pt prognosis is Fair.     Pt will continue to benefit from skilled outpatient physical therapy to address the deficits listed in the problem list box on initial evaluation, provide pt/family education and to maximize pt's level of independence in the home and community environment.     Pt's spiritual, cultural and educational needs considered and pt agreeable to plan of care and goals.     Anticipated barriers to physical therapy:  time schedule    Goals:     Short Term Goals: 3 weeks:  1. Patient will report compliance to Home Exercises.  (ongoing)  2. Patient will ambulate on RW 50 ft with min A. (met)  3. Patient will tolerate bike x 10'  (unable-discontinued)     Long Term Goals: 8 weeks   1. Patient will ambulate with  ft.  (unmet-100 ft inconsistent)  2. Patient  will have gross strength to BLE's/BUE's  4/5  (unmet 3/5)  3. Improved sit to stand to 5 reps in 30 second. (unmet-inconsistent)  4. FOTO >40/100  (met 50/100)     Long Term Goal Status: modified:    1. Patient will ambulate 150 ft distance consistently on RW.  2. Sit to stand and transfers consistent with modified independence > 5 reps in 30 seconds.  3. Both knees on full extension 0 degrees.  4. FOTO > 55/100      PLAN     Outpatient Physical Therapy 2 times weekly for 10 weeks to include the following interventions: Gait Training, Manual Therapy, Neuromuscular Re-ed, Therapeutic Activites and Therapeutic Exercise.         Danyelle Benoit, PTA

## 2022-02-17 ENCOUNTER — CLINICAL SUPPORT (OUTPATIENT)
Dept: REHABILITATION | Facility: HOSPITAL | Age: 72
End: 2022-02-17
Payer: MEDICARE

## 2022-02-17 DIAGNOSIS — M25.60 LIMITATION OF JOINT MOTION: ICD-10-CM

## 2022-02-17 DIAGNOSIS — R53.81 DEBILITY: ICD-10-CM

## 2022-02-17 PROCEDURE — 97110 THERAPEUTIC EXERCISES: CPT | Mod: PN

## 2022-02-17 PROCEDURE — 97116 GAIT TRAINING THERAPY: CPT | Mod: PN

## 2022-02-17 NOTE — PROGRESS NOTES
"[facility]   Physical Therapy Treatment Note     Name: Alyssa John  Clinic Number: [id number]    Therapy Diagnosis:   Gait instability, generalized weakness.  Physician: Genny Apodaca MD    Visit Date: 2/17/2022    Physician Orders: PT Eval and Treat   Medical Diagnosis from Referral: Multiple sclerosis; Gait disturbance  Evaluation Date: 11/29/2021  Authorization Period Expiration: 12/13/21  Plan of Care Expiration: 1/18/22  Visit # / Visits authorized: 16/22     Time In: 1430  Time Out: 1515  Total Billable Time: 45 minutes    Precautions: Fall, Hx of seizures     SUBJECTIVE     Pt reports: no pain. " I was planning to surprise you how good I do."    She was compliant with home exercise program.  Response to previous treatment:tolerated Rx  Functional change: less c/o pain or getting tired too quickly.    Pain: 0/10  Location: N/A    OBJECTIVE     Tight hamstrings;   Both knees with extension lag on flatlying.  Stooped walking posture.  Lags behind the walker.    Treatment     Alyssa received the treatments listed below:      therapeutic exercises to develop strength, endurance and ROM for 35 minutes including:  Hamstring/gastroc stretch   Hamstring set 20/20  Ankle dorsiflexion against manual resist 20/20  Straight leg raising 20/20  Bridging 20  Seated hamstring curls 10/10  Gait training 10 using RW 80 ft emphasis on floor clearance  and erect posture.   Patient Education and Home Exercises     Home Exercises Provided and Patient Education Provided     Education provided:   -Discussed with  Ms. Shah's comment of throwing herself in the river during session.  Written Home Exercises Provided: Patient instructed to cont prior HEP.   Exercises were reviewed and Alyssa was able to demonstrate them prior to the end of the session.  Alyssa demonstrated good  understanding of the education provided. See EMR under Patient Instructions for exercises provided during therapy " sessions    ASSESSMENT     Alyssa made a comment of throwing self in the river. Does not appear deppressed or in distress. Well motivate today. NO complain of fatigue although looks tired during gait.   Tolerated Rx fair.     Alyssa is progressing towards her goals.   Pt prognosis is Fair.     Pt will continue to benefit from skilled outpatient physical therapy to address the deficits listed in the problem list box on initial evaluation, provide pt/family education and to maximize pt's level of independence in the home and community environment.     Pt's spiritual, cultural and educational needs considered and pt agreeable to plan of care and goals.     Anticipated barriers to physical therapy: time schedule    Goals:   ongoing  1. Patient will ambulate 150 ft distance consistently on RW.  2. Sit to stand and transfers consistent with modified independence > 5 reps in 30 seconds.  3. Both knees on full extension 0 degrees.  4. FOTO > 55/100         PLAN     Monitor patient's mood during therapy.  Continue Plan of care.    Jb Frias, PT

## 2022-02-21 ENCOUNTER — CLINICAL SUPPORT (OUTPATIENT)
Dept: REHABILITATION | Facility: HOSPITAL | Age: 72
End: 2022-02-21
Payer: MEDICARE

## 2022-02-21 DIAGNOSIS — R53.81 DEBILITY: Primary | ICD-10-CM

## 2022-02-21 DIAGNOSIS — M25.60 LIMITATION OF JOINT MOTION: ICD-10-CM

## 2022-02-21 PROCEDURE — 97530 THERAPEUTIC ACTIVITIES: CPT | Mod: PN

## 2022-02-21 PROCEDURE — 97110 THERAPEUTIC EXERCISES: CPT | Mod: PN

## 2022-02-21 PROCEDURE — 97116 GAIT TRAINING THERAPY: CPT | Mod: PN

## 2022-02-22 NOTE — PROGRESS NOTES
[facility]   Physical Therapy Treatment Note     Name: Alyssa John  Clinic Number: [id number]    Therapy Diagnosis:   Gait instability, generalized weakness.  Physician: Genny Apodaca MD    Visit Date: 2/21/2022    Physician Orders: PT Eval and Treat   Medical Diagnosis from Referral: Multiple sclerosis; Gait disturbance  Evaluation Date: 11/29/2021  Authorization Period Expiration: 12/13/21  Plan of Care Expiration: 1/18/22  Visit # / Visits authorized: 16/22     Time In: 1430  Time Out: 1515  Total Billable Time: 45 minutes    Precautions: Fall, Hx of seizures     SUBJECTIVE     Pt reports:pain to left foot on stretch but none on weight bearing; Had another fall yesterday    She was compliant with home exercise program.  Response to previous treatment:tolerated Rx  Functional change:decreased distance ambulated    Pain: 3/10  Location: left foot on stretch    OBJECTIVE     Ambulates 45 ft, 15 ft on RW very slow pace half of the distance each attempt.  Consistent floor clearance during gait with initial wide steps u til tired.    Treatment     Alyssa received the treatments listed below:    therapeutic exercises to develop strength, endurance and ROM for 15 minutes including:  Active Ankle dorsiflexion  20/20  Seated knee extension 20/20  Hamstring curls against manual resist 20/20  Gait training 15 using RW  emphasis on floor clearance  and erect posture.   Therapeutic activity 15  Sit to stand transfers  &, standing postural trunk extension   Patient Education and Home Exercises     Home Exercises Provided and Patient Education Provided     Education provided:   -safety precautions and fall prevention.  Written Home Exercises Provided: Patient instructed to cont prior HEP.   Exercises were reviewed and Alyssa was able to demonstrate them prior to the end of the session.  Alyssa demonstrated good  understanding of the education provided. See EMR under Patient Instructions for  exercises provided during therapy sessions    ASSESSMENT     Alyssa fatigued with activity but very well motivated to demonstrate effort in ambulation. No significant pain unless applied heelcord stretching.left foot.  Tolerated Rx fair.     Alyssa is not progressing towards her goals.   Pt prognosis is Fair.     Pt will continue to benefit from skilled outpatient physical therapy to address the deficits listed in the problem list box on initial evaluation, provide pt/family education and to maximize pt's level of independence in the home and community environment.     Pt's spiritual, cultural and educational needs considered and pt agreeable to plan of care and goals.     Anticipated barriers to physical therapy: time schedule    Goals:   ongoing  1. Patient will ambulate 150 ft distance consistently on RW.  2. Sit to stand and transfers consistent with modified independence > 5 reps in 30 seconds.  3. Both knees on full extension 0 degrees.  4. FOTO > 55/100         PLAN     Emphasis on standig and gait activity.  Continue Plan of care.    Jb Frias, PT

## 2022-02-24 ENCOUNTER — CLINICAL SUPPORT (OUTPATIENT)
Dept: REHABILITATION | Facility: HOSPITAL | Age: 72
End: 2022-02-24
Payer: MEDICARE

## 2022-02-24 DIAGNOSIS — M25.60 LIMITATION OF JOINT MOTION: ICD-10-CM

## 2022-02-24 DIAGNOSIS — R53.81 DEBILITY: Primary | ICD-10-CM

## 2022-02-24 PROCEDURE — 97116 GAIT TRAINING THERAPY: CPT | Mod: PN

## 2022-02-24 PROCEDURE — 97110 THERAPEUTIC EXERCISES: CPT | Mod: PN

## 2022-02-24 PROCEDURE — 97530 THERAPEUTIC ACTIVITIES: CPT | Mod: PN

## 2022-02-24 NOTE — PROGRESS NOTES
[facility]   Physical Therapy Treatment Note     Name: Alyssa John  Clinic Number: [id number]    Therapy Diagnosis:   Gait instability, generalized weakness.  Physician: Genny Apodaca MD    Visit Date: 2/24/2022    Physician Orders: PT Eval and Treat   Medical Diagnosis from Referral: Multiple sclerosis; Gait disturbance  Evaluation Date: 11/29/2021  Authorization Period Expiration: 12/13/21  Plan of Care Expiration: 1/18/22  Visit # / Visits authorized:  13/22     Time In: 1545  Time Out: 130  Total Billable Time: 45 minutes    Precautions: Fall, Hx of seizures     SUBJECTIVE     Pt reports:being tired quickly when walking.   She was compliant with home exercise program.  Response to previous treatment:tolerated Rx  Functional change:none    Pain: 0/10  Location: N/A    OBJECTIVE     Needs tactile cues to initiate sit to stand. Retropulsive.    Treatment     Alyssa received the treatments listed below:    therapeutic exercises to develop strength, endurance and ROM for 15 minutes including:  Hamstring stretches.  Straight keg raises 20/20  Short arc quad manual resist 20/20  Bridgng 20  Gait training 20 using RW  emphasis on floor clearance  and erect posture. 30 ft  X 2  Therapeutic activity 10  Sit to stand, supine to sittransfers/vice versa  &, standing postural trunk extension   Patient Education and Home Exercises     Home Exercises Provided and Patient Education Provided     Education provided:   -nose over toes on sit to stand  Written Home Exercises Provided: Patient instructed to cont prior HEP.   Exercises were reviewed and Alyssa was able to demonstrate them prior to the end of the session.  Alyssa demonstrated good  understanding of the education provided. See EMR under Patient Instructions for exercises provided during therapy sessions    ASSESSMENT     Alyssa slows down when tired with gait activity.Postural alignment worse with dkistance ambulated.  Tolerated Rx  fair.     Alyssa is not progressing towards her goals.   Pt prognosis is Fair.     Pt will continue to benefit from skilled outpatient physical therapy to address the deficits listed in the problem list box on initial evaluation, provide pt/family education and to maximize pt's level of independence in the home and community environment.     Pt's spiritual, cultural and educational needs considered and pt agreeable to plan of care and goals.     Anticipated barriers to physical therapy: time schedule    Goals:   ongoing  1. Patient will ambulate 150 ft distance consistently on RW.  2. Sit to stand and transfers consistent with modified independence > 5 reps in 30 seconds.  3. Both knees on full extension 0 degrees.  4. FOTO > 55/100         PLAN     Endurance and postural extension.  Continue Plan of care.    Jb Frias, PT

## 2022-02-28 ENCOUNTER — PATIENT MESSAGE (OUTPATIENT)
Dept: PSYCHIATRY | Facility: CLINIC | Age: 72
End: 2022-02-28
Payer: MEDICARE

## 2022-02-28 ENCOUNTER — CLINICAL SUPPORT (OUTPATIENT)
Dept: REHABILITATION | Facility: HOSPITAL | Age: 72
End: 2022-02-28
Payer: MEDICARE

## 2022-02-28 DIAGNOSIS — R53.81 DEBILITY: Primary | ICD-10-CM

## 2022-02-28 DIAGNOSIS — M25.60 LIMITATION OF JOINT MOTION: ICD-10-CM

## 2022-02-28 PROCEDURE — 97116 GAIT TRAINING THERAPY: CPT | Mod: KX,PN

## 2022-02-28 PROCEDURE — 97110 THERAPEUTIC EXERCISES: CPT | Mod: KX,PN

## 2022-02-28 NOTE — PROGRESS NOTES
[facility]   Physical Therapy Treatment Note     Name: Alyssa John  Clinic Number: [id number]    Therapy Diagnosis:   Gait instability, generalized weakness.  Physician: Genny Apodaca MD    Visit Date: 2/28/2022    Physician Orders: PT Eval and Treat   Medical Diagnosis from Referral: Multiple sclerosis; Gait disturbance  Evaluation Date: 11/29/2021  Authorization Period Expiration: 12/13/21  Plan of Care Expiration: 1/18/22  Visit # / Visits authorized:  13/22     Time In: 1545  Time Out: 130  Total Billable Time: 45 minutes    Precautions: Fall, Hx of seizures     SUBJECTIVE     Pt reports left shoulder pain with no known trauma or injury. Walk up 3 days ago with the discomforts.  She was compliant with home exercise program.  Response to previous treatment:tolerated Rx  Functional change: improved speed of gait.    Pain:  5/10  Location:  Left shoulder    OBJECTIVE     Left foot drag during gait.    Treatment     Alyssa received the treatments listed below:    therapeutic exercises to develop strength, endurance and ROM for  30 minutes including:  Hamstring stretches.  Quad stes 20/20  Straight keg raises 20/20  Short arc quad manual resist 20/20  Bridgng 20  Seated hamstring curls manual resist 20/20  Trunk forward flexion while seated 10  Gait training 15 using RW  emphasis on floor clearance  and erect posture. 80 ft   Patient Education and Home Exercises     Home Exercises Provided and Patient Education Provided     Education provided:   -Floor clearance on swing during gait.  Written Home Exercises Provided: Patient instructed to cont prior HEP.   Exercises were reviewed and Alyssa was able to demonstrate them prior to the end of the session.  Alyssa demonstrated good  understanding of the education provided. See EMR under Patient Instructions for exercises provided during therapy sessions    ASSESSMENT     Alyssa has consistent wu and speed of gait today. Noted  occasional left foot drag during ambulation..  Tolerated Rx fair.     Alyssa is not progressing towards her goals.   Pt prognosis is Fair.     Pt will continue to benefit from skilled outpatient physical therapy to address the deficits listed in the problem list box on initial evaluation, provide pt/family education and to maximize pt's level of independence in the home and community environment.     Pt's spiritual, cultural and educational needs considered and pt agreeable to plan of care and goals.     Anticipated barriers to physical therapy: time schedule    Goals:   ongoing  1. Patient will ambulate 150 ft distance consistently on RW.  2. Sit to stand and transfers consistent with modified independence > 5 reps in 30 seconds.  3. Both knees on full extension 0 degrees.  4. FOTO > 55/100         PLAN     GT and functional activity.  Continue Plan of care.    Jb Frias, PT

## 2022-03-07 ENCOUNTER — CLINICAL SUPPORT (OUTPATIENT)
Dept: REHABILITATION | Facility: HOSPITAL | Age: 72
End: 2022-03-07
Payer: MEDICARE

## 2022-03-07 DIAGNOSIS — M25.60 LIMITATION OF JOINT MOTION: ICD-10-CM

## 2022-03-07 DIAGNOSIS — R53.81 DEBILITY: Primary | ICD-10-CM

## 2022-03-07 PROCEDURE — 97116 GAIT TRAINING THERAPY: CPT | Mod: KX,PN

## 2022-03-07 PROCEDURE — 97110 THERAPEUTIC EXERCISES: CPT | Mod: KX,PN

## 2022-03-08 NOTE — PROGRESS NOTES
[facility]   Physical Therapy Treatment Note     Name: Alyssa John  Clinic Number: [id number]    Therapy Diagnosis:   Gait instability, generalized weakness.  Physician: Genny Apodaca MD    Visit Date: 3/7/2022    Physician Orders: PT Eval and Treat   Medical Diagnosis from Referral: Multiple sclerosis; Gait disturbance  Evaluation Date: 11/29/2021  Authorization Period Expiration: 12/13/21  Plan of Care Expiration: 1/18/22  Visit # / Visits authorized:  15/22     Time In: 1515  Time Out: 1600  Total Billable Time: 45 minutes    Precautions: Fall, Hx of seizures     SUBJECTIVE     Pt reports no pain.  She was compliant with home exercise program.  Response to previous treatment:tolerated Rx  Functional change: inconsistent sit to stand and gait..    Pain:  0/10  Location:  N/A    OBJECTIVE     Stooped gait bilat foot drag with ambulation.    Treatment     Alyssa received the treatments listed below:    therapeutic exercises to develop strength, endurance and ROM for  30 minutes including:  Hamstring stretches.  Quad stes 20/20  Straight keg raises 20/20  Short arc quad manual resist 20/20  Bridgng 20  Seated hamstring curls manual resist 20/20  Sit to stand 10  Gait training 15 using RW  emphasis on floor clearance  and erect posture. 70 ft, 10 ft  Patient Education and Home Exercises     Home Exercises Provided and Patient Education Provided     Education provided:   -Postural awareness  Written Home Exercises Provided: Patient instructed to cont prior HEP.   Exercises were reviewed and Alyssa was able to demonstrate them prior to the end of the session.  Alyssa demonstrated good  understanding of the education provided. See EMR under Patient Instructions for exercises provided during therapy sessions    ASSESSMENT     Alyssa requires physical cues to initiate sit to stand. Bilateral foot drag when tired during gait. Inconsistent posture and distance  Tolerated Rx fair.      Alyssa is not progressing towards her goals.   Pt prognosis is Fair.     Pt will continue to benefit from skilled outpatient physical therapy to address the deficits listed in the problem list box on initial evaluation, provide pt/family education and to maximize pt's level of independence in the home and community environment.     Pt's spiritual, cultural and educational needs considered and pt agreeable to plan of care and goals.     Anticipated barriers to physical therapy: time schedule    Goals:   ongoing  1. Patient will ambulate 150 ft distance consistently on RW.  2. Sit to stand and transfers consistent with modified independence > 5 reps in 30 seconds.  3. Both knees on full extension 0 degrees.  4. FOTO > 55/100         PLAN     GT and functional activity.  Continue Plan of care.    Jb Frias, PT

## 2022-03-10 ENCOUNTER — CLINICAL SUPPORT (OUTPATIENT)
Dept: REHABILITATION | Facility: HOSPITAL | Age: 72
End: 2022-03-10
Payer: MEDICARE

## 2022-03-10 DIAGNOSIS — R53.81 DEBILITY: Primary | ICD-10-CM

## 2022-03-10 DIAGNOSIS — M25.60 LIMITATION OF JOINT MOTION: ICD-10-CM

## 2022-03-10 PROCEDURE — 97116 GAIT TRAINING THERAPY: CPT | Mod: PN

## 2022-03-10 PROCEDURE — 97110 THERAPEUTIC EXERCISES: CPT | Mod: PN

## 2022-03-10 NOTE — PROGRESS NOTES
[facility]   Physical Therapy Treatment Note     Name: Alyssa John  Clinic Number: [id number]    Therapy Diagnosis:   Gait instability, generalized weakness.    Physician: Genny Apodaca MD    Visit Date: 3/10/2022    Physician Orders: PT Eval and Treat   Medical Diagnosis from Referral: Multiple sclerosis; Gait disturbance  Evaluation Date: 11/29/2021  Authorization Period Expiration: 12/13/21  Plan of Care Expiration: 1/18/22  Visit # / Visits authorized:  16/22     Time In: 1417  Time Out: 1500  Total Billable Time: 43 minutes    Precautions: Fall, Hx of seizures     SUBJECTIVE     Pt reports perfroming better for  with transfers at home..  She was compliant with home exercise program.  Response to previous treatment:tolerated Rx  Functional change: none    Pain:  0/10  Location:  N/A    OBJECTIVE     Persistent swelling BLE's.    Treatment     Alyssa received the treatments listed below:    therapeutic exercises to develop strength, endurance and ROM for  30 minutes including:  Hamstring stretches.  Heel raises 20  Mini squats 20  Hip Abduction  Unilateral 10/10; alternate 10/10  Marching in place 15/15  Sit to stand  15  Gait training 15 using RW  emphasis on floor clearance  and erect posture. 100 ft  Patient Education and Home Exercises     Home Exercises Provided and Patient Education Provided     Education provided:   -Body mechanics.s  Written Home Exercises Provided: Patient instructed to cont prior HEP.   Exercises were reviewed and Alyssa was able to demonstrate them prior to the end of the session.  Alyssa demonstrated good  understanding of the education provided. See EMR under Patient Instructions for exercises provided during therapy sessions    ASSESSMENT     Alyssa perfromed all exercises in standing //bars.  Tolerated Rx fair.     Alyssa is not progressing towards her goals.   Pt prognosis is Fair.     Pt will continue to benefit from skilled  outpatient physical therapy to address the deficits listed in the problem list box on initial evaluation, provide pt/family education and to maximize pt's level of independence in the home and community environment.     Pt's spiritual, cultural and educational needs considered and pt agreeable to plan of care and goals.     Anticipated barriers to physical therapy: time schedule    Goals:   ongoing  1. Patient will ambulate 150 ft distance consistently on RW.  2. Sit to stand and transfers consistent with modified independence > 5 reps in 30 seconds.  3. Both knees on full extension 0 degrees.  4. FOTO > 55/100         PLAN     Postural alignment, strengthening  Continue Plan of care.    Jb Frias, PT

## 2022-03-14 ENCOUNTER — CLINICAL SUPPORT (OUTPATIENT)
Dept: REHABILITATION | Facility: HOSPITAL | Age: 72
End: 2022-03-14
Payer: MEDICARE

## 2022-03-14 DIAGNOSIS — R53.81 DEBILITY: Primary | ICD-10-CM

## 2022-03-14 DIAGNOSIS — M25.60 LIMITATION OF JOINT MOTION: ICD-10-CM

## 2022-03-14 PROCEDURE — 97110 THERAPEUTIC EXERCISES: CPT | Mod: PN

## 2022-03-14 PROCEDURE — 97116 GAIT TRAINING THERAPY: CPT | Mod: PN

## 2022-03-14 NOTE — PROGRESS NOTES
[facility]   Physical Therapy Treatment Note     Name: Alyssa John  Clinic Number: [id number]    Therapy Diagnosis:   Gait instability, generalized weakness.    Physician: Genny Apodaca MD    Visit Date: 3/14/2022    Physician Orders: PT Eval and Treat   Medical Diagnosis from Referral: Multiple sclerosis; Gait disturbance  Evaluation Date: 11/29/2021  Authorization Period Expiration: 12/13/21  Plan of Care Expiration: 1/18/22  Visit # / Visits authorized:  17/22     Time In: 1430  Time Out: 1515  Total Billable Time: 45 minutes    Precautions: Fall, Hx of seizures     SUBJECTIVE     Pt reports being active up and about to bathroom and such at home..  She was compliant with home exercise program.  Response to previous treatment:tolerated Rx  Functional change: improved gait endurance.    Pain:  0/10  Location:  N/A    OBJECTIVE     Pitting edema both LE's.    Treatment     Alyssa received the treatments listed below:    therapeutic exercises to develop strength, endurance and ROM for  25 minutes including:  Hamstring stretches.  Short arc quads manual resist 20  SLR 20/20  Bridging 20  Seated hamstring curls manual resistance 202/20  Sit to stand  15  Gait training 20 using RW  emphasis on floor clearance, consistent distance   and erect posture. 100 ft x 2  Patient Education and Home Exercises     Home Exercises Provided and Patient Education Provided     Education provided:   Sit to stand techniques reviewed.  Written Home Exercises Provided: Patient instructed to cont prior HEP.   Exercises were reviewed and Alyssa was able to demonstrate them prior to the end of the session.  Alyssa demonstrated good  understanding of the education provided. See EMR under Patient Instructions for exercises provided during therapy sessions    ASSESSMENT     Alyssa has poor performanc andrew initial attempts sit to stand.  Tolerated Rx fair.     Alyssa is not progressing towards her goals.   Pt  prognosis is Fair.     Pt will continue to benefit from skilled outpatient physical therapy to address the deficits listed in the problem list box on initial evaluation, provide pt/family education and to maximize pt's level of independence in the home and community environment.     Pt's spiritual, cultural and educational needs considered and pt agreeable to plan of care and goals.     Anticipated barriers to physical therapy: time schedule    Goals:   ongoing  1. Patient will ambulate 150 ft distance consistently on RW.  2. Sit to stand and transfers consistent with modified independence > 5 reps in 30 seconds.  3. Both knees on full extension 0 degrees.  4. FOTO > 55/100         PLAN     Postural alignment, strengthening  Continue Plan of care.    Jb Frias, PT

## 2022-03-16 ENCOUNTER — PATIENT OUTREACH (OUTPATIENT)
Dept: ADMINISTRATIVE | Facility: OTHER | Age: 72
End: 2022-03-16
Payer: MEDICARE

## 2022-03-17 ENCOUNTER — CLINICAL SUPPORT (OUTPATIENT)
Dept: REHABILITATION | Facility: HOSPITAL | Age: 72
End: 2022-03-17
Payer: MEDICARE

## 2022-03-17 ENCOUNTER — OFFICE VISIT (OUTPATIENT)
Dept: OBSTETRICS AND GYNECOLOGY | Facility: CLINIC | Age: 72
End: 2022-03-17
Payer: MEDICARE

## 2022-03-17 VITALS — SYSTOLIC BLOOD PRESSURE: 137 MMHG | BODY MASS INDEX: 29.94 KG/M2 | DIASTOLIC BLOOD PRESSURE: 87 MMHG | HEIGHT: 63 IN

## 2022-03-17 DIAGNOSIS — R53.81 DEBILITY: Primary | ICD-10-CM

## 2022-03-17 DIAGNOSIS — N89.8 VAGINAL ODOR: ICD-10-CM

## 2022-03-17 DIAGNOSIS — M25.60 LIMITATION OF JOINT MOTION: ICD-10-CM

## 2022-03-17 DIAGNOSIS — N95.2 POSTMENOPAUSAL ATROPHIC VAGINITIS: Primary | ICD-10-CM

## 2022-03-17 PROCEDURE — 3288F FALL RISK ASSESSMENT DOCD: CPT | Mod: CPTII,S$GLB,, | Performed by: NURSE PRACTITIONER

## 2022-03-17 PROCEDURE — 3008F BODY MASS INDEX DOCD: CPT | Mod: CPTII,S$GLB,, | Performed by: NURSE PRACTITIONER

## 2022-03-17 PROCEDURE — 87481 CANDIDA DNA AMP PROBE: CPT | Mod: 59 | Performed by: NURSE PRACTITIONER

## 2022-03-17 PROCEDURE — 1101F PT FALLS ASSESS-DOCD LE1/YR: CPT | Mod: CPTII,S$GLB,, | Performed by: NURSE PRACTITIONER

## 2022-03-17 PROCEDURE — 99203 OFFICE O/P NEW LOW 30 MIN: CPT | Mod: S$GLB,,, | Performed by: NURSE PRACTITIONER

## 2022-03-17 PROCEDURE — 3075F PR MOST RECENT SYSTOLIC BLOOD PRESS GE 130-139MM HG: ICD-10-PCS | Mod: CPTII,S$GLB,, | Performed by: NURSE PRACTITIONER

## 2022-03-17 PROCEDURE — 97110 THERAPEUTIC EXERCISES: CPT | Mod: PN,CQ

## 2022-03-17 PROCEDURE — 1159F PR MEDICATION LIST DOCUMENTED IN MEDICAL RECORD: ICD-10-PCS | Mod: CPTII,S$GLB,, | Performed by: NURSE PRACTITIONER

## 2022-03-17 PROCEDURE — 1126F AMNT PAIN NOTED NONE PRSNT: CPT | Mod: CPTII,S$GLB,, | Performed by: NURSE PRACTITIONER

## 2022-03-17 PROCEDURE — 3008F PR BODY MASS INDEX (BMI) DOCUMENTED: ICD-10-PCS | Mod: CPTII,S$GLB,, | Performed by: NURSE PRACTITIONER

## 2022-03-17 PROCEDURE — 99999 PR PBB SHADOW E&M-EST. PATIENT-LVL III: CPT | Mod: PBBFAC,,, | Performed by: NURSE PRACTITIONER

## 2022-03-17 PROCEDURE — 3075F SYST BP GE 130 - 139MM HG: CPT | Mod: CPTII,S$GLB,, | Performed by: NURSE PRACTITIONER

## 2022-03-17 PROCEDURE — 3079F PR MOST RECENT DIASTOLIC BLOOD PRESSURE 80-89 MM HG: ICD-10-PCS | Mod: CPTII,S$GLB,, | Performed by: NURSE PRACTITIONER

## 2022-03-17 PROCEDURE — 1101F PR PT FALLS ASSESS DOC 0-1 FALLS W/OUT INJ PAST YR: ICD-10-PCS | Mod: CPTII,S$GLB,, | Performed by: NURSE PRACTITIONER

## 2022-03-17 PROCEDURE — 1159F MED LIST DOCD IN RCRD: CPT | Mod: CPTII,S$GLB,, | Performed by: NURSE PRACTITIONER

## 2022-03-17 PROCEDURE — 99999 PR PBB SHADOW E&M-EST. PATIENT-LVL III: ICD-10-PCS | Mod: PBBFAC,,, | Performed by: NURSE PRACTITIONER

## 2022-03-17 PROCEDURE — 3079F DIAST BP 80-89 MM HG: CPT | Mod: CPTII,S$GLB,, | Performed by: NURSE PRACTITIONER

## 2022-03-17 PROCEDURE — 97116 GAIT TRAINING THERAPY: CPT | Mod: PN,CQ

## 2022-03-17 PROCEDURE — 3288F PR FALLS RISK ASSESSMENT DOCUMENTED: ICD-10-PCS | Mod: CPTII,S$GLB,, | Performed by: NURSE PRACTITIONER

## 2022-03-17 PROCEDURE — 1126F PR PAIN SEVERITY QUANTIFIED, NO PAIN PRESENT: ICD-10-PCS | Mod: CPTII,S$GLB,, | Performed by: NURSE PRACTITIONER

## 2022-03-17 PROCEDURE — 99203 PR OFFICE/OUTPT VISIT, NEW, LEVL III, 30-44 MIN: ICD-10-PCS | Mod: S$GLB,,, | Performed by: NURSE PRACTITIONER

## 2022-03-17 RX ORDER — ESTRADIOL 0.1 MG/G
0.5 CREAM VAGINAL NIGHTLY
Qty: 42.5 G | Refills: 1 | Status: SHIPPED | OUTPATIENT
Start: 2022-03-17 | End: 2023-03-17

## 2022-03-17 NOTE — PROGRESS NOTES
"[facility]   Physical Therapy Treatment Note     Name: Alyssa John  Clinic Number: [id number]    Therapy Diagnosis:   Gait instability, generalized weakness.    Physician: Genny Apodaca MD    Visit Date: 3/17/2022    Physician Orders: PT Eval and Treat   Medical Diagnosis from Referral: Multiple sclerosis; Gait disturbance  Evaluation Date: 11/29/2021  Authorization Period Expiration: 12/13/21  Plan of Care Expiration: 1/18/22  Visit # / Visits authorized:  18/22     Time In: 1438 (late arrival)   Time Out: 1525  Total Billable Time: 47 minutes    Precautions: Fall, History of seizures     SUBJECTIVE     Pt reports Fartun states that she is "in between good and bad".  She was compliant with "some" of her home exercise program.  Response to previous treatment: no complaints  Functional change: ongoing    Pain:  0/10  Location:  N/A    OBJECTIVE     Objective Measures updated at progress report unless specified.     Treatment     Alyssa received the treatments listed below:      therapeutic exercises to develop strength, endurance and range of motion  for 15 minutes including:    Nustep: 10 minutes level 2 without upper extremites with verbal cues to increase range and increase steps per minute    Sitting: Marching 2 sets of 10 with 1# left/right              hip abduction with red thera-band     Sit < > Stand minimal assistance for anterior lean and verbal cues for hand placement    gait training to improve functional mobility and safety for 32  minutes, with verbal cues to stay close to walker, and weight shift to left/right for bigger step length including:    Gait training  using RW  70 feet, 30 feet and 65 feet with Wheelchair to follow for safety.      Patient Education and Home Exercises     Home Exercises Provided and Patient Education Provided     Education provided:   -Patient provided with verbal and demonstrative instruction for all activities performed in today's " session.    Written Home Exercises Provided: Patient instructed to cont prior HEP.   Exercises were reviewed and Alyssa was able to demonstrate them prior to the end of the session.  Alyssa demonstrated good  understanding of the education provided. See EMR under Patient Instructions for exercises provided during therapy sessions    ASSESSMENT   Alyssa provided good participation and effort during today's session with focused on safety with gait training and sit to stand with little to no follow through. Fartun needed maximal verbal cues during ambulation to stay close to walker, improve weight shift to Right for increased Left foot clearance with Wheelchair to follow for safety.    Alyssa is not progressing towards her goals.   Pt prognosis is Fair.     Pt will continue to benefit from skilled outpatient physical therapy to address the deficits listed in the problem list box on initial evaluation, provide pt/family education and to maximize pt's level of independence in the home and community environment.     Pt's spiritual, cultural and educational needs considered and pt agreeable to plan of care and goals.     Anticipated barriers to physical therapy: time schedule    Goals:   Short Term Goals: 3 weeks:  1. Patient will report compliance to Home Exercises.  (ongoing)  2. Patient will ambulate on RW 50 ft with min A. (met)  3. Patient will tolerate bike x 10'  (ongoing)     Long Term Goals: 8 weeks   1. Patient will ambulate with  ft.  (ongoing )  2. Patient will have gross strength to BLE's/BUE's  4/5  (ongoing)  3. Improved sit to stand to 5 reps in 30 second. (ongoing)  4. FOTO >40/100  (met 50/100)     Long Term Goal Status: modified:    1. Patient will ambulate 150 ft distance consistently on RW. (ongoing)  2. Sit to stand and transfers consistent with modified independence > 5 reps in 30 seconds. (ongoing)  3. Both knees on full extension 0 degrees. (ongoing)  4. FOTO > 55/100 (ongoing)       PLAN      Postural alignment, strengthening  Continue Plan of care.    El Peck, Student Physical Therapist Assistant     I certify that I was present in the room  in service delivery and guiding them using my skilled judgment. As the co-signing therapist I have reviewed the students documentation and am responsible for the treatment, assessment, and plan.      Danyelle Benoit, Physical Therapist Assistant

## 2022-03-17 NOTE — PROGRESS NOTES
Alyssa John is a 71 y.o. female  presents with complaint of postmenopausal bleeding. New to me. Reports wiping and noticing some spotting on the toilet paper a few days ago. Her  was concerned and wanted her to be examined. No vaginal bleeding since. She is pretty sure the spotting was from her vagina. Reports a vaginal odor. No vaginal itching or abnormal discharge. Thought she felt some vulvar bumps? She is in a wheelchair and needs assistance for the exam. Reports her uterus and ovaries being intact.     Past Medical History:   Diagnosis Date    Arthritis     Coronary artery disease     Encounter for blood transfusion     Epilepsy     GERD (gastroesophageal reflux disease)     Headache     Hypertension     MI, old     MS (multiple sclerosis)     Seizures     epilepsy     Past Surgical History:   Procedure Laterality Date    APPENDECTOMY      BACK SURGERY      L5 discectomy    BREAST BIOPSY Right 15 yrs ago    benign    CATARACT EXTRACTION Bilateral     COLONOSCOPY N/A 2015    Procedure: COLONOSCOPY;  Surgeon: Henry Malcolm MD;  Location: [facility];  Service: Endoscopy;  Laterality: N/A;    CORONARY STENT PLACEMENT      right knee arthroscopy       Social History     Tobacco Use    Smoking status: Former Smoker     Packs/day: 1.50     Quit date: 2006     Years since quitting: 15.2    Smokeless tobacco: Never Used   Substance Use Topics    Alcohol use: No     Alcohol/week: 0.0 standard drinks    Drug use: No     Family History   Problem Relation Age of Onset    Scleroderma Mother     Heart disease Father         MI, CAD    Cancer Neg Hx     Diabetes Neg Hx     Stroke Neg Hx     Melanoma Neg Hx     Psoriasis Neg Hx     Lupus Neg Hx     Eczema Neg Hx      OB History    Para Term  AB Living   2 2 2         SAB IAB Ectopic Multiple Live Births                  # Outcome Date GA Lbr Keaton/2nd Weight Sex Delivery Anes PTL Lv   2 Term             1 Term                ROS:  GENERAL: No fever, chills, fatigability or weight loss.  VULVAR: No pain, no lesions and no itching.  VAGINAL: No relaxation, no itching, no discharge, + ODOR no abnormal bleeding and no lesions.  ABDOMEN: No abdominal pain. Denies nausea. Denies vomiting. No diarrhea. No constipation  BREAST: Denies pain. No lumps. No discharge.  URINARY: No incontinence, no nocturia, no frequency and no dysuria.  CARDIOVASCULAR: No chest pain. No shortness of breath. No leg cramps.  NEUROLOGICAL: No headaches. No vision changes.    PHYSICAL EXAM:  VULVA: normal appearing vulva with no masses, tenderness or lesions   VAGINA:  ATROPHIC normal appearing vagina with normal color. No abnormal discharge, no lesions   CERVIX: normal appearing cervix without discharge or lesions NOT VISUALIZED DUE TO PT TOLERATION OF EXAM  UTERUS: uterus is normal size, shape, consistency and nontender   ADNEXA: normal adnexa in size, nontender and no masses    ASSESSMENT and PLAN:    ICD-10-CM ICD-9-CM    1. Postmenopausal atrophic vaginitis  N95.2 627.3 estradioL (ESTRACE) 0.01 % (0.1 mg/gram) vaginal cream   2. Vaginal odor  N89.8 625.8 Vaginosis Screen by DNA Probe         1. Affirm pending, no discharge or odor noted on exam  2. Atrophy on exam- discussed vaginal estrogen cream. Will RTC if bleeding returns and/or is heavy. Discussed pelvic US needed at that time- declines today.

## 2022-03-18 LAB
BACTERIAL VAGINOSIS DNA: POSITIVE
CANDIDA GLABRATA DNA: NEGATIVE
CANDIDA KRUSEI DNA: NEGATIVE
CANDIDA RRNA VAG QL PROBE: NEGATIVE
T VAGINALIS RRNA GENITAL QL PROBE: NEGATIVE

## 2022-03-21 ENCOUNTER — TELEPHONE (OUTPATIENT)
Dept: PSYCHIATRY | Facility: CLINIC | Age: 72
End: 2022-03-21
Payer: MEDICARE

## 2022-03-21 ENCOUNTER — CLINICAL SUPPORT (OUTPATIENT)
Dept: REHABILITATION | Facility: HOSPITAL | Age: 72
End: 2022-03-21
Payer: MEDICARE

## 2022-03-21 DIAGNOSIS — R53.81 DEBILITY: Primary | ICD-10-CM

## 2022-03-21 DIAGNOSIS — N76.0 BV (BACTERIAL VAGINOSIS): Primary | ICD-10-CM

## 2022-03-21 DIAGNOSIS — B96.89 BV (BACTERIAL VAGINOSIS): Primary | ICD-10-CM

## 2022-03-21 DIAGNOSIS — M25.60 LIMITATION OF JOINT MOTION: ICD-10-CM

## 2022-03-21 PROCEDURE — 97116 GAIT TRAINING THERAPY: CPT | Mod: KX,PN

## 2022-03-21 PROCEDURE — 97110 THERAPEUTIC EXERCISES: CPT | Mod: KX,PN

## 2022-03-21 RX ORDER — TINIDAZOLE 500 MG/1
2 TABLET ORAL DAILY
Qty: 8 TABLET | Refills: 0 | Status: SHIPPED | OUTPATIENT
Start: 2022-03-21 | End: 2022-03-23

## 2022-03-21 NOTE — TELEPHONE ENCOUNTER
Fax received from [employer] advising pt (ID [id number]) was approved for assistance (free drug) for the 2022 calendar year for her MS medication.

## 2022-03-21 NOTE — PROGRESS NOTES
[facility]   Physical Therapy Treatment Note     Name: Alyssa John  Clinic Number: [id number]    Therapy Diagnosis:   Gait instability, generalized weakness.    Physician: Genny Apodaca MD    Visit Date: 3/21/2022    Physician Orders: PT Eval and Treat   Medical Diagnosis from Referral: Multiple sclerosis; Gait disturbance  Evaluation Date: 11/29/2021  Authorization Period Expiration: 12/13/21  Plan of Care Expiration: 1/18/22  Visit # / Visits authorized:  19/22     Time In: 1430  Time Out: 1515  Total Billable Time: 45 minutes    Precautions: Fall, Hx of seizures     SUBJECTIVE     Pt reports no subjective complaints.  She was compliant with home exercise program.  Response to previous treatment:tolerated Rx  Functional change: more  consistent with gait.    Pain:  0/10  Location:  N/A    OBJECTIVE     + edema both LE's.    Treatment     Alyssa received the treatments listed below:    therapeutic exercises to develop strength, endurance and ROM for  20 minutes including:  Mini squats 20  Hel raises 20  Sit to stand  20  Hamstring curls propelling wheelchair 30 ft  Gait training 25 using RW  emphasis on floor clearance, consistent distance   and erect posture. 100 ft x 3  Patient Education and Home Exercises     Home Exercises Provided and Patient Education Provided     Education provided:   Postural alignment and midline orientation.  Written Home Exercises Provided: Patient instructed to cont prior HEP.   Exercises were reviewed and Alyssa was able to demonstrate them prior to the end of the session.  Alyssa demonstrated good  understanding of the education provided. See EMR under Patient Instructions for exercises provided during therapy sessions    ASSESSMENT     Alyssa ambulated with consistent distance. Left foot drag minimized with verbal cueing.  Tolerated Rx fair.     Alyssa is not progressing towards her goals.   Pt prognosis is Fair.     Pt will continue to benefit  from skilled outpatient physical therapy to address the deficits listed in the problem list box on initial evaluation, provide pt/family education and to maximize pt's level of independence in the home and community environment.     Pt's spiritual, cultural and educational needs considered and pt agreeable to plan of care and goals.     Anticipated barriers to physical therapy: time schedule    Goals:   ongoing  1. Patient will ambulate 150 ft distance consistently on RW.  2. Sit to stand and transfers consistent with modified independence > 5 reps in 30 seconds.  3. Both knees on full extension 0 degrees.  4. FOTO > 55/100         PLAN     Postural alignment, strengthening, GT  Continue Plan of care.    Jb Frias, PT

## 2022-03-22 ENCOUNTER — TELEPHONE (OUTPATIENT)
Dept: OBSTETRICS AND GYNECOLOGY | Facility: CLINIC | Age: 72
End: 2022-03-22
Payer: MEDICARE

## 2022-03-22 NOTE — TELEPHONE ENCOUNTER
Informed pt of prescription being called in, pt verbalized understanding      ----- Message from Carie Florez NP sent at 3/21/2022  4:34 PM CDT -----  Please call pt and tell her I sent in a Rx for bacterial vaginosis. Take 4 tablets by mouth on day 1, 4 tablets by mouth on day 2.

## 2022-03-23 DIAGNOSIS — R25.1 TREMOR: ICD-10-CM

## 2022-03-23 DIAGNOSIS — Z87.898 HISTORY OF SEIZURE: ICD-10-CM

## 2022-03-23 DIAGNOSIS — G35 MULTIPLE SCLEROSIS: ICD-10-CM

## 2022-03-24 ENCOUNTER — CLINICAL SUPPORT (OUTPATIENT)
Dept: REHABILITATION | Facility: HOSPITAL | Age: 72
End: 2022-03-24
Payer: MEDICARE

## 2022-03-24 DIAGNOSIS — G35 MULTIPLE SCLEROSIS: ICD-10-CM

## 2022-03-24 DIAGNOSIS — M25.60 LIMITATION OF JOINT MOTION: ICD-10-CM

## 2022-03-24 DIAGNOSIS — R25.1 TREMOR: ICD-10-CM

## 2022-03-24 DIAGNOSIS — Z87.898 HISTORY OF SEIZURE: ICD-10-CM

## 2022-03-24 DIAGNOSIS — R53.81 DEBILITY: Primary | ICD-10-CM

## 2022-03-24 PROCEDURE — 97110 THERAPEUTIC EXERCISES: CPT | Mod: PN,CQ

## 2022-03-24 PROCEDURE — 97530 THERAPEUTIC ACTIVITIES: CPT | Mod: PN,CQ

## 2022-03-24 PROCEDURE — 97116 GAIT TRAINING THERAPY: CPT | Mod: PN,CQ

## 2022-03-24 RX ORDER — CLONAZEPAM 1 MG/1
1 TABLET ORAL 2 TIMES DAILY
Qty: 60 TABLET | Refills: 1 | Status: SHIPPED | OUTPATIENT
Start: 2022-03-24 | End: 2022-03-24

## 2022-03-24 RX ORDER — CLONAZEPAM 1 MG/1
TABLET ORAL
Qty: 45 TABLET | Refills: 1 | Status: SHIPPED | OUTPATIENT
Start: 2022-03-24 | End: 2022-10-28 | Stop reason: SDUPTHER

## 2022-03-24 RX ORDER — CLONAZEPAM 1 MG/1
TABLET ORAL
Qty: 60 TABLET | Refills: 1 | OUTPATIENT
Start: 2022-03-24

## 2022-03-24 NOTE — TELEPHONE ENCOUNTER
At last visit Jan 2022 pt was advised to  Increase Topamax to goal of 100 mg BID gradually (50 mg  / 100 mg x 2 weeks, then 100 mg BID). Repeat trough in 4 weeks.     Then, will try to slowly wean Klonopin (0.5 mg / 1 mg x 1 month, then 0.5 mg BID).       Please inquire as to which doses of seizure medications above she is currently taking     Also due to have a follow up with Dr Trimble in April or May

## 2022-03-24 NOTE — TELEPHONE ENCOUNTER
----- Message from Kelsey Schneider sent at 3/24/2022  3:57 PM CDT -----  Contact:   Type:  RX Refill Request    Who Called:    Refill or New Rx:  refill  RX Name and Strength:  Clonazepam 1 mg tablet  How is the patient currently taking it? (ex. 1XDay):  As Directed  Is this a 30 day or 90 day RX:  30  Preferred Pharmacy with phone number:  [facility]  Local or Mail Order:  local  Ordering Provider:  Tona Mtz Call Back Number:  [phone]    Additional Information:  Went to Pharmacy to get RX filled, they stated the needed a new rx       Please contact pt upon completion-Thank you~

## 2022-03-24 NOTE — TELEPHONE ENCOUNTER
Spoke to pt and pt's , Giuseppe. He reports that the pt is taking Topamax 100mg BID and had a trough level done in February (2/2/22). He also reports that they did not decrease the Clonazepam to 0.5mg BID. I provided him with the instructions to decrease--0.5mg QAM and 1mg QHS x 1 month then 0.5mg BID thereafter. He reports that the pt has taken the 1mg for the past few years. He states that she will be out of mediation on Monday. Please advise.    No pertinent family history in first degree relatives FAMILY HISTORY:  No pertinent family history in first degree relatives

## 2022-03-24 NOTE — PROGRESS NOTES
"[facility]   Physical Therapy Treatment Note     Name: Alyssa John  Clinic Number: [id number]    Therapy Diagnosis:   Gait instability, generalized weakness.    Physician: Genny Apodaca MD    Visit Date: 3/24/2022    Physician Orders: PT Eval and Treat   Medical Diagnosis from Referral: Multiple sclerosis; Gait disturbance  Evaluation Date: 11/29/2021  Authorization Period Expiration: 12/13/21  Plan of Care Expiration: 1/18/22  Visit # / Visits authorized:  20/22     Time In: 1435 (late arrival)   Time Out: 1515  Total Billable Time: 40 minutes    Precautions: Fall, History of seizures     SUBJECTIVE     Pt reports did not sleep well last night and is feeling not too well today.  She was compliant with of her home exercise program, "sometimes"  Response to previous treatment: no complaints  Functional change: ongoing    Pain:  0/10  Location:  N/A    OBJECTIVE     Objective Measures updated at progress report unless specified.     Treatment     Alyssa received the treatments listed below:      therapeutic exercises to develop strength, endurance and range of motion  for 15 minutes including:      Sitting: Marching 2 sets of 10 with 1# left/right   Long arc quads 2 sets of 10 1# Right Left    Bilateral ankle pumps 20 times              hip abduction with red thera-band    Ball squeeze 20 times    Sit < > Stand minimal assistance for anterior lean and verbal cues for hand placement    therapeutic activities to improve functional performance for 10  minutes, including:    Wheelchair to/from Recumbent bike with rolling walker assist and minimal assist for direction    Attempted recumbent bike but reported unable to bend knees    Returned to Wheelchair with assist    gait training to improve functional mobility and safety for 15  minutes, with verbal cues to stay close to walker, and weight shift to left/right for bigger step length including:    Gait training  using RW  90 feet " with Wheelchair to follow for safety, maximal cues to improve floor clearance on Left and increased step length bilaterally    Patient Education and Home Exercises     Home Exercises Provided and Patient Education Provided     Education provided:   -Patient provided with verbal and demonstrative instruction for all activities performed in today's session.    Written Home Exercises Provided: Patient instructed to cont prior HEP.   Exercises were reviewed and Alyssa was able to demonstrate them prior to the end of the session.  Alyssa demonstrated good  understanding of the education provided. See EMR under Patient Instructions for exercises provided during therapy sessions    ASSESSMENT   Alyssa provided good participation and effort during today's session with focused on gait training and lower extremity strengthening. Alyssa easily fatigued with all activities and needing cues to keep going and maximal cues for proper form.    Alyssa is not progressing towards her goals.   Pt prognosis is Fair.     Pt will continue to benefit from skilled outpatient physical therapy to address the deficits listed in the problem list box on initial evaluation, provide pt/family education and to maximize pt's level of independence in the home and community environment.     Pt's spiritual, cultural and educational needs considered and pt agreeable to plan of care and goals.     Anticipated barriers to physical therapy: time schedule    Goals:   Short Term Goals: 3 weeks:  1. Patient will report compliance to Home Exercises.  (ongoing)  2. Patient will ambulate on RW 50 ft with min A. (met)  3. Patient will tolerate bike x 10'  (ongoing)     Long Term Goals: 8 weeks   1. Patient will ambulate with  ft.  (ongoing )  2. Patient will have gross strength to BLE's/BUE's  4/5  (ongoing)  3. Improved sit to stand to 5 reps in 30 second. (ongoing)  4. FOTO >40/100  (met 50/100)     Long Term Goal Status: modified:    1. Patient will  ambulate 150 ft distance consistently on RW. (ongoing)  2. Sit to stand and transfers consistent with modified independence > 5 reps in 30 seconds. (ongoing)  3. Both knees on full extension 0 degrees. (ongoing)  4. FOTO > 55/100 (ongoing)       PLAN     Outpatient Physical Therapy 2 times weekly for 10 weeks to include the following interventions: Gait Training, Manual Therapy, Neuromuscular Re-ed, Therapeutic Activites and Therapeutic Exercise.      El Peck, Student Physical Therapist Assistant     I certify that I was present in the room  in service delivery and guiding them using my skilled judgment. As the co-signing therapist I have reviewed the students documentation and am responsible for the treatment, assessment, and plan.      Danyelle Benoit, Physical Therapist Assistant

## 2022-03-28 ENCOUNTER — CLINICAL SUPPORT (OUTPATIENT)
Dept: REHABILITATION | Facility: HOSPITAL | Age: 72
End: 2022-03-28
Payer: MEDICARE

## 2022-03-28 DIAGNOSIS — G35 MULTIPLE SCLEROSIS: ICD-10-CM

## 2022-03-28 DIAGNOSIS — R26.9 GAIT DISTURBANCE: Primary | ICD-10-CM

## 2022-03-28 DIAGNOSIS — R53.81 DEBILITY: ICD-10-CM

## 2022-03-28 DIAGNOSIS — M25.60 LIMITATION OF JOINT MOTION: ICD-10-CM

## 2022-03-28 PROCEDURE — 97110 THERAPEUTIC EXERCISES: CPT | Mod: PN

## 2022-03-28 PROCEDURE — 97116 GAIT TRAINING THERAPY: CPT | Mod: PN

## 2022-03-28 NOTE — PROGRESS NOTES
[facility]   Physical Therapy Treatment Note     Name: Alyssa John  Clinic Number: [id number]    Therapy Diagnosis:   Encounter Diagnoses   Name Primary?    Gait disturbance Yes    Multiple sclerosis     Debility     Limitation of joint motion      Physician: Genny Apodaca MD    Visit Date: 3/28/2022    Physician Orders: PT Eval and Treat   Medical Diagnosis from Referral: multiple sclerosis; gait disturbance  Evaluation Date: 11/29/2021  Authorization Period Expiration: 04/01/22  Plan of Care Expiration: 04/31/22  Visit # / Visits authorized: 21/ 36     Time In: 1435 (check in time of 1434 for 1430 appointment)  Time Out: 1520  Total Billable Time: 45 minutes     Precautions: Fall, Hx of seizures       SUBJECTIVE     Pt reports: generalized weakness and fatigue - did not have lunch yet today.  She was compliant with home exercise program.  Response to previous treatment: generalized weakness and fatigue  Functional change: decreased mobility     Pain: no complaints of pain      OBJECTIVE     Objective Measures updated at progress report unless specified.     Treatment     Alyssa received the treatments listed below:      therapeutic exercises to develop strength and endurance for 35 minutes including:     X 10 minutes SciFit (level 1) to promote flexibility prior to strength/mobility training   X 15 seated bilateral lower extremity therapeutic exercise = marching, ball squeeze, long arc quad   X 5 sit to stand emphasizing proper technique      gait training to improve functional mobility and safety for 10 minutes, including:     Functional ambulation 40 feet with rolling-walker and minimal/mod assist x 2      Patient Education and Home Exercises     Home Exercises Provided and Patient Education Provided     Education provided:   - proper therapeutic exercise technique  - continue home exercise program     Written Home Exercises Provided: Patient instructed to cont prior  HEP.      ASSESSMENT     Patient needed extra long rest breaks due to history of multiple sclerosis. Mobility limited by weakness and fatigue. Verbal cues needed during functional ambulation to improve upright posture.    Alyssa Is progressing fairly well towards her goals.   Pt prognosis is Fair.     Pt will continue to benefit from skilled outpatient physical therapy to address the deficits listed in the problem list box on initial evaluation, provide pt/family education and to maximize pt's level of independence in the home and community environment.     Pt's spiritual, cultural and educational needs considered and pt agreeable to plan of care and goals.     Anticipated barriers to physical therapy: severity of weakness and fatigue    Goals:     Short Term Goals: 3 weeks:  1. Patient will report compliance to Home Exercises. (MET)  2. Patient will ambulate on RW 50 ft with min A. (NOT MET)  3. Patient will tolerate bike x 10' (PART MET)     Long Term Goals: 8 weeks   1. Patient will ambulate with  ft. (NOT MET)   2. Patient will have gross strength to BLE's/BUE's  4/5 (NOT MET)   3. Improved sit to stand to 5 reps in 30 second. (PART MET)  4. FOTO >40/100 (NOT MET)     Long Term Goal Status: modified:    1. Patient will ambulate 150 ft distance consistently on RW. (NOT MET)  2. Sit to stand and transfers consistent with modified independence > 5 reps in 30 seconds. (NOT MET)  3. Both knees on full extension 0 degrees. (NOT MET)  4. FOTO > 55/100 (NOT MET)      PLAN     Continue to advance strength/mobility training to patient's tolerance.      Marcus Serrano, PT

## 2022-04-04 ENCOUNTER — CLINICAL SUPPORT (OUTPATIENT)
Dept: REHABILITATION | Facility: HOSPITAL | Age: 72
End: 2022-04-04
Payer: MEDICARE

## 2022-04-04 DIAGNOSIS — M25.60 LIMITATION OF JOINT MOTION: ICD-10-CM

## 2022-04-04 DIAGNOSIS — R53.81 DEBILITY: Primary | ICD-10-CM

## 2022-04-04 PROCEDURE — 97110 THERAPEUTIC EXERCISES: CPT | Mod: PN

## 2022-04-04 PROCEDURE — 97116 GAIT TRAINING THERAPY: CPT | Mod: PN

## 2022-04-04 NOTE — PROGRESS NOTES
Dictation #1  MRN:[id number]  CSN:[id number]  [facility]   Physical Therapy Treatment Note     Name: Alyssa John  Clinic Number: [id number]    Therapy Diagnosis:   Gait instability, generalized weakness.    Physician: Genny Apodaca MD    Visit Date: 4/4/2022    Physician Orders: PT Eval and Treat   Medical Diagnosis from Referral: Multiple sclerosis; Gait disturbance  Evaluation Date: 11/29/2021  Authorization Period Expiration: 12/13/21  Plan of Care Expiration: 4/31/22  Visit # / Visits authorized:  23/36 pending     Time In: 1432  Time Out: 1515  Total Billable Time: 43 minutes    Precautions: Fall, Hx of seizures     SUBJECTIVE     Pt reports no subjective complaints.  She was compliant with home exercise program.  Response to previous treatment:tolerated Rx  Functional change: inconsistent sit to stand.    Pain:  0/10  Location:  N/A    OBJECTIVE     Ambulates on RW with occasional left foot drag.    Treatment     Alyssa received the treatments listed below:    therapeutic exercises to develop strength, endurance and ROM for  33 minutes including:  Hamstring stretches   Ankle dorsiflexion 20/20  Short arc quads 20/20  Straight leg rasising 20/20  Bridging 20  Hip Abd/ ADD 20/20  Hamstring curls propelling wheelchair 30 ft  Gait training 10 using RW  emphasis on floor clearance, consistent distance   and erect posture. 100 ft    Patient Education and Home Exercises     Home Exercises Provided and Patient Education Provided     Education provided:   Sit to stand technique reinforced  Nose over toes..  Written Home Exercises Provided: Patient instructed to cont prior HEP.   Exercises were reviewed and Alyssa was able to demonstrate them prior to the end of the session.  Alyssa demonstrated good  understanding of the education provided. See EMR under Patient Instructions for exercises provided during therapy sessions    ASSESSMENT     Alyssa demonstrated signs of fatigue with left  foot drag after walking some distance and alteration of sit to stand technique after 6 reps.  Tolerated Rx fair.     Alyssa is not progressing towards her goals.   Pt prognosis is Fair.     Pt will continue to benefit from skilled outpatient physical therapy to address the deficits listed in the problem list box on initial evaluation, provide pt/family education and to maximize pt's level of independence in the home and community environment.     Pt's spiritual, cultural and educational needs considered and pt agreeable to plan of care and goals.     Anticipated barriers to physical therapy: time schedule    Goals:   ongoing  1. Patient will ambulate 150 ft distance consistently on RW.  2. Sit to stand and transfers consistent with modified independence > 5 reps in 30 seconds.  3. Both knees on full extension 0 degrees.  4. FOTO > 55/100    PLAN     Stretching, strengthening GT  Continue Plan of care.    Jb Frias, PT

## 2022-04-07 ENCOUNTER — CLINICAL SUPPORT (OUTPATIENT)
Dept: REHABILITATION | Facility: HOSPITAL | Age: 72
End: 2022-04-07
Payer: MEDICARE

## 2022-04-07 DIAGNOSIS — R53.81 DEBILITY: Primary | ICD-10-CM

## 2022-04-07 DIAGNOSIS — M25.60 LIMITATION OF JOINT MOTION: ICD-10-CM

## 2022-04-07 PROCEDURE — 97116 GAIT TRAINING THERAPY: CPT | Mod: PN,CQ

## 2022-04-07 PROCEDURE — 97110 THERAPEUTIC EXERCISES: CPT | Mod: PN,CQ

## 2022-04-07 PROCEDURE — 97530 THERAPEUTIC ACTIVITIES: CPT | Mod: PN,CQ

## 2022-04-07 NOTE — PROGRESS NOTES
"[facility]   Physical Therapy Treatment Note     Name: Alyssa John  Clinic Number: [id number]    Therapy Diagnosis:   Gait instability, generalized weakness.    Physician: Genny Apodaca MD    Visit Date: 4/7/2022    Physician Orders: PT Eval and Treat   Medical Diagnosis from Referral: Multiple sclerosis; Gait disturbance  Evaluation Date: 11/29/2021  Authorization Period Expiration: 12/13/21  Plan of Care Expiration: 1/18/22  Visit # / Visits authorized:  20/22     Time In: 1436 (late arrival)   Time Out: 1515  Total Billable Time: 39 minutes    Precautions: Fall, History of seizures     SUBJECTIVE     Pt reports Doing okay today, without complaints.  She was compliant with of her home exercise program, "sometimes"  Response to previous treatment: no complaints  Functional change: ongoing    Pain:  0/10  Location:  N/A    OBJECTIVE     Objective Measures updated at progress report unless specified.     Treatment     Alyssa received the treatments listed below:      therapeutic exercises to develop strength, endurance and range of motion  for 15 minutes including:    NuStep Level 5 10 minutes with Bilateral Upper Extremities     Sitting: Marching 30 times Right Left alternating     therapeutic activities to improve functional performance for 10  minutes, including:    Sit < > Stand minimal assistance for anterior lean and verbal cues for hand placement    Wheelchair < > NuStep squat pivot transfer moderate assist after multiple verbal cues and tactile cues to increase independence    gait training to improve functional mobility and safety for 14  minutes, with verbal cues to stay close to walker, and weight shift to left/right for bigger step length including:    Gait training  using RW  90 feet and 80 feet with Wheelchair to follow for safety, maximal cues to improve floor clearance on Left and increased step length bilaterally    Patient Education and Home Exercises     Home " Exercises Provided and Patient Education Provided     Education provided:   -Patient provided with verbal and demonstrative instruction for all activities performed in today's session.    Written Home Exercises Provided: Patient instructed to cont prior HEP.   Exercises were reviewed and Alyssa was able to demonstrate them prior to the end of the session.  Alyssa demonstrated good  understanding of the education provided. See EMR under Patient Instructions for exercises provided during therapy sessions    ASSESSMENT   Alyssa provided fair participation and effort during today's session with focuson gait training and lower extremity range of motion . Alyssa easily fatigued with all activities and needing cues remain on task.    Alyssa is not progressing towards her goals.   Pt prognosis is Fair.     Pt will continue to benefit from skilled outpatient physical therapy to address the deficits listed in the problem list box on initial evaluation, provide pt/family education and to maximize pt's level of independence in the home and community environment.     Pt's spiritual, cultural and educational needs considered and pt agreeable to plan of care and goals.     Anticipated barriers to physical therapy: time schedule    Goals:   Short Term Goals: 3 weeks:  1. Patient will report compliance to Home Exercises.  (ongoing)  2. Patient will ambulate on RW 50 ft with min A. (met)  3. Patient will tolerate bike x 10'  (ongoing)     Long Term Goals: 8 weeks   1. Patient will ambulate with  ft.  (ongoing )  2. Patient will have gross strength to BLE's/BUE's  4/5  (ongoing)  3. Improved sit to stand to 5 reps in 30 second. (ongoing)  4. FOTO >40/100  (met 50/100)     Long Term Goal Status: modified:    1. Patient will ambulate 150 ft distance consistently on RW. (ongoing)  2. Sit to stand and transfers consistent with modified independence > 5 reps in 30 seconds. (ongoing)  3. Both knees on full extension 0 degrees.  (ongoing)  4. FOTO > 55/100 (ongoing)       PLAN     Outpatient Physical Therapy 2 times weekly for 10 weeks to include the following interventions: Gait Training, Manual Therapy, Neuromuscular Re-ed, Therapeutic Activites and Therapeutic Exercise.     Danyelle Benoit, Physical Therapist Assistant

## 2022-04-11 ENCOUNTER — CLINICAL SUPPORT (OUTPATIENT)
Dept: REHABILITATION | Facility: HOSPITAL | Age: 72
End: 2022-04-11
Payer: MEDICARE

## 2022-04-11 DIAGNOSIS — M25.60 LIMITATION OF JOINT MOTION: ICD-10-CM

## 2022-04-11 DIAGNOSIS — R53.81 DEBILITY: Primary | ICD-10-CM

## 2022-04-11 PROCEDURE — 97116 GAIT TRAINING THERAPY: CPT | Mod: PN,CQ

## 2022-04-11 PROCEDURE — 97530 THERAPEUTIC ACTIVITIES: CPT | Mod: PN,CQ

## 2022-04-11 PROCEDURE — 97110 THERAPEUTIC EXERCISES: CPT | Mod: PN,CQ

## 2022-04-11 NOTE — PROGRESS NOTES
"[facility]   Physical Therapy Treatment Note     Name: Alyssa John  Clinic Number: [id number]    Therapy Diagnosis:   Gait instability, generalized weakness.    Physician: Genny Apodaca MD    Visit Date: 4/11/2022    Physician Orders: PT Eval and Treat   Medical Diagnosis from Referral: Multiple sclerosis; Gait disturbance  Evaluation Date: 11/29/2021  Authorization Period Expiration: 12/13/21  Plan of Care Expiration: 1/18/22  Visit # / Visits authorized:  20/22     Time In: 1520 (late arrival)   Time Out: 1600  Total Billable Time: 40 minutes    Precautions: Fall, History of seizures     SUBJECTIVE     Pt reports Doing okay today, wants to work on Shuttle today and does not like the NuStep, "I would really like to do the bike but I can't".   She was compliant with of her home exercise program of marching in place and ambulating with rolling walker in home  Response to previous treatment: no complaints  Functional change: ongoing    Pain:  0/10  Location:  N/A    OBJECTIVE     Objective Measures updated at progress report unless specified.     Treatment     Alyssa received the treatments listed below:      therapeutic exercises to develop strength, endurance and range of motion  for 20 minutes including:    Shuttle: 50# Bilateral leg press 3 minutes    37# Unilateral leg press 3 minutes Right Left     37# Bilateral Heel Dips / Heel Raises     Sitting: Hamstring curl 10 times with Red Theraband Right Left  with assist on Left for increased range     therapeutic activities to improve functional performance for 10  minutes, including:    Sit < > Stand minimal assistance for anterior lean and verbal cues for hand placement    Wheelchair < > Shuttle squat pivot transfer moderate assist after multiple verbal cues and tactile cues to increase independence    To supine on Shuttle moderate assistance    To sit from Shuttle moderate assistance    gait training to improve functional " mobility and safety for 10 minutes including:    Gait training  using RW  100 feet with Wheelchair to follow for safety, maximal cues to improve floor clearance on Left     Patient Education and Home Exercises     Home Exercises Provided and Patient Education Provided     Education provided:   -Patient provided with verbal and demonstrative instruction for all activities performed in today's session.    Written Home Exercises Provided: Patient instructed to cont prior HEP.   Exercises were reviewed and Alyssa was able to demonstrate them prior to the end of the session.  Alyssa demonstrated good  understanding of the education provided. See EMR under Patient Instructions for exercises provided during therapy sessions    ASSESSMENT   Alyssa provided fair participation and effort during today's session with focus on gait training and lower extremity strengthening / range of motion . Alyssa tolerated activities with cues to remain to task.    Alyssa is not progressing towards her goals.   Pt prognosis is Fair.     Pt will continue to benefit from skilled outpatient physical therapy to address the deficits listed in the problem list box on initial evaluation, provide pt/family education and to maximize pt's level of independence in the home and community environment.     Pt's spiritual, cultural and educational needs considered and pt agreeable to plan of care and goals.     Anticipated barriers to physical therapy: time schedule    Goals:   Short Term Goals: 3 weeks:  1. Patient will report compliance to Home Exercises.  (ongoing)  2. Patient will ambulate on RW 50 ft with min A. (met)  3. Patient will tolerate bike x 10'  (ongoing)     Long Term Goals: 8 weeks   1. Patient will ambulate with  ft.  (ongoing )  2. Patient will have gross strength to BLE's/BUE's  4/5  (ongoing)  3. Improved sit to stand to 5 reps in 30 second. (ongoing)  4. FOTO >40/100  (met 50/100)     Long Term Goal Status: modified:    1.  Patient will ambulate 150 ft distance consistently on RW. (ongoing)  2. Sit to stand and transfers consistent with modified independence > 5 reps in 30 seconds. (ongoing)  3. Both knees on full extension 0 degrees. (ongoing)  4. FOTO > 55/100 (ongoing)       PLAN     Outpatient Physical Therapy 2 times weekly for 10 weeks to include the following interventions: Gait Training, Manual Therapy, Neuromuscular Re-ed, Therapeutic Activites and Therapeutic Exercise.     Danyelle Benoit, Physical Therapist Assistant

## 2022-04-21 ENCOUNTER — CLINICAL SUPPORT (OUTPATIENT)
Dept: REHABILITATION | Facility: HOSPITAL | Age: 72
End: 2022-04-21
Payer: MEDICARE

## 2022-04-21 DIAGNOSIS — R53.81 DEBILITY: Primary | ICD-10-CM

## 2022-04-21 DIAGNOSIS — M25.60 LIMITATION OF JOINT MOTION: ICD-10-CM

## 2022-04-21 PROCEDURE — 97110 THERAPEUTIC EXERCISES: CPT | Mod: PN,CQ

## 2022-04-21 PROCEDURE — 97530 THERAPEUTIC ACTIVITIES: CPT | Mod: PN,CQ

## 2022-04-21 PROCEDURE — 97116 GAIT TRAINING THERAPY: CPT | Mod: PN,CQ

## 2022-04-21 NOTE — PROGRESS NOTES
"[facility]   Physical Therapy Treatment Note     Name: Alyssa John  Clinic Number: [id number]    Therapy Diagnosis:   Gait instability, generalized weakness.    Physician: Genny Apodaca MD    Visit Date: 4/21/2022    Physician Orders: PT Eval and Treat   Medical Diagnosis from Referral: Multiple sclerosis; Gait disturbance  Evaluation Date: 11/29/2021  Authorization Period Expiration: 05/02/2022  Plan of Care Expiration: 04/31/22  Visit # / Visits authorized:  26/48     Time In: 1431  Time Out: 1515  Total Billable Time: 44 minutes    Precautions: Fall, History of seizures     SUBJECTIVE     Pt reports "Not doing too good", "I am having sinus problems"  She reports was compliant with home exercise program   Response to previous treatment: no complaints  Functional change: ongoing    Pain:  0/10  Location:  N/A    OBJECTIVE     Objective Measures updated at progress report unless specified.     Treatment     Alyssa received the treatments listed below:      therapeutic exercises to develop strength, endurance and range of motion  for 20 minutes including:    Sit: Long arc quads 2 sets of 10 2# Right Left    March in place 2 sets of 10 2# Right Left    Heel slide 20 times Right Left with cloth under foot    Sitting: Hamstring curl 10 times with Red Theraband Right Left  with assist on Left for increased range     therapeutic activities to improve functional performance for 10 minutes, including:    Sit < > Stand minimal assistance for anterior lean and verbal cues for hand placement    Wheelchair < > SciFit Level 2 with Bilateral Upper Extremities minimal assistance with maximal cues for hand placement and safety    gait training to improve functional mobility and safety for 10 minutes including:    Gait training  using rolling walker 90 feet with Wheelchair to follow for safety, maximal cues to improve floor clearance on Left     Patient Education and Home Exercises     Home " Exercises Provided and Patient Education Provided     Education provided:   -Patient provided with verbal and demonstrative instruction for all activities performed in today's session.    Written Home Exercises Provided: Patient instructed to cont prior HEP.   Exercises were reviewed and Alyssa was able to demonstrate them prior to the end of the session.  Alyssa demonstrated good  understanding of the education provided. See EMR under Patient Instructions for exercises provided during therapy sessions    ASSESSMENT   Alyssa provided fair participation and effort during today's session with focus on gait training and lower extremity strengthening / range of motion . Alyssa tolerated activities with cues to remain on task. Continued cues for Left foot clearance with ambulation.    Alyssa is not progressing towards her goals.   Pt prognosis is Fair.     Pt will continue to benefit from skilled outpatient physical therapy to address the deficits listed in the problem list box on initial evaluation, provide pt/family education and to maximize pt's level of independence in the home and community environment.     Pt's spiritual, cultural and educational needs considered and pt agreeable to plan of care and goals.     Anticipated barriers to physical therapy: time schedule    Goals:   Short Term Goals: 3 weeks:  1. Patient will report compliance to Home Exercises.  (ongoing)  2. Patient will ambulate on RW 50 ft with min A. (met)  3. Patient will tolerate bike x 10'  (ongoing)     Long Term Goals: 8 weeks   1. Patient will ambulate with  ft.  (ongoing )  2. Patient will have gross strength to BLE's/BUE's  4/5  (ongoing)  3. Improved sit to stand to 5 reps in 30 second. (ongoing)  4. FOTO >40/100  (met 50/100)     Long Term Goal Status: modified:    1. Patient will ambulate 150 ft distance consistently on RW. (ongoing)  2. Sit to stand and transfers consistent with modified independence > 5 reps in 30 seconds.  (ongoing)  3. Both knees on full extension 0 degrees. (ongoing)  4. FOTO > 55/100 (ongoing)       PLAN     Outpatient Physical Therapy 2 times weekly for 10 weeks to include the following interventions: Gait Training, Manual Therapy, Neuromuscular Re-ed, Therapeutic Activites and Therapeutic Exercise.     Danyelle Benoit, Physical Therapist Assistant

## 2022-04-25 ENCOUNTER — CLINICAL SUPPORT (OUTPATIENT)
Dept: REHABILITATION | Facility: HOSPITAL | Age: 72
End: 2022-04-25
Payer: MEDICARE

## 2022-04-25 ENCOUNTER — PATIENT OUTREACH (OUTPATIENT)
Dept: ADMINISTRATIVE | Facility: OTHER | Age: 72
End: 2022-04-25
Payer: MEDICARE

## 2022-04-25 DIAGNOSIS — M25.60 LIMITATION OF JOINT MOTION: ICD-10-CM

## 2022-04-25 DIAGNOSIS — R53.81 DEBILITY: Primary | ICD-10-CM

## 2022-04-25 PROCEDURE — 97110 THERAPEUTIC EXERCISES: CPT | Mod: KX,PN

## 2022-04-25 PROCEDURE — 97116 GAIT TRAINING THERAPY: CPT | Mod: KX,PN

## 2022-04-25 NOTE — PROGRESS NOTES
Health Maintenance Due   Topic Date Due    TETANUS VACCINE  Never done    Shingles Vaccine (1 of 2) Never done    Pneumococcal Vaccines (Age 65+) (1 - PCV) Never done    DEXA Scan  06/13/2018    Influenza Vaccine (1) Never done    COVID-19 Vaccine (4 - Booster for Pfizer series) 12/27/2021     Updates were requested from care everywhere.  Chart was reviewed for overdue Proactive [facility] Encounters (ABDIRASHID) topics (CRS, Breast Cancer Screening, Eye exam)  Health Maintenance has been updated.  LINKS immunization registry triggered.  Immunizations were reconciled.

## 2022-04-25 NOTE — PROGRESS NOTES
Dictation #1  MRN:[id number]  CSN:[id number]  [facility]   Physical Therapy Treatment Note     Name: Alyssa John  Clinic Number: [id number]    Therapy Diagnosis:   Gait instability, generalized weakness.    Physician: Genny Apodaca MD    Visit Date: 4/25/2022    Physician Orders: PT Eval and Treat   Medical Diagnosis from Referral: Multiple sclerosis; Gait disturbance  Evaluation Date: 11/29/2021  Authorization Period Expiration: 12/13/21  Plan of Care Expiration: 4/31/22  Visit # / Visits authorized:  32/36 pending     Time In: 1435  Time Out: 1515  Total Billable Time: 40 minutes    Precautions: Fall, Hx of seizures     SUBJECTIVE     Pt reports no subjective complaints.  She was compliant with home exercise program.  Response to previous treatment:tolerated Rx  Functional change: ambulates long distance.    Pain:  0/10  Location:  N/A    OBJECTIVE     BLE's edematous.    Treatment     Alyssa received the treatments listed below:    therapeutic exercises to develop strength, endurance and ROM for  25 minutes including:  Hamstring stretches   Ankle dorsiflexion 20/20  Short arc quads 20/20  Straight leg rasising 20/20  Bridging 20  Hip Abd/ ADD 20/20  Hamstring curls propelling wheelchair 30 ft  Gait training 15 using RW  emphasis on floor clearance, consistent distance   and erect posture. 160 ft    Patient Education and Home Exercises     Home Exercises Provided and Patient Education Provided     Education provided:   Discussed DC plans..  Written Home Exercises Provided: Patient instructed to cont prior HEP.   Exercises were reviewed and Alyssa was able to demonstrate them prior to the end of the session.  Alyssa demonstrated good  understanding of the education provided. See EMR under Patient Instructions for exercises provided during therapy sessions    ASSESSMENT     Alyssa displayed more consistent floor clearance on gait and longer distance tolerated with nonstop  ambulation on RW.  Tolerated Rx fair.     Alyssa is  progressing towards her goals.   Pt prognosis is Fair.     Pt will continue to benefit from skilled outpatient physical therapy to address the deficits listed in the problem list box on initial evaluation, provide pt/family education and to maximize pt's level of independence in the home and community environment.     Pt's spiritual, cultural and educational needs considered and pt agreeable to plan of care and goals.     Anticipated barriers to physical therapy: time schedule    Goals:   ongoing  1. Patient will ambulate 150 ft distance consistently on RW Partially met)  2. Sit to stand and transfers consistent with modified independence > 5 reps in 30 seconds. (partially met)  3. Both knees on full extension 0 degrees.  Partially met)  4. FOTO > 55/100    PLAN     Discharge planning  Follow up one more visit.    Jb Frias, PT

## 2022-04-27 ENCOUNTER — OFFICE VISIT (OUTPATIENT)
Dept: NEUROLOGY | Facility: CLINIC | Age: 72
End: 2022-04-27
Payer: MEDICARE

## 2022-04-27 VITALS
RESPIRATION RATE: 18 BRPM | SYSTOLIC BLOOD PRESSURE: 146 MMHG | BODY MASS INDEX: 32 KG/M2 | HEART RATE: 67 BPM | DIASTOLIC BLOOD PRESSURE: 73 MMHG | WEIGHT: 180.69 LBS

## 2022-04-27 DIAGNOSIS — G35 MS (MULTIPLE SCLEROSIS): ICD-10-CM

## 2022-04-27 DIAGNOSIS — G40.909 SEIZURE DISORDER: Primary | ICD-10-CM

## 2022-04-27 PROCEDURE — 3008F BODY MASS INDEX DOCD: CPT | Mod: CPTII,S$GLB,, | Performed by: PSYCHIATRY & NEUROLOGY

## 2022-04-27 PROCEDURE — 99499 RISK ADDL DX/OHS AUDIT: ICD-10-PCS | Mod: S$GLB,,, | Performed by: PSYCHIATRY & NEUROLOGY

## 2022-04-27 PROCEDURE — 99214 PR OFFICE/OUTPT VISIT, EST, LEVL IV, 30-39 MIN: ICD-10-PCS | Mod: S$GLB,,, | Performed by: PSYCHIATRY & NEUROLOGY

## 2022-04-27 PROCEDURE — 99999 PR PBB SHADOW E&M-EST. PATIENT-LVL III: CPT | Mod: PBBFAC,,, | Performed by: PSYCHIATRY & NEUROLOGY

## 2022-04-27 PROCEDURE — 3077F PR MOST RECENT SYSTOLIC BLOOD PRESSURE >= 140 MM HG: ICD-10-PCS | Mod: CPTII,S$GLB,, | Performed by: PSYCHIATRY & NEUROLOGY

## 2022-04-27 PROCEDURE — 99999 PR PBB SHADOW E&M-EST. PATIENT-LVL III: ICD-10-PCS | Mod: PBBFAC,,, | Performed by: PSYCHIATRY & NEUROLOGY

## 2022-04-27 PROCEDURE — 3288F PR FALLS RISK ASSESSMENT DOCUMENTED: ICD-10-PCS | Mod: CPTII,S$GLB,, | Performed by: PSYCHIATRY & NEUROLOGY

## 2022-04-27 PROCEDURE — 1101F PT FALLS ASSESS-DOCD LE1/YR: CPT | Mod: CPTII,S$GLB,, | Performed by: PSYCHIATRY & NEUROLOGY

## 2022-04-27 PROCEDURE — 3077F SYST BP >= 140 MM HG: CPT | Mod: CPTII,S$GLB,, | Performed by: PSYCHIATRY & NEUROLOGY

## 2022-04-27 PROCEDURE — 99499 UNLISTED E&M SERVICE: CPT | Mod: S$GLB,,, | Performed by: PSYCHIATRY & NEUROLOGY

## 2022-04-27 PROCEDURE — 3008F PR BODY MASS INDEX (BMI) DOCUMENTED: ICD-10-PCS | Mod: CPTII,S$GLB,, | Performed by: PSYCHIATRY & NEUROLOGY

## 2022-04-27 PROCEDURE — 1126F PR PAIN SEVERITY QUANTIFIED, NO PAIN PRESENT: ICD-10-PCS | Mod: CPTII,S$GLB,, | Performed by: PSYCHIATRY & NEUROLOGY

## 2022-04-27 PROCEDURE — 3078F PR MOST RECENT DIASTOLIC BLOOD PRESSURE < 80 MM HG: ICD-10-PCS | Mod: CPTII,S$GLB,, | Performed by: PSYCHIATRY & NEUROLOGY

## 2022-04-27 PROCEDURE — 3288F FALL RISK ASSESSMENT DOCD: CPT | Mod: CPTII,S$GLB,, | Performed by: PSYCHIATRY & NEUROLOGY

## 2022-04-27 PROCEDURE — 1126F AMNT PAIN NOTED NONE PRSNT: CPT | Mod: CPTII,S$GLB,, | Performed by: PSYCHIATRY & NEUROLOGY

## 2022-04-27 PROCEDURE — 1101F PR PT FALLS ASSESS DOC 0-1 FALLS W/OUT INJ PAST YR: ICD-10-PCS | Mod: CPTII,S$GLB,, | Performed by: PSYCHIATRY & NEUROLOGY

## 2022-04-27 PROCEDURE — 3078F DIAST BP <80 MM HG: CPT | Mod: CPTII,S$GLB,, | Performed by: PSYCHIATRY & NEUROLOGY

## 2022-04-27 PROCEDURE — 99214 OFFICE O/P EST MOD 30 MIN: CPT | Mod: S$GLB,,, | Performed by: PSYCHIATRY & NEUROLOGY

## 2022-04-27 RX ORDER — HYDROCHLOROTHIAZIDE 12.5 MG/1
TABLET ORAL
Status: ON HOLD | COMMUNITY
Start: 2022-04-22 | End: 2022-12-06

## 2022-04-27 NOTE — PROGRESS NOTES
"Date of service:  04/27/2022     Chief complaint:  Seizures    Interval history:  The patient is a 71 y.o. female with a diagnosis of MS who we are seeing for epilepsy.  Since she was last here, her Topamax has been increased to 100 mg BID, and her Klonopin has been decreased to 0.5 mg BID.  She notes no seizures.  She has no new complaints.    History of present illness:  The patient is a 71 y.o. female with a PMH of MS (diagnosed in 2000) we have been asked to see for evaluation of episodes suspicious for seizures and medication management.  The patient reports a longstanding diagnosis of epilepsy and that she has a history of 2 types of seizures.    Seizure type 1: "Petit mal"  These began some time prior to 1971. With respect to aura, the patient reports nothing.  Her seizure is characterized by several jerks in either shoulder.  This component of this spell lasts for approximately several seconds.  Afterwards, she reports no postictal state.  The patient's frequency of events was roughly daily prior to starting AEDs.  She reports that she has not had one of these in years.    Seizure type 2: "Grand mal"  These began in 1971. With respect to warning, the patient would have "a lot of the petite mal" seizures.  Her seizure is characterized by loss of awareness, generalized stiffening, and generalized shaking.  She would have foaming of the mouth, urinary incontinence, and tongue biting.  This component of this spell lasts for approximately 2-3 minutes.  Afterwards, she was fatigued.  The patient's last event of this type was in 1990.  Prior to starting medication, they happened about once a month on average.    She does not give a history of events suspicious for absence seizures.    Regarding medications, the patient was tried on phenobarbital, Dilantin, and Depakote without success.  Tegretol was then given, and she continued to have seizures.  In 1982, Klonopin was added.  She became seizure free on this " regimen, aside from a seizure in 1990 when she tried to wean herself off her AEDs.  The patient's Tegretol was changed to Topamax at some point after 2000 when she became thrombocytopenic while also receiving Tysabri.  They also report that at some point, her Klonopin was increased from 0.5 mg BID to 1 mg BID.  They indicate that there was no clear reason why this was done.  They deny that she was having seizures.  They do report significant issues with fatigue, and they indicate that this prompted Dr. Apodaca to refer her here for revision of her AED regimen.    Potential Epilepsy Risk Factors:   Pregnancy/Labor/Delivery - none  Febrile seizures - none  Head injury  - none  CNS infection - none   Stroke - none  Family Hx of Sz - none    AED Treatments  Present regimen  Topamax 100 mg BID  Klonopin 0.5 mg BID    Prior treatments  Tegretol (discontinued due to thrombocytopenia in the setting of Tysabri administration)  Phenobarital (ineffective)  Dilantin (ineffective)  Depakote (ineffective)    Not tried  acetazolamide (Diamox, AZM)  Amantadine  brivitiracetam (Briviact, BRV)  clobazam (Onfi or Frizium, CLB)  ethosuximide (Zarontin, ESM)  eslicarbazine (Aptiom, ESL)  felbamate (felbatol, FBM)  gabapentin (Neurontin, GPN)  lacosamide (Vimpat, LCS)   lamotrigine (Lamictal, LTG)   levetiracetam (Keppra, LEV)  methsuximide (Celontin, MSM)  oxcarbazepine (Trileptal OXC)  perampanel (Fycompa, FCP)   pregabalin (Lyrica, PGB)  primidone (Mysoline, PRM)  rufinamide (Banzel, RUF)  tiagabine (Gabatril,  TGB)  viagabatrin, (Sabril, VGB)  vagal nerve stimulator (VNS)  valproic acid (Depakote, VPA)  zonisamide (Zonegran, ZNA)  Benzodiazepines  diazepam - rectal (Diastatl)  diazepam - oral (Valium, DZ)  clorazepate (Tranxene, CLZ)  Ativan  Brain Stimulation  Vagal Nerve Stimulation-n/a  DBS- n/a    Past Medical History:   Diagnosis Date    Arthritis     Coronary artery disease     Encounter for blood transfusion     Epilepsy      GERD (gastroesophageal reflux disease)     Headache     Hypertension     MI, old     MS (multiple sclerosis)     Seizures     epilepsy       Past Surgical History:   Procedure Laterality Date    APPENDECTOMY      BACK SURGERY      L5 discectomy    BREAST BIOPSY Right 15 yrs ago    benign    CATARACT EXTRACTION Bilateral     COLONOSCOPY N/A 9/16/2015    Procedure: COLONOSCOPY;  Surgeon: Henry Malcolm MD;  Location: [facility];  Service: Endoscopy;  Laterality: N/A;    CORONARY STENT PLACEMENT      right knee arthroscopy         Family History   Problem Relation Age of Onset    Scleroderma Mother     Heart disease Father         MI, CAD    Cancer Neg Hx     Diabetes Neg Hx     Stroke Neg Hx     Melanoma Neg Hx     Psoriasis Neg Hx     Lupus Neg Hx     Eczema Neg Hx        Social History     Socioeconomic History    Marital status:    Tobacco Use    Smoking status: Former Smoker     Packs/day: 1.50     Quit date: 12/26/2006     Years since quitting: 15.3    Smokeless tobacco: Never Used   Substance and Sexual Activity    Alcohol use: No     Alcohol/week: 0.0 standard drinks    Drug use: No    Sexual activity: Not Currently     Partners: Male     Social Determinants of Health     Financial Resource Strain: Low Risk     Difficulty of Paying Living Expenses: Not hard at all   Food Insecurity: No Food Insecurity    Worried About Running Out of Food in the Last Year: Never true    Ran Out of Food in the Last Year: Never true   Transportation Needs: No Transportation Needs    Lack of Transportation (Medical): No    Lack of Transportation (Non-Medical): No   Physical Activity: Insufficiently Active    Days of Exercise per Week: 2 days    Minutes of Exercise per Session: 40 min   Stress: No Stress Concern Present    Feeling of Stress : Not at all   Social Connections: Moderately Isolated    Frequency of Communication with Friends and Family: More than three times a week     Frequency of Social Gatherings with Friends and Family: Once a week    Attends Alevism Services: Never    Active Member of Clubs or Organizations: No    Attends Club or Organization Meetings: Never    Marital Status:    Housing Stability: Low Risk     Unable to Pay for Housing in the Last Year: No    Number of Places Lived in the Last Year: 1    Unstable Housing in the Last Year: No        Review of patient's allergies indicates:   Allergen Reactions    Codeine      Other reaction(s): throat tightness    Dilantin [phenytoin sodium extended]     Phenobarbital     Tegretol [carbamazepine]         Review of Systems  The patient's ROS is noncontributory except as noted above.    Physical exam:  BP (!) 146/73 (BP Location: Right arm, Patient Position: Sitting, BP Method: Medium (Automatic))   Pulse 67   Resp 18   Wt 81.9 kg (180 lb 10.7 oz)   BMI 32.00 kg/m²   General: Well developed, well nourished.  No acute distress.  ENT: Mucus membranes moist.  Atraumatic external nose and ears.  Lymphatic: No apparent lymphadenopathy.  Cardiovascular: Regular rate and rhythm.  Pulmonary: No increased work of breathing.  Abdomen/GI: No guarding.  Musculoskeletal: No clubbing or cyanosis.    Neurological exam:   Mental status: Awake and alert.  Oriented x4.  Speech fluent and appropriate.  Recent and remote memory appear to be grossly intact.  Fund of knowledge grossly normal.  Cranial nerves: Pupils equal round and reactive to light, extraocular movements intact, facial strength and sensation intact bilaterally, palate and tongue midline, hearing grossly intact bilaterally.  Motor: 5 out of 5 strength throughout the upper and lower extremities bilaterally except for 4-/5 in the left IP. Mildly increased tone.  Atrophy noted in HIs.  Sensation: Decreased to light touch and temperature bilaterally.  DTR: 3+ at the knees and 2+ at the BRs bilaterally.  Coordination: Finger-nose-finger testing notable for a  "bilateral intention tremor.  Gait: Uses walker at baseline.    Data base:  Notes from the referring physician were reviewed.  Briefly summarized, these discuss her MS.  The last note indicates concern that the patient's AED regimen may be contributing to fatigue and cognitive complaints.    MRI brain (4/21):  "1. There is a stable marked burden of white matter disease consistent with the provided diagnosis of multiple sclerosis.  These findings are other extensive.  There are no regions of restricted diffusion and there is no abnormal enhancement.  There are no findings to suggest interval progression of disease or active demyelination."  I independently visualized the images of this study and interpreted them.  There is diffuse atrophy noted involving the cerebrum, cerebellum, and brainstem.  She has a significant burden of white matter disease.  The areas of T2/FLAIR hyperintensity are most pronounced in a periventricular distribution, and some lesions appear to be oriented perpendicular to the ventricles.  While less pronounced, there are some juxtacortical lesion seen.  There is no abnormal contrast enhancement.  This study appears consistent with the patient's diagnosis of longstanding MS, and there is no evidence of active demyelination appreciated.    EEG:  NA    Assessment and plan:  The patient is a 71 y.o. female we have been asked to see for evaluation for events worrisome for seizures.  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  Given that her diagnosis of MS was made at age 50, I am less suspicious that she developed epilepsy secondary to her MS.  As far as medications go, we will continue her Topamax at 100 mg BID and wean her off the Klonopin in the coming weeks.  Medication side effects were discussed with the " patient.  State law as it pertains to driving for individuals with seizures was discussed.  The patient was also counseled on seizure safety. We will plan on seeing the patient back in a few weeks.

## 2022-04-28 ENCOUNTER — CLINICAL SUPPORT (OUTPATIENT)
Dept: REHABILITATION | Facility: HOSPITAL | Age: 72
End: 2022-04-28
Payer: MEDICARE

## 2022-04-28 DIAGNOSIS — R53.81 DEBILITY: Primary | ICD-10-CM

## 2022-04-28 DIAGNOSIS — M25.60 LIMITATION OF JOINT MOTION: ICD-10-CM

## 2022-04-28 PROCEDURE — 97110 THERAPEUTIC EXERCISES: CPT | Mod: PN

## 2022-04-28 PROCEDURE — 97116 GAIT TRAINING THERAPY: CPT | Mod: PN

## 2022-04-28 NOTE — PROGRESS NOTES
Dictation #1  MRN:[id number]  CSN:[id number]  [facility]   Physical Therapy Treatment Note     Name: Alyssa John  Clinic Number: [id number]    Therapy Diagnosis:   Gait instability, generalized weakness.    Physician: Genny Apodaca MD    Visit Date: 4/28/2022    Physician Orders: PT Eval and Treat   Medical Diagnosis from Referral: Multiple sclerosis; Gait disturbance  Evaluation Date: 11/29/2021  Authorization Period Expiration: 12/13/21  Plan of Care Expiration: 4/31/22  Visit # / Visits authorized:  33/36 pending     Time In: 1416  Time Out: 1500  Total Billable Time: 44 minutes    Precautions: Fall, Hx of seizures     SUBJECTIVE     Pt reports she is chey to see MD in 4 days to ask about continuing therapy.  She was compliant with home exercise program.  Response to previous treatment:tolerated Rx  Functional change: able to get up steps on stairs..    Pain:  0/10  Location:  N/A    OBJECTIVE     Retropulsive sustained unsupported sitting balance .    Treatment     Alyssa received the treatments listed below:    therapeutic exercises to develop strength, endurance and ROM for  29 minutes including:  Hamstring stretches   Ankle dorsiflexion 20/20  Short arc quads 20/20  Straight leg rasising 20/20  Bridging 20  Hip Abd/ ADD 20/20  Hamstring curls propelling wheelchair 30 ft  Gait training 15 using RW  emphasis on floor clearance, consistent distance   and erect posture. 160 ft  Step ups on stair climbing ascend and descend 3 steps.  Patient Education and Home Exercises     Home Exercises Provided and Patient Education Provided     Education provided:   Discussed Plan of care dependent on MD per patient...  Written Home Exercises Provided: Patient instructed to cont prior HEP.   Exercises were reviewed and Alyssa was able to demonstrate them prior to the end of the session.  Alyssa demonstrated good  understanding of the education provided. See EMR under Patient Instructions for  exercises provided during therapy sessions    ASSESSMENT     Alyssa displayed adequate floor clearance on step up on steps. Ambulates with left foot drag after 100 ft.  Tolerated Rx fair.     Alyssa is  progressing towards her goals.   Pt prognosis is Fair.     Pt will continue to benefit from skilled outpatient physical therapy to address the deficits listed in the problem list box on initial evaluation, provide pt/family education and to maximize pt's level of independence in the home and community environment.     Pt's spiritual, cultural and educational needs considered and pt agreeable to plan of care and goals.     Anticipated barriers to physical therapy: time schedule    Goals:   To update goals.  1. Patient will ambulate 150 ft distance consistently on RW Partially met)  2. Sit to stand and transfers consistent with modified independence > 5 reps in 30 seconds. (partially met)  3. Both knees on full extension 0 degrees. ( Partially met)  4. FOTO > 55/100 (met 56/100)    PLAN     Await after MD visit for Plan of Care Update.  Will extend 10 more weeks of visit through 7/20/22 if MD plans for more therapy.    Jb Frias, PT

## 2022-05-03 ENCOUNTER — OFFICE VISIT (OUTPATIENT)
Dept: NEUROLOGY | Facility: CLINIC | Age: 72
End: 2022-05-03
Payer: MEDICARE

## 2022-05-03 VITALS
HEART RATE: 70 BPM | BODY MASS INDEX: 32 KG/M2 | SYSTOLIC BLOOD PRESSURE: 148 MMHG | HEIGHT: 63 IN | DIASTOLIC BLOOD PRESSURE: 75 MMHG

## 2022-05-03 DIAGNOSIS — R26.9 GAIT DISTURBANCE: ICD-10-CM

## 2022-05-03 DIAGNOSIS — F32.A ANXIETY AND DEPRESSION: ICD-10-CM

## 2022-05-03 DIAGNOSIS — Z71.89 COUNSELING REGARDING GOALS OF CARE: ICD-10-CM

## 2022-05-03 DIAGNOSIS — G35 MS (MULTIPLE SCLEROSIS): ICD-10-CM

## 2022-05-03 DIAGNOSIS — R25.1 TREMOR: Primary | ICD-10-CM

## 2022-05-03 DIAGNOSIS — F41.9 ANXIETY AND DEPRESSION: ICD-10-CM

## 2022-05-03 PROCEDURE — 1101F PR PT FALLS ASSESS DOC 0-1 FALLS W/OUT INJ PAST YR: ICD-10-PCS | Mod: CPTII,S$GLB,, | Performed by: PSYCHIATRY & NEUROLOGY

## 2022-05-03 PROCEDURE — 3078F PR MOST RECENT DIASTOLIC BLOOD PRESSURE < 80 MM HG: ICD-10-PCS | Mod: CPTII,S$GLB,, | Performed by: PSYCHIATRY & NEUROLOGY

## 2022-05-03 PROCEDURE — 99999 PR PBB SHADOW E&M-EST. PATIENT-LVL III: ICD-10-PCS | Mod: PBBFAC,,, | Performed by: PSYCHIATRY & NEUROLOGY

## 2022-05-03 PROCEDURE — 1126F AMNT PAIN NOTED NONE PRSNT: CPT | Mod: CPTII,S$GLB,, | Performed by: PSYCHIATRY & NEUROLOGY

## 2022-05-03 PROCEDURE — 3288F FALL RISK ASSESSMENT DOCD: CPT | Mod: CPTII,S$GLB,, | Performed by: PSYCHIATRY & NEUROLOGY

## 2022-05-03 PROCEDURE — 99215 OFFICE O/P EST HI 40 MIN: CPT | Mod: S$GLB,,, | Performed by: PSYCHIATRY & NEUROLOGY

## 2022-05-03 PROCEDURE — 99215 PR OFFICE/OUTPT VISIT, EST, LEVL V, 40-54 MIN: ICD-10-PCS | Mod: S$GLB,,, | Performed by: PSYCHIATRY & NEUROLOGY

## 2022-05-03 PROCEDURE — 3008F PR BODY MASS INDEX (BMI) DOCUMENTED: ICD-10-PCS | Mod: CPTII,S$GLB,, | Performed by: PSYCHIATRY & NEUROLOGY

## 2022-05-03 PROCEDURE — 1160F PR REVIEW ALL MEDS BY PRESCRIBER/CLIN PHARMACIST DOCUMENTED: ICD-10-PCS | Mod: CPTII,S$GLB,, | Performed by: PSYCHIATRY & NEUROLOGY

## 2022-05-03 PROCEDURE — 3008F BODY MASS INDEX DOCD: CPT | Mod: CPTII,S$GLB,, | Performed by: PSYCHIATRY & NEUROLOGY

## 2022-05-03 PROCEDURE — 3077F SYST BP >= 140 MM HG: CPT | Mod: CPTII,S$GLB,, | Performed by: PSYCHIATRY & NEUROLOGY

## 2022-05-03 PROCEDURE — 3288F PR FALLS RISK ASSESSMENT DOCUMENTED: ICD-10-PCS | Mod: CPTII,S$GLB,, | Performed by: PSYCHIATRY & NEUROLOGY

## 2022-05-03 PROCEDURE — 99499 RISK ADDL DX/OHS AUDIT: ICD-10-PCS | Mod: S$GLB,,, | Performed by: PSYCHIATRY & NEUROLOGY

## 2022-05-03 PROCEDURE — 1101F PT FALLS ASSESS-DOCD LE1/YR: CPT | Mod: CPTII,S$GLB,, | Performed by: PSYCHIATRY & NEUROLOGY

## 2022-05-03 PROCEDURE — 99499 UNLISTED E&M SERVICE: CPT | Mod: S$GLB,,, | Performed by: PSYCHIATRY & NEUROLOGY

## 2022-05-03 PROCEDURE — 1126F PR PAIN SEVERITY QUANTIFIED, NO PAIN PRESENT: ICD-10-PCS | Mod: CPTII,S$GLB,, | Performed by: PSYCHIATRY & NEUROLOGY

## 2022-05-03 PROCEDURE — 1160F RVW MEDS BY RX/DR IN RCRD: CPT | Mod: CPTII,S$GLB,, | Performed by: PSYCHIATRY & NEUROLOGY

## 2022-05-03 PROCEDURE — 3078F DIAST BP <80 MM HG: CPT | Mod: CPTII,S$GLB,, | Performed by: PSYCHIATRY & NEUROLOGY

## 2022-05-03 PROCEDURE — 3077F PR MOST RECENT SYSTOLIC BLOOD PRESSURE >= 140 MM HG: ICD-10-PCS | Mod: CPTII,S$GLB,, | Performed by: PSYCHIATRY & NEUROLOGY

## 2022-05-03 PROCEDURE — 1159F PR MEDICATION LIST DOCUMENTED IN MEDICAL RECORD: ICD-10-PCS | Mod: CPTII,S$GLB,, | Performed by: PSYCHIATRY & NEUROLOGY

## 2022-05-03 PROCEDURE — 99999 PR PBB SHADOW E&M-EST. PATIENT-LVL III: CPT | Mod: PBBFAC,,, | Performed by: PSYCHIATRY & NEUROLOGY

## 2022-05-03 PROCEDURE — 1159F MED LIST DOCD IN RCRD: CPT | Mod: CPTII,S$GLB,, | Performed by: PSYCHIATRY & NEUROLOGY

## 2022-05-03 RX ORDER — PROPRANOLOL HYDROCHLORIDE 60 MG/1
60 CAPSULE, EXTENDED RELEASE ORAL DAILY
Qty: 30 CAPSULE | Refills: 11 | Status: SHIPPED | OUTPATIENT
Start: 2022-05-03 | End: 2022-08-05

## 2022-05-03 NOTE — PROGRESS NOTES
"Subjective:          Patient ID: Alyssa John is a 71 y.o. female who presents today for a routine clinic visit for MS.  She comes in with her     MS HPI:  · DMT: glatiramer acetate  · Side effects from DMT?   · Taking vitamin D3 as recommended?   · She took an injection of GA last Wednesday in her abdomen and immediate experienced excruciating back pain; she states she will no longer take it.   Also having IS pain and skin reactions in general.    · She has seen Dr. Trimble and klonopin is being lowered and topamax is being added.    · Her spouse feels her walking is improved; recently finished PT   · She reports that the trip here today is likely going "to exhaust her"  · Tremor is worse on the right and affecting QOL.  Weights dont help    Medications:  Current Outpatient Medications   Medication Sig    albuterol (PROAIR HFA) 90 mcg/actuation inhaler Inhale 2 puffs into the lungs every 6 (six) hours as needed for Wheezing. Rescue    atorvastatin (LIPITOR) 40 MG tablet TAKE 1 TABLET BY MOUTH EVERY DAY    cholecalciferol, vitamin D3, 125 mcg (5,000 unit) Tab Take 5,000 Units by mouth once daily.    clonazePAM (KLONOPIN) 1 MG tablet Take 0.5 mg PO in the AM and 1 mg PO in the PM (Patient taking differently: Take 0.5 mg PO in the AM and 0.5 mg PO in the PM)    clopidogrel (PLAVIX) 75 mg tablet Take 75 mg by mouth once daily.    DULoxetine (CYMBALTA) 20 MG capsule TAKE 2 CAPSULES BY MOUTH ONCE DAILY (Patient taking differently: TAKE 1 CAPSULES BY MOUTH ONCE DAILY)    estradioL (ESTRACE) 0.01 % (0.1 mg/gram) vaginal cream Place 0.5 g vaginally every evening. Use nightly for two weeks then twice weekly for maintenance    famotidine (PEPCID) 20 MG tablet Take 20 mg by mouth 2 (two) times daily as needed for Heartburn.    GLATOPA 40 mg/mL injection Inject 40 mg into the skin 3 (three) times a week.    hydroCHLOROthiazide (HYDRODIURIL) 12.5 MG Tab     nitroGLYCERIN (NITROSTAT) 0.4 MG SL tablet Place " 0.4 mg under the tongue every 5 (five) minutes as needed for Chest pain.    prednisoLONE acetate (PRED FORTE) 1 % DrpS INSTILL 1 DROP THREE TIMES A DAY INTO BOTH EYES    PSYLLIUM SEED, WITH DEXTROSE, (FIBER ORAL) Take by mouth 2 (two) times daily.    topiramate (TOPAMAX) 100 MG tablet Take 50 mg in the AM and 100 mg in the PM for 2 weeks, then increase to 100 mg twice per day (Patient taking differently: Take 100 mg by mouth 2 (two) times daily. Take 50 mg in the AM and 100 mg in the PM for 2 weeks, then increase to 100 mg twice per day)     No current facility-administered medications for this visit.       SOCIAL HISTORY  Social History     Tobacco Use    Smoking status: Former Smoker     Packs/day: 1.50     Quit date: 12/26/2006     Years since quitting: 15.4    Smokeless tobacco: Never Used   Substance Use Topics    Alcohol use: No     Alcohol/week: 0.0 standard drinks    Drug use: No       Living arrangements - the patient lives with their spouse.      REVIEW OF SYMPTOMS 5/3/2022   Do you feel abnormally tired on most days? Yes   Do you feel you generally sleep well? Yes   Do you have difficulty controlling your bladder?  No   Do you have difficulty controlling your bowels?  No   Do you have frequent muscle cramps, tightness or spasms in your limbs?  No   Do you have new visual symptoms?  No   Do you have worsening difficulty with your memory or thinking? Yes   Do you have worsening symptoms of anxiety or depression?  Yes   For patients who walk, Do you have more difficulty walking?  No   Have you fallen since your last visit?  No   For patients who use wheelchairs: Do you have any skin wounds or breakdown? Yes   Do you have difficulty using your hands?  Yes   Do you have shooting or burning pain? Yes   Do you have difficulty with sexual function?  Yes   If you are sexually active, are you using birth control? Y/N  N/A Not Applicable   Do you often choke when swallowing liquids or solid food?  No   Do you  experience worsening symptoms when overheated? No   Do you need any new equipment such as a wheelchair, walker or shower chair? Yes   Do you receive co-pay financial assistance for your principal MS medicine? Yes   Would you be interested in participating in an MS research trial in the future? Yes   For patients on Gilenya, Tecfidera, Aubagio, Rituxan, Ocrevus, Tysabri, Lemtrada or Methotrexate, are you aware that you should NOT receive live virus vaccines?  No   Do you feel you have adequate family/friend support?  Yes   Do you have health insurance?   Yes   Are you currently employed? No   Do you receive SSDI/SSI?  Not Applicable   Do you use marijuana or cannabis products? No   Have you been diagnosed with a urinary tract infection since your last visit here? No   Have you been diagnosed with a respiratory tract infection since your last visit here? No   Have you been to the emergency room since your last visit here? No   Have you been hospitalized since your last visit here?  No                Objective:          Timed 25 Foot Walk: 11/1/2021 5/3/2022   Did patient wear an AFO? No No   Was assistive device used? Yes Yes   Assistive device used (toña one): Bilateral Assistance Bilateral Assistance   Unilateral device used - -   Bilateral device used Walker/Rollator Walker/Rollator   Time for 25 Foot Walk (seconds) 17.3 12.84   Time for 25 Foot Walk (seconds) - -       Neurologic Exam  MENTAL STATUS: language is fluent, normal verbal comprehension, short-term and remote memory is intact, attention is normal, patient is alert and oriented x 3     CRANIAL NERVE EXAM:  There is no JOSE LUIS.  Extraocular muscles are intact. Pupils are equal, round, and reactive to light. No facial asymmetry. Facial sensation is intact bilaterally. There is no dysarthria.      MOTOR EXAM:  LLE 4-/5 in flexors;      SENSORY EXAM: moderate decrease in vibration     COORDINATION: kinetic tremor R>L     GAIT: walker; Left leg drags; unsteady           Imaging:       Results for orders placed during the hospital encounter of 04/10/21    MRI Brain Demyelinating W W/O Contrast    Impression  1. There is a stable marked burden of white matter disease consistent with the provided diagnosis of multiple sclerosis.  These findings are other extensive.  There are no regions of restricted diffusion and there is no abnormal enhancement.  There are no findings to suggest interval progression of disease or active demyelination.      Electronically signed by: Kingsley Bass MD  Date:    04/11/2021  Time:    20:22    Results for orders placed during the hospital encounter of 04/10/21    MRI Cervical Spine Demyelinating W W/O Contrast    Impression  1. There are multiple regions of abnormal STIR and T2 hyperintense signal in the cord which, considering slight difference in technique, slice selection and volume averaging as well as mild motion are essentially stable compared to the prior study.  The findings are felt to represent stable demyelinating disease without any definite new lesions identified.  2. There is multilevel degenerative disc and facet disease discussed in detail by level above.  There is some degree of disc space narrowing, disc osteophyte complex, uncovertebral spurring and/or facet joint arthropathy at multiple levels.  There is mild spinal stenosis at several levels.  There is multilevel foraminal stenosis which for the most part are stable compared to the prior study with the exception of some interval progression of right foraminal stenosis at the C4-5 level.  3. There is no abnormal enhancement within the vertebral bodies, discs, cord or epidural space.      Electronically signed by: Kingsley Bass MD  Date:    04/11/2021  Time:    21:09    Results for orders placed during the hospital encounter of 12/11/18    MRI Thoracic Spine Demyelinating W W/O Contrast    Impression  Multiple unchanged foci of T2 hyperintense signal throughout the spinal  cord extending from the cranial cervical junction throughout the thoracic spinal cord.  Lesion at T9-10 was not included in the field of view of prior imaging; however, all other lesions are unchanged from 08/02/2018.  No enhancing lesions to suggest active demyelination.    Mild multilevel disc/endplate degeneration, with mild spinal canal stenoses in the cervical spine and severe foraminal stenosis on the left at C4-5 and on the right at C5-6.    Electronically signed by resident: Baldo Krause  Date:    12/12/2018  Time:    09:12    Electronically signed by: Moo Michele MD  Date:    12/12/2018  Time:    15:24        Labs:     Lab Results   Component Value Date    ISHJASZX72YG 35 08/26/2020    PSJECHAZ71PJ 97 (H) 08/20/2019    BWMHKOSQ59WK 68 07/19/2018     Lab Results   Component Value Date    JCVINDEX SEE COMMENT (A) 12/09/2015    JCVANTIBODY SEE COMMENT 12/09/2015     Lab Results   Component Value Date    NE2OGGAF 73.7 03/30/2021    ABSOLUTECD3 1099 03/30/2021    IA2MUJRB 23.7 03/30/2021    ABSOLUTECD8 353 03/30/2021    JO8COUNB 48.9 03/30/2021    ABSOLUTECD4 729 03/30/2021    LABCD48 2.06 03/30/2021     Lab Results   Component Value Date    WBC 6.90 11/17/2021    RBC 4.85 11/17/2021    HGB 13.0 11/17/2021    HCT 42.1 11/17/2021    MCV 87 11/17/2021    MCH 26.8 (L) 11/17/2021    MCHC 30.9 (L) 11/17/2021    RDW 14.1 11/17/2021     11/17/2021    MPV 11.5 11/17/2021    GRAN 4.7 11/17/2021    GRAN 68.3 11/17/2021    LYMPH 1.5 11/17/2021    LYMPH 21.0 11/17/2021    MONO 0.4 11/17/2021    MONO 6.2 11/17/2021    EOS 0.2 11/17/2021    BASO 0.05 11/17/2021    EOSINOPHIL 3.5 11/17/2021    BASOPHIL 0.7 11/17/2021     Sodium   Date Value Ref Range Status   11/17/2021 140 136 - 145 mmol/L Final     Potassium   Date Value Ref Range Status   11/17/2021 3.6 3.5 - 5.1 mmol/L Final     Chloride   Date Value Ref Range Status   11/17/2021 111 (H) 95 - 110 mmol/L Final     CO2   Date Value Ref Range Status   11/17/2021 21  (L) 23 - 29 mmol/L Final     Carbon Monoxide, Blood   Date Value Ref Range Status   10/19/2016 2 % Final     Comment:     -------------------REFERENCE VALUE--------------------------  0-2 Normal (Non-smoker) , < or = 9 Normal (Smoker), > or   = 20 (Toxic concentration)  Test Performed by:  [place] [address]  : Adrien Norton II, M.D., Ph.D.       Glucose   Date Value Ref Range Status   11/17/2021 87 70 - 110 mg/dL Final     BUN   Date Value Ref Range Status   11/17/2021 17 8 - 23 mg/dL Final     Creatinine   Date Value Ref Range Status   11/17/2021 0.8 0.5 - 1.4 mg/dL Final   02/22/2013 0.8 0.5 - 1.4 mg/dL Final     Calcium   Date Value Ref Range Status   11/17/2021 8.7 8.7 - 10.5 mg/dL Final   02/22/2013 8.7 8.7 - 10.5 mg/dL Final     Total Protein   Date Value Ref Range Status   11/17/2021 6.7 6.0 - 8.4 g/dL Final     Albumin   Date Value Ref Range Status   11/17/2021 3.6 3.5 - 5.2 g/dL Final     Total Bilirubin   Date Value Ref Range Status   11/17/2021 0.6 0.1 - 1.0 mg/dL Final     Comment:     For infants and newborns, interpretation of results should be based  on gestational age, weight and in agreement with clinical  observations.    Premature Infant recommended reference ranges:  Up to 24 hours.............<8.0 mg/dL  Up to 48 hours............<12.0 mg/dL  3-5 days..................<15.0 mg/dL  6-29 days.................<15.0 mg/dL       Alkaline Phosphatase   Date Value Ref Range Status   11/17/2021 140 (H) 55 - 135 U/L Final   02/22/2013 107 55 - 135 U/L Final     AST   Date Value Ref Range Status   11/17/2021 16 10 - 40 U/L Final   02/22/2013 18 10 - 40 U/L Final     ALT   Date Value Ref Range Status   11/17/2021 20 10 - 44 U/L Final     Anion Gap   Date Value Ref Range Status   11/17/2021 8 8 - 16 mmol/L Final   02/22/2013 11 5 - 15 meq/L Final     eGFR if    Date Value Ref Range Status    11/17/2021 >60.0 >60 mL/min/1.73 m^2 Final     eGFR if non    Date Value Ref Range Status   11/17/2021 >60.0 >60 mL/min/1.73 m^2 Final     Comment:     Calculation used to obtain the estimated glomerular filtration  rate (eGFR) is the CKD-EPI equation.        No results found for: HEPBSAG, HEPBSAB, HEPBCAB        MS Impression and Plan:     NEURO MULTIPLE SCLEROSIS IMPRESSION:   MS Status:     Number of relapses in the past year?:  0    Clinical Progression:  Improved    MRI Progression:  N/A  Plan:     DMT:  Other    DMT comment:  Will hold DMT for now;  Give her age and prior difficulty with immunosuppression, and now intolerance to GA and history of depression (unlikely to tolerate interferon), will focus on rehab / PT for now and watch her closely    Symptom Management:  Implement change in symptom management    Implement Change in Symptom Management:  Tremors (start propranolol; side effects discussed; )     Importance of regular exercise every day emphasized;   Suspect fatigue will improve as klonopin lowered;  Tremor may be getting worse b/c of this change however so propranolol added;         F/u S[facility]  in 3mo      Problem List Items Addressed This Visit        Unprioritized    Anxiety and depression    Gait disturbance    MS (multiple sclerosis)    Relevant Medications    propranoloL (INDERAL LA) 60 MG 24 hr capsule    Tremor - Primary    Relevant Medications    propranoloL (INDERAL LA) 60 MG 24 hr capsule      Other Visit Diagnoses     Counseling regarding goals of care              Genny Apodaca MD    I spent a total of 40 minutes on the day of the visit.This includes face to face time and non-face to face time preparing to see the patient (eg, review of tests), obtaining and/or reviewing separately obtained history, documenting clinical information in the electronic or other health record, independently interpreting results and communicating results to the  patient/family/caregiver, or care coordinator.

## 2022-06-24 ENCOUNTER — PATIENT MESSAGE (OUTPATIENT)
Dept: PSYCHIATRY | Facility: CLINIC | Age: 72
End: 2022-06-24
Payer: MEDICARE

## 2022-06-26 ENCOUNTER — PATIENT MESSAGE (OUTPATIENT)
Dept: NEUROLOGY | Facility: CLINIC | Age: 72
End: 2022-06-26
Payer: MEDICARE

## 2022-06-27 ENCOUNTER — PATIENT MESSAGE (OUTPATIENT)
Dept: NEUROLOGY | Facility: CLINIC | Age: 72
End: 2022-06-27
Payer: MEDICARE

## 2022-06-28 ENCOUNTER — PATIENT MESSAGE (OUTPATIENT)
Dept: NEUROLOGY | Facility: CLINIC | Age: 72
End: 2022-06-28
Payer: MEDICARE

## 2022-06-29 ENCOUNTER — TELEPHONE (OUTPATIENT)
Dept: NEUROLOGY | Facility: CLINIC | Age: 72
End: 2022-06-29
Payer: MEDICARE

## 2022-07-05 ENCOUNTER — OFFICE VISIT (OUTPATIENT)
Dept: FAMILY MEDICINE | Facility: CLINIC | Age: 72
End: 2022-07-05
Payer: MEDICARE

## 2022-07-05 VITALS
DIASTOLIC BLOOD PRESSURE: 68 MMHG | SYSTOLIC BLOOD PRESSURE: 126 MMHG | HEIGHT: 63 IN | OXYGEN SATURATION: 95 % | BODY MASS INDEX: 32 KG/M2 | HEART RATE: 58 BPM | TEMPERATURE: 98 F

## 2022-07-05 DIAGNOSIS — B02.9 HERPES ZOSTER WITHOUT COMPLICATION: Primary | ICD-10-CM

## 2022-07-05 PROCEDURE — 3074F PR MOST RECENT SYSTOLIC BLOOD PRESSURE < 130 MM HG: ICD-10-PCS | Mod: CPTII,S$GLB,, | Performed by: FAMILY MEDICINE

## 2022-07-05 PROCEDURE — 1160F PR REVIEW ALL MEDS BY PRESCRIBER/CLIN PHARMACIST DOCUMENTED: ICD-10-PCS | Mod: CPTII,S$GLB,, | Performed by: FAMILY MEDICINE

## 2022-07-05 PROCEDURE — 3008F PR BODY MASS INDEX (BMI) DOCUMENTED: ICD-10-PCS | Mod: CPTII,S$GLB,, | Performed by: FAMILY MEDICINE

## 2022-07-05 PROCEDURE — 1101F PT FALLS ASSESS-DOCD LE1/YR: CPT | Mod: CPTII,S$GLB,, | Performed by: FAMILY MEDICINE

## 2022-07-05 PROCEDURE — 99999 PR PBB SHADOW E&M-EST. PATIENT-LVL III: ICD-10-PCS | Mod: PBBFAC,,, | Performed by: FAMILY MEDICINE

## 2022-07-05 PROCEDURE — 3288F PR FALLS RISK ASSESSMENT DOCUMENTED: ICD-10-PCS | Mod: CPTII,S$GLB,, | Performed by: FAMILY MEDICINE

## 2022-07-05 PROCEDURE — 1159F PR MEDICATION LIST DOCUMENTED IN MEDICAL RECORD: ICD-10-PCS | Mod: CPTII,S$GLB,, | Performed by: FAMILY MEDICINE

## 2022-07-05 PROCEDURE — 1101F PR PT FALLS ASSESS DOC 0-1 FALLS W/OUT INJ PAST YR: ICD-10-PCS | Mod: CPTII,S$GLB,, | Performed by: FAMILY MEDICINE

## 2022-07-05 PROCEDURE — 99213 PR OFFICE/OUTPT VISIT, EST, LEVL III, 20-29 MIN: ICD-10-PCS | Mod: S$GLB,,, | Performed by: FAMILY MEDICINE

## 2022-07-05 PROCEDURE — 3078F DIAST BP <80 MM HG: CPT | Mod: CPTII,S$GLB,, | Performed by: FAMILY MEDICINE

## 2022-07-05 PROCEDURE — 3008F BODY MASS INDEX DOCD: CPT | Mod: CPTII,S$GLB,, | Performed by: FAMILY MEDICINE

## 2022-07-05 PROCEDURE — 1160F RVW MEDS BY RX/DR IN RCRD: CPT | Mod: CPTII,S$GLB,, | Performed by: FAMILY MEDICINE

## 2022-07-05 PROCEDURE — 3078F PR MOST RECENT DIASTOLIC BLOOD PRESSURE < 80 MM HG: ICD-10-PCS | Mod: CPTII,S$GLB,, | Performed by: FAMILY MEDICINE

## 2022-07-05 PROCEDURE — 3288F FALL RISK ASSESSMENT DOCD: CPT | Mod: CPTII,S$GLB,, | Performed by: FAMILY MEDICINE

## 2022-07-05 PROCEDURE — 99999 PR PBB SHADOW E&M-EST. PATIENT-LVL III: CPT | Mod: PBBFAC,,, | Performed by: FAMILY MEDICINE

## 2022-07-05 PROCEDURE — 99213 OFFICE O/P EST LOW 20 MIN: CPT | Mod: S$GLB,,, | Performed by: FAMILY MEDICINE

## 2022-07-05 PROCEDURE — 1159F MED LIST DOCD IN RCRD: CPT | Mod: CPTII,S$GLB,, | Performed by: FAMILY MEDICINE

## 2022-07-05 PROCEDURE — 3074F SYST BP LT 130 MM HG: CPT | Mod: CPTII,S$GLB,, | Performed by: FAMILY MEDICINE

## 2022-07-05 RX ORDER — METHYLPREDNISOLONE 4 MG/1
TABLET ORAL
Qty: 1 EACH | Refills: 0 | Status: SHIPPED | OUTPATIENT
Start: 2022-07-05 | End: 2022-08-05

## 2022-07-05 RX ORDER — VALACYCLOVIR HYDROCHLORIDE 1 G/1
1000 TABLET, FILM COATED ORAL 3 TIMES DAILY
Qty: 21 TABLET | Refills: 0 | Status: SHIPPED | OUTPATIENT
Start: 2022-07-05 | End: 2022-10-28

## 2022-07-05 NOTE — PROGRESS NOTES
Subjective:       Patient ID: Alyssa John is a 71 y.o. female.    Chief Complaint: No chief complaint on file.    New to me patient here for UC visit.    Onset 2 days ago of pain in right flank area.  Rash erupted overnight/this AM.  Dull ache; moderate severity.      Review of Systems   Constitutional: Negative for fever.   Respiratory: Negative for shortness of breath.    Cardiovascular: Negative for chest pain.   Gastrointestinal: Negative for abdominal pain and nausea.   Skin: Positive for rash.   All other systems reviewed and are negative.      Objective:      Physical Exam  Constitutional:       General: She is not in acute distress.     Appearance: She is well-developed.   Cardiovascular:      Rate and Rhythm: Normal rate and regular rhythm.      Heart sounds: No murmur heard.  Pulmonary:      Effort: Pulmonary effort is normal.      Breath sounds: Normal breath sounds.   Skin:     General: Skin is warm and dry.             Comments: Typical zosteriform eruption noted as diagrammed.           Assessment:       1. Herpes zoster without complication        Plan:       Herpes zoster without complication  -     valACYclovir (VALTREX) 1000 MG tablet; Take 1 tablet (1,000 mg total) by mouth 3 (three) times daily. for 7 days  Dispense: 21 tablet; Refill: 0  -     methylPREDNISolone (MEDROL DOSEPACK) 4 mg tablet; use as directed  Dispense: 1 each; Refill: 0

## 2022-07-06 ENCOUNTER — PATIENT MESSAGE (OUTPATIENT)
Dept: NEUROLOGY | Facility: CLINIC | Age: 72
End: 2022-07-06
Payer: MEDICARE

## 2022-07-06 ENCOUNTER — DOCUMENTATION ONLY (OUTPATIENT)
Dept: NEUROLOGY | Facility: CLINIC | Age: 72
End: 2022-07-06
Payer: MEDICARE

## 2022-07-06 DIAGNOSIS — G40.909 SEIZURE DISORDER: Primary | ICD-10-CM

## 2022-07-06 NOTE — TELEPHONE ENCOUNTER
Please tell [facility] that if they aren't going to make this convenient for us to do, we'll just do our own ambulatories.  We've got a recent clinic note and phone notes documenting the decision making process.  I've put in the relevant order to do it internally.

## 2022-07-08 ENCOUNTER — PATIENT MESSAGE (OUTPATIENT)
Dept: FAMILY MEDICINE | Facility: CLINIC | Age: 72
End: 2022-07-08
Payer: MEDICARE

## 2022-07-08 DIAGNOSIS — B02.8 HERPES ZOSTER WITH COMPLICATION: Primary | ICD-10-CM

## 2022-07-08 RX ORDER — GABAPENTIN 300 MG/1
300 CAPSULE ORAL 3 TIMES DAILY
Qty: 90 CAPSULE | Refills: 0 | Status: SHIPPED | OUTPATIENT
Start: 2022-07-08 | End: 2023-02-27

## 2022-07-08 NOTE — TELEPHONE ENCOUNTER
Patient was seen by Dr. Pimentel on 07/05, and diagnosed with Shingles. Patient declined pain medication at the time, but is now requesting something to help with the pain. Dr. Pimentel will be out of the clinic until 07/12. Please advise.

## 2022-08-05 ENCOUNTER — OFFICE VISIT (OUTPATIENT)
Dept: NEUROLOGY | Facility: CLINIC | Age: 72
End: 2022-08-05
Payer: MEDICARE

## 2022-08-05 ENCOUNTER — PATIENT MESSAGE (OUTPATIENT)
Dept: NEUROLOGY | Facility: CLINIC | Age: 72
End: 2022-08-05

## 2022-08-05 DIAGNOSIS — R26.9 GAIT DISTURBANCE: ICD-10-CM

## 2022-08-05 DIAGNOSIS — R25.1 TREMOR: ICD-10-CM

## 2022-08-05 DIAGNOSIS — Z71.89 COUNSELING REGARDING GOALS OF CARE: ICD-10-CM

## 2022-08-05 DIAGNOSIS — G35 MULTIPLE SCLEROSIS: Primary | ICD-10-CM

## 2022-08-05 PROCEDURE — 1159F MED LIST DOCD IN RCRD: CPT | Mod: CPTII,95,, | Performed by: CLINICAL NURSE SPECIALIST

## 2022-08-05 PROCEDURE — 99214 PR OFFICE/OUTPT VISIT, EST, LEVL IV, 30-39 MIN: ICD-10-PCS | Mod: 95,,, | Performed by: CLINICAL NURSE SPECIALIST

## 2022-08-05 PROCEDURE — 99214 OFFICE O/P EST MOD 30 MIN: CPT | Mod: 95,,, | Performed by: CLINICAL NURSE SPECIALIST

## 2022-08-05 PROCEDURE — 1159F PR MEDICATION LIST DOCUMENTED IN MEDICAL RECORD: ICD-10-PCS | Mod: CPTII,95,, | Performed by: CLINICAL NURSE SPECIALIST

## 2022-08-05 RX ORDER — PROPRANOLOL HYDROCHLORIDE 80 MG/1
80 CAPSULE, EXTENDED RELEASE ORAL DAILY
Qty: 30 CAPSULE | Refills: 2 | Status: SHIPPED | OUTPATIENT
Start: 2022-08-05 | End: 2022-11-14 | Stop reason: SDUPTHER

## 2022-08-05 NOTE — PROGRESS NOTES
Subjective:          Patient ID: Alyssa John is a 71 y.o. female who presents today for a routine virtual visit for MS.  She was last seen by Dr. Apodaca in May 2022. The history has been provided by the patient.     The patient location is: her home   The chief complaint leading to consultation is: MS     Visit type: audiovisual    Face to Face time with patient: 25 minutes   35 minutes of total time spent on the encounter, which includes face to face time and non-face to face time preparing to see the patient (eg, review of tests), Obtaining and/or reviewing separately obtained history, Documenting clinical information in the electronic or other health record, Independently interpreting results (not separately reported) and communicating results to the patient/family/caregiver, or Care coordination (not separately reported).       Each patient to whom he or she provides medical services by telemedicine is:  (1) informed of the relationship between the physician and patient and the respective role of any other health care provider with respect to management of the patient; and (2) notified that he or she may decline to receive medical services by telemedicine and may withdraw from such care at any time.    MS HPI:  · DMT: None  · Taking vitamin D3 as recommended? Yes -  Dose: 5000 units M-F   She tried propranolol, and this was not very helpful for tremor. She is open to increasing the dose. If she holds her right hand in a certain way, she experiences more of a tremor.   She has been doing very well overall for the past few months.   She has been walking with a cane around the house with assistance. She mostly uses her walker though. Her  padded the handles on the walker, which makes it easier for her to .   She has stopped physical therapy completely, and she thinks that her PT would be amazed at how well she is doing.   She had shingles in July and is just getting over it. Her PCP has been  "managing this, and she does plan to get the shingles vaccine soon. She is taking gabapentin for pain. She denies any other infections.   She denies any falls lately.   Dr. Trimble has tapered clonazepam down to 0.5mg once daily. When she had 3 days of no medication, she felt like she might have a seizure, so she is back to 1/2 tab lately.   Bowels and bladder have been normal.   She denies any muscle spasms.   She has great long-term memory, but she feels a difference in short-term memory.   Her mood has been really good.   Her fine motor coordination is "rotten." She has arthritis and tremor. She has adapted to eating with this challenge.   She avoids the heat.   She denies any equipment needs.   She has not had COVID.     Medications:  Current Outpatient Medications   Medication Sig    albuterol (PROAIR HFA) 90 mcg/actuation inhaler Inhale 2 puffs into the lungs every 6 (six) hours as needed for Wheezing. Rescue    atorvastatin (LIPITOR) 40 MG tablet TAKE 1 TABLET BY MOUTH EVERY DAY    cholecalciferol, vitamin D3, 125 mcg (5,000 unit) Tab Take 5,000 Units by mouth once daily.    clonazePAM (KLONOPIN) 1 MG tablet Take 0.5 mg PO in the AM and 1 mg PO in the PM (Patient taking differently: Take 0.5 mg PO in the AM and 0.5 mg PO in the PM)    clopidogrel (PLAVIX) 75 mg tablet Take 75 mg by mouth once daily.    DULoxetine (CYMBALTA) 20 MG capsule TAKE 2 CAPSULES BY MOUTH ONCE DAILY (Patient taking differently: TAKE 1 CAPSULES BY MOUTH ONCE DAILY)    estradioL (ESTRACE) 0.01 % (0.1 mg/gram) vaginal cream Place 0.5 g vaginally every evening. Use nightly for two weeks then twice weekly for maintenance    famotidine (PEPCID) 20 MG tablet Take 20 mg by mouth 2 (two) times daily as needed for Heartburn.    gabapentin (NEURONTIN) 300 MG capsule Take 1 capsule (300 mg total) by mouth 3 (three) times daily.    hydroCHLOROthiazide (HYDRODIURIL) 12.5 MG Tab     methylPREDNISolone (MEDROL DOSEPACK) 4 mg tablet use as " directed    nitroGLYCERIN (NITROSTAT) 0.4 MG SL tablet Place 0.4 mg under the tongue every 5 (five) minutes as needed for Chest pain.    prednisoLONE acetate (PRED FORTE) 1 % DrpS INSTILL 1 DROP THREE TIMES A DAY INTO BOTH EYES    propranoloL (INDERAL LA) 60 MG 24 hr capsule Take 1 capsule (60 mg total) by mouth once daily.    PSYLLIUM SEED, WITH DEXTROSE, (FIBER ORAL) Take by mouth 2 (two) times daily.    topiramate (TOPAMAX) 100 MG tablet Take 50 mg in the AM and 100 mg in the PM for 2 weeks, then increase to 100 mg twice per day (Patient taking differently: Take 100 mg by mouth 2 (two) times daily. Take 50 mg in the AM and 100 mg in the PM for 2 weeks, then increase to 100 mg twice per day)    valACYclovir (VALTREX) 1000 MG tablet Take 1 tablet (1,000 mg total) by mouth 3 (three) times daily. for 7 days     SOCIAL HISTORY  Social History     Tobacco Use    Smoking status: Former Smoker     Packs/day: 1.50     Quit date: 12/26/2006     Years since quitting: 15.6    Smokeless tobacco: Never Used   Substance Use Topics    Alcohol use: No     Alcohol/week: 0.0 standard drinks    Drug use: No       Living arrangements - the patient lives with their spouse.           Objective:        1. 25 foot timed walk:  Timed 25 Foot Walk: 11/1/2021 5/3/2022   Did patient wear an AFO? No No   Was assistive device used? Yes Yes   Assistive device used (toña one): Bilateral Assistance Bilateral Assistance   Unilateral device used - -   Bilateral device used Walker/Rollator Walker/Rollator   Time for 25 Foot Walk (seconds) 17.3 12.84   Time for 25 Foot Walk (seconds) - -     Neurologic Exam    Deferred   Imaging:       Results for orders placed during the hospital encounter of 04/10/21    MRI Brain Demyelinating W W/O Contrast    Impression  1. There is a stable marked burden of white matter disease consistent with the provided diagnosis of multiple sclerosis.  These findings are other extensive.  There are no regions of  restricted diffusion and there is no abnormal enhancement.  There are no findings to suggest interval progression of disease or active demyelination.      Electronically signed by: Kingsley Bass MD  Date:    04/11/2021  Time:    20:22    Results for orders placed during the hospital encounter of 04/10/21    MRI Cervical Spine Demyelinating W W/O Contrast    Impression  1. There are multiple regions of abnormal STIR and T2 hyperintense signal in the cord which, considering slight difference in technique, slice selection and volume averaging as well as mild motion are essentially stable compared to the prior study.  The findings are felt to represent stable demyelinating disease without any definite new lesions identified.  2. There is multilevel degenerative disc and facet disease discussed in detail by level above.  There is some degree of disc space narrowing, disc osteophyte complex, uncovertebral spurring and/or facet joint arthropathy at multiple levels.  There is mild spinal stenosis at several levels.  There is multilevel foraminal stenosis which for the most part are stable compared to the prior study with the exception of some interval progression of right foraminal stenosis at the C4-5 level.  3. There is no abnormal enhancement within the vertebral bodies, discs, cord or epidural space.      Electronically signed by: Kingsley Bass MD  Date:    04/11/2021  Time:    21:09    Results for orders placed during the hospital encounter of 12/11/18    MRI Thoracic Spine Demyelinating W W/O Contrast    Impression  Multiple unchanged foci of T2 hyperintense signal throughout the spinal cord extending from the cranial cervical junction throughout the thoracic spinal cord.  Lesion at T9-10 was not included in the field of view of prior imaging; however, all other lesions are unchanged from 08/02/2018.  No enhancing lesions to suggest active demyelination.    Mild multilevel disc/endplate degeneration, with mild  spinal canal stenoses in the cervical spine and severe foraminal stenosis on the left at C4-5 and on the right at C5-6.    Electronically signed by resident: Baldo Krause  Date:    12/12/2018  Time:    09:12    Electronically signed by: Moo Michele MD  Date:    12/12/2018  Time:    15:24        Labs:     Lab Results   Component Value Date    KYIANPSF16QR 35 08/26/2020    SQALIORP92SH 97 (H) 08/20/2019    VGPUCSRN21BS 68 07/19/2018     Lab Results   Component Value Date    RN2EZWAD 73.7 03/30/2021    ABSOLUTECD3 1099 03/30/2021    BQ4EZIBG 23.7 03/30/2021    ABSOLUTECD8 353 03/30/2021    EL7XNRFD 48.9 03/30/2021    ABSOLUTECD4 729 03/30/2021    LABCD48 2.06 03/30/2021     Lab Results   Component Value Date    WBC 6.90 11/17/2021    RBC 4.85 11/17/2021    HGB 13.0 11/17/2021    HCT 42.1 11/17/2021    MCV 87 11/17/2021    MCH 26.8 (L) 11/17/2021    MCHC 30.9 (L) 11/17/2021    RDW 14.1 11/17/2021     11/17/2021    MPV 11.5 11/17/2021    GRAN 4.7 11/17/2021    GRAN 68.3 11/17/2021    LYMPH 1.5 11/17/2021    LYMPH 21.0 11/17/2021    MONO 0.4 11/17/2021    MONO 6.2 11/17/2021    EOS 0.2 11/17/2021    BASO 0.05 11/17/2021    EOSINOPHIL 3.5 11/17/2021    BASOPHIL 0.7 11/17/2021     Sodium   Date Value Ref Range Status   11/17/2021 140 136 - 145 mmol/L Final     Potassium   Date Value Ref Range Status   11/17/2021 3.6 3.5 - 5.1 mmol/L Final     Chloride   Date Value Ref Range Status   11/17/2021 111 (H) 95 - 110 mmol/L Final     CO2   Date Value Ref Range Status   11/17/2021 21 (L) 23 - 29 mmol/L Final     Carbon Monoxide, Blood   Date Value Ref Range Status   10/19/2016 2 % Final     Comment:     -------------------REFERENCE VALUE--------------------------  0-2 Normal (Non-smoker) , < or = 9 Normal (Smoker), > or   = 20 (Toxic concentration)  Test Performed by:  [address] [address]  : Adrien Norton II, M.D., Ph.D.        Glucose   Date Value Ref Range Status   11/17/2021 87 70 - 110 mg/dL Final     BUN   Date Value Ref Range Status   11/17/2021 17 8 - 23 mg/dL Final     Creatinine   Date Value Ref Range Status   11/17/2021 0.8 0.5 - 1.4 mg/dL Final   02/22/2013 0.8 0.5 - 1.4 mg/dL Final     Calcium   Date Value Ref Range Status   11/17/2021 8.7 8.7 - 10.5 mg/dL Final   02/22/2013 8.7 8.7 - 10.5 mg/dL Final     Total Protein   Date Value Ref Range Status   11/17/2021 6.7 6.0 - 8.4 g/dL Final     Albumin   Date Value Ref Range Status   11/17/2021 3.6 3.5 - 5.2 g/dL Final     Total Bilirubin   Date Value Ref Range Status   11/17/2021 0.6 0.1 - 1.0 mg/dL Final     Comment:     For infants and newborns, interpretation of results should be based  on gestational age, weight and in agreement with clinical  observations.    Premature Infant recommended reference ranges:  Up to 24 hours.............<8.0 mg/dL  Up to 48 hours............<12.0 mg/dL  3-5 days..................<15.0 mg/dL  6-29 days.................<15.0 mg/dL       Alkaline Phosphatase   Date Value Ref Range Status   11/17/2021 140 (H) 55 - 135 U/L Final   02/22/2013 107 55 - 135 U/L Final     AST   Date Value Ref Range Status   11/17/2021 16 10 - 40 U/L Final   02/22/2013 18 10 - 40 U/L Final     ALT   Date Value Ref Range Status   11/17/2021 20 10 - 44 U/L Final     Anion Gap   Date Value Ref Range Status   11/17/2021 8 8 - 16 mmol/L Final   02/22/2013 11 5 - 15 meq/L Final     eGFR if    Date Value Ref Range Status   11/17/2021 >60.0 >60 mL/min/1.73 m^2 Final     eGFR if non    Date Value Ref Range Status   11/17/2021 >60.0 >60 mL/min/1.73 m^2 Final     Comment:     Calculation used to obtain the estimated glomerular filtration  rate (eGFR) is the CKD-EPI equation.            MS Impression and Plan:     NEURO MULTIPLE SCLEROSIS IMPRESSION:   MS Status:     Number of relapses in the past year?:  0    Clinical Progression:  Clinically  Stable  Plan:     DMT:  No change in management    DMT comment:  She will remain off of DMT for now. Continue Vitamin D.     Symptom Management:  Implement change in symptom management    Implement Change in Symptom Management:  Tremors (Propranolol increased to 80mg daily. )     MRI will be planned for April 2023, and she will need Valium for this.   She will follow up with Dr. Apodaca in December.       TYRONE Palacios, CNS     Problem List Items Addressed This Visit     Tremor - Primary    Relevant Medications    propranoloL (INDERAL LA) 80 MG 24 hr capsule

## 2022-08-05 NOTE — Clinical Note
She's doing really well. She didn't feel much of a benefit from the propranolol 60mg, so I increased just up to 80mg. Are you good with MRIs q2 years for her?

## 2022-08-10 ENCOUNTER — PATIENT MESSAGE (OUTPATIENT)
Dept: NEUROLOGY | Facility: CLINIC | Age: 72
End: 2022-08-10
Payer: MEDICARE

## 2022-08-11 ENCOUNTER — TELEPHONE (OUTPATIENT)
Dept: NEUROLOGY | Facility: CLINIC | Age: 72
End: 2022-08-11
Payer: MEDICARE

## 2022-08-11 NOTE — TELEPHONE ENCOUNTER
----- Message from TYRONE Hurtado, CNS sent at 8/5/2022 12:33 PM CDT -----  F/U with GEORGINA in December

## 2022-08-11 NOTE — TELEPHONE ENCOUNTER
"Please see message from pt portal: "Dr. Trimble,   Several points:   1) I had to cancel my July 19 appointment due to coming down with Shingles July 5. The pain is gone, I'm no longer contagious, and I need to reschedule that appointment.   2) At the time I came down with Shingles, we were trying to schedule an EEG for me as I thought I was having mild seizures as I weened off Clonazepam. (I resumed taking 1/2 mg once daily after the episodes and have had no further trouble.) The seizures began about three days after I reduced dose from 1/2 mg once per day to 0 mg per day. We are six-to-seven weeks out from the possible seizure events. Would the EEG still be able to detect the activity, if there were some? If so, we need to schedule that as well.  3) My primary doctor prescribed me Gabapentin for the nerve pain from Shingles.  I've been taking 300 mg Gabapentin capsules 2-3 times a day for the past five weeks.   Now that the pain is gone, can I stops taking the medication cold-turkey or do I need to ween off of it? If so, at what rate?   I'm asking since the packaging says to not stop suddenly, but also because you may have experience with this medication as it's also prescribed to control seizures." Please advise.          "

## 2022-08-19 ENCOUNTER — TELEPHONE (OUTPATIENT)
Dept: NEUROLOGY | Facility: CLINIC | Age: 72
End: 2022-08-19

## 2022-08-31 DIAGNOSIS — Z78.0 MENOPAUSE: ICD-10-CM

## 2022-10-28 ENCOUNTER — OFFICE VISIT (OUTPATIENT)
Dept: URGENT CARE | Facility: CLINIC | Age: 72
End: 2022-10-28
Payer: MEDICARE

## 2022-10-28 VITALS
HEIGHT: 63 IN | WEIGHT: 170 LBS | RESPIRATION RATE: 16 BRPM | TEMPERATURE: 99 F | BODY MASS INDEX: 30.12 KG/M2 | DIASTOLIC BLOOD PRESSURE: 82 MMHG | HEART RATE: 61 BPM | OXYGEN SATURATION: 96 % | SYSTOLIC BLOOD PRESSURE: 135 MMHG

## 2022-10-28 DIAGNOSIS — Z86.79 HISTORY OF HYPERTENSION: ICD-10-CM

## 2022-10-28 DIAGNOSIS — G35 MULTIPLE SCLEROSIS: ICD-10-CM

## 2022-10-28 DIAGNOSIS — U07.1 COVID-19 VIRUS DETECTED: ICD-10-CM

## 2022-10-28 DIAGNOSIS — Z20.822 ENCOUNTER FOR LABORATORY TESTING FOR COVID-19 VIRUS: Primary | ICD-10-CM

## 2022-10-28 DIAGNOSIS — U07.1 COVID: ICD-10-CM

## 2022-10-28 DIAGNOSIS — R25.1 TREMOR: ICD-10-CM

## 2022-10-28 DIAGNOSIS — Z87.898 HISTORY OF SEIZURE: ICD-10-CM

## 2022-10-28 DIAGNOSIS — Z92.29 HISTORY OF LONG TERM ANTICOAGULANT USE: ICD-10-CM

## 2022-10-28 PROBLEM — D69.1 QUALITATIVE PLATELET DISORDER: Status: ACTIVE | Noted: 2022-10-28

## 2022-10-28 PROBLEM — Z95.1 PRESENCE OF AORTOCORONARY BYPASS GRAFT: Status: ACTIVE | Noted: 2022-10-28

## 2022-10-28 PROBLEM — Z95.5 PRESENCE OF CORONARY ANGIOPLASTY IMPLANT AND GRAFT: Status: ACTIVE | Noted: 2022-10-28

## 2022-10-28 LAB
CTP QC/QA: YES
SARS-COV-2 AG RESP QL IA.RAPID: POSITIVE

## 2022-10-28 PROCEDURE — 3075F PR MOST RECENT SYSTOLIC BLOOD PRESS GE 130-139MM HG: ICD-10-PCS | Mod: CPTII,S$GLB,, | Performed by: NURSE PRACTITIONER

## 2022-10-28 PROCEDURE — 1159F MED LIST DOCD IN RCRD: CPT | Mod: CPTII,S$GLB,, | Performed by: NURSE PRACTITIONER

## 2022-10-28 PROCEDURE — 87811 SARS CORONAVIRUS 2 ANTIGEN POCT, MANUAL READ: ICD-10-PCS | Mod: QW,S$GLB,, | Performed by: NURSE PRACTITIONER

## 2022-10-28 PROCEDURE — 1160F RVW MEDS BY RX/DR IN RCRD: CPT | Mod: CPTII,S$GLB,, | Performed by: NURSE PRACTITIONER

## 2022-10-28 PROCEDURE — 1159F PR MEDICATION LIST DOCUMENTED IN MEDICAL RECORD: ICD-10-PCS | Mod: CPTII,S$GLB,, | Performed by: NURSE PRACTITIONER

## 2022-10-28 PROCEDURE — 99214 PR OFFICE/OUTPT VISIT, EST, LEVL IV, 30-39 MIN: ICD-10-PCS | Mod: S$GLB,,, | Performed by: NURSE PRACTITIONER

## 2022-10-28 PROCEDURE — 3079F DIAST BP 80-89 MM HG: CPT | Mod: CPTII,S$GLB,, | Performed by: NURSE PRACTITIONER

## 2022-10-28 PROCEDURE — 87811 SARS-COV-2 COVID19 W/OPTIC: CPT | Mod: QW,S$GLB,, | Performed by: NURSE PRACTITIONER

## 2022-10-28 PROCEDURE — 1160F PR REVIEW ALL MEDS BY PRESCRIBER/CLIN PHARMACIST DOCUMENTED: ICD-10-PCS | Mod: CPTII,S$GLB,, | Performed by: NURSE PRACTITIONER

## 2022-10-28 PROCEDURE — 3079F PR MOST RECENT DIASTOLIC BLOOD PRESSURE 80-89 MM HG: ICD-10-PCS | Mod: CPTII,S$GLB,, | Performed by: NURSE PRACTITIONER

## 2022-10-28 PROCEDURE — 3075F SYST BP GE 130 - 139MM HG: CPT | Mod: CPTII,S$GLB,, | Performed by: NURSE PRACTITIONER

## 2022-10-28 PROCEDURE — 99214 OFFICE O/P EST MOD 30 MIN: CPT | Mod: S$GLB,,, | Performed by: NURSE PRACTITIONER

## 2022-10-28 RX ORDER — BENZONATATE 100 MG/1
100 CAPSULE ORAL 3 TIMES DAILY PRN
Qty: 30 CAPSULE | Refills: 0 | Status: SHIPPED | OUTPATIENT
Start: 2022-10-28 | End: 2022-11-07

## 2022-10-28 RX ORDER — FLUTICASONE PROPIONATE 50 MCG
1 SPRAY, SUSPENSION (ML) NASAL DAILY
Qty: 15.8 ML | Refills: 0 | Status: SHIPPED | OUTPATIENT
Start: 2022-10-28

## 2022-10-28 RX ORDER — PROMETHAZINE HYDROCHLORIDE AND DEXTROMETHORPHAN HYDROBROMIDE 6.25; 15 MG/5ML; MG/5ML
5 SYRUP ORAL EVERY 4 HOURS PRN
Qty: 118 ML | Refills: 0 | Status: SHIPPED | OUTPATIENT
Start: 2022-10-28 | End: 2022-11-07

## 2022-10-28 RX ORDER — CETIRIZINE HYDROCHLORIDE 10 MG/1
10 TABLET ORAL DAILY
Qty: 30 TABLET | Refills: 0 | Status: SHIPPED | OUTPATIENT
Start: 2022-10-28 | End: 2023-02-27

## 2022-10-28 NOTE — TELEPHONE ENCOUNTER
----- Message from Hipolito Banrett sent at 10/28/2022  8:11 AM CDT -----  Regarding: refill  Type:  RX Refill Request    Who Called:   // lei  Refill or New Rx:  refill    RX Name and Strength:    clonazePAM (KLONOPIN) 1 MG tablet 45 tablet 1 3/24/2022    Sig: Take 0.5 mg PO in the AM and 1 mg PO in the PM   Patient taking differently: Take 0.5 mg PO in the AM and 0.5 mg PO in the PM       Sent to pharmacy as: clonazePAM (KLONOPIN) 1 MG tablet   Notes to Pharmacy: Not to exceed 5 additional fills before 09/14/2021   E-Prescribing Status: Receipt confirmed by pharmacy (3/24/2022  5:00 PM CDT)     How is the patient currently taking it? (ex. 1XDay):  see above    Is this a 30 day or 90 day RX:  30-day    Preferred Pharmacy with phone number:    [facility]/pharmacy #[id number] - [place] - [address]  [address] [address]  Phone: [phone] Fax: [phone]    Local or Mail Order:  local    Ordering Provider:  Dr Atilio Mtz Call Back Number:  [phone]    Additional Information:  pt is out of med

## 2022-10-28 NOTE — TELEPHONE ENCOUNTER
states she is completely out of medication. Pt is reporting to her  that she feels like she is going to have a seizure.  reports she tested positive for Covid today on a home test.

## 2022-10-28 NOTE — TELEPHONE ENCOUNTER
----- Message from Hipolito Barnett sent at 10/28/2022  4:35 PM CDT -----  Regarding: med refill 2nd msg  Type: Needs Medical Advice    Who Called:       Best Call Back Number: [phone]    Additional Information: pt refill not sent as of time of this msg for  clonazePAM (KLONOPIN) 1 MG tablet 45 tablet 1 3/24/2022    Sig: Take 0.5 mg PO in the AM and 1 mg PO in the PM   Patient taking differently: Take 0.5 mg PO in the AM and 0.5 mg PO in the PM       Pharmacy:  [facility]/pharmacy #[id number] - [place] - [address]  [address] [address]  Phone: [phone] Fax: [phone]    PT IS OUT OF MED.

## 2022-10-28 NOTE — TELEPHONE ENCOUNTER
Pt requesting refill of clonazepam 1mg take 0.5 mg BID. Last refilled 4/27/22. Pt has f/u scheduled 10/31/22.

## 2022-10-29 ENCOUNTER — NURSE TRIAGE (OUTPATIENT)
Dept: ADMINISTRATIVE | Facility: CLINIC | Age: 72
End: 2022-10-29
Payer: MEDICARE

## 2022-10-29 ENCOUNTER — PATIENT MESSAGE (OUTPATIENT)
Dept: ADMINISTRATIVE | Facility: CLINIC | Age: 72
End: 2022-10-29
Payer: MEDICARE

## 2022-10-29 NOTE — TELEPHONE ENCOUNTER
1st enrollment call attempted with no contact made. Spoke with  who states he is currently not with pt. Advised will call back later regarding program enrollment.   Reason for Disposition   Message left with person in household.    Protocols used: No Contact or Duplicate Contact Call-A-JESSE

## 2022-10-29 NOTE — PATIENT INSTRUCTIONS
Symptomatic treatment to include:    Rest, increase fluid intake to include 50 % water, 50% electrolyte replacement  Tylenol as directed for fever, sore throat, headache, body aches.     Zrytec and flonase for sinus symptoms and to reduce inflammation.   Phenergan cough syrup at night for cough.  Will cause drowsiness  Tessalon perles cough pills as needed day or night.  Can be taken together with cough syrup if desired.  Helps best for dry, throat irritation cough.  Warm, salt water gargles, over the counter throat lozenges or sprays as desires.   Liquid benadryl and maalox 1 to 1 concentration, gargle and spit for temporary relief for sore throat.  Imodium over the counter as directed for diarrhea if desired.  ER for difficulty breathing not relieved by rest, excessive lethargy and/or change in mental status  Return to clinic for wheezing, shortness of breath, chest tightness, vomiting for re-evaluation and possible need for additional medications for the symptoms   Follow CDC isolation guidelines as provided     Patient Instructions   POSITIVE COVID TEST      You have tested positive for COVID-19 today.  Please note that patients who test positive for COVID-19 are required by the CDC to undergo isolation for 5 days, then wearing a mask around others for an additional 5 days, after their symptoms first began following the new updated guidelines of 12/27/2021. This isolation starts from the day you first developed symptoms, not the day of your positive test. For example, if your symptoms began on a Monday but tested positive on the following Wednesday, your 5-day isolation begins from that Monday, not the Wednesday you tested positive.  However, if you are asymptomatic (a person who does not have any symptoms) and COVID-19 positive, your 5-day isolation begins on the day you tested positive, regardless of exposure date.  Also, per the CDC guidelines, once your 5 days have passed, symptoms have resolved or are  improving, and you have not had fever greater than 100.4F in the last 24 hours without taking any fever reducers such as Tylenol (Acetaminophen) or Motrin (Ibuprofen), you may return to your normal activities including social distancing, wearing masks, and frequent handwashing - YOU DO NOT NEED ANOTHER TEST IN ORDER TO END YOUR QUARANTINE.

## 2022-10-29 NOTE — PROGRESS NOTES
"Subjective:       Patient ID: Alyssa John is a 72 y.o. female.    Vitals:  height is 5' 3" (1.6 m) and weight is 77.1 kg (170 lb). Her temperature is 98.9 °F (37.2 °C). Her blood pressure is 135/82 and her pulse is 61. Her respiration is 16 and oxygen saturation is 96%.     Chief Complaint: COVID-19 Concerns    Pt states  tested positive for covid 2 days ago, pt symptoms started today. Symptoms include fever, cough, congestion. Pt did at home covid test and was positive.     Constitution: Positive for fever.   HENT:  Positive for congestion. Negative for ear pain and sore throat.    Respiratory:  Positive for cough. Negative for chest tightness and shortness of breath.    Gastrointestinal:  Negative for nausea, vomiting and diarrhea.     Objective:      Physical Exam   Constitutional: She is oriented to person, place, and time. She appears well-developed.  Non-toxic appearance. She does not appear ill. No distress. obesity  HENT:   Head: Normocephalic and atraumatic.   Nose: Rhinorrhea present.   Mouth/Throat: Oropharynx is clear and moist. Mucous membranes are moist.   Eyes: Conjunctivae and EOM are normal. Right eye exhibits no discharge. Left eye exhibits no discharge.   Neck: Neck supple. No neck rigidity present.   Cardiovascular: Normal rate, regular rhythm and normal heart sounds.   Pulmonary/Chest: Effort normal and breath sounds normal. No respiratory distress. She has no wheezes. She has no rhonchi. She has no rales.   Abdominal: Normal appearance.   Musculoskeletal:      Cervical back: She exhibits no tenderness.   Lymphadenopathy:     She has no cervical adenopathy.   Neurological: no focal deficit. She is alert and oriented to person, place, and time.   Skin: Skin is warm and dry. Capillary refill takes 2 to 3 seconds.   Psychiatric: Her behavior is normal. Mood normal.   Nursing note and vitals reviewed.      Assessment:       1. Encounter for laboratory testing for COVID-19 virus  "   2. COVID-19 virus detected    3. History of hypertension    4. History of long term anticoagulant use    5. COVID            Plan:       Not a candidate for paxlovid due to plavix  Encounter for laboratory testing for COVID-19 virus  -     SARS Coronavirus 2 Antigen, POCT Manual Read    COVID-19 virus detected  -     fluticasone propionate (FLONASE) 50 mcg/actuation nasal spray; 1 spray (50 mcg total) by Each Nostril route once daily.  Dispense: 15.8 mL; Refill: 0  -     cetirizine (ZYRTEC) 10 MG tablet; Take 1 tablet (10 mg total) by mouth once daily.  Dispense: 30 tablet; Refill: 0  -     benzonatate (TESSALON) 100 MG capsule; Take 1 capsule (100 mg total) by mouth 3 (three) times daily as needed for Cough.  Dispense: 30 capsule; Refill: 0  -     promethazine-dextromethorphan (PROMETHAZINE-DM) 6.25-15 mg/5 mL Syrp; Take 5 mLs by mouth every 4 (four) hours as needed (cough).  Dispense: 118 mL; Refill: 0  -     COVID-19 Surveillance Program  -     pulse oximeter (PULSE OXIMETER) device; by Apply Externally route 2 (two) times a day. Use twice daily at 8 AM and 3 PM and record the value in [facility] as directed.  Dispense: 1 each; Refill: 0  -     Ambulatory referral/consult to [facility]    History of hypertension    History of long term anticoagulant use    COVID       Symptomatic treatment to include:    Rest, increase fluid intake to include 50 % water, 50% electrolyte replacement  Tylenol as directed for fever, sore throat, headache, body aches.     Zrytec and flonase for sinus symptoms and to reduce inflammation.   Phenergan cough syrup at night for cough.  Will cause drowsiness  Tessalon perles cough pills as needed day or night.  Can be taken together with cough syrup if desired.  Helps best for dry, throat irritation cough.  Warm, salt water gargles, over the counter throat lozenges or sprays as desires.   Liquid benadryl and maalox 1 to 1 concentration, gargle and spit for temporary relief for sore  throat.  Imodium over the counter as directed for diarrhea if desired.  ER for difficulty breathing not relieved by rest, excessive lethargy and/or change in mental status  Return to clinic for wheezing, shortness of breath, chest tightness, vomiting for re-evaluation and possible need for additional medications for the symptoms   Follow CDC isolation guidelines as provided     Patient Instructions   POSITIVE COVID TEST      You have tested positive for COVID-19 today.  Please note that patients who test positive for COVID-19 are required by the CDC to undergo isolation for 5 days, then wearing a mask around others for an additional 5 days, after their symptoms first began following the new updated guidelines of 12/27/2021. This isolation starts from the day you first developed symptoms, not the day of your positive test. For example, if your symptoms began on a Monday but tested positive on the following Wednesday, your 5-day isolation begins from that Monday, not the Wednesday you tested positive.  However, if you are asymptomatic (a person who does not have any symptoms) and COVID-19 positive, your 5-day isolation begins on the day you tested positive, regardless of exposure date.  Also, per the CDC guidelines, once your 5 days have passed, symptoms have resolved or are improving, and you have not had fever greater than 100.4F in the last 24 hours without taking any fever reducers such as Tylenol (Acetaminophen) or Motrin (Ibuprofen), you may return to your normal activities including social distancing, wearing masks, and frequent handwashing - YOU DO NOT NEED ANOTHER TEST IN ORDER TO END YOUR QUARANTINE.

## 2022-10-29 NOTE — TELEPHONE ENCOUNTER
2nd enrollment call attempted with no contact made. VM left, and previous [employer] message sent  Reason for Disposition   Message left on identified voice mail    Protocols used: No Contact or Duplicate Contact Call-A-JESSE

## 2022-10-30 ENCOUNTER — PATIENT MESSAGE (OUTPATIENT)
Dept: ADMINISTRATIVE | Facility: OTHER | Age: 72
End: 2022-10-30
Payer: MEDICARE

## 2022-10-30 ENCOUNTER — NURSE TRIAGE (OUTPATIENT)
Dept: ADMINISTRATIVE | Facility: CLINIC | Age: 72
End: 2022-10-30
Payer: MEDICARE

## 2022-10-30 NOTE — TELEPHONE ENCOUNTER
3rd enrollment call attempted with contact made. Pt currently enrolled into COVID-19 surveillance Program. No further needs at this time.  Called patient to review COVID-19 Surveillance Program enrollment process.    Smartphone:yes    [facility]:yes    Program consent:    Pulse oximeter status:yes    Verified emergency contacts yes    Program Overview: Reviewed , no response process, and importance of correct emergency contacts in event that well-being check is warranted. Patient will call [phone] 24/7 to speak with an OnCall nurse, if needed. Pt aware that if Sp02 less than or equal to 93% or if they have any worsening symptoms, they need to go to the emergency department. If they are having a medical emergency, they will call 911. Otherwise, patient will continue to submit data as scheduled. Reviewed importance of wearing mask if self or family members leave the house.     Patient had no further questions.     Sp02:98    Pulse:66    Temperature:99    SOB at rest (0-5):0    SOB with activity (0-5):0    Worsening symptoms:no    Fever symptoms:no    Increased home oxygen:   Reason for Disposition   Health Information question, no triage required and triager able to answer question    Protocols used: Information Only Call - No Triage-A-

## 2022-10-31 ENCOUNTER — PATIENT MESSAGE (OUTPATIENT)
Dept: ADMINISTRATIVE | Facility: OTHER | Age: 72
End: 2022-10-31
Payer: MEDICARE

## 2022-10-31 ENCOUNTER — TELEPHONE (OUTPATIENT)
Dept: NEUROLOGY | Facility: CLINIC | Age: 72
End: 2022-10-31
Payer: MEDICARE

## 2022-10-31 ENCOUNTER — NURSE TRIAGE (OUTPATIENT)
Dept: ADMINISTRATIVE | Facility: CLINIC | Age: 72
End: 2022-10-31
Payer: MEDICARE

## 2022-10-31 ENCOUNTER — OFFICE VISIT (OUTPATIENT)
Dept: NEUROLOGY | Facility: CLINIC | Age: 72
End: 2022-10-31
Payer: MEDICARE

## 2022-10-31 ENCOUNTER — PATIENT MESSAGE (OUTPATIENT)
Dept: ADMINISTRATIVE | Facility: CLINIC | Age: 72
End: 2022-10-31
Payer: MEDICARE

## 2022-10-31 DIAGNOSIS — G40.909 SEIZURE DISORDER: Primary | ICD-10-CM

## 2022-10-31 PROCEDURE — 99499 UNLISTED E&M SERVICE: CPT | Mod: 95,,, | Performed by: PSYCHIATRY & NEUROLOGY

## 2022-10-31 PROCEDURE — 1100F PR PT FALLS ASSESS DOC 2+ FALLS/FALL W/INJURY/YR: ICD-10-PCS | Mod: CPTII,95,, | Performed by: PSYCHIATRY & NEUROLOGY

## 2022-10-31 PROCEDURE — 1159F PR MEDICATION LIST DOCUMENTED IN MEDICAL RECORD: ICD-10-PCS | Mod: CPTII,95,, | Performed by: PSYCHIATRY & NEUROLOGY

## 2022-10-31 PROCEDURE — 3288F FALL RISK ASSESSMENT DOCD: CPT | Mod: CPTII,95,, | Performed by: PSYCHIATRY & NEUROLOGY

## 2022-10-31 PROCEDURE — 99213 PR OFFICE/OUTPT VISIT, EST, LEVL III, 20-29 MIN: ICD-10-PCS | Mod: 95,,, | Performed by: PSYCHIATRY & NEUROLOGY

## 2022-10-31 PROCEDURE — 99213 OFFICE O/P EST LOW 20 MIN: CPT | Mod: 95,,, | Performed by: PSYCHIATRY & NEUROLOGY

## 2022-10-31 PROCEDURE — 1159F MED LIST DOCD IN RCRD: CPT | Mod: CPTII,95,, | Performed by: PSYCHIATRY & NEUROLOGY

## 2022-10-31 PROCEDURE — 1100F PTFALLS ASSESS-DOCD GE2>/YR: CPT | Mod: CPTII,95,, | Performed by: PSYCHIATRY & NEUROLOGY

## 2022-10-31 PROCEDURE — 3288F PR FALLS RISK ASSESSMENT DOCUMENTED: ICD-10-PCS | Mod: CPTII,95,, | Performed by: PSYCHIATRY & NEUROLOGY

## 2022-10-31 RX ORDER — CALCIUM CARB/VITAMIN D3/VIT K1 500-500-40
1 TABLET,CHEWABLE ORAL
COMMUNITY

## 2022-10-31 RX ORDER — CLONAZEPAM 1 MG/1
TABLET ORAL
Qty: 30 TABLET | Refills: 1 | Status: ON HOLD | OUTPATIENT
Start: 2022-10-31 | End: 2022-12-09 | Stop reason: HOSPADM

## 2022-10-31 RX ORDER — ALBUTEROL SULFATE 90 UG/1
1 AEROSOL, METERED RESPIRATORY (INHALATION)
COMMUNITY

## 2022-10-31 NOTE — PROGRESS NOTES
"Date of service:  10/31/2022     The patient location is: home  The chief complaint leading to consultation is: seizures  Visit type: audiovisual  Total time spent with patient: 21 minutes  Each patient to whom he or she provides medical services by telemedicine is:  (1) informed of the relationship between the physician and patient and the respective role of any other health care provider with respect to management of the patient; and (2) notified that he or she may decline to receive medical services by telemedicine and may withdraw from such care at any time.    Interval history:  The patient is a 72 y.o. female with a diagnosis of MS who we are seeing for epilepsy.  Since she was last here, we have tried weaning her Klonopin.  She reports "miniseizures" occurring up to multiple times per day on low doses of Klonopin which resolve when the medication is increased.    History of present illness:  The patient is a 72 y.o. female with a PMH of MS (diagnosed in 2000) we have been asked to see for evaluation of episodes suspicious for seizures and medication management.  The patient reports a longstanding diagnosis of epilepsy and that she has a history of 2 types of seizures.    Seizure type 1: "Petit mal"  These began some time prior to 1971. With respect to aura, the patient reports nothing.  Her seizure is characterized by several jerks in either shoulder.  This component of this spell lasts for approximately several seconds.  Afterwards, she reports no postictal state.  The patient's frequency of events was roughly daily prior to starting AEDs.  She reports that she has not had one of these in years.    Seizure type 2: "Grand mal"  These began in 1971. With respect to warning, the patient would have "a lot of the petite mal" seizures.  Her seizure is characterized by loss of awareness, generalized stiffening, and generalized shaking.  She would have foaming of the mouth, urinary incontinence, and tongue biting.  " This component of this spell lasts for approximately 2-3 minutes.  Afterwards, she was fatigued.  The patient's last event of this type was in 1990.  Prior to starting medication, they happened about once a month on average.    She does not give a history of events suspicious for absence seizures.    Regarding medications, the patient was tried on phenobarbital, Dilantin, and Depakote without success.  Tegretol was then given, and she continued to have seizures.  In 1982, Klonopin was added.  She became seizure free on this regimen, aside from a seizure in 1990 when she tried to wean herself off her AEDs.  The patient's Tegretol was changed to Topamax at some point after 2000 when she became thrombocytopenic while also receiving Tysabri.  They also report that at some point, her Klonopin was increased from 0.5 mg BID to 1 mg BID.  They indicate that there was no clear reason why this was done.  They deny that she was having seizures.  They do report significant issues with fatigue, and they indicate that this prompted Dr. Apodaca to refer her here for revision of her AED regimen.    Potential Epilepsy Risk Factors:   Pregnancy/Labor/Delivery - none  Febrile seizures - none  Head injury  - none  CNS infection - none   Stroke - none  Family Hx of Sz - none    AED Treatments  Present regimen  Topamax 100 mg BID  Klonopin 0.5 mg BID    Prior treatments  Tegretol (discontinued due to thrombocytopenia in the setting of Tysabri administration)  Phenobarital (ineffective)  Dilantin (ineffective)  Depakote (ineffective)    Not tried  acetazolamide (Diamox, AZM)  Amantadine  brivitiracetam (Briviact, BRV)  clobazam (Onfi or Frizium, CLB)  ethosuximide (Zarontin, ESM)  eslicarbazine (Aptiom, ESL)  felbamate (felbatol, FBM)  gabapentin (Neurontin, GPN)  lacosamide (Vimpat, LCS)   lamotrigine (Lamictal, LTG)   levetiracetam (Keppra, LEV)  methsuximide (Celontin, MSM)  oxcarbazepine (Trileptal OXC)  perampanel (Fycompa, FCP)    pregabalin (Lyrica, PGB)  primidone (Mysoline, PRM)  rufinamide (Banzel, RUF)  tiagabine (Gabatril,  TGB)  viagabatrin, (Sabril, VGB)  vagal nerve stimulator (VNS)  valproic acid (Depakote, VPA)  zonisamide (Zonegran, ZNA)  Benzodiazepines  diazepam - rectal (Diastatl)  diazepam - oral (Valium, DZ)  clorazepate (Tranxene, CLZ)  Ativan  Brain Stimulation  Vagal Nerve Stimulation-n/a  DBS- n/a    Past Medical History:   Diagnosis Date    Arthritis     Coronary artery disease     Encounter for blood transfusion     Epilepsy     GERD (gastroesophageal reflux disease)     Headache     Hypertension     MI, old     MS (multiple sclerosis)     Seizures     epilepsy       Past Surgical History:   Procedure Laterality Date    APPENDECTOMY      BACK SURGERY      L5 discectomy    BREAST BIOPSY Right 15 yrs ago    benign    CATARACT EXTRACTION Bilateral     COLONOSCOPY N/A 9/16/2015    Procedure: COLONOSCOPY;  Surgeon: Henry Malcolm MD;  Location: [facility];  Service: Endoscopy;  Laterality: N/A;    CORONARY STENT PLACEMENT      right knee arthroscopy         Family History   Problem Relation Age of Onset    Scleroderma Mother     Heart disease Father         MI, CAD    Cancer Neg Hx     Diabetes Neg Hx     Stroke Neg Hx     Melanoma Neg Hx     Psoriasis Neg Hx     Lupus Neg Hx     Eczema Neg Hx        Social History     Socioeconomic History    Marital status:    Tobacco Use    Smoking status: Former     Packs/day: 1.50     Types: Cigarettes     Quit date: 12/26/2006     Years since quitting: 15.8    Smokeless tobacco: Never   Substance and Sexual Activity    Alcohol use: No     Alcohol/week: 0.0 standard drinks    Drug use: No    Sexual activity: Not Currently     Partners: Male     Social Determinants of Health     Financial Resource Strain: Low Risk     Difficulty of Paying Living Expenses: Not hard at all   Food Insecurity: No Food Insecurity    Worried About Running Out of Food in the Last Year: Never true  "   Ran Out of Food in the Last Year: Never true   Transportation Needs: No Transportation Needs    Lack of Transportation (Medical): No    Lack of Transportation (Non-Medical): No   Physical Activity: Insufficiently Active    Days of Exercise per Week: 2 days    Minutes of Exercise per Session: 40 min   Stress: No Stress Concern Present    Feeling of Stress : Not at all   Social Connections: Moderately Isolated    Frequency of Communication with Friends and Family: More than three times a week    Frequency of Social Gatherings with Friends and Family: Once a week    Attends Mandaen Services: Never    Active Member of Clubs or Organizations: No    Attends Club or Organization Meetings: Never    Marital Status:    Housing Stability: Low Risk     Unable to Pay for Housing in the Last Year: No    Number of Places Lived in the Last Year: 1    Unstable Housing in the Last Year: No        Review of patient's allergies indicates:   Allergen Reactions    Codeine      Other reaction(s): throat tightness    Dilantin [phenytoin sodium extended]     Phenobarbital     Tegretol [carbamazepine]         Review of Systems  The patient's ROS is noncontributory except as noted above.    Physical Exam  Exam limited as this was performed as a virtual visit.    Data base:  Notes from the referring physician were reviewed.  Briefly summarized, these discuss her MS.  The last note indicates concern that the patient's AED regimen may be contributing to fatigue and cognitive complaints.    MRI brain (4/21):  "1. There is a stable marked burden of white matter disease consistent with the provided diagnosis of multiple sclerosis.  These findings are other extensive.  There are no regions of restricted diffusion and there is no abnormal enhancement.  There are no findings to suggest interval progression of disease or active demyelination."  I independently visualized the images of this study and interpreted them.  There is diffuse " "atrophy noted involving the cerebrum, cerebellum, and brainstem.  She has a significant burden of white matter disease.  The areas of T2/FLAIR hyperintensity are most pronounced in a periventricular distribution, and some lesions appear to be oriented perpendicular to the ventricles.  While less pronounced, there are some juxtacortical lesion seen.  There is no abnormal contrast enhancement.  This study appears consistent with the patient's diagnosis of longstanding MS, and there is no evidence of active demyelination appreciated.    EEG:  Encephalopathy    Assessment and plan:  The patient is a 72 y.o. female we have been asked to see for evaluation for events worrisome for seizures.  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  Given that her diagnosis of MS was made at age 50, I am less suspicious that she developed epilepsy secondary to her MS.  We will admit her to the EMU in an effort to capture her "miniseizures" to make certain these are ictal in nature.  Medication side effects were discussed with the patient.  State law as it pertains to driving for individuals with seizures was discussed.  The patient was also counseled on seizure safety. We will plan on seeing the patient back in a few weeks.    A total of 24 minutes of total time spent on the encounter, which includes face to face time and non-face to face time preparing to see the patient (eg, review of tests), Obtaining and/or reviewing separately obtained history, documenting clinical information in the electronic or other health record, independently interpreting results (not separately reported)/communicating results to the patient/family/caregiver, and/or care coordination (not separately reported).    "

## 2022-10-31 NOTE — TELEPHONE ENCOUNTER
Pt called for no response to cc push.    [employer] message sent.    98% hr 66, 98.2F, Pt triaged for symptoms. Mild cough at this time that is slightly improving with medicine. Pt instructed to keep tx at home. Education provided on Pt and spouse. Invited them to call ooc at [phone] if any new or worsening symptoms appear to speak with a triage nurse. Education provided on what is expected and how the cc program works. Alert and oriented,  agrees with above.       Reason for Disposition   [1] COVID-19 diagnosed by positive lab test (e.g., PCR, rapid self-test kit) AND [2] mild symptoms (e.g., cough, fever, others) AND [3] no complications or SOB    Additional Information   Negative: SEVERE difficulty breathing (e.g., struggling for each breath, speaks in single words)   Negative: Difficult to awaken or acting confused (e.g., disoriented, slurred speech)   Negative: Bluish (or gray) lips or face now   Negative: Shock suspected (e.g., cold/pale/clammy skin, too weak to stand, low BP, rapid pulse)   Negative: Sounds like a life-threatening emergency to the triager   Negative: SEVERE or constant chest pain or pressure  (Exception: Mild central chest pain, present only when coughing.)   Negative: MODERATE difficulty breathing (e.g., speaks in phrases, SOB even at rest, pulse 100-120)   Negative: Headache and stiff neck (can't touch chin to chest)   Negative: Oxygen level (e.g., pulse oximetry) 90 percent or lower   Negative: Chest pain or pressure  (Exception: MILD central chest pain, present only when coughing.)   Negative: Patient sounds very sick or weak to the triager   Negative: MILD difficulty breathing (e.g., minimal/no SOB at rest, SOB with walking, pulse <100)   Negative: Fever > 103 F (39.4 C)   Negative: [1] Fever > 101 F (38.3 C) AND [2] over 60 years of age   Negative: [1] Fever > 100.0 F (37.8 C) AND [2] bedridden (e.g., CVA, chronic illness, recovering from surgery)   Negative: [1] HIGH RISK  for severe COVID complications (e.g., weak immune system, age > 64 years, obesity with BMI of 30 or higher, pregnant, chronic lung disease or other chronic medical condition) AND [2] COVID symptoms (e.g., cough, fever)  (Exceptions: Already seen by PCP and no new or worsening symptoms.)   Negative: [1] HIGH RISK patient AND [2] influenza is widespread in the community AND [3] ONE OR MORE respiratory symptoms: cough, sore throat, runny or stuffy nose   Negative: [1] HIGH RISK patient AND [2] influenza exposure within the last 7 days AND [3] ONE OR MORE respiratory symptoms: cough, sore throat, runny or stuffy nose   Negative: Oxygen level (e.g., pulse oximetry) 91 to 94 percent   Negative: [1] COVID-19 infection suspected by caller or triager AND [2] mild symptoms (cough, fever, or others) AND [3] negative COVID-19 rapid test   Negative: Fever present > 3 days (72 hours)   Negative: [1] Fever returns after gone for over 24 hours AND [2] symptoms worse or not improved   Negative: [1] Continuous (nonstop) coughing interferes with work or school AND [2] no improvement using cough treatment per Care Advice   Negative: Cough present > 3 weeks   Negative: [1] COVID-19 diagnosed by positive lab test (e.g., PCR, rapid self-test kit) AND [2] NO symptoms (e.g., cough, fever, others)    Protocols used: Coronavirus (COVID-19) Diagnosed or [id number]

## 2022-10-31 NOTE — TELEPHONE ENCOUNTER
Pt is being referred for EMU admission. Please contact the pt or pt's , Giuseppe to schedule an appt. Thank you!

## 2022-11-01 ENCOUNTER — PATIENT MESSAGE (OUTPATIENT)
Dept: ADMINISTRATIVE | Facility: OTHER | Age: 72
End: 2022-11-01
Payer: MEDICARE

## 2022-11-01 ENCOUNTER — TELEPHONE (OUTPATIENT)
Dept: NEUROLOGY | Facility: CLINIC | Age: 72
End: 2022-11-01
Payer: MEDICARE

## 2022-11-01 NOTE — TELEPHONE ENCOUNTER
Spoke with patient's  about scheduling EMU, aware she needs to have an adult accompany her for the duration including overnight, only admit Mon-Thurs. Has my direct line as POC to schedule when ready.

## 2022-11-02 ENCOUNTER — E-VISIT (OUTPATIENT)
Dept: FAMILY MEDICINE | Facility: CLINIC | Age: 72
End: 2022-11-02
Payer: MEDICARE

## 2022-11-02 ENCOUNTER — PATIENT MESSAGE (OUTPATIENT)
Dept: ADMINISTRATIVE | Facility: OTHER | Age: 72
End: 2022-11-02
Payer: MEDICARE

## 2022-11-02 DIAGNOSIS — U07.1 COVID: Primary | ICD-10-CM

## 2022-11-03 ENCOUNTER — PATIENT MESSAGE (OUTPATIENT)
Dept: ADMINISTRATIVE | Facility: OTHER | Age: 72
End: 2022-11-03
Payer: MEDICARE

## 2022-11-03 ENCOUNTER — NURSE TRIAGE (OUTPATIENT)
Dept: ADMINISTRATIVE | Facility: CLINIC | Age: 72
End: 2022-11-03
Payer: MEDICARE

## 2022-11-03 ENCOUNTER — PATIENT MESSAGE (OUTPATIENT)
Dept: FAMILY MEDICINE | Facility: CLINIC | Age: 72
End: 2022-11-03

## 2022-11-03 ENCOUNTER — PATIENT MESSAGE (OUTPATIENT)
Dept: ADMINISTRATIVE | Facility: CLINIC | Age: 72
End: 2022-11-03
Payer: MEDICARE

## 2022-11-03 PROCEDURE — 99421 PR E&M, ONLINE DIGIT, EST, < 7 DAYS, 5-10 MINS: ICD-10-PCS | Mod: S$GLB,,, | Performed by: STUDENT IN AN ORGANIZED HEALTH CARE EDUCATION/TRAINING PROGRAM

## 2022-11-03 PROCEDURE — 99421 OL DIG E/M SVC 5-10 MIN: CPT | Mod: S$GLB,,, | Performed by: STUDENT IN AN ORGANIZED HEALTH CARE EDUCATION/TRAINING PROGRAM

## 2022-11-03 NOTE — PROGRESS NOTES
Patient ID: Alyssa John is a 72 y.o. female.    Chief Complaint: Cough    The patient initiated a request through [employer] on 11/2/2022 for evaluation and management with a chief complaint of Cough     I evaluated the questionnaire submission on 11/03/2022  .    [facility] Peq Evisit Upper Respitatory/Cough Questionnaire    11/2/2022 10:49 PM CDT - Filed by Patient   Do you agree to participate in an E-Visit? Yes   If you have any of the following symptoms, please present to your local ER or call 911:  I acknowledge   What is the main issue that you would like for your doctor to address today? Covid cough, congestion, seem worse than when i had bronchitis   Are you able to take your vital signs? Yes   Systolic Blood Pressure:    Diastolic Blood Pressure:    Weight: 170   Height: 63.5   Pulse: 60   Temp: 97.9   Resp: 97   Pulse Ox: 60   What symptoms do you currently have?  Cough;  Fatigue;  Nasal Congestion;  New loss of taste or smell;  Runny nose;  Sore throat   Have you had a fever? Yes   What has been the range of your fever? Less than 100.4   When did your symptoms first appear? 10/28/2022   In the last two weeks, have you been in close contact with someone who has COVID-19 or the Flu? Yes , Covid-19   In the last two weeks, have you worked or volunteered in a healthcare facility or as a ? Healthcare facilities include a hospital, medical or dental clinic, long-term care facility, or nursing home No   Do you live in a long-term care facility, nursing home, or homeless shelter? No   List what you have done or taken to help your symptoms. Promethazine 5ml every four hours;  cetirizine 10 mg daily;  benzonate 100mg 3x daily;  fluticasone spray;  steam from taking a shower every other day;  cough drops   How severe are your symptoms? Severe   Have you taken an at home Covid test? Yes   What were the results? Positive   Have you taken a Flu test? No   Have you been fully vaccinated for COVID? (2  Pfizer, 2 Moderna or 1 Sahil & Sahil vaccine injections) Yes   Have you received a booster? Yes   Have you recieved a Flu shot? No   Do you have transportation to get tested for COVID if it is indicated and ordered for you at an [facility] location? Yes   Provide any information you feel is important to your history not asked above I tested positive for Covid at [facility] on Friday, Oct. 28. They prescribed the above med. said i qualified for monoclonal infusion but did not tell me the [place] infusion center was CLOSED ALL WEEKEND!!! The center didnt call me until TUES! A little late   Please attach any relevant images or files         Active Problem List with Overview Notes    Diagnosis Date Noted    Presence of aortocoronary bypass graft 10/28/2022    Presence of coronary angioplasty implant and graft 10/28/2022    Qualitative platelet disorder 10/28/2022    COVID 10/28/2022    Debility 02/07/2022    Limitation of joint motion 02/07/2022    Physical deconditioning 10/19/2021    Hand weakness 04/05/2021    Bronchitis 11/20/2020    Cognitive communication disorder 08/28/2020    Impaired mobility and ADLs 05/24/2020    Coordination impairment 05/24/2020    Impaired instrumental activities of daily living (IADL) 05/24/2020    Internal carotid artery stenosis, right 03/08/2020    Intractable chronic migraine without aura and without status migrainosus 03/08/2020     Given failure of multiple or preventives in the past, I recommended either monoclonal antibody or Botox especially given longstanding daily frequency.  She had a difficult time deciding between the 2 and I extensively explained the risks and benefits and expected course for both.  Ultimately she decided to proceed with Botox.    The patient has chronic migraines (G43.719) and suffers from headaches more than 15 days a month lasting more than 4 hours a day with no relief of symptoms despite trying multiple medications for at least 3 months, if tolerated,  including but not limited to   anti-epileptics - topiramate, carbamazepine, Dilantin, phenobarbital, gabapentin  beta blockers  - metoprolol  calcium channel blockers - blood pressure and pulse too low, contraindicated  Antidepressants - duloxetine    Botox treatment was approved for chronic migraines in October 2010. The patient will be an ideal candidate for Botox. We are planning for 3 treatments 3 months apart and aiming for at least 50% improvement in the symptoms. If we see no improvement after 3 treatments, we will discontinue the injections.    ---    She has history of myocardial infarction precluding the use of triptans or DHE, for this reason we will pursue Ubrelvy which carries no Cardiovascular warnings.  As she is having daily headaches which risks medication overuse headache, I will also prescribe Compazine which may be used on a daily basis without rebound risk however we have to watch her for worsening tremor. Compazine made her tired - advised to reduce dose by half. Ubrelvy approved as non-formulary exception, with $120 copay. Gave nurtec to try - if works well, this may change their mind about trying Ubrelvy.     Botox started 7/8/2020        Tremor 03/08/2020    Anxiety and depression 03/08/2020    Former smoker, stopped smoking in distant past 03/08/2020    History of MI (myocardial infarction) 03/08/2020    Osteopenia determined by x-ray 03/08/2020    Gait abnormality 09/06/2019    Impaired functional mobility, balance, and endurance 09/06/2019    Gait instability 02/21/2017    Encounter for long-term (current) use of high-risk medication 08/26/2016    Seizure disorder      Suspect PGE, possibly KIESHA  Pt with comorbid migraines & MS      Essential hypertension     Hiatal hernia with GERD without esophagitis     Arthritis     Chronic fatigue 08/10/2015    Vitamin D deficiency 02/03/2015    Gait disturbance 11/07/2014    Hyperlipidemia with target LDL less than 70 06/13/2013    MS (multiple  sclerosis) 02/23/2013     Established with Dr. Apodaca      CAD (coronary artery disease) 02/23/2013     Dr. Mcmillan, cardiology        Recent Labs Obtained:  Office Visit on 10/28/2022   Component Date Value Ref Range Status    SARS Coronavirus 2 Antigen 10/28/2022 Positive (A)  Negative Final     Acceptable 10/28/2022 Yes   Final       Encounter Diagnosis   Name Primary?    COVID Yes        No orders of the defined types were placed in this encounter.     Medications Ordered This Encounter   Medications    molnupiravir 200 mg capsule (EUA)     Sig: Take 4 capsules (800 mg total) by mouth every 12 (twelve) hours. for 5 days     Dispense:  40 capsule     Refill:  0     Order Specific Question:   I have informed patients with partners of childbearing potential to use a reliable method of contraception during therapy and for 3 months after the last dose of molnupiravir. I have informed them molnupiravir may cause fetal harm.     Answer:   Acknowledge        E-Visit Time Tracking:    Day 1 Time (in minutes): 7     Total Time (in minutes): 7

## 2022-11-03 NOTE — TELEPHONE ENCOUNTER
Patient initially escalated due to no response, however patient entered vitals prior to phone call. Vital signs and symptoms did not trigger a second escalation; therefore, no follow up call is needed at this time. Pt called and son said that he was at the grocery and vitals given over the phone and all were WNL         Reason for Disposition   Information only question and nurse able to answer    Protocols used: Information Only Call - No Triage-A-[place]

## 2022-11-04 ENCOUNTER — PATIENT MESSAGE (OUTPATIENT)
Dept: ADMINISTRATIVE | Facility: OTHER | Age: 72
End: 2022-11-04
Payer: MEDICARE

## 2022-11-05 ENCOUNTER — PATIENT MESSAGE (OUTPATIENT)
Dept: ADMINISTRATIVE | Facility: OTHER | Age: 72
End: 2022-11-05
Payer: MEDICARE

## 2022-11-05 ENCOUNTER — PATIENT MESSAGE (OUTPATIENT)
Dept: ADMINISTRATIVE | Facility: CLINIC | Age: 72
End: 2022-11-05
Payer: MEDICARE

## 2022-11-05 ENCOUNTER — NURSE TRIAGE (OUTPATIENT)
Dept: ADMINISTRATIVE | Facility: CLINIC | Age: 72
End: 2022-11-05
Payer: MEDICARE

## 2022-11-05 NOTE — TELEPHONE ENCOUNTER
Per Covid Surveillance Program, pt escalated for no response.  [employer] message sent.  Unable to contact pt.  Per SOP, all #'s attempted x 1.  No contact call.    Reason for Disposition   Message left on unidentified voice mail.  Phone number verified.   Message left with person in household.    Additional Information   Negative: Caller has already spoken with the PCP and has no further questions.   Negative: Caller has already spoken with another triager and has no further questions.   Negative: Caller has already spoken with another triager or PCP AND has further questions AND triager able to answer questions.   Negative: Busy signal.  First attempt to contact caller.  Follow-up call scheduled within 15 minutes.   Negative: No answer.  First attempt to contact caller.  Follow-up call scheduled within 15 minutes.   Negative: Message left on identified voice mail    Protocols used: No Contact or Duplicate Contact Call-A-

## 2022-11-06 ENCOUNTER — PATIENT MESSAGE (OUTPATIENT)
Dept: ADMINISTRATIVE | Facility: OTHER | Age: 72
End: 2022-11-06
Payer: MEDICARE

## 2022-11-06 ENCOUNTER — PATIENT MESSAGE (OUTPATIENT)
Dept: ADMINISTRATIVE | Facility: CLINIC | Age: 72
End: 2022-11-06
Payer: MEDICARE

## 2022-11-07 ENCOUNTER — PATIENT MESSAGE (OUTPATIENT)
Dept: ADMINISTRATIVE | Facility: OTHER | Age: 72
End: 2022-11-07
Payer: MEDICARE

## 2022-11-08 ENCOUNTER — PATIENT MESSAGE (OUTPATIENT)
Dept: ADMINISTRATIVE | Facility: CLINIC | Age: 72
End: 2022-11-08
Payer: MEDICARE

## 2022-11-08 ENCOUNTER — NURSE TRIAGE (OUTPATIENT)
Dept: ADMINISTRATIVE | Facility: CLINIC | Age: 72
End: 2022-11-08
Payer: MEDICARE

## 2022-11-08 ENCOUNTER — PATIENT MESSAGE (OUTPATIENT)
Dept: ADMINISTRATIVE | Facility: OTHER | Age: 72
End: 2022-11-08
Payer: MEDICARE

## 2022-11-08 NOTE — TELEPHONE ENCOUNTER
Patient initially escalated due to no response, however patient entered vitals prior to phone call. Vital signs and symptoms did not trigger a second escalation; therefore, no follow up call is needed at this time. Pt called and son wasn't home but she is doing well and gave me vitals over the phone and all WNL Instructed to reach out if we can assist in any way and will reach out if any problems        Reason for Disposition   Information only question and nurse able to answer    Protocols used: Information Only Call - No Triage-A-[place]

## 2022-11-09 ENCOUNTER — PATIENT MESSAGE (OUTPATIENT)
Dept: ADMINISTRATIVE | Facility: OTHER | Age: 72
End: 2022-11-09
Payer: MEDICARE

## 2022-11-10 ENCOUNTER — PATIENT MESSAGE (OUTPATIENT)
Dept: ADMINISTRATIVE | Facility: OTHER | Age: 72
End: 2022-11-10
Payer: MEDICARE

## 2022-11-11 ENCOUNTER — PATIENT MESSAGE (OUTPATIENT)
Dept: ADMINISTRATIVE | Facility: OTHER | Age: 72
End: 2022-11-11
Payer: MEDICARE

## 2022-11-14 ENCOUNTER — TELEPHONE (OUTPATIENT)
Dept: NEUROLOGY | Facility: CLINIC | Age: 72
End: 2022-11-14
Payer: MEDICARE

## 2022-11-15 ENCOUNTER — TELEPHONE (OUTPATIENT)
Dept: NEUROLOGY | Facility: CLINIC | Age: 72
End: 2022-11-15
Payer: MEDICARE

## 2022-11-15 DIAGNOSIS — G40.909 SEIZURE DISORDER: Primary | ICD-10-CM

## 2022-11-15 NOTE — TELEPHONE ENCOUNTER
Scheduled EMU 12/6/22, 1pm, via patient's . Aware an adult is required to accompany her for the duration including overnight which he confirmed would be him. Aware Alyssa will be connected to lines to her head, chest, arm, and have an IV placed; will not be able to leave the room for the duration. Instructions thoroughly discussed, mailed via [employer] and sent in portal.

## 2022-11-30 DIAGNOSIS — I10 ESSENTIAL HYPERTENSION: ICD-10-CM

## 2022-12-01 ENCOUNTER — PATIENT MESSAGE (OUTPATIENT)
Dept: PSYCHIATRY | Facility: CLINIC | Age: 72
End: 2022-12-01
Payer: MEDICARE

## 2022-12-05 ENCOUNTER — DOCUMENTATION ONLY (OUTPATIENT)
Dept: NEUROLOGY | Facility: CLINIC | Age: 72
End: 2022-12-05
Payer: MEDICARE

## 2022-12-05 ENCOUNTER — OFFICE VISIT (OUTPATIENT)
Dept: NEUROLOGY | Facility: CLINIC | Age: 72
End: 2022-12-05
Payer: MEDICARE

## 2022-12-05 VITALS
HEIGHT: 63 IN | HEART RATE: 54 BPM | BODY MASS INDEX: 30.11 KG/M2 | SYSTOLIC BLOOD PRESSURE: 139 MMHG | DIASTOLIC BLOOD PRESSURE: 63 MMHG

## 2022-12-05 DIAGNOSIS — R26.9 GAIT DISTURBANCE: ICD-10-CM

## 2022-12-05 DIAGNOSIS — Z71.89 COUNSELING REGARDING GOALS OF CARE: ICD-10-CM

## 2022-12-05 DIAGNOSIS — G35 MS (MULTIPLE SCLEROSIS): Primary | ICD-10-CM

## 2022-12-05 DIAGNOSIS — R25.1 TREMOR: ICD-10-CM

## 2022-12-05 DIAGNOSIS — M72.0 DUPUYTREN'S CONTRACTURE OF BOTH HANDS: ICD-10-CM

## 2022-12-05 PROCEDURE — 99215 OFFICE O/P EST HI 40 MIN: CPT | Mod: S$GLB,,, | Performed by: PSYCHIATRY & NEUROLOGY

## 2022-12-05 PROCEDURE — 1126F PR PAIN SEVERITY QUANTIFIED, NO PAIN PRESENT: ICD-10-PCS | Mod: CPTII,S$GLB,, | Performed by: PSYCHIATRY & NEUROLOGY

## 2022-12-05 PROCEDURE — 3008F PR BODY MASS INDEX (BMI) DOCUMENTED: ICD-10-PCS | Mod: CPTII,S$GLB,, | Performed by: PSYCHIATRY & NEUROLOGY

## 2022-12-05 PROCEDURE — 3075F PR MOST RECENT SYSTOLIC BLOOD PRESS GE 130-139MM HG: ICD-10-PCS | Mod: CPTII,S$GLB,, | Performed by: PSYCHIATRY & NEUROLOGY

## 2022-12-05 PROCEDURE — 1159F PR MEDICATION LIST DOCUMENTED IN MEDICAL RECORD: ICD-10-PCS | Mod: CPTII,S$GLB,, | Performed by: PSYCHIATRY & NEUROLOGY

## 2022-12-05 PROCEDURE — 1101F PR PT FALLS ASSESS DOC 0-1 FALLS W/OUT INJ PAST YR: ICD-10-PCS | Mod: CPTII,S$GLB,, | Performed by: PSYCHIATRY & NEUROLOGY

## 2022-12-05 PROCEDURE — 3078F PR MOST RECENT DIASTOLIC BLOOD PRESSURE < 80 MM HG: ICD-10-PCS | Mod: CPTII,S$GLB,, | Performed by: PSYCHIATRY & NEUROLOGY

## 2022-12-05 PROCEDURE — 3075F SYST BP GE 130 - 139MM HG: CPT | Mod: CPTII,S$GLB,, | Performed by: PSYCHIATRY & NEUROLOGY

## 2022-12-05 PROCEDURE — 3078F DIAST BP <80 MM HG: CPT | Mod: CPTII,S$GLB,, | Performed by: PSYCHIATRY & NEUROLOGY

## 2022-12-05 PROCEDURE — 1159F MED LIST DOCD IN RCRD: CPT | Mod: CPTII,S$GLB,, | Performed by: PSYCHIATRY & NEUROLOGY

## 2022-12-05 PROCEDURE — 99499 UNLISTED E&M SERVICE: CPT | Mod: S$GLB,,, | Performed by: PSYCHIATRY & NEUROLOGY

## 2022-12-05 PROCEDURE — 1160F PR REVIEW ALL MEDS BY PRESCRIBER/CLIN PHARMACIST DOCUMENTED: ICD-10-PCS | Mod: CPTII,S$GLB,, | Performed by: PSYCHIATRY & NEUROLOGY

## 2022-12-05 PROCEDURE — 1126F AMNT PAIN NOTED NONE PRSNT: CPT | Mod: CPTII,S$GLB,, | Performed by: PSYCHIATRY & NEUROLOGY

## 2022-12-05 PROCEDURE — 1101F PT FALLS ASSESS-DOCD LE1/YR: CPT | Mod: CPTII,S$GLB,, | Performed by: PSYCHIATRY & NEUROLOGY

## 2022-12-05 PROCEDURE — 99215 PR OFFICE/OUTPT VISIT, EST, LEVL V, 40-54 MIN: ICD-10-PCS | Mod: S$GLB,,, | Performed by: PSYCHIATRY & NEUROLOGY

## 2022-12-05 PROCEDURE — 3008F BODY MASS INDEX DOCD: CPT | Mod: CPTII,S$GLB,, | Performed by: PSYCHIATRY & NEUROLOGY

## 2022-12-05 PROCEDURE — 3288F PR FALLS RISK ASSESSMENT DOCUMENTED: ICD-10-PCS | Mod: CPTII,S$GLB,, | Performed by: PSYCHIATRY & NEUROLOGY

## 2022-12-05 PROCEDURE — 1160F RVW MEDS BY RX/DR IN RCRD: CPT | Mod: CPTII,S$GLB,, | Performed by: PSYCHIATRY & NEUROLOGY

## 2022-12-05 PROCEDURE — 99999 PR PBB SHADOW E&M-EST. PATIENT-LVL V: CPT | Mod: PBBFAC,,, | Performed by: PSYCHIATRY & NEUROLOGY

## 2022-12-05 PROCEDURE — 99999 PR PBB SHADOW E&M-EST. PATIENT-LVL V: ICD-10-PCS | Mod: PBBFAC,,, | Performed by: PSYCHIATRY & NEUROLOGY

## 2022-12-05 PROCEDURE — 99499 RISK ADDL DX/OHS AUDIT: ICD-10-PCS | Mod: S$GLB,,, | Performed by: PSYCHIATRY & NEUROLOGY

## 2022-12-05 PROCEDURE — 3288F FALL RISK ASSESSMENT DOCD: CPT | Mod: CPTII,S$GLB,, | Performed by: PSYCHIATRY & NEUROLOGY

## 2022-12-05 RX ORDER — PROPRANOLOL HYDROCHLORIDE 120 MG/1
120 CAPSULE, EXTENDED RELEASE ORAL DAILY
Qty: 90 CAPSULE | Refills: 3 | Status: SHIPPED | OUTPATIENT
Start: 2022-12-05 | End: 2023-11-29

## 2022-12-05 NOTE — PROGRESS NOTES
Subjective:          Patient ID: Alyssa John is a 72 y.o. female who presents today for a routine clinic visit for MS.  She comes in with her     MS HPI:  DMT: None  Side effects from DMT? N/A  Taking vitamin D3 as recommended? Yes -    Would like propranolol increased   She had COVID end of October -- just fully recovered from that.   Klonopin is now lowered to 0.5mg / day;  under the care of Dr. Trimble who plans EMU stay soon.    She's been using a cane more    Medications:  Current Outpatient Medications   Medication Sig    albuterol (PROVENTIL/VENTOLIN HFA) 90 mcg/actuation inhaler 1 puff as needed.    atorvastatin (LIPITOR) 40 MG tablet TAKE 1 TABLET BY MOUTH EVERY DAY    cetirizine (ZYRTEC) 10 MG tablet Take 1 tablet (10 mg total) by mouth once daily.    cholecalciferol, vitamin D3, 10 mcg (400 unit) Cap capsule 1 tablet.    cholecalciferol, vitamin D3, 125 mcg (5,000 unit) Tab Take 5,000 Units by mouth once daily.    clonazePAM (KLONOPIN) 1 MG tablet Take 0.5 mg PO in the AM and 0.5 mg PO in the PM    clopidogrel (PLAVIX) 75 mg tablet Take 75 mg by mouth once daily.    DULoxetine (CYMBALTA) 20 MG capsule TAKE 2 CAPSULES BY MOUTH ONCE DAILY    estradioL (ESTRACE) 0.01 % (0.1 mg/gram) vaginal cream Place 0.5 g vaginally every evening. Use nightly for two weeks then twice weekly for maintenance    famotidine (PEPCID) 20 MG tablet Take 20 mg by mouth 2 (two) times daily as needed for Heartburn.    fluticasone propionate (FLONASE) 50 mcg/actuation nasal spray 1 spray (50 mcg total) by Each Nostril route once daily.    gabapentin (NEURONTIN) 300 MG capsule Take 1 capsule (300 mg total) by mouth 3 (three) times daily.    hydroCHLOROthiazide (HYDRODIURIL) 12.5 MG Tab     nitroGLYCERIN (NITROSTAT) 0.4 MG SL tablet Place 0.4 mg under the tongue every 5 (five) minutes as needed for Chest pain.    prednisoLONE acetate (PRED FORTE) 1 % DrpS INSTILL 1 DROP THREE TIMES A DAY INTO BOTH EYES    propranoloL  (INDERAL LA) 80 MG 24 hr capsule TAKE 1 CAPSULE BY MOUTH ONCE DAILY.    pulse oximeter (PULSE OXIMETER) device by Apply Externally route 2 (two) times a day. Use twice daily at 8 AM and 3 PM and record the value in MyChart as directed.    topiramate (TOPAMAX) 100 MG tablet Take 50 mg in the AM and 100 mg in the PM for 2 weeks, then increase to 100 mg twice per day (Patient taking differently: Take 100 mg by mouth 2 (two) times daily. Take 50 mg in the AM and 100 mg in the PM for 2 weeks, then increase to 100 mg twice per day)     No current facility-administered medications for this visit.       SOCIAL HISTORY  Social History     Tobacco Use    Smoking status: Former     Packs/day: 1.50     Types: Cigarettes     Quit date: 2006     Years since quittin.0    Smokeless tobacco: Never   Substance Use Topics    Alcohol use: No     Alcohol/week: 0.0 standard drinks    Drug use: No       Living arrangements - the patient lives with their spouse.      REVIEW OF SYMPTOMS 5/3/2022   Do you feel abnormally tired on most days? Yes   Do you feel you generally sleep well? Yes   Do you have difficulty controlling your bladder?  No   Do you have difficulty controlling your bowels?  No   Do you have frequent muscle cramps, tightness or spasms in your limbs?  No   Do you have new visual symptoms?  No   Do you have worsening difficulty with your memory or thinking? Yes   Do you have worsening symptoms of anxiety or depression?  Yes   For patients who walk, Do you have more difficulty walking?  No   Have you fallen since your last visit?  No   For patients who use wheelchairs: Do you have any skin wounds or breakdown? Yes   Do you have difficulty using your hands?  Yes   Do you have shooting or burning pain? Yes   Do you have difficulty with sexual function?  Yes   If you are sexually active, are you using birth control? Y/N  N/A Not Applicable   Do you often choke when swallowing liquids or solid food?  No   Do you  experience worsening symptoms when overheated? No   Do you need any new equipment such as a wheelchair, walker or shower chair? Yes   Do you receive co-pay financial assistance for your principal MS medicine? Yes   Would you be interested in participating in an MS research trial in the future? Yes   For patients on Gilenya, Tecfidera, Aubagio, Rituxan, Ocrevus, Tysabri, Lemtrada or Methotrexate, are you aware that you should NOT receive live virus vaccines?  No   Do you feel you have adequate family/friend support?  Yes   Do you have health insurance?   Yes   Are you currently employed? No   Do you receive SSDI/SSI?  Not Applicable   Do you use marijuana or cannabis products? No   Have you been diagnosed with a urinary tract infection since your last visit here? No   Have you been diagnosed with a respiratory tract infection since your last visit here? No   Have you been to the emergency room since your last visit here? No   Have you been hospitalized since your last visit here?  No                Objective:        Timed 25 Foot Walk: 5/3/2022 12/5/2022   Did patient wear an AFO? No No   Was assistive device used? Yes Yes   Assistive device used (toña one): Bilateral Assistance Bilateral Assistance   Unilateral device used - -   Bilateral device used Walker/Rollator Walker/Rollator   Time for 25 Foot Walk (seconds) 12.84 -   Time for 25 Foot Walk (seconds) - 11.8     Neurologic Exam  MENTAL STATUS: language is fluent, normal verbal comprehension, short-term and remote memory is intact, attention is normal, patient is alert and oriented x 3     CRANIAL NERVE EXAM:  There is no JOSE LUIS.  Extraocular muscles are intact. Pupils are equal, round, and reactive to light. No facial asymmetry. Facial sensation is intact bilaterally. There is no dysarthria.      MOTOR EXAM:  LLE 4-/5 in flexors;      SENSORY EXAM: moderate decrease in vibration     COORDINATION: kinetic tremor R>L     GAIT: walker; Left leg drags; unsteady      Imaging:       Results for orders placed during the hospital encounter of 04/10/21    MRI Brain Demyelinating W W/O Contrast    Impression  1. There is a stable marked burden of white matter disease consistent with the provided diagnosis of multiple sclerosis.  These findings are other extensive.  There are no regions of restricted diffusion and there is no abnormal enhancement.  There are no findings to suggest interval progression of disease or active demyelination.      Electronically signed by: Kingsley Bass MD  Date:    04/11/2021  Time:    20:22    Results for orders placed during the hospital encounter of 04/10/21    MRI Cervical Spine Demyelinating W W/O Contrast    Impression  1. There are multiple regions of abnormal STIR and T2 hyperintense signal in the cord which, considering slight difference in technique, slice selection and volume averaging as well as mild motion are essentially stable compared to the prior study.  The findings are felt to represent stable demyelinating disease without any definite new lesions identified.  2. There is multilevel degenerative disc and facet disease discussed in detail by level above.  There is some degree of disc space narrowing, disc osteophyte complex, uncovertebral spurring and/or facet joint arthropathy at multiple levels.  There is mild spinal stenosis at several levels.  There is multilevel foraminal stenosis which for the most part are stable compared to the prior study with the exception of some interval progression of right foraminal stenosis at the C4-5 level.  3. There is no abnormal enhancement within the vertebral bodies, discs, cord or epidural space.      Electronically signed by: Kingsley Bass MD  Date:    04/11/2021  Time:    21:09    Results for orders placed during the hospital encounter of 12/11/18    MRI Thoracic Spine Demyelinating W W/O Contrast    Impression  Multiple unchanged foci of T2 hyperintense signal throughout the spinal  cord extending from the cranial cervical junction throughout the thoracic spinal cord.  Lesion at T9-10 was not included in the field of view of prior imaging; however, all other lesions are unchanged from 08/02/2018.  No enhancing lesions to suggest active demyelination.    Mild multilevel disc/endplate degeneration, with mild spinal canal stenoses in the cervical spine and severe foraminal stenosis on the left at C4-5 and on the right at C5-6.    Electronically signed by resident: Baldo Krause  Date:    12/12/2018  Time:    09:12    Electronically signed by: Moo Michele MD  Date:    12/12/2018  Time:    15:24        Labs:     Lab Results   Component Value Date    RKNSVZCM80UA 35 08/26/2020    UIKKEOPT18AJ 97 (H) 08/20/2019    EEZPNOMT20VH 68 07/19/2018     Lab Results   Component Value Date    JCVINDEX SEE COMMENT (A) 12/09/2015    JCVANTIBODY SEE COMMENT 12/09/2015     Lab Results   Component Value Date    XY6IPPWQ 73.7 03/30/2021    ABSOLUTECD3 1099 03/30/2021    UA0PXHRV 23.7 03/30/2021    ABSOLUTECD8 353 03/30/2021    PB3AKPRA 48.9 03/30/2021    ABSOLUTECD4 729 03/30/2021    LABCD48 2.06 03/30/2021     Lab Results   Component Value Date    WBC 10.00 12/06/2022    RBC 4.76 12/06/2022    HGB 13.3 12/06/2022    HCT 42.1 12/06/2022    MCV 88 12/06/2022    MCH 27.9 12/06/2022    MCHC 31.6 (L) 12/06/2022    RDW 13.9 12/06/2022     12/06/2022    MPV 12.9 12/06/2022    GRAN 6.6 12/06/2022    GRAN 66.4 12/06/2022    LYMPH 2.6 12/06/2022    LYMPH 25.8 12/06/2022    MONO 0.5 12/06/2022    MONO 4.9 12/06/2022    EOS 0.2 12/06/2022    BASO 0.06 12/06/2022    EOSINOPHIL 1.9 12/06/2022    BASOPHIL 0.6 12/06/2022     Sodium   Date Value Ref Range Status   12/07/2022 141 136 - 145 mmol/L Final     Potassium   Date Value Ref Range Status   12/07/2022 3.7 3.5 - 5.1 mmol/L Final     Chloride   Date Value Ref Range Status   12/07/2022 113 (H) 95 - 110 mmol/L Final     CO2   Date Value Ref Range Status   12/07/2022 21 (L)  23 - 29 mmol/L Final     Carbon Monoxide, Blood   Date Value Ref Range Status   10/19/2016 2 % Final     Comment:     -------------------REFERENCE VALUE--------------------------  0-2 Normal (Non-smoker) , < or = 9 Normal (Smoker), > or   = 20 (Toxic concentration)  Test Performed by:  [place] [address]  : Adrien Norton II, M.D., Ph.D.       Glucose   Date Value Ref Range Status   12/07/2022 98 70 - 110 mg/dL Final     BUN   Date Value Ref Range Status   12/07/2022 19 8 - 23 mg/dL Final     Creatinine   Date Value Ref Range Status   12/07/2022 0.9 0.5 - 1.4 mg/dL Final   02/22/2013 0.8 0.5 - 1.4 mg/dL Final     Calcium   Date Value Ref Range Status   12/07/2022 8.6 (L) 8.7 - 10.5 mg/dL Final   02/22/2013 8.7 8.7 - 10.5 mg/dL Final     Total Protein   Date Value Ref Range Status   12/07/2022 6.7 6.0 - 8.4 g/dL Final     Albumin   Date Value Ref Range Status   12/07/2022 3.7 3.5 - 5.2 g/dL Final     Total Bilirubin   Date Value Ref Range Status   12/07/2022 0.4 0.1 - 1.0 mg/dL Final     Comment:     For infants and newborns, interpretation of results should be based  on gestational age, weight and in agreement with clinical  observations.    Premature Infant recommended reference ranges:  Up to 24 hours.............<8.0 mg/dL  Up to 48 hours............<12.0 mg/dL  3-5 days..................<15.0 mg/dL  6-29 days.................<15.0 mg/dL       Alkaline Phosphatase   Date Value Ref Range Status   12/07/2022 112 55 - 135 U/L Final   02/22/2013 107 55 - 135 U/L Final     AST   Date Value Ref Range Status   12/07/2022 17 10 - 40 U/L Final   02/22/2013 18 10 - 40 U/L Final     ALT   Date Value Ref Range Status   12/07/2022 15 10 - 44 U/L Final     Anion Gap   Date Value Ref Range Status   12/07/2022 7 (L) 8 - 16 mmol/L Final   02/22/2013 11 5 - 15 meq/L Final     eGFR if    Date Value Ref Range Status    11/17/2021 >60.0 >60 mL/min/1.73 m^2 Final     eGFR if non    Date Value Ref Range Status   11/17/2021 >60.0 >60 mL/min/1.73 m^2 Final     Comment:     Calculation used to obtain the estimated glomerular filtration  rate (eGFR) is the CKD-EPI equation.        No results found for: HEPBSAG, HEPBSAB, HEPBCAB        MS Impression and Plan:     NEURO MULTIPLE SCLEROSIS IMPRESSION:   MS Status:     Number of relapses in the past year?:  0    Clinical Progression:  Clinically Stable    MRI Progression:  N/A    MRI Progression comment:  Deferred for now given clinical stabilty; will focus on PT   Plan:     DMT:  No change in management    Symptom Management:  Implement change in symptom management    Implement Change in Symptom Management:  Gait (PT ordered externally)     F/u Saira Peskin [facility] in 4mo       Problem List Items Addressed This Visit          Unprioritized    Gait disturbance    Relevant Orders    Ambulatory referral/consult to Physical/Occupational Therapy    Tremor    Relevant Medications    propranoloL (INDERAL LA) 120 MG 24 hr capsule    MS (multiple sclerosis) - Primary    Relevant Orders    Ambulatory referral/consult to Physical/Occupational Therapy     Other Visit Diagnoses       Dupuytren's contracture of both hands        Relevant Orders    Ambulatory referral/consult to Orthopedics    Counseling regarding goals of care                Genny Apodaca MD    I spent a total of 40 minutes on the day of the visit.This includes face to face time and non-face to face time preparing to see the patient (eg, review of tests), obtaining and/or reviewing separately obtained history, documenting clinical information in the electronic or other health record, independently interpreting results and communicating results to the patient/family/caregiver, or care coordinator.

## 2022-12-06 ENCOUNTER — HOSPITAL ENCOUNTER (INPATIENT)
Facility: HOSPITAL | Age: 72
LOS: 3 days | Discharge: HOME | DRG: 101 | End: 2022-12-09
Attending: STUDENT IN AN ORGANIZED HEALTH CARE EDUCATION/TRAINING PROGRAM | Admitting: STUDENT IN AN ORGANIZED HEALTH CARE EDUCATION/TRAINING PROGRAM
Payer: MEDICARE

## 2022-12-06 DIAGNOSIS — R56.9 SEIZURES: ICD-10-CM

## 2022-12-06 DIAGNOSIS — G40.909 SEIZURE DISORDER: Primary | ICD-10-CM

## 2022-12-06 DIAGNOSIS — G35 MS (MULTIPLE SCLEROSIS): ICD-10-CM

## 2022-12-06 LAB
BASOPHILS # BLD AUTO: 0.06 K/UL (ref 0–0.2)
BASOPHILS NFR BLD: 0.6 % (ref 0–1.9)
DIFFERENTIAL METHOD: ABNORMAL
EOSINOPHIL # BLD AUTO: 0.2 K/UL (ref 0–0.5)
EOSINOPHIL NFR BLD: 1.9 % (ref 0–8)
ERYTHROCYTE [DISTWIDTH] IN BLOOD BY AUTOMATED COUNT: 13.9 % (ref 11.5–14.5)
HCT VFR BLD AUTO: 42.1 % (ref 37–48.5)
HGB BLD-MCNC: 13.3 G/DL (ref 12–16)
IMM GRANULOCYTES # BLD AUTO: 0.04 K/UL (ref 0–0.04)
IMM GRANULOCYTES NFR BLD AUTO: 0.4 % (ref 0–0.5)
LYMPHOCYTES # BLD AUTO: 2.6 K/UL (ref 1–4.8)
LYMPHOCYTES NFR BLD: 25.8 % (ref 18–48)
MCH RBC QN AUTO: 27.9 PG (ref 27–31)
MCHC RBC AUTO-ENTMCNC: 31.6 G/DL (ref 32–36)
MCV RBC AUTO: 88 FL (ref 82–98)
MONOCYTES # BLD AUTO: 0.5 K/UL (ref 0.3–1)
MONOCYTES NFR BLD: 4.9 % (ref 4–15)
NEUTROPHILS # BLD AUTO: 6.6 K/UL (ref 1.8–7.7)
NEUTROPHILS NFR BLD: 66.4 % (ref 38–73)
NRBC BLD-RTO: 0 /100 WBC
PLATELET # BLD AUTO: 259 K/UL (ref 150–450)
PMV BLD AUTO: 12.9 FL (ref 9.2–12.9)
RBC # BLD AUTO: 4.76 M/UL (ref 4–5.4)
WBC # BLD AUTO: 10 K/UL (ref 3.9–12.7)

## 2022-12-06 PROCEDURE — 99223 1ST HOSP IP/OBS HIGH 75: CPT | Mod: ,,, | Performed by: PSYCHIATRY & NEUROLOGY

## 2022-12-06 PROCEDURE — 36410 VNPNXR 3YR/> PHY/QHP DX/THER: CPT

## 2022-12-06 PROCEDURE — 95700 EEG CONT REC W/VID EEG TECH: CPT

## 2022-12-06 PROCEDURE — 80201 ASSAY OF TOPIRAMATE: CPT | Performed by: STUDENT IN AN ORGANIZED HEALTH CARE EDUCATION/TRAINING PROGRAM

## 2022-12-06 PROCEDURE — 93010 ELECTROCARDIOGRAM REPORT: CPT | Mod: ,,, | Performed by: INTERNAL MEDICINE

## 2022-12-06 PROCEDURE — 11000001 HC ACUTE MED/SURG PRIVATE ROOM

## 2022-12-06 PROCEDURE — 76937 US GUIDE VASCULAR ACCESS: CPT

## 2022-12-06 PROCEDURE — 93005 ELECTROCARDIOGRAM TRACING: CPT

## 2022-12-06 PROCEDURE — C1751 CATH, INF, PER/CENT/MIDLINE: HCPCS

## 2022-12-06 PROCEDURE — 25000003 PHARM REV CODE 250: Performed by: PHYSICIAN ASSISTANT

## 2022-12-06 PROCEDURE — 99223 PR INITIAL HOSPITAL CARE,LEVL III: ICD-10-PCS | Mod: ,,, | Performed by: PSYCHIATRY & NEUROLOGY

## 2022-12-06 PROCEDURE — 95714 VEEG EA 12-26 HR UNMNTR: CPT

## 2022-12-06 PROCEDURE — 93010 EKG 12-LEAD: ICD-10-PCS | Mod: ,,, | Performed by: INTERNAL MEDICINE

## 2022-12-06 PROCEDURE — 99223 1ST HOSP IP/OBS HIGH 75: CPT | Mod: FS,,, | Performed by: PHYSICIAN ASSISTANT

## 2022-12-06 PROCEDURE — 85025 COMPLETE CBC W/AUTO DIFF WBC: CPT | Performed by: STUDENT IN AN ORGANIZED HEALTH CARE EDUCATION/TRAINING PROGRAM

## 2022-12-06 PROCEDURE — 99223 PR INITIAL HOSPITAL CARE,LEVL III: ICD-10-PCS | Mod: FS,,, | Performed by: PHYSICIAN ASSISTANT

## 2022-12-06 RX ORDER — PROPRANOLOL HYDROCHLORIDE 20 MG/1
80 TABLET ORAL DAILY
Status: DISCONTINUED | OUTPATIENT
Start: 2022-12-07 | End: 2022-12-09 | Stop reason: HOSPADM

## 2022-12-06 RX ORDER — DOCUSATE SODIUM 100 MG/1
100 CAPSULE, LIQUID FILLED ORAL 2 TIMES DAILY PRN
Status: DISCONTINUED | OUTPATIENT
Start: 2022-12-06 | End: 2022-12-09 | Stop reason: HOSPADM

## 2022-12-06 RX ORDER — DULOXETIN HYDROCHLORIDE 20 MG/1
20 CAPSULE, DELAYED RELEASE ORAL NIGHTLY
Status: DISCONTINUED | OUTPATIENT
Start: 2022-12-06 | End: 2022-12-09 | Stop reason: HOSPADM

## 2022-12-06 RX ORDER — ATORVASTATIN CALCIUM 20 MG/1
40 TABLET, FILM COATED ORAL DAILY
Status: DISCONTINUED | OUTPATIENT
Start: 2022-12-07 | End: 2022-12-09 | Stop reason: HOSPADM

## 2022-12-06 RX ORDER — CLOPIDOGREL BISULFATE 75 MG/1
75 TABLET ORAL DAILY
Status: DISCONTINUED | OUTPATIENT
Start: 2022-12-07 | End: 2022-12-09 | Stop reason: HOSPADM

## 2022-12-06 RX ORDER — TOPIRAMATE 25 MG/1
100 TABLET ORAL 2 TIMES DAILY
Status: DISCONTINUED | OUTPATIENT
Start: 2022-12-06 | End: 2022-12-09 | Stop reason: HOSPADM

## 2022-12-06 RX ORDER — LORAZEPAM 2 MG/ML
2 INJECTION INTRAMUSCULAR
Status: DISCONTINUED | OUTPATIENT
Start: 2022-12-06 | End: 2022-12-09 | Stop reason: HOSPADM

## 2022-12-06 RX ORDER — ONDANSETRON 8 MG/1
8 TABLET, ORALLY DISINTEGRATING ORAL EVERY 8 HOURS PRN
Status: DISCONTINUED | OUTPATIENT
Start: 2022-12-06 | End: 2022-12-09 | Stop reason: HOSPADM

## 2022-12-06 RX ORDER — SODIUM CHLORIDE 0.9 % (FLUSH) 0.9 %
10 SYRINGE (ML) INJECTION
Status: DISCONTINUED | OUTPATIENT
Start: 2022-12-06 | End: 2022-12-09 | Stop reason: HOSPADM

## 2022-12-06 RX ORDER — ACETAMINOPHEN 325 MG/1
650 TABLET ORAL EVERY 4 HOURS PRN
Status: DISCONTINUED | OUTPATIENT
Start: 2022-12-06 | End: 2022-12-09 | Stop reason: HOSPADM

## 2022-12-06 RX ADMIN — TOPIRAMATE 100 MG: 25 TABLET, FILM COATED ORAL at 09:12

## 2022-12-06 RX ADMIN — DULOXETINE HYDROCHLORIDE 20 MG: 20 CAPSULE, DELAYED RELEASE ORAL at 09:12

## 2022-12-06 NOTE — HPI
"Ms. John is a 72 year old woman with MS, HLD, prior MI, anxiety/depression and epilepsy since approximatley 1971 admitted to EMU for capture and characterization of new "miniseizures" that began occurring while attempting to taper off Topiramate. Patient reports she has not had a typical seizure event for years. She has previously endorsed events of shoulder jerking, as well as generalized convulsions associated with tongue biting and urinary incontinence. She has been maintained on Topiramate and Clonazepam for many years, and was recently working with outpatient epileptologist (Dr. Trimble) to taper off Clonazepam due to excessive sleepiness. Patient and  report that when patient tapered down to 0.5 mg every other night and then discontinued Clonazepam, patient began to have miniseizures that she describes as electrical charges in her head and feeling spacy with no loss of awareness. Due to these events, she resumed taking Clonazepam 0.5 mg nightly with resolution. Admit to EMU for capture and characterization of new event type.      Epilepsy History - Clinic Visit with Dr. Trimble 10/31/22  The patient is a 72 y.o. female with a diagnosis of MS who we are seeing for epilepsy.  Since she was last here, we have tried weaning her Klonopin.  She reports "miniseizures" occurring up to multiple times per day on low doses of Klonopin which resolve when the medication is increased.     History of present illness:  The patient is a 72 y.o. female with a PMH of MS (diagnosed in 2000) we have been asked to see for evaluation of episodes suspicious for seizures and medication management.  The patient reports a longstanding diagnosis of epilepsy and that she has a history of 2 types of seizures.     Seizure type 1: "Petit mal"  These began some time prior to 1971. With respect to aura, the patient reports nothing.  Her seizure is characterized by several jerks in either shoulder.  This component of this spell lasts for " "approximately several seconds.  Afterwards, she reports no postictal state.  The patient's frequency of events was roughly daily prior to starting AEDs.  She reports that she has not had one of these in years.     Seizure type 2: "Grand mal"  These began in 1971. With respect to warning, the patient would have "a lot of the petite mal" seizures.  Her seizure is characterized by loss of awareness, generalized stiffening, and generalized shaking.  She would have foaming of the mouth, urinary incontinence, and tongue biting.  This component of this spell lasts for approximately 2-3 minutes.  Afterwards, she was fatigued.  The patient's last event of this type was in 1990.  Prior to starting medication, they happened about once a month on average.     She does not give a history of events suspicious for absence seizures.     Regarding medications, the patient was tried on phenobarbital, Dilantin, and Depakote without success.  Tegretol was then given, and she continued to have seizures.  In 1982, Klonopin was added.  She became seizure free on this regimen, aside from a seizure in 1990 when she tried to wean herself off her AEDs.  The patient's Tegretol was changed to Topamax at some point after 2000 when she became thrombocytopenic while also receiving Tysabri.  They also report that at some point, her Klonopin was increased from 0.5 mg BID to 1 mg BID.  They indicate that there was no clear reason why this was done.  They deny that she was having seizures.  They do report significant issues with fatigue, and they indicate that this prompted Dr. Apodaca to refer her here for revision of her AED regimen.     Potential Epilepsy Risk Factors:   Pregnancy/Labor/Delivery - none  Febrile seizures - none  Head injury  - none  CNS infection - none   Stroke - none  Family Hx of Sz - none     AED Treatments  Present regimen  Topamax 100 mg BID  Klonopin 0.5 mg BID     Prior treatments  Tegretol (discontinued due to " thrombocytopenia in the setting of Tysabri administration)  Phenobarital (ineffective)  Dilantin (ineffective)  Depakote (ineffective)

## 2022-12-06 NOTE — H&P
"[facility]  Neurology-Epilepsy  History & Physical    Patient Name: Alyssa John  MRN: [id number]   Admission Date: 12/6/2022  Code Status: Full Code   Attending Provider: Stephy Dykes*   Primary Care Physician: Piyush Sharif MD  Principal Problem:Seizure disorder    Subjective:     Chief Complaint:  spells     HPI:   Ms. John is a 72 year old woman with MS, HLD, prior MI, anxiety/depression and epilepsy since approximatley 1971 admitted to EMU for capture and characterization of new "miniseizures" that began occurring while attempting to taper off Topiramate. Patient reports she has not had a typical seizure event for years. She has previously endorsed events of shoulder jerking, as well as generalized convulsions associated with tongue biting and urinary incontinence. She has been maintained on Topiramate and Clonazepam for many years, and was recently working with outpatient epileptologist (Dr. Trimble) to taper off Clonazepam due to excessive sleepiness. Patient and  report that when patient tapered down to 0.5 mg every other night and then discontinued Clonazepam, patient began to have miniseizures that she describes as electrical charges in her head and feeling spacy with no loss of awareness. Due to these events, she resumed taking Clonazepam 0.5 mg nightly with resolution. Admit to EMU for capture and characterization of new event type.      Epilepsy History - Clinic Visit with Dr. Trimble 10/31/22  The patient is a 72 y.o. female with a diagnosis of MS who we are seeing for epilepsy.  Since she was last here, we have tried weaning her Klonopin.  She reports "miniseizures" occurring up to multiple times per day on low doses of Klonopin which resolve when the medication is increased.     History of present illness:  The patient is a 72 y.o. female with a PMH of MS (diagnosed in 2000) we have been asked to see for evaluation of episodes suspicious for seizures and medication " "management.  The patient reports a longstanding diagnosis of epilepsy and that she has a history of 2 types of seizures.     Seizure type 1: "Petit mal"  These began some time prior to 1971. With respect to aura, the patient reports nothing.  Her seizure is characterized by several jerks in either shoulder.  This component of this spell lasts for approximately several seconds.  Afterwards, she reports no postictal state.  The patient's frequency of events was roughly daily prior to starting AEDs.  She reports that she has not had one of these in years.     Seizure type 2: "Grand mal"  These began in 1971. With respect to warning, the patient would have "a lot of the petite mal" seizures.  Her seizure is characterized by loss of awareness, generalized stiffening, and generalized shaking.  She would have foaming of the mouth, urinary incontinence, and tongue biting.  This component of this spell lasts for approximately 2-3 minutes.  Afterwards, she was fatigued.  The patient's last event of this type was in 1990.  Prior to starting medication, they happened about once a month on average.     She does not give a history of events suspicious for absence seizures.     Regarding medications, the patient was tried on phenobarbital, Dilantin, and Depakote without success.  Tegretol was then given, and she continued to have seizures.  In 1982, Klonopin was added.  She became seizure free on this regimen, aside from a seizure in 1990 when she tried to wean herself off her AEDs.  The patient's Tegretol was changed to Topamax at some point after 2000 when she became thrombocytopenic while also receiving Tysabri.  They also report that at some point, her Klonopin was increased from 0.5 mg BID to 1 mg BID.  They indicate that there was no clear reason why this was done.  They deny that she was having seizures.  They do report significant issues with fatigue, and they indicate that this prompted Dr. Apodaca to refer her here for " revision of her AED regimen.     Potential Epilepsy Risk Factors:   Pregnancy/Labor/Delivery - none  Febrile seizures - none  Head injury  - none  CNS infection - none   Stroke - none  Family Hx of Sz - none     AED Treatments  Present regimen  Topamax 100 mg BID  Klonopin 0.5 mg BID     Prior treatments  Tegretol (discontinued due to thrombocytopenia in the setting of Tysabri administration)  Phenobarital (ineffective)  Dilantin (ineffective)  Depakote (ineffective)      Past Medical History:   Diagnosis Date    Arthritis     Coronary artery disease     Encounter for blood transfusion     Epilepsy     GERD (gastroesophageal reflux disease)     Headache     Hypertension     MI, old     MS (multiple sclerosis)     Seizures     epilepsy       Past Surgical History:   Procedure Laterality Date    APPENDECTOMY      BACK SURGERY      L5 discectomy    BREAST BIOPSY Right 15 yrs ago    benign    CATARACT EXTRACTION Bilateral     COLONOSCOPY N/A 9/16/2015    Procedure: COLONOSCOPY;  Surgeon: Henry Malcolm MD;  Location: [facility];  Service: Endoscopy;  Laterality: N/A;    CORONARY STENT PLACEMENT      right knee arthroscopy         Review of patient's allergies indicates:   Allergen Reactions    Codeine      Other reaction(s): throat tightness    Dilantin [phenytoin sodium extended]     Phenobarbital     Tegretol [carbamazepine]        No current facility-administered medications on file prior to encounter.     Current Outpatient Medications on File Prior to Encounter   Medication Sig    atorvastatin (LIPITOR) 40 MG tablet TAKE 1 TABLET BY MOUTH EVERY DAY    clonazePAM (KLONOPIN) 1 MG tablet Take 0.5 mg PO in the AM and 0.5 mg PO in the PM    clopidogrel (PLAVIX) 75 mg tablet Take 75 mg by mouth once daily.    DULoxetine (CYMBALTA) 20 MG capsule TAKE 2 CAPSULES BY MOUTH ONCE DAILY    famotidine (PEPCID) 20 MG tablet Take 20 mg by mouth 2 (two) times daily as needed for Heartburn.    topiramate (TOPAMAX) 100 MG tablet  Take 50 mg in the AM and 100 mg in the PM for 2 weeks, then increase to 100 mg twice per day (Patient taking differently: Take 100 mg by mouth 2 (two) times daily. Take 50 mg in the AM and 100 mg in the PM for 2 weeks, then increase to 100 mg twice per day)    albuterol (PROVENTIL/VENTOLIN HFA) 90 mcg/actuation inhaler 1 puff as needed.    cetirizine (ZYRTEC) 10 MG tablet Take 1 tablet (10 mg total) by mouth once daily.    cholecalciferol, vitamin D3, 10 mcg (400 unit) Cap capsule 1 tablet.    cholecalciferol, vitamin D3, 125 mcg (5,000 unit) Tab Take 5,000 Units by mouth once daily.    estradioL (ESTRACE) 0.01 % (0.1 mg/gram) vaginal cream Place 0.5 g vaginally every evening. Use nightly for two weeks then twice weekly for maintenance    fluticasone propionate (FLONASE) 50 mcg/actuation nasal spray 1 spray (50 mcg total) by Each Nostril route once daily.    gabapentin (NEURONTIN) 300 MG capsule Take 1 capsule (300 mg total) by mouth 3 (three) times daily.    nitroGLYCERIN (NITROSTAT) 0.4 MG SL tablet Place 0.4 mg under the tongue every 5 (five) minutes as needed for Chest pain.    prednisoLONE acetate (PRED FORTE) 1 % DrpS INSTILL 1 DROP THREE TIMES A DAY INTO BOTH EYES    pulse oximeter (PULSE OXIMETER) device by Apply Externally route 2 (two) times a day. Use twice daily at 8 AM and 3 PM and [facility].    [DISCONTINUED] hydroCHLOROthiazide (HYDRODIURIL) 12.5 MG Tab      Continuous Infusions:    Family History       Problem Relation (Age of Onset)    Heart disease Father    Scleroderma Mother          Tobacco Use    Smoking status: Former     Packs/day: 1.50     Types: Cigarettes     Quit date: 12/26/2006     Years since quitting: 15.9    Smokeless tobacco: Never   Substance and Sexual Activity    Alcohol use: No     Alcohol/week: 0.0 standard drinks    Drug use: No    Sexual activity: Not Currently     Partners: Male     Review of Systems   Neurological:  Positive for tremors and  seizures. Negative for facial asymmetry.   All other systems reviewed and are negative.  Objective:     Vital Signs (Most Recent):  Temp: 98 °F (36.7 °C) (12/06/22 1525)  Pulse: (!) 53 (12/06/22 1525)  Resp: 16 (12/06/22 1525)  BP: 119/74 (12/06/22 1525)  SpO2: 96 % (12/06/22 1525)   Vital Signs (24h Range):  Temp:  [98 °F (36.7 °C)] 98 °F (36.7 °C)  Pulse:  [52-55] 53  Resp:  [16] 16  SpO2:  [96 %-97 %] 96 %  BP: (119-136)/(65-74) 119/74     Weight: 82 kg (180 lb 12.4 oz)  Body mass index is 32.02 kg/m².    Physical Exam  Vitals reviewed.   Constitutional:       General: She is not in acute distress.     Appearance: She is not diaphoretic.   HENT:      Head: Normocephalic and atraumatic.   Eyes:      General: No scleral icterus.     Extraocular Movements: Extraocular movements intact.      Conjunctiva/sclera: Conjunctivae normal.      Pupils: Pupils are equal, round, and reactive to light.   Cardiovascular:      Rate and Rhythm: Normal rate.   Pulmonary:      Effort: Pulmonary effort is normal. No respiratory distress.   Abdominal:      General: There is no distension.      Palpations: Abdomen is soft.   Musculoskeletal:         General: No signs of injury.      Cervical back: Neck supple. No rigidity.   Skin:     General: Skin is warm and dry.   Neurological:      General: No focal deficit present.      Mental Status: She is alert and oriented to person, place, and time. Mental status is at baseline.   Psychiatric:         Mood and Affect: Mood normal.         Behavior: Behavior normal.       NEUROLOGICAL EXAMINATION:     MENTAL STATUS   Oriented to person, place, and time.     CRANIAL NERVES     CN III, IV, VI   Pupils are equal, round, and reactive to light.    CN VII   Facial expression full, symmetric.     CN VIII   Hearing: intact    CN XI   CN XI normal.     CN XII   CN XII normal.     GAIT AND COORDINATION        Kinetic tremor BUE  Walker for ambulation, unsteady     Significant Labs: All pertinent lab  results from the past 24 hours have been reviewed.    Significant Studies: I have reviewed all pertinent imaging results/findings within the past 24 hours.    Assessment and Plan:     * Seizure disorder  72 year old woman with events suspicious for seizure since approximately 1971 admitted to EMU for capture and characterization of new events of electrical charges in head/feeling spacy with no loss of awarness that began when tapering off Clonazepam. Currently maintained on Topiramate 100 mg BID, Clonazepam 0.5 mg qHS with no typical seizures in many years. New events resolved after resuming Clonazepam 0.5 mg qHS.    - Continuous vEEG   - Hold home Clonazepam beginning on admission  - Continue Topiramate 100 mg BID  - Topiramate level pending  - Activation procedures per protocol- medication adjustments as above  - IV Ativan PRN for GTC greater than 5 min - please call epilepsy on call before administering  - Seizure precautions, suction and oxygen at bedside  - Fall precautions, side rail padding in place  - Visi monitoring with continuous pulse oximetry   - Telemetry    History of MI (myocardial infarction)  - Followed by Cardiology at OSH, continue home Clopidogrel during admission    Anxiety and depression  - Continue home Duloxetine    Hyperlipidemia with target LDL less than 70  - Continue home Atorvastatin    CAD (coronary artery disease)  - Hx of prior MI, continue home Clopidogrel during admission    MS (multiple sclerosis)  - Followed by Dr. Apodaca as outpatient, recently seen in clinic 12/5  - Not on DMT currently        VTE Risk Mitigation (From admission, onward)           Ordered     IP VTE LOW RISK PATIENT  Once         12/06/22 1441     Place NGOC hose  Until discontinued         12/06/22 1441     Place sequential compression device  Until discontinued         12/06/22 1441                    Madeleine Forbes PA-C  Neurology-Epilepsy  [facility] - EMU    Patient discussed with Dr. Oro and   Antimisiaris

## 2022-12-06 NOTE — NURSING
"EMU Nursing Interview    Patient admitted to room 906 - EMU team notified: LEO Salvador  Onset- When did your seizures first start? 1971  Aura- Do you experience an aura? "It feels like electrical activity in my head."  Symptoms- What symptoms do you experience? "My mind is somewhere else." Family denies witnessing any outward symptoms. Patient can carry on conversation during episode but feels there is something "off".  Triggers- Do you have any triggers? Reports episodes while attempting to wean off clonazepam.  Do you bite your tongue? Patient denies.  Do you become incontinent of bladder or bowels? Patient reports, "I don't think so."  Have you been told your BP or oxygen drops during event? Patient denies.    Bed locked in lowest position with bed alarm on and patient  at bedside. Instructed to call staff for mobility. Educated to use event button when patient feels an oncoming event or when an event is suspected. Patient and family verbalize understanding.  "

## 2022-12-06 NOTE — CONSULTS
COLEMAN placed 18g, 8 cm Midline in right brachial vein using u/s guidance.  Max dwell date 1/4/2023.  Lot # [id number].    MIDLINE inserted for long term PVA

## 2022-12-06 NOTE — ASSESSMENT & PLAN NOTE
72 year old woman with events suspicious for seizure since approximately 1971 admitted to EMU for capture and characterization of new events of electrical charges in head/feeling spacy with no loss of awarness that began when tapering off Clonazepam. Currently maintained on Topiramate 100 mg BID, Clonazepam 0.5 mg qHS with no typical seizures in many years. New events resolved after resuming Clonazepam 0.5 mg qHS.    - Continuous vEEG   - Hold home Clonazepam beginning on admission  - Topiramate level pending  - Activation procedures per protocol- medication adjustments as above  - IV Ativan PRN for GTC greater than 5 min - please call epilepsy on call before administering  - Seizure precautions, suction and oxygen at bedside  - Fall precautions, side rail padding in place  - Visi monitoring with continuous pulse oximetry   - Telemetry

## 2022-12-06 NOTE — SUBJECTIVE & OBJECTIVE
Past Medical History:   Diagnosis Date    Arthritis     Coronary artery disease     Encounter for blood transfusion     Epilepsy     GERD (gastroesophageal reflux disease)     Headache     Hypertension     MI, old     MS (multiple sclerosis)     Seizures     epilepsy       Past Surgical History:   Procedure Laterality Date    APPENDECTOMY      BACK SURGERY      L5 discectomy    BREAST BIOPSY Right 15 yrs ago    benign    CATARACT EXTRACTION Bilateral     COLONOSCOPY N/A 9/16/2015    Procedure: COLONOSCOPY;  Surgeon: Henry Malcolm MD;  Location: [facility];  Service: Endoscopy;  Laterality: N/A;    CORONARY STENT PLACEMENT      right knee arthroscopy         Review of patient's allergies indicates:   Allergen Reactions    Codeine      Other reaction(s): throat tightness    Dilantin [phenytoin sodium extended]     Phenobarbital     Tegretol [carbamazepine]        No current facility-administered medications on file prior to encounter.     Current Outpatient Medications on File Prior to Encounter   Medication Sig    atorvastatin (LIPITOR) 40 MG tablet TAKE 1 TABLET BY MOUTH EVERY DAY    clonazePAM (KLONOPIN) 1 MG tablet Take 0.5 mg PO in the AM and 0.5 mg PO in the PM    clopidogrel (PLAVIX) 75 mg tablet Take 75 mg by mouth once daily.    DULoxetine (CYMBALTA) 20 MG capsule TAKE 2 CAPSULES BY MOUTH ONCE DAILY    famotidine (PEPCID) 20 MG tablet Take 20 mg by mouth 2 (two) times daily as needed for Heartburn.    topiramate (TOPAMAX) 100 MG tablet Take 50 mg in the AM and 100 mg in the PM for 2 weeks, then increase to 100 mg twice per day (Patient taking differently: Take 100 mg by mouth 2 (two) times daily. Take 50 mg in the AM and 100 mg in the PM for 2 weeks, then increase to 100 mg twice per day)    albuterol (PROVENTIL/VENTOLIN HFA) 90 mcg/actuation inhaler 1 puff as needed.    cetirizine (ZYRTEC) 10 MG tablet Take 1 tablet (10 mg total) by mouth once daily.    cholecalciferol, vitamin D3, 10 mcg (400 unit) Cap  capsule 1 tablet.    cholecalciferol, vitamin D3, 125 mcg (5,000 unit) Tab Take 5,000 Units by mouth once daily.    estradioL (ESTRACE) 0.01 % (0.1 mg/gram) vaginal cream Place 0.5 g vaginally every evening. Use nightly for two weeks then twice weekly for maintenance    fluticasone propionate (FLONASE) 50 mcg/actuation nasal spray 1 spray (50 mcg total) by Each Nostril route once daily.    gabapentin (NEURONTIN) 300 MG capsule Take 1 capsule (300 mg total) by mouth 3 (three) times daily.    nitroGLYCERIN (NITROSTAT) 0.4 MG SL tablet Place 0.4 mg under the tongue every 5 (five) minutes as needed for Chest pain.    prednisoLONE acetate (PRED FORTE) 1 % DrpS INSTILL 1 DROP THREE TIMES A DAY INTO BOTH EYES    pulse oximeter (PULSE OXIMETER) device by Apply Externally route 2 (two) times a day. Use twice daily at 8 AM and 3 PM and record the value in MyChart as directed.    [DISCONTINUED] hydroCHLOROthiazide (HYDRODIURIL) 12.5 MG Tab      Continuous Infusions:    Family History       Problem Relation (Age of Onset)    Heart disease Father    Scleroderma Mother          Tobacco Use    Smoking status: Former     Packs/day: 1.50     Types: Cigarettes     Quit date: 12/26/2006     Years since quitting: 15.9    Smokeless tobacco: Never   Substance and Sexual Activity    Alcohol use: No     Alcohol/week: 0.0 standard drinks    Drug use: No    Sexual activity: Not Currently     Partners: Male     Review of Systems   Neurological:  Positive for tremors and seizures. Negative for facial asymmetry.   All other systems reviewed and are negative.  Objective:     Vital Signs (Most Recent):  Temp: 98 °F (36.7 °C) (12/06/22 1525)  Pulse: (!) 53 (12/06/22 1525)  Resp: 16 (12/06/22 1525)  BP: 119/74 (12/06/22 1525)  SpO2: 96 % (12/06/22 1525)   Vital Signs (24h Range):  Temp:  [98 °F (36.7 °C)] 98 °F (36.7 °C)  Pulse:  [52-55] 53  Resp:  [16] 16  SpO2:  [96 %-97 %] 96 %  BP: (119-136)/(65-74) 119/74     Weight: 82 kg (180 lb 12.4  oz)  Body mass index is 32.02 kg/m².    Physical Exam  Vitals reviewed.   Constitutional:       General: She is not in acute distress.     Appearance: She is not diaphoretic.   HENT:      Head: Normocephalic and atraumatic.   Eyes:      General: No scleral icterus.     Extraocular Movements: Extraocular movements intact.      Conjunctiva/sclera: Conjunctivae normal.      Pupils: Pupils are equal, round, and reactive to light.   Cardiovascular:      Rate and Rhythm: Normal rate.   Pulmonary:      Effort: Pulmonary effort is normal. No respiratory distress.   Abdominal:      General: There is no distension.      Palpations: Abdomen is soft.   Musculoskeletal:         General: No signs of injury.      Cervical back: Neck supple. No rigidity.   Skin:     General: Skin is warm and dry.   Neurological:      General: No focal deficit present.      Mental Status: She is alert and oriented to person, place, and time. Mental status is at baseline.   Psychiatric:         Mood and Affect: Mood normal.         Behavior: Behavior normal.       NEUROLOGICAL EXAMINATION:     MENTAL STATUS   Oriented to person, place, and time.     CRANIAL NERVES     CN III, IV, VI   Pupils are equal, round, and reactive to light.    CN VII   Facial expression full, symmetric.     CN VIII   Hearing: intact    CN XI   CN XI normal.     CN XII   CN XII normal.     GAIT AND COORDINATION        Kinetic tremor BUE  Walker for ambulation, unsteady     Significant Labs: All pertinent lab results from the past 24 hours have been reviewed.    Significant Studies: I have reviewed all pertinent imaging results/findings within the past 24 hours.

## 2022-12-07 LAB
ALBUMIN SERPL BCP-MCNC: 3.7 G/DL (ref 3.5–5.2)
ALP SERPL-CCNC: 112 U/L (ref 55–135)
ALT SERPL W/O P-5'-P-CCNC: 15 U/L (ref 10–44)
ANION GAP SERPL CALC-SCNC: 7 MMOL/L (ref 8–16)
AST SERPL-CCNC: 17 U/L (ref 10–40)
BILIRUB SERPL-MCNC: 0.4 MG/DL (ref 0.1–1)
BUN SERPL-MCNC: 19 MG/DL (ref 8–23)
CALCIUM SERPL-MCNC: 8.6 MG/DL (ref 8.7–10.5)
CHLORIDE SERPL-SCNC: 113 MMOL/L (ref 95–110)
CO2 SERPL-SCNC: 21 MMOL/L (ref 23–29)
CREAT SERPL-MCNC: 0.9 MG/DL (ref 0.5–1.4)
EST. GFR  (NO RACE VARIABLE): >60 ML/MIN/1.73 M^2
GLUCOSE SERPL-MCNC: 98 MG/DL (ref 70–110)
POTASSIUM SERPL-SCNC: 3.7 MMOL/L (ref 3.5–5.1)
PROT SERPL-MCNC: 6.7 G/DL (ref 6–8.4)
SODIUM SERPL-SCNC: 141 MMOL/L (ref 136–145)

## 2022-12-07 PROCEDURE — 99233 SBSQ HOSP IP/OBS HIGH 50: CPT | Mod: ,,, | Performed by: PSYCHIATRY & NEUROLOGY

## 2022-12-07 PROCEDURE — 99233 PR SUBSEQUENT HOSPITAL CARE,LEVL III: ICD-10-PCS | Mod: ,,, | Performed by: PSYCHIATRY & NEUROLOGY

## 2022-12-07 PROCEDURE — 95714 VEEG EA 12-26 HR UNMNTR: CPT

## 2022-12-07 PROCEDURE — 80053 COMPREHEN METABOLIC PANEL: CPT | Performed by: STUDENT IN AN ORGANIZED HEALTH CARE EDUCATION/TRAINING PROGRAM

## 2022-12-07 PROCEDURE — 11000001 HC ACUTE MED/SURG PRIVATE ROOM

## 2022-12-07 PROCEDURE — 99233 SBSQ HOSP IP/OBS HIGH 50: CPT | Mod: FS,,, | Performed by: PHYSICIAN ASSISTANT

## 2022-12-07 PROCEDURE — 25000003 PHARM REV CODE 250: Performed by: PHYSICIAN ASSISTANT

## 2022-12-07 PROCEDURE — 95720 PR EEG, W/VIDEO, CONT RECORD, I&R, >12<26 HRS: ICD-10-PCS | Mod: ,,, | Performed by: STUDENT IN AN ORGANIZED HEALTH CARE EDUCATION/TRAINING PROGRAM

## 2022-12-07 PROCEDURE — 99233 PR SUBSEQUENT HOSPITAL CARE,LEVL III: ICD-10-PCS | Mod: FS,,, | Performed by: PHYSICIAN ASSISTANT

## 2022-12-07 PROCEDURE — 95720 EEG PHY/QHP EA INCR W/VEEG: CPT | Mod: ,,, | Performed by: STUDENT IN AN ORGANIZED HEALTH CARE EDUCATION/TRAINING PROGRAM

## 2022-12-07 RX ADMIN — TOPIRAMATE 100 MG: 25 TABLET, FILM COATED ORAL at 08:12

## 2022-12-07 RX ADMIN — ATORVASTATIN CALCIUM 40 MG: 20 TABLET, FILM COATED ORAL at 08:12

## 2022-12-07 RX ADMIN — CLOPIDOGREL BISULFATE 75 MG: 75 TABLET ORAL at 08:12

## 2022-12-07 RX ADMIN — DULOXETINE HYDROCHLORIDE 20 MG: 20 CAPSULE, DELAYED RELEASE ORAL at 09:12

## 2022-12-07 RX ADMIN — PROPRANOLOL HYDROCHLORIDE 80 MG: 20 TABLET ORAL at 08:12

## 2022-12-07 RX ADMIN — TOPIRAMATE 100 MG: 25 TABLET, FILM COATED ORAL at 09:12

## 2022-12-07 NOTE — PLAN OF CARE
Problem: Adult Inpatient Plan of Care  Goal: Plan of Care Review  Outcome: Ongoing, Progressing  Flowsheets (Taken 12/7/2022 1606)  Plan of Care Reviewed With:   patient   spouse  Goal: Patient-Specific Goal (Individualized)  Outcome: Ongoing, Progressing  Flowsheets (Taken 12/7/2022 1606)  Anxieties, Fears or Concerns: none  Individualized Care Needs: none  Patient-Specific Goals (Include Timeframe): none    POC reviewed with the patient and they verbalized understanding. All comments and concerns addressed. Bed locked in lowest position, call light within reach. Safety precautions maintained. VSS, see flowsheets. No events this shift.

## 2022-12-07 NOTE — PROCEDURES
[facility] VIDEO ELECTROENCEPHALOGRAM  REPORT    DATE OF SERVICE: 12/6/22  REQUESTED BY:  Hipolito Trimble MD  LOCATION OF SERVICE:  [facility]    METHODOLOGY   Electroencephalographic (EEG) recording is with electrodes placed according to the International 10-20 placement system.  Thirty two (32) channels of digital signal (sampling rate of 512/sec) including T1 and T2 was simultaneously recorded from the scalp and may include  EKG, EMG, and/or eye monitors.  Recording band pass was 0.1 to 512 hz.  Digital video recording of the patient is simultaneously recorded with the EEG.  The patient is instructed report clinical symptoms which may occur during the recording session.  EEG and video recording is stored and archived in digital format.  Activation procedures which include photic stimulation, hyperventilation and instructing patients to perform simple task are done in selected patients.   The EEG is displayed on a monitor screen and can be reviewed using different montages.  Computer assisted analysis is employed to detect spike and electrographic seizure activity.   The entire record is submitted for computer analysis.  The entire recording is visually reviewed and the times identified by computer analysis as being spikes or seizures are reviewed again.  Compresses spectral analysis (CSA) is also performed on the activity recorded from each individual channel.  This is displayed as a power display of frequencies from 0 to 30 Hz over time.   The CSA is reviewed looking for asymmetries in power between homologous areas of the scalp and then compared with the original EEG recording.     [employer] software was also utilized in the review of this study.  This software suite analyzes the EEG recording in multiple domains.  Coherence and rhythmicity is computed to identify EEG sections which may contain organized seizures.  Each channel undergoes analysis to detect presence of spike and sharp waves which have special and morphological  "characteristic of epileptic activity.  The routine EEG recording is converted from spacial into frequency domain.  This is then displayed comparing homologous areas to identify areas of significant asymmetry.  Algorithm to identify non-cortically generated artifact is used to separate eye movement, EMG and other artifact from the EEG.      ELECTROENCEPHALOGRAM:    DAY 1:  RECORDING TIMES:  Start on 12/6/22 at 15:59:19  Stop on 12/7/22 at 07:00:01    A total of 14 hrs and 50 min of EEG recording was obtained.    Indication: 72 year old female with history of epilepsy and MS admitted for evaluation of "miniseizures" consisting of electrical charges in her head without loss of awareness.  These events occurred in the setting of klonopin wean.  The patient is also maintained on topamax as an outpatient.      State of Consciousness:   Awake and asleep    Background:   The background is well organized, symmetric and continuous.  There is a normal anterior to posterior gradient consisting of 5-10 mcV amplitude beta activity in the frontal region and well defined alpha activity in the posterior region.  . There is a well developed 30-70 uV, 9-10 Hz posterior dominant rhythm that is symmetric and reactive to eye opening and closure     Sleep:   The patient reaches stage 2 sleep with symmetric vertex waves, sleep spindles, and k complexes noted.     Non-epileptiform Abnormalities:  Intermittent slow, generalized: Intermittent bursts of high amplitude delta as well as polymorphic theta activity occur during the record        Epileptiform Abnormalities:   None    EKG:   NSR with rate averaging 50-60 BPM    Activating procedures:   - Hyperventilation and photic stimulation not performed    Events:   None      Impression:  This is an abnormal awake and sleep EEG due to the presence of intermittent generalized slowing, indicative of nonspecific global cerebral dysfunction.  No epileptiform discharges or seizures are recorded. "     Petra Arnold MD  [facility]   Department of Neurology

## 2022-12-07 NOTE — HOSPITAL COURSE
72 year old woman with events suspicious for seizure since approximately 1971 admitted to EMU for capture and characterization of new events of electrical charges in head/feeling spacy with no loss of awarness that began when tapering off Clonazepam. Currently maintained on Topiramate 100 mg BID, Clonazepam 0.5 mg qHS with no typical seizures in many years. New events resolved after resuming Clonazepam 0.5 mg qHS.  12/6>12/7: Holding Clonazepam since admission, continued Topiramate 100 mg BID. No typical events captured overnight, continue close vEEG.  12/7>12/8: No typical events captured overnight, EEG without seizures. Continue Topiramate 100 mg BID and holding Clonazepam.  12/8>12/9: No events since admission, EEG throughout admission without epileptiform discharge, seizures, or clinical events. Patient at baseline, doing well of Clonazepam. Given several days without events after holding Clonazepam, will discontinue Clonazepam on discharge. Possible that recent events may have been side effect of tapering down Clonazepam. Patient to follow up with Dr. Trimble for further management, stable for discharge home today.

## 2022-12-07 NOTE — PROGRESS NOTES
[facility]  Neurology-Epilepsy  Progress Note    Patient Name: Alyssa John  MRN: [id number]  Admission Date: 12/6/2022  Hospital Length of Stay: 1 days  Code Status: Full Code   Attending Provider: Stephy Dykes*  Primary Care Physician: Piyush Sharif MD   Principal Problem:Seizure disorder    Subjective:     Hospital Course:   72 year old woman with events suspicious for seizure since approximately 1971 admitted to EMU for capture and characterization of new events of electrical charges in head/feeling spacy with no loss of awarness that began when tapering off Clonazepam. Currently maintained on Topiramate 100 mg BID, Clonazepam 0.5 mg qHS with no typical seizures in many years. New events resolved after resuming Clonazepam 0.5 mg qHS.  12/6>12/7: Holding Clonazepam since admission, continued Topiramate 100 mg BID. No typical events captured overnight, continue close vEEG.      Interval History: No typical events captured since admission, patient at baseline. Continue holding Clonazepam to provoke event.    Current Facility-Administered Medications   Medication Dose Route Frequency Provider Last Rate Last Admin    acetaminophen tablet 650 mg  650 mg Oral Q4H PRN LEO Ramos-DELMA        atorvastatin tablet 40 mg  40 mg Oral Daily LEO Ramos-C   40 mg at 12/07/22 0804    clopidogreL tablet 75 mg  75 mg Oral Daily LEO Ramos-C   75 mg at 12/07/22 0803    docusate sodium capsule 100 mg  100 mg Oral BID PRN Madeleine Forbes PA-C        DULoxetine DR capsule 20 mg  20 mg Oral QHS LEO Ramos-C   20 mg at 12/06/22 2129    LORazepam injection 2 mg  2 mg Intravenous Q15 Min PRN LEO Ramos-DELMA        ondansetron disintegrating tablet 8 mg  8 mg Oral Q8H PRN LEO Ramos-DELMA        propranoloL tablet 80 mg  80 mg Oral Daily LEO Ramos-C   80 mg at 12/07/22 0803    sodium chloride 0.9% flush 10 mL  10 mL Intravenous PRN Madeleine Forbes PA-C        topiramate tablet 100  mg  100 mg Oral BID Madeleine Forbes PA-C   100 mg at 12/07/22 0803     Continuous Infusions:    Review of Systems   Neurological:  Positive for tremors and seizures. Negative for facial asymmetry.   All other systems reviewed and are negative.  Objective:     Vital Signs (Most Recent):  Temp: 98.2 °F (36.8 °C) (12/07/22 1147)  Pulse: (!) 51 (12/07/22 1107)  Resp: 18 (12/07/22 0802)  BP: 132/61 (12/07/22 0803)  SpO2: 96 % (12/07/22 0802)   Vital Signs (24h Range):  Temp:  [96.2 °F (35.7 °C)-98.2 °F (36.8 °C)] 98.2 °F (36.8 °C)  Pulse:  [49-60] 51  Resp:  [14-18] 18  SpO2:  [96 %-98 %] 96 %  BP: (119-139)/(61-84) 132/61     Weight: 82 kg (180 lb 12.4 oz)  Body mass index is 32.02 kg/m².    Physical Exam  Vitals reviewed.   Constitutional:       General: She is not in acute distress.     Appearance: She is not diaphoretic.   HENT:      Head: Normocephalic and atraumatic.   Eyes:      General: No scleral icterus.     Extraocular Movements: Extraocular movements intact.      Conjunctiva/sclera: Conjunctivae normal.      Pupils: Pupils are equal, round, and reactive to light.   Cardiovascular:      Rate and Rhythm: Normal rate.   Pulmonary:      Effort: Pulmonary effort is normal. No respiratory distress.   Abdominal:      General: There is no distension.      Palpations: Abdomen is soft.   Musculoskeletal:         General: No signs of injury.      Cervical back: Neck supple. No rigidity.   Skin:     General: Skin is warm and dry.   Neurological:      General: No focal deficit present.      Mental Status: She is alert and oriented to person, place, and time. Mental status is at baseline.   Psychiatric:         Mood and Affect: Mood normal.         Behavior: Behavior normal.       NEUROLOGICAL EXAMINATION:     MENTAL STATUS   Oriented to person, place, and time.     CRANIAL NERVES     CN III, IV, VI   Pupils are equal, round, and reactive to light.    CN VII   Facial expression full, symmetric.     CN VIII   Hearing:  intact    CN XI   CN XI normal.     CN XII   CN XII normal.     GAIT AND COORDINATION        Kinetic tremor BUE  Walker for ambulation, unsteady     Significant Labs: All pertinent lab results from the past 24 hours have been reviewed.    Significant Studies: I have reviewed all pertinent imaging results/findings within the past 24 hours.    Assessment and Plan:     * Seizure disorder  72 year old woman with events suspicious for seizure since approximately 1971 admitted to EMU for capture and characterization of new events of electrical charges in head/feeling spacy with no loss of awarness that began when tapering off Clonazepam. Currently maintained on Topiramate 100 mg BID, Clonazepam 0.5 mg qHS with no typical seizures in many years. New events resolved after resuming Clonazepam 0.5 mg qHS.    - Continuous vEEG - no typical events captured since admission  - Hold home Clonazepam beginning on admission  - Topiramate level pending  - Activation procedures per protocol- medication adjustments as above  - IV Ativan PRN for GTC greater than 5 min - please call epilepsy on call before administering  - Seizure precautions, suction and oxygen at bedside  - Fall precautions, side rail padding in place  - Visi monitoring with continuous pulse oximetry   - Telemetry    History of MI (myocardial infarction)  - Followed by Cardiology at OSH, continue home Clopidogrel during admission    Anxiety and depression  - Continue home Duloxetine    Hyperlipidemia with target LDL less than 70  - Continue home Atorvastatin    CAD (coronary artery disease)  - Hx of prior MI, continue home Clopidogrel during admission    MS (multiple sclerosis)  - Followed by Dr. Apodaca as outpatient, recently seen in clinic 12/5  - Not on DMT currently        VTE Risk Mitigation (From admission, onward)         Ordered     IP VTE LOW RISK PATIENT  Once         12/06/22 1441     Place NGOC hose  Until discontinued         12/06/22 1441     Place  sequential compression device  Until discontinued         12/06/22 1441                Madeleine Forbes PA-C  Neurology-Epilepsy  [facility]    Patient discussed with Dr. Oro and Dr. Arnold

## 2022-12-07 NOTE — PLAN OF CARE
Patient admitted to EMU for seizure monitoring. Tele, visi, EEG monitor applied to patient. Midline inserted in RUE. Patient oriented to floor and instructed to press event button for any suspected episode. Also instructed to call for mobility as patient is chair bound. Verbalized understanding. Good PO intake today. Plan to hold clonazepam tonight. Report given to receiving RN.    Problem: Adult Inpatient Plan of Care  Goal: Plan of Care Review  Outcome: Ongoing, Progressing  Goal: Patient-Specific Goal (Individualized)  Outcome: Ongoing, Progressing  Goal: Absence of Hospital-Acquired Illness or Injury  Outcome: Ongoing, Progressing  Goal: Optimal Comfort and Wellbeing  Outcome: Ongoing, Progressing  Goal: Readiness for Transition of Care  Outcome: Ongoing, Progressing     Problem: Infection  Goal: Absence of Infection Signs and Symptoms  Outcome: Ongoing, Progressing     Problem: Skin Injury Risk Increased  Goal: Skin Health and Integrity  Outcome: Ongoing, Progressing

## 2022-12-07 NOTE — PLAN OF CARE
[facility] (Hospital)  Initial Discharge Assessment       Primary Care Provider: Piyush Sharif MD    Admission Diagnosis: Seizure disorder [G40.909]    Admission Date: 12/6/2022  Expected Discharge Date:     Discharge Barriers Identified: None    Payor: [employer] MEDICARE / Plan: Shogether 65 / Product Type: Medicare Advantage /     Extended Emergency Contact Information  Primary Emergency Contact: Giuseppe John  Address: [address] [place] [address] [facility]  Home Phone: [phone]  Mobile Phone: [phone]  Relation: Spouse  Preferred language: English   needed? No  Secondary Emergency Contact: GaryAmparo Wang  Address: Unknown           [place] [address] [place]  Mobile Phone: [phone]  Relation: Sister  Preferred language: English   needed? No    Discharge Plan A: Home, Home with family  Discharge Plan B: Home      [facility] - [place], [place] - [address]  [address]  [facility] [address]  Phone: [phone] Fax: [phone]    [id number] - [place], [place] - [address]  [address]  [place] [address]  Phone: [phone] Fax: [phone]    [facility] - [place], [place] - [address]  [address]  [facility] [address]  Phone: [phone] Fax: [phone]    [facility] [address]  [address]  [facility] [address]  Phone: [phone] Fax: [phone]      Initial Assessment (most recent)       Adult Discharge Assessment - 12/07/22 1640          Discharge Assessment    Assessment Type Discharge Planning Assessment     Source of Information family     Communicated ROGERIO with patient/caregiver Date not available/Unable to determine     Reason For Admission Seizure     People in Home spouse     Do you expect to return to  your current living situation? Yes     Do you have help at home or someone to help you manage your care at home? Yes     Who are your caregiver(s) and their phone number(s)? Giuseppe [email or web address]-[phone]     Current cognitive status: Alert/Oriented     Walking or Climbing Stairs ambulation difficulty, requires equipment     Mobility Management Walker, Cane, Wheel chair     Readmission within 30 days? No     Do you currently have service(s) that help you manage your care at home? No     Do you take prescription medications? Yes     Do you have prescription coverage? Yes     Coverage [employer]     Do you have any problems affording any of your prescribed medications? No     Is the patient taking medications as prescribed? yes     Who is going to help you get home at discharge? Pt's family will provide transportation home.     How do you get to doctors appointments? family or friend will provide     Are you on dialysis? No     Do you take coumadin? No     Discharge Plan A Home;Home with family     Discharge Plan B Home     DME Needed Upon Discharge  none     Discharge Plan discussed with: Spouse/sig other     Discharge Barriers Identified None        Social Connections    Are you , , , , never , or living with a partner?                    Nasrin Minor LMSW  PRN-  [facility]  Ext. [phone]

## 2022-12-07 NOTE — ASSESSMENT & PLAN NOTE
72 year old woman with events suspicious for seizure since approximately 1971 admitted to EMU for capture and characterization of new events of electrical charges in head/feeling spacy with no loss of awarness that began when tapering off Clonazepam. Currently maintained on Topiramate 100 mg BID, Clonazepam 0.5 mg qHS with no typical seizures in many years. New events resolved after resuming Clonazepam 0.5 mg qHS.    - Continuous vEEG - no typical events captured since admission  - Hold home Clonazepam beginning on admission  - Topiramate level pending  - Activation procedures per protocol- medication adjustments as above  - IV Ativan PRN for GTC greater than 5 min - please call epilepsy on call before administering  - Seizure precautions, suction and oxygen at bedside  - Fall precautions, side rail padding in place  - Visi monitoring with continuous pulse oximetry   - Telemetry

## 2022-12-07 NOTE — PLAN OF CARE
"  Problem: Adult Inpatient Plan of Care  Goal: Plan of Care Review  Outcome: Ongoing, Progressing     Problem: Adult Inpatient Plan of Care  Goal: Patient-Specific Goal (Individualized)  Outcome: Ongoing, Progressing     Problem: Adult Inpatient Plan of Care  Goal: Optimal Comfort and Wellbeing  Outcome: Ongoing, Progressing     Problem: Adult Inpatient Plan of Care  Goal: Readiness for Transition of Care  Outcome: Ongoing, Progressing     Problem: Infection  Goal: Absence of Infection Signs and Symptoms  Outcome: Ongoing, Progressing     Problem: Skin Injury Risk Increased  Goal: Skin Health and Integrity  Outcome: Ongoing, Progressing   Patient was up late in bedside chair, her Klonipin is on hold and she normally uses that med for sleep. EEG Cap still in place with no seizure activity noted.  at bedside and very attentive, assists with ambulation to/from BR with RW. Continues to refuse SCD's/Anthony's educated on the importance of using but continues to decline. When Nurse gave patients her meds and explained what they were pt stated "but my Cymbalta is Blue/green, I educated them both on the fact that medications often come from different manufacturers and at times may be different colors, shapes or sizes and the importance of NOT taking meds according to their "color" both voiced their understanding. Bed in low position with SR's up times 4 when in bed, SR's padded for safety purposes. Call/event button in reach. VSS, flow sheets completed see for assessments.   "

## 2022-12-07 NOTE — SUBJECTIVE & OBJECTIVE
Interval History: No typical events captured since admission, patient at baseline. Continue holding Clonazepam to provoke event.    Current Facility-Administered Medications   Medication Dose Route Frequency Provider Last Rate Last Admin    acetaminophen tablet 650 mg  650 mg Oral Q4H PRN Madeleine Fine, PA-C        atorvastatin tablet 40 mg  40 mg Oral Daily Madeleine Fine, PA-C   40 mg at 12/07/22 0804    clopidogreL tablet 75 mg  75 mg Oral Daily Madeleine Fine, PA-C   75 mg at 12/07/22 0803    docusate sodium capsule 100 mg  100 mg Oral BID PRN Madeleine Fine, PA-C        DULoxetine DR capsule 20 mg  20 mg Oral QHS Madeleine Fine, PA-C   20 mg at 12/06/22 2129    LORazepam injection 2 mg  2 mg Intravenous Q15 Min PRN Madeleine Fine, PA-C        ondansetron disintegrating tablet 8 mg  8 mg Oral Q8H PRN Madeleine Fine, PA-C        propranoloL tablet 80 mg  80 mg Oral Daily Madeleine Fine, PA-C   80 mg at 12/07/22 0803    sodium chloride 0.9% flush 10 mL  10 mL Intravenous PRN Madeleine Fine, PA-C        topiramate tablet 100 mg  100 mg Oral BID Madeleine Fine, PA-C   100 mg at 12/07/22 0803     Continuous Infusions:    Review of Systems   Neurological:  Positive for tremors and seizures. Negative for facial asymmetry.   All other systems reviewed and are negative.  Objective:     Vital Signs (Most Recent):  Temp: 98.2 °F (36.8 °C) (12/07/22 1147)  Pulse: (!) 51 (12/07/22 1107)  Resp: 18 (12/07/22 0802)  BP: 132/61 (12/07/22 0803)  SpO2: 96 % (12/07/22 0802)   Vital Signs (24h Range):  Temp:  [96.2 °F (35.7 °C)-98.2 °F (36.8 °C)] 98.2 °F (36.8 °C)  Pulse:  [49-60] 51  Resp:  [14-18] 18  SpO2:  [96 %-98 %] 96 %  BP: (119-139)/(61-84) 132/61     Weight: 82 kg (180 lb 12.4 oz)  Body mass index is 32.02 kg/m².    Physical Exam  Vitals reviewed.   Constitutional:       General: She is not in acute distress.     Appearance: She is not diaphoretic.   HENT:      Head: Normocephalic and atraumatic.   Eyes:      General: No scleral icterus.      Extraocular Movements: Extraocular movements intact.      Conjunctiva/sclera: Conjunctivae normal.      Pupils: Pupils are equal, round, and reactive to light.   Cardiovascular:      Rate and Rhythm: Normal rate.   Pulmonary:      Effort: Pulmonary effort is normal. No respiratory distress.   Abdominal:      General: There is no distension.      Palpations: Abdomen is soft.   Musculoskeletal:         General: No signs of injury.      Cervical back: Neck supple. No rigidity.   Skin:     General: Skin is warm and dry.   Neurological:      General: No focal deficit present.      Mental Status: She is alert and oriented to person, place, and time. Mental status is at baseline.   Psychiatric:         Mood and Affect: Mood normal.         Behavior: Behavior normal.       NEUROLOGICAL EXAMINATION:     MENTAL STATUS   Oriented to person, place, and time.     CRANIAL NERVES     CN III, IV, VI   Pupils are equal, round, and reactive to light.    CN VII   Facial expression full, symmetric.     CN VIII   Hearing: intact    CN XI   CN XI normal.     CN XII   CN XII normal.     GAIT AND COORDINATION        Kinetic tremor BUE  Walker for ambulation, unsteady     Significant Labs: All pertinent lab results from the past 24 hours have been reviewed.    Significant Studies: I have reviewed all pertinent imaging results/findings within the past 24 hours.

## 2022-12-08 ENCOUNTER — SOCIAL WORK (OUTPATIENT)
Dept: NEUROLOGY | Facility: CLINIC | Age: 72
End: 2022-12-08
Payer: MEDICARE

## 2022-12-08 PROCEDURE — 94761 N-INVAS EAR/PLS OXIMETRY MLT: CPT

## 2022-12-08 PROCEDURE — 99900035 HC TECH TIME PER 15 MIN (STAT)

## 2022-12-08 PROCEDURE — 25000003 PHARM REV CODE 250: Performed by: PHYSICIAN ASSISTANT

## 2022-12-08 PROCEDURE — 99233 SBSQ HOSP IP/OBS HIGH 50: CPT | Mod: ,,, | Performed by: PSYCHIATRY & NEUROLOGY

## 2022-12-08 PROCEDURE — 99233 PR SUBSEQUENT HOSPITAL CARE,LEVL III: ICD-10-PCS | Mod: ,,, | Performed by: PSYCHIATRY & NEUROLOGY

## 2022-12-08 PROCEDURE — 99233 SBSQ HOSP IP/OBS HIGH 50: CPT | Mod: FS,,, | Performed by: PHYSICIAN ASSISTANT

## 2022-12-08 PROCEDURE — 99233 PR SUBSEQUENT HOSPITAL CARE,LEVL III: ICD-10-PCS | Mod: FS,,, | Performed by: PHYSICIAN ASSISTANT

## 2022-12-08 PROCEDURE — 95720 EEG PHY/QHP EA INCR W/VEEG: CPT | Mod: ,,, | Performed by: STUDENT IN AN ORGANIZED HEALTH CARE EDUCATION/TRAINING PROGRAM

## 2022-12-08 PROCEDURE — 95720 PR EEG, W/VIDEO, CONT RECORD, I&R, >12<26 HRS: ICD-10-PCS | Mod: ,,, | Performed by: STUDENT IN AN ORGANIZED HEALTH CARE EDUCATION/TRAINING PROGRAM

## 2022-12-08 PROCEDURE — 11000001 HC ACUTE MED/SURG PRIVATE ROOM

## 2022-12-08 PROCEDURE — 95714 VEEG EA 12-26 HR UNMNTR: CPT

## 2022-12-08 RX ADMIN — TOPIRAMATE 100 MG: 25 TABLET, FILM COATED ORAL at 08:12

## 2022-12-08 RX ADMIN — ACETAMINOPHEN 650 MG: 325 TABLET ORAL at 11:12

## 2022-12-08 RX ADMIN — PROPRANOLOL HYDROCHLORIDE 80 MG: 20 TABLET ORAL at 08:12

## 2022-12-08 RX ADMIN — CLOPIDOGREL BISULFATE 75 MG: 75 TABLET ORAL at 08:12

## 2022-12-08 RX ADMIN — ATORVASTATIN CALCIUM 40 MG: 20 TABLET, FILM COATED ORAL at 08:12

## 2022-12-08 RX ADMIN — DULOXETINE HYDROCHLORIDE 20 MG: 20 CAPSULE, DELAYED RELEASE ORAL at 08:12

## 2022-12-08 RX ADMIN — ACETAMINOPHEN 650 MG: 325 TABLET ORAL at 08:12

## 2022-12-08 NOTE — PROCEDURES
[facility] VIDEO ELECTROENCEPHALOGRAM  REPORT    DATE OF SERVICE: 12/7/22  REQUESTED BY:  Hipolito Trimble MD  LOCATION OF SERVICE:  [facility]    METHODOLOGY   Electroencephalographic (EEG) recording is with electrodes placed according to the International 10-20 placement system.  Thirty two (32) channels of digital signal (sampling rate of 512/sec) including T1 and T2 was simultaneously recorded from the scalp and may include  EKG, EMG, and/or eye monitors.  Recording band pass was 0.1 to 512 hz.  Digital video recording of the patient is simultaneously recorded with the EEG.  The patient is instructed report clinical symptoms which may occur during the recording session.  EEG and video recording is stored and archived in digital format.  Activation procedures which include photic stimulation, hyperventilation and instructing patients to perform simple task are done in selected patients.   The EEG is displayed on a monitor screen and can be reviewed using different montages.  Computer assisted analysis is employed to detect spike and electrographic seizure activity.   The entire record is submitted for computer analysis.  The entire recording is visually reviewed and the times identified by computer analysis as being spikes or seizures are reviewed again.  Compresses spectral analysis (CSA) is also performed on the activity recorded from each individual channel.  This is displayed as a power display of frequencies from 0 to 30 Hz over time.   The CSA is reviewed looking for asymmetries in power between homologous areas of the scalp and then compared with the original EEG recording.     [employer] software was also utilized in the review of this study.  This software suite analyzes the EEG recording in multiple domains.  Coherence and rhythmicity is computed to identify EEG sections which may contain organized seizures.  Each channel undergoes analysis to detect presence of spike and sharp waves which have special and morphological  "characteristic of epileptic activity.  The routine EEG recording is converted from spacial into frequency domain.  This is then displayed comparing homologous areas to identify areas of significant asymmetry.  Algorithm to identify non-cortically generated artifact is used to separate eye movement, EMG and other artifact from the EEG.      ELECTROENCEPHALOGRAM:    DAY 2:  RECORDING TIMES:  Start on 12/7/22 at 07:00:41  Stop on 12/8/22 at 08:44:04    A total of 21 hours and 22 minutes of EEG recording was obtained.    Indication: 72 year old female with history of epilepsy and MS admitted for evaluation of "miniseizures" consisting of electrical charges in her head without loss of awareness.  These events occurred in the setting of klonopin wean.  The patient is also maintained on topamax as an outpatient.      State of Consciousness:   Awake and asleep    Background:   The background is well organized, symmetric and continuous.  There is a normal anterior to posterior gradient consisting of 5-10 mcV amplitude beta activity in the frontal region and well defined alpha activity in the posterior region.  . There is a well developed 30-70 uV, 9-10 Hz posterior dominant rhythm that is symmetric and reactive to eye opening and closure     Sleep:   The patient reaches stage 2 sleep with symmetric vertex waves, sleep spindles, and k complexes noted.     Non-epileptiform Abnormalities:  Intermittent slow, generalized: Intermittent bursts of high amplitude delta and polymorphic theta slowing occur during the record        Other findings: there is intermittent rhythmic and high amplitude theta activity maximum in the vertex and parasagittal leads.  This occurs frequently during the record (since admission) and does not evolve.  It is thought to be either related to the intermittent slow (nonspecific dysfunction) or perhaps a benign variant such as a ciganek rhythm/midline theta rhythm.          Epileptiform Abnormalities: "   None    EKG:   NSR with rate averaging 50-60 BPM    Activating procedures:   - Hyperventilation and photic stimulation not performed    Events:   None      Impression:  This is an abnormal awake and sleep EEG due to the presence of intermittent generalized slowing, indicative of nonspecific global cerebral dysfunction.  No epileptiform discharges, seizures, or clinical events are recorded.  Compared to yesterday's record, there is no significant change.      Petra Arnold MD  [facility]   Department of Neurology

## 2022-12-08 NOTE — SUBJECTIVE & OBJECTIVE
Interval History: No typical events captured since admission, patient doing well off Clonazepam. Continue close vEEG for event capture/characterization.    Current Facility-Administered Medications   Medication Dose Route Frequency Provider Last Rate Last Admin    acetaminophen tablet 650 mg  650 mg Oral Q4H PRN Madeleine Fine, PA-C   650 mg at 12/08/22 1153    atorvastatin tablet 40 mg  40 mg Oral Daily Madeleine Fine, PA-C   40 mg at 12/08/22 0824    clopidogreL tablet 75 mg  75 mg Oral Daily Madeleine Fine, PA-C   75 mg at 12/08/22 0825    docusate sodium capsule 100 mg  100 mg Oral BID PRN Madeleine Fine, PA-C        DULoxetine DR capsule 20 mg  20 mg Oral QHS Madeleine Fine, PA-C   20 mg at 12/07/22 2115    LORazepam injection 2 mg  2 mg Intravenous Q15 Min PRN Madeleine Fine, PA-C        ondansetron disintegrating tablet 8 mg  8 mg Oral Q8H PRN Madeleine Fine, PA-C        propranoloL tablet 80 mg  80 mg Oral Daily Madeleine Fine, PA-C   80 mg at 12/08/22 0824    sodium chloride 0.9% flush 10 mL  10 mL Intravenous PRN Madeleine Fine, PA-C        topiramate tablet 100 mg  100 mg Oral BID Madeleine Fine, PA-C   100 mg at 12/08/22 0825     Continuous Infusions:    Review of Systems   Neurological:  Positive for tremors and seizures (none since admission). Negative for facial asymmetry.   All other systems reviewed and are negative.  Objective:     Vital Signs (Most Recent):  Temp: 96.2 °F (35.7 °C) (Simultaneous filing. User may not have seen previous data.) (12/08/22 1153)  Pulse: (!) 53 (12/08/22 1153)  Resp: 18 (12/08/22 1153)  BP: 138/63 (12/08/22 1153)  SpO2: 95 % (12/08/22 1153)   Vital Signs (24h Range):  Temp:  [96.2 °F (35.7 °C)-98.5 °F (36.9 °C)] 96.2 °F (35.7 °C)  Pulse:  [50-77] 53  Resp:  [16-20] 18  SpO2:  [92 %-99 %] 95 %  BP: (118-161)/(56-90) 138/63     Weight: 82 kg (180 lb 12.4 oz)  Body mass index is 32.02 kg/m².    Physical Exam  Vitals reviewed.   Constitutional:       General: She is not in acute distress.      Appearance: She is not diaphoretic.   HENT:      Head: Normocephalic and atraumatic.   Eyes:      General: No scleral icterus.     Extraocular Movements: Extraocular movements intact.      Conjunctiva/sclera: Conjunctivae normal.      Pupils: Pupils are equal, round, and reactive to light.   Cardiovascular:      Rate and Rhythm: Normal rate.   Pulmonary:      Effort: Pulmonary effort is normal. No respiratory distress.   Abdominal:      General: There is no distension.      Palpations: Abdomen is soft.   Musculoskeletal:         General: No signs of injury.      Cervical back: Neck supple. No rigidity.   Skin:     General: Skin is warm and dry.   Neurological:      General: No focal deficit present.      Mental Status: She is alert and oriented to person, place, and time. Mental status is at baseline.   Psychiatric:         Mood and Affect: Mood normal.         Behavior: Behavior normal.       NEUROLOGICAL EXAMINATION:     MENTAL STATUS   Oriented to person, place, and time.     CRANIAL NERVES     CN III, IV, VI   Pupils are equal, round, and reactive to light.    CN VII   Facial expression full, symmetric.     CN VIII   Hearing: intact    CN XI   CN XI normal.     CN XII   CN XII normal.     GAIT AND COORDINATION        Kinetic tremor BUE  Walker for ambulation, unsteady     Significant Labs: All pertinent lab results from the past 24 hours have been reviewed.    Significant Studies: I have reviewed all pertinent imaging results/findings within the past 24 hours.

## 2022-12-08 NOTE — NURSING
Initial Rounds: Patient is sitting up in bed using her tablet. EEG Wrap remains in place, no seizure activity noted,  at bedside. Is AAOx4, continues to decline SCD's and Anthony's.

## 2022-12-08 NOTE — ASSESSMENT & PLAN NOTE
72 year old woman with events suspicious for seizure since approximately 1971 admitted to EMU for capture and characterization of new events of electrical charges in head/feeling spacy with no loss of awarness that began when tapering off Clonazepam. Currently maintained on Topiramate 100 mg BID, Clonazepam 0.5 mg qHS with no typical seizures in many years. New events resolved after resuming Clonazepam 0.5 mg qHS.    - Continuous vEEG - no typical events captured since admission, EEG without seizures  - Hold home Clonazepam beginning on admission  - Topiramate level pending  - Activation procedures per protocol- medication adjustments as above  - IV Ativan PRN for GTC greater than 5 min - please call epilepsy on call before administering  - Seizure precautions, suction and oxygen at bedside  - Fall precautions, side rail padding in place  - Visi monitoring with continuous pulse oximetry   - Telemetry

## 2022-12-08 NOTE — PROGRESS NOTES
[facility]  Neurology-Epilepsy  Progress Note    Patient Name: Alyssa John  MRN: [id number]  Admission Date: 12/6/2022  Hospital Length of Stay: 2 days  Code Status: Full Code   Attending Provider: Stephy Dykes*  Primary Care Physician: Piyush Sharif MD   Principal Problem:Seizure disorder    Subjective:     Hospital Course:   72 year old woman with events suspicious for seizure since approximately 1971 admitted to EMU for capture and characterization of new events of electrical charges in head/feeling spacy with no loss of awarness that began when tapering off Clonazepam. Currently maintained on Topiramate 100 mg BID, Clonazepam 0.5 mg qHS with no typical seizures in many years. New events resolved after resuming Clonazepam 0.5 mg qHS.  12/6>12/7: Holding Clonazepam since admission, continued Topiramate 100 mg BID. No typical events captured overnight, continue close vEEG.  12/7>12/8: No typical events captured overnight, EEG without seizures. Continue Topiramate 100 mg BID and holding Clonazepam,      Interval History: No typical events captured since admission, patient doing well off Clonazepam. Continue close vEEG for event capture/characterization.    Current Facility-Administered Medications   Medication Dose Route Frequency Provider Last Rate Last Admin    acetaminophen tablet 650 mg  650 mg Oral Q4H PRN Madeleine Forbes PA-C   650 mg at 12/08/22 1153    atorvastatin tablet 40 mg  40 mg Oral Daily LEO Ramos-C   40 mg at 12/08/22 0824    clopidogreL tablet 75 mg  75 mg Oral Daily LEO Ramos-C   75 mg at 12/08/22 0825    docusate sodium capsule 100 mg  100 mg Oral BID PRN Madeleine Forbes PA-C        DULoxetine DR capsule 20 mg  20 mg Oral QHS [place] RamosC   20 mg at 12/07/22 2115    LORazepam injection 2 mg  2 mg Intravenous Q15 Min PRN Madeleine Forbes PA-C        ondansetron disintegrating tablet 8 mg  8 mg Oral Q8H PRN Madeleine Forbes PA-C        propranoloL tablet 80  mg  80 mg Oral Daily Madeleine LEO Forbes-C   80 mg at 12/08/22 0824    sodium chloride 0.9% flush 10 mL  10 mL Intravenous PRN MadeleineLEO Schultz-DELMA        topiramate tablet 100 mg  100 mg Oral BID Madeleine LEO Forbes-C   100 mg at 12/08/22 0825     Continuous Infusions:    Review of Systems   Neurological:  Positive for tremors and seizures (none since admission). Negative for facial asymmetry.   All other systems reviewed and are negative.  Objective:     Vital Signs (Most Recent):  Temp: 96.2 °F (35.7 °C) (Simultaneous filing. User may not have seen previous data.) (12/08/22 1153)  Pulse: (!) 53 (12/08/22 1153)  Resp: 18 (12/08/22 1153)  BP: 138/63 (12/08/22 1153)  SpO2: 95 % (12/08/22 1153)   Vital Signs (24h Range):  Temp:  [96.2 °F (35.7 °C)-98.5 °F (36.9 °C)] 96.2 °F (35.7 °C)  Pulse:  [50-77] 53  Resp:  [16-20] 18  SpO2:  [92 %-99 %] 95 %  BP: (118-161)/(56-90) 138/63     Weight: 82 kg (180 lb 12.4 oz)  Body mass index is 32.02 kg/m².    Physical Exam  Vitals reviewed.   Constitutional:       General: She is not in acute distress.     Appearance: She is not diaphoretic.   HENT:      Head: Normocephalic and atraumatic.   Eyes:      General: No scleral icterus.     Extraocular Movements: Extraocular movements intact.      Conjunctiva/sclera: Conjunctivae normal.      Pupils: Pupils are equal, round, and reactive to light.   Cardiovascular:      Rate and Rhythm: Normal rate.   Pulmonary:      Effort: Pulmonary effort is normal. No respiratory distress.   Abdominal:      General: There is no distension.      Palpations: Abdomen is soft.   Musculoskeletal:         General: No signs of injury.      Cervical back: Neck supple. No rigidity.   Skin:     General: Skin is warm and dry.   Neurological:      General: No focal deficit present.      Mental Status: She is alert and oriented to person, place, and time. Mental status is at baseline.   Psychiatric:         Mood and Affect: Mood normal.         Behavior: Behavior normal.        NEUROLOGICAL EXAMINATION:     MENTAL STATUS   Oriented to person, place, and time.     CRANIAL NERVES     CN III, IV, VI   Pupils are equal, round, and reactive to light.    CN VII   Facial expression full, symmetric.     CN VIII   Hearing: intact    CN XI   CN XI normal.     CN XII   CN XII normal.     GAIT AND COORDINATION        Kinetic tremor BUE  Walker for ambulation, unsteady     Significant Labs: All pertinent lab results from the past 24 hours have been reviewed.    Significant Studies: I have reviewed all pertinent imaging results/findings within the past 24 hours.    Assessment and Plan:     * Seizure disorder  72 year old woman with events suspicious for seizure since approximately 1971 admitted to EMU for capture and characterization of new events of electrical charges in head/feeling spacy with no loss of awarness that began when tapering off Clonazepam. Currently maintained on Topiramate 100 mg BID, Clonazepam 0.5 mg qHS with no typical seizures in many years. New events resolved after resuming Clonazepam 0.5 mg qHS.    - Continuous vEEG - no typical events captured since admission, EEG without seizures  - Hold home Clonazepam beginning on admission  - Topiramate level pending  - Activation procedures per protocol- medication adjustments as above  - IV Ativan PRN for GTC greater than 5 min - please call epilepsy on call before administering  - Seizure precautions, suction and oxygen at bedside  - Fall precautions, side rail padding in place  - Visi monitoring with continuous pulse oximetry   - Telemetry    History of MI (myocardial infarction)  - Followed by Cardiology at OSH, continue home Clopidogrel during admission    Anxiety and depression  - Continue home Duloxetine    Hyperlipidemia with target LDL less than 70  - Continue home Atorvastatin    CAD (coronary artery disease)  - Hx of prior MI, continue home Clopidogrel during admission    MS (multiple sclerosis)  - Followed by Dr. Apodaca as  outpatient, recently seen in clinic 12/5  - Not on DMT currently        VTE Risk Mitigation (From admission, onward)         Ordered     IP VTE LOW RISK PATIENT  Once         12/06/22 1441     Place NGOC hose  Until discontinued         12/06/22 1441     Place sequential compression device  Until discontinued         12/06/22 1441                Madeleine Forbes PA-C  Neurology-Epilepsy  [facility]    Patient discussed with Dr. Oro and Dr. Arnold

## 2022-12-08 NOTE — PLAN OF CARE
Problem: Adult Inpatient Plan of Care  Goal: Plan of Care Review  Outcome: Ongoing, Progressing  Flowsheets (Taken 12/8/2022 1650)  Plan of Care Reviewed With: patient  Goal: Patient-Specific Goal (Individualized)  Outcome: Ongoing, Progressing  Flowsheets (Taken 12/8/2022 1650)  Anxieties, Fears or Concerns: none  Individualized Care Needs: none  Patient-Specific Goals (Include Timeframe): none       POC reviewed with the patient and they verbalized understanding. All comments and concerns addressed. Bed locked in lowest position, call light within reach. Safety precautions maintained. VSS, see flowsheets. No events this shift.

## 2022-12-09 VITALS
HEIGHT: 63 IN | RESPIRATION RATE: 18 BRPM | OXYGEN SATURATION: 97 % | BODY MASS INDEX: 32.03 KG/M2 | HEART RATE: 50 BPM | SYSTOLIC BLOOD PRESSURE: 160 MMHG | TEMPERATURE: 98 F | WEIGHT: 180.75 LBS | DIASTOLIC BLOOD PRESSURE: 87 MMHG

## 2022-12-09 LAB — TOPIRAMATE SERPL-MCNC: 14.7 MCG/ML

## 2022-12-09 PROCEDURE — 99239 PR HOSPITAL DISCHARGE DAY,>30 MIN: ICD-10-PCS | Mod: FS,,, | Performed by: PHYSICIAN ASSISTANT

## 2022-12-09 PROCEDURE — 25000003 PHARM REV CODE 250: Performed by: PHYSICIAN ASSISTANT

## 2022-12-09 PROCEDURE — 99239 HOSP IP/OBS DSCHRG MGMT >30: CPT | Mod: FS,,, | Performed by: PHYSICIAN ASSISTANT

## 2022-12-09 PROCEDURE — 95718 PR EEG, W/VIDEO, CONT RECORD, I&R, 2-12 HRS: ICD-10-PCS | Mod: ,,, | Performed by: STUDENT IN AN ORGANIZED HEALTH CARE EDUCATION/TRAINING PROGRAM

## 2022-12-09 PROCEDURE — 95718 EEG PHYS/QHP 2-12 HR W/VEEG: CPT | Mod: ,,, | Performed by: STUDENT IN AN ORGANIZED HEALTH CARE EDUCATION/TRAINING PROGRAM

## 2022-12-09 RX ORDER — CHOLECALCIFEROL (VITAMIN D3) 25 MCG
1000 TABLET ORAL DAILY
Status: DISCONTINUED | OUTPATIENT
Start: 2022-12-09 | End: 2022-12-09 | Stop reason: HOSPADM

## 2022-12-09 RX ADMIN — ACETAMINOPHEN 650 MG: 325 TABLET ORAL at 12:12

## 2022-12-09 RX ADMIN — CLOPIDOGREL BISULFATE 75 MG: 75 TABLET ORAL at 08:12

## 2022-12-09 RX ADMIN — ATORVASTATIN CALCIUM 40 MG: 20 TABLET, FILM COATED ORAL at 08:12

## 2022-12-09 RX ADMIN — CHOLECALCIFEROL TAB 25 MCG (1000 UNIT) 1000 UNITS: 25 TAB at 10:12

## 2022-12-09 RX ADMIN — TOPIRAMATE 100 MG: 25 TABLET, FILM COATED ORAL at 08:12

## 2022-12-09 RX ADMIN — PROPRANOLOL HYDROCHLORIDE 80 MG: 20 TABLET ORAL at 08:12

## 2022-12-09 NOTE — PROGRESS NOTES
Epilepsy  met with patient briefly in their room in the EMU.   Patient currently sleeping and accompanied by family member seated on the couch.     SW introduced himself to family member and noted that SW is a therapist and  that supports patient's with epilepsy in the hospital and clinic.   RTISTEN reported he works closely with the epileptologists and neurologists.     TRISTEN provided information pamphlet regarding [facility]ile[facility] [place].   HOME ([email or web address])    TRISTEN also provided business card with his information.     No questions or concerns at this time.       [facility]    Clinical  -ALS, Epilepsy, Headache  [facility].  [email or web address]  Phone# [phone]     [phone]  Fax # [phone]

## 2022-12-09 NOTE — ASSESSMENT & PLAN NOTE
72 year old woman with events suspicious for seizure since approximately 1971 admitted to EMU for capture and characterization of new events of electrical charges in head/feeling spacy with no loss of awarness that began when tapering off Clonazepam. Currently maintained on Topiramate 100 mg BID, Clonazepam 0.5 mg qHS with no typical seizures in many years. New events resolved after resuming Clonazepam 0.5 mg qHS.    - Continuous vEEG - No events since admission, EEG throughout admission without epileptiform discharge, seizures, or clinical events.   - Patient at baseline, doing well off Clonazepam. Given several days without events after holding Clonazepam, will discontinue Clonazepam on discharge.   - Possible that recent events may have been side effect of tapering down Clonazepam.   - Continue Topiramate 100 mg BID  - Patient to follow up with Dr. Trimble for further management, stable for discharge home today.

## 2022-12-09 NOTE — DISCHARGE SUMMARY
"[facility]  Neurology-Epilepsy  Discharge Summary      Patient Name: Alyssa John  MRN: [id number]  Admission Date: 12/6/2022  Hospital Length of Stay: 3 days  Discharge Date and Time:  12/09/2022 2:22 PM  Attending Physician: Stephy Dykes*   Discharging Provider: Madeleine Forbes PA-C  Primary Care Physician: Piyush Sharif MD    HPI:   Ms. John is a 72 year old woman with MS, HLD, prior MI, anxiety/depression and epilepsy since approximatley 1971 admitted to EMU for capture and characterization of new "miniseizures" that began occurring while attempting to taper off Topiramate. Patient reports she has not had a typical seizure event for years. She has previously endorsed events of shoulder jerking, as well as generalized convulsions associated with tongue biting and urinary incontinence. She has been maintained on Topiramate and Clonazepam for many years, and was recently working with outpatient epileptologist (Dr. Trimble) to taper off Clonazepam due to excessive sleepiness. Patient and  report that when patient tapered down to 0.5 mg every other night and then discontinued Clonazepam, patient began to have miniseizures that she describes as electrical charges in her head and feeling spacy with no loss of awareness. Due to these events, she resumed taking Clonazepam 0.5 mg nightly with resolution. Admit to EMU for capture and characterization of new event type.      Epilepsy History - Clinic Visit with Dr. Trimble 10/31/22  The patient is a 72 y.o. female with a diagnosis of MS who we are seeing for epilepsy.  Since she was last here, we have tried weaning her Klonopin.  She reports "miniseizures" occurring up to multiple times per day on low doses of Klonopin which resolve when the medication is increased.     History of present illness:  The patient is a 72 y.o. female with a PMH of MS (diagnosed in 2000) we have been asked to see for evaluation of episodes suspicious for seizures " "and medication management.  The patient reports a longstanding diagnosis of epilepsy and that she has a history of 2 types of seizures.     Seizure type 1: "Petit mal"  These began some time prior to 1971. With respect to aura, the patient reports nothing.  Her seizure is characterized by several jerks in either shoulder.  This component of this spell lasts for approximately several seconds.  Afterwards, she reports no postictal state.  The patient's frequency of events was roughly daily prior to starting AEDs.  She reports that she has not had one of these in years.     Seizure type 2: "Grand mal"  These began in 1971. With respect to warning, the patient would have "a lot of the petite mal" seizures.  Her seizure is characterized by loss of awareness, generalized stiffening, and generalized shaking.  She would have foaming of the mouth, urinary incontinence, and tongue biting.  This component of this spell lasts for approximately 2-3 minutes.  Afterwards, she was fatigued.  The patient's last event of this type was in 1990.  Prior to starting medication, they happened about once a month on average.     She does not give a history of events suspicious for absence seizures.     Regarding medications, the patient was tried on phenobarbital, Dilantin, and Depakote without success.  Tegretol was then given, and she continued to have seizures.  In 1982, Klonopin was added.  She became seizure free on this regimen, aside from a seizure in 1990 when she tried to wean herself off her AEDs.  The patient's Tegretol was changed to Topamax at some point after 2000 when she became thrombocytopenic while also receiving Tysabri.  They also report that at some point, her Klonopin was increased from 0.5 mg BID to 1 mg BID.  They indicate that there was no clear reason why this was done.  They deny that she was having seizures.  They do report significant issues with fatigue, and they indicate that this prompted Dr. Apodaca to refer " her here for revision of her AED regimen.     Potential Epilepsy Risk Factors:   Pregnancy/Labor/Delivery - none  Febrile seizures - none  Head injury  - none  CNS infection - none   Stroke - none  Family Hx of Sz - none     AED Treatments  Present regimen  Topamax 100 mg BID  Klonopin 0.5 mg BID     Prior treatments  Tegretol (discontinued due to thrombocytopenia in the setting of Tysabri administration)  Phenobarital (ineffective)  Dilantin (ineffective)  Depakote (ineffective)      * No surgery found *     Indwelling Lines/Drains at time of discharge:   Lines/Drains/Airways     None               Hospital Course:   72 year old woman with events suspicious for seizure since approximately 1971 admitted to EMU for capture and characterization of new events of electrical charges in head/feeling spacy with no loss of awarness that began when tapering off Clonazepam. Currently maintained on Topiramate 100 mg BID, Clonazepam 0.5 mg qHS with no typical seizures in many years. New events resolved after resuming Clonazepam 0.5 mg qHS.  12/6>12/7: Holding Clonazepam since admission, continued Topiramate 100 mg BID. No typical events captured overnight, continue close vEEG.  12/7>12/8: No typical events captured overnight, EEG without seizures. Continue Topiramate 100 mg BID and holding Clonazepam.  12/8>12/9: No events since admission, EEG throughout admission without epileptiform discharge, seizures, or clinical events. Patient at baseline, doing well of Clonazepam. Given several days without events after holding Clonazepam, will discontinue Clonazepam on discharge. Possible that recent events may have been side effect of tapering down Clonazepam. Patient to follow up with Dr. Trimble for further management, stable for discharge home today.      Goals of Care Treatment Preferences:  Code Status: Full Code      Consults:   Consults (From admission, onward)        Status Ordering Provider     Inpatient consult to Midline team   Once        Provider:  (Not yet assigned)    Completed JOSHUA JIMENEZ          Significant Labs: All pertinent lab results from the past 24 hours have been reviewed.    Significant Studies: I have reviewed all pertinent imaging results/findings within the past 24 hours.    Pending Diagnostic Studies:     None        Final Active Diagnoses:    Diagnosis Date Noted POA    PRINCIPAL PROBLEM:  Seizure disorder [G40.909]  Yes    Anxiety and depression [F41.9, F32.A] 03/08/2020 Yes    History of MI (myocardial infarction) [I25.2] 03/08/2020 Not Applicable    Hyperlipidemia with target LDL less than 70 [E78.5] 06/13/2013 Yes     Chronic    MS (multiple sclerosis) [G35] 02/23/2013 Yes    CAD (coronary artery disease) [I25.10] 02/23/2013 Yes      Problems Resolved During this Admission:       * Seizure disorder  72 year old woman with events suspicious for seizure since approximately 1971 admitted to EMU for capture and characterization of new events of electrical charges in head/feeling spacy with no loss of awarness that began when tapering off Clonazepam. Currently maintained on Topiramate 100 mg BID, Clonazepam 0.5 mg qHS with no typical seizures in many years. New events resolved after resuming Clonazepam 0.5 mg qHS.    - Continuous vEEG - No events since admission, EEG throughout admission without epileptiform discharge, seizures, or clinical events.   - Patient at baseline, doing well off Clonazepam. Given several days without events after holding Clonazepam, will discontinue Clonazepam on discharge.   - Possible that recent events may have been side effect of tapering down Clonazepam.   - Continue Topiramate 100 mg BID  - Patient to follow up with Dr. Trimble for further management, stable for discharge home today.      History of MI (myocardial infarction)  - Followed by Cardiology at OSH, continue home Clopidogrel during admission    Anxiety and depression  - Continue home  Duloxetine    Hyperlipidemia with target LDL less than 70  - Continue home Atorvastatin    CAD (coronary artery disease)  - Hx of prior MI, continue home Clopidogrel during admission    MS (multiple sclerosis)  - Followed by Dr. Apodaca as outpatient, recently seen in clinic 12/5  - Not on DMT currently        Discharged Condition: stable    Disposition: Home or Self Care    Follow Up:   Follow-up Information     Hipolito Trimble Jr, MD. Go on 3/14/2023.    Specialty: Neurology  Why: Follow up 3/14 at 920am  Contact information:  [address]  [address]  [facility] [address]  [phone]             Piyush Sharif MD. Go on 12/20/2022.    Specialty: Family Medicine  Why: Hospital follow up 12/20 at 9am  Contact information:  [address] [facility] [address]  [phone]                       Patient Instructions:      Diet Adult Regular     Notify your health care provider if you experience any of the following:  difficulty breathing or increased cough     Notify your health care provider if you experience any of the following:  severe persistent headache     Notify your health care provider if you experience any of the following:  worsening rash     Notify your health care provider if you experience any of the following:  persistent dizziness, light-headedness, or visual disturbances     Notify your health care provider if you experience any of the following:  increased confusion or weakness     Activity as tolerated   Order Comments: No driving, bathing alone, swimming, or any event during which sudden loss of awareness could harm patient or others until 6 months event free, or cleared by Neurology       Medications:  Reconciled Home Medications:      Medication List      CHANGE how you take these medications    topiramate 100 MG tablet  Commonly known as: TOPAMAX  Take 100 mg twice per day  What changed:   · how much to take  · how to take this  · when to take this        CONTINUE taking these medications     albuterol 90 mcg/actuation inhaler  Commonly known as: PROVENTIL/VENTOLIN HFA  1 puff as needed.     atorvastatin 40 MG tablet  Commonly known as: LIPITOR  TAKE 1 TABLET BY MOUTH EVERY DAY     cetirizine 10 MG tablet  Commonly known as: ZYRTEC  Take 1 tablet (10 mg total) by mouth once daily.     * cholecalciferol (vitamin D3) 125 mcg (5,000 unit) Tab  Take 5,000 Units by mouth once daily.     * cholecalciferol (vitamin D3) 10 mcg (400 unit) Cap capsule  1 tablet.     clopidogreL 75 mg tablet  Commonly known as: PLAVIX  Take 75 mg by mouth once daily.     DULoxetine 20 MG capsule  Commonly known as: CYMBALTA  TAKE 2 CAPSULES BY MOUTH ONCE DAILY     estradioL 0.01 % (0.1 mg/gram) vaginal cream  Commonly known as: ESTRACE  Place 0.5 g vaginally every evening. Use nightly for two weeks then twice weekly for maintenance     famotidine 20 MG tablet  Commonly known as: PEPCID  Take 20 mg by mouth 2 (two) times daily as needed for Heartburn.     fluticasone propionate 50 mcg/actuation nasal spray  Commonly known as: FLONASE  1 spray (50 mcg total) by Each Nostril route once daily.     gabapentin 300 MG capsule  Commonly known as: NEURONTIN  Take 1 capsule (300 mg total) by mouth 3 (three) times daily.     nitroGLYCERIN 0.4 MG SL tablet  Commonly known as: NITROSTAT  Place 0.4 mg under the tongue every 5 (five) minutes as needed for Chest pain.     prednisoLONE acetate 1 % Drps  Commonly known as: PRED FORTE  INSTILL 1 DROP THREE TIMES A DAY INTO BOTH EYES     propranoloL 120 MG 24 hr capsule  Commonly known as: INDERAL LA  Take 1 capsule (120 mg total) by mouth once daily.     pulse oximeter device  Commonly known as: pulse oximeter  by Apply Externally route 2 (two) times a day. Use twice daily at 8 AM and 3 PM and record the value in Sat[employer] as directed.         * This list has 2 medication(s) that are the same as other medications prescribed for you. Read the directions carefully, and ask your doctor or other care  provider to review them with you.            STOP taking these medications    clonazePAM 1 MG tablet  Commonly known as: KlonoPIN          Time spent on the discharge of patient: 60 minutes    Madeleine Forbes PA-C  Neurology-Epilepsy  [facility]    Patient discussed with Dr. Oro and Dr. Arnold

## 2022-12-09 NOTE — PLAN OF CARE
Problem: Adult Inpatient Plan of Care  Goal: Plan of Care Review  Outcome: Ongoing, Progressing     Problem: Skin Injury Risk Increased  Goal: Skin Health and Integrity  Outcome: Ongoing, Progressing  Intervention: Optimize Skin Protection  Flowsheets (Taken 12/9/2022 6771)  Pressure Reduction Techniques: frequent weight shift encouraged  Pressure Reduction Devices: positioning supports utilized  Skin Protection:   adhesive use limited   incontinence pads utilized   tubing/devices free from skin contact  Head of Bed (HOB) Positioning: HOB elevated     POC reviewed with patient and  who remained at bedside.  A/O x4.  Respirations unlabored.  Skin w/d.  Continent of b/b.  Up to restroom with x1 person and walker assist.  Pt c/o persistent H/A to left frontal region most of shift.  Pt reports this as a chronic condition but is requiring medication administration at more frequent intervals than when at home.  This AM however pt is denying H/A rating pain at 0/0.  Each time patient c/o H/A she is A/O x4 with no neuro deficits noted.  PRN Tylenol given.  Tolerates meds whole with water without difficulty.  VSS.  See flowsheet for full assessment.  Able to verbalize wants/needs.  No s/s of distress.  Fall/safety/seizure precautions maintained.     Attending Attestation (For Attendings USE Only)...

## 2022-12-09 NOTE — PLAN OF CARE
Problem: Adult Inpatient Plan of Care  Goal: Plan of Care Review  Outcome: Met  Goal: Patient-Specific Goal (Individualized)  Outcome: Met  Goal: Absence of Hospital-Acquired Illness or Injury  Outcome: Met  Goal: Optimal Comfort and Wellbeing  Outcome: Met  Goal: Readiness for Transition of Care  Outcome: Met     Problem: Infection  Goal: Absence of Infection Signs and Symptoms  Outcome: Met     Problem: Skin Injury Risk Increased  Goal: Skin Health and Integrity  Outcome: Met     Problem: Seizure, Active Management  Goal: Absence of Seizure/Seizure-Related Injury  Outcome: Met         POC reviewed with the patient and they verbalized understanding. All comments and concerns addressed. Bed locked in lowest position with bed alarm set, call light within reach. Safety precautions maintained. VSS, see flowsheets. No events this shift.

## 2022-12-09 NOTE — PROCEDURES
[facility] VIDEO ELECTROENCEPHALOGRAM  REPORT     DATE OF SERVICE: 12/8/22  REQUESTED BY:  Hipolito Trimble MD  LOCATION OF SERVICE:  [facility]     METHODOLOGY              Electroencephalographic (EEG) recording is with electrodes placed according to the International 10-20 placement system.  Thirty two (32) channels of digital signal (sampling rate of 512/sec) including T1 and T2 was simultaneously recorded from the scalp and may include  EKG, EMG, and/or eye monitors.  Recording band pass was 0.1 to 512 hz.  Digital video recording of the patient is simultaneously recorded with the EEG.  The patient is instructed report clinical symptoms which may occur during the recording session.  EEG and video recording is stored and archived in digital format.  Activation procedures which include photic stimulation, hyperventilation and instructing patients to perform simple task are done in selected patients.              The EEG is displayed on a monitor screen and can be reviewed using different montages.  Computer assisted analysis is employed to detect spike and electrographic seizure activity.   The entire record is submitted for computer analysis.  The entire recording is visually reviewed and the times identified by computer analysis as being spikes or seizures are reviewed again.  Compresses spectral analysis (CSA) is also performed on the activity recorded from each individual channel.  This is displayed as a power display of frequencies from 0 to 30 Hz over time.   The CSA is reviewed looking for asymmetries in power between homologous areas of the scalp and then compared with the original EEG recording.                [employer] software was also utilized in the review of this study.  This software suite analyzes the EEG recording in multiple domains.  Coherence and rhythmicity is computed to identify EEG sections which may contain organized seizures.  Each channel undergoes analysis to detect presence of spike and sharp waves which  "have special and morphological characteristic of epileptic activity.  The routine EEG recording is converted from spacial into frequency domain.  This is then displayed comparing homologous areas to identify areas of significant asymmetry.  Algorithm to identify non-cortically generated artifact is used to separate eye movement, EMG and other artifact from the EEG.       ELECTROENCEPHALOGRAM:     DAY 3:  RECORDING TIMES:  Start on 12/7/22 at 07:00:41  Stop on 12/8/22 at 08:44:04  Start on 12/8/22 at 08:59:00  Stop on 12/9/22 at 07:00:01     A total of 21 hours and 58 minutes of EEG recording was obtained.     Indication: 72 year old female with history of epilepsy and MS admitted for evaluation of "miniseizures" consisting of electrical charges in her head without loss of awareness.  These events occurred in the setting of klonopin wean.  The patient is also maintained on topamax as an outpatient.       State of Consciousness:   Awake and asleep     Background:   The background is well organized, symmetric and continuous.  There is a normal anterior to posterior gradient consisting of 5-10 mcV amplitude beta activity in the frontal region and well defined alpha activity in the posterior region.  . There is a well developed 30-70 uV, 9-10 Hz posterior dominant rhythm that is symmetric and reactive to eye opening and closure      Sleep:   The patient reaches stage 2 sleep with symmetric vertex waves, sleep spindles, and k complexes noted.      Non-epileptiform Abnormalities:  Intermittent slow, generalized: Intermittent bursts of high amplitude delta and polymorphic theta slowing occur during the record        Other findings: there is intermittent rhythmic and high amplitude theta activity maximum in the vertex and parasagittal leads.  This occurs frequently during the record (since admission) and does not evolve.  It is thought to be either related to the intermittent slow (nonspecific dysfunction) or perhaps a " benign variant such as a ciganek rhythm/midline theta rhythm.            Epileptiform Abnormalities:   None     EKG:   NSR with rate averaging 50-60 BPM     Activating procedures:   - Hyperventilation and photic stimulation not performed     Events:   None        Impression:  This is an abnormal awake and sleep EEG due to the presence of intermittent generalized slowing, indicative of nonspecific global cerebral dysfunction.  No epileptiform discharges, seizures, or clinical events were recorded during this admission.       Petra Arnold MD  [facility]   Department of Neurology

## 2022-12-09 NOTE — PLAN OF CARE
[facility] (Hospital)  Discharge Final Note    Primary Care Provider: Piyush Sharif MD    Expected Discharge Date: 12/9/2022    Final Discharge Note (most recent)       Final Note - 12/09/22 1329          Final Note    Assessment Type Final Discharge Note     Anticipated Discharge Disposition Home or Self Care     Hospital Resources/Appts/Education Provided Appointments scheduled and added to AVS        Post-Acute Status    Post-Acute Authorization Other     Other Status No Post-Acute Service Needs     Discharge Delays None known at this time                              Contact Info       Hipolito Trimble Jr, MD   Specialty: Neurology   Relationship: Consulting Physician    [facility]  [address]  [facility] [address]   Phone: [phone]       Next Steps: Go on 3/14/2023    Instructions: Follow up 3/14 at 920am    Piyush Sharif MD   Specialty: Family Medicine   Relationship: PCP - General    [address] [facility] [address]   Phone: [phone]       Next Steps: Go on 12/20/2022    Instructions: Hospital follow up 12/20 at 9am          Future Appointments   Date Time Provider Department Center   12/13/2022  2:00 PM Olvin Fatima MD [facility] ORTHO [place] [facility]   12/20/2022  9:00 AM Carrie Villa PA-C [facility] MED [place]   3/14/2023  9:40 AM Hipolito Trimble Jr., MD [facility] NEURO [place]   4/13/2023  1:00 PM TYRONE Hurtado, CNS [facility] [place]  PRN

## 2022-12-09 NOTE — PROCEDURES
[facility] VIDEO ELECTROENCEPHALOGRAM  REPORT     DATE OF SERVICE: 12/9/22  REQUESTED BY:  Hipolito Trimble MD  LOCATION OF SERVICE:  [facility]     METHODOLOGY              Electroencephalographic (EEG) recording is with electrodes placed according to the International 10-20 placement system.  Thirty two (32) channels of digital signal (sampling rate of 512/sec) including T1 and T2 was simultaneously recorded from the scalp and may include  EKG, EMG, and/or eye monitors.  Recording band pass was 0.1 to 512 hz.  Digital video recording of the patient is simultaneously recorded with the EEG.  The patient is instructed report clinical symptoms which may occur during the recording session.  EEG and video recording is stored and archived in digital format.  Activation procedures which include photic stimulation, hyperventilation and instructing patients to perform simple task are done in selected patients.              The EEG is displayed on a monitor screen and can be reviewed using different montages.  Computer assisted analysis is employed to detect spike and electrographic seizure activity.   The entire record is submitted for computer analysis.  The entire recording is visually reviewed and the times identified by computer analysis as being spikes or seizures are reviewed again.  Compresses spectral analysis (CSA) is also performed on the activity recorded from each individual channel.  This is displayed as a power display of frequencies from 0 to 30 Hz over time.   The CSA is reviewed looking for asymmetries in power between homologous areas of the scalp and then compared with the original EEG recording.                [employer] software was also utilized in the review of this study.  This software suite analyzes the EEG recording in multiple domains.  Coherence and rhythmicity is computed to identify EEG sections which may contain organized seizures.  Each channel undergoes analysis to detect presence of spike and sharp waves which  "have special and morphological characteristic of epileptic activity.  The routine EEG recording is converted from spacial into frequency domain.  This is then displayed comparing homologous areas to identify areas of significant asymmetry.  Algorithm to identify non-cortically generated artifact is used to separate eye movement, EMG and other artifact from the EEG.       ELECTROENCEPHALOGRAM:     DAY 3:  RECORDING TIMES:  Start on 12/7/22 at 07:00:41  Stop on 12/8/22 at 08:44:04  Start on 12/8/22 at 08:59:00  Stop on 12/9/22 at 07:00:01  Start on 12/9/22 at 07:00:01  Stop on 12/9/22 at 11:20:00     A total of 4 hours and 2 minutes of EEG recording was obtained.     Indication: 72 year old female with history of epilepsy and MS admitted for evaluation of "miniseizures" consisting of electrical charges in her head without loss of awareness.  These events occurred in the setting of klonopin wean.  The patient is also maintained on topamax as an outpatient.       State of Consciousness:   Awake and asleep     Background:   The background is well organized, symmetric and continuous.  There is a normal anterior to posterior gradient consisting of 5-10 mcV amplitude beta activity in the frontal region and well defined alpha activity in the posterior region.  . There is a well developed 30-70 uV, 9-10 Hz posterior dominant rhythm that is symmetric and reactive to eye opening and closure      Sleep:   The patient reaches stage 2 sleep with symmetric vertex waves, sleep spindles, and k complexes noted.      Non-epileptiform Abnormalities:  Intermittent slow, generalized: Intermittent bursts of high amplitude delta and polymorphic theta slowing occur during the record        Other findings: there is intermittent rhythmic and high amplitude theta activity maximum in the vertex and parasagittal leads.  This occurs frequently during the record (since admission) and does not evolve.  It is thought to be either related to the " intermittent slow (nonspecific dysfunction) or perhaps a benign variant such as a ciganek rhythm/midline theta rhythm.            Epileptiform Abnormalities:   None     EKG:   NSR with rate averaging 50-60 BPM     Activating procedures:   - Hyperventilation and photic stimulation not performed     Events:   None        Impression:  This is an abnormal awake and sleep EEG due to the presence of intermittent generalized slowing, indicative of nonspecific global cerebral dysfunction.  No epileptiform discharges, seizures, or clinical events were recorded during this admission.       Petra Arnold MD  [facility]   Department of Neurology

## 2022-12-13 ENCOUNTER — OFFICE VISIT (OUTPATIENT)
Dept: ORTHOPEDICS | Facility: CLINIC | Age: 72
End: 2022-12-13
Payer: MEDICARE

## 2022-12-13 VITALS — WEIGHT: 180 LBS | BODY MASS INDEX: 31.89 KG/M2 | RESPIRATION RATE: 18 BRPM | HEIGHT: 63 IN

## 2022-12-13 DIAGNOSIS — M72.0 DUPUYTREN'S CONTRACTURE OF BOTH HANDS: Primary | ICD-10-CM

## 2022-12-13 DIAGNOSIS — M72.0 DUPUYTREN'S CONTRACTURE OF BOTH HANDS: ICD-10-CM

## 2022-12-13 PROCEDURE — 99203 OFFICE O/P NEW LOW 30 MIN: CPT | Mod: S$GLB,,, | Performed by: ORTHOPAEDIC SURGERY

## 2022-12-13 PROCEDURE — 3008F PR BODY MASS INDEX (BMI) DOCUMENTED: ICD-10-PCS | Mod: CPTII,S$GLB,, | Performed by: ORTHOPAEDIC SURGERY

## 2022-12-13 PROCEDURE — 99999 PR PBB SHADOW E&M-EST. PATIENT-LVL II: ICD-10-PCS | Mod: PBBFAC,,, | Performed by: ORTHOPAEDIC SURGERY

## 2022-12-13 PROCEDURE — 1125F PR PAIN SEVERITY QUANTIFIED, PAIN PRESENT: ICD-10-PCS | Mod: CPTII,S$GLB,, | Performed by: ORTHOPAEDIC SURGERY

## 2022-12-13 PROCEDURE — 1125F AMNT PAIN NOTED PAIN PRSNT: CPT | Mod: CPTII,S$GLB,, | Performed by: ORTHOPAEDIC SURGERY

## 2022-12-13 PROCEDURE — 99203 PR OFFICE/OUTPT VISIT, NEW, LEVL III, 30-44 MIN: ICD-10-PCS | Mod: S$GLB,,, | Performed by: ORTHOPAEDIC SURGERY

## 2022-12-13 PROCEDURE — 3008F BODY MASS INDEX DOCD: CPT | Mod: CPTII,S$GLB,, | Performed by: ORTHOPAEDIC SURGERY

## 2022-12-13 PROCEDURE — 1111F DSCHRG MED/CURRENT MED MERGE: CPT | Mod: CPTII,S$GLB,, | Performed by: ORTHOPAEDIC SURGERY

## 2022-12-13 PROCEDURE — 1111F PR DISCHARGE MEDS RECONCILED W/ CURRENT OUTPATIENT MED LIST: ICD-10-PCS | Mod: CPTII,S$GLB,, | Performed by: ORTHOPAEDIC SURGERY

## 2022-12-13 PROCEDURE — 99999 PR PBB SHADOW E&M-EST. PATIENT-LVL II: CPT | Mod: PBBFAC,,, | Performed by: ORTHOPAEDIC SURGERY

## 2022-12-13 NOTE — PROGRESS NOTES
Patient ID: Alyssa John is a 72 y.o. female    Chief Complaint:   Chief Complaint   Patient presents with    Left Hand - Pain    Right Hand - Pain       History of Present Illness:    Pleasant 72-year-old female with history of arthritis and multiple sclerosis who presents for evaluation of her bilateral hands.  She reports bilateral palm pain and palpable cords her hands for past several month.  This is difficult for her gripping a walker.  She is not had any conservative treatment.  They have tried padding the walker with some relief.  Has been evaluated for carpal tunnel syndrome in the past with the EMGs.  Not complaining today of any paresthesias.    PAST MEDICAL HISTORY:   Past Medical History:   Diagnosis Date    Arthritis     Coronary artery disease     Encounter for blood transfusion     Epilepsy     GERD (gastroesophageal reflux disease)     Headache     Hypertension     MI, old     MS (multiple sclerosis)     Seizures     epilepsy     PAST SURGICAL HISTORY:   Past Surgical History:   Procedure Laterality Date    APPENDECTOMY      BACK SURGERY      L5 discectomy    BREAST BIOPSY Right 15 yrs ago    benign    CATARACT EXTRACTION Bilateral     COLONOSCOPY N/A 9/16/2015    Procedure: COLONOSCOPY;  Surgeon: Henry Malcolm MD;  Location: [facility];  Service: Endoscopy;  Laterality: N/A;    CORONARY STENT PLACEMENT      right knee arthroscopy       FAMILY HISTORY:   Family History   Problem Relation Age of Onset    Scleroderma Mother     Heart disease Father         MI, CAD    Cancer Neg Hx     Diabetes Neg Hx     Stroke Neg Hx     Melanoma Neg Hx     Psoriasis Neg Hx     Lupus Neg Hx     Eczema Neg Hx      SOCIAL HISTORY:   Social History     Occupational History    Not on file   Tobacco Use    Smoking status: Former     Packs/day: 1.50     Types: Cigarettes     Quit date: 12/26/2006     Years since quitting: 15.9    Smokeless tobacco: Never   Substance and Sexual Activity    Alcohol use: No      Alcohol/week: 0.0 standard drinks    Drug use: No    Sexual activity: Not Currently     Partners: Male        MEDICATIONS:   Current Outpatient Medications:     albuterol (PROVENTIL/VENTOLIN HFA) 90 mcg/actuation inhaler, 1 puff as needed., Disp: , Rfl:     atorvastatin (LIPITOR) 40 MG tablet, TAKE 1 TABLET BY MOUTH EVERY DAY, Disp: 90 tablet, Rfl: 0    cetirizine (ZYRTEC) 10 MG tablet, Take 1 tablet (10 mg total) by mouth once daily., Disp: 30 tablet, Rfl: 0    cholecalciferol, vitamin D3, 10 mcg (400 unit) Cap capsule, 1 tablet., Disp: , Rfl:     cholecalciferol, vitamin D3, 125 mcg (5,000 unit) Tab, Take 5,000 Units by mouth once daily., Disp: , Rfl:     clopidogrel (PLAVIX) 75 mg tablet, Take 75 mg by mouth once daily., Disp: , Rfl:     DULoxetine (CYMBALTA) 20 MG capsule, TAKE 2 CAPSULES BY MOUTH ONCE DAILY, Disp: 180 capsule, Rfl: 1    estradioL (ESTRACE) 0.01 % (0.1 mg/gram) vaginal cream, Place 0.5 g vaginally every evening. Use nightly for two weeks then twice weekly for maintenance, Disp: 42.5 g, Rfl: 1    famotidine (PEPCID) 20 MG tablet, Take 20 mg by mouth 2 (two) times daily as needed for Heartburn., Disp: , Rfl:     fluticasone propionate (FLONASE) 50 mcg/actuation nasal spray, 1 spray (50 mcg total) by Each Nostril route once daily., Disp: 15.8 mL, Rfl: 0    gabapentin (NEURONTIN) 300 MG capsule, Take 1 capsule (300 mg total) by mouth 3 (three) times daily., Disp: 90 capsule, Rfl: 0    nitroGLYCERIN (NITROSTAT) 0.4 MG SL tablet, Place 0.4 mg under the tongue every 5 (five) minutes as needed for Chest pain., Disp: , Rfl:     prednisoLONE acetate (PRED FORTE) 1 % DrpS, INSTILL 1 DROP THREE TIMES A DAY INTO BOTH EYES, Disp: , Rfl:     propranoloL (INDERAL LA) 120 MG 24 hr capsule, Take 1 capsule (120 mg total) by mouth once daily., Disp: 90 capsule, Rfl: 3    pulse oximeter (PULSE OXIMETER) device, by Apply Externally route 2 (two) times a day. Use twice daily at 8 AM and 3 PM and record the value in  Erica as directed., Disp: 1 each, Rfl: 0    topiramate (TOPAMAX) 100 MG tablet, Take 50 mg in the AM and 100 mg in the PM for 2 weeks, then increase to 100 mg twice per day (Patient taking differently: Take 100 mg by mouth 2 (two) times daily.), Disp: 60 tablet, Rfl: 11  ALLERGIES:   Review of patient's allergies indicates:   Allergen Reactions    Codeine      Other reaction(s): throat tightness    Dilantin [phenytoin sodium extended]     Phenobarbital     Tegretol [carbamazepine]          Physical Exam     Vitals:    12/13/22 1410   Resp: 18     Alert and oriented to person, place and time. No acute distress. Well-groomed, not ill appearing. Pupils round and reactive, normal respiratory effort, no audible wheezing.     Patient currently in a wheelchair.  She has bilateral hand deformities with subluxation and hyperextension of the PIP joints.  She has boutonniere deformity of the left small finger.  Negative Tinel's bilateral wrists.  She has mildly thickened palmar cord in the palm adjacent to the ring finger bilaterally.  Able to make a composite fist bilaterally.  She has no contracture.    Imaging:     X-Ray: I have reviewed all pertinent results/findings and my personal findings are:  Diffuse degenerative changes of the bilateral hands with arthritic change of the PIP joints bilaterally.      Assessment & Plan    Dupuytren's contracture of both hands  -     Ambulatory referral/consult to Orthopedics         Treatment options discussed with her.  She has bilateral hand palmar cords which are painful and nodular.  We discussed options for this including occupational therapy and hand therapy.  She is in agreement with this plan.  I do not think she is a surgical candidate at this time.  If she continues to have difficulty despite therapy would consider referral to hand surgery.

## 2022-12-21 ENCOUNTER — CLINICAL SUPPORT (OUTPATIENT)
Dept: REHABILITATION | Facility: HOSPITAL | Age: 72
End: 2022-12-21
Attending: ORTHOPAEDIC SURGERY
Payer: MEDICARE

## 2022-12-21 DIAGNOSIS — M72.0 DUPUYTREN'S CONTRACTURE OF BOTH HANDS: Primary | ICD-10-CM

## 2022-12-21 PROCEDURE — 97110 THERAPEUTIC EXERCISES: CPT | Mod: PN

## 2022-12-21 PROCEDURE — 97165 OT EVAL LOW COMPLEX 30 MIN: CPT | Mod: PN

## 2022-12-21 NOTE — PATIENT INSTRUCTIONS
May perform in warm water or place heat pad on hand 8-10 minutes prior.  Also try to make a fist after above exercise 5 repetitions   Massage cording 3 minutes 2 times a day

## 2022-12-21 NOTE — PLAN OF CARE
[facility] Occupational Therapy  Initial Evaluation   Date: 12/21/2022  Name: Alyssa John  Clinic Number: [id number]  Therapy Diagnosis: pain and decreased ROM at both hands  Physician: Olvin Fatima MD  Physician Orders: eval and treat  Medical Diagnosis: M72.0 (ICD-10-CM) - Dupuytren's contracture of both hands   Surgical Procedure and Date: N/A  Evaluation Date: 12/21/2022  Insurance Authorization Period Expiration: 1/18/23  Plan of Care Certification Period:   Date of Return to MD: TADEO  Visit # / Visits authorized: Eval 1/1 1/12 approved   Time In: 2:23  Time Out: 3:08  Total Billable Time: 8 minutes    Precautions:  Standard  Multiple sclerosis and arthritis at Proximal interphalangeal joints    Subjective   Patient states: Right hand more painful than the left. Right has tremors when eating, reading newspaper, holding arm a certain way. Painful holding onto walker. Think it's getting worse, more painful to point that doesn't want to use walker  Involved Side: both hands  Dominant Side: Right  Date of Onset: several months ago  History of Current Condition/Mechanism of Injury: Per MD note on 12/13/22 She reports bilateral palm pain and palpable cords her hands for past several month. This is difficult for her gripping a walker. She is not had any conservative treatment. They have tried padding the walker with some relief.   Imaging: X-Ray: Diffuse degenerative changes of the bilateral hands with arthritic change of the PIP joints bilaterally.    Previous Therapy: none for Dupuytren's    Past Medical History/Physical Systems Review:    has a past medical history of Arthritis, Coronary artery disease, Encounter for blood transfusion, Epilepsy, GERD (gastroesophageal reflux disease), Headache, Hypertension, MI, old, MS (multiple sclerosis), and Seizures.   has a past surgical history that includes Appendectomy; Back surgery; Coronary stent placement; Colonoscopy (N/A, 9/16/2015);  right knee arthroscopy; Cataract extraction (Bilateral); and Breast biopsy (Right, 15 yrs ago).  has a current medication list which includes the following prescription(s): albuterol, atorvastatin, cetirizine, cholecalciferol (vitamin d3), cholecalciferol (vitamin d3), clopidogrel, duloxetine, estradiol, famotidine, fluticasone propionate, gabapentin, nitroglycerin, prednisolone acetate, propranolol, pulse oximeter, and topiramate.    Review of patient's allergies indicates:   Allergen Reactions    Codeine      Other reaction(s): throat tightness    Dilantin [phenytoin sodium extended]     Phenobarbital     Tegretol [carbamazepine]       Patient's Goals for Therapy: exercises to make it better so able to use walker and take care of self again    Pain:  Functional Pain Scale Rating 0-10: at eval  0/10  0/10 at least  10/10 at worst  Location: hands  Description: Sharp  Aggravating Factors: grasping, weightbearing  Easing Factors: water running over it     Functional Limitations/Social History:  Previous functional status includes: Independent with all self care requiring minimal assist for bathing and dressing ; dependent with all household tasks.    Current Functional Status   Home/Living environment : lives with her    ADL Assessment  ADL    Dressing  assisting more with dressing   Eating Very difficulty with managing utensils due to pain and trembling   Toileting Assist to manage clothing   Cooking dependent   Bathing/Grooming Assist to bathe  wrapping paper towel around toothbrush to be able to hold it   Household tasks dependent   By patient report       Objective   Observation:  cording at the right mid palm and cord noted at thumb web   swan neck deformities of right long and ring and left fingers  trembling of right hand noted several times during session  -with joint blocking noted crepitus/popping at right ring finger Proximal interphalangeal joint   Palpation:  tenderness at both palms right  greater than the left    HAND AROM     thumb bilaterally WNL                                                                                                                                                                               12/21/2022        A/PROM                                                      RIGHT                                                             LEFT                            INDEX      LONG      RING     LITTLE               INDEX     LONG     RING     LITTLE  MCP Flexion       80             80            80           80                         PIP Flexion        40/65        60/80       75/85       80                       30/70       55/60     80/NT    50/65  DIP Flexion         50            0/50          0/50        40                       60            40  Left MPs active flexion ~ 80-90 degrees      Treatment   Treatment Time In: 3:00  Treatment Time Out: 3:08  Total Treatment time separate from Evaluation time:8 minutes   Patient received therapeutic exercises for 8 minutes including:  -Active assistive/Passive range of motion of fingers flexion holding for 3 seconds  -joint blocking flexion of PIPs and DIPs 10 repetitions each  -composite passive flexion 5 repetitions   -soft tissue mobilization of bilateral palms to be performed 3 minutes 2 times a day  Discussed Dupuytren's disease conservative treatment with therapy, Xiaflex injection and surgical release and progression of therapy from ROM to strengthening    Home Exercise Program/Education:  Issued HEP (see patient instructions in EMR) and educated on use of warm water for exercise and heat for pain management . Exercises were reviewed and she was able to demonstrate them prior to the end of the session. Pt to access My chart after visit summary for written copy of exercises to perform at home. She demonstrated good  understanding of the education provided.  Pt was advised to perform these exercises free of  pain  Patient/Family Education: role of OT, goals for OT, scheduling/cancellations - pt verbalized understanding. Discussed insurance limitations with patient.    Assessment     Patient is a 72 y.o. right handed female presenting to skilled OT with diagnosis of bilateral Dupuytren's contractures with gradual onset over the past several months. Patient with pain of 0/10 to 10/10 described as sharp; right greater than the left.  She is tender to palpation at cording sites bilaterally.  Her AROM is limited at PIPs greater than MPs and DIPs.  She has swan neck deformities of most fingers. She reports assistance with self care and dependent for daily activities.  A brief history was obtained from the patient/family and MD note.  During the evaluation, the patient required no modification or assistance.  The following anticipated barriers/co-morbid conditions/personal factors may affect the plan of care: arthritis at hands and multiple sclerosis. MD doesn't feel she is a surgical candidate at this time.   Patient will benefit from OT to address problems hindering functional use of UE. The following goals were discussed with the patient and patient is in agreement with them as to be addressed in the treatment plan. The patient's rehab potential is Fair. Patient's educational, spiritual, cultural needs were considered.       Goals:   Short Term Goals  Independent in home program of ROM in 2 visits  Patient knowledgeable of assistive and adaptive equipment in 2 weeks  Patient with decrease in worst pain to 7/10 in 3 weeks  Patient reporting improved ability to use right to assist with dressing with decrease in pain and increase in ROM in 3 weeks  Long Term Goals  Patient with decrease in pain and increase in ROM allowing for greater use of right to assist with bathing in 4 weeks  Patient with decrease in pain to 4/10 at worst in 6 weeks  Patient with increase motion and decrease in pain allowing for greater independence in  assembling a sandwich and assisting with simple meal prep in 6 weeks    Plan   Outpatient Occupational Therapy 1 time weekly for 6 weeks per patient request (will reassess periodically and adjust as needed) to include the following interventions:  Home Exercise and Stretching, Patient Education, Therapeutic Exercise (72633) - Improve muscle strength, ROM, flexibility and muscle function, Manual Stretching (40535) - Passive or Active stretching to improve muscle length and function, Soft Tissue Mobs (99933) - Increase ROM tissue length, joint mechanics and modulate pain,  Heat (93742) -  Application of heat to increase local circulation and decrease pain, Ultrasound (88430) - Increase local circulation, improve tissue healing time, and modulate pain, Whirlpool/fluidotherapy (99233) - Increase local tissue circulation, improve elasticity of tissues, increased blood flow for improved muscle strength, ROM, flexibility and function, Therapeutic activities (85101) use of dynamic activities to improve functional performance,    SIMA Russell

## 2022-12-28 ENCOUNTER — CLINICAL SUPPORT (OUTPATIENT)
Dept: REHABILITATION | Facility: HOSPITAL | Age: 72
End: 2022-12-28
Attending: ORTHOPAEDIC SURGERY
Payer: MEDICARE

## 2022-12-28 DIAGNOSIS — M72.0 DUPUYTREN'S CONTRACTURE OF BOTH HANDS: Primary | ICD-10-CM

## 2022-12-28 PROCEDURE — 97140 MANUAL THERAPY 1/> REGIONS: CPT | Mod: PN

## 2022-12-28 PROCEDURE — 97022 WHIRLPOOL THERAPY: CPT | Mod: PN

## 2022-12-28 PROCEDURE — 97110 THERAPEUTIC EXERCISES: CPT | Mod: PN

## 2022-12-28 NOTE — PROGRESS NOTES
[facility] Occupational Therapy Daily Treatment Note   Date: 12/28/2022  Name: Alyssa John  Clinic Number: [id number]  Therapy Diagnosis:   Encounter Diagnosis   Name Primary?    Dupuytren's contracture of both hands Yes     Physician: Olvin Fatima MD  Physician Orders: eval and treat  Medical Diagnosis: M72.0 (ICD-10-CM) - Dupuytren's contracture of both hands   Surgical Procedure and Date: N/A  Evaluation Date: 12/21/2022  Insurance Authorization Period Expiration: 1/18/23  Plan of Care Certification Period:   Date of Return to MD: TADEO  Visit # / Visits authorized: Eval 1/1 2/12   Time In:3:00  Time Out: 3:45  Total Billable Time: 45 minutes    Precautions:  Standard  Multiple sclerosis and arthritis at Proximal interphalangeal joints    Subjective   Pt reports: hands hurt  really hard to close my fingers   was compliant with home exercise program given    Response to previous treatment:unremarkable  Functional change: none noted  Pain: 5/10  reported at end of session fingers felt looser and not as painful  Location: bilateral hands      Objective    Patient received the following treatment:     Supervised modalities after being cleared for contradictions for 20 minutes including:        Fluidotherapy to both hands for 10 minutes each to increase tissue elasticity, and for pain management.     Manual therapy techniques to increase joint mobilization and soft tissue mobilization for 10 minutes including:     -Soft tissue mobilization to  bilateral palms for 5 minutes each  to increase tissue elasticity and decrease pain  as patient was in fluidotherapy with one mobilization to the other.    Therapeutic exercises to improve functional performance while increasing strength, endurance, ROM,  and flexibility  for 15  minutes, including:   -Active assistive/Passive range of motion of each finger into composite flexion then all fingers into fist and attempt to hold x 15  repetitions   -joint blocking flexion of each Proximal interphalangeal joint x 10 repetitions each   Instruction and demonstration given of all introduced   Patient required moderate cuing for correct performance of exercise.    Home Exercises and Education Provided   Education provided:  - discussed insurance limitation  - Progress towards goals     Written Home Exercises Provided: Patient instructed to cont prior HEP.  Exercises were reviewed and she was able to demonstrate them prior to the end of the session. she demonstrated good  understanding of the HEP provided. See EMR under Patient Instructions for exercises provided prior visit.     Assessment   Patient with decrease in pain and increase in tissue elasticity by end of session. She has significant difficulty with achieving and maintaining flexion once placed especially index and long fingers bilaterally. These are also the most painful and stiffest. Pt  will continue to benefit from skilled OT to further address pain and deficits in ROM and strength which are hindering ability to perform self care and IADLs. Patient's cultural,spiritual, and educational needs were considered    Goals:  Short Term Goals  Independent in home program of ROM in 2 visits-ongoing  Patient knowledgeable of assistive and adaptive equipment in 2 weeks  Patient with decrease in worst pain to 7/10 in 3 weeks  Patient reporting improved ability to use right to assist with dressing with decrease in pain and increase in ROM in 3 weeks  Plan   Continue skilled therapy 1 times a week for 5 weeks  per patient request (will reassess periodically and adjust as needed) including therapeutic exercises and activities, manual therapy, and pain modalities  as needed   next session: paraffin to one of her hands so that she may consider home use  discuss use of Oval 8 splints     SMIA Russell

## 2022-12-29 ENCOUNTER — IMMUNIZATION (OUTPATIENT)
Dept: PHARMACY | Facility: CLINIC | Age: 72
End: 2022-12-29
Payer: MEDICARE

## 2022-12-29 DIAGNOSIS — Z23 NEED FOR VACCINATION: Primary | ICD-10-CM

## 2023-01-04 ENCOUNTER — CLINICAL SUPPORT (OUTPATIENT)
Dept: REHABILITATION | Facility: HOSPITAL | Age: 73
End: 2023-01-04
Attending: ORTHOPAEDIC SURGERY
Payer: MEDICARE

## 2023-01-04 DIAGNOSIS — M72.0 DUPUYTREN'S CONTRACTURE OF BOTH HANDS: Primary | ICD-10-CM

## 2023-01-04 PROCEDURE — 97110 THERAPEUTIC EXERCISES: CPT | Mod: PN

## 2023-01-04 PROCEDURE — 97140 MANUAL THERAPY 1/> REGIONS: CPT | Mod: PN

## 2023-01-04 NOTE — PROGRESS NOTES
[facility] Occupational Therapy Daily Treatment Note   Date: 1/4/2023  Name: Alyssa John  Clinic Number: [id number]  Therapy Diagnosis: Bilateral hand pain  Physician: Olvin Fatima MD  Physician Orders: eval and treat  Medical Diagnosis: M72.0 (ICD-10-CM) - Dupuytren's contracture of both hands   Surgical Procedure and Date: N/A  Evaluation Date: 12/21/2022  Insurance Authorization Period Expiration: 1/18/23  Plan of Care Certification Period:   Date of Return to MD: TADEO  Visit # / Visits authorized: Eval 1/1   3/12   Time In:3:05  Time Out: 3:45  Total Billable Time: 40 minutes    Precautions:  Standard  Multiple sclerosis and arthritis at Proximal interphalangeal joints    Subjective   Pt reports: Don't want to do the heat. My palms hurt very badly the night of my therapy. Not having as many shaking with the right. Able to hold onto walker a little better. When therapist mentioned Oval 8 splints  noted that she has some but her fingers started to swell (to be bring them in next visit)   was compliant with home exercise program given    Response to previous treatment:unremarkable  Functional change: none noted  Pain: 0/10   to 2/10   Location: bilateral hands      Objective    Proximal interphalangeal joint flexion  AROM                Right   Left    Index   60        40    Long   75        65  Patient received the following treatment:     Manual therapy techniques to increase joint mobilization and soft tissue mobilization for 12 minutes including:     -Soft tissue mobilization to  bilateral palms for 6 minutes each  to increase tissue elasticity and decrease pain      Therapeutic exercises to improve functional performance while increasing strength, endurance, ROM,  and flexibility  for 28  minutes, including:   -Active assistive/Passive range of motion of each finger into composite flexion then all fingers into fist and attempt to hold x 20 repetitions   -joint  blocking flexion of each Proximal interphalangeal joint x 10 repetitions each  -tendon glides   Instruction and demonstration given of all introduced   Patient required moderate cuing for correct performance of exercise.    Home Exercises and Education Provided   Education provided:  - discussed insurance limitation  - Progress towards goals     Written Home Exercises Provided: Patient instructed to cont prior HEP.  Exercises were reviewed and she was able to demonstrate them prior to the end of the session. she demonstrated good  understanding of the HEP provided. See EMR under Patient Instructions for exercises provided prior visit.     Assessment   Patient with increase in PIPs flexion of bilateral index and long fingers, however, with limitations persisting. Patient and her  noting improved ability to hold onto her walker with less pain. Patient did have increase in pain post last session. Pt  will continue to benefit from skilled OT to further address pain and deficits in ROM and strength which are hindering ability to perform self care and IADLs. Patient's cultural,spiritual, and educational needs were considered    Goals:  Short Term Goals  Independent in home program of ROM in 2 visits-met  Patient knowledgeable of assistive and adaptive equipment in 2 weeks-ongoing  Patient with decrease in worst pain to 7/10 in 3 weeks-ongoing  Patient reporting improved ability to use right to assist with dressing with decrease in pain and increase in ROM in 3 weeks-ongoing  Plan   Continue skilled therapy 1 times a week for 4 weeks  per patient request (will reassess periodically and adjust as needed) including therapeutic exercises and activities, manual therapy, and pain modalities  as needed   next session: patient to bring in her finger splints for therapist to assess fit and proper use  SIMA Russell

## 2023-01-05 ENCOUNTER — DOCUMENTATION ONLY (OUTPATIENT)
Dept: NEUROLOGY | Facility: CLINIC | Age: 73
End: 2023-01-05
Payer: MEDICARE

## 2023-01-06 ENCOUNTER — PES CALL (OUTPATIENT)
Dept: ADMINISTRATIVE | Facility: CLINIC | Age: 73
End: 2023-01-06
Payer: MEDICARE

## 2023-01-09 DIAGNOSIS — G40.909 SEIZURE DISORDER: Primary | ICD-10-CM

## 2023-01-09 RX ORDER — TOPIRAMATE 100 MG/1
100 TABLET, FILM COATED ORAL 2 TIMES DAILY
Qty: 60 TABLET | Refills: 1 | Status: SHIPPED | OUTPATIENT
Start: 2023-01-09 | End: 2023-03-14 | Stop reason: SDUPTHER

## 2023-01-09 NOTE — TELEPHONE ENCOUNTER
Topamax 100 mg 1 tablet by mouth BID called to the pharmacy with 1 refill.  Pt is out of medication.  Missed the am dose.  When do you want to see the pt back.  Last appt 10/31/22.

## 2023-01-09 NOTE — TELEPHONE ENCOUNTER
Spoke to Vicky at [facility] pharmacy, given a total of 6 refills.   notified to have the pt f/u in a few months.  He v/u.

## 2023-01-09 NOTE — TELEPHONE ENCOUNTER
----- Message from Sheridan Ferrell sent at 1/9/2023  2:39 PM CST -----  Regarding: Needs call back  Type:  RX Refill Request    Who Called:  Alyssa  Refill or New Rx:  refill  RX Name and Strength:  topiramate (TOPAMAX) 100 MG tablet 60 tablet 11 1/6/2022    Sig: Take 50 mg in the AM and 100 mg in the PM for 2 weeks, then increase to 100 mg twice per day   Patient taking differently: Take 100 mg by mouth 2 (two) times daily.       Sent to pharmacy as: topiramate (TOPAMAX) 100 MG tablet   E-Prescribing Status: Receipt confirmed by pharmacy (1/6/2022  2:42 PM CST)       How is the patient currently taking it? (ex. 1XDay):  see above  Is this a 30 day or 90 day RX:  see above  Preferred Pharmacy with phone number:        [facility]pharm[facility] #[id number] - [place] - [address]  [address] [address]  Phone: [phone] Fax: [phone]            Local or Mail Order:  local  Ordering Provider:  Dr DOMO Mtz Call Back Number:  [phone]    Additional Information:  pATIENT IS OUT COMPLETLY

## 2023-01-10 ENCOUNTER — E-VISIT (OUTPATIENT)
Dept: FAMILY MEDICINE | Facility: CLINIC | Age: 73
End: 2023-01-10
Payer: MEDICARE

## 2023-01-10 ENCOUNTER — TELEPHONE (OUTPATIENT)
Dept: FAMILY MEDICINE | Facility: CLINIC | Age: 73
End: 2023-01-10
Payer: MEDICARE

## 2023-01-10 ENCOUNTER — CLINICAL SUPPORT (OUTPATIENT)
Dept: FAMILY MEDICINE | Facility: CLINIC | Age: 73
End: 2023-01-10
Payer: MEDICARE

## 2023-01-10 DIAGNOSIS — J02.9 SORE THROAT: ICD-10-CM

## 2023-01-10 DIAGNOSIS — R05.9 COUGH, UNSPECIFIED TYPE: Primary | ICD-10-CM

## 2023-01-10 DIAGNOSIS — R05.9 COUGH, UNSPECIFIED TYPE: ICD-10-CM

## 2023-01-10 LAB
INFLUENZA A, MOLECULAR: NEGATIVE
INFLUENZA B, MOLECULAR: NEGATIVE
SPECIMEN SOURCE: NORMAL

## 2023-01-10 PROCEDURE — 99421 OL DIG E/M SVC 5-10 MIN: CPT | Mod: S$GLB,,, | Performed by: STUDENT IN AN ORGANIZED HEALTH CARE EDUCATION/TRAINING PROGRAM

## 2023-01-10 PROCEDURE — 99421 PR E&M, ONLINE DIGIT, EST, < 7 DAYS, 5-10 MINS: ICD-10-PCS | Mod: S$GLB,,, | Performed by: STUDENT IN AN ORGANIZED HEALTH CARE EDUCATION/TRAINING PROGRAM

## 2023-01-10 PROCEDURE — U0005 INFEC AGEN DETEC AMPLI PROBE: HCPCS | Performed by: STUDENT IN AN ORGANIZED HEALTH CARE EDUCATION/TRAINING PROGRAM

## 2023-01-10 PROCEDURE — 87502 INFLUENZA DNA AMP PROBE: CPT | Mod: PO | Performed by: STUDENT IN AN ORGANIZED HEALTH CARE EDUCATION/TRAINING PROGRAM

## 2023-01-10 RX ORDER — PROMETHAZINE HYDROCHLORIDE AND DEXTROMETHORPHAN HYDROBROMIDE 6.25; 15 MG/5ML; MG/5ML
5 SYRUP ORAL EVERY 4 HOURS PRN
Qty: 180 ML | Refills: 1 | Status: SHIPPED | OUTPATIENT
Start: 2023-01-10 | End: 2023-01-20

## 2023-01-10 NOTE — TELEPHONE ENCOUNTER
----- Message from Dyan Gomez sent at 1/10/2023  8:21 AM CST -----  Regarding: SAME DAY CALL BACK FOR APPT/TESTING  Type: Patient Call Back         Who called:SO         What is the request in detail: calling to get her sched for matt ppt; sates she needs to be tested for flu, strep throat, covid; states has diarrhea, cough, no fever but has been taking Asprin, body ache, little post nasal, please advise          Best call back number: [phone]         Additional Information:       [id number] - [place] - [address]  [address] [address]  Phone: [phone] Fax: [phone]               Thank You

## 2023-01-10 NOTE — PROGRESS NOTES
Patient ID: Alyssa John is a 72 y.o. female.    Chief Complaint: Cough          274}  The patient initiated a request through writewith on 1/10/2023 for evaluation and management with a chief complaint of Cough     I evaluated the questionnaire submission on 01/10/2023 .    [facility] Peq Evisit Upper Respitatory/Cough Questionnaire    1/10/2023  9:29 AM CST - Filed by Patient   Do you agree to participate in an E-Visit? Yes   If you have any of the following symptoms, please present to your local ER or call 911:  I acknowledge   What is the main issue that you would like for your doctor to address today? Im sick   Are you able to take your vital signs? No   What symptoms do you currently have?  Cough;  Diarrhea;  Fatigue;  Headache;  Nasal Congestion;  Muscle or body aches;  Nausea;  Runny nose;  Sore throat   Have you had a fever? No   When did your symptoms first appear? 1/9/2023   In the last two weeks, have you been in close contact with someone who has COVID-19 or the Flu? Yes, Flu   In the last two weeks, have you worked or volunteered in a healthcare facility or as a ? Healthcare facilities include a hospital, medical or dental clinic, long-term care facility, or nursing home No   Do you live in a long-term care facility, nursing home, or homeless shelter? No   List what you have done or taken to help your symptoms. Aspirin, NyQuil   How severe are your symptoms? Moderate   Have you taken an at home Covid test? No   Have you taken a Flu test? No   Have you been fully vaccinated for COVID? (2 Pfizer, 2 Moderna or 1 Sahil & Sahil vaccine injections) Yes   Have you received a booster? Yes   Have you recieved a Flu shot? Yes   When did you recieve your Flu shot? 12/30/2022   Do you have transportation to get tested for COVID if it is indicated and ordered for you at an [facility] location? Yes   Are you currently pregnant, could you be pregnant, or are you breast feeding? 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    COVID 10/28/2022    Debility 02/07/2022    Limitation of joint motion 02/07/2022    Physical deconditioning 10/19/2021    Hand weakness 04/05/2021    Bronchitis 11/20/2020    Cognitive communication disorder 08/28/2020    Impaired mobility and ADLs 05/24/2020    Coordination impairment 05/24/2020    Impaired instrumental activities of daily living (IADL) 05/24/2020    Internal carotid artery stenosis, right 03/08/2020    Intractable chronic migraine without aura and without status migrainosus 03/08/2020     Given failure of multiple or preventives in the past, I recommended either monoclonal antibody or Botox especially given longstanding daily frequency.  She had a difficult time deciding between the 2 and I extensively explained the risks and benefits and expected course for both.  Ultimately she decided to proceed with Botox.    The patient has chronic migraines (G43.719) and suffers from headaches more than 15 days a month lasting more than 4 hours a day with no relief of symptoms despite trying multiple medications for at least 3 months, if tolerated, including but not limited to   anti-epileptics - topiramate, carbamazepine, Dilantin, phenobarbital, gabapentin  beta blockers  - metoprolol  calcium channel blockers - blood pressure and pulse too low, contraindicated  Antidepressants - duloxetine    Botox treatment was approved for chronic migraines in October 2010. The patient will be an ideal candidate for Botox. We are planning for 3 treatments 3 months apart and aiming for at least 50% improvement in the symptoms. If we see no improvement after 3  treatments, we will discontinue the injections.    ---    She has history of myocardial infarction precluding the use of triptans or DHE, for this reason we will pursue Ubrelvy which carries no Cardiovascular warnings.  As she is having daily headaches which risks medication overuse headache, I will also prescribe Compazine which may be used on a daily basis without rebound risk however we have to watch her for worsening tremor. Compazine made her tired - advised to reduce dose by half. Ubrelvy approved as non-formulary exception, with $120 copay. Gave nurtec to try - if works well, this may change their mind about trying Ubrelvy.     Botox started 7/8/2020        Tremor 03/08/2020    Anxiety and depression 03/08/2020    Former smoker, stopped smoking in distant past 03/08/2020    History of MI (myocardial infarction) 03/08/2020    Osteopenia determined by x-ray 03/08/2020    Gait abnormality 09/06/2019    Impaired functional mobility, balance, and endurance 09/06/2019    Gait instability 02/21/2017    Encounter for long-term (current) use of high-risk medication 08/26/2016    Seizure disorder      Suspect PGE, possibly KIESHA  Pt with comorbid migraines & MS      Essential hypertension     Hiatal hernia with GERD without esophagitis     Arthritis     Chronic fatigue 08/10/2015    Vitamin D deficiency 02/03/2015    Gait disturbance 11/07/2014    Hyperlipidemia with target LDL less than 70 06/13/2013    MS (multiple sclerosis) 02/23/2013     Established with Dr. Apodaca      CAD (coronary artery disease) 02/23/2013     Dr. Mcmillan, cardiology        Recent Labs Obtained:  No visits with results within 7 Day(s) from this visit.   Latest known visit with results is:   Admission on 12/06/2022, Discharged on 12/09/2022   Component Date Value Ref Range Status    WBC 12/06/2022 10.00  3.90 - 12.70 K/uL Final    RBC 12/06/2022 4.76  4.00 - 5.40 M/uL Final    Hemoglobin 12/06/2022 13.3  12.0 - 16.0 g/dL Final    Hematocrit  12/06/2022 42.1  37.0 - 48.5 % Final    MCV 12/06/2022 88  82 - 98 fL Final    MCH 12/06/2022 27.9  27.0 - 31.0 pg Final    MCHC 12/06/2022 31.6 (L)  32.0 - 36.0 g/dL Final    RDW 12/06/2022 13.9  11.5 - 14.5 % Final    Platelets 12/06/2022 259  150 - 450 K/uL Final    MPV 12/06/2022 12.9  9.2 - 12.9 fL Final    Immature Granulocytes 12/06/2022 0.4  0.0 - 0.5 % Final    Gran # (ANC) 12/06/2022 6.6  1.8 - 7.7 K/uL Final    Immature Grans (Abs) 12/06/2022 0.04  0.00 - 0.04 K/uL Final    Comment: Mild elevation in immature granulocytes is non specific and   can be seen in a variety of conditions including stress response,   acute inflammation, trauma and pregnancy. Correlation with other   laboratory and clinical findings is essential.      Lymph # 12/06/2022 2.6  1.0 - 4.8 K/uL Final    Mono # 12/06/2022 0.5  0.3 - 1.0 K/uL Final    Eos # 12/06/2022 0.2  0.0 - 0.5 K/uL Final    Baso # 12/06/2022 0.06  0.00 - 0.20 K/uL Final    nRBC 12/06/2022 0  0 /100 WBC Final    Gran % 12/06/2022 66.4  38.0 - 73.0 % Final    Lymph % 12/06/2022 25.8  18.0 - 48.0 % Final    Mono % 12/06/2022 4.9  4.0 - 15.0 % Final    Eosinophil % 12/06/2022 1.9  0.0 - 8.0 % Final    Basophil % 12/06/2022 0.6  0.0 - 1.9 % Final    Differential Method 12/06/2022 Automated   Final    Topiramate Lvl 12/06/2022 14.7  mcg/mL Final    Comment: -------------------REFERENCE VALUE--------------------------  Reference values depend on clinical use:    Anticonvulsant: 5.0-20.0 mcg/mL    -------------------ADDITIONAL INFORMATION-------------------  This test was developed and its performance characteristics   determined by [facility] in a manner consistent with CLIA   requirements. This test has not been cleared or approved by   the U.[place]. Food and Drug Administration.    Test Performed by:  [facility]  [address] [place] [address]  : Adrien Norton M.D. Ph.D.; CLIA# [id number]      Sodium  12/07/2022 141  136 - 145 mmol/L Final    Potassium 12/07/2022 3.7  3.5 - 5.1 mmol/L Final    Chloride 12/07/2022 113 (H)  95 - 110 mmol/L Final    CO2 12/07/2022 21 (L)  23 - 29 mmol/L Final    Glucose 12/07/2022 98  70 - 110 mg/dL Final    BUN 12/07/2022 19  8 - 23 mg/dL Final    Creatinine 12/07/2022 0.9  0.5 - 1.4 mg/dL Final    Calcium 12/07/2022 8.6 (L)  8.7 - 10.5 mg/dL Final    Total Protein 12/07/2022 6.7  6.0 - 8.4 g/dL Final    Albumin 12/07/2022 3.7  3.5 - 5.2 g/dL Final    Total Bilirubin 12/07/2022 0.4  0.1 - 1.0 mg/dL Final    Comment: For infants and newborns, interpretation of results should be based  on gestational age, weight and in agreement with clinical  observations.    Premature Infant recommended reference ranges:  Up to 24 hours.............<8.0 mg/dL  Up to 48 hours............<12.0 mg/dL  3-5 days..................<15.0 mg/dL  6-29 days.................<15.0 mg/dL      Alkaline Phosphatase 12/07/2022 112  55 - 135 U/L Final    AST 12/07/2022 17  10 - 40 U/L Final    ALT 12/07/2022 15  10 - 44 U/L Final    Anion Gap 12/07/2022 7 (L)  8 - 16 mmol/L Final    eGFR 12/07/2022 >60.0  >60 mL/min/1.73 m^2 Final       Encounter Diagnoses   Name Primary?    Cough, unspecified type Yes    Sore throat         Orders Placed This Encounter   Procedures    Influenza A & B by Molecular     Standing Status:   Future     Standing Expiration Date:   7/10/2023    COVID-19 Routine Screening     Standing Status:   Future     Standing Expiration Date:   7/10/2023     Order Specific Question:   Is the patient symptomatic?     Answer:   Yes     Order Specific Question:   Is this needed for pre-procedure or pre-op testing?     Answer:   No     Order Specific Question:   Diagnosis:     Answer:   Cough [786.2.ICD-9-CM]      Medications Ordered This Encounter   Medications    promethazine-dextromethorphan (PROMETHAZINE-DM) 6.25-15 mg/5 mL Syrp     Sig: Take 5 mLs by mouth every 4 (four) hours as needed (cough).      Dispense:  180 mL     Refill:  1        E-Visit Time Tracking:    Day 1 Time (in minutes): 8     Total Time (in minutes): 8       274}

## 2023-01-10 NOTE — TELEPHONE ENCOUNTER
Called and spoke with pt. Pt states she thinks she has the FLU. Pt is having cough, congestion, diarrhea. Pt denies any fever or SOB. Pt sent a Evisit in portal.

## 2023-01-11 ENCOUNTER — PATIENT MESSAGE (OUTPATIENT)
Dept: FAMILY MEDICINE | Facility: CLINIC | Age: 73
End: 2023-01-11
Payer: MEDICARE

## 2023-01-11 DIAGNOSIS — J18.9 PNEUMONIA DUE TO INFECTIOUS ORGANISM, UNSPECIFIED LATERALITY, UNSPECIFIED PART OF LUNG: Primary | ICD-10-CM

## 2023-01-11 LAB — SARS-COV-2 RNA RESP QL NAA+PROBE: NOT DETECTED

## 2023-01-11 RX ORDER — DOXYCYCLINE HYCLATE 100 MG
100 TABLET ORAL 2 TIMES DAILY
Qty: 14 TABLET | Refills: 0 | Status: SHIPPED | OUTPATIENT
Start: 2023-01-11 | End: 2023-01-18

## 2023-01-13 ENCOUNTER — CLINICAL SUPPORT (OUTPATIENT)
Dept: REHABILITATION | Facility: HOSPITAL | Age: 73
End: 2023-01-13
Attending: ORTHOPAEDIC SURGERY
Payer: MEDICARE

## 2023-01-13 DIAGNOSIS — M72.0 DUPUYTREN'S CONTRACTURE OF BOTH HANDS: Primary | ICD-10-CM

## 2023-01-13 PROCEDURE — 97110 THERAPEUTIC EXERCISES: CPT | Mod: PN

## 2023-01-13 PROCEDURE — 97140 MANUAL THERAPY 1/> REGIONS: CPT | Mod: PN

## 2023-01-13 NOTE — PROGRESS NOTES
[facility] Occupational Therapy Daily Treatment Note   Date: 1/13/2023  Name: Alyssa John  Clinic Number: [id number]  Therapy Diagnosis: Bilateral hand pain  Physician: Olvin Fatima MD  Physician Orders: eval and treat  Medical Diagnosis: M72.0 (ICD-10-CM) - Dupuytren's contracture of both hands   Surgical Procedure and Date: N/A  Evaluation Date: 12/21/2022  Insurance Authorization Period Expiration: 1/18/23  Plan of Care Certification Period:   Date of Return to MD: REBECCAD  Visit # / Visits authorized: total of 3 visits in 2023;  2/pending  Time In:1:06  Time Out: 1:45  Total Billable Time: 39 minutes    Precautions:  Standard  Multiple sclerosis and arthritis at Proximal interphalangeal joints    Subjective   Pt reports: This morning shaking was really bad. The few days before didn't have any problems. No pain with walker and able to brush teeth. Want to try the heat again.   As massaging left and fluido to the right noted that she really likes the massage better than the heat.   was compliant with home exercise program given    Response to previous treatment:no soreness after last visit  Functional change: none noted  Pain: 0/10     Location: bilateral hands      Objective   Patient received the following treatment:     Fluidotherapy x 6 minutes to right hand air flow 80 heat 112 degrees   Manual therapy techniques to increase joint mobilization and soft tissue mobilization for 12 minutes including:     -Soft tissue mobilization to  bilateral palms for 6 minutes each  to increase tissue elasticity and decrease pain      Therapeutic exercises to improve functional performance while increasing strength, endurance, ROM,  and flexibility  for 27 minutes, including:   -Active assistive/Passive range of motion of each finger into composite flexion then all fingers into fist and attempt to hold x 20 repetitions   -joint blocking flexion of each Proximal interphalangeal joint x 10  repetitions each  -tendon glides  In hand manipulation of large piece of RED theraputty bilaterally   Instruction and demonstration given of all introduced   Patient required moderate cuing for correct performance of exercise.    Home Exercises and Education Provided   Education provided:  - discussed insurance limitation  - Progress towards goals     Written Home Exercises Provided: Patient instructed to cont prior HEP.  Exercises were reviewed and she was able to demonstrate them prior to the end of the session. she demonstrated good  understanding of the HEP provided. See EMR under Patient Instructions for exercises provided prior visit.     Assessment   Patient noting increase in use of hands with decrease in pain. She does not wish to receive fluidotherapy again. She had much greater difficulty manipulating theraputty with left than right. She tended to hold putty with lumbrical + position in the left. Pt  will continue to benefit from skilled OT to further address pain and deficits in ROM and strength which are hindering ability to perform self care and IADLs. Patient's cultural,spiritual, and educational needs were considered    Goals:  Short Term Goals  Independent in home program of ROM in 2 visits-met  Patient knowledgeable of assistive and adaptive equipment in 2 weeks-met  Patient with decrease in worst pain to 7/10 in 3 weeks-ongoing  Patient reporting improved ability to use right to assist with dressing with decrease in pain and increase in ROM in 3 weeks-ongoing  Plan   Continue skilled therapy 1 times a week for 3 weeks  per patient request (will reassess periodically and adjust as needed) including therapeutic exercises and activities, manual therapy, and pain modalities  as needed   next session: patient to bring in her finger splints for therapist to assess fit and proper use  SIMA Russell

## 2023-01-18 ENCOUNTER — CLINICAL SUPPORT (OUTPATIENT)
Dept: REHABILITATION | Facility: HOSPITAL | Age: 73
End: 2023-01-18
Attending: ORTHOPAEDIC SURGERY
Payer: MEDICARE

## 2023-01-18 DIAGNOSIS — M72.0 DUPUYTREN'S CONTRACTURE OF BOTH HANDS: Primary | ICD-10-CM

## 2023-01-18 PROCEDURE — 97110 THERAPEUTIC EXERCISES: CPT | Mod: PN,59

## 2023-01-18 PROCEDURE — 97140 MANUAL THERAPY 1/> REGIONS: CPT | Mod: PN

## 2023-01-18 NOTE — PROGRESS NOTES
"          [facility] Occupational Therapy Daily Treatment Note   Date: 1/18/2023  Name: Alyssa John  Clinic Number: [id number]  Therapy Diagnosis: Bilateral hand pain  Physician: Olvin Fatima MD  Physician Orders: eval and treat  Medical Diagnosis: M72.0 (ICD-10-CM) - Dupuytren's contracture of both hands   Surgical Procedure and Date: N/A  Evaluation Date: 12/21/2022  Insurance Authorization Period Expiration: 1/18/23  Plan of Care Certification Period: 2/03/23   Date of Return to MD: TBD  Visit # / Visits authorized: total of 3 visits in 2023;  3/20  Time In:11:33  Time Out: 12:15  Total Billable Time: 42 minutes    Precautions:  Standard  Multiple sclerosis and arthritis at Proximal interphalangeal joints    Subjective   Pt reports: Left hand woke her up 2 night later with pain.  In the palm    was compliant with home exercise program given    Response to previous treatment:no soreness after last visit  Functional change: none noted  Pain: 0/10 left hand last night on fire quick and gone  instantaneous   Location: bilateral hands      Objective   Patient received the following treatment:       Manual therapy techniques to increase joint mobilization and soft tissue mobilization for 14 including:     -Soft tissue mobilization to  bilateral palms for 7 minutes each  to increase tissue elasticity and decrease pain      Therapeutic exercises to improve functional performance while increasing strength, endurance, ROM,  and flexibility  for 27 minutes, including:   -Active assistive/Passive range of motion of each finger into composite flexion then all fingers into fist and attempt to hold x 20 repetitions   -joint blocking flexion of each Proximal interphalangeal joint x 10 repetitions each  -tendon glides  In hand manipulation of wooden beads 2 at a time as placed onto table top; 1/2" and 3/4" beads   Instruction and demonstration given of all introduced   Patient required moderate " cuing for correct performance of exercise.    Home Exercises and Education Provided   Education provided:  - discussed insurance limitation  - Progress towards goals     Written Home Exercises Provided: Patient instructed to cont prior HEP.  Exercises were reviewed and she was able to demonstrate them prior to the end of the session. she demonstrated good  understanding of the HEP provided. See EMR under Patient Instructions for exercises provided prior visit.     Assessment   Patient reporting 2 nights of instantaneous pain in left hand described as fire. She exhibited better ability to perform in hand manipulation of left hand today. Left long finger appears more supple to passive motion. She continues with significant difficulty actively flexion left index. No pain noted at right hand today or at left with exercises today.  Pt  will continue to benefit from skilled OT to further address pain and deficits in ROM and strength which are hindering ability to perform self care and IADLs. Patient's cultural,spiritual, and educational needs were considered    Goals:  Short Term Goals  Independent in home program of ROM in 2 visits-met  Patient knowledgeable of assistive and adaptive equipment in 2 weeks-met  Patient with decrease in worst pain to 7/10 in 3 weeks-ongoing  Patient reporting improved ability to use right to assist with dressing with decrease in pain and increase in ROM in 3 weeks-ongoing  Plan   Continue skilled therapy 1 times a week for 2 weeks  per patient request (will reassess periodically and adjust as needed) including therapeutic exercises and activities, manual therapy, and pain modalities  as needed   next session:reassess and discuss plan of care  SIMA Russell

## 2023-01-20 ENCOUNTER — DOCUMENTATION ONLY (OUTPATIENT)
Dept: NEUROLOGY | Facility: CLINIC | Age: 73
End: 2023-01-20
Payer: MEDICARE

## 2023-01-25 ENCOUNTER — CLINICAL SUPPORT (OUTPATIENT)
Dept: REHABILITATION | Facility: HOSPITAL | Age: 73
End: 2023-01-25
Attending: ORTHOPAEDIC SURGERY
Payer: MEDICARE

## 2023-01-25 DIAGNOSIS — M72.0 DUPUYTREN'S CONTRACTURE OF BOTH HANDS: Primary | ICD-10-CM

## 2023-01-25 PROCEDURE — 97760 ORTHOTIC MGMT&TRAING 1ST ENC: CPT | Mod: PN

## 2023-01-25 PROCEDURE — 97110 THERAPEUTIC EXERCISES: CPT | Mod: PN

## 2023-01-25 NOTE — PROGRESS NOTES
[facility] Occupational Therapy Daily Treatment Note   Date: 1/25/2023  Name: Alyssa John  Clinic Number: [id number]  Therapy Diagnosis: Bilateral hand pain  Physician: Olvin Fatima MD  Physician Orders: eval and treat  Medical Diagnosis: M72.0 (ICD-10-CM) - Dupuytren's contracture of both hands   Surgical Procedure and Date: N/A  Evaluation Date: 12/21/2022  Insurance Authorization Period Expiration: 1/18/23  Plan of Care Certification Period: 2/03/23   Date of Return to MD: TBD  Visit # / Visits authorized: total of 3 visits in 2023;  4/20  Time In:3:55  Time Out: 4:40  Total Billable Time: 45 minutes    Precautions:  Standard  Multiple sclerosis and arthritis at Proximal interphalangeal joints    Subjective   Pt reports:  Able to use hand more. Feel better for a few hours after therapy. Noted unable to find finger (Oval 8) splints she had. She wore them at night.   was compliant with home exercise program given    Response to previous treatment:no soreness after last visit  Functional change: able to dress with less assist see below  Pain: 0/10 left hand   instantaneous worst pain 6/10 with much less frequency  Location: bilateral hands  ADL's Dressing-doing much more herself  Eating- some days better; some not   Bathing- doing all herself except managing her hair     Objective   HAND AROM     thumb bilaterally WNL                                                                                                                                                                               12/21/2022        A/PROM                                                      RIGHT                                                             LEFT                            INDEX      LONG      RING     LITTLE               INDEX     LONG     RING     LITTLE  MCP Flexion       80             80            90           90                         PIP Flexion        40/65        60/80        80/85       90                       30/70       55/60     80/NT    50/65  DIP Flexion         50            0/50         20/50       60                       60            40  Left MPs active flexion ~ 80-90 degrees       1/25/23        A/PROM                                                      RIGHT                                                             LEFT                            INDEX      LONG      RING     LITTLE               INDEX     LONG     RING     LITTLE  MCP Flexion       90             80            80           80                       20             85          80          100  PIP Flexion        60/65        60/80       75/85       80                       30/70       70/60     80/NT    70/65  DIP Flexion         50            20/50      30/50        40                    35            50            35         5  Left MPs active flexion ~ 80-90 degrees   Patient received the following treatment:    Therapeutic exercises to improve functional performance while increasing strength, endurance, ROM,  and flexibility  for 35 minutes, including:   -Active assistive/Passive range of motion of each finger into composite flexion then all fingers into fist and attempt to hold x 20 repetitions   -joint blocking flexion of each Proximal interphalangeal joint x 10 repetitions each  -tendon glides   Instruction and demonstration given of all introduced   Patient required moderate cuing for correct performance of exercise.  Fitted with Oval 8  right index and ring and left ring 7    bilateral long 8 left little 5   Home Exercises and Education Provided   Education provided:  - discussed insurance limitation  - Progress towards goals   Ortho fit and train Oval 8 splints: educated on purpose and use; to be worn during the day with activities  Written Home Exercises Provided: Patient instructed to cont prior HEP.  Exercises were reviewed and she was able to demonstrate them prior to the end of the session.  she demonstrated good  understanding of the HEP provided. See EMR under Patient Instructions for exercises provided prior visit.     Assessment   Patient reporting a decrease in worst pain level from 10/10 to 6/10 and occurring less frequently. She is exhibiting an increase in fingers Active range of motion in flexion. Patient and her  noting a decrease in assist for self care activities. Patient fitted for Oval 8 splints - patient and  aware of don/doff, wear schedule and purpose of splints. Pt  will continue to benefit from skilled OT to further address pain and deficits in ROM and strength which are hindering ability to perform self care and IADLs. Patient's cultural,spiritual, and educational needs were considered    Goals:  Short Term Goals  Independent in home program of ROM in 2 visits-met  Patient knowledgeable of assistive and adaptive equipment in 2 weeks-met  Patient with decrease in worst pain to 7/10 in 3 weeks-ongoing  Patient reporting improved ability to use right to assist with dressing with decrease in pain and increase in ROM in 3 weeks-ongoing  Long Term Goals  Patient with decrease in pain and increase in ROM allowing for greater use of right to assist with bathing in 4 weeks-met  Patient with decrease in pain to 4/10 at worst in 6 weeks-ongoing  Patient with increase motion and decrease in pain allowing for greater independence in assembling a sandwich and assisting with simple meal prep in 6 weeks-ongoing  Plan   Continue skilled therapy 1 times a week for 6 weeks  per patient request (will reassess periodically and adjust as needed) including therapeutic exercises and activities, manual therapy, and pain modalities  as needed   next session:  SIMA Russell

## 2023-02-01 ENCOUNTER — DOCUMENTATION ONLY (OUTPATIENT)
Dept: NEUROLOGY | Facility: CLINIC | Age: 73
End: 2023-02-01
Payer: MEDICARE

## 2023-02-01 ENCOUNTER — CLINICAL SUPPORT (OUTPATIENT)
Dept: REHABILITATION | Facility: HOSPITAL | Age: 73
End: 2023-02-01
Attending: ORTHOPAEDIC SURGERY
Payer: MEDICARE

## 2023-02-01 DIAGNOSIS — M72.0 DUPUYTREN'S CONTRACTURE OF BOTH HANDS: Primary | ICD-10-CM

## 2023-02-01 PROCEDURE — 97110 THERAPEUTIC EXERCISES: CPT | Mod: PN

## 2023-02-01 PROCEDURE — 97140 MANUAL THERAPY 1/> REGIONS: CPT | Mod: PN

## 2023-02-01 NOTE — PROGRESS NOTES
[facility] Occupational Therapy Daily Treatment Note   Date: 2/1/2023  Name: Alyssa John  Clinic Number: [id number]  Therapy Diagnosis: Bilateral hand pain  Physician: Olvin Fatima MD  Physician Orders: eval and treat  Medical Diagnosis: M72.0 (ICD-10-CM) - Dupuytren's contracture of both hands   Surgical Procedure and Date: N/A  Evaluation Date: 12/21/2022  Insurance Authorization Period Expiration: 1/18/23  Plan of Care Certification Period: 2/03/23   Date of Return to MD: TBD  Visit # / Visits authorized: total of 3 visits in 2023;  5/20  Time In:3:04  Time Out: 3:45  Total Billable Time: 41 minutes    Precautions:  Standard  Multiple sclerosis and arthritis at Proximal interphalangeal joints    Subjective   Pt reports: Wore the finger splints for a while but my fingers and hand was hurting a lot. Think I want to go see about getting that injection for the Dupuytren's.    was compliant with home exercise program given    Response to previous treatment:no soreness after last visit  Functional change: none noted  Pain: 0/10 left hand at time of session  not getting sharp pain but still hurting   Location: bilateral hands  ADL's Dressing-doing much more herself  Eating- some days better; some not   Bathing- doing all herself except managing her hair   Objective   HAND AROM     thumb bilaterally WNL                                                                                                                                                                               12/21/2022        A/PROM                                                      RIGHT                                                             LEFT                            INDEX      LONG      RING     LITTLE               INDEX     LONG     RING     LITTLE  MCP Flexion       80             80            90           90                         PIP Flexion        40/65        60/80       80/85       90                        30/70       55/60     80/NT    50/65  DIP Flexion         50            0/50         20/50       60                       60            40  Left MPs active flexion ~ 80-90 degrees       1/25/23        A/PROM                                                      RIGHT                                                             LEFT                            INDEX      LONG      RING     LITTLE               INDEX     LONG     RING     LITTLE  MCP Flexion       90             80            80           80                       20             85          80          100  PIP Flexion        60/65        60/80       75/85       80                       30/70       70/60     80/NT    70/65  DIP Flexion         50            20/50      30/50        40                    35            50            35         5  Left MPs active flexion ~ 80-90 degrees   Patient received the following treatment:   Manual Therapy  x 8 minutes    Soft tissue mobilization of both palms to decrease pain and increase tissue elasticity  Therapeutic exercises to improve functional performance while increasing strength, endurance, ROM,  and flexibility  for 35 minutes, including:   -Active assistive/Passive range of motion of each finger into composite flexion then all fingers into fist and attempt to hold x 20 repetitions   -joint blocking flexion of each Proximal interphalangeal joint x 10 repetitions each  -tendon glides  -Placed Oval 8 ring splints on and discussed proper placement Patient to try to wear 2 on each hand for 30-40 minutes and see if able to tolerate. She is to alternate fingers and increase wear time as tolerated   Instruction and demonstration given of all introduced   Patient required moderate cuing for correct performance of exercise.     Home Exercises and Education Provided   Education provided:  - discussed insurance limitation  - Progress towards goals   Ortho fit and train Oval 8 splints: educated on  purpose and use; to be worn during the day with activities  Written Home Exercises Provided: Patient instructed to cont prior HEP.  Exercises were reviewed and she was able to demonstrate them prior to the end of the session. she demonstrated good  understanding of the HEP provided. See EMR under Patient Instructions for exercises provided prior visit.     Assessment   Patient reporting a decrease in worst pain level from 10/10 to 6/10 and occurring less frequently. No longer getting a sharp pain. She is exhibiting an increase in fingers Active range of motion in flexion. Patient and her  noting a decrease in assist for self care activities.  Patient's cultural,spiritual, and educational needs were considered    Goals:  Short Term Goals  Independent in home program of ROM in 2 visits-met  Patient knowledgeable of assistive and adaptive equipment in 2 weeks-met  Patient with decrease in worst pain to 7/10 in 3 weeks-ongoing  Patient reporting improved ability to use right to assist with dressing with decrease in pain and increase in ROM in 3 weeks-met  Long Term Goals  Patient with decrease in pain and increase in ROM allowing for greater use of right to assist with bathing in 4 weeks-met  Patient with decrease in pain to 4/10 at worst in 6 weeks-ongoing  Patient with increase motion and decrease in pain allowing for greater independence in assembling a sandwich and assisting with simple meal prep in 6 weeks-ongoing  Plan   Patient plans to make an appt with Dr Whalen to see if she is a candidate for Ziaflex injections. She would therapist to keep her chart open for a few weeks in case she would like to come in while waiting to see him. Will leave chart open for 2 weeks.  SIMA Russell

## 2023-02-20 ENCOUNTER — PATIENT MESSAGE (OUTPATIENT)
Dept: PSYCHIATRY | Facility: CLINIC | Age: 73
End: 2023-02-20
Payer: MEDICARE

## 2023-02-27 ENCOUNTER — OFFICE VISIT (OUTPATIENT)
Dept: FAMILY MEDICINE | Facility: CLINIC | Age: 73
End: 2023-02-27
Payer: MEDICARE

## 2023-02-27 VITALS
OXYGEN SATURATION: 95 % | BODY MASS INDEX: 30.08 KG/M2 | TEMPERATURE: 98 F | SYSTOLIC BLOOD PRESSURE: 118 MMHG | HEART RATE: 60 BPM | WEIGHT: 169.75 LBS | RESPIRATION RATE: 17 BRPM | DIASTOLIC BLOOD PRESSURE: 60 MMHG | HEIGHT: 63 IN

## 2023-02-27 DIAGNOSIS — R05.9 COUGH, UNSPECIFIED TYPE: Primary | ICD-10-CM

## 2023-02-27 LAB
CTP QC/QA: YES
CTP QC/QA: YES
POC MOLECULAR INFLUENZA A AGN: NEGATIVE
POC MOLECULAR INFLUENZA B AGN: POSITIVE
SARS-COV-2 RDRP RESP QL NAA+PROBE: POSITIVE

## 2023-02-27 PROCEDURE — 3078F DIAST BP <80 MM HG: CPT | Mod: CPTII,S$GLB,, | Performed by: FAMILY MEDICINE

## 2023-02-27 PROCEDURE — 87635: ICD-10-PCS | Mod: QW,S$GLB,, | Performed by: FAMILY MEDICINE

## 2023-02-27 PROCEDURE — 1101F PT FALLS ASSESS-DOCD LE1/YR: CPT | Mod: CPTII,S$GLB,, | Performed by: FAMILY MEDICINE

## 2023-02-27 PROCEDURE — 3288F PR FALLS RISK ASSESSMENT DOCUMENTED: ICD-10-PCS | Mod: CPTII,S$GLB,, | Performed by: FAMILY MEDICINE

## 2023-02-27 PROCEDURE — 87502 POCT INFLUENZA A/B MOLECULAR: ICD-10-PCS | Mod: QW,S$GLB,, | Performed by: FAMILY MEDICINE

## 2023-02-27 PROCEDURE — 3074F PR MOST RECENT SYSTOLIC BLOOD PRESSURE < 130 MM HG: ICD-10-PCS | Mod: CPTII,S$GLB,, | Performed by: FAMILY MEDICINE

## 2023-02-27 PROCEDURE — 3078F PR MOST RECENT DIASTOLIC BLOOD PRESSURE < 80 MM HG: ICD-10-PCS | Mod: CPTII,S$GLB,, | Performed by: FAMILY MEDICINE

## 2023-02-27 PROCEDURE — 99214 OFFICE O/P EST MOD 30 MIN: CPT | Mod: S$GLB,,, | Performed by: FAMILY MEDICINE

## 2023-02-27 PROCEDURE — 1159F MED LIST DOCD IN RCRD: CPT | Mod: CPTII,S$GLB,, | Performed by: FAMILY MEDICINE

## 2023-02-27 PROCEDURE — 1101F PR PT FALLS ASSESS DOC 0-1 FALLS W/OUT INJ PAST YR: ICD-10-PCS | Mod: CPTII,S$GLB,, | Performed by: FAMILY MEDICINE

## 2023-02-27 PROCEDURE — 3008F PR BODY MASS INDEX (BMI) DOCUMENTED: ICD-10-PCS | Mod: CPTII,S$GLB,, | Performed by: FAMILY MEDICINE

## 2023-02-27 PROCEDURE — 99214 PR OFFICE/OUTPT VISIT, EST, LEVL IV, 30-39 MIN: ICD-10-PCS | Mod: S$GLB,,, | Performed by: FAMILY MEDICINE

## 2023-02-27 PROCEDURE — 87635 SARS-COV-2 COVID-19 AMP PRB: CPT | Mod: QW,S$GLB,, | Performed by: FAMILY MEDICINE

## 2023-02-27 PROCEDURE — 99999 PR PBB SHADOW E&M-EST. PATIENT-LVL IV: CPT | Mod: PBBFAC,,, | Performed by: FAMILY MEDICINE

## 2023-02-27 PROCEDURE — 99999 PR PBB SHADOW E&M-EST. PATIENT-LVL IV: ICD-10-PCS | Mod: PBBFAC,,, | Performed by: FAMILY MEDICINE

## 2023-02-27 PROCEDURE — 87502 INFLUENZA DNA AMP PROBE: CPT | Mod: QW,S$GLB,, | Performed by: FAMILY MEDICINE

## 2023-02-27 PROCEDURE — 3074F SYST BP LT 130 MM HG: CPT | Mod: CPTII,S$GLB,, | Performed by: FAMILY MEDICINE

## 2023-02-27 PROCEDURE — 3008F BODY MASS INDEX DOCD: CPT | Mod: CPTII,S$GLB,, | Performed by: FAMILY MEDICINE

## 2023-02-27 PROCEDURE — 1159F PR MEDICATION LIST DOCUMENTED IN MEDICAL RECORD: ICD-10-PCS | Mod: CPTII,S$GLB,, | Performed by: FAMILY MEDICINE

## 2023-02-27 PROCEDURE — 1126F AMNT PAIN NOTED NONE PRSNT: CPT | Mod: CPTII,S$GLB,, | Performed by: FAMILY MEDICINE

## 2023-02-27 PROCEDURE — 3288F FALL RISK ASSESSMENT DOCD: CPT | Mod: CPTII,S$GLB,, | Performed by: FAMILY MEDICINE

## 2023-02-27 PROCEDURE — 1126F PR PAIN SEVERITY QUANTIFIED, NO PAIN PRESENT: ICD-10-PCS | Mod: CPTII,S$GLB,, | Performed by: FAMILY MEDICINE

## 2023-02-27 RX ORDER — OSELTAMIVIR PHOSPHATE 75 MG/1
75 CAPSULE ORAL 2 TIMES DAILY
Qty: 10 CAPSULE | Refills: 0 | Status: SHIPPED | OUTPATIENT
Start: 2023-02-27 | End: 2023-03-04

## 2023-02-27 NOTE — PATIENT INSTRUCTIONS
Virgilio Shah,     If you are due for any health screening(s) below please notify me so we can arrange them to be ordered and scheduled to maintain your health. Most healthy patients complete it. Don't lose out on improving your health.     Tests to Keep You Healthy    Mammogram: DUE  Colon Cancer Screening: Met on 12/11/2015  Last Blood Pressure <= 139/89 (2/27/2023): Yes      Breast Cancer Screening    Breast cancer is the second most common cancer in women after skin cancer, and the second leading cause of death from cancer after lung cancer. Mammograms can detect breast cancer early, which significantly increases the chances of curing the cancer.      A screening mammogram is an x-ray image of the breasts used for early breast cancer detection. It can help reduce the number of deaths from breast cancer among women. To get a clear image, the breast is placed between two plastic plates to make it flat. How often a mammogram is needed depends on your age and your breast cancer risk.    Although you are still overdue for this important screening, due to the COVID-19 pandemic, we recommend scheduling it in the near future.

## 2023-02-28 NOTE — PROGRESS NOTES
Subjective:   Patient ID: Alyssa John is a 72 y.o. female     Chief Complaint:Cough and Nasal Congestion      Notes cough, congestion, rhinorrhea for 2 days. Subjective fever. Denies sob/cp. Tolerating fluids well    Cough  Pertinent negatives include no chest pain or shortness of breath.   Review of Systems   Respiratory:  Positive for cough. Negative for shortness of breath.    Cardiovascular:  Negative for chest pain.   Gastrointestinal:  Negative for abdominal pain.   Genitourinary:  Negative for dysuria.   Past Medical History:   Diagnosis Date    Arthritis     Coronary artery disease     Encounter for blood transfusion     Epilepsy     GERD (gastroesophageal reflux disease)     Headache     Hypertension     MI, old     MS (multiple sclerosis)     Seizures     epilepsy     Past Surgical History:   Procedure Laterality Date    APPENDECTOMY      BACK SURGERY      L5 discectomy    BREAST BIOPSY Right 15 yrs ago    benign    CATARACT EXTRACTION Bilateral     COLONOSCOPY N/A 9/16/2015    Procedure: COLONOSCOPY;  Surgeon: Henry Malcolm MD;  Location: [facility];  Service: Endoscopy;  Laterality: N/A;    CORONARY STENT PLACEMENT      right knee arthroscopy       Objective:     Vitals:    02/27/23 1000   BP: 118/60   Pulse: 60   Resp: 17   Temp: 97.5 °F (36.4 °C)     Body mass index is 30.07 kg/m².  Physical Exam  Vitals and nursing note reviewed.   Constitutional:       Appearance: She is well-developed.   HENT:      Head: Normocephalic and atraumatic.   Eyes:      General: No scleral icterus.     Conjunctiva/sclera: Conjunctivae normal.   Cardiovascular:      Heart sounds: No murmur heard.  Pulmonary:      Effort: Pulmonary effort is normal. No respiratory distress.   Musculoskeletal:         General: No deformity. Normal range of motion.      Cervical back: Normal range of motion and neck supple.   Skin:     Coloration: Skin is not pale.      Findings: No rash.   Neurological:      Mental Status:  She is alert and oriented to person, place, and time.   Psychiatric:         Behavior: Behavior normal.         Thought Content: Thought content normal.         Judgment: Judgment normal.     Assessment:     1. Cough, unspecified type      Plan:   Cough, unspecified type  -     POCT COVID-19 Rapid Screening  -     POCT Influenza A/B Molecular  -     oseltamivir (TAMIFLU) 75 MG capsule; Take 1 capsule (75 mg total) by mouth 2 (two) times daily. for 5 days  Dispense: 10 capsule; Refill: 0  Suspect false possitve covid since within 90 days since last covid infection on PCR test. Antigen test not available in clinic. Will treat for flu with positive.        Total time spent of Less than 30 minutes minutes on the day of the visit.This includes face to face time and preparing to see the patient, obtaining and reviewing separately obtained history, documenting clinical information in the electronic or other health record, independently interpreting results and communicating results to the patient/family/caregiver, or care coordinator.    Established patient with me has been instructed that must see me at least 1 time yearly (every 365 days) for refills of medications. Seeing other providers in this clinic is fine but expectation is to see me yearly.    Brandon Glynn MD  02/28/2023    Portions of this note have been dictated with VANI Velasco

## 2023-03-02 ENCOUNTER — DOCUMENTATION ONLY (OUTPATIENT)
Dept: NEUROLOGY | Facility: CLINIC | Age: 73
End: 2023-03-02
Payer: MEDICARE

## 2023-03-14 ENCOUNTER — OFFICE VISIT (OUTPATIENT)
Dept: NEUROLOGY | Facility: CLINIC | Age: 73
End: 2023-03-14
Payer: MEDICARE

## 2023-03-14 VITALS
BODY MASS INDEX: 30.66 KG/M2 | WEIGHT: 173.06 LBS | SYSTOLIC BLOOD PRESSURE: 171 MMHG | DIASTOLIC BLOOD PRESSURE: 76 MMHG | HEART RATE: 59 BPM

## 2023-03-14 DIAGNOSIS — G40.909 SEIZURE DISORDER: ICD-10-CM

## 2023-03-14 PROCEDURE — 99213 PR OFFICE/OUTPT VISIT, EST, LEVL III, 20-29 MIN: ICD-10-PCS | Mod: S$GLB,,, | Performed by: PSYCHIATRY & NEUROLOGY

## 2023-03-14 PROCEDURE — 3077F SYST BP >= 140 MM HG: CPT | Mod: CPTII,S$GLB,, | Performed by: PSYCHIATRY & NEUROLOGY

## 2023-03-14 PROCEDURE — 99999 PR PBB SHADOW E&M-EST. PATIENT-LVL III: CPT | Mod: PBBFAC,,, | Performed by: PSYCHIATRY & NEUROLOGY

## 2023-03-14 PROCEDURE — 3008F PR BODY MASS INDEX (BMI) DOCUMENTED: ICD-10-PCS | Mod: CPTII,S$GLB,, | Performed by: PSYCHIATRY & NEUROLOGY

## 2023-03-14 PROCEDURE — 99999 PR PBB SHADOW E&M-EST. PATIENT-LVL III: ICD-10-PCS | Mod: PBBFAC,,, | Performed by: PSYCHIATRY & NEUROLOGY

## 2023-03-14 PROCEDURE — 99213 OFFICE O/P EST LOW 20 MIN: CPT | Mod: S$GLB,,, | Performed by: PSYCHIATRY & NEUROLOGY

## 2023-03-14 PROCEDURE — 99499 UNLISTED E&M SERVICE: CPT | Mod: S$GLB,,, | Performed by: PSYCHIATRY & NEUROLOGY

## 2023-03-14 PROCEDURE — 1126F PR PAIN SEVERITY QUANTIFIED, NO PAIN PRESENT: ICD-10-PCS | Mod: CPTII,S$GLB,, | Performed by: PSYCHIATRY & NEUROLOGY

## 2023-03-14 PROCEDURE — 3008F BODY MASS INDEX DOCD: CPT | Mod: CPTII,S$GLB,, | Performed by: PSYCHIATRY & NEUROLOGY

## 2023-03-14 PROCEDURE — 3078F PR MOST RECENT DIASTOLIC BLOOD PRESSURE < 80 MM HG: ICD-10-PCS | Mod: CPTII,S$GLB,, | Performed by: PSYCHIATRY & NEUROLOGY

## 2023-03-14 PROCEDURE — 1126F AMNT PAIN NOTED NONE PRSNT: CPT | Mod: CPTII,S$GLB,, | Performed by: PSYCHIATRY & NEUROLOGY

## 2023-03-14 PROCEDURE — 99499 RISK ADDL DX/OHS AUDIT: ICD-10-PCS | Mod: S$GLB,,, | Performed by: PSYCHIATRY & NEUROLOGY

## 2023-03-14 PROCEDURE — 3288F PR FALLS RISK ASSESSMENT DOCUMENTED: ICD-10-PCS | Mod: CPTII,S$GLB,, | Performed by: PSYCHIATRY & NEUROLOGY

## 2023-03-14 PROCEDURE — 1101F PT FALLS ASSESS-DOCD LE1/YR: CPT | Mod: CPTII,S$GLB,, | Performed by: PSYCHIATRY & NEUROLOGY

## 2023-03-14 PROCEDURE — 3288F FALL RISK ASSESSMENT DOCD: CPT | Mod: CPTII,S$GLB,, | Performed by: PSYCHIATRY & NEUROLOGY

## 2023-03-14 PROCEDURE — 1101F PR PT FALLS ASSESS DOC 0-1 FALLS W/OUT INJ PAST YR: ICD-10-PCS | Mod: CPTII,S$GLB,, | Performed by: PSYCHIATRY & NEUROLOGY

## 2023-03-14 PROCEDURE — 3078F DIAST BP <80 MM HG: CPT | Mod: CPTII,S$GLB,, | Performed by: PSYCHIATRY & NEUROLOGY

## 2023-03-14 PROCEDURE — 3077F PR MOST RECENT SYSTOLIC BLOOD PRESSURE >= 140 MM HG: ICD-10-PCS | Mod: CPTII,S$GLB,, | Performed by: PSYCHIATRY & NEUROLOGY

## 2023-03-14 RX ORDER — TOPIRAMATE 100 MG/1
100 TABLET, FILM COATED ORAL 2 TIMES DAILY
Qty: 180 TABLET | Refills: 3 | Status: SHIPPED | OUTPATIENT
Start: 2023-03-14 | End: 2024-02-08

## 2023-03-14 NOTE — PROGRESS NOTES
"Date of service:  3/14/2023     Chief complaint:  Epilepsy    Interval history:  The patient is a 72 y.o. female with a diagnosis of MS who we are seeing for epilepsy.  Since she was last here, we admitted her to the EMU, and they were able to successfully wean her Klonopin.  She reports no further "miniseizures," which had been occurring up to multiple times per day when we were trying to wean her Klonopin on an outpatient basis.  She has had none of her "petit mal" or "grand mal" seizures in a  number of years.    History of present illness:  The patient is a 72 y.o. female with a PMH of MS (diagnosed in 2000) we have been asked to see for evaluation of episodes suspicious for seizures and medication management.  The patient reports a longstanding diagnosis of epilepsy and that she has a history of 2 types of seizures.    Seizure type 1: "Petit mal"  These began some time prior to 1971. With respect to aura, the patient reports nothing.  Her seizure is characterized by several jerks in either shoulder.  This component of this spell lasts for approximately several seconds.  Afterwards, she reports no postictal state.  The patient's frequency of events was roughly daily prior to starting AEDs.  She reports that she has not had one of these in years.    Seizure type 2: "Grand mal"  These began in 1971. With respect to warning, the patient would have "a lot of the petite mal" seizures.  Her seizure is characterized by loss of awareness, generalized stiffening, and generalized shaking.  She would have foaming of the mouth, urinary incontinence, and tongue biting.  This component of this spell lasts for approximately 2-3 minutes.  Afterwards, she was fatigued.  The patient's last event of this type was in 1990.  Prior to starting medication, they happened about once a month on average.    She does not give a history of events suspicious for absence seizures.    Regarding medications, the patient was tried on " phenobarbital, Dilantin, and Depakote without success.  Tegretol was then given, and she continued to have seizures.  In 1982, Klonopin was added.  She became seizure free on this regimen, aside from a seizure in 1990 when she tried to wean herself off her AEDs.  The patient's Tegretol was changed to Topamax at some point after 2000 when she became thrombocytopenic while also receiving Tysabri.  They also report that at some point, her Klonopin was increased from 0.5 mg BID to 1 mg BID.  They indicate that there was no clear reason why this was done.  They deny that she was having seizures.  They do report significant issues with fatigue, and they indicate that this prompted Dr. Apodaca to refer her here for revision of her AED regimen.    Potential Epilepsy Risk Factors:   Pregnancy/Labor/Delivery - none  Febrile seizures - none  Head injury  - none  CNS infection - none   Stroke - none  Family Hx of Sz - none    AED Treatments  Present regimen  Topamax 100 mg BID    Prior treatments  Tegretol (discontinued due to thrombocytopenia in the setting of Tysabri administration)  Phenobarital (ineffective)  Dilantin (ineffective)  Depakote (ineffective)  Klonopin (effective, had been on it for years, weaned off secondary to drowsiness)    Not tried  acetazolamide (Diamox, AZM)  Amantadine  brivitiracetam (Briviact, BRV)  clobazam (Onfi or Frizium, CLB)  ethosuximide (Zarontin, ESM)  eslicarbazine (Aptiom, ESL)  felbamate (felbatol, FBM)  gabapentin (Neurontin, GPN)  lacosamide (Vimpat, LCS)   lamotrigine (Lamictal, LTG)   levetiracetam (Keppra, LEV)  methsuximide (Celontin, MSM)  oxcarbazepine (Trileptal OXC)  perampanel (Fycompa, FCP)   pregabalin (Lyrica, PGB)  primidone (Mysoline, PRM)  rufinamide (Banzel, RUF)  tiagabine (Gabatril,  TGB)  viagabatrin, (Sabril, VGB)  vagal nerve stimulator (VNS)  valproic acid (Depakote, VPA)  zonisamide (Zonegran, ZNA)  Benzodiazepines  diazepam - rectal (Diastatl)  diazepam - oral  (Valium, DZ)  clorazepate (Tranxene, CLZ)  Ativan  Brain Stimulation  Vagal Nerve Stimulation-n/a  DBS- n/a    Past Medical History:   Diagnosis Date    Arthritis     Coronary artery disease     Encounter for blood transfusion     Epilepsy     GERD (gastroesophageal reflux disease)     Headache     Hypertension     MI, old     MS (multiple sclerosis)     Seizures     epilepsy       Past Surgical History:   Procedure Laterality Date    APPENDECTOMY      BACK SURGERY      L5 discectomy    BREAST BIOPSY Right 15 yrs ago    benign    CATARACT EXTRACTION Bilateral     COLONOSCOPY N/A 2015    Procedure: COLONOSCOPY;  Surgeon: Henry Malcolm MD;  Location: [facility];  Service: Endoscopy;  Laterality: N/A;    CORONARY STENT PLACEMENT      right knee arthroscopy         Family History   Problem Relation Age of Onset    Scleroderma Mother     Heart disease Father         MI, CAD    Cancer Neg Hx     Diabetes Neg Hx     Stroke Neg Hx     Melanoma Neg Hx     Psoriasis Neg Hx     Lupus Neg Hx     Eczema Neg Hx        Social History     Socioeconomic History    Marital status:    Tobacco Use    Smoking status: Former     Packs/day: 1.50     Types: Cigarettes     Quit date: 2006     Years since quittin.2    Smokeless tobacco: Never   Substance and Sexual Activity    Alcohol use: No     Alcohol/week: 0.0 standard drinks    Drug use: No    Sexual activity: Not Currently     Partners: Male     Social Determinants of Health     Social Connections: Unknown    Marital Status:         Review of patient's allergies indicates:   Allergen Reactions    Codeine      Other reaction(s): throat tightness    Dilantin [phenytoin sodium extended]     Phenobarbital     Tegretol [carbamazepine]         Review of Systems  The patient's ROS is noncontributory except as noted above.    Physical Exam  BP (!) 171/76 (BP Location: Right arm, Patient Position: Sitting, BP Method: Large (Automatic))   Pulse (!) 59   Wt  "78.5 kg (173 lb 1 oz)   BMI 30.66 kg/m²    Exam is not significantly changed relative to prior evaluations.    Data base:  Notes from the referring physician were reviewed.  Briefly summarized, these discuss her MS.  The last note indicates concern that the patient's AED regimen may be contributing to fatigue and cognitive complaints.    Labs (12/22):  TPM: 14.7    MRI brain (4/21):  "1. There is a stable marked burden of white matter disease consistent with the provided diagnosis of multiple sclerosis.  These findings are other extensive.  There are no regions of restricted diffusion and there is no abnormal enhancement.  There are no findings to suggest interval progression of disease or active demyelination."  I independently visualized the images of this study and interpreted them.  There is diffuse atrophy noted involving the cerebrum, cerebellum, and brainstem.  She has a significant burden of white matter disease.  The areas of T2/FLAIR hyperintensity are most pronounced in a periventricular distribution, and some lesions appear to be oriented perpendicular to the ventricles.  While less pronounced, there are some juxtacortical lesion seen.  There is no abnormal contrast enhancement.  This study appears consistent with the patient's diagnosis of longstanding MS, and there is no evidence of active demyelination appreciated.    EEG:  Encephalopathy    Assessment and plan:  The patient is a 72 y.o. female we have been asked to see for evaluation for events worrisome for seizures.  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  Given that her diagnosis of MS was made at age 50, I am less suspicious that she developed epilepsy secondary to her MS.  We will continue her Topamax.  Medication side effects were discussed with the patient. "  State law as it pertains to driving for individuals with seizures was discussed.  The patient was also counseled on seizure safety. We will plan on seeing the patient back in a few weeks.

## 2023-04-02 ENCOUNTER — HOSPITAL ENCOUNTER (EMERGENCY)
Facility: HOSPITAL | Age: 73
Discharge: HOME | End: 2023-04-02
Attending: EMERGENCY MEDICINE
Payer: MEDICARE

## 2023-04-02 VITALS
HEIGHT: 63 IN | SYSTOLIC BLOOD PRESSURE: 160 MMHG | RESPIRATION RATE: 16 BRPM | HEART RATE: 59 BPM | BODY MASS INDEX: 29.95 KG/M2 | TEMPERATURE: 98 F | OXYGEN SATURATION: 97 % | WEIGHT: 169 LBS | DIASTOLIC BLOOD PRESSURE: 68 MMHG

## 2023-04-02 DIAGNOSIS — R35.0 URINARY FREQUENCY: Primary | ICD-10-CM

## 2023-04-02 DIAGNOSIS — R10.9 FLANK PAIN: ICD-10-CM

## 2023-04-02 DIAGNOSIS — N89.8 VAGINAL DRYNESS: ICD-10-CM

## 2023-04-02 LAB
ALBUMIN SERPL BCP-MCNC: 3.6 G/DL (ref 3.5–5.2)
ALP SERPL-CCNC: 100 U/L (ref 55–135)
ALT SERPL W/O P-5'-P-CCNC: 23 U/L (ref 10–44)
AMPHET+METHAMPHET UR QL: NEGATIVE
ANION GAP SERPL CALC-SCNC: 6 MMOL/L (ref 8–16)
AST SERPL-CCNC: 21 U/L (ref 10–40)
BARBITURATES UR QL SCN>200 NG/ML: NEGATIVE
BASOPHILS # BLD AUTO: 0.06 K/UL (ref 0–0.2)
BASOPHILS NFR BLD: 0.9 % (ref 0–1.9)
BENZODIAZ UR QL SCN>200 NG/ML: NEGATIVE
BILIRUB SERPL-MCNC: 0.6 MG/DL (ref 0.1–1)
BILIRUB UR QL STRIP: NEGATIVE
BNP SERPL-MCNC: 128 PG/ML (ref 0–99)
BUN SERPL-MCNC: 15 MG/DL (ref 8–23)
BZE UR QL SCN: NEGATIVE
CALCIUM SERPL-MCNC: 8.6 MG/DL (ref 8.7–10.5)
CANNABINOIDS UR QL SCN: NEGATIVE
CHLORIDE SERPL-SCNC: 114 MMOL/L (ref 95–110)
CK SERPL-CCNC: 74 U/L (ref 20–180)
CLARITY UR: CLEAR
CO2 SERPL-SCNC: 19 MMOL/L (ref 23–29)
COLOR UR: YELLOW
CREAT SERPL-MCNC: 0.9 MG/DL (ref 0.5–1.4)
CREAT UR-MCNC: 68 MG/DL (ref 15–325)
DIFFERENTIAL METHOD: ABNORMAL
EOSINOPHIL # BLD AUTO: 0.2 K/UL (ref 0–0.5)
EOSINOPHIL NFR BLD: 2.6 % (ref 0–8)
ERYTHROCYTE [DISTWIDTH] IN BLOOD BY AUTOMATED COUNT: 13.8 % (ref 11.5–14.5)
EST. GFR  (NO RACE VARIABLE): >60 ML/MIN/1.73 M^2
GLUCOSE SERPL-MCNC: 92 MG/DL (ref 70–110)
GLUCOSE UR QL STRIP: NEGATIVE
HCT VFR BLD AUTO: 44.1 % (ref 37–48.5)
HGB BLD-MCNC: 13.9 G/DL (ref 12–16)
HGB UR QL STRIP: NEGATIVE
IMM GRANULOCYTES # BLD AUTO: 0.02 K/UL (ref 0–0.04)
IMM GRANULOCYTES NFR BLD AUTO: 0.3 % (ref 0–0.5)
INR PPP: 0.9 (ref 0.8–1.2)
KETONES UR QL STRIP: NEGATIVE
LACTATE SERPL-SCNC: 1.5 MMOL/L (ref 0.5–1.9)
LEUKOCYTE ESTERASE UR QL STRIP: NEGATIVE
LIPASE SERPL-CCNC: 27 U/L (ref 4–60)
LYMPHOCYTES # BLD AUTO: 2.4 K/UL (ref 1–4.8)
LYMPHOCYTES NFR BLD: 36.7 % (ref 18–48)
MAGNESIUM SERPL-MCNC: 2.2 MG/DL (ref 1.6–2.6)
MCH RBC QN AUTO: 27.7 PG (ref 27–31)
MCHC RBC AUTO-ENTMCNC: 31.5 G/DL (ref 32–36)
MCV RBC AUTO: 88 FL (ref 82–98)
MONOCYTES # BLD AUTO: 0.4 K/UL (ref 0.3–1)
MONOCYTES NFR BLD: 5.8 % (ref 4–15)
NEUTROPHILS # BLD AUTO: 3.5 K/UL (ref 1.8–7.7)
NEUTROPHILS NFR BLD: 53.7 % (ref 38–73)
NITRITE UR QL STRIP: NEGATIVE
NRBC BLD-RTO: 0 /100 WBC
OPIATES UR QL SCN: NEGATIVE
PCP UR QL SCN>25 NG/ML: NEGATIVE
PH UR STRIP: 7 [PH] (ref 5–8)
PLATELET # BLD AUTO: 195 K/UL (ref 150–450)
PMV BLD AUTO: 11.4 FL (ref 9.2–12.9)
POTASSIUM SERPL-SCNC: 3.8 MMOL/L (ref 3.5–5.1)
PROT SERPL-MCNC: 6.6 G/DL (ref 6–8.4)
PROT UR QL STRIP: NEGATIVE
PROTHROMBIN TIME: 10.1 SEC (ref 9–12.5)
RBC # BLD AUTO: 5.01 M/UL (ref 4–5.4)
SODIUM SERPL-SCNC: 139 MMOL/L (ref 136–145)
SP GR UR STRIP: 1.01 (ref 1–1.03)
TOXICOLOGY INFORMATION: NORMAL
TROPONIN I SERPL HS-MCNC: 6.6 PG/ML (ref 0–14.9)
TSH SERPL DL<=0.005 MIU/L-ACNC: 1.57 UIU/ML (ref 0.34–5.6)
URN SPEC COLLECT METH UR: NORMAL
UROBILINOGEN UR STRIP-ACNC: NEGATIVE EU/DL
WBC # BLD AUTO: 6.51 K/UL (ref 3.9–12.7)

## 2023-04-02 PROCEDURE — 80307 DRUG TEST PRSMV CHEM ANLYZR: CPT | Performed by: STUDENT IN AN ORGANIZED HEALTH CARE EDUCATION/TRAINING PROGRAM

## 2023-04-02 PROCEDURE — 63600175 PHARM REV CODE 636 W HCPCS: Performed by: EMERGENCY MEDICINE

## 2023-04-02 PROCEDURE — 84443 ASSAY THYROID STIM HORMONE: CPT | Performed by: EMERGENCY MEDICINE

## 2023-04-02 PROCEDURE — 83880 ASSAY OF NATRIURETIC PEPTIDE: CPT | Performed by: EMERGENCY MEDICINE

## 2023-04-02 PROCEDURE — 82550 ASSAY OF CK (CPK): CPT | Performed by: EMERGENCY MEDICINE

## 2023-04-02 PROCEDURE — 87040 BLOOD CULTURE FOR BACTERIA: CPT | Performed by: EMERGENCY MEDICINE

## 2023-04-02 PROCEDURE — 84484 ASSAY OF TROPONIN QUANT: CPT | Performed by: EMERGENCY MEDICINE

## 2023-04-02 PROCEDURE — 85610 PROTHROMBIN TIME: CPT | Performed by: EMERGENCY MEDICINE

## 2023-04-02 PROCEDURE — 81003 URINALYSIS AUTO W/O SCOPE: CPT | Performed by: EMERGENCY MEDICINE

## 2023-04-02 PROCEDURE — 25500020 PHARM REV CODE 255: Performed by: STUDENT IN AN ORGANIZED HEALTH CARE EDUCATION/TRAINING PROGRAM

## 2023-04-02 PROCEDURE — 93005 ELECTROCARDIOGRAM TRACING: CPT | Performed by: INTERNAL MEDICINE

## 2023-04-02 PROCEDURE — 99285 EMERGENCY DEPT VISIT HI MDM: CPT | Mod: 25

## 2023-04-02 PROCEDURE — 80053 COMPREHEN METABOLIC PANEL: CPT | Performed by: EMERGENCY MEDICINE

## 2023-04-02 PROCEDURE — 85025 COMPLETE CBC W/AUTO DIFF WBC: CPT | Performed by: EMERGENCY MEDICINE

## 2023-04-02 PROCEDURE — 83690 ASSAY OF LIPASE: CPT | Performed by: EMERGENCY MEDICINE

## 2023-04-02 PROCEDURE — 25000003 PHARM REV CODE 250: Performed by: EMERGENCY MEDICINE

## 2023-04-02 PROCEDURE — 87154 CUL TYP ID BLD PTHGN 6+ TRGT: CPT | Performed by: EMERGENCY MEDICINE

## 2023-04-02 PROCEDURE — 83605 ASSAY OF LACTIC ACID: CPT | Performed by: EMERGENCY MEDICINE

## 2023-04-02 PROCEDURE — 93010 EKG 12-LEAD: ICD-10-PCS | Mod: ,,, | Performed by: INTERNAL MEDICINE

## 2023-04-02 PROCEDURE — 93010 ELECTROCARDIOGRAM REPORT: CPT | Mod: ,,, | Performed by: INTERNAL MEDICINE

## 2023-04-02 PROCEDURE — 96374 THER/PROPH/DIAG INJ IV PUSH: CPT | Mod: 59

## 2023-04-02 PROCEDURE — 83735 ASSAY OF MAGNESIUM: CPT | Performed by: EMERGENCY MEDICINE

## 2023-04-02 RX ORDER — ACETAMINOPHEN 500 MG
1000 TABLET ORAL
Status: COMPLETED | OUTPATIENT
Start: 2023-04-02 | End: 2023-04-02

## 2023-04-02 RX ORDER — HYDRALAZINE HYDROCHLORIDE 20 MG/ML
10 INJECTION INTRAMUSCULAR; INTRAVENOUS
Status: COMPLETED | OUTPATIENT
Start: 2023-04-02 | End: 2023-04-02

## 2023-04-02 RX ADMIN — HYDRALAZINE HYDROCHLORIDE 10 MG: 20 INJECTION, SOLUTION INTRAMUSCULAR; INTRAVENOUS at 06:04

## 2023-04-02 RX ADMIN — IOHEXOL 100 ML: 350 INJECTION, SOLUTION INTRAVENOUS at 06:04

## 2023-04-02 RX ADMIN — ACETAMINOPHEN 1000 MG: 500 TABLET ORAL at 05:04

## 2023-04-02 NOTE — ED PROVIDER NOTES
Encounter Date: 4/2/2023       History     Chief Complaint   Patient presents with    Urinary Frequency     Burning with urination, having confusion since last pm thinks has uti     72-year-old female presents complaining of burning frequent urination over the past several days with some associated left flank pain.  She has been sleeping more than normal per family members.  They were concerned that this may be a urinary tract infection.  She reports nausea without vomiting.  She reports urinary frequency and urgency.  She has not had any previous history of kidney stones.  She is had intermittent left flank pain that is not pleuritic in nature.  The pain has radiated at times down to the left abdominal area.  She denies chest pain or shortness of breath.  She denies any headache, neck pain or stiffness.  She denies any visual changes.  She reports that she is felt that her mentation has been slow.  She denies syncope or near-syncope.  She denies any other problems or complaints.      Review of patient's allergies indicates:   Allergen Reactions    Codeine      Other reaction(s): throat tightness    Dilantin [phenytoin sodium extended]     Phenobarbital     Tegretol [carbamazepine]      Past Medical History:   Diagnosis Date    Arthritis     Coronary artery disease     Encounter for blood transfusion     Epilepsy     GERD (gastroesophageal reflux disease)     Headache     Hypertension     MI, old     MS (multiple sclerosis)     Seizures     epilepsy     Past Surgical History:   Procedure Laterality Date    APPENDECTOMY      BACK SURGERY      L5 discectomy    BREAST BIOPSY Right 15 yrs ago    benign    CATARACT EXTRACTION Bilateral     COLONOSCOPY N/A 9/16/2015    Procedure: COLONOSCOPY;  Surgeon: Henry Malcolm MD;  Location[facility];  Service: Endoscopy;  Laterality: N/A;    CORONARY STENT PLACEMENT      right knee arthroscopy       Family History   Problem Relation Age of Onset    Scleroderma Mother      Heart disease Father         MI, CAD    Cancer Neg Hx     Diabetes Neg Hx     Stroke Neg Hx     Melanoma Neg Hx     Psoriasis Neg Hx     Lupus Neg Hx     Eczema Neg Hx      Social History     Tobacco Use    Smoking status: Former     Packs/day: 1.50     Types: Cigarettes     Quit date: 2006     Years since quittin.2    Smokeless tobacco: Never   Substance Use Topics    Alcohol use: Yes     Comment: rarely    Drug use: No     Review of Systems   Constitutional:  Positive for activity change, appetite change, chills and fatigue. Negative for fever.   HENT: Negative.  Negative for congestion, ear pain, rhinorrhea, sore throat and trouble swallowing.    Eyes: Negative.  Negative for photophobia, pain, redness and visual disturbance.   Respiratory: Negative.  Negative for cough, chest tightness, shortness of breath and wheezing.    Cardiovascular: Negative.  Negative for chest pain, palpitations and leg swelling.   Gastrointestinal:  Positive for nausea. Negative for abdominal distention, abdominal pain, blood in stool, constipation, diarrhea and vomiting.   Endocrine: Negative.    Genitourinary:  Positive for dysuria, flank pain and frequency. Negative for decreased urine volume, difficulty urinating, pelvic pain and urgency.   Musculoskeletal:  Negative for arthralgias, back pain, gait problem, myalgias, neck pain and neck stiffness.   Skin: Negative.  Negative for rash.   Neurological:  Positive for weakness (nonfocal generalized weakness and fatigue.) and light-headedness. Negative for dizziness, syncope, facial asymmetry, speech difficulty, numbness and headaches.   Hematological:  Does not bruise/bleed easily.   Psychiatric/Behavioral:  Negative for confusion.    All other systems reviewed and are negative.    Physical Exam     Initial Vitals [23 1516]   BP Pulse Resp Temp SpO2   (!) 181/104 73 18 98.4 °F (36.9 °C) 96 %      MAP       --         Physical Exam    Nursing note and vitals  reviewed.  Constitutional: She is active and cooperative. She does not have a sickly appearance. She does not appear ill. No distress.   HENT:   Head: Normocephalic and atraumatic.   Right Ear: Tympanic membrane normal.   Left Ear: Tympanic membrane normal.   Nose: Nose normal.   Mouth/Throat: Uvula is midline and oropharynx is clear and moist. Mucous membranes are dry. No oral lesions. No uvula swelling. No oropharyngeal exudate, posterior oropharyngeal edema or posterior oropharyngeal erythema.   Eyes: Conjunctivae, EOM and lids are normal. Pupils are equal, round, and reactive to light. No scleral icterus.   Neck: Trachea normal and phonation normal. Neck supple. No JVD present.   Normal range of motion.   Full passive range of motion without pain.     Cardiovascular:  Normal rate, regular rhythm, normal heart sounds, intact distal pulses and normal pulses.     Exam reveals no gallop, no distant heart sounds, no friction rub and no decreased pulses.       No murmur heard.  Pulmonary/Chest: Effort normal and breath sounds normal. No accessory muscle usage or stridor. No tachypnea. No respiratory distress. She has no wheezes. She has no rhonchi. She has no rales.   Abdominal: Abdomen is soft. Bowel sounds are normal. She exhibits no distension, no pulsatile midline mass and no mass. There is generalized abdominal tenderness and tenderness in the epigastric area and left upper quadrant. No hernia.   No right CVA tenderness.  There is left CVA tenderness. There is no rigidity, no rebound, no guarding, no tenderness at McBurney's point and negative Hopkins's sign. negative psoas sign and negative Rovsing's sign  Musculoskeletal:         General: No tenderness or edema. Normal range of motion.      Right hand: Normal. Normal range of motion. Normal capillary refill. Normal pulse.      Left hand: Normal. Normal range of motion. Normal capillary refill. Normal pulse.      Cervical back: Normal, full passive range of  motion without pain, normal range of motion and neck supple. No rigidity or bony tenderness. No spinous process tenderness or muscular tenderness. Normal range of motion.      Thoracic back: Normal. No bony tenderness. Normal range of motion.      Lumbar back: Normal. No bony tenderness. Normal range of motion.      Right foot: Normal. Normal range of motion and normal capillary refill. Normal pulse.      Left foot: Normal. Normal range of motion and normal capillary refill. Normal pulse.      Comments: Pulses are 2+ throughout, cap refill is less than 2 sec throughout, extremities are nontender throughout with full range of motion. There is no spinal tenderness to palpation.     Neurological: She is alert and oriented to person, place, and time. She has normal strength. She displays normal reflexes. No cranial nerve deficit or sensory deficit. Gait normal. GCS score is 15. GCS eye subscore is 4. GCS verbal subscore is 5. GCS motor subscore is 6.   No focal deficits.  Patient is A&O x4 with no confusion.  Is able to answer all questions appropriately.   Skin: Skin is warm, dry and intact. Capillary refill takes less than 2 seconds. No ecchymosis, no petechiae and no rash noted. No erythema. There is pallor.   Psychiatric: She has a normal mood and affect. Her speech is normal and behavior is normal. Judgment normal. Cognition and memory are normal.       ED Course   Procedures  Labs Reviewed   CBC W/ AUTO DIFFERENTIAL - Abnormal; Notable for the following components:       Result Value    MCHC 31.5 (*)     All other components within normal limits   COMPREHENSIVE METABOLIC PANEL - Abnormal; Notable for the following components:    Chloride 114 (*)     CO2 19 (*)     Calcium 8.6 (*)     Anion Gap 6 (*)     All other components within normal limits   B-TYPE NATRIURETIC PEPTIDE - Abnormal; Notable for the following components:     (*)     All other components within normal limits   CULTURE, BLOOD   CULTURE,  BLOOD   URINALYSIS, REFLEX TO URINE CULTURE    Narrative:     Specimen Source->Urine   LIPASE   CK   MAGNESIUM   TSH   TROPONIN I HIGH SENSITIVITY   PROTIME-INR   LACTIC ACID, PLASMA   DRUG SCREEN PANEL, URINE EMERGENCY   DRUG SCREEN PANEL, URINE EMERGENCY          Imaging Results              CT Abdomen Pelvis With Contrast (Final result)  Result time 04/02/23 19:19:19      Final result by Emmett Plunkett MD (04/02/23 19:19:19)                   Narrative:    CMS MANDATED QUALITY DATA - CT RADIATION 436    All CT scans at this facility utilize dose modulation, iterative reconstruction, and/or weight based dosing when appropriate to reduce radiation dose to as low as reasonably achievable.    CT ABDOMEN PELVIS WITH IV CONTRAST    History:  Abdominal abscess/infection suspected    Comparison:  none    Technique: 3.75 mm contiguous axial images of the abdomen and pelvis were obtained with IV. Oral contrast was administered. Coronal reformatted images were reviewed.    Findings: A calcified gallstone is identified. No inflammatory changes around the gallbladder or bile duct dilatation is observed.    The liver, spleen, pancreas, kidneys adrenal glands, stomach and visualized loops of small and large bowel are unremarkable. There is no visualized free air free fluid or organized fluid collections in the abdomen or pelvis or within the abdominal or pelvic wall visualized.    Uterus and adnexa are unremarkable.    The aorta tapers normally without vascular calcifications. No para-aortic, pelvic or inguinal adenopathy is visualized.    Impression: Cholelithiasis. No visualized acute abnormalities. No visualized intra-abdominal fluid collections.    Electronically signed by:  Emmett Plunkett MD  4/2/2023 7:19 PM CDT Workstation: [id number]                                     CT Head Without Contrast (Final result)  Result time 04/02/23 19:13:25      Final result by Emmett Plunkett MD (04/02/23 19:13:25)                    Narrative:    CMS MANDATED QUALITY DATA  CT RADIATION 436    All CT scans at this facility utilize dose modulation, iterative reconstruction, and/or weight based dosing when appropriate to reduce radiation dose to as low as reasonably achievable.    CT HEAD WITHOUT IV CONTRAST    History:Mental status change, unknown cause    Comparison: None    Technique: 5 mm contiguous axial images of the brain were obtained without contrast    Findings: Advanced chronic central white matter changes are observed within old infarct in the caudate on the right. Gray-white attenuation peripherally appears maintained. Brain parenchyma is without mass mass effect intra-axial or extra-axial fluid collection, parenchymal, interventricular or subarachnoid hemorrhage. Ventricular size and sulcal pattern are consistent the patient's chronologic age. Paranasal sinus and mastoid air cells are clear.    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 No visualized acute abnormalities.    Electronically signed by:  Emmett Plunkett MD  4/2/2023 7:13 PM CDT Workstation: [id number]                                     X-Ray Chest AP Portable (Final result)  Result time 04/02/23 17:33:29      Final result by Shanell Lopez DO (04/02/23 17:33:29)                   Narrative:    AP chest radiograph: 4/2/2023 5:33 PM CDT    History: 72 years  old Female with flank pain.    Comparison: 5/17/2019    Findings: The cardiomediastinal silhouette is enlarged.    No pneumothorax is seen.    No acute airspace opacities are seen.    No discrete pleural effusion is apparent.    Impression: No acute airspace opacities are seen.    Electronically signed by:  Shanell Lopez DO  4/2/2023 5:33 PM CDT Workstation: [id number]                                     Medications   acetaminophen tablet 1,000 mg (1,000 mg Oral Given 4/2/23 1739)   hydrALAZINE injection 10 mg (10 mg Intravenous Given 4/2/23 1821)   iohexoL (OMNIPAQUE 350) injection 100 mL (100 mLs  Intravenous Given 4/2/23 7706)                            72-year-old female with multiple sclerosis, hypertension presents for left-sided flank pain, burning with urination and possible confusion.  Vitals notable for elevated blood pressure, treated here with hydralazine with improvement.  She is also been intermittently bradycardic but rate improves when patient moves around.  Other vitals within normal limits.  On initial exam, patient flank pain.  On my reexamination, patient has no tenderness to palpation of the left flank.  UA without evidence of urinary tract infection.  CT abdomen pelvis without evidence of acute intra-abdominal infection, no evidence of diverticulitis, no evidence of abdominal aortic injury.  Lactic within normal limits, doubt bowel ischemia or sepsis.  Labs without evidence of acute abnormality but notable for mild decrease in bicarb and increase in chloride consistent with dehydration.  Patient given 1 L IV fluids without much change in her symptoms.  On reexamination, patient complaining of some vaginal discomfort.  Vaginal exam without evidence of lesions or abrasions, no evidence of discharge.  She does have some vaginal dryness, advised her to use a moisturizing cream and follow-up outpatient with Urogynecology.  She is comfortable with that plan.  Stable for discharge with outpatient follow up with the cardiologist's and also with the primary care physician.      Clinical Impression:   Final diagnoses:  [R10.9] Flank pain  [R35.0] Urinary frequency (Primary)  [N89.8] Vaginal dryness        ED Disposition Condition    Discharge Stable          ED Prescriptions    None       Follow-up Information       Follow up With Specialties Details Why Contact Info    Piyush Sharif MD Family Medicine Call in 1 day To set up a follow-up appointment, To recheck today's symptoms [id number] [facility] [address]  [phone]               Reagan Anne MD  04/03/23 0718

## 2023-04-02 NOTE — ED TRIAGE NOTES
"Present with c/o " I got a UTI" reports frequent urination, burning with urination since yesterday, denies fever/chills,   Pt's daughter reports pt has been sleeping more since " lately" since " the last few day"   Pt reports being fatigue since " the last few days"   C/o L flank pain since a couple of days, rated 5-7/10,   "

## 2023-04-03 NOTE — DISCHARGE INSTRUCTIONS

## 2023-04-04 LAB
ACINETOBACTER CALCOACETICUS/BAUMANNII COMPLEX: NOT DETECTED
BACTERIA BLD CULT: ABNORMAL
BACTEROIDES FRAGILIS: NOT DETECTED
CANDIDA ALBICANS: NOT DETECTED
CANDIDA AURIS: NOT DETECTED
CANDIDA GLABRATA: NOT DETECTED
CANDIDA KRUSEI: NOT DETECTED
CANDIDA PARAPSILOSIS: NOT DETECTED
CANDIDA TROPICALIS: NOT DETECTED
CRYPTOCOCCUS NEOFORMANS/GATTII: NOT DETECTED
CTX-M GENE: ABNORMAL
ENTEROBACTER CLOACAE COMPLEX: NOT DETECTED
ENTEROBACTERALES: NOT DETECTED
ENTEROCOCCUS FAECALIS: NOT DETECTED
ENTEROCOCCUS FAECIUM: NOT DETECTED
ESCHERICHIA COLI: NOT DETECTED
HAEMOPHILUS INFLUENZAE: NOT DETECTED
IMP GENE: ABNORMAL
KLEBSIELLA AEROGENES: NOT DETECTED
KLEBSIELLA OXYTOCA: NOT DETECTED
KLEBSIELLA PNEUMONIAE GROUP: NOT DETECTED
KPC: ABNORMAL
LISTERIA MONOCYTOGENES: NOT DETECTED
MCR-1: ABNORMAL
MEC A/C AND MREJ (MRSA): ABNORMAL
MEC A/C: NOT DETECTED
NDM GENE: ABNORMAL
NEISSERIA MENINGITIDIS: NOT DETECTED
OXA-48-LIKE: ABNORMAL
PROTEUS SPECIES: NOT DETECTED
PSEUDOMONAS AERUGINOSA: NOT DETECTED
SALMONELLA SP: NOT DETECTED
SERRATIA MARCESCENS: NOT DETECTED
STAPHYLOCOCCUS AUREUS: NOT DETECTED
STAPHYLOCOCCUS EPIDERMIDIS: DETECTED
STAPHYLOCOCCUS LUGDUNESIS: NOT DETECTED
STAPHYLOCOCCUS SPECIES: ABNORMAL
STENOTROPHOMONAS MALTOPHILIA: NOT DETECTED
STREPTOCOCCUS AGALACTIAE: NOT DETECTED
STREPTOCOCCUS PNEUMONIAE: NOT DETECTED
STREPTOCOCCUS PYOGENES: NOT DETECTED
STREPTOCOCCUS SPECIES: NOT DETECTED
VAN A/B: ABNORMAL
VIM GENE: ABNORMAL

## 2023-04-04 NOTE — PROGRESS NOTES
Blood cultures more than likely a skin contaminant please call the patient and see how they are feeling if they are still feeling bad or have any symptoms they should return to the emergency department for re-evaluation

## 2023-04-08 LAB — BACTERIA BLD CULT: NORMAL

## 2023-04-12 NOTE — PROGRESS NOTES
Subjective:          Patient ID: Alyssa John is a 72 y.o. female who presents today for a routine clinic visit for MS.  She was last seen in December 2022. The history has been provided by the patient. She is accompanied by her , Giuseppe.     MS HPI:  DMT: None  Taking vitamin D3 as recommended? Yes -  Dose: 5000 units 5 days a week   She fell about 4-5 days ago when she lost her balance and fell back into her wheelchair. She cut her arm and has it bandaged up.   She saw the hand specialist recently, Dr. Deon Whalen, and it was suggested to get injections in her hands for Dupuytren's contracture.   She was in the ER earlier this month with urinary frequency. She was not found to have a UTI.  She denies any other infections recently.   She last did PT in February and is interested in maintenance therapy.   She walks with her walker at home.  She is no longer taking clonazepam.   She continues to see Dr. Trimble for seizure management.   She states that she doesn't really think about her MS much.   She will be getting some home adjustments/accommodations--walk in shower, widened doorways, new cristobal instead of carpet.     Medications:  Current Outpatient Medications   Medication Sig    albuterol (PROVENTIL/VENTOLIN HFA) 90 mcg/actuation inhaler 1 puff as needed.    atorvastatin (LIPITOR) 40 MG tablet TAKE 1 TABLET BY MOUTH EVERY DAY    cetirizine (ZYRTEC) 10 MG tablet Take 1 tablet (10 mg total) by mouth once daily.    cholecalciferol, vitamin D3, 10 mcg (400 unit) Cap capsule 1 tablet.    cholecalciferol, vitamin D3, 125 mcg (5,000 unit) Tab Take 5,000 Units by mouth once daily.    clopidogrel (PLAVIX) 75 mg tablet Take 75 mg by mouth once daily.    DULoxetine (CYMBALTA) 20 MG capsule TAKE 2 CAPSULES BY MOUTH ONCE DAILY    estradioL (ESTRACE) 0.01 % (0.1 mg/gram) vaginal cream Place 0.5 g vaginally every evening. Use nightly for two weeks then twice weekly for maintenance    famotidine (PEPCID) 20 MG  "tablet Take 20 mg by mouth 2 (two) times daily as needed for Heartburn.    fluticasone propionate (FLONASE) 50 mcg/actuation nasal spray 1 spray (50 mcg total) by Each Nostril route once daily.    nitroGLYCERIN (NITROSTAT) 0.4 MG SL tablet Place 0.4 mg under the tongue every 5 (five) minutes as needed for Chest pain.    prednisoLONE acetate (PRED FORTE) 1 % DrpS INSTILL 1 DROP THREE TIMES A DAY INTO BOTH EYES    propranoloL (INDERAL LA) 120 MG 24 hr capsule Take 1 capsule (120 mg total) by mouth once daily.    pulse oximeter (PULSE OXIMETER) device by Apply Externally route 2 (two) times a day. Use twice daily at 8 AM and 3 PM and record the value in MyChart as directed.    sars-cov-2, covid-19 omicron, (MODERNA COVID BIVAL,6Y UP,,PF,) 50 mcg/0.5 mL injection Inject into the muscle. (Patient not taking: Reported on 2023)    topiramate (TOPAMAX) 100 MG tablet Take 1 tablet (100 mg total) by mouth 2 (two) times daily.     SOCIAL HISTORY  Social History     Tobacco Use    Smoking status: Former     Packs/day: 1.50     Types: Cigarettes     Quit date: 2006     Years since quittin.3    Smokeless tobacco: Never   Substance Use Topics    Alcohol use: Yes     Comment: rarely    Drug use: No       Living arrangements - the patient lives with her     ROS:  REVIEW OF SYMPTOMS 23   Do you feel abnormally tired on most days? Energy level has been ok.    Do you feel you generally sleep well? Yes--she has been restless at night; having trouble falling asleep    Do you have difficulty controlling your bladder?  No--she always wears a pad due to some urgency    Do you have difficulty controlling your bowels?  No   Do you have frequent muscle cramps, tightness or spasms in your limbs?  No   Do you have new visual symptoms?  No--sees her eye doctor once a year    Do you have worsening difficulty with your memory or thinking? Yes--she thinks that her memory is "ok." She uses a pillbox, and her  loads " it up with her medications.    Do you have worsening symptoms of anxiety or depression?  Yes--Denies depression   For patients who walk, Do you have more difficulty walking?  No   Have you fallen since your last visit?  Yes    For patients who use wheelchairs: Do you have any skin wounds or breakdown? Yes--on arm from recent fall    Do you have difficulty using your hands?  Yes   Do you have shooting or burning pain? No   Do you have difficulty with sexual function?  Yes   If you are sexually active, are you using birth control? Y/N  N/A Not Applicable   Do you often choke when swallowing liquids or solid food?  No   Do you experience worsening symptoms when overheated? No   Do you need any new equipment such as a wheelchair, walker or shower chair? Yes   Do you receive co-pay financial assistance for your principal MS medicine? Yes   Would you be interested in participating in an MS research trial in the future? Yes       Do you feel you have adequate family/friend support?  Yes   Do you have health insurance?   Yes   Are you currently employed? No   Do you receive SSDI/SSI?  Not Applicable   Do you use marijuana or cannabis products? No   Have you been diagnosed with a urinary tract infection since your last visit here? No   Have you been diagnosed with a respiratory tract infection since your last visit here? No   Have you been to the emergency room since your last visit here? No   Have you been hospitalized since your last visit here?  No            Objective:        1. 25 foot timed walk:  Timed 25 Foot Walk: 12/5/2022 4/13/2023   Did patient wear an AFO? No No   Was assistive device used? Yes Yes   Assistive device used (toña one): Bilateral Assistance Bilateral Assistance   Unilateral device used - -   Bilateral device used Walker/Rollator Walker/Rollator   Time for 25 Foot Walk (seconds) - 9.6   Time for 25 Foot Walk (seconds) 11.8 9.4       Neurologic Exam    MENTAL STATUS: language is fluent, normal verbal  comprehension, attention is normal, patient is alert and oriented x 3     CRANIAL NERVE EXAM:  There is no JOSE LUIS.  Extraocular muscles are intact. No facial asymmetry. Facial sensation is intact bilaterally. There is no dysarthria.      MOTOR EXAM:  LLE 4+/5 in flexors; 5/5 in deltoids and triceps, 5/5 RLE     SENSORY EXAM: moderate decrease in vibration     COORDINATION: Stable RSM in UE and LE; slightly unsteady finger to nose     GAIT: walker; left leg drags     20/20 OU   Imaging:         Results for orders placed during the hospital encounter of 04/10/21    MRI Brain Demyelinating W W/O Contrast    Impression  1. There is a stable marked burden of white matter disease consistent with the provided diagnosis of multiple sclerosis.  These findings are other extensive.  There are no regions of restricted diffusion and there is no abnormal enhancement.  There are no findings to suggest interval progression of disease or active demyelination.      Electronically signed by: Kingsley Bass MD  Date:    04/11/2021  Time:    20:22    Results for orders placed during the hospital encounter of 04/10/21    MRI Cervical Spine Demyelinating W W/O Contrast    Impression  1. There are multiple regions of abnormal STIR and T2 hyperintense signal in the cord which, considering slight difference in technique, slice selection and volume averaging as well as mild motion are essentially stable compared to the prior study.  The findings are felt to represent stable demyelinating disease without any definite new lesions identified.  2. There is multilevel degenerative disc and facet disease discussed in detail by level above.  There is some degree of disc space narrowing, disc osteophyte complex, uncovertebral spurring and/or facet joint arthropathy at multiple levels.  There is mild spinal stenosis at several levels.  There is multilevel foraminal stenosis which for the most part are stable compared to the prior study with the  exception of some interval progression of right foraminal stenosis at the C4-5 level.  3. There is no abnormal enhancement within the vertebral bodies, discs, cord or epidural space.      Electronically signed by: Kingsley Bass MD  Date:    04/11/2021  Time:    21:09      Labs:     Lab Results   Component Value Date    MYVSJHTV66FG 35 08/26/2020    MMUYHESK23EO 97 (H) 08/20/2019    QGEZRVHI48CU 68 07/19/2018     Lab Results   Component Value Date    NH6RYDKP 73.7 03/30/2021    ABSOLUTECD3 1099 03/30/2021    NB1DWMPW 23.7 03/30/2021    ABSOLUTECD8 353 03/30/2021    XN0BMPNM 48.9 03/30/2021    ABSOLUTECD4 729 03/30/2021    LABCD48 2.06 03/30/2021     Lab Results   Component Value Date    WBC 6.51 04/02/2023    RBC 5.01 04/02/2023    HGB 13.9 04/02/2023    HCT 44.1 04/02/2023    MCV 88 04/02/2023    MCH 27.7 04/02/2023    MCHC 31.5 (L) 04/02/2023    RDW 13.8 04/02/2023     04/02/2023    MPV 11.4 04/02/2023    GRAN 3.5 04/02/2023    GRAN 53.7 04/02/2023    LYMPH 2.4 04/02/2023    LYMPH 36.7 04/02/2023    MONO 0.4 04/02/2023    MONO 5.8 04/02/2023    EOS 0.2 04/02/2023    BASO 0.06 04/02/2023    EOSINOPHIL 2.6 04/02/2023    BASOPHIL 0.9 04/02/2023     Sodium   Date Value Ref Range Status   04/02/2023 139 136 - 145 mmol/L Final     Potassium   Date Value Ref Range Status   04/02/2023 3.8 3.5 - 5.1 mmol/L Final     Chloride   Date Value Ref Range Status   04/02/2023 114 (H) 95 - 110 mmol/L Final     CO2   Date Value Ref Range Status   04/02/2023 19 (L) 23 - 29 mmol/L Final     Carbon Monoxide, Blood   Date Value Ref Range Status   10/19/2016 2 % Final     Comment:     -------------------REFERENCE VALUE--------------------------  0-2 Normal (Non-smoker) , < or = 9 Normal (Smoker), > or   = 20 (Toxic concentration)  Test Performed by:  [place] [address]  : Adrien Norton II, M.D., Ph.D.       Glucose   Date Value Ref  Range Status   04/02/2023 92 70 - 110 mg/dL Final     BUN   Date Value Ref Range Status   04/02/2023 15 8 - 23 mg/dL Final     Creatinine   Date Value Ref Range Status   04/02/2023 0.9 0.5 - 1.4 mg/dL Final   02/22/2013 0.8 0.5 - 1.4 mg/dL Final     Calcium   Date Value Ref Range Status   04/02/2023 8.6 (L) 8.7 - 10.5 mg/dL Final   02/22/2013 8.7 8.7 - 10.5 mg/dL Final     Total Protein   Date Value Ref Range Status   04/02/2023 6.6 6.0 - 8.4 g/dL Final     Albumin   Date Value Ref Range Status   04/02/2023 3.6 3.5 - 5.2 g/dL Final     Total Bilirubin   Date Value Ref Range Status   04/02/2023 0.6 0.1 - 1.0 mg/dL Final     Comment:     For infants and newborns, interpretation of results should be based  on gestational age, weight and in agreement with clinical  observations.    Premature Infant recommended reference ranges:  Up to 24 hours.............<8.0 mg/dL  Up to 48 hours............<12.0 mg/dL  3-5 days..................<15.0 mg/dL  6-29 days.................<15.0 mg/dL       Alkaline Phosphatase   Date Value Ref Range Status   04/02/2023 100 55 - 135 U/L Final   02/22/2013 107 55 - 135 U/L Final     AST   Date Value Ref Range Status   04/02/2023 21 10 - 40 U/L Final   02/22/2013 18 10 - 40 U/L Final     ALT   Date Value Ref Range Status   04/02/2023 23 10 - 44 U/L Final     Anion Gap   Date Value Ref Range Status   04/02/2023 6 (L) 8 - 16 mmol/L Final   02/22/2013 11 5 - 15 meq/L Final     eGFR if    Date Value Ref Range Status   11/17/2021 >60.0 >60 mL/min/1.73 m^2 Final     eGFR if non    Date Value Ref Range Status   11/17/2021 >60.0 >60 mL/min/1.73 m^2 Final     Comment:     Calculation used to obtain the estimated glomerular filtration  rate (eGFR) is the CKD-EPI equation.          MS Impression and Plan:     NEURO MULTIPLE SCLEROSIS IMPRESSION:   MS Status:     Number of relapses in the past year?:  0    Clinical Progression:  Clinically Stable    MRI Progression  comment:  MRIs are deferred due to current stability.   Plan:     DMT:  No change in management    Symptom Management:  No change in symptom management    She will follow up with Dr. Apodaca in 6 months.     Total time spent with patient: 38 minutes   Total time spent on encounter: 55 minutes           TYRONE Palacios, CNS

## 2023-04-13 ENCOUNTER — OFFICE VISIT (OUTPATIENT)
Dept: NEUROLOGY | Facility: CLINIC | Age: 73
End: 2023-04-13
Payer: MEDICARE

## 2023-04-13 VITALS
DIASTOLIC BLOOD PRESSURE: 70 MMHG | BODY MASS INDEX: 30.39 KG/M2 | WEIGHT: 171.5 LBS | SYSTOLIC BLOOD PRESSURE: 117 MMHG | HEIGHT: 63 IN | HEART RATE: 54 BPM

## 2023-04-13 DIAGNOSIS — R26.9 GAIT DISTURBANCE: ICD-10-CM

## 2023-04-13 DIAGNOSIS — G35 MULTIPLE SCLEROSIS: Primary | ICD-10-CM

## 2023-04-13 DIAGNOSIS — Z71.89 COUNSELING REGARDING GOALS OF CARE: ICD-10-CM

## 2023-04-13 PROCEDURE — 1101F PT FALLS ASSESS-DOCD LE1/YR: CPT | Mod: CPTII,S$GLB,, | Performed by: CLINICAL NURSE SPECIALIST

## 2023-04-13 PROCEDURE — 3074F SYST BP LT 130 MM HG: CPT | Mod: CPTII,S$GLB,, | Performed by: CLINICAL NURSE SPECIALIST

## 2023-04-13 PROCEDURE — 3288F FALL RISK ASSESSMENT DOCD: CPT | Mod: CPTII,S$GLB,, | Performed by: CLINICAL NURSE SPECIALIST

## 2023-04-13 PROCEDURE — 99999 PR PBB SHADOW E&M-EST. PATIENT-LVL III: ICD-10-PCS | Mod: PBBFAC,,, | Performed by: CLINICAL NURSE SPECIALIST

## 2023-04-13 PROCEDURE — 1159F PR MEDICATION LIST DOCUMENTED IN MEDICAL RECORD: ICD-10-PCS | Mod: CPTII,S$GLB,, | Performed by: CLINICAL NURSE SPECIALIST

## 2023-04-13 PROCEDURE — 3074F PR MOST RECENT SYSTOLIC BLOOD PRESSURE < 130 MM HG: ICD-10-PCS | Mod: CPTII,S$GLB,, | Performed by: CLINICAL NURSE SPECIALIST

## 2023-04-13 PROCEDURE — 1126F PR PAIN SEVERITY QUANTIFIED, NO PAIN PRESENT: ICD-10-PCS | Mod: CPTII,S$GLB,, | Performed by: CLINICAL NURSE SPECIALIST

## 2023-04-13 PROCEDURE — 3078F PR MOST RECENT DIASTOLIC BLOOD PRESSURE < 80 MM HG: ICD-10-PCS | Mod: CPTII,S$GLB,, | Performed by: CLINICAL NURSE SPECIALIST

## 2023-04-13 PROCEDURE — 99999 PR PBB SHADOW E&M-EST. PATIENT-LVL III: CPT | Mod: PBBFAC,,, | Performed by: CLINICAL NURSE SPECIALIST

## 2023-04-13 PROCEDURE — 99215 OFFICE O/P EST HI 40 MIN: CPT | Mod: S$GLB,,, | Performed by: CLINICAL NURSE SPECIALIST

## 2023-04-13 PROCEDURE — 1126F AMNT PAIN NOTED NONE PRSNT: CPT | Mod: CPTII,S$GLB,, | Performed by: CLINICAL NURSE SPECIALIST

## 2023-04-13 PROCEDURE — 1159F MED LIST DOCD IN RCRD: CPT | Mod: CPTII,S$GLB,, | Performed by: CLINICAL NURSE SPECIALIST

## 2023-04-13 PROCEDURE — 99215 PR OFFICE/OUTPT VISIT, EST, LEVL V, 40-54 MIN: ICD-10-PCS | Mod: S$GLB,,, | Performed by: CLINICAL NURSE SPECIALIST

## 2023-04-13 PROCEDURE — 1101F PR PT FALLS ASSESS DOC 0-1 FALLS W/OUT INJ PAST YR: ICD-10-PCS | Mod: CPTII,S$GLB,, | Performed by: CLINICAL NURSE SPECIALIST

## 2023-04-13 PROCEDURE — 3288F PR FALLS RISK ASSESSMENT DOCUMENTED: ICD-10-PCS | Mod: CPTII,S$GLB,, | Performed by: CLINICAL NURSE SPECIALIST

## 2023-04-13 PROCEDURE — 3078F DIAST BP <80 MM HG: CPT | Mod: CPTII,S$GLB,, | Performed by: CLINICAL NURSE SPECIALIST

## 2023-04-13 PROCEDURE — 3008F BODY MASS INDEX DOCD: CPT | Mod: CPTII,S$GLB,, | Performed by: CLINICAL NURSE SPECIALIST

## 2023-04-13 PROCEDURE — 3008F PR BODY MASS INDEX (BMI) DOCUMENTED: ICD-10-PCS | Mod: CPTII,S$GLB,, | Performed by: CLINICAL NURSE SPECIALIST

## 2023-06-23 ENCOUNTER — TELEPHONE (OUTPATIENT)
Dept: NEUROLOGY | Facility: CLINIC | Age: 73
End: 2023-06-23

## 2023-07-21 ENCOUNTER — PATIENT MESSAGE (OUTPATIENT)
Dept: PSYCHIATRY | Facility: CLINIC | Age: 73
End: 2023-07-21
Payer: MEDICARE

## 2023-08-23 ENCOUNTER — PATIENT MESSAGE (OUTPATIENT)
Dept: FAMILY MEDICINE | Facility: CLINIC | Age: 73
End: 2023-08-23
Payer: MEDICARE

## 2023-09-20 DIAGNOSIS — Z78.0 MENOPAUSE: ICD-10-CM

## 2023-09-26 ENCOUNTER — TELEPHONE (OUTPATIENT)
Dept: NEUROLOGY | Facility: CLINIC | Age: 73
End: 2023-09-26
Payer: MEDICARE

## 2023-09-26 NOTE — TELEPHONE ENCOUNTER
----- Message from TYRONE Hurtado, CNS sent at 9/22/2023  5:17 PM CDT -----  She needs appt with BB- was due in October, but I guess first available is ok :)

## 2023-09-26 NOTE — TELEPHONE ENCOUNTER
Spoke to pt's , Giuseppe, and told him that Alyssa was due for an appt with Dr. Apodaca in October; however, her October schedule is full at this time. I also told Giuseppe that Dr. Apodaca's next available will be in February. I told Giuseppe that I can get Alyssa schedule with BB in February or if they want to be seen sooner, I can schedule her with AP. Giuseppe said they will wait until February for BB. I reserved a slot on BB's schedule for 2/6 at 1:40 PM. I explained to Giuseppe that BB's February schedule is not open yet, but I will get Alyssa officially scheduled when her Feb schedule opens up on October 1st. Giuseppe verbalized understanding.

## 2023-11-17 ENCOUNTER — OFFICE VISIT (OUTPATIENT)
Dept: FAMILY MEDICINE | Facility: CLINIC | Age: 73
End: 2023-11-17
Payer: MEDICARE

## 2023-11-17 ENCOUNTER — HOSPITAL ENCOUNTER (OUTPATIENT)
Dept: RADIOLOGY | Facility: CLINIC | Age: 73
Discharge: HOME | End: 2023-11-17
Attending: NURSE PRACTITIONER
Payer: MEDICARE

## 2023-11-17 VITALS
OXYGEN SATURATION: 96 % | HEIGHT: 63 IN | TEMPERATURE: 98 F | DIASTOLIC BLOOD PRESSURE: 60 MMHG | BODY MASS INDEX: 30.38 KG/M2 | SYSTOLIC BLOOD PRESSURE: 130 MMHG | HEART RATE: 54 BPM

## 2023-11-17 DIAGNOSIS — J06.9 UPPER RESPIRATORY TRACT INFECTION, UNSPECIFIED TYPE: ICD-10-CM

## 2023-11-17 DIAGNOSIS — J06.9 UPPER RESPIRATORY TRACT INFECTION, UNSPECIFIED TYPE: Primary | ICD-10-CM

## 2023-11-17 PROCEDURE — 99213 OFFICE O/P EST LOW 20 MIN: CPT | Mod: S$GLB,,, | Performed by: NURSE PRACTITIONER

## 2023-11-17 PROCEDURE — 1160F RVW MEDS BY RX/DR IN RCRD: CPT | Mod: CPTII,S$GLB,, | Performed by: NURSE PRACTITIONER

## 2023-11-17 PROCEDURE — 1159F MED LIST DOCD IN RCRD: CPT | Mod: CPTII,S$GLB,, | Performed by: NURSE PRACTITIONER

## 2023-11-17 PROCEDURE — 99213 PR OFFICE/OUTPT VISIT, EST, LEVL III, 20-29 MIN: ICD-10-PCS | Mod: S$GLB,,, | Performed by: NURSE PRACTITIONER

## 2023-11-17 PROCEDURE — 1126F AMNT PAIN NOTED NONE PRSNT: CPT | Mod: CPTII,S$GLB,, | Performed by: NURSE PRACTITIONER

## 2023-11-17 PROCEDURE — 1160F PR REVIEW ALL MEDS BY PRESCRIBER/CLIN PHARMACIST DOCUMENTED: ICD-10-PCS | Mod: CPTII,S$GLB,, | Performed by: NURSE PRACTITIONER

## 2023-11-17 PROCEDURE — 1101F PT FALLS ASSESS-DOCD LE1/YR: CPT | Mod: CPTII,S$GLB,, | Performed by: NURSE PRACTITIONER

## 2023-11-17 PROCEDURE — 3288F PR FALLS RISK ASSESSMENT DOCUMENTED: ICD-10-PCS | Mod: CPTII,S$GLB,, | Performed by: NURSE PRACTITIONER

## 2023-11-17 PROCEDURE — 1101F PR PT FALLS ASSESS DOC 0-1 FALLS W/OUT INJ PAST YR: ICD-10-PCS | Mod: CPTII,S$GLB,, | Performed by: NURSE PRACTITIONER

## 2023-11-17 PROCEDURE — 99999 PR PBB SHADOW E&M-EST. PATIENT-LVL V: CPT | Mod: PBBFAC,,, | Performed by: NURSE PRACTITIONER

## 2023-11-17 PROCEDURE — 71046 XR CHEST PA AND LATERAL: ICD-10-PCS | Mod: 26,,, | Performed by: RADIOLOGY

## 2023-11-17 PROCEDURE — 3008F BODY MASS INDEX DOCD: CPT | Mod: CPTII,S$GLB,, | Performed by: NURSE PRACTITIONER

## 2023-11-17 PROCEDURE — 3008F PR BODY MASS INDEX (BMI) DOCUMENTED: ICD-10-PCS | Mod: CPTII,S$GLB,, | Performed by: NURSE PRACTITIONER

## 2023-11-17 PROCEDURE — 71046 X-RAY EXAM CHEST 2 VIEWS: CPT | Mod: 26,,, | Performed by: RADIOLOGY

## 2023-11-17 PROCEDURE — 1126F PR PAIN SEVERITY QUANTIFIED, NO PAIN PRESENT: ICD-10-PCS | Mod: CPTII,S$GLB,, | Performed by: NURSE PRACTITIONER

## 2023-11-17 PROCEDURE — 3078F DIAST BP <80 MM HG: CPT | Mod: CPTII,S$GLB,, | Performed by: NURSE PRACTITIONER

## 2023-11-17 PROCEDURE — 3075F PR MOST RECENT SYSTOLIC BLOOD PRESS GE 130-139MM HG: ICD-10-PCS | Mod: CPTII,S$GLB,, | Performed by: NURSE PRACTITIONER

## 2023-11-17 PROCEDURE — 1159F PR MEDICATION LIST DOCUMENTED IN MEDICAL RECORD: ICD-10-PCS | Mod: CPTII,S$GLB,, | Performed by: NURSE PRACTITIONER

## 2023-11-17 PROCEDURE — 3075F SYST BP GE 130 - 139MM HG: CPT | Mod: CPTII,S$GLB,, | Performed by: NURSE PRACTITIONER

## 2023-11-17 PROCEDURE — 99999 PR PBB SHADOW E&M-EST. PATIENT-LVL V: ICD-10-PCS | Mod: PBBFAC,,, | Performed by: NURSE PRACTITIONER

## 2023-11-17 PROCEDURE — 3078F PR MOST RECENT DIASTOLIC BLOOD PRESSURE < 80 MM HG: ICD-10-PCS | Mod: CPTII,S$GLB,, | Performed by: NURSE PRACTITIONER

## 2023-11-17 PROCEDURE — 71046 X-RAY EXAM CHEST 2 VIEWS: CPT | Mod: TC,FY,PO

## 2023-11-17 PROCEDURE — 3288F FALL RISK ASSESSMENT DOCD: CPT | Mod: CPTII,S$GLB,, | Performed by: NURSE PRACTITIONER

## 2023-11-17 RX ORDER — PROMETHAZINE HYDROCHLORIDE AND DEXTROMETHORPHAN HYDROBROMIDE 6.25; 15 MG/5ML; MG/5ML
5 SYRUP ORAL NIGHTLY PRN
Qty: 118 ML | Refills: 0 | Status: SHIPPED | OUTPATIENT
Start: 2023-11-17

## 2023-11-17 RX ORDER — ALBUTEROL SULFATE 90 UG/1
2 AEROSOL, METERED RESPIRATORY (INHALATION) EVERY 6 HOURS PRN
Qty: 18 G | Refills: 1 | Status: SHIPPED | OUTPATIENT
Start: 2023-11-17

## 2023-11-17 RX ORDER — METHYLPREDNISOLONE 4 MG/1
TABLET ORAL
Qty: 21 EACH | Refills: 0 | Status: SHIPPED | OUTPATIENT
Start: 2023-11-17 | End: 2023-12-08

## 2023-11-17 RX ORDER — AZITHROMYCIN 250 MG/1
TABLET, FILM COATED ORAL
Qty: 6 TABLET | Refills: 0 | Status: SHIPPED | OUTPATIENT
Start: 2023-11-17 | End: 2023-11-22

## 2023-11-17 RX ORDER — FLUTICASONE PROPIONATE 50 MCG
1 SPRAY, SUSPENSION (ML) NASAL DAILY
Qty: 16 G | Refills: 0 | Status: SHIPPED | OUTPATIENT
Start: 2023-11-17 | End: 2024-01-08

## 2023-11-17 RX ORDER — BENZONATATE 100 MG/1
100 CAPSULE ORAL 3 TIMES DAILY PRN
Qty: 30 CAPSULE | Refills: 0 | Status: SHIPPED | OUTPATIENT
Start: 2023-11-17 | End: 2023-11-27

## 2023-11-17 NOTE — PROGRESS NOTES
Subjective:       Patient ID: Alyssa John is a 73 y.o. female.    Chief Complaint: Cough and Fatigue     HPI   74 y/o female patient with medical problems listed below presents for nasal congestion, rhinorrhea, postnasal dripping, chest congestion, cough and fatigue since 11/5 for 2 weeks. Patient sitting on wheelchair was accompanied by spouse. Denies recent travel. States her daughter was sick with upper respiratory infection before her. Symptoms are associated with rattling and low grade fever but denies sore throat, chest pain, sob, nausea, abdominal pain. States takes mucinex with mild relief. States cough is productive with greenish phlegm.     Patient Active Problem List   Diagnosis    MS (multiple sclerosis)    CAD (coronary artery disease)    Hyperlipidemia with target LDL less than 70    Gait disturbance    Vitamin D deficiency    Chronic fatigue    Seizure disorder    Essential hypertension    Hiatal hernia with GERD without esophagitis    Arthritis    Encounter for long-term (current) use of high-risk medication    Gait instability    Gait abnormality    Impaired functional mobility, balance, and endurance    Internal carotid artery stenosis, right    Intractable chronic migraine without aura and without status migrainosus    Tremor    Anxiety and depression    Former smoker, stopped smoking in distant past    History of MI (myocardial infarction)    Osteopenia determined by x-ray    Impaired mobility and ADLs    Coordination impairment    Impaired instrumental activities of daily living (IADL)    Cognitive communication disorder    Bronchitis    Hand weakness    Physical deconditioning    Debility    Limitation of joint motion    Presence of aortocoronary bypass graft    Presence of coronary angioplasty implant and graft    Qualitative platelet disorder    COVID      Review of patient's allergies indicates:   Allergen Reactions    Codeine      Other reaction(s): throat tightness    Dilantin  [phenytoin sodium extended]     Phenobarbital     Tegretol [carbamazepine]      Past Surgical History:   Procedure Laterality Date    APPENDECTOMY      BACK SURGERY      L5 discectomy    BREAST BIOPSY Right 15 yrs ago    benign    CATARACT EXTRACTION Bilateral     COLONOSCOPY N/A 9/16/2015    Procedure: COLONOSCOPY;  Surgeon: Henry Malcolm MD;  Location: [facility];  Service: Endoscopy;  Laterality: N/A;    CORONARY STENT PLACEMENT      right knee arthroscopy          Current Outpatient Medications:     albuterol (PROVENTIL/VENTOLIN HFA) 90 mcg/actuation inhaler, 1 puff as needed., Disp: , Rfl:     atorvastatin (LIPITOR) 40 MG tablet, TAKE 1 TABLET BY MOUTH EVERY DAY, Disp: 90 tablet, Rfl: 0    cholecalciferol, vitamin D3, 10 mcg (400 unit) Cap capsule, 1 tablet., Disp: , Rfl:     cholecalciferol, vitamin D3, 125 mcg (5,000 unit) Tab, Take 5,000 Units by mouth once daily., Disp: , Rfl:     clopidogrel (PLAVIX) 75 mg tablet, Take 75 mg by mouth once daily., Disp: , Rfl:     DULoxetine (CYMBALTA) 20 MG capsule, TAKE 2 CAPSULES BY MOUTH EVERY DAY, Disp: 180 capsule, Rfl: 1    famotidine (PEPCID) 20 MG tablet, Take 20 mg by mouth 2 (two) times daily as needed for Heartburn., Disp: , Rfl:     fluticasone propionate (FLONASE) 50 mcg/actuation nasal spray, 1 spray (50 mcg total) by Each Nostril route once daily., Disp: 15.8 mL, Rfl: 0    nitroGLYCERIN (NITROSTAT) 0.4 MG SL tablet, Place 0.4 mg under the tongue every 5 (five) minutes as needed for Chest pain., Disp: , Rfl:     prednisoLONE acetate (PRED FORTE) 1 % DrpS, INSTILL 1 DROP THREE TIMES A DAY INTO BOTH EYES, Disp: , Rfl:     propranoloL (INDERAL LA) 120 MG 24 hr capsule, Take 1 capsule (120 mg total) by mouth once daily., Disp: 90 capsule, Rfl: 3    pulse oximeter (PULSE OXIMETER) device, by Apply Externally route 2 (two) times a day. Use twice daily at 8 AM and 3 PM and [facility]., Disp: 1 each, Rfl: 0    sars-cov-2, covid-19  omicron, (MODERNA COVID BIVAL,6Y UP,,PF,) 50 mcg/0.5 mL injection, Inject into the muscle., Disp: 0.5 mL, Rfl: 0    topiramate (TOPAMAX) 100 MG tablet, Take 1 tablet (100 mg total) by mouth 2 (two) times daily., Disp: 180 tablet, Rfl: 3    albuterol (PROVENTIL HFA) 90 mcg/actuation inhaler, Inhale 2 puffs into the lungs every 6 (six) hours as needed for Wheezing or Shortness of Breath. Rescue, Disp: 18 g, Rfl: 1    azithromycin (Z-ANDREINA) 250 MG tablet, Take 2 tablets by mouth on day 1; Take 1 tablet by mouth on days 2-5, Disp: 6 tablet, Rfl: 0    benzonatate (TESSALON) 100 MG capsule, Take 1 capsule (100 mg total) by mouth 3 (three) times daily as needed for Cough., Disp: 30 capsule, Rfl: 0    cetirizine (ZYRTEC) 10 MG tablet, Take 1 tablet (10 mg total) by mouth once daily., Disp: 30 tablet, Rfl: 0    estradioL (ESTRACE) 0.01 % (0.1 mg/gram) vaginal cream, Place 0.5 g vaginally every evening. Use nightly for two weeks then twice weekly for maintenance, Disp: 42.5 g, Rfl: 1    fluticasone propionate (FLONASE) 50 mcg/actuation nasal spray, 1 spray (50 mcg total) by Each Nostril route once daily., Disp: 16 g, Rfl: 0    methylPREDNISolone (MEDROL DOSEPACK) 4 mg tablet, use as directed, Disp: 21 each, Rfl: 0    promethazine-dextromethorphan (PROMETHAZINE-DM) 6.25-15 mg/5 mL Syrp, Take 5 mLs by mouth nightly as needed (cough)., Disp: 118 mL, Rfl: 0    Review of Systems   Constitutional:  Negative for chills and fever.   HENT:  Positive for congestion, postnasal drip, rhinorrhea and sore throat. Negative for sinus pain.    Respiratory:  Positive for cough. Negative for chest tightness and shortness of breath.    Cardiovascular:  Negative for chest pain and palpitations.   Gastrointestinal:  Negative for abdominal pain.   Neurological:  Negative for dizziness and headaches.       Objective:   /60 (BP Location: Left arm, Patient Position: Sitting, BP Method: Large (Manual))   Pulse (!) 54   Temp 97.9 °F (36.6 °C)  "(Oral)   Ht 5' 3" (1.6 m)   SpO2 96%   BMI 30.38 kg/m²         Physical Exam  Constitutional:       General: She is not in acute distress.     Appearance: Normal appearance.   HENT:      Head: Atraumatic.   Cardiovascular:      Rate and Rhythm: Normal rate and regular rhythm.      Pulses: Normal pulses.      Heart sounds: Normal heart sounds.   Pulmonary:      Effort: Pulmonary effort is normal.      Breath sounds: Normal breath sounds.   Abdominal:      General: Abdomen is flat. Bowel sounds are normal.      Palpations: Abdomen is soft.      Tenderness: There is no abdominal tenderness.   Neurological:      Mental Status: She is oriented to person, place, and time.         Assessment:       1. Upper respiratory tract infection, unspecified type        Plan:       1. Upper respiratory tract infection, unspecified type  - benzonatate (TESSALON) 100 MG capsule; Take 1 capsule (100 mg total) by mouth 3 (three) times daily as needed for Cough.  Dispense: 30 capsule; Refill: 0  - fluticasone propionate (FLONASE) 50 mcg/actuation nasal spray; 1 spray (50 mcg total) by Each Nostril route once daily.  Dispense: 16 g; Refill: 0  - X-Ray Chest PA And Lateral; Future  - azithromycin (Z-ANDREINA) 250 MG tablet; Take 2 tablets by mouth on day 1; Take 1 tablet by mouth on days 2-5  Dispense: 6 tablet; Refill: 0  - albuterol (PROVENTIL HFA) 90 mcg/actuation inhaler; Inhale 2 puffs into the lungs every 6 (six) hours as needed for Wheezing or Shortness of Breath. Rescue  Dispense: 18 g; Refill: 1  - methylPREDNISolone (MEDROL DOSEPACK) 4 mg tablet; use as directed  Dispense: 21 each; Refill: 0  - promethazine-dextromethorphan (PROMETHAZINE-DM) 6.25-15 mg/5 mL Syrp; Take 5 mLs by mouth nightly as needed (cough).  Dispense: 118 mL; Refill: 0     Patient with be reevaluated in  as needed  or sooner wendyn  Frankie SORIA  "

## 2023-11-29 DIAGNOSIS — R25.1 TREMOR: ICD-10-CM

## 2023-11-29 RX ORDER — PROPRANOLOL HYDROCHLORIDE 120 MG/1
120 CAPSULE, EXTENDED RELEASE ORAL
Qty: 90 CAPSULE | Refills: 3 | Status: SHIPPED | OUTPATIENT
Start: 2023-11-29

## 2024-01-08 DIAGNOSIS — J06.9 UPPER RESPIRATORY TRACT INFECTION, UNSPECIFIED TYPE: ICD-10-CM

## 2024-01-08 RX ORDER — FLUTICASONE PROPIONATE 50 MCG
SPRAY, SUSPENSION (ML) NASAL
Qty: 24 ML | Refills: 11 | Status: SHIPPED | OUTPATIENT
Start: 2024-01-08

## 2024-01-11 DIAGNOSIS — Z00.00 ENCOUNTER FOR MEDICARE ANNUAL WELLNESS EXAM: ICD-10-CM

## 2024-01-22 ENCOUNTER — PATIENT MESSAGE (OUTPATIENT)
Dept: PSYCHIATRY | Facility: CLINIC | Age: 74
End: 2024-01-22
Payer: MEDICARE

## 2024-02-06 ENCOUNTER — OFFICE VISIT (OUTPATIENT)
Dept: NEUROLOGY | Facility: CLINIC | Age: 74
End: 2024-02-06
Payer: MEDICARE

## 2024-02-06 DIAGNOSIS — Z71.89 COUNSELING REGARDING GOALS OF CARE: ICD-10-CM

## 2024-02-06 DIAGNOSIS — G40.909 SEIZURE DISORDER: ICD-10-CM

## 2024-02-06 DIAGNOSIS — G35 MS (MULTIPLE SCLEROSIS): ICD-10-CM

## 2024-02-06 DIAGNOSIS — N31.9 NEUROGENIC BLADDER: Primary | ICD-10-CM

## 2024-02-06 DIAGNOSIS — R26.81 GAIT INSTABILITY: ICD-10-CM

## 2024-02-06 DIAGNOSIS — R26.9 GAIT DISTURBANCE: ICD-10-CM

## 2024-02-06 PROCEDURE — 99999 PR PBB SHADOW E&M-EST. PATIENT-LVL III: CPT | Mod: PBBFAC,,, | Performed by: PSYCHIATRY & NEUROLOGY

## 2024-02-06 PROCEDURE — 99215 OFFICE O/P EST HI 40 MIN: CPT | Mod: S$GLB,,, | Performed by: PSYCHIATRY & NEUROLOGY

## 2024-02-06 NOTE — PROGRESS NOTES
Subjective:          Patient ID: Alyssa John is a 73 y.o. female who presents today for a routine clinic visit for MS.  She comes in with her .     MS HPI:  DMT: None  Side effects from DMT? No  Taking vitamin D3 as recommended? Yes -   Overall, she's feeling pretty well;    Her dupuytren's contracture is bothering her - follows with Dr. Whalen;   Having urinary incontinence and retention;  also some bowel incontinence rarely; no UTI     Medications:  Current Outpatient Medications   Medication Sig    albuterol (PROVENTIL HFA) 90 mcg/actuation inhaler Inhale 2 puffs into the lungs every 6 (six) hours as needed for Wheezing or Shortness of Breath. Rescue    albuterol (PROVENTIL/VENTOLIN HFA) 90 mcg/actuation inhaler 1 puff as needed.    atorvastatin (LIPITOR) 40 MG tablet TAKE 1 TABLET BY MOUTH EVERY DAY    cetirizine (ZYRTEC) 10 MG tablet Take 1 tablet (10 mg total) by mouth once daily.    cholecalciferol, vitamin D3, 10 mcg (400 unit) Cap capsule 1 tablet.    cholecalciferol, vitamin D3, 125 mcg (5,000 unit) Tab Take 5,000 Units by mouth once daily.    clopidogrel (PLAVIX) 75 mg tablet Take 75 mg by mouth once daily.    DULoxetine (CYMBALTA) 20 MG capsule TAKE 2 CAPSULES BY MOUTH EVERY DAY    estradioL (ESTRACE) 0.01 % (0.1 mg/gram) vaginal cream Place 0.5 g vaginally every evening. Use nightly for two weeks then twice weekly for maintenance    famotidine (PEPCID) 20 MG tablet Take 20 mg by mouth 2 (two) times daily as needed for Heartburn.    fluticasone propionate (FLONASE) 50 mcg/actuation nasal spray 1 spray (50 mcg total) by Each Nostril route once daily.    fluticasone propionate (FLONASE) 50 mcg/actuation nasal spray USE 1 SPRAY (50 MCG TOTAL) IN EACH NOSTRIL ONCE DAILY    nitroGLYCERIN (NITROSTAT) 0.4 MG SL tablet Place 0.4 mg under the tongue every 5 (five) minutes as needed for Chest pain.    prednisoLONE acetate (PRED FORTE) 1 % DrpS INSTILL 1 DROP THREE TIMES A DAY INTO BOTH EYES     promethazine-dextromethorphan (PROMETHAZINE-DM) 6.25-15 mg/5 mL Syrp Take 5 mLs by mouth nightly as needed (cough).    propranoloL (INDERAL LA) 120 MG 24 hr capsule TAKE 1 CAPSULE BY MOUTH ONCE DAILY    pulse oximeter (PULSE OXIMETER) device by Apply Externally route 2 (two) times a day. Use twice daily at 8 AM and 3 PM and record the value in MyChart as directed.    sars-cov-2, covid-19 omicron, (MODERNA COVID BIVAL,6Y UP,,PF,) 50 mcg/0.5 mL injection Inject into the muscle.    topiramate (TOPAMAX) 100 MG tablet Take 1 tablet (100 mg total) by mouth 2 (two) times daily.       SOCIAL HISTORY  Social History     Tobacco Use    Smoking status: Former     Current packs/day: 0.00     Types: Cigarettes     Quit date: 2006     Years since quittin.1    Smokeless tobacco: Never   Substance Use Topics    Alcohol use: Yes     Comment: rarely    Drug use: No       Living arrangements - the patient lives with their family.          5/3/2022     1:54 PM   REVIEW OF SYMPTOMS   Do you feel abnormally tired on most days? Yes   Do you feel you generally sleep well? Yes   Do you have difficulty controlling your bladder?  No   Do you have difficulty controlling your bowels?  No   Do you have frequent muscle cramps, tightness or spasms in your limbs?  No   Do you have new visual symptoms?  No   Do you have worsening difficulty with your memory or thinking? Yes   Do you have worsening symptoms of anxiety or depression?  Yes   For patients who walk, Do you have more difficulty walking?  No   Have you fallen since your last visit?  No   For patients who use wheelchairs: Do you have any skin wounds or breakdown? Yes   Do you have difficulty using your hands?  Yes   Do you have shooting or burning pain? Yes   Do you have difficulty with sexual function?  Yes   If you are sexually active, are you using birth control? Y/N  N/A Not Applicable   Do you often choke when swallowing liquids or solid food?  No   Do you experience  worsening symptoms when overheated? No   Do you need any new equipment such as a wheelchair, walker or shower chair? Yes   Do you receive co-pay financial assistance for your principal MS medicine? Yes   Would you be interested in participating in an MS research trial in the future? Yes   For patients on Gilenya, Tecfidera, Aubagio, Rituxan, Ocrevus, Tysabri, Lemtrada or Methotrexate, are you aware that you should NOT receive live virus vaccines?  No   Do you feel you have adequate family/friend support?  Yes   Do you have health insurance?   Yes   Are you currently employed? No   Do you receive SSDI/SSI?  Not Applicable   Do you use marijuana or cannabis products? No   Have you been diagnosed with a urinary tract infection since your last visit here? No   Have you been diagnosed with a respiratory tract infection since your last visit here? No   Have you been to the emergency room since your last visit here? No   Have you been hospitalized since your last visit here?  No                Objective:            4/13/2023    12:01 AM 2/6/2024    12:01 AM   Timed 25 Foot Walk:   Did patient wear an AFO? No No   Was assistive device used? Yes Yes   Assistive device used (toña one): Bilateral Assistance Bilateral Assistance   Bilateral device used Walker/Rollator Walker/Rollator   Time for 25 Foot Walk (seconds) 9.6 11.2   Time for 25 Foot Walk (seconds) 9.4 10.6       Neurologic Exam  MENTAL STATUS: language is fluent, normal verbal comprehension, short-term and remote memory is intact, attention is normal, patient is alert and oriented x 3     CRANIAL NERVE EXAM:  There is no JOSE LUIS.  Extraocular muscles are intact. Pupils are equal, round, and reactive to light. No facial asymmetry. Facial sensation is intact bilaterally. There is no dysarthria.      MOTOR EXAM:  LLE 4-/5 in flexors;      SENSORY EXAM: moderate decrease in vibration     COORDINATION: kinetic tremor R>L     GAIT: aluminum walker; Left leg drags; unsteady      Imaging:     No results found for this or any previous visit.    No results found for this or any previous visit.    No results found for this or any previous visit.    Results for orders placed during the hospital encounter of 04/10/21    MRI Brain Demyelinating W W/O Contrast    Impression  1. There is a stable marked burden of white matter disease consistent with the provided diagnosis of multiple sclerosis.  These findings are other extensive.  There are no regions of restricted diffusion and there is no abnormal enhancement.  There are no findings to suggest interval progression of disease or active demyelination.      Electronically signed by: Kingsley Bass MD  Date:    04/11/2021  Time:    20:22    Results for orders placed during the hospital encounter of 04/10/21    MRI Cervical Spine Demyelinating W W/O Contrast    Impression  1. There are multiple regions of abnormal STIR and T2 hyperintense signal in the cord which, considering slight difference in technique, slice selection and volume averaging as well as mild motion are essentially stable compared to the prior study.  The findings are felt to represent stable demyelinating disease without any definite new lesions identified.  2. There is multilevel degenerative disc and facet disease discussed in detail by level above.  There is some degree of disc space narrowing, disc osteophyte complex, uncovertebral spurring and/or facet joint arthropathy at multiple levels.  There is mild spinal stenosis at several levels.  There is multilevel foraminal stenosis which for the most part are stable compared to the prior study with the exception of some interval progression of right foraminal stenosis at the C4-5 level.  3. There is no abnormal enhancement within the vertebral bodies, discs, cord or epidural space.      Electronically signed by: Kingsley Bass MD  Date:    04/11/2021  Time:    21:09    Results for orders placed during the hospital  encounter of 12/11/18    MRI Thoracic Spine Demyelinating W W/O Contrast    Impression  Multiple unchanged foci of T2 hyperintense signal throughout the spinal cord extending from the cranial cervical junction throughout the thoracic spinal cord.  Lesion at T9-10 was not included in the field of view of prior imaging; however, all other lesions are unchanged from 08/02/2018.  No enhancing lesions to suggest active demyelination.    Mild multilevel disc/endplate degeneration, with mild spinal canal stenoses in the cervical spine and severe foraminal stenosis on the left at C4-5 and on the right at C5-6.    Electronically signed by resident: Baldo Krause  Date:    12/12/2018  Time:    09:12    Electronically signed by: Moo Michele MD  Date:    12/12/2018  Time:    15:24        Labs:     Lab Results   Component Value Date    XEDOIEDS08QD 35 08/26/2020    ODHNEBUV41KW 97 (H) 08/20/2019    KBFQTJFI09UP 68 07/19/2018     Lab Results   Component Value Date    JCVINDEX SEE COMMENT (A) 12/09/2015    JCVANTIBODY SEE COMMENT 12/09/2015     Lab Results   Component Value Date    TG8WUMEH 73.7 03/30/2021    ABSOLUTECD3 1099 03/30/2021    CY3HXQZE 23.7 03/30/2021    ABSOLUTECD8 353 03/30/2021    QG2VTIKE 48.9 03/30/2021    ABSOLUTECD4 729 03/30/2021    LABCD48 2.06 03/30/2021     Lab Results   Component Value Date    WBC 6.51 04/02/2023    RBC 5.01 04/02/2023    HGB 13.9 04/02/2023    HCT 44.1 04/02/2023    MCV 88 04/02/2023    MCH 27.7 04/02/2023    MCHC 31.5 (L) 04/02/2023    RDW 13.8 04/02/2023     04/02/2023    MPV 11.4 04/02/2023    GRAN 3.5 04/02/2023    GRAN 53.7 04/02/2023    LYMPH 2.4 04/02/2023    LYMPH 36.7 04/02/2023    MONO 0.4 04/02/2023    MONO 5.8 04/02/2023    EOS 0.2 04/02/2023    BASO 0.06 04/02/2023    EOSINOPHIL 2.6 04/02/2023    BASOPHIL 0.9 04/02/2023     Sodium   Date Value Ref Range Status   04/02/2023 139 136 - 145 mmol/L Final     Potassium   Date Value Ref Range Status   04/02/2023 3.8 3.5 - 5.1  mmol/L Final     Chloride   Date Value Ref Range Status   04/02/2023 114 (H) 95 - 110 mmol/L Final     CO2   Date Value Ref Range Status   04/02/2023 19 (L) 23 - 29 mmol/L Final     Carbon Monoxide, Blood   Date Value Ref Range Status   10/19/2016 2 % Final     Comment:     -------------------REFERENCE VALUE--------------------------  0-2 Normal (Non-smoker) , < or = 9 Normal (Smoker), > or   = 20 (Toxic concentration)  Test Performed by:  [place] [address]  : Adrien Norton II, M.D., Ph.D.       Glucose   Date Value Ref Range Status   04/02/2023 92 70 - 110 mg/dL Final     BUN   Date Value Ref Range Status   04/02/2023 15 8 - 23 mg/dL Final     Creatinine   Date Value Ref Range Status   04/02/2023 0.9 0.5 - 1.4 mg/dL Final   02/22/2013 0.8 0.5 - 1.4 mg/dL Final     Calcium   Date Value Ref Range Status   04/02/2023 8.6 (L) 8.7 - 10.5 mg/dL Final   02/22/2013 8.7 8.7 - 10.5 mg/dL Final     Total Protein   Date Value Ref Range Status   04/02/2023 6.6 6.0 - 8.4 g/dL Final     Albumin   Date Value Ref Range Status   04/02/2023 3.6 3.5 - 5.2 g/dL Final     Total Bilirubin   Date Value Ref Range Status   04/02/2023 0.6 0.1 - 1.0 mg/dL Final     Comment:     For infants and newborns, interpretation of results should be based  on gestational age, weight and in agreement with clinical  observations.    Premature Infant recommended reference ranges:  Up to 24 hours.............<8.0 mg/dL  Up to 48 hours............<12.0 mg/dL  3-5 days..................<15.0 mg/dL  6-29 days.................<15.0 mg/dL       Alkaline Phosphatase   Date Value Ref Range Status   04/02/2023 100 55 - 135 U/L Final   02/22/2013 107 55 - 135 U/L Final     AST   Date Value Ref Range Status   04/02/2023 21 10 - 40 U/L Final   02/22/2013 18 10 - 40 U/L Final     ALT   Date Value Ref Range Status   04/02/2023 23 10 - 44 U/L Final     Anion Gap   Date Value  "Ref Range Status   04/02/2023 6 (L) 8 - 16 mmol/L Final   02/22/2013 11 5 - 15 meq/L Final     eGFR if    Date Value Ref Range Status   11/17/2021 >60.0 >60 mL/min/1.73 m^2 Final     eGFR if non    Date Value Ref Range Status   11/17/2021 >60.0 >60 mL/min/1.73 m^2 Final     Comment:     Calculation used to obtain the estimated glomerular filtration  rate (eGFR) is the CKD-EPI equation.        No results found for: "HEPBSAG", "HEPBSAB", "HEPBCAB"        MS Impression and Plan:     NEURO MULTIPLE SCLEROSIS IMPRESSION:   MS Status:     Number of relapses in the past year?:  0    Clinical Progression:  Clinically Stable    MRI Progression:  N/A  Plan:     DMT:  No change in management    Implement Disease Modifying Therapy:  None    Symptom Management:  Implement change in symptom management    Implement Change in Symptom Management:  Bladder and Gait     She is relatively stable;   PT ordered  Refer to urology, Dr. Brenner   F/u Saira Daley [facility] in 6mo          Problem List Items Addressed This Visit          Unprioritized    Gait instability    Gait disturbance    Relevant Orders    Ambulatory referral/consult to Physical/Occupational Therapy    MS (multiple sclerosis)    Relevant Orders    Ambulatory referral/consult to Urology    Ambulatory referral/consult to Physical/Occupational Therapy     Other Visit Diagnoses       Neurogenic bladder    -  Primary    Relevant Orders    Ambulatory referral/consult to Urology    Counseling regarding goals of care                Genny Apodaca MD    I spent a total of 42 minutes on the day of the visit.This includes face to face time and non-face to face time preparing to see the patient (eg, review of tests), obtaining and/or reviewing separately obtained history, documenting clinical information in the electronic or other health record, independently interpreting results and communicating results to the patient/family/caregiver, or care " coordinator.

## 2024-02-08 RX ORDER — TOPIRAMATE 100 MG/1
100 TABLET, FILM COATED ORAL 2 TIMES DAILY
Qty: 60 TABLET | Refills: 5 | Status: SHIPPED | OUTPATIENT
Start: 2024-02-08 | End: 2024-04-03 | Stop reason: SDUPTHER

## 2024-02-19 ENCOUNTER — TELEPHONE (OUTPATIENT)
Dept: NEUROLOGY | Facility: CLINIC | Age: 74
End: 2024-02-19
Payer: MEDICARE

## 2024-02-19 NOTE — TELEPHONE ENCOUNTER
----- Message from Hipolito Barnett sent at 2/19/2024  3:41 PM CST -----  Regarding: sooner appt from recall  Contact:  at [phone]  Type:  Sooner Appointment Request    Caller is requesting a sooner appointment.      Name of Caller:   / ludmila    When is the first available appointment?  Dept book    Symptoms:  12m f/u sz from Dr Trimble recall    Would the patient rather a call back or a response via [facility]? call  Best Call Back Number:  [phone]    Additional Information:  pt is part of [facility] in  and is open to seeing [facility] provider there.

## 2024-02-19 NOTE — TELEPHONE ENCOUNTER
Spoke with patients  and scheduled for 4/3/24 with Dr. Arnold. Advised if refills needed before appointment to call and will send refill. Informed of time/date/location. Patients  voiced understanding.

## 2024-02-22 ENCOUNTER — DOCUMENTATION ONLY (OUTPATIENT)
Dept: NEUROLOGY | Facility: CLINIC | Age: 74
End: 2024-02-22
Payer: MEDICARE

## 2024-03-06 ENCOUNTER — OFFICE VISIT (OUTPATIENT)
Dept: UROLOGY | Facility: CLINIC | Age: 74
End: 2024-03-06
Payer: MEDICARE

## 2024-03-06 VITALS
HEART RATE: 52 BPM | SYSTOLIC BLOOD PRESSURE: 139 MMHG | HEIGHT: 63 IN | BODY MASS INDEX: 35.16 KG/M2 | DIASTOLIC BLOOD PRESSURE: 85 MMHG | WEIGHT: 198.44 LBS

## 2024-03-06 DIAGNOSIS — N31.9 NEUROGENIC BLADDER: ICD-10-CM

## 2024-03-06 DIAGNOSIS — G35 MS (MULTIPLE SCLEROSIS): ICD-10-CM

## 2024-03-06 PROCEDURE — 99999 PR PBB SHADOW E&M-EST. PATIENT-LVL IV: CPT | Mod: PBBFAC,,, | Performed by: UROLOGY

## 2024-03-06 PROCEDURE — 99204 OFFICE O/P NEW MOD 45 MIN: CPT | Mod: S$GLB,,, | Performed by: UROLOGY

## 2024-03-06 NOTE — PROGRESS NOTES
[facility]      New Neurogenic Bladder (NGB) Note    3/6/2024    Referred by:  Genny Apodaca MD    HPI: Alyssa John is a very pleasant 73 y.o. female referred for evaluation of urinary incontinence (thought more likely than not to be of neurogenic origin) of 1-2 years duration. She currently does not perform intermittent catheterization and voids on own.  The underlying etiology is thought to be most likely multiple sclerosis, though other contributing factors will certainly be considered. She denies symptoms of voiding difficulty including decreased stream, hesitancy, intermittency, and post void dribbling. Bladder scan PVR was 151 mL. She denies symptoms of irritative voiding including dysuria. Recent upper tract imaging in the form of CT abdomen without contrast was reviewed today with no evidence of hydronephrosis.  There are no reports of symptoms suggestive of autonomic dysreflexia including headaches, blurry vision, nasal congestion, high blood pressure or profuse sweating in response to bladder or bowel stimulation.     She reports no prior medications or other treatments for her urgency symptoms.     Relevant Medications:  No current medications that would be anticipated to impair voiding    She does not report prior urodynamic evaluation. She does not reports prior cystoscopic evaluation.     A review of 10+ systems was conducted with pertinent positive and negative findings documented in HPI with all other systems reviewed and negative.    Past medical, family, surgical and social history reviewed as documented in chart with pertinent positive medical, family, surgical and social history detailed in HPI.    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  I am recommending urodynamic evaluation and possible cystoscopy to evaluate for safe storage. However, given the borderline PVR seen today, I would also wait until after that study to decide if we would start an OAB medication.     Schedule urodynamic evaluation

## 2024-03-07 DIAGNOSIS — N31.9 NEUROGENIC BLADDER: Primary | ICD-10-CM

## 2024-03-15 ENCOUNTER — DOCUMENTATION ONLY (OUTPATIENT)
Dept: NEUROLOGY | Facility: CLINIC | Age: 74
End: 2024-03-15
Payer: MEDICARE

## 2024-03-26 ENCOUNTER — TELEPHONE (OUTPATIENT)
Dept: FAMILY MEDICINE | Facility: CLINIC | Age: 74
End: 2024-03-26

## 2024-03-26 ENCOUNTER — HOSPITAL ENCOUNTER (OUTPATIENT)
Dept: RADIOLOGY | Facility: HOSPITAL | Age: 74
Discharge: HOME | End: 2024-03-26
Attending: STUDENT IN AN ORGANIZED HEALTH CARE EDUCATION/TRAINING PROGRAM
Payer: MEDICARE

## 2024-03-26 ENCOUNTER — E-VISIT (OUTPATIENT)
Dept: FAMILY MEDICINE | Facility: CLINIC | Age: 74
End: 2024-03-26
Payer: MEDICARE

## 2024-03-26 ENCOUNTER — TELEPHONE (OUTPATIENT)
Dept: FAMILY MEDICINE | Facility: CLINIC | Age: 74
End: 2024-03-26
Payer: MEDICARE

## 2024-03-26 DIAGNOSIS — J18.9 PNEUMONIA DUE TO INFECTIOUS ORGANISM, UNSPECIFIED LATERALITY, UNSPECIFIED PART OF LUNG: ICD-10-CM

## 2024-03-26 DIAGNOSIS — J02.9 SORE THROAT: ICD-10-CM

## 2024-03-26 DIAGNOSIS — R05.1 ACUTE COUGH: Primary | ICD-10-CM

## 2024-03-26 DIAGNOSIS — R05.9 COUGH, UNSPECIFIED TYPE: Primary | ICD-10-CM

## 2024-03-26 DIAGNOSIS — R05.9 COUGH, UNSPECIFIED TYPE: ICD-10-CM

## 2024-03-26 PROCEDURE — 99423 OL DIG E/M SVC 21+ MIN: CPT | Mod: ,,, | Performed by: STUDENT IN AN ORGANIZED HEALTH CARE EDUCATION/TRAINING PROGRAM

## 2024-03-26 PROCEDURE — 71046 X-RAY EXAM CHEST 2 VIEWS: CPT | Mod: TC,PO

## 2024-03-26 RX ORDER — DOXYCYCLINE HYCLATE 100 MG
100 TABLET ORAL 2 TIMES DAILY
Qty: 14 TABLET | Refills: 0 | Status: SHIPPED | OUTPATIENT
Start: 2024-03-26 | End: 2024-04-02

## 2024-03-26 RX ORDER — CEFDINIR 300 MG/1
300 CAPSULE ORAL 2 TIMES DAILY
Qty: 14 CAPSULE | Refills: 0 | Status: SHIPPED | OUTPATIENT
Start: 2024-03-26 | End: 2024-04-02

## 2024-03-26 NOTE — TELEPHONE ENCOUNTER
----- Message from Amparo Davis sent at 3/26/2024 12:49 PM CDT -----  Type: Needs Medical Advice  Who Called:  pt   Symptoms (please be specific):  cough  How long has patient had these symptoms:  last week  Pharmacy name and phone #:    Best Call Back Number: [phone]    Additional Information: pt had a choking incident last week and has had a bad cough ever since.  They wanted to be seen soon.  Please call back to advise

## 2024-03-26 NOTE — TELEPHONE ENCOUNTER
----- Message from Piyush Sharif MD sent at 3/26/2024  2:11 PM CDT -----  Regarding: xray ordered and ent ref  Placed ref and xray

## 2024-03-26 NOTE — PROGRESS NOTES
Patient ID: Alyssa John is a 73 y.o. female.    Chief Complaint: URI (Entered automatically based on patient selection in Patient Portal.)          274}  The patient initiated a request through [employer] on 3/26/2024 for evaluation and management with a chief complaint of URI (Entered automatically based on patient selection in Patient Portal.)     I evaluated the questionnaire submission on 03/26/2024 .    Total Time (in minutes): 22     [facility] Peq Evisit Upper Respitatory/Cough Questionnaire    3/26/2024  1:50 PM CDT - Filed by Patient   Do you agree to participate in an E-Visit? Yes   If you have any of the following symptoms, please present to your local ER or call 911:  I acknowledge   Choose the state of your primary residence [place]   What is the main issue you would like addressed today? Alyssa had a bad choking incident Wednesday, March 20, which resulted in her throwing up to dislodge food. She has had a bad cough since, disrupting sleep. She is not getting better.  I dont know if her throat was irratated/ damaged in some fashion   Are you able to take your vital signs? Yes   Systolic Blood Pressure: 179   Diastolic Blood Pressure: 99   Weight: 198   Height: 63.5   Pulse: 47   Temperature: 97.2   Respiration rate:    Pulse Oxygen: 98   What symptoms do you currently have?  Cough;  Fatigue;  Headache   Describe your cough: Productive (containing mucus);  Bothersome (interferes with daily activities)   Describe the mucus: White;  Foamy;  Thick   Have you ever smoked? I have smoked in the past   Have you had a fever? No   When did your symptoms first appear? 3/20/2024   In the last two weeks, have you been in close contact with someone who has COVID-19 or the Flu? Yes , Covid-19   In the last two weeks, have you worked or volunteered in a healthcare facility or as a ? Healthcare facilities include a hospital, medical or dental clinic, long-term care facility, or nursing home No   Do you  live in a long-term care facility, nursing home, group home, or homeless shelter? No   List what you have done or taken to help your symptoms. Aspirin for headache, cough syrup for cough   How severe are your symptoms? Moderate   Have your symptoms improved since they first appeared? No change   Have you taken an at home Covid test? Yes   What were the results? Negative   Have you taken a Flu test? No   Have you been fully vaccinated for COVID? (2 Pfizer, 2 Moderna or 1 Sahil & Sahil vaccine injections) Yes   Have you received a booster? Yes   Have you recieved a Flu shot? No   Do you have transportation to get tested for COVID if it is indicated and ordered for you at an [facility] location? Yes   Provide any additional information you feel is important. Coughing is moderate to severe.   Please attach any relevant images or files           Active Problem List with Overview Notes    Diagnosis Date Noted    Presence of aortocoronary bypass graft 10/28/2022    Presence of coronary angioplasty implant and graft 10/28/2022    Qualitative platelet disorder 10/28/2022    COVID 10/28/2022    Debility 02/07/2022    Limitation of joint motion 02/07/2022    Physical deconditioning 10/19/2021    Hand weakness 04/05/2021    Bronchitis 11/20/2020    Cognitive communication disorder 08/28/2020    Impaired mobility and ADLs 05/24/2020    Coordination impairment 05/24/2020    Impaired instrumental activities of daily living (IADL) 05/24/2020    Internal carotid artery stenosis, right 03/08/2020    Intractable chronic migraine without aura and without status migrainosus 03/08/2020     Given failure of multiple or preventives in the past, I recommended either monoclonal antibody or Botox especially given longstanding daily frequency.  She had a difficult time deciding between the 2 and I extensively explained the risks and benefits and expected course for both.  Ultimately she decided to proceed with Botox.    The patient has  chronic migraines (G43.719) and suffers from headaches more than 15 days a month lasting more than 4 hours a day with no relief of symptoms despite trying multiple medications for at least 3 months, if tolerated, including but not limited to   anti-epileptics - topiramate, carbamazepine, Dilantin, phenobarbital, gabapentin  beta blockers  - metoprolol  calcium channel blockers - blood pressure and pulse too low, contraindicated  Antidepressants - duloxetine    Botox treatment was approved for chronic migraines in October 2010. The patient will be an ideal candidate for Botox. We are planning for 3 treatments 3 months apart and aiming for at least 50% improvement in the symptoms. If we see no improvement after 3 treatments, we will discontinue the injections.    ---    She has history of myocardial infarction precluding the use of triptans or DHE, for this reason we will pursue Ubrelvy which carries no Cardiovascular warnings.  As she is having daily headaches which risks medication overuse headache, I will also prescribe Compazine which may be used on a daily basis without rebound risk however we have to watch her for worsening tremor. Compazine made her tired - advised to reduce dose by half. Ubrelvy approved as non-formulary exception, with $120 copay. Gave nurtec to try - if works well, this may change their mind about trying Ubrelvy.     Botox started 7/8/2020        Tremor 03/08/2020    Anxiety and depression 03/08/2020    Former smoker, stopped smoking in distant past 03/08/2020    History of MI (myocardial infarction) 03/08/2020    Osteopenia determined by x-ray 03/08/2020    Gait abnormality 09/06/2019    Impaired functional mobility, balance, and endurance 09/06/2019    Gait instability 02/21/2017    Encounter for long-term (current) use of high-risk medication 08/26/2016    Seizure disorder      Suspect PGE, possibly KIESHA  Pt with comorbid migraines & MS      Essential hypertension     Hiatal hernia with  GERD without esophagitis     Arthritis     Chronic fatigue 08/10/2015    Vitamin D deficiency 02/03/2015    Gait disturbance 11/07/2014    Hyperlipidemia with target LDL less than 70 06/13/2013    MS (multiple sclerosis) 02/23/2013     Established with Dr. Apodaca      CAD (coronary artery disease) 02/23/2013     Dr. Mcmillan, cardiology        Recent Labs Obtained:  Lab Results   Component Value Date    WBC 6.51 04/02/2023    HGB 13.9 04/02/2023    HCT 44.1 04/02/2023    MCV 88 04/02/2023     04/02/2023     04/02/2023    K 3.8 04/02/2023    GLU 92 04/02/2023    CREATININE 0.9 04/02/2023    EGFRNORACEVR >60.0 04/02/2023    HGBA1C 4.9 09/14/2017      Review of patient's allergies indicates:   Allergen Reactions    Codeine      Other reaction(s): throat tightness    Dilantin [phenytoin sodium extended]     Phenobarbital     Tegretol [carbamazepine]        Encounter Diagnoses   Name Primary?    Cough, unspecified type Yes    Pneumonia due to infectious organism, unspecified laterality, unspecified part of lung     Sore throat         Orders Placed This Encounter   Procedures    X-Ray Chest PA And Lateral     Standing Status:   Future     Standing Expiration Date:   3/26/2025     Order Specific Question:   May the Radiologist modify the order per protocol to meet the clinical needs of the patient?     Answer:   Yes    Ambulatory referral/consult to ENT     Standing Status:   Future     Standing Expiration Date:   4/26/2025     Referral Priority:   Routine     Referral Type:   Consultation     Referral Reason:   Specialty Services Required     Requested Specialty:   Otolaryngology     Number of Visits Requested:   1      Medications Ordered This Encounter   Medications    cefdinir (OMNICEF) 300 MG capsule     Sig: Take 1 capsule (300 mg total) by mouth 2 (two) times daily. for 7 days     Dispense:  14 capsule     Refill:  0    doxycycline (VIBRA-TABS) 100 MG tablet     Sig: Take 1 tablet (100 mg total) by mouth  2 (two) times daily. for 7 days     Dispense:  14 tablet     Refill:  0      Rec to get xray and   E-Visit Time Tracking:    Day 1 Time (in minutes): 22    Total Time (in minutes): 22      274}

## 2024-03-27 ENCOUNTER — DOCUMENTATION ONLY (OUTPATIENT)
Dept: NEUROLOGY | Facility: CLINIC | Age: 74
End: 2024-03-27
Payer: MEDICARE

## 2024-04-02 ENCOUNTER — TELEPHONE (OUTPATIENT)
Dept: NEUROLOGY | Facility: CLINIC | Age: 74
End: 2024-04-02
Payer: MEDICARE

## 2024-04-03 ENCOUNTER — OFFICE VISIT (OUTPATIENT)
Dept: NEUROLOGY | Facility: CLINIC | Age: 74
End: 2024-04-03
Payer: MEDICARE

## 2024-04-03 VITALS
HEART RATE: 53 BPM | SYSTOLIC BLOOD PRESSURE: 172 MMHG | DIASTOLIC BLOOD PRESSURE: 77 MMHG | BODY MASS INDEX: 35.15 KG/M2 | WEIGHT: 198.44 LBS

## 2024-04-03 DIAGNOSIS — R25.1 TREMOR: ICD-10-CM

## 2024-04-03 DIAGNOSIS — E66.01 SEVERE OBESITY (BMI 35.0-39.9) WITH COMORBIDITY: Primary | ICD-10-CM

## 2024-04-03 DIAGNOSIS — G40.909 SEIZURE DISORDER: ICD-10-CM

## 2024-04-03 DIAGNOSIS — I10 ESSENTIAL HYPERTENSION: ICD-10-CM

## 2024-04-03 PROCEDURE — 3078F DIAST BP <80 MM HG: CPT | Mod: CPTII,S$GLB,, | Performed by: STUDENT IN AN ORGANIZED HEALTH CARE EDUCATION/TRAINING PROGRAM

## 2024-04-03 PROCEDURE — 1159F MED LIST DOCD IN RCRD: CPT | Mod: CPTII,S$GLB,, | Performed by: STUDENT IN AN ORGANIZED HEALTH CARE EDUCATION/TRAINING PROGRAM

## 2024-04-03 PROCEDURE — 1126F AMNT PAIN NOTED NONE PRSNT: CPT | Mod: CPTII,S$GLB,, | Performed by: STUDENT IN AN ORGANIZED HEALTH CARE EDUCATION/TRAINING PROGRAM

## 2024-04-03 PROCEDURE — 3008F BODY MASS INDEX DOCD: CPT | Mod: CPTII,S$GLB,, | Performed by: STUDENT IN AN ORGANIZED HEALTH CARE EDUCATION/TRAINING PROGRAM

## 2024-04-03 PROCEDURE — 3077F SYST BP >= 140 MM HG: CPT | Mod: CPTII,S$GLB,, | Performed by: STUDENT IN AN ORGANIZED HEALTH CARE EDUCATION/TRAINING PROGRAM

## 2024-04-03 PROCEDURE — 99212 OFFICE O/P EST SF 10 MIN: CPT | Mod: S$GLB,,, | Performed by: STUDENT IN AN ORGANIZED HEALTH CARE EDUCATION/TRAINING PROGRAM

## 2024-04-03 PROCEDURE — 1160F RVW MEDS BY RX/DR IN RCRD: CPT | Mod: CPTII,S$GLB,, | Performed by: STUDENT IN AN ORGANIZED HEALTH CARE EDUCATION/TRAINING PROGRAM

## 2024-04-03 PROCEDURE — 99999 PR PBB SHADOW E&M-EST. PATIENT-LVL III: CPT | Mod: PBBFAC,,, | Performed by: STUDENT IN AN ORGANIZED HEALTH CARE EDUCATION/TRAINING PROGRAM

## 2024-04-03 PROCEDURE — 1101F PT FALLS ASSESS-DOCD LE1/YR: CPT | Mod: CPTII,S$GLB,, | Performed by: STUDENT IN AN ORGANIZED HEALTH CARE EDUCATION/TRAINING PROGRAM

## 2024-04-03 PROCEDURE — 3288F FALL RISK ASSESSMENT DOCD: CPT | Mod: CPTII,S$GLB,, | Performed by: STUDENT IN AN ORGANIZED HEALTH CARE EDUCATION/TRAINING PROGRAM

## 2024-04-03 RX ORDER — TOPIRAMATE 100 MG/1
100 TABLET, FILM COATED ORAL 2 TIMES DAILY
Qty: 180 TABLET | Refills: 3 | Status: SHIPPED | OUTPATIENT
Start: 2024-04-03

## 2024-04-03 NOTE — PROGRESS NOTES
"  [facility]  Epilepsy Clinic Progress Note      [facility] - NEUROLOGY  [facility], [place]    Date: 4/3/24  Patient Name: Alyssa John   MRN: [id number]   PCP: Piyush Sharif  Referring Provider: No ref. provider found    Assessment:     Possible primary generalized epilepsy  Continue topamax 100 mg BID  RTC 1 year    Plan:     Problem List Items Addressed This Visit          Neuro    Seizure disorder    Overview     Suspect PGE, possibly KIESHA  Pt with comorbid migraines & MS         Relevant Medications    topiramate (TOPAMAX) 100 MG tablet    Tremor       Endocrine    Severe obesity (BMI 35.0-39.9) with comorbidity - Primary       I completed education on seizure first aid and safety. I recommended seizure precautions with regards to avoiding unsupervised water recreational activity or bathing in tubs, climbing or working at heights, operation of heavy or dangerous machinery, caution around fire and sources of high heat, as well as any other activity which could put a patient at danger in case of a seizure.  I also reviewed the [place] DMV law and recommended that the patient not drive for 6 months in the event of breakthrough seizures.    Petra Arnold MD  [facility]   Department of Neurology    Patient note was created using MModal Dictation.  Any errors in syntax or even information may not have been identified and edited on initial review prior to signing this note.  Subjective:          Interval hx 4/3/24:  No breakthrough seizures, last seizure several years ago.  Continues to do well on topamax.  MS is stable.         History of present illness: per Dr Trimble  "The patient is a 72 y.o. female with a PMH of MS (diagnosed in 2000) we have been asked to see for evaluation of episodes suspicious for seizures and medication management.  The patient reports a longstanding diagnosis of epilepsy and that she has a history of 2 types of seizures.   " "  Seizure type 1: "Petit mal"  These began some time prior to 1971. With respect to aura, the patient reports nothing.  Her seizure is characterized by several jerks in either shoulder.  This component of this spell lasts for approximately several seconds.  Afterwards, she reports no postictal state.  The patient's frequency of events was roughly daily prior to starting AEDs.  She reports that she has not had one of these in years.     Seizure type 2: "Grand mal"  These began in 1971. With respect to warning, the patient would have "a lot of the petite mal" seizures.  Her seizure is characterized by loss of awareness, generalized stiffening, and generalized shaking.  She would have foaming of the mouth, urinary incontinence, and tongue biting.  This component of this spell lasts for approximately 2-3 minutes.  Afterwards, she was fatigued.  The patient's last event of this type was in 1990.  Prior to starting medication, they happened about once a month on average.     She does not give a history of events suspicious for absence seizures.     Regarding medications, the patient was tried on phenobarbital, Dilantin, and Depakote without success.  Tegretol was then given, and she continued to have seizures.  In 1982, Klonopin was added.  She became seizure free on this regimen, aside from a seizure in 1990 when she tried to wean herself off her AEDs.  The patient's Tegretol was changed to Topamax at some point after 2000 when she became thrombocytopenic while also receiving Tysabri.  They also report that at some point, her Klonopin was increased from 0.5 mg BID to 1 mg BID.  They indicate that there was no clear reason why this was done.  They deny that she was having seizures.  They do report significant issues with fatigue, and they indicate that this prompted Dr. Apodaca to refer her here for revision of her AED regimen.     Potential Epilepsy Risk Factors:   Pregnancy/Labor/Delivery - none  Febrile seizures - " "none  Head injury  - none  CNS infection - none   Stroke - none  Family Hx of Sz - none     AED Treatments  Present regimen  Topamax 100 mg BID"       PAST MEDICAL HISTORY:  Past Medical History:   Diagnosis Date    Arthritis     Coronary artery disease     Encounter for blood transfusion     Epilepsy     GERD (gastroesophageal reflux disease)     Headache     Hypertension     MI, old     MS (multiple sclerosis)     Seizures     epilepsy       PAST SURGICAL HISTORY:  Past Surgical History:   Procedure Laterality Date    APPENDECTOMY      BACK SURGERY      L5 discectomy    BREAST BIOPSY Right 15 yrs ago    benign    CATARACT EXTRACTION Bilateral     COLONOSCOPY N/A 9/16/2015    Procedure: COLONOSCOPY;  Surgeon: Henry Malcolm MD;  Location: [facility];  Service: Endoscopy;  Laterality: N/A;    CORONARY STENT PLACEMENT      right knee arthroscopy         CURRENT MEDS:  Current Outpatient Medications   Medication Sig Dispense Refill    albuterol (PROVENTIL/VENTOLIN HFA) 90 mcg/actuation inhaler 1 puff as needed.      atorvastatin (LIPITOR) 40 MG tablet TAKE 1 TABLET BY MOUTH EVERY DAY 90 tablet 0    cholecalciferol, vitamin D3, 10 mcg (400 unit) Cap capsule 1 tablet.      cholecalciferol, vitamin D3, 125 mcg (5,000 unit) Tab Take 5,000 Units by mouth once daily.      clopidogrel (PLAVIX) 75 mg tablet Take 75 mg by mouth once daily.      DULoxetine (CYMBALTA) 20 MG capsule TAKE 2 CAPSULES BY MOUTH EVERY  capsule 1    famotidine (PEPCID) 20 MG tablet Take 20 mg by mouth 2 (two) times daily as needed for Heartburn.      fluticasone propionate (FLONASE) 50 mcg/actuation nasal spray 1 spray (50 mcg total) by Each Nostril route once daily. 15.8 mL 0    fluticasone propionate (FLONASE) 50 mcg/actuation nasal spray USE 1 SPRAY (50 MCG TOTAL) IN EACH NOSTRIL ONCE DAILY 24 mL 11    nitroGLYCERIN (NITROSTAT) 0.4 MG SL tablet Place 0.4 mg under the tongue every 5 (five) minutes as needed for Chest pain.      " prednisoLONE acetate (PRED FORTE) 1 % DrpS INSTILL 1 DROP THREE TIMES A DAY INTO BOTH EYES      promethazine-dextromethorphan (PROMETHAZINE-DM) 6.25-15 mg/5 mL Syrp Take 5 mLs by mouth nightly as needed (cough). 118 mL 0    propranoloL (INDERAL LA) 120 MG 24 hr capsule TAKE 1 CAPSULE BY MOUTH ONCE DAILY 90 capsule 3    pulse oximeter (PULSE OXIMETER) device by Apply Externally route 2 (two) times a day. Use twice daily at 8 AM and 3 PM and record the value in [employer] as directed. 1 each 0    sars-cov-2, covid-19 omicron, (MODERNA COVID BIVAL,6Y UP,,PF,) 50 mcg/0.5 mL injection Inject into the muscle. 0.5 mL 0    albuterol (PROVENTIL HFA) 90 mcg/actuation inhaler Inhale 2 puffs into the lungs every 6 (six) hours as needed for Wheezing or Shortness of Breath. Rescue 18 g 1    cetirizine (ZYRTEC) 10 MG tablet Take 1 tablet (10 mg total) by mouth once daily. 30 tablet 0    estradioL (ESTRACE) 0.01 % (0.1 mg/gram) vaginal cream Place 0.5 g vaginally every evening. Use nightly for two weeks then twice weekly for maintenance 42.5 g 1    topiramate (TOPAMAX) 100 MG tablet Take 1 tablet (100 mg total) by mouth 2 (two) times daily. 180 tablet 3     No current facility-administered medications for this visit.       ALLERGIES:  Review of patient's allergies indicates:   Allergen Reactions    Codeine      Other reaction(s): throat tightness    Dilantin [phenytoin sodium extended]     Phenobarbital     Tegretol [carbamazepine]        FAMILY HISTORY:  Family History   Problem Relation Age of Onset    Scleroderma Mother     Heart disease Father         MI, CAD    Cancer Neg Hx     Diabetes Neg Hx     Stroke Neg Hx     Melanoma Neg Hx     Psoriasis Neg Hx     Lupus Neg Hx     Eczema Neg Hx        SOCIAL HISTORY:  Social History     Tobacco Use    Smoking status: Former     Current packs/day: 0.00     Types: Cigarettes     Quit date: 2006     Years since quittin.2    Smokeless tobacco: Never   Substance Use Topics     Alcohol use: Yes     Comment: rarely    Drug use: No       Review of Systems:  12 system review of systems is negative except for the symptoms mentioned in HPI.      Objective:     Vitals:    04/03/24 1416 04/03/24 1420   BP: (!) 190/78 (!) 172/77   BP Location: Left arm Right arm   Patient Position: Sitting Sitting   BP Method: Large (Automatic) Large (Automatic)   Pulse: (!) 53 (!) 53   Weight: 90 kg (198 lb 6.6 oz)      General: NAD, well nourished   Eyes: no tearing, discharge, no erythema   ENT: moist mucous membranes of the oral cavity, nares patent    Neck: Supple, full range of motion  Cardiovascular: Warm and well perfused, pulses equal and symmetrical  Lungs: Normal work of breathing, normal chest wall excursions  Skin: No rash, lesions, or breakdown on exposed skin  Psychiatry: Mood and affect are appropriate   Abdomen: soft, non tender, non distended  Extremeties: No cyanosis, clubbing or edema.    Neurological   Mild postural/kinetic tremor

## 2024-04-18 ENCOUNTER — DOCUMENTATION ONLY (OUTPATIENT)
Dept: NEUROLOGY | Facility: CLINIC | Age: 74
End: 2024-04-18
Payer: MEDICARE

## 2024-04-24 ENCOUNTER — PROCEDURE VISIT (OUTPATIENT)
Dept: UROLOGY | Facility: CLINIC | Age: 74
End: 2024-04-24
Payer: MEDICARE

## 2024-04-24 ENCOUNTER — HOSPITAL ENCOUNTER (OUTPATIENT)
Dept: RADIOLOGY | Facility: HOSPITAL | Age: 74
Discharge: HOME | End: 2024-04-24
Attending: UROLOGY
Payer: MEDICARE

## 2024-04-24 VITALS
DIASTOLIC BLOOD PRESSURE: 78 MMHG | HEIGHT: 63 IN | BODY MASS INDEX: 35.27 KG/M2 | SYSTOLIC BLOOD PRESSURE: 180 MMHG | RESPIRATION RATE: 18 BRPM | HEART RATE: 52 BPM | WEIGHT: 199.06 LBS | TEMPERATURE: 98 F

## 2024-04-24 DIAGNOSIS — N31.9 BLADDER DYSFUNCTION: ICD-10-CM

## 2024-04-24 DIAGNOSIS — N31.9 NEUROGENIC BLADDER: Primary | ICD-10-CM

## 2024-04-24 PROCEDURE — 51784 ANAL/URINARY MUSCLE STUDY: CPT | Mod: 26,51,S$GLB, | Performed by: UROLOGY

## 2024-04-24 PROCEDURE — 51600 INJECTION FOR BLADDER X-RAY: CPT | Mod: 51,S$GLB,, | Performed by: UROLOGY

## 2024-04-24 PROCEDURE — 74450 X-RAY URETHRA/BLADDER: CPT | Mod: TC

## 2024-04-24 PROCEDURE — 51797 INTRAABDOMINAL PRESSURE TEST: CPT | Mod: 26,S$GLB,, | Performed by: UROLOGY

## 2024-04-24 PROCEDURE — 51741 ELECTRO-UROFLOWMETRY FIRST: CPT | Mod: 26,51,S$GLB, | Performed by: UROLOGY

## 2024-04-24 PROCEDURE — 74455 X-RAY URETHRA/BLADDER: CPT | Mod: 26,S$GLB,, | Performed by: UROLOGY

## 2024-04-24 PROCEDURE — 51728 CYSTOMETROGRAM W/VP: CPT | Mod: 26,S$GLB,, | Performed by: UROLOGY

## 2024-04-24 NOTE — PATIENT INSTRUCTIONS
SIMPLE URODYNAMIC STUDY (SUDS) DISCHARGE INSTRUCTIONS    You have had a procedure that will require time to properly heal. Follow the instructions you have been given on how to care for yourself once you are home. Below is additional information to help in your recovery.    ACTIVITY  There are no restrictions in activity. Start doing again the things you did before the procedure.  You may experience a slight burning sensation. You may notice a small amount of blood in your urine. This will clear up within a day. Call the clinic if this continues beyond 48 hours.     DIET  Continue your normal diet. You may eat the same foods you ate before your procedure.  Drink plenty of fluids during the first 24-48 hours following your procedure.     MEDICATIONS  Resume all other previous medications from your prescribing physician.  Continue any pre-procedure antibiotics until they are all gone.     SIGNS AND SYMPTOMS TO REPORT TO THE DOCTOR  Chills or fever greater than 101° F within 24 hours of procedure.  Changes in urination, such as increased bleeding, foul smell, cloudy urine, or painful urination.  Call your doctor with any questions or concerns.     For any emergency situation, call 911 immediately or go to your nearest emergency room.     [facility]  [phone]

## 2024-04-25 NOTE — PROCEDURES
Urodynamic Report    [facility]       Referring Physician:  Abel Rosales MD    YOB: 1950  Date of Exam: 4/25/2024    HPI: Alyssa John is a very pleasant 73 y.o. female referred for evaluation of urinary incontinence (thought more likely than not to be of neurogenic origin) of 1-2 years duration. She currently does not perform intermittent catheterization and voids on own.  The underlying etiology is thought to be most likely multiple sclerosis, though other contributing factors will certainly be considered. She denies symptoms of voiding difficulty including decreased stream, hesitancy, intermittency, and post void dribbling. Bladder scan PVR was 151 mL. She denies symptoms of irritative voiding including dysuria. Recent upper tract imaging in the form of CT abdomen without contrast was reviewed today with no evidence of hydronephrosis.  There are no reports of symptoms suggestive of autonomic dysreflexia including headaches, blurry vision, nasal congestion, high blood pressure or profuse sweating in response to bladder or bowel stimulation.      She reports no prior medications or other treatments for her urgency symptoms.      Relevant Medications:  No current medications that would be anticipated to impair voiding    Cystometrogram:    Position: Sitting  Filling Rate: 30 mL/sec   Catheter: 7F  Fluid:Conray       Cath PVR prior: 188 mL Voiding Diary Capacity: Not Available   First Sensation: None First Desire: None   Strong Desire: None Cystometric Capacity: None       Pdet at MCFP: 0 cm H2O Compliance: Normal       Detrusor Overactivity (DO): Absent  Volume first DO: No DO   Max DO Pressure: No DO Urgency Incontinence: Absent        Leak Point Pressure Testing:        Volume Tested: 150 mL  Abdominal Leak Point Pressure: No Leak   Stress Induced DO: Absent          Voiding Study:        Voided Volume: 390 mL PVR: 110 mL   Maximum Flow: 6 mL/sec Average Flow 2.2  "mL/sec   Max Pdet: 6 cmH2O  Pdet at Max Flow: 0 cmH2O   EMG Storage: Normal Recruitment  EMG Voiding: Flaring with valsalva       Fluroscopic Imaging:        Bladder Contour: Smooth Vesicoureteral Reflux:No VUR   Bladder Neck at Rest: peaked Bladder Neck Voiding:Narrowed - Intermittent       Impression:        Sensation:Absent    Capacity: Normal    Compliance:Normal    Detrusor Overactivity:Absent    Continence: No Incontinence    Contractility: Hypocontractility with Valsalva Voiding    Emptying:Unsatisfactory - Hypocontractility    Coordination:Dysfunctional (Valsalva) Voiding          Summary:  This study was performed in accordance with the current AUA/SUFU Guideline on Adult Urodynamics. On filling phase she demonstrates absent first and strong desire with a normal bladder capacity. There is no detrusor overactivity (DO) demonstrated on this exam (though absence of DO on urodynamic evaluation does not exclude it as causative agent of urgency symptoms). Bladder compliance at capacity is normal. On fluoroscopy, the bladder contour is smooth. There is no VUR demonstrated on this exam.     On stress testing, with adequate cough and valsalva effort, there is no CHRISTINE demonstrated and patient reports no clinical history of CHRISTINE.    On voiding phase, dysfunctional voiding is present with no underlying detrusor contraction demonstrated. Voiding is with valsalva voiding only. On VCUG, the urethra is narrowed intermittently during voiding, suggesting the possibility of functional intermittent obstruction from detrusor sphincter dyssynergia or "pseudo-dyssynergia". The patient is unable to void to completion during the study or on independent flow rate which is consistent with the history of voiding difficulty. The patient reports this is the normal voiding pattern.     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 I would not be " comfortable prescribing and OAB medication, given the higher risk or urinary retention (she already has somewhat high PVR). She is not willing to self-catheterize, though this would help her empty and might help with her frequency and urgency. We could consider Sacral Neuromodulation given the combination of voiding dysfunction (NOUR) and urgency/frequency (she also has fecal incontinence), all of which might be improved with SNM. She is reluctant to undergo this, but she and her  would like to consider this further. She will return in 3-4 months to check on her status, though I'm happy to meet with them again earlier if she'd like to discuss a staged trial of SNM.

## 2024-05-02 ENCOUNTER — PATIENT MESSAGE (OUTPATIENT)
Dept: PSYCHIATRY | Facility: CLINIC | Age: 74
End: 2024-05-02
Payer: MEDICARE

## 2024-05-15 ENCOUNTER — DOCUMENTATION ONLY (OUTPATIENT)
Dept: NEUROLOGY | Facility: CLINIC | Age: 74
End: 2024-05-15
Payer: MEDICARE

## 2024-05-20 ENCOUNTER — PATIENT MESSAGE (OUTPATIENT)
Dept: PSYCHIATRY | Facility: CLINIC | Age: 74
End: 2024-05-20
Payer: MEDICARE

## 2024-05-22 ENCOUNTER — PATIENT MESSAGE (OUTPATIENT)
Dept: ADMINISTRATIVE | Facility: OTHER | Age: 74
End: 2024-05-22
Payer: MEDICARE

## 2024-06-10 ENCOUNTER — PATIENT MESSAGE (OUTPATIENT)
Dept: NEUROLOGY | Facility: CLINIC | Age: 74
End: 2024-06-10
Payer: MEDICARE

## 2024-06-10 DIAGNOSIS — F32.A DEPRESSION, UNSPECIFIED DEPRESSION TYPE: ICD-10-CM

## 2024-06-10 DIAGNOSIS — M79.2 NEUROPATHIC PAIN: ICD-10-CM

## 2024-06-11 RX ORDER — DULOXETIN HYDROCHLORIDE 20 MG/1
CAPSULE, DELAYED RELEASE ORAL
Qty: 180 CAPSULE | Refills: 1 | Status: SHIPPED | OUTPATIENT
Start: 2024-06-11

## 2024-07-03 LAB
CHOLEST SERPL-MSCNC: 146 MG/DL (ref 0–200)
HDLC SERPL-MCNC: 45 MG/DL
LDLC SERPL CALC-MCNC: 79 MG/DL
TRIGL SERPL-MCNC: 121 MG/DL (ref 40–160)

## 2024-07-08 ENCOUNTER — TELEPHONE (OUTPATIENT)
Dept: FAMILY MEDICINE | Facility: CLINIC | Age: 74
End: 2024-07-08
Payer: MEDICARE

## 2024-07-08 ENCOUNTER — OFFICE VISIT (OUTPATIENT)
Dept: FAMILY MEDICINE | Facility: CLINIC | Age: 74
End: 2024-07-08
Payer: MEDICARE

## 2024-07-08 ENCOUNTER — LAB VISIT (OUTPATIENT)
Dept: LAB | Facility: HOSPITAL | Age: 74
End: 2024-07-08
Attending: PHYSICIAN ASSISTANT
Payer: MEDICARE

## 2024-07-08 VITALS
WEIGHT: 190 LBS | BODY MASS INDEX: 33.66 KG/M2 | HEART RATE: 57 BPM | DIASTOLIC BLOOD PRESSURE: 70 MMHG | HEIGHT: 63 IN | SYSTOLIC BLOOD PRESSURE: 132 MMHG | OXYGEN SATURATION: 98 % | RESPIRATION RATE: 16 BRPM

## 2024-07-08 DIAGNOSIS — T14.8XXA WOUND OF SKIN: ICD-10-CM

## 2024-07-08 DIAGNOSIS — R32 URINARY INCONTINENCE, UNSPECIFIED TYPE: ICD-10-CM

## 2024-07-08 DIAGNOSIS — G35 MS (MULTIPLE SCLEROSIS): ICD-10-CM

## 2024-07-08 DIAGNOSIS — D69.1 QUALITATIVE PLATELET DISORDER: ICD-10-CM

## 2024-07-08 DIAGNOSIS — N30.90 CYSTITIS: Primary | ICD-10-CM
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
## 2024-07-08 DIAGNOSIS — R82.90 ABNORMAL URINE ODOR: ICD-10-CM

## 2024-07-08 DIAGNOSIS — I10 ESSENTIAL HYPERTENSION: ICD-10-CM

## 2024-07-08 PROBLEM — I70.0 AORTIC ATHEROSCLEROSIS: Status: ACTIVE | Noted: 2024-07-08

## 2024-07-08 PROBLEM — I70.0 AORTIC ATHEROSCLEROSIS: Status: RESOLVED | Noted: 2024-07-08 | Resolved: 2024-07-08

## 2024-07-08 LAB
BILIRUB UR QL STRIP: NEGATIVE
CLARITY UR: ABNORMAL
COLOR UR: YELLOW
GLUCOSE UR QL STRIP: NEGATIVE
HGB UR QL STRIP: NEGATIVE
KETONES UR QL STRIP: NEGATIVE
LEUKOCYTE ESTERASE UR QL STRIP: ABNORMAL
MICROSCOPIC COMMENT: ABNORMAL
NITRITE UR QL STRIP: NEGATIVE
PH UR STRIP: 6 [PH] (ref 5–8)
PROT UR QL STRIP: ABNORMAL
RBC #/AREA URNS HPF: 1 /HPF (ref 0–4)
SP GR UR STRIP: 1.01 (ref 1–1.03)
SQUAMOUS #/AREA URNS HPF: 3 /HPF
URN SPEC COLLECT METH UR: ABNORMAL
WBC #/AREA URNS HPF: 12 /HPF (ref 0–5)

## 2024-07-08 PROCEDURE — 3078F DIAST BP <80 MM HG: CPT | Mod: CPTII,S$GLB,, | Performed by: PHYSICIAN ASSISTANT

## 2024-07-08 PROCEDURE — 99214 OFFICE O/P EST MOD 30 MIN: CPT | Mod: S$GLB,,, | Performed by: PHYSICIAN ASSISTANT

## 2024-07-08 PROCEDURE — 99999 PR PBB SHADOW E&M-EST. PATIENT-LVL IV: CPT | Mod: PBBFAC,,, | Performed by: PHYSICIAN ASSISTANT

## 2024-07-08 PROCEDURE — 87086 URINE CULTURE/COLONY COUNT: CPT | Performed by: PHYSICIAN ASSISTANT

## 2024-07-08 PROCEDURE — 3008F BODY MASS INDEX DOCD: CPT | Mod: CPTII,S$GLB,, | Performed by: PHYSICIAN ASSISTANT

## 2024-07-08 PROCEDURE — 1100F PTFALLS ASSESS-DOCD GE2>/YR: CPT | Mod: CPTII,S$GLB,, | Performed by: PHYSICIAN ASSISTANT

## 2024-07-08 PROCEDURE — 3075F SYST BP GE 130 - 139MM HG: CPT | Mod: CPTII,S$GLB,, | Performed by: PHYSICIAN ASSISTANT

## 2024-07-08 PROCEDURE — 1159F MED LIST DOCD IN RCRD: CPT | Mod: CPTII,S$GLB,, | Performed by: PHYSICIAN ASSISTANT

## 2024-07-08 PROCEDURE — 1160F RVW MEDS BY RX/DR IN RCRD: CPT | Mod: CPTII,S$GLB,, | Performed by: PHYSICIAN ASSISTANT

## 2024-07-08 PROCEDURE — 3288F FALL RISK ASSESSMENT DOCD: CPT | Mod: CPTII,S$GLB,, | Performed by: PHYSICIAN ASSISTANT

## 2024-07-08 PROCEDURE — 81000 URINALYSIS NONAUTO W/SCOPE: CPT | Performed by: PHYSICIAN ASSISTANT

## 2024-07-08 PROCEDURE — 1126F AMNT PAIN NOTED NONE PRSNT: CPT | Mod: CPTII,S$GLB,, | Performed by: PHYSICIAN ASSISTANT

## 2024-07-08 RX ORDER — MUPIROCIN 20 MG/G
OINTMENT TOPICAL 3 TIMES DAILY
Qty: 30 G | Refills: 1 | Status: SHIPPED | OUTPATIENT
Start: 2024-07-08

## 2024-07-08 RX ORDER — AMOXICILLIN AND CLAVULANATE POTASSIUM 875; 125 MG/1; MG/1
1 TABLET, FILM COATED ORAL 2 TIMES DAILY
Qty: 20 TABLET | Refills: 0 | Status: SHIPPED | OUTPATIENT
Start: 2024-07-08 | End: 2024-07-18

## 2024-07-08 NOTE — TELEPHONE ENCOUNTER
----- Message from Anisa Lund sent at 7/8/2024 10:31 AM CDT -----  Contact:  joseph  Type: Needs Medical Advice  Who Called:  Joseph  Symptoms (please be specific):  strong smell to urine and frequent urination and number a accidents this past week  How long has patient had these symptoms:  for a little less than a week  Pharmacy name and phone #:    [facility]/pharmacy #[id number] - [place] - [address]  [address] [address]  Phone: [phone] Fax: [phone]  Best Call Back Number: [phone]  Additional Information: Would like to come and to drop off urine test so call back to advise and thanks

## 2024-07-08 NOTE — PROGRESS NOTES
Subjective:       Patient ID: Alyssa John is a 73 y.o. female.    Chief Complaint: Urinary Tract Infection    Ms. John is a 73 year old female with MS and HTN. She is here today with urinary complaints. She reports strong odor of urine and urinary incontinence. The patient denies fever, chills, abdominal pain. She is does have difficulty with bladder control related to her MS. She also had a fall yesterday and scraped her right shin on her wheelchair. She normally does not get up from her wheelchair alone but could not get help in time and tried to help herself to the bathroom.She recently had labs completed by Dr MARIMAR Mcmillan, cardiologist. These returned stable.      Review of patient's allergies indicates:   Allergen Reactions    Codeine      Other reaction(s): throat tightness    Dilantin [phenytoin sodium extended]     Phenobarbital     Tegretol [carbamazepine]          Current Outpatient Medications:     albuterol (PROVENTIL/VENTOLIN HFA) 90 mcg/actuation inhaler, 1 puff as needed., Disp: , Rfl:     atorvastatin (LIPITOR) 40 MG tablet, TAKE 1 TABLET BY MOUTH EVERY DAY, Disp: 90 tablet, Rfl: 0    cholecalciferol, vitamin D3, 10 mcg (400 unit) Cap capsule, 1 tablet., Disp: , Rfl:     cholecalciferol, vitamin D3, 125 mcg (5,000 unit) Tab, Take 5,000 Units by mouth once daily., Disp: , Rfl:     clopidogrel (PLAVIX) 75 mg tablet, Take 75 mg by mouth once daily., Disp: , Rfl:     DULoxetine (CYMBALTA) 20 MG capsule, TAKE 2 CAPSULES BY MOUTH EVERY DAY, Disp: 180 capsule, Rfl: 1    famotidine (PEPCID) 20 MG tablet, Take 20 mg by mouth 2 (two) times daily as needed for Heartburn., Disp: , Rfl:     fluticasone propionate (FLONASE) 50 mcg/actuation nasal spray, 1 spray (50 mcg total) by Each Nostril route once daily., Disp: 15.8 mL, Rfl: 0    fluticasone propionate (FLONASE) 50 mcg/actuation nasal spray, USE 1 SPRAY (50 MCG TOTAL) IN EACH NOSTRIL ONCE DAILY, Disp: 24 mL, Rfl: 11    nitroGLYCERIN (NITROSTAT)  0.4 MG SL tablet, Place 0.4 mg under the tongue every 5 (five) minutes as needed for Chest pain., Disp: , Rfl:     prednisoLONE acetate (PRED FORTE) 1 % DrpS, INSTILL 1 DROP THREE TIMES A DAY INTO BOTH EYES, Disp: , Rfl:     promethazine-dextromethorphan (PROMETHAZINE-DM) 6.25-15 mg/5 mL Syrp, Take 5 mLs by mouth nightly as needed (cough)., Disp: 118 mL, Rfl: 0    propranoloL (INDERAL LA) 120 MG 24 hr capsule, TAKE 1 CAPSULE BY MOUTH ONCE DAILY, Disp: 90 capsule, Rfl: 3    pulse oximeter (PULSE OXIMETER) device, by Apply Externally route 2 (two) times a day. Use twice daily at 8 AM and 3 PM and record the value in MyChart as directed., Disp: 1 each, Rfl: 0    sars-cov-2, covid-19 omicron, (MODERNA COVID BIVAL,6Y UP,,PF,) 50 mcg/0.5 mL injection, Inject into the muscle., Disp: 0.5 mL, Rfl: 0    topiramate (TOPAMAX) 100 MG tablet, Take 1 tablet (100 mg total) by mouth 2 (two) times daily., Disp: 180 tablet, Rfl: 3    albuterol (PROVENTIL HFA) 90 mcg/actuation inhaler, Inhale 2 puffs into the lungs every 6 (six) hours as needed for Wheezing or Shortness of Breath. Rescue, Disp: 18 g, Rfl: 1    amoxicillin-clavulanate 875-125mg (AUGMENTIN) 875-125 mg per tablet, Take 1 tablet by mouth 2 (two) times daily. for 10 days, Disp: 20 tablet, Rfl: 0    cetirizine (ZYRTEC) 10 MG tablet, Take 1 tablet (10 mg total) by mouth once daily., Disp: 30 tablet, Rfl: 0    estradioL (ESTRACE) 0.01 % (0.1 mg/gram) vaginal cream, Place 0.5 g vaginally every evening. Use nightly for two weeks then twice weekly for maintenance, Disp: 42.5 g, Rfl: 1    mupirocin (BACTROBAN) 2 % ointment, Apply topically 3 (three) times daily., Disp: 30 g, Rfl: 1    Lab Results   Component Value Date    WBC 6.51 04/02/2023    HGB 13.9 04/02/2023    HCT 44.1 04/02/2023     04/02/2023    CHOL 166 10/01/2021    TRIG 148 10/01/2021    HDL 48 10/01/2021    ALT 23 04/02/2023    AST 21 04/02/2023     04/02/2023    K 3.8 04/02/2023     (H) 04/02/2023     CREATININE 0.9 04/02/2023    BUN 15 04/02/2023    CO2 19 (L) 04/02/2023    TSH 1.570 04/02/2023    INR 0.9 04/02/2023    HGBA1C 4.9 09/14/2017       Review of Systems   Constitutional:  Negative for activity change, appetite change and fever.   HENT:  Negative for postnasal drip, rhinorrhea and sinus pressure.    Eyes:  Negative for visual disturbance.   Respiratory:  Negative for cough and shortness of breath.    Cardiovascular:  Negative for chest pain.   Gastrointestinal:  Negative for abdominal distention and abdominal pain.   Genitourinary:  Negative for difficulty urinating and dysuria.        Foul odor of urine, urinary incontinence   Musculoskeletal:  Negative for arthralgias and myalgias.   Skin:  Positive for color change and wound.   Neurological:  Negative for headaches.   Hematological:  Negative for adenopathy.   Psychiatric/Behavioral:  The patient is not nervous/anxious.        Objective:      Physical Exam  Constitutional:       Appearance: Normal appearance.      Comments: Seated in wheelchair in no acute distress   HENT:      Head: Normocephalic and atraumatic.   Eyes:      Conjunctiva/sclera: Conjunctivae normal.   Cardiovascular:      Rate and Rhythm: Normal rate and regular rhythm.   Pulmonary:      Effort: Pulmonary effort is normal. No respiratory distress.      Breath sounds: Normal breath sounds. No wheezing.   Abdominal:      General: There is no distension.      Palpations: There is no mass.      Tenderness: There is no abdominal tenderness.   Musculoskeletal:         General: Swelling (chronic lower extremity swelling) present.      Right lower leg: No edema.      Left lower leg: No edema.   Lymphadenopathy:      Cervical: No cervical adenopathy.   Skin:     Findings: Erythema present.      Comments: Right distal anterior lower extremity- 2 cm superficial wounds. No surrounding erythema and induration   Neurological:      Mental Status: She is alert and oriented to person, place, and  time.   Psychiatric:         Behavior: Behavior normal.         Assessment:       1. Cystitis    2. MS (multiple sclerosis)    3. Essential hypertension    4. Qualitative platelet disorder    5. Abnormal urine odor    6. Urinary incontinence, unspecified type    7. Fall, initial encounter    8. Wound of skin        Plan:     Alysas was seen today for urinary tract infection.    Diagnoses and all orders for this visit:    Cystitis  -     amoxicillin-clavulanate 875-125mg (AUGMENTIN) 875-125 mg per tablet; Take 1 tablet by mouth 2 (two) times daily. for 10 days    MS (multiple sclerosis)  stable  Essential hypertension  Controlled  Low salt diet  Qualitative platelet disorder  stable  Abnormal urine odor  -     Urinalysis; Future  -     Urine culture; Future  -     amoxicillin-clavulanate 875-125mg (AUGMENTIN) 875-125 mg per tablet; Take 1 tablet by mouth 2 (two) times daily. for 10 days    Urinary incontinence, unspecified type  -     Urinalysis; Future  -     Urine culture; Future  -     amoxicillin-clavulanate 875-125mg (AUGMENTIN) 875-125 mg per tablet; Take 1 tablet by mouth 2 (two) times daily. for 10 days    Fall, initial encounter  Patient wheelchair bound and encouraged not to ambulate without assistance.   Wound of skin  -     mupirocin (BACTROBAN) 2 % ointment; Apply topically 3 (three) times daily.      Take antibiotics with food.  Increase fluid intake.  Call the clinic if symptoms worsen, new symptoms develop or if you are not any better after completion of your antibiotics.

## 2024-07-08 NOTE — TELEPHONE ENCOUNTER
----- Message from Anisa Lund sent at 7/8/2024 10:31 AM CDT -----  Contact:  joseph  Type: Needs Medical Advice  Who Called:  Joseph  Symptoms (please be specific):  strong smell to urine and frequent urination and number a accidents this past week  How long has patient had these symptoms:  for a little less than a week  Pharmacy name and phone #:    [facility]/pharmacy #[id number] - [place] - [address]  [address] [address]  Phone: [phone] Fax: [phone]  Best Call Back Number: [phone]  Additional Information: Would like to come and to drop off urine test so call back to advise and thanks

## 2024-07-09 LAB
BACTERIA UR CULT: NORMAL
BACTERIA UR CULT: NORMAL

## 2024-07-18 ENCOUNTER — PATIENT OUTREACH (OUTPATIENT)
Dept: ADMINISTRATIVE | Facility: HOSPITAL | Age: 74
End: 2024-07-18
Payer: MEDICARE

## 2024-07-18 NOTE — PROGRESS NOTES
Population Health Chart Review & Patient Outreach Details      Additional [facility] Health Notes:               Updates Requested / Reviewed:      Updated Care Coordination Note, Care Everywhere, and Immunizations Reconciliation Completed or Queried: [facility] Topics Overdue:      [facility] Score: 2     Osteoporosis Screening  Mammogram    Pneumonia Vaccine  Tetanus Vaccine  Shingles/Zoster Vaccine  RSV Vaccine                  Health Maintenance Topic(s) Outreach Outcomes & Actions Taken:    Lab(s) - Outreach Outcomes & Actions Taken  : External Records Uploaded & Care Team Updated if Applicable

## 2024-07-25 ENCOUNTER — PATIENT MESSAGE (OUTPATIENT)
Dept: PSYCHIATRY | Facility: CLINIC | Age: 74
End: 2024-07-25
Payer: MEDICARE

## 2024-08-05 ENCOUNTER — PATIENT MESSAGE (OUTPATIENT)
Dept: PSYCHIATRY | Facility: CLINIC | Age: 74
End: 2024-08-05
Payer: MEDICARE

## 2024-08-07 ENCOUNTER — OFFICE VISIT (OUTPATIENT)
Dept: NEUROLOGY | Facility: CLINIC | Age: 74
End: 2024-08-07
Payer: MEDICARE

## 2024-08-07 VITALS
WEIGHT: 191.56 LBS | DIASTOLIC BLOOD PRESSURE: 79 MMHG | BODY MASS INDEX: 33.94 KG/M2 | SYSTOLIC BLOOD PRESSURE: 140 MMHG | HEART RATE: 54 BPM

## 2024-08-07 DIAGNOSIS — G35 MULTIPLE SCLEROSIS: Primary | ICD-10-CM

## 2024-08-07 DIAGNOSIS — Z74.09 IMPAIRED MOBILITY AND ADLS: ICD-10-CM

## 2024-08-07 DIAGNOSIS — Z78.9 IMPAIRED MOBILITY AND ADLS: ICD-10-CM

## 2024-08-07 DIAGNOSIS — R53.83 OTHER FATIGUE: ICD-10-CM

## 2024-08-07 DIAGNOSIS — Z71.89 COUNSELING REGARDING GOALS OF CARE: ICD-10-CM

## 2024-08-07 PROCEDURE — 3078F DIAST BP <80 MM HG: CPT | Mod: CPTII,S$GLB,, | Performed by: CLINICAL NURSE SPECIALIST

## 2024-08-07 PROCEDURE — 3288F FALL RISK ASSESSMENT DOCD: CPT | Mod: CPTII,S$GLB,, | Performed by: CLINICAL NURSE SPECIALIST

## 2024-08-07 PROCEDURE — 1100F PTFALLS ASSESS-DOCD GE2>/YR: CPT | Mod: CPTII,S$GLB,, | Performed by: CLINICAL NURSE SPECIALIST

## 2024-08-07 PROCEDURE — G2211 COMPLEX E/M VISIT ADD ON: HCPCS | Mod: S$GLB,,, | Performed by: CLINICAL NURSE SPECIALIST

## 2024-08-07 PROCEDURE — 1159F MED LIST DOCD IN RCRD: CPT | Mod: CPTII,S$GLB,, | Performed by: CLINICAL NURSE SPECIALIST

## 2024-08-07 PROCEDURE — 3077F SYST BP >= 140 MM HG: CPT | Mod: CPTII,S$GLB,, | Performed by: CLINICAL NURSE SPECIALIST

## 2024-08-07 PROCEDURE — 3008F BODY MASS INDEX DOCD: CPT | Mod: CPTII,S$GLB,, | Performed by: CLINICAL NURSE SPECIALIST

## 2024-08-07 PROCEDURE — 99999 PR PBB SHADOW E&M-EST. PATIENT-LVL IV: CPT | Mod: PBBFAC,,, | Performed by: CLINICAL NURSE SPECIALIST

## 2024-08-07 PROCEDURE — 99215 OFFICE O/P EST HI 40 MIN: CPT | Mod: S$GLB,,, | Performed by: CLINICAL NURSE SPECIALIST

## 2024-08-08 ENCOUNTER — LAB VISIT (OUTPATIENT)
Dept: LAB | Facility: HOSPITAL | Age: 74
End: 2024-08-08
Attending: CLINICAL NURSE SPECIALIST
Payer: MEDICARE

## 2024-08-08 ENCOUNTER — OFFICE VISIT (OUTPATIENT)
Dept: FAMILY MEDICINE | Facility: CLINIC | Age: 74
End: 2024-08-08
Payer: MEDICARE

## 2024-08-08 VITALS
HEIGHT: 63 IN | TEMPERATURE: 98 F | OXYGEN SATURATION: 95 % | HEART RATE: 59 BPM | DIASTOLIC BLOOD PRESSURE: 64 MMHG | SYSTOLIC BLOOD PRESSURE: 136 MMHG | BODY MASS INDEX: 33.6 KG/M2 | WEIGHT: 189.63 LBS | RESPIRATION RATE: 15 BRPM

## 2024-08-08 DIAGNOSIS — G35 MULTIPLE SCLEROSIS: ICD-10-CM

## 2024-08-08 DIAGNOSIS — R60.9 SWELLING: Primary | ICD-10-CM

## 2024-08-08 DIAGNOSIS — R53.83 OTHER FATIGUE: ICD-10-CM

## 2024-08-08 DIAGNOSIS — I10 ESSENTIAL HYPERTENSION: ICD-10-CM

## 2024-08-08 DIAGNOSIS — Z12.31 ENCOUNTER FOR SCREENING MAMMOGRAM FOR MALIGNANT NEOPLASM OF BREAST: ICD-10-CM

## 2024-08-08 DIAGNOSIS — G35 MS (MULTIPLE SCLEROSIS): ICD-10-CM

## 2024-08-08 DIAGNOSIS — R79.9 ABNORMAL FINDING OF BLOOD CHEMISTRY, UNSPECIFIED: ICD-10-CM

## 2024-08-08 DIAGNOSIS — G40.909 SEIZURE DISORDER: ICD-10-CM

## 2024-08-08 DIAGNOSIS — R53.82 CHRONIC FATIGUE: ICD-10-CM

## 2024-08-08 DIAGNOSIS — R25.1 TREMOR: ICD-10-CM

## 2024-08-08 DIAGNOSIS — M19.90 ARTHRITIS: ICD-10-CM

## 2024-08-08 LAB
BACTERIA #/AREA URNS AUTO: ABNORMAL /HPF
BILIRUB UR QL STRIP: NEGATIVE
CLARITY UR REFRACT.AUTO: CLEAR
COLOR UR AUTO: YELLOW
GLUCOSE UR QL STRIP: NEGATIVE
HGB UR QL STRIP: NEGATIVE
KETONES UR QL STRIP: NEGATIVE
LEUKOCYTE ESTERASE UR QL STRIP: ABNORMAL
MICROSCOPIC COMMENT: ABNORMAL
NITRITE UR QL STRIP: NEGATIVE
PH UR STRIP: 6 [PH] (ref 5–8)
PROT UR QL STRIP: NEGATIVE
RBC #/AREA URNS AUTO: 2 /HPF (ref 0–4)
SP GR UR STRIP: 1.01 (ref 1–1.03)
SQUAMOUS #/AREA URNS AUTO: 1 /HPF
URN SPEC COLLECT METH UR: ABNORMAL
WBC #/AREA URNS AUTO: 8 /HPF (ref 0–5)

## 2024-08-08 PROCEDURE — 1126F AMNT PAIN NOTED NONE PRSNT: CPT | Mod: CPTII,S$GLB,, | Performed by: STUDENT IN AN ORGANIZED HEALTH CARE EDUCATION/TRAINING PROGRAM

## 2024-08-08 PROCEDURE — 87186 SC STD MICRODIL/AGAR DIL: CPT | Performed by: CLINICAL NURSE SPECIALIST

## 2024-08-08 PROCEDURE — 99999 PR PBB SHADOW E&M-EST. PATIENT-LVL IV: CPT | Mod: PBBFAC,,, | Performed by: STUDENT IN AN ORGANIZED HEALTH CARE EDUCATION/TRAINING PROGRAM

## 2024-08-08 PROCEDURE — 3075F SYST BP GE 130 - 139MM HG: CPT | Mod: CPTII,S$GLB,, | Performed by: STUDENT IN AN ORGANIZED HEALTH CARE EDUCATION/TRAINING PROGRAM

## 2024-08-08 PROCEDURE — 99215 OFFICE O/P EST HI 40 MIN: CPT | Mod: S$GLB,,, | Performed by: STUDENT IN AN ORGANIZED HEALTH CARE EDUCATION/TRAINING PROGRAM

## 2024-08-08 PROCEDURE — 1100F PTFALLS ASSESS-DOCD GE2>/YR: CPT | Mod: CPTII,S$GLB,, | Performed by: STUDENT IN AN ORGANIZED HEALTH CARE EDUCATION/TRAINING PROGRAM

## 2024-08-08 PROCEDURE — 81001 URINALYSIS AUTO W/SCOPE: CPT | Performed by: CLINICAL NURSE SPECIALIST

## 2024-08-08 PROCEDURE — G2211 COMPLEX E/M VISIT ADD ON: HCPCS | Mod: S$GLB,,, | Performed by: STUDENT IN AN ORGANIZED HEALTH CARE EDUCATION/TRAINING PROGRAM

## 2024-08-08 PROCEDURE — 87088 URINE BACTERIA CULTURE: CPT | Performed by: CLINICAL NURSE SPECIALIST

## 2024-08-08 PROCEDURE — 87086 URINE CULTURE/COLONY COUNT: CPT | Performed by: CLINICAL NURSE SPECIALIST

## 2024-08-08 PROCEDURE — 1159F MED LIST DOCD IN RCRD: CPT | Mod: CPTII,S$GLB,, | Performed by: STUDENT IN AN ORGANIZED HEALTH CARE EDUCATION/TRAINING PROGRAM

## 2024-08-08 PROCEDURE — 3288F FALL RISK ASSESSMENT DOCD: CPT | Mod: CPTII,S$GLB,, | Performed by: STUDENT IN AN ORGANIZED HEALTH CARE EDUCATION/TRAINING PROGRAM

## 2024-08-08 PROCEDURE — 3008F BODY MASS INDEX DOCD: CPT | Mod: CPTII,S$GLB,, | Performed by: STUDENT IN AN ORGANIZED HEALTH CARE EDUCATION/TRAINING PROGRAM

## 2024-08-08 PROCEDURE — 3078F DIAST BP <80 MM HG: CPT | Mod: CPTII,S$GLB,, | Performed by: STUDENT IN AN ORGANIZED HEALTH CARE EDUCATION/TRAINING PROGRAM

## 2024-08-08 RX ORDER — TORSEMIDE 5 MG/1
5 TABLET ORAL DAILY PRN
Qty: 7 TABLET | Refills: 2 | Status: SHIPPED | OUTPATIENT
Start: 2024-08-08 | End: 2024-08-15

## 2024-08-11 LAB — BACTERIA UR CULT: ABNORMAL

## 2024-08-12 ENCOUNTER — HOSPITAL ENCOUNTER (OUTPATIENT)
Dept: RADIOLOGY | Facility: HOSPITAL | Age: 74
Discharge: HOME | End: 2024-08-12
Attending: STUDENT IN AN ORGANIZED HEALTH CARE EDUCATION/TRAINING PROGRAM
Payer: MEDICARE

## 2024-08-12 ENCOUNTER — PATIENT MESSAGE (OUTPATIENT)
Dept: NEUROLOGY | Facility: CLINIC | Age: 74
End: 2024-08-12
Payer: MEDICARE

## 2024-08-12 VITALS — HEIGHT: 63 IN | BODY MASS INDEX: 33.49 KG/M2 | WEIGHT: 189 LBS

## 2024-08-12 DIAGNOSIS — N39.0 URINARY TRACT INFECTION WITHOUT HEMATURIA, SITE UNSPECIFIED: Primary | ICD-10-CM

## 2024-08-12 DIAGNOSIS — R60.9 SWELLING: ICD-10-CM

## 2024-08-12 DIAGNOSIS — Z12.31 ENCOUNTER FOR SCREENING MAMMOGRAM FOR MALIGNANT NEOPLASM OF BREAST: ICD-10-CM

## 2024-08-12 DIAGNOSIS — Z78.0 MENOPAUSE: ICD-10-CM

## 2024-08-12 PROCEDURE — 77080 DXA BONE DENSITY AXIAL: CPT | Mod: 26,,, | Performed by: RADIOLOGY

## 2024-08-12 PROCEDURE — 77063 BREAST TOMOSYNTHESIS BI: CPT | Mod: 26,,, | Performed by: RADIOLOGY

## 2024-08-12 PROCEDURE — 77067 SCR MAMMO BI INCL CAD: CPT | Mod: 26,,, | Performed by: RADIOLOGY

## 2024-08-12 PROCEDURE — 93970 EXTREMITY STUDY: CPT | Mod: 26,,, | Performed by: RADIOLOGY

## 2024-08-12 PROCEDURE — 77080 DXA BONE DENSITY AXIAL: CPT | Mod: TC,PO

## 2024-08-12 PROCEDURE — 77067 SCR MAMMO BI INCL CAD: CPT | Mod: TC,PO

## 2024-08-12 PROCEDURE — 93970 EXTREMITY STUDY: CPT | Mod: TC,PO

## 2024-08-12 RX ORDER — SULFAMETHOXAZOLE AND TRIMETHOPRIM 800; 160 MG/1; MG/1
1 TABLET ORAL 2 TIMES DAILY
Qty: 14 TABLET | Refills: 0 | Status: SHIPPED | OUTPATIENT
Start: 2024-08-12 | End: 2024-08-19

## 2024-08-13 ENCOUNTER — TELEPHONE (OUTPATIENT)
Dept: FAMILY MEDICINE | Facility: CLINIC | Age: 74
End: 2024-08-13
Payer: MEDICARE

## 2024-08-13 ENCOUNTER — PATIENT MESSAGE (OUTPATIENT)
Dept: FAMILY MEDICINE | Facility: CLINIC | Age: 74
End: 2024-08-13
Payer: MEDICARE

## 2024-08-13 DIAGNOSIS — E61.1 IRON DEFICIENCY: Primary | ICD-10-CM

## 2024-08-13 NOTE — TELEPHONE ENCOUNTER
----- Message from Piyush Sharif MD sent at 8/13/2024  7:16 AM CDT -----  Pt has a low iron level and recommend she start an iron pill and recommend she see GI to investigate the cause placed a ref

## 2024-08-19 ENCOUNTER — DOCUMENTATION ONLY (OUTPATIENT)
Dept: NEUROLOGY | Facility: CLINIC | Age: 74
End: 2024-08-19
Payer: MEDICARE

## 2024-08-20 ENCOUNTER — PATIENT MESSAGE (OUTPATIENT)
Dept: FAMILY MEDICINE | Facility: CLINIC | Age: 74
End: 2024-08-20
Payer: MEDICARE

## 2024-08-20 DIAGNOSIS — E53.8 B12 DEFICIENCY: Primary | ICD-10-CM

## 2024-08-20 RX ORDER — LANOLIN ALCOHOL/MO/W.PET/CERES
1000 CREAM (GRAM) TOPICAL DAILY
Qty: 90 TABLET | Refills: 3 | Status: SHIPPED | OUTPATIENT
Start: 2024-08-20 | End: 2025-08-20

## 2024-08-21 ENCOUNTER — OFFICE VISIT (OUTPATIENT)
Dept: UROLOGY | Facility: CLINIC | Age: 74
End: 2024-08-21
Payer: MEDICARE

## 2024-08-21 ENCOUNTER — TELEPHONE (OUTPATIENT)
Dept: UROLOGY | Facility: CLINIC | Age: 74
End: 2024-08-21

## 2024-08-21 VITALS
HEART RATE: 64 BPM | DIASTOLIC BLOOD PRESSURE: 82 MMHG | SYSTOLIC BLOOD PRESSURE: 137 MMHG | BODY MASS INDEX: 35.46 KG/M2 | WEIGHT: 200.19 LBS

## 2024-08-21 DIAGNOSIS — N32.81 URGENCY-FREQUENCY SYNDROME: Primary | ICD-10-CM

## 2024-08-21 DIAGNOSIS — N39.41 URGENCY INCONTINENCE: Primary | ICD-10-CM

## 2024-08-21 PROCEDURE — 3044F HG A1C LEVEL LT 7.0%: CPT | Mod: CPTII,S$GLB,, | Performed by: UROLOGY

## 2024-08-21 PROCEDURE — 3079F DIAST BP 80-89 MM HG: CPT | Mod: CPTII,S$GLB,, | Performed by: UROLOGY

## 2024-08-21 PROCEDURE — 3008F BODY MASS INDEX DOCD: CPT | Mod: CPTII,S$GLB,, | Performed by: UROLOGY

## 2024-08-21 PROCEDURE — 99214 OFFICE O/P EST MOD 30 MIN: CPT | Mod: S$GLB,,, | Performed by: UROLOGY

## 2024-08-21 PROCEDURE — 87081 CULTURE SCREEN ONLY: CPT | Performed by: UROLOGY

## 2024-08-21 PROCEDURE — 99999 PR PBB SHADOW E&M-EST. PATIENT-LVL III: CPT | Mod: PBBFAC,,, | Performed by: UROLOGY

## 2024-08-21 PROCEDURE — 1159F MED LIST DOCD IN RCRD: CPT | Mod: CPTII,S$GLB,, | Performed by: UROLOGY

## 2024-08-21 PROCEDURE — 3075F SYST BP GE 130 - 139MM HG: CPT | Mod: CPTII,S$GLB,, | Performed by: UROLOGY

## 2024-08-21 NOTE — PROGRESS NOTES
"[facility]      Return Overactive Bladder (OAB) Note    8/21/2024    Referred by:  No ref. provider found    History of Present Illness    Ms. John presents today for follow up on urinary incontinence and to discuss treatment options.    She has a history of urinary incontinence associated with neurogenic origin, possibly due to multiple sclerosis. She denies voiding difficulty but reports inability to void to completion. Previous urodynamic evaluation revealed a bladder scan residual of 151 mL and a catheterized residual of 188 mL. The study showed no detrusor activity, normal bladder compliance, and no stress incontinence. She demonstrated dysfunctional voiding with Valsalva voiding and no underlying detrusor contraction. 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    She reports experiencing fecal incontinence. She expresses willingness to consider sacral neuromodulation as a potential treatment option for both her urinary symptoms and fecal incontinence, stating she has "reached the point that I need something to at least give it a whirl."    Overactive bladder medication was not previously recommended due to potential urinary retention. After initially being reluctant, she is now interested in trying sacral neuromodulation. She understands the procedure, including the trial period and potential need for general anesthesia. She is more comfortable with staged testing under general anesthesia in the operating room, rather than an office-based procedure with local anesthesia.    She understands that sponge baths will be required during the trial period to keep the device area dry and acknowledges the need for assistance with personal hygiene during recovery. She expresses willingness to undergo the trial despite temporary bathing limitations.    She inquires about " insurance coverage differences between office visit and surgery options for the procedure, seeking clarification on potential variations in insurance reimbursement for the two different approaches.        A review of 10+ systems was conducted with pertinent positive and negative findings documented in HPI with all other systems reviewed and negative.    Past medical, family, surgical and social history reviewed as documented in chart with pertinent positive medical, family, surgical and social history detailed in HPI.    Exam Findings:    Physical Exam    General: No acute distress. Nontoxic appearing.  HENT: Normocephalic. Atraumatic.  Respiratory: Normal respiratory effort. No conversational dyspnea. No audible wheezing.  Abdomen: No obvious distension.  Skin: No visible abnormalities.  Extremities: No edema upper extremities. No edema lower extremities.  Neurological: Alert and oriented x3. Normal speech.  Psychiatric: Normal mood. Normal affect. No evidence of SI.          Assessment/Plan:    Assessment & Plan    - Reviewed patient's history of urinary incontinence associated with neurogenic origin, likely multiple sclerosis  - Noted previous urodynamic evaluation findings: no detrusor activity, normal bladder compliance, no stress incontinence, dysfunctional voiding with Valsalva, and possible functional intermittent obstruction  - Considered sacral neuromodulation as a treatment option for nonobstructive urinary retention, frequency, urgency, and fecal incontinence  - Decided against recommending overactive bladder medications due to risk of precipitating urinary retention in patient with existing voiding difficulties  - Proposed staged testing for sacral neuromodulation, offering both office-based and operating room-based options for initial lead placement  URINARY SYMPTOMS AND FECAL INCONTINENCE:  - Explained sacral neuromodulation procedure, including placement of lead near sacral nerves to stimulate  bladder function, 2-stage process: initial test phase followed by permanent implantation if successful, and potential benefits for urinary symptoms and fecal incontinence.  - Discussed differences between office-based and operating room-based testing procedures.  - Described the appearance and placement of the device components using a demo model.  - Explained post-procedure care, including showering restrictions during the test phase.  - Ms. Dora to maintain sponge bath or careful showering technique during the test phase of sacral neuromodulation to keep the lead site dry.  - Follow up to schedule sacral neuromodulation staged testing procedure.  - Follow up to schedule second stage for permanent implantation approximately 2 weeks after initial lead placement, if test phase is successful.  LABS:  - Nose swab ordered to check for bacterial carriage.

## 2024-08-29 ENCOUNTER — OFFICE VISIT (OUTPATIENT)
Dept: GASTROENTEROLOGY | Facility: CLINIC | Age: 74
End: 2024-08-29
Payer: MEDICARE

## 2024-08-29 VITALS — BODY MASS INDEX: 35.47 KG/M2 | HEIGHT: 63 IN | WEIGHT: 200.19 LBS

## 2024-08-29 DIAGNOSIS — D50.9 IRON DEFICIENCY ANEMIA, UNSPECIFIED IRON DEFICIENCY ANEMIA TYPE: Primary | ICD-10-CM

## 2024-08-29 DIAGNOSIS — Z12.11 SCREENING FOR COLON CANCER: ICD-10-CM

## 2024-08-29 DIAGNOSIS — Z87.42 HISTORY OF VAGINAL BLEEDING: ICD-10-CM

## 2024-08-29 DIAGNOSIS — K21.9 GASTROESOPHAGEAL REFLUX DISEASE, UNSPECIFIED WHETHER ESOPHAGITIS PRESENT: ICD-10-CM

## 2024-08-29 PROCEDURE — 99999 PR PBB SHADOW E&M-EST. PATIENT-LVL IV: CPT | Mod: PBBFAC,,,

## 2024-08-29 PROCEDURE — 3288F FALL RISK ASSESSMENT DOCD: CPT | Mod: CPTII,S$GLB,,

## 2024-08-29 PROCEDURE — 3044F HG A1C LEVEL LT 7.0%: CPT | Mod: CPTII,S$GLB,,

## 2024-08-29 PROCEDURE — 3008F BODY MASS INDEX DOCD: CPT | Mod: CPTII,S$GLB,,

## 2024-08-29 PROCEDURE — 1101F PT FALLS ASSESS-DOCD LE1/YR: CPT | Mod: CPTII,S$GLB,,

## 2024-08-29 PROCEDURE — 1159F MED LIST DOCD IN RCRD: CPT | Mod: CPTII,S$GLB,,

## 2024-08-29 PROCEDURE — 1126F AMNT PAIN NOTED NONE PRSNT: CPT | Mod: CPTII,S$GLB,,

## 2024-08-29 PROCEDURE — 99203 OFFICE O/P NEW LOW 30 MIN: CPT | Mod: S$GLB,,,

## 2024-08-29 PROCEDURE — 1160F RVW MEDS BY RX/DR IN RCRD: CPT | Mod: CPTII,S$GLB,,

## 2024-08-29 NOTE — PROGRESS NOTES
Subjective:       Patient ID: Alyssa John is a 73 y.o. female Body mass index is 35.46 kg/m².    Chief Complaint: Anemia    This patient is new to me.  Referring Provider: Dr. Piyush Sharif for [facility].  Established patient of Dr. Malcolm.            Anemia  Visit type: Patient presents to clinic with a ferritin of 9.2 referred by PCP, CBC stable besides a low MCHC, no iron panel listed. Symptoms include leg swelling (recently saw PCP for leg swelling was placed on 7 day diuretic and completing further workup), light-headedness and malaise/fatigue. There has been no abdominal pain, anorexia, bruising/bleeding easily, confusion, fever, pallor, palpitations, paresthesias, pica or weight loss. ( assisting patient with history  Patient with history of  MS, CAD, epilepsy, GERD and HTN, reports GERD symptoms are significantly controlled on Pepcid 20 mg nightly reports heartburn if she eats spicy Mexican food or eats late at night and goes to bed, reports daily BMs that are soft mushy stools like type 5-6 on [place] stool scale does not take anything for her bowels reports with her MS at times she has trouble feeling her urine reports in October she is going to have a neurostimulator to help with bowel function, Patient's last colonoscopy in 2015 with Dr. Malcolm showed: Tortuous colon.  - Diverticulosis in the sigmoid colon , denies hx of colon polyps denies family hx of colon cancer) Signs of blood loss that are present include vaginal bleeding (reports 2 months ago experienced 2 episodes of vaginal bleeding where she saw small amounts of blood on tissue has not happened since then). Signs of blood loss that are not present include hematemesis, hematochezia, melena and menorrhagia. Past treatments include oral iron supplements (has not started on oral iron yet). There is no history of alcohol abuse, cancer, chronic liver disease, chronic renal disease, clotting disorder, dementia, heart failure,  hemoglobinopathy, HIV/AIDS, hypothyroidism, inflammatory bowel disease, malabsorption, malnutrition, neuropathy, recent illness, recent surgery, recent trauma or rheumatic disease. Procedure history includes colonoscopy. There is no past history of bone marrow exam, EGD or FOBT.       Review of Systems   Constitutional:  Positive for malaise/fatigue. Negative for fever and weight loss.   Cardiovascular:  Negative for palpitations.   Gastrointestinal:  Negative for abdominal distention, abdominal pain, anal bleeding, anorexia, blood in stool, constipation, diarrhea, hematemesis, hematochezia, melena, nausea, rectal pain and vomiting.   Genitourinary:  Positive for vaginal bleeding (reports 2 months ago experienced 2 episodes of vaginal bleeding where she saw small amounts of blood on tissue has not happened since then). Negative for hematuria and menorrhagia.   Musculoskeletal:  Positive for arthralgias (reports hx of MS).   Skin:  Negative for pallor and rash.   Neurological:  Positive for light-headedness. Negative for paresthesias.   Hematological:  Does not bruise/bleed easily.   Psychiatric/Behavioral:  Negative for confusion.          No LMP recorded. Patient is postmenopausal.  Past Medical History:   Diagnosis Date    Arthritis     Coronary artery disease     Encounter for blood transfusion     Epilepsy     GERD (gastroesophageal reflux disease)     Headache     Hypertension     MI, old     MS (multiple sclerosis)     Seizures     epilepsy     Past Surgical History:   Procedure Laterality Date    APPENDECTOMY      BACK SURGERY      L5 discectomy    BREAST BIOPSY Right 15 yrs ago    benign    CATARACT EXTRACTION Bilateral     COLONOSCOPY N/A 09/16/2015    Procedure: COLONOSCOPY;  Surgeon: Henry Malcolm MD;  Location: [facility];  Service: Endoscopy;  Laterality: N/A;    CORONARY STENT PLACEMENT      right knee arthroscopy       Family History   Problem Relation Name Age of Onset    Scleroderma Mother       Heart disease Father          MI, CAD    Cancer Neg Hx      Diabetes Neg Hx      Stroke Neg Hx      Melanoma Neg Hx      Psoriasis Neg Hx      Lupus Neg Hx      Eczema Neg Hx       Social History     Tobacco Use    Smoking status: Former     Current packs/day: 0.00     Types: Cigarettes     Quit date: 2006     Years since quittin.6     Passive exposure: Past    Smokeless tobacco: Never   Substance Use Topics    Alcohol use: Yes     Comment: rarely    Drug use: No     Wt Readings from Last 10 Encounters:   24 90.8 kg (200 lb 2.8 oz)   24 90.8 kg (200 lb 2.8 oz)   24 85.7 kg (189 lb)   24 86 kg (189 lb 9.5 oz)   24 86.9 kg (191 lb 9.3 oz)   24 86.2 kg (190 lb)   24 90.3 kg (199 lb 1.2 oz)   24 90 kg (198 lb 6.6 oz)   24 90 kg (198 lb 6.6 oz)   23 77.8 kg (171 lb 8.3 oz)     Lab Results   Component Value Date    WBC 8.57 2024    HGB 14.5 2024    HCT 45.9 2024    MCV 88 2024     2024     CMP  Sodium   Date Value Ref Range Status   2024 140 136 - 145 mmol/L Final     Potassium   Date Value Ref Range Status   2024 3.6 3.5 - 5.1 mmol/L Final     Chloride   Date Value Ref Range Status   2024 111 (H) 95 - 110 mmol/L Final     CO2   Date Value Ref Range Status   2024 20 (L) 23 - 29 mmol/L Final     Carbon Monoxide, Blood   Date Value Ref Range Status   10/19/2016 2 % Final     Comment:     -------------------REFERENCE VALUE--------------------------  0-2 Normal (Non-smoker) , < or = 9 Normal (Smoker), > or   = 20 (Toxic concentration)  Test Performed by:  [address] [address]  : Adrien Norton II, M.D., Ph.D.       Glucose   Date Value Ref Range Status   2024 103 70 - 110 mg/dL Final     BUN   Date Value Ref Range Status   2024 22 8 - 23 mg/dL Final     Creatinine   Date Value Ref Range Status  "  08/12/2024 0.9 0.5 - 1.4 mg/dL Final   02/22/2013 0.8 0.5 - 1.4 mg/dL Final     Calcium   Date Value Ref Range Status   08/12/2024 8.6 (L) 8.7 - 10.5 mg/dL Final   02/22/2013 8.7 8.7 - 10.5 mg/dL Final     Total Protein   Date Value Ref Range Status   08/12/2024 7.0 6.0 - 8.4 g/dL Final     Albumin   Date Value Ref Range Status   08/12/2024 4.1 3.5 - 5.2 g/dL Final     Total Bilirubin   Date Value Ref Range Status   08/12/2024 0.6 0.1 - 1.0 mg/dL Final     Comment:     For infants and newborns, interpretation of results should be based  on gestational age, weight and in agreement with clinical  observations.    Premature Infant recommended reference ranges:  Up to 24 hours.............<8.0 mg/dL  Up to 48 hours............<12.0 mg/dL  3-5 days..................<15.0 mg/dL  6-29 days.................<15.0 mg/dL       Alkaline Phosphatase   Date Value Ref Range Status   08/12/2024 123 55 - 135 U/L Final   02/22/2013 107 55 - 135 U/L Final     AST   Date Value Ref Range Status   08/12/2024 13 10 - 40 U/L Final   02/22/2013 18 10 - 40 U/L Final     ALT   Date Value Ref Range Status   08/12/2024 13 10 - 44 U/L Final     Anion Gap   Date Value Ref Range Status   08/12/2024 9 8 - 16 mmol/L Final   02/22/2013 11 5 - 15 meq/L Final     eGFR if    Date Value Ref Range Status   11/17/2021 >60.0 >60 mL/min/1.73 m^2 Final     eGFR if non    Date Value Ref Range Status   11/17/2021 >60.0 >60 mL/min/1.73 m^2 Final     Comment:     Calculation used to obtain the estimated glomerular filtration  rate (eGFR) is the CKD-EPI equation.        Lab Results   Component Value Date    LIPASE 27 04/02/2023     No results found for: "LIPASERES"  Lab Results   Component Value Date    TSH 2.224 08/12/2024       Reviewed prior medical records including radiology report of CT abdomen pelvis 04/02/2023 & endoscopy history (see surgical history/procedures).    Objective:      Physical Exam  Vitals and nursing note " reviewed.   Constitutional:       Appearance: Normal appearance. She is normal weight.   Cardiovascular:      Rate and Rhythm: Normal rate and regular rhythm.      Heart sounds: Normal heart sounds.   Pulmonary:      Breath sounds: Normal breath sounds.   Abdominal:      General: Bowel sounds are normal.      Palpations: Abdomen is soft.      Tenderness: There is no abdominal tenderness.   Skin:     General: Skin is warm and dry.      Coloration: Skin is not jaundiced.   Neurological:      Mental Status: She is alert and oriented to person, place, and time.   Psychiatric:         Mood and Affect: Mood normal.         Behavior: Behavior normal.         Assessment:       1. Iron deficiency anemia, unspecified iron deficiency anemia type    2. History of vaginal bleeding        Plan:       Iron deficiency anemia, unspecified iron deficiency anemia type  -     IRON AND TIBC; Future; Expected date: 08/29/2024   - discussed with patient the different ways that anemia occurs: blood loss (such as from the gi tract), the body is not making enough, or the body is breaking down the rbcs too quickly; recommend EGD and colonoscopy to further evaluate gi tract for possible blood loss and pending results of endoscopies, possible UGI with Small Bowel Follow Through/video capsule study  -follow-up with PCP and/or hematology for continued evaluation and management   -recommended to schedule Colonoscopy/EGD for further evaluation of anemia pt would like to hold off at the moment will call back to schedule    History of vaginal bleeding   -resolved   -if reoccurs please follow up with PCP and or OBGYN    Gastroesophageal reflux disease, unspecified whether esophagitis present   Please continue Pepcid 20 mg nightly  -Educated patient on lifestyle modifications to help control/reduce reflux/abdominal pain including: avoid large meals, avoid eating within 2-3 hours of bedtime (avoid late night eating & lying down soon after eating),  elevate head of bed if nocturnal symptoms are present, smoking cessation (if current smoker), & weight loss (if overweight).   -Educated to avoid known foods which trigger reflux symptoms & to minimize/avoid high-fat foods, chocolate, caffeine, citrus, alcohol, & tomato products.  -Advised to avoid/limit use of NSAID's, since they can cause GI upset, bleeding, and/or ulcers. If needed, take with food.      Screening for colon cancer   Next surveillance colonoscopy due on 09/2025    Follow up if symptoms worsen or fail to improve.      If no improvement in symptoms or symptoms worsen, call/follow-up at clinic or go to ER.       [facility] - GASTROENTEROLOGY  [facility], [place]     Dictation software program was used for this note. Please expect some simple typographical  errors in this note.    Encounter includes face to face time and non-face to face time preparing to see the patient (eg, review of tests), obtaining and/or reviewing separately obtained history, documenting clinical information in the electronic or other health record, independently interpreting results (not separately reported) and communicating results to the patient/family/caregiver, or care coordination (not separately reported).

## 2024-09-16 ENCOUNTER — HOSPITAL ENCOUNTER (INPATIENT)
Facility: HOSPITAL | Age: 74
LOS: 3 days | Discharge: HOME | DRG: 312 | End: 2024-09-21
Attending: EMERGENCY MEDICINE
Payer: MEDICARE

## 2024-09-16 DIAGNOSIS — R07.9 CHEST PAIN: ICD-10-CM

## 2024-09-16 DIAGNOSIS — R55 SYNCOPE AND COLLAPSE: ICD-10-CM

## 2024-09-16 DIAGNOSIS — R55 SYNCOPE: ICD-10-CM

## 2024-09-16 DIAGNOSIS — T67.1XXA HEAT SYNCOPE, INITIAL ENCOUNTER: Primary | ICD-10-CM

## 2024-09-16 PROCEDURE — 93010 ELECTROCARDIOGRAM REPORT: CPT | Mod: ,,, | Performed by: GENERAL PRACTICE

## 2024-09-16 PROCEDURE — 99285 EMERGENCY DEPT VISIT HI MDM: CPT | Mod: 25

## 2024-09-16 PROCEDURE — 93005 ELECTROCARDIOGRAM TRACING: CPT | Performed by: GENERAL PRACTICE

## 2024-09-16 NOTE — Clinical Note
Diagnosis: Heat syncope, initial encounter [[id number]]   Future Attending Provider: BRAYAN WOOD [[id number]]   Place in Observation: [facility] [[id number]]

## 2024-09-17 ENCOUNTER — CLINICAL SUPPORT (OUTPATIENT)
Dept: CARDIOLOGY | Facility: HOSPITAL | Age: 74
End: 2024-09-17
Payer: MEDICARE

## 2024-09-17 VITALS — HEIGHT: 63 IN | WEIGHT: 199.06 LBS | BODY MASS INDEX: 35.27 KG/M2

## 2024-09-17 LAB
ALBUMIN SERPL BCP-MCNC: 3.7 G/DL (ref 3.5–5.2)
ALBUMIN SERPL BCP-MCNC: 3.9 G/DL (ref 3.5–5.2)
ALP SERPL-CCNC: 131 U/L (ref 55–135)
ALP SERPL-CCNC: 136 U/L (ref 55–135)
ALT SERPL W/O P-5'-P-CCNC: 11 U/L (ref 10–44)
ALT SERPL W/O P-5'-P-CCNC: 11 U/L (ref 10–44)
ANION GAP SERPL CALC-SCNC: 10 MMOL/L (ref 8–16)
ANION GAP SERPL CALC-SCNC: 11 MMOL/L (ref 8–16)
AORTIC ROOT ANNULUS: 2.8 CM
AORTIC VALVE CUSP SEPERATION: 1.7 CM
APICAL FOUR CHAMBER EJECTION FRACTION: 62 %
APTT PPP: 26.8 SEC (ref 21–32)
ASCENDING AORTA: 2.7 CM
AST SERPL-CCNC: 12 U/L (ref 10–40)
AST SERPL-CCNC: 13 U/L (ref 10–40)
AV INDEX (PROSTH): 0.93
AV MEAN GRADIENT: 4 MMHG
AV PEAK GRADIENT: 8 MMHG
AV VALVE AREA BY VELOCITY RATIO: 3 CM²
AV VALVE AREA: 2.93 CM²
AV VELOCITY RATIO: 0.96
BACTERIA #/AREA URNS HPF: ABNORMAL /HPF
BASOPHILS # BLD AUTO: 0.05 K/UL (ref 0–0.2)
BASOPHILS # BLD AUTO: 0.05 K/UL (ref 0–0.2)
BASOPHILS NFR BLD: 0.5 % (ref 0–1.9)
BASOPHILS NFR BLD: 0.5 % (ref 0–1.9)
BILIRUB DIRECT SERPL-MCNC: 0.1 MG/DL (ref 0.1–0.3)
BILIRUB SERPL-MCNC: 0.4 MG/DL (ref 0.1–1)
BILIRUB SERPL-MCNC: 0.5 MG/DL (ref 0.1–1)
BILIRUB UR QL STRIP: NEGATIVE
BNP SERPL-MCNC: 75 PG/ML (ref 0–99)
BSA FOR ECHO PROCEDURE: 2 M2
BUN SERPL-MCNC: 18 MG/DL (ref 8–23)
BUN SERPL-MCNC: 22 MG/DL (ref 8–23)
CALCIUM SERPL-MCNC: 8.2 MG/DL (ref 8.7–10.5)
CALCIUM SERPL-MCNC: 8.5 MG/DL (ref 8.7–10.5)
CHLORIDE SERPL-SCNC: 114 MMOL/L (ref 95–110)
CHLORIDE SERPL-SCNC: 114 MMOL/L (ref 95–110)
CK SERPL-CCNC: 78 U/L (ref 20–180)
CLARITY UR: ABNORMAL
CO2 SERPL-SCNC: 19 MMOL/L (ref 23–29)
CO2 SERPL-SCNC: 19 MMOL/L (ref 23–29)
COLOR UR: YELLOW
CREAT SERPL-MCNC: 0.8 MG/DL (ref 0.5–1.4)
CREAT SERPL-MCNC: 0.9 MG/DL (ref 0.5–1.4)
CV ECHO LV RWT: 0.27 CM
DIFFERENTIAL METHOD BLD: ABNORMAL
DIFFERENTIAL METHOD BLD: ABNORMAL
DOP CALC AO PEAK VEL: 1.37 M/S
DOP CALC AO VTI: 28.9 CM
DOP CALC LVOT AREA: 3.1 CM2
DOP CALC LVOT DIAMETER: 2 CM
DOP CALC LVOT PEAK VEL: 1.31 M/S
DOP CALC LVOT STROKE VOLUME: 84.78 CM3
DOP CALC MV VTI: 31.2 CM
DOP CALCLVOT PEAK VEL VTI: 27 CM
E WAVE DECELERATION TIME: 352 MSEC
E/A RATIO: 0.57
E/E' RATIO: 8.86 M/S
ECHO LV POSTERIOR WALL: 0.67 CM (ref 0.6–1.1)
EOSINOPHIL # BLD AUTO: 0.2 K/UL (ref 0–0.5)
EOSINOPHIL # BLD AUTO: 0.2 K/UL (ref 0–0.5)
EOSINOPHIL NFR BLD: 1.8 % (ref 0–8)
EOSINOPHIL NFR BLD: 1.9 % (ref 0–8)
ERYTHROCYTE [DISTWIDTH] IN BLOOD BY AUTOMATED COUNT: 14.1 % (ref 11.5–14.5)
ERYTHROCYTE [DISTWIDTH] IN BLOOD BY AUTOMATED COUNT: 14.1 % (ref 11.5–14.5)
EST. GFR  (NO RACE VARIABLE): >60 ML/MIN/1.73 M^2
EST. GFR  (NO RACE VARIABLE): >60 ML/MIN/1.73 M^2
FRACTIONAL SHORTENING: 46 % (ref 28–44)
GLUCOSE SERPL-MCNC: 102 MG/DL (ref 70–110)
GLUCOSE SERPL-MCNC: 107 MG/DL (ref 70–110)
GLUCOSE UR QL STRIP: NEGATIVE
HCT VFR BLD AUTO: 42.3 % (ref 37–48.5)
HCT VFR BLD AUTO: 43.3 % (ref 37–48.5)
HGB BLD-MCNC: 13 G/DL (ref 12–16)
HGB BLD-MCNC: 13.6 G/DL (ref 12–16)
HGB UR QL STRIP: ABNORMAL
IMM GRANULOCYTES # BLD AUTO: 0.02 K/UL (ref 0–0.04)
IMM GRANULOCYTES # BLD AUTO: 0.03 K/UL (ref 0–0.04)
IMM GRANULOCYTES NFR BLD AUTO: 0.2 % (ref 0–0.5)
IMM GRANULOCYTES NFR BLD AUTO: 0.3 % (ref 0–0.5)
INR PPP: 0.9 (ref 0.8–1.2)
INTERVENTRICULAR SEPTUM: 0.8 CM (ref 0.6–1.1)
KETONES UR QL STRIP: NEGATIVE
LACTATE SERPL-SCNC: 1 MMOL/L (ref 0.5–1.9)
LEFT ATRIUM SIZE: 3.1 CM
LEFT INTERNAL DIMENSION IN SYSTOLE: 2.67 CM (ref 2.1–4)
LEFT VENTRICLE DIASTOLIC VOLUME INDEX: 59.59 ML/M2
LEFT VENTRICLE DIASTOLIC VOLUME: 115 ML
LEFT VENTRICLE END DIASTOLIC VOLUME APICAL 4 CHAMBER: 122 ML
LEFT VENTRICLE MASS INDEX: 62 G/M2
LEFT VENTRICLE SYSTOLIC VOLUME INDEX: 13.6 ML/M2
LEFT VENTRICLE SYSTOLIC VOLUME: 26.3 ML
LEFT VENTRICULAR INTERNAL DIMENSION IN DIASTOLE: 4.94 CM (ref 3.5–6)
LEFT VENTRICULAR MASS: 119.43 G
LEUKOCYTE ESTERASE UR QL STRIP: ABNORMAL
LV LATERAL E/E' RATIO: 7.75 M/S
LV SEPTAL E/E' RATIO: 10.33 M/S
LVED V (TEICH): 115 ML
LVES V (TEICH): 26.3 ML
LVOT MG: 4 MMHG
LVOT MV: 0.85 CM/S
LYMPHOCYTES # BLD AUTO: 2.3 K/UL (ref 1–4.8)
LYMPHOCYTES # BLD AUTO: 2.3 K/UL (ref 1–4.8)
LYMPHOCYTES NFR BLD: 22.4 % (ref 18–48)
LYMPHOCYTES NFR BLD: 24 % (ref 18–48)
MAGNESIUM SERPL-MCNC: 2 MG/DL (ref 1.6–2.6)
MAGNESIUM SERPL-MCNC: 2.1 MG/DL (ref 1.6–2.6)
MCH RBC QN AUTO: 27 PG (ref 27–31)
MCH RBC QN AUTO: 27.8 PG (ref 27–31)
MCHC RBC AUTO-ENTMCNC: 30.7 G/DL (ref 32–36)
MCHC RBC AUTO-ENTMCNC: 31.4 G/DL (ref 32–36)
MCV RBC AUTO: 88 FL (ref 82–98)
MCV RBC AUTO: 89 FL (ref 82–98)
MICROSCOPIC COMMENT: ABNORMAL
MONOCYTES # BLD AUTO: 0.6 K/UL (ref 0.3–1)
MONOCYTES # BLD AUTO: 0.8 K/UL (ref 0.3–1)
MONOCYTES NFR BLD: 6.5 % (ref 4–15)
MONOCYTES NFR BLD: 8 % (ref 4–15)
MV MEAN GRADIENT: 2 MMHG
MV PEAK A VEL: 1.09 M/S
MV PEAK E VEL: 0.62 M/S
MV PEAK GRADIENT: 6 MMHG
MV STENOSIS PRESSURE HALF TIME: 82 MS
MV VALVE AREA BY CONTINUITY EQUATION: 2.72 CM2
MV VALVE AREA P 1/2 METHOD: 2.68 CM2
NEUTROPHILS # BLD AUTO: 6.4 K/UL (ref 1.8–7.7)
NEUTROPHILS # BLD AUTO: 6.9 K/UL (ref 1.8–7.7)
NEUTROPHILS NFR BLD: 66.9 % (ref 38–73)
NEUTROPHILS NFR BLD: 67 % (ref 38–73)
NITRITE UR QL STRIP: NEGATIVE
NRBC BLD-RTO: 0 /100 WBC
NRBC BLD-RTO: 0 /100 WBC
OHS CV RV/LV RATIO: 0.52 CM
PH UR STRIP: 6 [PH] (ref 5–8)
PHOSPHATE SERPL-MCNC: 3.6 MG/DL (ref 2.7–4.5)
PLATELET # BLD AUTO: 196 K/UL (ref 150–450)
PLATELET # BLD AUTO: 204 K/UL (ref 150–450)
PMV BLD AUTO: 10.8 FL (ref 9.2–12.9)
PMV BLD AUTO: 11.4 FL (ref 9.2–12.9)
POTASSIUM SERPL-SCNC: 3.4 MMOL/L (ref 3.5–5.1)
POTASSIUM SERPL-SCNC: 3.8 MMOL/L (ref 3.5–5.1)
PROT SERPL-MCNC: 6.5 G/DL (ref 6–8.4)
PROT SERPL-MCNC: 6.8 G/DL (ref 6–8.4)
PROT UR QL STRIP: ABNORMAL
PROTHROMBIN TIME: 10.4 SEC (ref 9–12.5)
RA PRESSURE ESTIMATED: 3 MMHG
RBC # BLD AUTO: 4.82 M/UL (ref 4–5.4)
RBC # BLD AUTO: 4.89 M/UL (ref 4–5.4)
RBC #/AREA URNS HPF: 4 /HPF (ref 0–4)
RIGHT VENTRICULAR END-DIASTOLIC DIMENSION: 2.55 CM
RV TISSUE DOPPLER FREE WALL SYSTOLIC VELOCITY 1 (APICAL 4 CHAMBER VIEW): 14.8 CM/S
SODIUM SERPL-SCNC: 143 MMOL/L (ref 136–145)
SODIUM SERPL-SCNC: 144 MMOL/L (ref 136–145)
SP GR UR STRIP: 1.02 (ref 1–1.03)
SQUAMOUS #/AREA URNS HPF: 16 /HPF
TDI LATERAL: 0.08 M/S
TDI SEPTAL: 0.06 M/S
TDI: 0.07 M/S
TROPONIN I SERPL HS-MCNC: 10.4 PG/ML (ref 0–14.9)
TROPONIN I SERPL HS-MCNC: 12 PG/ML (ref 0–14.9)
TROPONIN I SERPL HS-MCNC: 8.7 PG/ML (ref 0–14.9)
URN SPEC COLLECT METH UR: ABNORMAL
UROBILINOGEN UR STRIP-ACNC: NEGATIVE EU/DL
WBC # BLD AUTO: 10.33 K/UL (ref 3.9–12.7)
WBC # BLD AUTO: 9.5 K/UL (ref 3.9–12.7)
WBC #/AREA URNS HPF: 21 /HPF (ref 0–5)
Z-SCORE OF LEFT VENTRICULAR DIMENSION IN END DIASTOLE: -1.01
Z-SCORE OF LEFT VENTRICULAR DIMENSION IN END SYSTOLE: -1.82

## 2024-09-17 PROCEDURE — 82550 ASSAY OF CK (CPK): CPT | Performed by: INTERNAL MEDICINE

## 2024-09-17 PROCEDURE — 84484 ASSAY OF TROPONIN QUANT: CPT | Mod: 91 | Performed by: INTERNAL MEDICINE

## 2024-09-17 PROCEDURE — 80076 HEPATIC FUNCTION PANEL: CPT | Performed by: INTERNAL MEDICINE

## 2024-09-17 PROCEDURE — 96372 THER/PROPH/DIAG INJ SC/IM: CPT | Performed by: INTERNAL MEDICINE

## 2024-09-17 PROCEDURE — 93306 TTE W/DOPPLER COMPLETE: CPT

## 2024-09-17 PROCEDURE — 80053 COMPREHEN METABOLIC PANEL: CPT | Performed by: EMERGENCY MEDICINE

## 2024-09-17 PROCEDURE — 25000003 PHARM REV CODE 250

## 2024-09-17 PROCEDURE — 85610 PROTHROMBIN TIME: CPT | Performed by: INTERNAL MEDICINE

## 2024-09-17 PROCEDURE — 83605 ASSAY OF LACTIC ACID: CPT | Performed by: INTERNAL MEDICINE

## 2024-09-17 PROCEDURE — 85025 COMPLETE CBC W/AUTO DIFF WBC: CPT | Mod: 91 | Performed by: INTERNAL MEDICINE

## 2024-09-17 PROCEDURE — 85025 COMPLETE CBC W/AUTO DIFF WBC: CPT | Performed by: EMERGENCY MEDICINE

## 2024-09-17 PROCEDURE — 83735 ASSAY OF MAGNESIUM: CPT | Mod: 91 | Performed by: INTERNAL MEDICINE

## 2024-09-17 PROCEDURE — 63600175 PHARM REV CODE 636 W HCPCS: Performed by: INTERNAL MEDICINE

## 2024-09-17 PROCEDURE — 84484 ASSAY OF TROPONIN QUANT: CPT | Mod: 91 | Performed by: EMERGENCY MEDICINE

## 2024-09-17 PROCEDURE — G0378 HOSPITAL OBSERVATION PER HR: HCPCS

## 2024-09-17 PROCEDURE — 85730 THROMBOPLASTIN TIME PARTIAL: CPT | Performed by: INTERNAL MEDICINE

## 2024-09-17 PROCEDURE — 83735 ASSAY OF MAGNESIUM: CPT | Performed by: EMERGENCY MEDICINE

## 2024-09-17 PROCEDURE — 81001 URINALYSIS AUTO W/SCOPE: CPT | Performed by: EMERGENCY MEDICINE

## 2024-09-17 PROCEDURE — 83880 ASSAY OF NATRIURETIC PEPTIDE: CPT | Performed by: EMERGENCY MEDICINE

## 2024-09-17 PROCEDURE — 87086 URINE CULTURE/COLONY COUNT: CPT | Performed by: EMERGENCY MEDICINE

## 2024-09-17 PROCEDURE — 93306 TTE W/DOPPLER COMPLETE: CPT | Mod: 26,,, | Performed by: INTERNAL MEDICINE

## 2024-09-17 PROCEDURE — 84484 ASSAY OF TROPONIN QUANT: CPT | Performed by: EMERGENCY MEDICINE

## 2024-09-17 PROCEDURE — 25000003 PHARM REV CODE 250: Performed by: INTERNAL MEDICINE

## 2024-09-17 PROCEDURE — 84100 ASSAY OF PHOSPHORUS: CPT | Performed by: INTERNAL MEDICINE

## 2024-09-17 PROCEDURE — 80048 BASIC METABOLIC PNL TOTAL CA: CPT | Performed by: INTERNAL MEDICINE

## 2024-09-17 RX ORDER — PROPRANOLOL HYDROCHLORIDE 10 MG/1
20 TABLET ORAL DAILY
Status: DISCONTINUED | OUTPATIENT
Start: 2024-09-18 | End: 2024-09-21 | Stop reason: HOSPADM

## 2024-09-17 RX ORDER — ACETAMINOPHEN 325 MG/1
650 TABLET ORAL EVERY 4 HOURS PRN
Status: DISCONTINUED | OUTPATIENT
Start: 2024-09-17 | End: 2024-09-21 | Stop reason: HOSPADM

## 2024-09-17 RX ORDER — SODIUM,POTASSIUM PHOSPHATES 280-250MG
2 POWDER IN PACKET (EA) ORAL
Status: DISCONTINUED | OUTPATIENT
Start: 2024-09-17 | End: 2024-09-21 | Stop reason: HOSPADM

## 2024-09-17 RX ORDER — ALUMINUM HYDROXIDE, MAGNESIUM HYDROXIDE, AND SIMETHICONE 1200; 120; 1200 MG/30ML; MG/30ML; MG/30ML
30 SUSPENSION ORAL 4 TIMES DAILY PRN
Status: DISCONTINUED | OUTPATIENT
Start: 2024-09-17 | End: 2024-09-21 | Stop reason: HOSPADM

## 2024-09-17 RX ORDER — FAMOTIDINE 20 MG/1
20 TABLET, FILM COATED ORAL 2 TIMES DAILY
Status: DISCONTINUED | OUTPATIENT
Start: 2024-09-17 | End: 2024-09-21 | Stop reason: HOSPADM

## 2024-09-17 RX ORDER — TOPIRAMATE 25 MG/1
100 TABLET ORAL 2 TIMES DAILY
Status: DISCONTINUED | OUTPATIENT
Start: 2024-09-17 | End: 2024-09-21 | Stop reason: HOSPADM

## 2024-09-17 RX ORDER — ONDANSETRON HYDROCHLORIDE 2 MG/ML
4 INJECTION, SOLUTION INTRAVENOUS EVERY 8 HOURS PRN
Status: DISCONTINUED | OUTPATIENT
Start: 2024-09-17 | End: 2024-09-21 | Stop reason: HOSPADM

## 2024-09-17 RX ORDER — GLUCAGON 1 MG
1 KIT INJECTION
Status: DISCONTINUED | OUTPATIENT
Start: 2024-09-17 | End: 2024-09-21 | Stop reason: HOSPADM

## 2024-09-17 RX ORDER — IBUPROFEN 200 MG
24 TABLET ORAL
Status: DISCONTINUED | OUTPATIENT
Start: 2024-09-17 | End: 2024-09-21 | Stop reason: HOSPADM

## 2024-09-17 RX ORDER — SODIUM CHLORIDE 0.9 % (FLUSH) 0.9 %
2 SYRINGE (ML) INJECTION EVERY 12 HOURS PRN
Status: DISCONTINUED | OUTPATIENT
Start: 2024-09-17 | End: 2024-09-21 | Stop reason: HOSPADM

## 2024-09-17 RX ORDER — POLYETHYLENE GLYCOL 3350 17 G/17G
17 POWDER, FOR SOLUTION ORAL DAILY PRN
Status: ACTIVE | OUTPATIENT
Start: 2024-09-17 | End: 2024-09-20

## 2024-09-17 RX ORDER — CLOPIDOGREL BISULFATE 75 MG/1
75 TABLET ORAL DAILY
Status: DISCONTINUED | OUTPATIENT
Start: 2024-09-17 | End: 2024-09-21 | Stop reason: HOSPADM

## 2024-09-17 RX ORDER — ONDANSETRON 4 MG/1
8 TABLET, ORALLY DISINTEGRATING ORAL EVERY 8 HOURS PRN
Status: DISCONTINUED | OUTPATIENT
Start: 2024-09-17 | End: 2024-09-21 | Stop reason: HOSPADM

## 2024-09-17 RX ORDER — PROPRANOLOL HYDROCHLORIDE 20 MG/1
120 TABLET ORAL DAILY
Status: DISCONTINUED | OUTPATIENT
Start: 2024-09-17 | End: 2024-09-17

## 2024-09-17 RX ORDER — OXYCODONE HYDROCHLORIDE 5 MG/1
5 TABLET ORAL EVERY 6 HOURS PRN
Status: DISCONTINUED | OUTPATIENT
Start: 2024-09-17 | End: 2024-09-17

## 2024-09-17 RX ORDER — IBUPROFEN 200 MG
16 TABLET ORAL
Status: DISCONTINUED | OUTPATIENT
Start: 2024-09-17 | End: 2024-09-21 | Stop reason: HOSPADM

## 2024-09-17 RX ORDER — ACETAMINOPHEN 325 MG/1
650 TABLET ORAL EVERY 8 HOURS PRN
Status: DISCONTINUED | OUTPATIENT
Start: 2024-09-17 | End: 2024-09-21 | Stop reason: HOSPADM

## 2024-09-17 RX ORDER — LANOLIN ALCOHOL/MO/W.PET/CERES
800 CREAM (GRAM) TOPICAL
Status: DISCONTINUED | OUTPATIENT
Start: 2024-09-17 | End: 2024-09-21 | Stop reason: HOSPADM

## 2024-09-17 RX ORDER — TALC
6 POWDER (GRAM) TOPICAL NIGHTLY PRN
Status: DISCONTINUED | OUTPATIENT
Start: 2024-09-17 | End: 2024-09-21 | Stop reason: HOSPADM

## 2024-09-17 RX ORDER — ATORVASTATIN CALCIUM 40 MG/1
40 TABLET, FILM COATED ORAL NIGHTLY
Status: DISCONTINUED | OUTPATIENT
Start: 2024-09-17 | End: 2024-09-21 | Stop reason: HOSPADM

## 2024-09-17 RX ORDER — NALOXONE HCL 0.4 MG/ML
0.02 VIAL (ML) INJECTION
Status: DISCONTINUED | OUTPATIENT
Start: 2024-09-17 | End: 2024-09-21 | Stop reason: HOSPADM

## 2024-09-17 RX ORDER — ENOXAPARIN SODIUM 100 MG/ML
40 INJECTION SUBCUTANEOUS EVERY 24 HOURS
Status: DISCONTINUED | OUTPATIENT
Start: 2024-09-17 | End: 2024-09-21 | Stop reason: HOSPADM

## 2024-09-17 RX ORDER — MORPHINE SULFATE 2 MG/ML
2 INJECTION, SOLUTION INTRAMUSCULAR; INTRAVENOUS EVERY 4 HOURS PRN
Status: DISCONTINUED | OUTPATIENT
Start: 2024-09-17 | End: 2024-09-21 | Stop reason: HOSPADM

## 2024-09-17 RX ORDER — AMOXICILLIN 250 MG
1 CAPSULE ORAL DAILY PRN
Status: DISCONTINUED | OUTPATIENT
Start: 2024-09-17 | End: 2024-09-21 | Stop reason: HOSPADM

## 2024-09-17 RX ADMIN — FAMOTIDINE 20 MG: 20 TABLET ORAL at 09:09

## 2024-09-17 RX ADMIN — POTASSIUM BICARBONATE 35 MEQ: 391 TABLET, EFFERVESCENT ORAL at 04:09

## 2024-09-17 RX ADMIN — CLOPIDOGREL BISULFATE 75 MG: 75 TABLET, FILM COATED ORAL at 09:09

## 2024-09-17 RX ADMIN — ENOXAPARIN SODIUM 40 MG: 40 INJECTION SUBCUTANEOUS at 04:09

## 2024-09-17 RX ADMIN — ATORVASTATIN CALCIUM 40 MG: 40 TABLET, FILM COATED ORAL at 09:09

## 2024-09-17 RX ADMIN — TOPIRAMATE 100 MG: 100 TABLET, FILM COATED ORAL at 09:09

## 2024-09-17 NOTE — H&P
[facility]         HISTORY & PHYSICAL   HOSPITALIST ADMISSION NOTE    Admitting MD: Aaron Torres MD             Patient Name:Alyssa John  Patient :1950   Patient Age/Gender: 73 y.o. female  MRN:[id number]  CSN:[id number]  Status: OP- Observation  Admission Date:2024  Date of Service:2024               .HISTORY OF PRESENT ILLNESS   Chief Complaint:   Chief Complaint   Patient presents with    Loss of Consciousness     Sitting on the toilet.  Had syncopal episode.  Fell to the ground hitting her left cheek and left shoulder.  Complains of pain on both areas.          Principal Problem:   <principal problem not specified>    Patient Information Source(s):      patient and ER records.    HPI:  Alyssa John is a 73 y.o. female who has a history of  hypertension, seizures, multiple sclerosis, CAD, status post MI  and presents with syncopal episode    History given by , patient has some degree of short-term memory loss and forgetfulness, unclear if this represents some degree of progressive or early dementia.  Patient also feels some details he when able.  She had gone to use the bathroom and has sat down on the toilet that and had not yet relieve herself when suddenly she fell and collapsed towards the left.  She hit the left side of her face and cheek on her way down.  She woke she called out to her  who came to assist her in brought her back up to a seated position.  They were concerned about what happened and felt that she should come to the hospital.  They denied any recollection of chest pain or shortness of breath or symptoms prior to that.  It does not appear that patient had a postictal..  Normally when patient has seizures they are grand mal seizures and there were no signs of that per the patient.  General patient has been denying any recent fevers or recent shortness of breath or any other significant symptoms.    No notes on  file                 .REVIEW OF SYSTEMS.   Review of Systems    All other ROS other than above negative           . MEDICAL HISTORY.   PCP: Piysuh Sharif MD  Specialists:    Past Medical History    Past Medical History:   Diagnosis Date    Arthritis     Coronary artery disease     Encounter for blood transfusion     Epilepsy     GERD (gastroesophageal reflux disease)     Headache     Hypertension     MI, old     MS (multiple sclerosis)     Seizures     epilepsy       Past Surgical History     has a past surgical history that includes Appendectomy; Back surgery; Coronary stent placement; Colonoscopy (N/A, 2015); right knee arthroscopy; Cataract extraction (Bilateral); and Breast biopsy (Right, 15 yrs ago).  Past Surgical History:   Procedure Laterality Date    APPENDECTOMY      BACK SURGERY      L5 discectomy    BREAST BIOPSY Right 15 yrs ago    benign    CATARACT EXTRACTION Bilateral     COLONOSCOPY N/A 2015    Procedure: COLONOSCOPY;  Surgeon: Henry Malcolm MD;  Location: [facility];  Service: Endoscopy;  Laterality: N/A;    CORONARY STENT PLACEMENT      right knee arthroscopy           Social  History     reports that she quit smoking about 17 years ago. Her smoking use included cigarettes. She has been exposed to tobacco smoke. She has never used smokeless tobacco. She reports current alcohol use. She reports that she does not use drugs.  Tobacco Use    Smoking status: Former     Current packs/day: 0.00     Types: Cigarettes     Quit date: 2006     Years since quittin.7     Passive exposure: Past    Smokeless tobacco: Never   Substance and Sexual Activity    Alcohol use: Yes     Comment: rarely    Drug use: No    Sexual activity: Not Currently     Partners: Male         Family History    family history includes Heart disease in her father; Scleroderma in her mother.     Family History       Problem Relation (Age of Onset)    Heart disease Father    Scleroderma Mother                        . ALLERGIES.       Review of patient's allergies indicates:   Allergen Reactions    Codeine      Other reaction(s): throat tightness    Dilantin [phenytoin sodium extended]     Phenobarbital     Tegretol [carbamazepine]               . MEDICATIONS.   Home Medications:       No current facility-administered medications on file prior to encounter.     Current Outpatient Medications on File Prior to Encounter   Medication Sig    atorvastatin (LIPITOR) 40 MG tablet TAKE 1 TABLET BY MOUTH EVERY DAY    cholecalciferol, vitamin D3, 10 mcg (400 unit) Cap capsule 1 tablet.    clopidogrel (PLAVIX) 75 mg tablet Take 75 mg by mouth once daily.    cyanocobalamin (VITAMIN B-12) 1000 MCG tablet Take 1 tablet (1,000 mcg total) by mouth once daily.    DULoxetine (CYMBALTA) 20 MG capsule TAKE 2 CAPSULES BY MOUTH EVERY DAY    estradioL (ESTRACE) 0.01 % (0.1 mg/gram) vaginal cream Place 0.5 g vaginally every evening. Use nightly for two weeks then twice weekly for maintenance    famotidine (PEPCID) 20 MG tablet Take 20 mg by mouth 2 (two) times daily as needed for Heartburn.    fluticasone propionate (FLONASE) 50 mcg/actuation nasal spray 1 spray (50 mcg total) by Each Nostril route once daily.    mupirocin (BACTROBAN) 2 % ointment Apply topically 3 (three) times daily.    nitroGLYCERIN (NITROSTAT) 0.4 MG SL tablet Place 0.4 mg under the tongue every 5 (five) minutes as needed for Chest pain.    propranoloL (INDERAL LA) 120 MG 24 hr capsule TAKE 1 CAPSULE BY MOUTH ONCE DAILY    topiramate (TOPAMAX) 100 MG tablet Take 1 tablet (100 mg total) by mouth 2 (two) times daily.    torsemide (DEMADEX) 5 MG Tab Take 1 tablet (5 mg total) by mouth daily as needed (swelling).                  . PHYSICAL EXAM.         Vital Signs (24h Range): Vital Signs (Most Recent):   Temp:  [97.5 °F (36.4 °C)-97.9 °F (36.6 °C)] 97.9 °F (36.6 °C)  Pulse:  [55-60] 60  Resp:  [15-18] 15  SpO2:  [98 %-100 %] 98 %  BP: (132-141)/() 132/96 Temp:  "97.9 °F (36.6 °C) (09/17/24 0020)  Pulse: 60 (09/17/24 0020)  Resp: 15 (09/17/24 0020)  BP: (!) 132/96 (09/17/24 0020)  SpO2: 98 % (09/17/24 0020)      Vitals:    09/16/24 2306 09/17/24 0020   BP: (!) 141/103 (!) 132/96   BP Location: Left arm    Pulse: (!) 55 60   Resp: 18 15   Temp: 97.5 °F (36.4 °C) 97.9 °F (36.6 °C)   TempSrc: Oral Oral   SpO2: 100% 98%   Weight: 90.3 kg (199 lb)    Height: 5' 3" (1.6 m)            Weight: 90.3 kg (199 lb)  Body mass index is 35.25 kg/m².  Wt Readings from Last 3 Encounters:   09/16/24 90.3 kg (199 lb)   08/29/24 90.8 kg (200 lb 2.8 oz)   08/21/24 90.8 kg (200 lb 2.8 oz)         --    Physical Exam  Vitals reviewed.   Constitutional:       General: She is not in acute distress.     Appearance: She is ill-appearing (Patient fairly anxious).   HENT:      Head: Normocephalic.      Comments: Bruising on left cheek  Eyes:      Pupils: Pupils are equal, round, and reactive to light.   Cardiovascular:      Rate and Rhythm: Normal rate.      Pulses: Normal pulses.      Heart sounds: No murmur heard.  Pulmonary:      Effort: Pulmonary effort is normal. No respiratory distress.      Breath sounds: No wheezing or rales.   Abdominal:      General: There is no distension.      Palpations: Abdomen is soft.      Tenderness: There is no abdominal tenderness.   Musculoskeletal:         General: No swelling or tenderness.   Neurological:      General: No focal deficit present.      Mental Status: She is alert. She is disoriented.      Cranial Nerves: No cranial nerve deficit.      Motor: No weakness.      Comments: Patient having generalized pain everywhere                     DIAGNOSTIC DATA   Summary    Laboratory Studies:    CBC BMP   Recent Labs   Lab 09/17/24  0035   WBC 10.33   HGB 13.0   HCT 42.3       Recent Labs   Lab 09/17/24 0035      K 3.8   *   CO2 19*   BUN 22   CREATININE 0.9   CALCIUM 8.5*           All pertinent labs within the past 24 hours have been " "reviewed.  .    Other Studies:          ------  Diagnostic Data Details          CBC  10.33 13.0 204    42.3     Coag                 BMP  144 114 22 102   3.8 19 0.9     CaMgPhos  8.5   2.1          Last labs from current encounter as of 09/17/2024-6:34 AM             Laboratory Studies:    Blood Gas:  No results for input(s): "PH", "PCO2", "PO2", "HCO3", "POCSATURATED", "BE" in the last 72 hours.      Recent Labs   Lab 09/17/24  0035   WBC 10.33   HGB 13.0   HCT 42.3      MONO 8.0  0.8   EOSINOPHIL 1.9     No results for input(s): "APTT", "INR", "PTT" in the last 168 hours.     Recent Labs   Lab 09/17/24  0035      K 3.8   *   CO2 19*   BUN 22   CREATININE 0.9   CALCIUM 8.5*   PROT 6.8   BILITOT 0.4   ALKPHOS 131   ALT 11   AST 13     Recent Labs   Lab 09/17/24  0035   MG 2.1        Recent Labs   Lab 09/17/24  0035 09/17/24  0321   TROPONINIHS 8.7 10.4             Lab Results   Component Value Date    COLORU Yellow 09/17/2024    COLORU Yellow 08/08/2024    COLORU Yellow 07/08/2024    SPECGRAV 1.025 09/17/2024    SPECGRAV 1.010 08/08/2024    SPECGRAV 1.015 07/08/2024    PHUR 6.0 09/17/2024    PHUR 6.0 08/08/2024    PHUR 6.0 07/08/2024    NITRITE Negative 09/17/2024    NITRITE Negative 08/08/2024    NITRITE Negative 07/08/2024    KETONESU Negative 09/17/2024    KETONESU Negative 08/08/2024    KETONESU Negative 07/08/2024    UROBILINOGEN Negative 09/17/2024    UROBILINOGEN Negative 04/02/2023    UROBILINOGEN Negative 03/28/2019      No results for input(s): "TSH", "T3FREE", "FREET4" in the last 168 hours.      Lab Results   Component Value Date    HGBA1C 5.8 08/12/2024    HGBA1C 4.9 09/14/2017    HGBA1C 5.3 01/26/2015     The 10-year ASCVD risk score (Yeni OSULLIVAN, et al., 2019) is: 17.5%    Values used to calculate the score:      Age: 73 years      Sex: Female      Is Non- : No      Diabetic: No      Tobacco smoker: No      Systolic Blood Pressure: 132 mmHg      Is BP " treated: Yes      HDL Cholesterol: 45 mg/dL      Total Cholesterol: 146 mg/dL           ECG:        RADIOLOGY STUDIES:     CT Maxillofacial Without Contrast  Narrative: EXAMINATION:  CT MAXILLOFACIAL WITHOUT CONTRAST    CLINICAL HISTORY:  fall;    TECHNIQUE:  Low dose axial images, sagittal and coronal reformations were obtained through the face.  Contrast was not administered.    COMPARISON:  CT brain and cervical spine also obtained same date 09/16/2024    FINDINGS:  Is no evidence of acute fracture involving the facial bones.  Minor incidental mucosal thickening in the sphenoid sinus otherwise the paranasal sinuses appear clear.  Globes and retro bulbar structures appear symmetric and grossly intact.  Mastoid air cells middle ears are clear.  Mild nasal septal deviation to the right is incidentally noted.  There is a cosmetic implant midline along the parasymphyseal mandible.    There are degenerative changes involving the TMJ joints bilaterally with irregularity of the mandibular condyles greater on the right.  Intracranial structures imaged demonstrate no acute abnormality and are described in detail on CT brain report.  Impression: No evidence of acute fracture, significant soft tissue swelling involving the facial bones.    Incidental findings as above.    Electronically signed by: Yolanda Boggs  Date:    09/17/2024  Time:    01:24  X-Ray Chest AP Portable  Narrative: EXAMINATION:  XR CHEST AP PORTABLE    CLINICAL HISTORY:  Chest Pain;    TECHNIQUE:  Single frontal view of the chest was performed.    COMPARISON:  03/26/2024    FINDINGS:  Emphysema.  No focal consolidation.  Normal heart size.  Impression: No acute findings.    Electronically signed by: Rimma Dunham  Date:    09/17/2024  Time:    00:52  CT Cervical Spine Without Contrast  Narrative: EXAMINATION:  CT CERVICAL SPINE WITHOUT CONTRAST    CLINICAL HISTORY:  fall;    TECHNIQUE:  Low dose axial images, sagittal and coronal reformations were  performed though the cervical spine.  Contrast was not administered.    COMPARISON:  MRI cervical spine 04/10/2021, chest x-ray 04/02/2023, 5/17/19    FINDINGS:  Sagittal reformatted images demonstrate adequate alignment of the cervical spine.  There is no evidence of acute fracture.  Craniocervical junction is aligned.    C2-C3:There is mild spondylosis with no evidence of significant disc protrusion or canal or neural foraminal stenosis.    C3-C4:There is no disc protrusion or canal or neural foraminal stenosis.  Uncovertebral joint hypertrophic changes noted greater on the left.    C4-C5:There is significant uncovertebral joint hypertrophy on the left contributing to moderate left neural foraminal stenosis.  There is mild right neural foraminal stenosis without significant canal stenosis.    C5-C6:There is spondylosis with loss of disc height and circumferential disc osteophyte complex formation.  Uncovertebral joint facet hypertrophic changes contribute to severe right neural foraminal stenosis.  There is no significant canal or left neural foraminal stenosis.    C6-C7:There is uncovertebral joint and facet hypertrophic change greater on the left contributing to mild left canal and left neural foraminal stenosis.    C7-T1:There is no significant bony canal significant neural foraminal stenosis.  Mild left neural foraminal narrowing related to is uncovertebral joint hypertrophy.    The soft tissue structures visualized in the neck are unremarkable.    Nodular apical is pleural thickening and emphysematous changes noted on the right which was seen but to a lesser degree on the MRI which is not optimized for evaluating interval detrimental change.  Nodular appearance is mildly concerning.  Impression: No evidence of acute fracture or subluxation involving the cervical spine.    Multilevel spondylosis as detailed above at each disc level.    Nodular apical pleural thickening and scarring on the right.  Consider  follow-up to ensure stability/benignity.  Correlation with prior chest CTs if available may be helpful.    Significant emphysematous changes in the imaged lungs.    Electronically signed by: Yolanda Boggs  Date:    09/17/2024  Time:    00:43  CT Head Without Contrast  Narrative: EXAMINATION:  CT HEAD WITHOUT CONTRAST    CLINICAL HISTORY:  fall;    TECHNIQUE:  Low dose axial images were obtained through the head.  Coronal and sagittal reformations were also performed. Contrast was not administered.    COMPARISON:  CT brain 04/02/2023    FINDINGS:  There is no evidence of acute intracranial hemorrhage or hematoma.  The gray-white matter junction differentiation is intact with no evidence of acute major arterial infarct.  There is no focal mass or mass effect.  Prominence of the ventricles, cisterns and sulci is in keeping with senescent atrophic changes.  There is moderate to marked confluent low density in the deep white matter as seen with microvascular chronic ischemic changes.  There are remote lacunar infarcts in the basal ganglia bilaterally and corona radiata bilaterally.  Paranasal sinuses, mastoid air cells and middle ears are clear.  The bony calvarium is intact.  Posterior fossa structures and pituitary gland are unremarkable allowing for skull base artifact.  Impression: There is use no convincing acute intracranial abnormality or fracture.    Remote lacunar infarcts bilaterally in the basal ganglia and corona radiata.    Microvascular chronic ischemic changes.    Senescent atrophic changes.    Electronically signed by: Yolanda Boggs  Date:    09/17/2024  Time:    00:22          CARDIAC DIAGNOSTIC INFORMATION REVIEW:  Most Recent Echocardiogram:  Results for orders placed during the hospital encounter of 04/29/19    Transthoracic echo (TTE) 2D with Color Flow    Interpretation Summary  · Normal left ventricular systolic function. The estimated ejection fraction is 64%  · Normal LV diastolic  function.  · Normal right ventricular systolic function.  · Anterior echo free space - probable pericardial fat pad,.  · Normal central venous pressure (3 mm Hg).      Last Cardiac Cath  No results found for this or any previous visit.        Last Stress Test   No results found for this or any previous visit.          -------                     . ASSESSMENT & PLAN.   Patient Summary:       Patient Problem List:  There are no hospital problems to display for this patient.        Current Medications:       9/16/2024   Medications   sodium chloride 0.9% flush 2 mL (has no administration in time range)   melatonin tablet 6 mg (has no administration in time range)   ondansetron disintegrating tablet 8 mg (has no administration in time range)   ondansetron injection 4 mg (has no administration in time range)   polyethylene glycol packet 17 g (has no administration in time range)   senna-docusate 8.6-50 mg per tablet 1 tablet (has no administration in time range)   acetaminophen tablet 650 mg (has no administration in time range)   aluminum-magnesium hydroxide-simethicone 200-200-20 mg/5 mL suspension 30 mL (has no administration in time range)   acetaminophen tablet 650 mg (has no administration in time range)   oxyCODONE immediate release tablet 5 mg (has no administration in time range)   morphine injection 2 mg (has no administration in time range)   naloxone 0.4 mg/mL injection 0.02 mg (has no administration in time range)   potassium bicarbonate disintegrating tablet 50 mEq (has no administration in time range)   potassium bicarbonate disintegrating tablet 35 mEq (has no administration in time range)   potassium bicarbonate disintegrating tablet 60 mEq (has no administration in time range)   magnesium oxide tablet 800 mg (has no administration in time range)   magnesium oxide tablet 800 mg (has no administration in time range)   potassium, sodium phosphates 280-160-250 mg packet 2 packet (has no administration in  time range)   potassium, sodium phosphates 280-160-250 mg packet 2 packet (has no administration in time range)   potassium, sodium phosphates 280-160-250 mg packet 2 packet (has no administration in time range)   glucose chewable tablet 16 g (has no administration in time range)   glucose chewable tablet 24 g (has no administration in time range)   dextrose 50% injection 12.5 g (has no administration in time range)   dextrose 50% injection 25 g (has no administration in time range)   glucagon (human recombinant) injection 1 mg (has no administration in time range)   enoxaparin injection 40 mg (has no administration in time range)               Assessment & Plan:    No notes have been filed under this hospital service.  Service: Hospital Medicine         Syncope Patient had unexplained cause of syncope, she has history of seizures but the report patient and her  gave did not sound like she is in like activity, patient could have had vasovagal episode or something similar however patient reportedly had not began to urinate or have a bowel movement, patient could have had a cardiac episode and this is yet to be determined, being monitored for this and labs will be repeated including troponin.  Monitor on telemetry.      Seizures We will need to continue patient's home medications      CAD   Continue home medication including statin and Plavix                  EDITABLEPROBLIST     SUBJOBJ    VTE PPX:    VTE Risk Mitigation (From admission, onward)           Ordered     enoxaparin injection 40 mg  Daily         09/17/24 0633     IP VTE HIGH RISK PATIENT  Once         09/17/24 0633     Place sequential compression device  Until discontinued         09/17/24 0633                  Fluids:    Nutrition:   Code Status:   Code Status: Full Code   Admit Status: OP- Observation    Admitting MD:  Aaron Torres MD -  Department of Hospital Medicine  - [facility]  ED MD:   Consultants:   Treatment Teams  "This Admission:  PCP: Piyush Sharif MD    PATHWAYS ORDERED      [] COPD       [] DKA      [] CHF      [] CVA Ischemic      [] GI Bleed      [] NSTEMI      [] NONE                 Electronically Signed By: Aaron Torres 9/17/2024 6:34 AM  September 17, 2024     Portions of this chart may have been created with Dragon Voice Recognition Software . Occasional wrong-word or "sound-alike" substitutions may have occurred due to the inherent limitations of voice recognition software. Please read the chart carefully and recognize, using context, where these substitutions have occurred.               "

## 2024-09-17 NOTE — PLAN OF CARE
Formerly [facility] - Emergency Dept  Initial Discharge Assessment       Primary Care Provider: Piyush Sharif MD    Admission Diagnosis: Heat syncope, initial encounter [T67.1XXA]    Admission Date: 9/16/2024  Expected Discharge Date: 9/18/2024     met with Pt and spouse at bedside to complete an initial discharge assessment. Demographics, PCP, and insurance verified. Pt was very sleepy, so SW was unable to assess to what extent pt was alert and oriented.  Remainder of assessment was conducted by pt's spouse.     According to the spouse, Pt has No dialysis and pt's has an inability to complete ADLs without assistance. Pt uses wheelchair, shower chair, grab bars, and transfer chair.   Pt's spouse verbalized plan to discharge home via spousal transport.     During SDOH assessment, pt's spouse shared that financial resources are needed. SW will provide.    Pt has no other needs to be addressed at this time.        Payor: [employer] [facility] / Plan: [employer] CHOICES / Product Type: Medicare Advantage /     Extended Emergency Contact Information  Primary Emergency Contact: Giuseppe John  Address: [address] [place] [address] [facility]  Home Phone: [phone]  Mobile Phone: [phone]  Relation: Spouse  Preferred language: English   needed? No  Secondary Emergency Contact: Amparo Vega [place]  Address: Unknown           [place] [address] [place]  Mobile Phone: [phone]  Relation: Sister  Preferred language: English   needed? No    Discharge Plan A: (P) Home with family  Discharge Plan B: (P) Home with family      [facility] - [place], [place] - [address]  [address] [address]  Phone: [phone] Fax: [phone]    [id number] - [place] - [address]  [address] [address]  Phone: [phone] Fax: [phone]    [facility] [place] - [place] - [address]  [address]  [facility] [address]  Phone: [phone] Fax: [phone]    [facility] - [place], [place] - [address]  [address]  [facility] [address]  Phone: [phone] Fax: [phone]      Initial Assessment (most recent)       Adult Discharge Assessment - 09/17/24 1632          Discharge Assessment    Assessment Type Discharge Planning Assessment (P)      Confirmed/corrected address, phone number and insurance Yes (P)      Confirmed Demographics Correct on Facesheet (P)      Source of Information patient;family (P)      When was your last doctors appointment? -- (P)    few weeks ago    Does patient/caregiver understand observation status Yes (P)      Reason For Admission Heat Syncope (P)      People in Home spouse;sibling(s) (P)      Facility Arrived From: Home (P)      Do you expect to return to your current living situation? Yes (P)      Do you have help at home or someone to help you manage your care at home? Yes (P)      Who are your caregiver(s) and their phone number(s)? Giuseppe John (Spouse)  [phone] (Mobile) (P)      Prior to hospitilization cognitive status: Unable to Assess (P)      Current cognitive status: Unable to Assess (P)      Walking or Climbing Stairs Difficulty yes (P)      Walking or Climbing Stairs ambulation difficulty, requires equipment;stair climbing difficulty, requires equipment (P)      Dressing/Bathing Difficulty yes (P)      Dressing/Bathing bathing difficulty, assistance 1 person;dressing difficulty, assistance 1 person (P)      Home Accessibility wheelchair accessible (P)      Home Layout Able to live on 1st floor (P)      Equipment Currently Used at Home wheelchair;walker, rolling;shower chair;grab bar (P)      Readmission within 30 days? No (P)      Patient currently being followed by outpatient case management? No (P)      Do you currently have  service(s) that help you manage your care at home? No (P)      Do you take prescription medications? Yes (P)      Do you have prescription coverage? Yes (P)      Coverage [employer] [facility] - [employer] CHO[employer] (P)      Do you have any problems affording any of your prescribed medications? No (P)      Is the patient taking medications as prescribed? yes (P)      Who is going to help you get home at discharge? Giuseppe John (Spouse)  [phone] (Mobile) (P)      How do you get to doctors appointments? family or friend will provide (P)      Are you on dialysis? No (P)      Do you take coumadin? No (P)      Discharge Plan A Home with family (P)      Discharge Plan B Home with family (P)      DME Needed Upon Discharge  none (P)      Discharge Plan discussed with: Spouse/sig other;Patient (P)      Name(s) and Number(s) Giuseppe John (Spouse)  [phone] (Mobile) (P)         Physical Activity    On average, how many days per week do you engage in moderate to strenuous exercise (like a brisk walk)? Patient unable to answer (P)      On average, how many minutes do you engage in exercise at this level? Patient unable to answer (P)         Financial Resource Strain    How hard is it for you to pay for the very basics like food, housing, medical care, and heating? Patient unable to answer (P)         Housing Stability    In the last 12 months, was there a time when you were not able to pay the mortgage or rent on time? Patient unable to answer (P)      At any time in the past 12 months, were you homeless or living in a shelter (including now)? Patient unable to answer (P)         Transportation Needs    Has the lack of transportation kept you from medical appointments, meetings, work or from getting things needed for daily living? Patient unable to answer (P)         Food Insecurity    Within the past 12 months, you worried that your food would run out before you got the money to buy more. Patient unable  to answer (P)      Within the past 12 months, the food you bought just didn't last and you didn't have money to get more. Patient unable to answer (P)         Stress    Do you feel stress - tense, restless, nervous, or anxious, or unable to sleep at night because your mind is troubled all the time - these days? Patient unable to answer (P)         Social Isolation    How often do you feel lonely or isolated from those around you?  Patient unable to answer (P)         Alcohol Use    Q1: How often do you have a drink containing alcohol? Patient unable to answer (P)      Q2: How many drinks containing alcohol do you have on a typical day when you are drinking? Patient unable to answer (P)      Q3: How often do you have six or more drinks on one occasion? Patient unable to answer (P)         Utilities    In the past 12 months has the electric, gas, oil, or water company threatened to shut off services in your home? Patient unable to answer (P)         Health Literacy    How often do you need to have someone help you when you read instructions, pamphlets, or other written material from your doctor or pharmacy? Patient unable to respond (P)         OTHER    Name(s) of People in Home Giuseppe John (Spouse)  [phone] (Mobile) (P)

## 2024-09-17 NOTE — CONSULTS
[facility]  Department of Neurology  Neurology Consultation Note        PATIENT NAME: Alyssa John  MRN: [id number]  CSN: [id number]      TODAY'S DATE: 09/17/2024  ADMIT DATE: 9/16/2024                            CONSULTING PROVIDER: Sanchez Garcia MD  CONSULT REQUESTED BY: Violeta Espinoza, *      Reason for consult:  Episodes of unresponsiveness      History obtained from chart review, the patient, and  at bedside.    HPI per EMR: Alyssa John is a 73 y.o. female with a history of  hypertension, seizures, multiple sclerosis, CAD, status post MI  and presents with syncopal episode     History given by , patient has some degree of short-term memory loss and forgetfulness, unclear if this represents some degree of progressive or early dementia.  Patient also feels some details he when able.  She had gone to use the bathroom and has sat down on the toilet that and had not yet relieve herself when suddenly she fell and collapsed towards the left.  She hit the left side of her face and cheek on her way down.  She woke she called out to her  who came to assist her in brought her back up to a seated position.  They were concerned about what happened and felt that she should come to the hospital.  They denied any recollection of chest pain or shortness of breath or symptoms prior to that.  It does not appear that patient had a postictal..  Normally when patient has seizures they are grand mal seizures and there were no signs of that per the patient.  General patient has been denying any recent fevers or recent shortness of breath or any other significant symptoms.       Neurology consult:  Patient was seen and examined by me.  She is alert, oriented x3 and able to follow commands appropriately.  She was brought to the hospital for episodes of unresponsiveness yesterday.  She was apparently sitting on toilet seat and slumped forward and would only respond to deep  "stimuli.  She then fell side words hitting her cheek having a bruise on the left side of the eye.  She immediately woke up without losing consciousness and would respond appropriately at that time however he could not pick her up.  He noted that her fingers turn blue yesterday and checked her oxygen saturation at that time was 80.  Her pulse was 50 and blood pressure was greater than 160 systolic.  EMS was called and patient was brought to the hospital.     also states that she had a similar episode last week while sitting on the toilet seat and slumped forward and would only respond to repeated stimulus.  There was no seizure-like activity noted.  He did not check her blood pressure or pulse or oxygen saturation at that time.    She has a history of seizures and her last seizure (generalized tonic-clonic seizure) was about 10 years ago.  She was on Tegretol in the past which was weaned off due to thrombocytopenia and now she is on Topamax 100 mg b.i.d. which he is compliant with.  Denies any side effects from medication.    Per chart review- her seizure semiology is    ''Seizure type 1: "Petit mal"  These began some time prior to 1971. With respect to aura, the patient reports nothing.  Her seizure is characterized by several jerks in either shoulder.  This component of this spell lasts for approximately several seconds.  Afterwards, she reports no postictal state.  The patient's frequency of events was roughly daily prior to starting AEDs.  She reports that she has not had one of these in years.     Seizure type 2: "Grand mal"  These began in 1971. With respect to warning, the patient would have "a lot of the petite mal" seizures.  Her seizure is characterized by loss of awareness, generalized stiffening, and generalized shaking.  She would have foaming of the mouth, urinary incontinence, and tongue biting.  This component of this spell lasts for approximately 2-3 minutes.  Afterwards, she was fatigued.  " The patient's last event of this type was in 1990.  Prior to starting medication, they happened about once a month on average.''       PREVIOUS MEDICAL HISTORY:  Past Medical History:   Diagnosis Date    Arthritis     Coronary artery disease     Encounter for blood transfusion     Epilepsy     GERD (gastroesophageal reflux disease)     Headache     Hypertension     MI, old     MS (multiple sclerosis)     Seizures     epilepsy     PREVIOUS SURGICAL HISTORY:  Past Surgical History:   Procedure Laterality Date    APPENDECTOMY      BACK SURGERY      L5 discectomy    BREAST BIOPSY Right 15 yrs ago    benign    CATARACT EXTRACTION Bilateral     COLONOSCOPY N/A 09/16/2015    Procedure: COLONOSCOPY;  Surgeon: Henry Malcolm MD;  Location: [facility];  Service: Endoscopy;  Laterality: N/A;    CORONARY STENT PLACEMENT      right knee arthroscopy       FAMILY MEDICAL HISTORY:  Family History   Problem Relation Name Age of Onset    Scleroderma Mother      Heart disease Father          MI, CAD    Cancer Neg Hx      Diabetes Neg Hx      Stroke Neg Hx      Melanoma Neg Hx      Psoriasis Neg Hx      Lupus Neg Hx      Eczema Neg Hx       ALLERGIES:  Review of patient's allergies indicates:   Allergen Reactions    Codeine      Other reaction(s): throat tightness    Dilantin [phenytoin sodium extended]     Phenobarbital     Tegretol [carbamazepine]      HOME MEDICATIONS:  Prior to Admission medications    Medication Sig Start Date End Date Taking? Authorizing Provider   atorvastatin (LIPITOR) 40 MG tablet TAKE 1 TABLET BY MOUTH EVERY DAY 12/2/19   Holly Toledo NP   cholecalciferol, vitamin D3, 10 mcg (400 unit) Cap capsule 1 tablet.    Provider, Historical   clopidogrel (PLAVIX) 75 mg tablet Take 75 mg by mouth once daily.    Provider, Historical   cyanocobalamin (VITAMIN B-12) 1000 MCG tablet Take 1 tablet (1,000 mcg total) by mouth once daily. 8/20/24 8/20/25  Piyush Sharif MD   DULoxetine (CYMBALTA) 20 MG capsule  TAKE 2 CAPSULES BY MOUTH EVERY DAY 6/11/24   Saira Daley APRN, CNS   estradioL (ESTRACE) 0.01 % (0.1 mg/gram) vaginal cream Place 0.5 g vaginally every evening. Use nightly for two weeks then twice weekly for maintenance 3/17/22 3/17/23  Carie Florez, NP   famotidine (PEPCID) 20 MG tablet Take 20 mg by mouth 2 (two) times daily as needed for Heartburn.    Provider, Historical   fluticasone propionate (FLONASE) 50 mcg/actuation nasal spray 1 spray (50 mcg total) by Each Nostril route once daily. 10/28/22   Sharon Raya NP   mupirocin (BACTROBAN) 2 % ointment Apply topically 3 (three) times daily. 7/8/24   Carrie Villa PA-C   nitroGLYCERIN (NITROSTAT) 0.4 MG SL tablet Place 0.4 mg under the tongue every 5 (five) minutes as needed for Chest pain.    Provider, Historical   propranoloL (INDERAL LA) 120 MG 24 hr capsule TAKE 1 CAPSULE BY MOUTH ONCE DAILY 11/29/23   Saira Daley APRN, CNS   topiramate (TOPAMAX) 100 MG tablet Take 1 tablet (100 mg total) by mouth 2 (two) times daily. 4/3/24   AntimisiarisStephy MD   torsemide (DEMADEX) 5 MG Tab Take 1 tablet (5 mg total) by mouth daily as needed (swelling). 8/8/24 8/15/24  Piyush Sharif MD     CURRENT SCHEDULED MEDICATIONS:   enoxparin  40 mg Subcutaneous Daily     CURRENT INFUSIONS:    CURRENT PRN MEDICATIONS:    Current Facility-Administered Medications:     acetaminophen, 650 mg, Oral, Q8H PRN    acetaminophen, 650 mg, Oral, Q4H PRN    aluminum-magnesium hydroxide-simethicone, 30 mL, Oral, QID PRN    dextrose 50%, 12.5 g, Intravenous, PRN    dextrose 50%, 25 g, Intravenous, PRN    glucagon (human recombinant), 1 mg, Intramuscular, PRN    glucose, 16 g, Oral, PRN    glucose, 24 g, Oral, PRN    magnesium oxide, 800 mg, Oral, PRN    magnesium oxide, 800 mg, Oral, PRN    melatonin, 6 mg, Oral, Nightly PRN    morphine, 2 mg, Intravenous, Q4H PRN    naloxone, 0.02 mg, Intravenous, PRN    ondansetron, 8 mg, Oral, Q8H PRN     "ondansetron, 4 mg, Intravenous, Q8H PRN    polyethylene glycol, 17 g, Oral, Daily PRN    potassium bicarbonate, 35 mEq, Oral, PRN    potassium bicarbonate, 50 mEq, Oral, PRN    potassium bicarbonate, 60 mEq, Oral, PRN    potassium, sodium phosphates, 2 packet, Oral, PRN    potassium, sodium phosphates, 2 packet, Oral, PRN    potassium, sodium phosphates, 2 packet, Oral, PRN    senna-docusate 8.6-50 mg, 1 tablet, Oral, Daily PRN    sodium chloride 0.9%, 2 mL, Intravenous, Q12H PRN    REVIEW OF SYSTEMS:  Please refer to the HPI for all pertinent positive and negative findings. A comprehensive review of all other systems was negative.    PHYSICAL EXAM:  Patient Vitals for the past 24 hrs:   BP Temp Temp src Pulse Resp SpO2 Height Weight   09/17/24 1400 (!) 127/58 -- -- 69 19 (!) 94 % -- --   09/17/24 1300 (!) 140/66 -- -- 74 13 97 % -- --   09/17/24 1200 (!) 175/74 -- -- (!) 58 14 (!) 94 % -- --   09/17/24 1100 (!) 124/58 -- -- 67 11 96 % -- --   09/17/24 0800 (!) 178/77 -- -- 66 (!) 22 98 % -- --   09/17/24 0705 (!) 179/77 -- -- 66 (!) 33 97 % -- --   09/17/24 0600 (!) 175/81 97.9 °F (36.6 °C) Oral 63 19 100 % -- --   09/17/24 0450 (!) 162/71 -- -- 64 13 98 % -- --   09/17/24 0020 (!) 132/96 97.9 °F (36.6 °C) Oral 60 15 98 % -- --   09/16/24 2306 (!) 141/103 97.5 °F (36.4 °C) Oral (!) 55 18 100 % 5' 3" (1.6 m) 90.3 kg (199 lb)       GENERAL APPEARANCE: Alert, well-developed, well-nourished female in no acute distress.  HEENT: Normocephalic and atraumatic. PERRL. Oropharynx unremarkable.  PULM: Normal respiratory effort. No accessory muscle use.  CV: RRR.  ABDOMEN: Soft, nontender.  EXTREMITIES: No obvious signs of vascular compromise. Pulses present. No cyanosis, clubbing or edema.  SKIN: Clear; no rashes, lesions or skin breaks in exposed areas.    NEURO:  MENTAL STATUS: Patient awake and oriented to time, place, and person, recent/remote memory normal, attention span/concentration normal, and speech fluent without " "paraphasic errors.  Affect euthymic.    CRANIAL NERVES:  CN I: Not tested.  CN II: Fundoscopic exam deferred.  CN III, IV, VI: Pupils equal, round and reactive to light.  Extraocular movements full and intact.  CN V: Facial sensation normal.  CN VII: Facial asymmetry absent.  CN VIII: Hearing grossly normal and equal bilaterally.  No skew deviation or pathologic nystagmus.  CN IX, X: Palate elevates symmetrically. Speech/articulation is clear without dysarthria.  CN XI: Shoulder shrug and chin rotation equal with good strength.  CN XII: Tongue protrusion midline.    MOTOR:  Bulk normal. Tone normal and symmetric throughout.  Abnormal movements absent.  Tremor: none present.  Strength 4/5 throughout    REFLEXES:  DTRs 1+ throughout.  Plantar response downgoing bilaterally.  SENSATION: grossly intact throughout.  COORDINATION: normal finger-to-nose.  STATION: not tested.  GAIT: not tested.      Labs:  Recent Labs   Lab 09/17/24  0035 09/17/24  1030    143   K 3.8 3.4*   * 114*   CO2 19* 19*   BUN 22 18   CREATININE 0.9 0.8    107   CALCIUM 8.5* 8.2*   PHOS  --  3.6   MG 2.1 2.0     Recent Labs   Lab 09/17/24  0035 09/17/24  1030   WBC 10.33 9.50   HGB 13.0 13.6   HCT 42.3 43.3    196     Recent Labs   Lab 09/17/24  0035 09/17/24  1030   ALBUMIN 3.9 3.7   PROT 6.8 6.5   BILITOT 0.4 0.5   ALKPHOS 131 136*   ALT 11 11   AST 13 12     Lab Results   Component Value Date    INR 0.9 09/17/2024     Lab Results   Component Value Date    TRIG 121 07/03/2024    HDL 45 07/03/2024    CHOLHDL 28.9 10/01/2021     Lab Results   Component Value Date    HGBA1C 5.8 08/12/2024     No results found for: "PROTEINCSF", "GLUCCSF", "WBCCSF"    Imaging:  I have reviewed and interpreted the pertinent imaging and lab results.      US Carotid Bilateral  Narrative: CMS MANDATED QUALITY DATA - CAROTID - 195    All measurements and percent stenosis described below were determined using NASCET criteria or criteria similar to " NASCET, as defined by the Society of Radiologists in Ultrasound Consensus Conference, Radiology, 2003    EXAMINATION:  US CAROTID BILATERAL    CLINICAL HISTORY:  Syncope;.    TECHNIQUE:  Grayscale, color and spectral Doppler analysis was performed.    COMPARISON:  None.    FINDINGS:  Minimal plaque in the carotid bulbs    Peak systolic velocity in the right ICA is 104 cm/sec, with ICA/CCA ratio of 1.3.    Peak systolic velocity in the  left ICA is 90 cm/sec, with ICA/CCA ratio of 0.7.    Antegrade flow within the vertebral arteries  Impression: No hemodynamically significant stenosis in the extracranial carotid arteries    Antegrade flow within the vertebral arteries    Electronically signed by: Katrina Zelaya  Date:    09/17/2024  Time:    13:43  CT Maxillofacial Without Contrast  Narrative: EXAMINATION:  CT MAXILLOFACIAL WITHOUT CONTRAST    CLINICAL HISTORY:  fall;    TECHNIQUE:  Low dose axial images, sagittal and coronal reformations were obtained through the face.  Contrast was not administered.    COMPARISON:  CT brain and cervical spine also obtained same date 09/16/2024    FINDINGS:  Is no evidence of acute fracture involving the facial bones.  Minor incidental mucosal thickening in the sphenoid sinus otherwise the paranasal sinuses appear clear.  Globes and retro bulbar structures appear symmetric and grossly intact.  Mastoid air cells middle ears are clear.  Mild nasal septal deviation to the right is incidentally noted.  There is a cosmetic implant midline along the parasymphyseal mandible.    There are degenerative changes involving the TMJ joints bilaterally with irregularity of the mandibular condyles greater on the right.  Intracranial structures imaged demonstrate no acute abnormality and are described in detail on CT brain report.  Impression: No evidence of acute fracture, significant soft tissue swelling involving the facial bones.    Incidental findings as above.    Electronically signed  by: Yolanda Boggs  Date:    09/17/2024  Time:    01:24  X-Ray Chest AP Portable  Narrative: EXAMINATION:  XR CHEST AP PORTABLE    CLINICAL HISTORY:  Chest Pain;    TECHNIQUE:  Single frontal view of the chest was performed.    COMPARISON:  03/26/2024    FINDINGS:  Emphysema.  No focal consolidation.  Normal heart size.  Impression: No acute findings.    Electronically signed by: Rimma Dunham  Date:    09/17/2024  Time:    00:52  CT Cervical Spine Without Contrast  Narrative: EXAMINATION:  CT CERVICAL SPINE WITHOUT CONTRAST    CLINICAL HISTORY:  fall;    TECHNIQUE:  Low dose axial images, sagittal and coronal reformations were performed though the cervical spine.  Contrast was not administered.    COMPARISON:  MRI cervical spine 04/10/2021, chest x-ray 04/02/2023, 5/17/19    FINDINGS:  Sagittal reformatted images demonstrate adequate alignment of the cervical spine.  There is no evidence of acute fracture.  Craniocervical junction is aligned.    C2-C3:There is mild spondylosis with no evidence of significant disc protrusion or canal or neural foraminal stenosis.    C3-C4:There is no disc protrusion or canal or neural foraminal stenosis.  Uncovertebral joint hypertrophic changes noted greater on the left.    C4-C5:There is significant uncovertebral joint hypertrophy on the left contributing to moderate left neural foraminal stenosis.  There is mild right neural foraminal stenosis without significant canal stenosis.    C5-C6:There is spondylosis with loss of disc height and circumferential disc osteophyte complex formation.  Uncovertebral joint facet hypertrophic changes contribute to severe right neural foraminal stenosis.  There is no significant canal or left neural foraminal stenosis.    C6-C7:There is uncovertebral joint and facet hypertrophic change greater on the left contributing to mild left canal and left neural foraminal stenosis.    C7-T1:There is no significant bony canal significant neural  foraminal stenosis.  Mild left neural foraminal narrowing related to is uncovertebral joint hypertrophy.    The soft tissue structures visualized in the neck are unremarkable.    Nodular apical is pleural thickening and emphysematous changes noted on the right which was seen but to a lesser degree on the MRI which is not optimized for evaluating interval detrimental change.  Nodular appearance is mildly concerning.  Impression: No evidence of acute fracture or subluxation involving the cervical spine.    Multilevel spondylosis as detailed above at each disc level.    Nodular apical pleural thickening and scarring on the right.  Consider follow-up to ensure stability/benignity.  Correlation with prior chest CTs if available may be helpful.    Significant emphysematous changes in the imaged lungs.    Electronically signed by: Yolanda Boggs  Date:    09/17/2024  Time:    00:43  CT Head Without Contrast  Narrative: EXAMINATION:  CT HEAD WITHOUT CONTRAST    CLINICAL HISTORY:  fall;    TECHNIQUE:  Low dose axial images were obtained through the head.  Coronal and sagittal reformations were also performed. Contrast was not administered.    COMPARISON:  CT brain 04/02/2023    FINDINGS:  There is no evidence of acute intracranial hemorrhage or hematoma.  The gray-white matter junction differentiation is intact with no evidence of acute major arterial infarct.  There is no focal mass or mass effect.  Prominence of the ventricles, cisterns and sulci is in keeping with senescent atrophic changes.  There is moderate to marked confluent low density in the deep white matter as seen with microvascular chronic ischemic changes.  There are remote lacunar infarcts in the basal ganglia bilaterally and corona radiata bilaterally.  Paranasal sinuses, mastoid air cells and middle ears are clear.  The bony calvarium is intact.  Posterior fossa structures and pituitary gland are unremarkable allowing for skull base artifact.  Impression:  There is use no convincing acute intracranial abnormality or fracture.    Remote lacunar infarcts bilaterally in the basal ganglia and corona radiata.    Microvascular chronic ischemic changes.    Senescent atrophic changes.    Electronically signed by: Yolanda Flakita  Date:    09/17/2024  Time:    00:22         ASSESSMENT & PLAN:      Episodes of unresponsiveness     Plan:   Etiology of episodes of unresponsiveness is unknown however she was noted to be having low oxygen saturation yesterday which could be contributing to her episode.  Can not rule out seizures however less likely as she responded immediately without any postictal state.  Also these episodes are not similar to her prior seizure semiology.  Recommend to workup for metabolic derangements and infectious abnormalities that could cause episode of unresponsiveness.  EEG ordered and pending.  If continues to have episodes, might benefit from outpatient LTM for 48-72 hours  Check orthostatic vitals.  PT OT and speech therapy evaluate and treat.    Normalize blood pressure.    Will follow           Thank you kindly for including us in the care of this patient. Please do not hesitate to contact us with any questions.           Sanchez Garcia MD  Neurology/vascular Neurology  Date of Service: 09/17/2024  3:24 PM    --------------------------------------------------------------------------------------------------------------------------------------------------------------------------------------------------------------------------------------------------------------  Please note: This note was transcribed using voice recognition software. Because of this technology there are often uinintended grammatical, spelling, and other transcription errors. Please disregard these errors.

## 2024-09-17 NOTE — ED NOTES
No changes in previous assessments. Patient resting quietly on ED stretcher with spouse at bedside. Denies any needs at this time. CHAY JARAMILLO.

## 2024-09-17 NOTE — PLAN OF CARE
Phoned the patient to schedule a sleep consult per Faina Barbour APRN - CNP.  Patient stated that she did not want to schedule unless our providers will specifically write an Rx for Ambien.  She was asked to call back if she'd like to schedule.    Pt was explained LEUNG. Pt verbalized understanding of LEUNG and signed. LEUNG scanned to .       09/17/24 1517   LEUNG Message   Medicare Outpatient and Observation Notification regarding financial responsibility Explained to patient/caregiver;Signed/date by patient/caregiver   Date LUENG was signed 09/17/24   Time LEUNG was signed 1511

## 2024-09-17 NOTE — PLAN OF CARE
To address the strains identified during the initial assessment, the SW provided resources.    Resources addressed:    Financial Strain - [employer] ([employer]) and Food resources    Housing Instability - [employer] and [employer]housing    Utilities - [employer] ([employer]) and Low Income Energy Assistance Program

## 2024-09-17 NOTE — ED PROVIDER NOTES
Encounter Date: 9/16/2024       History     Chief Complaint   Patient presents with    Loss of Consciousness     Sitting on the toilet.  Had syncopal episode.  Fell to the ground hitting her left cheek and left shoulder.  Complains of pain on both areas.       Chief complaint is fall.  The patient was sitting on the toilet trying to urinate and before urinating had a syncopal episode.  She remembers falling to the floor hitting her cheek and after that there was a period of time when she was unresponsive.  She has a history of hypertension and is on Plavix.  Her pulse ox has been good.  She only complains of left cheek pain to me.  Her CABG was normal.  She recently had UTIs on antibiotics.  She denies any chest pain headache shortness of breath prior to the syncopal episode.  She does have hypertension multiple sclerosis seizures        Review of patient's allergies indicates:   Allergen Reactions    Codeine      Other reaction(s): throat tightness    Dilantin [phenytoin sodium extended]     Phenobarbital     Tegretol [carbamazepine]      Past Medical History:   Diagnosis Date    Arthritis     Coronary artery disease     Encounter for blood transfusion     Epilepsy     GERD (gastroesophageal reflux disease)     Headache     Hypertension     MI, old     MS (multiple sclerosis)     Seizures     epilepsy     Past Surgical History:   Procedure Laterality Date    APPENDECTOMY      BACK SURGERY      L5 discectomy    BREAST BIOPSY Right 15 yrs ago    benign    CATARACT EXTRACTION Bilateral     COLONOSCOPY N/A 09/16/2015    Procedure: COLONOSCOPY;  Surgeon: Henry Malcolm MD;  Location: [facility];  Service: Endoscopy;  Laterality: N/A;    CORONARY STENT PLACEMENT      right knee arthroscopy       Family History   Problem Relation Name Age of Onset    Scleroderma Mother      Heart disease Father          MI, CAD    Cancer Neg Hx      Diabetes Neg Hx      Stroke Neg Hx      Melanoma Neg Hx      Psoriasis Neg Hx       Lupus Neg Hx      Eczema Neg Hx       Social History     Tobacco Use    Smoking status: Former     Current packs/day: 0.00     Types: Cigarettes     Quit date: 2006     Years since quittin.7     Passive exposure: Past    Smokeless tobacco: Never   Substance Use Topics    Alcohol use: Yes     Comment: rarely    Drug use: No     Review of Systems   Constitutional:  Negative for chills and fever.   HENT:  Negative for ear pain, rhinorrhea and sore throat.    Eyes:  Negative for pain and visual disturbance.   Respiratory:  Negative for cough and shortness of breath.    Cardiovascular:  Negative for chest pain and palpitations.   Gastrointestinal:  Negative for abdominal pain, constipation, diarrhea, nausea and vomiting.   Genitourinary:  Negative for dysuria, frequency, hematuria and urgency.   Musculoskeletal:  Negative for back pain, joint swelling and myalgias.   Skin:  Negative for rash.   Neurological:  Positive for syncope. Negative for dizziness, seizures, weakness and headaches.   Psychiatric/Behavioral:  Negative for dysphoric mood. The patient is not nervous/anxious.        Physical Exam     Initial Vitals [24 2306]   BP Pulse Resp Temp SpO2   (!) 141/103 (!) 55 18 97.5 °F (36.4 °C) 100 %      MAP       --         Physical Exam    Nursing note and vitals reviewed.  Constitutional: She appears well-developed and well-nourished.   HENT:   Head: Normocephalic and atraumatic.   Patient has a bruise to the left cheek status post fall.   Eyes: Conjunctivae, EOM and lids are normal. Pupils are equal, round, and reactive to light.   Neck: Trachea normal. Neck supple. No thyroid mass and no thyromegaly present.   Normal range of motion.  Cardiovascular:  Normal rate, regular rhythm and normal heart sounds.           Pulmonary/Chest: Effort normal and breath sounds normal.   Abdominal: Abdomen is soft. She exhibits no distension. There is no abdominal tenderness.   Musculoskeletal:         General:  Normal range of motion.      Cervical back: Normal range of motion and neck supple.     Neurological: She is alert and oriented to person, place, and time. She has normal strength and normal reflexes. No cranial nerve deficit or sensory deficit.   Skin: Skin is warm and dry.   Psychiatric: She has a normal mood and affect. Her speech is normal and behavior is normal. Judgment and thought content normal.         ED Course   Procedures  Labs Reviewed   CBC W/ AUTO DIFFERENTIAL - Abnormal       Result Value    WBC 10.33      RBC 4.82      Hemoglobin 13.0      Hematocrit 42.3      MCV 88      MCH 27.0      MCHC 30.7 (*)     RDW 14.1      Platelets 204      MPV 11.4      Immature Granulocytes 0.3      Gran # (ANC) 6.9      Immature Grans (Abs) 0.03      Lymph # 2.3      Mono # 0.8      Eos # 0.2      Baso # 0.05      nRBC 0      Gran % 66.9      Lymph % 22.4      Mono % 8.0      Eosinophil % 1.9      Basophil % 0.5      Differential Method Automated     COMPREHENSIVE METABOLIC PANEL - Abnormal    Sodium 144      Potassium 3.8      Chloride 114 (*)     CO2 19 (*)     Glucose 102      BUN 22      Creatinine 0.9      Calcium 8.5 (*)     Total Protein 6.8      Albumin 3.9      Total Bilirubin 0.4      Alkaline Phosphatase 131      AST 13      ALT 11      eGFR >60.0      Anion Gap 11     MAGNESIUM    Magnesium 2.1     TROPONIN I HIGH SENSITIVITY    Troponin I High Sensitivity 8.7     B-TYPE NATRIURETIC PEPTIDE    BNP 75     TROPONIN I HIGH SENSITIVITY          Imaging Results              X-Ray Chest AP Portable (Final result)  Result time 09/17/24 00:52:33      Final result by Rimma Dunham MD (09/17/24 00:52:33)                   Impression:      No acute findings.      Electronically signed by: Rimma Dunham  Date:    09/17/2024  Time:    00:52               Narrative:    EXAMINATION:  XR CHEST AP PORTABLE    CLINICAL HISTORY:  Chest Pain;    TECHNIQUE:  Single frontal view of the chest was  performed.    COMPARISON:  03/26/2024    FINDINGS:  Emphysema.  No focal consolidation.  Normal heart size.                                       CT Maxillofacial Without Contrast (Final result)  Result time 09/17/24 01:24:58      Final result by Yolanda Boggs MD (09/17/24 01:24:58)                   Impression:      No evidence of acute fracture, significant soft tissue swelling involving the facial bones.    Incidental findings as above.      Electronically signed by: Yolanda Boggs  Date:    09/17/2024  Time:    01:24               Narrative:    EXAMINATION:  CT MAXILLOFACIAL WITHOUT CONTRAST    CLINICAL HISTORY:  fall;    TECHNIQUE:  Low dose axial images, sagittal and coronal reformations were obtained through the face.  Contrast was not administered.    COMPARISON:  CT brain and cervical spine also obtained same date 09/16/2024    FINDINGS:  Is no evidence of acute fracture involving the facial bones.  Minor incidental mucosal thickening in the sphenoid sinus otherwise the paranasal sinuses appear clear.  Globes and retro bulbar structures appear symmetric and grossly intact.  Mastoid air cells middle ears are clear.  Mild nasal septal deviation to the right is incidentally noted.  There is a cosmetic implant midline along the parasymphyseal mandible.    There are degenerative changes involving the TMJ joints bilaterally with irregularity of the mandibular condyles greater on the right.  Intracranial structures imaged demonstrate no acute abnormality and are described in detail on CT brain report.                                       CT Cervical Spine Without Contrast (Final result)  Result time 09/17/24 00:43:26      Final result by Yolanda Boggs MD (09/17/24 00:43:26)                   Impression:      No evidence of acute fracture or subluxation involving the cervical spine.    Multilevel spondylosis as detailed above at each disc level.    Nodular apical pleural thickening and scarring on  the right.  Consider follow-up to ensure stability/benignity.  Correlation with prior chest CTs if available may be helpful.    Significant emphysematous changes in the imaged lungs.      Electronically signed by: Yolanda Boggs  Date:    09/17/2024  Time:    00:43               Narrative:    EXAMINATION:  CT CERVICAL SPINE WITHOUT CONTRAST    CLINICAL HISTORY:  fall;    TECHNIQUE:  Low dose axial images, sagittal and coronal reformations were performed though the cervical spine.  Contrast was not administered.    COMPARISON:  MRI cervical spine 04/10/2021, chest x-ray 04/02/2023, 5/17/19    FINDINGS:  Sagittal reformatted images demonstrate adequate alignment of the cervical spine.  There is no evidence of acute fracture.  Craniocervical junction is aligned.    C2-C3:There is mild spondylosis with no evidence of significant disc protrusion or canal or neural foraminal stenosis.    C3-C4:There is no disc protrusion or canal or neural foraminal stenosis.  Uncovertebral joint hypertrophic changes noted greater on the left.    C4-C5:There is significant uncovertebral joint hypertrophy on the left contributing to moderate left neural foraminal stenosis.  There is mild right neural foraminal stenosis without significant canal stenosis.    C5-C6:There is spondylosis with loss of disc height and circumferential disc osteophyte complex formation.  Uncovertebral joint facet hypertrophic changes contribute to severe right neural foraminal stenosis.  There is no significant canal or left neural foraminal stenosis.    C6-C7:There is uncovertebral joint and facet hypertrophic change greater on the left contributing to mild left canal and left neural foraminal stenosis.    C7-T1:There is no significant bony canal significant neural foraminal stenosis.  Mild left neural foraminal narrowing related to is uncovertebral joint hypertrophy.    The soft tissue structures visualized in the neck are unremarkable.    Nodular apical is  pleural thickening and emphysematous changes noted on the right which was seen but to a lesser degree on the MRI which is not optimized for evaluating interval detrimental change.  Nodular appearance is mildly concerning.                                       CT Head Without Contrast (Final result)  Result time 09/17/24 00:22:07      Final result by Yolanda Boggs MD (09/17/24 00:22:07)                   Impression:      There is use no convincing acute intracranial abnormality or fracture.    Remote lacunar infarcts bilaterally in the basal ganglia and corona radiata.    Microvascular chronic ischemic changes.    Senescent atrophic changes.      Electronically signed by: Yolanda Boggs  Date:    09/17/2024  Time:    00:22               Narrative:    EXAMINATION:  CT HEAD WITHOUT CONTRAST    CLINICAL HISTORY:  fall;    TECHNIQUE:  Low dose axial images were obtained through the head.  Coronal and sagittal reformations were also performed. Contrast was not administered.    COMPARISON:  CT brain 04/02/2023    FINDINGS:  There is no evidence of acute intracranial hemorrhage or hematoma.  The gray-white matter junction differentiation is intact with no evidence of acute major arterial infarct.  There is no focal mass or mass effect.  Prominence of the ventricles, cisterns and sulci is in keeping with senescent atrophic changes.  There is moderate to marked confluent low density in the deep white matter as seen with microvascular chronic ischemic changes.  There are remote lacunar infarcts in the basal ganglia bilaterally and corona radiata bilaterally.  Paranasal sinuses, mastoid air cells and middle ears are clear.  The bony calvarium is intact.  Posterior fossa structures and pituitary gland are unremarkable allowing for skull base artifact.                                       Medications - No data to display  Medical Decision Making  Patient is status post syncopal episode hitting face differential diagnosis  includes syncope secondary to cardiac disease hypovolemia neuro mediated problems patient could have intracranial injury or facial fracture secondary to fall all workup negative this point hospitalist consulted for admission for syncope.Lukas Ewing MD  2:38 AM 09/17/2024            Amount and/or Complexity of Data Reviewed  Labs: ordered.  Radiology: ordered.    Risk  Decision regarding hospitalization.                                      Clinical Impression:  Final diagnoses:  [R07.9] Chest pain  [T67.1XXA] Heat syncope, initial encounter (Primary)          ED Disposition Condition    Admit Stable                Lukas Ewing MD  09/17/24 [id number]

## 2024-09-17 NOTE — CARE UPDATE
73-year-old female with history of seizures compliant with home medication, hypertension, MS, CAD presented with syncopal episode.  She was admitted for further syncope evaluation.  At the time of my examination, she did not have any active complaints.  She mentioned she was very tired and she fell asleep in middle of the conversation.   who was at the bedside mentioned that this morning she was sitting on the toilet, after few minutes she saw her consciousness was going down and she was in a position about to fall, he also saw her extremities were turning blue and her saturations were 70%, also noticed her blood pressure was low and hence called the EMS.  He mentioned that a similar episode happened last week, when she was just staring, lost her awareness, and they had to call her name loudly and physically shake her for her to respond.  She is compliant with her seizure medications.  For further evaluation of the syncope, we shall obtain a 2D echocardiogram (does not have a recent 1), bilateral carotid ultrasound.  We will also consult Neurology for further evaluation of seizures given her repeated episodes of reduced awareness, although these are not similar to her usual tonic clonic episodes. EEG for ?absence seizures.  Her imaging incidentally found emphysematous pulmonary changes (reports h/o smoking 2 PPD for >40 years) but on exam she does not have any wheezing, stable on room air, does not have any shortness of breath on exertion.  She can follow up as outpatient regarding that.  There were also multiple cervical spine changes seen, no critical stenosis seen, she can follow up as outpatient with Neurosurgery regarding the same.  We restarted her home medications for history of seizures, hyperlipidemia, CAD, GERD.  We reduced her home dose of propranolol and we will start it once a repeat check her orthostatic vitals as her blood pressures are now stable.

## 2024-09-18 PROBLEM — R55 SYNCOPE AND COLLAPSE: Status: ACTIVE | Noted: 2024-09-18

## 2024-09-18 PROBLEM — J43.9 EMPHYSEMA LUNG: Status: ACTIVE | Noted: 2024-09-18

## 2024-09-18 LAB
ALBUMIN SERPL BCP-MCNC: 3.3 G/DL (ref 3.5–5.2)
ALP SERPL-CCNC: 122 U/L (ref 55–135)
ALT SERPL W/O P-5'-P-CCNC: 10 U/L (ref 10–44)
ANION GAP SERPL CALC-SCNC: 9 MMOL/L (ref 8–16)
AST SERPL-CCNC: 11 U/L (ref 10–40)
BASOPHILS # BLD AUTO: 0.05 K/UL (ref 0–0.2)
BASOPHILS NFR BLD: 0.6 % (ref 0–1.9)
BILIRUB SERPL-MCNC: 0.5 MG/DL (ref 0.1–1)
BUN SERPL-MCNC: 17 MG/DL (ref 8–23)
CALCIUM SERPL-MCNC: 8 MG/DL (ref 8.7–10.5)
CHLORIDE SERPL-SCNC: 112 MMOL/L (ref 95–110)
CHOLEST SERPL-MCNC: 145 MG/DL (ref 120–199)
CHOLEST/HDLC SERPL: 3.7 {RATIO} (ref 2–5)
CO2 SERPL-SCNC: 20 MMOL/L (ref 23–29)
CREAT SERPL-MCNC: 0.8 MG/DL (ref 0.5–1.4)
DIFFERENTIAL METHOD BLD: ABNORMAL
EOSINOPHIL # BLD AUTO: 0.2 K/UL (ref 0–0.5)
EOSINOPHIL NFR BLD: 2 % (ref 0–8)
ERYTHROCYTE [DISTWIDTH] IN BLOOD BY AUTOMATED COUNT: 14.3 % (ref 11.5–14.5)
EST. GFR  (NO RACE VARIABLE): >60 ML/MIN/1.73 M^2
ESTIMATED AVG GLUCOSE: 126 MG/DL (ref 68–131)
GLUCOSE SERPL-MCNC: 90 MG/DL (ref 70–110)
HBA1C MFR BLD: 6 % (ref 4.5–6.2)
HCT VFR BLD AUTO: 36.8 % (ref 37–48.5)
HDLC SERPL-MCNC: 39 MG/DL (ref 40–75)
HDLC SERPL: 26.9 % (ref 20–50)
HGB BLD-MCNC: 11.5 G/DL (ref 12–16)
IMM GRANULOCYTES # BLD AUTO: 0.04 K/UL (ref 0–0.04)
IMM GRANULOCYTES NFR BLD AUTO: 0.5 % (ref 0–0.5)
LDLC SERPL CALC-MCNC: 83.4 MG/DL (ref 63–159)
LYMPHOCYTES # BLD AUTO: 2.5 K/UL (ref 1–4.8)
LYMPHOCYTES NFR BLD: 30 % (ref 18–48)
MAGNESIUM SERPL-MCNC: 1.9 MG/DL (ref 1.6–2.6)
MCH RBC QN AUTO: 27.4 PG (ref 27–31)
MCHC RBC AUTO-ENTMCNC: 31.3 G/DL (ref 32–36)
MCV RBC AUTO: 88 FL (ref 82–98)
MONOCYTES # BLD AUTO: 0.7 K/UL (ref 0.3–1)
MONOCYTES NFR BLD: 8.5 % (ref 4–15)
NEUTROPHILS # BLD AUTO: 4.8 K/UL (ref 1.8–7.7)
NEUTROPHILS NFR BLD: 58.4 % (ref 38–73)
NONHDLC SERPL-MCNC: 106 MG/DL
NRBC BLD-RTO: 0 /100 WBC
PLATELET # BLD AUTO: 191 K/UL (ref 150–450)
PMV BLD AUTO: 11.6 FL (ref 9.2–12.9)
POTASSIUM SERPL-SCNC: 3.6 MMOL/L (ref 3.5–5.1)
PROT SERPL-MCNC: 5.7 G/DL (ref 6–8.4)
RBC # BLD AUTO: 4.2 M/UL (ref 4–5.4)
SODIUM SERPL-SCNC: 141 MMOL/L (ref 136–145)
TRIGL SERPL-MCNC: 113 MG/DL (ref 30–150)
WBC # BLD AUTO: 8.19 K/UL (ref 3.9–12.7)

## 2024-09-18 PROCEDURE — 83735 ASSAY OF MAGNESIUM: CPT | Performed by: INTERNAL MEDICINE

## 2024-09-18 PROCEDURE — 36415 COLL VENOUS BLD VENIPUNCTURE: CPT | Performed by: INTERNAL MEDICINE

## 2024-09-18 PROCEDURE — 80061 LIPID PANEL: CPT | Performed by: INTERNAL MEDICINE

## 2024-09-18 PROCEDURE — 63600175 PHARM REV CODE 636 W HCPCS

## 2024-09-18 PROCEDURE — 25000003 PHARM REV CODE 250

## 2024-09-18 PROCEDURE — 80053 COMPREHEN METABOLIC PANEL: CPT | Performed by: INTERNAL MEDICINE

## 2024-09-18 PROCEDURE — 97530 THERAPEUTIC ACTIVITIES: CPT

## 2024-09-18 PROCEDURE — 97162 PT EVAL MOD COMPLEX 30 MIN: CPT

## 2024-09-18 PROCEDURE — 94761 N-INVAS EAR/PLS OXIMETRY MLT: CPT

## 2024-09-18 PROCEDURE — 25000003 PHARM REV CODE 250: Performed by: INTERNAL MEDICINE

## 2024-09-18 PROCEDURE — 96374 THER/PROPH/DIAG INJ IV PUSH: CPT

## 2024-09-18 PROCEDURE — 83036 HEMOGLOBIN GLYCOSYLATED A1C: CPT | Performed by: INTERNAL MEDICINE

## 2024-09-18 PROCEDURE — 85025 COMPLETE CBC W/AUTO DIFF WBC: CPT | Performed by: INTERNAL MEDICINE

## 2024-09-18 PROCEDURE — 21400001 HC TELEMETRY ROOM

## 2024-09-18 PROCEDURE — 63600175 PHARM REV CODE 636 W HCPCS: Performed by: INTERNAL MEDICINE

## 2024-09-18 RX ORDER — HYDRALAZINE HYDROCHLORIDE 20 MG/ML
5 INJECTION INTRAMUSCULAR; INTRAVENOUS EVERY 6 HOURS PRN
Status: DISCONTINUED | OUTPATIENT
Start: 2024-09-18 | End: 2024-09-21 | Stop reason: HOSPADM

## 2024-09-18 RX ADMIN — CLOPIDOGREL BISULFATE 75 MG: 75 TABLET, FILM COATED ORAL at 08:09

## 2024-09-18 RX ADMIN — TOPIRAMATE 100 MG: 100 TABLET, FILM COATED ORAL at 08:09

## 2024-09-18 RX ADMIN — PROPRANOLOL HYDROCHLORIDE 20 MG: 10 TABLET ORAL at 08:09

## 2024-09-18 RX ADMIN — ACETAMINOPHEN 650 MG: 325 TABLET ORAL at 01:09

## 2024-09-18 RX ADMIN — HYDRALAZINE HYDROCHLORIDE 5 MG: 20 INJECTION INTRAMUSCULAR; INTRAVENOUS at 06:09

## 2024-09-18 RX ADMIN — FAMOTIDINE 20 MG: 20 TABLET ORAL at 08:09

## 2024-09-18 RX ADMIN — ENOXAPARIN SODIUM 40 MG: 40 INJECTION SUBCUTANEOUS at 04:09

## 2024-09-18 RX ADMIN — POTASSIUM BICARBONATE 50 MEQ: 977.5 TABLET, EFFERVESCENT ORAL at 08:09

## 2024-09-18 RX ADMIN — ATORVASTATIN CALCIUM 40 MG: 40 TABLET, FILM COATED ORAL at 08:09

## 2024-09-18 NOTE — PT/OT/SLP EVAL
Physical Therapy Evaluation    Patient Name:  Alyssa John   MRN:  [id number]    Recommendations:     Discharge Recommendations: High Intensity Therapy   Discharge Equipment Recommendations:  (TBD at next level of care)   Barriers to discharge: None    Assessment:     Alyssa John is a 73 y.o. female admitted with a medical diagnosis of <principal problem not specified>.  She presents with the following impairments/functional limitations: weakness, impaired endurance, impaired self care skills, impaired functional mobility, gait instability, decreased lower extremity function, pain, edema, impaired skin, impaired cardiopulmonary response to activity Pt found resting in bed in ED.. S/P Syncopal event at home with fall. Spouse is present and offering support. Pt with hx of MS. Progressive weakness over last few months with multiple UTIs. Was ambulatory but now requiring assistance with all mobility and using WC for primary mobility.Pt moves LE's partially off of bed but does not take resistance. She required max A for bed mobility and min A x 2 to stand with RW. She was able to take 3 side steps and 1 retro steps with RW min A x 2 and VC's.HR elevated from baseline of 100 bpm to 130 BPM with standing activity Pt would benefit from post hospital intensive rehab to maximize safety and functional independence , reduce fall risk and decrease burden of care.    Rehab Prognosis: Fair; patient would benefit from acute skilled PT services to address these deficits and reach maximum level of function.    Recent Surgery: * No surgery found *      Plan:     During this hospitalization, patient to be seen 6 x/week to address the identified rehab impairments via gait training, therapeutic activities, therapeutic exercises, neuromuscular re-education and progress toward the following goals:    Plan of Care Expires:  10/18/24    Subjective     Chief Complaint: pain and weakness, fearful of falling  Patient/Family  Comments/goals: stronger, walk again  Pain/Comfort:  Pain Rating 1: 0/10 (painful to touch, not rated, poor tolerance to touch)  Location - Side 1: Bilateral  Location - Orientation 1: lower  Location 1: leg  Pain Addressed 1: Cessation of Activity  Pain Rating Post-Intervention 1: 0/10    Patients cultural, spiritual, Spiritism conflicts given the current situation: no    Living Environment:  Pt lives with spouse, daughters in [facility]  Prior to admission, patients level of function was assistance with all ADL's, mobility, .  Equipment used at home: shower chair, wheelchair, walker, rolling, grab bar.  DME owned (not currently used): none.  Upon discharge, patient will have assistance from family.    Objective:     Communicated with nurse prior to session.  Patient found HOB elevated with telemetry, PureWick, peripheral IV, pulse ox (continuous)  upon PT entry to room.    General Precautions: Standard, fall, seizure  Orthopedic Precautions:N/A   Braces: N/A  Respiratory Status: Room air    Exams:  Cognitive Exam:  Patient is oriented to Person, Place, Time, and Situation  Gross Motor Coordination:  WFL  Sensation:    -       Intact  light/touch B LE's  Skin Integrity/Edema:  -       B lower legs mild edema, red, flaky skin    Functional Mobility:  Bed Mobility:     Supine to Sit: maximal assistance  Sit to Supine: maximal assistance  Transfers:     Sit to Stand:  minimum assistance and of 2 persons with rolling walker  Gait: 3 sides steps to R and 1 retro  with RW CGA/min A x 2 and VC's, fatigued  Balance: static sitting on EOB with SBA, initial retro pulsion and required physical assistance to right and achieve postural stability, fair static standing balance with B UE support on RW      AM-PAC 6 CLICK MOBILITY  Total Score:11       Treatment & Education:  Pt and spouse educated on PT roles and goals, DC recommendations, fall prevention, body mechanics for care giving  Static sitting x 5 minutes, Static standing x 2  minutes    Patient left HOB elevated with all lines intact, call button in reach, and nurse notified.    GOALS:   Multidisciplinary Problems       Physical Therapy Goals          Problem: Physical Therapy    Goal Priority Disciplines Outcome Goal Variances Interventions   Physical Therapy Goal     PT, PT/OT Progressing     Description: Goals to be met by: 10/18/24     Patient will increase functional independence with mobility by performin. Supine to sit with Stand-by Assistance  2. Sit to supine with MInimal Assistance  3. Sit to stand transfer with Contact Guard Assistance  4. Bed to chair transfer with Contact Guard Assistance using Rolling Walker  5. Gait  x 2 x 25 feet with Contact Guard Assistance using Rolling Walker.                          History:     Past Medical History:   Diagnosis Date    Arthritis     Coronary artery disease     Encounter for blood transfusion     Epilepsy     GERD (gastroesophageal reflux disease)     Headache     Hypertension     MI, old     MS (multiple sclerosis)     Seizures     epilepsy       Past Surgical History:   Procedure Laterality Date    APPENDECTOMY      BACK SURGERY      L5 discectomy    BREAST BIOPSY Right 15 yrs ago    benign    CATARACT EXTRACTION Bilateral     COLONOSCOPY N/A 2015    Procedure: COLONOSCOPY;  Surgeon: Henry Malcolm MD;  Location: [facility];  Service: Endoscopy;  Laterality: N/A;    CORONARY STENT PLACEMENT      right knee arthroscopy         Time Tracking:     PT Received On: 24  PT Start Time: 1048     PT Stop Time: 1118  PT Total Time (min): 30 min     Billable Minutes: Evaluation 12 and Therapeutic Activity 18      2024

## 2024-09-18 NOTE — PLAN OF CARE
Inpatient Upgrade Note     Alyssa John has warranted treatment spanning two or more midnights of hospital level care for the management of  syncope . She continues to require daily labs, further testing/imaging, monitoring of vital signs, and medication adjustments. Her condition is also complicated by the following comorbidities: Coronary Artery Disease, Hypertension, and Epilepsy, MS .

## 2024-09-18 NOTE — PROCEDURES
EEG REPORT    NAME: Alyssa John  : 1950  MRN: [id number]    DATE of EE2024    CLINICAL INDICATION: This is a 73 y.o. female being evaluated for episodes of unresponsiveness.    MEDICATIONS:    atorvastatin  40 mg Oral QHS    clopidogreL  75 mg Oral Daily    enoxparin  40 mg Subcutaneous Daily    famotidine  20 mg Oral BID    propranoloL  20 mg Oral Daily    topiramate  100 mg Oral BID         EEG DESCRIPTION:  A 16-channel EEG was performed with electrode placement in 10/[employer] System. Longitudinal bipolar and referential montages were utilized in analysis.    This is a technically adequate study with minor muscle and motion artifacts.    The dominant posterior background rhythm was a well modulated, symmetric, synchronous, reactive,6-7 Hz rhythm.  At times there was diffuse generalized slowing in the lower theta range    The record was reactive to eye opening and closure. Anterior derivations showed the expected admixture of low-voltage beta and theta frequencies as well as intermittent eye blink artifact.    Sleep architecture was not seen    Photic stimulation, performed elicited no driving response. Hyperventilation  was not performed.    No epileptiform discharges were recorded. No electrographic or electroclinical seizures were recorded.    DIAGNOSIS: This is an abnormal EEG due to the presence of mild generalised slowing. No lateralizing or epileptiform features are noted. No electrographic or electroclinical seizures are recorded.      CLINICAL INTERPRETATION:  This is an abnormal EEG indicating mild encephalopathy.  No seizures or epileptiform activity noted.         Sanchez Garcia MD  Neurology

## 2024-09-18 NOTE — PLAN OF CARE
Problem: Physical Therapy  Goal: Physical Therapy Goal  Description: Goals to be met by: 10/18/24     Patient will increase functional independence with mobility by performin. Supine to sit with Stand-by Assistance  2. Sit to supine with MInimal Assistance  3. Sit to stand transfer with Contact Guard Assistance  4. Bed to chair transfer with Contact Guard Assistance using Rolling Walker  5. Gait  x 2 x 25 feet with Contact Guard Assistance using Rolling Walker.     Outcome: Progressing

## 2024-09-18 NOTE — NURSING
Nurses Note -- 4 Eyes      9/18/2024   6:28 PM      Skin assessed during: Admit      [] No Altered Skin Integrity Present    []Prevention Measures Documented      [x] Yes- Altered Skin Integrity Present or Discovered   [] LDA Added if Not in Epic (Describe Wound)   [] New Altered Skin Integrity was Present on Admit and Documented in LDA   [] Wound Image Taken  BLE redness  Wound Care Consulted? Yes    Attending Nurse: Lida Golden RN/Staff Member:  Ana

## 2024-09-18 NOTE — PROGRESS NOTES
[facility]  Department of Neurology  Progress Note  Date: 2024 10:19 AM          Patient Name: Alyssa John   MRN: [id number]   : 1950    AGE: 73 y.o.    LOS: 0 days Hospital Day: 3  Admit date: 2024 10:54 PM       HPI per EMR: Alyssa John is a 73 y.o. female with a history of  hypertension, seizures, multiple sclerosis, CAD, status post MI  and presents with syncopal episode     History given by , patient has some degree of short-term memory loss and forgetfulness, unclear if this represents some degree of progressive or early dementia.  Patient also feels some details he when able.  She had gone to use the bathroom and has sat down on the toilet that and had not yet relieve herself when suddenly she fell and collapsed towards the left.  She hit the left side of her face and cheek on her way down.  She woke she called out to her  who came to assist her in brought her back up to a seated position.  They were concerned about what happened and felt that she should come to the hospital.  They denied any recollection of chest pain or shortness of breath or symptoms prior to that.  It does not appear that patient had a postictal..  Normally when patient has seizures they are grand mal seizures and there were no signs of that per the patient.  General patient has been denying any recent fevers or recent shortness of breath or any other significant symptoms.        Neurology consult:  Patient was seen and examined by me.  She is alert, oriented x3 and able to follow commands appropriately.  She was brought to the hospital for episodes of unresponsiveness yesterday.  She was apparently sitting on toilet seat and slumped forward and would only respond to deep stimuli.  She then fell side words hitting her cheek having a bruise on the left side of the eye.  She immediately woke up without losing consciousness and would respond appropriately at that time however  "he could not pick her up.  He noted that her fingers turn blue yesterday and checked her oxygen saturation at that time was 80.  Her pulse was 50 and blood pressure was greater than 160 systolic.  EMS was called and patient was brought to the hospital.      also states that she had a similar episode last week while sitting on the toilet seat and slumped forward and would only respond to repeated stimulus.  There was no seizure-like activity noted.  He did not check her blood pressure or pulse or oxygen saturation at that time.     She has a history of seizures and her last seizure (generalized tonic-clonic seizure) was about 10 years ago.  She was on Tegretol in the past which was weaned off due to thrombocytopenia and now she is on Topamax 100 mg b.i.d. which he is compliant with.  Denies any side effects from medication.     Per chart review- her seizure semiology is     ''Seizure type 1: "Petit mal"  These began some time prior to 1971. With respect to aura, the patient reports nothing.  Her seizure is characterized by several jerks in either shoulder.  This component of this spell lasts for approximately several seconds.  Afterwards, she reports no postictal state.  The patient's frequency of events was roughly daily prior to starting AEDs.  She reports that she has not had one of these in years.     Seizure type 2: "Grand mal"  These began in 1971. With respect to warning, the patient would have "a lot of the petite mal" seizures.  Her seizure is characterized by loss of awareness, generalized stiffening, and generalized shaking.  She would have foaming of the mouth, urinary incontinence, and tongue biting.  This component of this spell lasts for approximately 2-3 minutes.  Afterwards, she was fatigued.  The patient's last event of this type was in 1990.  Prior to starting medication, they happened about once a month on average.''    09/18/2024: No acute events overnight. Patient was seen and examined by " me this morning. Neuro exam is normal and unchanged from yesterday.  No further episodes of unresponsiveness    Vitals:  Patient Vitals for the past 24 hrs:   BP Temp Temp src Pulse Resp SpO2   09/18/24 0820 (!) 165/70 97.9 °F (36.6 °C) -- 76 20 96 %   09/18/24 0132 (!) 150/66 -- -- 76 (!) 24 98 %   09/17/24 2300 (!) 155/69 97.6 °F (36.4 °C) Oral 65 16 96 %   09/17/24 1600 (!) 142/63 97.7 °F (36.5 °C) Oral 68 18 --   09/17/24 1545 115/73 -- -- 83 20 95 %   09/17/24 1542 126/67 -- -- 77 17 97 %   09/17/24 1538 (!) 149/68 -- -- 69 14 95 %   09/17/24 1400 (!) 127/58 -- -- 69 19 (!) 94 %   09/17/24 1300 (!) 140/66 -- -- 74 13 97 %   09/17/24 1200 (!) 175/74 -- -- (!) 58 14 (!) 94 %   09/17/24 1100 (!) 124/58 -- -- 67 11 96 %     PHYSICAL EXAM:     GENERAL APPEARANCE: Well-developed, well-nourished female in no acute distress.  HEENT: Normocephalic and atraumatic. PERRL. Oropharynx unremarkable.  PULM: Comfortable on room air.  CV: RRR.  ABDOMEN: Soft, nontender.  EXTREMITIES: No signs of vascular compromise. Pulses present. No cyanosis, clubbing or edema.  SKIN: Clear; no rashes, lesions or skin breaks in exposed areas.      NEURO:   MENTAL STATUS: Patient awake and oriented to time, place, and person. Affect normal.  CRANIAL NERVES II-XII: Pupils equal, round and reactive to light. Extraocular movements full and intact. No facial asymmetry.  MOTOR: Normal bulk. Tone normal and symmetrical throughout.  No abnormal movements. No tremor.   Strength 4/5 throughout unless specified below.  REFLEXES: DTRs 1+; normal and symmetric throughout.   SENSATION: Sensation grossly intact to fine touch.  COORDINATION: Finger-to-nose normal for age and symmetric.  STATION: Romberg deferred.  GAIT: Deferred.    CURRENT SCHEDULED MEDICATIONS:   atorvastatin  40 mg Oral QHS    clopidogreL  75 mg Oral Daily    enoxparin  40 mg Subcutaneous Daily    famotidine  20 mg Oral BID    propranoloL  20 mg Oral Daily    topiramate  100 mg Oral BID  "    CURRENT INFUSIONS:    DATA:  Recent Labs   Lab 09/17/24  0035 09/17/24  1030 09/18/24  0537    143 141   K 3.8 3.4* 3.6   * 114* 112*   CO2 19* 19* 20*   BUN 22 18 17   CREATININE 0.9 0.8 0.8    107 90   CALCIUM 8.5* 8.2* 8.0*   PHOS  --  3.6  --    MG 2.1 2.0 1.9   AST 13 12 11   ALT 11 11 10     Recent Labs   Lab 09/17/24  0035 09/17/24  1030 09/18/24  0537   WBC 10.33 9.50 8.19   HGB 13.0 13.6 11.5*   HCT 42.3 43.3 36.8*    196 191     No results found for: "PROTEINCSF", "GLUCCSF", "WBCCSF", "RBCCSF", "LYMPHCSF", "PMNCSF"  Hemoglobin A1C   Date Value Ref Range Status   08/12/2024 5.8 4.5 - 6.2 % Final     Comment:     According to ADA guidelines, hemoglobin A1C <7.0% represents  optimal control in non-pregnant diabetic patients.  Different  metrics may apply to specific populations.   Standards of Medical Care in Diabetes - 2016.    For the purpose of screening for the presence of diabetes:  <5.7%     Consistent with the absence of diabetes  5.7-6.4%  Consistent with increasing risk for diabetes   (prediabetes)  >or=6.5%  Consistent with diabetes    Currently no consensus exists for use of hemoglobin A1C  for diagnosis of diabetes for children.     09/14/2017 4.9 4.0 - 5.6 % Final     Comment:     According to ADA guidelines, hemoglobin A1c <7.0% represents  optimal control in non-pregnant diabetic patients. Different  metrics may apply to specific patient populations.   Standards of Medical Care in Diabetes-2016.  For the purpose of screening for the presence of diabetes:  <5.7%     Consistent with the absence of diabetes  5.7-6.4%  Consistent with increasing risk for diabetes   (prediabetes)  >or=6.5%  Consistent with diabetes  Currently, no consensus exists for use of hemoglobin A1c  for diagnosis of diabetes for children.  This Hemoglobin A1c assay has significant interference with fetal   hemoglobin   (HbF). The results are invalid for patients with abnormal amounts of   HbF, "   including those with known Hereditary Persistence   of Fetal Hemoglobin. Heterozygous hemoglobin variants (HbAS, HbAC,   HbAD, HbAE, HbA2) do not significantly interfere with this assay;   however, presence of multiple variants in a sample may impact the %   interference.     01/26/2015 5.3 4.5 - 6.2 % Final            I have personally reviewed and interpreted the pertinent imaging and lab results.  Imaging Results              US Carotid Bilateral (Final result)  Result time 09/17/24 13:43:51      Final result by Katrina Zelaya MD (09/17/24 13:43:51)                   Impression:      No hemodynamically significant stenosis in the extracranial carotid arteries    Antegrade flow within the vertebral arteries      Electronically signed by: Katrina Zelaya  Date:    09/17/2024  Time:    13:43               Narrative:      CMS MANDATED QUALITY DATA - CAROTID - 195    All measurements and percent stenosis described below were determined using NASCET criteria or criteria similar to NASCET, as defined by the Society of Radiologists in Ultrasound Consensus Conference, Radiology, 2003    EXAMINATION:  US CAROTID BILATERAL    CLINICAL HISTORY:  Syncope;.    TECHNIQUE:  Grayscale, color and spectral Doppler analysis was performed.    COMPARISON:  None.    FINDINGS:  Minimal plaque in the carotid bulbs    Peak systolic velocity in the right ICA is 104 cm/sec, with ICA/CCA ratio of 1.3.    Peak systolic velocity in the  left ICA is 90 cm/sec, with ICA/CCA ratio of 0.7.    Antegrade flow within the vertebral arteries                                       X-Ray Chest AP Portable (Final result)  Result time 09/17/24 00:52:33      Final result by Rimma Dunham MD (09/17/24 00:52:33)                   Impression:      No acute findings.      Electronically signed by: Rimma Dunham  Date:    09/17/2024  Time:    00:52               Narrative:    EXAMINATION:  XR CHEST AP PORTABLE    CLINICAL HISTORY:  Chest  Pain;    TECHNIQUE:  Single frontal view of the chest was performed.    COMPARISON:  03/26/2024    FINDINGS:  Emphysema.  No focal consolidation.  Normal heart size.                                       CT Maxillofacial Without Contrast (Final result)  Result time 09/17/24 01:24:58      Final result by Yolanda Boggs MD (09/17/24 01:24:58)                   Impression:      No evidence of acute fracture, significant soft tissue swelling involving the facial bones.    Incidental findings as above.      Electronically signed by: Yolanda Boggs  Date:    09/17/2024  Time:    01:24               Narrative:    EXAMINATION:  CT MAXILLOFACIAL WITHOUT CONTRAST    CLINICAL HISTORY:  fall;    TECHNIQUE:  Low dose axial images, sagittal and coronal reformations were obtained through the face.  Contrast was not administered.    COMPARISON:  CT brain and cervical spine also obtained same date 09/16/2024    FINDINGS:  Is no evidence of acute fracture involving the facial bones.  Minor incidental mucosal thickening in the sphenoid sinus otherwise the paranasal sinuses appear clear.  Globes and retro bulbar structures appear symmetric and grossly intact.  Mastoid air cells middle ears are clear.  Mild nasal septal deviation to the right is incidentally noted.  There is a cosmetic implant midline along the parasymphyseal mandible.    There are degenerative changes involving the TMJ joints bilaterally with irregularity of the mandibular condyles greater on the right.  Intracranial structures imaged demonstrate no acute abnormality and are described in detail on CT brain report.                                       CT Cervical Spine Without Contrast (Final result)  Result time 09/17/24 00:43:26      Final result by Yolanda Boggs MD (09/17/24 00:43:26)                   Impression:      No evidence of acute fracture or subluxation involving the cervical spine.    Multilevel spondylosis as detailed above at each disc  level.    Nodular apical pleural thickening and scarring on the right.  Consider follow-up to ensure stability/benignity.  Correlation with prior chest CTs if available may be helpful.    Significant emphysematous changes in the imaged lungs.      Electronically signed by: Yolanda Boggs  Date:    09/17/2024  Time:    00:43               Narrative:    EXAMINATION:  CT CERVICAL SPINE WITHOUT CONTRAST    CLINICAL HISTORY:  fall;    TECHNIQUE:  Low dose axial images, sagittal and coronal reformations were performed though the cervical spine.  Contrast was not administered.    COMPARISON:  MRI cervical spine 04/10/2021, chest x-ray 04/02/2023, 5/17/19    FINDINGS:  Sagittal reformatted images demonstrate adequate alignment of the cervical spine.  There is no evidence of acute fracture.  Craniocervical junction is aligned.    C2-C3:There is mild spondylosis with no evidence of significant disc protrusion or canal or neural foraminal stenosis.    C3-C4:There is no disc protrusion or canal or neural foraminal stenosis.  Uncovertebral joint hypertrophic changes noted greater on the left.    C4-C5:There is significant uncovertebral joint hypertrophy on the left contributing to moderate left neural foraminal stenosis.  There is mild right neural foraminal stenosis without significant canal stenosis.    C5-C6:There is spondylosis with loss of disc height and circumferential disc osteophyte complex formation.  Uncovertebral joint facet hypertrophic changes contribute to severe right neural foraminal stenosis.  There is no significant canal or left neural foraminal stenosis.    C6-C7:There is uncovertebral joint and facet hypertrophic change greater on the left contributing to mild left canal and left neural foraminal stenosis.    C7-T1:There is no significant bony canal significant neural foraminal stenosis.  Mild left neural foraminal narrowing related to is uncovertebral joint hypertrophy.    The soft tissue structures  visualized in the neck are unremarkable.    Nodular apical is pleural thickening and emphysematous changes noted on the right which was seen but to a lesser degree on the MRI which is not optimized for evaluating interval detrimental change.  Nodular appearance is mildly concerning.                                       CT Head Without Contrast (Final result)  Result time 09/17/24 00:22:07      Final result by Yolanda Boggs MD (09/17/24 00:22:07)                   Impression:      There is use no convincing acute intracranial abnormality or fracture.    Remote lacunar infarcts bilaterally in the basal ganglia and corona radiata.    Microvascular chronic ischemic changes.    Senescent atrophic changes.      Electronically signed by: Yolanda Boggs  Date:    09/17/2024  Time:    00:22               Narrative:    EXAMINATION:  CT HEAD WITHOUT CONTRAST    CLINICAL HISTORY:  fall;    TECHNIQUE:  Low dose axial images were obtained through the head.  Coronal and sagittal reformations were also performed. Contrast was not administered.    COMPARISON:  CT brain 04/02/2023    FINDINGS:  There is no evidence of acute intracranial hemorrhage or hematoma.  The gray-white matter junction differentiation is intact with no evidence of acute major arterial infarct.  There is no focal mass or mass effect.  Prominence of the ventricles, cisterns and sulci is in keeping with senescent atrophic changes.  There is moderate to marked confluent low density in the deep white matter as seen with microvascular chronic ischemic changes.  There are remote lacunar infarcts in the basal ganglia bilaterally and corona radiata bilaterally.  Paranasal sinuses, mastoid air cells and middle ears are clear.  The bony calvarium is intact.  Posterior fossa structures and pituitary gland are unremarkable allowing for skull base artifact.                                           ASSESSMENT AND PLAN:     Episodes of unresponsiveness   Orthostatic  hypotension     Plan:   Etiology of episodes of unresponsiveness is unknown however she was noted to be having low oxygen saturation which could be contributing to her episode.  Can not rule out seizures however less likely as she responded immediately without any postictal state.  Also these episodes are not similar to her prior seizure semiology.  Recommend to workup for metabolic derangements and infectious abnormalities that could cause episode of unresponsiveness.  EEG showed mild background slowing without seizures or epileptiform activity.  If continues to have episodes, might benefit from outpatient LTM for 48-72 hours  Continue with Topamax 100 mg b.i.d. for seizure prevention.  Orthostatic vitals were positive.  Workup and management per primary team.  Carotid ultrasound was negative for significant stenosis of extracranial carotid arteries.  PT OT and speech therapy evaluate and treat.    Normalize blood pressure.    Outpatient neurology follow-up            Sanchez Garcia MD  Neurology/vascular Neurology  Date of Service: 09/18/2024  10:19 AM    Please note: This note was transcribed using voice recognition software. Because of this technology there are often uinintended grammatical, spelling, and other transcription errors. Please disregard these errors.

## 2024-09-18 NOTE — PLAN OF CARE
Problem: Skin Injury Risk Increased  Goal: Skin Health and Integrity  Outcome: Progressing     Problem: Adult Inpatient Plan of Care  Goal: Plan of Care Review  Outcome: Progressing  Goal: Optimal Comfort and Wellbeing  Outcome: Progressing     Problem: Fall Injury Risk  Goal: Absence of Fall and Fall-Related Injury  Outcome: Progressing

## 2024-09-19 ENCOUNTER — E-VISIT (OUTPATIENT)
Dept: FAMILY MEDICINE | Facility: CLINIC | Age: 74
End: 2024-09-19
Payer: MEDICARE

## 2024-09-19 ENCOUNTER — TELEPHONE (OUTPATIENT)
Dept: PULMONOLOGY | Facility: CLINIC | Age: 74
End: 2024-09-19
Payer: MEDICARE

## 2024-09-19 ENCOUNTER — TELEPHONE (OUTPATIENT)
Dept: FAMILY MEDICINE | Facility: CLINIC | Age: 74
End: 2024-09-19
Payer: MEDICARE

## 2024-09-19 DIAGNOSIS — I25.10 CORONARY ARTERY DISEASE INVOLVING NATIVE CORONARY ARTERY OF NATIVE HEART WITHOUT ANGINA PECTORIS: ICD-10-CM

## 2024-09-19 DIAGNOSIS — G40.909 SEIZURE DISORDER: ICD-10-CM

## 2024-09-19 DIAGNOSIS — N39.41 URGENCY INCONTINENCE: ICD-10-CM

## 2024-09-19 DIAGNOSIS — I10 PRIMARY HYPERTENSION: ICD-10-CM

## 2024-09-19 DIAGNOSIS — G35 MS (MULTIPLE SCLEROSIS): ICD-10-CM

## 2024-09-19 DIAGNOSIS — Z01.818 PREOP EXAMINATION: Primary | ICD-10-CM

## 2024-09-19 LAB
ALBUMIN SERPL BCP-MCNC: 3.7 G/DL (ref 3.5–5.2)
ALP SERPL-CCNC: 125 U/L (ref 55–135)
ALT SERPL W/O P-5'-P-CCNC: 8 U/L (ref 10–44)
ANION GAP SERPL CALC-SCNC: 9 MMOL/L (ref 8–16)
AST SERPL-CCNC: 12 U/L (ref 10–40)
BACTERIA #/AREA URNS HPF: ABNORMAL /HPF
BACTERIA UR CULT: NO GROWTH
BASOPHILS # BLD AUTO: 0.03 K/UL (ref 0–0.2)
BASOPHILS NFR BLD: 0.3 % (ref 0–1.9)
BILIRUB SERPL-MCNC: 0.8 MG/DL (ref 0.1–1)
BILIRUB UR QL STRIP: NEGATIVE
BUN SERPL-MCNC: 17 MG/DL (ref 8–23)
CALCIUM SERPL-MCNC: 8.5 MG/DL (ref 8.7–10.5)
CHLORIDE SERPL-SCNC: 111 MMOL/L (ref 95–110)
CLARITY UR: ABNORMAL
CO2 SERPL-SCNC: 19 MMOL/L (ref 23–29)
COLOR UR: YELLOW
CREAT SERPL-MCNC: 0.9 MG/DL (ref 0.5–1.4)
DIFFERENTIAL METHOD BLD: ABNORMAL
EOSINOPHIL # BLD AUTO: 0.2 K/UL (ref 0–0.5)
EOSINOPHIL NFR BLD: 1.8 % (ref 0–8)
ERYTHROCYTE [DISTWIDTH] IN BLOOD BY AUTOMATED COUNT: 14.3 % (ref 11.5–14.5)
EST. GFR  (NO RACE VARIABLE): >60 ML/MIN/1.73 M^2
GLUCOSE SERPL-MCNC: 113 MG/DL (ref 70–110)
GLUCOSE UR QL STRIP: NEGATIVE
HCT VFR BLD AUTO: 41.3 % (ref 37–48.5)
HGB BLD-MCNC: 13.1 G/DL (ref 12–16)
HGB UR QL STRIP: ABNORMAL
IMM GRANULOCYTES # BLD AUTO: 0.04 K/UL (ref 0–0.04)
IMM GRANULOCYTES NFR BLD AUTO: 0.4 % (ref 0–0.5)
KETONES UR QL STRIP: ABNORMAL
LEUKOCYTE ESTERASE UR QL STRIP: ABNORMAL
LYMPHOCYTES # BLD AUTO: 2 K/UL (ref 1–4.8)
LYMPHOCYTES NFR BLD: 21.1 % (ref 18–48)
MAGNESIUM SERPL-MCNC: 1.9 MG/DL (ref 1.6–2.6)
MCH RBC QN AUTO: 27.4 PG (ref 27–31)
MCHC RBC AUTO-ENTMCNC: 31.7 G/DL (ref 32–36)
MCV RBC AUTO: 86 FL (ref 82–98)
MICROSCOPIC COMMENT: ABNORMAL
MONOCYTES # BLD AUTO: 0.7 K/UL (ref 0.3–1)
MONOCYTES NFR BLD: 7.6 % (ref 4–15)
NEUTROPHILS # BLD AUTO: 6.4 K/UL (ref 1.8–7.7)
NEUTROPHILS NFR BLD: 68.8 % (ref 38–73)
NITRITE UR QL STRIP: NEGATIVE
NON-SQ EPI CELLS #/AREA URNS HPF: 2 /HPF
NRBC BLD-RTO: 0 /100 WBC
PH UR STRIP: 6 [PH] (ref 5–8)
PLATELET # BLD AUTO: 191 K/UL (ref 150–450)
PMV BLD AUTO: 11.4 FL (ref 9.2–12.9)
POTASSIUM SERPL-SCNC: 3.7 MMOL/L (ref 3.5–5.1)
PROT SERPL-MCNC: 6.3 G/DL (ref 6–8.4)
PROT UR QL STRIP: ABNORMAL
RBC # BLD AUTO: 4.78 M/UL (ref 4–5.4)
RBC #/AREA URNS HPF: 8 /HPF (ref 0–4)
SODIUM SERPL-SCNC: 139 MMOL/L (ref 136–145)
SP GR UR STRIP: 1.02 (ref 1–1.03)
SQUAMOUS #/AREA URNS HPF: 2 /HPF
URN SPEC COLLECT METH UR: ABNORMAL
UROBILINOGEN UR STRIP-ACNC: ABNORMAL EU/DL
WBC # BLD AUTO: 9.27 K/UL (ref 3.9–12.7)
WBC #/AREA URNS HPF: 63 /HPF (ref 0–5)

## 2024-09-19 PROCEDURE — 85025 COMPLETE CBC W/AUTO DIFF WBC: CPT | Performed by: INTERNAL MEDICINE

## 2024-09-19 PROCEDURE — 81001 URINALYSIS AUTO W/SCOPE: CPT

## 2024-09-19 PROCEDURE — 80053 COMPREHEN METABOLIC PANEL: CPT | Performed by: INTERNAL MEDICINE

## 2024-09-19 PROCEDURE — 99900031 HC PATIENT EDUCATION (STAT)

## 2024-09-19 PROCEDURE — 25000003 PHARM REV CODE 250: Performed by: INTERNAL MEDICINE

## 2024-09-19 PROCEDURE — 36415 COLL VENOUS BLD VENIPUNCTURE: CPT | Performed by: INTERNAL MEDICINE

## 2024-09-19 PROCEDURE — 99422 OL DIG E/M SVC 11-20 MIN: CPT | Mod: ,,, | Performed by: STUDENT IN AN ORGANIZED HEALTH CARE EDUCATION/TRAINING PROGRAM

## 2024-09-19 PROCEDURE — 21400001 HC TELEMETRY ROOM

## 2024-09-19 PROCEDURE — 63600175 PHARM REV CODE 636 W HCPCS: Performed by: INTERNAL MEDICINE

## 2024-09-19 PROCEDURE — 87086 URINE CULTURE/COLONY COUNT: CPT

## 2024-09-19 PROCEDURE — 94760 N-INVAS EAR/PLS OXIMETRY 1: CPT

## 2024-09-19 PROCEDURE — 95819 EEG AWAKE AND ASLEEP: CPT

## 2024-09-19 PROCEDURE — 97530 THERAPEUTIC ACTIVITIES: CPT | Mod: CQ

## 2024-09-19 PROCEDURE — 25000003 PHARM REV CODE 250

## 2024-09-19 PROCEDURE — 97116 GAIT TRAINING THERAPY: CPT | Mod: CQ

## 2024-09-19 PROCEDURE — 83735 ASSAY OF MAGNESIUM: CPT | Performed by: INTERNAL MEDICINE

## 2024-09-19 RX ADMIN — FAMOTIDINE 20 MG: 20 TABLET ORAL at 08:09

## 2024-09-19 RX ADMIN — TOPIRAMATE 100 MG: 100 TABLET, FILM COATED ORAL at 09:09

## 2024-09-19 RX ADMIN — POTASSIUM BICARBONATE 50 MEQ: 977.5 TABLET, EFFERVESCENT ORAL at 05:09

## 2024-09-19 RX ADMIN — CLOPIDOGREL BISULFATE 75 MG: 75 TABLET, FILM COATED ORAL at 08:09

## 2024-09-19 RX ADMIN — FAMOTIDINE 20 MG: 20 TABLET ORAL at 09:09

## 2024-09-19 RX ADMIN — PROPRANOLOL HYDROCHLORIDE 20 MG: 10 TABLET ORAL at 08:09

## 2024-09-19 RX ADMIN — TOPIRAMATE 100 MG: 100 TABLET, FILM COATED ORAL at 08:09

## 2024-09-19 RX ADMIN — ENOXAPARIN SODIUM 40 MG: 40 INJECTION SUBCUTANEOUS at 04:09

## 2024-09-19 RX ADMIN — ATORVASTATIN CALCIUM 40 MG: 40 TABLET, FILM COATED ORAL at 09:09

## 2024-09-19 NOTE — PROGRESS NOTES
09/19/24 1015 09/19/24 1020 09/19/24 1025   Vital Signs   BP (!) 153/67 (!) 163/74 (!) 170/77   Patient Position Lying Sitting Standing     Orthostatic BP Results

## 2024-09-19 NOTE — PT/OT/SLP PROGRESS
Physical Therapy Treatment    Patient Name:  Alyssa John   MRN:  [id number]    Recommendations:     Discharge Recommendations: High Intensity Therapy  Discharge Equipment Recommendations:  (TBD at next level of care)  Barriers to discharge:  recent fall, decreased mobility from baseline    Assessment:     Alyssa John is a 73 y.o. female admitted with a medical diagnosis of <principal problem not specified>.  She presents with the following impairments/functional limitations: weakness, impaired endurance, impaired functional mobility, gait instability, decreased lower extremity function, impaired balance, impaired cardiopulmonary response to activity.    SBP elevated but (-) OH and (-) symptoms.   Supine: 153/67 (96), 71 HR  Seated EOB: 170/77 (111), 75 HR  Standin/74 (106), 77 HR    Pt displays significant instability and requires 2-person assist for lateral and backward stepping during pivot transfers, but was able to increase fwd gait to 15' with RW, modA of 1, and chair follow.     Extended time required during session for cleanup (pt seated EOB) due to purewick malfunction and pt found with gown and all linens saturated and soiled. Additional extended time at end of session for transfer to/from [facility] with modAx2 due to sudden bowel urgency but (-) BM and (+) flatus only.     Very good tolerance to extensive mobility efforts today. Despite initial apprehension about planned transfer to chair, as session progressed, pt increased in confidence and was  motivated to increase gait distance.   Pt was left up in chair with needs at hand, spouse re-entering room, and extender planning to apply new purewick system.     Rehab Prognosis: Good and Fair; patient would benefit from acute skilled PT services to address these deficits and reach maximum level of function.    Recent Surgery: * No surgery found *      Plan:     During this hospitalization, patient to be seen 6 x/week to address the identified  rehab impairments via gait training, therapeutic activities, therapeutic exercises, neuromuscular re-education and progress toward the following goals:    Plan of Care Expires:  10/18/24    Subjective     Chief Complaint: weakness, decreased mobility   Patient/Family Comments/goals: get back to walking  Pain/Comfort:  Pain Rating 1: 0/10  Pain Rating Post-Intervention 1: 0/10      Objective:     Communicated with nurse prior to session.  Patient found HOB elevated with telemetry, PureWick, peripheral IV, bed alarm upon PT entry to room.     General Precautions: Standard, fall, seizure  Orthopedic Precautions: N/A  Braces: N/A  Respiratory Status: Room air     Functional Mobility:  Bed Mobility:     Supine to Sit: moderate assistance  Transfers:     Sit to Stand:  moderate assistance and of 2 persons with rolling walker and requiring assist to transfer each hand to RW handle; VC for wide BELLE to improve stability   Bed to Chair: modAx1 and minAx1 with  rolling walker  using  Stand Pivot and VC throughout; unstable   Toilet Transfer: modAx1 and minAx1 with  rolling walker  using  Stand Pivot  Gait: fwd gait 15' RW, modA, chair follow, very slow gait speed, VC for BUE support to improve LLE step length; increased difficulty with R wt shift       AM-PAC 6 CLICK MOBILITY          Treatment & Education:  -Pt educ: benefits of participation with therapy, OOB to chair during the day and for all meals, 2-person staff assist for transfers, call light, fall prevention  -Change of gown, brief, bedding due to purewick malfunction  -Transfer training  -Static stand x 3 trials for standing BP and then skin hygiene     Patient left up in chair with all lines intact, call button in reach, chair alarm on, nurse notified, and spouse and extender present..    GOALS:   Multidisciplinary Problems       Physical Therapy Goals          Problem: Physical Therapy    Goal Priority Disciplines Outcome Goal Variances Interventions   Physical  Therapy Goal     PT, PT/OT Progressing     Description: Goals to be met by: 10/18/24     Patient will increase functional independence with mobility by performin. Supine to sit with Stand-by Assistance  2. Sit to supine with MInimal Assistance  3. Sit to stand transfer with Contact Guard Assistance  4. Bed to chair transfer with Contact Guard Assistance using Rolling Walker  5. Gait  x 2 x 25 feet with Contact Guard Assistance using Rolling Walker.                          Time Tracking:     PT Received On: 24  PT Start Time: 1002     PT Stop Time: 1058  PT Total Time (min): 56 min     Billable Minutes: Gait Training 15 and Therapeutic Activity 41    Treatment Type: Treatment  PT/PTA: PTA     Number of PTA visits since last PT visit: 2024

## 2024-09-19 NOTE — SUBJECTIVE & OBJECTIVE
Interval History:  Patient was seen and examined at bedside, she mentioned she felt much better compared to presentation.  She did not have any active complaints today.  She was wondering when she can get a bed upstairs and can lead the ED.    Review of Systems   Constitutional:  Negative for appetite change, chills, fatigue and fever.   HENT:  Negative for congestion.    Respiratory:  Negative for apnea, chest tightness, shortness of breath and wheezing.    Cardiovascular:  Negative for chest pain, palpitations and leg swelling.   Gastrointestinal:  Negative for abdominal distention, abdominal pain, constipation and nausea.   Genitourinary:  Negative for dysuria.     Objective:     Vital Signs (Most Recent):  Temp: 97.8 °F (36.6 °C) (09/18/24 1914)  Pulse: 69 (09/18/24 1914)  Resp: 20 (09/18/24 1914)  BP: (!) 184/77 (09/18/24 1914)  SpO2: 96 % (09/18/24 1914) Vital Signs (24h Range):  Temp:  [97.6 °F (36.4 °C)-97.9 °F (36.6 °C)] 97.8 °F (36.6 °C)  Pulse:  [65-76] 69  Resp:  [16-24] 20  SpO2:  [96 %-98 %] 96 %  BP: (150-184)/(66-77) 184/77     Weight: 95.4 kg (210 lb 5.1 oz)  Body mass index is 37.26 kg/m².    Intake/Output Summary (Last 24 hours) at 9/18/2024 1932  Last data filed at 9/18/2024 0345  Gross per 24 hour   Intake 240 ml   Output 400 ml   Net -160 ml         Physical Exam  Constitutional:       Appearance: Normal appearance.   HENT:      Head:      Comments: Bruise under her left eye from the fall     Nose: Nose normal.      Mouth/Throat:      Mouth: Mucous membranes are moist.   Eyes:      Pupils: Pupils are equal, round, and reactive to light.   Cardiovascular:      Rate and Rhythm: Regular rhythm.      Pulses: Normal pulses.   Pulmonary:      Effort: Pulmonary effort is normal. No respiratory distress.      Breath sounds: Normal breath sounds. No wheezing or rhonchi.   Abdominal:      General: Abdomen is flat. There is no distension.      Palpations: Abdomen is soft. There is no mass.       Tenderness: There is no abdominal tenderness.   Musculoskeletal:      Right lower leg: No edema.      Left lower leg: No edema.   Skin:     Coloration: Skin is not jaundiced.   Neurological:      General: No focal deficit present.      Mental Status: She is alert. Mental status is at baseline.      Sensory: No sensory deficit.      Motor: No weakness.   Psychiatric:         Mood and Affect: Mood normal.         Behavior: Behavior normal.             Significant Labs: All pertinent labs within the past 24 hours have been reviewed.    Significant Imaging: I have reviewed all pertinent imaging results/findings within the past 24 hours.

## 2024-09-19 NOTE — ASSESSMENT & PLAN NOTE
Chronic, controlled. Latest blood pressure and vitals reviewed-     Temp:  [97.8 °F (36.6 °C)-98.6 °F (37 °C)]   Pulse:  [68-76]   Resp:  [18-20]   BP: (147-197)/(67-81)   SpO2:  [95 %-98 %] .   Home meds for hypertension were reviewed and noted below.   Hypertension Medications               nitroGLYCERIN (NITROSTAT) 0.4 MG SL tablet Place 0.4 mg under the tongue every 5 (five) minutes as needed for Chest pain.    propranoloL (INDERAL LA) 120 MG 24 hr capsule TAKE 1 CAPSULE BY MOUTH ONCE DAILY    torsemide (DEMADEX) 5 MG Tab Take 1 tablet (5 mg total) by mouth daily as needed (swelling).            While in the hospital, will manage blood pressure as follows;     Presented with syncope and hypotension   We will restart propranolol at a low dose of 20 mg  Orthostatic vitals improved on repeat    Will utilize p.r.n. blood pressure medication only if patient's blood pressure greater than 180/110 and she develops symptoms such as worsening chest pain or shortness of breath.

## 2024-09-19 NOTE — PLAN OF CARE
Problem: Skin Injury Risk Increased  Goal: Skin Health and Integrity  Outcome: Progressing     Problem: Adult Inpatient Plan of Care  Goal: Plan of Care Review  Outcome: Progressing  Goal: Patient-Specific Goal (Individualized)  Outcome: Progressing  Goal: Absence of Hospital-Acquired Illness or Injury  Outcome: Progressing     Problem: Fall Injury Risk  Goal: Absence of Fall and Fall-Related Injury  Outcome: Progressing

## 2024-09-19 NOTE — ASSESSMENT & PLAN NOTE
-presented after episode of syncope and collapse  -was found on the toilet with reduced awareness, later had a syncopal episode  -CT head and CT maxillofacial negative for acute abnormality  -2D echo EF normal, no major valvular abnormalities   -carotid ultrasound no significant stenosis  -CT showing multiple changes in cervical spine, no critical foraminal stenosis, outpatient neurosurgery follow-up  -neurology consulted given her seizure disorder and these episodes of impaired awareness  -EEG obtained, no seizure activity, mild encephalopathy   -telemetry  -orthostatic vitals the positive today, blood pressure while standing greater than 110/60 mmHg.  Reduce antihypertensive dose. repeat orthostatic vitals tomorrow

## 2024-09-19 NOTE — PROGRESS NOTES
[facility] Medicine  Progress Note    Patient Name: Alyssa John  MRN: [id number]  Patient Class: IP- Inpatient   Admission Date: 9/16/2024  Length of Stay: 1 days  Attending Physician: Sridevi Julian MD  Primary Care Provider: Piyush Sharif MD        Subjective:     Principal Problem:Syncope and collapse        HPI:  Alyssa John is a 73 y.o. female who has a history of  hypertension, seizures, multiple sclerosis, CAD, status post MI  and presents with syncopal episode     History given by , patient has some degree of short-term memory loss and forgetfulness, unclear if this represents some degree of progressive or early dementia.  Patient also feels some details he when able.  She had gone to use the bathroom and has sat down on the toilet that and had not yet relieve herself when suddenly she fell and collapsed towards the left.  She hit the left side of her face and cheek on her way down.  She woke she called out to her  who came to assist her in brought her back up to a seated position.  They were concerned about what happened and felt that she should come to the hospital.  They denied any recollection of chest pain or shortness of breath or symptoms prior to that.  It does not appear that patient had a postictal..  Normally when patient has seizures they are grand mal seizures and there were no signs of that per the patient.  General patient has been denying any recent fevers or recent shortness of breath or any other significant symptoms.    Overview/Hospital Course:  73 year old female with history of seizures compliant with home medication of topiramate presented after episode of decreased awareness and later an syncopal episode. CT head negative. 2D ECHO showed normal EF no major valvular abnormalities, Carotid USG did not show any significant stenosis, Neurology was consulted given her recent episodes of reduced awareness (though these are  different from her previous grand mal seizures) who obtained an EEG that did not show seizure activity.  She was in the ED, tele monitor did not show any obvious arrhythmia.  Orthostatic vitals were positive but her standing blood pressure was greater than 110/60 mmHg.  Her home propranolol dose was reduced to 20 mg with p.r.n. hydralazine for better blood pressure control.  Neurology is following, appreciate their recommendations.  PT recommended acute rehab discharge.    Interval History:  Patient seen and examined.  Overnight notes reviewed.  Patient's  was at bedside.  Patient is sitting up in chair reports feeling well after working with therapy.  She was still reports fatigue and generalized weakness but states she feels better than she did on admit.  PT OT recommended high-intensity therapy.  Case management consulted for discharge planning.  Repeat orthostatic vitals were negative.  Repeat UA ordered.    Review of Systems   Constitutional:  Positive for fatigue. Negative for fever.   Respiratory:  Negative for shortness of breath.    Cardiovascular:  Negative for chest pain.   Gastrointestinal:  Negative for abdominal pain and nausea.   Neurological:  Positive for weakness (generalized).     Objective:     Vital Signs (Most Recent):  Temp: 97.8 °F (36.6 °C) (09/19/24 1101)  Pulse: 70 (09/19/24 1101)  Resp: 18 (09/19/24 1101)  BP: (!) 147/70 (09/19/24 1101)  SpO2: 96 % (09/19/24 1101) Vital Signs (24h Range):  Temp:  [97.8 °F (36.6 °C)-98.6 °F (37 °C)] 97.8 °F (36.6 °C)  Pulse:  [68-76] 70  Resp:  [18-20] 18  SpO2:  [95 %-98 %] 96 %  BP: (147-197)/(67-81) 147/70     Weight: 95.4 kg (210 lb 5.1 oz)  Body mass index is 37.26 kg/m².    Intake/Output Summary (Last 24 hours) at 9/19/2024 1342  Last data filed at 9/19/2024 0938  Gross per 24 hour   Intake 480 ml   Output --   Net 480 ml         Physical Exam  Vitals and nursing note reviewed.   Constitutional:       General: She is not in acute distress.      Appearance: Normal appearance. She is obese. She is ill-appearing (chronically).   HENT:      Head: Normocephalic.        Comments: Bruising noted under left eye s/p fall.  Eyes:      Extraocular Movements: Extraocular movements intact.   Cardiovascular:      Rate and Rhythm: Normal rate and regular rhythm.   Pulmonary:      Effort: Pulmonary effort is normal.      Breath sounds: Normal breath sounds.   Abdominal:      General: Abdomen is flat. Bowel sounds are normal.      Palpations: Abdomen is soft.      Tenderness: There is no abdominal tenderness. There is no guarding or rebound.   Skin:     General: Skin is warm.   Neurological:      General: No focal deficit present.      Mental Status: She is alert and oriented to person, place, and time. Mental status is at baseline.   Psychiatric:         Mood and Affect: Mood normal.         Behavior: Behavior normal.             Significant Labs: All pertinent labs within the past 24 hours have been reviewed.  CBC:   Recent Labs   Lab 09/18/24  0537 09/19/24  0642   WBC 8.19 9.27   HGB 11.5* 13.1   HCT 36.8* 41.3    191     CMP:   Recent Labs   Lab 09/18/24  0537 09/19/24  0443    139   K 3.6 3.7   * 111*   CO2 20* 19*   GLU 90 113*   BUN 17 17   CREATININE 0.8 0.9   CALCIUM 8.0* 8.5*   PROT 5.7* 6.3   ALBUMIN 3.3* 3.7   BILITOT 0.5 0.8   ALKPHOS 122 125   AST 11 12   ALT 10 8*   ANIONGAP 9 9     Magnesium:   Recent Labs   Lab 09/18/24  0537 09/19/24  0443   MG 1.9 1.9       Significant Imaging: I have reviewed all pertinent imaging results/findings within the past 24 hours.    Assessment/Plan:      * Syncope and collapse  -presented after episode of syncope and collapse  -was found on the toilet with reduced awareness, later had a syncopal episode  -CT head and CT maxillofacial negative for acute abnormality  -2D echo EF normal, no major valvular abnormalities   -carotid ultrasound no significant stenosis  -CT showing multiple changes in cervical  spine, no critical foraminal stenosis, outpatient neurosurgery follow-up  -neurology consulted given her seizure disorder and these episodes of impaired awareness  -EEG obtained, no seizure activity, mild encephalopathy   -telemetry  -orthostatic vitals the positive 9/18, blood pressure while standing greater than 110/60 mmHg.  Reduce antihypertensive dose. repeat orthostatic vitals were negative 9/19  - PT/OT recommending high-intensity therapy.  Case management consulted for discharge planning      Emphysema lung  -smoked 2 pack per day for 40 years   -incidental finding of emphysematous changes   -currently no active wheezing, no COPD exacerbation, on room air comfortable  -outpatient follow-up    GERD (gastroesophageal reflux disease)  Continue famotidine      Hypertension  Chronic, controlled. Latest blood pressure and vitals reviewed-     Temp:  [97.8 °F (36.6 °C)-98.6 °F (37 °C)]   Pulse:  [68-76]   Resp:  [18-20]   BP: (147-197)/(67-81)   SpO2:  [95 %-98 %] .   Home meds for hypertension were reviewed and noted below.   Hypertension Medications               nitroGLYCERIN (NITROSTAT) 0.4 MG SL tablet Place 0.4 mg under the tongue every 5 (five) minutes as needed for Chest pain.    propranoloL (INDERAL LA) 120 MG 24 hr capsule TAKE 1 CAPSULE BY MOUTH ONCE DAILY    torsemide (DEMADEX) 5 MG Tab Take 1 tablet (5 mg total) by mouth daily as needed (swelling).            While in the hospital, will manage blood pressure as follows;     Presented with syncope and hypotension   We will restart propranolol at a low dose of 20 mg  Orthostatic vitals improved on repeat    Will utilize p.r.n. blood pressure medication only if patient's blood pressure greater than 180/110 and she develops symptoms such as worsening chest pain or shortness of breath.    Seizure disorder  History of seizure disorder   On topiramate 100 mg b.i.d. at home   Recent episodes of reduced awareness  Neurology on board   EEG no seizure activity    Continue topiramate   Seizure precautions   Aspiration precautions   Fall precautions      Hyperlipemia  Continue statin        VTE Risk Mitigation (From admission, onward)           Ordered     enoxaparin injection 40 mg  Daily         09/17/24 0633     IP VTE HIGH RISK PATIENT  Once         09/17/24 0633     Place sequential compression device  Until discontinued         09/17/24 0633                    Discharge Planning   ROGERIO: 9/20/2024     Code Status: Full Code   Is the patient medically ready for discharge?:     Reason for patient still in hospital (select all that apply): Patient trending condition, Laboratory test, Treatment, Consult recommendations, PT / OT recommendations, and Pending disposition  Discharge Plan A: Rehab                  Tierra Kerr PA-C  Department of Hospital Medicine   [facility]

## 2024-09-19 NOTE — PLAN OF CARE
Problem: Skin Injury Risk Increased  Goal: Skin Health and Integrity  Outcome: Progressing     Problem: Adult Inpatient Plan of Care  Goal: Plan of Care Review  Outcome: Progressing  Goal: Patient-Specific Goal (Individualized)  Outcome: Progressing  Goal: Absence of Hospital-Acquired Illness or Injury  Outcome: Progressing  Goal: Optimal Comfort and Wellbeing  Outcome: Progressing  Goal: Readiness for Transition of Care  Outcome: Progressing     Problem: Fall Injury Risk  Goal: Absence of Fall and Fall-Related Injury  Outcome: Progressing     Problem: Wound  Goal: Optimal Coping  Outcome: Progressing  Goal: Optimal Functional Ability  Outcome: Progressing  Goal: Absence of Infection Signs and Symptoms  Outcome: Progressing  Goal: Improved Oral Intake  Outcome: Progressing  Goal: Optimal Pain Control and Function  Outcome: Progressing  Goal: Skin Health and Integrity  Outcome: Progressing  Goal: Optimal Wound Healing  Outcome: Progressing

## 2024-09-19 NOTE — PROGRESS NOTES
[facility] Medicine  Progress Note    Patient Name: Alyssa John  MRN: [id number]  Patient Class: IP- Inpatient   Admission Date: 9/16/2024  Length of Stay: 0 days  Attending Physician: Aaron Torres MD  Primary Care Provider: Piyush Sharif MD        Subjective:     Principal Problem:<principal problem not specified>        HPI:  Alyssa John is a 73 y.o. female who has a history of  hypertension, seizures, multiple sclerosis, CAD, status post MI  and presents with syncopal episode     History given by , patient has some degree of short-term memory loss and forgetfulness, unclear if this represents some degree of progressive or early dementia.  Patient also feels some details he when able.  She had gone to use the bathroom and has sat down on the toilet that and had not yet relieve herself when suddenly she fell and collapsed towards the left.  She hit the left side of her face and cheek on her way down.  She woke she called out to her  who came to assist her in brought her back up to a seated position.  They were concerned about what happened and felt that she should come to the hospital.  They denied any recollection of chest pain or shortness of breath or symptoms prior to that.  It does not appear that patient had a postictal..  Normally when patient has seizures they are grand mal seizures and there were no signs of that per the patient.  General patient has been denying any recent fevers or recent shortness of breath or any other significant symptoms.    Overview/Hospital Course:  73 year old female with history of seizures compliant with home medication of topiramate presented after episode of decreased awareness and later an syncopal episode. CT head negative. 2D ECHO showed normal EF no major valvular abnormalities, Carotid USG did not show any significant stenosis, Neurology was consulted given her recent episodes of reduced awareness (though  these are different from her previous grand mal seizures) who obtained an EEG that did not show seizure activity.  She was in the ED, tele monitor did not show any obvious arrhythmia.  Orthostatic vitals were positive but her standing blood pressure was greater than 110/60 mmHg.  Her home propranolol dose was reduced to 20 mg with p.r.n. hydralazine for better blood pressure control.  Neurology is following, appreciate their recommendations.  PT recommended acute rehab discharge.    Interval History:  Patient was seen and examined at bedside, she mentioned she felt much better compared to presentation.  She did not have any active complaints today.  She was wondering when she can get a bed upstairs and can lead the ED.    Review of Systems   Constitutional:  Negative for appetite change, chills, fatigue and fever.   HENT:  Negative for congestion.    Respiratory:  Negative for apnea, chest tightness, shortness of breath and wheezing.    Cardiovascular:  Negative for chest pain, palpitations and leg swelling.   Gastrointestinal:  Negative for abdominal distention, abdominal pain, constipation and nausea.   Genitourinary:  Negative for dysuria.     Objective:     Vital Signs (Most Recent):  Temp: 97.8 °F (36.6 °C) (09/18/24 1914)  Pulse: 69 (09/18/24 1914)  Resp: 20 (09/18/24 1914)  BP: (!) 184/77 (09/18/24 1914)  SpO2: 96 % (09/18/24 1914) Vital Signs (24h Range):  Temp:  [97.6 °F (36.4 °C)-97.9 °F (36.6 °C)] 97.8 °F (36.6 °C)  Pulse:  [65-76] 69  Resp:  [16-24] 20  SpO2:  [96 %-98 %] 96 %  BP: (150-184)/(66-77) 184/77     Weight: 95.4 kg (210 lb 5.1 oz)  Body mass index is 37.26 kg/m².    Intake/Output Summary (Last 24 hours) at 9/18/2024 1932  Last data filed at 9/18/2024 0345  Gross per 24 hour   Intake 240 ml   Output 400 ml   Net -160 ml         Physical Exam  Constitutional:       Appearance: Normal appearance.   HENT:      Head:      Comments: Bruise under her left eye from the fall     Nose: Nose normal.       Mouth/Throat:      Mouth: Mucous membranes are moist.   Eyes:      Pupils: Pupils are equal, round, and reactive to light.   Cardiovascular:      Rate and Rhythm: Regular rhythm.      Pulses: Normal pulses.   Pulmonary:      Effort: Pulmonary effort is normal. No respiratory distress.      Breath sounds: Normal breath sounds. No wheezing or rhonchi.   Abdominal:      General: Abdomen is flat. There is no distension.      Palpations: Abdomen is soft. There is no mass.      Tenderness: There is no abdominal tenderness.   Musculoskeletal:      Right lower leg: No edema.      Left lower leg: No edema.   Skin:     Coloration: Skin is not jaundiced.   Neurological:      General: No focal deficit present.      Mental Status: She is alert. Mental status is at baseline.      Sensory: No sensory deficit.      Motor: No weakness.   Psychiatric:         Mood and Affect: Mood normal.         Behavior: Behavior normal.             Significant Labs: All pertinent labs within the past 24 hours have been reviewed.    Significant Imaging: I have reviewed all pertinent imaging results/findings within the past 24 hours.    Assessment/Plan:      Emphysema lung  -smoked 2 pack per day for 40 years   -incidental finding of emphysematous changes   -currently no active wheezing, no COPD exacerbation, on room air comfortable  -outpatient follow-up    Syncope and collapse  -presented after episode of syncope and collapse  -was found on the toilet with reduced awareness, later had a syncopal episode  -CT head and CT maxillofacial negative for acute abnormality  -2D echo EF normal, no major valvular abnormalities   -carotid ultrasound no significant stenosis  -CT showing multiple changes in cervical spine, no critical foraminal stenosis, outpatient neurosurgery follow-up  -neurology consulted given her seizure disorder and these episodes of impaired awareness  -EEG obtained, no seizure activity, mild encephalopathy   -telemetry  -orthostatic  vitals the positive today, blood pressure while standing greater than 110/60 mmHg.  Reduce antihypertensive dose. repeat orthostatic vitals tomorrow      GERD (gastroesophageal reflux disease)  Continue famotidine      Hypertension  Chronic, controlled. Latest blood pressure and vitals reviewed-     Temp:  [97.6 °F (36.4 °C)-97.9 °F (36.6 °C)]   Pulse:  [65-76]   Resp:  [16-24]   BP: (150-184)/(66-77)   SpO2:  [96 %-98 %] .   Home meds for hypertension were reviewed and noted below.   Hypertension Medications               nitroGLYCERIN (NITROSTAT) 0.4 MG SL tablet Place 0.4 mg under the tongue every 5 (five) minutes as needed for Chest pain.    propranoloL (INDERAL LA) 120 MG 24 hr capsule TAKE 1 CAPSULE BY MOUTH ONCE DAILY    torsemide (DEMADEX) 5 MG Tab Take 1 tablet (5 mg total) by mouth daily as needed (swelling).            While in the hospital, will manage blood pressure as follows;     Presented with syncope and hypotension   We will restart propranolol at a low dose of 20 mg  Orthostatic vitals tomorrow    Will utilize p.r.n. blood pressure medication only if patient's blood pressure greater than 180/110 and she develops symptoms such as worsening chest pain or shortness of breath.    Seizure disorder  History of seizure disorder   On topiramate 100 mg b.i.d. at home   Recent episodes of reduced awareness  Neurology on board   EEG no seizure activity   Continue topiramate   Seizure precautions   Aspiration precautions   Fall precautions      Hyperlipemia  Continue statin        VTE Risk Mitigation (From admission, onward)           Ordered     enoxaparin injection 40 mg  Daily         09/17/24 0633     IP VTE HIGH RISK PATIENT  Once         09/17/24 0633     Place sequential compression device  Until discontinued         09/17/24 0633                    Discharge Planning   ROGERIO: 9/19/2024     Code Status: Full Code   Is the patient medically ready for discharge?:     Reason for patient still in hospital  (select all that apply): Treatment  Discharge Plan A: Home with family                  Viloeta Espinoza MD  Department of Hospital Medicine   [facility]

## 2024-09-19 NOTE — ASSESSMENT & PLAN NOTE
History of seizure disorder   On topiramate 100 mg b.i.d. at home   Recent episodes of reduced awareness  Neurology on board   EEG no seizure activity   Continue topiramate   Seizure precautions   Aspiration precautions   Fall precautions

## 2024-09-19 NOTE — ASSESSMENT & PLAN NOTE
-presented after episode of syncope and collapse  -was found on the toilet with reduced awareness, later had a syncopal episode  -CT head and CT maxillofacial negative for acute abnormality  -2D echo EF normal, no major valvular abnormalities   -carotid ultrasound no significant stenosis  -CT showing multiple changes in cervical spine, no critical foraminal stenosis, outpatient neurosurgery follow-up  -neurology consulted given her seizure disorder and these episodes of impaired awareness  -EEG obtained, no seizure activity, mild encephalopathy   -telemetry  -orthostatic vitals the positive 9/18, blood pressure while standing greater than 110/60 mmHg.  Reduce antihypertensive dose. repeat orthostatic vitals were negative 9/19  - PT/OT recommending high-intensity therapy.  Case management consulted for discharge planning

## 2024-09-19 NOTE — TELEPHONE ENCOUNTER
----- Message from Piyush Sharif MD sent at 9/19/2024  6:42 AM CDT -----  Regarding: FW: Surgical Clearance and Optimazation  If pt needs preop please schedule thx can be evisit  ----- Message -----  From: Carrie Gerardo LPN  Sent: 9/13/2024   2:57 PM CDT  To: Piyush Sharif MD  Subject: Surgical Clearance and Optimazation              Good afternoon,    Patient is scheduled for a INSERTION, NEUROSTIMULATOR, TEMPORARY, SACRAL with Dr. Rosales on 9/27/2024 and is expected to be under general anesthesia for 59 minutes. Can you please provide clearance and optimization? Can you also provide plavix instructions? Thank you!

## 2024-09-19 NOTE — CARE UPDATE
09/19/24 0745   PRE-TX-O2   Device (Oxygen Therapy) room air   SpO2 95 %   Pulse Oximetry Type Intermittent   $ Pulse Oximetry - Single Charge Pulse Oximetry - Single   Pulse 74   Resp 18   BP (!) 179/81   Education   $ Education 15 min

## 2024-09-19 NOTE — CARE UPDATE
09/19/24 0745   Patient Assessment/Suction   Level of Consciousness (AVPU) alert   Respiratory Effort Normal;Unlabored   Expansion/Accessory Muscles/Retractions no retractions;no use of accessory muscles   All Lung Fields Breath Sounds Anterior:;Lateral:;diminished;clear   Cough Frequency no cough   PRE-TX-O2   Device (Oxygen Therapy) room air   SpO2 95 %   Pulse Oximetry Type Intermittent   $ Pulse Oximetry - Single Charge Pulse Oximetry - Single   Pulse 74   Resp 18   BP (!) 179/81   Education   $ Education 15 min

## 2024-09-19 NOTE — HPI
Alyssa John is a 73 y.o. female who has a history of  hypertension, seizures, multiple sclerosis, CAD, status post MI  and presents with syncopal episode     History given by , patient has some degree of short-term memory loss and forgetfulness, unclear if this represents some degree of progressive or early dementia.  Patient also feels some details he when able.  She had gone to use the bathroom and has sat down on the toilet that and had not yet relieve herself when suddenly she fell and collapsed towards the left.  She hit the left side of her face and cheek on her way down.  She woke she called out to her  who came to assist her in brought her back up to a seated position.  They were concerned about what happened and felt that she should come to the hospital.  They denied any recollection of chest pain or shortness of breath or symptoms prior to that.  It does not appear that patient had a postictal..  Normally when patient has seizures they are grand mal seizures and there were no signs of that per the patient.  General patient has been denying any recent fevers or recent shortness of breath or any other significant symptoms.

## 2024-09-19 NOTE — PLAN OF CARE
[facility] SNF sent as a plan B.   SAMANTHA called and TIFFANY faxed to the Office of Aging. ([phone]).     09/19/24 1357   Post-Acute Status   Post-Acute Authorization Placement   Post-Acute Placement Status Referrals Sent   Discharge Plan   Discharge Plan A Rehab   Discharge Plan B Skilled Nursing Facility     [id number]- UR sent a message to Suzy with [facility] to follow up

## 2024-09-19 NOTE — TELEPHONE ENCOUNTER
----- Message from CESAR [place] sent at 9/19/2024  1:40 PM CDT -----  Regarding: appt  Good afternoon,    Pt is d/c from [facility] today and needs a 2 week follow up with Dr. Montague.    Thank you,    Angela Bob, CSW  [phone]

## 2024-09-19 NOTE — PLAN OF CARE
TRISTEN sent auth req to [facility] via [employer] and also sent an email to Angy at [facility]. TRISTEN sent a SC to Suzy with [facility] to have her evaluate pt.       09/19/24 1010   Post-Acute Status   Post-Acute Authorization Placement   Post-Acute Placement Status Referrals Sent   Discharge Plan   Discharge Plan A Rehab

## 2024-09-19 NOTE — TELEPHONE ENCOUNTER
Spoke to pt  and discussed surgical clearance. Pt is currently in hospital due to a recent fall.  req to complete e-visit. E-visit sent.

## 2024-09-19 NOTE — SUBJECTIVE & OBJECTIVE
Interval History:  Patient seen and examined.  Overnight notes reviewed.  Patient's  was at bedside.  Patient is sitting up in chair reports feeling well after working with therapy.  She was still reports fatigue and generalized weakness but states she feels better than she did on admit.  PT OT recommended high-intensity therapy.  Case management consulted for discharge planning.  Repeat orthostatic vitals were negative.  Repeat UA ordered.    Review of Systems   Constitutional:  Positive for fatigue. Negative for fever.   Respiratory:  Negative for shortness of breath.    Cardiovascular:  Negative for chest pain.   Gastrointestinal:  Negative for abdominal pain and nausea.   Neurological:  Positive for weakness (generalized).     Objective:     Vital Signs (Most Recent):  Temp: 97.8 °F (36.6 °C) (09/19/24 1101)  Pulse: 70 (09/19/24 1101)  Resp: 18 (09/19/24 1101)  BP: (!) 147/70 (09/19/24 1101)  SpO2: 96 % (09/19/24 1101) Vital Signs (24h Range):  Temp:  [97.8 °F (36.6 °C)-98.6 °F (37 °C)] 97.8 °F (36.6 °C)  Pulse:  [68-76] 70  Resp:  [18-20] 18  SpO2:  [95 %-98 %] 96 %  BP: (147-197)/(67-81) 147/70     Weight: 95.4 kg (210 lb 5.1 oz)  Body mass index is 37.26 kg/m².    Intake/Output Summary (Last 24 hours) at 9/19/2024 1342  Last data filed at 9/19/2024 0938  Gross per 24 hour   Intake 480 ml   Output --   Net 480 ml         Physical Exam  Vitals and nursing note reviewed.   Constitutional:       General: She is not in acute distress.     Appearance: Normal appearance. She is obese. She is ill-appearing (chronically).   HENT:      Head: Normocephalic.        Comments: Bruising noted under left eye s/p fall.  Eyes:      Extraocular Movements: Extraocular movements intact.   Cardiovascular:      Rate and Rhythm: Normal rate and regular rhythm.   Pulmonary:      Effort: Pulmonary effort is normal.      Breath sounds: Normal breath sounds.   Abdominal:      General: Abdomen is flat. Bowel sounds are normal.       Palpations: Abdomen is soft.      Tenderness: There is no abdominal tenderness. There is no guarding or rebound.   Skin:     General: Skin is warm.   Neurological:      General: No focal deficit present.      Mental Status: She is alert and oriented to person, place, and time. Mental status is at baseline.   Psychiatric:         Mood and Affect: Mood normal.         Behavior: Behavior normal.             Significant Labs: All pertinent labs within the past 24 hours have been reviewed.  CBC:   Recent Labs   Lab 09/18/24  0537 09/19/24  0642   WBC 8.19 9.27   HGB 11.5* 13.1   HCT 36.8* 41.3    191     CMP:   Recent Labs   Lab 09/18/24  0537 09/19/24  0443    139   K 3.6 3.7   * 111*   CO2 20* 19*   GLU 90 113*   BUN 17 17   CREATININE 0.8 0.9   CALCIUM 8.0* 8.5*   PROT 5.7* 6.3   ALBUMIN 3.3* 3.7   BILITOT 0.5 0.8   ALKPHOS 122 125   AST 11 12   ALT 10 8*   ANIONGAP 9 9     Magnesium:   Recent Labs   Lab 09/18/24  0537 09/19/24  0443   MG 1.9 1.9       Significant Imaging: I have reviewed all pertinent imaging results/findings within the past 24 hours.

## 2024-09-19 NOTE — ASSESSMENT & PLAN NOTE
-smoked 2 pack per day for 40 years   -incidental finding of emphysematous changes   -currently no active wheezing, no COPD exacerbation, on room air comfortable  -outpatient follow-up

## 2024-09-19 NOTE — ASSESSMENT & PLAN NOTE
Chronic, controlled. Latest blood pressure and vitals reviewed-     Temp:  [97.6 °F (36.4 °C)-97.9 °F (36.6 °C)]   Pulse:  [65-76]   Resp:  [16-24]   BP: (150-184)/(66-77)   SpO2:  [96 %-98 %] .   Home meds for hypertension were reviewed and noted below.   Hypertension Medications               nitroGLYCERIN (NITROSTAT) 0.4 MG SL tablet Place 0.4 mg under the tongue every 5 (five) minutes as needed for Chest pain.    propranoloL (INDERAL LA) 120 MG 24 hr capsule TAKE 1 CAPSULE BY MOUTH ONCE DAILY    torsemide (DEMADEX) 5 MG Tab Take 1 tablet (5 mg total) by mouth daily as needed (swelling).            While in the hospital, will manage blood pressure as follows;     Presented with syncope and hypotension   We will restart propranolol at a low dose of 20 mg  Orthostatic vitals tomorrow    Will utilize p.r.n. blood pressure medication only if patient's blood pressure greater than 180/110 and she develops symptoms such as worsening chest pain or shortness of breath.

## 2024-09-19 NOTE — HOSPITAL COURSE
73 year old female with history of seizures compliant with home medication of topiramate presented after episode of decreased awareness and later an syncopal episode. CT head negative. 2D ECHO showed normal EF no major valvular abnormalities, Carotid USG did not show any significant stenosis, Neurology was consulted given her recent episodes of reduced awareness (though these are different from her previous grand mal seizures) who obtained an EEG that did not show seizure activity.  She was in the ED, tele monitor did not show any obvious arrhythmia. Orthostatic vitals were positive but her standing blood pressure was greater than 110/60 mmHg.  Her home propranolol dose was reduced to 20 mg with p.r.n. hydralazine for better blood pressure control. Repeat orthostatic vitals were negative. Neurology signed off. PT/OT recommended acute rehab on discharge.  CM was consulted for discharge planning. Patient was denied authorization for rehab. Patient declined skilled nursing facility or home health and elected to pursue outpatient PT/OT which was ordered. Patient was seen on day of discharge. Patient was follow up with PCP, Neurology and Cardiology. Outpatient Holter monitor ordered at discharge. Return precautions discussed with the patient and patient was  who voiced understanding. Lifestyle modifications discussed. Discussed importance of monitoring blood pressure daily and to bring a log to future appointments.

## 2024-09-20 PROBLEM — N39.0 UTI (URINARY TRACT INFECTION): Status: ACTIVE | Noted: 2024-09-20

## 2024-09-20 LAB
ALBUMIN SERPL BCP-MCNC: 3.5 G/DL (ref 3.5–5.2)
ALP SERPL-CCNC: 111 U/L (ref 55–135)
ALT SERPL W/O P-5'-P-CCNC: 9 U/L (ref 10–44)
ANION GAP SERPL CALC-SCNC: 8 MMOL/L (ref 8–16)
AST SERPL-CCNC: 11 U/L (ref 10–40)
BASOPHILS # BLD AUTO: 0.02 K/UL (ref 0–0.2)
BASOPHILS NFR BLD: 0.3 % (ref 0–1.9)
BILIRUB SERPL-MCNC: 0.7 MG/DL (ref 0.1–1)
BUN SERPL-MCNC: 18 MG/DL (ref 8–23)
CALCIUM SERPL-MCNC: 8.1 MG/DL (ref 8.7–10.5)
CHLORIDE SERPL-SCNC: 109 MMOL/L (ref 95–110)
CO2 SERPL-SCNC: 20 MMOL/L (ref 23–29)
CREAT SERPL-MCNC: 0.9 MG/DL (ref 0.5–1.4)
DIFFERENTIAL METHOD BLD: ABNORMAL
EOSINOPHIL # BLD AUTO: 0.1 K/UL (ref 0–0.5)
EOSINOPHIL NFR BLD: 1.8 % (ref 0–8)
ERYTHROCYTE [DISTWIDTH] IN BLOOD BY AUTOMATED COUNT: 14.2 % (ref 11.5–14.5)
EST. GFR  (NO RACE VARIABLE): >60 ML/MIN/1.73 M^2
GLUCOSE SERPL-MCNC: 132 MG/DL (ref 70–110)
HCT VFR BLD AUTO: 39.3 % (ref 37–48.5)
HGB BLD-MCNC: 12.5 G/DL (ref 12–16)
IMM GRANULOCYTES # BLD AUTO: 0.02 K/UL (ref 0–0.04)
IMM GRANULOCYTES NFR BLD AUTO: 0.3 % (ref 0–0.5)
LYMPHOCYTES # BLD AUTO: 2 K/UL (ref 1–4.8)
LYMPHOCYTES NFR BLD: 26.5 % (ref 18–48)
MAGNESIUM SERPL-MCNC: 1.8 MG/DL (ref 1.6–2.6)
MCH RBC QN AUTO: 27.8 PG (ref 27–31)
MCHC RBC AUTO-ENTMCNC: 31.8 G/DL (ref 32–36)
MCV RBC AUTO: 87 FL (ref 82–98)
MONOCYTES # BLD AUTO: 0.6 K/UL (ref 0.3–1)
MONOCYTES NFR BLD: 7.5 % (ref 4–15)
NEUTROPHILS # BLD AUTO: 4.9 K/UL (ref 1.8–7.7)
NEUTROPHILS NFR BLD: 63.6 % (ref 38–73)
NRBC BLD-RTO: 0 /100 WBC
PLATELET # BLD AUTO: 198 K/UL (ref 150–450)
PMV BLD AUTO: 11.6 FL (ref 9.2–12.9)
POTASSIUM SERPL-SCNC: 3.6 MMOL/L (ref 3.5–5.1)
PROT SERPL-MCNC: 5.9 G/DL (ref 6–8.4)
RBC # BLD AUTO: 4.5 M/UL (ref 4–5.4)
SODIUM SERPL-SCNC: 137 MMOL/L (ref 136–145)
WBC # BLD AUTO: 7.65 K/UL (ref 3.9–12.7)

## 2024-09-20 PROCEDURE — 21400001 HC TELEMETRY ROOM

## 2024-09-20 PROCEDURE — 80053 COMPREHEN METABOLIC PANEL: CPT | Performed by: INTERNAL MEDICINE

## 2024-09-20 PROCEDURE — 97535 SELF CARE MNGMENT TRAINING: CPT

## 2024-09-20 PROCEDURE — 85025 COMPLETE CBC W/AUTO DIFF WBC: CPT | Performed by: INTERNAL MEDICINE

## 2024-09-20 PROCEDURE — 63600175 PHARM REV CODE 636 W HCPCS

## 2024-09-20 PROCEDURE — 36415 COLL VENOUS BLD VENIPUNCTURE: CPT | Performed by: INTERNAL MEDICINE

## 2024-09-20 PROCEDURE — 83735 ASSAY OF MAGNESIUM: CPT | Performed by: INTERNAL MEDICINE

## 2024-09-20 PROCEDURE — 25000003 PHARM REV CODE 250

## 2024-09-20 PROCEDURE — 97530 THERAPEUTIC ACTIVITIES: CPT | Mod: CQ

## 2024-09-20 PROCEDURE — 25000003 PHARM REV CODE 250: Performed by: INTERNAL MEDICINE

## 2024-09-20 PROCEDURE — 63600175 PHARM REV CODE 636 W HCPCS: Performed by: INTERNAL MEDICINE

## 2024-09-20 PROCEDURE — 97166 OT EVAL MOD COMPLEX 45 MIN: CPT

## 2024-09-20 RX ADMIN — ENOXAPARIN SODIUM 40 MG: 40 INJECTION SUBCUTANEOUS at 05:09

## 2024-09-20 RX ADMIN — HYDRALAZINE HYDROCHLORIDE 5 MG: 20 INJECTION INTRAMUSCULAR; INTRAVENOUS at 08:09

## 2024-09-20 RX ADMIN — POTASSIUM BICARBONATE 50 MEQ: 977.5 TABLET, EFFERVESCENT ORAL at 09:09

## 2024-09-20 RX ADMIN — Medication 800 MG: at 01:09

## 2024-09-20 RX ADMIN — TOPIRAMATE 100 MG: 100 TABLET, FILM COATED ORAL at 08:09

## 2024-09-20 RX ADMIN — CEFTRIAXONE SODIUM 1 G: 1 INJECTION, POWDER, FOR SOLUTION INTRAMUSCULAR; INTRAVENOUS at 09:09

## 2024-09-20 RX ADMIN — TOPIRAMATE 100 MG: 100 TABLET, FILM COATED ORAL at 09:09

## 2024-09-20 RX ADMIN — ATORVASTATIN CALCIUM 40 MG: 40 TABLET, FILM COATED ORAL at 08:09

## 2024-09-20 RX ADMIN — CLOPIDOGREL BISULFATE 75 MG: 75 TABLET, FILM COATED ORAL at 09:09

## 2024-09-20 RX ADMIN — FAMOTIDINE 20 MG: 20 TABLET ORAL at 08:09

## 2024-09-20 RX ADMIN — PROPRANOLOL HYDROCHLORIDE 20 MG: 10 TABLET ORAL at 09:09

## 2024-09-20 RX ADMIN — FAMOTIDINE 20 MG: 20 TABLET ORAL at 09:09

## 2024-09-20 NOTE — ASSESSMENT & PLAN NOTE
Acute on chronic:  - repeat UA concerning for UTI  - Rocephin ordered   -urine culture in process

## 2024-09-20 NOTE — PLAN OF CARE
142 and PASRR scanned into pt's  for Plan B: SNF       09/20/24 1158   Post-Acute Status   Post-Acute Authorization Placement   Coverage [facility] CHOICES   Discharge Delays None known at this time   Discharge Plan   Discharge Plan A Rehab   Discharge Plan B Skilled Nursing Facility

## 2024-09-20 NOTE — DISCHARGE INSTRUCTIONS
Daily Wound care:  Clean all areas of folds and both lower legs with Hibiclens soap and water and dry.   Apply a thin layer of Triad hydrophilic wound dressing ointment and leave open to air.   FOR Home wound care:  Apply Triad after shower, after drying all skin areas thoroughly daily.  May apply to any affected skin areas.   Triad is over the counter ointment and may be purchased from [facility] or other pharmacies as well as online through [facility] or [employer].      Follow up with PCP, Cardiology, and Neurology.    Take daily blood pressure daily and report log to Cardiology and PCP  Outpatient Holter monitor ordered in clinic should contact you.    Outpatient referral for physical therapy and occupational therapy was ordered   Discussed lifestyle modifications and taking time from going from lying to sitting and standing.    Decrease propranolol dose.  Sent prescription for antibiotic for concern for UTI.    Discharge Instructions, [facility] Medicine    Thank you for choosing [facility] for your medical care. The primary doctor who is taking care of you at the time of your discharge is Ki Mckay DO.     You were admitted to the hospital with Syncope and collapse.     Please note your discharge instructions, including diet/activity restrictions, follow-up appointments, and medication changes.  If you have any questions about your medical issues, prescriptions, or any other questions, please feel free to contact the [facility] Medicine Dept at [phone] and we will help.    If you are previously with Home health, outpatient PT/OT or under a therapy program, you are cleared to return to those programs.    Please direct all long term medication refills and follow up to your primary care provider, Piyush Sharif MD. Thank you again for letting us take care of your health care needs.    Please note the following discharge instructions per your discharging  physician-  Tierra Kerr PA-C

## 2024-09-20 NOTE — SUBJECTIVE & OBJECTIVE
Interval History: Patient seen and examined. Overnight notes reviewed.  Patient's  was at bedside. Patient is lying down in bed in no acute distress.  Patient was states she was eager to work with therapy. She reports fatigue and generalized weakness but states she feels better than she did on admit.  PT/OT recommended high-intensity therapy. Case management consulted for discharge planning. Repeat UA borderline for UTI.  Urine culture in process.  Rocephin ordered.    Review of Systems   Constitutional:  Positive for fatigue. Negative for fever.   Respiratory:  Negative for shortness of breath.    Cardiovascular:  Negative for chest pain.   Gastrointestinal:  Negative for abdominal pain and nausea.   Neurological:  Positive for weakness (generalized).     Objective:     Vital Signs (Most Recent):  Temp: 97.8 °F (36.6 °C) (09/20/24 0835)  Pulse: 77 (09/20/24 0835)  Resp: 20 (09/20/24 0835)  BP: 133/77 (09/20/24 0835)  SpO2: 95 % (09/20/24 0835) Vital Signs (24h Range):  Temp:  [97.7 °F (36.5 °C)-98.6 °F (37 °C)] 97.8 °F (36.6 °C)  Pulse:  [70-79] 77  Resp:  [16-20] 20  SpO2:  [94 %-97 %] 95 %  BP: (132-169)/(72-77) 133/77     Weight: 95.7 kg (210 lb 15.7 oz)  Body mass index is 37.37 kg/m².    Intake/Output Summary (Last 24 hours) at 9/20/2024 1358  Last data filed at 9/20/2024 0548  Gross per 24 hour   Intake 1080 ml   Output 850 ml   Net 230 ml         Physical Exam  Vitals and nursing note reviewed.   Constitutional:       General: She is not in acute distress.     Appearance: Normal appearance. She is obese. She is ill-appearing (chronically).   HENT:      Head: Normocephalic.        Comments: Bruising noted under left eye s/p fall.  Eyes:      Extraocular Movements: Extraocular movements intact.   Cardiovascular:      Rate and Rhythm: Normal rate and regular rhythm.   Pulmonary:      Effort: Pulmonary effort is normal.      Breath sounds: Normal breath sounds.   Abdominal:      General: Abdomen is flat.  Bowel sounds are normal.      Palpations: Abdomen is soft.      Tenderness: There is no abdominal tenderness. There is no guarding or rebound.   Skin:     General: Skin is warm.   Neurological:      General: No focal deficit present.      Mental Status: She is alert and oriented to person, place, and time. Mental status is at baseline.   Psychiatric:         Mood and Affect: Mood normal.         Behavior: Behavior normal.             Significant Labs: All pertinent labs within the past 24 hours have been reviewed.  CBC:   Recent Labs   Lab 09/19/24  0642 09/20/24  0502   WBC 9.27 7.65   HGB 13.1 12.5   HCT 41.3 39.3    198     CMP:   Recent Labs   Lab 09/19/24  0443 09/20/24  0501    137   K 3.7 3.6   * 109   CO2 19* 20*   * 132*   BUN 17 18   CREATININE 0.9 0.9   CALCIUM 8.5* 8.1*   PROT 6.3 5.9*   ALBUMIN 3.7 3.5   BILITOT 0.8 0.7   ALKPHOS 125 111   AST 12 11   ALT 8* 9*   ANIONGAP 9 8     Magnesium:   Recent Labs   Lab 09/19/24  0443 09/20/24  0501   MG 1.9 1.8       Significant Imaging: I have reviewed all pertinent imaging results/findings within the past 24 hours.

## 2024-09-20 NOTE — PROGRESS NOTES
CHIEF COMPLAINT:   Chief Complaint   Patient presents with   • Procedure     EMB & yearly, no pap seen, mammogram 23 benign, colonoscopy , Dexa 22 osteopenia, pt used cytotec as directed tried to get it up as high as she could, pt took Ibuprofen 600mg, pt states the bleeding stopped on 23. Pt needs a note for work that she was here today.        HPI: had bleeding intermittently on and off for about 1 week   And it was not heavy    Saw Dr. Hood and was treated for a UTI and the bleeding stopped.    Had radiation for cervical cancer in .      Not sexually active;    Occasionally uses Vag E2    Depression Screening:  PHQ2/9:  Initial depression screening score:: 0            ROS:   General/Constitutional Denies.    Urology Denies.   Integumentary Denies.   Women Only Denies.    Genitourinary Denies.    Skin Denies.    Gastrointestinal Denies.    Endocrine Denies.    Psychiatric Denies.    Health Education Admits.         OB/GYN HISTORY:  OB History    Para Term  AB Living   1 1 1 0 0 1   SAB IAB Ectopic Molar Multiple Live Births   0 0 0 0 0 1       MENSTRUAL HISTORY :  No LMP recorded (lmp unknown). Patient is postmenopausal.        SEXUAL ACTIVITY:  Social History     Substance and Sexual Activity   Sexual Activity Not Currently   • Partners: Male       HISTORY:  Past Medical History:   Diagnosis Date   • Aortic insufficiency    • Diverticulosis    • Essential (primary) hypertension    • Hepatitis C    • Hepatitis-C     Took treatment and was diagnosed as cured by her gastroenterologist at [place]   • HSV (herpes simplex virus) infection    • Left bundle branch block    • Malignant neoplasm (CMD)     cervical cancer traeted with radiation therapy   • Osteoarthritis    • Radiculopathy       Past Surgical History:   Procedure Laterality Date   • Carpal tunnel release Bilateral 21, 21   • Colonoscopy  2022   • Hip arthroplasty Left 10/11/2022   • Spine  Wound care consulted for wounds present on admit. Head to toe skin assessment completed at this time. Patient reports history of leg swelling with wounds at times. Patient is noted with bilateral lower leg redness anterior and posterior and swelling to both lower legs and feet including the toes. Bilateral heels are noted with non blanchable redness and are offloaded from bed with pillows. Patient has minimal MASD to bilateral groins that are pink in color with no open areas noted at this assessment. Cleaned all areas with hibiclens soap and water and dried. Applied a thin layer of Triad to all areas and left open to air. Removed sacral foam dressing due to incontinence of urine even with versette external female catheter in place. Sacrum and bilateral buttocks with no redness at this assessment. Applied a thin layer of Triad to the buttocks to protect the skin from urine incontinence. Wound care will follow up as needed throughout hospital stay.      09/19/24 1908   WOCN Assessment   WOCN Total Time (mins) 45   Visit Date 09/19/24   Visit Time 1730   Consult Type New   WOCN Speciality Wound   Wound moisture;venous   Intervention assessed;applied;chart review;coordination of care;orders   Teaching on-going   Pressure Injury Prevention    Sacral Foam Dressing Remove        Wound 09/18/24 1831 Venous Ulcer Right lower Leg   Date First Assessed/Time First Assessed: 09/18/24 1831   Present on Original Admission: Yes  Primary Wound Type: Venous Ulcer  Side: Right  Orientation: lower  Location: Leg   Wound Image    Dressing Appearance Open to air   Drainage Amount None   Appearance Red   Tissue loss description Not applicable   Care Cleansed with:;Antimicrobial agent;Applied:;Skin Barrier   Dressing Applied  (Triad)        Wound 09/18/24 1831 Venous Ulcer Left lower Leg   Date First Assessed/Time First Assessed: 09/18/24 1831   Present on Original Admission: Yes  Primary Wound Type: Venous Ulcer  Side: Left  Orientation:  lower  Location: Leg   Wound Image    Dressing Appearance Open to air   Drainage Amount None   Appearance Red   Care Cleansed with:;Antimicrobial agent;Applied:;Skin Barrier   Dressing Applied  (Triad)        Wound 09/18/24 Venous Ulcer Left lower;posterior Calf   Date First Assessed: 09/18/24   Present on Original Admission: Yes  Primary Wound Type: Venous Ulcer  Side: Left  Orientation: lower;posterior  Location: Calf   Wound Image    Dressing Appearance Open to air   Drainage Amount None   Appearance Red   Care Cleansed with:;Antimicrobial agent;Applied:;Skin Barrier   Dressing Applied  (Triad)        Wound 09/18/24 Venous Ulcer Right lower;posterior Calf   Date First Assessed: 09/18/24   Present on Original Admission: Yes  Primary Wound Type: Venous Ulcer  Side: Right  Orientation: lower;posterior  Location: Calf   Wound Image    Dressing Appearance Open to air   Drainage Amount None   Appearance Red   Care Cleansed with:;Antimicrobial agent;Applied:;Skin Barrier   Dressing Applied  (Triad)        Wound 09/18/24 Pressure Injury Left posterior Heel   Date First Assessed: 09/18/24   Present on Original Admission: Yes  Primary Wound Type: Pressure Injury  Side: Left  Orientation: posterior  Location: Heel   Pressure Injury Stage 1   Dressing Appearance Open to air   Drainage Amount None   Appearance Red   Care Cleansed with:;Antimicrobial agent;Applied:;Skin Barrier   Dressing Applied  (Triad)        Wound 09/18/24 Pressure Injury Right posterior Heel   Date First Assessed: 09/18/24   Present on Original Admission: Yes  Primary Wound Type: Pressure Injury  Side: Right  Orientation: posterior  Location: Heel   Pressure Injury Stage 1   Dressing Appearance Open to air   Drainage Amount None   Appearance Red   Care Cleansed with:;Antimicrobial agent;Applied:;Skin Barrier   Dressing Applied  (Triad)        Wound 09/18/24 Moisture associated dermatitis Left Groin   Date First Assessed: 09/18/24   Present on Original  surgery  08/2019    kyphoplasty S1   • Total knee replacement Left 2007     Current Outpatient Medications   Medication Sig Dispense Refill   • [START ON 11/23/2023] estradiol (ESTRACE) 0.1 MG/GM vaginal cream Place 1 g vaginally 2 days a week. 42.5 g 0   • nystatin (MYCOSTATIN) 255756 UNIT/GM ointment Apply topically 2 times daily. 30 g 3   • fluconazole (Diflucan) 150 MG tablet Take 1 tablet by mouth 1 time for 1 dose. 1 tablet 0   • cyclobenzaprine (FLEXERIL) 10 MG tablet Take 1 tablet by mouth 3 times daily as needed for Muscle spasms. 42 tablet 0   • LORazepam (ATIVAN) 1 MG tablet Take 1 tablet by mouth 2 times daily as needed for Anxiety. 60 tablet 3   • metoPROLOL succinate (TOPROL-XL) 25 MG 24 hr tablet Take 0.5 tablets by mouth daily. 45 tablet 1   • metFORMIN (GLUCOPHAGE) 500 MG tablet TAKE 1 TABLET BY MOUTH IN THE MORNING AND 1 TABLET IN THE EVENING WITH MEALS 60 tablet 1   • traMADol (ULTRAM) 50 MG tablet TAKE 1 TABLET BY MOUTH EVERY 4-6 HOURS     • pregabalin (LYRICA) 75 MG capsule Take 1 capsule by mouth in the morning and 1 capsule in the evening. 14 capsule 0   • diclofenac (VOLTAREN) 75 MG EC tablet TAKE 1 TABLET BY MOUTH IN THE MORNING AND 1 TABLET IN THE EVENING WITH FOOD 60 tablet 3   • Entresto  MG per tablet TAKE 1 TABLET BY MOUTH IN THE MORNING AND IN THE EVENING 60 tablet 3   • CALCIUM PO Take by mouth daily. Gummie     • atorvastatin (LIPITOR) 10 MG tablet Take 1 tablet by mouth every evening. 90 tablet 3   • amoxicillin (AMOXIL) 500 MG capsule TAKE 4 CAPSULES BY MOUTH 1 HOUR BEFORE DENTAL PROCEDURE 4 capsule 3   • acetaminophen (TYLENOL) 500 MG tablet Take 2 tablets by mouth every 8 hours as needed for Pain. 60 tablet 0   • Multiple Vitamins-Minerals (PRESERVISION AREDS PO)      • vitamin E 1000 units capsule Take 1,000 Units by mouth daily.     • VITAMIN D, CHOLECALCIFEROL, PO 2,000 units daily     • Multiple Vitamins-Minerals (CENTRUM SILVER PO) Daily       No current  Admission: Yes  Primary Wound Type: Moisture associated dermatitis  Side: Left  Location: Groin   Dressing Appearance Open to air   Drainage Amount None   Appearance Pink   Care Cleansed with:;Antimicrobial agent;Applied:;Skin Barrier   Dressing Applied  (Triad)        Wound 09/18/24 Moisture associated dermatitis Right Groin   Date First Assessed: 09/18/24   Present on Original Admission: Yes  Primary Wound Type: Moisture associated dermatitis  Side: Right  Location: Groin   Dressing Appearance Open to air   Drainage Amount None   Appearance Pink   Care Cleansed with:;Antimicrobial agent;Applied:;Skin Barrier   Dressing Applied  (Triad)        facility-administered medications for this visit.     ALLERGIES:  No Known Allergies  Family History   Problem Relation Age of Onset   • Bipolar disorder Mother    • Seizure Disorder Mother    • Hypertension Father    • Diabetes Father    • Osteoarthritis Father    • Clotting Disorder Father         DVT when he was in the hospital with heart issues   • Pulmonary embolism Paternal Grandfather          of massive PE     Social History     Tobacco Use   • Smoking status: Never     Passive exposure: Never   • Smokeless tobacco: Never   Vaping Use   • Vaping Use: never used   Substance Use Topics   • Alcohol use: Yes     Comment: social   • Drug use: No      The following EMR sections were reviewed and updated during today's visit: Allergies, Medication List, Problem List, Past Medical History, Past Surgical History, Social History and Family History    PHYSICAL EXAM:  Visit Vitals  /74 (BP Location: RUE - Right upper extremity, Patient Position: Sitting, Cuff Size: Large Adult)   Pulse 85   Temp 97.6 °F (36.4 °C) (Oral)   Resp 18   Ht 5' 6\" (1.676 m)   Wt 90.3 kg (199 lb)   LMP  (LMP Unknown)   SpO2 97%   BMI 32.12 kg/m²     Vital signs reviewed for today's visit.  GENERAL APEARANCE:  The patient is a pleasant, normal appearing female with normal affect and in no distress.  BREASTS: Breast exam performed seated and supine.  No masses, non-tender, no nipple discharge or lymphadenopathy.  ABDOMEN: Soft, non-tender, non-distended.  No hepatosplenomegaly.  Normal bowel sounds.  No umbilical or inguinal hernias.  SKIN:  Warm and dry to touch.  No lesions or rashes noted.    PSYCHIATRIC/NEUROLOGIC:  Appropriate mood and affect, normal recall, alert and oriented x 3  EXTREMITIES:  Warm and well perfused.  No edema noted.  Muscle strength and sensation are normal bilaterally 5/5 in both upper and lower extremities.     :  Vulva: Inspection of her external genitalia reveals normal mons pubis,and labia majora. Labia  minora with scant erythema;  Normal appearing     Clitoris, urethral meatus and [place]'s glands.  + erythema on right groin skin.  No evidence of active bleeding  Bladder:  No evidence of urethral or bladder tenderness.    Vagina: shortened 3 cm. Pale; Dry; Smooth;  No discharge noted.    Had placed cytotec so unable to perform culture.  Cervix:  Unable to identify  Uterus: unable to identifyAdnexa are non-tender to palpation  Perineum:  Perineum appears normal. No lesions or masses.  Rectal Exam: not done    ASSESSMENT:  Problem List Items Addressed This Visit    None  Visit Diagnoses     Gynecologic exam normal    -  Primary    Post-menopausal bleeding        Relevant Orders    US PELVIS NON-OB EXTERNALTRANSABDOMINAL    Yeast infection        Relevant Medications    nystatin (MYCOSTATIN) 321091 UNIT/GM ointment    fluconazole (Diflucan) 150 MG tablet    Vaginal atrophy        Relevant Medications    estradiol (ESTRACE) 0.1 MG/GM vaginal cream (Start on 11/23/2023)    Osteopenia of multiple sites                PLAN:   This may have been due to her UTI or yeast.  Need to evaluate pelvis to be on safe side.  If US ok, will observe.  May consider sending to gyn onc for their opinion as well.  Right now, there is nothing worrisome on exam    Return in about 1 year (around 11/21/2024) for Annual Exam.      Alivia Krishnan MD  11/21/2023

## 2024-09-20 NOTE — PT/OT/SLP PROGRESS
Physical Therapy Treatment    Patient Name:  Alyssa John   MRN:  [id number]    Recommendations:     Discharge Recommendations: High Intensity Therapy  Discharge Equipment Recommendations:  (TBD at next level of care)  Barriers to discharge:  decreased mobility from baseline, assist x 2 for most mobility     Assessment:     Alyssa John is a 73 y.o. female admitted with a medical diagnosis of Syncope and collapse.  She presents with the following impairments/functional limitations: weakness, impaired endurance, impaired functional mobility, impaired balance, impaired cardiopulmonary response to activity, decreased lower extremity function, decreased upper extremity function, pain, edema, impaired skin.    Pt was unable to increase gait distance today, c/o increased weakness in extremities x 4, and required maxA to safely land onto seat of chair after 6' gait distance. Expressed discouragement about inability to increase mobility. Spouse present for session and receptive to all teaching for improved transfer technique.     Spouse and pt had many questions about potential for inpatient rehab after discharge, and by end of session were much encouraged about benefit of high-intensity therapy to improve functional mobility safety and decrease caregiver burden.         Rehab Prognosis: Good and Fair; patient would benefit from acute skilled PT services to address these deficits and reach maximum level of function.    Recent Surgery: * No surgery found *      Plan:     During this hospitalization, patient to be seen 6 x/week to address the identified rehab impairments via gait training, therapeutic activities, therapeutic exercises, neuromuscular re-education and progress toward the following goals:    Plan of Care Expires:  10/18/24    Subjective     Chief Complaint: worried about going to post-acute rehab facility but receptive to further education about benefit  Patient/Family Comments/goals: not be in  the hospital on Sunday because it's her birthday  Pain/Comfort:  Pain Rating 1: other (see comments) (unrated, but severe)  Location - Side 1: Bilateral  Location - Orientation 1: posterior  Location 1: ankle  Pain Addressed 1: Reposition, Distraction, Other (see comments) (avoidance of touch/pressure to the area)  Pain Rating Post-Intervention 1: 0/10      Objective:     Communicated with nurse prior to session.  Patient found HOB elevated with telemetry, PureWick, peripheral IV, bed alarm upon PT entry to room.     General Precautions: Standard, fall, seizure  Orthopedic Precautions: N/A  Braces: N/A  Respiratory Status: Room air     Functional Mobility:  Bed Mobility:     Supine to Sit: moderate assistance  Transfers:     Sit to Stand:  modAx1 and minAx1 with rolling walker  Bed to Chair: modAx1 and minAx1 with  assist required to turn RW  using  Stand Pivot  Gait: 6' RW modA progressing to maxA for safe descent into chair due to BLE fatigue; chair follow; VC throughout for sequencing and BUE assist       AM-PAC 6 CLICK MOBILITY          Treatment & Education:  Pt educ; benefits of participation in therapy, OOB to chair during the day and for all meals, call light, staff assist for mobility, fall prevention   -Transfer training (demo, VC for improved STS technique)  -Static stand training, modA to sustain upright; VC for shift of wt fwd into midfoot and anterior translation of pelvis over feet; unable to improve balance effectively today for static stand  -Gait training with RW      Patient left up in chair with all lines intact, call button in reach, chair alarm on, nurse notified, and spouse present..    GOALS:   Multidisciplinary Problems       Physical Therapy Goals          Problem: Physical Therapy    Goal Priority Disciplines Outcome Goal Variances Interventions   Physical Therapy Goal     PT, PT/OT Progressing     Description: Goals to be met by: 10/18/24     Patient will increase functional independence  with mobility by performin. Supine to sit with Stand-by Assistance  2. Sit to supine with MInimal Assistance  3. Sit to stand transfer with Contact Guard Assistance  4. Bed to chair transfer with Contact Guard Assistance using Rolling Walker  5. Gait  x 2 x 25 feet with Contact Guard Assistance using Rolling Walker.                          Time Tracking:     PT Received On: 24  PT Start Time: 1043     PT Stop Time: 1122  PT Total Time (min): 39 min     Billable Minutes: Therapeutic Activity 39    Treatment Type: Treatment  PT/PTA: PTA     Number of PTA visits since last PT visit: 2     2024

## 2024-09-20 NOTE — PLAN OF CARE
5620 - TRISTEN spoke to Angy at [employer] to schedule a peer to peer for auth approval.  Angy reported she is going to contact nurses to try to get it scheduled for today, 9/20/2024.  She will call SW as soon as she is able to get a peer to peer scheduled.    0674 - TRISTEN spoke to pt's  about the possibility of the peer to peer not being scheduled until Monday. He appeared to be understanding of the situation.        09/20/24 1429   Post-Acute Status   Post-Acute Authorization Placement   Post-Acute Placement Status Pending payor review/awaiting authorization (if required)   Coverage [facility]   Discharge Delays None known at this time   Discharge Plan   Discharge Plan A Skilled Nursing Facility   Discharge Plan B Skilled Nursing Facility

## 2024-09-20 NOTE — PT/OT/SLP EVAL
Occupational Therapy   Evaluation    Name: Alyssa John  MRN: [id number]  Admitting Diagnosis: Syncope and collapse  Recent Surgery: * No surgery found *      Recommendations:     Discharge Recommendations: High Intensity Therapy  Discharge Equipment Recommendations:  to be determined by next level of care  Barriers to discharge:   (increased assist with ADLs and mobiilty)    Assessment:     Alyssa John is a 73 y.o. female with a medical diagnosis of Syncope and collapse. Pt agreeable to OT evaluation this AM. Performance deficits affecting function: weakness, impaired endurance, impaired self care skills, impaired functional mobility, gait instability, impaired balance, decreased coordination, decreased upper extremity function, decreased lower extremity function, decreased safety awareness, decreased ROM, impaired cardiopulmonary response to activity.  Extended time taken to clean pt bed level before transferring to EOB due to pt soiled upon entry. Extended time take also to edu pt on benefits of going to IPR facility at d/c.     Rehab Prognosis: Fair; patient would benefit from acute skilled OT services to address these deficits and reach maximum level of function.       Plan:     Patient to be seen 5 x/week to address the above listed problems via self-care/home management, therapeutic activities, therapeutic exercises  Plan of Care Expires: 10/20/24  Plan of Care Reviewed with: patient    Subjective     Chief Complaint: fatigue  Patient/Family Comments/goals: none stated    Occupational Profile:  Living Environment: Pt lives with  in a 1 story home. Pt has a WIS with a shower chair  Previous level of function: Mostly w/c bound; Can stand t/f with assist; Requires assist with dressing and toileting; Can bathe self, but requires assist with transfer. Setup assist for feeding and grooming tasks.  Roles and Routines: wife; mother  Equipment Used at Home: shower chair, wheelchair, walker,  rolling, grab bar  Assistance upon Discharge: yes, from facility    Pain/Comfort:  Pain Rating 1: 0/10    Patients cultural, spiritual, Adventist conflicts given the current situation:      Objective:     Communicated with: nursing prior to session.  Patient found HOB elevated with telemetry, PureWick, peripheral IV, bed alarm upon OT entry to room.    General Precautions: Standard, fall  Orthopedic Precautions: N/A  Braces: N/A  Respiratory Status: Room air    Occupational Performance:    Bed Mobility:    Patient completed Rolling/Turning to Left with  moderate assistance  Patient completed Rolling/Turning to Right with moderate assistance  Patient completed Scooting/Bridging with total assistance and 2 persons  Patient completed Supine to Sit with maximal assistance and 2 persons  Patient completed Sit to Supine with maximal assistance and 2 persons    Functional Mobility/Transfers:  Patient completed Sit <> Stand Transfer with moderate assistance and of 2 persons  with  rolling walker on 1st attempt; pt only able to stand for a few seconds before sitting back down without warning; On 2nd attempt, pt required maximum assistance to stand, but unable to come to complete stand and immediately sat back down    Activities of Daily Living:  Grooming: stand by assistance and contact guard assistance for balance while seated on EOB to complete oral care and washed face. Pt required assistance to wash hair with shampoo cap and to comb hair  Toileting: maximal assistance for hygiene and brief management bed level    Cognitive/Visual Perceptual:  Cognitive/Psychosocial Skills:     -       Oriented to: Person and Place   -       Follows Commands/attention:Follows one-step commands  -       Communication: clear/fluent  -       Memory: deficits  -       Safety awareness/insight to disability: impaired   -       Mood/Affect/Coping skills/emotional control: Appropriate to situation, Cooperative, and Pleasant    Physical  Exam:  Balance:    -       SBA/CGA seated balance  Upper Extremity Range of Motion:     -       Right Upper Extremity: ~80 degree shldr flex; WFL distally  -       Left Upper Extremity: ~80 degrees shldr flex; WFL distally  Upper Extremity Strength:    -       Right Upper Extremity: WFL  -       Left Upper Extremity: WFL   Strength:    -       Right Upper Extremity: poor   -       Left Upper Extremity: poor   Gross motor coordination:   WFL    AMPAC 6 Click ADL:  AMPAC Total Score: 14    Treatment & Education:  Pt educated on role of OT/POC, importance of OOB/EOB activity, use of call bell, and safety during ADLs, transfers, and functional mobility.    Patient left HOB elevated with all lines intact, call button in reach, and bed alarm on    GOALS:   Multidisciplinary Problems       Occupational Therapy Goals          Problem: Occupational Therapy    Goal Priority Disciplines Outcome Interventions   Occupational Therapy Goal     OT, PT/OT     Description: Goals to be met by: 10/20/24     Patient will increase functional independence with ADLs by performing:    UE Dressing with Minimal Assistance.  LE Dressing with Minimal Assistance and Assistive Devices as needed.  Grooming while seated with Set-up Assistance.  Toileting from bedside commode with Moderate Assistance for hygiene and clothing management.   Toilet transfer to bedside commode with Minimal Assistance.                         History:     Past Medical History:   Diagnosis Date    Arthritis     Coronary artery disease     Encounter for blood transfusion     Epilepsy     GERD (gastroesophageal reflux disease)     Headache     Hypertension     MI, old     MS (multiple sclerosis)     Seizures     epilepsy         Past Surgical History:   Procedure Laterality Date    APPENDECTOMY      BACK SURGERY      L5 discectomy    BREAST BIOPSY Right 15 yrs ago    benign    CATARACT EXTRACTION Bilateral     COLONOSCOPY N/A 09/16/2015    Procedure:  COLONOSCOPY;  Surgeon: Henry Malcolm MD;  [facility]: [facility];  Service: Endoscopy;  Laterality: N/A;    CORONARY STENT PLACEMENT      right knee arthroscopy         Time Tracking:     OT Date of Treatment: 09/20/24  OT Start Time: 0914  OT Stop Time: 1014  OT Total Time (min): 60 min    Billable Minutes:Evaluation 7  Self Care/Home Management 53    9/20/2024

## 2024-09-20 NOTE — PLAN OF CARE
TRISTEN sent a message to Angy with N to follow up on prior auth. TRISTEN sent a message to Suzy with [facility] inquiring about follow up.       09/20/24 0904   Post-Acute Status   Post-Acute Authorization Placement   Discharge Plan   Discharge Plan A Rehab     Plan B [facility] has accepted.     1312- TRISTEN received an email from [facility] stating pt required a P2P. TRISTEN (Esmer) to schedule P2P. Per Angy she was going to try to get the P2P scheduled today. Esmer gave Angy all of Tierra BIALEY's contact information. Tierra BAILEY aware and awaiting phone call.     1612- Per Angy she has sent it to the medical review and it has been submitted to medical director. Per Angy if the medical director does not call by 1700, the P2P will not be completed until Monday 09/23/2024.

## 2024-09-20 NOTE — PROGRESS NOTES
[facility] Medicine  Progress Note    Patient Name: Alyssa John  MRN: [id number]  Patient Class: IP- Inpatient   Admission Date: 9/16/2024  Length of Stay: 2 days  Attending Physician: Sridevi Julian MD  Primary Care Provider: Piyush Sharif MD        Subjective:     Principal Problem:Syncope and collapse        HPI:  Alyssa John is a 73 y.o. female who has a history of  hypertension, seizures, multiple sclerosis, CAD, status post MI  and presents with syncopal episode     History given by , patient has some degree of short-term memory loss and forgetfulness, unclear if this represents some degree of progressive or early dementia.  Patient also feels some details he when able.  She had gone to use the bathroom and has sat down on the toilet that and had not yet relieve herself when suddenly she fell and collapsed towards the left.  She hit the left side of her face and cheek on her way down.  She woke she called out to her  who came to assist her in brought her back up to a seated position.  They were concerned about what happened and felt that she should come to the hospital.  They denied any recollection of chest pain or shortness of breath or symptoms prior to that.  It does not appear that patient had a postictal..  Normally when patient has seizures they are grand mal seizures and there were no signs of that per the patient.  General patient has been denying any recent fevers or recent shortness of breath or any other significant symptoms.    Overview/Hospital Course:  73 year old female with history of seizures compliant with home medication of topiramate presented after episode of decreased awareness and later an syncopal episode. CT head negative. 2D ECHO showed normal EF no major valvular abnormalities, Carotid USG did not show any significant stenosis, Neurology was consulted given her recent episodes of reduced awareness (though these are  different from her previous grand mal seizures) who obtained an EEG that did not show seizure activity.  She was in the ED, tele monitor did not show any obvious arrhythmia.  Orthostatic vitals were positive but her standing blood pressure was greater than 110/60 mmHg.  Her home propranolol dose was reduced to 20 mg with p.r.n. hydralazine for better blood pressure control.  Neurology is following, appreciate their recommendations.  PT recommended acute rehab discharge.    Interval History: Patient seen and examined. Overnight notes reviewed.  Patient's  was at bedside. Patient is lying down in bed in no acute distress.  Patient was states she was eager to work with therapy. She reports fatigue and generalized weakness but states she feels better than she did on admit.  PT/OT recommended high-intensity therapy. Case management consulted for discharge planning. Repeat UA borderline for UTI.  Urine culture in process.  Rocephin ordered.    Review of Systems   Constitutional:  Positive for fatigue. Negative for fever.   Respiratory:  Negative for shortness of breath.    Cardiovascular:  Negative for chest pain.   Gastrointestinal:  Negative for abdominal pain and nausea.   Neurological:  Positive for weakness (generalized).     Objective:     Vital Signs (Most Recent):  Temp: 97.8 °F (36.6 °C) (09/20/24 0835)  Pulse: 77 (09/20/24 0835)  Resp: 20 (09/20/24 0835)  BP: 133/77 (09/20/24 0835)  SpO2: 95 % (09/20/24 0835) Vital Signs (24h Range):  Temp:  [97.7 °F (36.5 °C)-98.6 °F (37 °C)] 97.8 °F (36.6 °C)  Pulse:  [70-79] 77  Resp:  [16-20] 20  SpO2:  [94 %-97 %] 95 %  BP: (132-169)/(72-77) 133/77     Weight: 95.7 kg (210 lb 15.7 oz)  Body mass index is 37.37 kg/m².    Intake/Output Summary (Last 24 hours) at 9/20/2024 1353  Last data filed at 9/20/2024 0548  Gross per 24 hour   Intake 1080 ml   Output 850 ml   Net 230 ml         Physical Exam  Vitals and nursing note reviewed.   Constitutional:       General: She  is not in acute distress.     Appearance: Normal appearance. She is obese. She is ill-appearing (chronically).   HENT:      Head: Normocephalic.        Comments: Bruising noted under left eye s/p fall.  Eyes:      Extraocular Movements: Extraocular movements intact.   Cardiovascular:      Rate and Rhythm: Normal rate and regular rhythm.   Pulmonary:      Effort: Pulmonary effort is normal.      Breath sounds: Normal breath sounds.   Abdominal:      General: Abdomen is flat. Bowel sounds are normal.      Palpations: Abdomen is soft.      Tenderness: There is no abdominal tenderness. There is no guarding or rebound.   Skin:     General: Skin is warm.   Neurological:      General: No focal deficit present.      Mental Status: She is alert and oriented to person, place, and time. Mental status is at baseline.   Psychiatric:         Mood and Affect: Mood normal.         Behavior: Behavior normal.             Significant Labs: All pertinent labs within the past 24 hours have been reviewed.  CBC:   Recent Labs   Lab 09/19/24  0642 09/20/24  0502   WBC 9.27 7.65   HGB 13.1 12.5   HCT 41.3 39.3    198     CMP:   Recent Labs   Lab 09/19/24  0443 09/20/24  0501    137   K 3.7 3.6   * 109   CO2 19* 20*   * 132*   BUN 17 18   CREATININE 0.9 0.9   CALCIUM 8.5* 8.1*   PROT 6.3 5.9*   ALBUMIN 3.7 3.5   BILITOT 0.8 0.7   ALKPHOS 125 111   AST 12 11   ALT 8* 9*   ANIONGAP 9 8     Magnesium:   Recent Labs   Lab 09/19/24  0443 09/20/24  0501   MG 1.9 1.8       Significant Imaging: I have reviewed all pertinent imaging results/findings within the past 24 hours.    Assessment/Plan:      * Syncope and collapse  -presented after episode of syncope and collapse  -was found on the toilet with reduced awareness, later had a syncopal episode  -CT head and CT maxillofacial negative for acute abnormality  -2D echo EF normal, no major valvular abnormalities   -carotid ultrasound no significant stenosis  -CT showing  multiple changes in cervical spine, no critical foraminal stenosis, outpatient neurosurgery follow-up  -neurology consulted given her seizure disorder and these episodes of impaired awareness  -EEG obtained, no seizure activity, mild encephalopathy   -telemetry  -orthostatic vitals the positive 9/18, blood pressure while standing greater than 110/60 mmHg.  Reduce antihypertensive dose. repeat orthostatic vitals were negative 9/19  - PT/OT recommending high-intensity therapy.  Case management consulted for discharge planning      UTI (urinary tract infection)  Acute on chronic:  - repeat UA concerning for UTI  - Rocephin ordered   -urine culture in process      Emphysema lung  -smoked 2 pack per day for 40 years   -incidental finding of emphysematous changes   -currently no active wheezing, no COPD exacerbation, on room air comfortable  -outpatient follow-up    GERD (gastroesophageal reflux disease)  Continue famotidine      Hypertension  Chronic, controlled. Latest blood pressure and vitals reviewed-     Temp:  [97.8 °F (36.6 °C)-98.6 °F (37 °C)]   Pulse:  [68-76]   Resp:  [18-20]   BP: (147-197)/(67-81)   SpO2:  [95 %-98 %] .   Home meds for hypertension were reviewed and noted below.   Hypertension Medications               nitroGLYCERIN (NITROSTAT) 0.4 MG SL tablet Place 0.4 mg under the tongue every 5 (five) minutes as needed for Chest pain.    propranoloL (INDERAL LA) 120 MG 24 hr capsule TAKE 1 CAPSULE BY MOUTH ONCE DAILY    torsemide (DEMADEX) 5 MG Tab Take 1 tablet (5 mg total) by mouth daily as needed (swelling).            While in the hospital, will manage blood pressure as follows;     Presented with syncope and hypotension   We will restart propranolol at a low dose of 20 mg  Orthostatic vitals improved on repeat    Will utilize p.r.n. blood pressure medication only if patient's blood pressure greater than 180/110 and she develops symptoms such as worsening chest pain or shortness of breath.    Seizure  disorder  History of seizure disorder   On topiramate 100 mg b.i.d. at home   Recent episodes of reduced awareness  Neurology on board   EEG no seizure activity   Continue topiramate   Seizure precautions   Aspiration precautions   Fall precautions      Hyperlipemia  Continue statin        VTE Risk Mitigation (From admission, onward)           Ordered     enoxaparin injection 40 mg  Daily         09/17/24 0633     IP VTE HIGH RISK PATIENT  Once         09/17/24 0633     Place sequential compression device  Until discontinued         09/17/24 0633                    Discharge Planning   ROGERIO: 9/21/2024     Code Status: Full Code   Is the patient medically ready for discharge?:     Reason for patient still in hospital (select all that apply): Patient trending condition, Laboratory test, Treatment, PT / OT recommendations, and Pending disposition  Discharge Plan A: Rehab   Discharge Delays: None known at this time              Tierra Kerr PA-C  Department of Hospital Medicine   [facility]

## 2024-09-20 NOTE — PLAN OF CARE
Problem: Skin Injury Risk Increased  Goal: Skin Health and Integrity  Outcome: Progressing     Problem: Adult Inpatient Plan of Care  Goal: Plan of Care Review  Outcome: Progressing  Goal: Patient-Specific Goal (Individualized)  Outcome: Progressing     Problem: Fall Injury Risk  Goal: Absence of Fall and Fall-Related Injury  Outcome: Progressing     Problem: Wound  Goal: Optimal Coping  Outcome: Progressing  Goal: Optimal Functional Ability  Outcome: Progressing

## 2024-09-21 VITALS
RESPIRATION RATE: 18 BRPM | WEIGHT: 194.25 LBS | OXYGEN SATURATION: 95 % | BODY MASS INDEX: 34.42 KG/M2 | SYSTOLIC BLOOD PRESSURE: 152 MMHG | DIASTOLIC BLOOD PRESSURE: 66 MMHG | TEMPERATURE: 98 F | HEIGHT: 63 IN | HEART RATE: 58 BPM

## 2024-09-21 LAB
ALBUMIN SERPL BCP-MCNC: 3.5 G/DL (ref 3.5–5.2)
ALP SERPL-CCNC: 114 U/L (ref 55–135)
ALT SERPL W/O P-5'-P-CCNC: 12 U/L (ref 10–44)
ANION GAP SERPL CALC-SCNC: 11 MMOL/L (ref 8–16)
AST SERPL-CCNC: 14 U/L (ref 10–40)
BASOPHILS # BLD AUTO: 0.04 K/UL (ref 0–0.2)
BASOPHILS NFR BLD: 0.5 % (ref 0–1.9)
BILIRUB SERPL-MCNC: 0.6 MG/DL (ref 0.1–1)
BUN SERPL-MCNC: 19 MG/DL (ref 8–23)
CALCIUM SERPL-MCNC: 8.4 MG/DL (ref 8.7–10.5)
CHLORIDE SERPL-SCNC: 110 MMOL/L (ref 95–110)
CO2 SERPL-SCNC: 19 MMOL/L (ref 23–29)
CREAT SERPL-MCNC: 0.8 MG/DL (ref 0.5–1.4)
DIFFERENTIAL METHOD BLD: NORMAL
EOSINOPHIL # BLD AUTO: 0.2 K/UL (ref 0–0.5)
EOSINOPHIL NFR BLD: 2.2 % (ref 0–8)
ERYTHROCYTE [DISTWIDTH] IN BLOOD BY AUTOMATED COUNT: 14.5 % (ref 11.5–14.5)
EST. GFR  (NO RACE VARIABLE): >60 ML/MIN/1.73 M^2
GLUCOSE SERPL-MCNC: 95 MG/DL (ref 70–110)
HCT VFR BLD AUTO: 40.6 % (ref 37–48.5)
HGB BLD-MCNC: 13 G/DL (ref 12–16)
IMM GRANULOCYTES # BLD AUTO: 0.03 K/UL (ref 0–0.04)
IMM GRANULOCYTES NFR BLD AUTO: 0.4 % (ref 0–0.5)
LYMPHOCYTES # BLD AUTO: 2.1 K/UL (ref 1–4.8)
LYMPHOCYTES NFR BLD: 26.3 % (ref 18–48)
MAGNESIUM SERPL-MCNC: 2 MG/DL (ref 1.6–2.6)
MCH RBC QN AUTO: 28 PG (ref 27–31)
MCHC RBC AUTO-ENTMCNC: 32 G/DL (ref 32–36)
MCV RBC AUTO: 88 FL (ref 82–98)
MONOCYTES # BLD AUTO: 0.8 K/UL (ref 0.3–1)
MONOCYTES NFR BLD: 9.8 % (ref 4–15)
NEUTROPHILS # BLD AUTO: 4.9 K/UL (ref 1.8–7.7)
NEUTROPHILS NFR BLD: 60.8 % (ref 38–73)
NRBC BLD-RTO: 0 /100 WBC
PLATELET # BLD AUTO: 200 K/UL (ref 150–450)
PMV BLD AUTO: 11.4 FL (ref 9.2–12.9)
POTASSIUM SERPL-SCNC: 3.5 MMOL/L (ref 3.5–5.1)
PROT SERPL-MCNC: 6.3 G/DL (ref 6–8.4)
RBC # BLD AUTO: 4.64 M/UL (ref 4–5.4)
SODIUM SERPL-SCNC: 140 MMOL/L (ref 136–145)
WBC # BLD AUTO: 8.1 K/UL (ref 3.9–12.7)

## 2024-09-21 PROCEDURE — 85025 COMPLETE CBC W/AUTO DIFF WBC: CPT | Performed by: INTERNAL MEDICINE

## 2024-09-21 PROCEDURE — 97530 THERAPEUTIC ACTIVITIES: CPT | Mod: CQ

## 2024-09-21 PROCEDURE — 25000003 PHARM REV CODE 250

## 2024-09-21 PROCEDURE — 25000003 PHARM REV CODE 250: Performed by: INTERNAL MEDICINE

## 2024-09-21 PROCEDURE — 80053 COMPREHEN METABOLIC PANEL: CPT | Performed by: INTERNAL MEDICINE

## 2024-09-21 PROCEDURE — 83735 ASSAY OF MAGNESIUM: CPT | Performed by: INTERNAL MEDICINE

## 2024-09-21 PROCEDURE — 36415 COLL VENOUS BLD VENIPUNCTURE: CPT | Performed by: INTERNAL MEDICINE

## 2024-09-21 RX ORDER — CEPHALEXIN 500 MG/1
500 CAPSULE ORAL EVERY 12 HOURS
Qty: 10 CAPSULE | Refills: 0 | Status: SHIPPED | OUTPATIENT
Start: 2024-09-21 | End: 2024-09-26

## 2024-09-21 RX ORDER — PROPRANOLOL HYDROCHLORIDE 20 MG/1
20 TABLET ORAL DAILY
Qty: 30 TABLET | Refills: 11 | Status: SHIPPED | OUTPATIENT
Start: 2024-09-22 | End: 2025-09-22

## 2024-09-21 RX ADMIN — FAMOTIDINE 20 MG: 20 TABLET ORAL at 09:09

## 2024-09-21 RX ADMIN — POTASSIUM BICARBONATE 50 MEQ: 977.5 TABLET, EFFERVESCENT ORAL at 06:09

## 2024-09-21 RX ADMIN — CLOPIDOGREL BISULFATE 75 MG: 75 TABLET, FILM COATED ORAL at 09:09

## 2024-09-21 RX ADMIN — PROPRANOLOL HYDROCHLORIDE 20 MG: 10 TABLET ORAL at 09:09

## 2024-09-21 RX ADMIN — TOPIRAMATE 100 MG: 100 TABLET, FILM COATED ORAL at 09:09

## 2024-09-21 NOTE — PLAN OF CARE
BAIRON spoke with Suzy Baum admissions liaison with [facility] and informed her per MD peer to peer was successful. Patient will be able to admit on Sunday 9/22/24 if auth received. To provide further update shortly.    0930 Voicemail left with N oncall line to verify status of auth.Awaiting return phone call.    0945 BAIRON received return phone call from Kady with [facility] on status of authorization and still no decision has been made. Also advised not likely [employer] auth to be approved. Will be provided with final decision within next few hours.    1200 Per Kady with [facility] IPR insurance auth has been denied. Patient will like to d/c home with Home Health.

## 2024-09-21 NOTE — PT/OT/SLP PROGRESS
Physical Therapy Treatment    Patient Name:  Alyssa John   MRN:  [id number]    Recommendations:     Discharge Recommendations: High Intensity Therapy  Discharge Equipment Recommendations:  (TBD at next level of care)  Barriers to discharge:  Requires increased assistance for mobility, Medical status    Assessment:     Alyssa John is a 74 y.o. female admitted with a medical diagnosis of Syncope and collapse.  She presents with the following impairments/functional limitations: weakness, impaired endurance, impaired self care skills, impaired functional mobility, gait instability, impaired balance, decreased lower extremity function, pain, impaired cardiopulmonary response to activity, decreased safety awareness . Pt seen with HOB elevated and RN at bedside administering medication. RN requested we take orthostatics on pt. BP obtained as 149/67 MAP 96 supine, 163/67 MAP 96 sitting, 141/55 MAP 79 standing. Pt required mod a to eob with R LE assist, Mod a x2 sit to stand t/f with rw secondary to R knee pain. Poor + standing balance initially, but improved over time with cues for posture. Pt confirmed being lightheaded and dizzy with standing. Due to requiring a brief change, the pt returned to bed with HOB elevated and RN notified.     Rehab Prognosis: Fair; patient would benefit from acute skilled PT services to address these deficits and reach maximum level of function.    Recent Surgery: * No surgery found *      Plan:     During this hospitalization, patient to be seen 6 x/week to address the identified rehab impairments via gait training, therapeutic activities, therapeutic exercises, neuromuscular re-education and progress toward the following goals:    Plan of Care Expires:  10/18/24    Subjective     Chief Complaint: 10/10 R patellar pain  Patient/Family Comments/goals: Pt agreeable to PT.   Pain/Comfort:  Pain Rating 1: 10/10  Location - Side 1: Right  Location - Orientation 1:  anterior  Location 1: knee  Pain Addressed 1: Cessation of Activity, Nurse notified  Pain Rating Post-Intervention 1: 10/10      Objective:     Communicated with RN prior to session.  Patient found HOB elevated with bed alarm, PureWick, peripheral IV, telemetry upon PT entry to room.     General Precautions: Standard, fall, seizure  Orthopedic Precautions: N/A  Braces: N/A  Respiratory Status: Room air     Functional Mobility:  Bed Mobility:     Supine to Sit: moderate assistance  Sit to Supine: moderate assistance  Transfers:     Sit to Stand:  moderate assistance and of 2 persons with rolling walker  Balance: Poor + static stand with rw      AM-PAC 6 CLICK MOBILITY          Treatment & Education:  Pt was educated on the following: call light use, importance of OOB activity and functional mobility to negate the negative effects of prolonged bed rest during this hospitalization, safe transfers/ambulation and discharge planning recommendations/options.     Patient left HOB elevated with all lines intact, call button in reach, bed alarm on, RN notified, and spouse present..    GOALS:   Multidisciplinary Problems       Physical Therapy Goals          Problem: Physical Therapy    Goal Priority Disciplines Outcome Goal Variances Interventions   Physical Therapy Goal     PT, PT/OT Progressing     Description: Goals to be met by: 10/18/24     Patient will increase functional independence with mobility by performin. Supine to sit with Stand-by Assistance  2. Sit to supine with MInimal Assistance  3. Sit to stand transfer with Contact Guard Assistance  4. Bed to chair transfer with Contact Guard Assistance using Rolling Walker  5. Gait  x 2 x 25 feet with Contact Guard Assistance using Rolling Walker.                          Time Tracking:     PT Received On: 24  PT Start Time: 901     PT Stop Time: 925  PT Total Time (min): 24 min     Billable Minutes: Therapeutic Activity 24       PT/PTA: PTA     Number of  PTA visits since last PT visit: 3     09/21/2024

## 2024-09-21 NOTE — PLAN OF CARE
Problem: Skin Injury Risk Increased  Goal: Skin Health and Integrity  Outcome: Progressing     Problem: Adult Inpatient Plan of Care  Goal: Plan of Care Review  Outcome: Progressing  Goal: Patient-Specific Goal (Individualized)  Outcome: Progressing     Problem: Fall Injury Risk  Goal: Absence of Fall and Fall-Related Injury  Outcome: Progressing

## 2024-09-21 NOTE — DISCHARGE SUMMARY
[facility]  Discharge Summary      Patient Name: Alyssa John  MRN: [id number]  [facility]: [id number]  Patient Class: IP- Inpatient  Admission Date: 9/16/2024  Hospital Length of Stay: 3 days  Discharge Date and Time:  09/21/2024 3:06 PM  Attending Physician: Ki Mckay DO   Discharging Provider: Tierra Kerr PA-C  Primary Care Provider: Piyush Sharif MD    Primary Care Team: Networked reference to record PCT     HPI:   Alyssa John is a 73 y.o. female who has a history of  hypertension, seizures, multiple sclerosis, CAD, status post MI  and presents with syncopal episode     History given by , patient has some degree of short-term memory loss and forgetfulness, unclear if this represents some degree of progressive or early dementia.  Patient also feels some details he when able.  She had gone to use the bathroom and has sat down on the toilet that and had not yet relieve herself when suddenly she fell and collapsed towards the left.  She hit the left side of her face and cheek on her way down.  She woke she called out to her  who came to assist her in brought her back up to a seated position.  They were concerned about what happened and felt that she should come to the hospital.  They denied any recollection of chest pain or shortness of breath or symptoms prior to that.  It does not appear that patient had a postictal..  Normally when patient has seizures they are grand mal seizures and there were no signs of that per the patient.  General patient has been denying any recent fevers or recent shortness of breath or any other significant symptoms.    * No surgery found *      Hospital Course:   73 year old female with history of seizures compliant with home medication of topiramate presented after episode of decreased awareness and later an syncopal episode. CT head negative. 2D ECHO showed normal EF no major valvular abnormalities, Carotid  USG did not show any significant stenosis, Neurology was consulted given her recent episodes of reduced awareness (though these are different from her previous grand mal seizures) who obtained an EEG that did not show seizure activity.  She was in the ED, tele monitor did not show any obvious arrhythmia. Orthostatic vitals were positive but her standing blood pressure was greater than 110/60 mmHg.  Her home propranolol dose was reduced to 20 mg with p.r.n. hydralazine for better blood pressure control. Repeat orthostatic vitals were negative. Neurology signed off. PT/OT recommended acute rehab on discharge.  CM was consulted for discharge planning. Patient was denied authorization for rehab. Patient declined skilled nursing facility or home health and elected to pursue outpatient PT/OT which was ordered. Patient was seen on day of discharge. Patient was follow up with PCP, Neurology and Cardiology. Outpatient Holter monitor ordered at discharge. Return precautions discussed with the patient and patient was  who voiced understanding. Lifestyle modifications discussed. Discussed importance of monitoring blood pressure daily and to bring a log to future appointments.     Goals of Care Treatment Preferences:  Code Status: Full Code      SDOH Screening:  The patient was unable to be screened for utility difficulties, food insecurity, transport difficulties, housing insecurity, and interpersonal safety, so no concerns could be identified this admission.     Consults:   Consults (From admission, onward)          Status Ordering Provider     Inpatient consult to Social Work/Case Management  Once        Provider:  (Not yet assigned)    Acknowledged GILBERTO TSAI     Inpatient consult to   Once        Provider:  (Not yet assigned)    Acknowledged GILBERTO TSAI     Inpatient consult to Neurology  Once        Provider:  Sanchez Garcia MD Completed KASIREDDY, THRISHALA R No new  Assessment & Plan notes have been filed under this hospital service since the last note was generated.  Service: Hospital Medicine    Final Active Diagnoses:    Diagnosis Date Noted POA    PRINCIPAL PROBLEM:  Syncope and collapse [R55] 09/18/2024 Yes    UTI (urinary tract infection) [N39.0] 09/20/2024 Yes    Emphysema lung [J43.9] 09/18/2024 Yes    Seizure disorder [G40.909]  Yes    GERD (gastroesophageal reflux disease) [K21.9]  Yes    Hypertension [I10]  Yes    Hyperlipemia [E78.5] 06/13/2013 Yes      Problems Resolved During this Admission:       Discharged Condition: good    Disposition: Home or Self Care    Follow Up:   Follow-up Information       Piyush Sharif MD Follow up.    Specialty: Family Medicine  Contact information:  [address] [facility]  [place] [address]  [phone]               Sanchez Garcia MD Follow up.    Specialty: Neurology  Contact information:  [address] [facility]  [place] [address]  [phone]               Saira Daley APRN, CNS Follow up.    Specialty: Neurology  Contact information:  [address] [facility] [address] [address]  [phone]               Reji Mcmillan MD Follow up.    Specialties: Cardiology, Interventional Cardiology  Contact information:  [address] [facility]  [place] [address]  [phone]                           Patient Instructions:      Ambulatory referral/consult to Physical/Occupational Therapy   Standing Status: Future   Referral Priority: Routine Referral Type: Physical Medicine   Referral Reason: Specialty Services Required   Number of Visits Requested: 1     Notify your health care provider if you experience any of the following:  temperature >100.4     Notify your health care provider if you experience any of the following:  persistent nausea and vomiting or diarrhea     Notify your health care provider if you experience any of the following:  severe uncontrolled pain     Notify your health care provider if you experience any of  the following:  redness, tenderness, or signs of infection (pain, swelling, redness, odor or green/yellow discharge around incision site)     Notify your health care provider if you experience any of the following:  increased confusion or weakness     Holter monitor - 24 hour   Standing Status: Future Standing Exp. Date: 09/21/25     Order Specific Question Answer Comments   Release to patient Immediate      Activity as tolerated       Significant Diagnostic Studies: Labs: CMP   Recent Labs   Lab 09/20/24  0501 09/21/24  0410    140   K 3.6 3.5    110   CO2 20* 19*   * 95   BUN 18 19   CREATININE 0.9 0.8   CALCIUM 8.1* 8.4*   PROT 5.9* 6.3   ALBUMIN 3.5 3.5   BILITOT 0.7 0.6   ALKPHOS 111 114   AST 11 14   ALT 9* 12   ANIONGAP 8 11    and CBC   Recent Labs   Lab 09/20/24  0502 09/21/24  0410   WBC 7.65 8.10   HGB 12.5 13.0   HCT 39.3 40.6    200       Pending Diagnostic Studies:       None           Medications:  Reconciled Home Medications:      Medication List        START taking these medications      cephALEXin 500 MG capsule  Commonly known as: KEFLEX  Take 1 capsule (500 mg total) by mouth every 12 (twelve) hours. for 5 days     propranoloL 20 MG tablet  Commonly known as: INDERAL  Take 1 tablet (20 mg total) by mouth once daily.  Start taking on: September 22, 2024  Replaces: propranoloL 120 MG 24 hr capsule            CONTINUE taking these medications      atorvastatin 40 MG tablet  Commonly known as: LIPITOR  TAKE 1 TABLET BY MOUTH EVERY DAY     cholecalciferol (vitamin D3) 10 mcg (400 unit) Cap capsule  1 tablet.     clopidogreL 75 mg tablet  Commonly known as: PLAVIX  Take 75 mg by mouth once daily.     cyanocobalamin 1000 MCG tablet  Commonly known as: VITAMIN B-12  Take 1 tablet (1,000 mcg total) by mouth once daily.     DULoxetine 20 MG capsule  Commonly known as: CYMBALTA  TAKE 2 CAPSULES BY MOUTH EVERY DAY     estradioL 0.01 % (0.1 mg/gram) vaginal cream  Commonly known as:  ESTRACE  Place 0.5 g vaginally every evening. Use nightly for two weeks then twice weekly for maintenance     famotidine 20 MG tablet  Commonly known as: PEPCID  Take 20 mg by mouth 2 (two) times daily as needed for Heartburn.     fluticasone propionate 50 mcg/actuation nasal spray  Commonly known as: FLONASE  1 spray (50 mcg total) by Each Nostril route once daily.     mupirocin 2 % ointment  Commonly known as: BACTROBAN  Apply topically 3 (three) times daily.     nitroGLYCERIN 0.4 MG SL tablet  Commonly known as: NITROSTAT  Place 0.4 mg under the tongue every 5 (five) minutes as needed for Chest pain.     topiramate 100 MG tablet  Commonly known as: TOPAMAX  Take 1 tablet (100 mg total) by mouth 2 (two) times daily.     torsemide 5 MG Tab  Commonly known as: DEMADEX  Take 1 tablet (5 mg total) by mouth daily as needed (swelling).            STOP taking these medications      propranoloL 120 MG 24 hr capsule  Commonly known as: INDERAL [place]  Replaced by: propranoloL 20 MG tablet              Indwelling Lines/Drains at time of discharge:   Lines/Drains/Airways       None                   Time spent on the discharge of patient: 35 minutes         Tierra Kerr PA-C  Department of Hospital Medicine  [facility]

## 2024-09-21 NOTE — PLAN OF CARE
Case Management Final Discharge Note      Discharge Disposition: Home    New DME ordered / company name: None    Relevant SDOH / Transition of Care Barriers:  None    Primary Caretaker and contact information:     Giuseppe John (Spouse)  [phone] (Mobile)       Scheduled followup appointment: In basket messages sent PCP, Neurology, Cardiology providers noted on AVS for hospital follow up appointments    Referrals placed: Outpatient PT/OT    Transportation:     Giuseppe John (Spouse)  [phone] (Mobile)     Reinaldo BAILEY [facility] cardiology clinic to contact patient on Monday to arrange  of Holter monitor.    Patient and family educated on discharge services and updated on DC plan. Bedside RN notified, patient clear to discharge from Case Management Perspective.      09/21/24 1307   Final Note   Assessment Type Final Discharge Note   Anticipated Discharge Disposition Home   Hospital Resources/Appts/Education Provided Provided patient/caregiver with written discharge plan information   Post-Acute Status   Discharge Delays None known at this time     [facility]  Discharge Final Note    Primary Care Provider: Piyush Sharif MD    Expected Discharge Date: 9/21/2024    Final Discharge Note (most recent)       Final Note - 09/21/24 1307          Final Note    Assessment Type Final Discharge Note (P)      Anticipated Discharge Disposition Home or Self Care (P)      Hospital Resources/Appts/Education Provided Provided patient/caregiver with written discharge plan information (P)         Post-Acute Status    Discharge Delays None known at this time (P)                      Important Message from Medicare  Important Message from Medicare regarding Discharge Appeal Rights: Given to patient/caregiver, Explained to patient/caregiver, Signed/date by patient/caregiver     Date IMM was signed: 09/19/24  Time IMM was signed: 0930    Contact Info       Piyush Sharif MD   Specialty: Family  Medicine   Relationship: PCP - General    [address] [facility]  [place] [address]   Phone: [phone]       Next Steps: Follow up    Sanchez Garcia MD   Specialty: Neurology    [address] [facility]  [place] [address]   Phone: [phone]       Next Steps: Follow up    Saira Daley, APRN, CNS   Specialty: Neurology    [address] [facility] [address]   Phone: [phone]       Next Steps: Follow up    Reji Mcmillan MD   Specialty: Cardiology, Interventional Cardiology   Relationship: Consulting Physician    [address] [facility]  [facility]  [place] [address]   Phone: [phone]       Next Steps: Follow up

## 2024-09-22 ENCOUNTER — TELEPHONE (OUTPATIENT)
Dept: FAMILY MEDICINE | Facility: CLINIC | Age: 74
End: 2024-09-22
Payer: MEDICARE

## 2024-09-22 DIAGNOSIS — M79.2 NEUROPATHIC PAIN: ICD-10-CM

## 2024-09-22 DIAGNOSIS — F32.A DEPRESSION, UNSPECIFIED DEPRESSION TYPE: ICD-10-CM

## 2024-09-22 LAB
BACTERIA UR CULT: NO GROWTH
OHS QRS DURATION: 78 MS
OHS QTC CALCULATION: 449 MS

## 2024-09-22 NOTE — TELEPHONE ENCOUNTER
----- Message from Itz Hook RN sent at 9/21/2024 12:25 PM CDT -----  Regarding: Hosptial follow up  Patient is discharging from hospital today and needs seven day hospital follow up. Please contact patient with further details.

## 2024-09-23 ENCOUNTER — PATIENT MESSAGE (OUTPATIENT)
Dept: FAMILY MEDICINE | Facility: CLINIC | Age: 74
End: 2024-09-23
Payer: MEDICARE

## 2024-09-23 RX ORDER — DULOXETIN HYDROCHLORIDE 20 MG/1
CAPSULE, DELAYED RELEASE ORAL
Qty: 180 CAPSULE | Refills: 1 | Status: SHIPPED | OUTPATIENT
Start: 2024-09-23

## 2024-09-23 NOTE — PROGRESS NOTES
Patient ID: Alyssa John is a 74 y.o. female.    Chief Complaint: General Illness (Entered automatically based on patient selection in [employer].)          274}  The patient initiated a request through [employer] on 9/19/2024 for evaluation and management with a chief complaint of General Illness (Entered automatically based on patient selection in [employer].)     I evaluated the questionnaire submission on 09/23/2024 . Preop for INSERTION, NEUROSTIMULATOR, TEMPORARY, SACRAL     Total Time (in minutes): 14     Ohs Peq Evisit General    9/22/2024  4:05 PM CDT - Filed by Patient   Do you agree to participate in an E-Visit? Yes   If you have any of the following symptoms, please present to your local emergency room or call 911:  I acknowledge   Choose the state of your primary residence [place]   What is the main issue you would like addressed today? Need PCP approval for procedure   Please describe your symptoms See medical record for hospitalization [facility], discharged Saturday, Sept. 21, 2024   Where is your problem located? SEE RECORD   How severe are your symptoms? Severe   Have you had these symptoms before? No   How long have you been having these symptoms? For a few days   Please list any medications or treatments you have used for your condition and indicate if it was effective or not. This form keeps timing out when i give a full descruption and i have to start over from scratch. SEE RECORD   What makes this feel better? Na   What makes this feel worse? Na   Are these symptoms related to a condition that you currently have? Yes   What is the condition? SEE RECORD   When were you last seen for this condition? 9/21/2024   Please describe any probable cause for these symptoms SEE RECORD   Provide any additional information you feel is important. Alyssa needs this proceedure. Insurance declined admittance from the hospital into inpatient rehabilitation so we are free. We are applying for outpatient rehab. She  pola PT 2x week, OT and speech 1x week. Def needs speech evaluation   Please attach any relevant images or files    Are you able to take your vital signs? 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    COVID 10/28/2022    Debility 02/07/2022    Limitation of joint motion 02/07/2022    Physical deconditioning 10/19/2021    Hand weakness 04/05/2021    Bronchitis 11/20/2020    Cognitive communication disorder 08/28/2020    Impaired mobility and ADLs 05/24/2020    Coordination impairment 05/24/2020    Impaired instrumental activities of daily living (IADL) 05/24/2020    Internal carotid artery stenosis, right 03/08/2020    Intractable chronic migraine without aura and without status migrainosus 03/08/2020     Given failure of multiple or preventives in the past, I recommended either monoclonal antibody or Botox especially given longstanding daily frequency.  She had a difficult time deciding between the 2 and I extensively explained the risks and benefits and expected course for both.  Ultimately she decided to proceed with Botox.    The patient has chronic migraines (G43.719) and suffers from headaches more than 15 days a month lasting more than 4 hours a day with no relief of symptoms despite trying multiple medications for at least 3 months, if tolerated, including but not limited to   anti-epileptics - topiramate, carbamazepine, Dilantin, phenobarbital, gabapentin  beta blockers  - metoprolol  calcium channel blockers - blood pressure and pulse too low, contraindicated  Antidepressants - duloxetine    Botox treatment was approved for chronic migraines in  October 2010. The patient will be an ideal candidate for Botox. We are planning for 3 treatments 3 months apart and aiming for at least 50% improvement in the symptoms. If we see no improvement after 3 treatments, we will discontinue the injections.    ---    She has history of myocardial infarction precluding the use of triptans or DHE, for this reason we will pursue Ubrelvy which carries no Cardiovascular warnings.  As she is having daily headaches which risks medication overuse headache, I will also prescribe Compazine which may be used on a daily basis without rebound risk however we have to watch her for worsening tremor. Compazine made her tired - advised to reduce dose by half. Ubrelvy approved as non-formulary exception, with $120 copay. Gave nurtec to try - if works well, this may change their mind about trying Ubrelvy.     Botox started 7/8/2020        Tremor 03/08/2020    Anxiety and depression 03/08/2020    Former smoker, stopped smoking in distant past 03/08/2020    History of MI (myocardial infarction) 03/08/2020    Osteopenia determined by x-ray 03/08/2020    Gait abnormality 09/06/2019    Impaired functional mobility, balance, and endurance 09/06/2019    Gait instability 02/21/2017    Encounter for long-term (current) use of high-risk medication 08/26/2016    Seizure disorder      Suspect PGE, possibly KIESHA  Pt with comorbid migraines & MS      Hypertension     GERD (gastroesophageal reflux disease)     Arthritis     Chronic fatigue 08/10/2015    Vitamin D deficiency 02/03/2015    Gait disturbance 11/07/2014    Hyperlipemia 06/13/2013    MS (multiple sclerosis) 02/23/2013     Established with Dr. Apodaca      CAD (coronary artery disease) 02/23/2013     Dr. Mcmillan, cardiology        Recent Labs Obtained:  Lab Results   Component Value Date    WBC 8.10 09/21/2024    HGB 13.0 09/21/2024    HCT 40.6 09/21/2024    MCV 88 09/21/2024     09/21/2024     09/21/2024    K 3.5 09/21/2024    GLU 95  09/21/2024    CREATININE 0.8 09/21/2024    EGFRNORACEVR >60.0 09/21/2024    HGBA1C 6.0 09/18/2024    TSH 2.224 08/12/2024      Review of patient's allergies indicates:   Allergen Reactions    Codeine      Other reaction(s): throat tightness    Dilantin [phenytoin sodium extended]     Phenobarbital     Tegretol [carbamazepine]        Encounter Diagnoses   Name Primary?    Preop examination Yes    Urgency incontinence     Seizure disorder     MS (multiple sclerosis)     Primary hypertension     Coronary artery disease involving native coronary artery of native heart without angina pectoris         No orders of the defined types were placed in this encounter.   Pt is optimized for surgery and I believe the benefits outweigh the risks.   Can hold plavix 5 days prior to procedure.         E-Visit Time Tracking:    Day 1 Time (in minutes): 14    Total Time (in minutes): 14      274}

## 2024-09-24 ENCOUNTER — HOSPITAL ENCOUNTER (OUTPATIENT)
Dept: CARDIOLOGY | Facility: CLINIC | Age: 74
Discharge: HOME | End: 2024-09-24
Payer: MEDICARE

## 2024-09-24 ENCOUNTER — PATIENT OUTREACH (OUTPATIENT)
Dept: ADMINISTRATIVE | Facility: CLINIC | Age: 74
End: 2024-09-24
Payer: MEDICARE

## 2024-09-24 DIAGNOSIS — R55 SYNCOPE, UNSPECIFIED SYNCOPE TYPE: ICD-10-CM

## 2024-09-24 NOTE — PRE-PROCEDURE INSTRUCTIONS
Patient cleared for surgery from Dr. Sharif on 9/22.     Encounter Diagnoses   Name Primary?    Preop examination Yes    Urgency incontinence      Seizure disorder      MS (multiple sclerosis)      Primary hypertension      Coronary artery disease involving native coronary artery of native heart without angina pectoris           No orders of the defined types were placed in this encounter.   Pt is optimized for surgery and I believe the benefits outweigh the risks.   Can hold plavix 5 days prior to procedure.          E-Visit Time Tracking:     Day 1 Time (in minutes): 14     Total Time (in minutes): 14

## 2024-09-24 NOTE — PROGRESS NOTES
C3 nurse spoke with Alyssa John for a TCC post hospital discharge follow up call. The patient does not have a scheduled HOSFU appointment with Piyush Sharif MD within 5-7 days post hospital discharge date 9/21/24. C3 nurse was unable to schedule HOSFU appointment in [facility].    Message sent to PCP staff requesting they contact patient and schedule follow up appointment.

## 2024-09-26 ENCOUNTER — ANESTHESIA EVENT (OUTPATIENT)
Dept: SURGERY | Facility: HOSPITAL | Age: 74
End: 2024-09-26
Payer: MEDICARE

## 2024-09-26 ENCOUNTER — TELEPHONE (OUTPATIENT)
Dept: NEUROLOGY | Facility: CLINIC | Age: 74
End: 2024-09-26
Payer: MEDICARE

## 2024-09-26 NOTE — TELEPHONE ENCOUNTER
Spoke with pt spouse in regards to getting pt scheduled for an f/u appt, pt is scheduled 10/10/24.

## 2024-09-26 NOTE — PRE-PROCEDURE INSTRUCTIONS
Patients  (Giuseppe) was given pre op instructions and arrival time. Patient will be accompanies by .    Dear Alyssa ,     You are scheduled for a procedure with Dr. Rosales on 9/27/2024. Your scheduled arrival time is 5:15 am.  This arrival time is roughly 2 hours before your anticipated procedure time to allow sufficient time for pre-op.  Please wear comfortable clothing  This procedure will take place at the [facility] at the corner of [facility] and [facility].  It [facility] [place] [facility] to [facility]. The address is:     [address] [place] [address].  [address] [address]     After entering the building, proceed to the second floor and check in with registration.      Your fasting instructions are as follows:     Nothing to eat after 11:00 pm the evening before your surgery.   You may drink clear liquids up until 2 hours prior to your arrival time.      You MUST have a responsible adult to bring you home. Your surgery will be cancelled if you do not have a ride. (UBER or [employer]AXI WILL NOT BE ACCEPTED AS A RESPONSIBLE RIDE)     Please hold the following medications the morning of your procedure:  -Aspirin and Aspirin-containing products (Goody's powder, Excedrin)  -NSAIDs (Advil, Ibuprofen, Aleve, Diclofenac)  -Vitamins/Supplements   -Herbal remedies/Teas  -Stimulants (Adderall, Vyvanse, Adipex)  -Diabetic medication   -Prescription creams/gels/lotions        *May take Tylenol if needed         The evening before and morning of your procedure, take a shower using antibacterial soap (ex: Hibiclens or Dial antibacterial soap). DO NOT apply deodorant, lotion, cologne, or anything else to the skin. Do not wear jewelry or bring any valuables with you.  .     Please do not wear contact lenses the day of your procedure.   Bring any inhalers that you may need.     If you have any procedure-specific questions, please call your surgeon's office. Any other questions, don't  hesitate to call at [phone]     Thanks,  Pre-Admit Testing  Anesthesia Dept [facility]

## 2024-09-26 NOTE — ANESTHESIA PREPROCEDURE EVALUATION
09/26/2024  Alyssa John is a 74 y.o., female.      Pre-op Assessment    I have reviewed the Patient Summary Reports.     I have reviewed the Nursing Notes. I have reviewed the NPO Status.   I have reviewed the Medications.     Review of Systems  Anesthesia Hx:             Denies Family Hx of Anesthesia complications.    Denies Personal Hx of Anesthesia complications.                    Cardiovascular:     Hypertension  Past MI CAD                                        Pulmonary:   COPD                         Physical Exam  General: Well nourished    Airway:  Mallampati: II   Mouth Opening: Normal  TM Distance: Normal    Chest/Lungs:  Normal Respiratory Rate    Heart:  Rate: Normal    Anesthesia Plan  Type of Anesthesia, risks & benefits discussed:    Anesthesia Type: Gen ETT  Intra-op Monitoring Plan: Standard ASA Monitors  Post Op Pain Control Plan: multimodal analgesia  Induction:  IV  Airway Plan: Video  Informed Consent: Informed consent signed with the Patient and all parties understand the risks and agree with anesthesia plan.  All questions answered.   ASA Score: 3  Day of Surgery Review of History & Physical: H&P Update referred to the surgeon/provider.    Ready For Surgery From Anesthesia Perspective.   .

## 2024-09-27 ENCOUNTER — HOSPITAL ENCOUNTER (OUTPATIENT)
Facility: HOSPITAL | Age: 74
Discharge: HOME | End: 2024-09-27
Attending: UROLOGY | Admitting: UROLOGY
Payer: MEDICARE

## 2024-09-27 ENCOUNTER — ANESTHESIA (OUTPATIENT)
Dept: SURGERY | Facility: HOSPITAL | Age: 74
End: 2024-09-27
Payer: MEDICARE

## 2024-09-27 VITALS
OXYGEN SATURATION: 94 % | HEART RATE: 64 BPM | SYSTOLIC BLOOD PRESSURE: 162 MMHG | RESPIRATION RATE: 22 BRPM | TEMPERATURE: 97 F | DIASTOLIC BLOOD PRESSURE: 76 MMHG

## 2024-09-27 DIAGNOSIS — N32.81 URGENCY-FREQUENCY SYNDROME: ICD-10-CM

## 2024-09-27 DIAGNOSIS — N39.41 URGENCY INCONTINENCE: Primary | ICD-10-CM

## 2024-09-27 PROCEDURE — 36000706: Performed by: UROLOGY

## 2024-09-27 PROCEDURE — 25000003 PHARM REV CODE 250: Performed by: NURSE ANESTHETIST, CERTIFIED REGISTERED

## 2024-09-27 PROCEDURE — C1778 LEAD, NEUROSTIMULATOR: HCPCS | Performed by: UROLOGY

## 2024-09-27 PROCEDURE — 27201423 OPTIME MED/SURG SUP & DEVICES STERILE SUPPLY: Performed by: UROLOGY

## 2024-09-27 PROCEDURE — 36000707: Performed by: UROLOGY

## 2024-09-27 PROCEDURE — 71000016 HC POSTOP RECOV ADDL HR: Performed by: UROLOGY

## 2024-09-27 PROCEDURE — 37000009 HC ANESTHESIA EA ADD 15 MINS: Performed by: UROLOGY

## 2024-09-27 PROCEDURE — 63600175 PHARM REV CODE 636 W HCPCS: Performed by: NURSE ANESTHETIST, CERTIFIED REGISTERED

## 2024-09-27 PROCEDURE — 64561 IMPLANT NEUROELECTRODES: CPT | Mod: RT,,, | Performed by: UROLOGY

## 2024-09-27 PROCEDURE — 37000008 HC ANESTHESIA 1ST 15 MINUTES: Performed by: UROLOGY

## 2024-09-27 PROCEDURE — 25000003 PHARM REV CODE 250: Performed by: UROLOGY

## 2024-09-27 PROCEDURE — 71000033 HC RECOVERY, INTIAL HOUR: Performed by: UROLOGY

## 2024-09-27 PROCEDURE — C1883 ADAPT/EXT, PACING/NEURO LEAD: HCPCS | Performed by: UROLOGY

## 2024-09-27 PROCEDURE — 71000015 HC POSTOP RECOV 1ST HR: Performed by: UROLOGY

## 2024-09-27 DEVICE — LEAD INTERSTIM 2 SURESCAN 33CM: Type: IMPLANTABLE DEVICE | Site: SACRUM | Status: FUNCTIONAL

## 2024-09-27 RX ORDER — PROCHLORPERAZINE EDISYLATE 5 MG/ML
5 INJECTION INTRAMUSCULAR; INTRAVENOUS EVERY 30 MIN PRN
Status: DISCONTINUED | OUTPATIENT
Start: 2024-09-27 | End: 2024-09-27 | Stop reason: HOSPADM

## 2024-09-27 RX ORDER — LIDOCAINE HYDROCHLORIDE AND EPINEPHRINE 10; 10 MG/ML; UG/ML
INJECTION, SOLUTION INFILTRATION; PERINEURAL
Status: DISCONTINUED | OUTPATIENT
Start: 2024-09-27 | End: 2024-09-27 | Stop reason: HOSPADM

## 2024-09-27 RX ORDER — LIDOCAINE HYDROCHLORIDE 20 MG/ML
INJECTION INTRAVENOUS
Status: DISCONTINUED | OUTPATIENT
Start: 2024-09-27 | End: 2024-09-27

## 2024-09-27 RX ORDER — ACETAMINOPHEN 10 MG/ML
INJECTION, SOLUTION INTRAVENOUS
Status: DISCONTINUED | OUTPATIENT
Start: 2024-09-27 | End: 2024-09-27

## 2024-09-27 RX ORDER — MIDAZOLAM HYDROCHLORIDE 1 MG/ML
INJECTION INTRAMUSCULAR; INTRAVENOUS
Status: DISCONTINUED | OUTPATIENT
Start: 2024-09-27 | End: 2024-09-27

## 2024-09-27 RX ORDER — GLUCAGON 1 MG
1 KIT INJECTION
Status: DISCONTINUED | OUTPATIENT
Start: 2024-09-27 | End: 2024-09-27 | Stop reason: HOSPADM

## 2024-09-27 RX ORDER — FAMOTIDINE 10 MG/ML
INJECTION INTRAVENOUS
Status: DISCONTINUED | OUTPATIENT
Start: 2024-09-27 | End: 2024-09-27

## 2024-09-27 RX ORDER — PROPOFOL 10 MG/ML
VIAL (ML) INTRAVENOUS
Status: DISCONTINUED | OUTPATIENT
Start: 2024-09-27 | End: 2024-09-27

## 2024-09-27 RX ORDER — DEXAMETHASONE SODIUM PHOSPHATE 4 MG/ML
INJECTION, SOLUTION INTRA-ARTICULAR; INTRALESIONAL; INTRAMUSCULAR; INTRAVENOUS; SOFT TISSUE
Status: DISCONTINUED | OUTPATIENT
Start: 2024-09-27 | End: 2024-09-27

## 2024-09-27 RX ORDER — SODIUM CHLORIDE 9 MG/ML
INJECTION, SOLUTION INTRAVENOUS CONTINUOUS
Status: DISCONTINUED | OUTPATIENT
Start: 2024-09-27 | End: 2024-09-27 | Stop reason: HOSPADM

## 2024-09-27 RX ORDER — FENTANYL CITRATE 50 UG/ML
25 INJECTION, SOLUTION INTRAMUSCULAR; INTRAVENOUS EVERY 5 MIN PRN
Status: DISCONTINUED | OUTPATIENT
Start: 2024-09-27 | End: 2024-09-27 | Stop reason: HOSPADM

## 2024-09-27 RX ORDER — ONDANSETRON HYDROCHLORIDE 2 MG/ML
4 INJECTION, SOLUTION INTRAVENOUS DAILY PRN
Status: DISCONTINUED | OUTPATIENT
Start: 2024-09-27 | End: 2024-09-27 | Stop reason: HOSPADM

## 2024-09-27 RX ORDER — FENTANYL CITRATE 50 UG/ML
INJECTION, SOLUTION INTRAMUSCULAR; INTRAVENOUS
Status: DISCONTINUED | OUTPATIENT
Start: 2024-09-27 | End: 2024-09-27

## 2024-09-27 RX ORDER — CEFAZOLIN SODIUM 1 G/3ML
INJECTION, POWDER, FOR SOLUTION INTRAMUSCULAR; INTRAVENOUS
Status: DISCONTINUED | OUTPATIENT
Start: 2024-09-27 | End: 2024-09-27

## 2024-09-27 RX ORDER — HYDROMORPHONE HYDROCHLORIDE 1 MG/ML
0.2 INJECTION, SOLUTION INTRAMUSCULAR; INTRAVENOUS; SUBCUTANEOUS EVERY 5 MIN PRN
Status: DISCONTINUED | OUTPATIENT
Start: 2024-09-27 | End: 2024-09-27 | Stop reason: HOSPADM

## 2024-09-27 RX ORDER — ONDANSETRON HYDROCHLORIDE 2 MG/ML
INJECTION, SOLUTION INTRAVENOUS
Status: DISCONTINUED | OUTPATIENT
Start: 2024-09-27 | End: 2024-09-27

## 2024-09-27 RX ORDER — TRAMADOL HYDROCHLORIDE 50 MG/1
50 TABLET ORAL EVERY 6 HOURS PRN
Qty: 12 TABLET | Refills: 0 | Status: SHIPPED | OUTPATIENT
Start: 2024-09-27 | End: 2024-09-30

## 2024-09-27 RX ORDER — PHENYLEPHRINE HCL IN 0.9% NACL 1 MG/10 ML
SYRINGE (ML) INTRAVENOUS
Status: DISCONTINUED | OUTPATIENT
Start: 2024-09-27 | End: 2024-09-27

## 2024-09-27 RX ORDER — ROCURONIUM BROMIDE 10 MG/ML
INJECTION, SOLUTION INTRAVENOUS
Status: DISCONTINUED | OUTPATIENT
Start: 2024-09-27 | End: 2024-09-27

## 2024-09-27 RX ORDER — OXYCODONE AND ACETAMINOPHEN 5; 325 MG/1; MG/1
1 TABLET ORAL
Status: DISCONTINUED | OUTPATIENT
Start: 2024-09-27 | End: 2024-09-27 | Stop reason: HOSPADM

## 2024-09-27 RX ORDER — EPHEDRINE SULFATE 50 MG/ML
INJECTION, SOLUTION INTRAVENOUS
Status: DISCONTINUED | OUTPATIENT
Start: 2024-09-27 | End: 2024-09-27

## 2024-09-27 RX ADMIN — EPHEDRINE SULFATE 5 MG: 50 INJECTION INTRAVENOUS at 07:09

## 2024-09-27 RX ADMIN — ACETAMINOPHEN 1000 MG: 10 INJECTION INTRAVENOUS at 07:09

## 2024-09-27 RX ADMIN — ROCURONIUM BROMIDE 50 MG: 10 INJECTION INTRAVENOUS at 07:09

## 2024-09-27 RX ADMIN — Medication 100 MCG: at 07:09

## 2024-09-27 RX ADMIN — ONDANSETRON 4 MG: 2 INJECTION INTRAMUSCULAR; INTRAVENOUS at 07:09

## 2024-09-27 RX ADMIN — LIDOCAINE HYDROCHLORIDE 40 MG: 20 INJECTION INTRAVENOUS at 07:09

## 2024-09-27 RX ADMIN — FAMOTIDINE 20 MG: 10 INJECTION, SOLUTION INTRAVENOUS at 07:09

## 2024-09-27 RX ADMIN — Medication 100 MCG: at 08:09

## 2024-09-27 RX ADMIN — DEXAMETHASONE SODIUM PHOSPHATE 4 MG: 4 INJECTION, SOLUTION INTRA-ARTICULAR; INTRALESIONAL; INTRAMUSCULAR; INTRAVENOUS; SOFT TISSUE at 07:09

## 2024-09-27 RX ADMIN — SODIUM CHLORIDE: 0.9 INJECTION, SOLUTION INTRAVENOUS at 08:09

## 2024-09-27 RX ADMIN — Medication 200 MCG: at 07:09

## 2024-09-27 RX ADMIN — CEFAZOLIN 2 G: 330 INJECTION, POWDER, FOR SOLUTION INTRAMUSCULAR; INTRAVENOUS at 07:09

## 2024-09-27 RX ADMIN — FENTANYL CITRATE 50 MCG: 50 INJECTION INTRAMUSCULAR; INTRAVENOUS at 07:09

## 2024-09-27 RX ADMIN — SODIUM CHLORIDE: 0.9 INJECTION, SOLUTION INTRAVENOUS at 06:09

## 2024-09-27 RX ADMIN — PROPOFOL 100 MG: 10 INJECTION, EMULSION INTRAVENOUS at 07:09

## 2024-09-27 RX ADMIN — MIDAZOLAM 1 MG: 1 INJECTION INTRAMUSCULAR; INTRAVENOUS at 07:09

## 2024-09-27 RX ADMIN — SUGAMMADEX 400 MG: 100 INJECTION, SOLUTION INTRAVENOUS at 07:09

## 2024-09-27 NOTE — OP NOTE
"Neuromodulation Operative Note  9/27/2024    Preoperative Diagnosis:   Urinary frequency  Urinary urgency  Urgency incontinence  Fecal incontinence    Postoperative Diagnosis:  Urinary frequency  Urinary urgency  Urgency incontinence  Fecal incontinence    Procedure:  Percutaneous implantation of neurostimulator electrode array, sacral nerve (transforaminal placement) including image guidance (80778)    Attending Surgeon: Abel Rosales MD    Anesthesia: General Endotracheal anesthesia     EBL: <10 mL    Complications: None    Findings:   Lead Placement: right  Opening Thresholds: all less than or equal to 1.0 V  Placement of Interstim X lead    Reason for Procedure: Alyssa John is a very pleasant 74 y.o. female with a history of frequency, urgency, urgency incontinence, and fecal incontinence. Previous urodynamic evaluation revealed a bladder scan residual of 151 mL and a catheterized residual of 188 mL. The study showed no detrusor activity, normal bladder compliance, and no stress incontinence. She demonstrated dysfunctional voiding with Valsalva voiding and no underlying detrusor contraction. 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     She reports experiencing fecal incontinence. She expresses willingness to consider sacral neuromodulation as a potential treatment option for both her urinary symptoms and fecal incontinence, stating she has "reached the point that I need something to at least give it a whirl."    Procedure in Detail: Our standard sacral neuromodulation infection protocol was utilized preoperatively, intraoperatively and postoperatively, including perioperative antibiotics in accordance with that protocol. After informed consent was obtained and documented in the chart, She was brought to the OR suite where general endotracheal anesthesia was undertaken by the " OR staff. She was then gently rolled into a prone position with all pressure points padded and chest rolls used to facilitate ventilation. A formal timeout was performed. She was prepped and draped in accordance with our infection protocol.  A ground pad was placed on the bottom of the patient's foot and the proximal ends of the test stimulation cable were connected to the ground pad and the external neurostimulator (ENS).     The c-arm was moved into AP position and  AP images were obtained of the sacrum. The medial borders of the S3 foramina were identified and marked on the skin. The c-arm was then moved into the lateral position to identify the S3 foramen.The 3.5 inch needle was used and inserted along the most upper and medial aspect of the S3 foramen bilaterally and appropriate needle depth was visualized utilizing fluoroscopy. Opening motor thresholds with the needle tip just at the anterior edge of the sacrum were found to be less than or equal to 1.0 mA on all electrodes.  The right motor response was found to be superior and the decision was made to proceed on this side.     The foramen needle stylet was removed and a directional guide was placed through the needle using markers on the guide to assure appropriate depth. The foramen needle was removed by sliding over the directional guide. A small vertical incision was made inclusive of the directional guide through the skin. The lead introducer with dilator was placed over the directional guide and utilizing  fluoroscopic guidance, the lead introducer was advanced until the tip of the dilator sheath was seen at the anterior edge of the sacrum. The dilator sheath was removed along with the directional guide. Under fluoroscopic guidance, the 978BB1 (Interstim X) lead with the curved-tip stylet directed laterally was advanced such that the most proximal contact was situated at the anterior edge of the sacrum.    All four electrode contacts were tested,  observing chi and plantar flexion of the great toe utilizing the test stimulation cable and the external neurostimulator and controller. Opening thresholds with electrode contacts 1-4 less than or equal to 1.0 mA on all electrodes. The introducer was retracted over the lead, deploying the tines. Retesting of all four electrodes confirmed appropriate opening responses.     The potential internal neurostimulator pocket site was identified below the iliac crest and lateral to the sacrum. Local anesthetic was administered and an approximately 2cm incision was made into the subcutaneous tissue creating a connection pocket site. A vicryl anchoring suture was pre-placed in the medial aspect of the pocket through the fascia at the base.     The tunneling tool with sheath and dilator tip was placed from the lead insertion site subcutaneously to the small incised connection pocket site. The tunneling tool was removed and the lead was fed through the sheath, exiting at the connection pocket site. The sheath was removed. An incision was made at the contralateral percutaneous extension site. The tunneling tool was reassembled with the dilator tip excluding the tunneling tube. The tunneling tool was inserted at the pocket connection site and tunneled to the percutaneous extension exit site. The dilator tip was removed and replaced with the carrier tip. The connector end of the percutaneous extension was pressed fit into the carrier tip and the tunneling tool was pulled back to the connection pocket site. The connector end of the percutaneous extension was detached and the tunneling tool and carrier tip was removed. The lead and connector end of the percutaneous extension were cleaned and dried. The lead was inserted into percutaneous extension connection hub. The setscrew was tightened with the torque wrench until an audible click was heard.     The anchor suture was tied down just distal to the percutaneous extension  connector and tension on the external portion of the extension resulted in no migration of the connector. The incision was irrigated with sterile water and closed with 3-0 Monocryl suture. The incisions were then covered with Tegaderm dressing with the addition of a Biopatch at the percutaneous extension exit site.     The patient was transferred to the recovery room in stable condition. Using the controller and external neurostimulator, the patient was programmed to the electrode of optimum sensation and provided utilization instructions prior to discharge. Patient will complete a voiding diary during testing period to help document results of this test procedure.    As attending surgeon, I was present for all key portions of the procedure and immediately available for any non-key portions of the procedure including induction and extubation.

## 2024-09-27 NOTE — PLAN OF CARE
Pt in preop bay 39, VSS, and IV inserted. Pt denies any open wounds on body or the use of any weight loss injections. Pt ready to roll.    Procedural consents verified with pt.

## 2024-09-27 NOTE — ANESTHESIA POSTPROCEDURE EVALUATION
Anesthesia Post Evaluation    Patient: Alyssa John    Procedure(s) Performed: Procedure(s) (LRB):  INSERTION, NEUROSTIMULATOR, TEMPORARY, SACRAL (N/A)    Final Anesthesia Type: general      Patient location during evaluation: PACU  Patient participation: Yes- Able to Participate  Level of consciousness: awake and alert  Post-procedure vital signs: reviewed and stable  Pain management: adequate  Airway patency: patent    PONV status at discharge: No PONV  Anesthetic complications: no      Cardiovascular status: stable  Respiratory status: spontaneous ventilation  Hydration status: euvolemic  Follow-up not needed.            Vitals Value Taken Time   /76 09/27/24 1031   Temp 36 09/27/24 1542   Pulse 63 09/27/24 1040   Resp 17 09/27/24 1039   SpO2 94 % 09/27/24 1040   Vitals shown include unfiled device data.      Event Time   Out of Recovery 09:00:00         Pain/Dianne Score: Dianne Score: 10 (9/27/2024 10:15 AM)

## 2024-09-27 NOTE — H&P
"[facility]        History and Physical Exam  9/27/2024     Referred by:  No ref. provider found     History of Present Illness    Ms. John presents today for follow up on urinary incontinence    She has a history of urinary incontinence associated with neurogenic origin, possibly due to multiple sclerosis. She denies voiding difficulty but reports inability to void to completion. Previous urodynamic evaluation revealed a bladder scan residual of 151 mL and a catheterized residual of 188 mL. The study showed no detrusor activity, normal bladder compliance, and no stress incontinence. She demonstrated dysfunctional voiding with Valsalva voiding and no underlying detrusor contraction. 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     She reports experiencing fecal incontinence. She expresses willingness to consider sacral neuromodulation as a potential treatment option for both her urinary symptoms and fecal incontinence, stating she has "reached the point that I need something to at least give it a whirl."     Overactive bladder medication was not previously recommended due to potential urinary retention. After initially being reluctant, she is now interested in trying sacral neuromodulation. She understands the procedure, including the trial period and potential need for general anesthesia. She is more comfortable with staged testing under general anesthesia in the operating room, rather than an office-based procedure with local anesthesia.     She understands that sponge baths will be required during the trial period to keep the device area dry and acknowledges the need for assistance with personal hygiene during recovery. She expresses willingness to undergo the trial despite temporary bathing limitations.     She inquires about insurance coverage differences between " office visit and surgery options for the procedure, seeking clarification on potential variations in insurance reimbursement for the two different approaches.        A review of 10+ systems was conducted with pertinent positive and negative findings documented in HPI with all other systems reviewed and negative.     Past medical, family, surgical and social history reviewed as documented in chart with pertinent positive medical, family, surgical and social history detailed in HPI.     Exam Findings:     Physical Exam    General: No acute distress. Nontoxic appearing.  HENT: Normocephalic. Atraumatic.  Respiratory: Normal respiratory effort. No conversational dyspnea. No audible wheezing.  Abdomen: No obvious distension.  Skin: No visible abnormalities.  Extremities: No edema upper extremities. No edema lower extremities.  Neurological: Alert and oriented x3. Normal speech.  Psychiatric: Normal mood. Normal affect. No evidence of SI.           Assessment/Plan:     Assessment & Plan    - She would like to proceed with staged testing of SNM to see if she will see improvement sufficient for implantation.

## 2024-09-27 NOTE — PLAN OF CARE
Pt last took Plavix 9/23. Dr. Triana, anesthesia notified and ok to proceed. Pt also has bilateral cellulitis. Dr Rosales aware and ok to proceed.

## 2024-09-27 NOTE — ANESTHESIA PROCEDURE NOTES
Intubation    Date/Time: 9/27/2024 7:12 AM    Performed by: Kingsley Gamboa CRNA  Authorized by: Ricardo Triana MD    Intubation:     Induction:  Intravenous    Intubated:  Postinduction    Mask Ventilation:  Easy mask    Attempts:  1    Attempted By:  DELIA    Blade:  Philippe 3    Laryngeal View Grade: Grade I - full view of cords      Difficult Airway Encountered?: No      Complications:  None    Airway Device:  Oral endotracheal tube    Airway Device Size:  7.0    Style/Cuff Inflation:  Cuffed (inflated to minimal occlusive pressure)    Inflation Amount (mL):  6    Tube secured:  22    Secured at:  The lips    Placement Verified By:  Capnometry    Complicating Factors:  None    Findings Post-Intubation:  BS equal bilateral and atraumatic/condition of teeth unchanged

## 2024-09-27 NOTE — PLAN OF CARE
Discharge instructions given and explained to patient and family with verbalization of understanding all instructions. Patients v/s stable, denies n/v and tolerating po, rates pain level tolerable, IV removed, and family at bedside for patient discharge home.      [employer] Representative present at bedside educating patient and spouse on device and proper use.

## 2024-09-27 NOTE — TRANSFER OF CARE
Anesthesia Transfer of Care Note    Patient: Alyssa John    Procedure(s) Performed: Procedure(s) (LRB):  INSERTION, NEUROSTIMULATOR, TEMPORARY, SACRAL (N/A)    Patient location: PACU    Anesthesia Type: general    Transport from OR: Transported from OR on 6-10 L/min O2 by face mask with adequate spontaneous ventilation    Post pain: adequate analgesia    Post assessment: no apparent anesthetic complications and tolerated procedure well    Post vital signs: stable    Level of consciousness: responds to stimulation and sedated    Nausea/Vomiting: no nausea/vomiting    Complications: none    Transfer of care protocol was followed      Last vitals: Visit Vitals  BP (!) 177/77   Pulse 76   Temp 36 °C (96.8 °F) (Temporal)   Resp 16   SpO2 98%   Breastfeeding No

## 2024-09-27 NOTE — DISCHARGE SUMMARY
[facility]  Discharge Note  Short Stay    Procedure(s) (LRB):  INSERTION, NEUROSTIMULATOR, TEMPORARY, SACRAL (N/A)      OUTCOME: Patient tolerated treatment/procedure well without complication and is now ready for discharge.    DISPOSITION: Home or Self Care    FINAL DIAGNOSIS:  Fecal Incontinence    FOLLOWUP: In clinic    DISCHARGE INSTRUCTIONS:  No discharge procedures on file.     TIME SPENT ON DISCHARGE: 15 minutes

## 2024-09-30 ENCOUNTER — OFFICE VISIT (OUTPATIENT)
Dept: PULMONOLOGY | Facility: CLINIC | Age: 74
End: 2024-09-30
Payer: MEDICARE

## 2024-09-30 VITALS
BODY MASS INDEX: 34.38 KG/M2 | SYSTOLIC BLOOD PRESSURE: 135 MMHG | HEART RATE: 60 BPM | DIASTOLIC BLOOD PRESSURE: 75 MMHG | HEIGHT: 63 IN | OXYGEN SATURATION: 95 % | WEIGHT: 194 LBS

## 2024-09-30 DIAGNOSIS — J44.9 CHRONIC OBSTRUCTIVE PULMONARY DISEASE, UNSPECIFIED COPD TYPE: ICD-10-CM

## 2024-09-30 DIAGNOSIS — G35 MULTIPLE SCLEROSIS: ICD-10-CM

## 2024-09-30 DIAGNOSIS — J43.9 PULMONARY EMPHYSEMA, UNSPECIFIED EMPHYSEMA TYPE: Primary | ICD-10-CM

## 2024-09-30 DIAGNOSIS — R60.0 PEDAL EDEMA: ICD-10-CM

## 2024-09-30 DIAGNOSIS — J41.0 SIMPLE CHRONIC BRONCHITIS: ICD-10-CM

## 2024-09-30 DIAGNOSIS — E66.811 OBESITY (BMI 30.0-34.9): ICD-10-CM

## 2024-09-30 PROCEDURE — 1111F DSCHRG MED/CURRENT MED MERGE: CPT | Mod: CPTII,S$GLB,, | Performed by: INTERNAL MEDICINE

## 2024-09-30 PROCEDURE — 99204 OFFICE O/P NEW MOD 45 MIN: CPT | Mod: S$GLB,,, | Performed by: INTERNAL MEDICINE

## 2024-09-30 PROCEDURE — 3044F HG A1C LEVEL LT 7.0%: CPT | Mod: CPTII,S$GLB,, | Performed by: INTERNAL MEDICINE

## 2024-09-30 PROCEDURE — 3075F SYST BP GE 130 - 139MM HG: CPT | Mod: CPTII,S$GLB,, | Performed by: INTERNAL MEDICINE

## 2024-09-30 PROCEDURE — 1126F AMNT PAIN NOTED NONE PRSNT: CPT | Mod: CPTII,S$GLB,, | Performed by: INTERNAL MEDICINE

## 2024-09-30 PROCEDURE — 1159F MED LIST DOCD IN RCRD: CPT | Mod: CPTII,S$GLB,, | Performed by: INTERNAL MEDICINE

## 2024-09-30 PROCEDURE — 3078F DIAST BP <80 MM HG: CPT | Mod: CPTII,S$GLB,, | Performed by: INTERNAL MEDICINE

## 2024-09-30 PROCEDURE — 1101F PT FALLS ASSESS-DOCD LE1/YR: CPT | Mod: CPTII,S$GLB,, | Performed by: INTERNAL MEDICINE

## 2024-09-30 PROCEDURE — 3288F FALL RISK ASSESSMENT DOCD: CPT | Mod: CPTII,S$GLB,, | Performed by: INTERNAL MEDICINE

## 2024-09-30 PROCEDURE — 3008F BODY MASS INDEX DOCD: CPT | Mod: CPTII,S$GLB,, | Performed by: INTERNAL MEDICINE

## 2024-09-30 PROCEDURE — 99999 PR PBB SHADOW E&M-EST. PATIENT-LVL IV: CPT | Mod: PBBFAC,,, | Performed by: INTERNAL MEDICINE

## 2024-09-30 NOTE — PATIENT INSTRUCTIONS
Pulmonary emphysema, unspecified emphysema type  -     CT Chest Without Contrast; Future; Expected date: 09/30/2024  -     Complete PFT with bronchodilator; Future  Follow up in about 1 month (around 10/30/2024).

## 2024-09-30 NOTE — PROGRESS NOTES
SUBJECTIVE:    Patient ID: Alyssa John is a 74 y.o. female.    Chief Complaint: Follow-up    HPI The patient was found to have emphysema on a CT spine.  She also expectorates clear mucus all day.  This has been happening for a least 6-12 months. She quit smoking 2010. She smoked 80 pkyrs.  The patient has multiple sclerosis in his wheelchair-bound.  She is incontinent.  She has gained 30 lb.  She is very edematous.   is worried about her energy levels.  He wonders if it is from a pulmonary process or part of her MS.  The patient denies any dyspnea.  Past Medical History:   Diagnosis Date    Arthritis     Coronary artery disease     Encounter for blood transfusion     Epilepsy     GERD (gastroesophageal reflux disease)     Headache     Hypertension     MI, old     MS (multiple sclerosis)     Seizures     epilepsy     Past Surgical History:   Procedure Laterality Date    APPENDECTOMY      BACK SURGERY      L5 discectomy    BREAST BIOPSY Right 15 yrs ago    benign    CATARACT EXTRACTION Bilateral     COLONOSCOPY N/A 09/16/2015    Procedure: COLONOSCOPY;  Surgeon: Henry Malcolm MD;  Location: [facility];  Service: Endoscopy;  Laterality: N/A;    CORONARY STENT PLACEMENT      INSERTION, NEUROSTIMULATOR, TEMPORARY, SACRAL N/A 9/27/2024    Procedure: INSERTION, NEUROSTIMULATOR, TEMPORARY, SACRAL;  Surgeon: Abel Rosales MD;  Location: [facility] OR;  Service: Urology;  Laterality: N/A;  1 hour 45 minutes Mack RUIZ    right knee arthroscopy       Family History   Problem Relation Name Age of Onset    Scleroderma Mother      Heart disease Father          MI, CAD    Cancer Neg Hx      Diabetes Neg Hx      Stroke Neg Hx      Melanoma Neg Hx      Psoriasis Neg Hx      Lupus Neg Hx      Eczema Neg Hx          Social History:   Marital Status:   Occupation:  before retiring  Alcohol History:  reports current alcohol use.  Tobacco History:  reports that she quit smoking about 17 years  ago. Her smoking use included cigarettes. She has been exposed to tobacco smoke. She has never used smokeless tobacco. 80 pk yr smoking history  Drug History:  reports no history of drug use.    Review of patient's allergies indicates:   Allergen Reactions    Codeine      Other reaction(s): throat tightness    Dilantin [phenytoin sodium extended]     Phenobarbital     Tegretol [carbamazepine]        Current Outpatient Medications   Medication Sig Dispense Refill    atorvastatin (LIPITOR) 40 MG tablet TAKE 1 TABLET BY MOUTH EVERY DAY 90 tablet 0    cholecalciferol, vitamin D3, 10 mcg (400 unit) Cap capsule 1 tablet.      clopidogrel (PLAVIX) 75 mg tablet Take 75 mg by mouth once daily.      cyanocobalamin (VITAMIN B-12) 1000 MCG tablet Take 1 tablet (1,000 mcg total) by mouth once daily. 90 tablet 3    DULoxetine (CYMBALTA) 20 MG capsule TAKE 2 CAPSULES BY MOUTH EVERY  capsule 1    famotidine (PEPCID) 20 MG tablet Take 20 mg by mouth 2 (two) times daily as needed for Heartburn.      fluticasone propionate (FLONASE) 50 mcg/actuation nasal spray 1 spray (50 mcg total) by Each Nostril route once daily. 15.8 mL 0    propranoloL (INDERAL) 20 MG tablet Take 1 tablet (20 mg total) by mouth once daily. 30 tablet 11    topiramate (TOPAMAX) 100 MG tablet Take 1 tablet (100 mg total) by mouth 2 (two) times daily. 180 tablet 3    traMADoL (ULTRAM) 50 mg tablet Take 1 tablet (50 mg total) by mouth every 6 (six) hours as needed for Pain. 12 tablet 0    estradioL (ESTRACE) 0.01 % (0.1 mg/gram) vaginal cream Place 0.5 g vaginally every evening. Use nightly for two weeks then twice weekly for maintenance 42.5 g 1    mupirocin (BACTROBAN) 2 % ointment Apply topically 3 (three) times daily. (Patient not taking: Reported on 9/24/2024) 30 g 1    nitroGLYCERIN (NITROSTAT) 0.4 MG SL tablet Place 0.4 mg under the tongue every 5 (five) minutes as needed for Chest pain. (Patient not taking: Reported on 9/24/2024)      torsemide  "(DEMADEX) 5 MG Tab Take 1 tablet (5 mg total) by mouth daily as needed (swelling). 7 tablet 2     No current facility-administered medications for this visit.       Alpha-1 Antitrypsin:  Last PFT:   Last CT:    Review of Systems  General: Feeling good  Eyes: Vision is good. She wears reading glasses   ENT:  No sinusitis or pharyngitis.   Heart:: No chest pain or palpitations.  Lungs: coughs up phlegm all the time  GI: No Nausea, vomiting, constipation, diarrhea, or reflux.  : Incontinent  Musculoskeletal: knee hurts sometimes, fingers hurt, Dupuchenne's  Skin: lesions to posterior legs  Neuro: headaches  Lymph: edema to legs  Psych: anxiety  Endo: weight gain    OBJECTIVE:      /75 (BP Location: Right arm, Patient Position: Sitting)   Pulse 60   Ht 5' 3" (1.6 m)   Wt 88 kg (194 lb)   SpO2 95%   BMI 34.37 kg/m²     Physical Exam  GENERAL: Older obese patient in no distress, sitting in a wheelchair  HEENT: Pupils equal and reactive. Extraocular movements intact. Nose intact. Pharynx moist.  NECK: Supple.   HEART: Regular rate and rhythm. No murmur or gallop auscultated.  LUNGS: Clear to auscultation and percussion. Lung excursion symmetrical. No change in fremitus. No adventitial noises.  ABDOMEN:  Obese Bowel sounds present. Non-tender, no masses palpated.  EXTREMITIES: Normal muscle tone and joint movement, no cyanosis or clubbing.  There are swan-neck deformities to the hands bilaterally  LYMPHATICS: No adenopathy palpated, 2+ edema.  SKIN:  Very dry, intact, breakdown to the posterior calves bilaterally.  NEURO: Cranial nerves II-XII intact. Motor strength not formally tested but the  states he has to stand her up and pivot her to a wheelchair or to the bathroom.  PSYCH: Appropriate affect.    Assessment:       1. Pulmonary emphysema, unspecified emphysema type    2. Chronic obstructive pulmonary disease, unspecified COPD type    3. Simple chronic bronchitis    4. Pedal edema    5. Multiple " sclerosis    6. Obesity (BMI 30.0-34.9)        The patient has no pulmonary complaints.  Her  is concerned about her emphysema and the fact that she expectorated clear mucus all day long and all night long.  The patient needs to lose weight as she has gained 30 lb of late and she will be too heavy for her  to manage her soon.  The patient needs to get on her Lasix and elevate her legs so that she does not have further venous insufficiency breakdown.  Will obtain PFTs and a CT of her chest to better delineate the degree of emphysema and see if she is obstructed.  Will then determine whether she needs a inhaled steroid or a steroid and a long-acting bronchodilator.  Plan:       Pulmonary emphysema, unspecified emphysema type  -     CT Chest Without Contrast; Future; Expected date: 09/30/2024  -     Complete PFT with bronchodilator; Future    Chronic obstructive pulmonary disease, unspecified COPD type    Simple chronic bronchitis    Pedal edema    Multiple sclerosis    Obesity (BMI 30.0-34.9)         Follow up in about 1 month (around 10/30/2024).

## 2024-10-01 ENCOUNTER — TELEPHONE (OUTPATIENT)
Dept: UROLOGY | Facility: CLINIC | Age: 74
End: 2024-10-01
Payer: MEDICARE

## 2024-10-01 ENCOUNTER — CLINICAL SUPPORT (OUTPATIENT)
Dept: REHABILITATION | Facility: HOSPITAL | Age: 74
End: 2024-10-01
Payer: MEDICARE

## 2024-10-01 DIAGNOSIS — R53.81 PHYSICAL DECONDITIONING: Primary | ICD-10-CM

## 2024-10-01 DIAGNOSIS — N32.81 URGENCY-FREQUENCY SYNDROME: Primary | ICD-10-CM

## 2024-10-01 DIAGNOSIS — Z74.09 IMPAIRED FUNCTIONAL MOBILITY, BALANCE, AND ENDURANCE: ICD-10-CM

## 2024-10-01 DIAGNOSIS — T67.1XXA HEAT SYNCOPE, INITIAL ENCOUNTER: ICD-10-CM

## 2024-10-01 DIAGNOSIS — R55 SYNCOPE: ICD-10-CM

## 2024-10-01 PROCEDURE — 97163 PT EVAL HIGH COMPLEX 45 MIN: CPT | Mod: PO

## 2024-10-01 NOTE — PLAN OF CARE
[facility]  Physical Therapy Neurological Rehabilitation Initial Evaluation    Name: Alyssa John  Clinic Number: [id number]    Therapy Diagnosis:   Encounter Diagnoses   Name Primary?    Heat syncope, initial encounter     Syncope     Physical deconditioning Yes    Impaired functional mobility, balance, and endurance      Physician: Tierra Kerr, GUILLERMO    Physician Orders: PT Eval and Treat   Medical Diagnosis from Referral:   T67.1XXA (ICD-10-CM) - Heat syncope, initial encounter   R55 (ICD-10-CM) - Syncope     Evaluation Date: 10/1/2024  Authorization Period Expiration: 9/21/24-9/21/25  Plan of Care Expiration: 12/20/24  Visit # / Visits authorized: 1/ 1    FOTO 1: assess at first visit  FOTO 2:  FOTO 3:    Time In: 1530  Time Out: 1600  Total Billable Time: 30 minutes    Precautions: Standard, Fall, and MS    Subjective   Date of onset: last 3 months of weakness  History of current condition - Alyssa reports: she has MS, in the past 3 months she has had significant UTI which has caused her weakness and deconditioning. She had a recent hospitalization for syncope on  toilet.  states she requires increased assist with mobility and ADLs. She has increased bilateral lower extremities swelling.  has been putting barrier cream on legs, with minimal weeping noted to anterior left lower extremity.     HPI The patient was found to have emphysema on a CT spine.  She also expectorates clear mucus all day.  This has been happening for a least 6-12 months. She quit smoking 2010. She smoked 80 pkyrs.  The patient has multiple sclerosis in his wheelchair-bound.  She is incontinent.  She has gained 30 lb.  She is very edematous.   is worried about her energy levels.  He wonders if it is from a pulmonary process or part of her MS.  The patient denies any dyspnea.   Medical History:   Past Medical History:   Diagnosis Date    Arthritis     Coronary artery disease      "Encounter for blood transfusion     Epilepsy     GERD (gastroesophageal reflux disease)     Headache     Hypertension     MI, old     MS (multiple sclerosis)     Seizures     epilepsy       Surgical History:   Alyssa John  has a past surgical history that includes Appendectomy; Back surgery; Coronary stent placement; Colonoscopy (N/A, 09/16/2015); right knee arthroscopy; Cataract extraction (Bilateral); Breast biopsy (Right, 15 yrs ago); and insertion, neurostimulator, temporary, sacral (N/A, 9/27/2024).    Medications:   Alyssa has a current medication list which includes the following prescription(s): atorvastatin, cholecalciferol (vitamin d3), clopidogrel, cyanocobalamin, duloxetine, estradiol, famotidine, fluticasone propionate, mupirocin, nitroglycerin, propranolol, topiramate, and torsemide, and the following Facility-Administered Medications: albuterol sulfate.    Allergies:   Review of patient's allergies indicates:   Allergen Reactions    Codeine      Other reaction(s): throat tightness    Dilantin [phenytoin sodium extended]     Phenobarbital     Tegretol [carbamazepine]         Prior Therapy: none  Social History: , 2 adult children and sister, 1SH threshold   Falls: 1 fall 2wks ago   DME: Rw, manual w/c, transport chair shower chair  Home Environment: very rarely home alone   Exercise Routine / History: none   Family Present at time of Eval:    Occupation: retired,   Driving: no  Hobbies: card games, writing  Prior Level of Function: assist with all ADLs  Current Level of Function: mod-max for all mobility and ADLs    Pain:  Current 0/10,    Patient's goals: "I would like to walk, I'd like to go outside."    Objective   Patient received from waiting room. Patient presents in transport chair.  Mental status: alert, oriented to person, place, and time, affect appropriate to mood  Appearance: Casually dressed  Behavior:  calm, cooperative, and adequate rapport can be " established  Attention Span and Concentration:  Decreased    Vitals:   Blood Pressure: 162/90mmHg  Heart Rate: 59bpm  O2: 99%    Posture:   Sitting: Rounded shoulder, Head forward, Posterior pelvic tilt, and Slouched posture  Standing: Rounded shoulder    Transfers:     Transfers Level of Assistance  Comments   Roll Right Maximum Assistance    Roll Left Maximum Assistance    Roll Supine Moderate Assistance    Roll Prone MARCELL    Supine to Sit Total Assistance    Sit to Supine Total Assistance    Sit to Stand Total Assistance    Stand to Sit Total Assistance    Transfer from mat to chair Total Assistance Stand pivot patient with short shuffling steps to turn)     Sensation:   Left upper extremity  Left lower extremity  Right upper extremity  Right lower extremity    Light Touch Intact Intact Intact Intact     Visual/Auditory:   Patient denies changes.    ROM:   GROSS AROM/PROM  UPPER EXTREMITY  (R) UE: WFLs  (L) UE: WFLs  LOWER EXTREMITY  (R) LE: WFLs  (L) LE: WFLs    Strength:      RLE Strength Grade LLE Strength Grade   Hip Flexion 2/5 Hip Flexion 2/5   Knee Flexion 2+/5 Knee Flexion 3/5   Knee Extension 3-/5 Knee Extension 3/5   Ankle Dorsiflexion 3-/5 Ankle Dorsiflexion 3-/5   Ankle Plantarflexion 3-/5 Ankle Plantarflexion 3-/5       Coordination: Not tested    Gait Assessment:  Not safe to assess at evaluation      Unable to assess ouctome measures at this time due to safety and weakness    TREATMENT   Total Treatment time separate from Evaluation: 0 minutes    Home Exercises and Patient Education Provided    Education provided:   - role of PT, PT plan of care  - need for OT for assessment of ADL capability  - need for hygiene and moisturizing of bilateral lower extremities, minimal use of barrier cream as if could damage skin.    Written Home Exercises Provided:  no, will provide at first visit .  Exercises were reviewed and Alyssa was able to demonstrate them prior to the end of the session.  Alyssa demonstrated  good  understanding of the education provided.     Assessment   Alyssa is a 74 y.o. female referred to outpatient Physical Therapy with a medical diagnosis of syncope. Patient with diagnosis of Multiple sclerosis as well. Patient presents with decreased strength, balance and functional mobility. She has increased swelling of bilateral lower extremities as well as tightness of bilateral upper extremities and bilateral lower extremities. Family also requires safety training for mobility and care of patient to protect themselves. She would benefit from a multidisciplinary approach to aide in balance and strength training as well as care of self.    Patient prognosis is Good.   Patient will benefit from skilled outpatient Physical Therapy to address the deficits stated above and in the chart below, provide patient/family education, and to maximize patient's level of independence.     Plan of care discussed with patient: Yes  Patient's spiritual, cultural and educational needs considered and patient is agreeable to the plan of care and goals as stated below:     Anticipated Barriers for therapy: fatigue    Medical Necessity is demonstrated by the following  History  Co-morbidities and personal factors that may impact the plan of care [] LOW: no personal factors / co-morbidities  [] MODERATE: 1-2 personal factors / co-morbidities  [x] HIGH: 3+ personal factors / co-morbidities    Moderate / High Support Documentation:   Co-morbidities affecting plan of care: MS, HTN, seizures, emphysema    Personal Factors:   age     Examination  Body Structures and Functions, activity limitations and participation restrictions that may impact the plan of care [] LOW: addressing 1-2 elements  [] MODERATE: 3+ elements  [x] HIGH: 4+ elements (please support below)    Moderate / High Support Documentation: range of motion, strength, functional mobility, swelling     Clinical Presentation [] LOW: stable  [] MODERATE: Evolving  [x] HIGH:  Unstable     Decision Making/ Complexity Score: high         Goals:  Short Term Goals: 4 weeks  Date Last Assessed Status   Patient to be independent with home exercise program.   New   2. Patient to tolerate cardiorespiratory activity for at least 10 minutes with RPE of </= 3/10.   New   3. Patient to participate in dynamic balance activities to increase balance with moderate assistance.   New      Long Term Goals: 6 weeks Date Last Assessed Status   Patient to participate in strengthening exercises of low back and bilateral lower extremities to decrease fall risk.   New   2. Patient to improve sit to stand with RW and moderate assistance.   New   3. Patient to improve transfer with slide board with supervision    New   4.Patient's caregiver to demonstrate independence with transfers.  New   5. Patient to be able to ambulate 50ft with RW with moderate assistance.  New      Plan   Plan of care Certification: 10/1/2024 to 12/20/24.    Outpatient Physical Therapy 2 times weekly for 12 weeks to include the following interventions: Gait Training, Manual Therapy, Neuromuscular Re-ed, Patient Education, Therapeutic Activities, and Therapeutic Exercise.     Jessica Quigley PT, DPT, CLT  10/3/2024    I CERTIFY THE NEED FOR THESE SERVICES FURNISHED UNDER THIS PLAN OF TREATMENT AND WHILE UNDER MY CARE        Physician Name: _______________________________     Physician Signature: ____________________________

## 2024-10-02 ENCOUNTER — PATIENT MESSAGE (OUTPATIENT)
Dept: FAMILY MEDICINE | Facility: CLINIC | Age: 74
End: 2024-10-02
Payer: MEDICARE

## 2024-10-03 ENCOUNTER — HOSPITAL ENCOUNTER (OUTPATIENT)
Dept: RADIOLOGY | Facility: HOSPITAL | Age: 74
Discharge: HOME | End: 2024-10-03
Attending: INTERNAL MEDICINE
Payer: MEDICARE

## 2024-10-03 ENCOUNTER — HOSPITAL ENCOUNTER (OUTPATIENT)
Dept: PULMONOLOGY | Facility: HOSPITAL | Age: 74
Discharge: HOME | End: 2024-10-03
Attending: INTERNAL MEDICINE
Payer: MEDICARE

## 2024-10-03 ENCOUNTER — OFFICE VISIT (OUTPATIENT)
Dept: FAMILY MEDICINE | Facility: CLINIC | Age: 74
End: 2024-10-03
Payer: MEDICARE

## 2024-10-03 ENCOUNTER — TELEPHONE (OUTPATIENT)
Dept: PULMONOLOGY | Facility: HOSPITAL | Age: 74
End: 2024-10-03

## 2024-10-03 DIAGNOSIS — J43.9 PULMONARY EMPHYSEMA, UNSPECIFIED EMPHYSEMA TYPE: ICD-10-CM

## 2024-10-03 DIAGNOSIS — I10 ESSENTIAL HYPERTENSION: ICD-10-CM

## 2024-10-03 DIAGNOSIS — N39.41 URGENCY INCONTINENCE: ICD-10-CM

## 2024-10-03 DIAGNOSIS — G35 MULTIPLE SCLEROSIS: ICD-10-CM

## 2024-10-03 DIAGNOSIS — R60.9 SWELLING: Primary | ICD-10-CM

## 2024-10-03 LAB
DLCO SINGLE BREATH LLN: 14.34
DLCO SINGLE BREATH PRE REF: 47.3 %
DLCO SINGLE BREATH REF: 20.08
DLCOC SBVA LLN: 2.72
DLCOC SBVA REF: 4.21
DLCOC SINGLE BREATH LLN: 14.34
DLCOC SINGLE BREATH REF: 20.08
DLCOVA LLN: 2.72
DLCOVA PRE REF: 62.7 %
DLCOVA PRE: 2.64 ML/(MIN*MMHG*L) (ref 2.72–5.69)
DLCOVA REF: 4.21
ERV LLN: -16449.42
ERV PRE REF: 49.6 %
ERV REF: 0.58
FEF 25 75 CHG: -28.4 %
FEF 25 75 LLN: 1.01
FEF 25 75 POST REF: 58.1 %
FEF 25 75 PRE REF: 81.1 %
FEF 25 75 REF: 2.4
FET100 CHG: 147.1 %
FEV1 CHG: -5.3 %
FEV1 FVC CHG: -10.1 %
FEV1 FVC LLN: 64
FEV1 FVC POST REF: 97.2 %
FEV1 FVC PRE REF: 108.1 %
FEV1 FVC REF: 78
FEV1 LLN: 1.45
FEV1 POST REF: 82.2 %
FEV1 PRE REF: 86.9 %
FEV1 REF: 2.02
FRCPLETH LLN: 1.84
FRCPLETH PREREF: 101.2 %
FRCPLETH REF: 2.66
FVC CHG: 5.3 %
FVC LLN: 1.89
FVC POST REF: 83.8 %
FVC PRE REF: 79.6 %
FVC REF: 2.63
IVC PRE: 2.18 L (ref 1.89–3.41)
IVC SINGLE BREATH LLN: 1.89
IVC SINGLE BREATH PRE REF: 83.2 %
IVC SINGLE BREATH REF: 2.63
MVV LLN: 64
MVV PRE REF: 55.3 %
MVV REF: 75
PEF CHG: 7.9 %
PEF LLN: 3.52
PEF POST REF: 59.8 %
PEF PRE REF: 55.4 %
PEF REF: 5.18
POST FEF 25 75: 1.4 L/S (ref 1.01–3.8)
POST FET 100: 6.45 SEC
POST FEV1 FVC: 75.63 % (ref 63.94–89.74)
POST FEV1: 1.66 L (ref 1.45–2.57)
POST FVC: 2.2 L (ref 1.89–3.41)
POST PEF: 3.1 L/S (ref 3.52–6.84)
PRE DLCO: 9.5 ML/(MIN*MMHG) (ref 14.34–25.81)
PRE ERV: 0.29 L (ref -16449.42–16450.58)
PRE FEF 25 75: 1.95 L/S (ref 1.01–3.8)
PRE FET 100: 2.61 SEC
PRE FEV1 FVC: 84.1 % (ref 63.94–89.74)
PRE FEV1: 1.76 L (ref 1.45–2.57)
PRE FRC PL: 2.69 L (ref 1.84–3.48)
PRE FVC: 2.09 L (ref 1.89–3.41)
PRE MVV: 41.42 L/MIN (ref 63.61–86.06)
PRE PEF: 2.87 L/S (ref 3.52–6.84)
PRE RV: 2.4 L (ref 1.5–2.66)
PRE TLC: 4.5 L (ref 3.78–5.76)
RAW LLN: 3.06
RAW PRE REF: 134.3 %
RAW PRE: 4.11 CMH2O*S/L (ref 3.06–3.06)
RAW REF: 3.06
RV LLN: 1.5
RV PRE REF: 115.6 %
RV REF: 2.08
RVTLC LLN: 35
RVTLC PRE REF: 121.2 %
RVTLC PRE: 53.48 % (ref 34.53–53.71)
RVTLC REF: 44
TLC LLN: 3.78
TLC PRE REF: 94.2 %
TLC REF: 4.77
VA PRE: 3.6 L (ref 4.62–4.62)
VA SINGLE BREATH LLN: 4.62
VA SINGLE BREATH PRE REF: 77.9 %
VA SINGLE BREATH REF: 4.62
VC LLN: 1.89
VC PRE REF: 79.6 %
VC PRE: 2.09 L (ref 1.89–3.41)
VC REF: 2.63

## 2024-10-03 PROCEDURE — 3044F HG A1C LEVEL LT 7.0%: CPT | Mod: CPTII,95,, | Performed by: STUDENT IN AN ORGANIZED HEALTH CARE EDUCATION/TRAINING PROGRAM

## 2024-10-03 PROCEDURE — 94726 PLETHYSMOGRAPHY LUNG VOLUMES: CPT

## 2024-10-03 PROCEDURE — 94729 DIFFUSING CAPACITY: CPT

## 2024-10-03 PROCEDURE — 71250 CT THORAX DX C-: CPT | Mod: 26,,, | Performed by: RADIOLOGY

## 2024-10-03 PROCEDURE — 99443 PR PHYSICIAN TELEPHONE EVALUATION 21-30 MIN: CPT | Mod: 95,,, | Performed by: STUDENT IN AN ORGANIZED HEALTH CARE EDUCATION/TRAINING PROGRAM

## 2024-10-03 PROCEDURE — 71250 CT THORAX DX C-: CPT | Mod: TC

## 2024-10-03 PROCEDURE — 94060 EVALUATION OF WHEEZING: CPT

## 2024-10-03 RX ORDER — TORSEMIDE 5 MG/1
5 TABLET ORAL DAILY
Qty: 30 TABLET | Refills: 3 | Status: SHIPPED | OUTPATIENT
Start: 2024-10-03 | End: 2025-10-03

## 2024-10-03 RX ORDER — ALBUTEROL SULFATE 2.5 MG/.5ML
SOLUTION RESPIRATORY (INHALATION)
Status: DISCONTINUED
Start: 2024-10-03 | End: 2024-10-04 | Stop reason: HOSPADM

## 2024-10-03 NOTE — PROGRESS NOTES
Established Patient - Audio Only Telehealth Visit       274}  The patient location is: [place]   The chief complaint leading to consultation is:   Chief Complaint   Patient presents with    Leg Swelling     Visit type: Virtual visit with audio only (telephone)  Total time spent with patient: 23 min       The reason for the audio only service rather than synchronous audio and video virtual visit was related to technical difficulties or patient preference/necessity.     Each patient to whom I provide medical services by telemedicine is:  (1) informed of the relationship between the physician and patient and the respective role of any other health care provider with respect to management of the patient; and (2) notified that they may decline to receive medical services by telemedicine and may withdraw from such care at any time. Patient verbally consented to receive this service via voice-only telephone call.     274}     HPI: pt has taken torsemide 5 mg for only a couple of days some improvement in swelling. She does have incontinence thus a balance her  reports as well.          Lab Results   Component Value Date    WBC 8.10 09/21/2024    HGB 13.0 09/21/2024    HCT 40.6 09/21/2024    MCV 88 09/21/2024     09/21/2024     09/21/2024    K 3.5 09/21/2024     09/21/2024    CALCIUM 8.4 (L) 09/21/2024    PHOS 3.6 09/17/2024    CO2 19 (L) 09/21/2024    GLU 95 09/21/2024    BUN 19 09/21/2024    CREATININE 0.8 09/21/2024    EGFRNORACEVR >60.0 09/21/2024    ANIONGAP 11 09/21/2024    PROT 6.3 09/21/2024    ALBUMIN 3.5 09/21/2024    BILITOT 0.6 09/21/2024    ALKPHOS 114 09/21/2024    ALT 12 09/21/2024    AST 14 09/21/2024    INR 0.9 09/17/2024    CHOL 145 09/18/2024    TRIG 113 09/18/2024    HDL 39 (L) 09/18/2024    LDLCALC 83.4 09/18/2024    TSH 2.224 08/12/2024    HGBA1C 6.0 09/18/2024          Assessment and plan:  swelling will continue torsemide 5 mg for a week can give a trial of 10 mg if not  enough sig improvement discussed risks and benefits.      Alyssa was seen today for leg swelling.    Diagnoses and all orders for this visit:    Swelling  -     torsemide (DEMADEX) 5 MG Tab; Take 1 tablet (5 mg total) by mouth once daily.    Multiple sclerosis    Urgency incontinence    Essential hypertension     Hypertension -stable continue current meds monitor no headache or chest pain f/u blood work     Urgency incontinence stable monitor       This service was not originating from a related E/M service provided within the previous 7 days nor will  to an E/M service or procedure within the next 24 hours or my soonest available appointment.  Prevailing standard of care was able to be met in this audio-only visit.

## 2024-10-04 ENCOUNTER — TELEPHONE (OUTPATIENT)
Dept: FAMILY MEDICINE | Facility: CLINIC | Age: 74
End: 2024-10-04
Payer: MEDICARE

## 2024-10-04 DIAGNOSIS — Z74.09 IMPAIRED MOBILITY AND ADLS: Primary | Chronic | ICD-10-CM

## 2024-10-04 DIAGNOSIS — Z78.9 IMPAIRED MOBILITY AND ADLS: Primary | Chronic | ICD-10-CM

## 2024-10-04 NOTE — TELEPHONE ENCOUNTER
----- Message from Piyush Sharif MD sent at 10/4/2024  6:50 AM CDT -----  Regarding: FW: Occupational Therapy Referral    ----- Message -----  From: Jessica Quigley, PT  Sent: 10/3/2024  10:49 PM CDT  To: Piyush Sharif MD  Subject: Occupational Therapy Referral                    Dr. Sharif,    I have evaluated Ms. John for physical therapy. She also presents with affected care of self and ADLs. She would benefit from an occupational therapy evaluation. If you are in agreement, please place a referral for an occupational therapy evaluation.    Jessica Quigley PT, DPT, CLT  10/3/2024

## 2024-10-04 NOTE — PROGRESS NOTES
Thank you for consult. Please refer to Plan of Care for complete note and goals.  Jessica Quigley PT, DPT, CLT  10/3/2024

## 2024-10-06 ENCOUNTER — TELEPHONE (OUTPATIENT)
Dept: PULMONOLOGY | Facility: HOSPITAL | Age: 74
End: 2024-10-06

## 2024-10-06 NOTE — TELEPHONE ENCOUNTER
The patient's PFT show a moderate diffusion defect.  The CT shows some nodules and emphysema.  This is not worsened from her last CT except for a 7 mm linear density in the right upper lobe and three-month follow up is suggested

## 2024-10-10 ENCOUNTER — E-VISIT (OUTPATIENT)
Dept: FAMILY MEDICINE | Facility: CLINIC | Age: 74
End: 2024-10-10
Payer: MEDICARE

## 2024-10-10 ENCOUNTER — PATIENT MESSAGE (OUTPATIENT)
Dept: FAMILY MEDICINE | Facility: CLINIC | Age: 74
End: 2024-10-10
Payer: MEDICARE

## 2024-10-10 ENCOUNTER — LAB VISIT (OUTPATIENT)
Dept: LAB | Facility: HOSPITAL | Age: 74
End: 2024-10-10
Attending: STUDENT IN AN ORGANIZED HEALTH CARE EDUCATION/TRAINING PROGRAM
Payer: MEDICARE

## 2024-10-10 ENCOUNTER — PATIENT MESSAGE (OUTPATIENT)
Dept: UROLOGY | Facility: CLINIC | Age: 74
End: 2024-10-10
Payer: MEDICARE

## 2024-10-10 ENCOUNTER — PATIENT MESSAGE (OUTPATIENT)
Dept: PULMONOLOGY | Facility: CLINIC | Age: 74
End: 2024-10-10
Payer: MEDICARE

## 2024-10-10 DIAGNOSIS — R82.90 CLOUDY URINE: Primary | ICD-10-CM

## 2024-10-10 DIAGNOSIS — N30.00 ACUTE CYSTITIS WITHOUT HEMATURIA: Primary | ICD-10-CM

## 2024-10-10 DIAGNOSIS — N30.00 ACUTE CYSTITIS WITHOUT HEMATURIA: ICD-10-CM

## 2024-10-10 LAB
BACTERIA #/AREA URNS AUTO: ABNORMAL /HPF
BILIRUB UR QL STRIP: NEGATIVE
CLARITY UR REFRACT.AUTO: ABNORMAL
COLOR UR AUTO: YELLOW
GLUCOSE UR QL STRIP: NEGATIVE
HGB UR QL STRIP: NEGATIVE
KETONES UR QL STRIP: NEGATIVE
LEUKOCYTE ESTERASE UR QL STRIP: ABNORMAL
MICROSCOPIC COMMENT: ABNORMAL
NITRITE UR QL STRIP: NEGATIVE
PH UR STRIP: 7 [PH] (ref 5–8)
PROT UR QL STRIP: NEGATIVE
RBC #/AREA URNS AUTO: 6 /HPF (ref 0–4)
SP GR UR STRIP: 1.01 (ref 1–1.03)
SQUAMOUS #/AREA URNS AUTO: 2 /HPF
URN SPEC COLLECT METH UR: ABNORMAL
WBC #/AREA URNS AUTO: >100 /HPF (ref 0–5)
WBC CLUMPS UR QL AUTO: ABNORMAL

## 2024-10-10 PROCEDURE — 87088 URINE BACTERIA CULTURE: CPT | Performed by: STUDENT IN AN ORGANIZED HEALTH CARE EDUCATION/TRAINING PROGRAM

## 2024-10-10 PROCEDURE — 81001 URINALYSIS AUTO W/SCOPE: CPT | Performed by: STUDENT IN AN ORGANIZED HEALTH CARE EDUCATION/TRAINING PROGRAM

## 2024-10-10 PROCEDURE — 87086 URINE CULTURE/COLONY COUNT: CPT | Performed by: STUDENT IN AN ORGANIZED HEALTH CARE EDUCATION/TRAINING PROGRAM

## 2024-10-10 PROCEDURE — 87186 SC STD MICRODIL/AGAR DIL: CPT | Performed by: STUDENT IN AN ORGANIZED HEALTH CARE EDUCATION/TRAINING PROGRAM

## 2024-10-10 RX ORDER — PHENAZOPYRIDINE HYDROCHLORIDE 200 MG/1
200 TABLET, FILM COATED ORAL 3 TIMES DAILY PRN
Qty: 9 TABLET | Refills: 0 | Status: SHIPPED | OUTPATIENT
Start: 2024-10-10 | End: 2024-10-13

## 2024-10-10 RX ORDER — NITROFURANTOIN 25; 75 MG/1; MG/1
100 CAPSULE ORAL 2 TIMES DAILY
Qty: 10 CAPSULE | Refills: 0 | Status: SHIPPED | OUTPATIENT
Start: 2024-10-10 | End: 2024-10-15

## 2024-10-10 NOTE — PRE-PROCEDURE INSTRUCTIONS
Unable to reach patient on phone. The following message was sent to patient portal.     Dear Alyssa ,     You are scheduled for a procedure with Dr. Rosales on 10/11/2024. Your scheduled arrival time is 11:20 am.  This arrival time is roughly 2 hours before your anticipated procedure time to allow sufficient time for pre-op.  Please wear comfortable clothing  This procedure will take place at the [facility] at the corner of [facility].  [facility] [place] [facility]. The address is:     [address] [place] [address].  [place] [id number]     After entering the building, proceed to the second floor and check in with registration.      Your fasting instructions are as follows:     Nothing to eat after Midnight the evening before your surgery.   You may drink clear liquids up until 2 hours prior to your arrival time.      You MUST have a responsible adult to bring you home.     Please hold the following medications the morning of your procedure:  -Aspirin and Aspirin-containing products (Goody's powder, Excedrin)  -NSAIDs (Advil, Ibuprofen, Aleve, Diclofenac)  -Vitamins/Supplements   -Herbal remedies/Teas  -Stimulants (Adderall, Vyvanse, Adipex)  -Diabetic medication   -Prescription creams/gels/lotions        *May take Tylenol if needed         The evening before and morning of your procedure, take a shower using antibacterial soap (ex: Hibiclens or Dial antibacterial soap). DO NOT apply deodorant, lotion, cologne, or anything else to the skin. Do not wear jewelry or bring any valuables with you.  .     Please do not wear contact lenses the day of your procedure.   Bring any inhalers that you may need.     If you have any procedure-specific questions, please call your surgeon's office. Any other questions, don't hesitate to call at [phone]     Thanks,  Pre-Admit Testing  Anesthesia Dept [facility]

## 2024-10-10 NOTE — PROGRESS NOTES
Patient ID: Alyssa John is a 74 y.o. female.    Chief Complaint: General Illness (Entered automatically based on patient selection in [employer].)          274}  The patient initiated a request through [employer] on 10/10/2024 for evaluation and management with a chief complaint of General Illness (Entered automatically based on patient selection in [employer].)     I evaluated the questionnaire submission on 10/10/2024 .    Total Time (in minutes): 12     [facility]    10/10/2024  8:29 AM CDT - Filed by Patient   What do you need help with? Urinary Symptoms   Do you agree to participate in an E-Visit? Yes   If you have any of the following symptoms, please present to your local emergency room or call 911:  I acknowledge   What is the main issue you would like addressed today? UTI Strong urine smell, slightly cloudy,   What symptoms do you currently have? Change in urine appearance or smell   When did your symptoms first appear? 10/10/2024   List what you have done or taken to help your symptoms. Nothing   Please indicate whether you have had the following symptoms during the past 24 hours     Urgent urination (a sudden and uncontrollable urge to urinate) Severe   Frequent urination of small amounts of urine (going to the toilet very often) Severe   Burning pain when urinating None   Incomplete bladder emptying (still feel like you need to urinate again after urination) Severe   Pain not associated with urination in the lower abdomen below the belly button) None   What does your urine look like? Cloudy   Blood seen in the urine None   Flank pain (pain in one or both sides of the lower back) None   Abnormal Vaginal Discharge (abnormal amount, color and/or odor) None   Discharge from the urethra (urinary opening) without urination None   High body temperature/fever? None-<99.5   Please rate how much discomfort you have experience because of the symptoms in the past 24 hours: Severe    Please indicate how the symptoms have interfered with your every day activities/work in the past 24 hours: Severe   Please indicate how these symptoms have interfered with your social activities (visiting people, meeting with friends, etc.) in the past 24 hours? Severe   Are you a diabetic? No   Please indicate whether you have the following at the time of completion of this questionnaire: None of the above   Provide any additional information you feel is important. Mahogany had UTIs off and on for the past 2-3 months. Had four accidents in a row last night. Very strong smell urine. Slightly cloudy   Please attach any relevant images or files (if you have performed a home test for UTI, please submit a photo of results)    Are you able to take your vital signs? Yes   Systolic Blood Pressure: 154   Diastolic Blood Pressure: 82   Weight: 193   Height: 63.5   Pulse: 74   Temperature: 97.5   Respiration rate: 14   Pulse Oxygen: 96          Active Problem List with Overview Notes    Diagnosis Date Noted    Obesity (BMI 30.0-34.9) 09/30/2024    Simple chronic bronchitis 09/30/2024    Pedal edema 09/30/2024    Chronic obstructive pulmonary disease 09/30/2024    Urgency-frequency syndrome 09/27/2024    UTI (urinary tract infection) 09/20/2024    Syncope and collapse 09/18/2024    Emphysema lung 09/18/2024    Severe obesity (BMI 35.0-39.9) with comorbidity 04/03/2024    Presence of aortocoronary bypass graft 10/28/2022    Presence of coronary angioplasty implant and graft 10/28/2022    Qualitative platelet disorder 10/28/2022    COVID 10/28/2022    Debility 02/07/2022    Limitation of joint motion 02/07/2022    Physical deconditioning 10/19/2021    Hand weakness 04/05/2021    Bronchitis 11/20/2020    Cognitive communication disorder 08/28/2020    Impaired mobility and ADLs 05/24/2020    Coordination impairment 05/24/2020    Impaired instrumental activities of daily living (IADL) 05/24/2020    Internal carotid artery stenosis,  right 03/08/2020    Intractable chronic migraine without aura and without status migrainosus 03/08/2020     Given failure of multiple or preventives in the past, I recommended either monoclonal antibody or Botox especially given longstanding daily frequency.  She had a difficult time deciding between the 2 and I extensively explained the risks and benefits and expected course for both.  Ultimately she decided to proceed with Botox.    The patient has chronic migraines (G43.719) and suffers from headaches more than 15 days a month lasting more than 4 hours a day with no relief of symptoms despite trying multiple medications for at least 3 months, if tolerated, including but not limited to   anti-epileptics - topiramate, carbamazepine, Dilantin, phenobarbital, gabapentin  beta blockers  - metoprolol  calcium channel blockers - blood pressure and pulse too low, contraindicated  Antidepressants - duloxetine    Botox treatment was approved for chronic migraines in October 2010. The patient will be an ideal candidate for Botox. We are planning for 3 treatments 3 months apart and aiming for at least 50% improvement in the symptoms. If we see no improvement after 3 treatments, we will discontinue the injections.    ---    She has history of myocardial infarction precluding the use of triptans or DHE, for this reason we will pursue Ubrelvy which carries no Cardiovascular warnings.  As she is having daily headaches which risks medication overuse headache, I will also prescribe Compazine which may be used on a daily basis without rebound risk however we have to watch her for worsening tremor. Compazine made her tired - advised to reduce dose by half. Ubrelvy approved as non-formulary exception, with $120 copay. [facility] if [facility] well, this may change their mind about trying Ubrelvy.     Botox started 7/8/2020        Tremor 03/08/2020    Anxiety and depression 03/08/2020    Former smoker, stopped smoking in distant  past 03/08/2020    History of MI (myocardial infarction) 03/08/2020    Osteopenia determined by x-ray 03/08/2020    Gait abnormality 09/06/2019    Impaired functional mobility, balance, and endurance 09/06/2019    Gait instability 02/21/2017    Encounter for long-term (current) use of high-risk medication 08/26/2016    Seizure disorder      Suspect PGE, possibly KIESHA  Pt with comorbid migraines & MS      Hypertension     GERD (gastroesophageal reflux disease)     Arthritis     Chronic fatigue 08/10/2015    Vitamin D deficiency 02/03/2015    Gait disturbance 11/07/2014    Hyperlipemia 06/13/2013    Multiple sclerosis 02/23/2013     Established with Dr. Apodaca      CAD (coronary artery disease) 02/23/2013     Dr. Mcmillan, cardiology        Recent Labs Obtained:  Lab Results   Component Value Date    WBC 8.10 09/21/2024    HGB 13.0 09/21/2024    HCT 40.6 09/21/2024    MCV 88 09/21/2024     09/21/2024     09/21/2024    K 3.5 09/21/2024    GLU 95 09/21/2024    CREATININE 0.8 09/21/2024    EGFRNORACEVR >60.0 09/21/2024    HGBA1C 6.0 09/18/2024    TSH 2.224 08/12/2024      Review of patient's allergies indicates:   Allergen Reactions    Codeine      Other reaction(s): throat tightness    Dilantin [phenytoin sodium extended]     Phenobarbital     Tegretol [carbamazepine]        Encounter Diagnosis   Name Primary?    Acute cystitis without hematuria Yes        Orders Placed This Encounter   Procedures    Urinalysis, Reflex to Urine Culture Urine, Clean Catch     Standing Status:   Future     Standing Expiration Date:   12/9/2025     Order Specific Question:   Preferred Collection Type     Answer:   Urine, Clean Catch     Order Specific Question:   Specimen Source     Answer:   Urine      Medications Ordered This Encounter   Medications    nitrofurantoin, macrocrystal-monohydrate, (MACROBID) 100 MG capsule     Sig: Take 1 capsule (100 mg total) by mouth 2 (two) times daily. for 5 days     Dispense:  10 capsule      Refill:  0    phenazopyridine (PYRIDIUM) 200 MG tablet     Sig: Take 1 tablet (200 mg total) by mouth 3 (three) times daily as needed for Pain.     Dispense:  9 tablet     Refill:  0        E-Visit Time Tracking:    Day 1 Time (in minutes): 12    Total Time (in minutes): 12      274}

## 2024-10-11 ENCOUNTER — ANESTHESIA EVENT (OUTPATIENT)
Dept: SURGERY | Facility: HOSPITAL | Age: 74
End: 2024-10-11
Payer: MEDICARE

## 2024-10-11 ENCOUNTER — HOSPITAL ENCOUNTER (OUTPATIENT)
Facility: HOSPITAL | Age: 74
Discharge: HOME | End: 2024-10-11
Attending: UROLOGY | Admitting: UROLOGY
Payer: MEDICARE

## 2024-10-11 ENCOUNTER — ANESTHESIA (OUTPATIENT)
Dept: SURGERY | Facility: HOSPITAL | Age: 74
End: 2024-10-11
Payer: MEDICARE

## 2024-10-11 VITALS
WEIGHT: 193 LBS | OXYGEN SATURATION: 98 % | SYSTOLIC BLOOD PRESSURE: 128 MMHG | RESPIRATION RATE: 16 BRPM | HEIGHT: 63 IN | TEMPERATURE: 98 F | DIASTOLIC BLOOD PRESSURE: 63 MMHG | HEART RATE: 70 BPM | BODY MASS INDEX: 34.2 KG/M2

## 2024-10-11 DIAGNOSIS — N39.41 URGENCY INCONTINENCE: Primary | ICD-10-CM

## 2024-10-11 PROCEDURE — 63600175 PHARM REV CODE 636 W HCPCS: Performed by: NURSE ANESTHETIST, CERTIFIED REGISTERED

## 2024-10-11 PROCEDURE — 25000003 PHARM REV CODE 250: Performed by: NURSE ANESTHETIST, CERTIFIED REGISTERED

## 2024-10-11 PROCEDURE — C1787 PATIENT PROGR, NEUROSTIM: HCPCS | Performed by: UROLOGY

## 2024-10-11 PROCEDURE — 71000033 HC RECOVERY, INTIAL HOUR: Performed by: UROLOGY

## 2024-10-11 PROCEDURE — 95972 ALYS CPLX SP/PN NPGT W/PRGRM: CPT | Mod: 59,,, | Performed by: UROLOGY

## 2024-10-11 PROCEDURE — 71000015 HC POSTOP RECOV 1ST HR: Performed by: UROLOGY

## 2024-10-11 PROCEDURE — 36000706: Performed by: UROLOGY

## 2024-10-11 PROCEDURE — 71000016 HC POSTOP RECOV ADDL HR: Performed by: UROLOGY

## 2024-10-11 PROCEDURE — 64590 INS/RPL PRPH SAC/GSTR NPG/R: CPT | Mod: ,,, | Performed by: UROLOGY

## 2024-10-11 PROCEDURE — 99900035 HC TECH TIME PER 15 MIN (STAT)

## 2024-10-11 PROCEDURE — 63600175 PHARM REV CODE 636 W HCPCS: Performed by: UROLOGY

## 2024-10-11 PROCEDURE — 37000009 HC ANESTHESIA EA ADD 15 MINS: Performed by: UROLOGY

## 2024-10-11 PROCEDURE — 36000707: Performed by: UROLOGY

## 2024-10-11 PROCEDURE — 37000008 HC ANESTHESIA 1ST 15 MINUTES: Performed by: UROLOGY

## 2024-10-11 PROCEDURE — 94799 UNLISTED PULMONARY SVC/PX: CPT

## 2024-10-11 PROCEDURE — A4216 STERILE WATER/SALINE, 10 ML: HCPCS | Performed by: NURSE ANESTHETIST, CERTIFIED REGISTERED

## 2024-10-11 PROCEDURE — C1767 GENERATOR, NEURO NON-RECHARG: HCPCS | Performed by: UROLOGY

## 2024-10-11 DEVICE — SYS INTERSTIM X RECHARGE FREE: Type: IMPLANTABLE DEVICE | Site: BACK | Status: FUNCTIONAL

## 2024-10-11 RX ORDER — LIDOCAINE HYDROCHLORIDE AND EPINEPHRINE 10; 10 MG/ML; UG/ML
INJECTION, SOLUTION INFILTRATION; PERINEURAL
Status: DISCONTINUED | OUTPATIENT
Start: 2024-10-11 | End: 2024-10-11 | Stop reason: HOSPADM

## 2024-10-11 RX ORDER — FENTANYL CITRATE 50 UG/ML
25 INJECTION, SOLUTION INTRAMUSCULAR; INTRAVENOUS EVERY 5 MIN PRN
Status: DISCONTINUED | OUTPATIENT
Start: 2024-10-11 | End: 2024-10-11 | Stop reason: HOSPADM

## 2024-10-11 RX ORDER — PROPOFOL 10 MG/ML
VIAL (ML) INTRAVENOUS CONTINUOUS PRN
Status: DISCONTINUED | OUTPATIENT
Start: 2024-10-11 | End: 2024-10-11

## 2024-10-11 RX ORDER — GLUCAGON 1 MG
1 KIT INJECTION
Status: DISCONTINUED | OUTPATIENT
Start: 2024-10-11 | End: 2024-10-11 | Stop reason: HOSPADM

## 2024-10-11 RX ORDER — ACETAMINOPHEN 10 MG/ML
INJECTION, SOLUTION INTRAVENOUS
Status: DISCONTINUED | OUTPATIENT
Start: 2024-10-11 | End: 2024-10-11

## 2024-10-11 RX ORDER — CEFAZOLIN SODIUM 1 G/3ML
INJECTION, POWDER, FOR SOLUTION INTRAMUSCULAR; INTRAVENOUS
Status: DISCONTINUED | OUTPATIENT
Start: 2024-10-11 | End: 2024-10-11

## 2024-10-11 RX ORDER — ONDANSETRON HYDROCHLORIDE 2 MG/ML
4 INJECTION, SOLUTION INTRAVENOUS ONCE AS NEEDED
Status: DISCONTINUED | OUTPATIENT
Start: 2024-10-11 | End: 2024-10-11 | Stop reason: HOSPADM

## 2024-10-11 RX ORDER — TRAMADOL HYDROCHLORIDE 50 MG/1
50 TABLET ORAL EVERY 6 HOURS PRN
Qty: 12 TABLET | Refills: 0 | Status: SHIPPED | OUTPATIENT
Start: 2024-10-11 | End: 2024-10-14

## 2024-10-11 RX ORDER — MEPERIDINE HYDROCHLORIDE 50 MG/ML
12.5 INJECTION INTRAMUSCULAR; INTRAVENOUS; SUBCUTANEOUS ONCE AS NEEDED
Status: DISCONTINUED | OUTPATIENT
Start: 2024-10-11 | End: 2024-10-11 | Stop reason: HOSPADM

## 2024-10-11 RX ORDER — SODIUM CHLORIDE 0.9 % (FLUSH) 0.9 %
SYRINGE (ML) INJECTION
Status: DISCONTINUED | OUTPATIENT
Start: 2024-10-11 | End: 2024-10-11

## 2024-10-11 RX ORDER — LIDOCAINE HYDROCHLORIDE 20 MG/ML
INJECTION, SOLUTION EPIDURAL; INFILTRATION; INTRACAUDAL; PERINEURAL
Status: DISCONTINUED | OUTPATIENT
Start: 2024-10-11 | End: 2024-10-11

## 2024-10-11 RX ORDER — MIDAZOLAM HYDROCHLORIDE 1 MG/ML
INJECTION INTRAMUSCULAR; INTRAVENOUS
Status: DISCONTINUED | OUTPATIENT
Start: 2024-10-11 | End: 2024-10-11

## 2024-10-11 RX ORDER — ONDANSETRON HYDROCHLORIDE 2 MG/ML
INJECTION, SOLUTION INTRAVENOUS
Status: DISCONTINUED | OUTPATIENT
Start: 2024-10-11 | End: 2024-10-11

## 2024-10-11 RX ORDER — SODIUM CHLORIDE 0.9 % (FLUSH) 0.9 %
3 SYRINGE (ML) INJECTION
Status: DISCONTINUED | OUTPATIENT
Start: 2024-10-11 | End: 2024-10-11 | Stop reason: HOSPADM

## 2024-10-11 RX ORDER — FENTANYL CITRATE 50 UG/ML
INJECTION, SOLUTION INTRAMUSCULAR; INTRAVENOUS
Status: DISCONTINUED | OUTPATIENT
Start: 2024-10-11 | End: 2024-10-11

## 2024-10-11 RX ADMIN — ONDANSETRON 4 MG: 2 INJECTION INTRAMUSCULAR; INTRAVENOUS at 12:10

## 2024-10-11 RX ADMIN — MIDAZOLAM HYDROCHLORIDE 1 MG: 1 INJECTION, SOLUTION INTRAMUSCULAR; INTRAVENOUS at 12:10

## 2024-10-11 RX ADMIN — LIDOCAINE HYDROCHLORIDE 40 MG: 20 INJECTION, SOLUTION EPIDURAL; INFILTRATION; INTRACAUDAL; PERINEURAL at 12:10

## 2024-10-11 RX ADMIN — PROPOFOL 50 MCG/KG/MIN: 10 INJECTION, EMULSION INTRAVENOUS at 12:10

## 2024-10-11 RX ADMIN — FENTANYL CITRATE 12.5 MCG: 50 INJECTION, SOLUTION INTRAMUSCULAR; INTRAVENOUS at 01:10

## 2024-10-11 RX ADMIN — Medication 10 ML: at 12:10

## 2024-10-11 RX ADMIN — ACETAMINOPHEN 1000 MG: 1000 INJECTION, SOLUTION INTRAVENOUS at 01:10

## 2024-10-11 RX ADMIN — Medication 5 ML: at 12:10

## 2024-10-11 RX ADMIN — CEFAZOLIN 2 G: 330 INJECTION, POWDER, FOR SOLUTION INTRAMUSCULAR; INTRAVENOUS at 12:10

## 2024-10-11 NOTE — PLAN OF CARE
Discharge instructions reviewed with pt and spouse at the bedside. Both v/u of all instructions. VSS. IV removed with catheter tip intact. No concerns voiced; all questions answered. Escorted patient via wheelchair to family member awaiting in private vehicle. Prescriptions to be picked up from pharmacy upon discharge.

## 2024-10-11 NOTE — ANESTHESIA PREPROCEDURE EVALUATION
10/11/2024  Alyssa John is a 74 y.o., female.      Pre-op Assessment    I have reviewed the Patient Summary Reports.     I have reviewed the Nursing Notes.    I have reviewed the Medications.     Review of Systems  Anesthesia Hx:             Denies Family Hx of Anesthesia complications.    Denies Personal Hx of Anesthesia complications.                    Procedure: INSERTION, NEUROSTIMULATOR, PERMANENT, SACRAL (N/A)         Patient Active Problem List   Diagnosis    Multiple sclerosis    CAD (coronary artery disease)    Hyperlipemia    Gait disturbance    Vitamin D deficiency    Chronic fatigue    Seizure disorder    Hypertension    GERD (gastroesophageal reflux disease)    Arthritis    Encounter for long-term (current) use of high-risk medication    Gait instability    Gait abnormality    Impaired functional mobility, balance, and endurance    Internal carotid artery stenosis, right    Intractable chronic migraine without aura and without status migrainosus    Tremor    Anxiety and depression    Former smoker, stopped smoking in distant past    History of MI (myocardial infarction)    Osteopenia determined by x-ray    Impaired mobility and ADLs    Coordination impairment    Impaired instrumental activities of daily living (IADL)    Cognitive communication disorder    Bronchitis    Hand weakness    Physical deconditioning    Debility    Limitation of joint motion    Presence of aortocoronary bypass graft    Presence of coronary angioplasty implant and graft    Qualitative platelet disorder    COVID    Severe obesity (BMI 35.0-39.9) with comorbidity    Syncope and collapse    Emphysema lung    UTI (urinary tract infection)    Urgency-frequency syndrome    Obesity (BMI 30.0-34.9)    Simple chronic bronchitis    Pedal edema    Chronic obstructive pulmonary disease    Urgency incontinence       Past  Medical History:   Diagnosis Date    Arthritis     Coronary artery disease     Encounter for blood transfusion     Epilepsy     GERD (gastroesophageal reflux disease)     Headache     Hypertension     MI, old     MS (multiple sclerosis)     Seizures     epilepsy       ECHO: Results for orders placed during the hospital encounter of 09/16/24    Echo    Interpretation Summary    Left Ventricle: The left ventricle is normal in size. Normal wall thickness. There is normal systolic function with a visually estimated ejection fraction of 60 - 65%. There is normal diastolic function.    Right Ventricle: Normal right ventricular cavity size. Wall thickness is normal. Systolic function is normal.    IVC/SVC: Normal venous pressure at 3 mmHg.      Body mass index is 34.19 kg/m².    Tobacco Use: Medium Risk (10/11/2024)    Patient History     Smoking Tobacco Use: Former     Smokeless Tobacco Use: Never     Passive Exposure: Past       Social History     Substance and Sexual Activity   Drug Use No        Alcohol Use: Patient Unable To Answer (9/17/2024)    AUDIT-C     Frequency of Alcohol Consumption: Patient unable to answer     Average Number of Drinks: Patient unable to answer     Frequency of Binge Drinking: Patient unable to answer       Review of patient's allergies indicates:   Allergen Reactions    Codeine      Other reaction(s): throat tightness    Dilantin [phenytoin sodium extended]     Phenobarbital     Tegretol [carbamazepine]          Airway:  No value filed.     Physical Exam  General: Alert and Oriented    Airway:  Mallampati: II   Mouth Opening: Normal  TM Distance: Normal  Neck ROM: Normal ROM        Anesthesia Plan  Type of Anesthesia, risks & benefits discussed:    Anesthesia Type: Gen Natural Airway  Intra-op Monitoring Plan: Standard ASA Monitors  Post Op Pain Control Plan: multimodal analgesia  Induction:  IV  Informed Consent: Informed consent signed with the Patient and all parties understand the risks  and agree with anesthesia plan.  All questions answered.   ASA Score: 3  Day of Surgery Review of History & Physical: H&P Update referred to the surgeon/provider.    Ready For Surgery From Anesthesia Perspective.     .

## 2024-10-11 NOTE — OP NOTE
Neuromodulation Operative Note  10/11/2024    Preoperative Diagnosis:   Urinary frequency  Urinary urgency  Urgency incontinence    Postoperative Diagnosis:  Urinary frequency  Urinary urgency  Urgency incontinence    Procedure:  Insertion of sacral neurostimulator pulse generator, including pocket creation and connection between electrode array and pulse generator (05517)  Complex analysis and programming of implanted neurostimulator pulse generator (83511)    Attending Surgeon: Abel Rosales MD    Anesthesia: Monitored Anesthesia Care    EBL: <10 mL    Complications: None    Findings:   IPG Placement: left  Placement of Interstim X generator    Reason for Procedure: Alyssa John is a very pleasant 74 y.o. female with a history of frequency, urgency, and urgency incontinence. Patient has attempted previous, more conservative, behavioral therapies in the form of dietary modification, fluid moderation, and timed voiding . Attempts to treat with OAB medication resulted in chronic urinary retention.  Over the last 2 weeks of testing, effects of SNM have been monitored for patient response. The patient has recorded at least a 50% decrease in nocturia episodes averaged daily over a 3-day diary improving from 3-4 leaks per night to 0-1 voids per night.  Given that the patient has demonstrated clinically significant response to testing of sacral neuromodulation, and desires permanent implantation, it is appropriate to proceed with implantation of an implantable pulse generator.     PROCEDURE IN DETAIL: Informed consent was obtained by explaining all risks and benefits of the procedure to the patient in detail. She was given appropriate perioperative antibiotics within 30 minutes prior to beginning the procedure. She was brought to the OR suite, where monitored anesthesia care was administered by anesthesia staff in a prone position. She was prepped and draped in accordance with our standard sacral  "neuromodulation infection protocol, which was utilized preoperatively, intraoperatively, and postoperatively.     A #15 scalpel blade was used to open the percutaneous extension incision and the percutaneous extension was delivered. This was  from the lead, cut and delivered sterilely from the field. The quadripolar lead was then attached to the Interstim X implantable pulse generator, which was pre-placed within the pocket. The single setscrew was tightened until a single "click" was heard.  The pocket had been thoroughly irrigated with sterile water. After impedance testing demonstrated no abnormalities on all electrode pairs, the incision was closed in 2 layers, deeply with a 2-0 Vicryl and superficially with a running 3-0 Monocryl. The incision was covered with Dermabond. She tolerated the procedure well and was transferred in stable condition to the recovery room.     Once awake and alert, complex electronic analysis and programming of the SNM pulse generator was performed, including setting active contact groups, amplitude, pulse width, frequency, cycling, and lockout parameters.     POSTOPERATIVE PLAN: We will plan to see her back in 4-6 weeks for continued evaluation.     "

## 2024-10-11 NOTE — DISCHARGE INSTRUCTIONS
Sacral Neuromodulation Post-Operative Care     Caring for Your Wound   Your incision is covered with dermabond (liquid bandaid). Let this substance fall off on its own.  This usually takes about 10-14 days. Do not scrub or try to peel this off.     Post Surgical Pain Management   It is normal to experience some discomfort post operatively, however the stimulation should not  be painful.   Take Tylenol 650mg 1-2 tabs every 4-6 hours as needed if you feel tenderness from surgical  incision (Do not to exceed 4000mg daily) or Motrin 200mg 1-2 tabs every 4-6 hours.  Warning: Do not take additional Acetaminophen (Tylenol) while taking oxycodone or hydrocodone. All of these  products contain Acetaminophen, which can be toxic if taken in excess.     Stimulation   Stimulation is the pulling or tingling sensation felt in the pelvic area (near the vagina, scrotum  or anal area.) It should not be painful. If it is painful or you feel the stimulation sensation move  down the legs decrease the stimulation and call the office and speak to a nurse.   During the trial period (stage 1) you may notice that the Interstim device has improved your  continence which means it is working and on the correct setting. If you are having episodes of  incontinence you will need to increase your device setting by moving the dial up slowly.     Activity   Avoid heavy lifting or strenuous activity for 14 days after your surgery.   You can shower within 24 hours post surgery.     Symptoms to Report   Severe or worsening pain, unrelieved by pain medications.   Fever, greater than 101.5ºF, chills or sweats   Painful or problems urinating   Any problems with the device     If you experience any of the above symptoms, call the [facility] clinic at [phone] during business hours on weekdays. If after hours or on weekends, call [phone] and ask to speak with the urology resident on call.

## 2024-10-11 NOTE — DISCHARGE SUMMARY
[facility]  Discharge Note  Short Stay    Procedure(s) (LRB):  INSERTION, NEUROSTIMULATOR, PERMANENT, SACRAL (N/A)      OUTCOME: Patient tolerated treatment/procedure well without complication and is now ready for discharge.    DISPOSITION: Home or Self Care    FINAL DIAGNOSIS: Urgency Incontinence    FOLLOWUP: In clinic    DISCHARGE INSTRUCTIONS:  No discharge procedures on file.     TIME SPENT ON DISCHARGE: 15 minutes

## 2024-10-11 NOTE — INTERVAL H&P NOTE
The patient has been examined and the H&P has been reviewed:    While she continues with daily incontinence, she has noted greater than 50% improvement in symptoms. Specifically, incontinence at night has resolved most nights, rather than 3-4 incontinence episodes per night.     Surgery risks, benefits and alternative options discussed and understood by patient/family.          There are no hospital problems to display for this patient.

## 2024-10-11 NOTE — TRANSFER OF CARE
"Anesthesia Transfer of Care Note    Patient: Alyssa John    Procedure(s) Performed: Procedure(s) (LRB):  INSERTION, NEUROSTIMULATOR, PERMANENT, SACRAL (N/A)    Patient location: PACU    Anesthesia Type: general    Transport from OR: Transported from OR on 6-10 L/min O2 by face mask with adequate spontaneous ventilation    Post pain: adequate analgesia    Post assessment: no apparent anesthetic complications    Post vital signs: stable    Level of consciousness: sedated    Nausea/Vomiting: no nausea/vomiting    Complications: none    Transfer of care protocol was followed      Last vitals: Visit Vitals  BP (!) 151/73 (BP Location: Left arm, Patient Position: Lying)   Pulse 67   Temp 36.4 °C (97.5 °F) (Tympanic)   Resp 16   Ht 5' 3" (1.6 m)   Wt 87.5 kg (193 lb)   SpO2 96%   Breastfeeding No   BMI 34.19 kg/m²     "

## 2024-10-13 LAB
BACTERIA UR CULT: ABNORMAL
BACTERIA UR CULT: ABNORMAL

## 2024-10-14 ENCOUNTER — CLINICAL SUPPORT (OUTPATIENT)
Dept: REHABILITATION | Facility: HOSPITAL | Age: 74
End: 2024-10-14
Payer: MEDICARE

## 2024-10-14 DIAGNOSIS — Z74.09 IMPAIRED FUNCTIONAL MOBILITY, BALANCE, AND ENDURANCE: Primary | ICD-10-CM

## 2024-10-14 DIAGNOSIS — R53.81 PHYSICAL DECONDITIONING: ICD-10-CM

## 2024-10-14 PROCEDURE — 97530 THERAPEUTIC ACTIVITIES: CPT | Mod: PO

## 2024-10-14 NOTE — PROGRESS NOTES
"[facility]   Physical Therapy Treatment Note      Name: Alyssa John  Clinic Number: [id number]    Therapy Diagnosis:   Encounter Diagnoses   Name Primary?    Impaired functional mobility, balance, and endurance Yes    Physical deconditioning      Physician: Tierra Kerr PA-C    Visit Date: 10/14/2024    Evaluation Date: 10/1/2024  Authorization Period Expiration: 9/24/21-12/31/24  Plan of Care Expiration: 12/20/24  Visit # / Visits authorized: 1/ 20     FOTO 1: assess at first visit  FOTO 2:  FOTO 3:     Time In: 1530  Time Out: 1600  Total Billable Time: 30 minutes     Precautions: Standard, Fall, and MS    PTA Visit #: 0/5       Subjective     Patient reports: "I'm not doing well. I really want to go home." Patient's  reports she had neurostimulator placed on right flank on Friday 10/11. She is still having discomfort from that placement.   -patient minimally participated in session, encouraged family to message surgeon to inform of continued discomfort  She was not compliant with home exercise program.  Response to previous treatment: ongoing  Functional change: ongoing    Pain: 10/10  Location: right back      Objective      Objective Measures updated at progress report unless specified.     Treatment     Alyssa received the treatments listed below:      She presented in personal wheelchair with  present    therapeutic activities to improve functional performance for 30  minutes, including:  Sit to stand from wheelchair: maximum assistance   Stand pivot to mat (mat to patient's left): maximum assistance  Sit to supine: total A  Supine to sit: total A  Stand pivot to wheelchair (wheelchair to patient's right): max A    --patient requested cessation of session due to back pain and discomfort.    Patient Education and Home Exercises       Education provided:   - how to scoot to edge of wheelchair and mat assymetrically  - importance of following up with surgeon " of neurostimulator due to increased pain.    Written Home Exercises Provided:  no, did not provide yet . Exercises were reviewed and Alyssa was able to demonstrate them prior to the end of the session.  Alyssa demonstrated fair  understanding of the education provided. See Electronic Medical Record under Patient Instructions for exercises provided during therapy sessions    Assessment     Ms. John tolerated session poor-fair. She was largely limited by back pain from neurostimulator placement. She did attempt mobility, however soon requested cessation of session due to pain. Encouraged  to follow-up with doctor due to increased complaints of pain with mobility.    Alyssa Is progressing well towards her goals.   Patient prognosis is Fair.     Patient will continue to benefit from skilled outpatient physical therapy to address the deficits listed in the problem list box on initial evaluation, provide pt/family education and to maximize pt's level of independence in the home and community environment.     Patient's spiritual, cultural and educational needs considered and pt agreeable to plan of care and goals.     Anticipated barriers to physical therapy: fatigue,pain    Goals:  Short Term Goals: 4 weeks  Date Last Assessed Status   Patient to be independent with home exercise program.   New   2. Patient to tolerate cardiorespiratory activity for at least 10 minutes with RPE of </= 3/10.   New   3. Patient to participate in dynamic balance activities to increase balance with moderate assistance.   New      Long Term Goals: 6 weeks Date Last Assessed Status   Patient to participate in strengthening exercises of low back and bilateral lower extremities to decrease fall risk.   New   2. Patient to improve sit to stand with RW and moderate assistance.   New   3. Patient to improve transfer with slide board with supervision    New   4.Patient's caregiver to demonstrate independence with transfers.   New   5.  Patient to be able to ambulate 50ft with RW with moderate assistance.   New      Plan   Plan of care Certification: 10/1/2024 to 12/20/24.     Outpatient Physical Therapy 2 times weekly for 12 weeks to include the following interventions: Gait Training, Manual Therapy, Neuromuscular Re-ed, Patient Education, Therapeutic Activities, and Therapeutic Exercise.      Jessica Quigley PT, DPT, CLT  10/16/2024

## 2024-10-16 ENCOUNTER — OFFICE VISIT (OUTPATIENT)
Dept: HOME HEALTH SERVICES | Facility: CLINIC | Age: 74
End: 2024-10-16
Payer: MEDICARE

## 2024-10-16 VITALS
SYSTOLIC BLOOD PRESSURE: 121 MMHG | OXYGEN SATURATION: 96 % | WEIGHT: 193 LBS | DIASTOLIC BLOOD PRESSURE: 79 MMHG | HEIGHT: 63 IN | HEART RATE: 79 BPM | BODY MASS INDEX: 34.2 KG/M2

## 2024-10-16 DIAGNOSIS — G43.719 INTRACTABLE CHRONIC MIGRAINE WITHOUT AURA AND WITHOUT STATUS MIGRAINOSUS: ICD-10-CM

## 2024-10-16 DIAGNOSIS — F32.A ANXIETY AND DEPRESSION: ICD-10-CM

## 2024-10-16 DIAGNOSIS — G35 MULTIPLE SCLEROSIS: ICD-10-CM

## 2024-10-16 DIAGNOSIS — J44.9 CHRONIC OBSTRUCTIVE PULMONARY DISEASE, UNSPECIFIED COPD TYPE: ICD-10-CM

## 2024-10-16 DIAGNOSIS — E78.5 HYPERLIPIDEMIA, UNSPECIFIED HYPERLIPIDEMIA TYPE: ICD-10-CM

## 2024-10-16 DIAGNOSIS — E66.09 CLASS 1 OBESITY DUE TO EXCESS CALORIES WITH SERIOUS COMORBIDITY AND BODY MASS INDEX (BMI) OF 34.0 TO 34.9 IN ADULT: ICD-10-CM

## 2024-10-16 DIAGNOSIS — Z00.00 ENCOUNTER FOR MEDICARE ANNUAL WELLNESS EXAM: Primary | ICD-10-CM

## 2024-10-16 DIAGNOSIS — F41.9 ANXIETY AND DEPRESSION: ICD-10-CM

## 2024-10-16 DIAGNOSIS — M85.80 OSTEOPENIA DETERMINED BY X-RAY: ICD-10-CM

## 2024-10-16 DIAGNOSIS — I10 PRIMARY HYPERTENSION: ICD-10-CM

## 2024-10-16 DIAGNOSIS — R41.841 COGNITIVE COMMUNICATION DISORDER: ICD-10-CM

## 2024-10-16 DIAGNOSIS — E66.811 CLASS 1 OBESITY DUE TO EXCESS CALORIES WITH SERIOUS COMORBIDITY AND BODY MASS INDEX (BMI) OF 34.0 TO 34.9 IN ADULT: ICD-10-CM

## 2024-10-16 DIAGNOSIS — Z99.3 DEPENDENCE ON WHEELCHAIR: ICD-10-CM

## 2024-10-16 DIAGNOSIS — N39.41 URGENCY INCONTINENCE: ICD-10-CM

## 2024-10-16 DIAGNOSIS — E55.9 VITAMIN D DEFICIENCY: ICD-10-CM

## 2024-10-16 DIAGNOSIS — R26.9 GAIT DISTURBANCE: ICD-10-CM

## 2024-10-16 DIAGNOSIS — I25.10 CORONARY ARTERY DISEASE WITHOUT ANGINA PECTORIS, UNSPECIFIED VESSEL OR LESION TYPE, UNSPECIFIED WHETHER NATIVE OR TRANSPLANTED HEART: ICD-10-CM

## 2024-10-16 DIAGNOSIS — K21.9 GASTROESOPHAGEAL REFLUX DISEASE, UNSPECIFIED WHETHER ESOPHAGITIS PRESENT: ICD-10-CM

## 2024-10-16 DIAGNOSIS — D69.1 QUALITATIVE PLATELET DISORDER: ICD-10-CM

## 2024-10-16 DIAGNOSIS — J43.9 PULMONARY EMPHYSEMA, UNSPECIFIED EMPHYSEMA TYPE: ICD-10-CM

## 2024-10-16 DIAGNOSIS — Z00.00 ENCOUNTER FOR PREVENTIVE HEALTH EXAMINATION: ICD-10-CM

## 2024-10-16 DIAGNOSIS — I25.2 HISTORY OF MI (MYOCARDIAL INFARCTION): ICD-10-CM

## 2024-10-16 DIAGNOSIS — I70.0 AORTIC ATHEROSCLEROSIS: ICD-10-CM

## 2024-10-16 PROCEDURE — 3044F HG A1C LEVEL LT 7.0%: CPT | Mod: CPTII,S$GLB,, | Performed by: NURSE PRACTITIONER

## 2024-10-16 PROCEDURE — 3074F SYST BP LT 130 MM HG: CPT | Mod: CPTII,S$GLB,, | Performed by: NURSE PRACTITIONER

## 2024-10-16 PROCEDURE — 3288F FALL RISK ASSESSMENT DOCD: CPT | Mod: CPTII,S$GLB,, | Performed by: NURSE PRACTITIONER

## 2024-10-16 PROCEDURE — 1159F MED LIST DOCD IN RCRD: CPT | Mod: CPTII,S$GLB,, | Performed by: NURSE PRACTITIONER

## 2024-10-16 PROCEDURE — 1100F PTFALLS ASSESS-DOCD GE2>/YR: CPT | Mod: CPTII,S$GLB,, | Performed by: NURSE PRACTITIONER

## 2024-10-16 PROCEDURE — 1158F ADVNC CARE PLAN TLK DOCD: CPT | Mod: CPTII,S$GLB,, | Performed by: NURSE PRACTITIONER

## 2024-10-16 PROCEDURE — G0439 PPPS, SUBSEQ VISIT: HCPCS | Mod: S$GLB,,, | Performed by: NURSE PRACTITIONER

## 2024-10-16 PROCEDURE — 3078F DIAST BP <80 MM HG: CPT | Mod: CPTII,S$GLB,, | Performed by: NURSE PRACTITIONER

## 2024-10-16 PROCEDURE — 1160F RVW MEDS BY RX/DR IN RCRD: CPT | Mod: CPTII,S$GLB,, | Performed by: NURSE PRACTITIONER

## 2024-10-18 ENCOUNTER — CLINICAL SUPPORT (OUTPATIENT)
Dept: REHABILITATION | Facility: HOSPITAL | Age: 74
End: 2024-10-18
Payer: MEDICARE

## 2024-10-18 DIAGNOSIS — R53.81 PHYSICAL DECONDITIONING: ICD-10-CM

## 2024-10-18 DIAGNOSIS — Z74.09 IMPAIRED FUNCTIONAL MOBILITY, BALANCE, AND ENDURANCE: Primary | ICD-10-CM

## 2024-10-18 PROCEDURE — 97110 THERAPEUTIC EXERCISES: CPT | Mod: PO

## 2024-10-18 PROCEDURE — 97530 THERAPEUTIC ACTIVITIES: CPT | Mod: PO

## 2024-10-21 NOTE — PROGRESS NOTES
"[facility]   Physical Therapy Treatment Note      Name: Alyssa John  Clinic Number: [id number]    Therapy Diagnosis:   Encounter Diagnoses   Name Primary?    Impaired functional mobility, balance, and endurance Yes    Physical deconditioning      Physician: Tierra Kerr PA-C    Visit Date: 10/18/2024    Evaluation Date: 10/1/2024  Authorization Period Expiration: 9/24/21-12/31/24  Plan of Care Expiration: 12/20/24  Visit # / Visits authorized: 2/ 20     FOTO 1: assess at first visit  FOTO 2:  FOTO 3:     Time In: 1215  Time Out: 1315  Total Billable Time: 60 minutes     Precautions: Standard, Fall, and MS    PTA Visit #: 0/5       Subjective     Patient reports: "I'm doing better today." Patient's  states he did not call MD about right rip pain since she was doing better. He does have questions about how to transfer in/out of the car safer.    She was not compliant with home exercise program.  Response to previous treatment: ongoing  Functional change: ongoing    Pain: 10/10  Location: right back      Objective      Objective Measures updated at progress report unless specified.     Treatment     Alyssa received the treatments listed below:      She presented in personal transport chair with     Therapeutic exercise x 25 minutes  Supine heel slides bilateral lower extremities 2x10  Supine SAQs bilateral lower extremities 2x10  Supine ankle pumps bilateral lower extremities 2x10     therapeutic activities to improve functional performance for 35 minutes, including:  Sit to stand from wheelchair: maximum assistance   Stand pivot to mat (mat to patient's right): maximum assistance  Sit to supine: total A  Supine to sit: total A  Stand pivot to wheelchair (wheelchair to patient's left): max A    Car transfer:  describes patient facing car door, using car door to pull-up to standing, he is pushing her from the hips to standing position. He then moves the " wheelchair and then guides her hips into the car.  -therapist demonstrated car squat pivot transfer where her hips are coming in contact with the seat first, he should be standing in front of her to give her support and anterior weight shift. Once seated on the car seat, lift one leg at a time into the car. Reverse the process for getting out of the car, patient should face out (perpendicular to the seat) have feet placed on the ground and squat pivot to wheelchair. Discussed need to use a wheelchair with a drop arm so they could safely use a slide board to increase independence and safety of transfers.    --patient requested cessation of session due to back pain and discomfort.    Patient Education and Home Exercises       Education provided:   - how to scoot to edge of wheelchair and mat assymetrically  - importance of following up with surgeon of neurostimulator due to increased pain.    Written Home Exercises Provided:  no, did not provide yet . Exercises were reviewed and Alyssa was able to demonstrate them prior to the end of the session.  Alyssa demonstrated fair  understanding of the education provided. See Electronic Medical Record under Patient Instructions for exercises provided during therapy sessions    Assessment     Ms. John tolerated session poor-fair. Patient requires significant assistance with all mobility. She requires verbal cues to stay on task and perform therex. Patient and  require ongoing training for safety with all mobility at home.     Alyssa Is progressing well towards her goals.   Patient prognosis is Fair.     Patient will continue to benefit from skilled outpatient physical therapy to address the deficits listed in the problem list box on initial evaluation, provide pt/family education and to maximize pt's level of independence in the home and community environment.     Patient's spiritual, cultural and educational needs considered and pt agreeable to plan of care and  goals.     Anticipated barriers to physical therapy: fatigue,pain    Goals:  Short Term Goals: 4 weeks  Date Last Assessed Status   Patient to be independent with home exercise program.   New   2. Patient to tolerate cardiorespiratory activity for at least 10 minutes with RPE of </= 3/10.   New   3. Patient to participate in dynamic balance activities to increase balance with moderate assistance.   New      Long Term Goals: 6 weeks Date Last Assessed Status   Patient to participate in strengthening exercises of low back and bilateral lower extremities to decrease fall risk.   New   2. Patient to improve sit to stand with RW and moderate assistance.   New   3. Patient to improve transfer with slide board with supervision    New   4.Patient's caregiver to demonstrate independence with transfers.   New   5. Patient to be able to ambulate 50ft with RW with moderate assistance.   New      Plan   Plan of care Certification: 10/1/2024 to 12/20/24.     Outpatient Physical Therapy 2 times weekly for 12 weeks to include the following interventions: Gait Training, Manual Therapy, Neuromuscular Re-ed, Patient Education, Therapeutic Activities, and Therapeutic Exercise.      Jessica Quigley PT, DPT, CLT  10/21/2024

## 2024-10-23 ENCOUNTER — CLINICAL SUPPORT (OUTPATIENT)
Dept: REHABILITATION | Facility: HOSPITAL | Age: 74
End: 2024-10-23
Attending: STUDENT IN AN ORGANIZED HEALTH CARE EDUCATION/TRAINING PROGRAM
Payer: MEDICARE

## 2024-10-23 DIAGNOSIS — Z74.09 IMPAIRED MOBILITY AND ADLS: Chronic | ICD-10-CM

## 2024-10-23 DIAGNOSIS — Z78.9 IMPAIRED MOBILITY AND ADLS: Chronic | ICD-10-CM

## 2024-10-23 DIAGNOSIS — Z74.09 IMPAIRED FUNCTIONAL MOBILITY AND ENDURANCE: ICD-10-CM

## 2024-10-23 DIAGNOSIS — Z78.9 DECREASED INDEPENDENCE WITH ACTIVITIES OF DAILY LIVING: Primary | ICD-10-CM

## 2024-10-23 PROCEDURE — 97166 OT EVAL MOD COMPLEX 45 MIN: CPT | Mod: PO

## 2024-10-28 ENCOUNTER — CLINICAL SUPPORT (OUTPATIENT)
Dept: REHABILITATION | Facility: HOSPITAL | Age: 74
End: 2024-10-28
Payer: MEDICARE

## 2024-10-28 DIAGNOSIS — R53.81 PHYSICAL DECONDITIONING: ICD-10-CM

## 2024-10-28 DIAGNOSIS — Z74.09 IMPAIRED FUNCTIONAL MOBILITY, BALANCE, AND ENDURANCE: Primary | ICD-10-CM

## 2024-10-28 PROBLEM — E66.811 CLASS 1 OBESITY DUE TO EXCESS CALORIES WITH SERIOUS COMORBIDITY AND BODY MASS INDEX (BMI) OF 34.0 TO 34.9 IN ADULT: Status: ACTIVE | Noted: 2024-04-03

## 2024-10-28 PROBLEM — J40 BRONCHITIS: Status: RESOLVED | Noted: 2020-11-20 | Resolved: 2024-10-28

## 2024-10-28 PROBLEM — Z78.9 DECREASED INDEPENDENCE WITH ACTIVITIES OF DAILY LIVING: Status: ACTIVE | Noted: 2024-10-28

## 2024-10-28 PROBLEM — N39.0 UTI (URINARY TRACT INFECTION): Status: RESOLVED | Noted: 2024-09-20 | Resolved: 2024-10-28

## 2024-10-28 PROBLEM — E66.09 CLASS 1 OBESITY DUE TO EXCESS CALORIES WITH SERIOUS COMORBIDITY AND BODY MASS INDEX (BMI) OF 34.0 TO 34.9 IN ADULT: Status: ACTIVE | Noted: 2024-04-03

## 2024-10-28 PROBLEM — U07.1 COVID: Status: RESOLVED | Noted: 2022-10-28 | Resolved: 2024-10-28

## 2024-10-28 PROBLEM — E66.811 OBESITY (BMI 30.0-34.9): Status: RESOLVED | Noted: 2024-09-30 | Resolved: 2024-10-28

## 2024-10-28 PROCEDURE — 97530 THERAPEUTIC ACTIVITIES: CPT | Mod: PO

## 2024-10-31 ENCOUNTER — ON-DEMAND VIRTUAL (OUTPATIENT)
Dept: URGENT CARE | Facility: CLINIC | Age: 74
End: 2024-10-31
Payer: MEDICARE

## 2024-10-31 DIAGNOSIS — R35.0 URINARY FREQUENCY: Primary | ICD-10-CM

## 2024-10-31 RX ORDER — AMOXICILLIN AND CLAVULANATE POTASSIUM 500; 125 MG/1; MG/1
1 TABLET, FILM COATED ORAL EVERY 12 HOURS
Qty: 20 TABLET | Refills: 0 | Status: SHIPPED | OUTPATIENT
Start: 2024-10-31 | End: 2024-11-10

## 2024-10-31 RX ORDER — PHENAZOPYRIDINE HYDROCHLORIDE 100 MG/1
100 TABLET, FILM COATED ORAL 3 TIMES DAILY PRN
Qty: 6 TABLET | Refills: 0 | Status: SHIPPED | OUTPATIENT
Start: 2024-10-31 | End: 2024-11-02

## 2024-11-01 ENCOUNTER — CLINICAL SUPPORT (OUTPATIENT)
Dept: REHABILITATION | Facility: HOSPITAL | Age: 74
End: 2024-11-01
Payer: MEDICARE

## 2024-11-01 DIAGNOSIS — Z74.09 IMPAIRED FUNCTIONAL MOBILITY, BALANCE, AND ENDURANCE: Primary | ICD-10-CM

## 2024-11-01 DIAGNOSIS — R53.81 PHYSICAL DECONDITIONING: ICD-10-CM

## 2024-11-01 PROCEDURE — 97110 THERAPEUTIC EXERCISES: CPT | Mod: PO

## 2024-11-01 PROCEDURE — 97530 THERAPEUTIC ACTIVITIES: CPT | Mod: PO

## 2024-11-04 ENCOUNTER — CLINICAL SUPPORT (OUTPATIENT)
Dept: REHABILITATION | Facility: HOSPITAL | Age: 74
End: 2024-11-04
Payer: MEDICARE

## 2024-11-04 DIAGNOSIS — R53.81 PHYSICAL DECONDITIONING: ICD-10-CM

## 2024-11-04 DIAGNOSIS — Z74.09 IMPAIRED FUNCTIONAL MOBILITY, BALANCE, AND ENDURANCE: Primary | ICD-10-CM

## 2024-11-04 PROCEDURE — 97110 THERAPEUTIC EXERCISES: CPT | Mod: PO,CQ

## 2024-11-04 PROCEDURE — 97530 THERAPEUTIC ACTIVITIES: CPT | Mod: PO,CQ

## 2024-11-04 NOTE — PROGRESS NOTES
"[facility]   Physical Therapy Treatment Note      Name: Alyssa John  Clinic Number: [id number]    Therapy Diagnosis:   Encounter Diagnoses   Name Primary?    Impaired functional mobility, balance, and endurance Yes    Physical deconditioning        Physician: Tierra Kerr PA-C    Visit Date: 11/4/2024    Evaluation Date: 10/1/2024  Authorization Period Expiration: 9/24/21-12/31/24  Plan of Care Expiration: 12/20/24  Visit # / Visits authorized: 2/20     FOTO 1: assess at first visit  FOTO 2:  FOTO 3:     Time In: 1300     Time Out: 1345     Total Billable Time: 45  minutes     Precautions: Standard, Fall, and MS    PTA Visit #: 1/5     Subjective     Patient reports: Bilateral knees "achy" "from this weather" that increases with standing  She reports was compliant with home exercise program.  Response to previous treatment: ongoing  Functional change: ongoing    Pain: 2-5/10    Location: Bilateral knees      Objective      Objective Measures updated at progress report unless specified.     She presented in personal transport chair with     Treatment     Alyssa received the treatments listed below:      Alyssa received therapeutic exercises to develop strength, endurance, and range of motion for 30 minutes including:    SciFit Level 10 minutes from own Wheelchair with rest stops    Seated:  Bilateral ankle pumps 20 times  Long arc quads 20 times Right Left alternating (verbal cues to increase range)  Long arc quads 10 times Right Left   March 20 times Right Left alternating  Hip flexion 10 times Right Left     (Activity time includes skilled set-up and positioning as well as therapeutic rest)    therapeutic activities to improve functional performance for 15   minutes, including:    Parallel bars:  Sit<>stand maximal assistance   Static stand maximal assistance>contact guard assistance with verbal cues to bring hands forward  Ambulation 5 feet with poor foot clearance on " Left, verbal cues to improve Weight Shift to Right     (Activity time includes skilled set-up and positioning as well as therapeutic rest)    Patient Education and Home Exercises       Education provided:     -Patient provided with verbal and demonstrative instruction for all activities performed in today's session.    Written Home Exercises Provided: Pt instructed to continue prior HEP.      See Electronic Medical Record under Patient Instructions for exercises provided during therapy sessions    Assessment      Alyssa provided fair/good participation and effort during today's session with treatment focused on lower extremity range of motion and functional mobility. Alyssa with poor tolerance to standing activities 2nd to increasing Bilateral knee pain Right greater then Left.     Alyssa Is progressing well towards her goals.   Patient prognosis is Fair.     Patient will continue to benefit from skilled outpatient physical therapy to address the deficits listed in the problem list box on initial evaluation, provide pt/family education and to maximize pt's level of independence in the home and community environment.     Patient's spiritual, cultural and educational needs considered and pt agreeable to plan of care and goals.     Anticipated barriers to physical therapy: fatigue,pain    Goals:  Short Term Goals: 4 weeks  Date Last Assessed Status   Patient to be independent with home exercise program.   ongoing   2. Patient to tolerate cardiorespiratory activity for at least 10 minutes with RPE of </= 3/10.   ongoing   3. Patient to participate in dynamic balance activities to increase balance with moderate assistance.   ongoing      Long Term Goals: 6 weeks Date Last Assessed Status   Patient to participate in strengthening exercises of low back and bilateral lower extremities to decrease fall risk.   ongoing   2. Patient to improve sit to stand with RW and moderate assistance.   ongoing   3. Patient to improve  transfer with slide board with supervision    ongoing   4.Patient's caregiver to demonstrate independence with transfers.   ongoing   5. Patient to be able to ambulate 50ft with RW with moderate assistance.   ongoing      Plan   Plan of care Certification: 10/1/2024 to 12/20/24.     Outpatient Physical Therapy 2 times weekly for 12 weeks to include the following interventions: Gait Training, Manual Therapy, Neuromuscular Re-ed, Patient Education, Therapeutic Activities, and Therapeutic Exercise.      Danyelle Benoit, PTA

## 2024-11-05 ENCOUNTER — DOCUMENTATION ONLY (OUTPATIENT)
Dept: REHABILITATION | Facility: HOSPITAL | Age: 74
End: 2024-11-05
Payer: MEDICARE

## 2024-11-05 ENCOUNTER — CLINICAL SUPPORT (OUTPATIENT)
Dept: REHABILITATION | Facility: HOSPITAL | Age: 74
End: 2024-11-05
Payer: MEDICARE

## 2024-11-05 DIAGNOSIS — Z78.9 DECREASED INDEPENDENCE WITH ACTIVITIES OF DAILY LIVING: Primary | ICD-10-CM

## 2024-11-05 DIAGNOSIS — Z74.09 IMPAIRED FUNCTIONAL MOBILITY AND ENDURANCE: ICD-10-CM

## 2024-11-05 PROCEDURE — 97110 THERAPEUTIC EXERCISES: CPT | Mod: PO

## 2024-11-05 PROCEDURE — 97530 THERAPEUTIC ACTIVITIES: CPT | Mod: PO

## 2024-11-05 NOTE — PROGRESS NOTES
" [facility]  Occupational Therapy Treatment Note     Date: 11/5/2024  Name: Alyssa John  Clinic Number: [id number]    Therapy Diagnosis:   Encounter Diagnoses   Name Primary?    Decreased independence with activities of daily living Yes    Impaired functional mobility and endurance      Physician: Piyush Sharif MD    Physician Orders: Eval and Treat  Medical Diagnosis: Z74.09,Z78.9 (ICD-10-CM) - Impaired mobility and ADLs   Evaluation Date: 10/23/2024  Insurance Authorization Period Expiration: 12/31/2024  Plan of Care Certification Period: 12/20/2024  Date of Return to MD: TBD  Visit # / Visits authorized: 1 / 10 + eval   FOTO: 1/ 3, last assessed on 10/23/2024     Precautions:  Standard and Fall     Time In: 1:05 PM  Time Out: 1:45 PM  Total Billable Time: 40 minutes    Subjective     Patient reports: No changes, no complaints.   She was not compliant with home exercise program given last session. (Not given)  Response to previous treatment:1st treatment   Functional change: no changes     Pain: 0/10  Location: no pain reported     Objective     Objective Measures updated at progress report unless specified.    At 1:08 PM, YU=244/74, HR=67            Treatment     Alyssa received the treatments listed below:      therapeutic exercises to develop strength, endurance, and ROM for 13 minutes including.    UBE x5 min forward/ 5 min back with bilateral upper extremities , level 1.0, seated to increase bilateral upper extremity activity tolerance and strength as well as improving cardiovascular fitness. She was encouraged to keep rate of perceived exertion (RPE) at "easy to moderate" (3-4/10). Shine avg=2, peak shine=4. Pt's reported RPE= 5/10. Rest and water break taken after completion.     neuromuscular re-education activities to improve: Balance and coordination for 0 minutes. The following activities were included:  NA    therapeutic activities to improve functional " performance for 27  minutes, including:    See vitals above     Squat pivot transfer wheelchair <> mat / max a     To sit edge of mat, OT placed wedge behind pt's back to decrease backward lean when sitting.     Gave handwriting samples (name in cursive). Explored various pens . See objective.     Pt enjoyed the triangle . She proceeded to write a paragraph in cursive. Handwriting was small and ~50% legible. Required increased time complete.     After writing the paragraph, pt reported that she did not think she liked the triangle  anymore.     Patient Education and Home Exercises     Education provided:   - Progress towards goals     Written Home Exercises Provided: not given today      Assessment     Alyssa participated well today. She completed UBE without stopping for rest break during the task. Handwriting assessed today and goal added. Paragraph with less legibility than just writing her name.     Alyssa is progressing well towards her goals and there are no updates to goals at this time. Pt prognosis is Guarded.     Patient will continue to benefit from skilled outpatient occupational therapy to address the deficits listed in the problem list on initial evaluation provide patient/family education and to maximize patient's level of independence in the home and community environment.     Patient's spiritual, cultural and educational needs considered and patient agreeable to plan of care and goals.    Anticipated barriers to occupational therapy: MS    Goals:  Short Term Goals (4 weeks) Status When Last Assessed  Progressing/ Met   1) Pt to be (I) with HEP.    (progressing 11/5/2024)     2) Sit <> supine to improve to mod a  Max a  (progressing 11/5/2024)     3) Pt to be able to roll to the left and right for improved bed mobility with min a  Max a  (progressing 11/5/2024)     4) Pt to be able put on a pull over t shirt with min a  Max a  (progressing 11/5/2024)     5) pt to increase left   strength by 4 lbs  avg 10.6    (progressing 11/5/2024)     6) handwriting to be assessed and goal added as necessary    Completed 11/5/2024           LongTerm Goals (8 weeks) Status When Last Assessed  Progressing/ Met   1) Pt to increase left  strength by 8 lbs  avg 10.6    (progressing 11/5/2024)     2) Pt to be able to pull up her pants while standing with min a for balance from her .  Pt's  pulls up her pants while she holds bar.  (progressing 11/5/2024)     3) pt to improve shoulder range of motion by 10 degrees for increased ability to complete overhead tasks  Joint Evaluation  AROM  10/23/2024 AROM  10/23/2024     Right  Left    Shoulder flex 0-180 120 scaption  109 scaption    Shoulder Abd 0-180 144 122    (progressing 11/5/2024)     4) pt to be able to complete UBE, sitting, level 2, for 10 minutes with no rest break during task for increased endurance    (progressing 11/5/2024)     5) Pt to improve FMC, as evidenced by a 5 second decrease bilaterally during the 9 hole peg test.  9 Hole Peg Test (9HPT) Left Right   10/23/2024 48s 43s    (progressing 11/5/2024)     6) pt to be able to write short story with 100% legibility.         Additional functional goals to be added as pt progresses and per her priorities.     Plan   Certification Period/Plan of care expiration: 10/23/2024 to 12/20/2024.     Outpatient Occupational Therapy 2 times weekly for 8 weeks to include the following interventions: Manual Therapy, Moist Heat/ Ice, Neuromuscular Re-ed, Orthotic Management and Training, Patient Education, Self Care, Therapeutic Activities, and Therapeutic Exercise    Updates/Grading for next session: handwriting, BIANKA Serra OT   11/5/2024

## 2024-11-05 NOTE — PROGRESS NOTES
PT/PTA met face to face to discuss pt's treatment plan and progress towards established goals. Pt will be seen by a physical therapist minimally every 6th visit or every 30 days.    Danyelle Benoit PTA

## 2024-11-11 ENCOUNTER — ON-DEMAND VIRTUAL (OUTPATIENT)
Dept: URGENT CARE | Facility: CLINIC | Age: 74
End: 2024-11-11
Payer: MEDICARE

## 2024-11-11 DIAGNOSIS — J20.9 ACUTE BRONCHITIS, UNSPECIFIED ORGANISM: Primary | ICD-10-CM

## 2024-11-11 PROCEDURE — 99213 OFFICE O/P EST LOW 20 MIN: CPT | Mod: 95,,, | Performed by: FAMILY MEDICINE

## 2024-11-11 RX ORDER — BENZONATATE 100 MG/1
100 CAPSULE ORAL 3 TIMES DAILY PRN
Qty: 30 CAPSULE | Refills: 0 | Status: SHIPPED | OUTPATIENT
Start: 2024-11-11 | End: 2024-11-21

## 2024-11-11 RX ORDER — FLUTICASONE PROPIONATE 50 MCG
1 SPRAY, SUSPENSION (ML) NASAL DAILY
Qty: 16 ML | Refills: 0 | Status: SHIPPED | OUTPATIENT
Start: 2024-11-11 | End: 2024-11-18

## 2024-11-11 RX ORDER — METHYLPREDNISOLONE 4 MG/1
TABLET ORAL
Qty: 21 EACH | Refills: 0 | Status: SHIPPED | OUTPATIENT
Start: 2024-11-11 | End: 2024-12-02

## 2024-11-11 RX ORDER — AMOXICILLIN AND CLAVULANATE POTASSIUM 875; 125 MG/1; MG/1
1 TABLET, FILM COATED ORAL 2 TIMES DAILY
Qty: 14 TABLET | Refills: 0 | Status: SHIPPED | OUTPATIENT
Start: 2024-11-11 | End: 2024-11-18

## 2024-11-11 NOTE — PROGRESS NOTES
Subjective:      Patient ID: Alyssa John is a 74 y.o. female.    Vitals:  vitals were not taken for this visit.     Chief Complaint: Sinusitis      Visit Type: TELE AUDIOVISUAL    Present with the patient at the time of consultation: TELEMED PRESENT WITH PATIENT: family member    Past Medical History:   Diagnosis Date    Arthritis     Coronary artery disease     Encounter for blood transfusion     Epilepsy     GERD (gastroesophageal reflux disease)     Headache     Hypertension     MI, old     MS (multiple sclerosis)     Seizures     epilepsy     Past Surgical History:   Procedure Laterality Date    APPENDECTOMY      BACK SURGERY      L5 discectomy    BREAST BIOPSY Right 15 yrs ago    benign    CATARACT EXTRACTION Bilateral     COLONOSCOPY N/A 2015    Procedure: COLONOSCOPY;  Surgeon: Henry Malcolm MD;  Location: [facility] END[facility];  Service: Endoscopy;  Laterality: N/A;    CORONARY STENT PLACEMENT      IMPLANTATION OF PERMANENT SACRAL NERVE STIMULATOR N/A 10/11/2024    Procedure: INSERTION, NEUROSTIMULATOR, PERMANENT, SACRAL;  Surgeon: Abel Rosales MD;  Location: [facility] OR;  Service: Urology;  Laterality: N/A;  Mack RUIZ    INSERTION, NEUROSTIMULATOR, TEMPORARY, SACRAL N/A 2024    Procedure: INSERTION, NEUROSTIMULATOR, TEMPORARY, SACRAL;  Surgeon: Abel Roasles MD;  Location: [facility] OR;  Service: Urology;  Laterality: N/A;  1 hour 45 minutes Mack RUIZ    right knee arthroscopy       Review of patient's allergies indicates:   Allergen Reactions    Codeine      Other reaction(s): throat tightness    Dilantin [phenytoin sodium extended]     Phenobarbital     Tegretol [carbamazepine]      Current Outpatient Medications on File Prior to Visit   Medication Sig Dispense Refill    [] amoxicillin-clavulanate 500-125mg (AUGMENTIN) 500-125 mg Tab Take 1 tablet (500 mg total) by mouth every 12 (twelve) hours. for 10 days 20 tablet 0    atorvastatin (LIPITOR) 40 MG tablet TAKE 1 TABLET  BY MOUTH EVERY DAY 90 tablet 0    cholecalciferol, vitamin D3, 10 mcg (400 unit) Cap capsule 1 tablet.      clopidogrel (PLAVIX) 75 mg tablet Take 75 mg by mouth once daily.      cyanocobalamin (VITAMIN B-12) 1000 MCG tablet Take 1 tablet (1,000 mcg total) by mouth once daily. 90 tablet 3    DULoxetine (CYMBALTA) 20 MG capsule TAKE 2 CAPSULES BY MOUTH EVERY  capsule 1    famotidine (PEPCID) 20 MG tablet Take 20 mg by mouth 2 (two) times daily as needed for Heartburn.      nitroGLYCERIN (NITROSTAT) 0.4 MG SL tablet Place 0.4 mg under the tongue every 5 (five) minutes as needed for Chest pain.      propranoloL (INDERAL) 20 MG tablet Take 1 tablet (20 mg total) by mouth once daily. 30 tablet 11    topiramate (TOPAMAX) 100 MG tablet Take 1 tablet (100 mg total) by mouth 2 (two) times daily. 180 tablet 3    torsemide (DEMADEX) 5 MG Tab Take 1 tablet (5 mg total) by mouth once daily. 30 tablet 3     No current facility-administered medications on file prior to visit.     Family History   Problem Relation Name Age of Onset    Scleroderma Mother      Heart disease Father          MI, CAD    Cancer Neg Hx      Diabetes Neg Hx      Stroke Neg Hx      Melanoma Neg Hx      Psoriasis Neg Hx      Lupus Neg Hx      Eczema Neg Hx         Medications Ordered                [facility]/pharmacy #[id number] - [place]  [address]   [address] [place] [address]    Telephone: [phone]   Fax: [phone]   Hours: Not open 24 hours                         E-Prescribed (4 of 4)              amoxicillin-clavulanate 875-125mg (AUGMENTIN) 875-125 mg per tablet    Sig: Take 1 tablet by mouth 2 (two) times daily. for 7 days       Start: 11/11/24     Quantity: 14 tablet Refills: 0                         benzonatate (TESSALON) 100 MG capsule    Sig: Take 1 capsule (100 mg total) by mouth 3 (three) times daily as needed for Cough.       Start: 11/11/24     Quantity: 30 capsule Refills: 0                         fluticasone propionate  (FLONASE) 50 mcg/actuation nasal spray    Si spray (50 mcg total) by Each Nostril route once daily. for 7 days       Start: 24     Quantity: 16 mL Refills: 0                         methylPREDNISolone (MEDROL DOSEPACK) 4 mg tablet    Sig: use as directed       Start: 24     Quantity: 21 each Refills: 0                           Ohs Peq Odvv Intake    2024  4:28 PM CST - Filed by Patient   What is your current physical address in the event of a medical emergency? [address] [place] [address]   Are you able to take your vital signs? Yes   Systolic Blood Pressure: 185   Diastolic Blood Pressure: 93   Weight: 193   Height: 63.5   Pulse: 67   Temperature: 97.5   Respiration rate: 16   Pulse Oxygen: 98   Please attach any relevant images or files    Is your employer contracted with [facility]? No         Patient Location: home    History of MS.  Unable to turn herself in bed.      Sinusitis  This is a new problem. The current episode started in the past 7 days. The problem has been gradually worsening since onset. There has been no fever. Associated symptoms include congestion, coughing and sinus pressure. Pertinent negatives include no chills, diaphoresis, ear pain, headaches, hoarse voice, neck pain or shortness of breath. ( had a cold last week. Cough is productive.  Threw up mucus.  History of COPD.  )       Constitution: Negative for chills and sweating.   HENT:  Positive for congestion and sinus pressure. Negative for ear pain.    Neck: Negative for neck pain.   Respiratory:  Positive for cough. Negative for shortness of breath.    Neurological:  Negative for headaches.        Objective:   The physical exam was conducted virtually.  Physical Exam   Constitutional: She is oriented to person, place, and time. She appears ill. No distress.   HENT:   Ears:   Right Ear: External ear normal.   Left Ear: External ear normal.   Nose: Rhinorrhea and congestion present.   Eyes:  Conjunctivae are normal.   Pulmonary/Chest: Effort normal. No respiratory distress. She has no wheezes.   Neurological: She is alert and oriented to person, place, and time.   Skin: Skin is no rash.   Psychiatric: Her behavior is normal.       Assessment:     1. Acute bronchitis, unspecified organism        Plan:       Acute bronchitis, unspecified organism  -     amoxicillin-clavulanate 875-125mg (AUGMENTIN) 875-125 mg per tablet; Take 1 tablet by mouth 2 (two) times daily. for 7 days  Dispense: 14 tablet; Refill: 0  -     benzonatate (TESSALON) 100 MG capsule; Take 1 capsule (100 mg total) by mouth 3 (three) times daily as needed for Cough.  Dispense: 30 capsule; Refill: 0  -     fluticasone propionate (FLONASE) 50 mcg/actuation nasal spray; 1 spray (50 mcg total) by Each Nostril route once daily. for 7 days  Dispense: 16 mL; Refill: 0  -     methylPREDNISolone (MEDROL DOSEPACK) 4 mg tablet; use as directed  Dispense: 21 each; Refill: 0

## 2024-11-14 ENCOUNTER — OFFICE VISIT (OUTPATIENT)
Dept: FAMILY MEDICINE | Facility: CLINIC | Age: 74
End: 2024-11-14
Payer: MEDICARE

## 2024-11-14 ENCOUNTER — TELEPHONE (OUTPATIENT)
Dept: FAMILY MEDICINE | Facility: CLINIC | Age: 74
End: 2024-11-14
Payer: MEDICARE

## 2024-11-14 VITALS
HEART RATE: 67 BPM | BODY MASS INDEX: 36.01 KG/M2 | TEMPERATURE: 98 F | DIASTOLIC BLOOD PRESSURE: 68 MMHG | HEIGHT: 63 IN | WEIGHT: 203.25 LBS | RESPIRATION RATE: 17 BRPM | OXYGEN SATURATION: 95 % | SYSTOLIC BLOOD PRESSURE: 114 MMHG

## 2024-11-14 DIAGNOSIS — J44.1 ACUTE EXACERBATION OF CHRONIC OBSTRUCTIVE PULMONARY DISEASE (COPD): Primary | ICD-10-CM

## 2024-11-14 DIAGNOSIS — J20.9 ACUTE BRONCHITIS, UNSPECIFIED ORGANISM: ICD-10-CM

## 2024-11-14 PROCEDURE — 99213 OFFICE O/P EST LOW 20 MIN: CPT | Mod: S$GLB,,, | Performed by: FAMILY MEDICINE

## 2024-11-14 PROCEDURE — 3078F DIAST BP <80 MM HG: CPT | Mod: CPTII,S$GLB,, | Performed by: FAMILY MEDICINE

## 2024-11-14 PROCEDURE — 99999 PR PBB SHADOW E&M-EST. PATIENT-LVL IV: CPT | Mod: PBBFAC,,, | Performed by: FAMILY MEDICINE

## 2024-11-14 PROCEDURE — 1126F AMNT PAIN NOTED NONE PRSNT: CPT | Mod: CPTII,S$GLB,, | Performed by: FAMILY MEDICINE

## 2024-11-14 PROCEDURE — 3044F HG A1C LEVEL LT 7.0%: CPT | Mod: CPTII,S$GLB,, | Performed by: FAMILY MEDICINE

## 2024-11-14 PROCEDURE — 1159F MED LIST DOCD IN RCRD: CPT | Mod: CPTII,S$GLB,, | Performed by: FAMILY MEDICINE

## 2024-11-14 PROCEDURE — 3008F BODY MASS INDEX DOCD: CPT | Mod: CPTII,S$GLB,, | Performed by: FAMILY MEDICINE

## 2024-11-14 PROCEDURE — 3074F SYST BP LT 130 MM HG: CPT | Mod: CPTII,S$GLB,, | Performed by: FAMILY MEDICINE

## 2024-11-14 RX ORDER — IPRATROPIUM BROMIDE AND ALBUTEROL SULFATE 2.5; .5 MG/3ML; MG/3ML
3 SOLUTION RESPIRATORY (INHALATION) EVERY 6 HOURS PRN
Qty: 50 EACH | Refills: 1 | Status: SHIPPED | OUTPATIENT
Start: 2024-11-14 | End: 2025-11-14

## 2024-11-14 NOTE — PROGRESS NOTES
Subjective:       Patient ID: Alyssa John is a 74 y.o. female.    Chief Complaint: No chief complaint on file.    Patient here for UC visit.  CC:  Having a lot of trouble dealing with thick yellow sputum; SOB.  Not improved since starting Rx's below.  Hx of COPD; does not yet have a nebulizer machine.  No fever noted.    Had Virtual visit on 11/11 - 3 days ago:  Sinusitis  This is a new problem. The current episode started in the past 7 days. The problem has been gradually worsening since onset. There has been no fever. Associated symptoms include congestion, coughing and sinus pressure. Pertinent negatives include no chills, diaphoresis, ear pain, headaches, hoarse voice, neck pain or shortness of breath. ( had a cold last week. Cough is productive.  Threw up mucus.  History of COPD.  )   Assessment:   1. Acute bronchitis, unspecified organism    Plan:   Acute bronchitis, unspecified organism  -     amoxicillin-clavulanate 875-125mg (AUGMENTIN) 875-125 mg per tablet; Take 1 tablet by mouth 2 (two) times daily. for 7 days  Dispense: 14 tablet; Refill: 0  -     benzonatate (TESSALON) 100 MG capsule; Take 1 capsule (100 mg total) by mouth 3 (three) times daily as needed for Cough.  Dispense: 30 capsule; Refill: 0  -     fluticasone propionate (FLONASE) 50 mcg/actuation nasal spray; 1 spray (50 mcg total) by Each Nostril route once daily. for 7 days  Dispense: 16 mL; Refill: 0  -     methylPREDNISolone (MEDROL DOSEPACK) 4 mg tablet; use as directed  Dispense: 21 each; Refill: 0           Review of Systems   Constitutional:  Negative for fever.   Respiratory:  Positive for cough, chest tightness and shortness of breath.    Cardiovascular:  Negative for chest pain.   Gastrointestinal:  Negative for abdominal pain and nausea.   Skin:  Negative for rash.   All other systems reviewed and are negative.      Objective:      Physical Exam  Vitals reviewed.   Constitutional:       General: She is not in acute  distress.     Appearance: She is well-developed.   Cardiovascular:      Rate and Rhythm: Normal rate and regular rhythm.      Heart sounds: No murmur heard.  Pulmonary:      Effort: Pulmonary effort is normal.      Breath sounds: Decreased breath sounds and rhonchi present. No rales.      Comments: Sat 95%  Neurological:      Mental Status: She is alert.         Assessment:       1. Acute exacerbation of chronic obstructive pulmonary disease (COPD)    2. Acute bronchitis, unspecified organism        Plan:       Acute exacerbation of chronic obstructive pulmonary disease (COPD)  -     NEBULIZER FOR HOME USE  -     albuterol-ipratropium (DUO-NEB) 2.5 mg-0.5 mg/3 mL nebulizer solution; Take 3 mLs by nebulization every 6 (six) hours as needed for Wheezing. Rescue  Dispense: 50 each; Refill: 1    Acute bronchitis, unspecified organism  -     NEBULIZER FOR HOME USE  -     albuterol-ipratropium (DUO-NEB) 2.5 mg-0.5 mg/3 mL nebulizer solution; Take 3 mLs by nebulization every 6 (six) hours as needed for Wheezing. Rescue  Dispense: 50 each; Refill: 1      Patient Instructions   Push fluids intake.  Drink plenty of water.     Robitussin DM - (store brand) - use as directed.    Contact your PCP if any worsening or for any new concerns as we discussed.

## 2024-11-14 NOTE — TELEPHONE ENCOUNTER
Nebulizer for home use ordered in clinic visit; coverage of device verified with [facility] stated copay for device would be $3.00.  Pt notified voiced understanding, directions and nebulizer given out of clinic supply; no further interventions needed at this time.

## 2024-11-14 NOTE — PATIENT INSTRUCTIONS
Push fluids intake.  Drink plenty of water.     Robitussin DM - (store brand) - use as directed.    Contact your PCP if any worsening or for any new concerns as we discussed.

## 2024-11-21 ENCOUNTER — CLINICAL SUPPORT (OUTPATIENT)
Dept: REHABILITATION | Facility: HOSPITAL | Age: 74
End: 2024-11-21
Payer: MEDICARE

## 2024-11-21 DIAGNOSIS — R53.81 PHYSICAL DECONDITIONING: ICD-10-CM

## 2024-11-21 DIAGNOSIS — Z74.09 IMPAIRED FUNCTIONAL MOBILITY AND ENDURANCE: ICD-10-CM

## 2024-11-21 DIAGNOSIS — Z78.9 DECREASED INDEPENDENCE WITH ACTIVITIES OF DAILY LIVING: Primary | ICD-10-CM

## 2024-11-21 DIAGNOSIS — Z74.09 IMPAIRED FUNCTIONAL MOBILITY, BALANCE, AND ENDURANCE: Primary | ICD-10-CM

## 2024-11-21 PROCEDURE — 97110 THERAPEUTIC EXERCISES: CPT | Mod: KX,PO

## 2024-11-21 PROCEDURE — 97110 THERAPEUTIC EXERCISES: CPT | Mod: PO,CQ

## 2024-11-21 PROCEDURE — 97530 THERAPEUTIC ACTIVITIES: CPT | Mod: PO,CQ

## 2024-11-21 NOTE — PROGRESS NOTES
[facility]   Physical Therapy Treatment Note      Name: Alyssa John  Clinic Number: [id number]    Therapy Diagnosis:   Encounter Diagnoses   Name Primary?    Impaired functional mobility, balance, and endurance Yes    Physical deconditioning          Physician: Tierra Kerr PA-C    Visit Date: 11/21/2024    Evaluation Date: 10/1/2024  Authorization Period Expiration: 9/24/21-12/31/24  Plan of Care Expiration: 12/20/24  Visit # / Visits authorized: 6/20(7)     FOTO 1: assess at first visit  FOTO 2:  FOTO 3:     Time In: 1522   Time Out: 1600    Total Billable Time: 38  minutes     Precautions: Standard, Fall, and MS    PTA Visit #: 1/5     Subjective     Patient reports: Bilateral knee pain with stand activities  She reports was compliant with home exercise program.  Response to previous treatment: no problems stated  Functional change: ongoing    Pain: 10/10    Location: Bilateral knees      Objective      Objective Measures updated at progress report unless specified.     To clinic in personal transport chair    Treatment     Alyssa received the treatments listed below:      Alyssa received therapeutic exercises to develop strength, endurance, and range of motion for 23 minutes including:    SciFit Level 1 10 minutes from own transport chair with wedges and weights to decrease posterior sliding, occasional assistance to increase range    Seated:  Skateboard 10 times Right Left     (Activity time includes skilled set-up and positioning as well as therapeutic rest)    therapeutic activities to improve functional performance for 15   minutes, including:    Parallel bars:  Sit<>stand maximal assistance with verbal cues and assist to slide hands forward  Static stand maximal assistance>contact guard assistance 5 seconds and 20 seconds    (Activity time includes skilled set-up and positioning as well as therapeutic rest)    Patient Education and Home Exercises       Education  provided:     -Patient provided with verbal and demonstrative instruction for all activities performed in today's session.  - patient and  instructed to use a skateboard at home to increase independent range of motion of Bilateral Lower Extremities     Written Home Exercises Provided: Pt instructed to continue prior HEP.      See Electronic Medical Record under Patient Instructions for exercises provided during therapy sessions    Assessment     Alyssa provided fair participation and effort during today's session with treatment focused on lower extremity range of motion and functional mobility. Alyssa with poor tolerance to standing activities 2nd to stated 10/10 pain, did well with skateboard and was able to do lower extremity range of motion without assistance, encouraged patient and  to do skateboard activity at home to improve range of motion, stated agreement.    Alyssa Is progressing well towards her goals.   Patient prognosis is Fair.     Patient will continue to benefit from skilled outpatient physical therapy to address the deficits listed in the problem list box on initial evaluation, provide pt/family education and to maximize pt's level of independence in the home and community environment.     Patient's spiritual, cultural and educational needs considered and pt agreeable to plan of care and goals.     Anticipated barriers to physical therapy: fatigue,pain    Goals:  Short Term Goals: 4 weeks  Date Last Assessed Status   Patient to be independent with home exercise program.   ongoing   2. Patient to tolerate cardiorespiratory activity for at least 10 minutes with RPE of </= 3/10.   ongoing   3. Patient to participate in dynamic balance activities to increase balance with moderate assistance.   ongoing      Long Term Goals: 6 weeks Date Last Assessed Status   Patient to participate in strengthening exercises of low back and bilateral lower extremities to decrease fall risk.   ongoing   2.  Patient to improve sit to stand with RW and moderate assistance.   ongoing   3. Patient to improve transfer with slide board with supervision    ongoing   4.Patient's caregiver to demonstrate independence with transfers.   ongoing   5. Patient to be able to ambulate 50ft with RW with moderate assistance.   ongoing      Plan   Plan of care Certification: 10/1/2024 to 12/20/24.     Outpatient Physical Therapy 2 times weekly for 12 weeks to include the following interventions: Gait Training, Manual Therapy, Neuromuscular Re-ed, Patient Education, Therapeutic Activities, and Therapeutic Exercise.      Danyelle Benoit, PTA

## 2024-11-21 NOTE — PROGRESS NOTES
" [facility]  Occupational Therapy Treatment Note     Date: 11/21/2024  Name: Alyssa John  Clinic Number: [id number]    Therapy Diagnosis:   Encounter Diagnoses   Name Primary?    Decreased independence with activities of daily living Yes    Impaired functional mobility and endurance      Physician: Piyush Sharif MD    Physician Orders: Eval and Treat  Medical Diagnosis: Z74.09,Z78.9 (ICD-10-CM) - Impaired mobility and ADLs   Evaluation Date: 10/23/2024  Insurance Authorization Period Expiration: 12/31/2024  Plan of Care Certification Period: 12/20/2024  Date of Return to MD: TBD  Visit # / Visits authorized: 2 / 10 + eval   FOTO: 1/ 3, last assessed on 10/23/2024     Precautions:  Standard and Fall     Time In:  4:00 PM  Time Out:  4:23 PM  Total Billable Time: 23 minutes    Subjective     Patient reports: Pt reported she is sick. She does not feel good. Pt refused a mask. Pt did not want to end the session. She stated that her  will not take her to get mexican food if she doesn't do therapy.     She was not compliant with home exercise program given last session. (Not given)  Response to previous treatment: no changes   Functional change: no changes     Pain: 0/10  Location: no pain reported     Objective     Objective Measures updated at progress report unless specified.    At 4:05 PM, DF=480/76 HR=65     Treatment     Alyssa received the treatments listed below:      therapeutic exercises to develop strength, endurance, and ROM for 23 minutes including.    See vitals above     See subjective     UBE x5 min forward/ 5 min back with bilateral upper extremities , level 1.0, seated to increase bilateral upper extremity activity tolerance and strength as well as improving cardiovascular fitness. She was encouraged to keep rate of perceived exertion (RPE) at "easy to moderate" (3-4/10). Shine avg=0, peak shine=3. Pt's reported RPE= 5/10. Rest break taken during and " after completion.     OT made decision to end session early due to coughing and pt not feeling well.     neuromuscular re-education activities to improve: Balance and coordination for 0 minutes. The following activities were included:  NA    therapeutic activities to improve functional performance for 0 minutes, including: NA    Patient Education and Home Exercises     Education provided:   - Progress towards goals     Written Home Exercises Provided: not given today      Assessment     See subjective for info. Pt completed UBE very slowly today. OT made decision to end pt's session early due to pt not feeling well.     Alyssa is progressing well towards her goals and there are no updates to goals at this time. Pt prognosis is Guarded.     Patient will continue to benefit from skilled outpatient occupational therapy to address the deficits listed in the problem list on initial evaluation provide patient/family education and to maximize patient's level of independence in the home and community environment.     Patient's spiritual, cultural and educational needs considered and patient agreeable to plan of care and goals.    Anticipated barriers to occupational therapy: MS    Goals:  Short Term Goals (4 weeks) Status When Last Assessed  Progressing/ Met   1) Pt to be (I) with HEP.    (progressing 11/21/2024)     2) Sit <> supine to improve to mod a  Max a  (progressing 11/21/2024)     3) Pt to be able to roll to the left and right for improved bed mobility with min a  Max a  (progressing 11/21/2024)     4) Pt to be able put on a pull over t shirt with min a  Max a  (progressing 11/21/2024)     5) pt to increase left  strength by 4 lbs  avg 10.6    (progressing 11/21/2024)     6) handwriting to be assessed and goal added as necessary    Completed 11/5/2024           LongTerm Goals (8 weeks) Status When Last Assessed  Progressing/ Met   1) Pt to increase left  strength by 8 lbs  avg 10.6    (progressing  11/21/2024)     2) Pt to be able to pull up her pants while standing with min a for balance from her .  Pt's  pulls up her pants while she holds bar.  (progressing 11/21/2024)     3) pt to improve shoulder range of motion by 10 degrees for increased ability to complete overhead tasks  Joint Evaluation  AROM  10/23/2024 AROM  10/23/2024     Right  Left    Shoulder flex 0-180 120 scaption  109 scaption    Shoulder Abd 0-180 144 122    (progressing 11/21/2024)     4) pt to be able to complete UBE, sitting, level 2, for 10 minutes with no rest break during task for increased endurance    (progressing 11/21/2024)     5) Pt to improve FMC, as evidenced by a 5 second decrease bilaterally during the 9 hole peg test.  9 Hole Peg Test (9HPT) Left Right   10/23/2024 48s 43s    (progressing 11/21/2024)     6) pt to be able to write short story with 100% legibility.         Additional functional goals to be added as pt progresses and per her priorities.     Plan   Certification Period/Plan of care expiration: 10/23/2024 to 12/20/2024.     Outpatient Occupational Therapy 2 times weekly for 8 weeks to include the following interventions: Manual Therapy, Moist Heat/ Ice, Neuromuscular Re-ed, Orthotic Management and Training, Patient Education, Self Care, Therapeutic Activities, and Therapeutic Exercise    Updates/Grading for next session: handwriting, BIANKA Serra OT   11/21/2024

## 2024-11-22 ENCOUNTER — OFFICE VISIT (OUTPATIENT)
Dept: UROLOGY | Facility: CLINIC | Age: 74
End: 2024-11-22
Payer: MEDICARE

## 2024-11-22 VITALS
WEIGHT: 203.25 LBS | SYSTOLIC BLOOD PRESSURE: 131 MMHG | BODY MASS INDEX: 36.01 KG/M2 | DIASTOLIC BLOOD PRESSURE: 82 MMHG | HEART RATE: 70 BPM

## 2024-11-22 DIAGNOSIS — N39.0 RECURRENT UTI: ICD-10-CM

## 2024-11-22 DIAGNOSIS — N39.41 URGE INCONTINENCE: Primary | ICD-10-CM

## 2024-11-22 PROCEDURE — 99999 PR PBB SHADOW E&M-EST. PATIENT-LVL III: CPT | Mod: PBBFAC,,,

## 2024-11-22 PROCEDURE — 87086 URINE CULTURE/COLONY COUNT: CPT

## 2024-11-22 RX ORDER — ESTRADIOL 0.1 MG/G
1 CREAM VAGINAL
Qty: 42.5 G | Refills: 3 | Status: SHIPPED | OUTPATIENT
Start: 2024-11-22 | End: 2025-11-22

## 2024-11-22 NOTE — PROGRESS NOTES
[facility]      Urology Post-operative Note    11/22/2024     Referred by:  No ref. provider found    Procedure: SNM Implant  Time Since Procedure: 6-8 weeks    HPI: Alyssa John is a very pleasant 74 y.o. female following up for post-operative visit. She reports doing well since her implant, however, there was a recent recurrence of her UUI in the last 3 days. She has been on 3 rounds of antibiotics for various health concerns since September. She completed the most recent round earlier this week. She does not use vaginal estrogen cream or probiotics.     A review of 10+ systems was conducted with pertinent positive and negative findings documented in HPI with all other systems reviewed and negative.    Past medical, family, surgical and social history reviewed as documented in chart with pertinent positive medical, family, surgical and social history detailed in HPI.    Exam Findings:    Const: no acute distress, conversant and alert  Eyes: anicteric, extraocular muscles intact  ENMT: normocephalic, Nl oral membranes  Cardio: no cyanosis, nl cap refill  Pulm: no tachypnea; no resp distress  Abd: soft, no tenderness  Musc: no laceration, no tenderness  Neuro: alert; oriented x 3  Skin: warm, dry; no petichiae  Psych: no anxiety; normal speech     UA 1.025, pH 5, Leuk ++, nitrite +, +50 protein, 50 blood, otherwise negative    Assessment/Plan:     Urine sent for culture. Will message through portal with results.   Vaginal estrogen cream prescribed for use 3x/weekly.   D-mannose handout given. Most of her previous infections have been pan-sensitive E. Coli.   Increase water intake.   Follow up in 2 months.     I spent a total of 30 minutes on the day of the visit.  This includes face to face time and non-face to face time preparing to see the patient (eg, review of tests), obtaining and/or reviewing separately obtained history, documenting clinical information in the electronic or other  health record, independently interpreting results and communicating results to the patient/family/caregiver, or care coordinator.

## 2024-11-23 LAB — BACTERIA UR CULT: ABNORMAL

## 2024-11-25 ENCOUNTER — PATIENT MESSAGE (OUTPATIENT)
Dept: UROLOGY | Facility: CLINIC | Age: 74
End: 2024-11-25
Payer: MEDICARE

## 2024-11-25 DIAGNOSIS — N30.00 ACUTE CYSTITIS WITHOUT HEMATURIA: Primary | ICD-10-CM

## 2024-11-25 RX ORDER — CIPROFLOXACIN 500 MG/1
500 TABLET ORAL 2 TIMES DAILY
Qty: 10 TABLET | Refills: 0 | Status: SHIPPED | OUTPATIENT
Start: 2024-11-25 | End: 2024-11-30

## 2024-12-16 ENCOUNTER — PATIENT MESSAGE (OUTPATIENT)
Dept: PSYCHIATRY | Facility: CLINIC | Age: 74
End: 2024-12-16
Payer: MEDICARE

## 2024-12-29 DIAGNOSIS — R60.9 SWELLING: ICD-10-CM

## 2024-12-29 NOTE — TELEPHONE ENCOUNTER
No care due was identified.  [facility] Embedded Care Due Messages. Reference number: [id number].   12/29/2024 7:15:22 AM CST

## 2024-12-30 ENCOUNTER — LAB VISIT (OUTPATIENT)
Dept: LAB | Facility: HOSPITAL | Age: 74
End: 2024-12-30
Attending: PSYCHIATRY & NEUROLOGY
Payer: MEDICARE

## 2024-12-30 ENCOUNTER — DOCUMENTATION ONLY (OUTPATIENT)
Dept: REHABILITATION | Facility: HOSPITAL | Age: 74
End: 2024-12-30
Payer: MEDICARE

## 2024-12-30 ENCOUNTER — OFFICE VISIT (OUTPATIENT)
Dept: NEUROLOGY | Facility: CLINIC | Age: 74
End: 2024-12-30
Payer: MEDICARE

## 2024-12-30 VITALS
SYSTOLIC BLOOD PRESSURE: 159 MMHG | WEIGHT: 201.75 LBS | HEART RATE: 55 BPM | HEIGHT: 63 IN | BODY MASS INDEX: 35.75 KG/M2 | DIASTOLIC BLOOD PRESSURE: 85 MMHG

## 2024-12-30 DIAGNOSIS — N31.9 NEUROGENIC BLADDER: ICD-10-CM

## 2024-12-30 DIAGNOSIS — E53.1 VITAMIN B6 DEFICIENCY: ICD-10-CM

## 2024-12-30 DIAGNOSIS — G35 MS (MULTIPLE SCLEROSIS): Primary | ICD-10-CM

## 2024-12-30 DIAGNOSIS — Z78.9 IMPAIRED MOBILITY AND ADLS: ICD-10-CM

## 2024-12-30 DIAGNOSIS — R26.81 GAIT INSTABILITY: ICD-10-CM

## 2024-12-30 DIAGNOSIS — R26.9 GAIT DISTURBANCE: ICD-10-CM

## 2024-12-30 DIAGNOSIS — G35 MS (MULTIPLE SCLEROSIS): ICD-10-CM

## 2024-12-30 DIAGNOSIS — F09 COGNITIVE DYSFUNCTION: ICD-10-CM

## 2024-12-30 DIAGNOSIS — N39.0 URINARY TRACT INFECTION WITHOUT HEMATURIA, SITE UNSPECIFIED: ICD-10-CM

## 2024-12-30 DIAGNOSIS — Z71.89 COUNSELING REGARDING GOALS OF CARE: ICD-10-CM

## 2024-12-30 DIAGNOSIS — Z74.09 IMPAIRED MOBILITY AND ADLS: ICD-10-CM

## 2024-12-30 LAB
ALBUMIN SERPL BCP-MCNC: 3.4 G/DL (ref 3.5–5.2)
ALP SERPL-CCNC: 150 U/L (ref 40–150)
ALT SERPL W/O P-5'-P-CCNC: 10 U/L (ref 10–44)
ANION GAP SERPL CALC-SCNC: 11 MMOL/L (ref 8–16)
AST SERPL-CCNC: 13 U/L (ref 10–40)
BILIRUB SERPL-MCNC: 0.4 MG/DL (ref 0.1–1)
BUN SERPL-MCNC: 18 MG/DL (ref 8–23)
CALCIUM SERPL-MCNC: 8.3 MG/DL (ref 8.7–10.5)
CHLORIDE SERPL-SCNC: 114 MMOL/L (ref 95–110)
CO2 SERPL-SCNC: 19 MMOL/L (ref 23–29)
CREAT SERPL-MCNC: 0.8 MG/DL (ref 0.5–1.4)
EST. GFR  (NO RACE VARIABLE): >60 ML/MIN/1.73 M^2
GLUCOSE SERPL-MCNC: 78 MG/DL (ref 70–110)
POTASSIUM SERPL-SCNC: 3.8 MMOL/L (ref 3.5–5.1)
PROT SERPL-MCNC: 6.8 G/DL (ref 6–8.4)
SODIUM SERPL-SCNC: 144 MMOL/L (ref 136–145)
VIT B12 SERPL-MCNC: 1065 PG/ML (ref 210–950)

## 2024-12-30 PROCEDURE — 80053 COMPREHEN METABOLIC PANEL: CPT | Performed by: PSYCHIATRY & NEUROLOGY

## 2024-12-30 PROCEDURE — 84425 ASSAY OF VITAMIN B-1: CPT | Performed by: PSYCHIATRY & NEUROLOGY

## 2024-12-30 PROCEDURE — 99999 PR PBB SHADOW E&M-EST. PATIENT-LVL III: CPT | Mod: PBBFAC,,, | Performed by: PSYCHIATRY & NEUROLOGY

## 2024-12-30 PROCEDURE — 1160F RVW MEDS BY RX/DR IN RCRD: CPT | Mod: CPTII,S$GLB,, | Performed by: PSYCHIATRY & NEUROLOGY

## 2024-12-30 PROCEDURE — 3077F SYST BP >= 140 MM HG: CPT | Mod: CPTII,S$GLB,, | Performed by: PSYCHIATRY & NEUROLOGY

## 2024-12-30 PROCEDURE — 82607 VITAMIN B-12: CPT | Performed by: PSYCHIATRY & NEUROLOGY

## 2024-12-30 PROCEDURE — 36415 COLL VENOUS BLD VENIPUNCTURE: CPT | Performed by: PSYCHIATRY & NEUROLOGY

## 2024-12-30 PROCEDURE — 1159F MED LIST DOCD IN RCRD: CPT | Mod: CPTII,S$GLB,, | Performed by: PSYCHIATRY & NEUROLOGY

## 2024-12-30 PROCEDURE — G2211 COMPLEX E/M VISIT ADD ON: HCPCS | Mod: S$GLB,,, | Performed by: PSYCHIATRY & NEUROLOGY

## 2024-12-30 PROCEDURE — 99215 OFFICE O/P EST HI 40 MIN: CPT | Mod: S$GLB,,, | Performed by: PSYCHIATRY & NEUROLOGY

## 2024-12-30 PROCEDURE — 1101F PT FALLS ASSESS-DOCD LE1/YR: CPT | Mod: CPTII,S$GLB,, | Performed by: PSYCHIATRY & NEUROLOGY

## 2024-12-30 PROCEDURE — 3288F FALL RISK ASSESSMENT DOCD: CPT | Mod: CPTII,S$GLB,, | Performed by: PSYCHIATRY & NEUROLOGY

## 2024-12-30 PROCEDURE — 84207 ASSAY OF VITAMIN B-6: CPT | Performed by: PSYCHIATRY & NEUROLOGY

## 2024-12-30 PROCEDURE — 3008F BODY MASS INDEX DOCD: CPT | Mod: CPTII,S$GLB,, | Performed by: PSYCHIATRY & NEUROLOGY

## 2024-12-30 PROCEDURE — 3079F DIAST BP 80-89 MM HG: CPT | Mod: CPTII,S$GLB,, | Performed by: PSYCHIATRY & NEUROLOGY

## 2024-12-30 RX ORDER — TORSEMIDE 5 MG/1
5 TABLET ORAL
Qty: 90 TABLET | Refills: 2 | Status: SHIPPED | OUTPATIENT
Start: 2024-12-30

## 2024-12-30 NOTE — Clinical Note
Pt has declined a lot this year; some concern for demential -I'm exploring; but pt's  needs more help than he's getting right now; Could one of you pls call him to discuss options - maybe [facility] aide at home vs other

## 2024-12-30 NOTE — TELEPHONE ENCOUNTER
Refill Routing Note   Medication(s) are not appropriate for processing by [facility] for the following reason(s):        New or recently adjusted medication    [facility] action(s):  Defer               Appointments  past 12m or future 3m with PCP    Date Provider   Last Visit   10/3/2024 Piyush Sharif MD   Next Visit   Visit date not found Piyush Sharif MD   ED visits in past 90 days: 0        Note composed:10:20 AM 12/30/2024

## 2024-12-30 NOTE — Clinical Note
Virgilio Pickett,  Seeing Alyssa today; she's gone down a lot this year- not walking at all; I think much of this is deconditioning associated with chronic URIs/ LE edema and chronic UTIs.  I encouraged her  to make appt with you soon to address these medical issues.  Thanks :)

## 2024-12-30 NOTE — PROGRESS NOTES
Outpatient Therapy Discharge Summary     Name: Alyssa John  Clinic Number: [id number]    Physician: Tierra Kerr PA-C     Visit Date: 11/21/2024    Evaluation Date: 10/1/2024  Authorization Period Expiration: 9/24/21-12/31/24  Plan of Care Expiration: 12/20/24  Visit # / Visits authorized: 6/20(7)     Date of Discharge Note:  12/30/2024    Date of Last visit: 11/21/24  Total Cancelled Visits: 8    Assessment    Alyssa is appropriate for discharge at this time. Pt has met 0/3 STG and 0/5 LTG. Pt has not attended PT since 11/21/24. Pt has had 8 cancellations in a row. Pt is appropriate for discharge from outpatient PT at this time with HEP.       Discharge reason : Non-Compliance with attendance  and self discharge    Goals:   Short Term Goals: 4 weeks  Date Last Assessed Status   Patient to be independent with home exercise program.   NOT MET   2. Patient to tolerate cardiorespiratory activity for at least 10 minutes with RPE of </= 3/10.   NOT MET   3. Patient to participate in dynamic balance activities to increase balance with moderate assistance.   NOT MET      Long Term Goals: 6 weeks Date Last Assessed Status   Patient to participate in strengthening exercises of low back and bilateral lower extremities to decrease fall risk.   NOT MET   2. Patient to improve sit to stand with RW and moderate assistance.   NOT MET   3. Patient to improve transfer with slide board with supervision    NOT MET   4.Patient's caregiver to demonstrate independence with transfers.   NOT MET   5. Patient to be able to ambulate 50ft with RW with moderate assistance.   NOT MET         Plan   Pt is appropriate for discharge from PT with HEP at this time.      Wendy House, PT  12/30/2024

## 2024-12-30 NOTE — Clinical Note
Please order home health with [facility] to begin in 2 weeks - home health PT to assist with gait , strengthening, deconditioning and transfers.

## 2024-12-30 NOTE — PROGRESS NOTES
Subjective:          Patient ID: Alyssa John is a 74 y.o. female who presents today for a routine clinic visit for MS.   She comes in with her .     MS HPI:  DMT: None  Her  states that things have been very rough this fall. She fell in the bathroom (passed out) and hit her face - [facility], hospital.  Had full work up for syncope with no obvious cause was identified.  She was urinating on the toilet when she lost consciousness - may have been micturition syncope;  Was discharged with home health PT in late October; She then developed a URI that she could not shake - missed a lot of PT and ultimately PT was cancelled.   She's not been doing any PT so she's weaker.    Had a sacral stimulator placed in September- helps her bladder.    Has had frequent UTIs - follows with Dr. Rosales;   She transfers with the help of another person; can stand / pivot; not walking  Her  states her thinking has not been good ; gets confused between night day; forgets whether she takes her medications;      Medications:  Current Outpatient Medications   Medication Sig    albuterol-ipratropium (DUO-NEB) 2.5 mg-0.5 mg/3 mL nebulizer solution Take 3 mLs by nebulization every 6 (six) hours as needed for Wheezing. Rescue    atorvastatin (LIPITOR) 40 MG tablet TAKE 1 TABLET BY MOUTH EVERY DAY    cholecalciferol, vitamin D3, 10 mcg (400 unit) Cap capsule 1 tablet.    clopidogrel (PLAVIX) 75 mg tablet Take 75 mg by mouth once daily.    cyanocobalamin (VITAMIN B-12) 1000 MCG tablet Take 1 tablet (1,000 mcg total) by mouth once daily.    DULoxetine (CYMBALTA) 20 MG capsule TAKE 2 CAPSULES BY MOUTH EVERY DAY    estradioL (ESTRACE) 0.01 % (0.1 mg/gram) vaginal cream Place 1 g vaginally 3 (three) times a week.    famotidine (PEPCID) 20 MG tablet Take 20 mg by mouth 2 (two) times daily as needed for Heartburn.    nitroGLYCERIN (NITROSTAT) 0.4 MG SL tablet Place 0.4 mg under the tongue every 5 (five) minutes as needed for  Chest pain.    propranoloL (INDERAL) 20 MG tablet Take 1 tablet (20 mg total) by mouth once daily.    topiramate (TOPAMAX) 100 MG tablet Take 1 tablet (100 mg total) by mouth 2 (two) times daily.    torsemide (DEMADEX) 5 MG Tab TAKE 1 TABLET BY MOUTH EVERY DAY    ciprofloxacin HCl (CIPRO) 500 MG tablet Take 1 tablet (500 mg total) by mouth 2 (two) times daily.    nitrofurantoin, macrocrystal-monohydrate, (MACROBID) 100 MG capsule Take 1 capsule (100 mg total) by mouth 2 (two) times daily.     No current facility-administered medications for this visit.       SOCIAL HISTORY  Social History     Tobacco Use    Smoking status: Former     Current packs/day: 0.00     Average packs/day: 2.0 packs/day for 42.0 years (84.0 ttl pk-yrs)     Types: Cigarettes     Start date: 1968     Quit date: 2010     Years since quitting: 15.0     Passive exposure: Past    Smokeless tobacco: Never   Substance Use Topics    Alcohol use: Not Currently     Comment: rarely    Drug use: No                Objective:            4/13/2023     1:00 PM 2/6/2024     1:40 PM   Timed 25 Foot Walk:   Did patient wear an AFO? No No   Was assistive device used? Yes Yes   Assistive device used (toña one): Bilateral Assistance Bilateral Assistance   Bilateral device used Walker/Rollator Walker/Rollator   Time for 25 Foot Walk (seconds) 9.6 11.2   Time for 25 Foot Walk (seconds) 9.4 10.6       Neurological Exam  MENTAL STATUS: bradyphrenia; difficulty with short term memory      CRANIAL NERVE EXAM:  There is no JOSE LUIS.  Extraocular muscles are intact. Pupils are equal, round, and reactive to light. No facial asymmetry. Facial sensation is intact bilaterally. There is no dysarthria.      MOTOR EXAM:  LLE 3/5 in flexors;      SENSORY EXAM: moderate decrease in vibration     COORDINATION: kinetic tremor R>L     GAIT: deferred     Imaging:     No results found for this or any previous visit.    No results found for this or any previous visit.    No results found for  this or any previous visit.    Results for orders placed during the hospital encounter of 04/10/21    MRI Brain Demyelinating W W/O Contrast    Impression  1. There is a stable marked burden of white matter disease consistent with the provided diagnosis of multiple sclerosis.  These findings are other extensive.  There are no regions of restricted diffusion and there is no abnormal enhancement.  There are no findings to suggest interval progression of disease or active demyelination.      Electronically signed by: Kingsley Bass MD  Date:    04/11/2021  Time:    20:22    Results for orders placed during the hospital encounter of 04/10/21    MRI Cervical Spine Demyelinating W W/O Contrast    Impression  1. There are multiple regions of abnormal STIR and T2 hyperintense signal in the cord which, considering slight difference in technique, slice selection and volume averaging as well as mild motion are essentially stable compared to the prior study.  The findings are felt to represent stable demyelinating disease without any definite new lesions identified.  2. There is multilevel degenerative disc and facet disease discussed in detail by level above.  There is some degree of disc space narrowing, disc osteophyte complex, uncovertebral spurring and/or facet joint arthropathy at multiple levels.  There is mild spinal stenosis at several levels.  There is multilevel foraminal stenosis which for the most part are stable compared to the prior study with the exception of some interval progression of right foraminal stenosis at the C4-5 level.  3. There is no abnormal enhancement within the vertebral bodies, discs, cord or epidural space.      Electronically signed by: Kingsley Bass MD  Date:    04/11/2021  Time:    21:09    No results found for this or any previous visit.        Labs:     Lab Results   Component Value Date    KJSUXYTK70OE 35 08/26/2020    PKPENPME89VY 97 (H) 08/20/2019    [id number] 68 07/19/2018      Lab Results   Component Value Date    JCVINDEX SEE COMMENT (A) 12/09/2015    JCVANTIBODY SEE COMMENT 12/09/2015     Lab Results   Component Value Date    UA0JOAAZ 73.7 03/30/2021    ABSOLUTECD3 1099 03/30/2021    GX9UAYCA 23.7 03/30/2021    ABSOLUTECD8 353 03/30/2021    VB1YWIJT 48.9 03/30/2021    ABSOLUTECD4 729 03/30/2021    LABCD48 2.06 03/30/2021     Lab Results   Component Value Date    WBC 8.10 09/21/2024    RBC 4.64 09/21/2024    HGB 13.0 09/21/2024    HCT 40.6 09/21/2024    MCV 88 09/21/2024    MCH 28.0 09/21/2024    MCHC 32.0 09/21/2024    RDW 14.5 09/21/2024     09/21/2024    MPV 11.4 09/21/2024    GRAN 4.9 09/21/2024    GRAN 60.8 09/21/2024    LYMPH 2.1 09/21/2024    LYMPH 26.3 09/21/2024    MONO 0.8 09/21/2024    MONO 9.8 09/21/2024    EOS 0.2 09/21/2024    BASO 0.04 09/21/2024    EOSINOPHIL 2.2 09/21/2024    BASOPHIL 0.5 09/21/2024     Sodium   Date Value Ref Range Status   12/30/2024 144 136 - 145 mmol/L Final     Potassium   Date Value Ref Range Status   12/30/2024 3.8 3.5 - 5.1 mmol/L Final     Chloride   Date Value Ref Range Status   12/30/2024 114 (H) 95 - 110 mmol/L Final     CO2   Date Value Ref Range Status   12/30/2024 19 (L) 23 - 29 mmol/L Final     Carbon Monoxide, Blood   Date Value Ref Range Status   10/19/2016 2 % Final     Comment:     -------------------REFERENCE VALUE--------------------------  0-2 Normal (Non-smoker) , < or = 9 Normal (Smoker), > or   = 20 (Toxic concentration)  Test Performed by:  [address] [id number]  : Adrien Norton II, M.D., Ph.D.       Glucose   Date Value Ref Range Status   12/30/2024 78 70 - 110 mg/dL Final     BUN   Date Value Ref Range Status   12/30/2024 18 8 - 23 mg/dL Final     Creatinine   Date Value Ref Range Status   12/30/2024 0.8 0.5 - 1.4 mg/dL Final   02/22/2013 0.8 0.5 - 1.4 mg/dL Final     Calcium   Date Value Ref Range Status   12/30/2024 8.3  "(L) 8.7 - 10.5 mg/dL Final   02/22/2013 8.7 8.7 - 10.5 mg/dL Final     Total Protein   Date Value Ref Range Status   12/30/2024 6.8 6.0 - 8.4 g/dL Final     Albumin   Date Value Ref Range Status   12/30/2024 3.4 (L) 3.5 - 5.2 g/dL Final     Total Bilirubin   Date Value Ref Range Status   12/30/2024 0.4 0.1 - 1.0 mg/dL Final     Comment:     For infants and newborns, interpretation of results should be based  on gestational age, weight and in agreement with clinical  observations.    Premature Infant recommended reference ranges:  Up to 24 hours.............<8.0 mg/dL  Up to 48 hours............<12.0 mg/dL  3-5 days..................<15.0 mg/dL  6-29 days.................<15.0 mg/dL       Alkaline Phosphatase   Date Value Ref Range Status   12/30/2024 150 40 - 150 U/L Final   02/22/2013 107 55 - 135 U/L Final     AST   Date Value Ref Range Status   12/30/2024 13 10 - 40 U/L Final   02/22/2013 18 10 - 40 U/L Final     ALT   Date Value Ref Range Status   12/30/2024 10 10 - 44 U/L Final     Anion Gap   Date Value Ref Range Status   12/30/2024 11 8 - 16 mmol/L Final   02/22/2013 11 5 - 15 meq/L Final     eGFR if    Date Value Ref Range Status   11/17/2021 >60.0 >60 mL/min/1.73 m^2 Final     eGFR if non    Date Value Ref Range Status   11/17/2021 >60.0 >60 mL/min/1.73 m^2 Final     Comment:     Calculation used to obtain the estimated glomerular filtration  rate (eGFR) is the CKD-EPI equation.        No results found for: "HEPBSAG", "HEPBSAB", "HEPBCAB"        MS Impression and Plan:     NEURO MULTIPLE SCLEROSIS IMPRESSION:   Clinical Progression:  Worsened  Type:  Progressive  MS Classification:  Secondarily Progressive MS  Current DMT: none  DMT:  No change in management  Symptom Management:  Implement change in symptom management  Implement Change in Symptom Management:  Cognitive and Gait  Additional Impressions:  Pt has declined significantly with regard to cognition and gait.   Will " proceed with metabolic w/u and check UA.   Will reach out to PCP to address her chronic URIs.    Refer for NP testing  Home Health PT   Discussed a power chair- she defers  Discussed a sleep study - does not want   Will ask our SW team to reach out to patient's  regarding more support for patient  F/u [facility] in 2mo         Problem List Items Addressed This Visit          Unprioritized    Impaired mobility and ADLs (Chronic)    Gait instability    Relevant Orders    Comprehensive Metabolic Panel (Completed)    Vitamin B12 (Completed)    VITAMIN B6 (Completed)    VITAMIN B1 (Completed)    Gait disturbance     Other Visit Diagnoses       MS (multiple sclerosis)    -  Primary    Relevant Orders    Ambulatory consult to Neuropsychology    Comprehensive Metabolic Panel (Completed)    Vitamin B12 (Completed)    VITAMIN B6 (Completed)    VITAMIN B1 (Completed)    Counseling regarding goals of care        Neurogenic bladder        Cognitive dysfunction        Relevant Orders    Ambulatory consult to Neuropsychology    Comprehensive Metabolic Panel (Completed)    Vitamin B12 (Completed)    VITAMIN B6 (Completed)    VITAMIN B1 (Completed)    Vitamin B6 deficiency        Relevant Orders    VITAMIN B6 (Completed)    Urinary tract infection without hematuria, site unspecified        Relevant Orders    Urinalysis, Reflex to Urine Culture Urine, Clean Catch (Completed)            Genny Apodaca MD    I spent a total of 40 minutes on the day of the visit.This includes face to face time and non-face to face time preparing to see the patient (eg, review of tests), obtaining and/or reviewing separately obtained history, documenting clinical information in the electronic or other health record, independently interpreting results and communicating results to the patient/family/caregiver, or care coordinator.  Visit today included increased complexity associated with the care of the episodic problem : chronic  immunotherapy; addressed and managing the longitudinal care of the patient due to the serious and/or complex managed problem(s) MS.

## 2025-01-02 ENCOUNTER — TELEPHONE (OUTPATIENT)
Dept: PSYCHIATRY | Facility: CLINIC | Age: 75
End: 2025-01-02

## 2025-01-02 DIAGNOSIS — R26.81 GAIT INSTABILITY: ICD-10-CM

## 2025-01-02 DIAGNOSIS — G35 MULTIPLE SCLEROSIS: Primary | ICD-10-CM

## 2025-01-02 DIAGNOSIS — Z74.09 IMPAIRED MOBILITY AND ADLS: Chronic | ICD-10-CM

## 2025-01-02 DIAGNOSIS — Z78.9 IMPAIRED MOBILITY AND ADLS: Chronic | ICD-10-CM

## 2025-01-02 DIAGNOSIS — R53.81 PHYSICAL DECONDITIONING: ICD-10-CM

## 2025-01-02 NOTE — TELEPHONE ENCOUNTER
SW received referral from JOSSIE Apodaca MD to assist with home health orders and discuss in-home aide services. SW contacted Pt's  and confirmed that they would like to start physical therapy with [facility] in two weeks, as Alyssa is currently recovering from chronic UTIs.     Pt's  states that he is open to receiving information about in-home aides. Currently, Pt's sister is living with them and is usually of help with transfers, however she recently had two knee replacements so she is not able to physically help as much as usual. Pt's youngest daughter is able to help intermittently throughout the week, with re-positioning and transfers.     Pt's  requests resources be sent via portal, and he will discuss with Pt. Pt's  also inquired about antibiotics for UTI. SW will relay message to provider.

## 2025-01-03 ENCOUNTER — PATIENT MESSAGE (OUTPATIENT)
Dept: NEUROLOGY | Facility: CLINIC | Age: 75
End: 2025-01-03
Payer: MEDICARE

## 2025-01-03 RX ORDER — CIPROFLOXACIN 500 MG/1
500 TABLET ORAL 2 TIMES DAILY
Qty: 5 TABLET | Refills: 0 | Status: SHIPPED | OUTPATIENT
Start: 2025-01-03

## 2025-01-06 ENCOUNTER — PATIENT MESSAGE (OUTPATIENT)
Dept: NEUROLOGY | Facility: CLINIC | Age: 75
End: 2025-01-06
Payer: MEDICARE

## 2025-01-06 DIAGNOSIS — N39.0 URINARY TRACT INFECTION WITHOUT HEMATURIA, SITE UNSPECIFIED: Primary | ICD-10-CM

## 2025-01-06 RX ORDER — NITROFURANTOIN 25; 75 MG/1; MG/1
100 CAPSULE ORAL 2 TIMES DAILY
Qty: 14 CAPSULE | Refills: 0 | Status: SHIPPED | OUTPATIENT
Start: 2025-01-06 | End: 2025-01-16 | Stop reason: ALTCHOICE

## 2025-01-09 ENCOUNTER — PATIENT MESSAGE (OUTPATIENT)
Dept: NEUROLOGY | Facility: CLINIC | Age: 75
End: 2025-01-09
Payer: MEDICARE

## 2025-01-15 ENCOUNTER — PATIENT MESSAGE (OUTPATIENT)
Dept: FAMILY MEDICINE | Facility: CLINIC | Age: 75
End: 2025-01-15

## 2025-01-15 ENCOUNTER — OFFICE VISIT (OUTPATIENT)
Dept: FAMILY MEDICINE | Facility: CLINIC | Age: 75
End: 2025-01-15
Payer: MEDICARE

## 2025-01-15 ENCOUNTER — HOSPITAL ENCOUNTER (OUTPATIENT)
Dept: RADIOLOGY | Facility: CLINIC | Age: 75
Discharge: HOME | End: 2025-01-15
Attending: STUDENT IN AN ORGANIZED HEALTH CARE EDUCATION/TRAINING PROGRAM
Payer: MEDICARE

## 2025-01-15 VITALS
DIASTOLIC BLOOD PRESSURE: 80 MMHG | BODY MASS INDEX: 35.75 KG/M2 | HEIGHT: 63 IN | OXYGEN SATURATION: 97 % | HEART RATE: 92 BPM | TEMPERATURE: 99 F | RESPIRATION RATE: 16 BRPM | WEIGHT: 201.75 LBS | SYSTOLIC BLOOD PRESSURE: 148 MMHG

## 2025-01-15 DIAGNOSIS — R05.2 SUBACUTE COUGH: ICD-10-CM

## 2025-01-15 DIAGNOSIS — Z00.00 HEALTHCARE MAINTENANCE: Primary | ICD-10-CM

## 2025-01-15 DIAGNOSIS — J01.00 SUBACUTE MAXILLARY SINUSITIS: ICD-10-CM

## 2025-01-15 DIAGNOSIS — E66.01 SEVERE OBESITY (BMI 35.0-39.9) WITH COMORBIDITY: ICD-10-CM

## 2025-01-15 DIAGNOSIS — G35 MS (MULTIPLE SCLEROSIS): ICD-10-CM

## 2025-01-15 DIAGNOSIS — G40.909 SEIZURE DISORDER: ICD-10-CM

## 2025-01-15 DIAGNOSIS — J18.9 PNEUMONIA DUE TO INFECTIOUS ORGANISM, UNSPECIFIED LATERALITY, UNSPECIFIED PART OF LUNG: ICD-10-CM

## 2025-01-15 DIAGNOSIS — R11.10 VOMITING, UNSPECIFIED VOMITING TYPE, UNSPECIFIED WHETHER NAUSEA PRESENT: ICD-10-CM

## 2025-01-15 PROBLEM — J43.9 EMPHYSEMA LUNG: Status: RESOLVED | Noted: 2024-09-18 | Resolved: 2025-01-15

## 2025-01-15 PROBLEM — J44.9 CHRONIC OBSTRUCTIVE PULMONARY DISEASE: Status: RESOLVED | Noted: 2024-09-30 | Resolved: 2025-01-15

## 2025-01-15 PROBLEM — D69.1 QUALITATIVE PLATELET DISORDER: Status: RESOLVED | Noted: 2022-10-28 | Resolved: 2025-01-15

## 2025-01-15 PROBLEM — J41.0 SIMPLE CHRONIC BRONCHITIS: Status: RESOLVED | Noted: 2024-09-30 | Resolved: 2025-01-15

## 2025-01-15 LAB
INFLUENZA A, MOLECULAR: DETECTED
INFLUENZA B, MOLECULAR: NOT DETECTED
RSV AG BY MOLECULAR METHOD: NOT DETECTED
SARS-COV-2 RNA RESP QL NAA+PROBE: NOT DETECTED

## 2025-01-15 PROCEDURE — 3008F BODY MASS INDEX DOCD: CPT | Mod: CPTII,S$GLB,, | Performed by: STUDENT IN AN ORGANIZED HEALTH CARE EDUCATION/TRAINING PROGRAM

## 2025-01-15 PROCEDURE — 99214 OFFICE O/P EST MOD 30 MIN: CPT | Mod: S$GLB,,, | Performed by: STUDENT IN AN ORGANIZED HEALTH CARE EDUCATION/TRAINING PROGRAM

## 2025-01-15 PROCEDURE — 3077F SYST BP >= 140 MM HG: CPT | Mod: CPTII,S$GLB,, | Performed by: STUDENT IN AN ORGANIZED HEALTH CARE EDUCATION/TRAINING PROGRAM

## 2025-01-15 PROCEDURE — 3288F FALL RISK ASSESSMENT DOCD: CPT | Mod: CPTII,S$GLB,, | Performed by: STUDENT IN AN ORGANIZED HEALTH CARE EDUCATION/TRAINING PROGRAM

## 2025-01-15 PROCEDURE — 71046 X-RAY EXAM CHEST 2 VIEWS: CPT | Mod: 26,,, | Performed by: STUDENT IN AN ORGANIZED HEALTH CARE EDUCATION/TRAINING PROGRAM

## 2025-01-15 PROCEDURE — 1159F MED LIST DOCD IN RCRD: CPT | Mod: CPTII,S$GLB,, | Performed by: STUDENT IN AN ORGANIZED HEALTH CARE EDUCATION/TRAINING PROGRAM

## 2025-01-15 PROCEDURE — 3079F DIAST BP 80-89 MM HG: CPT | Mod: CPTII,S$GLB,, | Performed by: STUDENT IN AN ORGANIZED HEALTH CARE EDUCATION/TRAINING PROGRAM

## 2025-01-15 PROCEDURE — 71046 X-RAY EXAM CHEST 2 VIEWS: CPT | Mod: TC,FY,PO

## 2025-01-15 PROCEDURE — 0241U SARS-COV2 (COVID) WITH FLU/RSV BY PCR: CPT | Performed by: STUDENT IN AN ORGANIZED HEALTH CARE EDUCATION/TRAINING PROGRAM

## 2025-01-15 PROCEDURE — 99999 PR PBB SHADOW E&M-EST. PATIENT-LVL IV: CPT | Mod: PBBFAC,,, | Performed by: STUDENT IN AN ORGANIZED HEALTH CARE EDUCATION/TRAINING PROGRAM

## 2025-01-15 PROCEDURE — 1101F PT FALLS ASSESS-DOCD LE1/YR: CPT | Mod: CPTII,S$GLB,, | Performed by: STUDENT IN AN ORGANIZED HEALTH CARE EDUCATION/TRAINING PROGRAM

## 2025-01-15 PROCEDURE — G2211 COMPLEX E/M VISIT ADD ON: HCPCS | Mod: S$GLB,,, | Performed by: STUDENT IN AN ORGANIZED HEALTH CARE EDUCATION/TRAINING PROGRAM

## 2025-01-15 PROCEDURE — 1126F AMNT PAIN NOTED NONE PRSNT: CPT | Mod: CPTII,S$GLB,, | Performed by: STUDENT IN AN ORGANIZED HEALTH CARE EDUCATION/TRAINING PROGRAM

## 2025-01-15 RX ORDER — CEFDINIR 300 MG/1
300 CAPSULE ORAL 2 TIMES DAILY
Qty: 14 CAPSULE | Refills: 0 | Status: ON HOLD | OUTPATIENT
Start: 2025-01-15 | End: 2025-01-22 | Stop reason: HOSPADM

## 2025-01-15 RX ORDER — DOXYCYCLINE HYCLATE 100 MG
100 TABLET ORAL 2 TIMES DAILY
Qty: 14 TABLET | Refills: 0 | Status: ON HOLD | OUTPATIENT
Start: 2025-01-15 | End: 2025-01-22 | Stop reason: HOSPADM

## 2025-01-15 RX ORDER — PROMETHAZINE HYDROCHLORIDE AND DEXTROMETHORPHAN HYDROBROMIDE 6.25; 15 MG/5ML; MG/5ML
5 SYRUP ORAL EVERY 6 HOURS PRN
Qty: 118 ML | Refills: 1 | Status: ON HOLD | OUTPATIENT
Start: 2025-01-15 | End: 2025-01-24 | Stop reason: HOSPADM

## 2025-01-15 NOTE — PROGRESS NOTES
[facility] Note    Subjective:    The HPI and pertinent ROS is included in the Diagnostic Impression Remarks section at the end of the note. Please see below for further details. Chief complaint is at end of note.     Alyssa is a pleasant intelligent patient who is here for evaluation.     Modified Medications    No medications on file       Data reviewed 274}  Previous medical records reviewed and summarized in plan section at end of note.      If you are due for any health screening(s) below please notify me so we can arrange them to be ordered and scheduled. Most healthy patients at your age complete them, but you are free to accept or refuse. If you can't do it, I'll definitely understand. If you can, I'd certainly appreciate it!     All of your core healthy metrics are met.      The following portions of the patient's history were reviewed and updated as appropriate: allergies, current medications, past family history, past medical history, past social history, past surgical history and problem list.    She  has a past medical history of Arthritis, Coronary artery disease, Encounter for blood transfusion, Epilepsy, GERD (gastroesophageal reflux disease), Headache, Hypertension, MI, old, MS (multiple sclerosis), and Seizures.  She  has a past surgical history that includes Appendectomy; Back surgery; Coronary stent placement; Colonoscopy (N/A, 09/16/2015); right knee arthroscopy; Cataract extraction (Bilateral); Breast biopsy (Right, 15 yrs ago); insertion, neurostimulator, temporary, sacral (N/A, 9/27/2024); and Implantation of permanent sacral nerve stimulator (N/A, 10/11/2024).    She  reports that she quit smoking about 15 years ago. Her smoking use included cigarettes. She started smoking about 57 years ago. She has a 84 pack-year smoking history. She has been exposed to tobacco smoke. She has never used smokeless tobacco. She reports that she does not currently use alcohol. She reports  "that she does not use drugs.  She family history includes Heart disease in her father; Scleroderma in her mother.    Review of patient's allergies indicates:   Allergen Reactions    Codeine      Other reaction(s): throat tightness    Dilantin [phenytoin sodium extended]     Phenobarbital     Tegretol [carbamazepine]        Tobacco Use: Medium Risk (1/15/2025)    Patient History     Smoking Tobacco Use: Former     Smokeless Tobacco Use: Never     Passive Exposure: Past     Physical Examination  Physical Exam  No wheezing noted.    Vital Signs  Blood pressure is slightly elevated. Heart rate is 92.     General appearance: alert, cooperative, no distress  Neck: no thyromegaly, no neck stiffness  Lungs: clear to auscultation, no wheezes, rales or rhonchi, symmetric air entry  Heart: crackles right lung   Abdomen: soft, nontender, nondistended, no rigidity, rebound, or guarding.   Back: no point tenderness over spine  Extremities: peripheral pulses normal, pavan leg swelling  Neurological:alert, oriented, normal speech, no new focal findings or movement disorder noted from baseline      BP Readings from Last 3 Encounters:   01/15/25 (!) 148/80   12/30/24 (!) 159/85   11/22/24 131/82     Wt Readings from Last 3 Encounters:   01/15/25 91.5 kg (201 lb 11.5 oz)   12/30/24 91.5 kg (201 lb 11.5 oz)   11/22/24 92.2 kg (203 lb 4.2 oz)     BP (!) 148/80 (BP Location: Right arm, Patient Position: Sitting)   Pulse 92   Temp 98.7 °F (37.1 °C) (Oral)   Resp 16   Ht 5' 3" (1.6 m)   Wt 91.5 kg (201 lb 11.5 oz)   SpO2 97%   BMI 35.73 kg/m²    274}  Laboratory: I have reviewed old labs below:    274}    Lab Results   Component Value Date    WBC 8.10 09/21/2024    HGB 13.0 09/21/2024    HCT 40.6 09/21/2024    MCV 88 09/21/2024     09/21/2024     12/30/2024    K 3.8 12/30/2024     (H) 12/30/2024    CALCIUM 8.3 (L) 12/30/2024    PHOS 3.6 09/17/2024    CO2 19 (L) 12/30/2024    GLU 78 12/30/2024    BUN 18 12/30/2024    " CREATININE 0.8 12/30/2024    EGFRNORACEVR >60.0 12/30/2024    ANIONGAP 11 12/30/2024    PROT 6.8 12/30/2024    ALBUMIN 3.4 (L) 12/30/2024    BILITOT 0.4 12/30/2024    ALKPHOS 150 12/30/2024    ALT 10 12/30/2024    AST 13 12/30/2024    INR 0.9 09/17/2024    CHOL 145 09/18/2024    TRIG 113 09/18/2024    HDL 39 (L) 09/18/2024    LDLCALC 83.4 09/18/2024    TSH 2.224 08/12/2024    HGBA1C 6.0 09/18/2024      Lab reviewed by me: Particular labs of significance that I will monitor, workup, or treat to improve are mentioned below in diagnostic impression remarks.    Results         Imaging/EKG: I have reviewed the pertinent results and my findings are noted in remarks.  274}    CC:   Chief Complaint   Patient presents with    Nausea    Fever    Emesis        274}    History of Present Illness  The patient is a 74-year-old female who presents for evaluation of vomiting, cough, and congestion.    She has been experiencing episodes of vomiting since this morning, with the first two instances involving the expulsion of phlegm, which she attributes to her nighttime swallowing habit due to significant congestion. The third episode occurred during breakfast. She reports no abdominal pain, new muscle aches, or diarrhea. She also reports no recent exposure to sick individuals. She has not been around any young children. She has vomited three times, with the first two instances involving phlegm and the third involving a small amount of egg. She has not had any recent seizures.    She has been producing thick sputum for the past three days, which she believes is triggering her vomiting. She does not have a sore throat. She has a history of coughing due to allergies, but notes an increase in severity over the past few days. Despite these symptoms, she reports feeling well overall.    Supplemental Information  She has some swelling in her legs. She is currently on Macrobid for a urinary tract infection and has a scheduled appointment  with her urologist next week.    MEDICATIONS  Current: Macrobid       Assessment/Plan  Alyssa John is a 74 y.o. female who presents to clinic with:  1. Healthcare maintenance    2. Subacute maxillary sinusitis    3. Subacute cough    4. Severe obesity (BMI 35.0-39.9) with comorbidity    5. MS (multiple sclerosis)    6. Seizure disorder    7. Vomiting, unspecified vomiting type, unspecified whether nausea present    8. Pneumonia due to infectious organism, unspecified laterality, unspecified part of lung       274}    Assessment & Plan  1. Vomiting.  She has experienced vomiting three times, initially with phlegm and later with a small amount of food. The vomiting appears to be triggered by her cough. A blood test will be conducted to check potassium levels and other relevant parameters.    2. Pneumonia   She has had a heavy cough for three days, producing thick phlegm. A chest x-ray will be ordered to evaluate her condition. A swab test will be performed to screen for influenza, COVID-19, and strep. Antibiotics will be prescribed, but if the COVID-19 test is positive, an antiviral will be given instead. Cough syrup will also be prescribed.     Vomiting secondary to cough     Seizure disorder- stable continue current medication. Avoid medications that lower seizure threshold. Recommend seizure precautions.      Severe obesity with comorbidity-htn to which the severe obesity is a contributing factor. This is consistent with the definition of severe obesity with comorbidity. The patient's severe obesity was monitored, evaluated, addressed and/or treated. Diet and exercise counseling performed.     This note was generated with the assistance of ambient listening technology. Verbal consent was obtained by the patient and accompanying visitor(s) for the recording of patient appointment to facilitate this note. I attest to having reviewed and edited the generated note for accuracy, though some syntax or spelling  errors may persist. Please contact the author of this note for any clarification.      1. Healthcare maintenance    2. Subacute maxillary sinusitis  - CBC Without Differential; Future  - Comprehensive Metabolic Panel; Future    3. Subacute cough  - X-Ray Chest PA And Lateral; Future  - Influenza A & B by Molecular; Future  - POCT COVID-19 Rapid Screening; Future  - POCT RSV by Molecular; Future  - CBC Without Differential; Future  - Comprehensive Metabolic Panel; Future  - promethazine-dextromethorphan (PROMETHAZINE-DM) 6.25-15 mg/5 mL Syrp; Take 5 mLs by mouth every 6 (six) hours as needed (cough).  Dispense: 118 mL; Refill: 1    4. Severe obesity (BMI 35.0-39.9) with comorbidity  - CBC Without Differential; Future  - Comprehensive Metabolic Panel; Future    5. MS (multiple sclerosis)    6. Seizure disorder    7. Vomiting, unspecified vomiting type, unspecified whether nausea present    8. Pneumonia due to infectious organism, unspecified laterality, unspecified part of lung  - doxycycline (VIBRA-TABS) 100 MG tablet; Take 1 tablet (100 mg total) by mouth 2 (two) times daily. for 7 days  Dispense: 14 tablet; Refill: 0  - cefdinir (OMNICEF) 300 MG capsule; Take 1 capsule (300 mg total) by mouth 2 (two) times daily. for 7 days  Dispense: 14 capsule; Refill: 0      Piyush Sharif MD   274}    If you are due for any health screening(s) below please notify me so we can arrange them to be ordered and scheduled. Most healthy patients at your age complete them, but you are free to accept or refuse.     If you can't do it, I'll definitely understand. If you can, I'd certainly appreciate it!   All of your core healthy metrics are met.

## 2025-01-16 ENCOUNTER — NURSE TRIAGE (OUTPATIENT)
Dept: ADMINISTRATIVE | Facility: CLINIC | Age: 75
End: 2025-01-16
Payer: MEDICARE

## 2025-01-16 ENCOUNTER — TELEPHONE (OUTPATIENT)
Dept: FAMILY MEDICINE | Facility: CLINIC | Age: 75
End: 2025-01-16
Payer: MEDICARE

## 2025-01-16 ENCOUNTER — HOSPITAL ENCOUNTER (INPATIENT)
Facility: HOSPITAL | Age: 75
LOS: 7 days | Discharge: NURSING, REHAB OR LONG-TERM CARE | DRG: 689 | End: 2025-01-24
Attending: EMERGENCY MEDICINE | Admitting: HOSPITALIST
Payer: MEDICARE

## 2025-01-16 ENCOUNTER — PATIENT MESSAGE (OUTPATIENT)
Dept: FAMILY MEDICINE | Facility: CLINIC | Age: 75
End: 2025-01-16
Payer: MEDICARE

## 2025-01-16 ENCOUNTER — OCHSNER VIRTUAL EMERGENCY DEPARTMENT (OUTPATIENT)
Facility: CLINIC | Age: 75
End: 2025-01-16
Payer: MEDICARE

## 2025-01-16 DIAGNOSIS — R07.9 CHEST PAIN: ICD-10-CM

## 2025-01-16 DIAGNOSIS — J18.9 PNEUMONIA OF BOTH LUNGS DUE TO INFECTIOUS ORGANISM, UNSPECIFIED PART OF LUNG: ICD-10-CM

## 2025-01-16 DIAGNOSIS — N39.0 UTI (URINARY TRACT INFECTION): ICD-10-CM

## 2025-01-16 DIAGNOSIS — I49.9 ABNORMAL HEART RHYTHM: ICD-10-CM

## 2025-01-16 DIAGNOSIS — E87.6 HYPOKALEMIA: Primary | ICD-10-CM

## 2025-01-16 DIAGNOSIS — J11.1 INFLUENZA: Primary | ICD-10-CM

## 2025-01-16 DIAGNOSIS — R78.81 BACTEREMIA: ICD-10-CM

## 2025-01-16 DIAGNOSIS — J10.1 INFLUENZA A: ICD-10-CM

## 2025-01-16 DIAGNOSIS — Z74.09 IMPAIRED FUNCTIONAL MOBILITY, BALANCE, AND ENDURANCE: ICD-10-CM

## 2025-01-16 DIAGNOSIS — R06.02 SHORTNESS OF BREATH: Primary | ICD-10-CM

## 2025-01-16 DIAGNOSIS — R53.1 GENERALIZED WEAKNESS: ICD-10-CM

## 2025-01-16 LAB
ALBUMIN SERPL BCP-MCNC: 3.8 G/DL (ref 3.5–5.2)
ALP SERPL-CCNC: 123 U/L (ref 55–135)
ALT SERPL W/O P-5'-P-CCNC: 16 U/L (ref 10–44)
ANION GAP SERPL CALC-SCNC: 8 MMOL/L (ref 8–16)
AST SERPL-CCNC: 22 U/L (ref 10–40)
BACTERIA #/AREA URNS HPF: ABNORMAL /HPF
BASOPHILS # BLD AUTO: 0.04 K/UL (ref 0–0.2)
BASOPHILS NFR BLD: 0.6 % (ref 0–1.9)
BILIRUB SERPL-MCNC: 0.6 MG/DL (ref 0.1–1)
BILIRUB UR QL STRIP: NEGATIVE
BUN SERPL-MCNC: 16 MG/DL (ref 8–23)
CALCIUM SERPL-MCNC: 8 MG/DL (ref 8.7–10.5)
CHLORIDE SERPL-SCNC: 108 MMOL/L (ref 95–110)
CLARITY UR: CLEAR
CO2 SERPL-SCNC: 21 MMOL/L (ref 23–29)
COLOR UR: YELLOW
CREAT SERPL-MCNC: 0.9 MG/DL (ref 0.5–1.4)
DIFFERENTIAL METHOD BLD: ABNORMAL
EOSINOPHIL # BLD AUTO: 0 K/UL (ref 0–0.5)
EOSINOPHIL NFR BLD: 0.1 % (ref 0–8)
ERYTHROCYTE [DISTWIDTH] IN BLOOD BY AUTOMATED COUNT: 15.2 % (ref 11.5–14.5)
EST. GFR  (NO RACE VARIABLE): >60 ML/MIN/1.73 M^2
GLUCOSE SERPL-MCNC: 127 MG/DL (ref 70–110)
GLUCOSE UR QL STRIP: NEGATIVE
HCT VFR BLD AUTO: 39.4 % (ref 37–48.5)
HGB BLD-MCNC: 12.4 G/DL (ref 12–16)
HGB UR QL STRIP: ABNORMAL
HYALINE CASTS #/AREA URNS LPF: 0 /LPF
IMM GRANULOCYTES # BLD AUTO: 0.02 K/UL (ref 0–0.04)
IMM GRANULOCYTES NFR BLD AUTO: 0.3 % (ref 0–0.5)
KETONES UR QL STRIP: NEGATIVE
LACTATE SERPL-SCNC: 1.1 MMOL/L (ref 0.5–1.9)
LDH SERPL L TO P-CCNC: 1.56 MMOL/L (ref 0.5–2.2)
LEUKOCYTE ESTERASE UR QL STRIP: ABNORMAL
LIPASE SERPL-CCNC: 17 U/L (ref 4–60)
LYMPHOCYTES # BLD AUTO: 1 K/UL (ref 1–4.8)
LYMPHOCYTES NFR BLD: 13.9 % (ref 18–48)
MAGNESIUM SERPL-MCNC: 1.6 MG/DL (ref 1.6–2.6)
MCH RBC QN AUTO: 27.2 PG (ref 27–31)
MCHC RBC AUTO-ENTMCNC: 31.5 G/DL (ref 32–36)
MCV RBC AUTO: 86 FL (ref 82–98)
MICROSCOPIC COMMENT: ABNORMAL
MONOCYTES # BLD AUTO: 0.5 K/UL (ref 0.3–1)
MONOCYTES NFR BLD: 7.5 % (ref 4–15)
NEUTROPHILS # BLD AUTO: 5.3 K/UL (ref 1.8–7.7)
NEUTROPHILS NFR BLD: 77.6 % (ref 38–73)
NITRITE UR QL STRIP: NEGATIVE
NRBC BLD-RTO: 0 /100 WBC
PH UR STRIP: 6 [PH] (ref 5–8)
PLATELET # BLD AUTO: 155 K/UL (ref 150–450)
PMV BLD AUTO: 11.6 FL (ref 9.2–12.9)
POTASSIUM SERPL-SCNC: 3.2 MMOL/L (ref 3.5–5.1)
PROT SERPL-MCNC: 6.7 G/DL (ref 6–8.4)
PROT UR QL STRIP: ABNORMAL
RBC # BLD AUTO: 4.56 M/UL (ref 4–5.4)
RBC #/AREA URNS HPF: 6 /HPF (ref 0–4)
SAMPLE: NORMAL
SODIUM SERPL-SCNC: 137 MMOL/L (ref 136–145)
SP GR UR STRIP: 1.02 (ref 1–1.03)
SQUAMOUS #/AREA URNS HPF: 2 /HPF
TROPONIN I SERPL HS-MCNC: 29.6 PG/ML (ref 0–14.9)
TROPONIN I SERPL HS-MCNC: 31 PG/ML (ref 0–14.9)
URN SPEC COLLECT METH UR: ABNORMAL
UROBILINOGEN UR STRIP-ACNC: NEGATIVE EU/DL
WBC # BLD AUTO: 6.89 K/UL (ref 3.9–12.7)
WBC #/AREA URNS HPF: 96 /HPF (ref 0–5)
YEAST URNS QL MICRO: ABNORMAL

## 2025-01-16 PROCEDURE — 93010 ELECTROCARDIOGRAM REPORT: CPT | Mod: ,,, | Performed by: GENERAL PRACTICE

## 2025-01-16 PROCEDURE — 85025 COMPLETE CBC W/AUTO DIFF WBC: CPT | Performed by: EMERGENCY MEDICINE

## 2025-01-16 PROCEDURE — 87154 CUL TYP ID BLD PTHGN 6+ TRGT: CPT | Performed by: EMERGENCY MEDICINE

## 2025-01-16 PROCEDURE — 99285 EMERGENCY DEPT VISIT HI MDM: CPT | Mod: 25

## 2025-01-16 PROCEDURE — G0378 HOSPITAL OBSERVATION PER HR: HCPCS

## 2025-01-16 PROCEDURE — 63600175 PHARM REV CODE 636 W HCPCS: Performed by: EMERGENCY MEDICINE

## 2025-01-16 PROCEDURE — 25000003 PHARM REV CODE 250: Performed by: NURSE PRACTITIONER

## 2025-01-16 PROCEDURE — 81001 URINALYSIS AUTO W/SCOPE: CPT | Performed by: EMERGENCY MEDICINE

## 2025-01-16 PROCEDURE — 84484 ASSAY OF TROPONIN QUANT: CPT | Performed by: EMERGENCY MEDICINE

## 2025-01-16 PROCEDURE — 87086 URINE CULTURE/COLONY COUNT: CPT | Performed by: EMERGENCY MEDICINE

## 2025-01-16 PROCEDURE — 83690 ASSAY OF LIPASE: CPT | Performed by: EMERGENCY MEDICINE

## 2025-01-16 PROCEDURE — 83605 ASSAY OF LACTIC ACID: CPT | Performed by: EMERGENCY MEDICINE

## 2025-01-16 PROCEDURE — 87040 BLOOD CULTURE FOR BACTERIA: CPT | Mod: 59 | Performed by: EMERGENCY MEDICINE

## 2025-01-16 PROCEDURE — 25000003 PHARM REV CODE 250: Performed by: EMERGENCY MEDICINE

## 2025-01-16 PROCEDURE — 36415 COLL VENOUS BLD VENIPUNCTURE: CPT | Performed by: EMERGENCY MEDICINE

## 2025-01-16 PROCEDURE — 84484 ASSAY OF TROPONIN QUANT: CPT | Mod: 91 | Performed by: NURSE PRACTITIONER

## 2025-01-16 PROCEDURE — 93005 ELECTROCARDIOGRAM TRACING: CPT | Performed by: GENERAL PRACTICE

## 2025-01-16 PROCEDURE — 80053 COMPREHEN METABOLIC PANEL: CPT | Performed by: EMERGENCY MEDICINE

## 2025-01-16 PROCEDURE — 83735 ASSAY OF MAGNESIUM: CPT | Performed by: EMERGENCY MEDICINE

## 2025-01-16 RX ORDER — DULOXETIN HYDROCHLORIDE 30 MG/1
30 CAPSULE, DELAYED RELEASE ORAL DAILY
Status: DISCONTINUED | OUTPATIENT
Start: 2025-01-17 | End: 2025-01-24 | Stop reason: HOSPADM

## 2025-01-16 RX ORDER — CEFTRIAXONE 1 G/1
1 INJECTION, POWDER, FOR SOLUTION INTRAMUSCULAR; INTRAVENOUS
Status: DISCONTINUED | OUTPATIENT
Start: 2025-01-17 | End: 2025-01-19

## 2025-01-16 RX ORDER — POTASSIUM CHLORIDE 750 MG/1
10 CAPSULE, EXTENDED RELEASE ORAL DAILY
Qty: 7 CAPSULE | Refills: 0 | Status: SHIPPED | OUTPATIENT
Start: 2025-01-16 | End: 2025-01-23

## 2025-01-16 RX ORDER — IPRATROPIUM BROMIDE AND ALBUTEROL SULFATE 2.5; .5 MG/3ML; MG/3ML
3 SOLUTION RESPIRATORY (INHALATION) EVERY 6 HOURS PRN
Status: DISCONTINUED | OUTPATIENT
Start: 2025-01-16 | End: 2025-01-24 | Stop reason: HOSPADM

## 2025-01-16 RX ORDER — ACETAMINOPHEN 325 MG/1
650 TABLET ORAL EVERY 4 HOURS PRN
Status: DISCONTINUED | OUTPATIENT
Start: 2025-01-16 | End: 2025-01-24 | Stop reason: HOSPADM

## 2025-01-16 RX ORDER — TORSEMIDE 5 MG/1
5 TABLET ORAL DAILY
Status: DISCONTINUED | OUTPATIENT
Start: 2025-01-16 | End: 2025-01-17

## 2025-01-16 RX ORDER — PROPRANOLOL HYDROCHLORIDE 20 MG/1
20 TABLET ORAL DAILY
Status: DISCONTINUED | OUTPATIENT
Start: 2025-01-16 | End: 2025-01-21

## 2025-01-16 RX ORDER — SODIUM CHLORIDE 0.9 % (FLUSH) 0.9 %
10 SYRINGE (ML) INJECTION
Status: DISCONTINUED | OUTPATIENT
Start: 2025-01-16 | End: 2025-01-24 | Stop reason: HOSPADM

## 2025-01-16 RX ORDER — BALOXAVIR MARBOXIL 80 MG/1
80 TABLET, FILM COATED ORAL ONCE
Qty: 1 TABLET | Refills: 0 | Status: ON HOLD | OUTPATIENT
Start: 2025-01-16 | End: 2025-01-22 | Stop reason: HOSPADM

## 2025-01-16 RX ORDER — LANOLIN ALCOHOL/MO/W.PET/CERES
800 CREAM (GRAM) TOPICAL
Status: DISCONTINUED | OUTPATIENT
Start: 2025-01-16 | End: 2025-01-24 | Stop reason: HOSPADM

## 2025-01-16 RX ORDER — OSELTAMIVIR PHOSPHATE 75 MG/1
75 CAPSULE ORAL
Status: COMPLETED | OUTPATIENT
Start: 2025-01-16 | End: 2025-01-16

## 2025-01-16 RX ORDER — AZITHROMYCIN 250 MG/1
250 TABLET, FILM COATED ORAL DAILY
Status: DISCONTINUED | OUTPATIENT
Start: 2025-01-16 | End: 2025-01-19

## 2025-01-16 RX ORDER — SODIUM,POTASSIUM PHOSPHATES 280-250MG
2 POWDER IN PACKET (EA) ORAL
Status: DISCONTINUED | OUTPATIENT
Start: 2025-01-16 | End: 2025-01-24 | Stop reason: HOSPADM

## 2025-01-16 RX ORDER — POTASSIUM CHLORIDE 750 MG/1
20 CAPSULE, EXTENDED RELEASE ORAL DAILY
Status: DISCONTINUED | OUTPATIENT
Start: 2025-01-16 | End: 2025-01-16

## 2025-01-16 RX ORDER — CEFTRIAXONE 2 G/1
2 INJECTION, POWDER, FOR SOLUTION INTRAMUSCULAR; INTRAVENOUS ONCE
Status: COMPLETED | OUTPATIENT
Start: 2025-01-16 | End: 2025-01-16

## 2025-01-16 RX ORDER — ATORVASTATIN CALCIUM 40 MG/1
40 TABLET, FILM COATED ORAL DAILY
Status: DISCONTINUED | OUTPATIENT
Start: 2025-01-17 | End: 2025-01-24 | Stop reason: HOSPADM

## 2025-01-16 RX ORDER — FAMOTIDINE 20 MG/1
20 TABLET, FILM COATED ORAL 2 TIMES DAILY
Status: DISCONTINUED | OUTPATIENT
Start: 2025-01-16 | End: 2025-01-24 | Stop reason: HOSPADM

## 2025-01-16 RX ORDER — TOPIRAMATE 25 MG/1
100 TABLET ORAL 2 TIMES DAILY
Status: DISCONTINUED | OUTPATIENT
Start: 2025-01-16 | End: 2025-01-24 | Stop reason: HOSPADM

## 2025-01-16 RX ORDER — CLOPIDOGREL BISULFATE 75 MG/1
75 TABLET ORAL DAILY
Status: DISCONTINUED | OUTPATIENT
Start: 2025-01-17 | End: 2025-01-24 | Stop reason: HOSPADM

## 2025-01-16 RX ORDER — CEFTRIAXONE 1 G/1
1 INJECTION, POWDER, FOR SOLUTION INTRAMUSCULAR; INTRAVENOUS ONCE
Status: DISCONTINUED | OUTPATIENT
Start: 2025-01-16 | End: 2025-01-16

## 2025-01-16 RX ORDER — ONDANSETRON HYDROCHLORIDE 2 MG/ML
4 INJECTION, SOLUTION INTRAVENOUS EVERY 6 HOURS PRN
Status: DISCONTINUED | OUTPATIENT
Start: 2025-01-16 | End: 2025-01-24 | Stop reason: HOSPADM

## 2025-01-16 RX ORDER — TALC
6 POWDER (GRAM) TOPICAL NIGHTLY PRN
Status: DISCONTINUED | OUTPATIENT
Start: 2025-01-16 | End: 2025-01-24 | Stop reason: HOSPADM

## 2025-01-16 RX ORDER — OSELTAMIVIR PHOSPHATE 75 MG/1
75 CAPSULE ORAL 2 TIMES DAILY
Status: DISCONTINUED | OUTPATIENT
Start: 2025-01-17 | End: 2025-01-17

## 2025-01-16 RX ORDER — LANOLIN ALCOHOL/MO/W.PET/CERES
1000 CREAM (GRAM) TOPICAL DAILY
Status: DISCONTINUED | OUTPATIENT
Start: 2025-01-17 | End: 2025-01-24 | Stop reason: HOSPADM

## 2025-01-16 RX ADMIN — OSELTAMAVIR PHOSPHATE 75 MG: 75 CAPSULE ORAL at 04:01

## 2025-01-16 RX ADMIN — TOPIRAMATE 100 MG: 100 TABLET, FILM COATED ORAL at 07:01

## 2025-01-16 RX ADMIN — TORSEMIDE 5 MG: 5 TABLET ORAL at 07:01

## 2025-01-16 RX ADMIN — AZITHROMYCIN MONOHYDRATE 500 MG: 500 INJECTION, POWDER, LYOPHILIZED, FOR SOLUTION INTRAVENOUS at 04:01

## 2025-01-16 RX ADMIN — POTASSIUM BICARBONATE 25 MEQ: 978 TABLET, EFFERVESCENT ORAL at 04:01

## 2025-01-16 RX ADMIN — FAMOTIDINE 20 MG: 20 TABLET ORAL at 09:01

## 2025-01-16 RX ADMIN — PROPRANOLOL HYDROCHLORIDE 20 MG: 20 TABLET ORAL at 07:01

## 2025-01-16 RX ADMIN — CEFTRIAXONE 2 G: 2 INJECTION, POWDER, FOR SOLUTION INTRAMUSCULAR; INTRAVENOUS at 04:01

## 2025-01-16 NOTE — HPI
74 year old female with a past medical history of GERD, Epilepsy, hypertension, MI, MS, CAD was recently tested positive for influenza A.  She reports to the emergency room with reports of weakness and diarrhea.  Reports of decreased mobility and activity of daily living for the last 7-8 months. Patient needs to be assisted out of wheelchair by significant other. She was diagnosed with urinary tract infection about 17 days ago. She recently treated with omnicef and cipro.inished course of antibiotics. She then developed cough and congestion. She was diagnosed with influenza yesterday. Now  is unable to assist patient and transfers. She reports increased generalized weakness especially to both legs. No trouble with vision or speech. There was some chills with no definite fever. Urinary symptoms resolved.     In the ER, potassium 3.2, glucose 127, calcium 8, troponin I high sensitivity 29.6, influenza A positive UA with 1+ protein, 1+ blood, 2+ leukocytes 96 WBCs urine culture sent, COVID negative lactic 1.56 chest x-ray Mild bilateral perihilar interstitial thickening     Admit to hospital medicine for UTI after failing outpatient therapy.

## 2025-01-16 NOTE — TELEPHONE ENCOUNTER
"Pt called in stating that she is trying to speak to provider. Pt  got on phone to speak for pt.  states that tested positive for Influenza A. Pt  states she has MS, and having diarrhea and weakness. Pt thinks she needs to be ea but does not want to ED. Wants someone to write direct admission orders. Advised there is a long wait in many ERs now and she may not immediately get a room. States needs help caring for pt. States pt is very weak and unsteady. Temp 98.4. Pt needs assistance walking.  states pt seems "out of it" but is oriented. States she is difficult to awaken and can not stay awake. Care advice per protocol. Advised  to call 911.  states he understands and will call 911 now. Advised to monitor and to call back with concerns or worsening symptoms. Verbalized understanding.     Reason for Disposition   Difficult to awaken or acting confused (e.g., disoriented, slurred speech)    Additional Information   Negative: SEVERE difficulty breathing (e.g., struggling for each breath, speaks in single words)   Negative: Shock suspected (e.g., cold/pale/clammy skin, too weak to stand, low BP, rapid pulse)    Protocols used: Weakness (Generalized) and Fatigue-A-OH    "

## 2025-01-16 NOTE — ED PROVIDER NOTES
Encounter Date: 1/16/2025       History     Chief Complaint   Patient presents with    Weakness     Hx MS. Dx flu-A yesterday.     Diarrhea    BLE edema     Patient with a history of multiple medical problems including multiple sclerosis.  Patient with decreased ability with mobility and activity of daily living for the last 7-8 months.  Patient needs to be assisted out of wheelchair by significant other.  She was diagnosed with urinary tract infection about 17 days ago.  She recently finished course of antibiotics.  She then developed cough and congestion.  She was diagnosed with influenza yesterday.  Now  is unable to assist patient and transfers.  She reports increased generalized weakness especially to both legs.  No trouble with vision or speech.  There was some chills with no definite fever.  Urinary symptoms resolved.      Review of patient's allergies indicates:   Allergen Reactions    Codeine      Other reaction(s): throat tightness    Dilantin [phenytoin sodium extended]     Phenobarbital     Tegretol [carbamazepine]      Past Medical History:   Diagnosis Date    Arthritis     Coronary artery disease     Encounter for blood transfusion     Epilepsy     GERD (gastroesophageal reflux disease)     Headache     Hypertension     MI, old     MS (multiple sclerosis)     Seizures     epilepsy     Past Surgical History:   Procedure Laterality Date    APPENDECTOMY      BACK SURGERY      L5 discectomy    BREAST BIOPSY Right 15 yrs ago    benign    CATARACT EXTRACTION Bilateral     COLONOSCOPY N/A 09/16/2015    Procedure: COLONOSCOPY;  Surgeon: Henry Malcolm MD;  Location: [facility];  Service: Endoscopy;  Laterality: N/A;    CORONARY STENT PLACEMENT      IMPLANTATION OF PERMANENT SACRAL NERVE STIMULATOR N/A 10/11/2024    Procedure: INSERTION, NEUROSTIMULATOR, PERMANENT, SACRAL;  Surgeon: Abel Rosales MD;  Location: [facility] OR;  Service: Urology;  Laterality: N/A;  Mack RUIZ    INSERTION,  NEUROSTIMULATOR, TEMPORARY, SACRAL N/A 9/27/2024    Procedure: INSERTION, NEUROSTIMULATOR, TEMPORARY, SACRAL;  Surgeon: Abel Rosales MD;  Location: [facility] OR;  Service: Urology;  Laterality: N/A;  1 hour 45 minutes Mack RUIZ    right knee arthroscopy       Family History   Problem Relation Name Age of Onset    Scleroderma Mother      Heart disease Father          MI, CAD    Cancer Neg Hx      Diabetes Neg Hx      Stroke Neg Hx      Melanoma Neg Hx      Psoriasis Neg Hx      Lupus Neg Hx      Eczema Neg Hx       Social History     Tobacco Use    Smoking status: Former     Current packs/day: 0.00     Average packs/day: 2.0 packs/day for 42.0 years (84.0 ttl pk-yrs)     Types: Cigarettes     Start date: 1968     Quit date: 2010     Years since quitting: 15.0     Passive exposure: Past    Smokeless tobacco: Never   Substance Use Topics    Alcohol use: Not Currently     Comment: rarely    Drug use: No     Review of Systems   Constitutional:  Positive for chills.   HENT:  Positive for congestion.    Eyes:  Negative for visual disturbance.   Respiratory:  Positive for cough. Negative for shortness of breath.    Cardiovascular:  Negative for chest pain and palpitations.   Gastrointestinal:  Negative for abdominal pain and vomiting.   Genitourinary:  Negative for dysuria.   Musculoskeletal:  Negative for joint swelling.   Neurological:  Positive for weakness. Negative for headaches.   Psychiatric/Behavioral:  Negative for confusion.        Physical Exam     Initial Vitals [01/16/25 1424]   BP Pulse Resp Temp SpO2   137/83 75 20 98.5 °F (36.9 °C) 97 %      MAP       --         Physical Exam    Nursing note and vitals reviewed.  Constitutional: She is not diaphoretic. No distress.   HENT:   Head: Normocephalic and atraumatic.   Eyes: Conjunctivae are normal.   Neck:   Normal range of motion.  Cardiovascular:  Normal rate.           Pulmonary/Chest: Breath sounds normal.   Abdominal: Abdomen is soft. There is no  abdominal tenderness.   Musculoskeletal:      Cervical back: Normal range of motion.      Comments: 2+ bilateral pretibial edema with chronic hyper pigmentation. all extremities are neurovascularly intact.     Neurological: She is alert and oriented to person, place, and time. No sensory deficit.   Somewhat weak to upper extremities with at least 4/5 strength to both arms.  Strength is symmetric to all extremities.  She has 3/5 to 4/5 strength to both legs.  Patient is able to lift against gravity barely to both legs.   Skin: No rash noted.   Psychiatric:   Weak voice.  Poor historian.   feels in most of details for patient         ED Course   Procedures  Labs Reviewed   URINALYSIS, REFLEX TO URINE CULTURE - Abnormal       Result Value    Specimen UA Urine, Clean Catch      Color, UA Yellow      Appearance, UA Clear      pH, UA 6.0      Specific Gravity, UA 1.025      Protein, UA 1+ (*)     Glucose, UA Negative      Ketones, UA Negative      Bilirubin (UA) Negative      Occult Blood UA 1+ (*)     Nitrite, UA Negative      Urobilinogen, UA Negative      Leukocytes, UA 2+ (*)     Narrative:     Specimen Source->Urine   TROPONIN I HIGH SENSITIVITY - Abnormal    Troponin I High Sensitivity 29.6 (*)    COMPREHENSIVE METABOLIC PANEL - Abnormal    Sodium 137      Potassium 3.2 (*)     Chloride 108      CO2 21 (*)     Glucose 127 (*)     BUN 16      Creatinine 0.9      Calcium 8.0 (*)     Total Protein 6.7      Albumin 3.8      Total Bilirubin 0.6      Alkaline Phosphatase 123      AST 22      ALT 16      eGFR >60.0      Anion Gap 8     CBC W/ AUTO DIFFERENTIAL - Abnormal    WBC 6.89      RBC 4.56      Hemoglobin 12.4      Hematocrit 39.4      MCV 86      MCH 27.2      MCHC 31.5 (*)     RDW 15.2 (*)     Platelets 155      MPV 11.6      Immature Granulocytes 0.3      Gran # (ANC) 5.3      Immature Grans (Abs) 0.02      Lymph # 1.0      Mono # 0.5      Eos # 0.0      Baso # 0.04      nRBC 0      Gran % 77.6 (*)      Lymph % 13.9 (*)     Mono % 7.5      Eosinophil % 0.1      Basophil % 0.6      Differential Method Automated     URINALYSIS MICROSCOPIC - Abnormal    RBC, UA 6 (*)     WBC, UA 96 (*)     Bacteria Occasional      Yeast, UA Few (*)     Squam Epithel, UA 2      Hyaline Casts, UA 0      Microscopic Comment SEE COMMENT      Narrative:     Specimen Source->Urine   CULTURE, BLOOD    Narrative:     Collection has been rescheduled by [email or web address] at 01/16/2025 15:06 Reason:   Patient unavailable ekg  Collection has been rescheduled by [email or web address] at 01/16/2025 15:25 Reason:   Done  Collection has been rescheduled by [email or web address] at 01/16/2025 15:06 Reason:   Patient unavailable ekg  Collection has been rescheduled by [email or web address] at 01/16/2025 15:25 Reason:   Done   CULTURE, BLOOD    Narrative:     Collection has been rescheduled by [email or web address] at 01/16/2025 15:06 Reason:   Patient unavailable ekg  Collection has been rescheduled by [email or web address] at 01/16/2025 15:25 Reason:   Done  Collection has been rescheduled by [email or web address] at 01/16/2025 15:06 Reason:   Patient unavailable ekg  Collection has been rescheduled by [email or web address] at 01/16/2025 15:25 Reason:   Done   CLOSTRIDIUM DIFFICILE   CULTURE, URINE   MAGNESIUM    Magnesium 1.6     LIPASE    Lipase 17     GASTROINTESTINAL PATHOGENS PANEL, PCR   LACTIC ACID, PLASMA   ISTAT LACTATE    POC Lactate 1.56      Sample VENOUS     POCT LACTATE        ECG Results              EKG 12-lead (In process)        Collection Time Result Time QRS Duration OHS QTC Calculation    01/16/25 15:06:53 01/16/25 15:30:59 68 491                     In process by Interface, [facility] (01/16/25 15:31:04)                   Narrative:    Test Reason : R53.1,    Vent. Rate :  68 BPM     Atrial Rate :  68 BPM     P-R Int : 174 ms          QRS Dur :  68 ms      QT Int : 462 ms       P-R-T Axes :  45 -10  70 degrees    QTcB Int : 491 ms    Normal sinus rhythm  Possible Left atrial enlargement  LVH ( R in aVL , Romhilt-Hilario )  Inferior infarct ,age  undetermined  Abnormal ECG  No previous ECGs available    Referred By: AAAREFERRAL SELF           Confirmed By:                                   Imaging Results              X-Ray Chest AP Portable (Final result)  Result time 01/16/25 16:06:25      Final result by Gage Heath DO (01/16/25 16:06:25)                   Impression:      Mild bilateral perihilar interstitial thickening likely reflects infection/inflammation of the lower respiratory tract.      Electronically signed by: Gage Heath  Date:    01/16/2025  Time:    16:06               Narrative:    EXAMINATION:  XR CHEST AP PORTABLE    CLINICAL HISTORY:  Influenza;    FINDINGS:  Portable chest with comparison chest x-ray 01/15/2025.  Normal cardiomediastinal silhouette.There is mild bilateral perihilar interstitial thickening.  No confluent airspace opacities.  Pulmonary vasculature is normal. No acute osseous abnormality.                                       Medications   sodium chloride 0.9% flush 10 mL (has no administration in time range)   cefTRIAXone injection 1 g (has no administration in time range)   azithromycin tablet 250 mg (has no administration in time range)   acetaminophen tablet 650 mg (has no administration in time range)   ondansetron injection 4 mg (has no administration in time range)   melatonin tablet 6 mg (has no administration in time range)   oseltamivir capsule 75 mg (has no administration in time range)   albuterol-ipratropium 2.5 mg-0.5 mg/3 mL nebulizer solution 3 mL (has no administration in time range)   potassium bicarbonate disintegrating tablet 25 mEq (25 mEq Oral Given 1/16/25 1644)   oseltamivir capsule 75 mg (75 mg Oral Given 1/16/25 1644)   cefTRIAXone injection 2 g (2 g Intravenous Given 1/16/25 1645)   azithromycin (ZITHROMAX) 500 mg in 0.9% NaCl 250 mL IVPB (admixture device) (500 mg Intravenous New Bag 1/16/25 1658)     Medical Decision Making  Patient presents with worsening weakness.  Differential  diagnosis includes MS exacerbation, metabolic encephalopathy, electrolyte abnormality.  Here CBC and CMP are unremarkable except for potassium of 3 2.  Urinalysis positive leukocytes and white blood cells on catheterized urine.  Chest x-ray with bilateral lung zone faint infiltrate consistent with infectious etiology.  EKG sinus rhythm.  Here patient does have generalized weakness.  Mostly in the lower extremities.  Patient continues to have urinary tract infection.  She does have possible pneumonia in setting of positive influenza yesterday.  Broad-spectrum antibiotics initiated.  Cultures taken.  Initial lactate is unremarkable.  Patient has been having loose stools in setting of recent antibiotic use.  C diff is a consideration.  Stool studies are still pending.  Discussed with Dr. Condon on-call for  , patient's neurologist.  She suggest no pulse corticosteroids.  Treat infectious etiology.  Monitor closely.  Milka with Hospital Medicine to admit.    Amount and/or Complexity of Data Reviewed  Labs: ordered. Decision-making details documented in ED Course.  Radiology: ordered. Decision-making details documented in ED Course.  ECG/medicine tests:  Decision-making details documented in ED Course.    Risk  Prescription drug management.                                      Clinical Impression:  Final diagnoses:  [R53.1] Generalized weakness  [N39.0] UTI (urinary tract infection)  [J11.1] Influenza (Primary)  [J18.9] Pneumonia of both lungs due to infectious organism, unspecified part of lung          ED Disposition Condition    Observation                 Hamilton Patino MD  01/16/25 5816

## 2025-01-16 NOTE — TELEPHONE ENCOUNTER
----- Message from Piyush Sharif MD sent at 1/16/2025  6:47 AM CST -----  Potassium slightly low sent in potassium for a week

## 2025-01-16 NOTE — PLAN OF CARE-OVED
[facility] Emergency Department Plan of Care Note  Referral Source: Nurse On-Call                               Chief Complaint   Patient presents with    Weakness    Influenza       Recommendation: Emergency Department          Emergency Department: [place]               No diagnosis found.       No orders of the defined types were placed in this encounter.    74-year-old female with a history of MS was recently tested positive for influenza A.  Is having diarrhea as well as weakness.  Called her primary care physician who advised going to the emergency department.  Contacting DANIEL in order to find out which hospital may be least busy.  The patient is apprehensive about going the emergency department as they had to stay in the emergency department for hours last time.

## 2025-01-16 NOTE — SUBJECTIVE & OBJECTIVE
Past Medical History:   Diagnosis Date    Arthritis     Coronary artery disease     Encounter for blood transfusion     Epilepsy     GERD (gastroesophageal reflux disease)     Headache     Hypertension     MI, old     MS (multiple sclerosis)     Seizures     epilepsy       Past Surgical History:   Procedure Laterality Date    APPENDECTOMY      BACK SURGERY      L5 discectomy    BREAST BIOPSY Right 15 yrs ago    benign    CATARACT EXTRACTION Bilateral     COLONOSCOPY N/A 09/16/2015    Procedure: COLONOSCOPY;  Surgeon: Henry Malcolm MD;  Location: [facility];  Service: Endoscopy;  Laterality: N/A;    CORONARY STENT PLACEMENT      IMPLANTATION OF PERMANENT SACRAL NERVE STIMULATOR N/A 10/11/2024    Procedure: INSERTION, NEUROSTIMULATOR, PERMANENT, SACRAL;  Surgeon: Abel Rosales MD;  Location: [facility] OR;  Service: Urology;  Laterality: N/A;  Mack RUIZ    INSERTION, NEUROSTIMULATOR, TEMPORARY, SACRAL N/A 9/27/2024    Procedure: INSERTION, NEUROSTIMULATOR, TEMPORARY, SACRAL;  Surgeon: Abel Rosales MD;  Location: [facility] OR;  Service: Urology;  Laterality: N/A;  1 hour 45 minutes Mack RUIZ    right knee arthroscopy         Review of patient's allergies indicates:   Allergen Reactions    Codeine      Other reaction(s): throat tightness    Dilantin [phenytoin sodium extended]     Phenobarbital     Tegretol [carbamazepine]        No current facility-administered medications on file prior to encounter.     Current Outpatient Medications on File Prior to Encounter   Medication Sig    albuterol-ipratropium (DUO-NEB) 2.5 mg-0.5 mg/3 mL nebulizer solution Take 3 mLs by nebulization every 6 (six) hours as needed for Wheezing. Rescue    atorvastatin (LIPITOR) 40 MG tablet TAKE 1 TABLET BY MOUTH EVERY DAY    baloxavir marboxiL (XOFLUZA) 80 mg tablet Take 1 tablet (80 mg total) by mouth once. for 1 dose    cefdinir (OMNICEF) 300 MG capsule Take 1 capsule (300 mg total) by mouth 2 (two) times daily. for 7 days     cholecalciferol, vitamin D3, 10 mcg (400 unit) Cap capsule 1 tablet.    ciprofloxacin HCl (CIPRO) 500 MG tablet Take 1 tablet (500 mg total) by mouth 2 (two) times daily. (Patient not taking: Reported on 1/15/2025)    clopidogrel (PLAVIX) 75 mg tablet Take 75 mg by mouth once daily.    cyanocobalamin (VITAMIN B-12) 1000 MCG tablet Take 1 tablet (1,000 mcg total) by mouth once daily.    doxycycline (VIBRA-TABS) 100 MG tablet Take 1 tablet (100 mg total) by mouth 2 (two) times daily. for 7 days    DULoxetine (CYMBALTA) 20 MG capsule TAKE 2 CAPSULES BY MOUTH EVERY DAY    estradioL (ESTRACE) 0.01 % (0.1 mg/gram) vaginal cream Place 1 g vaginally 3 (three) times a week.    famotidine (PEPCID) 20 MG tablet Take 20 mg by mouth 2 (two) times daily as needed for Heartburn.    nitrofurantoin, macrocrystal-monohydrate, (MACROBID) 100 MG capsule Take 1 capsule (100 mg total) by mouth 2 (two) times daily. (Patient not taking: Reported on 1/15/2025)    nitroGLYCERIN (NITROSTAT) 0.4 MG SL tablet Place 0.4 mg under the tongue every 5 (five) minutes as needed for Chest pain.    potassium chloride (MICRO-K) 10 MEQ CpSR Take 1 capsule (10 mEq total) by mouth once daily. for 7 days    promethazine-dextromethorphan (PROMETHAZINE-DM) 6.25-15 mg/5 mL Syrp Take 5 mLs by mouth every 6 (six) hours as needed (cough).    propranoloL (INDERAL) 20 MG tablet Take 1 tablet (20 mg total) by mouth once daily.    topiramate (TOPAMAX) 100 MG tablet Take 1 tablet (100 mg total) by mouth 2 (two) times daily.    torsemide (DEMADEX) 5 MG Tab TAKE 1 TABLET BY MOUTH EVERY DAY     Family History       Problem Relation (Age of Onset)    Heart disease Father    Scleroderma Mother          Tobacco Use    Smoking status: Former     Current packs/day: 0.00     Average packs/day: 2.0 packs/day for 42.0 years (84.0 ttl pk-yrs)     Types: Cigarettes     Start date: 1968     Quit date: 2010     Years since quitting: 15.0     Passive exposure: Past    Smokeless  tobacco: Never   Substance and Sexual Activity    Alcohol use: Not Currently     Comment: rarely    Drug use: No    Sexual activity: Not Currently     Partners: Male     Review of Systems   Constitutional:  Positive for chills.   HENT: Negative.     Respiratory:  Positive for cough and shortness of breath.    Cardiovascular:  Positive for leg swelling. Negative for chest pain.   Gastrointestinal:  Positive for diarrhea.   Genitourinary: Negative.    Musculoskeletal: Negative.    Neurological:  Positive for weakness.     Objective:     Vital Signs (Most Recent):  Temp: 98.5 °F (36.9 °C) (01/16/25 1424)  Pulse: 85 (01/16/25 1538)  Resp: 20 (01/16/25 1424)  BP: (!) 186/76 (01/16/25 1538)  SpO2: 96 % (01/16/25 1538) Vital Signs (24h Range):  Temp:  [98.5 °F (36.9 °C)] 98.5 °F (36.9 °C)  Pulse:  [75-85] 85  Resp:  [20] 20  SpO2:  [96 %-97 %] 96 %  BP: (137-186)/(69-83) 186/76     Weight: 91.2 kg (201 lb)  Body mass index is 35.61 kg/m².     Physical Exam  Vitals reviewed.   Constitutional:       Appearance: Normal appearance.   HENT:      Head: Normocephalic and atraumatic.      Mouth/Throat:      Mouth: Mucous membranes are moist.   Eyes:      Pupils: Pupils are equal, round, and reactive to light.   Cardiovascular:      Rate and Rhythm: Normal rate.      Pulses: Normal pulses.      Heart sounds: No murmur heard.  Pulmonary:      Effort: Pulmonary effort is normal. No respiratory distress.      Breath sounds: Normal breath sounds. No wheezing.   Abdominal:      General: Bowel sounds are normal.      Palpations: Abdomen is soft.   Musculoskeletal:      Cervical back: Normal range of motion and neck supple.      Comments: 2+ bilateral lower extremity edema with chronic hyper pigmentation. all extremities are neurovascularly intact.      Skin:     General: Skin is warm and dry.      Capillary Refill: Capillary refill takes 2 to 3 seconds.   Neurological:      Mental Status: She is alert.      Motor: Weakness present.       Comments: weak to upper extremities with at least 4/5 strength to both arms.  Strength is symmetric to all extremities.  She has 3/5 to 4/5 strength to both legs.  Patient is able to lift against gravity barely to both legs.   Psychiatric:         Mood and Affect: Mood normal.              CRANIAL NERVES     CN III, IV, VI   Pupils are equal, round, and reactive to light.       Significant Labs: All pertinent labs within the past 24 hours have been reviewed.  Recent Lab Results         01/16/25  1533   01/16/25  1517   01/16/25  1513        Albumin     3.8       ALP     123       ALT     16       Anion Gap     8       Appearance, UA Clear           AST     22       Bacteria, UA Occasional           Baso #     0.04       Basophil %     0.6       Bilirubin (UA) Negative           BILIRUBIN TOTAL     0.6  Comment: For infants and newborns, interpretation of results should be based  on gestational age, weight and in agreement with clinical  observations.    Premature Infant recommended reference ranges:  Up to 24 hours.............<8.0 mg/dL  Up to 48 hours............<12.0 mg/dL  3-5 days..................<15.0 mg/dL  6-29 days.................<15.0 mg/dL         BUN     16       Calcium     8.0       Chloride     108       CO2     21       Color, UA Yellow           Creatinine     0.9       Differential Method     Automated       eGFR     >60.0       Eos #     0.0       Eos %     0.1       Glucose     127       Glucose, UA Negative           Gran # (ANC)     5.3       Gran %     77.6       Hematocrit     39.4       Hemoglobin     12.4       Hyaline Casts, UA 0           Immature Grans (Abs)     0.02  Comment: Mild elevation in immature granulocytes is non specific and   can be seen in a variety of conditions including stress response,   acute inflammation, trauma and pregnancy. Correlation with other   laboratory and clinical findings is essential.         Immature Granulocytes     0.3       Ketones, UA Negative            Leukocyte Esterase, UA 2+           Lipase     17       Lymph #     1.0       Lymph %     13.9       Magnesium      1.6       MCH     27.2       MCHC     31.5       MCV     86       Microscopic Comment SEE COMMENT  Comment: Other formed elements not mentioned in the report are not   present in the microscopic examination.              Mono #     0.5       Mono %     7.5       MPV     11.6       NITRITE UA Negative           nRBC     0       Blood, UA 1+           pH, UA 6.0           Platelet Count     155       POC Lactate   1.56         Potassium     3.2       PROTEIN TOTAL     6.7       Protein, UA 1+  Comment: Recommend a 24 hour urine protein or a urine   protein/creatinine ratio if globulin induced proteinuria is  clinically suspected.             RBC     4.56       RBC, UA 6           RDW     15.2       Sample   VENOUS         Sodium     137       Spec Grav UA 1.025           Specimen UA Urine, Clean Catch           Squam Epithel, UA 2           Troponin I High Sensitivity     29.6  Comment: Troponin results differ between methods. Do not use   results between Troponin methods interchangeably.    Alkaline Phospatase levels above 400 U/L may   cause false positive results.    Access hsTnI should not be used for patients taking   Asfotase [place] (Strensiq).         UROBILINOGEN UA Negative           WBC, UA 96           WBC     6.89       Yeast, UA Few                   Significant Imaging: I have reviewed all pertinent imaging results/findings within the past 24 hours.  Imaging Results              X-Ray Chest AP Portable (Final result)  Result time 01/16/25 16:06:25      Final result by Gage Heath DO (01/16/25 16:06:25)                   Impression:      Mild bilateral perihilar interstitial thickening likely reflects infection/inflammation of the lower respiratory tract.      Electronically signed by: Gage Heath  Date:    01/16/2025  Time:    16:06               Narrative:    EXAMINATION:  XR  CHEST AP PORTABLE    CLINICAL HISTORY:  Influenza;    FINDINGS:  Portable chest with comparison chest x-ray 01/15/2025.  Normal cardiomediastinal silhouette.There is mild bilateral perihilar interstitial thickening.  No confluent airspace opacities.  Pulmonary vasculature is normal. No acute osseous abnormality.

## 2025-01-16 NOTE — TELEPHONE ENCOUNTER
Spoke to pt  who states he does not want to go to ED. Pt states he waited 37.5 hours last time prior to being seen. Pt was seen yesterday by Dr. Sharif and he states she is very week, dehydrated, out of it, very floppy like and unable to stand on her own. Pt is having diarrhea about 3 times already just this am. Pt is still vomiting this am as well. Pt is vomiting mucous he feels she collects from sleeping on her side at night. Pt does not have a special bed. Pt does not have any HH assistance currently. Family states they are having trouble caring for her at this time and  is requesting a direct admit. Stating she was diagnosed with FLU A. Pt does not have a fever. Pulse 74 and oxygen was 97% on room air. When completing labs yesterday they had trouble and stuck pt at least 4 times.   stated he gave pt lomotil about an hour ago. Pt states they just got notified that her antiviral meds are ready. Pt is drinking water and does not complain of nausea at all. She is just vomiting mucous. Pt has been eating without difficulty and has an appetite.  requesting a direct admit to avoid pt in the ED due to symptoms and current state. States they have been cleaning pt and home since 8am.  was advised again to call 911 if having trouble and needing help getting pt to ED. Pt declined and is wanting to have Dr. Sharif's thoughts. Please advise

## 2025-01-16 NOTE — TELEPHONE ENCOUNTER
----- Message from Anisa sent at 1/16/2025 10:21 AM CST -----  Contact:  lei  Type: Needs Medical Advice  Who Called:  son Lei  Symptoms (please be specific):  since she has multiple sorosis    How long has patient had these symptoms:  she is having a lot of diarrhea and weighing as much as she does and being so floppy he can't hold her up even with the gate belt. She is also throwing up needs help getting her in direct admit.  He even had to throw, just slipped off chair in shower and he had to run get her up she is ok  but needs call back now  Best Call Back Number: [phone]  Additional Information:Yellow code to rec call back from nurse or provider within 15 mins and since no response from nurse in clinic reached out to nurse on call and  thanks

## 2025-01-17 PROBLEM — B95.7 STAPHYLOCOCCUS EPIDERMIDIS BACTEREMIA: Status: ACTIVE | Noted: 2025-01-17

## 2025-01-17 PROBLEM — R78.81 STAPHYLOCOCCUS EPIDERMIDIS BACTEREMIA: Status: ACTIVE | Noted: 2025-01-17

## 2025-01-17 PROBLEM — E87.70 FLUID OVERLOAD: Status: ACTIVE | Noted: 2024-10-28

## 2025-01-17 LAB
ACINETOBACTER CALCOACETICUS/BAUMANNII COMPLEX: NOT DETECTED
ALBUMIN SERPL BCP-MCNC: 3.4 G/DL (ref 3.5–5.2)
ALP SERPL-CCNC: 107 U/L (ref 55–135)
ALT SERPL W/O P-5'-P-CCNC: 15 U/L (ref 10–44)
ANION GAP SERPL CALC-SCNC: 10 MMOL/L (ref 8–16)
AORTIC ROOT ANNULUS: 2.92 CM
AORTIC VALVE CUSP SEPERATION: 2.08 CM
APICAL FOUR CHAMBER EJECTION FRACTION: 54 %
APICAL TWO CHAMBER EJECTION FRACTION: 55 %
ASCENDING AORTA: 3.13 CM
AST SERPL-CCNC: 21 U/L (ref 10–40)
AV INDEX (PROSTH): 0.96
AV MEAN GRADIENT: 4 MMHG
AV PEAK GRADIENT: 6 MMHG
AV VALVE AREA BY VELOCITY RATIO: 3.2 CM²
AV VALVE AREA: 3.3 CM²
AV VELOCITY RATIO: 0.92
BACTEROIDES FRAGILIS: NOT DETECTED
BASOPHILS # BLD AUTO: 0.02 K/UL (ref 0–0.2)
BASOPHILS NFR BLD: 0.4 % (ref 0–1.9)
BILIRUB SERPL-MCNC: 0.4 MG/DL (ref 0.1–1)
BSA FOR ECHO PROCEDURE: 2.04 M2
BUN SERPL-MCNC: 16 MG/DL (ref 8–23)
CALCIUM SERPL-MCNC: 7.9 MG/DL (ref 8.7–10.5)
CANDIDA ALBICANS: NOT DETECTED
CANDIDA AURIS: NOT DETECTED
CANDIDA GLABRATA: NOT DETECTED
CANDIDA KRUSEI: NOT DETECTED
CANDIDA PARAPSILOSIS: NOT DETECTED
CANDIDA TROPICALIS: NOT DETECTED
CHLORIDE SERPL-SCNC: 108 MMOL/L (ref 95–110)
CO2 SERPL-SCNC: 20 MMOL/L (ref 23–29)
CREAT SERPL-MCNC: 0.9 MG/DL (ref 0.5–1.4)
CRYPTOCOCCUS NEOFORMANS/GATTII: NOT DETECTED
CTX-M GENE (ESBL PRODUCER): ABNORMAL
CV ECHO LV RWT: 0.48 CM
DIFFERENTIAL METHOD BLD: ABNORMAL
DOP CALC AO PEAK VEL: 1.2 M/S
DOP CALC AO VTI: 27.1 CM
DOP CALC LVOT AREA: 3.5 CM2
DOP CALC LVOT DIAMETER: 2.1 CM
DOP CALC LVOT PEAK VEL: 1.1 M/S
DOP CALC LVOT STROKE VOLUME: 90.4 CM3
DOP CALC MV VTI: 23.1 CM
DOP CALCLVOT PEAK VEL VTI: 26.1 CM
E WAVE DECELERATION TIME: 198 MSEC
E/A RATIO: 1.16
E/E' RATIO: 10 M/S
ECHO LV POSTERIOR WALL: 1.1 CM (ref 0.6–1.1)
ENTEROBACTER CLOACAE COMPLEX: NOT DETECTED
ENTEROBACTERALES: NOT DETECTED
ENTEROCOCCUS FAECALIS: NOT DETECTED
ENTEROCOCCUS FAECIUM: NOT DETECTED
EOSINOPHIL # BLD AUTO: 0 K/UL (ref 0–0.5)
EOSINOPHIL NFR BLD: 0.4 % (ref 0–8)
ERYTHROCYTE [DISTWIDTH] IN BLOOD BY AUTOMATED COUNT: 15.2 % (ref 11.5–14.5)
ESCHERICHIA COLI: NOT DETECTED
EST. GFR  (NO RACE VARIABLE): >60 ML/MIN/1.73 M^2
FRACTIONAL SHORTENING: 28.3 % (ref 28–44)
GLUCOSE SERPL-MCNC: 101 MG/DL (ref 70–110)
HAEMOPHILUS INFLUENZAE: NOT DETECTED
HCT VFR BLD AUTO: 33.5 % (ref 37–48.5)
HGB BLD-MCNC: 10.9 G/DL (ref 12–16)
IMM GRANULOCYTES # BLD AUTO: 0.02 K/UL (ref 0–0.04)
IMM GRANULOCYTES NFR BLD AUTO: 0.4 % (ref 0–0.5)
IMP GENE (CARBAPENEM RESISTANT): ABNORMAL
INTERVENTRICULAR SEPTUM: 1 CM (ref 0.6–1.1)
IVRT: 107 MSEC
KLEBSIELLA AEROGENES: NOT DETECTED
KLEBSIELLA OXYTOCA: NOT DETECTED
KLEBSIELLA PNEUMONIAE GROUP: NOT DETECTED
KPC RESISTANCE GENE (CARBAPENEM): ABNORMAL
LA MAJOR: 5.4 CM
LACTATE SERPL-SCNC: 0.7 MMOL/L (ref 0.5–1.9)
LEFT ATRIUM AREA SYSTOLIC (APICAL 2 CHAMBER): 17.85 CM2
LEFT ATRIUM AREA SYSTOLIC (APICAL 4 CHAMBER): 16.71 CM2
LEFT ATRIUM VOLUME INDEX MOD: 23 ML/M2
LEFT ATRIUM VOLUME MOD: 45 ML
LEFT INTERNAL DIMENSION IN SYSTOLE: 3.3 CM (ref 2.1–4)
LEFT VENTRICLE DIASTOLIC VOLUME INDEX: 49.47 ML/M2
LEFT VENTRICLE DIASTOLIC VOLUME: 96.97 ML
LEFT VENTRICLE END DIASTOLIC VOLUME APICAL 2 CHAMBER: 179.24 ML
LEFT VENTRICLE END DIASTOLIC VOLUME APICAL 4 CHAMBER: 136.8 ML
LEFT VENTRICLE END SYSTOLIC VOLUME APICAL 2 CHAMBER: 44.62 ML
LEFT VENTRICLE END SYSTOLIC VOLUME APICAL 4 CHAMBER: 42.24 ML
LEFT VENTRICLE MASS INDEX: 86.7 G/M2
LEFT VENTRICLE SYSTOLIC VOLUME INDEX: 22.9 ML/M2
LEFT VENTRICLE SYSTOLIC VOLUME: 44.82 ML
LEFT VENTRICULAR INTERNAL DIMENSION IN DIASTOLE: 4.6 CM (ref 3.5–6)
LEFT VENTRICULAR MASS: 169.9 G
LISTERIA MONOCYTOGENES: NOT DETECTED
LV LATERAL E/E' RATIO: 8.8 M/S
LV SEPTAL E/E' RATIO: 12.6 M/S
LVED V (TEICH): 96.97 ML
LVES V (TEICH): 44.82 ML
LVOT MG: 3.16 MMHG
LVOT MV: 0.85 CM/S
LYMPHOCYTES # BLD AUTO: 1.3 K/UL (ref 1–4.8)
LYMPHOCYTES NFR BLD: 25.1 % (ref 18–48)
MAGNESIUM SERPL-MCNC: 1.6 MG/DL (ref 1.6–2.6)
MCH RBC QN AUTO: 27.7 PG (ref 27–31)
MCHC RBC AUTO-ENTMCNC: 32.5 G/DL (ref 32–36)
MCR-1: ABNORMAL
MCV RBC AUTO: 85 FL (ref 82–98)
MEC A/C AND MREJ (MRSA): ABNORMAL
MEC A/C: DETECTED
MONOCYTES # BLD AUTO: 0.6 K/UL (ref 0.3–1)
MONOCYTES NFR BLD: 11.9 % (ref 4–15)
MV MEAN GRADIENT: 1 MMHG
MV PEAK A VEL: 0.76 M/S
MV PEAK E VEL: 0.88 M/S
MV PEAK GRADIENT: 4 MMHG
MV STENOSIS PRESSURE HALF TIME: 58.08 MS
MV VALVE AREA BY CONTINUITY EQUATION: 3.91 CM2
MV VALVE AREA P 1/2 METHOD: 3.79 CM2
NDM GENE (CARBAPENEM RESISTANT): ABNORMAL
NEISSERIA MENINGITIDIS: NOT DETECTED
NEUTROPHILS # BLD AUTO: 3.2 K/UL (ref 1.8–7.7)
NEUTROPHILS NFR BLD: 61.8 % (ref 38–73)
NRBC BLD-RTO: 0 /100 WBC
OHS CV RV/LV RATIO: 0.65 CM
OHS LV EJECTION FRACTION SIMPSONS BIPLANE MOD: 49 %
OXA-48-LIKE (CARBAPENEM RESISTANT): ABNORMAL
PISA MRMAX VEL: 4.22 M/S
PISA TR MAX VEL: 2.6 M/S
PLATELET # BLD AUTO: 138 K/UL (ref 150–450)
PMV BLD AUTO: 11.4 FL (ref 9.2–12.9)
POTASSIUM SERPL-SCNC: 3.1 MMOL/L (ref 3.5–5.1)
PROCALCITONIN SERPL IA-MCNC: 0.17 NG/ML (ref 0–0.5)
PROT SERPL-MCNC: 5.9 G/DL (ref 6–8.4)
PROTEUS SPECIES: NOT DETECTED
PSEUDOMONAS AERUGINOSA: NOT DETECTED
PV MV: 0.65 M/S
PV PEAK GRADIENT: 3 MMHG
PV PEAK VELOCITY: 0.92 M/S
RA MAJOR: 5.01 CM
RBC # BLD AUTO: 3.93 M/UL (ref 4–5.4)
RIGHT VENTRICLE DIASTOLIC BASEL DIMENSION: 3 CM
RIGHT VENTRICLE DIASTOLIC LENGTH: 4.8 CM
RIGHT VENTRICULAR END-DIASTOLIC DIMENSION: 2.98 CM
RIGHT VENTRICULAR LENGTH IN DIASTOLE (APICAL 4-CHAMBER VIEW): 4.79 CM
RV TISSUE DOPPLER FREE WALL SYSTOLIC VELOCITY 1 (APICAL 4 CHAMBER VIEW): 11.26 CM/S
SALMONELLA SP: NOT DETECTED
SERRATIA MARCESCENS: NOT DETECTED
SODIUM SERPL-SCNC: 138 MMOL/L (ref 136–145)
STAPHYLOCOCCUS AUREUS: NOT DETECTED
STAPHYLOCOCCUS EPIDERMIDIS: DETECTED
STAPHYLOCOCCUS LUGDUNESIS: NOT DETECTED
STAPHYLOCOCCUS SPECIES: ABNORMAL
STENOTROPHOMONAS MALTOPHILIA: NOT DETECTED
STJ: 3.06 CM
STREPTOCOCCUS AGALACTIAE: NOT DETECTED
STREPTOCOCCUS PNEUMONIAE: NOT DETECTED
STREPTOCOCCUS PYOGENES: NOT DETECTED
STREPTOCOCCUS SPECIES: NOT DETECTED
TDI LATERAL: 0.1 M/S
TDI SEPTAL: 0.07 M/S
TDI: 0.09 M/S
TR MAX PG: 27 MMHG
TRICUSPID ANNULAR PLANE SYSTOLIC EXCURSION: 2.34 CM
TROPONIN I SERPL DL<=0.01 NG/ML-MCNC: 0.04 NG/ML (ref 0–0.03)
VAN A/B (VRE GENE): ABNORMAL
VIM GENE (CARBAPENEM RESISTANT): ABNORMAL
WBC # BLD AUTO: 5.14 K/UL (ref 3.9–12.7)
Z-SCORE OF LEFT VENTRICULAR DIMENSION IN END DIASTOLE: -1.99
Z-SCORE OF LEFT VENTRICULAR DIMENSION IN END SYSTOLE: -0.35

## 2025-01-17 PROCEDURE — 85025 COMPLETE CBC W/AUTO DIFF WBC: CPT | Performed by: NURSE PRACTITIONER

## 2025-01-17 PROCEDURE — 97535 SELF CARE MNGMENT TRAINING: CPT

## 2025-01-17 PROCEDURE — 97162 PT EVAL MOD COMPLEX 30 MIN: CPT

## 2025-01-17 PROCEDURE — 63600175 PHARM REV CODE 636 W HCPCS: Performed by: HOSPITALIST

## 2025-01-17 PROCEDURE — 27000207 HC ISOLATION

## 2025-01-17 PROCEDURE — 97530 THERAPEUTIC ACTIVITIES: CPT

## 2025-01-17 PROCEDURE — 36415 COLL VENOUS BLD VENIPUNCTURE: CPT | Performed by: HOSPITALIST

## 2025-01-17 PROCEDURE — 84484 ASSAY OF TROPONIN QUANT: CPT

## 2025-01-17 PROCEDURE — 63700000 PHARM REV CODE 250 ALT 637 W/O HCPCS: Performed by: NURSE PRACTITIONER

## 2025-01-17 PROCEDURE — 87040 BLOOD CULTURE FOR BACTERIA: CPT | Performed by: HOSPITALIST

## 2025-01-17 PROCEDURE — 99900035 HC TECH TIME PER 15 MIN (STAT)

## 2025-01-17 PROCEDURE — 94799 UNLISTED PULMONARY SVC/PX: CPT

## 2025-01-17 PROCEDURE — 51798 US URINE CAPACITY MEASURE: CPT

## 2025-01-17 PROCEDURE — 63600175 PHARM REV CODE 636 W HCPCS: Performed by: NURSE PRACTITIONER

## 2025-01-17 PROCEDURE — 94761 N-INVAS EAR/PLS OXIMETRY MLT: CPT

## 2025-01-17 PROCEDURE — 11000001 HC ACUTE MED/SURG PRIVATE ROOM

## 2025-01-17 PROCEDURE — 25000003 PHARM REV CODE 250: Performed by: HOSPITALIST

## 2025-01-17 PROCEDURE — 93005 ELECTROCARDIOGRAM TRACING: CPT

## 2025-01-17 PROCEDURE — 93010 ELECTROCARDIOGRAM REPORT: CPT | Mod: ,,, | Performed by: INTERNAL MEDICINE

## 2025-01-17 PROCEDURE — 84145 PROCALCITONIN (PCT): CPT | Performed by: INTERNAL MEDICINE

## 2025-01-17 PROCEDURE — 80053 COMPREHEN METABOLIC PANEL: CPT | Performed by: INTERNAL MEDICINE

## 2025-01-17 PROCEDURE — 97165 OT EVAL LOW COMPLEX 30 MIN: CPT

## 2025-01-17 PROCEDURE — 83735 ASSAY OF MAGNESIUM: CPT | Performed by: INTERNAL MEDICINE

## 2025-01-17 PROCEDURE — 36415 COLL VENOUS BLD VENIPUNCTURE: CPT

## 2025-01-17 PROCEDURE — 83605 ASSAY OF LACTIC ACID: CPT | Performed by: INTERNAL MEDICINE

## 2025-01-17 PROCEDURE — 25000003 PHARM REV CODE 250: Performed by: NURSE PRACTITIONER

## 2025-01-17 PROCEDURE — 25000003 PHARM REV CODE 250: Performed by: INTERNAL MEDICINE

## 2025-01-17 RX ORDER — FUROSEMIDE 10 MG/ML
40 INJECTION INTRAMUSCULAR; INTRAVENOUS EVERY 12 HOURS
Status: DISCONTINUED | OUTPATIENT
Start: 2025-01-17 | End: 2025-01-18

## 2025-01-17 RX ORDER — OSELTAMIVIR PHOSPHATE 30 MG/1
30 CAPSULE ORAL 2 TIMES DAILY
Status: DISCONTINUED | OUTPATIENT
Start: 2025-01-17 | End: 2025-01-21

## 2025-01-17 RX ORDER — ENOXAPARIN SODIUM 100 MG/ML
40 INJECTION SUBCUTANEOUS EVERY 24 HOURS
Status: DISCONTINUED | OUTPATIENT
Start: 2025-01-17 | End: 2025-01-24 | Stop reason: HOSPADM

## 2025-01-17 RX ORDER — AMMONIUM LACTATE 12 G/100G
LOTION TOPICAL 2 TIMES DAILY
Status: DISCONTINUED | OUTPATIENT
Start: 2025-01-17 | End: 2025-01-24 | Stop reason: HOSPADM

## 2025-01-17 RX ADMIN — FAMOTIDINE 20 MG: 20 TABLET ORAL at 09:01

## 2025-01-17 RX ADMIN — AMMONIUM LACTATE: 12 LOTION TOPICAL at 09:01

## 2025-01-17 RX ADMIN — FUROSEMIDE 40 MG: 10 INJECTION, SOLUTION INTRAMUSCULAR; INTRAVENOUS at 11:01

## 2025-01-17 RX ADMIN — ATORVASTATIN CALCIUM 40 MG: 40 TABLET, FILM COATED ORAL at 09:01

## 2025-01-17 RX ADMIN — Medication 1000 MCG: at 09:01

## 2025-01-17 RX ADMIN — Medication 800 MG: at 04:01

## 2025-01-17 RX ADMIN — Medication 800 MG: at 09:01

## 2025-01-17 RX ADMIN — POTASSIUM BICARBONATE 35 MEQ: 391 TABLET, EFFERVESCENT ORAL at 04:01

## 2025-01-17 RX ADMIN — TOPIRAMATE 100 MG: 100 TABLET, FILM COATED ORAL at 09:01

## 2025-01-17 RX ADMIN — POTASSIUM & SODIUM PHOSPHATES POWDER PACK 280-160-250 MG 2 PACKET: 280-160-250 PACK at 04:01

## 2025-01-17 RX ADMIN — PROPRANOLOL HYDROCHLORIDE 20 MG: 20 TABLET ORAL at 09:01

## 2025-01-17 RX ADMIN — POTASSIUM BICARBONATE 35 MEQ: 391 TABLET, EFFERVESCENT ORAL at 09:01

## 2025-01-17 RX ADMIN — OSELTAMIVIR PHOSPHATE 30 MG: 30 CAPSULE ORAL at 09:01

## 2025-01-17 RX ADMIN — TORSEMIDE 5 MG: 5 TABLET ORAL at 09:01

## 2025-01-17 RX ADMIN — CEFTRIAXONE SODIUM 1 G: 1 INJECTION, POWDER, FOR SOLUTION INTRAMUSCULAR; INTRAVENOUS at 04:01

## 2025-01-17 RX ADMIN — CLOPIDOGREL BISULFATE 75 MG: 75 TABLET, FILM COATED ORAL at 09:01

## 2025-01-17 RX ADMIN — DULOXETINE HYDROCHLORIDE 30 MG: 30 CAPSULE, DELAYED RELEASE ORAL at 09:01

## 2025-01-17 RX ADMIN — FUROSEMIDE 40 MG: 10 INJECTION, SOLUTION INTRAMUSCULAR; INTRAVENOUS at 09:01

## 2025-01-17 RX ADMIN — ACETAMINOPHEN 650 MG: 325 TABLET ORAL at 05:01

## 2025-01-17 RX ADMIN — AZITHROMYCIN DIHYDRATE 250 MG: 250 TABLET ORAL at 09:01

## 2025-01-17 RX ADMIN — ENOXAPARIN SODIUM 40 MG: 40 INJECTION SUBCUTANEOUS at 04:01

## 2025-01-17 NOTE — ASSESSMENT & PLAN NOTE
1/2 blood culture positive for staph epi.   Most likely contamination.   Hold off antibiotics now

## 2025-01-17 NOTE — NURSING
Monitor room called and reported made had run of Afib then quickly converted into sinus, Dr salcido made aware, stated to get EKG, care ongoing

## 2025-01-17 NOTE — CARE UPDATE
Patient arrived from [facility]. NAD noted.  Denies any pain.   at bedside.  Agree with previous hospital medicine admission assessment.  Continue with current antibiotic regimen for UTI and Tamiflu for influenza A.

## 2025-01-17 NOTE — PROGRESS NOTES
Alyssa John [id number] is a 74 y.o. female who has been consulted for vancomycin dosing.    Pharmacy consult for vancomycin dosing in no longer required.  Vancomycin was discontinued.    Thank you for allowing us to participate in this patient's care.     Nadya Nelson, PharmD

## 2025-01-17 NOTE — PT/OT/SLP EVAL
Physical Therapy Evaluation    Patient Name:  Alyssa John   MRN:  [id number]    Recommendations:     Discharge Recommendations: Moderate Intensity Therapy   Discharge Equipment Recommendations: none   Barriers to discharge:  medical status, pt unable to transfer to  or upright activity.    Assessment:     Alyssa John is a 74 y.o. female admitted with a medical diagnosis of Influenza A.  She presents with the following impairments/functional limitations: weakness, impaired endurance, impaired functional mobility, impaired self care skills, impaired skin, edema, impaired cardiopulmonary response to activity, impaired muscle length.  Pt found with HOB elevated and agreeable to working with PT. Pt A & O x  3 and has the following co-morbidities: MS, h/o MI, Epilepsy, GERD, HTN, CAD.  Pt tolerated session fair/poor and required maxA x2 for safe mobilization during session today.  Initially pt holding appropriate conversation with slight delayed speech, upon sitting EOB pt holding balance with min/mod difficulty x12 minutes when she began to lean left and states she was tired.  Assisted back to supine with maxA x2 and pt immediately closed eyes to sleep.  Able to arouse pt and denied LOC or feeling as if she fainted but very lethargic.  She continues to attempt follow commands but movements much slower and with decreased strength.  Rolling in bed to perform hygiene and change bedding with maxA and max VC for hand placement. Pt would benefit from acute PT during hospitalization to increase strength, endurance and safety with mobility and would benefit from moderate intensity therapy prior to discharge home.     Rehab Prognosis: Fair; patient would benefit from acute skilled PT services to address these deficits and reach maximum level of function.    Recent Surgery: * No surgery found *      Plan:     During this hospitalization, patient to be seen 6 x/week to address the identified rehab impairments via  "therapeutic activities, therapeutic exercises, neuromuscular re-education, wheelchair management/training and progress toward the following goals:    Plan of Care Expires:       Subjective     Chief Complaint: "I'm tired"  Patient/Family Comments/goals: get better and go home  Pain/Comfort:  Pain Rating 1: 0/10    Patients cultural, spiritual, Pentecostalism conflicts given the current situation:      Living Environment:  Pt lives with  who is primary caregiver  Prior to admission, patients level of function was able to transfer with assist from  into , able to stand and accept weight on bilat LE, has not been able to ambulate since June when she began having frequent and recurring UTI.  Equipment used at home: walker, rolling, wheelchair, bedside commode, cane, straight.  DME owned (not currently used): none.  Upon discharge, patient will have assistance from .    Objective:     Communicated with RNMontse prior to session.  OK to treat/eval pt and up in chair if able. Patient found HOB elevated with peripheral IV, PureWick, telemetry, bed alarm  upon PT entry to room.    General Precautions: Standard, fall, seizure, droplet, vision impaired  Orthopedic Precautions:    Braces:    Respiratory Status: Room air    Exams:  Cognitive Exam:  Patient is oriented to Person and Place  Gross Motor Coordination:  poor coordination  Postural Exam:  Patient presented with the following abnormalities:    -       Rounded shoulders  -       Forward head  -       Posterior pelvic tilt  Skin Integrity/Edema:      -       Skin integrity: preventative foam bandage covering bilat heels, noted excoriation to groin and perineum  -       Edema: Moderate bilat LE  RLE ROM: limited ankle DF and abduction  RLE Strength: 2/5  LLE ROM: limited ankle DF and hip abduction  LLE Strength: 2/5  Limited hip abduction R>L affecting hygiene at home per  report.    Functional Mobility:  Bed Mobility:     Rolling Left:  " maximal assistance  Rolling Right: maximal assistance  Supine to Sit: maximal assistance and of 1 persons  Sit to Supine: maximal assistance and of 2 persons      AM-PAC 6 CLICK MOBILITY  Total Score:8       Treatment & Education:  Pt was educated on the following: call light use, importance of OOB activity and functional mobility to negate the negative effects of prolonged bed rest during this hospitalization, safe transfers/ambulation and discharge planning recommendations/options.     Patient left HOB elevated with all lines intact, call button in reach, bed alarm on, RN notified, and   present.    GOALS:   Multidisciplinary Problems       Physical Therapy Goals          Problem: Physical Therapy    Goal Priority Disciplines Outcome Interventions   Physical Therapy Goal     PT, PT/OT Progressing    Description: 1. Supine to sit with Stand-by Assistance  2. Sit to stand transfer with min Assistance  3. Bed to chair transfer with assist of 1 caregiver and ue of RW as needed.  4. Pt will perform bilat LE therex with Lakeshia.                       DME Justifications:  No DME recommended requiring DME justifications    History:     Past Medical History:   Diagnosis Date    Arthritis     Coronary artery disease     Encounter for blood transfusion     Epilepsy     GERD (gastroesophageal reflux disease)     Headache     Hypertension     MI, old     MS (multiple sclerosis)     Seizures     epilepsy       Past Surgical History:   Procedure Laterality Date    APPENDECTOMY      BACK SURGERY      L5 discectomy    BREAST BIOPSY Right 15 yrs ago    benign    CATARACT EXTRACTION Bilateral     COLONOSCOPY N/A 09/16/2015    Procedure: COLONOSCOPY;  Surgeon: Henry Malcolm MD;  Location: [facility];  Service: Endoscopy;  Laterality: N/A;    CORONARY STENT PLACEMENT      IMPLANTATION OF PERMANENT SACRAL NERVE STIMULATOR N/A 10/11/2024    Procedure: INSERTION, NEUROSTIMULATOR, PERMANENT, SACRAL;  Surgeon: Abel Rosales  MD MARIMAR;  Location: [facility];  Service: Urology;  Laterality: N/A;  Mack RUIZ    INSERTION, NEUROSTIMULATOR, TEMPORARY, SACRAL N/A 9/27/2024    Procedure: INSERTION, NEUROSTIMULATOR, TEMPORARY, SACRAL;  Surgeon: Abel Rosales MD;  Location: [facility] OR;  Service: Urology;  Laterality: N/A;  1 hour 45 minutes Mack RUIZ    right knee arthroscopy         Time Tracking:     PT Received On: 01/17/25  PT Start Time: 1428     PT Stop Time: 1515  PT Total Time (min): 47 min     Billable Minutes: Evaluation 8 and Therapeutic Activity 39      01/17/2025

## 2025-01-17 NOTE — CARE UPDATE
Nurse Montserrat reported elevated troponin. However, due to patient's current location at [facility] main instructed nurse to report critical lab value to Hospitalist at [facility] in order to physically assess patient dynamics concerning elevated troponin.

## 2025-01-17 NOTE — PLAN OF CARE
Alyssa John has warranted treatment spanning two or more midnights of hospital level care for the management of  UTI and Influenza A . She continues to require IV antibiotics, further testing/imaging, monitoring of vital signs, and medication adjustments. Her condition is also complicated by the following comorbidities: Coronary Artery Disease and MS,GERD and Epilepsy .

## 2025-01-17 NOTE — PT/OT/SLP EVAL
Occupational Therapy   Evaluation    Name: Alyssa John  MRN: [id number]  Admitting Diagnosis: Influenza A  Recent Surgery: * No surgery found *      Recommendations:     Discharge Recommendations: Low Intensity Therapy  Discharge Equipment Recommendations:  none  Barriers to discharge:  None    Assessment:     Alyssa John is a 74 y.o. female with a medical diagnosis of Influenza A.  She presents with hx of multiple sclerosis and frequent UTI's since June of 2024. Patient limited with participation with grooming EOB d/t fatigue. Patient sat EOB requirnig Mod A for trunk support. Noted frequent posterior leaning to L side while seated EOB requiring intermittent Mod A to regain upright sitting posture during oral hygiene task. Patient stood requiring Mod A using RW, but was unable to take side steps to HOB d/t BLE weakness. Performance deficits affecting function: weakness, impaired endurance, impaired self care skills, impaired functional mobility, gait instability, impaired balance, decreased upper extremity function, decreased lower extremity function, decreased coordination, decreased ROM, impaired cardiopulmonary response to activity.      Rehab Prognosis: Fair; patient would benefit from acute skilled OT services to address these deficits and reach maximum level of function.       Plan:     Patient to be seen 3 x/week to address the above listed problems via self-care/home management, therapeutic activities, therapeutic exercises  Plan of Care Expires: 02/07/25  Plan of Care Reviewed with: patient, spouse    Subjective     Chief Complaint: fatigue  Patient/Family Comments/goals: none    Occupational Profile:  Living Environment: Patient lives with .   Previous level of function: Patient required assist with LB dressing and bathing. Patient was able to groom and toilet with Supervision. Patient was ambulatory in w/c since June 2024.   Equipment Used at Home: walker, rolling, wheelchair,  bedside commode, cane, straight  Assistance upon Discharge: Patient will receive assistance from .     Pain/Comfort:  Pain Rating 1: 0/10  Pain Rating Post-Intervention 1: 0/10    Patients cultural, spiritual, Taoist conflicts given the current situation: no    Objective:     Communicated with: nurse Woodson/Angy prior to session.  Patient found HOB elevated with peripheral IV, telemetry upon OT entry to room.    General Precautions: Standard, fall  Orthopedic Precautions: N/A  Braces: N/A  Respiratory Status: Room air    Occupational Performance:    Bed Mobility:    Patient completed Scooting/Bridging with moderate assistance  Patient completed Supine to Sit with moderate assistance  Patient completed Sit to Supine with maximal assistance and 2 persons    Functional Mobility/Transfers:  Patient completed Sit <> Stand Transfer with moderate assistance  with  rolling walker   Functional Mobility: Poor static sitting balance requiring intermittent Mod A to maintain sit balance; Poor static standing d/t BLE weakness    Activities of Daily Living:  Grooming: stand by assistance with oral and facial hygiene while seated EOB; pt required intermittent Mod A d/t frequent posterior L side leaning during activity  Lower Body Dressing: maximal assistance to don/doff socks in bed  Toileting: dependence with Purewick and adult brief in place    Cognitive/Visual Perceptual:  Cognitive/Psychosocial Skills:     -       Oriented to: Person, Place, Time, and Situation   -       Follows Commands/attention:Follows multistep  commands  -       Communication: clear/fluent  -       Safety awareness/insight to disability: intact   -       Mood/Affect/Coping skills/emotional control: Appropriate to situation and Cooperative  Visual/Perceptual:      -Intact     Physical Exam:  Postural examination/scapula alignment:    -       Rounded shoulders  -       Forward head  Upper Extremity Range of Motion:     -       Right Upper  Extremity: WFL except at shldr  -       Left Upper Extremity: WFL except at shldr  Upper Extremity Strength:    -       Right Upper Extremity: WFL except at shldr  -       Left Upper Extremity: WFL except at shldr   Strength:    -       Right Upper Extremity: WFL  -       Left Upper Extremity: WFL  Fine Motor Coordination:    -       Intact  Gross motor coordination:   WFL in BUE    AMPAC 6 Click ADL:  [facility] Total Score: 14    Treatment & Education:  Pt educated on role of OT/POC, importance of time EOB/OOB, safety with ADLs, importance of intermittent mobility, safe techniques for transfers/ambulation using RW, discharge recommendations/options, and use of call light for assistance and fall prevention.       Patient left HOB elevated with all lines intact and call button in reach    GOALS:   Multidisciplinary Problems       Occupational Therapy Goals          Problem: Occupational Therapy    Goal Priority Disciplines Outcome Interventions   Occupational Therapy Goal     OT, PT/OT Progressing    Description: Goals to be met by: 2/7/2025     Patient will increase functional independence with ADLs by performing:    Grooming while EOB with Stand-by Assistance.  Toileting from bedside commode with Minimal Assistance for hygiene and clothing management.   Sitting at edge of bed x15 minutes with Stand-by Assistance.  Supine to sit with Minimal Assistance.  Toilet transfer to bedside commode with Minimal Assistance.                         DME Justifications:  No DME recommended requiring DME justifications    History:     Past Medical History:   Diagnosis Date    Arthritis     Coronary artery disease     Encounter for blood transfusion     Epilepsy     GERD (gastroesophageal reflux disease)     Headache     Hypertension     MI, old     MS (multiple sclerosis)     Seizures     epilepsy         Past Surgical History:   Procedure Laterality Date    APPENDECTOMY      BACK SURGERY      L5 discectomy    BREAST BIOPSY Right  15 yrs ago    benign    CATARACT EXTRACTION Bilateral     COLONOSCOPY N/A 09/16/2015    Procedure: COLONOSCOPY;  Surgeon: Henry Malcolm MD;  Location: [facility];  Service: Endoscopy;  Laterality: N/A;    CORONARY STENT PLACEMENT      IMPLANTATION OF PERMANENT SACRAL NERVE STIMULATOR N/A 10/11/2024    Procedure: INSERTION, NEUROSTIMULATOR, PERMANENT, SACRAL;  Surgeon: Abel Rosales MD;  Location: [facility] OR;  Service: Urology;  Laterality: N/A;  Mack RUIZ    INSERTION, NEUROSTIMULATOR, TEMPORARY, SACRAL N/A 9/27/2024    Procedure: INSERTION, NEUROSTIMULATOR, TEMPORARY, SACRAL;  Surgeon: Abel Rosales MD;  Location: [facility] OR;  Service: Urology;  Laterality: N/A;  1 hour 45 minutes Mack RUIZ    right knee arthroscopy         Time Tracking:     OT Date of Treatment: 01/17/25  OT Start Time: 0910  OT Stop Time: 0947  OT Total Time (min): 37 min    Billable Minutes:Evaluation 10  Self Care/Home Management 27 1/17/2025

## 2025-01-17 NOTE — ASSESSMENT & PLAN NOTE
Patient's blood pressure range in the last 24 hours was: BP  Min: 137/69  Max: 186/76.The patient's inpatient anti-hypertensive regimen is listed below:  Current Antihypertensives       Plan  - BP is controlled, no changes needed to their regimen

## 2025-01-17 NOTE — PLAN OF CARE
Problem: Physical Therapy  Goal: Physical Therapy Goal  Description: 1. Supine to sit with Stand-by Assistance  2. Sit to stand transfer with min Assistance  3. Bed to chair transfer with assist of 1 caregiver and ue of RW as needed.  4. Pt will perform bilat LE therex with Lakeshia.  Outcome: Progressing

## 2025-01-17 NOTE — H&P
[facility] - Emergency Dept  Hospital Medicine  History & Physical    Patient Name: Alyssa John  MRN: [id number]  Patient Class: OP- Observation  Admission Date: 1/16/2025  Attending Physician: Americo Marks MD   Primary Care Provider: Piyush Sharif MD         Patient information was obtained from patient, past medical records, and ER records.     Subjective:     Principal Problem:UTI (urinary tract infection)    Chief Complaint:   Chief Complaint   Patient presents with    Weakness     Hx MS. Dx flu-A yesterday.     Diarrhea    BLE edema        HPI: 74 year old female with a past medical history of GERD, Epilepsy, hypertension, MI, MS, CAD was recently tested positive for influenza A.  She reports to the emergency room with reports of weakness and diarrhea.  Reports of decreased mobility and activity of daily living for the last 7-8 months. Patient needs to be assisted out of wheelchair by significant other. She was diagnosed with urinary tract infection about 17 days ago. She recently treated with omnicef and cipro.inished course of antibiotics. She then developed cough and congestion. She was diagnosed with influenza yesterday. Now  is unable to assist patient and transfers. She reports increased generalized weakness especially to both legs. No trouble with vision or speech. There was some chills with no definite fever. Urinary symptoms resolved.     In the ER, potassium 3.2, glucose 127, calcium 8, troponin I high sensitivity 29.6, influenza A positive UA with 1+ protein, 1+ blood, 2+ leukocytes 96 WBCs urine culture sent, COVID negative lactic 1.56 chest x-ray Mild bilateral perihilar interstitial thickening     Admit to hospital medicine for UTI after failing outpatient therapy.          Past Medical History:   Diagnosis Date    Arthritis     Coronary artery disease     Encounter for blood transfusion     Epilepsy     GERD (gastroesophageal reflux disease)     Headache      Hypertension     MI, old     MS (multiple sclerosis)     Seizures     epilepsy       Past Surgical History:   Procedure Laterality Date    APPENDECTOMY      BACK SURGERY      L5 discectomy    BREAST BIOPSY Right 15 yrs ago    benign    CATARACT EXTRACTION Bilateral     COLONOSCOPY N/A 09/16/2015    Procedure: COLONOSCOPY;  Surgeon: Henry Malcolm MD;  Location: [facility];  Service: Endoscopy;  Laterality: N/A;    CORONARY STENT PLACEMENT      IMPLANTATION OF PERMANENT SACRAL NERVE STIMULATOR N/A 10/11/2024    Procedure: INSERTION, NEUROSTIMULATOR, PERMANENT, SACRAL;  Surgeon: Abel Rosales MD;  Location: [facility] OR;  Service: Urology;  Laterality: N/A;  Mack RUIZ    INSERTION, NEUROSTIMULATOR, TEMPORARY, SACRAL N/A 9/27/2024    Procedure: INSERTION, NEUROSTIMULATOR, TEMPORARY, SACRAL;  Surgeon: Abel Rosales MD;  Location: [facility] OR;  Service: Urology;  Laterality: N/A;  1 hour 45 minutes Mack RUIZ    right knee arthroscopy         Review of patient's allergies indicates:   Allergen Reactions    Codeine      Other reaction(s): throat tightness    Dilantin [phenytoin sodium extended]     Phenobarbital     Tegretol [carbamazepine]        No current facility-administered medications on file prior to encounter.     Current Outpatient Medications on File Prior to Encounter   Medication Sig    albuterol-ipratropium (DUO-NEB) 2.5 mg-0.5 mg/3 mL nebulizer solution Take 3 mLs by nebulization every 6 (six) hours as needed for Wheezing. Rescue    atorvastatin (LIPITOR) 40 MG tablet TAKE 1 TABLET BY MOUTH EVERY DAY    baloxavir marboxiL (XOFLUZA) 80 mg tablet Take 1 tablet (80 mg total) by mouth once. for 1 dose    cefdinir (OMNICEF) 300 MG capsule Take 1 capsule (300 mg total) by mouth 2 (two) times daily. for 7 days    cholecalciferol, vitamin D3, 10 mcg (400 unit) Cap capsule 1 tablet.    ciprofloxacin HCl (CIPRO) 500 MG tablet Take 1 tablet (500 mg total) by mouth 2 (two) times daily. (Patient not taking:  Reported on 1/15/2025)    clopidogrel (PLAVIX) 75 mg tablet Take 75 mg by mouth once daily.    cyanocobalamin (VITAMIN B-12) 1000 MCG tablet Take 1 tablet (1,000 mcg total) by mouth once daily.    doxycycline (VIBRA-TABS) 100 MG tablet Take 1 tablet (100 mg total) by mouth 2 (two) times daily. for 7 days    DULoxetine (CYMBALTA) 20 MG capsule TAKE 2 CAPSULES BY MOUTH EVERY DAY    estradioL (ESTRACE) 0.01 % (0.1 mg/gram) vaginal cream Place 1 g vaginally 3 (three) times a week.    famotidine (PEPCID) 20 MG tablet Take 20 mg by mouth 2 (two) times daily as needed for Heartburn.    nitrofurantoin, macrocrystal-monohydrate, (MACROBID) 100 MG capsule Take 1 capsule (100 mg total) by mouth 2 (two) times daily. (Patient not taking: Reported on 1/15/2025)    nitroGLYCERIN (NITROSTAT) 0.4 MG SL tablet Place 0.4 mg under the tongue every 5 (five) minutes as needed for Chest pain.    potassium chloride (MICRO-K) 10 MEQ CpSR Take 1 capsule (10 mEq total) by mouth once daily. for 7 days    promethazine-dextromethorphan (PROMETHAZINE-DM) 6.25-15 mg/5 mL Syrp Take 5 mLs by mouth every 6 (six) hours as needed (cough).    propranoloL (INDERAL) 20 MG tablet Take 1 tablet (20 mg total) by mouth once daily.    topiramate (TOPAMAX) 100 MG tablet Take 1 tablet (100 mg total) by mouth 2 (two) times daily.    torsemide (DEMADEX) 5 MG Tab TAKE 1 TABLET BY MOUTH EVERY DAY     Family History       Problem Relation (Age of Onset)    Heart disease Father    Scleroderma Mother          Tobacco Use    Smoking status: Former     Current packs/day: 0.00     Average packs/day: 2.0 packs/day for 42.0 years (84.0 ttl pk-yrs)     Types: Cigarettes     Start date: 1968     Quit date: 2010     Years since quitting: 15.0     Passive exposure: Past    Smokeless tobacco: Never   Substance and Sexual Activity    Alcohol use: Not Currently     Comment: rarely    Drug use: No    Sexual activity: Not Currently     Partners: Male     Review of Systems    Constitutional:  Positive for chills.   HENT: Negative.     Respiratory:  Positive for cough and shortness of breath.    Cardiovascular:  Positive for leg swelling. Negative for chest pain.   Gastrointestinal:  Positive for diarrhea.   Genitourinary: Negative.    Musculoskeletal: Negative.    Neurological:  Positive for weakness.     Objective:     Vital Signs (Most Recent):  Temp: 98.5 °F (36.9 °C) (01/16/25 1424)  Pulse: 85 (01/16/25 1538)  Resp: 20 (01/16/25 1424)  BP: (!) 186/76 (01/16/25 1538)  SpO2: 96 % (01/16/25 1538) Vital Signs (24h Range):  Temp:  [98.5 °F (36.9 °C)] 98.5 °F (36.9 °C)  Pulse:  [75-85] 85  Resp:  [20] 20  SpO2:  [96 %-97 %] 96 %  BP: (137-186)/(69-83) 186/76     Weight: 91.2 kg (201 lb)  Body mass index is 35.61 kg/m².     Physical Exam  Vitals reviewed.   Constitutional:       Appearance: Normal appearance.   HENT:      Head: Normocephalic and atraumatic.      Mouth/Throat:      Mouth: Mucous membranes are moist.   Eyes:      Pupils: Pupils are equal, round, and reactive to light.   Cardiovascular:      Rate and Rhythm: Normal rate.      Pulses: Normal pulses.      Heart sounds: No murmur heard.  Pulmonary:      Effort: Pulmonary effort is normal. No respiratory distress.      Breath sounds: Normal breath sounds. No wheezing.   Abdominal:      General: Bowel sounds are normal.      Palpations: Abdomen is soft.   Musculoskeletal:      Cervical back: Normal range of motion and neck supple.      Comments: 2+ bilateral lower extremity edema with chronic hyper pigmentation. all extremities are neurovascularly intact.      Skin:     General: Skin is warm and dry.      Capillary Refill: Capillary refill takes 2 to 3 seconds.   Neurological:      Mental Status: She is alert.      Motor: Weakness present.      Comments: weak to upper extremities with at least 4/5 strength to both arms.  Strength is symmetric to all extremities.  She has 3/5 to 4/5 strength to both legs.  Patient is able to lift  against gravity barely to both legs.   Psychiatric:         Mood and Affect: Mood normal.              CRANIAL NERVES     CN III, IV, VI   Pupils are equal, round, and reactive to light.       Significant Labs: All pertinent labs within the past 24 hours have been reviewed.  Recent Lab Results         01/16/25  1533   01/16/25  1517   01/16/25  1513        Albumin     3.8       ALP     123       ALT     16       Anion Gap     8       Appearance, UA Clear           AST     22       Bacteria, UA Occasional           Baso #     0.04       Basophil %     0.6       Bilirubin (UA) Negative           BILIRUBIN TOTAL     0.6  Comment: For infants and newborns, interpretation of results should be based  on gestational age, weight and in agreement with clinical  observations.    Premature Infant recommended reference ranges:  Up to 24 hours.............<8.0 mg/dL  Up to 48 hours............<12.0 mg/dL  3-5 days..................<15.0 mg/dL  6-29 days.................<15.0 mg/dL         BUN     16       Calcium     8.0       Chloride     108       CO2     21       Color, UA Yellow           Creatinine     0.9       Differential Method     Automated       eGFR     >60.0       Eos #     0.0       Eos %     0.1       Glucose     127       Glucose, UA Negative           Gran # (ANC)     5.3       Gran %     77.6       Hematocrit     39.4       Hemoglobin     12.4       Hyaline Casts, UA 0           Immature Grans (Abs)     0.02  Comment: Mild elevation in immature granulocytes is non specific and   can be seen in a variety of conditions including stress response,   acute inflammation, trauma and pregnancy. Correlation with other   laboratory and clinical findings is essential.         Immature Granulocytes     0.3       Ketones, UA Negative           Leukocyte Esterase, UA 2+           Lipase     17       Lymph #     1.0       Lymph %     13.9       Magnesium      1.6       MCH     27.2       MCHC     31.5       MCV     86        Microscopic Comment SEE COMMENT  Comment: Other formed elements not mentioned in the report are not   present in the microscopic examination.              Mono #     0.5       Mono %     7.5       MPV     11.6       NITRITE UA Negative           nRBC     0       Blood, UA 1+           pH, UA 6.0           Platelet Count     155       POC Lactate   1.56         Potassium     3.2       PROTEIN TOTAL     6.7       Protein, UA 1+  Comment: Recommend a 24 hour urine protein or a urine   protein/creatinine ratio if globulin induced proteinuria is  clinically suspected.             RBC     4.56       RBC, UA 6           RDW     15.2       Sample   VENOUS         Sodium     137       Spec Grav UA 1.025           Specimen UA Urine, Clean Catch           Squam Epithel, UA 2           Troponin I High Sensitivity     29.6  Comment: Troponin results differ between methods. Do not use   results between Troponin methods interchangeably.    Alkaline Phospatase levels above 400 U/L may   cause false positive results.    Access hsTnI should not be used for patients taking   Asfotase [place] (Strensiq).         UROBILINOGEN UA Negative           WBC, UA 96           WBC     6.89       Yeast, UA Few                   Significant Imaging: I have reviewed all pertinent imaging results/findings within the past 24 hours.  Imaging Results              X-Ray Chest AP Portable (Final result)  Result time 01/16/25 16:06:25      Final result by Gage Heath DO (01/16/25 16:06:25)                   Impression:      Mild bilateral perihilar interstitial thickening likely reflects infection/inflammation of the lower respiratory tract.      Electronically signed by: Gage Heath  Date:    01/16/2025  Time:    16:06               Narrative:    EXAMINATION:  XR CHEST AP PORTABLE    CLINICAL HISTORY:  Influenza;    FINDINGS:  Portable chest with comparison chest x-ray 01/15/2025.  Normal cardiomediastinal silhouette.There is mild bilateral  perihilar interstitial thickening.  No confluent airspace opacities.  Pulmonary vasculature is normal. No acute osseous abnormality.                                      Assessment/Plan:     * UTI (urinary tract infection)  Recently treated with Macrobid  Start ceftriaxone, azithromycin  Urine cultures pending      Influenza A  Symptomatic care  Oxygen p.r.n.  Tamiflu      Severe obesity (BMI 35.0-39.9) with comorbidity  Body mass index is 35.61 kg/m². Morbid obesity complicates all aspects of disease management from diagnostic modalities to treatment. Weight loss encouraged and health benefits explained to patient.         Impaired functional mobility, balance, and endurance  Consult PT/OT  Fall/safety precautions      Hypertension  Patient's blood pressure range in the last 24 hours was: BP  Min: 137/69  Max: 186/76.The patient's inpatient anti-hypertensive regimen is listed below:  Current Antihypertensives       Plan  - BP is controlled, no changes needed to their regimen      Seizure disorder  Chronic  Continue home meds        VTE Risk Mitigation (From admission, onward)           Ordered     IP VTE HIGH RISK PATIENT  Once         01/16/25 1647     Place sequential compression device  Until discontinued         01/16/25 1647                         On 01/16/2025, patient should be placed in hospital observation services under my care in collaboration with Dr. Marks.           Milka Cardoza, NP  Department of Hospital Medicine  [facility] - Emergency Dept

## 2025-01-17 NOTE — PLAN OF CARE
Problem: Occupational Therapy  Goal: Occupational Therapy Goal  Description: Goals to be met by: 2/7/2025     Patient will increase functional independence with ADLs by performing:    Grooming while EOB with Stand-by Assistance.  Toileting from bedside commode with Minimal Assistance for hygiene and clothing management.   Sitting at edge of bed x15 minutes with Stand-by Assistance.  Supine to sit with Minimal Assistance.  Toilet transfer to bedside commode with Minimal Assistance.    Outcome: Progressing

## 2025-01-17 NOTE — PROGRESS NOTES
Pharmacist Renal Dose Adjustment Note    Alyssa John is a 74 y.o. female being treated with the medication Oseltamivir    Patient Data:    Vital Signs (Most Recent):  Temp: 98.5 °F (36.9 °C) (01/16/25 1424)  Pulse: 66 (01/17/25 0000)  Resp: 20 (01/17/25 0000)  BP: 137/60 (01/17/25 0000)  SpO2: 95 % (01/17/25 0000) Vital Signs (72h Range):  Temp:  [98.5 °F (36.9 °C)-98.7 °F (37.1 °C)]   Pulse:  [59-92]   Resp:  [16-20]   BP: (121-186)/(59-88)   SpO2:  [95 %-100 %]        Ht:    Wt: 91.2 kg (201 lb)  Estimated Creatinine Clearance: 58.8 mL/min (based on SCr of 0.9 mg/dL).  Body mass index is 35.61 kg/m².    Per [facility] renal dosing protocol:     Previous Order: Oseltamivir 75 mg BiD    Will be changed to:     New Order: Oseltamivir 30 mg BiD,    Due to: CrCl < 60 mL/min    Renal dose adjustments performed as noted above.    We will continue monitoring and adjusting as necessary.    Pharmacist: Aaron Madrid PharmD  Ext: [phone]

## 2025-01-17 NOTE — DISCHARGE INSTRUCTIONS
[facility]/Surg  Facility Transfer Orders        Admit to: SNF    Diagnoses:   Active Hospital Problems    Diagnosis  POA    *Impaired functional mobility, balance, and endurance [Z74.09]  Yes    Venous stasis dermatitis of both lower extremities [I87.2]  Yes    Influenza A [J10.1]  Yes    Severe obesity (BMI 35.0-39.9) with comorbidity [E66.01]  Yes    Seizure disorder [G40.909]  Yes     Suspect PGE, possibly KIESHA  Pt with comorbid migraines & MS      Hypertension [I10]  Yes    Multiple sclerosis [G35]  Yes     Established with Dr. Apodaca        Resolved Hospital Problems    Diagnosis Date Resolved POA    Hypophosphatemia [E83.39] 01/23/2025 No    Hypokalemia [E87.6] 01/23/2025 Yes    Staphylococcus epidermidis bacteremia [R78.81, B95.7] 01/20/2025 Yes    Fluid overload [E87.70] 01/20/2025 Yes     Allergies:   Review of patient's allergies indicates:   Allergen Reactions    Codeine      Other reaction(s): throat tightness    Dilantin [phenytoin sodium extended]     Phenobarbital     Tegretol [carbamazepine]        Code Status: full    Vitals: Routine       Diet: cardiac diet    Activity: Activity as tolerated    Nursing Precautions: Fall, Seizure, and Pressure ulcer prevention    Bed/Surface: routine    Consults: PT to evaluate and treat- 5 times a week, OT to evaluate and treat- 5 times a week, and Wound Care    Oxygen: room air    Dialysis: Patient is not on dialysis.     Labs:  n/a                     Imaging: n/a    Miscellaneous Care:   Wound care:  Bilateral Lower Legs--Venous Stasis Dermatitis--clean legs with soap and water.  Dry thoroughly.  Apply Lac Hydrin to legs BID.  Apply the hydrocortisone lotion prescribed BID for 7 days    Bilateral inner Thighs--Abrasions-POA--clean with soap and water.  Pat Dry.  Apply Thin layer of Triad cream to area BID and PRN.    Bilateral Heels--Stage 1 pressure Injury--Clean with soap and water.  Pat dry.  Apply Heel foam dressings.  Change weekly or PRN.       IV Access: n/a     Medications: Discontinue all previous medication orders, if any. See new list below.  Current Discharge Medication List        START taking these medications    Details   amLODIPine (NORVASC) 5 MG tablet Take 1 tablet (5 mg total) by mouth once daily.    Comments: .      furosemide (LASIX) 20 MG tablet Take 1 tablet (20 mg total) by mouth once daily.      guaiFENesin 100 mg/5 ml (ROBITUSSIN) 100 mg/5 mL syrup Take 10 mLs (200 mg total) by mouth 3 (three) times daily as needed for Cough.      hydrocortisone 2.5 % cream Apply topically 2 (two) times daily. Apply to red areas of lower legs for 7 days           CONTINUE these medications which have NOT CHANGED    Details   atorvastatin (LIPITOR) 40 MG tablet TAKE 1 TABLET BY MOUTH EVERY DAY  Qty: 90 tablet, Refills: 0      cholecalciferol, vitamin D3, 10 mcg (400 unit) Cap capsule Take 1 tablet by mouth once daily.      clopidogrel (PLAVIX) 75 mg tablet Take 75 mg by mouth once daily.      cyanocobalamin (VITAMIN B-12) 1000 MCG tablet Take 1 tablet (1,000 mcg total) by mouth once daily.  Qty: 90 tablet, Refills: 3    Associated Diagnoses: B12 deficiency      DULoxetine (CYMBALTA) 20 MG capsule TAKE 2 CAPSULES BY MOUTH EVERY DAY  Qty: 180 capsule, Refills: 1    Associated Diagnoses: Neuropathic pain; Depression, unspecified depression type      estradioL (ESTRACE) 0.01 % (0.1 mg/gram) vaginal cream Place 1 g vaginally 3 (three) times a week.  Qty: 42.5 g, Refills: 3    Associated Diagnoses: Recurrent UTI      famotidine (PEPCID) 20 MG tablet Take 20 mg by mouth 2 (two) times daily as needed for Heartburn.      topiramate (TOPAMAX) 100 MG tablet Take 1 tablet (100 mg total) by mouth 2 (two) times daily.  Qty: 180 tablet, Refills: 3    Associated Diagnoses: Seizure disorder           STOP taking these medications       potassium chloride (MICRO-K) 10 MEQ CpSR Comments:   Reason for Stopping:         baloxavir marboxiL (XOFLUZA) 80 mg tablet Comments:    Reason for Stopping:         promethazine-dextromethorphan (PROMETHAZINE-DM) 6.25-15 mg/5 mL Syrp Comments:   Reason for Stopping:         propranoloL (INDERAL) 20 MG tablet Comments:   Reason for Stopping:         torsemide (DEMADEX) 5 MG Tab Comments:   Reason for Stopping:         albuterol-ipratropium (DUO-NEB) 2.5 mg-0.5 mg/3 mL nebulizer solution Comments:   Reason for Stopping:         cefdinir (OMNICEF) 300 MG capsule Comments:   Reason for Stopping:         doxycycline (VIBRA-TABS) 100 MG tablet Comments:   Reason for Stopping:             Follow up:    Follow-up Information       Piyush Sharif MD. Schedule an appointment as soon as possible for a visit today.    Specialty: Family Medicine  Why: within 1 week of SNF discharge  Contact information:  [address] [facility] [address]  [phone]                               Immunizations Administered as of 1/22/2025       Name Date Dose VIS Date Route Exp Date    COVID-19, MRNA, LN-S, PF (Pfizer) (Purple Cap) 9/27/2021  3:30 PM 0.3 mL 12/12/2020 Intramuscular 10/31/2021    Site: Right deltoid     Given By: Tona Eng LPN     : [employer]     Lot: [id number]     COVID-19, MRNA, LN-S, PF (Pfizer) (Purple Cap) 2/25/2021 11:37 AM 0.3 mL 12/12/2020 Intramuscular 6/30/2021    Site: Left deltoid     Given By: Ramona Skelton RN     : [employer]     Lot: [id number]     COVID-19, MRNA, LN-S, PF (Pfizer) (Purple Cap) 2/4/2021 11:41 AM 0.3 mL 12/12/2020 Intramuscular 5/31/2021    Site: Right deltoid     Given By: Elo Berg LPN     : [employer]     Lot: [id number]                  David Urbina MD

## 2025-01-17 NOTE — PLAN OF CARE
Problem: Infection  Goal: Absence of Infection Signs and Symptoms  Outcome: Progressing     Problem: Adult Inpatient Plan of Care  Goal: Plan of Care Review  Outcome: Progressing     Problem: Skin Injury Risk Increased  Goal: Skin Health and Integrity  Outcome: Progressing     Problem: Pneumonia  Goal: Fluid Balance  Outcome: Progressing  Goal: Resolution of Infection Signs and Symptoms  Outcome: Progressing  Goal: Effective Oxygenation and Ventilation  Outcome: Progressing     Problem: Gas Exchange Impaired  Goal: Optimal Gas Exchange  Outcome: Progressing     Problem: Mobility Impairment  Goal: Optimal Mobility  Outcome: Progressing

## 2025-01-17 NOTE — PLAN OF CARE
"Pharmacy Chemotherapy calculation:    DX: Bladder CA    Cycle 4  Previous treatment = C3 on 12/16/22    Regimen: Pembrolizumab    *Dosing Reference*  Pembrolizumab 200 mg IV over 30 minutes on Day 1  21-day cycle until disease progression or unacceptable toxicity or until up to 24 months of therapy has been completed  NCCN Guidelines for Bladder Cancer V.1.2022.  Sola AGUILLON, et al. N Engl J Med. 2017;376(11):0608-1989.  Venita M, et al. Eur J Cancer. 2020;131:68-75.    Allergies: Patient has no known allergies.   BP (!) 142/65   Pulse 63   Temp 36.4 °C (97.5 °F) (Temporal)   Resp 18   Ht 1.64 m (5' 4.57\")   Wt 79.5 kg (175 lb 4.3 oz)   SpO2 96%   BMI 29.56 kg/m²   Body surface area is 1.9 meters squared.    Labs 1/6/23  ANC~ 5730 Plt = 206k   Hgb = 14.5     TSH = pending Free T4 = pending    Labs 1/3/23  SCr = 0.81 mg/dL CrCl ~ 78 mL/min   LFT's = WNLs  TBili = 0.5     Pembrolizumab (Keytruda) 200 mg fixed dose  No calculation required, okay to treat with final dose = 200 mg IV    Reggie Ivy, PharmD  " ME assessment completed with pt and  at bedside. Confirmed demographics. Uses walker and shower chair at home, no other DME at this time. [facility] was supposed to start care this week, but could not due to hospitalization. Will need new orders for HH at [place].   [facility] - Med/Surg  Initial Discharge Assessment       Primary Care Provider: Piyush Sharif MD    Admission Diagnosis: UTI (urinary tract infection) [N39.0]  Influenza [J11.1]    Admission Date: 1/16/2025  Expected Discharge Date: 1/20/2025    Transition of Care Barriers: None    [facility] [employer] [facility] / Plan: [employer] CHOICES / Product Type: Medicare Advantage /     Extended Emergency Contact Information  Primary Emergency Contact: Ap John  Address: [address]           [place] [address] [address] [facility]  Home Phone: [phone]  Mobile Phone: [phone]  Relation: Spouse  Preferred language: English   needed? No  Secondary Emergency Contact: Amparo Vega  Address: [place] [address] [place]  Mobile Phone: [phone]  Relation: Sister  Preferred language: English   needed? No    Discharge Plan A: Home Health  Discharge Plan B: Home with family      [id number] [place] - [address]  [address]  [place] [address]  Phone: [phone] Fax: [phone]    [facility] - [place], [place] - [address]  [address] [address]  Phone: [phone] Fax: [phone]    [facility] [place] [address]  [address] [address]  Phone: [phone] Fax: [phone]    [facility] - [place], [place] - [address]  [address]  [place] [address]  Phone: [phone] Fax: [phone]    [facility]  [address] [address]  Phone:  [phone] Fax: [phone]      Initial Assessment (most recent)       Adult Discharge Assessment - 01/17/25 1414          Discharge Assessment    Assessment Type Discharge Planning Assessment     Confirmed/corrected address, phone number and insurance Yes     Confirmed Demographics Correct on Facesheet     Source of Information patient;family     Communicated ROGERIO with patient/caregiver Date not available/Unable to determine     Reason For Admission UTI     People in Home spouse     Do you expect to return to your current living situation? Yes     Do you have help at home or someone to help you manage your care at home? Yes     Who are your caregiver(s) and their phone number(s)?      Prior to hospitilization cognitive status: Alert/Oriented     Current cognitive status: Alert/Oriented     Equipment Currently Used at Home walker, rolling;shower chair     Readmission within 30 days? No     Patient currently being followed by outpatient case management? No     Do you currently have service(s) that help you manage your care at home? Yes     Name and Contact number of agency [facility]     Is the pt/caregiver preference to resume services with current agency Yes     Do you take prescription medications? Yes     Do you have prescription coverage? Yes     Coverage [facility]'s health mcare     Do you have any problems affording any of your prescribed medications? No     Is the patient taking medications as prescribed? yes     Who is going to help you get home at discharge?      How do you get to doctors appointments? family or friend will provide     Are you on dialysis? No     Do you take coumadin? No     Discharge Plan A Home Health     Discharge Plan B Home with family     DME Needed Upon Discharge  none     Discharge Plan discussed with: Patient;Spouse/sig other     Name(s) and Number(s) garima     Transition of Care Barriers None

## 2025-01-17 NOTE — CONSULTS
[facility]/Surg  Wound Care    Patient Name:  Alyssa John   MRN:  [id number]  Date: 1/17/2025  Diagnosis: Influenza A    History:     Past Medical History:   Diagnosis Date    Arthritis     Coronary artery disease     Encounter for blood transfusion     Epilepsy     GERD (gastroesophageal reflux disease)     Headache     Hypertension     MI, old     MS (multiple sclerosis)     Seizures     epilepsy       Social History     Socioeconomic History    Marital status:    Tobacco Use    Smoking status: Former     Current packs/day: 0.00     Average packs/day: 2.0 packs/day for 42.0 years (84.0 ttl pk-yrs)     Types: Cigarettes     Start date: 1968     Quit date: 2010     Years since quitting: 15.0     Passive exposure: Past    Smokeless tobacco: Never   Substance and Sexual Activity    Alcohol use: Not Currently     Comment: rarely    Drug use: No    Sexual activity: Not Currently     Partners: Male     Social Drivers of Health     Financial Resource Strain: Patient Declined (1/17/2025)    Overall Financial Resource Strain (CARDIA)     Difficulty of Paying Living Expenses: Patient declined   Food Insecurity: Patient Declined (1/17/2025)    Hunger Vital Sign     Worried About Running Out of Food in the Last Year: Patient declined     Ran Out of Food in the Last Year: Patient declined   Transportation Needs: Patient Declined (1/17/2025)    TRANSPORTATION NEEDS     Transportation : Patient declined   Physical Activity: Inactive (10/16/2024)    Exercise Vital Sign     Days of Exercise per Week: 0 days     Minutes of Exercise per Session: 0 min   Stress: Patient Declined (1/17/2025)    Citizen of [place] [employer] - Occupational Stress Questionnaire     Feeling of Stress : Patient declined   Housing Stability: Patient Declined (1/17/2025)    Housing Stability Vital Sign     Unable to Pay for Housing in the Last Year: Patient declined     Homeless in the Last Year: Patient declined        Precautions:     Allergies as of 01/16/2025 - Reviewed 01/16/2025   Allergen Reaction Noted    Codeine  04/02/2009    Dilantin [phenytoin sodium extended]  12/12/2011    Phenobarbital      Tegretol [carbamazepine]  09/16/2015       [facility] Assessment Details/Treatment     Pt seen by Wound care for consultation to Bilateral lower extremities.  Skin assessment performed.  Bilateral heels noted with non-blanchable redness and bogginess.  Applied heel foam dressings.  Pillow placed under legs to float heels.  Right leg noted with moist redness and excoriation.  Small dry dermatitis noted to posterior right leg.  Applied Triad cream.  Left leg noted with moist redness with excoriation to posterior leg. Applied Triad cream.  Recommend Lac-Hydrin to bilateral legs BID.  Bilateral inner thighs noted with red, liner abrasions.  Pt  reported abrasions occurred when he was helping the patient get up at home.  Buttocks noted with moisture discoloration.  Applied Triad cream to area. Educated Pt and  on Pressure injury prevention.  Elevate bilateral heels on pillows.  Turn frequently in bed.  Verbalized understanding.     01/17/25 1030   WOCN Assessment   WOCN Total Time (mins) 50   Visit Date 01/17/25   Visit Time 1030   Consult Type New   [facility]   Wound moisture;skin tear   Intervention assessed;changed;applied;chart review;orders   Teaching on-going        Wound 09/18/24 1831 Venous Ulcer Right lower Leg   Date First Assessed/Time First Assessed: 09/18/24 1831   Present on Original Admission: Yes  Primary Wound Type: Venous Ulcer  Side: Right  Orientation: lower  Location: Leg  Removal Indication and Assessment: not present upon hospital arrival   Wound Image     Dressing Appearance Open to air   Drainage Amount None   Drainage Characteristics/Odor No odor   Appearance Red;Maroon;Tan;Dry   Periwound Area Intact;Hemosiderin Staining;Redness;Moist;Ecchymotic   Wound Edges Undefined   Care Cleansed  with:;Soap and water   Dressing Applied  (Triad cream--ordered Lac-Hydrin lotion BID)   Periwound Care Dry periwound area maintained;Hydrocolloid barrier applied;Moisture barrier applied        Wound 09/18/24 1831 Venous Ulcer Left lower Leg   Date First Assessed/Time First Assessed: 09/18/24 1831   Present on Original Admission: Yes  Primary Wound Type: Venous Ulcer  Side: Left  Orientation: lower  Location: Leg  Removal Indication and Assessment: not present upon hospital arrival   Wound Image    Dressing Appearance Open to air   Drainage Amount None   Drainage Characteristics/Odor No odor   Appearance Intact;Pink;Red;Moist;Ecchymotic   Periwound Area Intact;Ecchymotic;Excoriated;Moist;Redness   Care Cleansed with:;Soap and water   Dressing Applied  (Triad cream--ordered Lac-Hydrin BID)   Periwound Care Cleansed with pH balanced cleanser;Dry periwound area maintained;Moisture barrier applied        Wound 09/18/24 Venous Ulcer Left lower;posterior Calf   Date First Assessed: 09/18/24   Present on Original Admission: Yes  Primary Wound Type: Venous Ulcer  Side: Left  Orientation: lower;posterior  Location: Calf  Removal Indication and Assessment: not present upon hospital arrival   Wound Image    Dressing Appearance Open to air   Drainage Amount None   Drainage Characteristics/Odor No odor   Appearance Red;Maroon;Dry;Moist;Ecchymotic   Periwound Area Intact;Ecchymotic;Excoriated;Hemosiderin Staining;Maroon;Moist;Redness   Wound Edges Undefined   Care Cleansed with:;Soap and water   Dressing Applied  (Triad cream--ordered Lac Hydrin BID)   Periwound Care Cleansed with pH balanced cleanser;Dry periwound area maintained;Moisture barrier applied        Wound 09/18/24 Venous Ulcer Right lower;posterior Calf   Date First Assessed: 09/18/24   Present on Original Admission: Yes  Primary Wound Type: Venous Ulcer  Side: Right  Orientation: lower;posterior  Location: Calf  Removal Indication and Assessment: not present upon  hospital arrival   Wound Image    Dressing Appearance Open to air   Drainage Amount None   Drainage Characteristics/Odor No odor   Appearance Red;Maroon;Intact;Dry;Moist;Ecchymotic   Periwound Area Intact;Ecchymotic;Excoriated;Hemosiderin Staining;Maroon;Moist;Redness   Wound Edges Undefined   Care Cleansed with:;Soap and water   Dressing Applied  (Triad cream--Ordered Lac-hydrin lotion BID)   Periwound Care Cleansed with pH balanced cleanser;Dry periwound area maintained;Moisture barrier applied        Wound 09/18/24 Pressure Injury Left posterior Heel   Date First Assessed: 09/18/24   Present on Original Admission: Yes  Primary Wound Type: Pressure Injury  Side: Left  Orientation: posterior  Location: Heel   Wound Image    Pressure Injury Stage 1   Dressing Appearance Open to air   Drainage Amount None   Drainage Characteristics/Odor No odor   Appearance Intact;Red;Maroon;Dry;Ecchymotic   Periwound Area Intact;Dry;Excoriated;Maroon;Redness   Wound Edges Undefined   Care Cleansed with:;Wound cleanser   Dressing Foam   Periwound Care Cleansed with pH balanced cleanser;Dry periwound area maintained        Wound 09/18/24 Pressure Injury Right posterior Heel   Date First Assessed: 09/18/24   Present on Original Admission: Yes  Primary Wound Type: Pressure Injury  Side: Right  Orientation: posterior  Location: Heel   Wound Image    Pressure Injury Stage 1   Dressing Appearance Open to air   Drainage Amount None   Drainage Characteristics/Odor No odor   Appearance Intact;Red;Maroon;Dry;Ecchymotic   Periwound Area Intact;Dry;Excoriated;Maroon;Redness   Wound Edges Undefined   Care Cleansed with:;Wound cleanser   Dressing Foam   Periwound Care Cleansed with pH balanced cleanser;Dry periwound area maintained        Wound 01/17/25 0743 Abrasion(s) Left medial Thigh   Date First Assessed/Time First Assessed: 01/17/25 0743   Present on Original Admission: Yes  Primary Wound Type: Abrasion(s)  Side: Left  Orientation: medial   Location: Thigh   Wound Image     Dressing Appearance Open to air   Drainage Amount None   Drainage Characteristics/Odor No odor   Appearance Red;Maroon;Dry;Ecchymotic   Periwound Area Intact;Dry   Wound Edges Defined   Care Cleansed with:;Soap and water   Dressing Applied  (Triad cream)   Periwound Care Cleansed with pH balanced cleanser;Dry periwound area maintained;Moisture barrier applied       Wound Care Recommendations:  Bilateral Lower Legs--Venous Stasis Dermatitis--clean legs with soap and water.  Dry thoroughly.  Apply Lac Hydrin to legs BID.      Bilateral inner Thighs--Abrasions-POA--clean with soap and water.  Pat Dry.  Apply Thin layer of Triad cream to area BID and PRN.    Bilateral Heels--Stage 1 pressure Injury--Clean with soap and water.  Pat dry.  Apply Heel foam dressings.  Change weekly or PRN.      01/17/2025

## 2025-01-17 NOTE — SUBJECTIVE & OBJECTIVE
Interval History:   Patient is seen and Examined at multidisciplinary rounds, has intermittent low-grade fever, feel weak for while.  No chest pain.  Patient has long history of multiple sclerosis currently not on any treatment.  Blood culture positive for GPC.       Review of Systems   Constitutional:  Positive for fatigue.     Objective:     Vital Signs (Most Recent):  Temp: 99 °F (37.2 °C) (01/17/25 0712)  Pulse: 68 (01/17/25 0712)  Resp: 18 (01/17/25 0712)  BP: (!) 118/56 (01/17/25 0712)  SpO2: 95 % (01/17/25 0831) Vital Signs (24h Range):  Temp:  [98.5 °F (36.9 °C)-99.9 °F (37.7 °C)] 99 °F (37.2 °C)  Pulse:  [59-85] 68  Resp:  [18-20] 18  SpO2:  [90 %-100 %] 95 %  BP: (108-186)/(51-95) 118/56     Weight: 93.6 kg (206 lb 5.6 oz)  Body mass index is 36.55 kg/m².    Intake/Output Summary (Last 24 hours) at 1/17/2025 1137  Last data filed at 1/16/2025 1950  Gross per 24 hour   Intake 250 ml   Output --   Net 250 ml         Physical Exam  Constitutional:       Appearance: Normal appearance. She is ill-appearing.   HENT:      Head: Normocephalic and atraumatic.      Nose: Nose normal.   Eyes:      Extraocular Movements: Extraocular movements intact.      Pupils: Pupils are equal, round, and reactive to light.   Cardiovascular:      Rate and Rhythm: Normal rate and regular rhythm.      Heart sounds: No murmur heard.  Pulmonary:      Effort: Pulmonary effort is normal.      Breath sounds: Normal breath sounds.   Abdominal:      General: Abdomen is flat.      Palpations: Abdomen is soft.   Musculoskeletal:         General: Swelling present. Normal range of motion.      Cervical back: Normal range of motion and neck supple.   Skin:     General: Skin is warm and dry.   Neurological:      General: No focal deficit present.      Mental Status: She is alert and oriented to person, place, and time.   Psychiatric:         Mood and Affect: Mood normal.             Significant Labs: All pertinent labs within the past 24 hours  have been reviewed.  CBC:   Recent Labs   Lab 01/15/25  1450 01/16/25  1513 01/17/25  0425   WBC 6.85 6.89 5.14   HGB 13.1 12.4 10.9*   HCT 42.4 39.4 33.5*    155 138*     CMP:   Recent Labs   Lab 01/15/25  1450 01/16/25  1513 01/17/25  0425    137 138   K 3.4* 3.2* 3.1*   * 108 108   CO2 18* 21* 20*   GLU 93 127* 101   BUN 11 16 16   CREATININE 0.7 0.9 0.9   CALCIUM 8.6* 8.0* 7.9*   PROT 7.2 6.7 5.9*   ALBUMIN 3.6 3.8 3.4*   BILITOT 0.5 0.6 0.4   ALKPHOS 150 123 107   AST 16 22 21   ALT 15 16 15   ANIONGAP 10 8 10       Significant Imaging: I have reviewed all pertinent imaging results/findings within the past 24 hours.  I have reviewed and interpreted all pertinent imaging results/findings within the past 24 hours.    Scheduled Meds:   atorvastatin  40 mg Oral Daily    azithromycin  250 mg Oral Daily    cefTRIAXone (Rocephin) IV (PEDS and ADULTS)  1 g Intravenous Q24H    clopidogreL  75 mg Oral Daily    cyanocobalamin  1,000 mcg Oral Daily    DULoxetine  30 mg Oral Daily    famotidine  20 mg Oral BID    furosemide (LASIX) injection  40 mg Intravenous Q12H    oseltamivir  30 mg Oral BID    propranoloL  20 mg Oral Daily    topiramate  100 mg Oral BID    vancomycin (VANCOCIN) IV (PEDS and ADULTS)  2,000 mg Intravenous Q24H     Continuous Infusions:  PRN Meds:.  Current Facility-Administered Medications:     acetaminophen, 650 mg, Oral, Q4H PRN    albuterol-ipratropium, 3 mL, Nebulization, Q6H PRN    magnesium oxide, 800 mg, Oral, PRN    magnesium oxide, 800 mg, Oral, PRN    melatonin, 6 mg, Oral, Nightly PRN    ondansetron, 4 mg, Intravenous, Q6H PRN    potassium bicarbonate, 35 mEq, Oral, PRN    potassium bicarbonate, 50 mEq, Oral, PRN    potassium bicarbonate, 60 mEq, Oral, PRN    potassium, sodium phosphates, 2 packet, Oral, PRN    potassium, sodium phosphates, 2 packet, Oral, PRN    potassium, sodium phosphates, 2 packet, Oral, PRN    sodium chloride 0.9%, 10 mL, Intravenous, PRN    Pharmacy to  dose Vancomycin consult, , , Once **AND** vancomycin - pharmacy to dose, , Intravenous, pharmacy to manage frequency    X-Ray Chest AP Portable    Result Date: 1/16/2025  EXAMINATION: XR CHEST AP PORTABLE CLINICAL HISTORY: Influenza; FINDINGS: Portable chest with comparison chest x-ray 01/15/2025.  Normal cardiomediastinal silhouette.There is mild bilateral perihilar interstitial thickening.  No confluent airspace opacities.  Pulmonary vasculature is normal. No acute osseous abnormality.     Mild bilateral perihilar interstitial thickening likely reflects infection/inflammation of the lower respiratory tract. Electronically signed by: Gage Heath Date:    01/16/2025 Time:    16:06    X-Ray Chest PA And Lateral    Result Date: 1/15/2025  EXAMINATION: XR CHEST PA AND LATERAL CLINICAL HISTORY: Subacute cough TECHNIQUE: PA and lateral views of the chest were performed. COMPARISON: 09/16/2024 FINDINGS: Stable cardiomediastinal silhouette.  No focal airspace consolidation, pleural effusion, or pneumothorax.  No acute osseous abnormality.     No acute cardiopulmonary process. Electronically signed by: Mario Hoyt Date:    01/15/2025 Time:    15:16  - pulls last radiology orders      Microbiology Results (last 7 days)       Procedure Component Value Units Date/Time    Rapid Organism ID by PCR (from Blood culture) [[id number]]  (Abnormal) Collected: 01/16/25 1510    Order Status: Completed Updated: 01/17/25 1111     Enterococcus faecalis Not Detected     Enterococcus faecium Not Detected     Listeria monocytogenes Not Detected     Staphylococcus spp. See species for ID     Staphylococcus aureus Not Detected     Staphylococcus epidermidis Detected     Staphylococcus lugdunensis Not Detected     Streptococcus species Not Detected     Streptococcus agalactiae Not Detected     Streptococcus pneumoniae Not Detected     Streptococcus pyogenes Not Detected     Acinetobacter calcoaceticus/baumannii complex Not Detected      Bacteroides fragilis Not Detected     Enterobacterales Not Detected     Enterobacter cloacae complex Not Detected     Escherichia coli Not Detected     Klebsiella aerogenes Not Detected     Klebsiella oxytoca Not Detected     Klebsiella pneumoniae group Not Detected     Proteus Not Detected     Salmonella sp Not Detected     Serratia marcescens Not Detected     Haemophilus influenzae Not Detected     Neisseria meningtidis Not Detected     Pseudomonas aeruginosa Not Detected     Stenotrophomonas maltophilia Not Detected     Candida albicans Not Detected     Candida auris Not Detected     Candida glabrata Not Detected     Candida krusei Not Detected     Candida parapsilosis Not Detected     Candida tropicalis Not Detected     Cryptococcus neoformans/gattii Not Detected     CTX-M (ESBL ) Test not applicable     IMP (Carbapenem resistant) Test not applicable     KPC resistance gene (Carbapenem resistant) Test not applicable     mcr-1  Test not applicable     mec A/C  Detected     mec A/C and MREJ (MRSA) gene Test not applicable     NDM (Carbapenem resistant) Test not applicable     OXA-48-like (Carbapenem resistant) Test not applicable     van A/B (VRE gene) Test not applicable     VIM (Carbapenem resistant) Test not applicable    Narrative:      Aerobic and anaerobic    Blood culture x two cultures. Draw prior to antibiotics. [[id number]]  (Abnormal) Collected: 01/16/25 1510    Order Status: Completed Specimen: Blood Updated: 01/17/25 1105     Blood Culture, Routine Gram stain aer bottle: Gram positive cocci in clusters resembling Staph      Results called to and read back by:Bobby Schwab RN on [facility]      STAPHYLOCOCCUS EPIDERMIDIS    Narrative:      Aerobic and anaerobic  Collection has been rescheduled by HOLLY at 01/16/2025 15:06 Reason:   Patient unavailable ekg  Collection has been rescheduled by HOLLY at 01/16/2025 15:25 Reason:   Done  Collection has been rescheduled by HOLLY at 01/16/2025 15:06 Reason:    Patient unavailable ekg  Collection has been rescheduled by [email or web address] at 01/16/2025 15:25 Reason:   Done    Urine culture [[id number]] Collected: 01/16/25 1533    Order Status: Completed Specimen: Urine Updated: 01/17/25 0825     Urine Culture, Routine No growth to date    Narrative:      Specimen Source->Urine    Blood culture x two cultures. Draw prior to antibiotics. [[id number]] Collected: 01/16/25 1523    Order Status: Completed Specimen: Blood from Peripheral, Forearm, Left Updated: 01/16/25 2317     Blood Culture, Routine No Growth to date    Narrative:      Aerobic and anaerobic  Collection has been rescheduled by ZJ1 at 01/16/2025 15:06 Reason:   Patient unavailable ekg  Collection has been rescheduled by ZJ1 at 01/16/2025 15:25 Reason:   Done  Collection has been rescheduled by ZJ1 at 01/16/2025 15:06 Reason:   Patient unavailable ekg  Collection has been rescheduled by ZJ1 at 01/16/2025 15:25 Reason:   Done    Clostridium difficile EIA [[id number]]     Order Status: No result Specimen: Stool

## 2025-01-17 NOTE — PROGRESS NOTES
Pharmacokinetic Initial Assessment: IV Vancomycin    Assessment/Plan:    Initiate intravenous vancomycin with dose of 2000 mg q24 hours  Desired empiric serum trough concentration is 15 to 20 mcg/mL  Draw vancomycin trough level 60 min prior to third dose on 1/19 at approximately 1200  Pharmacy will continue to follow and monitor vancomycin.      Please contact pharmacy at extension [phone] with any questions regarding this assessment.     Thank you for the consult,   Holly Serrano       Patient brief summary:  Alyssa John is a 74 y.o. female initiated on antimicrobial therapy with IV Vancomycin for treatment of suspected bacteremia    Drug Allergies:   Review of patient's allergies indicates:   Allergen Reactions    Codeine      Other reaction(s): throat tightness    Dilantin [phenytoin sodium extended]     Phenobarbital     Tegretol [carbamazepine]        Actual Body Weight:   93.6    Renal Function:   Estimated Creatinine Clearance: 59.6 mL/min (based on SCr of 0.9 mg/dL).,     Dialysis Method (if applicable):  N/A    CBC (last 72 hours):  Recent Labs   Lab Result Units 01/15/25  1450 01/16/25  1513 01/17/25  0425   WBC K/uL 6.85 6.89 5.14   Hemoglobin g/dL 13.1 12.4 10.9*   Hematocrit % 42.4 39.4 33.5*   Platelets K/uL 185 155 138*   Gran % %  --  77.6* 61.8   Lymph % %  --  13.9* 25.1   Mono % %  --  7.5 11.9   Eosinophil % %  --  0.1 0.4   Basophil % %  --  0.6 0.4   Differential Method   --  Automated Automated       Metabolic Panel (last 72 hours):  Recent Labs   Lab Result Units 01/15/25  1450 01/16/25  1513 01/16/25  1533 01/17/25  0425   Sodium mmol/L 142 137  --  138   Potassium mmol/L 3.4* 3.2*  --  3.1*   Chloride mmol/L 114* 108  --  108   CO2 mmol/L 18* 21*  --  20*   Glucose mg/dL 93 127*  --  101   Glucose, UA   --   --  Negative  --    BUN mg/dL 11 16  --  16   Creatinine mg/dL 0.7 0.9  --  0.9   Albumin g/dL 3.6 3.8  --  3.4*   Total Bilirubin mg/dL 0.5 0.6  --  0.4   Alkaline  "Phosphatase U/L 150 123  --  107   AST U/L 16 22  --  21   ALT U/L 15 16  --  15   Magnesium mg/dL  --  1.6  --  1.6       Drug levels (last 3 results):  No results for input(s): "VANCOMYCINRA", "VANCORANDOM", "VANCOMYCINPE", "VANCOPEAK", "VANCOMYCINTR", "VANCOTROUGH" in the last 72 hours.    Microbiologic Results:  Microbiology Results (last 7 days)       Procedure Component Value Units Date/Time    Blood culture x two cultures. Draw prior to antibiotics. [[id number]]  (Abnormal) Collected: 01/16/25 1510    Order Status: Completed Specimen: Blood Updated: 01/17/25 1105     Blood Culture, Routine Gram stain aer bottle: Gram positive cocci in clusters resembling Staph      Results called to and read back by:Bobby Schwab RN on [facility]      STAPHYLOCOCCUS EPIDERMIDIS    Narrative:      Aerobic and anaerobic  Collection has been rescheduled by ZJ1 at 01/16/2025 15:06 Reason:   Patient unavailable ekg  Collection has been rescheduled by ZJ1 at 01/16/2025 15:25 Reason:   Done  Collection has been rescheduled by ZJ1 at 01/16/2025 15:06 Reason:   Patient unavailable ekg  Collection has been rescheduled by ZJ1 at 01/16/2025 15:25 Reason:   Done    Rapid Organism ID by PCR (from Blood culture) [[id number]] Resulted: 01/17/25 1105    Order Status: No result Updated: 01/17/25 1105    Urine culture [[id number]] Collected: 01/16/25 1533    Order Status: Completed Specimen: Urine Updated: 01/17/25 0825     Urine Culture, Routine No growth to date    Narrative:      Specimen Source->Urine    Blood culture x two cultures. Draw prior to antibiotics. [[id number]] Collected: 01/16/25 1523    Order Status: Completed Specimen: Blood from Peripheral, Forearm, Left Updated: 01/16/25 2317     Blood Culture, Routine No Growth to date    Narrative:      Aerobic and anaerobic  Collection has been rescheduled by ZJ1 at 01/16/2025 15:06 Reason:   Patient unavailable ekg  Collection has been rescheduled by ZJ1 at 01/16/2025 15:25 Reason: "   Done  Collection has been rescheduled by HOLLY at 01/16/2025 15:06 Reason:   Patient unavailable ekg  Collection has been rescheduled by HOLLY at 01/16/2025 15:25 Reason:   Done    Clostridium difficile EIA [[id number]]     Order Status: No result Specimen: Stool

## 2025-01-17 NOTE — PROGRESS NOTES
[facility] Medicine  Progress Note    Patient Name: Alyssa John  MRN: [id number]  Patient Class: IP- Inpatient   Admission Date: 1/16/2025  Length of Stay: 0 days  Attending Physician: Carl Frausto MD  Primary Care Provider: Piyush Sharif MD        Subjective     Principal Problem:Influenza A        HPI:  74 year old female with a past medical history of GERD, Epilepsy, hypertension, MI, MS, CAD was recently tested positive for influenza A.  She reports to the emergency room with reports of weakness and diarrhea.  Reports of decreased mobility and activity of daily living for the last 7-8 months. Patient needs to be assisted out of wheelchair by significant other. She was diagnosed with urinary tract infection about 17 days ago. She recently treated with omnicef and cipro.inished course of antibiotics. She then developed cough and congestion. She was diagnosed with influenza yesterday. Now  is unable to assist patient and transfers. She reports increased generalized weakness especially to both legs. No trouble with vision or speech. There was some chills with no definite fever. Urinary symptoms resolved.     In the ER, potassium 3.2, glucose 127, calcium 8, troponin I high sensitivity 29.6, influenza A positive UA with 1+ protein, 1+ blood, 2+ leukocytes 96 WBCs urine culture sent, COVID negative lactic 1.56 chest x-ray Mild bilateral perihilar interstitial thickening     Admit to hospital medicine for UTI after failing outpatient therapy.          Overview/Hospital Course:  74-year-old female with a history of multiple sclerosis limited mobility, recurrent UTI, CAD on Plavix presenting here for urinary tract infection/ influenza a positive and diarrhea.  Started on empiric Rocephin azithromycin, Tamiflu.  Patient also looks severe fluid overload, started on IV Lasix.  2D echo is pending.  Patient also found to 1/2 have staph epi bacteremia, most likely  contamination.  Follow up repeat blood culture.     Interval History:   Patient is seen and Examined at multidisciplinary rounds, has intermittent low-grade fever, feel weak for while.  No chest pain.  Patient has long history of multiple sclerosis currently not on any treatment.  Blood culture positive for GPC.       Review of Systems   Constitutional:  Positive for fatigue.     Objective:     Vital Signs (Most Recent):  Temp: 99 °F (37.2 °C) (01/17/25 0712)  Pulse: 68 (01/17/25 0712)  Resp: 18 (01/17/25 0712)  BP: (!) 118/56 (01/17/25 0712)  SpO2: 95 % (01/17/25 0831) Vital Signs (24h Range):  Temp:  [98.5 °F (36.9 °C)-99.9 °F (37.7 °C)] 99 °F (37.2 °C)  Pulse:  [59-85] 68  Resp:  [18-20] 18  SpO2:  [90 %-100 %] 95 %  BP: (108-186)/(51-95) 118/56     Weight: 93.6 kg (206 lb 5.6 oz)  Body mass index is 36.55 kg/m².    Intake/Output Summary (Last 24 hours) at 1/17/2025 1137  Last data filed at 1/16/2025 1950  Gross per 24 hour   Intake 250 ml   Output --   Net 250 ml         Physical Exam  Constitutional:       Appearance: Normal appearance. She is ill-appearing.   HENT:      Head: Normocephalic and atraumatic.      Nose: Nose normal.   Eyes:      Extraocular Movements: Extraocular movements intact.      Pupils: Pupils are equal, round, and reactive to light.   Cardiovascular:      Rate and Rhythm: Normal rate and regular rhythm.      Heart sounds: No murmur heard.  Pulmonary:      Effort: Pulmonary effort is normal.      Breath sounds: Normal breath sounds.   Abdominal:      General: Abdomen is flat.      Palpations: Abdomen is soft.   Musculoskeletal:         General: Swelling present. Normal range of motion.      Cervical back: Normal range of motion and neck supple.   Skin:     General: Skin is warm and dry.   Neurological:      General: No focal deficit present.      Mental Status: She is alert and oriented to person, place, and time.   Psychiatric:         Mood and Affect: Mood normal.             Significant  Labs: All pertinent labs within the past 24 hours have been reviewed.  CBC:   Recent Labs   Lab 01/15/25  1450 01/16/25  1513 01/17/25  0425   WBC 6.85 6.89 5.14   HGB 13.1 12.4 10.9*   HCT 42.4 39.4 33.5*    155 138*     CMP:   Recent Labs   Lab 01/15/25  1450 01/16/25  1513 01/17/25  0425    137 138   K 3.4* 3.2* 3.1*   * 108 108   CO2 18* 21* 20*   GLU 93 127* 101   BUN 11 16 16   CREATININE 0.7 0.9 0.9   CALCIUM 8.6* 8.0* 7.9*   PROT 7.2 6.7 5.9*   ALBUMIN 3.6 3.8 3.4*   BILITOT 0.5 0.6 0.4   ALKPHOS 150 123 107   AST 16 22 21   ALT 15 16 15   ANIONGAP 10 8 10       Significant Imaging: I have reviewed all pertinent imaging results/findings within the past 24 hours.  I have reviewed and interpreted all pertinent imaging results/findings within the past 24 hours.    Scheduled Meds:   atorvastatin  40 mg Oral Daily    azithromycin  250 mg Oral Daily    cefTRIAXone (Rocephin) IV (PEDS and ADULTS)  1 g Intravenous Q24H    clopidogreL  75 mg Oral Daily    cyanocobalamin  1,000 mcg Oral Daily    DULoxetine  30 mg Oral Daily    famotidine  20 mg Oral BID    furosemide (LASIX) injection  40 mg Intravenous Q12H    oseltamivir  30 mg Oral BID    propranoloL  20 mg Oral Daily    topiramate  100 mg Oral BID    vancomycin (VANCOCIN) IV (PEDS and ADULTS)  2,000 mg Intravenous Q24H     Continuous Infusions:  PRN Meds:.  Current Facility-Administered Medications:     acetaminophen, 650 mg, Oral, Q4H PRN    albuterol-ipratropium, 3 mL, Nebulization, Q6H PRN    magnesium oxide, 800 mg, Oral, PRN    magnesium oxide, 800 mg, Oral, PRN    melatonin, 6 mg, Oral, Nightly PRN    ondansetron, 4 mg, Intravenous, Q6H PRN    potassium bicarbonate, 35 mEq, Oral, PRN    potassium bicarbonate, 50 mEq, Oral, PRN    potassium bicarbonate, 60 mEq, Oral, PRN    potassium, sodium phosphates, 2 packet, Oral, PRN    potassium, sodium phosphates, 2 packet, Oral, PRN    potassium, sodium phosphates, 2 packet, Oral, PRN    sodium  chloride 0.9%, 10 mL, Intravenous, PRN    Pharmacy to dose Vancomycin consult, , , Once **AND** vancomycin - pharmacy to dose, , Intravenous, pharmacy to manage frequency    X-Ray Chest AP Portable    Result Date: 1/16/2025  EXAMINATION: XR CHEST AP PORTABLE CLINICAL HISTORY: Influenza; FINDINGS: Portable chest with comparison chest x-ray 01/15/2025.  Normal cardiomediastinal silhouette.There is mild bilateral perihilar interstitial thickening.  No confluent airspace opacities.  Pulmonary vasculature is normal. No acute osseous abnormality.     Mild bilateral perihilar interstitial thickening likely reflects infection/inflammation of the lower respiratory tract. Electronically signed by: Gage Heath Date:    01/16/2025 Time:    16:06    X-Ray Chest PA And Lateral    Result Date: 1/15/2025  EXAMINATION: XR CHEST PA AND LATERAL CLINICAL HISTORY: Subacute cough TECHNIQUE: PA and lateral views of the chest were performed. COMPARISON: 09/16/2024 FINDINGS: Stable cardiomediastinal silhouette.  No focal airspace consolidation, pleural effusion, or pneumothorax.  No acute osseous abnormality.     No acute cardiopulmonary process. Electronically signed by: Mario Hoyt Date:    01/15/2025 Time:    15:16  - pulls last radiology orders      Microbiology Results (last 7 days)       Procedure Component Value Units Date/Time    Rapid Organism ID by PCR (from Blood culture) [[id number]]  (Abnormal) Collected: 01/16/25 1510    Order Status: Completed Updated: 01/17/25 1111     Enterococcus faecalis Not Detected     Enterococcus faecium Not Detected     Listeria monocytogenes Not Detected     Staphylococcus spp. See species for ID     Staphylococcus aureus Not Detected     Staphylococcus epidermidis Detected     Staphylococcus lugdunensis Not Detected     Streptococcus species Not Detected     Streptococcus agalactiae Not Detected     Streptococcus pneumoniae Not Detected     Streptococcus pyogenes Not Detected     Acinetobacter  calcoaceticus/baumannii complex Not Detected     Bacteroides fragilis Not Detected     Enterobacterales Not Detected     Enterobacter cloacae complex Not Detected     Escherichia coli Not Detected     Klebsiella aerogenes Not Detected     Klebsiella oxytoca Not Detected     Klebsiella pneumoniae group Not Detected     Proteus Not Detected     Salmonella sp Not Detected     Serratia marcescens Not Detected     Haemophilus influenzae Not Detected     Neisseria meningtidis Not Detected     Pseudomonas aeruginosa Not Detected     Stenotrophomonas maltophilia Not Detected     Candida albicans Not Detected     Candida auris Not Detected     Candida glabrata Not Detected     Candida krusei Not Detected     Candida parapsilosis Not Detected     Candida tropicalis Not Detected     Cryptococcus neoformans/gattii Not Detected     CTX-M (ESBL ) Test not applicable     IMP (Carbapenem resistant) Test not applicable     KPC resistance gene (Carbapenem resistant) Test not applicable     mcr-1  Test not applicable     mec A/C  Detected     mec A/C and MREJ (MRSA) gene Test not applicable     NDM (Carbapenem resistant) Test not applicable     OXA-48-like (Carbapenem resistant) Test not applicable     van A/B (VRE gene) Test not applicable     VIM (Carbapenem resistant) Test not applicable    Narrative:      Aerobic and anaerobic    Blood culture x two cultures. Draw prior to antibiotics. [[id number]]  (Abnormal) Collected: 01/16/25 1510    Order Status: Completed Specimen: Blood Updated: 01/17/25 1105     Blood Culture, Routine Gram stain aer bottle: Gram positive cocci in clusters resembling Staph      Results called to and read back by:Bobby Schwab RN on [facility]      STAPHYLOCOCCUS EPIDERMIDIS    Narrative:      Aerobic and anaerobic  Collection has been rescheduled by HOLLY at 01/16/2025 15:06 Reason:   Patient unavailable ekg  Collection has been rescheduled by HOLLY at 01/16/2025 15:25 Reason:   Done  Collection has been  rescheduled by ZJ1 at 01/16/2025 15:06 Reason:   Patient unavailable ekg  Collection has been rescheduled by ZJ1 at 01/16/2025 15:25 Reason:   Done    Urine culture [[id number]] Collected: 01/16/25 1533    Order Status: Completed Specimen: Urine Updated: 01/17/25 0825     Urine Culture, Routine No growth to date    Narrative:      Specimen Source->Urine    Blood culture x two cultures. Draw prior to antibiotics. [[id number]] Collected: 01/16/25 1523    Order Status: Completed Specimen: Blood from Peripheral, Forearm, Left Updated: 01/16/25 2317     Blood Culture, Routine No Growth to date    Narrative:      Aerobic and anaerobic  Collection has been rescheduled by ZJ1 at 01/16/2025 15:06 Reason:   Patient unavailable ekg  Collection has been rescheduled by [email or web address] at 01/16/2025 15:25 Reason:   Done  Collection has been rescheduled by ZJ1 at 01/16/2025 15:06 Reason:   Patient unavailable ekg  Collection has been rescheduled by [email or web address] at 01/16/2025 15:25 Reason:   Done    Clostridium difficile EIA [[id number]]     Order Status: No result Specimen: Stool               Assessment and Plan     * Influenza A  Symptomatic care  Oxygen p.r.n.  Tamiflu      Staphylococcus epidermidis bacteremia    1/2 blood culture positive for staph epi.   Most likely contamination.   Hold off antibiotics now    Severe obesity (BMI 35.0-39.9) with comorbidity  Body mass index is 35.61 kg/m². Morbid obesity complicates all aspects of disease management from diagnostic modalities to treatment. Weight loss encouraged and health benefits explained to patient.         Fluid overload    Patient looks severe fluid overload,  we will start IV Lasix 40 b.i.d..   Get 2D echo.     UTI (urinary tract infection)  Recently treated with Macrobid  Start ceftriaxone, azithromycin  Urine cultures pending      Impaired functional mobility, balance, and endurance  Consult PT/OT  Fall/safety precautions      Hypertension  Patient's blood pressure range in the last 24  hours was: BP  Min: 137/69  Max: 186/76.The patient's inpatient anti-hypertensive regimen is listed below:  Current Antihypertensives       Plan  - BP is controlled, no changes needed to their regimen      Seizure disorder  Chronic  Continue home meds      Multiple sclerosis    Aware, function status worsened due to infection.       VTE Risk Mitigation (From admission, onward)           Ordered     IP VTE HIGH RISK PATIENT  Once         01/16/25 1647     Place sequential compression device  Until discontinued         01/16/25 1647                    Discharge Planning   ROGERIO: 1/20/2025     Code Status: Full Code   Medical Readiness for Discharge Date:                    Please place Justification for DME        Carl Frausto MD  Department of Hospital Medicine   [facility]/Surg

## 2025-01-18 PROBLEM — L03.119 LOWER EXTREMITY CELLULITIS: Status: ACTIVE | Noted: 2025-01-18

## 2025-01-18 LAB
ALBUMIN SERPL BCP-MCNC: 3.2 G/DL (ref 3.5–5.2)
ALP SERPL-CCNC: 109 U/L (ref 40–150)
ALT SERPL W/O P-5'-P-CCNC: 22 U/L (ref 10–44)
ANION GAP SERPL CALC-SCNC: 12 MMOL/L (ref 8–16)
AST SERPL-CCNC: 34 U/L (ref 10–40)
BASOPHILS # BLD AUTO: 0.05 K/UL (ref 0–0.2)
BASOPHILS NFR BLD: 0.9 % (ref 0–1.9)
BILIRUB SERPL-MCNC: 0.4 MG/DL (ref 0.1–1)
BUN SERPL-MCNC: 20 MG/DL (ref 8–23)
CALCIUM SERPL-MCNC: 7.5 MG/DL (ref 8.7–10.5)
CHLORIDE SERPL-SCNC: 108 MMOL/L (ref 95–110)
CO2 SERPL-SCNC: 17 MMOL/L (ref 23–29)
CREAT SERPL-MCNC: 1 MG/DL (ref 0.5–1.4)
DIFFERENTIAL METHOD BLD: ABNORMAL
EOSINOPHIL # BLD AUTO: 0.2 K/UL (ref 0–0.5)
EOSINOPHIL NFR BLD: 2.6 % (ref 0–8)
ERYTHROCYTE [DISTWIDTH] IN BLOOD BY AUTOMATED COUNT: 15.9 % (ref 11.5–14.5)
EST. GFR  (NO RACE VARIABLE): 59 ML/MIN/1.73 M^2
GLUCOSE SERPL-MCNC: 89 MG/DL (ref 70–110)
HCT VFR BLD AUTO: 37.7 % (ref 37–48.5)
HGB BLD-MCNC: 11.5 G/DL (ref 12–16)
IMM GRANULOCYTES # BLD AUTO: 0.02 K/UL (ref 0–0.04)
IMM GRANULOCYTES NFR BLD AUTO: 0.3 % (ref 0–0.5)
LYMPHOCYTES # BLD AUTO: 2.7 K/UL (ref 1–4.8)
LYMPHOCYTES NFR BLD: 45.5 % (ref 18–48)
MAGNESIUM SERPL-MCNC: 1.8 MG/DL (ref 1.6–2.6)
MCH RBC QN AUTO: 27.2 PG (ref 27–31)
MCHC RBC AUTO-ENTMCNC: 30.5 G/DL (ref 32–36)
MCV RBC AUTO: 89 FL (ref 82–98)
MONOCYTES # BLD AUTO: 0.7 K/UL (ref 0.3–1)
MONOCYTES NFR BLD: 11.1 % (ref 4–15)
NEUTROPHILS # BLD AUTO: 2.3 K/UL (ref 1.8–7.7)
NEUTROPHILS NFR BLD: 39.6 % (ref 38–73)
NRBC BLD-RTO: 0 /100 WBC
PHOSPHATE SERPL-MCNC: 3.6 MG/DL (ref 2.7–4.5)
PLATELET # BLD AUTO: 134 K/UL (ref 150–450)
PMV BLD AUTO: 12.3 FL (ref 9.2–12.9)
POTASSIUM SERPL-SCNC: 4.3 MMOL/L (ref 3.5–5.1)
PROT SERPL-MCNC: 6.5 G/DL (ref 6–8.4)
RBC # BLD AUTO: 4.23 M/UL (ref 4–5.4)
SODIUM SERPL-SCNC: 137 MMOL/L (ref 136–145)
WBC # BLD AUTO: 5.83 K/UL (ref 3.9–12.7)

## 2025-01-18 PROCEDURE — 25000003 PHARM REV CODE 250: Performed by: INTERNAL MEDICINE

## 2025-01-18 PROCEDURE — 27000207 HC ISOLATION

## 2025-01-18 PROCEDURE — 94761 N-INVAS EAR/PLS OXIMETRY MLT: CPT

## 2025-01-18 PROCEDURE — 97530 THERAPEUTIC ACTIVITIES: CPT | Mod: CQ

## 2025-01-18 PROCEDURE — 97110 THERAPEUTIC EXERCISES: CPT | Mod: CQ

## 2025-01-18 PROCEDURE — 80053 COMPREHEN METABOLIC PANEL: CPT | Performed by: INTERNAL MEDICINE

## 2025-01-18 PROCEDURE — 83735 ASSAY OF MAGNESIUM: CPT | Performed by: INTERNAL MEDICINE

## 2025-01-18 PROCEDURE — 99900035 HC TECH TIME PER 15 MIN (STAT)

## 2025-01-18 PROCEDURE — 25000003 PHARM REV CODE 250: Performed by: NURSE PRACTITIONER

## 2025-01-18 PROCEDURE — 25000003 PHARM REV CODE 250: Performed by: HOSPITALIST

## 2025-01-18 PROCEDURE — 63600175 PHARM REV CODE 636 W HCPCS: Performed by: HOSPITALIST

## 2025-01-18 PROCEDURE — 84100 ASSAY OF PHOSPHORUS: CPT | Performed by: HOSPITALIST

## 2025-01-18 PROCEDURE — 63600175 PHARM REV CODE 636 W HCPCS: Performed by: NURSE PRACTITIONER

## 2025-01-18 PROCEDURE — 27000221 HC OXYGEN, UP TO 24 HOURS

## 2025-01-18 PROCEDURE — 63700000 PHARM REV CODE 250 ALT 637 W/O HCPCS: Performed by: NURSE PRACTITIONER

## 2025-01-18 PROCEDURE — 85025 COMPLETE CBC W/AUTO DIFF WBC: CPT | Performed by: NURSE PRACTITIONER

## 2025-01-18 PROCEDURE — 36415 COLL VENOUS BLD VENIPUNCTURE: CPT | Performed by: HOSPITALIST

## 2025-01-18 PROCEDURE — 11000001 HC ACUTE MED/SURG PRIVATE ROOM

## 2025-01-18 RX ORDER — SODIUM BICARBONATE 650 MG/1
650 TABLET ORAL 3 TIMES DAILY
Status: DISCONTINUED | OUTPATIENT
Start: 2025-01-18 | End: 2025-01-20

## 2025-01-18 RX ORDER — FUROSEMIDE 10 MG/ML
40 INJECTION INTRAMUSCULAR; INTRAVENOUS DAILY
Status: DISCONTINUED | OUTPATIENT
Start: 2025-01-18 | End: 2025-01-18

## 2025-01-18 RX ORDER — FUROSEMIDE 10 MG/ML
40 INJECTION INTRAMUSCULAR; INTRAVENOUS EVERY 12 HOURS
Status: DISCONTINUED | OUTPATIENT
Start: 2025-01-18 | End: 2025-01-19

## 2025-01-18 RX ADMIN — SODIUM BICARBONATE 650 MG TABLET 650 MG: at 08:01

## 2025-01-18 RX ADMIN — FUROSEMIDE 40 MG: 10 INJECTION, SOLUTION INTRAMUSCULAR; INTRAVENOUS at 08:01

## 2025-01-18 RX ADMIN — AZITHROMYCIN DIHYDRATE 250 MG: 250 TABLET ORAL at 08:01

## 2025-01-18 RX ADMIN — FAMOTIDINE 20 MG: 20 TABLET ORAL at 08:01

## 2025-01-18 RX ADMIN — TOPIRAMATE 100 MG: 100 TABLET, FILM COATED ORAL at 08:01

## 2025-01-18 RX ADMIN — CEFTRIAXONE SODIUM 1 G: 1 INJECTION, POWDER, FOR SOLUTION INTRAMUSCULAR; INTRAVENOUS at 04:01

## 2025-01-18 RX ADMIN — ATORVASTATIN CALCIUM 40 MG: 40 TABLET, FILM COATED ORAL at 08:01

## 2025-01-18 RX ADMIN — ACETAMINOPHEN 650 MG: 325 TABLET ORAL at 08:01

## 2025-01-18 RX ADMIN — PROPRANOLOL HYDROCHLORIDE 20 MG: 20 TABLET ORAL at 08:01

## 2025-01-18 RX ADMIN — Medication 800 MG: at 06:01

## 2025-01-18 RX ADMIN — SODIUM BICARBONATE 650 MG TABLET 650 MG: at 04:01

## 2025-01-18 RX ADMIN — OSELTAMIVIR PHOSPHATE 30 MG: 30 CAPSULE ORAL at 08:01

## 2025-01-18 RX ADMIN — Medication 1000 MCG: at 08:01

## 2025-01-18 RX ADMIN — AMMONIUM LACTATE: 12 LOTION TOPICAL at 08:01

## 2025-01-18 RX ADMIN — DULOXETINE HYDROCHLORIDE 30 MG: 30 CAPSULE, DELAYED RELEASE ORAL at 08:01

## 2025-01-18 RX ADMIN — Medication 800 MG: at 08:01

## 2025-01-18 RX ADMIN — CLOPIDOGREL BISULFATE 75 MG: 75 TABLET, FILM COATED ORAL at 08:01

## 2025-01-18 RX ADMIN — ENOXAPARIN SODIUM 40 MG: 40 INJECTION SUBCUTANEOUS at 04:01

## 2025-01-18 NOTE — PT/OT/SLP PROGRESS
Physical Therapy Treatment    Patient Name:  Alyssa John   MRN:  [id number]    Recommendations:     Discharge Recommendations: Moderate Intensity Therapy  Discharge Equipment Recommendations: none  Barriers to discharge: None    Assessment:     Alyssa John is a 74 y.o. female admitted with a medical diagnosis of Influenza A.  She presents with the following impairments/functional limitations: weakness, impaired endurance, impaired self care skills, impaired functional mobility, impaired balance, decreased upper extremity function, decreased lower extremity function, decreased ROM, impaired skin, edema . Awake, alert, supine in bed with spouse present.  Agreed to mobilize.  Presents with B heels bandaged and lower leg edema.  Reports no pian but increased fatigue.  Transferred sup > sit with  Max A and verbal cues for technique. Required slow movement for energy conservation.  Max A to scoot to EOB.  Sat ~ 15 min .  Performed LE exercises seated with rest periods between each.  Sit > supine with Max A.  Scoot HOB , total A.  Set up with lunch.    Caregiver reports increased UTI's have caused increased weakness and patient no longer able to transfer as she was able to.  It is more difficult to maneuver in restroom at home.  They have a BSC but do not feel safe with it.  He was unsure of the difference between rehab and SNF as they tried [facility] before and were denied. PTA was able to explain the difference regarding therapy but advised that the CM could provide more information.     Rehab Prognosis: Fair; patient would benefit from acute skilled PT services to address these deficits and reach maximum level of function.    Recent Surgery: * No surgery found *      Plan:     During this hospitalization, patient to be seen 6 x/week to address the identified rehab impairments via therapeutic activities, therapeutic exercises, neuromuscular re-education, wheelchair management/training and progress toward  the following goals:    Plan of Care Expires:       Subjective     Chief Complaint: weakness / fatigue  Patient/Family Comments/goals: TBD  Pain/Comfort:  Pain Rating 1: 0/10      Objective:     Communicated with nurse Delong prior to session.  Patient found supine with bed alarm, PureWick, telemetry (B heels bandaged / lower legs and feet swollen) upon PT entry to room.     General Precautions: Standard, droplet, fall, seizure, vision impaired  Orthopedic Precautions: N/A  Braces: N/A  Respiratory Status: Room air     Functional Mobility:  Bed Mobility:     Supine to Sit: maximal assistance  Sit to Supine: maximal assistance      AM-PAC 6 CLICK MOBILITY          Treatment & Education:  Transferred EOB with Max A.  Performed LE exercises seated : TKE's, Hip abd/add/ flexion, AP's, QS, GS.     Patient left supine with all lines intact, call button in reach, bed alarm on, nurse Delong notified, and spouse present..    GOALS:   Multidisciplinary Problems       Physical Therapy Goals          Problem: Physical Therapy    Goal Priority Disciplines Outcome Interventions   Physical Therapy Goal     PT, PT/OT Progressing    Description: 1. Supine to sit with Stand-by Assistance  2. Sit to stand transfer with min Assistance  3. Bed to chair transfer with assist of 1 caregiver and ue of RW as needed.  4. Pt will perform bilat LE therex with Lakeshia.                       DME Justifications:  No DME recommended requiring DME justifications    Time Tracking:     PT Received On: 01/18/25  PT Start Time: 1130     PT Stop Time: 1202  PT Total Time (min): 32 min     Billable Minutes: Therapeutic Activity 20min and Therapeutic Exercise 12min    Treatment Type: Treatment  PT/PTA: PTA     Number of PTA visits since last PT visit: 1 01/18/2025

## 2025-01-18 NOTE — ASSESSMENT & PLAN NOTE
Consult PT/OT  Fall/safety precautions    Patient does not want to go to skilled nursing facility or rehab/

## 2025-01-18 NOTE — PLAN OF CARE
Problem: Physical Therapy  Goal: Physical Therapy Goal  Description: 1. Supine to sit with Stand-by Assistance  2. Sit to stand transfer with min Assistance  3. Bed to chair transfer with assist of 1 caregiver and ue of RW as needed.  4. Pt will perform bilat LE therex with Lakeshia.  Outcome: Progressing   Therapeutic activity to improve functional mobility : bed mobility , transfers , LE exercises.

## 2025-01-18 NOTE — ASSESSMENT & PLAN NOTE
Patient looks severe fluid overload,  we will start IV Lasix 40 b.i.d..   Get 2D echo.     Echo    Result Date: 1/17/2025    Left Ventricle: The left ventricle is normal in size. Normal wall   thickness. There is normal systolic function. There is normal diastolic   function. E/A ratio is 1.16.    Right Ventricle: Normal right ventricular cavity size. Systolic   function is normal.    Mitral Valve: There is mild regurgitation.    Tricuspid Valve: There is mild to moderate regurgitation. The estimated   PA systolic pressure is at least 27 mmHg.    Overall the study quality was technically difficult. The study was   difficult due to patient's poor acoustic windows.

## 2025-01-18 NOTE — SUBJECTIVE & OBJECTIVE
Interval History:   Patient is seen and Examined at multidisciplinary rounds, feel much better, -1.8 L, leg edema improved.  No fever.  No chest pain or SOB.  2D echo reviewed normal LV function.         Review of Systems   Constitutional:  Positive for fatigue.     Objective:     Vital Signs (Most Recent):  Temp: 98.6 °F (37 °C) (01/18/25 1113)  Pulse: (!) 56 (01/18/25 1113)  Resp: 18 (01/18/25 1113)  BP: (!) 110/55 (01/18/25 1113)  SpO2: (!) 94 % (01/18/25 1113) Vital Signs (24h Range):  Temp:  [97.1 °F (36.2 °C)-98.9 °F (37.2 °C)] 98.6 °F (37 °C)  Pulse:  [56-77] 56  Resp:  [16-18] 18  SpO2:  [92 %-98 %] 94 %  BP: (107-128)/(49-61) 110/55     Weight: 93.4 kg (206 lb)  Body mass index is 36.49 kg/m².    Intake/Output Summary (Last 24 hours) at 1/18/2025 1204  Last data filed at 1/18/2025 0426  Gross per 24 hour   Intake 120 ml   Output 1950 ml   Net -1830 ml         Physical Exam  Constitutional:       Appearance: Normal appearance. She is ill-appearing.   HENT:      Head: Normocephalic and atraumatic.      Nose: Nose normal.   Eyes:      Extraocular Movements: Extraocular movements intact.      Pupils: Pupils are equal, round, and reactive to light.   Cardiovascular:      Rate and Rhythm: Normal rate and regular rhythm.      Heart sounds: No murmur heard.  Pulmonary:      Effort: Pulmonary effort is normal.      Breath sounds: Normal breath sounds.   Abdominal:      General: Abdomen is flat.      Palpations: Abdomen is soft.   Musculoskeletal:         General: Swelling present. Normal range of motion.      Cervical back: Normal range of motion and neck supple.      Comments: Bilateral lower extremity redness with swelling.    Skin:     General: Skin is warm and dry.   Neurological:      General: No focal deficit present.      Mental Status: She is alert and oriented to person, place, and time.   Psychiatric:         Mood and Affect: Mood normal.             Significant Labs: All pertinent labs within the past 24  hours have been reviewed.  CBC:   Recent Labs   Lab 01/16/25  1513 01/17/25  0425   WBC 6.89 5.14   HGB 12.4 10.9*   HCT 39.4 33.5*    138*     CMP:   Recent Labs   Lab 01/16/25  1513 01/17/25  0425 01/18/25  0320    138 137   K 3.2* 3.1* 4.3    108 108   CO2 21* 20* 17*   * 101 89   BUN 16 16 20   CREATININE 0.9 0.9 1.0   CALCIUM 8.0* 7.9* 7.5*   PROT 6.7 5.9* 6.5   ALBUMIN 3.8 3.4* 3.2*   BILITOT 0.6 0.4 0.4   ALKPHOS 123 107 109   AST 22 21 34   ALT 16 15 22   ANIONGAP 8 10 12       Significant Imaging: I have reviewed all pertinent imaging results/findings within the past 24 hours.  I have reviewed and interpreted all pertinent imaging results/findings within the past 24 hours.    Scheduled Meds:   ammonium lactate   Topical (Top) BID    atorvastatin  40 mg Oral Daily    azithromycin  250 mg Oral Daily    cefTRIAXone (Rocephin) IV (PEDS and ADULTS)  1 g Intravenous Q24H    clopidogreL  75 mg Oral Daily    cyanocobalamin  1,000 mcg Oral Daily    DULoxetine  30 mg Oral Daily    enoxparin  40 mg Subcutaneous Q24H (prophylaxis, 1700)    famotidine  20 mg Oral BID    furosemide (LASIX) injection  40 mg Intravenous Q12H    oseltamivir  30 mg Oral BID    propranoloL  20 mg Oral Daily    sodium bicarbonate  650 mg Oral TID    topiramate  100 mg Oral BID     Continuous Infusions:  PRN Meds:.  Current Facility-Administered Medications:     acetaminophen, 650 mg, Oral, Q4H PRN    albuterol-ipratropium, 3 mL, Nebulization, Q6H PRN    magnesium oxide, 800 mg, Oral, PRN    magnesium oxide, 800 mg, Oral, PRN    melatonin, 6 mg, Oral, Nightly PRN    ondansetron, 4 mg, Intravenous, Q6H PRN    potassium bicarbonate, 35 mEq, Oral, PRN    potassium bicarbonate, 50 mEq, Oral, PRN    potassium bicarbonate, 60 mEq, Oral, PRN    potassium, sodium phosphates, 2 packet, Oral, PRN    potassium, sodium phosphates, 2 packet, Oral, PRN    potassium, sodium phosphates, 2 packet, Oral, PRN    sodium chloride 0.9%, 10  mL, Intravenous, PRN    X-Ray Chest AP Portable    Result Date: 1/16/2025  EXAMINATION: XR CHEST AP PORTABLE CLINICAL HISTORY: Influenza; FINDINGS: Portable chest with comparison chest x-ray 01/15/2025.  Normal cardiomediastinal silhouette.There is mild bilateral perihilar interstitial thickening.  No confluent airspace opacities.  Pulmonary vasculature is normal. No acute osseous abnormality.     Mild bilateral perihilar interstitial thickening likely reflects infection/inflammation of the lower respiratory tract. Electronically signed by: Gage Heath Date:    01/16/2025 Time:    16:06    X-Ray Chest PA And Lateral    Result Date: 1/15/2025  EXAMINATION: XR CHEST PA AND LATERAL CLINICAL HISTORY: Subacute cough TECHNIQUE: PA and lateral views of the chest were performed. COMPARISON: 09/16/2024 FINDINGS: Stable cardiomediastinal silhouette.  No focal airspace consolidation, pleural effusion, or pneumothorax.  No acute osseous abnormality.     No acute cardiopulmonary process. Electronically signed by: Mario Hoyt Date:    01/15/2025 Time:    15:16  - pulls last radiology orders      Microbiology Results (last 7 days)       Procedure Component Value Units Date/Time    Urine culture [[id number]] Collected: 01/16/25 1533    Order Status: Completed Specimen: Urine Updated: 01/18/25 0751     Urine Culture, Routine No growth to date      No growth to date    Narrative:      Specimen Source->Urine    Blood culture x two cultures. Draw prior to antibiotics. [[id number]]  (Abnormal) Collected: 01/16/25 1510    Order Status: Completed Specimen: Blood Updated: 01/18/25 0619     Blood Culture, Routine Gram stain aer bottle: Gram positive cocci in clusters resembling Staph      Results called to and read back by:Bobby Schwab RN on [facility]      STAPHYLOCOCCUS EPIDERMIDIS  Susceptibility testing not routinely performed.      Narrative:      Aerobic and anaerobic  Collection has been rescheduled by HOLLY at 01/16/2025 15:06  Reason:   Patient unavailable ekg  Collection has been rescheduled by [email or web address] at 01/16/2025 15:25 Reason:   Done  Collection has been rescheduled by [email or web address] at 01/16/2025 15:06 Reason:   Patient unavailable ekg  Collection has been rescheduled by [email or web address] at 01/16/2025 15:25 Reason:   Done    Blood culture [[id number]] Collected: 01/17/25 1226    Order Status: Completed Specimen: Blood from Peripheral, Hand, Left Updated: 01/17/25 1958     Blood Culture, Routine No Growth to date    Blood culture [[id number]] Collected: 01/17/25 1226    Order Status: Completed Specimen: Blood from Peripheral, Hand, Right Updated: 01/17/25 1958     Blood Culture, Routine No Growth to date    Blood culture x two cultures. Draw prior to antibiotics. [[id number]] Collected: 01/16/25 1523    Order Status: Completed Specimen: Blood from Peripheral, Forearm, Left Updated: 01/17/25 1632     Blood Culture, Routine No Growth to date      No Growth to date    Narrative:      Aerobic and anaerobic  Collection has been rescheduled by [email or web address] at 01/16/2025 15:06 Reason:   Patient unavailable ekg  Collection has been rescheduled by [email or web address] at 01/16/2025 15:25 Reason:   Done  Collection has been rescheduled by [email or web address] at 01/16/2025 15:06 Reason:   Patient unavailable ekg  Collection has been rescheduled by [email or web address] at 01/16/2025 15:25 Reason:   Done    Rapid Organism ID by PCR (from Blood culture) [[id number]]  (Abnormal) Collected: 01/16/25 1510    Order Status: Completed Updated: 01/17/25 1111     Enterococcus faecalis Not Detected     Enterococcus faecium Not Detected     Listeria monocytogenes Not Detected     Staphylococcus spp. See species for ID     Staphylococcus aureus Not Detected     Staphylococcus epidermidis Detected     Staphylococcus lugdunensis Not Detected     Streptococcus species Not Detected     Streptococcus agalactiae Not Detected     Streptococcus pneumoniae Not Detected     Streptococcus pyogenes Not Detected     Acinetobacter calcoaceticus/baumannii  complex Not Detected     Bacteroides fragilis Not Detected     Enterobacterales Not Detected     Enterobacter cloacae complex Not Detected     Escherichia coli Not Detected     Klebsiella aerogenes Not Detected     Klebsiella oxytoca Not Detected     Klebsiella pneumoniae group Not Detected     Proteus Not Detected     Salmonella sp Not Detected     Serratia marcescens Not Detected     Haemophilus influenzae Not Detected     Neisseria meningtidis Not Detected     Pseudomonas aeruginosa Not Detected     Stenotrophomonas maltophilia Not Detected     Candida albicans Not Detected     Candida auris Not Detected     Candida glabrata Not Detected     Candida krusei Not Detected     Candida parapsilosis Not Detected     Candida tropicalis Not Detected     Cryptococcus neoformans/gattii Not Detected     CTX-M (ESBL ) Test not applicable     IMP (Carbapenem resistant) Test not applicable     KPC resistance gene (Carbapenem resistant) Test not applicable     mcr-1  Test not applicable     mec A/C  Detected     mec A/C and MREJ (MRSA) gene Test not applicable     NDM (Carbapenem resistant) Test not applicable     OXA-48-like (Carbapenem resistant) Test not applicable     van A/B (VRE gene) Test not applicable     VIM (Carbapenem resistant) Test not applicable    Narrative:      Aerobic and anaerobic    Clostridium difficile EIA [[id number]]     Order Status: No result Specimen: Stool

## 2025-01-18 NOTE — ASSESSMENT & PLAN NOTE
1/2 blood culture positive for staph epi.   Most likely contamination.   Hold off antibiotics now  Follow up repeat blood culture

## 2025-01-18 NOTE — PLAN OF CARE
Problem: Adult Inpatient Plan of Care  Goal: Absence of Hospital-Acquired Illness or Injury  Outcome: Progressing  Goal: Optimal Comfort and Wellbeing  Outcome: Progressing     Problem: Wound  Goal: Optimal Coping  Outcome: Progressing

## 2025-01-18 NOTE — PROGRESS NOTES
[facility] Medicine  Progress Note    Patient Name: Alyssa John  MRN: [id number]  Patient Class: IP- Inpatient   Admission Date: 1/16/2025  Length of Stay: 1 days  Attending Physician: Carl Frausto MD  Primary Care Provider: Piyush Sharif MD        Subjective     Principal Problem:Influenza A        HPI:  74 year old female with a past medical history of GERD, Epilepsy, hypertension, MI, MS, CAD was recently tested positive for influenza A.  She reports to the emergency room with reports of weakness and diarrhea.  Reports of decreased mobility and activity of daily living for the last 7-8 months. Patient needs to be assisted out of wheelchair by significant other. She was diagnosed with urinary tract infection about 17 days ago. She recently treated with omnicef and cipro.inished course of antibiotics. She then developed cough and congestion. She was diagnosed with influenza yesterday. Now  is unable to assist patient and transfers. She reports increased generalized weakness especially to both legs. No trouble with vision or speech. There was some chills with no definite fever. Urinary symptoms resolved.     In the ER, potassium 3.2, glucose 127, calcium 8, troponin I high sensitivity 29.6, influenza A positive UA with 1+ protein, 1+ blood, 2+ leukocytes 96 WBCs urine culture sent, COVID negative lactic 1.56 chest x-ray Mild bilateral perihilar interstitial thickening     Admit to hospital medicine for UTI after failing outpatient therapy.          Overview/Hospital Course:  74-year-old female with a history of multiple sclerosis limited mobility, recurrent UTI, CAD on Plavix presenting here for urinary tract infection/ influenza a positive and diarrhea.  Started on empiric Rocephin azithromycin, Tamiflu.  Patient also looks severe fluid overload, started on IV Lasix.  2D echo is pending.  Patient also found to 1/2 have staph epi bacteremia, most likely  contamination.  Follow up repeat blood culture.     Interval History:   Patient is seen and Examined at multidisciplinary rounds, feel much better, -1.8 L, leg edema improved.  No fever.  No chest pain or SOB.  2D echo reviewed normal LV function.         Review of Systems   Constitutional:  Positive for fatigue.     Objective:     Vital Signs (Most Recent):  Temp: 98.6 °F (37 °C) (01/18/25 1113)  Pulse: (!) 56 (01/18/25 1113)  Resp: 18 (01/18/25 1113)  BP: (!) 110/55 (01/18/25 1113)  SpO2: (!) 94 % (01/18/25 1113) Vital Signs (24h Range):  Temp:  [97.1 °F (36.2 °C)-98.9 °F (37.2 °C)] 98.6 °F (37 °C)  Pulse:  [56-77] 56  Resp:  [16-18] 18  SpO2:  [92 %-98 %] 94 %  BP: (107-128)/(49-61) 110/55     Weight: 93.4 kg (206 lb)  Body mass index is 36.49 kg/m².    Intake/Output Summary (Last 24 hours) at 1/18/2025 1204  Last data filed at 1/18/2025 0426  Gross per 24 hour   Intake 120 ml   Output 1950 ml   Net -1830 ml         Physical Exam  Constitutional:       Appearance: Normal appearance. She is ill-appearing.   HENT:      Head: Normocephalic and atraumatic.      Nose: Nose normal.   Eyes:      Extraocular Movements: Extraocular movements intact.      Pupils: Pupils are equal, round, and reactive to light.   Cardiovascular:      Rate and Rhythm: Normal rate and regular rhythm.      Heart sounds: No murmur heard.  Pulmonary:      Effort: Pulmonary effort is normal.      Breath sounds: Normal breath sounds.   Abdominal:      General: Abdomen is flat.      Palpations: Abdomen is soft.   Musculoskeletal:         General: Swelling present. Normal range of motion.      Cervical back: Normal range of motion and neck supple.      Comments: Bilateral lower extremity redness with swelling.    Skin:     General: Skin is warm and dry.   Neurological:      General: No focal deficit present.      Mental Status: She is alert and oriented to person, place, and time.   Psychiatric:         Mood and Affect: Mood normal.              Significant Labs: All pertinent labs within the past 24 hours have been reviewed.  CBC:   Recent Labs   Lab 01/16/25  1513 01/17/25  0425   WBC 6.89 5.14   HGB 12.4 10.9*   HCT 39.4 33.5*    138*     CMP:   Recent Labs   Lab 01/16/25  1513 01/17/25  0425 01/18/25  0320    138 137   K 3.2* 3.1* 4.3    108 108   CO2 21* 20* 17*   * 101 89   BUN 16 16 20   CREATININE 0.9 0.9 1.0   CALCIUM 8.0* 7.9* 7.5*   PROT 6.7 5.9* 6.5   ALBUMIN 3.8 3.4* 3.2*   BILITOT 0.6 0.4 0.4   ALKPHOS 123 107 109   AST 22 21 34   ALT 16 15 22   ANIONGAP 8 10 12       Significant Imaging: I have reviewed all pertinent imaging results/findings within the past 24 hours.  I have reviewed and interpreted all pertinent imaging results/findings within the past 24 hours.    Scheduled Meds:   ammonium lactate   Topical (Top) BID    atorvastatin  40 mg Oral Daily    azithromycin  250 mg Oral Daily    cefTRIAXone (Rocephin) IV (PEDS and ADULTS)  1 g Intravenous Q24H    clopidogreL  75 mg Oral Daily    cyanocobalamin  1,000 mcg Oral Daily    DULoxetine  30 mg Oral Daily    enoxparin  40 mg Subcutaneous Q24H (prophylaxis, 1700)    famotidine  20 mg Oral BID    furosemide (LASIX) injection  40 mg Intravenous Q12H    oseltamivir  30 mg Oral BID    propranoloL  20 mg Oral Daily    sodium bicarbonate  650 mg Oral TID    topiramate  100 mg Oral BID     Continuous Infusions:  PRN Meds:.  Current Facility-Administered Medications:     acetaminophen, 650 mg, Oral, Q4H PRN    albuterol-ipratropium, 3 mL, Nebulization, Q6H PRN    magnesium oxide, 800 mg, Oral, PRN    magnesium oxide, 800 mg, Oral, PRN    melatonin, 6 mg, Oral, Nightly PRN    ondansetron, 4 mg, Intravenous, Q6H PRN    potassium bicarbonate, 35 mEq, Oral, PRN    potassium bicarbonate, 50 mEq, Oral, PRN    potassium bicarbonate, 60 mEq, Oral, PRN    potassium, sodium phosphates, 2 packet, Oral, PRN    potassium, sodium phosphates, 2 packet, Oral, PRN    potassium, sodium  phosphates, 2 packet, Oral, PRN    sodium chloride 0.9%, 10 mL, Intravenous, PRN    X-Ray Chest AP Portable    Result Date: 1/16/2025  EXAMINATION: XR CHEST AP PORTABLE CLINICAL HISTORY: Influenza; FINDINGS: Portable chest with comparison chest x-ray 01/15/2025.  Normal cardiomediastinal silhouette.There is mild bilateral perihilar interstitial thickening.  No confluent airspace opacities.  Pulmonary vasculature is normal. No acute osseous abnormality.     Mild bilateral perihilar interstitial thickening likely reflects infection/inflammation of the lower respiratory tract. Electronically signed by: Gage Heath Date:    01/16/2025 Time:    16:06    X-Ray Chest PA And Lateral    Result Date: 1/15/2025  EXAMINATION: XR CHEST PA AND LATERAL CLINICAL HISTORY: Subacute cough TECHNIQUE: PA and lateral views of the chest were performed. COMPARISON: 09/16/2024 FINDINGS: Stable cardiomediastinal silhouette.  No focal airspace consolidation, pleural effusion, or pneumothorax.  No acute osseous abnormality.     No acute cardiopulmonary process. Electronically signed by: Mario Hoyt Date:    01/15/2025 Time:    15:16  - pulls last radiology orders      Microbiology Results (last 7 days)       Procedure Component Value Units Date/Time    Urine culture [[id number]] Collected: 01/16/25 1533    Order Status: Completed Specimen: Urine Updated: 01/18/25 0751     Urine Culture, Routine No growth to date      No growth to date    Narrative:      Specimen Source->Urine    Blood culture x two cultures. Draw prior to antibiotics. [[id number]]  (Abnormal) Collected: 01/16/25 1510    Order Status: Completed Specimen: Blood Updated: 01/18/25 0619     Blood Culture, Routine Gram stain aer bottle: Gram positive cocci in clusters resembling Staph      Results called to and read back by:Bobby Schwab RN on [facility]      STAPHYLOCOCCUS EPIDERMIDIS  Susceptibility testing not routinely performed.      Narrative:      Aerobic and  anaerobic  Collection has been rescheduled by [email or web address] at 01/16/2025 15:06 Reason:   Patient unavailable ekg  Collection has been rescheduled by [email or web address] at 01/16/2025 15:25 Reason:   Done  Collection has been rescheduled by [email or web address] at 01/16/2025 15:06 Reason:   Patient unavailable ekg  Collection has been rescheduled by [email or web address] at 01/16/2025 15:25 Reason:   Done    Blood culture [[id number]] Collected: 01/17/25 1226    Order Status: Completed Specimen: Blood from Peripheral, Hand, Left Updated: 01/17/25 1958     Blood Culture, Routine No Growth to date    Blood culture [[id number]] Collected: 01/17/25 1226    Order Status: Completed Specimen: Blood from Peripheral, Hand, Right Updated: 01/17/25 1958     Blood Culture, Routine No Growth to date    Blood culture x two cultures. Draw prior to antibiotics. [[id number]] Collected: 01/16/25 1523    Order Status: Completed Specimen: Blood from Peripheral, Forearm, Left Updated: 01/17/25 1632     Blood Culture, Routine No Growth to date      No Growth to date    Narrative:      Aerobic and anaerobic  Collection has been rescheduled by [email or web address] at 01/16/2025 15:06 Reason:   Patient unavailable ekg  Collection has been rescheduled by [email or web address] at 01/16/2025 15:25 Reason:   Done  Collection has been rescheduled by [email or web address] at 01/16/2025 15:06 Reason:   Patient unavailable ekg  Collection has been rescheduled by [email or web address] at 01/16/2025 15:25 Reason:   Done    Rapid Organism ID by PCR (from Blood culture) [[id number]]  (Abnormal) Collected: 01/16/25 1510    Order Status: Completed Updated: 01/17/25 1111     Enterococcus faecalis Not Detected     Enterococcus faecium Not Detected     Listeria monocytogenes Not Detected     Staphylococcus spp. See species for ID     Staphylococcus aureus Not Detected     Staphylococcus epidermidis Detected     Staphylococcus lugdunensis Not Detected     Streptococcus species Not Detected     Streptococcus agalactiae Not Detected     Streptococcus pneumoniae Not Detected      Streptococcus pyogenes Not Detected     Acinetobacter calcoaceticus/baumannii complex Not Detected     Bacteroides fragilis Not Detected     Enterobacterales Not Detected     Enterobacter cloacae complex Not Detected     Escherichia coli Not Detected     Klebsiella aerogenes Not Detected     Klebsiella oxytoca Not Detected     Klebsiella pneumoniae group Not Detected     Proteus Not Detected     Salmonella sp Not Detected     Serratia marcescens Not Detected     Haemophilus influenzae Not Detected     Neisseria meningtidis Not Detected     Pseudomonas aeruginosa Not Detected     Stenotrophomonas maltophilia Not Detected     Candida albicans Not Detected     Candida auris Not Detected     Candida glabrata Not Detected     Candida krusei Not Detected     Candida parapsilosis Not Detected     Candida tropicalis Not Detected     Cryptococcus neoformans/gattii Not Detected     CTX-M (ESBL ) Test not applicable     IMP (Carbapenem resistant) Test not applicable     KPC resistance gene (Carbapenem resistant) Test not applicable     mcr-1  Test not applicable     mec A/C  Detected     mec A/C and MREJ (MRSA) gene Test not applicable     NDM (Carbapenem resistant) Test not applicable     OXA-48-like (Carbapenem resistant) Test not applicable     van A/B (VRE gene) Test not applicable     VIM (Carbapenem resistant) Test not applicable    Narrative:      Aerobic and anaerobic    Clostridium difficile EIA [[id number]]     Order Status: No result Specimen: Stool               Assessment and Plan     * Influenza A  Symptomatic care  Oxygen p.r.n.  Tamiflu      Lower extremity cellulitis    Continue IV Lasix  Rule out DVT  Continue empiric IV antibiotics    Staphylococcus epidermidis bacteremia    1/2 blood culture positive for staph epi.   Most likely contamination.   Hold off antibiotics now  Follow up repeat blood culture    Severe obesity (BMI 35.0-39.9) with comorbidity  Body mass index is 35.61 kg/m². Morbid obesity  complicates all aspects of disease management from diagnostic modalities to treatment. Weight loss encouraged and health benefits explained to patient.         Fluid overload    Patient looks severe fluid overload,  we will start IV Lasix 40 b.i.d..   Get 2D echo.     Echo    Result Date: 1/17/2025    Left Ventricle: The left ventricle is normal in size. Normal wall   thickness. There is normal systolic function. There is normal diastolic   function. E/A ratio is 1.16.    Right Ventricle: Normal right ventricular cavity size. Systolic   function is normal.    Mitral Valve: There is mild regurgitation.    Tricuspid Valve: There is mild to moderate regurgitation. The estimated   PA systolic pressure is at least 27 mmHg.    Overall the study quality was technically difficult. The study was   difficult due to patient's poor acoustic windows.           UTI (urinary tract infection)  Recently treated with Macrobid  Start ceftriaxone, azithromycin  Urine cultures pending      Impaired functional mobility, balance, and endurance  Consult PT/OT  Fall/safety precautions    Patient does not want to go to skilled nursing facility or rehab/       Hypertension  Patient's blood pressure range in the last 24 hours was: BP  Min: 107/49  Max: 128/61.The patient's inpatient anti-hypertensive regimen is listed below:  Current Antihypertensives  propranoloL tablet 20 mg, Daily, Oral  furosemide injection 40 mg, Every 12 hours, Intravenous    Plan  - BP is controlled, no changes needed to their regimen      Seizure disorder  Chronic  Continue home meds      Multiple sclerosis    Aware, function status worsened due to infection.       VTE Risk Mitigation (From admission, onward)           Ordered     enoxaparin injection 40 mg  Every 24 hours         01/17/25 1141     IP VTE HIGH RISK PATIENT  Once         01/16/25 1647     Place sequential compression device  Until discontinued         01/16/25 1647                    Discharge Planning    ROGERIO: 1/21/2025     Code Status: Full Code   Medical Readiness for Discharge Date:   Discharge Plan A: Home Health                Please place Justification for DME        Carl Frausto MD  Department of Hospital Medicine   [facility]/Surg

## 2025-01-18 NOTE — CARE UPDATE
01/17/25 1911   Patient Assessment/Suction   Level of Consciousness (AVPU) alert   Respiratory Effort Unlabored   Expansion/Accessory Muscles/Retractions expansion symmetric;no retractions;no use of accessory muscles   All Lung Fields Breath Sounds diminished;clear   PRE-TX-O2   Device (Oxygen Therapy) room air   SpO2 96 %   Pulse Oximetry Type Intermittent   Pulse 71   Resp 18   Aerosol Therapy   $ Aerosol Therapy Charges PRN treatment not required   Respiratory Treatment Status (SVN) PRN treatment not required

## 2025-01-18 NOTE — PLAN OF CARE
Met with pt and spouse at bedside to discuss dc planning. They are agreeable to SNF. List provided. CM to follow up for pt choice         01/18/25 7364   Discharge Reassessment   Assessment Type Discharge Planning Reassessment   Did the patient's condition or plan change since previous assessment? Yes   Discharge Plan discussed with: Spouse/sig other;Patient   Discharge Plan A Skilled Nursing Facility   Discharge Plan B Home Health   Post-Acute Status   Post-Acute Authorization Placement   Post-Acute Placement Status Patient List Provided

## 2025-01-18 NOTE — PLAN OF CARE
Problem: Infection  Goal: Absence of Infection Signs and Symptoms  Outcome: Progressing     Problem: Adult Inpatient Plan of Care  Goal: Plan of Care Review  Outcome: Progressing  Goal: Optimal Comfort and Wellbeing  Outcome: Progressing     Problem: Skin Injury Risk Increased  Goal: Skin Health and Integrity  Outcome: Progressing     Problem: Pneumonia  Goal: Fluid Balance  Outcome: Progressing  Goal: Resolution of Infection Signs and Symptoms  Outcome: Progressing     Problem: Gas Exchange Impaired  Goal: Optimal Gas Exchange  Outcome: Progressing     Problem: Mobility Impairment  Goal: Optimal Mobility  Outcome: Progressing

## 2025-01-18 NOTE — ASSESSMENT & PLAN NOTE
Patient's blood pressure range in the last 24 hours was: BP  Min: 107/49  Max: 128/61.The patient's inpatient anti-hypertensive regimen is listed below:  Current Antihypertensives  propranoloL tablet 20 mg, Daily, Oral  furosemide injection 40 mg, Every 12 hours, Intravenous    Plan  - BP is controlled, no changes needed to their regimen

## 2025-01-19 LAB
ALBUMIN SERPL BCP-MCNC: 3.2 G/DL (ref 3.5–5.2)
ALP SERPL-CCNC: 107 U/L (ref 40–150)
ALT SERPL W/O P-5'-P-CCNC: 18 U/L (ref 10–44)
ANION GAP SERPL CALC-SCNC: 12 MMOL/L (ref 8–16)
AST SERPL-CCNC: 24 U/L (ref 10–40)
BACTERIA UR CULT: NO GROWTH
BASOPHILS # BLD AUTO: 0.02 K/UL (ref 0–0.2)
BASOPHILS NFR BLD: 0.4 % (ref 0–1.9)
BILIRUB SERPL-MCNC: 0.4 MG/DL (ref 0.1–1)
BUN SERPL-MCNC: 21 MG/DL (ref 8–23)
CALCIUM SERPL-MCNC: 8.3 MG/DL (ref 8.7–10.5)
CHLORIDE SERPL-SCNC: 103 MMOL/L (ref 95–110)
CO2 SERPL-SCNC: 24 MMOL/L (ref 23–29)
CREAT SERPL-MCNC: 1 MG/DL (ref 0.5–1.4)
DIFFERENTIAL METHOD BLD: ABNORMAL
EOSINOPHIL # BLD AUTO: 0.2 K/UL (ref 0–0.5)
EOSINOPHIL NFR BLD: 3.6 % (ref 0–8)
ERYTHROCYTE [DISTWIDTH] IN BLOOD BY AUTOMATED COUNT: 15 % (ref 11.5–14.5)
EST. GFR  (NO RACE VARIABLE): 59 ML/MIN/1.73 M^2
GLUCOSE SERPL-MCNC: 102 MG/DL (ref 70–110)
HCT VFR BLD AUTO: 36 % (ref 37–48.5)
HGB BLD-MCNC: 11.4 G/DL (ref 12–16)
IMM GRANULOCYTES # BLD AUTO: 0.01 K/UL (ref 0–0.04)
IMM GRANULOCYTES NFR BLD AUTO: 0.2 % (ref 0–0.5)
LYMPHOCYTES # BLD AUTO: 1.9 K/UL (ref 1–4.8)
LYMPHOCYTES NFR BLD: 35.6 % (ref 18–48)
MAGNESIUM SERPL-MCNC: 2 MG/DL (ref 1.6–2.6)
MCH RBC QN AUTO: 27.1 PG (ref 27–31)
MCHC RBC AUTO-ENTMCNC: 31.7 G/DL (ref 32–36)
MCV RBC AUTO: 86 FL (ref 82–98)
MONOCYTES # BLD AUTO: 0.4 K/UL (ref 0.3–1)
MONOCYTES NFR BLD: 6.6 % (ref 4–15)
NEUTROPHILS # BLD AUTO: 2.9 K/UL (ref 1.8–7.7)
NEUTROPHILS NFR BLD: 53.6 % (ref 38–73)
NRBC BLD-RTO: 0 /100 WBC
PHOSPHATE SERPL-MCNC: 3.8 MG/DL (ref 2.7–4.5)
PLATELET # BLD AUTO: 147 K/UL (ref 150–450)
PMV BLD AUTO: 11.8 FL (ref 9.2–12.9)
POTASSIUM SERPL-SCNC: 3.1 MMOL/L (ref 3.5–5.1)
PROT SERPL-MCNC: 6.2 G/DL (ref 6–8.4)
RBC # BLD AUTO: 4.21 M/UL (ref 4–5.4)
SODIUM SERPL-SCNC: 139 MMOL/L (ref 136–145)
WBC # BLD AUTO: 5.34 K/UL (ref 3.9–12.7)

## 2025-01-19 PROCEDURE — 25000003 PHARM REV CODE 250: Performed by: INTERNAL MEDICINE

## 2025-01-19 PROCEDURE — 97110 THERAPEUTIC EXERCISES: CPT | Mod: CQ

## 2025-01-19 PROCEDURE — 99900035 HC TECH TIME PER 15 MIN (STAT)

## 2025-01-19 PROCEDURE — 85025 COMPLETE CBC W/AUTO DIFF WBC: CPT | Performed by: NURSE PRACTITIONER

## 2025-01-19 PROCEDURE — 94761 N-INVAS EAR/PLS OXIMETRY MLT: CPT

## 2025-01-19 PROCEDURE — 36415 COLL VENOUS BLD VENIPUNCTURE: CPT | Performed by: HOSPITALIST

## 2025-01-19 PROCEDURE — 27000207 HC ISOLATION

## 2025-01-19 PROCEDURE — 80053 COMPREHEN METABOLIC PANEL: CPT | Performed by: INTERNAL MEDICINE

## 2025-01-19 PROCEDURE — 30200315 PPD INTRADERMAL TEST REV CODE 302: Performed by: HOSPITALIST

## 2025-01-19 PROCEDURE — 83735 ASSAY OF MAGNESIUM: CPT | Performed by: INTERNAL MEDICINE

## 2025-01-19 PROCEDURE — 11000001 HC ACUTE MED/SURG PRIVATE ROOM

## 2025-01-19 PROCEDURE — 25000003 PHARM REV CODE 250: Performed by: NURSE PRACTITIONER

## 2025-01-19 PROCEDURE — 25000003 PHARM REV CODE 250: Performed by: HOSPITALIST

## 2025-01-19 PROCEDURE — 63600175 PHARM REV CODE 636 W HCPCS: Performed by: HOSPITALIST

## 2025-01-19 PROCEDURE — 84100 ASSAY OF PHOSPHORUS: CPT | Performed by: HOSPITALIST

## 2025-01-19 PROCEDURE — 63700000 PHARM REV CODE 250 ALT 637 W/O HCPCS: Performed by: NURSE PRACTITIONER

## 2025-01-19 PROCEDURE — 27000221 HC OXYGEN, UP TO 24 HOURS

## 2025-01-19 PROCEDURE — 86580 TB INTRADERMAL TEST: CPT | Performed by: HOSPITALIST

## 2025-01-19 RX ORDER — FUROSEMIDE 20 MG/1
20 TABLET ORAL DAILY
Status: DISCONTINUED | OUTPATIENT
Start: 2025-01-20 | End: 2025-01-24 | Stop reason: HOSPADM

## 2025-01-19 RX ADMIN — TOPIRAMATE 100 MG: 100 TABLET, FILM COATED ORAL at 09:01

## 2025-01-19 RX ADMIN — SODIUM BICARBONATE 650 MG TABLET 650 MG: at 09:01

## 2025-01-19 RX ADMIN — SODIUM BICARBONATE 650 MG TABLET 650 MG: at 08:01

## 2025-01-19 RX ADMIN — SODIUM BICARBONATE 650 MG TABLET 650 MG: at 04:01

## 2025-01-19 RX ADMIN — DULOXETINE HYDROCHLORIDE 30 MG: 30 CAPSULE, DELAYED RELEASE ORAL at 08:01

## 2025-01-19 RX ADMIN — FUROSEMIDE 40 MG: 10 INJECTION, SOLUTION INTRAMUSCULAR; INTRAVENOUS at 09:01

## 2025-01-19 RX ADMIN — AZITHROMYCIN DIHYDRATE 250 MG: 250 TABLET ORAL at 08:01

## 2025-01-19 RX ADMIN — Medication 1000 MCG: at 08:01

## 2025-01-19 RX ADMIN — AMMONIUM LACTATE: 12 LOTION TOPICAL at 09:01

## 2025-01-19 RX ADMIN — POTASSIUM BICARBONATE 35 MEQ: 391 TABLET, EFFERVESCENT ORAL at 05:01

## 2025-01-19 RX ADMIN — ENOXAPARIN SODIUM 40 MG: 40 INJECTION SUBCUTANEOUS at 04:01

## 2025-01-19 RX ADMIN — TOPIRAMATE 100 MG: 100 TABLET, FILM COATED ORAL at 08:01

## 2025-01-19 RX ADMIN — OSELTAMIVIR PHOSPHATE 30 MG: 30 CAPSULE ORAL at 09:01

## 2025-01-19 RX ADMIN — OSELTAMIVIR PHOSPHATE 30 MG: 30 CAPSULE ORAL at 08:01

## 2025-01-19 RX ADMIN — FAMOTIDINE 20 MG: 20 TABLET ORAL at 08:01

## 2025-01-19 RX ADMIN — ATORVASTATIN CALCIUM 40 MG: 40 TABLET, FILM COATED ORAL at 08:01

## 2025-01-19 RX ADMIN — TUBERCULIN PURIFIED PROTEIN DERIVATIVE 5 UNITS: 5 INJECTION, SOLUTION INTRADERMAL at 04:01

## 2025-01-19 RX ADMIN — PROPRANOLOL HYDROCHLORIDE 20 MG: 20 TABLET ORAL at 08:01

## 2025-01-19 RX ADMIN — CLOPIDOGREL BISULFATE 75 MG: 75 TABLET, FILM COATED ORAL at 08:01

## 2025-01-19 RX ADMIN — AMMONIUM LACTATE: 12 LOTION TOPICAL at 08:01

## 2025-01-19 RX ADMIN — FAMOTIDINE 20 MG: 20 TABLET ORAL at 09:01

## 2025-01-19 NOTE — CARE UPDATE
01/18/25 1948   Patient Assessment/Suction   Level of Consciousness (AVPU) alert   Respiratory Effort Normal;Unlabored   Expansion/Accessory Muscles/Retractions expansion symmetric;no retractions;no use of accessory muscles   All Lung Fields Breath Sounds clear;diminished   PRE-TX-O2   Device (Oxygen Therapy) nasal cannula   $ Is the patient on Low Flow Oxygen? Yes   Flow (L/min) (Oxygen Therapy) 2   Oxygen Concentration (%) 28   SpO2 (!) 90 %  (Sat was 90% on room air/  S/U 2L NC)   Pulse Oximetry Type Intermittent   Pulse 60   Resp 18   Aerosol Therapy   $ Aerosol Therapy Charges PRN treatment not required   Respiratory Treatment Status (SVN) PRN treatment not required   Ready to Wean/Extubation Screen   FIO2<=50 (chart decimal) 0.28

## 2025-01-19 NOTE — SUBJECTIVE & OBJECTIVE
Interval History:   Patient is seen and Examined at multidisciplinary rounds, feel much better.  Leg edema improved.  Evaluated by physical therapist, recommended SNF placement, family is agreeable for this.  Last day of antibiotics, we will DC it.         Review of Systems   Constitutional:  Positive for fatigue.     Objective:     Vital Signs (Most Recent):  Temp: 98.5 °F (36.9 °C) (01/19/25 0733)  Pulse: 68 (01/19/25 0733)  Resp: 20 (01/19/25 0733)  BP: 134/63 (01/19/25 0733)  SpO2: (!) 92 % (01/19/25 0733) Vital Signs (24h Range):  Temp:  [97.6 °F (36.4 °C)-98.6 °F (37 °C)] 98.5 °F (36.9 °C)  Pulse:  [56-68] 68  Resp:  [16-20] 20  SpO2:  [90 %-96 %] 92 %  BP: (105-134)/(53-63) 134/63     Weight: 93.4 kg (206 lb)  Body mass index is 36.49 kg/m².    Intake/Output Summary (Last 24 hours) at 1/19/2025 1048  Last data filed at 1/19/2025 0420  Gross per 24 hour   Intake 630 ml   Output 550 ml   Net 80 ml         Physical Exam  Constitutional:       Appearance: Normal appearance. She is ill-appearing.   HENT:      Head: Normocephalic and atraumatic.      Nose: Nose normal.   Eyes:      Extraocular Movements: Extraocular movements intact.      Pupils: Pupils are equal, round, and reactive to light.   Cardiovascular:      Rate and Rhythm: Normal rate and regular rhythm.      Heart sounds: No murmur heard.  Pulmonary:      Effort: Pulmonary effort is normal.      Breath sounds: Normal breath sounds.   Abdominal:      General: Abdomen is flat.      Palpations: Abdomen is soft.   Musculoskeletal:         General: Swelling present. Normal range of motion.      Cervical back: Normal range of motion and neck supple.      Comments: Bilateral lower extremity redness with swelling.    Skin:     General: Skin is warm and dry.   Neurological:      General: No focal deficit present.      Mental Status: She is alert and oriented to person, place, and time.   Psychiatric:         Mood and Affect: Mood normal.             Significant  Labs: All pertinent labs within the past 24 hours have been reviewed.  CBC:   Recent Labs   Lab 01/18/25 0320 01/19/25 0316   WBC 5.83 5.34   HGB 11.5* 11.4*   HCT 37.7 36.0*   * 147*     CMP:   Recent Labs   Lab 01/18/25 0320 01/19/25 0316    139   K 4.3 3.1*    103   CO2 17* 24   GLU 89 102   BUN 20 21   CREATININE 1.0 1.0   CALCIUM 7.5* 8.3*   PROT 6.5 6.2   ALBUMIN 3.2* 3.2*   BILITOT 0.4 0.4   ALKPHOS 109 107   AST 34 24   ALT 22 18   ANIONGAP 12 12       Significant Imaging: I have reviewed all pertinent imaging results/findings within the past 24 hours.  I have reviewed and interpreted all pertinent imaging results/findings within the past 24 hours.    Scheduled Meds:   ammonium lactate   Topical (Top) BID    atorvastatin  40 mg Oral Daily    clopidogreL  75 mg Oral Daily    cyanocobalamin  1,000 mcg Oral Daily    DULoxetine  30 mg Oral Daily    enoxparin  40 mg Subcutaneous Q24H (prophylaxis, 1700)    famotidine  20 mg Oral BID    [START ON 1/20/2025] furosemide  20 mg Oral Daily    oseltamivir  30 mg Oral BID    propranoloL  20 mg Oral Daily    sodium bicarbonate  650 mg Oral TID    topiramate  100 mg Oral BID     Continuous Infusions:  PRN Meds:.  Current Facility-Administered Medications:     acetaminophen, 650 mg, Oral, Q4H PRN    albuterol-ipratropium, 3 mL, Nebulization, Q6H PRN    magnesium oxide, 800 mg, Oral, PRN    magnesium oxide, 800 mg, Oral, PRN    melatonin, 6 mg, Oral, Nightly PRN    ondansetron, 4 mg, Intravenous, Q6H PRN    potassium bicarbonate, 35 mEq, Oral, PRN    potassium bicarbonate, 50 mEq, Oral, PRN    potassium bicarbonate, 60 mEq, Oral, PRN    potassium, sodium phosphates, 2 packet, Oral, PRN    potassium, sodium phosphates, 2 packet, Oral, PRN    potassium, sodium phosphates, 2 packet, Oral, PRN    sodium chloride 0.9%, 10 mL, Intravenous, PRN    X-Ray Chest AP Portable    Result Date: 1/16/2025  EXAMINATION: XR CHEST AP PORTABLE CLINICAL HISTORY:  Influenza; FINDINGS: Portable chest with comparison chest x-ray 01/15/2025.  Normal cardiomediastinal silhouette.There is mild bilateral perihilar interstitial thickening.  No confluent airspace opacities.  Pulmonary vasculature is normal. No acute osseous abnormality.     Mild bilateral perihilar interstitial thickening likely reflects infection/inflammation of the lower respiratory tract. Electronically signed by: Gage Heath Date:    01/16/2025 Time:    16:06    X-Ray Chest PA And Lateral    Result Date: 1/15/2025  EXAMINATION: XR CHEST PA AND LATERAL CLINICAL HISTORY: Subacute cough TECHNIQUE: PA and lateral views of the chest were performed. COMPARISON: 09/16/2024 FINDINGS: Stable cardiomediastinal silhouette.  No focal airspace consolidation, pleural effusion, or pneumothorax.  No acute osseous abnormality.     No acute cardiopulmonary process. Electronically signed by: Mario Hoyt Date:    01/15/2025 Time:    15:16  - pulls last radiology orders      Microbiology Results (last 7 days)       Procedure Component Value Units Date/Time    Urine culture [[id number]] Collected: 01/16/25 1533    Order Status: Completed Specimen: Urine Updated: 01/19/25 1003     Urine Culture, Routine No growth    Narrative:      Specimen Source->Urine    Blood culture x two cultures. Draw prior to antibiotics. [[id number]] Collected: 01/16/25 1523    Order Status: Completed Specimen: Blood from Peripheral, Forearm, Left Updated: 01/18/25 1632     Blood Culture, Routine No Growth to date      No Growth to date      No Growth to date    Narrative:      Aerobic and anaerobic  Collection has been rescheduled by [email or web address] at 01/16/2025 15:06 Reason:   Patient unavailable ekg  Collection has been rescheduled by [email or web address] at 01/16/2025 15:25 Reason:   Done  Collection has been rescheduled by [email or web address] at 01/16/2025 15:06 Reason:   Patient unavailable ekg  Collection has been rescheduled by [email or web address] at 01/16/2025 15:25 Reason:   Done    Blood culture  [[id number]] Collected: 01/17/25 1226    Order Status: Completed Specimen: Blood from Peripheral, Hand, Left Updated: 01/18/25 1432     Blood Culture, Routine No Growth to date      No Growth to date    Blood culture [[id number]] Collected: 01/17/25 1226    Order Status: Completed Specimen: Blood from Peripheral, Hand, Right Updated: 01/18/25 1432     Blood Culture, Routine No Growth to date      No Growth to date    Blood culture x two cultures. Draw prior to antibiotics. [[id number]]  (Abnormal) Collected: 01/16/25 1510    Order Status: Completed Specimen: Blood Updated: 01/18/25 0619     Blood Culture, Routine Gram stain aer bottle: Gram positive cocci in clusters resembling Staph      Results called to and read back by:Bobby Schwab RN on [facility]      STAPHYLOCOCCUS EPIDERMIDIS  Susceptibility testing not routinely performed.      Narrative:      Aerobic and anaerobic  Collection has been rescheduled by [email or web address] at 01/16/2025 15:06 Reason:   Patient unavailable ekg  Collection has been rescheduled by [email or web address] at 01/16/2025 15:25 Reason:   Done  Collection has been rescheduled by [email or web address] at 01/16/2025 15:06 Reason:   Patient unavailable ekg  Collection has been rescheduled by [email or web address] at 01/16/2025 15:25 Reason:   Done    Rapid Organism ID by PCR (from Blood culture) [[id number]]  (Abnormal) Collected: 01/16/25 1510    Order Status: Completed Updated: 01/17/25 1111     Enterococcus faecalis Not Detected     Enterococcus faecium Not Detected     Listeria monocytogenes Not Detected     Staphylococcus spp. See species for ID     Staphylococcus aureus Not Detected     Staphylococcus epidermidis Detected     Staphylococcus lugdunensis Not Detected     Streptococcus species Not Detected     Streptococcus agalactiae Not Detected     Streptococcus pneumoniae Not Detected     Streptococcus pyogenes Not Detected     Acinetobacter calcoaceticus/baumannii complex Not Detected     Bacteroides fragilis Not Detected     Enterobacterales Not  Detected     Enterobacter cloacae complex Not Detected     Escherichia coli Not Detected     Klebsiella aerogenes Not Detected     Klebsiella oxytoca Not Detected     Klebsiella pneumoniae group Not Detected     Proteus Not Detected     Salmonella sp Not Detected     Serratia marcescens Not Detected     Haemophilus influenzae Not Detected     Neisseria meningtidis Not Detected     Pseudomonas aeruginosa Not Detected     Stenotrophomonas maltophilia Not Detected     Candida albicans Not Detected     Candida auris Not Detected     Candida glabrata Not Detected     Candida krusei Not Detected     Candida parapsilosis Not Detected     Candida tropicalis Not Detected     Cryptococcus neoformans/gattii Not Detected     CTX-M (ESBL ) Test not applicable     IMP (Carbapenem resistant) Test not applicable     KPC resistance gene (Carbapenem resistant) Test not applicable     mcr-1  Test not applicable     mec A/C  Detected     mec A/C and MREJ (MRSA) gene Test not applicable     NDM (Carbapenem resistant) Test not applicable     OXA-48-like (Carbapenem resistant) Test not applicable     van A/B (VRE gene) Test not applicable     VIM (Carbapenem resistant) Test not applicable    Narrative:      Aerobic and anaerobic    Clostridium difficile EIA [[id number]]     Order Status: Canceled Specimen: Stool

## 2025-01-19 NOTE — ASSESSMENT & PLAN NOTE
Recently treated with Macrobid  Start ceftriaxone, azithromycin  Completed 5 days we will discontinue antibiotics

## 2025-01-19 NOTE — PT/OT/SLP PROGRESS
Physical Therapy Treatment    Patient Name:  Alyssa John   MRN:  [id number]    Recommendations:     Discharge Recommendations: Moderate Intensity Therapy  Discharge Equipment Recommendations: none  Barriers to discharge: None    Assessment:     Alyssa John is a 74 y.o. female admitted with a medical diagnosis of Influenza A.  She presents with the following impairments/functional limitations: weakness, impaired endurance, impaired self care skills, impaired functional mobility, impaired balance, visual deficits, decreased upper extremity function, decreased lower extremity function, decreased ROM, impaired skin, edema . Awake , alert, supine in bed with spouse present. Reports not feeling her best after several interruptions during the night.  Agreed to exercises and positioning in bed. Presents with L lateral lean  and B heels fixed on bed.  PROM BLE's slowly as notable discomfort with touch or movement.  B heels floated on pillow.  Attempted upper body scoot to R side with HOB slightly lowered.  Falling asleep on occasion.  Nurse informed that heel protectors may be needed.     Rehab Prognosis: Fair; patient would benefit from acute skilled PT services to address these deficits and reach maximum level of function.    Recent Surgery: * No surgery found *      Plan:     During this hospitalization, patient to be seen 6 x/week to address the identified rehab impairments via therapeutic activities, therapeutic exercises, neuromuscular re-education, wheelchair management/training and progress toward the following goals:    Plan of Care Expires:       Subjective     Chief Complaint: weakness / fatigue  Patient/Family Comments/goals: to go to facility  Pain/Comfort:  Pain Rating 1: 0/10 (reports no pain , grimaces with touch / movement BLE's.)      Objective:     Communicated with nurse Cornelius prior to session.  Patient found supine with bed alarm, PureWick, telemetry (bandages B heels) upon PT entry to  room.     General Precautions: Standard, droplet, fall, seizure, vision impaired  Orthopedic Precautions: N/A  Braces: N/A  Respiratory Status: Nasal cannula, flow 2 L/min     Functional Mobility:        AM-PAC 6 CLICK MOBILITY          Treatment & Education:  PROM BLE's in supine to tolerance.  BLE's heels floated.    Patient left HOB elevated with all lines intact, call button in reach, bed alarm on, nurse Ashli notified, and spouse present..    GOALS:   Multidisciplinary Problems       Physical Therapy Goals          Problem: Physical Therapy    Goal Priority Disciplines Outcome Interventions   Physical Therapy Goal     PT, PT/OT Progressing    Description: 1. Supine to sit with Stand-by Assistance  2. Sit to stand transfer with min Assistance  3. Bed to chair transfer with assist of 1 caregiver and ue of RW as needed.  4. Pt will perform bilat LE therex with Lakeshia.                       DME Justifications:  No DME recommended requiring DME justifications    Time Tracking:     PT Received On: 01/19/25  PT Start Time: 0945     PT Stop Time: 1000  PT Total Time (min): 15 min     Billable Minutes: Therapeutic Exercise 15min    Treatment Type: Treatment  PT/PTA: PTA     Number of PTA visits since last PT visit: 2     01/19/2025

## 2025-01-19 NOTE — ASSESSMENT & PLAN NOTE
Patient's blood pressure range in the last 24 hours was: BP  Min: 105/53  Max: 134/63.The patient's inpatient anti-hypertensive regimen is listed below:  Current Antihypertensives  propranoloL tablet 20 mg, Daily, Oral  furosemide tablet 20 mg, Daily, Oral    Plan  - BP is controlled, no changes needed to their regimen

## 2025-01-19 NOTE — CARE UPDATE
01/19/25 0700 01/19/25 0704   Patient Assessment/Suction   Level of Consciousness (AVPU) alert  --    Respiratory Effort Unlabored;Normal  --    Expansion/Accessory Muscles/Retractions no retractions  --    All Lung Fields Breath Sounds clear  --    Rhythm/Pattern, Respiratory pattern regular;depth regular  --    PRE-TX-O2   Device (Oxygen Therapy) nasal cannula room air   $ Is the patient on Low Flow Oxygen? Yes  --    Flow (L/min) (Oxygen Therapy) 2  --    SpO2 96 % 95 %   Pulse Oximetry Type Intermittent  --    $ Pulse Oximetry - Multiple Charge Pulse Oximetry - Multiple  --    Pulse 68 67   Resp 17  --    Aerosol Therapy   $ Aerosol Therapy Charges PRN treatment not required  --    Respiratory Treatment Status (SVN) PRN treatment not required  --

## 2025-01-19 NOTE — ASSESSMENT & PLAN NOTE
1/2 blood culture positive for staph epi.   Most likely contamination.   Hold off antibiotics now  Follow up repeat blood culture- negative

## 2025-01-19 NOTE — PLAN OF CARE
Problem: Physical Therapy  Goal: Physical Therapy Goal  Description: 1. Supine to sit with Stand-by Assistance  2. Sit to stand transfer with min Assistance  3. Bed to chair transfer with assist of 1 caregiver and ue of RW as needed.  4. Pt will perform bilat LE therex with Lakeshia.  Outcome: Progressing   Therapeutic activity to improve functional mobility : bed mobility , transfers EOB/OOB, LE exercises.

## 2025-01-19 NOTE — PLAN OF CARE
Spoke with pt's spouse, Ap, via phone to discuss SNF preferences. 1st choice is [facility]. Will have to review list again to provide back options.     Referral sent to  and other local facilities in case no bed available at     Submitted to PHN for SNF auth    1250: received call back from spouse. 2nd choice is [facility] in [place]. Did inform him that I have to send referral to more facilities but will try to get at preferred facility. He verbalized understanding         01/19/25 1241   Post-Acute Status   Post-Acute Authorization Placement   Post-Acute Placement Status Referrals Sent

## 2025-01-19 NOTE — PLAN OF CARE
Problem: Adult Inpatient Plan of Care  Goal: Plan of Care Review  Outcome: Progressing     Problem: Adult Inpatient Plan of Care  Goal: Absence of Hospital-Acquired Illness or Injury  Outcome: Progressing     Problem: Adult Inpatient Plan of Care  Goal: Optimal Comfort and Wellbeing  Outcome: Progressing     Problem: Wound  Goal: Optimal Functional Ability  Outcome: Progressing     Problem: Wound  Goal: Absence of Infection Signs and Symptoms  Outcome: Progressing     Problem: Wound  Goal: Optimal Pain Control and Function  Outcome: Progressing

## 2025-01-19 NOTE — ASSESSMENT & PLAN NOTE
Patient looks severe fluid overload,  we will start IV Lasix 40 b.i.d..   Get 2D echo.     Echo    Result Date: 1/17/2025    Left Ventricle: The left ventricle is normal in size. Normal wall   thickness. There is normal systolic function. There is normal diastolic   function. E/A ratio is 1.16.    Right Ventricle: Normal right ventricular cavity size. Systolic   function is normal.    Mitral Valve: There is mild regurgitation.    Tricuspid Valve: There is mild to moderate regurgitation. The estimated   PA systolic pressure is at least 27 mmHg.    Overall the study quality was technically difficult. The study was   difficult due to patient's poor acoustic windows.       Much improved with IV Lasix, discontinue IV Lasix.  Continue p.o. Lasix

## 2025-01-19 NOTE — PT/OT/SLP PROGRESS
Occupational Therapy      Patient Name:  Alyssa John   MRN:  [id number]    Patient not seen today secondary to Other (Comment) (Pt declined. Wants to rest.). Will follow-up.    1/19/2025

## 2025-01-19 NOTE — PROGRESS NOTES
[facility] Medicine  Progress Note    Patient Name: Alyssa John  MRN: [id number]  Patient Class: IP- Inpatient   Admission Date: 1/16/2025  Length of Stay: 2 days  Attending Physician: Carl Frausto MD  Primary Care Provider: Piyush Sharif MD        Subjective     Principal Problem:Influenza A        HPI:  74 year old female with a past medical history of GERD, Epilepsy, hypertension, MI, MS, CAD was recently tested positive for influenza A.  She reports to the emergency room with reports of weakness and diarrhea.  Reports of decreased mobility and activity of daily living for the last 7-8 months. Patient needs to be assisted out of wheelchair by significant other. She was diagnosed with urinary tract infection about 17 days ago. She recently treated with omnicef and cipro.inished course of antibiotics. She then developed cough and congestion. She was diagnosed with influenza yesterday. Now  is unable to assist patient and transfers. She reports increased generalized weakness especially to both legs. No trouble with vision or speech. There was some chills with no definite fever. Urinary symptoms resolved.     In the ER, potassium 3.2, glucose 127, calcium 8, troponin I high sensitivity 29.6, influenza A positive UA with 1+ protein, 1+ blood, 2+ leukocytes 96 WBCs urine culture sent, COVID negative lactic 1.56 chest x-ray Mild bilateral perihilar interstitial thickening     Admit to hospital medicine for UTI after failing outpatient therapy.          Overview/Hospital Course:  74-year-old female with a history of multiple sclerosis limited mobility, recurrent UTI, CAD on Plavix presenting here for urinary tract infection/ influenza a positive and diarrhea.  Started on empiric Rocephin azithromycin, Tamiflu.  Patient also looks severe fluid overload, started on IV Lasix.  2D echo is pending.  Patient also found to 1/2 have staph epi bacteremia, most likely  contamination.  Follow up repeat blood culture.     Interval History:   Patient is seen and Examined at multidisciplinary rounds, feel much better.  Leg edema improved.  Evaluated by physical therapist, recommended SNF placement, family is agreeable for this.  Last day of antibiotics, we will DC it.         Review of Systems   Constitutional:  Positive for fatigue.     Objective:     Vital Signs (Most Recent):  Temp: 98.5 °F (36.9 °C) (01/19/25 0733)  Pulse: 68 (01/19/25 0733)  Resp: 20 (01/19/25 0733)  BP: 134/63 (01/19/25 0733)  SpO2: (!) 92 % (01/19/25 0733) Vital Signs (24h Range):  Temp:  [97.6 °F (36.4 °C)-98.6 °F (37 °C)] 98.5 °F (36.9 °C)  Pulse:  [56-68] 68  Resp:  [16-20] 20  SpO2:  [90 %-96 %] 92 %  BP: (105-134)/(53-63) 134/63     Weight: 93.4 kg (206 lb)  Body mass index is 36.49 kg/m².    Intake/Output Summary (Last 24 hours) at 1/19/2025 1048  Last data filed at 1/19/2025 0420  Gross per 24 hour   Intake 630 ml   Output 550 ml   Net 80 ml         Physical Exam  Constitutional:       Appearance: Normal appearance. She is ill-appearing.   HENT:      Head: Normocephalic and atraumatic.      Nose: Nose normal.   Eyes:      Extraocular Movements: Extraocular movements intact.      Pupils: Pupils are equal, round, and reactive to light.   Cardiovascular:      Rate and Rhythm: Normal rate and regular rhythm.      Heart sounds: No murmur heard.  Pulmonary:      Effort: Pulmonary effort is normal.      Breath sounds: Normal breath sounds.   Abdominal:      General: Abdomen is flat.      Palpations: Abdomen is soft.   Musculoskeletal:         General: Swelling present. Normal range of motion.      Cervical back: Normal range of motion and neck supple.      Comments: Bilateral lower extremity redness with swelling.    Skin:     General: Skin is warm and dry.   Neurological:      General: No focal deficit present.      Mental Status: She is alert and oriented to person, place, and time.   Psychiatric:          Mood and Affect: Mood normal.             Significant Labs: All pertinent labs within the past 24 hours have been reviewed.  CBC:   Recent Labs   Lab 01/18/25  0320 01/19/25 0316   WBC 5.83 5.34   HGB 11.5* 11.4*   HCT 37.7 36.0*   * 147*     CMP:   Recent Labs   Lab 01/18/25  0320 01/19/25  0316    139   K 4.3 3.1*    103   CO2 17* 24   GLU 89 102   BUN 20 21   CREATININE 1.0 1.0   CALCIUM 7.5* 8.3*   PROT 6.5 6.2   ALBUMIN 3.2* 3.2*   BILITOT 0.4 0.4   ALKPHOS 109 107   AST 34 24   ALT 22 18   ANIONGAP 12 12       Significant Imaging: I have reviewed all pertinent imaging results/findings within the past 24 hours.  I have reviewed and interpreted all pertinent imaging results/findings within the past 24 hours.    Scheduled Meds:   ammonium lactate   Topical (Top) BID    atorvastatin  40 mg Oral Daily    clopidogreL  75 mg Oral Daily    cyanocobalamin  1,000 mcg Oral Daily    DULoxetine  30 mg Oral Daily    enoxparin  40 mg Subcutaneous Q24H (prophylaxis, 1700)    famotidine  20 mg Oral BID    [START ON 1/20/2025] furosemide  20 mg Oral Daily    oseltamivir  30 mg Oral BID    propranoloL  20 mg Oral Daily    sodium bicarbonate  650 mg Oral TID    topiramate  100 mg Oral BID     Continuous Infusions:  PRN Meds:.  Current Facility-Administered Medications:     acetaminophen, 650 mg, Oral, Q4H PRN    albuterol-ipratropium, 3 mL, Nebulization, Q6H PRN    magnesium oxide, 800 mg, Oral, PRN    magnesium oxide, 800 mg, Oral, PRN    melatonin, 6 mg, Oral, Nightly PRN    ondansetron, 4 mg, Intravenous, Q6H PRN    potassium bicarbonate, 35 mEq, Oral, PRN    potassium bicarbonate, 50 mEq, Oral, PRN    potassium bicarbonate, 60 mEq, Oral, PRN    potassium, sodium phosphates, 2 packet, Oral, PRN    potassium, sodium phosphates, 2 packet, Oral, PRN    potassium, sodium phosphates, 2 packet, Oral, PRN    sodium chloride 0.9%, 10 mL, Intravenous, PRN    X-Ray Chest AP Portable    Result Date:  1/16/2025  EXAMINATION: XR CHEST AP PORTABLE CLINICAL HISTORY: Influenza; FINDINGS: Portable chest with comparison chest x-ray 01/15/2025.  Normal cardiomediastinal silhouette.There is mild bilateral perihilar interstitial thickening.  No confluent airspace opacities.  Pulmonary vasculature is normal. No acute osseous abnormality.     Mild bilateral perihilar interstitial thickening likely reflects infection/inflammation of the lower respiratory tract. Electronically signed by: Gage Heath Date:    01/16/2025 Time:    16:06    X-Ray Chest PA And Lateral    Result Date: 1/15/2025  EXAMINATION: XR CHEST PA AND LATERAL CLINICAL HISTORY: Subacute cough TECHNIQUE: PA and lateral views of the chest were performed. COMPARISON: 09/16/2024 FINDINGS: Stable cardiomediastinal silhouette.  No focal airspace consolidation, pleural effusion, or pneumothorax.  No acute osseous abnormality.     No acute cardiopulmonary process. Electronically signed by: Mario Hoyt Date:    01/15/2025 Time:    15:16  - pulls last radiology orders      Microbiology Results (last 7 days)       Procedure Component Value Units Date/Time    Urine culture [[id number]] Collected: 01/16/25 1533    Order Status: Completed Specimen: Urine Updated: 01/19/25 1003     Urine Culture, Routine No growth    Narrative:      Specimen Source->Urine    Blood culture x two cultures. Draw prior to antibiotics. [[id number]] Collected: 01/16/25 1523    Order Status: Completed Specimen: Blood from Peripheral, Forearm, Left Updated: 01/18/25 1632     Blood Culture, Routine No Growth to date      No Growth to date      No Growth to date    Narrative:      Aerobic and anaerobic  Collection has been rescheduled by HOLLY at 01/16/2025 15:06 Reason:   Patient unavailable ekg  Collection has been rescheduled by HOLLY at 01/16/2025 15:25 Reason:   Done  Collection has been rescheduled by HOLLY at 01/16/2025 15:06 Reason:   Patient unavailable ekg  Collection has been rescheduled by  HOLLY at 01/16/2025 15:25 Reason:   Done    Blood culture [[id number]] Collected: 01/17/25 1226    Order Status: Completed Specimen: Blood from Peripheral, Hand, Left Updated: 01/18/25 1432     Blood Culture, Routine No Growth to date      No Growth to date    Blood culture [[id number]] Collected: 01/17/25 1226    Order Status: Completed Specimen: Blood from Peripheral, Hand, Right Updated: 01/18/25 1432     Blood Culture, Routine No Growth to date      No Growth to date    Blood culture x two cultures. Draw prior to antibiotics. [[id number]]  (Abnormal) Collected: 01/16/25 1510    Order Status: Completed Specimen: Blood Updated: 01/18/25 0619     Blood Culture, Routine Gram stain aer bottle: Gram positive cocci in clusters resembling Staph      Results called to and read back by:Bobby Schwab RN on [facility]      STAPHYLOCOCCUS EPIDERMIDIS  Susceptibility testing not routinely performed.      Narrative:      Aerobic and anaerobic  Collection has been rescheduled by [email or web address] at 01/16/2025 15:06 Reason:   Patient unavailable ekg  Collection has been rescheduled by [email or web address] at 01/16/2025 15:25 Reason:   Done  Collection has been rescheduled by [email or web address] at 01/16/2025 15:06 Reason:   Patient unavailable ekg  Collection has been rescheduled by [email or web address] at 01/16/2025 15:25 Reason:   Done    Rapid Organism ID by PCR (from Blood culture) [[id number]]  (Abnormal) Collected: 01/16/25 1510    Order Status: Completed Updated: 01/17/25 1111     Enterococcus faecalis Not Detected     Enterococcus faecium Not Detected     Listeria monocytogenes Not Detected     Staphylococcus spp. See species for ID     Staphylococcus aureus Not Detected     Staphylococcus epidermidis Detected     Staphylococcus lugdunensis Not Detected     Streptococcus species Not Detected     Streptococcus agalactiae Not Detected     Streptococcus pneumoniae Not Detected     Streptococcus pyogenes Not Detected     Acinetobacter calcoaceticus/baumannii complex Not Detected      Bacteroides fragilis Not Detected     Enterobacterales Not Detected     Enterobacter cloacae complex Not Detected     Escherichia coli Not Detected     Klebsiella aerogenes Not Detected     Klebsiella oxytoca Not Detected     Klebsiella pneumoniae group Not Detected     Proteus Not Detected     Salmonella sp Not Detected     Serratia marcescens Not Detected     Haemophilus influenzae Not Detected     Neisseria meningtidis Not Detected     Pseudomonas aeruginosa Not Detected     Stenotrophomonas maltophilia Not Detected     Candida albicans Not Detected     Candida auris Not Detected     Candida glabrata Not Detected     Candida krusei Not Detected     Candida parapsilosis Not Detected     Candida tropicalis Not Detected     Cryptococcus neoformans/gattii Not Detected     CTX-M (ESBL ) Test not applicable     IMP (Carbapenem resistant) Test not applicable     KPC resistance gene (Carbapenem resistant) Test not applicable     mcr-1  Test not applicable     mec A/C  Detected     mec A/C and MREJ (MRSA) gene Test not applicable     NDM (Carbapenem resistant) Test not applicable     OXA-48-like (Carbapenem resistant) Test not applicable     van A/B (VRE gene) Test not applicable     VIM (Carbapenem resistant) Test not applicable    Narrative:      Aerobic and anaerobic    Clostridium difficile EIA [195038]     Order Status: Canceled Specimen: Stool               Assessment and Plan     * Influenza A  Symptomatic care  Oxygen p.r.n.  Tamiflu      Lower extremity cellulitis    Improved, ruled out DVT    Staphylococcus epidermidis bacteremia    1/2 blood culture positive for staph epi.   Most likely contamination.   Hold off antibiotics now  Follow up repeat blood culture- negative    Severe obesity (BMI 35.0-39.9) with comorbidity  Body mass index is 36.49 kg/m². Morbid obesity complicates all aspects of disease management from diagnostic modalities to treatment. Weight loss encouraged and health benefits  explained to patient.         Fluid overload    Patient looks severe fluid overload,  we will start IV Lasix 40 b.i.d..   Get 2D echo.     Echo    Result Date: 1/17/2025    Left Ventricle: The left ventricle is normal in size. Normal wall   thickness. There is normal systolic function. There is normal diastolic   function. E/A ratio is 1.16.    Right Ventricle: Normal right ventricular cavity size. Systolic   function is normal.    Mitral Valve: There is mild regurgitation.    Tricuspid Valve: There is mild to moderate regurgitation. The estimated   PA systolic pressure is at least 27 mmHg.    Overall the study quality was technically difficult. The study was   difficult due to patient's poor acoustic windows.       Much improved with IV Lasix, discontinue IV Lasix.  Continue p.o. Lasix      UTI (urinary tract infection)  Recently treated with Macrobid  Start ceftriaxone, azithromycin  Completed 5 days we will discontinue antibiotics      Impaired functional mobility, balance, and endurance  Consult PT/OT  Fall/safety precautions    Patient does not want to go to skilled nursing facility or rehab/       Hypertension  Patient's blood pressure range in the last 24 hours was: BP  Min: 105/53  Max: 134/63.The patient's inpatient anti-hypertensive regimen is listed below:  Current Antihypertensives  propranoloL tablet 20 mg, Daily, Oral  furosemide tablet 20 mg, Daily, Oral    Plan  - BP is controlled, no changes needed to their regimen      Seizure disorder  Chronic  Continue home meds      Multiple sclerosis    Aware, function status worsened due to infection.       VTE Risk Mitigation (From admission, onward)           Ordered     enoxaparin injection 40 mg  Every 24 hours         01/17/25 1141     IP VTE HIGH RISK PATIENT  Once         01/16/25 1647     Place sequential compression device  Until discontinued         01/16/25 1647                    Discharge Planning   ROGERIO: 1/23/2025     Code Status: Full Code    Medical Readiness for Discharge Date:   Discharge Plan A: Skilled Nursing Facility                Please place Justification for DME        Carl Frausto MD  Department of Hospital Medicine   [facility]/Surg

## 2025-01-20 PROBLEM — B95.7 STAPHYLOCOCCUS EPIDERMIDIS BACTEREMIA: Status: RESOLVED | Noted: 2025-01-17 | Resolved: 2025-01-20

## 2025-01-20 PROBLEM — E87.70 FLUID OVERLOAD: Status: RESOLVED | Noted: 2024-10-28 | Resolved: 2025-01-20

## 2025-01-20 PROBLEM — E87.6 HYPOKALEMIA: Status: ACTIVE | Noted: 2025-01-20

## 2025-01-20 PROBLEM — R78.81 STAPHYLOCOCCUS EPIDERMIDIS BACTEREMIA: Status: RESOLVED | Noted: 2025-01-17 | Resolved: 2025-01-20

## 2025-01-20 PROBLEM — I87.2 VENOUS STASIS DERMATITIS OF BOTH LOWER EXTREMITIES: Status: ACTIVE | Noted: 2025-01-18

## 2025-01-20 LAB
ALBUMIN SERPL BCP-MCNC: 3 G/DL (ref 3.5–5.2)
ALP SERPL-CCNC: 113 U/L (ref 40–150)
ALT SERPL W/O P-5'-P-CCNC: 15 U/L (ref 10–44)
ANION GAP SERPL CALC-SCNC: 10 MMOL/L (ref 8–16)
AST SERPL-CCNC: 21 U/L (ref 10–40)
BACTERIA BLD CULT: ABNORMAL
BILIRUB SERPL-MCNC: 0.4 MG/DL (ref 0.1–1)
BUN SERPL-MCNC: 20 MG/DL (ref 8–23)
CALCIUM SERPL-MCNC: 8.4 MG/DL (ref 8.7–10.5)
CHLORIDE SERPL-SCNC: 105 MMOL/L (ref 95–110)
CO2 SERPL-SCNC: 26 MMOL/L (ref 23–29)
CREAT SERPL-MCNC: 0.9 MG/DL (ref 0.5–1.4)
EST. GFR  (NO RACE VARIABLE): >60 ML/MIN/1.73 M^2
GLUCOSE SERPL-MCNC: 99 MG/DL (ref 70–110)
MAGNESIUM SERPL-MCNC: 2.2 MG/DL (ref 1.6–2.6)
OHS QRS DURATION: 68 MS
OHS QTC CALCULATION: 491 MS
PHOSPHATE SERPL-MCNC: 3.5 MG/DL (ref 2.7–4.5)
POTASSIUM SERPL-SCNC: 3.3 MMOL/L (ref 3.5–5.1)
PROT SERPL-MCNC: 6 G/DL (ref 6–8.4)
SODIUM SERPL-SCNC: 141 MMOL/L (ref 136–145)

## 2025-01-20 PROCEDURE — 80053 COMPREHEN METABOLIC PANEL: CPT | Performed by: INTERNAL MEDICINE

## 2025-01-20 PROCEDURE — 25000003 PHARM REV CODE 250: Performed by: INTERNAL MEDICINE

## 2025-01-20 PROCEDURE — 25000003 PHARM REV CODE 250: Performed by: STUDENT IN AN ORGANIZED HEALTH CARE EDUCATION/TRAINING PROGRAM

## 2025-01-20 PROCEDURE — 25000003 PHARM REV CODE 250: Performed by: HOSPITALIST

## 2025-01-20 PROCEDURE — 11000001 HC ACUTE MED/SURG PRIVATE ROOM

## 2025-01-20 PROCEDURE — 27000221 HC OXYGEN, UP TO 24 HOURS

## 2025-01-20 PROCEDURE — 25000003 PHARM REV CODE 250: Performed by: NURSE PRACTITIONER

## 2025-01-20 PROCEDURE — 99900035 HC TECH TIME PER 15 MIN (STAT)

## 2025-01-20 PROCEDURE — 94761 N-INVAS EAR/PLS OXIMETRY MLT: CPT

## 2025-01-20 PROCEDURE — 36415 COLL VENOUS BLD VENIPUNCTURE: CPT | Performed by: INTERNAL MEDICINE

## 2025-01-20 PROCEDURE — 63600175 PHARM REV CODE 636 W HCPCS: Performed by: HOSPITALIST

## 2025-01-20 PROCEDURE — 83735 ASSAY OF MAGNESIUM: CPT | Performed by: INTERNAL MEDICINE

## 2025-01-20 PROCEDURE — 27000207 HC ISOLATION

## 2025-01-20 PROCEDURE — 84100 ASSAY OF PHOSPHORUS: CPT | Performed by: HOSPITALIST

## 2025-01-20 RX ORDER — POTASSIUM CHLORIDE 20 MEQ/1
20 TABLET, EXTENDED RELEASE ORAL DAILY
Status: DISCONTINUED | OUTPATIENT
Start: 2025-01-21 | End: 2025-01-24 | Stop reason: HOSPADM

## 2025-01-20 RX ORDER — HYDROCORTISONE 25 MG/G
CREAM TOPICAL 2 TIMES DAILY
Status: DISCONTINUED | OUTPATIENT
Start: 2025-01-20 | End: 2025-01-24 | Stop reason: HOSPADM

## 2025-01-20 RX ORDER — AMOXICILLIN 250 MG
1 CAPSULE ORAL DAILY
Status: DISCONTINUED | OUTPATIENT
Start: 2025-01-20 | End: 2025-01-24 | Stop reason: HOSPADM

## 2025-01-20 RX ORDER — LACTULOSE 10 G/15ML
20 SOLUTION ORAL EVERY 6 HOURS PRN
Status: DISCONTINUED | OUTPATIENT
Start: 2025-01-20 | End: 2025-01-24 | Stop reason: HOSPADM

## 2025-01-20 RX ADMIN — OSELTAMIVIR PHOSPHATE 30 MG: 30 CAPSULE ORAL at 10:01

## 2025-01-20 RX ADMIN — TOPIRAMATE 100 MG: 100 TABLET, FILM COATED ORAL at 08:01

## 2025-01-20 RX ADMIN — HYDROCORTISONE: 25 CREAM TOPICAL at 10:01

## 2025-01-20 RX ADMIN — SENNOSIDES AND DOCUSATE SODIUM 1 TABLET: 50; 8.6 TABLET ORAL at 12:01

## 2025-01-20 RX ADMIN — ATORVASTATIN CALCIUM 40 MG: 40 TABLET, FILM COATED ORAL at 08:01

## 2025-01-20 RX ADMIN — HYDROCORTISONE: 25 CREAM TOPICAL at 12:01

## 2025-01-20 RX ADMIN — LACTULOSE 20 G: 20 SOLUTION ORAL at 12:01

## 2025-01-20 RX ADMIN — ACETAMINOPHEN 650 MG: 325 TABLET ORAL at 10:01

## 2025-01-20 RX ADMIN — Medication 1000 MCG: at 08:01

## 2025-01-20 RX ADMIN — ENOXAPARIN SODIUM 40 MG: 40 INJECTION SUBCUTANEOUS at 05:01

## 2025-01-20 RX ADMIN — PROPRANOLOL HYDROCHLORIDE 20 MG: 20 TABLET ORAL at 08:01

## 2025-01-20 RX ADMIN — OSELTAMIVIR PHOSPHATE 30 MG: 30 CAPSULE ORAL at 08:01

## 2025-01-20 RX ADMIN — FAMOTIDINE 20 MG: 20 TABLET ORAL at 08:01

## 2025-01-20 RX ADMIN — AMMONIUM LACTATE: 12 LOTION TOPICAL at 10:01

## 2025-01-20 RX ADMIN — TOPIRAMATE 100 MG: 100 TABLET, FILM COATED ORAL at 10:01

## 2025-01-20 RX ADMIN — CLOPIDOGREL BISULFATE 75 MG: 75 TABLET, FILM COATED ORAL at 08:01

## 2025-01-20 RX ADMIN — DULOXETINE HYDROCHLORIDE 30 MG: 30 CAPSULE, DELAYED RELEASE ORAL at 08:01

## 2025-01-20 RX ADMIN — AMMONIUM LACTATE: 12 LOTION TOPICAL at 08:01

## 2025-01-20 RX ADMIN — POTASSIUM BICARBONATE 35 MEQ: 391 TABLET, EFFERVESCENT ORAL at 05:01

## 2025-01-20 RX ADMIN — FAMOTIDINE 20 MG: 20 TABLET ORAL at 10:01

## 2025-01-20 RX ADMIN — FUROSEMIDE 20 MG: 20 TABLET ORAL at 08:01

## 2025-01-20 NOTE — CARE UPDATE
01/20/25 0738   Patient Assessment/Suction   Level of Consciousness (AVPU) alert   Respiratory Effort Normal;Unlabored   Expansion/Accessory Muscles/Retractions no use of accessory muscles;no retractions;expansion symmetric   All Lung Fields Breath Sounds Anterior:;diminished   Rhythm/Pattern, Respiratory unlabored;pattern regular;depth regular;no shortness of breath reported   Cough Frequency no cough   PRE-TX-O2   Device (Oxygen Therapy) nasal cannula  (placed on RA)   $ Is the patient on Low Flow Oxygen? Yes   SpO2 100 %   Pulse Oximetry Type Intermittent   $ Pulse Oximetry - Multiple Charge Pulse Oximetry - Multiple   Pulse 68   Resp 18   Positioning HOB elevated 30 degrees   Aerosol Therapy   $ Aerosol Therapy Charges PRN treatment not required

## 2025-01-20 NOTE — PROGRESS NOTES
[facility] Medicine  Progress Note    Patient Name: Alyssa John  MRN: [id number]  Patient Class: IP- Inpatient   Admission Date: 1/16/2025  Length of Stay: 3 days  Attending Physician: David Urbina MD  Primary Care Provider: Piyush Sharif MD        Subjective     Principal Problem:Influenza A        HPI:  74 year old female with a past medical history of GERD, Epilepsy, hypertension, MI, MS, CAD was recently tested positive for influenza A.  She reports to the emergency room with reports of weakness and diarrhea.  Reports of decreased mobility and activity of daily living for the last 7-8 months. Patient needs to be assisted out of wheelchair by significant other. She was diagnosed with urinary tract infection about 17 days ago. She recently treated with omnicef and cipro.inished course of antibiotics. She then developed cough and congestion. She was diagnosed with influenza yesterday. Now  is unable to assist patient and transfers. She reports increased generalized weakness especially to both legs. No trouble with vision or speech. There was some chills with no definite fever. Urinary symptoms resolved.     In the ER, potassium 3.2, glucose 127, calcium 8, troponin I high sensitivity 29.6, influenza A positive UA with 1+ protein, 1+ blood, 2+ leukocytes 96 WBCs urine culture sent, COVID negative lactic 1.56 chest x-ray Mild bilateral perihilar interstitial thickening     Admit to hospital medicine for UTI after failing outpatient therapy.          Overview/Hospital Course:  74-year-old female with a history of multiple sclerosis with limited mobility, recurrent UTI, CAD on Plavix presenting here for influenza a positive and diarrhea.  Also acute on chronic debility.  Urine culture was negative.  Started on empiric Rocephin azithromycin, Tamiflu.  Patient also had fluid overload treated with IV Lasix.  Patient also found to 1/2 have staph epi bacteremia, most  likely contamination.  Repeat blood culture negative.  2D echo shows normal LVEF, volume status much improved with IV Lasix.  Converted to p.o. Lasix.  Completed antibiotics.  Patient is recommended to go to SNF by physical therapist.    Interval History:  Seen on a.m. rounds.  Still generally weak but somewhat improved.  Still with significant pain to lower legs.  Pending SNF.      Objective:     Vital Signs (Most Recent):  Temp: 97.9 °F (36.6 °C) (01/20/25 1107)  Pulse: (!) 58 (01/20/25 1107)  Resp: 18 (01/20/25 1107)  BP: (!) 160/70 (01/20/25 1107)  SpO2: (!) 92 % (01/20/25 1107) Vital Signs (24h Range):  Temp:  [97.5 °F (36.4 °C)-98.2 °F (36.8 °C)] 97.9 °F (36.6 °C)  Pulse:  [53-68] 58  Resp:  [17-18] 18  SpO2:  [92 %-100 %] 92 %  BP: (102-160)/(55-70) 160/70     Weight: 93.4 kg (206 lb)  Body mass index is 36.49 kg/m².    Intake/Output Summary (Last 24 hours) at 1/20/2025 1152  Last data filed at 1/20/2025 0000  Gross per 24 hour   Intake 480 ml   Output 850 ml   Net -370 ml         Physical Exam  Vitals reviewed.   Constitutional:       General: She is not in acute distress.     Comments: Chronically ill-appearing   HENT:      Head: Normocephalic and atraumatic.   Cardiovascular:      Rate and Rhythm: Normal rate and regular rhythm.   Pulmonary:      Effort: Pulmonary effort is normal. No respiratory distress.   Musculoskeletal:      Comments: Mild-to-moderate erythema of the lower extremities bilateral, minimal swelling.  Tender to touch   Neurological:      Mental Status: She is alert and oriented to person, place, and time.   Psychiatric:         Mood and Affect: Affect normal.         Behavior: Behavior normal.         Thought Content: Thought content normal.             Significant Labs: All pertinent labs within the past 24 hours have been reviewed.    Significant Imaging: I have reviewed all pertinent imaging results/findings within the past 24 hours.    Assessment and Plan     * Influenza  A  Symptomatic care  Isolation  Tamiflu    Hypokalemia  Patient's most recent potassium results are listed below.   Recent Labs     01/18/25  0320 01/19/25  0316 01/20/25  0315   K 4.3 3.1* 3.3*     Plan  - Replete potassium per protocol  - Monitor potassium Daily  - Patient's hypokalemia is stable  - resume home supplement 20 mEq daily    Venous stasis dermatitis of both lower extremities  No DVT  Continue Lac-Hydrin per wound care  Start hydrocortisone topical    Severe obesity (BMI 35.0-39.9) with comorbidity  Body mass index is 36.49 kg/m². Morbid obesity complicates all aspects of disease management from diagnostic modalities to treatment.      Impaired functional mobility, balance, and endurance  Consult PT/OT  Fall/safety precautions  Now planned for SNF    Hypertension  Patient's blood pressure range in the last 24 hours was: BP  Min: 102/55  Max: 160/70.The patient's inpatient anti-hypertensive regimen is listed below:  Current Antihypertensives  propranoloL tablet 20 mg, Daily, Oral  furosemide tablet 20 mg, Daily, Oral    Plan  - BP is controlled, no changes needed to their regimen    Seizure disorder  Chronic  Continue home meds    Multiple sclerosis  Aware, function status worsened due to infection.       VTE Risk Mitigation (From admission, onward)           Ordered     enoxaparin injection 40 mg  Every 24 hours         01/17/25 1141     IP VTE HIGH RISK PATIENT  Once         01/16/25 1647     Place sequential compression device  Until discontinued         01/16/25 1647                    Discharge Planning   ROGERIO: 1/23/2025     Code Status: Full Code   Medical Readiness for Discharge Date:   Discharge Plan A: Skilled Nursing Facility                 David Urbina MD  Department of Hospital Medicine   [facility]/Surg

## 2025-01-20 NOTE — PLAN OF CARE
Plan of care continues. Flu + now on room air. Encouraged repositioning, pt c/o pain BLE- skin red and blistered. SR on telemetry. Prn meds given for BM. K replaced. Sister @ bedside for most of day. Hourly rounding for safety. Pt is waiting for snf placement    BLE improved after steroid cream      Problem: Adult Inpatient Plan of Care  Goal: Plan of Care Review  Outcome: Progressing     Problem: Wound  Goal: Optimal Wound Healing  Outcome: Progressing     Problem: Fall Injury Risk  Goal: Absence of Fall and Fall-Related Injury  Outcome: Progressing     Problem: Skin Injury Risk Increased  Goal: Skin Health and Integrity  Outcome: Progressing     Problem: Pneumonia  Goal: Fluid Balance  Outcome: Progressing

## 2025-01-20 NOTE — ASSESSMENT & PLAN NOTE
Patient's most recent potassium results are listed below.   Recent Labs     01/18/25  0320 01/19/25  0316 01/20/25  0315   K 4.3 3.1* 3.3*     Plan  - Replete potassium per protocol  - Monitor potassium Daily  - Patient's hypokalemia is stable  - resume home supplement 20 mEq daily

## 2025-01-20 NOTE — CARE UPDATE
01/20/25 0843   PRE-TX-O2   Device (Oxygen Therapy) room air   SpO2 95 %   Pulse Oximetry Type Intermittent   $ Pulse Oximetry - Multiple Charge Pulse Oximetry - Multiple        01/20/25 1152   PRE-TX-O2   Device (Oxygen Therapy) room air   SpO2 95 %   Pulse Oximetry Type Intermittent   $ Pulse Oximetry - Multiple Charge Pulse Oximetry - Multiple   Pulse 62   Resp 18   Positioning HOB elevated 45 degrees

## 2025-01-20 NOTE — PT/OT/SLP PROGRESS
Occupational Therapy      Patient Name:  Alyssa John   MRN:  [id number]    Patient not seen today secondary to Patient unwilling to participate. Will follow-up.    1/20/2025

## 2025-01-20 NOTE — PLAN OF CARE
Per Kellen Avila, pt accepted and can admit Wednesday pending improvement in weather, auth, and 142.   Spouse completing admit paperwork today       01/20/25 1040   Post-Acute Status   Post-Acute Authorization Placement   Post-Acute Placement Status Pending payor review/awaiting authorization (if required)

## 2025-01-20 NOTE — SUBJECTIVE & OBJECTIVE
Interval History:  Seen on a.m. rounds.  Still generally weak but somewhat improved.  Still with significant pain to lower legs.  Pending SNF.      Objective:     Vital Signs (Most Recent):  Temp: 97.9 °F (36.6 °C) (01/20/25 1107)  Pulse: (!) 58 (01/20/25 1107)  Resp: 18 (01/20/25 1107)  BP: (!) 160/70 (01/20/25 1107)  SpO2: (!) 92 % (01/20/25 1107) Vital Signs (24h Range):  Temp:  [97.5 °F (36.4 °C)-98.2 °F (36.8 °C)] 97.9 °F (36.6 °C)  Pulse:  [53-68] 58  Resp:  [17-18] 18  SpO2:  [92 %-100 %] 92 %  BP: (102-160)/(55-70) 160/70     Weight: 93.4 kg (206 lb)  Body mass index is 36.49 kg/m².    Intake/Output Summary (Last 24 hours) at 1/20/2025 1152  Last data filed at 1/20/2025 0000  Gross per 24 hour   Intake 480 ml   Output 850 ml   Net -370 ml         Physical Exam  Vitals reviewed.   Constitutional:       General: She is not in acute distress.     Comments: Chronically ill-appearing   HENT:      Head: Normocephalic and atraumatic.   Cardiovascular:      Rate and Rhythm: Normal rate and regular rhythm.   Pulmonary:      Effort: Pulmonary effort is normal. No respiratory distress.   Musculoskeletal:      Comments: Mild-to-moderate erythema of the lower extremities bilateral, minimal swelling.  Tender to touch   Neurological:      Mental Status: She is alert and oriented to person, place, and time.   Psychiatric:         Mood and Affect: Affect normal.         Behavior: Behavior normal.         Thought Content: Thought content normal.             Significant Labs: All pertinent labs within the past 24 hours have been reviewed.    Significant Imaging: I have reviewed all pertinent imaging results/findings within the past 24 hours.

## 2025-01-20 NOTE — PLAN OF CARE
Problem: Infection  Goal: Absence of Infection Signs and Symptoms  Outcome: Progressing     Problem: Adult Inpatient Plan of Care  Goal: Plan of Care Review  Outcome: Progressing  Goal: Absence of Hospital-Acquired Illness or Injury  Outcome: Progressing  Goal: Optimal Comfort and Wellbeing  Outcome: Progressing     Problem: Wound  Goal: Optimal Pain Control and Function  Outcome: Progressing  Goal: Skin Health and Integrity  Outcome: Progressing  Goal: Optimal Wound Healing  Outcome: Progressing     Problem: Fall Injury Risk  Goal: Absence of Fall and Fall-Related Injury  Outcome: Progressing     Problem: Gas Exchange Impaired  Goal: Optimal Gas Exchange  Outcome: Progressing

## 2025-01-20 NOTE — ASSESSMENT & PLAN NOTE
1/2 blood culture positive for staph epi.   Most likely contamination.   Hold off antibiotics now  Follow up repeat blood culture- negative   Last visit: 3/19/24   Last Med refill: 3/14/24  Does patient have enough medication for 72 hours:    Next Visit Date:  Future Appointments   Date Time Provider Department Center   5/16/2024 11:30 AM Fabián Nevarez, ROXIE - CNP [id number]       Health Maintenance   Topic Date Due    Hepatitis B vaccine (1 of 3 - 3-dose series) Never done    COVID-19 Vaccine (1) Never done    Pneumococcal 0-64 years Vaccine (1 of 2 - PCV) Never done    HIV screen  Never done    Hepatitis C screen  Never done    DTaP/Tdap/Td vaccine (1 - Tdap) Never done    Colorectal Cancer Screen  Never done    Lipids  06/29/2024    Depression Screen  01/16/2025    Diabetes screen  06/29/2026    Flu vaccine  Completed    Hepatitis A vaccine  Aged Out    Hib vaccine  Aged Out    HPV vaccine  Aged Out    Polio vaccine  Aged Out    Meningococcal (ACWY) vaccine  Aged Out       Hemoglobin A1C (%)   Date Value   06/30/2020 5.1             ( goal A1C is < 7)   No components found for: \"LABMICR\"  LDL Calculated (mg/dL)   Date Value   06/29/2023 151   10/23/2019 163 (A)       (goal LDL is <100)   No results found for: \"AST\", \"ALT\", \"BUN\", \"CR\"  BP Readings from Last 3 Encounters:   03/19/24 (!) 140/94   01/16/24 128/80   11/16/23 126/76          (goal 120/80)    All Future Testing planned in [facility]  Lab Frequency Next Occurrence               Patient Active Problem List:     Essential hypertension     Onychomycosis     Adult ADHD     High risk medication use     Dermatitis     Tobacco abuse     Medication management     Lateral epicondylitis of right elbow

## 2025-01-20 NOTE — NURSING
AAO x 4, VSS, Cardiac monitoring NSR, contact/airborne precautions maintained, patient did not want to change positions, laying supine, SR x 2, bed alarm set, resting between care, no complaints of pain, gave handoff to Radha HERNANDEZ to continue care.

## 2025-01-20 NOTE — ASSESSMENT & PLAN NOTE
Patient's blood pressure range in the last 24 hours was: BP  Min: 102/55  Max: 160/70.The patient's inpatient anti-hypertensive regimen is listed below:  Current Antihypertensives  propranoloL tablet 20 mg, Daily, Oral  furosemide tablet 20 mg, Daily, Oral    Plan  - BP is controlled, no changes needed to their regimen

## 2025-01-20 NOTE — CARE UPDATE
01/19/25 3050   Patient Assessment/Suction   Level of Consciousness (AVPU) alert   Respiratory Effort Unlabored   Expansion/Accessory Muscles/Retractions no retractions;no use of accessory muscles   All Lung Fields Breath Sounds clear   PRE-TX-O2   Device (Oxygen Therapy) nasal cannula   $ Is the patient on Low Flow Oxygen? Yes   Flow (L/min) (Oxygen Therapy) 1   SpO2 (!) 93 %   Pulse Oximetry Type Intermittent   $ Pulse Oximetry - Multiple Charge Pulse Oximetry - Multiple   Pulse (!) 57   Resp 17   Aerosol Therapy   $ Aerosol Therapy Charges PRN treatment not required

## 2025-01-21 PROBLEM — E83.39 HYPOPHOSPHATEMIA: Status: ACTIVE | Noted: 2025-01-21

## 2025-01-21 LAB
BACTERIA BLD CULT: NORMAL
OHS QRS DURATION: 70 MS
OHS QTC CALCULATION: 394 MS
PHOSPHATE SERPL-MCNC: 2.6 MG/DL (ref 2.7–4.5)
TB INDURATION 48 - 72 HR READ: 0 MM (ref 0–?)
TB SKIN TEST 48 - 72 HR READ: NEGATIVE

## 2025-01-21 PROCEDURE — 99900035 HC TECH TIME PER 15 MIN (STAT)

## 2025-01-21 PROCEDURE — 25000003 PHARM REV CODE 250: Performed by: NURSE PRACTITIONER

## 2025-01-21 PROCEDURE — 27000207 HC ISOLATION

## 2025-01-21 PROCEDURE — 36415 COLL VENOUS BLD VENIPUNCTURE: CPT | Performed by: INTERNAL MEDICINE

## 2025-01-21 PROCEDURE — 63600175 PHARM REV CODE 636 W HCPCS: Performed by: HOSPITALIST

## 2025-01-21 PROCEDURE — 25000003 PHARM REV CODE 250: Performed by: STUDENT IN AN ORGANIZED HEALTH CARE EDUCATION/TRAINING PROGRAM

## 2025-01-21 PROCEDURE — 11000001 HC ACUTE MED/SURG PRIVATE ROOM

## 2025-01-21 PROCEDURE — 25000003 PHARM REV CODE 250: Performed by: INTERNAL MEDICINE

## 2025-01-21 PROCEDURE — 84100 ASSAY OF PHOSPHORUS: CPT | Performed by: HOSPITALIST

## 2025-01-21 PROCEDURE — 25000003 PHARM REV CODE 250: Performed by: HOSPITALIST

## 2025-01-21 PROCEDURE — 94761 N-INVAS EAR/PLS OXIMETRY MLT: CPT

## 2025-01-21 RX ORDER — AMLODIPINE BESYLATE 2.5 MG/1
2.5 TABLET ORAL DAILY
Status: DISCONTINUED | OUTPATIENT
Start: 2025-01-21 | End: 2025-01-22

## 2025-01-21 RX ADMIN — ATORVASTATIN CALCIUM 40 MG: 40 TABLET, FILM COATED ORAL at 08:01

## 2025-01-21 RX ADMIN — TOPIRAMATE 100 MG: 100 TABLET, FILM COATED ORAL at 08:01

## 2025-01-21 RX ADMIN — ENOXAPARIN SODIUM 40 MG: 40 INJECTION SUBCUTANEOUS at 05:01

## 2025-01-21 RX ADMIN — CLOPIDOGREL BISULFATE 75 MG: 75 TABLET, FILM COATED ORAL at 08:01

## 2025-01-21 RX ADMIN — DULOXETINE HYDROCHLORIDE 30 MG: 30 CAPSULE, DELAYED RELEASE ORAL at 09:01

## 2025-01-21 RX ADMIN — PROPRANOLOL HYDROCHLORIDE 20 MG: 20 TABLET ORAL at 08:01

## 2025-01-21 RX ADMIN — Medication 1000 MCG: at 08:01

## 2025-01-21 RX ADMIN — HYDROCORTISONE: 25 CREAM TOPICAL at 09:01

## 2025-01-21 RX ADMIN — AMMONIUM LACTATE: 12 LOTION TOPICAL at 09:01

## 2025-01-21 RX ADMIN — POTASSIUM & SODIUM PHOSPHATES POWDER PACK 280-160-250 MG 2 PACKET: 280-160-250 PACK at 06:01

## 2025-01-21 RX ADMIN — FUROSEMIDE 20 MG: 20 TABLET ORAL at 08:01

## 2025-01-21 RX ADMIN — POTASSIUM CHLORIDE 20 MEQ: 1500 TABLET, EXTENDED RELEASE ORAL at 08:01

## 2025-01-21 RX ADMIN — FAMOTIDINE 20 MG: 20 TABLET ORAL at 08:01

## 2025-01-21 RX ADMIN — HYDROCORTISONE: 25 CREAM TOPICAL at 08:01

## 2025-01-21 RX ADMIN — SENNOSIDES AND DOCUSATE SODIUM 1 TABLET: 50; 8.6 TABLET ORAL at 09:01

## 2025-01-21 RX ADMIN — AMLODIPINE BESYLATE 2.5 MG: 2.5 TABLET ORAL at 08:01

## 2025-01-21 NOTE — ASSESSMENT & PLAN NOTE
Aware, function status worsened due to infection but has been progressively getting worse at home.

## 2025-01-21 NOTE — SUBJECTIVE & OBJECTIVE
Interval History:  Seen on a.m. rounds.  Nursing report bradycardia into the 40s, patient asymptomatic.  Reports some improvement to the bilateral lower leg pain.      Objective:     Vital Signs (Most Recent):  Temp: 98.1 °F (36.7 °C) (01/21/25 0705)  Pulse: (!) 58 (01/21/25 0735)  Resp: 16 (01/21/25 0735)  BP: (!) 198/89 (01/21/25 0705)  SpO2: 95 % (01/21/25 0735) Vital Signs (24h Range):  Temp:  [97.9 °F (36.6 °C)-99 °F (37.2 °C)] 98.1 °F (36.7 °C)  Pulse:  [51-81] 58  Resp:  [16-19] 16  SpO2:  [92 %-97 %] 95 %  BP: (140-198)/(66-89) 198/89     Weight: 93.4 kg (206 lb)  Body mass index is 36.49 kg/m².    Intake/Output Summary (Last 24 hours) at 1/21/2025 1005  Last data filed at 1/21/2025 0651  Gross per 24 hour   Intake 480 ml   Output 950 ml   Net -470 ml         Physical Exam  Vitals reviewed.   Constitutional:       General: She is not in acute distress.     Comments: Chronically ill-appearing   HENT:      Head: Normocephalic and atraumatic.   Cardiovascular:      Rate and Rhythm: Regular rhythm. Bradycardia present.   Pulmonary:      Effort: Pulmonary effort is normal. No respiratory distress.   Musculoskeletal:      Comments: Mild-to-moderate erythema of the lower extremities bilateral, minimal swelling.  Tender to touch   Neurological:      Mental Status: She is alert and oriented to person, place, and time.   Psychiatric:         Mood and Affect: Affect normal.         Behavior: Behavior normal.         Thought Content: Thought content normal.             Significant Labs: All pertinent labs within the past 24 hours have been reviewed.    Significant Imaging: I have reviewed all pertinent imaging results/findings within the past 24 hours.

## 2025-01-21 NOTE — PT/OT/SLP PROGRESS
Physical Therapy      Patient Name:  Alyssa John   MRN:  [id number]    Patient not seen today secondary to Patient unwilling to participate (pt declined x2 attempts). Will follow-up 1/22/25.

## 2025-01-21 NOTE — PROGRESS NOTES
[facility]  Progress Note    Patient Name: Alyssa John  MRN: [id number]  Patient Class: IP- Inpatient   Admission Date: 1/16/2025  Length of Stay: 4 days  Attending Physician: David Urbina MD  Primary Care Provider: Piyush Sharif MD        Subjective     Principal Problem:Influenza A        HPI:  74 year old female with a past medical history of GERD, Epilepsy, hypertension, MI, MS, CAD was recently tested positive for influenza A.  She reports to the emergency room with reports of weakness and diarrhea.  Reports of decreased mobility and activity of daily living for the last 7-8 months. Patient needs to be assisted out of wheelchair by significant other. She was diagnosed with urinary tract infection about 17 days ago. She recently treated with omnicef and cipro.inished course of antibiotics. She then developed cough and congestion. She was diagnosed with influenza yesterday. Now  is unable to assist patient and transfers. She reports increased generalized weakness especially to both legs. No trouble with vision or speech. There was some chills with no definite fever. Urinary symptoms resolved.     In the ER, potassium 3.2, glucose 127, calcium 8, troponin I high sensitivity 29.6, influenza A positive UA with 1+ protein, 1+ blood, 2+ leukocytes 96 WBCs urine culture sent, COVID negative lactic 1.56 chest x-ray Mild bilateral perihilar interstitial thickening     Admit to hospital medicine for UTI after failing outpatient therapy.          Overview/Hospital Course:  74-year-old female with a history of multiple sclerosis with limited mobility, recurrent UTI, CAD on Plavix presenting here for influenza a positive and diarrhea.  Also acute on chronic debility.  Urine culture was negative.  Started on empiric Rocephin azithromycin, Tamiflu.  Patient also had fluid overload treated with IV Lasix.  Patient also found to 1/2 have staph epi bacteremia, most  likely contamination.  Repeat blood culture negative.  2D echo shows normal LVEF, volume status much improved with IV Lasix.  Converted to p.o. Lasix.  Completed antibiotics.  Patient is recommended to go to SNF by physical therapist.    Interval History:  Seen on a.m. rounds.  Nursing report bradycardia into the 40s, patient asymptomatic.  Reports some improvement to the bilateral lower leg pain.      Objective:     Vital Signs (Most Recent):  Temp: 98.1 °F (36.7 °C) (01/21/25 0705)  Pulse: (!) 58 (01/21/25 0735)  Resp: 16 (01/21/25 0735)  BP: (!) 198/89 (01/21/25 0705)  SpO2: 95 % (01/21/25 0735) Vital Signs (24h Range):  Temp:  [97.9 °F (36.6 °C)-99 °F (37.2 °C)] 98.1 °F (36.7 °C)  Pulse:  [51-81] 58  Resp:  [16-19] 16  SpO2:  [92 %-97 %] 95 %  BP: (140-198)/(66-89) 198/89     Weight: 93.4 kg (206 lb)  Body mass index is 36.49 kg/m².    Intake/Output Summary (Last 24 hours) at 1/21/2025 1005  Last data filed at 1/21/2025 0651  Gross per 24 hour   Intake 480 ml   Output 950 ml   Net -470 ml         Physical Exam  Vitals reviewed.   Constitutional:       General: She is not in acute distress.     Comments: Chronically ill-appearing   HENT:      Head: Normocephalic and atraumatic.   Cardiovascular:      Rate and Rhythm: Regular rhythm. Bradycardia present.   Pulmonary:      Effort: Pulmonary effort is normal. No respiratory distress.   Musculoskeletal:      Comments: Mild-to-moderate erythema of the lower extremities bilateral, minimal swelling.  Tender to touch   Neurological:      Mental Status: She is alert and oriented to person, place, and time.   Psychiatric:         Mood and Affect: Affect normal.         Behavior: Behavior normal.         Thought Content: Thought content normal.             Significant Labs: All pertinent labs within the past 24 hours have been reviewed.    Significant Imaging: I have reviewed all pertinent imaging results/findings within the past 24 hours.    Assessment and Plan     *  Impaired functional mobility, balance, and endurance  Consult PT/OT  Fall/safety precautions  Now planned for SNF    Hypophosphatemia  Patient's most recent phosphorus results are listed below.   Recent Labs     01/19/25  0316 01/20/25  0315 01/21/25  0345   PHOS 3.8 3.5 2.6*     Plan  - Will treat hypophosphatemia with replacement protocol  - Patient's hypophosphatemia is  worse    Hypokalemia  Patient's most recent potassium results are listed below.   Recent Labs     01/19/25  0316 01/20/25  0315   K 3.1* 3.3*       Plan  - Replete potassium per protocol  - Monitor potassium Daily  - Patient's hypokalemia is stable  - resumed home supplement 20 mEq daily    Venous stasis dermatitis of both lower extremities  No DVT  Continue Lac-Hydrin per wound care  Continue hydrocortisone topical    Influenza A  Symptomatic care  Isolation  Completed 5 days Tamiflu    Severe obesity (BMI 35.0-39.9) with comorbidity  Body mass index is 36.49 kg/m². Morbid obesity complicates all aspects of disease management from diagnostic modalities to treatment.     Hypertension  Slightly uncontrolled  - discontinue propranolol for bradycardia   - start amlodipine 2.5 mg daily    Seizure disorder  Chronic  Continue home meds    Multiple sclerosis  Aware, function status worsened due to infection but has been progressively getting worse at home.       VTE Risk Mitigation (From admission, onward)           Ordered     enoxaparin injection 40 mg  Every 24 hours         01/17/25 1141     IP VTE HIGH RISK PATIENT  Once         01/16/25 1647     Place sequential compression device  Until discontinued         01/16/25 1647                    Discharge Planning   ROGERIO: 1/23/2025     Code Status: Full Code   Medical Readiness for Discharge Date:   Discharge Plan A: Skilled Nursing Facility                 David Urbina MD  Department of Hospital Medicine   [facility]/Surg

## 2025-01-21 NOTE — PLAN OF CARE
Problem: Infection  Goal: Absence of Infection Signs and Symptoms  Outcome: Progressing     Problem: Adult Inpatient Plan of Care  Goal: Plan of Care Review  Outcome: Progressing  Goal: Absence of Hospital-Acquired Illness or Injury  Outcome: Progressing  Goal: Optimal Comfort and Wellbeing  Outcome: Progressing     Problem: Wound  Goal: Absence of Infection Signs and Symptoms  Outcome: Progressing  Goal: Optimal Pain Control and Function  Outcome: Progressing  Goal: Skin Health and Integrity  Outcome: Progressing     Problem: Fall Injury Risk  Goal: Absence of Fall and Fall-Related Injury  Outcome: Progressing     Problem: Skin Injury Risk Increased  Goal: Skin Health and Integrity  Outcome: Progressing     Problem: Gas Exchange Impaired  Goal: Optimal Gas Exchange  Outcome: Progressing

## 2025-01-21 NOTE — ASSESSMENT & PLAN NOTE
Slightly uncontrolled  - discontinue propranolol for bradycardia   - start amlodipine 2.5 mg daily

## 2025-01-21 NOTE — CARE UPDATE
01/20/25 1932   Patient Assessment/Suction   Level of Consciousness (AVPU) alert   Respiratory Effort Unlabored   Expansion/Accessory Muscles/Retractions no use of accessory muscles;no retractions   All Lung Fields Breath Sounds diminished   PRE-TX-O2   Device (Oxygen Therapy) room air   SpO2 97 %   Pulse Oximetry Type Intermittent   $ Pulse Oximetry - Multiple Charge Pulse Oximetry - Multiple   Pulse (!) 51   Resp 17   Aerosol Therapy   $ Aerosol Therapy Charges PRN treatment not required

## 2025-01-21 NOTE — ASSESSMENT & PLAN NOTE
Patient's most recent phosphorus results are listed below.   Recent Labs     01/19/25  0316 01/20/25  0315 01/21/25  0345   PHOS 3.8 3.5 2.6*     Plan  - Will treat hypophosphatemia with replacement protocol  - Patient's hypophosphatemia is  worse

## 2025-01-21 NOTE — ASSESSMENT & PLAN NOTE
Patient's most recent potassium results are listed below.   Recent Labs     01/19/25  0316 01/20/25  0315   K 3.1* 3.3*       Plan  - Replete potassium per protocol  - Monitor potassium Daily  - Patient's hypokalemia is stable  - resumed home supplement 20 mEq daily

## 2025-01-21 NOTE — NURSING
Monitor room reported heart rate 46, Dr Urbina @ station and notified and stated he will hold propanolol, primary nurse made aware

## 2025-01-21 NOTE — PLAN OF CARE
Problem: Infection  Goal: Absence of Infection Signs and Symptoms  Outcome: Progressing     Problem: Adult Inpatient Plan of Care  Goal: Plan of Care Review  Outcome: Progressing  Goal: Patient-Specific Goal (Individualized)  Outcome: Progressing  Goal: Absence of Hospital-Acquired Illness or Injury  Outcome: Progressing  Goal: Optimal Comfort and Wellbeing  Outcome: Progressing  Goal: Readiness for Transition of Care  Outcome: Progressing     Problem: Wound  Goal: Optimal Coping  Outcome: Progressing  Goal: Optimal Functional Ability  Outcome: Progressing  Goal: Absence of Infection Signs and Symptoms  Outcome: Progressing  Goal: Improved Oral Intake  Outcome: Progressing  Goal: Optimal Pain Control and Function  Outcome: Progressing  Goal: Skin Health and Integrity  Outcome: Progressing  Goal: Optimal Wound Healing  Outcome: Progressing     Problem: Fall Injury Risk  Goal: Absence of Fall and Fall-Related Injury  Outcome: Progressing     Problem: Skin Injury Risk Increased  Goal: Skin Health and Integrity  Outcome: Progressing     Problem: Pneumonia  Goal: Fluid Balance  Outcome: Progressing  Goal: Resolution of Infection Signs and Symptoms  Outcome: Progressing  Goal: Effective Oxygenation and Ventilation  Outcome: Progressing     Problem: Gas Exchange Impaired  Goal: Optimal Gas Exchange  Outcome: Progressing     Problem: Mobility Impairment  Goal: Optimal Mobility  Outcome: Progressing     Problem: Hypertension Acute  Goal: Blood Pressure Within Desired Range  Outcome: Progressing

## 2025-01-22 LAB
ALBUMIN SERPL BCP-MCNC: 3.2 G/DL (ref 3.5–5.2)
ALP SERPL-CCNC: 119 U/L (ref 40–150)
ALT SERPL W/O P-5'-P-CCNC: 28 U/L (ref 10–44)
ANION GAP SERPL CALC-SCNC: 10 MMOL/L (ref 8–16)
AST SERPL-CCNC: 39 U/L (ref 10–40)
BACTERIA BLD CULT: NORMAL
BACTERIA BLD CULT: NORMAL
BILIRUB SERPL-MCNC: 0.6 MG/DL (ref 0.1–1)
BUN SERPL-MCNC: 17 MG/DL (ref 8–23)
CALCIUM SERPL-MCNC: 8.2 MG/DL (ref 8.7–10.5)
CHLORIDE SERPL-SCNC: 111 MMOL/L (ref 95–110)
CO2 SERPL-SCNC: 19 MMOL/L (ref 23–29)
CREAT SERPL-MCNC: 0.9 MG/DL (ref 0.5–1.4)
EST. GFR  (NO RACE VARIABLE): >60 ML/MIN/1.73 M^2
GLUCOSE SERPL-MCNC: 92 MG/DL (ref 70–110)
MAGNESIUM SERPL-MCNC: 2.3 MG/DL (ref 1.6–2.6)
PHOSPHATE SERPL-MCNC: 2.5 MG/DL (ref 2.7–4.5)
POTASSIUM SERPL-SCNC: 3.8 MMOL/L (ref 3.5–5.1)
PROT SERPL-MCNC: 6.4 G/DL (ref 6–8.4)
SODIUM SERPL-SCNC: 140 MMOL/L (ref 136–145)

## 2025-01-22 PROCEDURE — 63600175 PHARM REV CODE 636 W HCPCS: Performed by: HOSPITALIST

## 2025-01-22 PROCEDURE — 25000003 PHARM REV CODE 250: Performed by: HOSPITALIST

## 2025-01-22 PROCEDURE — 36415 COLL VENOUS BLD VENIPUNCTURE: CPT | Performed by: HOSPITALIST

## 2025-01-22 PROCEDURE — 25000003 PHARM REV CODE 250: Performed by: NURSE PRACTITIONER

## 2025-01-22 PROCEDURE — 27000207 HC ISOLATION

## 2025-01-22 PROCEDURE — 25000003 PHARM REV CODE 250: Performed by: STUDENT IN AN ORGANIZED HEALTH CARE EDUCATION/TRAINING PROGRAM

## 2025-01-22 PROCEDURE — 80053 COMPREHEN METABOLIC PANEL: CPT | Performed by: INTERNAL MEDICINE

## 2025-01-22 PROCEDURE — 83735 ASSAY OF MAGNESIUM: CPT | Performed by: INTERNAL MEDICINE

## 2025-01-22 PROCEDURE — 25000003 PHARM REV CODE 250: Performed by: INTERNAL MEDICINE

## 2025-01-22 PROCEDURE — 94761 N-INVAS EAR/PLS OXIMETRY MLT: CPT

## 2025-01-22 PROCEDURE — 11000001 HC ACUTE MED/SURG PRIVATE ROOM

## 2025-01-22 PROCEDURE — 84100 ASSAY OF PHOSPHORUS: CPT | Performed by: HOSPITALIST

## 2025-01-22 PROCEDURE — 99900035 HC TECH TIME PER 15 MIN (STAT)

## 2025-01-22 RX ORDER — AMLODIPINE BESYLATE 5 MG/1
5 TABLET ORAL DAILY
Status: DISCONTINUED | OUTPATIENT
Start: 2025-01-22 | End: 2025-01-24 | Stop reason: HOSPADM

## 2025-01-22 RX ORDER — SODIUM,POTASSIUM PHOSPHATES 280-250MG
2 POWDER IN PACKET (EA) ORAL ONCE
Status: COMPLETED | OUTPATIENT
Start: 2025-01-22 | End: 2025-01-22

## 2025-01-22 RX ORDER — FUROSEMIDE 20 MG/1
20 TABLET ORAL DAILY
Start: 2025-01-22 | End: 2026-01-22

## 2025-01-22 RX ORDER — HYDROCORTISONE 25 MG/G
CREAM TOPICAL 2 TIMES DAILY
Start: 2025-01-22 | End: 2025-01-29

## 2025-01-22 RX ORDER — AMLODIPINE BESYLATE 5 MG/1
5 TABLET ORAL DAILY
Start: 2025-01-22 | End: 2026-01-22

## 2025-01-22 RX ADMIN — HYDROCORTISONE: 25 CREAM TOPICAL at 09:01

## 2025-01-22 RX ADMIN — CLOPIDOGREL BISULFATE 75 MG: 75 TABLET, FILM COATED ORAL at 09:01

## 2025-01-22 RX ADMIN — POTASSIUM & SODIUM PHOSPHATES POWDER PACK 280-160-250 MG 2 PACKET: 280-160-250 PACK at 09:01

## 2025-01-22 RX ADMIN — ATORVASTATIN CALCIUM 40 MG: 40 TABLET, FILM COATED ORAL at 09:01

## 2025-01-22 RX ADMIN — AMLODIPINE BESYLATE 5 MG: 5 TABLET ORAL at 09:01

## 2025-01-22 RX ADMIN — TOPIRAMATE 100 MG: 100 TABLET, FILM COATED ORAL at 09:01

## 2025-01-22 RX ADMIN — FAMOTIDINE 20 MG: 20 TABLET ORAL at 09:01

## 2025-01-22 RX ADMIN — AMMONIUM LACTATE: 12 LOTION TOPICAL at 09:01

## 2025-01-22 RX ADMIN — POTASSIUM BICARBONATE 50 MEQ: 977.5 TABLET, EFFERVESCENT ORAL at 06:01

## 2025-01-22 RX ADMIN — ENOXAPARIN SODIUM 40 MG: 40 INJECTION SUBCUTANEOUS at 05:01

## 2025-01-22 RX ADMIN — HYDROCORTISONE: 25 CREAM TOPICAL at 08:01

## 2025-01-22 RX ADMIN — SENNOSIDES AND DOCUSATE SODIUM 1 TABLET: 50; 8.6 TABLET ORAL at 09:01

## 2025-01-22 RX ADMIN — FUROSEMIDE 20 MG: 20 TABLET ORAL at 09:01

## 2025-01-22 RX ADMIN — DULOXETINE HYDROCHLORIDE 30 MG: 30 CAPSULE, DELAYED RELEASE ORAL at 09:01

## 2025-01-22 RX ADMIN — Medication 1000 MCG: at 09:01

## 2025-01-22 RX ADMIN — POTASSIUM CHLORIDE 20 MEQ: 1500 TABLET, EXTENDED RELEASE ORAL at 09:01

## 2025-01-22 RX ADMIN — POTASSIUM & SODIUM PHOSPHATES POWDER PACK 280-160-250 MG 2 PACKET: 280-160-250 PACK at 06:01

## 2025-01-22 NOTE — ASSESSMENT & PLAN NOTE
Patient's most recent potassium results are listed below.   Recent Labs     01/20/25  0315 01/22/25  0324   K 3.3* 3.8       Plan  - Replete potassium per protocol  - Monitor potassium Daily  - Patient's hypokalemia is stable/improved  - resumed home supplement 20 mEq daily

## 2025-01-22 NOTE — PLAN OF CARE
Problem: Infection  Goal: Absence of Infection Signs and Symptoms  Outcome: Progressing     Problem: Adult Inpatient Plan of Care  Goal: Plan of Care Review  Outcome: Progressing  Goal: Absence of Hospital-Acquired Illness or Injury  Outcome: Progressing  Goal: Optimal Comfort and Wellbeing  Outcome: Progressing     Problem: Wound  Goal: Optimal Coping  Outcome: Progressing  Goal: Optimal Pain Control and Function  Outcome: Progressing  Goal: Skin Health and Integrity  Outcome: Progressing  Goal: Optimal Wound Healing  Outcome: Progressing     Problem: Fall Injury Risk  Goal: Absence of Fall and Fall-Related Injury  Outcome: Progressing     Problem: Gas Exchange Impaired  Goal: Optimal Gas Exchange  Outcome: Progressing     Problem: Mobility Impairment  Goal: Optimal Mobility  Outcome: Progressing

## 2025-01-22 NOTE — PLAN OF CARE
Per Angy with PHN, SNF has been authorized.   SRN [id number].       01/22/25 1520   Post-Acute Status   Post-Acute Authorization Placement   Post-Acute Placement Status Set-up Complete/Auth obtained   Coverage PHN

## 2025-01-22 NOTE — PHYSICIAN QUERY
Please clarify/confirm the emergency medicine diagnosis of Pneumonia of both lungs due to infectious organism:  Diagnosis ruled out - antibiotics stopped and treated as acute CHF
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
## 2025-01-22 NOTE — CARE UPDATE
01/21/25 1937   Patient Assessment/Suction   Level of Consciousness (AVPU) alert   Respiratory Effort Normal   Expansion/Accessory Muscles/Retractions no use of accessory muscles;no retractions   All Lung Fields Breath Sounds clear   PRE-TX-O2   Device (Oxygen Therapy) room air   SpO2 96 %   Pulse Oximetry Type Intermittent   $ Pulse Oximetry - Multiple Charge Pulse Oximetry - Multiple   Pulse (!) 55   Resp 16   Aerosol Therapy   $ Aerosol Therapy Charges PRN treatment not required

## 2025-01-22 NOTE — SUBJECTIVE & OBJECTIVE
Interval History:  Seen on a.m. rounds.  No major changes.  Pending SNF      Objective:     Vital Signs (Most Recent):  Temp: 98.1 °F (36.7 °C) (01/22/25 1025)  Pulse: 62 (01/22/25 1025)  Resp: 18 (01/22/25 1025)  BP: 124/78 (01/22/25 1025)  SpO2: 95 % (01/22/25 1025) Vital Signs (24h Range):  Temp:  [97 °F (36.1 °C)-98.1 °F (36.7 °C)] 98.1 °F (36.7 °C)  Pulse:  [55-74] 62  Resp:  [16-18] 18  SpO2:  [94 %-97 %] 95 %  BP: (124-179)/(59-78) 124/78     Weight: 93.4 kg (206 lb)  Body mass index is 36.49 kg/m².    Intake/Output Summary (Last 24 hours) at 1/22/2025 1206  Last data filed at 1/22/2025 0656  Gross per 24 hour   Intake 220 ml   Output 110 ml   Net 110 ml         Physical Exam  Vitals reviewed.   Constitutional:       General: She is not in acute distress.     Comments: Chronically ill-appearing   HENT:      Head: Normocephalic and atraumatic.   Cardiovascular:      Rate and Rhythm: Normal rate and regular rhythm.   Pulmonary:      Effort: Pulmonary effort is normal. No respiratory distress.   Musculoskeletal:      Comments: Mild-to-moderate erythema of the lower extremities bilateral, minimal swelling.  Tender to touch   Neurological:      Mental Status: She is alert and oriented to person, place, and time.   Psychiatric:         Mood and Affect: Affect normal.         Behavior: Behavior normal.         Thought Content: Thought content normal.             Significant Labs: All pertinent labs within the past 24 hours have been reviewed.    Significant Imaging: I have reviewed all pertinent imaging results/findings within the past 24 hours.

## 2025-01-22 NOTE — PLAN OF CARE
SNF packet sent to [facility] for auth review.  Email sent to Angy with N to expedite request.        01/22/25 1510   Post-Acute Status   Post-Acute Authorization Placement   Post-Acute Placement Status Pending payor review/awaiting authorization (if required)

## 2025-01-22 NOTE — ASSESSMENT & PLAN NOTE
Slightly uncontrolled  - discontinued propranolol for bradycardia   - increase amlodipine to 5 mg daily

## 2025-01-22 NOTE — ASSESSMENT & PLAN NOTE
Patient's most recent phosphorus results are listed below.   Recent Labs     01/20/25  0315 01/21/25  0345 01/22/25  0324   PHOS 3.5 2.6* 2.5*       Plan  - Will treat hypophosphatemia with replacement protocol  - Patient's hypophosphatemia is  worse /stable

## 2025-01-22 NOTE — ASSESSMENT & PLAN NOTE
Aware, function status worsened due to infection but has been progressively getting worse at home.   Not consistent with flare

## 2025-01-23 PROBLEM — E83.39 HYPOPHOSPHATEMIA: Status: RESOLVED | Noted: 2025-01-21 | Resolved: 2025-01-23

## 2025-01-23 PROBLEM — E87.6 HYPOKALEMIA: Status: RESOLVED | Noted: 2025-01-20 | Resolved: 2025-01-23

## 2025-01-23 LAB
ANION GAP SERPL CALC-SCNC: 11 MMOL/L (ref 8–16)
BUN SERPL-MCNC: 17 MG/DL (ref 8–23)
CALCIUM SERPL-MCNC: 8.5 MG/DL (ref 8.7–10.5)
CHLORIDE SERPL-SCNC: 111 MMOL/L (ref 95–110)
CO2 SERPL-SCNC: 19 MMOL/L (ref 23–29)
CREAT SERPL-MCNC: 0.8 MG/DL (ref 0.5–1.4)
EST. GFR  (NO RACE VARIABLE): >60 ML/MIN/1.73 M^2
GLUCOSE SERPL-MCNC: 84 MG/DL (ref 70–110)
PHOSPHATE SERPL-MCNC: 2.9 MG/DL (ref 2.7–4.5)
POTASSIUM SERPL-SCNC: 3.6 MMOL/L (ref 3.5–5.1)
SODIUM SERPL-SCNC: 141 MMOL/L (ref 136–145)

## 2025-01-23 PROCEDURE — 94761 N-INVAS EAR/PLS OXIMETRY MLT: CPT

## 2025-01-23 PROCEDURE — 84100 ASSAY OF PHOSPHORUS: CPT | Performed by: HOSPITALIST

## 2025-01-23 PROCEDURE — 25000003 PHARM REV CODE 250: Performed by: HOSPITALIST

## 2025-01-23 PROCEDURE — 97530 THERAPEUTIC ACTIVITIES: CPT

## 2025-01-23 PROCEDURE — 99900035 HC TECH TIME PER 15 MIN (STAT)

## 2025-01-23 PROCEDURE — 25000003 PHARM REV CODE 250: Performed by: NURSE PRACTITIONER

## 2025-01-23 PROCEDURE — 27000207 HC ISOLATION

## 2025-01-23 PROCEDURE — 97116 GAIT TRAINING THERAPY: CPT

## 2025-01-23 PROCEDURE — 11000001 HC ACUTE MED/SURG PRIVATE ROOM

## 2025-01-23 PROCEDURE — 63600175 PHARM REV CODE 636 W HCPCS: Performed by: HOSPITALIST

## 2025-01-23 PROCEDURE — 80048 BASIC METABOLIC PNL TOTAL CA: CPT | Performed by: STUDENT IN AN ORGANIZED HEALTH CARE EDUCATION/TRAINING PROGRAM

## 2025-01-23 PROCEDURE — 36415 COLL VENOUS BLD VENIPUNCTURE: CPT | Performed by: HOSPITALIST

## 2025-01-23 PROCEDURE — 25000003 PHARM REV CODE 250: Performed by: INTERNAL MEDICINE

## 2025-01-23 PROCEDURE — 25000003 PHARM REV CODE 250: Performed by: STUDENT IN AN ORGANIZED HEALTH CARE EDUCATION/TRAINING PROGRAM

## 2025-01-23 RX ADMIN — FAMOTIDINE 20 MG: 20 TABLET ORAL at 08:01

## 2025-01-23 RX ADMIN — DULOXETINE HYDROCHLORIDE 30 MG: 30 CAPSULE, DELAYED RELEASE ORAL at 08:01

## 2025-01-23 RX ADMIN — HYDROCORTISONE: 25 CREAM TOPICAL at 08:01

## 2025-01-23 RX ADMIN — CLOPIDOGREL BISULFATE 75 MG: 75 TABLET, FILM COATED ORAL at 08:01

## 2025-01-23 RX ADMIN — ENOXAPARIN SODIUM 40 MG: 40 INJECTION SUBCUTANEOUS at 04:01

## 2025-01-23 RX ADMIN — POTASSIUM BICARBONATE 50 MEQ: 977.5 TABLET, EFFERVESCENT ORAL at 06:01

## 2025-01-23 RX ADMIN — AMMONIUM LACTATE: 12 LOTION TOPICAL at 08:01

## 2025-01-23 RX ADMIN — ATORVASTATIN CALCIUM 40 MG: 40 TABLET, FILM COATED ORAL at 08:01

## 2025-01-23 RX ADMIN — AMLODIPINE BESYLATE 5 MG: 5 TABLET ORAL at 08:01

## 2025-01-23 RX ADMIN — FUROSEMIDE 20 MG: 20 TABLET ORAL at 08:01

## 2025-01-23 RX ADMIN — Medication 1000 MCG: at 08:01

## 2025-01-23 RX ADMIN — TOPIRAMATE 100 MG: 100 TABLET, FILM COATED ORAL at 08:01

## 2025-01-23 RX ADMIN — SENNOSIDES AND DOCUSATE SODIUM 1 TABLET: 50; 8.6 TABLET ORAL at 08:01

## 2025-01-23 RX ADMIN — POTASSIUM CHLORIDE 20 MEQ: 1500 TABLET, EXTENDED RELEASE ORAL at 08:01

## 2025-01-23 NOTE — PT/OT/SLP PROGRESS
Physical Therapy Treatment    Patient Name:  Alyssa John   MRN:  [id number]    Recommendations:     Discharge Recommendations: Moderate Intensity Therapy  Discharge Equipment Recommendations: none  Barriers to discharge: None    Assessment:     Alyssa John is a 74 y.o. female admitted with a medical diagnosis of Impaired functional mobility, balance, and endurance.  She presents with the following impairments/functional limitations: weakness, impaired endurance, impaired functional mobility, gait instability, impaired balance, decreased lower extremity function, edema .  Patient agreeable to PT treatment this afternoon.  Patient presented supine in bed and rolled bilateral multiple times to get cleaned following BM.  Patient then transferred supine to sit with max assist and then sit to stand with mod assist x 2.  Patient then ambulated x 6 feet rw min assist with max difficulty advancing left LE.    Rehab Prognosis: Good; patient would benefit from acute skilled PT services to address these deficits and reach maximum level of function.    Recent Surgery: * No surgery found *      Plan:     During this hospitalization, patient to be seen 6 x/week to address the identified rehab impairments via gait training, therapeutic activities, therapeutic exercises and progress toward the following goals:    Plan of Care Expires:       Subjective     Chief Complaint: weakness  Patient/Family Comments/goals: get stronger  Pain/Comfort:         Objective:     Communicated with nurse prior to session.  Patient found supine with bed alarm, telemetry, PureWick upon PT entry to room.     General Precautions: Standard, fall, droplet (flu)  Orthopedic Precautions: N/A  Braces: N/A  Respiratory Status: Room air     Functional Mobility:  Bed Mobility:     Supine to Sit: maximal assistance  Transfers:     Sit to Stand:  moderate assistance and of 2 persons with rolling walker  Gait: x 6 feet RW min assist      AM-PAC 6  CLICK MOBILITY          Treatment & Education:  Bed mobility for bilateral rolling multiple times each way mod assist to get cleaned following BM  Transfer training supine to sit max, sit to stand mod x 2 RW  Gait training x 6 feet RW min assist.    Patient left up in chair with call button in reach, chair alarm on, nurse notified, and nurse present..    GOALS:   Multidisciplinary Problems       Physical Therapy Goals          Problem: Physical Therapy    Goal Priority Disciplines Outcome Interventions   Physical Therapy Goal     PT, PT/OT Progressing    Description: 1. Supine to sit with Stand-by Assistance  2. Sit to stand transfer with min Assistance  3. Bed to chair transfer with assist of 1 caregiver and ue of RW as needed.  4. Pt will perform bilat LE therex with [place].                       DME Justifications:  No DME recommended requiring DME justifications    Time Tracking:     PT Received On: 01/23/25  PT Start Time: 1609     PT Stop Time: 1648  PT Total Time (min): 39 min     Billable Minutes: Gait Training 9 and Therapeutic Activity 30    Treatment Type: Treatment  PT/PTA: PT     Number of PTA visits since last PT visit: 0     01/23/2025

## 2025-01-23 NOTE — SUBJECTIVE & OBJECTIVE
Interval History:  Seen on a.m. rounds.  No complaints or changes.  Pending SNF.   says SNF may not be able to accept today but it is possibility      Objective:     Vital Signs (Most Recent):  Temp: 97.9 °F (36.6 °C) (01/23/25 1039)  Pulse: 73 (01/23/25 1039)  Resp: 18 (01/23/25 1039)  BP: (!) 149/70 (01/23/25 1039)  SpO2: (!) 94 % (01/23/25 1039) Vital Signs (24h Range):  Temp:  [97.6 °F (36.4 °C)-98.3 °F (36.8 °C)] 97.9 °F (36.6 °C)  Pulse:  [66-81] 73  Resp:  [16-18] 18  SpO2:  [94 %-96 %] 94 %  BP: (138-171)/(67-84) 149/70     Weight: 93.4 kg (206 lb)  Body mass index is 36.49 kg/m².    Intake/Output Summary (Last 24 hours) at 1/23/2025 1047  Last data filed at 1/23/2025 0745  Gross per 24 hour   Intake --   Output 910 ml   Net -910 ml         Physical Exam  Vitals reviewed.   Constitutional:       General: She is not in acute distress.     Comments: Chronically ill-appearing   HENT:      Head: Normocephalic and atraumatic.   Cardiovascular:      Rate and Rhythm: Normal rate and regular rhythm.   Pulmonary:      Effort: Pulmonary effort is normal. No respiratory distress.   Musculoskeletal:      Comments: Mild-to-moderate erythema of the lower extremities bilateral, minimal swelling.  Tender to touch   Neurological:      Mental Status: She is alert and oriented to person, place, and time.   Psychiatric:         Mood and Affect: Affect normal.         Behavior: Behavior normal.         Thought Content: Thought content normal.             Significant Labs: All pertinent labs within the past 24 hours have been reviewed.    Significant Imaging: I have reviewed all pertinent imaging results/findings within the past 24 hours.
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
## 2025-01-23 NOTE — CARE UPDATE
01/22/25 1946   Patient Assessment/Suction   Level of Consciousness (AVPU) alert   Respiratory Effort Unlabored   Expansion/Accessory Muscles/Retractions no use of accessory muscles;no retractions   All Lung Fields Breath Sounds diminished   PRE-TX-O2   Device (Oxygen Therapy) room air   SpO2 95 %   Pulse Oximetry Type Intermittent   $ Pulse Oximetry - Multiple Charge Pulse Oximetry - Multiple   Pulse 78   Resp 17   Aerosol Therapy   $ Aerosol Therapy Charges PRN treatment not required

## 2025-01-23 NOTE — PHYSICIAN QUERY
Please further specify the heart failure diagnosis.     Other (please specify): Acute heart failure with preserved ejection fraction now resolved. Recent and prior echos report normal EF and normal diastolic function

## 2025-01-23 NOTE — PLAN OF CARE
TRISTEN called in [place] to [facility] [phone] and fax pasrr to [phone] [facility] state approval.  Fax confirmation received.    Lay scanned into EPIC       01/23/25 1144   Post-Acute Status   Post-Acute Authorization Placement   Post-Acute Placement Status Pending [facility] direction/certification

## 2025-01-23 NOTE — PLAN OF CARE
Problem: Infection  Goal: Absence of Infection Signs and Symptoms  Outcome: Progressing     Problem: Adult Inpatient Plan of Care  Goal: Plan of Care Review  Outcome: Progressing  Goal: Absence of Hospital-Acquired Illness or Injury  Outcome: Progressing  Goal: Optimal Comfort and Wellbeing  Outcome: Progressing  Goal: Readiness for Transition of Care  Outcome: Progressing     Problem: Wound  Goal: Optimal Functional Ability  Outcome: Progressing  Goal: Optimal Pain Control and Function  Outcome: Progressing  Goal: Skin Health and Integrity  Outcome: Progressing  Goal: Optimal Wound Healing  Outcome: Progressing     Problem: Fall Injury Risk  Goal: Absence of Fall and Fall-Related Injury  Outcome: Progressing     Problem: Mobility Impairment  Goal: Optimal Mobility  Outcome: Progressing
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
## 2025-01-24 VITALS
RESPIRATION RATE: 17 BRPM | TEMPERATURE: 97 F | DIASTOLIC BLOOD PRESSURE: 67 MMHG | BODY MASS INDEX: 36.5 KG/M2 | HEART RATE: 76 BPM | SYSTOLIC BLOOD PRESSURE: 148 MMHG | WEIGHT: 206 LBS | HEIGHT: 63 IN | OXYGEN SATURATION: 97 %

## 2025-01-24 PROBLEM — J10.1 INFLUENZA A: Status: RESOLVED | Noted: 2025-01-16 | Resolved: 2025-01-24

## 2025-01-24 LAB
ANION GAP SERPL CALC-SCNC: 12 MMOL/L (ref 8–16)
BUN SERPL-MCNC: 17 MG/DL (ref 8–23)
CALCIUM SERPL-MCNC: 8.6 MG/DL (ref 8.7–10.5)
CHLORIDE SERPL-SCNC: 112 MMOL/L (ref 95–110)
CO2 SERPL-SCNC: 17 MMOL/L (ref 23–29)
CREAT SERPL-MCNC: 0.8 MG/DL (ref 0.5–1.4)
EST. GFR  (NO RACE VARIABLE): >60 ML/MIN/1.73 M^2
GLUCOSE SERPL-MCNC: 84 MG/DL (ref 70–110)
PHOSPHATE SERPL-MCNC: 3 MG/DL (ref 2.7–4.5)
POTASSIUM SERPL-SCNC: 3.8 MMOL/L (ref 3.5–5.1)
SODIUM SERPL-SCNC: 141 MMOL/L (ref 136–145)

## 2025-01-24 PROCEDURE — 25000003 PHARM REV CODE 250: Performed by: NURSE PRACTITIONER

## 2025-01-24 PROCEDURE — 99900035 HC TECH TIME PER 15 MIN (STAT)

## 2025-01-24 PROCEDURE — 97530 THERAPEUTIC ACTIVITIES: CPT

## 2025-01-24 PROCEDURE — 25000003 PHARM REV CODE 250: Performed by: STUDENT IN AN ORGANIZED HEALTH CARE EDUCATION/TRAINING PROGRAM

## 2025-01-24 PROCEDURE — 84100 ASSAY OF PHOSPHORUS: CPT | Performed by: HOSPITALIST

## 2025-01-24 PROCEDURE — 25000003 PHARM REV CODE 250: Performed by: HOSPITALIST

## 2025-01-24 PROCEDURE — 36415 COLL VENOUS BLD VENIPUNCTURE: CPT | Performed by: HOSPITALIST

## 2025-01-24 PROCEDURE — 94761 N-INVAS EAR/PLS OXIMETRY MLT: CPT

## 2025-01-24 PROCEDURE — 97535 SELF CARE MNGMENT TRAINING: CPT

## 2025-01-24 PROCEDURE — 80048 BASIC METABOLIC PNL TOTAL CA: CPT | Performed by: STUDENT IN AN ORGANIZED HEALTH CARE EDUCATION/TRAINING PROGRAM

## 2025-01-24 RX ORDER — GUAIFENESIN 100 MG/5ML
200 SOLUTION ORAL 3 TIMES DAILY PRN
Start: 2025-01-24 | End: 2025-02-03

## 2025-01-24 RX ADMIN — TOPIRAMATE 100 MG: 100 TABLET, FILM COATED ORAL at 08:01

## 2025-01-24 RX ADMIN — POTASSIUM CHLORIDE 20 MEQ: 1500 TABLET, EXTENDED RELEASE ORAL at 08:01

## 2025-01-24 RX ADMIN — Medication 1000 MCG: at 08:01

## 2025-01-24 RX ADMIN — ATORVASTATIN CALCIUM 40 MG: 40 TABLET, FILM COATED ORAL at 08:01

## 2025-01-24 RX ADMIN — AMLODIPINE BESYLATE 5 MG: 5 TABLET ORAL at 08:01

## 2025-01-24 RX ADMIN — HYDROCORTISONE: 25 CREAM TOPICAL at 08:01

## 2025-01-24 RX ADMIN — CLOPIDOGREL BISULFATE 75 MG: 75 TABLET, FILM COATED ORAL at 08:01

## 2025-01-24 RX ADMIN — DULOXETINE HYDROCHLORIDE 30 MG: 30 CAPSULE, DELAYED RELEASE ORAL at 08:01

## 2025-01-24 RX ADMIN — AMMONIUM LACTATE: 12 LOTION TOPICAL at 08:01

## 2025-01-24 RX ADMIN — FUROSEMIDE 20 MG: 20 TABLET ORAL at 08:01

## 2025-01-24 RX ADMIN — FAMOTIDINE 20 MG: 20 TABLET ORAL at 08:01
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
## 2025-01-24 NOTE — CARE UPDATE
01/24/25 0845   Patient Assessment/Suction   Level of Consciousness (AVPU) alert   Respiratory Effort Unlabored   Expansion/Accessory Muscles/Retractions no use of accessory muscles;no retractions;expansion symmetric   Rhythm/Pattern, Respiratory unlabored;pattern regular;depth regular;no shortness of breath reported   PRE-TX-O2   Device (Oxygen Therapy) room air   SpO2 99 %   Pulse Oximetry Type Intermittent   $ Pulse Oximetry - Multiple Charge Pulse Oximetry - Multiple   Pulse 91   Resp 16   Aerosol Therapy   $ Aerosol Therapy Charges PRN treatment not required   Respiratory Treatment Status (SVN) PRN treatment not required

## 2025-01-24 NOTE — PROGRESS NOTES
Patient discharged at this time, transferred via Wheelchair in [place] transportation. Personal belongings in possession of .

## 2025-01-24 NOTE — PLAN OF CARE
01/23/25 1909   Patient Assessment/Suction   Level of Consciousness (AVPU) alert   Respiratory Effort Unlabored   Rhythm/Pattern, Respiratory pattern regular   Cough Frequency no cough   PRE-TX-O2   Device (Oxygen Therapy) room air   SpO2 95 %   Pulse Oximetry Type Intermittent

## 2025-01-24 NOTE — PT/OT/SLP PROGRESS
Occupational Therapy   Treatment    Name: Alyssa John  MRN: [id number]  Admitting Diagnosis:  Impaired functional mobility, balance, and endurance       Recommendations:     Discharge Recommendations: Low Intensity Therapy  Discharge Equipment Recommendations:  none  Barriers to discharge:  None    Assessment:     Alyssa John is a 74 y.o. female with a medical diagnosis of Impaired functional mobility, balance, and endurance. Performance deficits affecting function are weakness, impaired endurance, impaired self care skills, impaired functional mobility, gait instability, impaired balance, decreased lower extremity function, decreased upper extremity function, decreased coordination, edema.     Rehab Prognosis:  Good; patient would benefit from acute skilled OT services to address these deficits and reach maximum level of function.       Plan:     Patient to be seen 3 x/week to address the above listed problems via self-care/home management, therapeutic activities, therapeutic exercises  Plan of Care Expires: 02/07/25  Plan of Care Reviewed with: patient, spouse    Subjective     Chief Complaint: none  Patient/Family Comments/goals: none  Pain/Comfort:  Pain Rating 1: 0/10  Pain Rating Post-Intervention 1: 0/10    Objective:     Communicated with: nurse Spencer prior to session.  Patient found HOB elevated with bed alarm, telemetry upon OT entry to room.    General Precautions: Standard, fall, droplet    Orthopedic Precautions:N/A  Braces: N/A  Respiratory Status: Room air     Occupational Performance:     Bed Mobility:    Patient completed Scooting/Bridging with maximal assistance  Patient completed Supine to Sit with maximal assistance  Patient completed Sit to Supine with maximal assistance     Functional Mobility/Transfers:  Patient completed Sit <> Stand Transfer with moderate assistance  with  rolling walker   Functional Mobility: Poor BLE coordination in standing requiring Mod A  and verbal  cues with taking 4 steps forward, backward, and to HOB.     Activities of Daily Living:  Grooming: stand by assistance with hair grooming and oral/facial hygiene seated EOB  Toileting: dependence with adult brief in place      AMPA 6 Click ADL:      Treatment & Education:  OT ed patient on safety with walker use for functional mobility with cues for hand placement & sequencing.   OT educated patient on energy conservation techniques to reduce demand on cardiovascular system with performance of ADL tasks.       Patient left HOB elevated with all lines intact, call button in reach, and  present    GOALS:   Multidisciplinary Problems       Occupational Therapy Goals          Problem: Occupational Therapy    Goal Priority Disciplines Outcome Interventions   Occupational Therapy Goal     OT, PT/OT Progressing    Description: Goals to be met by: 2/7/2025     Patient will increase functional independence with ADLs by performing:    Grooming while EOB with Stand-by Assistance.  Toileting from bedside commode with Minimal Assistance for hygiene and clothing management.   Sitting at edge of bed x15 minutes with Stand-by Assistance.  Supine to sit with Minimal Assistance.  Toilet transfer to bedside commode with Minimal Assistance.                         DME Justifications:  TBD    Time Tracking:     OT Date of Treatment: 01/24/25  OT Start Time: 1212  OT Stop Time: 1250  OT Total Time (min): 38 min    Billable Minutes:Self Care/Home Management 24  Therapeutic Activity 14    OT/MAME: OT          1/24/2025

## 2025-01-24 NOTE — PROGRESS NOTES
[facility]/Surg  Wound Care    Patient Name:  Alyssa John   MRN:  [id number]  Date: 1/24/2025  Diagnosis: Impaired functional mobility, balance, and endurance    History:     Past Medical History:   Diagnosis Date    Arthritis     Coronary artery disease     Encounter for blood transfusion     Epilepsy     GERD (gastroesophageal reflux disease)     Headache     Hypertension     MI, old     MS (multiple sclerosis)     Seizures     epilepsy       Social History     Socioeconomic History    Marital status:    Tobacco Use    Smoking status: Former     Current packs/day: 0.00     Average packs/day: 2.0 packs/day for 42.0 years (84.0 ttl pk-yrs)     Types: Cigarettes     Start date: 1968     Quit date: 2010     Years since quitting: 15.0     Passive exposure: Past    Smokeless tobacco: Never   Substance and Sexual Activity    Alcohol use: Not Currently     Comment: rarely    Drug use: No    Sexual activity: Not Currently     Partners: Male     Social Drivers of Health     Financial Resource Strain: Patient Declined (1/17/2025)    Overall Financial Resource Strain (CARDIA)     Difficulty of Paying Living Expenses: Patient declined   Food Insecurity: Patient Declined (1/17/2025)    Hunger Vital Sign     Worried About Running Out of Food in the Last Year: Patient declined     Ran Out of Food in the Last Year: Patient declined   Transportation Needs: Patient Declined (1/17/2025)    TRANSPORTATION NEEDS     Transportation : Patient declined   Physical Activity: Inactive (10/16/2024)    Exercise Vital Sign     Days of Exercise per Week: 0 days     Minutes of Exercise per Session: 0 min   Stress: Patient Declined (1/17/2025)    [employer] - Occupational Stress Questionnaire     Feeling of Stress : Patient declined   Housing Stability: Patient Declined (1/17/2025)    Housing Stability Vital Sign     Unable to Pay for Housing in the Last Year: Patient declined      Homeless in the Last Year: Patient declined       Precautions:     Allergies as of 01/16/2025 - Reviewed 01/16/2025   Allergen Reaction Noted    Codeine  04/02/2009    Dilantin [phenytoin sodium extended]  12/12/2011    Phenobarbital      Tegretol [carbamazepine]  09/16/2015       [facility] Assessment Details/Treatment     Pt seen by Wound Care for follow up to Heels, bilateral lower legs, Inner Thighs, Abdominal fold, Breast folds.  No new skin breakdown noted.  Bilateral heels sluggish blanching, boggy.  Applied Heel foam dressings.  Bilateral legs noted with redness to lower leg.  No drainage or weeping noted.  Posterior legs noted with darker redness, Maroon discoloration.  Lac-Hydrin and Hydrocortisone cream being applied BID.  Left inner thigh linear redness noted fading.  Abdominal fold noted with fading redness, moist.  Continue use of powder to area for dryness.   Sacrum/Buttocks noted with blanchable redness.  Breasts noted with mild redness.  Continue use of powder to area to keep dry.       01/24/25 1030   WOCN Assessment   WOCN Total Time (mins) 50   Visit Date 01/24/25   Visit Time 1030   Consult Type Follow Up   WOCN Speciality Wound   Wound moisture   Intervention assessed;changed;applied;chart review   Teaching on-going        Wound 09/18/24 1831 Venous Ulcer Right lower Leg   Date First Assessed/Time First Assessed: 09/18/24 1831   Present on Original Admission: Yes  Primary Wound Type: Venous Ulcer  Side: Right  Orientation: lower  Location: Leg  Removal Indication and Assessment: not present upon hospital arrival   Wound Image    Dressing Appearance Open to air   Drainage Amount None   Drainage Characteristics/Odor No odor   Appearance Intact;Pink;Red;Dry;Ecchymotic   Periwound Area Intact;Dry;Ecchymotic;Redness   Wound Edges Undefined   Care Cleansed with:;Soap and water   Dressing Applied  (Lac-Hydrin BID and Hydrocortisone ordered)   Periwound Care Dry periwound area maintained;Hydrocolloid barrier  applied;Topical treatment applied        Wound 09/18/24 1831 Venous Ulcer Left lower Leg   Date First Assessed/Time First Assessed: 09/18/24 1831   Present on Original Admission: Yes  Primary Wound Type: Venous Ulcer  Side: Left  Orientation: lower  Location: Leg  Removal Indication and Assessment: not present upon hospital arrival   Wound Image    Dressing Appearance Open to air   Drainage Amount None   Drainage Characteristics/Odor No odor   Appearance Intact;Pink;Red;Dry;Ecchymotic   Periwound Area Intact;Dry;Ecchymotic;Redness   Wound Edges Undefined   Care Cleansed with:;Soap and water   Dressing Applied  (Lac-Hydrin and Hydrocortisone ordered.)        Wound 09/18/24 Venous Ulcer Left lower;posterior Calf   Date First Assessed: 09/18/24   Present on Original Admission: Yes  Primary Wound Type: Venous Ulcer  Side: Left  Orientation: lower;posterior  Location: Calf  Removal Indication and Assessment: not present upon hospital arrival   Wound Image    Dressing Appearance Open to air   Drainage Amount None   Drainage Characteristics/Odor No odor   Appearance Red;Maroon;Dry;Ecchymotic   Periwound Area Intact;Ecchymotic;Dry;Maroon;Redness   Wound Edges Undefined   Care Cleansed with:;Soap and water   Dressing Applied  (Lac-Hydrin and Hydrocortisone ordered)   Periwound Care Cleansed with pH balanced cleanser;Dry periwound area maintained;Topical treatment applied        Wound 09/18/24 Venous Ulcer Right lower;posterior Calf   Date First Assessed: 09/18/24   Present on Original Admission: Yes  Primary Wound Type: Venous Ulcer  Side: Right  Orientation: lower;posterior  Location: Calf  Removal Indication and Assessment: not present upon hospital arrival   Wound Image    Dressing Appearance Open to air   Drainage Amount None   Drainage Characteristics/Odor No odor   Appearance Intact;Maroon;Red;Dry;Ecchymotic   Periwound Area Intact;Dry;Ecchymotic;Maroon;Satellite lesion   Wound Edges Undefined   Care Cleansed  with:;Soap and water   Dressing Applied  (Lac-hydrin and Hydrocortisone ordered)   Periwound Care Cleansed with pH balanced cleanser;Dry periwound area maintained;Topical treatment applied        Wound 09/18/24 Pressure Injury Left posterior Heel   Date First Assessed: 09/18/24   Present on Original Admission: Yes  Primary Wound Type: Pressure Injury  Side: Left  Orientation: posterior  Location: Heel   Wound Image    Pressure Injury Stage 1   Dressing Appearance Dry;Intact   Drainage Amount None   Drainage Characteristics/Odor No odor   Appearance Intact;Red;Maroon;Dry   Periwound Area Intact;Dry;Maroon;Redness   Wound Edges Undefined   Care Cleansed with:;Soap and water   Dressing Foam   Periwound Care Cleansed with pH balanced cleanser;Dry periwound area maintained;Topical treatment applied        Wound 09/18/24 Pressure Injury Right posterior Heel   Date First Assessed: 09/18/24   Present on Original Admission: Yes  Primary Wound Type: Pressure Injury  Side: Right  Orientation: posterior  Location: Heel   Wound Image    Pressure Injury Stage 1   Dressing Appearance Intact;Dry   Drainage Amount None   Drainage Characteristics/Odor No odor   Appearance Intact;Red;Maroon;Dry;Ecchymotic   Periwound Area Intact;Dry;Ecchymotic;Maroon;Redness   Wound Edges Undefined   Care Cleansed with:;Soap and water   Dressing Foam   Periwound Care Cleansed with pH balanced cleanser;Dry periwound area maintained;Topical treatment applied        Wound 01/17/25 0743 Abrasion(s) Left medial Thigh   Date First Assessed/Time First Assessed: 01/17/25 0743   Present on Original Admission: Yes  Primary Wound Type: Abrasion(s)  Side: Left  Orientation: medial  Location: Thigh   Wound Image    Dressing Appearance Open to air   Drainage Amount None   Drainage Characteristics/Odor No odor   Appearance Intact;Pink;Red;Dry   Periwound Area Dry;Intact   Wound Edges Defined   Care Cleansed with:;Soap and water   Dressing Applied  (Triad cream)    Periwound Care Cleansed with pH balanced cleanser;Dry periwound area maintained;Moisture barrier applied       Wound Care Recommendations:  Bilateral Lower Legs--Venous Stasis Dermatitis--clean legs with soap and water.  Dry thoroughly.  Apply Lac Hydrin to legs BID.       Bilateral inner Thighs--Abrasions-POA--clean with soap and water.  Pat Dry.  Apply Thin layer of Triad cream to area BID and PRN.     Bilateral Heels--Stage 1 pressure Injury--Clean with soap and water.  Pat dry.  Apply Heel foam dressings.  Change weekly or PRN.     01/24/2025

## 2025-01-24 NOTE — DISCHARGE SUMMARY
[facility] Medicine  Discharge Summary      Patient Name: Alyssa John  MRN: [id number]  [facility]: [id number]  Patient Class: IP- Inpatient  Admission Date: 1/16/2025  Hospital Length of Stay: 7 days  Discharge Date and Time:  01/24/2025 1:59 PM  Attending Physician: David Urbina MD   Discharging Provider: David Urbina MD  Primary Care Provider: Piyush Sharif MD    Primary Care Team: Networked reference to record PCT     HPI:   74 year old female with a past medical history of GERD, Epilepsy, hypertension, MI, MS, CAD was recently tested positive for influenza A.  She reports to the emergency room with reports of weakness and diarrhea.  Reports of decreased mobility and activity of daily living for the last 7-8 months. Patient needs to be assisted out of wheelchair by significant other. She was diagnosed with urinary tract infection about 17 days ago. She recently treated with omnicef and cipro.inished course of antibiotics. She then developed cough and congestion. She was diagnosed with influenza yesterday. Now  is unable to assist patient and transfers. She reports increased generalized weakness especially to both legs. No trouble with vision or speech. There was some chills with no definite fever. Urinary symptoms resolved.     In the ER, potassium 3.2, glucose 127, calcium 8, troponin I high sensitivity 29.6, influenza A positive UA with 1+ protein, 1+ blood, 2+ leukocytes 96 WBCs urine culture sent, COVID negative lactic 1.56 chest x-ray Mild bilateral perihilar interstitial thickening     Admit to hospital medicine for UTI after failing outpatient therapy.          * No surgery found *      Hospital Course:   74-year-old female with a history of multiple sclerosis with limited mobility, recurrent UTI, CAD on Plavix presenting here for influenza a positive and diarrhea.  Also acute on chronic debility.  Urine culture was negative.  Started on empiric  Rocephin azithromycin, Tamiflu as initially suspected to have influenza pneumonitis/pneumonia.  Antibiotics later stopped.  Completed course of Tamiflu.  Patient treated for fluid overload with IV Lasix.  Patient also found to 1/2 have staph epi bacteremia, most likely contamination.  Repeat blood culture negative.  2D echo shows normal LVEF, volume status much improved with IV Lasix.  Converted to p.o. Lasix.  Treated for venous stasis dermatitis with topical therapies.  Patient is recommended to go to SNF by physical therapist. Accepted and discharged to SNF. By time of discharge the patient was tolerating a regular diet with improved admission symptoms. Patient seen and examined on day of discharge.    Physical exam on day of discharge:  Constitutional:       General: She is not in acute distress.     Comments: Chronically ill-appearing   HENT:      Head: Normocephalic and atraumatic.   Cardiovascular:      Rate and Rhythm: Normal rate and regular rhythm.   Pulmonary:      Effort: Pulmonary effort is normal. No respiratory distress.   Musculoskeletal:      Comments: Mild-to-moderate erythema of the lower extremities bilateral, minimal swelling.  Tender to touch   Neurological:      Mental Status: She is alert and oriented to person, place, and time.   Psychiatric:         Mood and Affect: Affect normal.         Behavior: Behavior normal.         Thought Content: Thought content normal.        Goals of Care Treatment Preferences:  Code Status: Full Code      SDOH Screening:  The patient declined to be screened for utility difficulties, food insecurity, transport difficulties, housing insecurity, and interpersonal safety, so no concerns could be identified this admission.     Consults:   Consults (From admission, onward)          Status Ordering Provider     Inpatient consult to Social Work/Case Management  Once        Provider:  (Not yet assigned)    Acknowledged KONG GILBERT              Final Active Diagnoses:     Diagnosis Date Noted POA    PRINCIPAL PROBLEM:  Impaired functional mobility, balance, and endurance [Z74.09] 09/06/2019 Yes    Venous stasis dermatitis of both lower extremities [I87.2] 01/18/2025 Yes    Severe obesity (BMI 35.0-39.9) with comorbidity [E66.01] 01/15/2025 Yes    Seizure disorder [G40.909]  Yes    Hypertension [I10]  Yes    Multiple sclerosis [G35] 02/23/2013 Yes      Problems Resolved During this Admission:    Diagnosis Date Noted Date Resolved POA    Hypophosphatemia [E83.39] 01/21/2025 01/23/2025 No    Hypokalemia [E87.6] 01/20/2025 01/23/2025 Yes    Staphylococcus epidermidis bacteremia [R78.81, B95.7] 01/17/2025 01/20/2025 Yes    Influenza A [J10.1] 01/16/2025 01/24/2025 Yes    Fluid overload [E87.70] 10/28/2024 01/20/2025 Yes       Discharged Condition: good    Disposition: Skilled Nursing Facility    Follow Up:   Follow-up Information       Piyush Sharif MD. Schedule an appointment as soon as possible for a visit today.    Specialty: Family Medicine  Why: within 1 week of SNF discharge  Contact information:  [address] [facility] [address] [phone]                           Patient Instructions:      Diet Cardiac     Notify your health care provider if you experience any of the following:  temperature >100.4     Notify your health care provider if you experience any of the following:  persistent nausea and vomiting or diarrhea     Notify your health care provider if you experience any of the following:  severe uncontrolled pain     Notify your health care provider if you experience any of the following:  redness, tenderness, or signs of infection (pain, swelling, redness, odor or green/yellow discharge around incision site)     Notify your health care provider if you experience any of the following:  difficulty breathing or increased cough     Notify your health care provider if you experience any of the following:  severe persistent headache     Notify your health care  provider if you experience any of the following:  worsening rash     Notify your health care provider if you experience any of the following:  persistent dizziness, light-headedness, or visual disturbances     Notify your health care provider if you experience any of the following:  increased confusion or weakness     Activity as tolerated       Significant Diagnostic Studies:     Lab Results   Component Value Date    WBC 5.34 01/19/2025    HGB 11.4 (L) 01/19/2025    HCT 36.0 (L) 01/19/2025    MCV 86 01/19/2025     (L) 01/19/2025       BMP  Lab Results   Component Value Date     01/24/2025    K 3.8 01/24/2025     (H) 01/24/2025    CO2 17 (L) 01/24/2025    BUN 17 01/24/2025    CREATININE 0.8 01/24/2025    CALCIUM 8.6 (L) 01/24/2025    ANIONGAP 12 01/24/2025    EGFRNORACEVR >60 01/24/2025       Microbiology Results (last 7 days)       Procedure Component Value Units Date/Time    Blood culture [[id number]] Collected: 01/17/25 1226    Order Status: Completed Specimen: Blood from Peripheral, Hand, Left Updated: 01/22/25 1432     Blood Culture, Routine No growth after 5 days.    Blood culture [[id number]] Collected: 01/17/25 1226    Order Status: Completed Specimen: Blood from Peripheral, Hand, Right Updated: 01/22/25 1432     Blood Culture, Routine No growth after 5 days.    Blood culture x two cultures. Draw prior to antibiotics. [[id number]] Collected: 01/16/25 1523    Order Status: Completed Specimen: Blood from Peripheral, Forearm, Left Updated: 01/21/25 1632     Blood Culture, Routine No growth after 5 days.    Narrative:      Aerobic and anaerobic  Collection has been rescheduled by HOLLY at 01/16/2025 15:06 Reason:   Patient unavailable ekg  Collection has been rescheduled by HOLLY at 01/16/2025 15:25 Reason:   Done  Collection has been rescheduled by ZJOSE at 01/16/2025 15:06 Reason:   Patient unavailable ekg  Collection has been rescheduled by HOLLY at 01/16/2025 15:25 Reason:   Done    Blood culture  x two cultures. Draw prior to antibiotics. [[id number]]  (Abnormal) Collected: 01/16/25 1510    Order Status: Completed Specimen: Blood Updated: 01/20/25 0918     Blood Culture, Routine Gram stain aer bottle: Gram positive cocci in clusters resembling Staph      Results called to and read back by:Bobby Schwab RN on [facility]      STAPHYLOCOCCUS EPIDERMIDIS  Organism is a probable contaminant       Comment: Previous comment was modified by MARAH at 09:18 on 01/20/2025   Susceptibility testing not routinely performed.         Narrative:      Aerobic and anaerobic  Collection has been rescheduled by ZJOSE at 01/16/2025 15:06 Reason:   Patient unavailable ekg  Collection has been rescheduled by Z at 01/16/2025 15:25 Reason:   Done  Collection has been rescheduled by Z at 01/16/2025 15:06 Reason:   Patient unavailable ekg  Collection has been rescheduled by ZJOSE at 01/16/2025 15:25 Reason:   Done    Urine culture [[id number]] Collected: 01/16/25 1533    Order Status: Completed Specimen: Urine Updated: 01/19/25 1003     Urine Culture, Routine No growth    Narrative:      Specimen Source->Urine          1/15/25  Influenza A, Molecular Not Detected Detected Abnormal        US Lower Extremity Veins Bilateral    Result Date: 1/18/2025  EXAMINATION: US LOWER EXTREMITY VEINS BILATERAL CLINICAL HISTORY: bilateral leg swelling; TECHNIQUE: Duplex and color flow Doppler and dynamic compression was performed of the bilateral lower extremity veins was performed. COMPARISON: None FINDINGS: Right thigh veins: The common femoral, femoral, popliteal, upper greater saphenous, and deep femoral veins are patent and free of thrombus. The veins are normally compressible and have normal phasic flow and augmentation response. Right calf veins: The visualized calf veins are patent. Left thigh veins: The common femoral, femoral, popliteal, upper greater saphenous, and deep femoral veins are patent and free of thrombus. The veins are normally  compressible and have normal phasic flow and augmentation response. Left calf veins: The visualized calf veins are patent. Miscellaneous: None     No evidence of deep venous thrombosis in either lower extremity. Electronically signed by: Kaylyn Pineda MD Date:    01/18/2025 Time:    13:05    Echo    Result Date: 1/17/2025    Left Ventricle: The left ventricle is normal in size. Normal wall thickness. There is normal systolic function. There is normal diastolic function. E/A ratio is 1.16.   Right Ventricle: Normal right ventricular cavity size. Systolic function is normal.   Mitral Valve: There is mild regurgitation.   Tricuspid Valve: There is mild to moderate regurgitation. The estimated PA systolic pressure is at least 27 mmHg.   Overall the study quality was technically difficult. The study was difficult due to patient's poor acoustic windows.     X-Ray Chest AP Portable    Result Date: 1/16/2025  EXAMINATION: XR CHEST AP PORTABLE CLINICAL HISTORY: Influenza; FINDINGS: Portable chest with comparison chest x-ray 01/15/2025.  Normal cardiomediastinal silhouette.There is mild bilateral perihilar interstitial thickening.  No confluent airspace opacities.  Pulmonary vasculature is normal. No acute osseous abnormality.     Mild bilateral perihilar interstitial thickening likely reflects infection/inflammation of the lower respiratory tract. Electronically signed by: Gage Heath Date:    01/16/2025 Time:    16:06    X-Ray Chest PA And Lateral    Result Date: 1/15/2025  EXAMINATION: XR CHEST PA AND LATERAL CLINICAL HISTORY: Subacute cough TECHNIQUE: PA and lateral views of the chest were performed. COMPARISON: 09/16/2024 FINDINGS: Stable cardiomediastinal silhouette.  No focal airspace consolidation, pleural effusion, or pneumothorax.  No acute osseous abnormality.     No acute cardiopulmonary process. Electronically signed by: Mario Hoyt Date:    01/15/2025 Time:    15:16  - pulls last radiology  orders      Pending Diagnostic Studies:    none     Medications:  Reconciled Home Medications:      Medication List        START taking these medications      amLODIPine 5 MG tablet  Commonly known as: NORVASC  Take 1 tablet (5 mg total) by mouth once daily.     furosemide 20 MG tablet  Commonly known as: LASIX  Take 1 tablet (20 mg total) by mouth once daily.     guaiFENesin 100 mg/5 ml 100 mg/5 mL syrup  Commonly known as: ROBITUSSIN  Take 10 mLs (200 mg total) by mouth 3 (three) times daily as needed for Cough.     hydrocortisone 2.5 % cream  Apply topically 2 (two) times daily. Apply to red areas of lower legs for 7 days            CHANGE how you take these medications      atorvastatin 40 MG tablet  Commonly known as: LIPITOR  TAKE 1 TABLET BY MOUTH EVERY DAY  What changed: when to take this     DULoxetine 20 MG capsule  Commonly known as: CYMBALTA  TAKE 2 CAPSULES BY MOUTH EVERY DAY  What changed:   how much to take  when to take this            CONTINUE taking these medications      cholecalciferol (vitamin D3) 10 mcg (400 unit) Cap capsule  Take 1 tablet by mouth once daily.     clopidogreL 75 mg tablet  Commonly known as: PLAVIX  Take 75 mg by mouth once daily.     cyanocobalamin 1000 MCG tablet  Commonly known as: VITAMIN B-12  Take 1 tablet (1,000 mcg total) by mouth once daily.     estradioL 0.01 % (0.1 mg/gram) vaginal cream  Commonly known as: ESTRACE  Place 1 g vaginally 3 (three) times a week.     famotidine 20 MG tablet  Commonly known as: PEPCID  Take 20 mg by mouth 2 (two) times daily as needed for Heartburn.     topiramate 100 MG tablet  Commonly known as: TOPAMAX  Take 1 tablet (100 mg total) by mouth 2 (two) times daily.            STOP taking these medications      albuterol-ipratropium 2.5 mg-0.5 mg/3 mL nebulizer solution  Commonly known as: DUO-NEB     cefdinir 300 MG capsule  Commonly known as: OMNICEF     doxycycline 100 MG tablet  Commonly known as: VIBRA-TABS      promethazine-dextromethorphan 6.25-15 mg/5 mL Syrp  Commonly known as: PROMETHAZINE-DM     propranoloL 20 MG tablet  Commonly known as: INDERAL     torsemide 5 MG Tab  Commonly known as: DEMADEX     XOFLUZA 80 mg tablet  Generic drug: baloxavir marboxiL            ASK your doctor about these medications      potassium chloride 10 MEQ Cpsr  Commonly known as: MICRO-K  Take 1 capsule (10 mEq total) by mouth once daily. for 7 days  Ask about: Should I take this medication?              Indwelling Lines/Drains at time of discharge:   Lines/Drains/Airways       Drain  Duration             Female External Urinary Catheter w/ Suction 01/20/25 1911 3 days                    Time spent on the discharge of patient: 33 minutes         David Urbina MD  Department of Hospital Medicine  [facility]/Surg

## 2025-01-24 NOTE — PT/OT/SLP PROGRESS
Physical Therapy      Patient Name:  Alyssa John   MRN:  [id number]    Patient not seen today secondary to Other (Comment) (2 attempts ( 10: 20 ) wound care present.  Preparing for discharge at this time , awaiting transport .).

## 2025-01-24 NOTE — PLAN OF CARE
Per Kellen Griffithsiar, report can be called to nurse for A20 @ [phone]. They will  pt.     Pt clear for DC from CM standpoint. Discharging to [facility].        01/24/25 1221   Final Note   Assessment Type Final Discharge Note   Anticipated Discharge Disposition SNF   Post-Acute Status   Post-Acute Authorization Placement   Post-Acute Placement Status Set-up Complete/Auth obtained

## 2025-01-24 NOTE — PLAN OF CARE
Problem: Infection  Goal: Absence of Infection Signs and Symptoms  Outcome: Progressing     Problem: Adult Inpatient Plan of Care  Goal: Plan of Care Review  Outcome: Progressing  Goal: Patient-Specific Goal (Individualized)  Outcome: Progressing  Goal: Absence of Hospital-Acquired Illness or Injury  Outcome: Progressing  Goal: Optimal Comfort and Wellbeing  Outcome: Progressing     Problem: Wound  Goal: Optimal Functional Ability  Outcome: Progressing  Goal: Absence of Infection Signs and Symptoms  Outcome: Progressing     Problem: Fall Injury Risk  Goal: Absence of Fall and Fall-Related Injury  Outcome: Progressing     Problem: Mobility Impairment  Goal: Optimal Mobility  Outcome: Progressing

## 2025-02-19 ENCOUNTER — OFFICE VISIT (OUTPATIENT)
Dept: FAMILY MEDICINE | Facility: CLINIC | Age: 75
End: 2025-02-19
Payer: MEDICARE

## 2025-02-19 ENCOUNTER — HOSPITAL ENCOUNTER (OUTPATIENT)
Dept: RADIOLOGY | Facility: HOSPITAL | Age: 75
Discharge: HOME | End: 2025-02-19
Attending: PHYSICIAN ASSISTANT
Payer: MEDICARE

## 2025-02-19 ENCOUNTER — LAB VISIT (OUTPATIENT)
Dept: LAB | Facility: HOSPITAL | Age: 75
End: 2025-02-19
Attending: PHYSICIAN ASSISTANT
Payer: MEDICARE

## 2025-02-19 VITALS
HEIGHT: 63 IN | TEMPERATURE: 98 F | OXYGEN SATURATION: 96 % | RESPIRATION RATE: 16 BRPM | HEART RATE: 69 BPM | BODY MASS INDEX: 36.49 KG/M2 | SYSTOLIC BLOOD PRESSURE: 132 MMHG | DIASTOLIC BLOOD PRESSURE: 76 MMHG

## 2025-02-19 DIAGNOSIS — I10 PRIMARY HYPERTENSION: ICD-10-CM

## 2025-02-19 DIAGNOSIS — I87.2 VENOUS STASIS DERMATITIS OF BOTH LOWER EXTREMITIES: ICD-10-CM

## 2025-02-19 DIAGNOSIS — N39.0 URINARY TRACT INFECTION WITHOUT HEMATURIA, SITE UNSPECIFIED: ICD-10-CM

## 2025-02-19 DIAGNOSIS — Z74.09 IMPAIRED MOBILITY AND ADLS: Chronic | ICD-10-CM

## 2025-02-19 DIAGNOSIS — F32.A ANXIETY AND DEPRESSION: ICD-10-CM

## 2025-02-19 DIAGNOSIS — F41.9 ANXIETY AND DEPRESSION: ICD-10-CM

## 2025-02-19 DIAGNOSIS — J43.9 PULMONARY EMPHYSEMA, UNSPECIFIED EMPHYSEMA TYPE: ICD-10-CM

## 2025-02-19 DIAGNOSIS — I25.10 CORONARY ARTERY DISEASE WITHOUT ANGINA PECTORIS, UNSPECIFIED VESSEL OR LESION TYPE, UNSPECIFIED WHETHER NATIVE OR TRANSPLANTED HEART: ICD-10-CM

## 2025-02-19 DIAGNOSIS — Z78.9 IMPAIRED MOBILITY AND ADLS: Chronic | ICD-10-CM

## 2025-02-19 DIAGNOSIS — R05.9 COUGH, UNSPECIFIED TYPE: ICD-10-CM

## 2025-02-19 DIAGNOSIS — R93.89 ABNORMAL CHEST CT: ICD-10-CM

## 2025-02-19 DIAGNOSIS — Z74.09 IMPAIRED FUNCTIONAL MOBILITY, BALANCE, AND ENDURANCE: ICD-10-CM

## 2025-02-19 DIAGNOSIS — G35 MULTIPLE SCLEROSIS: ICD-10-CM

## 2025-02-19 DIAGNOSIS — G40.909 SEIZURE DISORDER: ICD-10-CM

## 2025-02-19 DIAGNOSIS — Z09 HOSPITAL DISCHARGE FOLLOW-UP: Primary | ICD-10-CM

## 2025-02-19 LAB
ALBUMIN SERPL BCP-MCNC: 3.4 G/DL (ref 3.5–5.2)
ALP SERPL-CCNC: 131 U/L (ref 40–150)
ALT SERPL W/O P-5'-P-CCNC: 15 U/L (ref 10–44)
ANION GAP SERPL CALC-SCNC: 11 MMOL/L (ref 8–16)
AST SERPL-CCNC: 60 U/L (ref 10–40)
BASOPHILS # BLD AUTO: 0.03 K/UL (ref 0–0.2)
BASOPHILS NFR BLD: 0.4 % (ref 0–1.9)
BILIRUB SERPL-MCNC: 0.5 MG/DL (ref 0.1–1)
BNP SERPL-MCNC: 70 PG/ML (ref 0–99)
BUN SERPL-MCNC: 14 MG/DL (ref 8–23)
CALCIUM SERPL-MCNC: 9 MG/DL (ref 8.7–10.5)
CHLORIDE SERPL-SCNC: 112 MMOL/L (ref 95–110)
CO2 SERPL-SCNC: 18 MMOL/L (ref 23–29)
CREAT SERPL-MCNC: 0.8 MG/DL (ref 0.5–1.4)
DIFFERENTIAL METHOD BLD: ABNORMAL
EOSINOPHIL # BLD AUTO: 0.2 K/UL (ref 0–0.5)
EOSINOPHIL NFR BLD: 3.2 % (ref 0–8)
ERYTHROCYTE [DISTWIDTH] IN BLOOD BY AUTOMATED COUNT: 14.6 % (ref 11.5–14.5)
EST. GFR  (NO RACE VARIABLE): >60 ML/MIN/1.73 M^2
GLUCOSE SERPL-MCNC: 92 MG/DL (ref 70–110)
HCT VFR BLD AUTO: 39.5 % (ref 37–48.5)
HGB BLD-MCNC: 12.5 G/DL (ref 12–16)
IMM GRANULOCYTES # BLD AUTO: 0.04 K/UL (ref 0–0.04)
IMM GRANULOCYTES NFR BLD AUTO: 0.5 % (ref 0–0.5)
LYMPHOCYTES # BLD AUTO: 2.1 K/UL (ref 1–4.8)
LYMPHOCYTES NFR BLD: 27.4 % (ref 18–48)
MAGNESIUM SERPL-MCNC: 2 MG/DL (ref 1.6–2.6)
MCH RBC QN AUTO: 27.4 PG (ref 27–31)
MCHC RBC AUTO-ENTMCNC: 31.6 G/DL (ref 32–36)
MCV RBC AUTO: 86 FL (ref 82–98)
MONOCYTES # BLD AUTO: 0.5 K/UL (ref 0.3–1)
MONOCYTES NFR BLD: 7.2 % (ref 4–15)
NEUTROPHILS # BLD AUTO: 4.6 K/UL (ref 1.8–7.7)
NEUTROPHILS NFR BLD: 61.3 % (ref 38–73)
NRBC BLD-RTO: 0 /100 WBC
PLATELET # BLD AUTO: 214 K/UL (ref 150–450)
PMV BLD AUTO: 11.2 FL (ref 9.2–12.9)
POTASSIUM SERPL-SCNC: 4.1 MMOL/L (ref 3.5–5.1)
PROT SERPL-MCNC: 6.8 G/DL (ref 6–8.4)
RBC # BLD AUTO: 4.57 M/UL (ref 4–5.4)
SODIUM SERPL-SCNC: 141 MMOL/L (ref 136–145)
WBC # BLD AUTO: 7.51 K/UL (ref 3.9–12.7)

## 2025-02-19 PROCEDURE — 85025 COMPLETE CBC W/AUTO DIFF WBC: CPT | Performed by: PHYSICIAN ASSISTANT

## 2025-02-19 PROCEDURE — 99999 PR PBB SHADOW E&M-EST. PATIENT-LVL IV: CPT | Mod: PBBFAC,,, | Performed by: PHYSICIAN ASSISTANT

## 2025-02-19 PROCEDURE — 83880 ASSAY OF NATRIURETIC PEPTIDE: CPT | Performed by: PHYSICIAN ASSISTANT

## 2025-02-19 PROCEDURE — 1159F MED LIST DOCD IN RCRD: CPT | Mod: CPTII,S$GLB,, | Performed by: PHYSICIAN ASSISTANT

## 2025-02-19 PROCEDURE — 1101F PT FALLS ASSESS-DOCD LE1/YR: CPT | Mod: CPTII,S$GLB,, | Performed by: PHYSICIAN ASSISTANT

## 2025-02-19 PROCEDURE — 3288F FALL RISK ASSESSMENT DOCD: CPT | Mod: CPTII,S$GLB,, | Performed by: PHYSICIAN ASSISTANT

## 2025-02-19 PROCEDURE — 36415 COLL VENOUS BLD VENIPUNCTURE: CPT | Mod: PO | Performed by: PHYSICIAN ASSISTANT

## 2025-02-19 PROCEDURE — 3075F SYST BP GE 130 - 139MM HG: CPT | Mod: CPTII,S$GLB,, | Performed by: PHYSICIAN ASSISTANT

## 2025-02-19 PROCEDURE — 80053 COMPREHEN METABOLIC PANEL: CPT | Performed by: PHYSICIAN ASSISTANT

## 2025-02-19 PROCEDURE — 1126F AMNT PAIN NOTED NONE PRSNT: CPT | Mod: CPTII,S$GLB,, | Performed by: PHYSICIAN ASSISTANT

## 2025-02-19 PROCEDURE — 71250 CT THORAX DX C-: CPT | Mod: TC

## 2025-02-19 PROCEDURE — 1111F DSCHRG MED/CURRENT MED MERGE: CPT | Mod: CPTII,S$GLB,, | Performed by: PHYSICIAN ASSISTANT

## 2025-02-19 PROCEDURE — 3078F DIAST BP <80 MM HG: CPT | Mod: CPTII,S$GLB,, | Performed by: PHYSICIAN ASSISTANT

## 2025-02-19 PROCEDURE — 3008F BODY MASS INDEX DOCD: CPT | Mod: CPTII,S$GLB,, | Performed by: PHYSICIAN ASSISTANT

## 2025-02-19 PROCEDURE — 83735 ASSAY OF MAGNESIUM: CPT | Performed by: PHYSICIAN ASSISTANT

## 2025-02-19 RX ORDER — FUROSEMIDE 20 MG/1
20 TABLET ORAL DAILY
Qty: 90 TABLET | Refills: 1 | Status: SHIPPED | OUTPATIENT
Start: 2025-02-19 | End: 2026-02-19

## 2025-02-19 RX ORDER — POTASSIUM CHLORIDE 20 MEQ/1
20 TABLET, EXTENDED RELEASE ORAL 2 TIMES DAILY
COMMUNITY

## 2025-02-19 RX ORDER — AMMONIUM LACTATE 12 G/100G
LOTION TOPICAL
Qty: 225 G | Refills: 0 | Status: SHIPPED | OUTPATIENT
Start: 2025-02-19

## 2025-02-19 NOTE — PROGRESS NOTES
Subjective:       Patient ID: Alyssa John is a 74 y.o. female.    Chief Complaint: hospital folow up      Ms. John is a 74 year old female with MS and seizure disorder was recently hospitalized with flu A and UTI. The patient had significant weakness and decreased in ADLs. The patient was treated with antibiotics and tamiflu. The patient was discharged to SNF.     The patient is accompanied by her  who reports patient's leg swelling have worsened since discharge from SNF. The patient is taking lasix three times per week now. The patient is bedbound or sitting in wheelchair. She is currently not ambulatory. The patient is receiving  nursing, PT, OT, and speech. The patient's  reports that she has not been ambulatory since beginning to have problems with UTIs in June. The patient also has had cough productive of mucus. The patient did have emphysema on CT in October 2024; follow up was not completed. The patient agrees to having follow up imaging. The  has concerns about patient taking potassium pills due to size and difficulty swallowing.      David Urbina MD   Physician  [facility] Medicine     Discharge Summary     Signed     Creation Time: 1/24/2025  1:59 PM      [facility] Medicine  Discharge Summary        Patient Name: Alyssa John  MRN: [id number]  NAVEED: [id number]  Patient Class: IP- Inpatient  Admission Date: 1/16/2025  Hospital Length of Stay: 7 days  Discharge Date and Time:  01/24/2025 1:59 PM  Attending Physician: David Urbina MD   Discharging Provider: David Urbina MD  Primary Care Provider: Piyush Sharif MD     Primary Care Team: Networked reference to record PCT      HPI:   74 year old female with a past medical history of GERD, Epilepsy, hypertension, MI, MS, CAD was recently tested positive for influenza A.  She reports to the emergency room with reports of weakness and diarrhea.  Reports of decreased mobility  and activity of daily living for the last 7-8 months. Patient needs to be assisted out of wheelchair by significant other. She was diagnosed with urinary tract infection about 17 days ago. She recently treated with omnicef and cipro.inished course of antibiotics. She then developed cough and congestion. She was diagnosed with influenza yesterday. Now  is unable to assist patient and transfers. She reports increased generalized weakness especially to both legs. No trouble with vision or speech. There was some chills with no definite fever. Urinary symptoms resolved.      In the ER, potassium 3.2, glucose 127, calcium 8, troponin I high sensitivity 29.6, influenza A positive UA with 1+ protein, 1+ blood, 2+ leukocytes 96 WBCs urine culture sent, COVID negative lactic 1.56 chest x-ray Mild bilateral perihilar interstitial thickening      Admit to hospital medicine for UTI after failing outpatient therapy.              * No surgery found *       Hospital Course:   74-year-old female with a history of multiple sclerosis with limited mobility, recurrent UTI, CAD on Plavix presenting here for influenza a positive and diarrhea.  Also acute on chronic debility.  Urine culture was negative.  Started on empiric Rocephin azithromycin, Tamiflu as initially suspected to have influenza pneumonitis/pneumonia.  Antibiotics later stopped.  Completed course of Tamiflu.  Patient treated for fluid overload with IV Lasix.  Patient also found to 1/2 have staph epi bacteremia, most likely contamination.  Repeat blood culture negative.  2D echo shows normal LVEF, volume status much improved with IV Lasix.  Converted to p.o. Lasix.  Treated for venous stasis dermatitis with topical therapies.  Patient is recommended to go to SNF by physical therapist. Accepted and discharged to SNF. By time of discharge the patient was tolerating a regular diet with improved admission symptoms. Patient seen and examined on day of discharge.      Physical exam on day of discharge:  Constitutional:       General: She is not in acute distress.     Comments: Chronically ill-appearing   HENT:      Head: Normocephalic and atraumatic.   Cardiovascular:      Rate and Rhythm: Normal rate and regular rhythm.   Pulmonary:      Effort: Pulmonary effort is normal. No respiratory distress.   Musculoskeletal:      Comments: Mild-to-moderate erythema of the lower extremities bilateral, minimal swelling.  Tender to touch   Neurological:      Mental Status: She is alert and oriented to person, place, and time.   Psychiatric:         Mood and Affect: Affect normal.         Behavior: Behavior normal.         Thought Content: Thought content normal.         Goals of Care Treatment Preferences:  Code Status: Full Code        SDOH Screening:  The patient declined to be screened for utility difficulties, food insecurity, transport difficulties, housing insecurity, and interpersonal safety, so no concerns could be identified this admission.     Consults:     Consults (From admission, onward)             Status Ordering Provider        Inpatient consult to Social Work/Case Management  Once       Provider:  (Not yet assigned)   Acknowledged KONG GILBERT                     Final Active Diagnoses:    Diagnosis Date Noted POA  · PRINCIPAL PROBLEM:  Impaired functional mobility, balance, and endurance [Z74.09] 09/06/2019 Yes  · Venous stasis dermatitis of both lower extremities [I87.2] 01/18/2025 Yes  · Severe obesity (BMI 35.0-39.9) with comorbidity [E66.01] 01/15/2025 Yes  · Seizure disorder [G40.909]   Yes  · Hypertension [I10]   Yes  · Multiple sclerosis [G35] 02/23/2013 Yes     Problems Resolved During this Admission:    Diagnosis Date Noted Date Resolved POA  · Hypophosphatemia [E83.39] 01/21/2025 01/23/2025 No  · Hypokalemia [E87.6] 01/20/2025 01/23/2025 Yes  · Staphylococcus epidermidis bacteremia [R78.81, B95.7] 01/17/2025 01/20/2025 Yes  · Influenza A  [J10.1] 01/16/2025 01/24/2025 Yes  · Fluid overload [E87.70] 10/28/2024 01/20/2025 Yes        Discharged Condition: good     Disposition: Skilled Nursing Facility     Follow Up:        Follow-up Information            Piyush Sharif MD. Schedule an appointment as soon as possible for a visit today.   Specialty: Family Medicine  Why: within 1 week of SNF discharge  Contact information:  [id number] [id number]  [phone]                                    Patient Instructions:        Diet Cardiac       Notify your health care provider if you experience any of the following:  temperature >100.4       Notify your health care provider if you experience any of the following:  persistent nausea and vomiting or diarrhea       Notify your health care provider if you experience any of the following:  severe uncontrolled pain       Notify your health care provider if you experience any of the following:  redness, tenderness, or signs of infection (pain, swelling, redness, odor or green/yellow discharge around incision site)       Notify your health care provider if you experience any of the following:  difficulty breathing or increased cough       Notify your health care provider if you experience any of the following:  severe persistent headache       Notify your health care provider if you experience any of the following:  worsening rash       Notify your health care provider if you experience any of the following:  persistent dizziness, light-headedness, or visual disturbances       Notify your health care provider if you experience any of the following:  increased confusion or weakness       Activity as tolerated        Significant Diagnostic Studies:        Lab Results  Component Value Date    WBC 5.34 01/19/2025    HGB 11.4 (L) 01/19/2025    HCT 36.0 (L) 01/19/2025    MCV 86 01/19/2025     (L) 01/19/2025        BMP    Lab Results  Component Value Date     01/24/2025    K 3.8 01/24/2025      (H) 01/24/2025    CO2 17 (L) 01/24/2025    BUN 17 01/24/2025    CREATININE 0.8 01/24/2025    CALCIUM 8.6 (L) 01/24/2025    ANIONGAP 12 01/24/2025    EGFRNORACEVR >60 01/24/2025          Microbiology Results (last 7 days)          Procedure Component Value Units Date/Time    Blood culture [[id number]] Collected: 01/17/25 1226    Order Status: Completed Specimen: Blood from Peripheral, Hand, Left Updated: 01/22/25 1432      Blood Culture, Routine No growth after 5 days.    Blood culture [[id number]] Collected: 01/17/25 1226    Order Status: Completed Specimen: Blood from Peripheral, Hand, Right Updated: 01/22/25 1432      Blood Culture, Routine No growth after 5 days.    Blood culture x two cultures. Draw prior to antibiotics. [[id number]] Collected: 01/16/25 1523    Order Status: Completed Specimen: Blood from Peripheral, Forearm, Left Updated: 01/21/25 1632      Blood Culture, Routine No growth after 5 days.    Narrative:      Aerobic and anaerobic  Collection has been rescheduled by [email or web address] at 01/16/2025 15:06 Reason:   Patient unavailable ekg  Collection has been rescheduled by [email or web address] at 01/16/2025 15:25 Reason:   Done  Collection has been rescheduled by [email or web address] at 01/16/2025 15:06 Reason:   Patient unavailable ekg  Collection has been rescheduled by [email or web address] at 01/16/2025 15:25 Reason:   Done    Blood culture x two cultures. Draw prior to antibiotics. [[id number]]  (Abnormal) Collected: 01/16/25 1510    Order Status: Completed Specimen: Blood Updated: 01/20/25 0918      Blood Culture, Routine Gram stain aer bottle: Gram positive cocci in clusters resembling Staph        Results called to and read back by:Bobby Schwab RN on [facility]        STAPHYLOCOCCUS EPIDERMIDIS  Organism is a probable contaminant          Comment: Previous comment was modified by AMRAH at 09:18 on 01/20/2025   Susceptibility testing not routinely performed.         Narrative:      Aerobic and anaerobic  Collection has been rescheduled by [email or web address] at  01/16/2025 15:06 Reason:   Patient unavailable ekg  Collection has been rescheduled by HOLLY at 01/16/2025 15:25 Reason:   Done  Collection has been rescheduled by HOLLY at 01/16/2025 15:06 Reason:   Patient unavailable ekg  Collection has been rescheduled by HOLLY at 01/16/2025 15:25 Reason:   Done    Urine culture [[id number]] Collected: 01/16/25 1533    Order Status: Completed Specimen: Urine Updated: 01/19/25 1003      Urine Culture, Routine No growth    Narrative:      Specimen Source->Urine             1/15/25  Influenza A, MolecularNot DetectedDetected Abnormal             US Lower Extremity Veins Bilateral     Result Date: 1/18/2025  EXAMINATION: US LOWER EXTREMITY VEINS BILATERAL CLINICAL HISTORY: bilateral leg swelling; TECHNIQUE: Duplex and color flow Doppler and dynamic compression was performed of the bilateral lower extremity veins was performed. COMPARISON: None FINDINGS: Right thigh veins: The common femoral, femoral, popliteal, upper greater saphenous, and deep femoral veins are patent and free of thrombus. The veins are normally compressible and have normal phasic flow and augmentation response. Right calf veins: The visualized calf veins are patent. Left thigh veins: The common femoral, femoral, popliteal, upper greater saphenous, and deep femoral veins are patent and free of thrombus. The veins are normally compressible and have normal phasic flow and augmentation response. Left calf veins: The visualized calf veins are patent. Miscellaneous: None      No evidence of deep venous thrombosis in either lower extremity. Electronically signed by:      Kaylyn Pineda MD Date:                                       01/18/2025 Time:                                            13:05     Echo     Result Date: 1/17/2025  ·  Left Ventricle: The left ventricle is normal in size. Normal wall thickness. There is normal systolic function. There is normal diastolic function. E/A ratio is 1.16. ·  Right Ventricle:  Normal right ventricular cavity size. Systolic function is normal. ·  Mitral Valve: There is mild regurgitation. ·  Tricuspid Valve: There is mild to moderate regurgitation. The estimated PA systolic pressure is at least 27 mmHg. ·  Overall the study quality was technically difficult. The study was difficult due to patient's poor acoustic windows.      X-Ray Chest AP Portable     Result Date: 1/16/2025  EXAMINATION: XR CHEST AP PORTABLE CLINICAL HISTORY: Influenza; FINDINGS: Portable chest with comparison chest x-ray 01/15/2025.  Normal cardiomediastinal silhouette.There is mild bilateral perihilar interstitial thickening.  No confluent airspace opacities.  Pulmonary vasculature is normal. No acute osseous abnormality.      Mild bilateral perihilar interstitial thickening likely reflects infection/inflammation of the lower respiratory tract. Electronically signed by:       Gage Heath Date:                                     01/16/2025 Time:                                      16:06     X-Ray Chest PA And Lateral     Result Date: 1/15/2025  EXAMINATION: XR CHEST PA AND LATERAL CLINICAL HISTORY: Subacute cough TECHNIQUE: PA and lateral views of the chest were performed. COMPARISON: 09/16/2024 FINDINGS: Stable cardiomediastinal silhouette.  No focal airspace consolidation, pleural effusion, or pneumothorax.  No acute osseous abnormality.      No acute cardiopulmonary process. Electronically signed by:          Mario Hoyt Date:                                        01/15/2025 Time:                                            15:16  - pulls last radiology orders          Pending Diagnostic Studies:   none     Medications:  Reconciled Home Medications:        Medication List          START taking these medications        amLODIPine 5 MG tablet  Commonly known as: NORVASC  Take 1 tablet (5 mg total) by mouth once daily.     furosemide 20 MG tablet  Commonly known as: LASIX  Take 1 tablet (20 mg total) by mouth  once daily.     guaiFENesin 100 mg/5 ml 100 mg/5 mL syrup  Commonly known as: ROBITUSSIN  Take 10 mLs (200 mg total) by mouth 3 (three) times daily as needed for Cough.     hydrocortisone 2.5 % cream  Apply topically 2 (two) times daily. Apply to red areas of lower legs for 7 days                  CHANGE how you take these medications        atorvastatin 40 MG tablet  Commonly known as: LIPITOR  TAKE 1 TABLET BY MOUTH EVERY DAY  What changed: when to take this     DULoxetine 20 MG capsule  Commonly known as: CYMBALTA  TAKE 2 CAPSULES BY MOUTH EVERY DAY  What changed:   · how much to take  · when to take this                  CONTINUE taking these medications        cholecalciferol (vitamin D3) 10 mcg (400 unit) Cap capsule  Take 1 tablet by mouth once daily.     clopidogreL 75 mg tablet  Commonly known as: PLAVIX  Take 75 mg by mouth once daily.     cyanocobalamin 1000 MCG tablet  Commonly known as: VITAMIN B-12  Take 1 tablet (1,000 mcg total) by mouth once daily.     estradioL 0.01 % (0.1 mg/gram) vaginal cream  Commonly known as: ESTRACE  Place 1 g vaginally 3 (three) times a week.     famotidine 20 MG tablet  Commonly known as: PEPCID  Take 20 mg by mouth 2 (two) times daily as needed for Heartburn.     topiramate 100 MG tablet  Commonly known as: TOPAMAX  Take 1 tablet (100 mg total) by mouth 2 (two) times daily.                  STOP taking these medications        albuterol-ipratropium 2.5 mg-0.5 mg/3 mL nebulizer solution  Commonly known as: DUO-NEB     cefdinir 300 MG capsule  Commonly known as: OMNICEF     doxycycline 100 MG tablet  Commonly known as: VIBRA-TABS     promethazine-dextromethorphan 6.25-15 mg/5 mL Syrp  Commonly known as: PROMETHAZINE-DM     propranoloL 20 MG tablet  Commonly known as: INDERAL     torsemide 5 MG Tab  Commonly known as: DEMADEX     XOFLUZA 80 mg tablet  Generic drug: baloxavir marboxiL                  ASK your doctor about these medications        potassium chloride 10 MEQ  Cpsr  Commonly known as: MICRO-K  Take 1 capsule (10 mEq total) by mouth once daily. for 7 days  Ask about: Should I take this medication?                   Indwelling Lines/Drains at time of discharge:     Lines/Drains/Airways            Drain  Duration                Female External Urinary Catheter w/ Suction 01/20/25 1911 3 days                          Time spent on the discharge of patient: 33 minutes           David Urbina MD  Department of Hospital Medicine  [facility] - Med/Surg    Electronically signed by David Urbina MD at 1/24/2025  2:00 PM         ED to Hosp-Admission (Discharged) on 1/16/2025            Detailed Report          Note shared with patient  Additional Orders and Documentation      Results  Imaging  Microbiology         Meds           Orders  Procedures         Flowsheets      SmartForms:  [facility] ED FACILITY CHARGE REVIEWER  Encounter Info: History,     Allergies,     Education,     Care Plan,     Detailed Report    Linked Episodes    Covid-19 Noted 12/30/2024  Hospital Problem List      Multiple sclerosis    Seizure disorder    Hypertension    Impaired functional mobility, balance, and endurance    Severe obesity (BMI 35.0-39.9) with comorbidity    Venous stasis dermatitis of both lower extremities    Fluid overload Resolved    Influenza A Resolved    Staphylococcus epidermidis bacteremia Resolved    Hypokalemia Resolved    Hypophosphatemia Resolved  Care Timeline    01/17   Admitted from ED (Observation) 0548     Admitted 0812  01/24   Discharged 1422    Discharge      Skilled Nursing Facility       IP AVS (English View)   (Snapshot)   - Printed 1/24/2025      Follow-Ups: Schedule an appointment with Piyush Sharif MD (Family Medicine) on 1/24/2025; within 1 week of SNF discharge  Medication List at Discharge      amlodipine besylate 5 mg Oral Daily    atorvastatin calcium 40 MG TAKE 1 TABLET BY MOUTH EVERY DAY  Patient taking differently: Take 40 mg by mouth once  daily.    cholecalciferol (vitamin D3) 10 mcg (400 unit) 1 tablet Daily    clopidogrel bisulfate 75 mg Daily    cyanocobalamin (vitamin B-12) 1,000 mcg Oral Daily    duloxetine HCl 20 MG TAKE 2 CAPSULES BY MOUTH EVERY DAY  Patient taking differently: Take 20 mg by mouth once daily.    estradiol 1 g Vaginal Three times weekly    famotidine 20 mg 2 times daily PRN    furosemide 20 mg Oral Daily    guaifenesin 200 mg Oral 3 times daily PRN    hydrocortisone 2.5 % Topical (Top) 2 times daily, Apply to red areas of lower legs    potassium chloride 10 MEQ 10 mEq Oral Daily    topiramate 100 mg Oral 2 times daily        Allergen Reactions    Codeine      Other reaction(s): throat tightness    Dilantin [phenytoin sodium extended]     Phenobarbital     Tegretol [carbamazepine]        Current Medications[1]    Lab Results   Component Value Date    WBC 5.34 01/19/2025    HGB 11.4 (L) 01/19/2025    HCT 36.0 (L) 01/19/2025     (L) 01/19/2025    CHOL 145 09/18/2024    TRIG 113 09/18/2024    HDL 39 (L) 09/18/2024    ALT 28 01/22/2025    AST 39 01/22/2025     01/24/2025    K 3.8 01/24/2025     (H) 01/24/2025    CREATININE 0.8 01/24/2025    BUN 17 01/24/2025    CO2 17 (L) 01/24/2025    TSH 2.224 08/12/2024    INR 0.9 09/17/2024    HGBA1C 6.0 09/18/2024       Review of Systems    Objective:      Physical Exam  Constitutional:       Comments: Chronically ill appearing. Sitting in wheelchair in no acute distress   HENT:      Head: Normocephalic and atraumatic.   Cardiovascular:      Rate and Rhythm: Normal rate and regular rhythm.   Pulmonary:      Effort: Pulmonary effort is normal. No respiratory distress.      Breath sounds: Normal breath sounds. No wheezing.      Comments: Coughing    Abdominal:      General: There is no distension.      Palpations: There is no mass.      Tenderness: There is no abdominal tenderness.   Musculoskeletal:      Right lower leg: Edema present.      Left lower leg: Edema present.       Comments: Bilateral lower extremity edema   Lymphadenopathy:      Cervical: No cervical adenopathy.   Skin:     Findings: Erythema present.      Comments: Hyperpigmentation/ erythema of distal lower extremities as seen in venous stasis dermatitis   Neurological:      Mental Status: She is alert and oriented to person, place, and time.   Psychiatric:         Behavior: Behavior normal.         Assessment:       1. Hospital discharge follow-up    2. Multiple sclerosis    3. Coronary artery disease without angina pectoris, unspecified vessel or lesion type, unspecified whether native or transplanted heart    4. Primary hypertension    5. Seizure disorder    6. Impaired functional mobility, balance, and endurance    7. Anxiety and depression    8. Impaired mobility and ADLs    9. Venous stasis dermatitis of both lower extremities    10. Cough, unspecified type    11. Pulmonary emphysema, unspecified emphysema type    12. Abnormal chest CT    13. Urinary tract infection without hematuria, site unspecified        Plan:       Alyssa was seen today for hospital folow up .    Diagnoses and all orders for this visit:    Hospital discharge follow-up  Patient stable  Recent dc from SNF- receiving speech, OT, PT, and nursing through   Multiple sclerosis  stable  Coronary artery disease without angina pectoris, unspecified vessel or lesion type, unspecified whether native or transplanted heart  Stable  Continue current medications  Primary hypertension  -     Comprehensive Metabolic Panel; Future  -     CBC Auto Differential; Future  -     B-TYPE NATRIURETIC PEPTIDE; Future  -     Magnesium; Future    Seizure disorder  Stable on current medication  Impaired functional mobility, balance, and endurance  Chronic  Continue PT and OT  Anxiety and depression  stable  Impaired mobility and ADLs  Chronic  Continue PT and OT  Venous stasis dermatitis of both lower extremities  -     furosemide (LASIX) 20 MG tablet; Take 1 tablet (20 mg  total) by mouth once daily.  -     Comprehensive Metabolic Panel; Future  -     CBC Auto Differential; Future  -     B-TYPE NATRIURETIC PEPTIDE; Future  -     Magnesium; Future  -     ammonium lactate (LAC-HYDRIN) 12 % lotion; Apply topically as needed for Dry Skin.  Resume lasix daily  RTC Monday for chalino boots  Cough, unspecified type  -     CT Chest Without Contrast; Future    Pulmonary emphysema, unspecified emphysema type  -     CT Chest Without Contrast; Future    Abnormal chest CT  -     CT Chest Without Contrast; Future    Urinary tract infection without hematuria, site unspecified  -     Urinalysis; Future  -     Urine Culture High Risk; Future      Patient will be notified when labs return and further recommendations will be given at that time.   Follow up Monday for chalino boots  Resume lasix daily  Elevate legs when able       [1]   Current Outpatient Medications:     amLODIPine (NORVASC) 5 MG tablet, Take 1 tablet (5 mg total) by mouth once daily., Disp: , Rfl:     atorvastatin (LIPITOR) 40 MG tablet, TAKE 1 TABLET BY MOUTH EVERY DAY (Patient taking differently: Take 40 mg by mouth once daily.), Disp: 90 tablet, Rfl: 0    cholecalciferol, vitamin D3, 10 mcg (400 unit) Cap capsule, Take 1 tablet by mouth once daily., Disp: , Rfl:     clopidogrel (PLAVIX) 75 mg tablet, Take 75 mg by mouth once daily., Disp: , Rfl:     cyanocobalamin (VITAMIN B-12) 1000 MCG tablet, Take 1 tablet (1,000 mcg total) by mouth once daily., Disp: 90 tablet, Rfl: 3    DULoxetine (CYMBALTA) 20 MG capsule, TAKE 2 CAPSULES BY MOUTH EVERY DAY (Patient taking differently: Take 20 mg by mouth once daily.), Disp: 180 capsule, Rfl: 1    estradioL (ESTRACE) 0.01 % (0.1 mg/gram) vaginal cream, Place 1 g vaginally 3 (three) times a week., Disp: 42.5 g, Rfl: 3    famotidine (PEPCID) 20 MG tablet, Take 20 mg by mouth 2 (two) times daily as needed for Heartburn., Disp: , Rfl:     furosemide (LASIX) 20 MG tablet, Take 1 tablet (20 mg total) by  mouth once daily., Disp: , Rfl:     hydrocortisone 2.5 % cream, Apply topically 2 (two) times daily. Apply to red areas of lower legs for 7 days, Disp: , Rfl:     topiramate (TOPAMAX) 100 MG tablet, Take 1 tablet (100 mg total) by mouth 2 (two) times daily., Disp: 180 tablet, Rfl: 3

## 2025-02-20 ENCOUNTER — RESULTS FOLLOW-UP (OUTPATIENT)
Dept: FAMILY MEDICINE | Facility: CLINIC | Age: 75
End: 2025-02-20
Payer: MEDICARE

## 2025-02-20 DIAGNOSIS — N30.00 ACUTE CYSTITIS WITHOUT HEMATURIA: Primary | ICD-10-CM

## 2025-02-21 ENCOUNTER — LAB VISIT (OUTPATIENT)
Dept: LAB | Facility: HOSPITAL | Age: 75
End: 2025-02-21
Attending: PHYSICIAN ASSISTANT
Payer: MEDICARE

## 2025-02-21 DIAGNOSIS — N39.0 URINARY TRACT INFECTION WITHOUT HEMATURIA, SITE UNSPECIFIED: ICD-10-CM

## 2025-02-21 LAB
BACTERIA #/AREA URNS AUTO: ABNORMAL /HPF
BILIRUB UR QL STRIP: NEGATIVE
CLARITY UR REFRACT.AUTO: ABNORMAL
COLOR UR AUTO: YELLOW
GLUCOSE UR QL STRIP: NEGATIVE
HGB UR QL STRIP: NEGATIVE
KETONES UR QL STRIP: NEGATIVE
LEUKOCYTE ESTERASE UR QL STRIP: ABNORMAL
MICROSCOPIC COMMENT: ABNORMAL
NITRITE UR QL STRIP: NEGATIVE
PH UR STRIP: 6 [PH] (ref 5–8)
PROT UR QL STRIP: ABNORMAL
RBC #/AREA URNS AUTO: 1 /HPF (ref 0–4)
SP GR UR STRIP: 1.02 (ref 1–1.03)
SQUAMOUS #/AREA URNS AUTO: 3 /HPF
URN SPEC COLLECT METH UR: ABNORMAL
WBC #/AREA URNS AUTO: >100 /HPF (ref 0–5)
WBC CLUMPS UR QL AUTO: ABNORMAL

## 2025-02-21 PROCEDURE — 87088 URINE BACTERIA CULTURE: CPT | Performed by: PHYSICIAN ASSISTANT

## 2025-02-21 PROCEDURE — 87086 URINE CULTURE/COLONY COUNT: CPT | Performed by: PHYSICIAN ASSISTANT

## 2025-02-21 PROCEDURE — 81001 URINALYSIS AUTO W/SCOPE: CPT | Performed by: PHYSICIAN ASSISTANT

## 2025-02-21 PROCEDURE — 87186 SC STD MICRODIL/AGAR DIL: CPT | Performed by: PHYSICIAN ASSISTANT

## 2025-02-24 ENCOUNTER — OFFICE VISIT (OUTPATIENT)
Dept: NEUROLOGY | Facility: CLINIC | Age: 75
End: 2025-02-24
Payer: MEDICARE

## 2025-02-24 ENCOUNTER — OFFICE VISIT (OUTPATIENT)
Dept: FAMILY MEDICINE | Facility: CLINIC | Age: 75
End: 2025-02-24
Payer: MEDICARE

## 2025-02-24 VITALS
HEART RATE: 60 BPM | RESPIRATION RATE: 17 BRPM | SYSTOLIC BLOOD PRESSURE: 132 MMHG | OXYGEN SATURATION: 98 % | TEMPERATURE: 98 F | BODY MASS INDEX: 36.49 KG/M2 | HEIGHT: 63 IN | DIASTOLIC BLOOD PRESSURE: 80 MMHG

## 2025-02-24 DIAGNOSIS — R74.01 ELEVATED AST (SGOT): ICD-10-CM

## 2025-02-24 DIAGNOSIS — I87.2 VENOUS STASIS DERMATITIS OF BOTH LOWER EXTREMITIES: ICD-10-CM

## 2025-02-24 DIAGNOSIS — F32.A ANXIETY AND DEPRESSION: ICD-10-CM

## 2025-02-24 DIAGNOSIS — N30.00 ACUTE CYSTITIS WITHOUT HEMATURIA: ICD-10-CM

## 2025-02-24 DIAGNOSIS — Z74.09 IMPAIRED MOBILITY AND ADLS: ICD-10-CM

## 2025-02-24 DIAGNOSIS — I10 PRIMARY HYPERTENSION: ICD-10-CM

## 2025-02-24 DIAGNOSIS — F41.9 ANXIETY AND DEPRESSION: ICD-10-CM

## 2025-02-24 DIAGNOSIS — Z78.9 IMPAIRED MOBILITY AND ADLS: ICD-10-CM

## 2025-02-24 DIAGNOSIS — Z71.89 COUNSELING REGARDING GOALS OF CARE: ICD-10-CM

## 2025-02-24 DIAGNOSIS — D64.9 MILD ANEMIA: Primary | ICD-10-CM

## 2025-02-24 DIAGNOSIS — G35 MULTIPLE SCLEROSIS: Primary | ICD-10-CM

## 2025-02-24 LAB — BACTERIA UR CULT: ABNORMAL

## 2025-02-24 PROCEDURE — 99214 OFFICE O/P EST MOD 30 MIN: CPT | Mod: 25,S$GLB,, | Performed by: PHYSICIAN ASSISTANT

## 2025-02-24 PROCEDURE — 1159F MED LIST DOCD IN RCRD: CPT | Mod: CPTII,S$GLB,, | Performed by: PHYSICIAN ASSISTANT

## 2025-02-24 PROCEDURE — 3079F DIAST BP 80-89 MM HG: CPT | Mod: CPTII,S$GLB,, | Performed by: PHYSICIAN ASSISTANT

## 2025-02-24 PROCEDURE — 99999 PR PBB SHADOW E&M-EST. PATIENT-LVL IV: CPT | Mod: PBBFAC,,, | Performed by: PHYSICIAN ASSISTANT

## 2025-02-24 PROCEDURE — 1126F AMNT PAIN NOTED NONE PRSNT: CPT | Mod: CPTII,S$GLB,, | Performed by: PHYSICIAN ASSISTANT

## 2025-02-24 PROCEDURE — 3008F BODY MASS INDEX DOCD: CPT | Mod: CPTII,S$GLB,, | Performed by: PHYSICIAN ASSISTANT

## 2025-02-24 PROCEDURE — 29580 STRAPPING UNNA BOOT: CPT | Mod: 50,S$GLB,, | Performed by: PHYSICIAN ASSISTANT

## 2025-02-24 PROCEDURE — 3075F SYST BP GE 130 - 139MM HG: CPT | Mod: CPTII,S$GLB,, | Performed by: PHYSICIAN ASSISTANT

## 2025-02-24 PROCEDURE — 1101F PT FALLS ASSESS-DOCD LE1/YR: CPT | Mod: CPTII,S$GLB,, | Performed by: PHYSICIAN ASSISTANT

## 2025-02-24 PROCEDURE — 3288F FALL RISK ASSESSMENT DOCD: CPT | Mod: CPTII,S$GLB,, | Performed by: PHYSICIAN ASSISTANT

## 2025-02-24 PROCEDURE — 98005 SYNCH AUDIO-VIDEO EST LOW 20: CPT | Mod: 95,,, | Performed by: CLINICAL NURSE SPECIALIST

## 2025-02-24 PROCEDURE — 1160F RVW MEDS BY RX/DR IN RCRD: CPT | Mod: CPTII,S$GLB,, | Performed by: PHYSICIAN ASSISTANT

## 2025-02-24 PROCEDURE — G2211 COMPLEX E/M VISIT ADD ON: HCPCS | Mod: 95,,, | Performed by: CLINICAL NURSE SPECIALIST

## 2025-02-24 PROCEDURE — 1159F MED LIST DOCD IN RCRD: CPT | Mod: CPTII,95,, | Performed by: CLINICAL NURSE SPECIALIST

## 2025-02-24 RX ORDER — SULFAMETHOXAZOLE AND TRIMETHOPRIM 800; 160 MG/1; MG/1
1 TABLET ORAL 2 TIMES DAILY
Qty: 6 TABLET | Refills: 0 | Status: SHIPPED | OUTPATIENT
Start: 2025-02-24

## 2025-02-24 NOTE — PROGRESS NOTES
Subjective:       Patient ID: Alyssa John is a 74 y.o. female.    Chief Complaint: Follow-up (1 week f/u) and Diarrhea    Mrs. John is a 74 year old female with MS and epilepsy who presents for a follow up regarding venous stasis of bilateral lower extremities. She states that she is doing okay, no changes in lower extremities. Patient presents today for Unna boot placement on bilateral lower extremities.   Patient was seen and treated for an UTI at the last visit (2/19/2025) but the  states that he has not been able to  the medication from the pharmacy yet. Pt states that her urine is continuing to be cloudy in nature. She also had labs at last appt that revealed mild anemia and mild transaminitis.   Patient's  also states that she has had 2 episodes of diarrhea over the last 2 days. He reports this is improving with imodium.  Patient continues to have home health for PT and nursing services.       Review of patient's allergies indicates:   Allergen Reactions    Codeine      Other reaction(s): throat tightness    Dilantin [phenytoin sodium extended]     Phenobarbital     Tegretol [carbamazepine]        Current Medications[1]    Lab Results   Component Value Date    WBC 7.51 02/19/2025    HGB 12.5 02/19/2025    HCT 39.5 02/19/2025     02/19/2025    CHOL 145 09/18/2024    TRIG 113 09/18/2024    HDL 39 (L) 09/18/2024    ALT 15 02/19/2025    AST 60 (H) 02/19/2025     02/19/2025    K 4.1 02/19/2025     (H) 02/19/2025    CREATININE 0.8 02/19/2025    BUN 14 02/19/2025    CO2 18 (L) 02/19/2025    TSH 2.224 08/12/2024    INR 0.9 09/17/2024    HGBA1C 6.0 09/18/2024       Review of Systems   Constitutional:  Negative for activity change, appetite change and fever.   HENT:  Negative for postnasal drip, rhinorrhea and sinus pressure.    Eyes:  Negative for visual disturbance.   Respiratory:  Negative for cough and shortness of breath.    Cardiovascular:  Positive for leg  swelling. Negative for chest pain.   Gastrointestinal:  Negative for abdominal distention and abdominal pain.   Genitourinary:  Negative for difficulty urinating and dysuria.   Musculoskeletal:  Negative for arthralgias and myalgias.   Skin:  Positive for color change.   Neurological:  Negative for headaches.   Hematological:  Negative for adenopathy.   Psychiatric/Behavioral:  The patient is not nervous/anxious.        Objective:      Physical Exam  Constitutional:       Appearance: Normal appearance. She is obese.      Comments: Patient more alert and conversational today. Sitting in wheelchair in no acute distress   HENT:      Head: Normocephalic and atraumatic.   Cardiovascular:      Rate and Rhythm: Normal rate and regular rhythm.      Heart sounds: Normal heart sounds.   Pulmonary:      Effort: Pulmonary effort is normal. No respiratory distress.      Breath sounds: Normal breath sounds. No wheezing.   Abdominal:      General: There is no distension.      Palpations: There is no mass.      Tenderness: There is no abdominal tenderness.   Musculoskeletal:      Right lower leg: Tenderness present. Edema present.      Left lower leg: Tenderness present. Edema present.      Comments: Bilateral lower extremity edema   Lymphadenopathy:      Cervical: No cervical adenopathy.   Skin:     General: Skin is warm and dry.      Findings: Erythema present.      Comments: Hyperpigmentation/Erythema of distal lower extremities   Neurological:      Mental Status: She is alert and oriented to person, place, and time.   Psychiatric:         Behavior: Behavior normal.         Assessment:       1. Mild anemia    2. Elevated AST (SGOT)    3. Venous stasis dermatitis of both lower extremities        Plan:       Alyssa was seen today for follow-up and diarrhea.    Diagnoses and all orders for this visit:    Mild anemia  -     Iron and TIBC; Future  -     Ferritin; Future    Elevated AST (SGOT)  -     Hepatic Function Panel; Future  -      Hepatitis Panel, Acute; Future    Venous stasis dermatitis of both lower extremities  Continue Lasix daily   Discussed importance of elevating legs as much as possible   Unna boot placement on bilateral lower extremities - RTC Wednesday for removal       Follow up on Wednesday, 2/26/2025, for Unna boot removal     Primary hypertension  Controlled  Low salt diet  Continue current medication  Anxiety and depression  Stable on current medication  Acute cystitis without hematuria  Patient to start bactrim for UTI.             [1]   Current Outpatient Medications:     amLODIPine (NORVASC) 5 MG tablet, Take 1 tablet (5 mg total) by mouth once daily., Disp: , Rfl:     ammonium lactate (LAC-HYDRIN) 12 % lotion, Apply topically as needed for Dry Skin., Disp: 225 g, Rfl: 0    atorvastatin (LIPITOR) 40 MG tablet, TAKE 1 TABLET BY MOUTH EVERY DAY, Disp: 90 tablet, Rfl: 0    cholecalciferol, vitamin D3, 10 mcg (400 unit) Cap capsule, Take 1 tablet by mouth once daily., Disp: , Rfl:     clopidogrel (PLAVIX) 75 mg tablet, Take 75 mg by mouth once daily., Disp: , Rfl:     cyanocobalamin (VITAMIN B-12) 1000 MCG tablet, Take 1 tablet (1,000 mcg total) by mouth once daily., Disp: 90 tablet, Rfl: 3    DULoxetine (CYMBALTA) 20 MG capsule, TAKE 2 CAPSULES BY MOUTH EVERY DAY, Disp: 180 capsule, Rfl: 1    estradioL (ESTRACE) 0.01 % (0.1 mg/gram) vaginal cream, Place 1 g vaginally 3 (three) times a week., Disp: 42.5 g, Rfl: 3    famotidine (PEPCID) 20 MG tablet, Take 20 mg by mouth 2 (two) times daily as needed for Heartburn., Disp: , Rfl:     furosemide (LASIX) 20 MG tablet, Take 1 tablet (20 mg total) by mouth once daily., Disp: 90 tablet, Rfl: 1    potassium chloride SA (K-DUR,KLOR-CON) 20 MEQ tablet, Take 20 mEq by mouth 2 (two) times daily., Disp: , Rfl:     sulfamethoxazole-trimethoprim 800-160mg (BACTRIM DS) 800-160 mg Tab, Take 1 tablet by mouth 2 (two) times daily., Disp: 6 tablet, Rfl: 0    topiramate (TOPAMAX) 100 MG tablet,  Take 1 tablet (100 mg total) by mouth 2 (two) times daily., Disp: 180 tablet, Rfl: 3    hydrocortisone 2.5 % cream, Apply topically 2 (two) times daily. Apply to red areas of lower legs for 7 days, Disp: , Rfl:

## 2025-02-24 NOTE — Clinical Note
"Hi! Another patient who wants a reclining lift chair. She is requesting a "zero-gravity" feature, so it tips back very far. Not sure about coverage for these. I haven't put in the order yet, but I can when you give the word. "

## 2025-02-24 NOTE — PROGRESS NOTES
Subjective:          Patient ID: Alyssa John is a 74 y.o. female who presents today for a routine virtual visit for MS.  She was last seen in December. The history has been provided by the patient. Her  is also present during the visit.     The patient location is: her home   The chief complaint leading to consultation is: MS     Visit type: audiovisual    Face to Face time with patient: 16 minutes   25 minutes of total time spent on the encounter, which includes face to face time and non-face to face time preparing to see the patient (eg, review of tests), Obtaining and/or reviewing separately obtained history, Documenting clinical information in the electronic or other health record, Independently interpreting results (not separately reported) and communicating results to the patient/family/caregiver, or Care coordination (not separately reported).         Each patient to whom he or she provides medical services by telemedicine is:  (1) informed of the relationship between the physician and patient and the respective role of any other health care provider with respect to management of the patient; and (2) notified that he or she may decline to receive medical services by telemedicine and may withdraw from such care at any time.    Notes:   MS HPI:  DMT: None    Side effects from DMT? No  Taking vitamin D3 as recommended? Yes -   Since the last visit, she was hospitalized for flu A for 9 days. Her legs were very swollen during the hospitalization. She also had a URI, UTI, and a staph infection on the legs all at the same time. She was treated with antibiotics. By the time she left the hospital, she had lost 20lbs of water weight. She was discharged to [facility] and did 19 days of rehab. By the time she left there, the swelling had mostly resolved. She is getting skilled nursing, PT/OT/ST at home now. She is also getting both legs wrapped at one of the [facility] clinics with  Unaboots and medicated creams. Her  reports that she is still very weak after being in bed for a month. She is currently unable to walk, and it is difficult to get her into the shower. Her  is not interested in getting a Brittani lift for her at home. He has been able to help her transfer where she needs to go.   He denies any other pressure ulcers.   She is on Bactrim for a UTI starting today.     Medications:  Current Outpatient Medications   Medication Sig    amLODIPine (NORVASC) 5 MG tablet Take 1 tablet (5 mg total) by mouth once daily.    ammonium lactate (LAC-HYDRIN) 12 % lotion Apply topically as needed for Dry Skin.    atorvastatin (LIPITOR) 40 MG tablet TAKE 1 TABLET BY MOUTH EVERY DAY    cholecalciferol, vitamin D3, 10 mcg (400 unit) Cap capsule Take 1 tablet by mouth once daily.    clopidogrel (PLAVIX) 75 mg tablet Take 75 mg by mouth once daily.    cyanocobalamin (VITAMIN B-12) 1000 MCG tablet Take 1 tablet (1,000 mcg total) by mouth once daily.    DULoxetine (CYMBALTA) 20 MG capsule TAKE 2 CAPSULES BY MOUTH EVERY DAY    estradioL (ESTRACE) 0.01 % (0.1 mg/gram) vaginal cream Place 1 g vaginally 3 (three) times a week.    famotidine (PEPCID) 20 MG tablet Take 20 mg by mouth 2 (two) times daily as needed for Heartburn.    furosemide (LASIX) 20 MG tablet Take 1 tablet (20 mg total) by mouth once daily.    hydrocortisone 2.5 % cream Apply topically 2 (two) times daily. Apply to red areas of lower legs for 7 days    nitrofurantoin (MACRODANTIN) 50 MG capsule Take 1 capsule (50 mg total) by mouth 4 (four) times daily.    potassium chloride SA (K-DUR,KLOR-CON) 20 MEQ tablet Take 20 mEq by mouth 2 (two) times daily.    sulfamethoxazole-trimethoprim 800-160mg (BACTRIM DS) 800-160 mg Tab Take 1 tablet by mouth 2 (two) times daily.    topiramate (TOPAMAX) 100 MG tablet Take 1 tablet (100 mg total) by mouth 2 (two) times daily.     No current facility-administered medications for this visit.       SOCIAL  HISTORY  Social History[1]    Living arrangements - the patient lives with her .              Objective:        1. 25 foot timed walk:      4/13/2023     1:00 PM 2/6/2024     1:40 PM   Timed 25 Foot Walk:   Did patient wear an AFO? No No   Was assistive device used? Yes Yes   Assistive device used (toña one): Bilateral Assistance Bilateral Assistance   Bilateral device used Walker/Rollator Walker/Rollator   Time for 25 Foot Walk (seconds) 9.6 11.2   Time for 25 Foot Walk (seconds) 9.4 10.6     Neurological Exam    Deferred   Imaging:         Results for orders placed during the hospital encounter of 04/10/21    MRI Brain Demyelinating W W/O Contrast    Impression  1. There is a stable marked burden of white matter disease consistent with the provided diagnosis of multiple sclerosis.  These findings are other extensive.  There are no regions of restricted diffusion and there is no abnormal enhancement.  There are no findings to suggest interval progression of disease or active demyelination.      Electronically signed by: Kingsley Bass MD  Date:    04/11/2021  Time:    20:22    Results for orders placed during the hospital encounter of 04/10/21    MRI Cervical Spine Demyelinating W W/O Contrast    Impression  1. There are multiple regions of abnormal STIR and T2 hyperintense signal in the cord which, considering slight difference in technique, slice selection and volume averaging as well as mild motion are essentially stable compared to the prior study.  The findings are felt to represent stable demyelinating disease without any definite new lesions identified.  2. There is multilevel degenerative disc and facet disease discussed in detail by level above.  There is some degree of disc space narrowing, disc osteophyte complex, uncovertebral spurring and/or facet joint arthropathy at multiple levels.  There is mild spinal stenosis at several levels.  There is multilevel foraminal stenosis which for the most  part are stable compared to the prior study with the exception of some interval progression of right foraminal stenosis at the C4-5 level.  3. There is no abnormal enhancement within the vertebral bodies, discs, cord or epidural space.      Electronically signed by: Kingsley Bass MD  Date:    04/11/2021  Time:    21:09    Labs:     Lab Results   Component Value Date    KNYODDCJ15CW 35 08/26/2020    PNYANFVT85YV 97 (H) 08/20/2019    KUZAAYLM58RP 68 07/19/2018     Lab Results   Component Value Date    JCVINDEX SEE COMMENT (A) 12/09/2015    JCVANTIBODY SEE COMMENT 12/09/2015     Lab Results   Component Value Date    CI7ZHTGL 73.7 03/30/2021    ABSOLUTECD3 1099 03/30/2021    UX0NYFLF 23.7 03/30/2021    ABSOLUTECD8 353 03/30/2021    CG9VPHTR 48.9 03/30/2021    ABSOLUTECD4 729 03/30/2021    LABCD48 2.06 03/30/2021     Lab Results   Component Value Date    WBC 7.51 02/19/2025    RBC 4.57 02/19/2025    HGB 12.5 02/19/2025    HCT 39.5 02/19/2025    MCV 86 02/19/2025    MCH 27.4 02/19/2025    MCHC 31.6 (L) 02/19/2025    RDW 14.6 (H) 02/19/2025     02/19/2025    MPV 11.2 02/19/2025    GRAN 4.6 02/19/2025    GRAN 61.3 02/19/2025    LYMPH 2.1 02/19/2025    LYMPH 27.4 02/19/2025    MONO 0.5 02/19/2025    MONO 7.2 02/19/2025    EOS 0.2 02/19/2025    BASO 0.03 02/19/2025    EOSINOPHIL 3.2 02/19/2025    BASOPHIL 0.4 02/19/2025     Sodium   Date Value Ref Range Status   02/19/2025 141 136 - 145 mmol/L Final     Potassium   Date Value Ref Range Status   02/19/2025 4.1 3.5 - 5.1 mmol/L Final     Chloride   Date Value Ref Range Status   02/19/2025 112 (H) 95 - 110 mmol/L Final     CO2   Date Value Ref Range Status   02/19/2025 18 (L) 23 - 29 mmol/L Final     Carbon Monoxide, Blood   Date Value Ref Range Status   10/19/2016 2 % Final     Comment:     -------------------REFERENCE VALUE--------------------------  0-2 Normal (Non-smoker) , < or = 9 Normal (Smoker), > or   = 20 (Toxic concentration)  Test Performed by:  [facility] - [address] [address]  : Adrien Norton II, M.D., Ph.D.       Glucose   Date Value Ref Range Status   02/19/2025 92 70 - 110 mg/dL Final     BUN   Date Value Ref Range Status   02/19/2025 14 8 - 23 mg/dL Final     Creatinine   Date Value Ref Range Status   02/19/2025 0.8 0.5 - 1.4 mg/dL Final   02/22/2013 0.8 0.5 - 1.4 mg/dL Final     Calcium   Date Value Ref Range Status   02/19/2025 9.0 8.7 - 10.5 mg/dL Final   02/22/2013 8.7 8.7 - 10.5 mg/dL Final     Total Protein   Date Value Ref Range Status   02/19/2025 6.8 6.0 - 8.4 g/dL Final     Albumin   Date Value Ref Range Status   02/19/2025 3.4 (L) 3.5 - 5.2 g/dL Final     Total Bilirubin   Date Value Ref Range Status   02/19/2025 0.5 0.1 - 1.0 mg/dL Final     Comment:     For infants and newborns, interpretation of results should be based  on gestational age, weight and in agreement with clinical  observations.    Premature Infant recommended reference ranges:  Up to 24 hours.............<8.0 mg/dL  Up to 48 hours............<12.0 mg/dL  3-5 days..................<15.0 mg/dL  6-29 days.................<15.0 mg/dL       Alkaline Phosphatase   Date Value Ref Range Status   02/19/2025 131 40 - 150 U/L Final   02/22/2013 107 55 - 135 U/L Final     AST   Date Value Ref Range Status   02/19/2025 60 (H) 10 - 40 U/L Final   02/22/2013 18 10 - 40 U/L Final     ALT   Date Value Ref Range Status   02/19/2025 15 10 - 44 U/L Final     Anion Gap   Date Value Ref Range Status   02/19/2025 11 8 - 16 mmol/L Final   02/22/2013 11 5 - 15 meq/L Final     eGFR if    Date Value Ref Range Status   11/17/2021 >60.0 >60 mL/min/1.73 m^2 Final     eGFR if non    Date Value Ref Range Status   11/17/2021 >60.0 >60 mL/min/1.73 m^2 Final     Comment:     Calculation used to obtain the estimated glomerular filtration  rate (eGFR) is the CKD-EPI equation.            MS Holbrook  "and Plan:     NEURO MULTIPLE SCLEROSIS IMPRESSION:   Number of relapses in the past year?:  0  Clinical Progression:  Worsened  Clinical Progression comment:  She was recently hospitalized with several infections and is very deconditioned.   Current DMT: none  Symptom Management:  Implement change in symptom management  Implement Change in Symptom Management:  Adaptive Needs  Implement Change in Symptom Management comment: Her  inquires about a lift chair with a "zero-gravity" feature. A reclining lift chair with this feature would allow her to keep legs elevated, which would undoubtedly helping with swelling.The lift feature would also help her  with transferring her.  Will explore options for these with our social workers.   I also mentioned that a hospital bed might be helpful for positioning, but they defer for now.   She will follow up with Dr. Apodaca in 2 months.       TYRONE Palacios, CNS    Problem List Items Addressed This Visit          Neurologic Problems    Multiple sclerosis - Primary       Other    Impaired mobility and ADLs (Chronic)     Other Visit Diagnoses         Counseling regarding goals of care                       [1]   Social History  Tobacco Use    Smoking status: Former     Current packs/day: 0.00     Average packs/day: 2.0 packs/day for 42.0 years (84.0 ttl pk-yrs)     Types: Cigarettes     Start date: 1968     Quit date: 2010     Years since quitting: 15.1     Passive exposure: Past    Smokeless tobacco: Never   Substance Use Topics    Alcohol use: Not Currently     Comment: rarely    Drug use: No     "

## 2025-02-26 ENCOUNTER — TELEPHONE (OUTPATIENT)
Dept: FAMILY MEDICINE | Facility: CLINIC | Age: 75
End: 2025-02-26

## 2025-02-26 ENCOUNTER — OFFICE VISIT (OUTPATIENT)
Dept: UROLOGY | Facility: CLINIC | Age: 75
End: 2025-02-26
Payer: MEDICARE

## 2025-02-26 ENCOUNTER — CLINICAL SUPPORT (OUTPATIENT)
Dept: FAMILY MEDICINE | Facility: CLINIC | Age: 75
End: 2025-02-26
Payer: MEDICARE

## 2025-02-26 VITALS
BODY MASS INDEX: 36.48 KG/M2 | DIASTOLIC BLOOD PRESSURE: 76 MMHG | WEIGHT: 205.94 LBS | SYSTOLIC BLOOD PRESSURE: 127 MMHG | HEART RATE: 67 BPM

## 2025-02-26 DIAGNOSIS — N39.41 URGENCY INCONTINENCE: Primary | ICD-10-CM

## 2025-02-26 DIAGNOSIS — R60.9 SWELLING: Primary | ICD-10-CM

## 2025-02-26 DIAGNOSIS — N39.0 RECURRENT UTI: ICD-10-CM

## 2025-02-26 PROCEDURE — 99214 OFFICE O/P EST MOD 30 MIN: CPT | Mod: S$GLB,,, | Performed by: UROLOGY

## 2025-02-26 PROCEDURE — 3078F DIAST BP <80 MM HG: CPT | Mod: CPTII,S$GLB,, | Performed by: UROLOGY

## 2025-02-26 PROCEDURE — 99999 PR PBB SHADOW E&M-EST. PATIENT-LVL III: CPT | Mod: PBBFAC,,, | Performed by: UROLOGY

## 2025-02-26 PROCEDURE — 3074F SYST BP LT 130 MM HG: CPT | Mod: CPTII,S$GLB,, | Performed by: UROLOGY

## 2025-02-26 PROCEDURE — 1126F AMNT PAIN NOTED NONE PRSNT: CPT | Mod: CPTII,S$GLB,, | Performed by: UROLOGY

## 2025-02-26 PROCEDURE — 3288F FALL RISK ASSESSMENT DOCD: CPT | Mod: CPTII,S$GLB,, | Performed by: UROLOGY

## 2025-02-26 PROCEDURE — 1100F PTFALLS ASSESS-DOCD GE2>/YR: CPT | Mod: CPTII,S$GLB,, | Performed by: UROLOGY

## 2025-02-26 PROCEDURE — 1159F MED LIST DOCD IN RCRD: CPT | Mod: CPTII,S$GLB,, | Performed by: UROLOGY

## 2025-02-26 PROCEDURE — 3008F BODY MASS INDEX DOCD: CPT | Mod: CPTII,S$GLB,, | Performed by: UROLOGY

## 2025-02-26 PROCEDURE — G2211 COMPLEX E/M VISIT ADD ON: HCPCS | Mod: S$GLB,,, | Performed by: UROLOGY

## 2025-02-26 RX ORDER — NITROFURANTOIN MACROCRYSTALS 50 MG/1
50 CAPSULE ORAL 4 TIMES DAILY
Qty: 360 CAPSULE | Refills: 1 | Status: SHIPPED | OUTPATIENT
Start: 2025-02-26

## 2025-02-26 NOTE — PROGRESS NOTES
Patient was seen as a nurse visit to remove her Unna boots off both of her feet and legs area .   Patients  was wondering if he should be applying any type of lotion or a medicated cream to the legs. When Unna Boots were removed patients legs were still red but not as swollen as they were on Monday before the Unna boots were placed . Patient denied any pain just a slight tenderness when removing the Unna Boot .

## 2025-02-26 NOTE — TELEPHONE ENCOUNTER
Apply ammonium lactate as prescribed. Elevate legs as much as possible. Please schedule follow up with me in 2-4 weeks and follow up with Dr. Sharif in 3 months.

## 2025-02-26 NOTE — PROGRESS NOTES
[facility]      Return Recurrent Urinary Tract Infections Note    2/26/2025    Referred by:  No ref. provider found    History of Present Illness    CHIEF COMPLAINT:  Patient presents for follow-up of recurrent urinary tract infections and overactive bladder, with recent sacral neuromodulator implantation.    HPI:  Patient is an established patient returning for continued evaluation of recurrent urinary tract infections and overactive bladder. In October 2024, she underwent full implantation of a sacral neuromodulator after showing greater than 50% improvement during a 2-week testing period. Prior to implantation, she had 3-4 incontinence episodes per night, which reduced to 0 to 1 episode per night during the trial.    At a November follow-up, she was doing well but had a recent urinary tract infection. She was prescribed vaginal estrogen and D-mannose at that time. She had another urinary tract infection in February, confirmed by a positive urine culture.    Her UTI symptoms include odor and cloudiness in her urine, as well as more frequent urination. She denies burning or pain with urination. She has been using the prescribed vaginal estrogen cream 3 times per week and taking D-mannose pills twice daily, in the morning and evening, as instructed. She continues to have UTI symptoms.    Her history of UTIs includes an infection in 2018, with no recurrence until 2022 when she had vaginitis/vaginosis. Since then, she has had multiple UTIs.    She also reports fecal incontinence, stating she is unable to sense when she is defecating.    MEDICATIONS:  Patient is on vaginal estrogen cream, applying 0.5 to 1 g 3 times per week at night for vaginal health. She is also taking D-mannose twice daily, in the morning and evening, for prevention of urinary tract infections.    MEDICAL HISTORY:  Patient has a history of recurrent urinary tract infections and overactive bladder.    SURGICAL HISTORY:  In October  2024, the patient underwent sacral neuromodulator implantation for overactive bladder. The outcome was positive, with improvement from 3-4 incontinence episodes per night to 0-1 episodes per night.    TEST RESULTS:  Patient had two positive urine cultures, one in January and another in February. In October 2024, the patient underwent sacral neuromodulator testing. She showed a greater than 50% response over 2 weeks of testing, with incontinence episodes reducing from 3-4 per night to 0-1 per night.          A review of 10+ systems was conducted with pertinent positive and negative findings documented in HPI with all other systems reviewed and negative.    Past medical, family, surgical and social history reviewed as documented in chart with pertinent positive medical, family, surgical and social history detailed in HPI.    Exam Findings:    Physical Exam    General: No acute distress. Nontoxic appearing.  HENT: Normocephalic. Atraumatic.  Respiratory: Normal respiratory effort. No conversational dyspnea. No audible wheezing.  Abdomen: No obvious distension.  Skin: No visible abnormalities.  Extremities: No edema upper extremities. No edema lower extremities.  Neurological: Alert and oriented x3. Normal speech.  Psychiatric: Normal mood. Normal affect. No evidence of SI.          Assessment/Plan:    Assessment & Plan    IMPRESSION:  Evaluated patient with recurrent UTIs and overactive bladder, previously underwent sacral neuromodulator implantation  Noted recent positive urine culture in February, indicating ongoing UTI issues despite current regimen  Considered low-dose antibiotic prophylaxis for recurrent UTIs, as D-mannose and vaginal estrogen have not fully prevented breakthrough infections  Discussed difficulty in assessing neuromodulator effectiveness due to frequent UTIs potentially masking results  Considered potential adjustment of neuromodulator settings to address reported fecal incontinence, once UTIs are  better controlled    PLAN SUMMARY:  - Continue vaginal estrogen cream: 0.5-1g intravaginally, 3 times weekly at night  - Start low-dose prophylactic antibiotic at night before bed for  days  - Continue D-mannose 2 times daily (morning and evening)  - Follow up in 3 months to assess antibiotic prophylaxis effectiveness and UTI frequency    URINARY TRACT INFECTION:  - Explained that odor and cloudiness alone are not reliable symptoms of UTI; emphasized importance of other symptoms like frequent urination, urgency, pain, or blood in urine.  - Discussed that asymptomatic bacteriuria is not an infection and should not be treated with antibiotics.  - Explained role of vaginal estrogen in promoting healthy vaginal tyler and lowering pH to inhibit growth of infection-causing bacteria.  - Discussed that D-mannose works by reducing bacterial adherence to the bladder wall.  - Explained that brand of D-mannose is not important, as long as dosage is correct.  - Patient to monitor for specific UTI symptoms: new frequency, new urgency, pain with urination, blood in urine.  - Started low-dose prophylactic antibiotic at night before bed, to be taken for  days.  - Continued D-mannose 2 times daily (morning and evening).  - Continued vaginal estrogen cream: 0.5-1g inside vagina using fingertip, 3 times weekly at night.    FOLLOW-UP:  - Follow up in 3 months to assess effectiveness of antibiotic prophylaxis and UTI frequency.              Visit complexity today is associated with medical care services that are part of the ongoing care related to the single serious and/or complex condition of Overactive bladder (OAB) and Recurrent urinary tract infections ([place]). A longitudinal relationship exists or is being developed between the patient and this practitioner for the care of this condition.

## 2025-02-27 ENCOUNTER — TELEPHONE (OUTPATIENT)
Dept: FAMILY MEDICINE | Facility: CLINIC | Age: 75
End: 2025-02-27
Payer: MEDICARE

## 2025-02-28 ENCOUNTER — TELEPHONE (OUTPATIENT)
Dept: NEUROLOGY | Facility: CLINIC | Age: 75
End: 2025-02-28
Payer: MEDICARE

## 2025-02-28 DIAGNOSIS — G35 MULTIPLE SCLEROSIS: Primary | ICD-10-CM

## 2025-03-06 ENCOUNTER — TELEPHONE (OUTPATIENT)
Dept: NEUROLOGY | Facility: CLINIC | Age: 75
End: 2025-03-06
Payer: MEDICARE

## 2025-03-06 ENCOUNTER — TELEPHONE (OUTPATIENT)
Dept: PSYCHIATRY | Facility: CLINIC | Age: 75
End: 2025-03-06

## 2025-03-07 ENCOUNTER — PATIENT MESSAGE (OUTPATIENT)
Dept: PSYCHIATRY | Facility: CLINIC | Age: 75
End: 2025-03-07
Payer: MEDICARE

## 2025-03-11 ENCOUNTER — TELEPHONE (OUTPATIENT)
Dept: CARDIOLOGY | Facility: CLINIC | Age: 75
End: 2025-03-11
Payer: MEDICARE

## 2025-03-27 ENCOUNTER — PATIENT MESSAGE (OUTPATIENT)
Dept: FAMILY MEDICINE | Facility: CLINIC | Age: 75
End: 2025-03-27
Payer: MEDICARE

## 2025-03-31 ENCOUNTER — OFFICE VISIT (OUTPATIENT)
Dept: FAMILY MEDICINE | Facility: CLINIC | Age: 75
End: 2025-03-31
Payer: MEDICARE

## 2025-03-31 VITALS
SYSTOLIC BLOOD PRESSURE: 128 MMHG | RESPIRATION RATE: 12 BRPM | HEIGHT: 63 IN | WEIGHT: 185.44 LBS | BODY MASS INDEX: 32.86 KG/M2 | DIASTOLIC BLOOD PRESSURE: 70 MMHG | OXYGEN SATURATION: 97 % | HEART RATE: 60 BPM | TEMPERATURE: 98 F

## 2025-03-31 DIAGNOSIS — K21.9 GASTROESOPHAGEAL REFLUX DISEASE, UNSPECIFIED WHETHER ESOPHAGITIS PRESENT: Primary | ICD-10-CM

## 2025-03-31 DIAGNOSIS — I25.10 CORONARY ARTERY DISEASE WITHOUT ANGINA PECTORIS, UNSPECIFIED VESSEL OR LESION TYPE, UNSPECIFIED WHETHER NATIVE OR TRANSPLANTED HEART: ICD-10-CM

## 2025-03-31 DIAGNOSIS — R53.81 DEBILITY: ICD-10-CM

## 2025-03-31 DIAGNOSIS — Z78.9 IMPAIRED MOBILITY AND ADLS: Chronic | ICD-10-CM

## 2025-03-31 DIAGNOSIS — Z74.09 IMPAIRED MOBILITY AND ADLS: Chronic | ICD-10-CM

## 2025-03-31 DIAGNOSIS — I87.2 VENOUS STASIS DERMATITIS OF BOTH LOWER EXTREMITIES: ICD-10-CM

## 2025-03-31 DIAGNOSIS — G35 MULTIPLE SCLEROSIS: ICD-10-CM

## 2025-03-31 DIAGNOSIS — E55.9 VITAMIN D DEFICIENCY: ICD-10-CM

## 2025-03-31 PROCEDURE — 3078F DIAST BP <80 MM HG: CPT | Mod: CPTII,S$GLB,, | Performed by: PHYSICIAN ASSISTANT

## 2025-03-31 PROCEDURE — 1101F PT FALLS ASSESS-DOCD LE1/YR: CPT | Mod: CPTII,S$GLB,, | Performed by: PHYSICIAN ASSISTANT

## 2025-03-31 PROCEDURE — 1160F RVW MEDS BY RX/DR IN RCRD: CPT | Mod: CPTII,S$GLB,, | Performed by: PHYSICIAN ASSISTANT

## 2025-03-31 PROCEDURE — 3008F BODY MASS INDEX DOCD: CPT | Mod: CPTII,S$GLB,, | Performed by: PHYSICIAN ASSISTANT

## 2025-03-31 PROCEDURE — 1159F MED LIST DOCD IN RCRD: CPT | Mod: CPTII,S$GLB,, | Performed by: PHYSICIAN ASSISTANT

## 2025-03-31 PROCEDURE — 99214 OFFICE O/P EST MOD 30 MIN: CPT | Mod: S$GLB,,, | Performed by: PHYSICIAN ASSISTANT

## 2025-03-31 PROCEDURE — 3074F SYST BP LT 130 MM HG: CPT | Mod: CPTII,S$GLB,, | Performed by: PHYSICIAN ASSISTANT

## 2025-03-31 PROCEDURE — G2211 COMPLEX E/M VISIT ADD ON: HCPCS | Mod: S$GLB,,, | Performed by: PHYSICIAN ASSISTANT

## 2025-03-31 PROCEDURE — 3288F FALL RISK ASSESSMENT DOCD: CPT | Mod: CPTII,S$GLB,, | Performed by: PHYSICIAN ASSISTANT

## 2025-03-31 PROCEDURE — 99999 PR PBB SHADOW E&M-EST. PATIENT-LVL IV: CPT | Mod: PBBFAC,,, | Performed by: PHYSICIAN ASSISTANT

## 2025-03-31 RX ORDER — POTASSIUM CHLORIDE 20 MEQ/1
20 TABLET, EXTENDED RELEASE ORAL 2 TIMES DAILY
Qty: 270 TABLET | Refills: 1 | Status: SHIPPED | OUTPATIENT
Start: 2025-03-31

## 2025-03-31 RX ORDER — CALCIUM CARB/VITAMIN D3/VIT K1 500-500-40
400 TABLET,CHEWABLE ORAL DAILY
Qty: 90 CAPSULE | Refills: 1 | Status: SHIPPED | OUTPATIENT
Start: 2025-03-31

## 2025-03-31 RX ORDER — FAMOTIDINE 20 MG/1
20 TABLET, FILM COATED ORAL DAILY
Qty: 90 TABLET | Refills: 3 | Status: SHIPPED | OUTPATIENT
Start: 2025-03-31

## 2025-03-31 NOTE — PROGRESS NOTES
Subjective:       Patient ID: Alyssa John is a 74 y.o. female.    Chief Complaint: Leg Swelling    Ms. John is a 74 year old female with MS, CAD, and GERD. She presents to clinic for routine follow up. She has been receiving  therapies and is doing very well. She will be discharged from  in coming days and would like to pursue outpatient PT at that time. The patient does request some medication refills. She will see cardiologist and pulmonologist next month.      Review of patient's allergies indicates:   Allergen Reactions    Codeine      Other reaction(s): throat tightness    Dilantin [phenytoin sodium extended]     Phenobarbital     Tegretol [carbamazepine]        Current Medications[1]    Lab Results   Component Value Date    WBC 7.51 02/19/2025    HGB 12.5 02/19/2025    HCT 39.5 02/19/2025     02/19/2025    CHOL 145 09/18/2024    TRIG 113 09/18/2024    HDL 39 (L) 09/18/2024    ALT 15 02/19/2025    AST 60 (H) 02/19/2025     02/19/2025    K 4.1 02/19/2025     (H) 02/19/2025    CREATININE 0.8 02/19/2025    BUN 14 02/19/2025    CO2 18 (L) 02/19/2025    TSH 2.224 08/12/2024    INR 0.9 09/17/2024    HGBA1C 6.0 09/18/2024       Review of Systems   Constitutional:  Negative for activity change, appetite change and fever.   HENT:  Negative for postnasal drip, rhinorrhea and sinus pressure.    Eyes:  Negative for visual disturbance.   Respiratory:  Negative for cough and shortness of breath.    Cardiovascular:  Negative for chest pain.   Gastrointestinal:  Negative for abdominal distention and abdominal pain.   Genitourinary:  Negative for difficulty urinating and dysuria.   Musculoskeletal:  Negative for arthralgias and myalgias.   Neurological:  Negative for headaches.   Hematological:  Negative for adenopathy.   Psychiatric/Behavioral:  The patient is not nervous/anxious.        Objective:      Physical Exam  Constitutional:       Appearance: Normal appearance.   HENT:       Head: Normocephalic and atraumatic.   Eyes:      Conjunctiva/sclera: Conjunctivae normal.   Cardiovascular:      Rate and Rhythm: Normal rate and regular rhythm.   Pulmonary:      Effort: Pulmonary effort is normal. No respiratory distress.      Breath sounds: Normal breath sounds. No wheezing.   Abdominal:      General: There is no distension.      Palpations: There is no mass.      Tenderness: There is no abdominal tenderness.   Musculoskeletal:      Right lower leg: No edema.      Left lower leg: No edema.   Lymphadenopathy:      Cervical: No cervical adenopathy.   Skin:     Findings: No erythema.   Neurological:      Mental Status: She is alert and oriented to person, place, and time.   Psychiatric:         Behavior: Behavior normal.         Assessment:       1. Gastroesophageal reflux disease, unspecified whether esophagitis present    2. Vitamin D deficiency    3. Venous stasis dermatitis of both lower extremities    4. Multiple sclerosis    5. Coronary artery disease without angina pectoris, unspecified vessel or lesion type, unspecified whether native or transplanted heart    6. Debility    7. Impaired mobility and ADLs        Plan:       Alyssa was seen today for leg swelling.    Diagnoses and all orders for this visit:    Gastroesophageal reflux disease, unspecified whether esophagitis present  -     famotidine (PEPCID) 20 MG tablet; Take 1 tablet (20 mg total) by mouth once daily.  -     Comprehensive Metabolic Panel; Future  -     CBC Auto Differential; Future    Vitamin D deficiency  -     cholecalciferol, vitamin D3, 10 mcg (400 unit) Cap capsule; Take 1 capsule (400 Units total) by mouth once daily.    Venous stasis dermatitis of both lower extremities  -     potassium chloride SA (K-DUR,KLOR-CON) 20 MEQ tablet; Take 1 tablet (20 mEq total) by mouth 2 (two) times daily.    Multiple sclerosis  Stable  Follow up with neurologist as scheduled  Coronary artery disease without angina pectoris, unspecified  vessel or lesion type, unspecified whether native or transplanted heart  -     Comprehensive Metabolic Panel; Future  -     CBC Auto Differential; Future  -     Lipid Panel; Future    Debility  Chronic  Patient to contact me when officially discharged from  and referral to outpatient PT will be placed  Impaired mobility and ADLs  Chronic  Patient to contact me when officially discharged from  and referral to outpatient PT will be placed         [1]   Current Outpatient Medications:     ammonium lactate (LAC-HYDRIN) 12 % lotion, Apply topically as needed for Dry Skin., Disp: 225 g, Rfl: 0    atorvastatin (LIPITOR) 40 MG tablet, TAKE 1 TABLET BY MOUTH EVERY DAY, Disp: 90 tablet, Rfl: 0    clopidogrel (PLAVIX) 75 mg tablet, Take 75 mg by mouth once daily., Disp: , Rfl:     cyanocobalamin (VITAMIN B-12) 1000 MCG tablet, Take 1 tablet (1,000 mcg total) by mouth once daily., Disp: 90 tablet, Rfl: 3    DULoxetine (CYMBALTA) 20 MG capsule, TAKE 2 CAPSULES BY MOUTH EVERY DAY, Disp: 180 capsule, Rfl: 1    estradioL (ESTRACE) 0.01 % (0.1 mg/gram) vaginal cream, Place 1 g vaginally 3 (three) times a week., Disp: 42.5 g, Rfl: 3    furosemide (LASIX) 20 MG tablet, Take 1 tablet (20 mg total) by mouth once daily., Disp: 90 tablet, Rfl: 1    nitrofurantoin (MACRODANTIN) 50 MG capsule, Take 1 capsule (50 mg total) by mouth 4 (four) times daily., Disp: 360 capsule, Rfl: 1    topiramate (TOPAMAX) 100 MG tablet, Take 1 tablet (100 mg total) by mouth 2 (two) times daily., Disp: 180 tablet, Rfl: 3    cholecalciferol, vitamin D3, 10 mcg (400 unit) Cap capsule, Take 1 capsule (400 Units total) by mouth once daily., Disp: 90 capsule, Rfl: 1    famotidine (PEPCID) 20 MG tablet, Take 1 tablet (20 mg total) by mouth once daily., Disp: 90 tablet, Rfl: 3    hydrocortisone 2.5 % cream, Apply topically 2 (two) times daily. Apply to red areas of lower legs for 7 days, Disp: , Rfl:     potassium chloride SA (K-DUR,KLOR-CON) 20 MEQ tablet, Take 1  tablet (20 mEq total) by mouth 2 (two) times daily., Disp: 270 tablet, Rfl: 1

## 2025-04-03 ENCOUNTER — RESULTS FOLLOW-UP (OUTPATIENT)
Dept: FAMILY MEDICINE | Facility: CLINIC | Age: 75
End: 2025-04-03

## 2025-04-03 ENCOUNTER — OFFICE VISIT (OUTPATIENT)
Dept: FAMILY MEDICINE | Facility: CLINIC | Age: 75
End: 2025-04-03
Payer: MEDICARE

## 2025-04-03 VITALS
OXYGEN SATURATION: 97 % | DIASTOLIC BLOOD PRESSURE: 70 MMHG | BODY MASS INDEX: 32.86 KG/M2 | HEIGHT: 63 IN | HEART RATE: 55 BPM | SYSTOLIC BLOOD PRESSURE: 117 MMHG | WEIGHT: 185.44 LBS

## 2025-04-03 DIAGNOSIS — N30.01 ACUTE CYSTITIS WITH HEMATURIA: ICD-10-CM

## 2025-04-03 DIAGNOSIS — R30.0 DYSURIA: Primary | ICD-10-CM

## 2025-04-03 DIAGNOSIS — R35.0 URINARY FREQUENCY: ICD-10-CM

## 2025-04-03 LAB
BILIRUB SERPL-MCNC: NEGATIVE MG/DL
BLOOD URINE, POC: ABNORMAL
CLARITY, POC UA: ABNORMAL
COLOR, POC UA: ABNORMAL
GLUCOSE UR QL STRIP: 100
KETONES UR QL STRIP: NEGATIVE
LEUKOCYTE ESTERASE URINE, POC: ABNORMAL
NITRITE, POC UA: POSITIVE
PH, POC UA: 6.5
PROTEIN, POC: 100
SPECIFIC GRAVITY, POC UA: 1.01
UROBILINOGEN, POC UA: 2

## 2025-04-03 PROCEDURE — 81002 URINALYSIS NONAUTO W/O SCOPE: CPT | Mod: S$GLB,,,

## 2025-04-03 PROCEDURE — 99214 OFFICE O/P EST MOD 30 MIN: CPT | Mod: S$GLB,,,

## 2025-04-03 PROCEDURE — 3078F DIAST BP <80 MM HG: CPT | Mod: CPTII,S$GLB,,

## 2025-04-03 PROCEDURE — 3008F BODY MASS INDEX DOCD: CPT | Mod: CPTII,S$GLB,,

## 2025-04-03 PROCEDURE — 1160F RVW MEDS BY RX/DR IN RCRD: CPT | Mod: CPTII,S$GLB,,

## 2025-04-03 PROCEDURE — 1101F PT FALLS ASSESS-DOCD LE1/YR: CPT | Mod: CPTII,S$GLB,,

## 2025-04-03 PROCEDURE — 3074F SYST BP LT 130 MM HG: CPT | Mod: CPTII,S$GLB,,

## 2025-04-03 PROCEDURE — 1125F AMNT PAIN NOTED PAIN PRSNT: CPT | Mod: CPTII,S$GLB,,

## 2025-04-03 PROCEDURE — 3288F FALL RISK ASSESSMENT DOCD: CPT | Mod: CPTII,S$GLB,,

## 2025-04-03 PROCEDURE — 87086 URINE CULTURE/COLONY COUNT: CPT

## 2025-04-03 PROCEDURE — 1159F MED LIST DOCD IN RCRD: CPT | Mod: CPTII,S$GLB,,

## 2025-04-03 PROCEDURE — 99999 PR PBB SHADOW E&M-EST. PATIENT-LVL IV: CPT | Mod: PBBFAC,,,

## 2025-04-03 RX ORDER — PHENAZOPYRIDINE HYDROCHLORIDE 100 MG/1
100 TABLET, FILM COATED ORAL 3 TIMES DAILY PRN
Qty: 10 TABLET | Refills: 0 | Status: SHIPPED | OUTPATIENT
Start: 2025-04-03 | End: 2025-04-13

## 2025-04-03 RX ORDER — AMOXICILLIN AND CLAVULANATE POTASSIUM 875; 125 MG/1; MG/1
1 TABLET, FILM COATED ORAL EVERY 12 HOURS
Qty: 14 TABLET | Refills: 0 | Status: SHIPPED | OUTPATIENT
Start: 2025-04-03 | End: 2025-04-10

## 2025-04-03 NOTE — PROGRESS NOTES
To Subjective:       Patient ID: Alyssa John is a 74 y.o. female.    Chief Complaint: Urinary Tract Infection    Patient presents to the clinic with complaint of UTI symptoms.     Patient Active Problem List:     Multiple sclerosis     CAD (coronary artery disease)     Hyperlipemia     Gait disturbance     Vitamin D deficiency     Chronic fatigue     Seizure disorder     Hypertension     GERD (gastroesophageal reflux disease)     Arthritis     Encounter for long-term (current) use of high-risk medication     Gait instability     Gait abnormality     Impaired functional mobility, balance, and endurance     Internal carotid artery stenosis, right     Intractable chronic migraine without aura and without status migrainosus     Tremor     Anxiety and depression     Former smoker, stopped smoking in distant past     History of MI (myocardial infarction)     Osteopenia determined by x-ray     Impaired mobility and ADLs     Coordination impairment     Impaired instrumental activities of daily living (IADL)     Cognitive communication disorder     Hand weakness     Physical deconditioning     Debility     Limitation of joint motion     Presence of aortocoronary bypass graft     Presence of coronary angioplasty implant and graft     Class 1 obesity due to excess calories with serious comorbidity and body mass index (BMI) of 34.0 to 34.9 in adult     Aortic atherosclerosis     Syncope and collapse     Urgency-frequency syndrome     Pedal edema     Urgency incontinence     Decreased independence with activities of daily living     Severe obesity (BMI 35.0-39.9) with comorbidity     Venous stasis dermatitis of both lower extremities    Symptoms started around 3 days ago with dysuria and urinary frequency. Denies fever.     Urinary Tract Infection   This is a new problem. The current episode started in the past 7 days. The problem occurs every urination. The problem has been unchanged. The quality of the pain is  described as burning. The pain is moderate. There has been no fever. Associated symptoms include frequency, hesitancy and urgency. Pertinent negatives include no chills, flank pain, nausea, vomiting, constipation or rash. Treatments tried: Azo, Macrobid. The treatment provided no relief. Her past medical history is significant for recurrent UTIs.     Review of Systems   Constitutional:  Negative for activity change, appetite change, chills, diaphoresis and fever.        In wheelchair   HENT:  Negative for congestion, ear pain, postnasal drip, sinus pressure, sneezing and sore throat.    Eyes:  Negative for pain, discharge, redness and itching.   Respiratory:  Negative for apnea, cough, chest tightness, shortness of breath and wheezing.    Cardiovascular:  Negative for chest pain and leg swelling.   Gastrointestinal:  Negative for abdominal distention, abdominal pain, constipation, diarrhea, nausea and vomiting.   Genitourinary:  Positive for dysuria, frequency, hesitancy and urgency. Negative for difficulty urinating and flank pain.   Skin:  Negative for color change, rash and wound.   Neurological:  Negative for dizziness.   All other systems reviewed and are negative.      Problem List[1]    Objective:      Physical Exam  Vitals and nursing note reviewed.   Constitutional:       General: She is not in acute distress.     Appearance: Normal appearance. She is well-developed.   HENT:      Head: Normocephalic.      Nose: Nose normal.   Eyes:      Conjunctiva/sclera: Conjunctivae normal.      Pupils: Pupils are equal, round, and reactive to light.   Cardiovascular:      Rate and Rhythm: Normal rate.   Pulmonary:      Effort: Pulmonary effort is normal. No respiratory distress.   Abdominal:      Tenderness: There is no right CVA tenderness or left CVA tenderness.   Musculoskeletal:      Cervical back: Normal range of motion.   Skin:     General: Skin is warm and dry.      Findings: No rash.   Neurological:      Mental  "Status: She is alert and oriented to person, place, and time.   Psychiatric:         Behavior: Behavior normal.         Lab Results   Component Value Date    WBC 7.51 02/19/2025    HGB 12.5 02/19/2025    HCT 39.5 02/19/2025     02/19/2025    CHOL 145 09/18/2024    TRIG 113 09/18/2024    HDL 39 (L) 09/18/2024    ALT 15 02/19/2025    AST 60 (H) 02/19/2025     02/19/2025    K 4.1 02/19/2025     (H) 02/19/2025    CREATININE 0.8 02/19/2025    BUN 14 02/19/2025    CO2 18 (L) 02/19/2025    TSH 2.224 08/12/2024    INR 0.9 09/17/2024    HGBA1C 6.0 09/18/2024     The ASCVD Risk score (Yeni OSULLIVAN, et al., 2019) failed to calculate for the following reasons:    Risk score cannot be calculated because patient has a medical history suggesting prior/existing ASCVD  Visit Vitals  /70 (BP Location: Right arm, Patient Position: Sitting)   Pulse (!) 55   Ht 5' 3" (1.6 m)   Wt 84.1 kg (185 lb 6.5 oz)   SpO2 97%   BMI 32.84 kg/m²      Assessment:       1. Dysuria    2. Acute cystitis with hematuria    3. Urinary frequency        Plan:       1. Dysuria/Acute cystitis with hematuria/Urinary frequency  -     POCT URINE DIPSTICK WITHOUT MICROSCOPE  -     Urine Culture High Risk  -     phenazopyridine (PYRIDIUM) 100 MG tablet; Take 1 tablet (100 mg total) by mouth 3 (three) times daily as needed for Pain.  Dispense: 10 tablet; Refill: 0  -     amoxicillin-clavulanate 875-125mg (AUGMENTIN) 875-125 mg per tablet; Take 1 tablet by mouth every 12 (twelve) hours. for 7 days  Dispense: 14 tablet; Refill: 0   - Increase water intake   - The diagnosis, treatment plan, instructions for follow-up and reevaluation as well as ED precautions were discussed and understanding was verbalized. All questions or concerns have been addressed.            Follow up if symptoms worsen or fail to improve.      Future Appointments       Date Provider Specialty Appt Notes    4/16/2025  Lab Labs    4/22/2025 Piyush Rodrigues MD Cardiology est " care    4/23/2025 Verna Montague MD Pulmonology f/u - emphysema    5/28/2025 Abel Rosales MD Urology 3 mo f/u    5/29/2025 Piyush Sharif MD Family Medicine 3 mo f/u: leg swelling    6/2/2025 Genny Apodaca MD Neurology f/u                  [1]   Patient Active Problem List  Diagnosis    Multiple sclerosis    CAD (coronary artery disease)    Hyperlipemia    Gait disturbance    Vitamin D deficiency    Chronic fatigue    Seizure disorder    Hypertension    GERD (gastroesophageal reflux disease)    Arthritis    Encounter for long-term (current) use of high-risk medication    Gait instability    Gait abnormality    Impaired functional mobility, balance, and endurance    Internal carotid artery stenosis, right    Intractable chronic migraine without aura and without status migrainosus    Tremor    Anxiety and depression    Former smoker, stopped smoking in distant past    History of MI (myocardial infarction)    Osteopenia determined by x-ray    Impaired mobility and ADLs    Coordination impairment    Impaired instrumental activities of daily living (IADL)    Cognitive communication disorder    Hand weakness    Physical deconditioning    Debility    Limitation of joint motion    Presence of aortocoronary bypass graft    Presence of coronary angioplasty implant and graft    Class 1 obesity due to excess calories with serious comorbidity and body mass index (BMI) of 34.0 to 34.9 in adult    Aortic atherosclerosis    Syncope and collapse    Urgency-frequency syndrome    Pedal edema    Urgency incontinence    Decreased independence with activities of daily living    Severe obesity (BMI 35.0-39.9) with comorbidity    Venous stasis dermatitis of both lower extremities

## 2025-04-03 NOTE — PATIENT INSTRUCTIONS

## 2025-04-04 ENCOUNTER — TELEPHONE (OUTPATIENT)
Dept: FAMILY MEDICINE | Facility: CLINIC | Age: 75
End: 2025-04-04
Payer: MEDICARE

## 2025-04-04 DIAGNOSIS — Z78.9 DECREASED INDEPENDENCE WITH ACTIVITIES OF DAILY LIVING: ICD-10-CM

## 2025-04-04 DIAGNOSIS — Z74.09 IMPAIRED MOBILITY AND ADLS: Chronic | ICD-10-CM

## 2025-04-04 DIAGNOSIS — M19.90 ARTHRITIS: ICD-10-CM

## 2025-04-04 DIAGNOSIS — M25.60 LIMITATION OF JOINT MOTION: ICD-10-CM

## 2025-04-04 DIAGNOSIS — Z78.9 IMPAIRED INSTRUMENTAL ACTIVITIES OF DAILY LIVING (IADL): Chronic | ICD-10-CM

## 2025-04-04 DIAGNOSIS — R53.81 DEBILITY: ICD-10-CM

## 2025-04-04 DIAGNOSIS — Z74.09 IMPAIRED FUNCTIONAL MOBILITY, BALANCE, AND ENDURANCE: ICD-10-CM

## 2025-04-04 DIAGNOSIS — R26.9 GAIT ABNORMALITY: ICD-10-CM

## 2025-04-04 DIAGNOSIS — R26.81 GAIT INSTABILITY: ICD-10-CM

## 2025-04-04 DIAGNOSIS — Z78.9 IMPAIRED MOBILITY AND ADLS: Chronic | ICD-10-CM

## 2025-04-04 DIAGNOSIS — R53.81 PHYSICAL DECONDITIONING: ICD-10-CM

## 2025-04-04 DIAGNOSIS — G35 MULTIPLE SCLEROSIS: Primary | ICD-10-CM

## 2025-04-04 DIAGNOSIS — R26.9 GAIT DISTURBANCE: ICD-10-CM

## 2025-04-04 DIAGNOSIS — M85.80 OSTEOPENIA DETERMINED BY X-RAY: ICD-10-CM

## 2025-04-04 DIAGNOSIS — I25.2 HISTORY OF MI (MYOCARDIAL INFARCTION): ICD-10-CM

## 2025-04-04 LAB — BACTERIA UR CULT: ABNORMAL

## 2025-04-04 NOTE — TELEPHONE ENCOUNTER
Phoned pt and pt's  to begin termination of my services with them. 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 If they could not find one before I leave, they can still call the clinic and their referral will be given to another . Pt's  said they found a few recliner chairs in [place] they are interested in and plan to test out, hopefully next week.

## 2025-04-04 NOTE — TELEPHONE ENCOUNTER
----- Message from Latasha sent at 4/4/2025 12:45 PM CDT -----  Type: Needs Medical AdviceWho Called:  Kandice Palomo [facility] Call Back Number: [phone] # [phone] Information: Needs an order for out patient PT. Being discharged next  week. Please call back to advise, thank you!

## 2025-04-07 ENCOUNTER — PATIENT MESSAGE (OUTPATIENT)
Dept: FAMILY MEDICINE | Facility: CLINIC | Age: 75
End: 2025-04-07
Payer: MEDICARE

## 2025-04-07 DIAGNOSIS — N30.01 ACUTE CYSTITIS WITH HEMATURIA: Primary | ICD-10-CM

## 2025-04-07 RX ORDER — SULFAMETHOXAZOLE AND TRIMETHOPRIM 800; 160 MG/1; MG/1
1 TABLET ORAL 2 TIMES DAILY
Qty: 14 TABLET | Refills: 0 | Status: SHIPPED | OUTPATIENT
Start: 2025-04-07 | End: 2025-04-14

## 2025-04-07 NOTE — TELEPHONE ENCOUNTER
Please call patient and notify her that her urine culture a bacteria called Klebsiella. She needs to discontinue Augmentin and start Bactrim twice daily.   Thanks,   Juliana

## 2025-04-09 ENCOUNTER — CLINICAL SUPPORT (OUTPATIENT)
Dept: REHABILITATION | Facility: HOSPITAL | Age: 75
End: 2025-04-09
Attending: STUDENT IN AN ORGANIZED HEALTH CARE EDUCATION/TRAINING PROGRAM
Payer: MEDICARE

## 2025-04-09 DIAGNOSIS — M19.90 ARTHRITIS: ICD-10-CM

## 2025-04-09 DIAGNOSIS — R53.81 PHYSICAL DECONDITIONING: ICD-10-CM

## 2025-04-09 DIAGNOSIS — R26.9 GAIT DISTURBANCE: ICD-10-CM

## 2025-04-09 NOTE — PROGRESS NOTES
Pt arrived for PT eval. Will require neuro PT for balance and gait disturbance as ordered by MD.  Pt was rescheduled for tomorrow with neuro PT.

## 2025-04-10 ENCOUNTER — CLINICAL SUPPORT (OUTPATIENT)
Dept: REHABILITATION | Facility: HOSPITAL | Age: 75
End: 2025-04-10
Payer: MEDICARE

## 2025-04-10 DIAGNOSIS — Z74.09 IMPAIRED FUNCTIONAL MOBILITY, BALANCE, GAIT, AND ENDURANCE: Primary | ICD-10-CM

## 2025-04-10 DIAGNOSIS — R53.1 GENERAL WEAKNESS: ICD-10-CM

## 2025-04-10 PROCEDURE — 97163 PT EVAL HIGH COMPLEX 45 MIN: CPT | Mod: PO

## 2025-04-13 PROBLEM — R53.1 GENERAL WEAKNESS: Status: ACTIVE | Noted: 2025-04-13

## 2025-04-13 PROBLEM — Z74.09 IMPAIRED FUNCTIONAL MOBILITY, BALANCE, GAIT, AND ENDURANCE: Status: ACTIVE | Noted: 2025-04-13

## 2025-04-14 ENCOUNTER — CLINICAL SUPPORT (OUTPATIENT)
Dept: REHABILITATION | Facility: HOSPITAL | Age: 75
End: 2025-04-14
Payer: MEDICARE

## 2025-04-14 DIAGNOSIS — R53.1 GENERAL WEAKNESS: ICD-10-CM

## 2025-04-14 DIAGNOSIS — Z74.09 IMPAIRED FUNCTIONAL MOBILITY, BALANCE, GAIT, AND ENDURANCE: Primary | ICD-10-CM

## 2025-04-14 PROCEDURE — 97110 THERAPEUTIC EXERCISES: CPT | Mod: PO,CQ

## 2025-04-14 PROCEDURE — 97530 THERAPEUTIC ACTIVITIES: CPT | Mod: PO,CQ

## 2025-04-14 NOTE — PROGRESS NOTES
Outpatient Rehab    Physical Therapy Evaluation (only)    Patient Name: Alyssa John  MRN: [id number]  YOB: 1950  Encounter Date: 4/10/2025    Therapy Diagnosis:   Encounter Diagnoses   Name Primary?    Impaired functional mobility, balance, gait, and endurance Yes    General weakness      Physician: Piyush Sharif MD    Physician Orders: Eval and Treat  Medical Diagnosis: Arthritis  Gait disturbance  Physical deconditioning    Visit # / Visits Authorized:  1 / 1  Insurance Authorization Period: 4/4/2025 to 4/4/2026  Date of Evaluation: 4/10/2025  Plan of Care Certification: 4/10/2025 to 7/1/2025     Time In: 1020   Time Out: 1100  Total Time: 40   Total Billable Time: 40    Intake Outcome Measure for FOTO Survey    Therapist reviewed FOTO scores for Alyssa John on 4/10/2025.   FOTO report - see Media section or FOTO account episode details.     Intake Score:  %    Precautions     Fall and MS      Subjective   History of Present Illness  Alyssa is a 74 y.o. female who reports to physical therapy with a chief concern of Strength and Mobility.     The patient reports a medical diagnosis of M19.90 (ICD-10-CM) - Arthritis  R26.9 (ICD-10-CM) - Gait disturbance  R53.81 (ICD-10-CM) - Physical deconditioning.    Diagnostic tests related to this condition: CT scan.   CT Scan Details: 2/19/2025: FINDINGS:  There is pulmonary emphysema.  There are noncalcified pulmonary nodules seen bilaterally with the largest nodule seen within the anterior aspect of the right upper lobe of the lung measuring 7 mm, series 2, image 48. This is unchanged from what was seen on 10/03/2024.  No new or enlarging pulmonary nodules are identified.  There is biapical pulmonary scarring.  The osseous structures show degenerative changes of the spine.  The lucent region within the proximal right humerus is unchanged without evidence aggressive features.     Impression:     There are multiple pulmonary nodules  with the largest measuring 7 mm in the right upper lung zone anteriorly.  These are not changed from what was seen on 10/03/2024.  Continued surveillance is recommended.    Dominant Hand: Right  History of Present Condition/Illness: Alyssa has a medical diagnosis of multiple sclerosis. She has a history of illnesses and falls int he last year causing being in the hospital on multiple occasions. Most recent hospitalization in January due to influenza and general weakness. Remained in hospital for about 1 week followed by [facility] for 3 weeks. She was released and had Home Health for about 4 weeks which ended yesterday. She arrives to therapy complaining of impaired mobility. Requires assistance for all transfers and for some portion of activities of daily living. Use of wheelchair for mobility. She would like to return to ambulating using rolling walker to enter a restaurant.     Additional Lymphedema History      Multiple sclerosis, recent pneumonia, chronic impaired mobility    Activities of Daily Living  Social history was obtained from Patient and Spouse.    General Prior Level of Function Comments: wheelchair bound with ability to stand and walk minimally  General Current Level of Function Comments: wheelchair for majority of tasks  Patient Responsibilities: Community mobility, Personal ADL    Previously independent with activities of daily living? No  Activities previously needing assistance include Transfers, Dressing - lower body, Dressing - upper body, Bed mobility, Grooming, Functional mobility, Feeding, and Toileting.   Currently independent with activities of daily living? No  Activities currently needing assistance include Bed mobility, Dressing - lower body, Dressing - upper body, Feeding, Functional mobility, Grooming, Personal device care, Transfers, and Toileting.   Needs help transferring into the shower but able to bath herself from a shower chair    Previously independent with instrumental  activities of daily living? No  Activities previously needing assistance include: Community mobility and Meal prep.   Currently independent with instrumental activities of daily living? No  Activities currently needing assistance include: Community mobility, Grocery/shopping, Health management, Home establishment and management, Meal prep, and Safety and emergency maintenance.            Pain  No Pain Reported: Yes                 Treatment History  Treatments  Discharged From Past 30 Days: Inpatient acute facility, Skilled nursing facility, Home health care  Previously Received Treatments: Yes  Previous Treatments: Exercise, Home exercise program, Physical therapy, Occupational therapy  Currently Receiving Treatments: No    Living Arrangements  Living Situation  Housing: Home independently  Living Arrangements: Spouse/significant other, Family members  Support Systems: Children, Family members, Friends/neighbors, Spouse/significant other    Home Setup  Number of Levels in Home: One level  Patient is able to live on main floor of home: Yes  Bathroom Shower/Tub: Walk-in shower, Other (Comment)  Other Shower/Tub Comment: shower chair present    Equipment/Treatments  Mobility Equipment: Gait belt, Hospital bed, Standard walker, Straight cane, Manual wheelchair  Patient Expressive Communication Modes: Verbal  Other Adaptive Equipment Comment: shower chair, grab bars        Employment  Patient reports: Does the patient's condition impact their ability to work?  Employment Status: Retired          Past Medical History/Physical Systems Review:   Alyssa John  has a past medical history of Arthritis, Coronary artery disease, Encounter for blood transfusion, Epilepsy, GERD (gastroesophageal reflux disease), Headache, Hypertension, MI, old, MS (multiple sclerosis), and Seizures.    Alyssa John  has a past surgical history that includes Appendectomy; Back surgery; Coronary stent placement; Colonoscopy (N/A,  09/16/2015); right knee arthroscopy; Cataract extraction (Bilateral); Breast biopsy (Right, 15 yrs ago); insertion, neurostimulator, temporary, sacral (N/A, 9/27/2024); and Implantation of permanent sacral nerve stimulator (N/A, 10/11/2024).    Alyssa has a current medication list which includes the following prescription(s): ammonium lactate, atorvastatin, cholecalciferol (vitamin d3), clopidogrel, cyanocobalamin, duloxetine, estradiol, famotidine, furosemide, hydrocortisone, nitrofurantoin, phenazopyridine, potassium chloride sa, sulfamethoxazole-trimethoprim 800-160mg, and topiramate.    Review of patient's allergies indicates:   Allergen Reactions    Codeine      Other reaction(s): throat tightness    Dilantin [phenytoin sodium extended]     Phenobarbital     Tegretol [carbamazepine]         Objective   Communication  The patient's current expressive communication modes are Verbal.                Hip Strength - Planes of Motion   Right Strength Right Pain Left Strength Left  Pain   Flexion (L2) 3-   3-     Extension           ABduction 3-   3-     ADduction 2   2     Internal Rotation           External Rotation               Knee Strength   Right Strength Right Pain Left Strength Left  Pain   Flexion (S2) 3   3     Prone Flexion           Extension (L3) 3+   3+            Ankle/Foot Strength - Planes of Motion   Right Strength Right Pain Left Strength Left  Pain   Dorsiflexion (L4) 3+   3+     Plantar Flexion (S1) 3+   3+     Inversion           Eversion           Great Toe Flexion           Great Toe Extension (L5)           Lesser Toes Flexion           Lesser Toes Extension                  Transfers Assessment  Sit to Stand Assistance: Moderate assist  Chair to Bed Assistance: Moderate assist  Bed to Chair Assistance: Moderate assist  Wheelchair Transfer Assistance: Moderate assist  Wheelchair Transfer Assistance Details: stand pivot and squat pivot    Bed Mobility Assessment  Rolling Assistance: Minimal  assist  Sidelying to Sit Assistance: Moderate assist  Sit to Sidelying Assistance: Moderate assist  Scooting to Edge of Bed Assistance: Maximal assist           Time Entry(in minutes):  PT Evaluation (Complex) Time Entry: 40    Assessment & Plan   Assessment  Alyssa presents with a condition of High complexity.   Presentation of Symptoms: Evolving  Will Comorbidities Impact Care: Yes  Multiple sclerosis, recent pneumonia, chronic impaired mobility,     Functional Limitations: Activity tolerance, Bed mobility, Completing self-care activities, Decreased ambulation distance/endurance, Driving, Functional mobility, Gait limitations, Getting off the floor, Gross motor coordination, Increased risk of fall, Maintaining balance, Participating in leisure activities, Performing household chores, Standing tolerance, Transfers  Impairments: Abnormal gait, Abnormal muscle firing, Abnormal muscle tone, Impaired balance, Activity intolerance, Impaired physical strength, Lack of appropriate home exercise program, Safety issue  Personal Factors Affecting Prognosis: Physical limitations    Patient Goal for Therapy (PT): walk into restaurant using walker  Prognosis: Fair  Assessment Details: Alyssa is a 74 year old female with the medical diagnosis of arthritis, gait impairment, and physical deconditioning. She arrives in transport chair;  states she has a wheelchair at home. She requires moderate assistance for stand pivot and squat pivot transfers from wheelchair to/from mat. Minimal to moderate assistance required for bed mobility. Patient's lower extremity strength is grossly 3/5 bilaterally. She uses wheelchair for mobility around home and in community.  reports she is able to propell wheelchair between rooms at home for short distances but is dependent for community outings due to poor endurance and strength. Assistance required for activities of daily living and household chores. She would benefit from physical  therapy to address general weakness, impaired functional mobility, and increased dependence on family as caregiver.    Plan  From a physical therapy perspective, the patient would benefit from: Skilled Rehab Services    Planned therapy interventions include: Therapeutic exercise, Therapeutic activities, Neuromuscular re-education, Manual therapy, Gait training, Orthotic management and training, Prosthetic management and training, and Wheelchair management.    Planned modalities to include: Electrical stimulation - attended and Electrical stimulation - passive/unattended.        Visit Frequency: 2 times Per Week for 10 Weeks.       This plan was discussed with Patient and Family.   Discussion participants: Agreed Upon Plan of Care  Plan details: Plan of Care: 4/10/2025 to 7/1/2025          Patient's spiritual, cultural, and educational needs considered and patient agreeable to plan of care and goals.     Education  Education was done with Patient. The patient's learning style includes Listening. The patient Verbalizes understanding.         PT plan of care and Goals       Goals:   Active       Long Term Goals       Patient will report compliance with updated home exercise program for 5 days a week or more to maintain improvements post discharge.        Start:  04/10/25    Expected End:  07/01/25            Patient will demonstrate improved bed mobility by performing supine<>sit with contact guard assistance.       Start:  04/10/25    Expected End:  07/01/25            Patient will perform stand pivot transfer wheelchair to/from mat with contact guard assistance to improve independence.       Start:  04/10/25    Expected End:  07/01/25               Short Term Goals       Patient will report compliance with home exercise program 5 days a week to improve carry over.        Start:  04/10/25    Expected End:  07/01/25            Patient to improve bed mobility demonstrating rolling bilaterally with supervision.        Start:  04/10/25    Expected End:  07/01/25            PT will assess standing tolerance and gait to set appropriate goals.       Start:  04/10/25    Expected End:  07/01/25                Madeleine Malcolm PT     Patient

## 2025-04-14 NOTE — PROGRESS NOTES
Outpatient Rehab    Physical Therapy Visit    Patient Name: Alyssa John  MRN: [id number]  YOB: 1950  Encounter Date: 4/14/2025    Therapy Diagnosis:   Encounter Diagnoses   Name Primary?    Impaired functional mobility, balance, gait, and endurance Yes    General weakness      Physician: Piyush Sharif MD    Physician Orders: Eval and Treat  Medical Diagnosis: Arthritis  Gait disturbance  Physical deconditioning    Visit # / Visits Authorized:  1 / 10  Insurance Authorization Period: 4/7/2025 to 12/31/2025  Evaluation Date: 10/1/2024  Authorization Period Expiration: 9/24/21-12/31/24  Plan of Care Expiration: 12/20/24  Visit # / Visits authorized: 1/20(2)       PT/PTA: PTA   Number of PTA visits since last PT visit:1  Time In: 1518   Time Out: 1600  Total Time: 42   Total Billable Time: 42    FOTO:  Intake Score:  %  Survey Score 1:  %  Survey Score 2:  %    Precautions     Standard, Fall, and MS      Subjective   without complaints, reports not doing home exercise program.  Pain reported as 0/10.      Objective            Treatment:  Therapeutic Exercise  TE 1: SciFit Level 1 10 minutes with BUE from own transport chair  TE 2: sit exercises 20 times: ankle pumps, long arc quads, march  Therapeutic Activity  TA 1: Parallel bars: sit<>stand catalina, ambulation 6 feet cga  TA 2: sit<>stand from wc vc for hand placement moda for anterior lean  TA 3: Ambulation with rw 13 feet with wc to follow catalina for forward progression, max vc for L foot clearance    Time Entry(in minutes):  Therapeutic Activity Time Entry: 27  Therapeutic Exercise Time Entry: 15    Assessment & Plan   Assessment: Alyssa tolerated activities well today, able to ambulate short distance with WC to follow.       Patient will continue to benefit from skilled outpatient physical therapy to address the deficits listed in the problem list box on initial evaluation, provide pt/family education and to maximize pt's level of  independence in the home and community environment.     Patient's spiritual, cultural, and educational needs considered and patient agreeable to plan of care and goals.     Education  Education was done with Patient. The patient's learning style includes Listening and Pictures/video. The patient Demonstrates understanding.                 Plan: Continue with PC to increase activities as patient tolerates.    Goals:   Active       Long Term Goals       Patient will report compliance with updated home exercise program for 5 days a week or more to maintain improvements post discharge.        Start:  04/10/25    Expected End:  07/01/25            Patient will demonstrate improved bed mobility by performing supine<>sit with contact guard assistance.       Start:  04/10/25    Expected End:  07/01/25            Patient will perform stand pivot transfer wheelchair to/from mat with contact guard assistance to improve independence.       Start:  04/10/25    Expected End:  07/01/25               Short Term Goals       Patient will report compliance with home exercise program 5 days a week to improve carry over.        Start:  04/10/25    Expected End:  07/01/25            Patient to improve bed mobility demonstrating rolling bilaterally with supervision.       Start:  04/10/25    Expected End:  07/01/25            PT will assess standing tolerance and gait to set appropriate goals.       Start:  04/10/25    Expected End:  07/01/25                Danyelle Benoit, PTA

## 2025-04-16 ENCOUNTER — LAB VISIT (OUTPATIENT)
Dept: LAB | Facility: HOSPITAL | Age: 75
End: 2025-04-16
Attending: PHYSICIAN ASSISTANT
Payer: MEDICARE

## 2025-04-16 DIAGNOSIS — I25.10 CORONARY ARTERY DISEASE WITHOUT ANGINA PECTORIS, UNSPECIFIED VESSEL OR LESION TYPE, UNSPECIFIED WHETHER NATIVE OR TRANSPLANTED HEART: ICD-10-CM

## 2025-04-16 DIAGNOSIS — R74.01 ELEVATED AST (SGOT): ICD-10-CM

## 2025-04-16 DIAGNOSIS — K21.9 GASTROESOPHAGEAL REFLUX DISEASE, UNSPECIFIED WHETHER ESOPHAGITIS PRESENT: ICD-10-CM

## 2025-04-16 DIAGNOSIS — D64.9 MILD ANEMIA: ICD-10-CM

## 2025-04-16 LAB
ABSOLUTE EOSINOPHIL (OHS): 0.2 K/UL
ABSOLUTE MONOCYTE (OHS): 0.4 K/UL (ref 0.3–1)
ABSOLUTE NEUTROPHIL COUNT (OHS): 3.65 K/UL (ref 1.8–7.7)
ALBUMIN SERPL BCP-MCNC: 3.3 G/DL (ref 3.5–5.2)
ALP SERPL-CCNC: 143 UNIT/L (ref 40–150)
ALT SERPL W/O P-5'-P-CCNC: 11 UNIT/L (ref 10–44)
ANION GAP (OHS): 12 MMOL/L (ref 8–16)
AST SERPL-CCNC: 12 UNIT/L (ref 11–45)
BASOPHILS # BLD AUTO: 0.06 K/UL
BASOPHILS NFR BLD AUTO: 1 %
BILIRUB DIRECT SERPL-MCNC: 0.2 MG/DL (ref 0.1–0.3)
BILIRUB SERPL-MCNC: 0.3 MG/DL (ref 0.1–1)
BUN SERPL-MCNC: 18 MG/DL (ref 8–23)
CALCIUM SERPL-MCNC: 8.5 MG/DL (ref 8.7–10.5)
CHLORIDE SERPL-SCNC: 114 MMOL/L (ref 95–110)
CHOLEST SERPL-MCNC: 163 MG/DL (ref 120–199)
CHOLEST/HDLC SERPL: 3.5 {RATIO} (ref 2–5)
CO2 SERPL-SCNC: 17 MMOL/L (ref 23–29)
CREAT SERPL-MCNC: 0.8 MG/DL (ref 0.5–1.4)
ERYTHROCYTE [DISTWIDTH] IN BLOOD BY AUTOMATED COUNT: 15 % (ref 11.5–14.5)
FERRITIN SERPL-MCNC: 19 NG/ML (ref 20–300)
GFR SERPLBLD CREATININE-BSD FMLA CKD-EPI: >60 ML/MIN/1.73/M2
GLUCOSE SERPL-MCNC: 84 MG/DL (ref 70–110)
HAV IGM SERPL QL IA: NORMAL
HBV CORE IGM SERPL QL IA: NORMAL
HBV SURFACE AG SERPL QL IA: NORMAL
HCT VFR BLD AUTO: 38.6 % (ref 37–48.5)
HCV AB SERPL QL IA: NORMAL
HDLC SERPL-MCNC: 46 MG/DL (ref 40–75)
HDLC SERPL: 28.2 % (ref 20–50)
HGB BLD-MCNC: 11.8 GM/DL (ref 12–16)
IMM GRANULOCYTES # BLD AUTO: 0.01 K/UL (ref 0–0.04)
IMM GRANULOCYTES NFR BLD AUTO: 0.2 % (ref 0–0.5)
IRON SATN MFR SERPL: 10 % (ref 20–50)
IRON SERPL-MCNC: 35 UG/DL (ref 30–160)
LDLC SERPL CALC-MCNC: 90.6 MG/DL (ref 63–159)
LYMPHOCYTES # BLD AUTO: 1.85 K/UL (ref 1–4.8)
MCH RBC QN AUTO: 26.8 PG (ref 27–31)
MCHC RBC AUTO-ENTMCNC: 30.6 G/DL (ref 32–36)
MCV RBC AUTO: 88 FL (ref 82–98)
NONHDLC SERPL-MCNC: 117 MG/DL
NUCLEATED RBC (/100WBC) (OHS): 0 /100 WBC
PLATELET # BLD AUTO: 244 K/UL (ref 150–450)
PMV BLD AUTO: 11 FL (ref 9.2–12.9)
POTASSIUM SERPL-SCNC: 3.7 MMOL/L (ref 3.5–5.1)
PROT SERPL-MCNC: 6.6 GM/DL (ref 6–8.4)
RBC # BLD AUTO: 4.4 M/UL (ref 4–5.4)
RELATIVE EOSINOPHIL (OHS): 3.2 %
RELATIVE LYMPHOCYTE (OHS): 30 % (ref 18–48)
RELATIVE MONOCYTE (OHS): 6.5 % (ref 4–15)
RELATIVE NEUTROPHIL (OHS): 59.1 % (ref 38–73)
SODIUM SERPL-SCNC: 143 MMOL/L (ref 136–145)
TIBC SERPL-MCNC: 354 UG/DL (ref 250–450)
TRANSFERRIN SERPL-MCNC: 239 MG/DL (ref 200–375)
TRIGL SERPL-MCNC: 132 MG/DL (ref 30–150)
WBC # BLD AUTO: 6.17 K/UL (ref 3.9–12.7)

## 2025-04-16 PROCEDURE — 80061 LIPID PANEL: CPT

## 2025-04-16 PROCEDURE — 80053 COMPREHEN METABOLIC PANEL: CPT

## 2025-04-16 PROCEDURE — 82248 BILIRUBIN DIRECT: CPT

## 2025-04-16 PROCEDURE — 36415 COLL VENOUS BLD VENIPUNCTURE: CPT | Mod: PO

## 2025-04-16 PROCEDURE — 82728 ASSAY OF FERRITIN: CPT

## 2025-04-16 PROCEDURE — 85025 COMPLETE CBC W/AUTO DIFF WBC: CPT

## 2025-04-16 PROCEDURE — 84466 ASSAY OF TRANSFERRIN: CPT

## 2025-04-16 PROCEDURE — 80074 ACUTE HEPATITIS PANEL: CPT

## 2025-04-17 ENCOUNTER — RESULTS FOLLOW-UP (OUTPATIENT)
Dept: FAMILY MEDICINE | Facility: CLINIC | Age: 75
End: 2025-04-17

## 2025-04-17 ENCOUNTER — CLINICAL SUPPORT (OUTPATIENT)
Dept: REHABILITATION | Facility: HOSPITAL | Age: 75
End: 2025-04-17
Payer: MEDICARE

## 2025-04-17 DIAGNOSIS — R53.1 GENERAL WEAKNESS: ICD-10-CM

## 2025-04-17 DIAGNOSIS — Z74.09 IMPAIRED FUNCTIONAL MOBILITY, BALANCE, GAIT, AND ENDURANCE: Primary | ICD-10-CM

## 2025-04-17 PROCEDURE — 97530 THERAPEUTIC ACTIVITIES: CPT | Mod: PO,CQ

## 2025-04-17 PROCEDURE — 97110 THERAPEUTIC EXERCISES: CPT | Mod: PO,CQ

## 2025-04-17 NOTE — PROGRESS NOTES
Outpatient Rehab    Physical Therapy Visit    Patient Name: Alyssa John  MRN: [id number]  YOB: 1950  Encounter Date: 4/17/2025    Therapy Diagnosis:   Encounter Diagnoses   Name Primary?    Impaired functional mobility, balance, gait, and endurance Yes    General weakness      Physician: Piyush Sharif MD    Physician Orders: Eval and Treat  Medical Diagnosis: Arthritis  Gait disturbance  Physical deconditioning    Visit # / Visits Authorized:  2 / 10  Insurance Authorization Period: 4/7/2025 to 12/31/2025  Date of Evaluation: 4/10/2025  Plan of Care Certification: 4/10/2025 to 7/1/2025        PT/PTA: PTA   Number of PTA visits since last PT visit:1  Time In: 1300   Time Out: 1345  Total Time: 45   Total Billable Time: 45    FOTO:  Intake Score:  %  Survey Score 1:  %  Survey Score 2:  %    Precautions     Fall and MS      Subjective   Patient reports transferred by herself to toilet.  Pain reported as 0/10.      Objective            Treatment:  Therapeutic Exercise  TE 1: SciFit Stepper Level 1 10 minutes with BUE and occasional assistance to increase range  Therapeutic Activity  TA 1: wc<>Stepper SPT moda for safety and direction  TA 2: amblation with RW 25 feet catalina for forward progression wc to follow max vc for increased weight shift to R and to L to improve B foot clearance    Time Entry(in minutes):  Therapeutic Activity Time Entry: 35  Therapeutic Exercise Time Entry: 10    Assessment & Plan   Assessment: Alyssa distracted from therapy session today and needing multiple cues to remain on task. Had difficulty with all activities, encouraged patient and  to have someone present during transfers to improve safety.       Patient will continue to benefit from skilled outpatient physical therapy to address the deficits listed in the problem list box on initial evaluation, provide pt/family education and to maximize pt's level of independence in the home and community  environment.     Patient's spiritual, cultural, and educational needs considered and patient agreeable to plan of care and goals.           Plan:      Goals:   Active       Long Term Goals       Patient will report compliance with updated home exercise program for 5 days a week or more to maintain improvements post discharge.        Start:  04/10/25    Expected End:  07/01/25            Patient will demonstrate improved bed mobility by performing supine<>sit with contact guard assistance.       Start:  04/10/25    Expected End:  07/01/25            Patient will perform stand pivot transfer wheelchair to/from mat with contact guard assistance to improve independence.       Start:  04/10/25    Expected End:  07/01/25               Short Term Goals       Patient will report compliance with home exercise program 5 days a week to improve carry over.        Start:  04/10/25    Expected End:  07/01/25            Patient to improve bed mobility demonstrating rolling bilaterally with supervision.       Start:  04/10/25    Expected End:  07/01/25            PT will assess standing tolerance and gait to set appropriate goals.       Start:  04/10/25    Expected End:  07/01/25                Danyelle Benoit, PTA

## 2025-04-21 ENCOUNTER — CLINICAL SUPPORT (OUTPATIENT)
Dept: REHABILITATION | Facility: HOSPITAL | Age: 75
End: 2025-04-21
Payer: MEDICARE

## 2025-04-21 DIAGNOSIS — R53.1 GENERAL WEAKNESS: ICD-10-CM

## 2025-04-21 DIAGNOSIS — Z74.09 IMPAIRED FUNCTIONAL MOBILITY, BALANCE, GAIT, AND ENDURANCE: Primary | ICD-10-CM

## 2025-04-21 PROCEDURE — 97530 THERAPEUTIC ACTIVITIES: CPT | Mod: PO

## 2025-04-21 NOTE — PROGRESS NOTES
Outpatient Rehab    Physical Therapy Visit    Patient Name: Alyssa John  MRN: [id number]  YOB: 1950  Encounter Date: 4/21/2025    Therapy Diagnosis:   Encounter Diagnoses   Name Primary?    Impaired functional mobility, balance, gait, and endurance Yes    General weakness      Physician: Piyush Sharif MD    Physician Orders: Eval and Treat  Medical Diagnosis: Arthritis  Gait disturbance  Physical deconditioning    Visit # / Visits Authorized:  3 / 10  Insurance Authorization Period: 4/7/2025 to 12/31/2025  Date of Evaluation: 4/10/2025  Plan of Care Certification: 4/10/2025 to 7/1/2025      PT/PTA: PT   Number of PTA visits since last PT visit:2  Time In: 1518   Time Out: 1602  Total Time: 44   Total Billable Time: 44    FOTO:  Intake Score:  %  Survey Score 1:  %  Survey Score 2:  %    Precautions     Fall and MS      Subjective   States she has somewhere to go after session today and needs to leave on time.  Pain reported as 0/10.      Objective            Treatment:  Therapeutic Exercise  TE 1: SciFit Stepper Level 1.5 for 11.5 minutes with BUE and occasional assistance to increase range  Therapeutic Activity  TA 1: wc<>Stepper stand pivot transfer Callie-ModA for safety and direction  TA 2: amblation with RW 40 feet and 37 feet with Min A for forward progression wc to follow max vc for increased weight shift to R and to L to improve B foot clearance  TA 3: sit<>stand with Min A    Time Entry(in minutes):  Therapeutic Activity Time Entry: 44    Assessment & Plan   Assessment: Alyssa tolerated treatment session well. Ambulated increased distances of 40 ft and 37 ft with use of rolling walker and wheelchair follow for safety. Minimal assistance provided for sit to stand transfer and during gait for safety. She requires frequent verbal cues to increase weight shift onto right LE and step length on LLE. She remains appropriate for PT at this time.  Evaluation/Treatment Tolerance: Patient  tolerated treatment well    Patient will continue to benefit from skilled outpatient physical therapy to address the deficits listed in the problem list box on initial evaluation, provide pt/family education and to maximize pt's level of independence in the home and community environment.     Patient's spiritual, cultural, and educational needs considered and patient agreeable to plan of care and goals.     Education  Education was done with Patient. The patient's learning style includes Listening. The patient Verbalizes understanding.         Continue with HEP       Plan: Continue with PC to increase activities as patient tolerates.    Goals:   Active       Long Term Goals       Patient will report compliance with updated home exercise program for 5 days a week or more to maintain improvements post discharge.  (Progressing)       Start:  04/10/25    Expected End:  07/01/25            Patient will demonstrate improved bed mobility by performing supine<>sit with contact guard assistance. (Progressing)       Start:  04/10/25    Expected End:  07/01/25            Patient will perform stand pivot transfer wheelchair to/from mat with contact guard assistance to improve independence. (Progressing)       Start:  04/10/25    Expected End:  07/01/25               Short Term Goals       Patient will report compliance with home exercise program 5 days a week to improve carry over.  (Progressing)       Start:  04/10/25    Expected End:  07/01/25            Patient to improve bed mobility demonstrating rolling bilaterally with supervision. (Progressing)       Start:  04/10/25    Expected End:  07/01/25            PT will assess standing tolerance and gait to set appropriate goals. (Progressing)       Start:  04/10/25    Expected End:  07/01/25                Madeleine Malcolm, PT

## 2025-04-22 ENCOUNTER — OFFICE VISIT (OUTPATIENT)
Dept: CARDIOLOGY | Facility: CLINIC | Age: 75
End: 2025-04-22
Payer: MEDICARE

## 2025-04-22 VITALS
SYSTOLIC BLOOD PRESSURE: 162 MMHG | OXYGEN SATURATION: 98 % | WEIGHT: 185 LBS | DIASTOLIC BLOOD PRESSURE: 83 MMHG | BODY MASS INDEX: 32.77 KG/M2 | HEART RATE: 56 BPM

## 2025-04-22 DIAGNOSIS — I65.21 INTERNAL CAROTID ARTERY STENOSIS, RIGHT: ICD-10-CM

## 2025-04-22 DIAGNOSIS — I25.110 ATHEROSCLEROSIS OF NATIVE CORONARY ARTERY OF NATIVE HEART WITH UNSTABLE ANGINA PECTORIS: ICD-10-CM

## 2025-04-22 DIAGNOSIS — Z95.5 PRESENCE OF CORONARY ANGIOPLASTY IMPLANT AND GRAFT: ICD-10-CM

## 2025-04-22 DIAGNOSIS — I70.0 AORTIC ATHEROSCLEROSIS: ICD-10-CM

## 2025-04-22 DIAGNOSIS — I87.2 VENOUS STASIS DERMATITIS OF BOTH LOWER EXTREMITIES: Primary | ICD-10-CM

## 2025-04-22 DIAGNOSIS — I10 PRIMARY HYPERTENSION: ICD-10-CM

## 2025-04-22 DIAGNOSIS — I25.2 HISTORY OF MI (MYOCARDIAL INFARCTION): ICD-10-CM

## 2025-04-22 DIAGNOSIS — G40.909 SEIZURE DISORDER: ICD-10-CM

## 2025-04-22 DIAGNOSIS — E78.5 HYPERLIPIDEMIA, UNSPECIFIED HYPERLIPIDEMIA TYPE: ICD-10-CM

## 2025-04-22 DIAGNOSIS — G35 MS (MULTIPLE SCLEROSIS): ICD-10-CM

## 2025-04-22 DIAGNOSIS — I25.10 CORONARY ARTERY DISEASE WITHOUT ANGINA PECTORIS, UNSPECIFIED VESSEL OR LESION TYPE, UNSPECIFIED WHETHER NATIVE OR TRANSPLANTED HEART: ICD-10-CM

## 2025-04-22 DIAGNOSIS — I10 HYPERTENSION, UNSPECIFIED TYPE: ICD-10-CM

## 2025-04-22 DIAGNOSIS — D69.1 PLATELET FUNCTION DEFECT: ICD-10-CM

## 2025-04-22 DIAGNOSIS — I73.9 PAD (PERIPHERAL ARTERY DISEASE): ICD-10-CM

## 2025-04-22 PROCEDURE — 3077F SYST BP >= 140 MM HG: CPT | Mod: CPTII,S$GLB,, | Performed by: GENERAL PRACTICE

## 2025-04-22 PROCEDURE — 1160F RVW MEDS BY RX/DR IN RCRD: CPT | Mod: CPTII,S$GLB,, | Performed by: GENERAL PRACTICE

## 2025-04-22 PROCEDURE — 99999 PR PBB SHADOW E&M-EST. PATIENT-LVL IV: CPT | Mod: PBBFAC,,, | Performed by: GENERAL PRACTICE

## 2025-04-22 PROCEDURE — 3008F BODY MASS INDEX DOCD: CPT | Mod: CPTII,S$GLB,, | Performed by: GENERAL PRACTICE

## 2025-04-22 PROCEDURE — 3079F DIAST BP 80-89 MM HG: CPT | Mod: CPTII,S$GLB,, | Performed by: GENERAL PRACTICE

## 2025-04-22 PROCEDURE — 99204 OFFICE O/P NEW MOD 45 MIN: CPT | Mod: S$GLB,,, | Performed by: GENERAL PRACTICE

## 2025-04-22 PROCEDURE — 3288F FALL RISK ASSESSMENT DOCD: CPT | Mod: CPTII,S$GLB,, | Performed by: GENERAL PRACTICE

## 2025-04-22 PROCEDURE — 1101F PT FALLS ASSESS-DOCD LE1/YR: CPT | Mod: CPTII,S$GLB,, | Performed by: GENERAL PRACTICE

## 2025-04-22 PROCEDURE — 1159F MED LIST DOCD IN RCRD: CPT | Mod: CPTII,S$GLB,, | Performed by: GENERAL PRACTICE

## 2025-04-22 RX ORDER — PROPRANOLOL HYDROCHLORIDE 20 MG/1
20 TABLET ORAL
COMMUNITY
Start: 2025-04-02

## 2025-04-22 RX ORDER — LOSARTAN POTASSIUM 25 MG/1
25 TABLET ORAL DAILY
Qty: 90 TABLET | Refills: 3 | Status: SHIPPED | OUTPATIENT
Start: 2025-04-22 | End: 2026-04-22

## 2025-04-22 NOTE — PROGRESS NOTES
Subjective:    Patient ID:  Alyssa John is a 74 y.o. female who presents for follow-up of   Chief Complaint   Patient presents with    Establish Care    Coronary Artery Disease    Hyperlipidemia       HPI:  Than today to follow-up hospitalization for in fluids and lower extremity venous stasis with cellulitis.  And hypertension.  She has history of stent 2010 by Dr. Mcmillan.  She had ECHO which revealed normal left ventricular ejection fraction and mild-to-moderate tricuspid regurgitation.  She has had no cardiovascular problems since.  She has been wheelchair-bound and has had chronic edema in the lower extremities since.  She was treated for cellulitis in both are extremities while hospitalized the fluid when down.  She is placed on amlodipine for hypertension.  Went to rehab for 3 weeks and the fluid is gradually returned.  She has been off amlodipine for about a month.  She takes Lasix 20 mg 5 times a week.  She does not like it because she has a urinate too much.    EKG is normal sinus rhythm normal.  HEART RATE 50 VOLTAGE FOR LVH    Admission Date: 1/16/2025  Hospital Length of Stay: 7 days  Discharge Date and Time:  01/24/2025 1:59 PM  Attending Physician: David Urbina MD   Discharging Provider: David Urbina MD  Primary Care Provider: Piyush Sharif MD     Primary Care Team: Networked reference to record PCT      HPI:   74 year old female with a past medical history of GERD, Epilepsy, hypertension, MI, MS, CAD was recently tested positive for influenza A.  She reports to the emergency room with reports of weakness and diarrhea.  Reports of decreased mobility and activity of daily living for the last 7-8 months. Patient needs to be assisted out of wheelchair by significant other. She was diagnosed with urinary tract infection about 17 days ago. She recently treated with omnicef and cipro.inished course of antibiotics. She then developed cough and congestion. She was diagnosed with  influenza yesterday. Now  is unable to assist patient and transfers. She reports increased generalized weakness especially to both legs. No trouble with vision or speech. There was some chills with no definite fever. Urinary symptoms resolved.      In the ER, potassium 3.2, glucose 127, calcium 8, troponin I high sensitivity 29.6, influenza A positive UA with 1+ protein, 1+ blood, 2+ leukocytes 96 WBCs urine culture sent, COVID negative lactic 1.56 chest x-ray Mild bilateral perihilar interstitial thickening      Admit to hospital medicine for UTI after failing outpatient therapy.              * No surgery found *       Hospital Course:   74-year-old female with a history of multiple sclerosis with limited mobility, recurrent UTI, CAD on Plavix presenting here for influenza a positive and diarrhea.  Also acute on chronic debility.  Urine culture was negative.  Started on empiric Rocephin azithromycin, Tamiflu as initially suspected to have influenza pneumonitis/pneumonia.  Antibiotics later stopped.  Completed course of Tamiflu.  Patient treated for fluid overload with IV Lasix.  Patient also found to 1/2 have staph epi bacteremia, most likely contamination.  Repeat blood culture negative.  2D echo shows normal LVEF, volume status much improved with IV Lasix.  Converted to p.o. Lasix.  Treated for venous stasis dermatitis with topical therapies.  Patient is recommended to go to SNF by physical therapist. Accepted and discharged to SNF. By time of discharge the patient was tolerating a regular diet with improved admission symptoms. Patient seen and examined on day of discharge.      Result Date: 1/17/2025    Left Ventricle: The left ventricle is normal in size. Normal wall thickness. There is normal systolic function. There is normal diastolic function. E/A ratio is 1.16.   Right Ventricle: Normal right ventricular cavity size. Systolic function is normal.   Mitral Valve: There is mild regurgitation.    Tricuspid Valve: There is mild to moderate regurgitation. The estimated PA systolic pressure is at least 27 mmHg.   Overall the study quality was technically difficult. The study was difficult due to patient's poor acoustic windows.         Review of patient's allergies indicates:   Allergen Reactions    Codeine      Other reaction(s): throat tightness    Dilantin [phenytoin sodium extended]     Phenobarbital     Tegretol [carbamazepine]        Past Medical History:   Diagnosis Date    Arthritis     Coronary artery disease     Encounter for blood transfusion     Epilepsy     GERD (gastroesophageal reflux disease)     Headache     Hypertension     MI, old     MS (multiple sclerosis)     Seizures     epilepsy     Past Surgical History:   Procedure Laterality Date    APPENDECTOMY      BACK SURGERY      L5 discectomy    BREAST BIOPSY Right 15 yrs ago    benign    CATARACT EXTRACTION Bilateral     COLONOSCOPY N/A 09/16/2015    Procedure: COLONOSCOPY;  Surgeon: Henry Malcolm MD;  Location: [facility] ENDO;  Service: Endoscopy;  Laterality: N/A;    CORONARY STENT PLACEMENT      IMPLANTATION OF PERMANENT SACRAL NERVE STIMULATOR N/A 10/11/2024    Procedure: INSERTION, NEUROSTIMULATOR, PERMANENT, SACRAL;  Surgeon: Abel Rosales MD;  Location: [facility] OR;  Service: Urology;  Laterality: N/A;  Mack RUIZ    INSERTION, NEUROSTIMULATOR, TEMPORARY, SACRAL N/A 9/27/2024    Procedure: INSERTION, NEUROSTIMULATOR, TEMPORARY, SACRAL;  Surgeon: Abel Rosales MD;  Location: [facility] OR;  Service: Urology;  Laterality: N/A;  1 hour 45 minutes Mack RUIZ    right knee arthroscopy       Social History[1]  Family History   Problem Relation Name Age of Onset    Scleroderma Mother      Heart disease Father          MI, CAD    Cancer Neg Hx      Diabetes Neg Hx      Stroke Neg Hx      Melanoma Neg Hx      Psoriasis Neg Hx      Lupus Neg Hx      Eczema Neg Hx          Review of Systems:   Constitution: Negative for diaphoresis and  fever.   HEENT: Negative for nosebleeds.    Cardiovascular: Negative for chest pain       No dyspnea on exertion       No leg swelling        No palpitations  Respiratory: Negative for shortness of breath and wheezing.    Hematologic/Lymphatic: Negative for bleeding problem. Does not bruise/bleed easily.   Skin: Negative for color change and rash.   Musculoskeletal: Negative for falls and myalgias.   Gastrointestinal: Negative for hematemesis and hematochezia.   Genitourinary: Negative for hematuria.   Neurological: Negative for dizziness and light-headedness.   Psychiatric/Behavioral: Negative for altered mental status and memory loss.          Objective:        Vitals:    04/22/25 1353   BP: (!) 162/83   Pulse: (!) 56       Lab Results   Component Value Date    WBC 6.17 04/16/2025    HGB 11.8 (L) 04/16/2025    HCT 38.6 04/16/2025     04/16/2025    CHOL 163 04/16/2025    TRIG 132 04/16/2025    HDL 46 04/16/2025    ALT 11 04/16/2025    AST 12 04/16/2025     04/16/2025    K 3.7 04/16/2025     (H) 04/16/2025    CREATININE 0.8 04/16/2025    BUN 18 04/16/2025    CO2 17 (L) 04/16/2025    TSH 2.224 08/12/2024    INR 0.9 09/17/2024    HGBA1C 6.0 09/18/2024        ECHOCARDIOGRAM RESULTS  Results for orders placed during the hospital encounter of 01/16/25    Echo    Interpretation Summary    Left Ventricle: The left ventricle is normal in size. Normal wall thickness. There is normal systolic function. There is normal diastolic function. E/A ratio is 1.16.    Right Ventricle: Normal right ventricular cavity size. Systolic function is normal.    Mitral Valve: There is mild regurgitation.    Tricuspid Valve: There is mild to moderate regurgitation. The estimated PA systolic pressure is at least 27 mmHg.    Overall the study quality was technically difficult. The study was difficult due to patient's poor acoustic windows.        CURRENT/PREVIOUS VISIT EKG  Results for orders placed or performed during the  hospital encounter of 01/16/25   EKG 12-lead    Collection Time: 01/17/25  3:12 PM   Result Value Ref Range    QRS Duration 70 ms    [facility] QTC Calculation 394 ms    Narrative    Test Reason : ABN HEART RHYTHM    Vent. Rate :  55 BPM     Atrial Rate :  55 BPM     P-R Int : 144 ms          QRS Dur :  70 ms      QT Int : 412 ms       P-R-T Axes :  44 -17  50 degrees    QTcB Int : 394 ms    Sinus bradycardia  Voltage criteria for left ventricular hypertrophy  Inferior infarct (cited on or before 16-Jan-2025)  Abnormal ECG  When compared with ECG of 16-Jan-2025 15:06,  ST no longer depressed in Lateral leads  Nonspecific T wave abnormality, improved in Anterior-lateral leads  QT has shortened  Confirmed by Chun Braun ([id number]) on 1/21/2025 1:25:22 PM    Referred By: AAAREFERRAL SELF           Confirmed By: Chun Braun     No valid procedures specified.   No results found for this or any previous visit.      Physical Exam:  CONSTITUTIONAL: No fever, no chills  HEENT: Normocephalic, atraumatic,pupils reactive to light                 NECK:  No JVD no carotid bruit  CVS: S1S2+, RRR, no murmurs,   LUNGS: Clear  ABDOMEN: Soft, NT, BS+  EXTREMITIES: No cyanosis, LEFT LOWER EXTREMITY 3+ EDEMA WITH MILD CHRONIC SINCE CELLULITIS RIGHT LOWER EXTREMITY 2+ EDEMA WITH MILD ERYTHEMA   : No hernandez catheter  NEURO: AAO X 3  PSY: Normal affect      Medication List with Changes/Refills   New Medications    LOSARTAN (COZAAR) 25 MG TABLET    Take 1 tablet (25 mg total) by mouth once daily.   Current Medications    AMMONIUM LACTATE (LAC-HYDRIN) 12 % LOTION    Apply topically as needed for Dry Skin.    ATORVASTATIN (LIPITOR) 40 MG TABLET    TAKE 1 TABLET BY MOUTH EVERY DAY    CHOLECALCIFEROL, VITAMIN D3, 10 MCG (400 UNIT) CAP CAPSULE    Take 1 capsule (400 Units total) by mouth once daily.    CLOPIDOGREL (PLAVIX) 75 MG TABLET    Take 75 mg by mouth once daily.    CYANOCOBALAMIN (VITAMIN B-12) 1000 MCG TABLET    Take 1 tablet (1,000 mcg  total) by mouth once daily.    DULOXETINE (CYMBALTA) 20 MG CAPSULE    TAKE 2 CAPSULES BY MOUTH EVERY DAY    ESTRADIOL (ESTRACE) 0.01 % (0.1 MG/GRAM) VAGINAL CREAM    Place 1 g vaginally 3 (three) times a week.    FAMOTIDINE (PEPCID) 20 MG TABLET    Take 1 tablet (20 mg total) by mouth once daily.    FUROSEMIDE (LASIX) 20 MG TABLET    Take 1 tablet (20 mg total) by mouth once daily.    HYDROCORTISONE 2.5 % CREAM    Apply topically 2 (two) times daily. Apply to red areas of lower legs for 7 days    NITROFURANTOIN (MACRODANTIN) 50 MG CAPSULE    Take 1 capsule (50 mg total) by mouth 4 (four) times daily.    POTASSIUM CHLORIDE SA (K-DUR,KLOR-CON) 20 MEQ TABLET    Take 1 tablet (20 mEq total) by mouth 2 (two) times daily.    PROPRANOLOL (INDERAL) 20 MG TABLET    Take 20 mg by mouth.    TOPIRAMATE (TOPAMAX) 100 MG TABLET    Take 1 tablet (100 mg total) by mouth 2 (two) times daily.             Assessment:       1. Venous stasis dermatitis of both lower extremities    2. Seizure disorder    3. Coronary artery disease without angina pectoris, unspecified vessel or lesion type, unspecified whether native or transplanted heart    4. Presence of coronary angioplasty implant and graft    5. Aortic atherosclerosis    6. Internal carotid artery stenosis, right    7. Hyperlipidemia, unspecified hyperlipidemia type    8. History of MI (myocardial infarction)    9. Primary hypertension    10. Platelet function defect    11. MS (multiple sclerosis)         Plan:     Problem List Items Addressed This Visit          Neuro    Seizure disorder    Overview   Suspect PGE, possibly KIESHA  Pt with comorbid migraines & MS            Cardiac/Vascular    CAD (coronary artery disease)    Overview   Dr. Mcmillan, cardiology         Relevant Orders    IN OFFICE EKG 12-LEAD (to Muse)    Hyperlipemia    Hypertension    Internal carotid artery stenosis, right    History of MI (myocardial infarction)    Presence of coronary angioplasty implant and graft     Aortic atherosclerosis    Venous stasis dermatitis of both lower extremities - Primary    Relevant Orders    Ambulatory referral/consult to Wound Clinic    Ambulatory referral/consult to Vascular Cardiology     Other Visit Diagnoses         Platelet function defect          MS (multiple sclerosis)        Relevant Orders    Ambulatory referral/consult to Wound Clinic    Ambulatory referral/consult to Vascular Cardiology          Venous stasis with bilateral cellulitis.  Refer to lymphedema clinic for to be fit for stockings.  Refer to Dr. Adrien House at the [facility] [place] to evaluate venous stasis versus lymphedema and other treatments besides increasing diuretic    Hypertension blood pressure usually runs 130-140 and will start on low-dose losartan.  Avoid amlodipine secondary to swelling in lower extremity    History of myocardial infarction and stent 2010 no recurrence.  Continue current medications    SINUS BRADYCARDIA NO CLINICAL SIGNIFICANCE  Follow up in about 3 months (around 7/22/2025).    The patients questions were answered, they verbalized understanding, and agreed with the treatment plan.     KINDRA DAMON MD  [facility]         [1]   Social History  Tobacco Use    Smoking status: Former     Current packs/day: 0.00     Average packs/day: 2.0 packs/day for 42.0 years (84.0 ttl pk-yrs)     Types: Cigarettes     Start date: 1968     Quit date: 2010     Years since quitting: 15.3     Passive exposure: Past    Smokeless tobacco: Never   Substance Use Topics    Alcohol use: Not Currently     Comment: rarely    Drug use: No

## 2025-04-23 ENCOUNTER — CLINICAL SUPPORT (OUTPATIENT)
Dept: REHABILITATION | Facility: HOSPITAL | Age: 75
End: 2025-04-23
Payer: MEDICARE

## 2025-04-23 DIAGNOSIS — R53.1 GENERAL WEAKNESS: ICD-10-CM

## 2025-04-23 DIAGNOSIS — Z74.09 IMPAIRED FUNCTIONAL MOBILITY, BALANCE, GAIT, AND ENDURANCE: Primary | ICD-10-CM

## 2025-04-23 PROCEDURE — 97110 THERAPEUTIC EXERCISES: CPT | Mod: PO

## 2025-04-23 PROCEDURE — 97530 THERAPEUTIC ACTIVITIES: CPT | Mod: PO

## 2025-04-24 ENCOUNTER — DOCUMENT SCAN (OUTPATIENT)
Dept: HOME HEALTH SERVICES | Facility: HOSPITAL | Age: 75
End: 2025-04-24
Payer: MEDICARE

## 2025-04-24 ENCOUNTER — HOSPITAL ENCOUNTER (EMERGENCY)
Facility: HOSPITAL | Age: 75
Discharge: HOME | End: 2025-04-24
Attending: EMERGENCY MEDICINE
Payer: MEDICARE

## 2025-04-24 VITALS
OXYGEN SATURATION: 100 % | SYSTOLIC BLOOD PRESSURE: 182 MMHG | HEART RATE: 75 BPM | TEMPERATURE: 98 F | HEIGHT: 63 IN | RESPIRATION RATE: 16 BRPM | WEIGHT: 185 LBS | DIASTOLIC BLOOD PRESSURE: 88 MMHG | BODY MASS INDEX: 32.78 KG/M2

## 2025-04-24 DIAGNOSIS — R31.9 URINARY TRACT INFECTION WITH HEMATURIA, SITE UNSPECIFIED: ICD-10-CM

## 2025-04-24 DIAGNOSIS — N39.0 URINARY TRACT INFECTION WITH HEMATURIA, SITE UNSPECIFIED: ICD-10-CM

## 2025-04-24 DIAGNOSIS — R10.9 FLANK PAIN: Primary | ICD-10-CM

## 2025-04-24 LAB
ABSOLUTE EOSINOPHIL (SMH): 0.09 K/UL
ABSOLUTE MONOCYTE (SMH): 0.82 K/UL (ref 0.3–1)
ABSOLUTE NEUTROPHIL COUNT (SMH): 6.8 K/UL (ref 1.8–7.7)
ALBUMIN SERPL-MCNC: 4.1 G/DL (ref 3.5–5.2)
ALP SERPL-CCNC: 139 UNIT/L (ref 55–135)
ALT SERPL-CCNC: 11 UNIT/L (ref 10–44)
ANION GAP (SMH): 10 MMOL/L (ref 8–16)
AST SERPL-CCNC: 18 UNIT/L (ref 10–40)
BACTERIA #/AREA URNS AUTO: ABNORMAL /HPF
BASOPHILS # BLD AUTO: 0.07 K/UL
BASOPHILS NFR BLD AUTO: 0.7 %
BILIRUB SERPL-MCNC: 0.5 MG/DL (ref 0.1–1)
BILIRUB UR QL STRIP.AUTO: NEGATIVE
BUN SERPL-MCNC: 18 MG/DL (ref 8–23)
CALCIUM SERPL-MCNC: 9 MG/DL (ref 8.7–10.5)
CHLORIDE SERPL-SCNC: 108 MMOL/L (ref 95–110)
CLARITY UR: ABNORMAL
CO2 SERPL-SCNC: 22 MMOL/L (ref 23–29)
COLOR UR AUTO: YELLOW
CREAT SERPL-MCNC: 0.8 MG/DL (ref 0.5–1.4)
ERYTHROCYTE [DISTWIDTH] IN BLOOD BY AUTOMATED COUNT: 15.2 % (ref 11.5–14.5)
GFR SERPLBLD CREATININE-BSD FMLA CKD-EPI: >60 ML/MIN/1.73/M2
GLUCOSE SERPL-MCNC: 99 MG/DL (ref 70–110)
GLUCOSE UR QL STRIP: NEGATIVE
HCT VFR BLD AUTO: 39.9 % (ref 37–48.5)
HGB BLD-MCNC: 12.5 GM/DL (ref 12–16)
HGB UR QL STRIP: NEGATIVE
IMM GRANULOCYTES # BLD AUTO: 0.03 K/UL (ref 0–0.04)
IMM GRANULOCYTES NFR BLD AUTO: 0.3 % (ref 0–0.5)
KETONES UR QL STRIP: NEGATIVE
LEUKOCYTE ESTERASE UR QL STRIP: ABNORMAL
LYMPHOCYTES # BLD AUTO: 2.12 K/UL (ref 1–4.8)
MCH RBC QN AUTO: 27.1 PG (ref 27–31)
MCHC RBC AUTO-ENTMCNC: 31.3 G/DL (ref 32–36)
MCV RBC AUTO: 87 FL (ref 82–98)
MICROSCOPIC COMMENT: ABNORMAL
NITRITE UR QL STRIP: NEGATIVE
NON-SQ EPI CELLS #/AREA URNS AUTO: 1 /HPF
NUCLEATED RBC (/100WBC) (SMH): 0 /100 WBC
OHS QRS DURATION: 62 MS
OHS QTC CALCULATION: 395 MS
PH UR STRIP: 8 [PH]
PLATELET # BLD AUTO: 255 K/UL (ref 150–450)
PMV BLD AUTO: 11.2 FL (ref 9.2–12.9)
POTASSIUM SERPL-SCNC: 4.6 MMOL/L (ref 3.5–5.1)
PROT SERPL-MCNC: 7.5 GM/DL (ref 6–8.4)
PROT UR QL STRIP: ABNORMAL
RBC # BLD AUTO: 4.61 M/UL (ref 4–5.4)
RBC #/AREA URNS AUTO: 9 /HPF
RELATIVE EOSINOPHIL (SMH): 0.9 % (ref 0–8)
RELATIVE LYMPHOCYTE (SMH): 21.3 % (ref 18–48)
RELATIVE MONOCYTE (SMH): 8.3 % (ref 4–15)
RELATIVE NEUTROPHIL (SMH): 68.5 % (ref 38–73)
SODIUM SERPL-SCNC: 140 MMOL/L (ref 136–145)
SP GR UR STRIP: 1.02
SQUAMOUS #/AREA URNS AUTO: 8 /HPF
UROBILINOGEN UR STRIP-ACNC: NEGATIVE EU/DL
WBC # BLD AUTO: 9.93 K/UL (ref 3.9–12.7)
WBC #/AREA URNS AUTO: >100 /HPF
YEAST UR QL AUTO: ABNORMAL

## 2025-04-24 PROCEDURE — 87086 URINE CULTURE/COLONY COUNT: CPT | Performed by: NURSE PRACTITIONER

## 2025-04-24 PROCEDURE — 80053 COMPREHEN METABOLIC PANEL: CPT | Performed by: NURSE PRACTITIONER

## 2025-04-24 PROCEDURE — 85025 COMPLETE CBC W/AUTO DIFF WBC: CPT | Performed by: NURSE PRACTITIONER

## 2025-04-24 PROCEDURE — 99285 EMERGENCY DEPT VISIT HI MDM: CPT | Mod: 25

## 2025-04-24 PROCEDURE — 63600175 PHARM REV CODE 636 W HCPCS: Performed by: STUDENT IN AN ORGANIZED HEALTH CARE EDUCATION/TRAINING PROGRAM

## 2025-04-24 PROCEDURE — 81003 URINALYSIS AUTO W/O SCOPE: CPT | Performed by: NURSE PRACTITIONER

## 2025-04-24 PROCEDURE — 86803 HEPATITIS C AB TEST: CPT | Performed by: EMERGENCY MEDICINE

## 2025-04-24 PROCEDURE — 96374 THER/PROPH/DIAG INJ IV PUSH: CPT

## 2025-04-24 PROCEDURE — 87389 HIV-1 AG W/HIV-1&-2 AB AG IA: CPT | Performed by: EMERGENCY MEDICINE

## 2025-04-24 RX ORDER — CEFUROXIME AXETIL 500 MG/1
500 TABLET ORAL 2 TIMES DAILY
Qty: 20 TABLET | Refills: 0 | Status: SHIPPED | OUTPATIENT
Start: 2025-04-24 | End: 2025-05-04

## 2025-04-24 RX ORDER — CEFTRIAXONE 2 G/1
2 INJECTION, POWDER, FOR SOLUTION INTRAMUSCULAR; INTRAVENOUS ONCE
Status: COMPLETED | OUTPATIENT
Start: 2025-04-24 | End: 2025-04-24

## 2025-04-24 RX ADMIN — CEFTRIAXONE SODIUM 2 G: 2 INJECTION, POWDER, FOR SOLUTION INTRAMUSCULAR; INTRAVENOUS at 10:04

## 2025-04-24 NOTE — FIRST PROVIDER EVALUATION
Emergency Department TeleTriage Encounter Note      CHIEF COMPLAINT    Chief Complaint   Patient presents with    Flank Pain     Left side flank pain, started today has hx of uti       VITAL SIGNS   Initial Vitals [04/24/25 1753]   BP Pulse Resp Temp SpO2   (!) 197/93 67 17 97.6 °F (36.4 °C) 98 %      MAP       --            ALLERGIES    Review of patient's allergies indicates:   Allergen Reactions    Codeine      Other reaction(s): throat tightness    Dilantin [phenytoin sodium extended]     Phenobarbital     Tegretol [carbamazepine]        PROVIDER TRIAGE NOTE  Verbal consent for the teletriage evaluation was given by the patient at the start of the evaluation.  All efforts will be made to maintain patient's privacy during the evaluation.      This is a teletriage evaluation of a 74 y.o. female presenting to the ED with c/o left flank pain that started at 3 pm today. Limited physical exam via telehealth: The patient is awake, alert, answering questions appropriately and is not in respiratory distress.  As the Teletriage provider, I performed an initial assessment and ordered appropriate labs and imaging studies, if any, to facilitate the patient's care once placed in the ED. Once a room is available, care and a full evaluation will be completed by an alternate ED provider.  Any additional orders and the final disposition will be determined by that provider.  All imaging and labs will not be followed-up by the Teletriage Team, including myself.          ORDERS  Labs Reviewed   HEPATITIS C ANTIBODY   HIV 1 / 2 ANTIBODY   CBC W/ AUTO DIFFERENTIAL    Narrative:     The following orders were created for panel order CBC auto differential.  Procedure                               Abnormality         Status                     ---------                               -----------         ------                     CBC with Differential[[id number]]                                                        Please view results  for these tests on the individual orders.   COMPREHENSIVE METABOLIC PANEL   URINALYSIS, REFLEX TO URINE CULTURE   CBC WITH DIFFERENTIAL       ED Orders (720h ago, onward)      Start Ordered     Status Ordering Provider    04/24/25 1829 04/24/25 1828  Saline lock IV  Once         Ordered YESSI VERGARA    04/24/25 1829 04/24/25 1828  CBC auto differential  STAT         Ordered YESSI VERGARA    04/24/25 1829 04/24/25 1828  Comprehensive metabolic panel  STAT         Ordered YESSI VERGARA    04/24/25 1829 04/24/25 1828  Urinalysis, Reflex to Urine Culture  STAT         Ordered YESSI VERGARA    04/24/25 1829 04/24/25 1828  CBC with Differential  PROCEDURE ONCE         Ordered JAIYESSI    04/24/25 1753 04/24/25 1752  Hepatitis C Antibody  STAT         Ordered DES KOROMA    04/24/25 1753 04/24/25 1752  HIV 1/2 Ag/Ab (4th Gen)  STAT         Ordered DES KOROMA              Virtual Visit Note: The provider triage portion of this emergency department evaluation and documentation was performed via [employer], a HIPAA-compliant telemedicine application, in concert with a tele-presenter in the room. A face to face patient evaluation with one of my colleagues will occur once the patient is placed in an emergency department room.      DISCLAIMER: This note was prepared with [employer] voice recognition transcription software. Garbled syntax, mangled pronouns, and other bizarre constructions may be attributed to that software system.

## 2025-04-25 ENCOUNTER — PATIENT OUTREACH (OUTPATIENT)
Facility: OTHER | Age: 75
End: 2025-04-25
Payer: MEDICARE

## 2025-04-25 LAB
HCV AB SERPL QL IA: NORMAL
HIV 1+2 AB+HIV1 P24 AG SERPL QL IA: NORMAL

## 2025-04-25 NOTE — DISCHARGE INSTRUCTIONS
You were seen for flank pain.  We diagnosed with a urinary tract infection.  Take your Ceftin twice a day for 10 days.      Please return to this facility or another ED as needed for any new or worsening symptoms including chest pain, shortness of breath, abdominal pain, nausea or vomiting, fever or chills. Please follow up with your primary care doctor in 3 days. Please take all medicines as prescribed.

## 2025-04-25 NOTE — ED PROVIDER NOTES
Encounter Date: 4/24/2025       History     Chief Complaint   Patient presents with    Flank Pain     Left side flank pain, started today has hx of uti     HPI74 yo woman with left flank pain started at 3pm. No fever.PNH uti, cad, seizures  Review of patient's allergies indicates:   Allergen Reactions    Codeine      Other reaction(s): throat tightness    Dilantin [phenytoin sodium extended]     Phenobarbital     Tegretol [carbamazepine]      Past Medical History:   Diagnosis Date    Arthritis     Coronary artery disease     Encounter for blood transfusion     Epilepsy     GERD (gastroesophageal reflux disease)     Headache     Hypertension     MI, old     MS (multiple sclerosis)     Seizures     epilepsy     Past Surgical History:   Procedure Laterality Date    APPENDECTOMY      BACK SURGERY      L5 discectomy    BREAST BIOPSY Right 15 yrs ago    benign    CATARACT EXTRACTION Bilateral     COLONOSCOPY N/A 09/16/2015    Procedure: COLONOSCOPY;  Surgeon: Henry Malcolm MD;  Location: [facility] ENDO;  Service: Endoscopy;  Laterality: N/A;    CORONARY STENT PLACEMENT      IMPLANTATION OF PERMANENT SACRAL NERVE STIMULATOR N/A 10/11/2024    Procedure: INSERTION, NEUROSTIMULATOR, PERMANENT, SACRAL;  Surgeon: Abel Rosales MD;  Location: [facility] OR;  Service: Urology;  Laterality: N/A;  Mack RUIZ    INSERTION, NEUROSTIMULATOR, TEMPORARY, SACRAL N/A 9/27/2024    Procedure: INSERTION, NEUROSTIMULATOR, TEMPORARY, SACRAL;  Surgeon: Abel Rosales MD;  Location: [facility] OR;  Service: Urology;  Laterality: N/A;  1 hour 45 minutes Mack RUIZ    right knee arthroscopy       Family History   Problem Relation Name Age of Onset    Scleroderma Mother      Heart disease Father          MI, CAD    Cancer Neg Hx      Diabetes Neg Hx      Stroke Neg Hx      Melanoma Neg Hx      Psoriasis Neg Hx      Lupus Neg Hx      Eczema Neg Hx       Social History[1]  Review of Systems   Constitutional:  Positive for fever.   HENT:   Negative for sore throat.    Respiratory:  Negative for shortness of breath.    Cardiovascular:  Negative for chest pain.   Gastrointestinal:  Negative for nausea.   Genitourinary:  Positive for flank pain. Negative for dysuria.   Musculoskeletal:  Negative for back pain.   Skin:  Negative for rash.   Neurological:  Negative for weakness.   Hematological:  Does not bruise/bleed easily.       Physical Exam     Initial Vitals [04/24/25 1753]   BP Pulse Resp Temp SpO2   (!) 197/93 67 17 97.6 °F (36.4 °C) 98 %      MAP       --         Physical Exam    Constitutional: Vital signs are normal. She appears well-developed and well-nourished.  Non-toxic appearance. No distress.   HENT:   Head: Normocephalic and atraumatic.   Eyes: EOM are normal. Pupils are equal, round, and reactive to light.   Neck: Neck supple. No JVD present.   Normal range of motion.  Cardiovascular:  Normal rate, regular rhythm, normal heart sounds and intact distal pulses.     Exam reveals no gallop and no friction rub.       No murmur heard.  Pulmonary/Chest: Breath sounds normal. She has no wheezes. She has no rhonchi. She has no rales.   Abdominal: Abdomen is soft. Bowel sounds are normal. There is no abdominal tenderness.   There is right CVA tenderness.  There is left CVA tenderness. There is no rebound and no guarding.   Musculoskeletal:         General: Normal range of motion.      Cervical back: Normal range of motion and neck supple. No rigidity.     Neurological: She is alert and oriented to person, place, and time. She has normal strength and normal reflexes. No cranial nerve deficit or sensory deficit. She exhibits normal muscle tone. Coordination normal. GCS eye subscore is 4. GCS verbal subscore is 5. GCS motor subscore is 6.   Skin: Skin is warm and dry.   Psychiatric: She has a normal mood and affect. Her speech is normal and behavior is normal. She is not actively hallucinating.         ED Course   Procedures  Labs Reviewed    COMPREHENSIVE METABOLIC PANEL - Abnormal       Result Value    Sodium 140      Potassium 4.6      Chloride 108      CO2 22 (*)     Glucose 99      BUN 18      Creatinine 0.8      Calcium 9.0      Protein Total 7.5      Albumin 4.1      Bilirubin Total 0.5       (*)     AST 18      ALT 11      Anion Gap 10      eGFR >60     URINALYSIS, REFLEX TO URINE CULTURE - Abnormal    Color, UA Yellow      Appearance, UA Hazy (*)     Spec Grav UA 1.025      pH, UA 8.0      Protein, UA Trace (*)     Glucose, UA Negative      Ketones, UA Negative      Blood, UA Negative      Bilirubin, UA Negative      Urobilinogen, UA Negative      Nitrites, UA Negative      Leukocyte Esterase, UA 3+ (*)    CBC WITH DIFFERENTIAL - Abnormal    WBC 9.93      RBC 4.61      Hgb 12.5      Hct 39.9      MCV 87      MCH 27.1      MCHC 31.3 (*)     RDW 15.2 (*)     Platelet Count 255      MPV 11.2      Nucleated RBC 0      Neut % 68.5      Lymph % 21.3      Mono % 8.3      Eos % 0.9      Basophil % 0.7      Imm Grans % 0.3      Neut # 6.8      Lymph # 2.12      Mono # 0.82      Eos # 0.09      Baso # 0.07      Imm Grans # 0.03     URINALYSIS MICROSCOPIC - Abnormal    RBC, UA 9 (*)     WBC, UA >100 (*)     Bacteria, UA Rare      Yeast, UA Occasional (*)     Squamous Epithelial Cells, UA 8      Non-Squamous Epithelial Cells 1 (*)     Microscopic Comment       HEPATITIS C ANTIBODY - Normal    Hep C Ab Interp Non-Reactive     HIV 1 / 2 ANTIBODY - Normal    HIV 1/2 Ag/Ab Non-Reactive     CULTURE, URINE    Urine Culture No Growth     CBC W/ AUTO DIFFERENTIAL    Narrative:     The following orders were created for panel order CBC auto differential.  Procedure                               Abnormality         Status                     ---------                               -----------         ------                     CBC with Differential[[id number]]       Abnormal            Final result                 Please view results for these tests on the  individual orders.          Imaging Results              CT Renal Stone Study ABD Pelvis WO (Final result)  Result time 04/24/25 22:06:53      Final result by Rimma Dunham MD (04/24/25 22:06:53)                   Impression:      Bladder wall thickening.  Correlate for cystitis    Other findings above      Electronically signed by: Rimma Dunham  Date:    04/24/2025  Time:    22:06               Narrative:    EXAMINATION:  CT RENAL STONE STUDY ABD PELVIS WO    CLINICAL HISTORY:  Flank pain, kidney stone suspected;    TECHNIQUE:  Low dose axial images, sagittal and coronal reformations were obtained from the lung bases to the pubic symphysis.  Contrast was not administered.    COMPARISON:  04/02/2023    FINDINGS:  Soft tissues: Unremarkable.    Bones: No acute osseous abnormality.    Lower chest: Normal heart size. No pericardial effusion.    Lung Bases: Well aerated, without consolidation or pleural fluid.    Liver: Normal in size and attenuation, with no focal hepatic lesions.    Gallbladder: Cholelithiasis    Bile Ducts: No evidence of dilated ducts.    Pancreas: No mass or peripancreatic fat stranding.    Spleen: Unremarkable.    Adrenals: Unremarkable.    Kidneys/ Ureters: Unremarkable.    Bladder: Bladder wall thickening.    Reproductive organs: Unremarkable.    GI Tract/Mesentery: No evidence of bowel obstruction or inflammation.  Large volume stool throughout the colon and rectum.  Diverticulosis.    Peritoneal Space: No ascites. No free air.    Lymphadenopathy: No significant adenopathy.    Vasculature: No significant atherosclerosis or aneurysm.                                       Medications   cefTRIAXone injection 2 g (2 g Intravenous Given 4/24/25 2213)     Medical Decision Making  Pt with hx of utis with left flank pain, no cva tenderness, afebrile, nontoxic.  DDX uti, pyelonephritis.  Workup started in ED and care turned over to oncomming physician pending UA and blood work  results.    Amount and/or Complexity of Data Reviewed  Radiology: ordered.    Risk  Prescription drug management.               ED Course as of 04/29/25 1055   Thu Apr 24, 2025   2201 Prior urine culture demonstrates a Klebsiella that is sensitive to ceftriaxone [IC]   2229 I evaluated the patient, she is very well-appearing, she reports that her pain has gone, we discussed her results including likely UTI, no evidence of nephrolithiasis.  Discussed options to include oral antibiotics versus admission for IV antibiotics.  With her overall nontoxic appearance are normal vitals are reassuring labs and she is feeling better she would prefer to go home I think this is reasonable.  She got a dose of ceftriaxone here I will give her Ceftin, Return precautions/follow up instructions/treatment plan given, expressed agreement and understanding.    [IC]      ED Course User Index  [IC] Marcos Rodriguez MD                           Clinical Impression:  Final diagnoses:  [R10.9] Flank pain (Primary)  [N39.0, R31.9] Urinary tract infection with hematuria, site unspecified          ED Disposition Condition    Discharge Stable          ED Prescriptions       Medication Sig Dispense Start Date End Date Auth. Provider    cefUROXime (CEFTIN) 500 MG tablet Take 1 tablet (500 mg total) by mouth 2 (two) times daily. for 10 days 20 tablet 4/24/2025 5/4/2025 Marcos Rodriguez MD          Follow-up Information       Follow up With Specialties Details Why Contact Info Additional Information    Piyush Sharif MD Family Medicine Schedule an appointment as soon as possible for a visit on 4/28/2025  [address] [facility] [address]  [phone]       [facility] - Emergency Dept Emergency Medicine Go to  As needed, If symptoms worsen [address] [facility] [id number] [phone] 1st floor               [1]   Social History  Tobacco Use    Smoking status: Former     Current packs/day: 0.00      Average packs/day: 2.0 packs/day for 42.0 years (84.0 ttl pk-yrs)     Types: Cigarettes     Start date: 1968     Quit date: 2010     Years since quitting: 15.3     Passive exposure: Past    Smokeless tobacco: Never   Substance Use Topics    Alcohol use: Not Currently     Comment: rarely    Drug use: No        Olvin Julio MD  04/29/25 1087

## 2025-04-27 LAB — BACTERIA UR CULT: NO GROWTH

## 2025-04-28 NOTE — PROGRESS NOTES
Outpatient Rehab    Physical Therapy Visit    Patient Name: Alyssa John  MRN: [id number]  YOB: 1950  Encounter Date: 4/23/2025    Therapy Diagnosis:   Encounter Diagnoses   Name Primary?    Impaired functional mobility, balance, gait, and endurance Yes    General weakness      Physician: Piyush Sharif MD    Physician Orders: Eval and Treat  Medical Diagnosis: Arthritis  Gait disturbance  Physical deconditioning    Visit # / Visits Authorized:  4 / 10  Insurance Authorization Period: 4/7/2025 to 12/31/2025  Date of Evaluation: 4/10/2025  Plan of Care Certification: 4/10/2025 to 7/1/2025     PT/PTA: PT   Number of PTA visits since last PT visit:0  Time In: 1650   Time Out: 1735  Total Time: 45   Total Billable Time: 45    FOTO:  Intake Score:  %  Survey Score 1:  %  Survey Score 2:  %    Precautions     Fall and MS      Subjective   She is fatigued today but will try to walk..  Pain reported as 0/10.      Objective            Treatment:  Therapeutic Exercise  TE 1: Standing in parallel bars: hip abd 2 x 10 each  TE 2: Standing in parallel bars: hip ext 2 x 10 each  TE 3: Standing in parallel bars: heel raises 2 x 10  TE 4: SciFit level 1 x 10 minutes with bilateral UE/LE. Verbal cues for increasing range of motion and speed.  Therapeutic Activity  TA 1: attempted gait using jesus walker. Pt fatigued and would stand for a few seconds or take a step and then need a rest break. DC'd activity after multiple attempts to stand and ambulate.  TA 2: WC<>stepper stand pivot transfer with Mod A  TA 3: sit<>stand with Min A    Time Entry(in minutes):  Therapeutic Activity Time Entry: 10  Therapeutic Exercise Time Entry: 35    Assessment & Plan   Assessment: Alyssa had poor tolerance to treatment session today. She arrived fatigued and was unable to ambulate using jesus walker. Limited standing tolerance requiring frequent seated rest breaks between sets of standing strength training in parallel  teto.  Evaluation/Treatment Tolerance: Patient limited by fatigue    Patient will continue to benefit from skilled outpatient physical therapy to address the deficits listed in the problem list box on initial evaluation, provide pt/family education and to maximize pt's level of independence in the home and community environment.     Patient's spiritual, cultural, and educational needs considered and patient agreeable to plan of care and goals.     Education  Education was done with Patient. The patient's learning style includes Listening. The patient Verbalizes understanding.         Continue with HEP       Plan: Continue with PC to increase activities as patient tolerates.    Goals:   Active       Long Term Goals       Patient will report compliance with updated home exercise program for 5 days a week or more to maintain improvements post discharge.  (Progressing)       Start:  04/10/25    Expected End:  07/01/25            Patient will demonstrate improved bed mobility by performing supine<>sit with contact guard assistance. (Progressing)       Start:  04/10/25    Expected End:  07/01/25            Patient will perform stand pivot transfer wheelchair to/from mat with contact guard assistance to improve independence. (Progressing)       Start:  04/10/25    Expected End:  07/01/25               Short Term Goals       Patient will report compliance with home exercise program 5 days a week to improve carry over.  (Progressing)       Start:  04/10/25    Expected End:  07/01/25            Patient to improve bed mobility demonstrating rolling bilaterally with supervision. (Progressing)       Start:  04/10/25    Expected End:  07/01/25            PT will assess standing tolerance and gait to set appropriate goals. (Progressing)       Start:  04/10/25    Expected End:  07/01/25                Madeleine Malcolm, PT

## 2025-04-28 NOTE — ED PROVIDER NOTES
This patient was signed out to me pending her CT scan and urinalysis.  She has a UTI.  She does not have a kidney stone.  She is very well-appearing.  Vitals are reassuring. See ED course for discussion regarding disposition.           ED Course as of 04/28/25 0246   Thu Apr 24, 2025 2201 Prior urine culture demonstrates a Klebsiella that is sensitive to ceftriaxone [IC]   2229 I evaluated the patient, she is very well-appearing, she reports that her pain has gone, we discussed her results including likely UTI, no evidence of nephrolithiasis.  Discussed options to include oral antibiotics versus admission for IV antibiotics.  With her overall nontoxic appearance are normal vitals are reassuring labs and she is feeling better she would prefer to go home I think this is reasonable.  She got a dose of ceftriaxone here I will give her Ceftin, Return precautions/follow up instructions/treatment plan given, expressed agreement and understanding.    [IC]      ED Course User Index  [IC] Marcos Rodriguez MD                           Clinical Impression:  Final diagnoses:  [R10.9] Flank pain (Primary)  [N39.0, R31.9] Urinary tract infection with hematuria, site unspecified          ED Disposition Condition    Discharge Stable          ED Prescriptions       Medication Sig Dispense Start Date End Date Auth. Provider    cefUROXime (CEFTIN) 500 MG tablet Take 1 tablet (500 mg total) by mouth 2 (two) times daily. for 10 days 20 tablet 4/24/2025 5/4/2025 Marcos Rodriguez MD          Follow-up Information       Follow up With Specialties Details Why Contact Info Additional Information    Piyush Sharif MD Family Medicine Schedule an appointment as soon as possible for a visit on 4/28/2025  [address] [facility] [address]  [phone]       [facility] - Emergency Dept Emergency Medicine Go to  As needed, If symptoms worsen [address] [facility] [id number] [phone] 1st floor              Marcos Rodriguez MD  04/28/25 6165

## 2025-04-30 ENCOUNTER — CLINICAL SUPPORT (OUTPATIENT)
Dept: REHABILITATION | Facility: HOSPITAL | Age: 75
End: 2025-04-30
Payer: MEDICARE

## 2025-04-30 DIAGNOSIS — Z74.09 IMPAIRED FUNCTIONAL MOBILITY, BALANCE, GAIT, AND ENDURANCE: ICD-10-CM

## 2025-04-30 DIAGNOSIS — R53.1 GENERAL WEAKNESS: ICD-10-CM

## 2025-04-30 DIAGNOSIS — Z74.09 IMPAIRED FUNCTIONAL MOBILITY, BALANCE, AND ENDURANCE: Primary | ICD-10-CM

## 2025-04-30 DIAGNOSIS — R53.81 PHYSICAL DECONDITIONING: ICD-10-CM

## 2025-04-30 PROCEDURE — 97110 THERAPEUTIC EXERCISES: CPT | Mod: PO

## 2025-04-30 PROCEDURE — 97530 THERAPEUTIC ACTIVITIES: CPT | Mod: PO

## 2025-04-30 NOTE — PROGRESS NOTES
Outpatient Rehab    Physical Therapy Visit    Patient Name: Alyssa John  MRN: [id number]  YOB: 1950  Encounter Date: 4/30/2025    Therapy Diagnosis:   Encounter Diagnoses   Name Primary?    Impaired functional mobility, balance, and endurance Yes    Physical deconditioning     Impaired functional mobility, balance, gait, and endurance     General weakness      Physician: Piyush Sharif MD    Physician Orders: Eval and Treat  Medical Diagnosis: Arthritis  Gait disturbance  Physical deconditioning    Visit # / Visits Authorized:  5 / 10  Insurance Authorization Period: 4/7/2025 to 12/31/2025  Date of Evaluation: 4/10/2025  Plan of Care Certification: 4/10/2025 to 7/1/2025     PT/PTA: PT   Number of PTA visits since last PT visit:0  Time In: 1434   Time Out: 1515  Total Time: 41   Total Billable Time: 41    FOTO:  Intake Score:  %  Survey Score 1:  %  Survey Score 2:  %    Precautions     Fall and MS      Subjective   She is having a good day today..  Pain reported as 0/10.      Objective            Treatment:  Therapeutic Exercise  TE 1: Standing in parallel bars: hip abd 2 x 10 each with BUE support  TE 2: Standing in parallel bars: hip ext 2 x 10 each with BUE support  TE 3: Standing in parallel bars: heel raises 2 x 10 with BUE support  TE 4: Standing in parallel bars: mini squats x 10 with BUE support  Therapeutic Activity  TA 1: Gait using front wheeled walker for 10 feet with transport chair follow for safety. Limited foot clearance bilaterally. Verbal cues to increase step length on LLE. (limited distance due to bathroom need and frustration with L foot)  TA 2: sit<>stand with Min A    Time Entry(in minutes):  Therapeutic Activity Time Entry: 10  Therapeutic Exercise Time Entry: 31    Assessment & Plan   Assessment: Alyssa tolerated treatment session fair today. Started positively during session indicating she would like to walk today and today was a good day. AFter a few steps  ambulating using front wheeled walker pt reports she needs to use restroom. When returned she ambulated another few feet and became frustrated with difficulty moving LLE during swing phase. Requested to stop gait training. Performed strength training in parallel bars with BUE support. Pt fatigues easily during session limiting time for exercise. She remains appropriate for PT at this time.  Evaluation/Treatment Tolerance: Patient limited by fatigue    Patient will continue to benefit from skilled outpatient physical therapy to address the deficits listed in the problem list box on initial evaluation, provide pt/family education and to maximize pt's level of independence in the home and community environment.     Patient's spiritual, cultural, and educational needs considered and patient agreeable to plan of care and goals.     Education  Education was done with Patient. The patient's learning style includes Listening. The patient Verbalizes understanding.         Continue with HEP       Plan: Increase gait endurance and improve gait pattern    Goals:   Active       Long Term Goals       Patient will report compliance with updated home exercise program for 5 days a week or more to maintain improvements post discharge.  (Progressing)       Start:  04/10/25    Expected End:  07/01/25            Patient will demonstrate improved bed mobility by performing supine<>sit with contact guard assistance. (Progressing)       Start:  04/10/25    Expected End:  07/01/25            Patient will perform stand pivot transfer wheelchair to/from mat with contact guard assistance to improve independence. (Progressing)       Start:  04/10/25    Expected End:  07/01/25               Short Term Goals       Patient will report compliance with home exercise program 5 days a week to improve carry over.  (Progressing)       Start:  04/10/25    Expected End:  07/01/25            Patient to improve bed mobility demonstrating rolling bilaterally  with supervision. (Progressing)       Start:  04/10/25    Expected End:  07/01/25            PT will assess standing tolerance and gait to set appropriate goals. (Progressing)       Start:  04/10/25    Expected End:  07/01/25                Madeleine Malcolm PT

## 2025-05-02 ENCOUNTER — CLINICAL SUPPORT (OUTPATIENT)
Dept: REHABILITATION | Facility: HOSPITAL | Age: 75
End: 2025-05-02
Payer: MEDICARE

## 2025-05-02 DIAGNOSIS — Z74.09 IMPAIRED FUNCTIONAL MOBILITY, BALANCE, AND ENDURANCE: Primary | ICD-10-CM

## 2025-05-02 DIAGNOSIS — R53.81 PHYSICAL DECONDITIONING: ICD-10-CM

## 2025-05-02 DIAGNOSIS — R53.1 GENERAL WEAKNESS: ICD-10-CM

## 2025-05-02 DIAGNOSIS — Z74.09 IMPAIRED FUNCTIONAL MOBILITY, BALANCE, GAIT, AND ENDURANCE: ICD-10-CM

## 2025-05-02 PROCEDURE — 97110 THERAPEUTIC EXERCISES: CPT | Mod: PO

## 2025-05-02 PROCEDURE — 97530 THERAPEUTIC ACTIVITIES: CPT | Mod: PO

## 2025-05-02 NOTE — PROGRESS NOTES
Outpatient Rehab    Physical Therapy Visit    Patient Name: Alyssa John  MRN: [id number]  YOB: 1950  Encounter Date: 5/2/2025    Therapy Diagnosis:   Encounter Diagnoses   Name Primary?    Impaired functional mobility, balance, and endurance Yes    Physical deconditioning     Impaired functional mobility, balance, gait, and endurance     General weakness        Physician: Piyush Sharif MD    Physician Orders: Eval and Treat  Medical Diagnosis: Arthritis  Gait disturbance  Physical deconditioning    Visit # / Visits Authorized:  6 / 10  Insurance Authorization Period: 4/7/2025 to 12/31/2025  Date of Evaluation: 4/10/2025  Plan of Care Certification: 4/10/2025 to 7/1/2025     PT/PTA: PT   Number of PTA visits since last PT visit:0  Time In: 1146   Time Out: 1229  Total Time: 43   Total Billable Time: 43    FOTO:  Intake Score:  %  Survey Score 1:  %  Survey Score 2:  %    Precautions     Fall and MS      Subjective   She is feeling okay today. Somewhat fatigued from poor sleep last night..  Pain reported as 0/10.      Objective            Treatment:  Therapeutic Exercise  TE 1: SciFit level 1 x 15 with frequent rest breaks and verbal cues to increase ROM  Therapeutic Activity  TA 1: Gait using front wheeled walker for 2 x 5 feet with transport chair follow for safety. Limited foot clearance bilaterally. Verbal cues to increase step length on LLE.  TA 2: sit<>stand with Min A  TA 3: WC<>scifit stand pivot transfer x 2 with Min A    Time Entry(in minutes):  Therapeutic Activity Time Entry: 23  Therapeutic Exercise Time Entry: 19    Assessment & Plan   Assessment: Alyssa had poor tolerance to PT session today. Limited activities performed due to frequent need of rest. Difficulty ambulating more than a few steps at a time with poor foot clearance bilaterally, worse on left side. Limited range of motion on SciFit bike with encouragement to increased pace and ROM. PT observed erythema and  swelling to bilateral lower legs. Instructed to see MD for treatment as soon as possible.  indicates appointment next week. Instructed to see MD prior to that if pain or redness worsens or does not improve.  Evaluation/Treatment Tolerance: Patient limited by fatigue    Patient will continue to benefit from skilled outpatient physical therapy to address the deficits listed in the problem list box on initial evaluation, provide pt/family education and to maximize pt's level of independence in the home and community environment.     Patient's spiritual, cultural, and educational needs considered and patient agreeable to plan of care and goals.     Education  Education was done with Patient and Other recipient present. The patient's learning style includes Listening. The patient Verbalizes understanding.  They identified as Spouse/significant other. The reported learning style is Listening. The recipient Verbalizes understanding.     Have MD assess redness and swelling to lower legs as soon as possible.         Plan: Increase gait endurance and improve gait pattern    Goals:   Active       Long Term Goals       Patient will report compliance with updated home exercise program for 5 days a week or more to maintain improvements post discharge.  (Progressing)       Start:  04/10/25    Expected End:  07/01/25            Patient will demonstrate improved bed mobility by performing supine<>sit with contact guard assistance. (Progressing)       Start:  04/10/25    Expected End:  07/01/25            Patient will perform stand pivot transfer wheelchair to/from mat with contact guard assistance to improve independence. (Progressing)       Start:  04/10/25    Expected End:  07/01/25               Short Term Goals       Patient will report compliance with home exercise program 5 days a week to improve carry over.  (Progressing)       Start:  04/10/25    Expected End:  07/01/25            Patient to improve bed mobility  demonstrating rolling bilaterally with supervision. (Progressing)       Start:  04/10/25    Expected End:  07/01/25            PT will assess standing tolerance and gait to set appropriate goals. (Progressing)       Start:  04/10/25    Expected End:  07/01/25                  Madeleine Malcolm, PT

## 2025-05-06 ENCOUNTER — CLINICAL SUPPORT (OUTPATIENT)
Dept: REHABILITATION | Facility: HOSPITAL | Age: 75
End: 2025-05-06
Payer: MEDICARE

## 2025-05-06 DIAGNOSIS — R53.81 PHYSICAL DECONDITIONING: ICD-10-CM

## 2025-05-06 DIAGNOSIS — R53.1 GENERAL WEAKNESS: ICD-10-CM

## 2025-05-06 DIAGNOSIS — Z74.09 IMPAIRED FUNCTIONAL MOBILITY, BALANCE, AND ENDURANCE: Primary | ICD-10-CM

## 2025-05-06 DIAGNOSIS — Z74.09 IMPAIRED FUNCTIONAL MOBILITY, BALANCE, GAIT, AND ENDURANCE: ICD-10-CM

## 2025-05-06 PROCEDURE — 97530 THERAPEUTIC ACTIVITIES: CPT | Mod: PO,CQ

## 2025-05-06 PROCEDURE — 97110 THERAPEUTIC EXERCISES: CPT | Mod: PO,CQ

## 2025-05-06 NOTE — PROGRESS NOTES
Outpatient Rehab    Physical Therapy Visit    Patient Name: Alyssa John  MRN: [id number]  YOB: 1950  Encounter Date: 5/6/2025    Therapy Diagnosis:   Encounter Diagnoses   Name Primary?    Impaired functional mobility, balance, and endurance Yes    Physical deconditioning     Impaired functional mobility, balance, gait, and endurance     General weakness      Physician: Piyush Sharif MD    Physician Orders: Eval and Treat  Medical Diagnosis: Arthritis  Gait disturbance  Physical deconditioning    Visit # / Visits Authorized:  7 / 10  Insurance Authorization Period: 4/7/2025 to 12/31/2025  Date of Evaluation: 4/10/2025  Plan of Care Certification: 4/10/2025 to 7/1/2025      PT/PTA: PTA   Number of PTA visits since last PT visit:1  Time In: 1430   Time Out: 1515  Total Time (in minutes): 45   Total Billable Time (in minutes): 45    FOTO:  Intake Score:  %  Survey Score 2:  %  Survey Score 3:  %         Subjective   States using medicine  for B legs.  Pain reported as 0/10.      Objective            Treatment:  Therapeutic Exercise  TE 1: SciFit Stepper Level 1 with BUE 15 minutes with occasional VC and Assist for increased range  Therapeutic Activity  TA 1: WC<>Stepper transfer with RW MaxA for anterior lean to stand, total assistance for direction  TA 2: WC<>STand Maxa for anterior lean  TA 3: Ambulation with RW 16 feet and 20 feet with wc to follow, assistance for forward progression to sba and occasional assistance for balance    Time Entry(in minutes):  Therapeutic Activity Time Entry: 30  Therapeutic Exercise Time Entry: 15    Assessment & Plan   Assessment: Alyssa with increased tolerance to ambulation and requested decreased assistance with fair results.  Alyssa needing encouragement at start of session as well as cues to remain on task.       Patient will continue to benefit from skilled outpatient physical therapy to address the deficits listed in the problem list box on  initial evaluation, provide pt/family education and to maximize pt's level of independence in the home and community environment.     Patient's spiritual, cultural, and educational needs considered and patient agreeable to plan of care and goals.           Plan: Continue with POC to increase activity endurance as patient tolerates.    Goals:   Active       Long Term Goals       Patient will report compliance with updated home exercise program for 5 days a week or more to maintain improvements post discharge.  (Progressing)       Start:  04/10/25    Expected End:  07/01/25            Patient will demonstrate improved bed mobility by performing supine<>sit with contact guard assistance. (Progressing)       Start:  04/10/25    Expected End:  07/01/25            Patient will perform stand pivot transfer wheelchair to/from mat with contact guard assistance to improve independence. (Progressing)       Start:  04/10/25    Expected End:  07/01/25               Short Term Goals       Patient will report compliance with home exercise program 5 days a week to improve carry over.  (Progressing)       Start:  04/10/25    Expected End:  07/01/25            Patient to improve bed mobility demonstrating rolling bilaterally with supervision. (Progressing)       Start:  04/10/25    Expected End:  07/01/25            PT will assess standing tolerance and gait to set appropriate goals. (Progressing)       Start:  04/10/25    Expected End:  07/01/25                Danyelle Benoit, PTA

## 2025-05-07 ENCOUNTER — HOSPITAL ENCOUNTER (EMERGENCY)
Facility: HOSPITAL | Age: 75
Discharge: HOME | End: 2025-05-07
Attending: STUDENT IN AN ORGANIZED HEALTH CARE EDUCATION/TRAINING PROGRAM
Payer: MEDICARE

## 2025-05-07 ENCOUNTER — PATIENT MESSAGE (OUTPATIENT)
Dept: FAMILY MEDICINE | Facility: CLINIC | Age: 75
End: 2025-05-07

## 2025-05-07 ENCOUNTER — OFFICE VISIT (OUTPATIENT)
Dept: FAMILY MEDICINE | Facility: CLINIC | Age: 75
End: 2025-05-07
Payer: MEDICARE

## 2025-05-07 VITALS
WEIGHT: 190 LBS | SYSTOLIC BLOOD PRESSURE: 115 MMHG | BODY MASS INDEX: 32.44 KG/M2 | OXYGEN SATURATION: 95 % | HEART RATE: 76 BPM | RESPIRATION RATE: 17 BRPM | HEIGHT: 64 IN | TEMPERATURE: 99 F | DIASTOLIC BLOOD PRESSURE: 56 MMHG

## 2025-05-07 DIAGNOSIS — R10.9 ABDOMINAL PAIN, UNSPECIFIED ABDOMINAL LOCATION: Primary | ICD-10-CM

## 2025-05-07 DIAGNOSIS — R10.32 LLQ PAIN: Primary | ICD-10-CM

## 2025-05-07 DIAGNOSIS — N30.90 CYSTITIS WITHOUT HEMATURIA: Primary | ICD-10-CM

## 2025-05-07 DIAGNOSIS — R10.32 LEFT LOWER QUADRANT ABDOMINAL PAIN: ICD-10-CM

## 2025-05-07 DIAGNOSIS — R11.0 NAUSEA: ICD-10-CM

## 2025-05-07 DIAGNOSIS — N30.90 CYSTITIS WITHOUT HEMATURIA: ICD-10-CM

## 2025-05-07 DIAGNOSIS — R10.9 ABDOMINAL PAIN, UNSPECIFIED ABDOMINAL LOCATION: ICD-10-CM

## 2025-05-07 DIAGNOSIS — N39.0 RECURRENT UTI: Primary | ICD-10-CM

## 2025-05-07 LAB
ABSOLUTE EOSINOPHIL (SMH): 0.19 K/UL
ABSOLUTE MONOCYTE (SMH): 0.7 K/UL (ref 0.3–1)
ABSOLUTE NEUTROPHIL COUNT (SMH): 8.7 K/UL (ref 1.8–7.7)
ALBUMIN SERPL-MCNC: 3.8 G/DL (ref 3.5–5.2)
ALP SERPL-CCNC: 137 UNIT/L (ref 55–135)
ALT SERPL-CCNC: 10 UNIT/L (ref 10–44)
ANION GAP (SMH): 11 MMOL/L (ref 8–16)
AST SERPL-CCNC: 11 UNIT/L (ref 10–40)
BACTERIA #/AREA URNS AUTO: ABNORMAL /HPF
BASOPHILS # BLD AUTO: 0.05 K/UL
BASOPHILS NFR BLD AUTO: 0.4 %
BILIRUB SERPL-MCNC: 0.4 MG/DL (ref 0.1–1)
BILIRUB UR QL STRIP.AUTO: NEGATIVE
BUN SERPL-MCNC: 17 MG/DL (ref 8–23)
CALCIUM SERPL-MCNC: 8.9 MG/DL (ref 8.7–10.5)
CHLORIDE SERPL-SCNC: 111 MMOL/L (ref 95–110)
CLARITY UR: CLEAR
CO2 SERPL-SCNC: 18 MMOL/L (ref 23–29)
COLOR UR AUTO: YELLOW
CREAT SERPL-MCNC: 0.7 MG/DL (ref 0.5–1.4)
ERYTHROCYTE [DISTWIDTH] IN BLOOD BY AUTOMATED COUNT: 14.7 % (ref 11.5–14.5)
GFR SERPLBLD CREATININE-BSD FMLA CKD-EPI: >60 ML/MIN/1.73/M2
GLUCOSE SERPL-MCNC: 141 MG/DL (ref 70–110)
GLUCOSE UR QL STRIP: NEGATIVE
HCT VFR BLD AUTO: 39.8 % (ref 37–48.5)
HGB BLD-MCNC: 12.6 GM/DL (ref 12–16)
HGB UR QL STRIP: NEGATIVE
IMM GRANULOCYTES # BLD AUTO: 0.05 K/UL (ref 0–0.04)
IMM GRANULOCYTES NFR BLD AUTO: 0.4 % (ref 0–0.5)
KETONES UR QL STRIP: NEGATIVE
LEUKOCYTE ESTERASE UR QL STRIP: ABNORMAL
LIPASE SERPL-CCNC: 31 U/L (ref 4–60)
LYMPHOCYTES # BLD AUTO: 1.68 K/UL (ref 1–4.8)
MCH RBC QN AUTO: 26.5 PG (ref 27–31)
MCHC RBC AUTO-ENTMCNC: 31.7 G/DL (ref 32–36)
MCV RBC AUTO: 84 FL (ref 82–98)
MICROSCOPIC COMMENT: ABNORMAL
NITRITE UR QL STRIP: NEGATIVE
NUCLEATED RBC (/100WBC) (SMH): 0 /100 WBC
PH UR STRIP: 7 [PH]
PLATELET # BLD AUTO: 237 K/UL (ref 150–450)
PMV BLD AUTO: 10.9 FL (ref 9.2–12.9)
POTASSIUM SERPL-SCNC: 3.8 MMOL/L (ref 3.5–5.1)
PROT SERPL-MCNC: 7.1 GM/DL (ref 6–8.4)
PROT UR QL STRIP: NEGATIVE
RBC # BLD AUTO: 4.75 M/UL (ref 4–5.4)
RBC #/AREA URNS AUTO: 5 /HPF
RELATIVE EOSINOPHIL (SMH): 1.7 % (ref 0–8)
RELATIVE LYMPHOCYTE (SMH): 14.8 % (ref 18–48)
RELATIVE MONOCYTE (SMH): 6.2 % (ref 4–15)
RELATIVE NEUTROPHIL (SMH): 76.5 % (ref 38–73)
SODIUM SERPL-SCNC: 140 MMOL/L (ref 136–145)
SP GR UR STRIP: >=1.03
SQUAMOUS #/AREA URNS AUTO: 4 /HPF
UROBILINOGEN UR STRIP-ACNC: NEGATIVE EU/DL
WBC # BLD AUTO: 11.33 K/UL (ref 3.9–12.7)
WBC #/AREA URNS AUTO: 33 /HPF

## 2025-05-07 PROCEDURE — 98006 SYNCH AUDIO-VIDEO EST MOD 30: CPT | Mod: 95,,, | Performed by: STUDENT IN AN ORGANIZED HEALTH CARE EDUCATION/TRAINING PROGRAM

## 2025-05-07 PROCEDURE — 82040 ASSAY OF SERUM ALBUMIN: CPT | Performed by: STUDENT IN AN ORGANIZED HEALTH CARE EDUCATION/TRAINING PROGRAM

## 2025-05-07 PROCEDURE — 99285 EMERGENCY DEPT VISIT HI MDM: CPT | Mod: 25

## 2025-05-07 PROCEDURE — 25500020 PHARM REV CODE 255: Performed by: STUDENT IN AN ORGANIZED HEALTH CARE EDUCATION/TRAINING PROGRAM

## 2025-05-07 PROCEDURE — 85025 COMPLETE CBC W/AUTO DIFF WBC: CPT | Performed by: STUDENT IN AN ORGANIZED HEALTH CARE EDUCATION/TRAINING PROGRAM

## 2025-05-07 PROCEDURE — 4010F ACE/ARB THERAPY RXD/TAKEN: CPT | Mod: CPTII,95,, | Performed by: STUDENT IN AN ORGANIZED HEALTH CARE EDUCATION/TRAINING PROGRAM

## 2025-05-07 PROCEDURE — 25000003 PHARM REV CODE 250: Performed by: STUDENT IN AN ORGANIZED HEALTH CARE EDUCATION/TRAINING PROGRAM

## 2025-05-07 PROCEDURE — 36415 COLL VENOUS BLD VENIPUNCTURE: CPT | Performed by: STUDENT IN AN ORGANIZED HEALTH CARE EDUCATION/TRAINING PROGRAM

## 2025-05-07 PROCEDURE — 96361 HYDRATE IV INFUSION ADD-ON: CPT

## 2025-05-07 PROCEDURE — 96375 TX/PRO/DX INJ NEW DRUG ADDON: CPT

## 2025-05-07 PROCEDURE — 96374 THER/PROPH/DIAG INJ IV PUSH: CPT

## 2025-05-07 PROCEDURE — G2211 COMPLEX E/M VISIT ADD ON: HCPCS | Mod: 95,,, | Performed by: STUDENT IN AN ORGANIZED HEALTH CARE EDUCATION/TRAINING PROGRAM

## 2025-05-07 PROCEDURE — 87086 URINE CULTURE/COLONY COUNT: CPT | Performed by: STUDENT IN AN ORGANIZED HEALTH CARE EDUCATION/TRAINING PROGRAM

## 2025-05-07 PROCEDURE — 81003 URINALYSIS AUTO W/O SCOPE: CPT | Performed by: STUDENT IN AN ORGANIZED HEALTH CARE EDUCATION/TRAINING PROGRAM

## 2025-05-07 PROCEDURE — 83690 ASSAY OF LIPASE: CPT | Performed by: STUDENT IN AN ORGANIZED HEALTH CARE EDUCATION/TRAINING PROGRAM

## 2025-05-07 PROCEDURE — 63600175 PHARM REV CODE 636 W HCPCS: Performed by: STUDENT IN AN ORGANIZED HEALTH CARE EDUCATION/TRAINING PROGRAM

## 2025-05-07 RX ORDER — DICYCLOMINE HYDROCHLORIDE 10 MG/1
10 CAPSULE ORAL 3 TIMES DAILY PRN
Qty: 21 CAPSULE | Refills: 0 | Status: ON HOLD | OUTPATIENT
Start: 2025-05-07 | End: 2025-05-14

## 2025-05-07 RX ORDER — ONDANSETRON HYDROCHLORIDE 2 MG/ML
4 INJECTION, SOLUTION INTRAVENOUS
Status: COMPLETED | OUTPATIENT
Start: 2025-05-07 | End: 2025-05-07

## 2025-05-07 RX ORDER — CEPHALEXIN 500 MG/1
500 CAPSULE ORAL EVERY 12 HOURS
Qty: 14 CAPSULE | Refills: 0 | Status: CANCELLED | OUTPATIENT
Start: 2025-05-07 | End: 2025-05-14

## 2025-05-07 RX ORDER — MORPHINE SULFATE 4 MG/ML
4 INJECTION, SOLUTION INTRAMUSCULAR; INTRAVENOUS
Refills: 0 | Status: COMPLETED | OUTPATIENT
Start: 2025-05-07 | End: 2025-05-07

## 2025-05-07 RX ORDER — SODIUM CHLORIDE 9 MG/ML
1000 INJECTION, SOLUTION INTRAVENOUS
Status: COMPLETED | OUTPATIENT
Start: 2025-05-07 | End: 2025-05-07

## 2025-05-07 RX ORDER — CEPHALEXIN 500 MG/1
500 CAPSULE ORAL EVERY 12 HOURS
Qty: 10 CAPSULE | Refills: 0 | Status: ON HOLD | OUTPATIENT
Start: 2025-05-07 | End: 2025-05-12

## 2025-05-07 RX ADMIN — IOHEXOL 100 ML: 350 INJECTION, SOLUTION INTRAVENOUS at 06:05

## 2025-05-07 RX ADMIN — SODIUM CHLORIDE 1000 ML: 9 INJECTION, SOLUTION INTRAVENOUS at 05:05

## 2025-05-07 RX ADMIN — MORPHINE SULFATE 4 MG: 4 INJECTION INTRAVENOUS at 05:05

## 2025-05-07 RX ADMIN — ONDANSETRON 4 MG: 2 INJECTION INTRAMUSCULAR; INTRAVENOUS at 05:05

## 2025-05-07 NOTE — PROGRESS NOTES
[facility] Primary Care Note    Subjective:    Chief Complaint:   Chief Complaint   Patient presents with    Abdominal Pain      274}    The HPI and pertinent ROS is included in the Diagnostic Impression Remarks section at the end of the note. Please see below for further details.     Alyssa is a pleasant intelligent patient who is here for evaluation.     I saw patient on video with audio on [employer] because patient could not use [employer] video easily. Verbal consent obtained.       The following portions of the patient's history were reviewed and updated as appropriate: allergies, current medications, past family history, past medical history, past social history, past surgical history and problem list.    She  has a past medical history of Arthritis, Coronary artery disease, Encounter for blood transfusion, Epilepsy, GERD (gastroesophageal reflux disease), Headache, Hypertension, MI, old, MS (multiple sclerosis), and Seizures.  She  has a past surgical history that includes Appendectomy; Back surgery; Coronary stent placement; Colonoscopy (N/A, 09/16/2015); right knee arthroscopy; Cataract extraction (Bilateral); Breast biopsy (Right, 15 yrs ago); insertion, neurostimulator, temporary, sacral (N/A, 9/27/2024); and Implantation of permanent sacral nerve stimulator (N/A, 10/11/2024).  She  reports that she quit smoking about 15 years ago. Her smoking use included cigarettes. She started smoking about 57 years ago. She has a 84 pack-year smoking history. She has been exposed to tobacco smoke. She has never used smokeless tobacco. She reports that she does not currently use alcohol. She reports that she does not use drugs.  She family history includes Heart disease in her father; Scleroderma in her mother.    Review of patient's allergies indicates:   Allergen Reactions    Codeine      Other reaction(s): throat tightness    Dilantin [phenytoin sodium extended]     Phenobarbital     Tegretol [carbamazepine]   "      Physical Examination  There were no vitals taken for this visit.   274}  Wt Readings from Last 3 Encounters:   05/07/25 86.2 kg (190 lb)   04/24/25 83.9 kg (185 lb)   04/22/25 83.9 kg (185 lb)     BP Readings from Last 3 Encounters:   05/07/25 (!) 115/56   04/24/25 (!) 182/88   04/22/25 (!) 162/83     Estimated body mass index is 33.13 kg/m² as calculated from the following:    Height as of an earlier encounter on 5/7/25: 5' 3.5" (1.613 m).    Weight as of an earlier encounter on 5/7/25: 86.2 kg (190 lb).     CONSTITUTIONAL: No apparent distress. Does not appear acutely ill or septic. Appears adequately hydrated.  PULM: Breathing unlabored.  PSYCHIATRIC: Alert and conversant and grossly oriented. Mood is grossly neutral. Affect appropriate. Judgment and insight grossly intact.  Integument: normal coloration and turgor, no rashes, no suspicious skin lesions noted.    Data reviewed 274}  Previous medical records reviewed and summarized in HPI.     Laboratory  274}  I have reviewed old labs below:  Lab Results   Component Value Date    WBC 11.33 05/07/2025    HGB 12.6 05/07/2025    HCT 39.8 05/07/2025    MCV 84 05/07/2025     05/07/2025     05/07/2025    K 3.8 05/07/2025     (H) 05/07/2025    CALCIUM 8.9 05/07/2025    PHOS 3.0 01/24/2025    CO2 18 (L) 05/07/2025     (H) 05/07/2025    BUN 17 05/07/2025    CREATININE 0.7 05/07/2025    ANIONGAP 11 05/07/2025    ESTGFRAFRICA >60.0 11/17/2021    EGFRNONAA >60.0 11/17/2021    PROT 7.1 05/07/2025    ALBUMIN 3.8 05/07/2025    BILITOT 0.4 05/07/2025    ALKPHOS 137 (H) 05/07/2025    ALT 10 05/07/2025    AST 11 05/07/2025    INR 0.9 09/17/2024    CHOL 163 04/16/2025    TRIG 132 04/16/2025    HDL 46 04/16/2025    LDLCALC 90.6 04/16/2025    TSH 2.224 08/12/2024    HGBA1C 6.0 09/18/2024     Lab reviewed by me: Particular labs of significance that I will monitor, workup, or treat to improve are mentioned below in diagnostic impression " remarks.  :[id number]}274}  Imaging/EKG: I have reviewed the pertinent results/findings and my personal findings are noted below in diagnostic impression remarks.  274}    CC:   Chief Complaint   Patient presents with    Abdominal Pain        274}    History of Present Illness  The patient presents for evaluation of abdominal pain and multiple questions following an emergency room visit.    She experienced abdominal pain and will complete her antibiotic course today. She reports no nausea, vomiting, or altered mental status. She has questions regarding her CT scan, which revealed some abnormalities, and is curious about the potential causes of her urinary tract infections and associated symptoms. She was prescribed a cephalosporin, which she will complete today.     Assessment/Plan  Alyssa John is a 74 y.o. female who presents with:    1. Cystitis without hematuria    2. Abdominal pain, unspecified abdominal location        Diagnostic Impression Remarks + HPI    Assessment & Plan  1. Urinary Tract Infection (UTI).  She will continue her antibiotic regimen, specifically cephalosporin. The culture results will be monitored to ensure the infection is resolved. There is no evidence of renal infection at this time. She is advised to report any exacerbation of symptoms promptly.    2. Abdominal Pain.  The pain is intermittent and had a sudden onset. A CT scan did not reveal any signs of diverticulitis, abscess, or infection, leaving the etiology unclear. If the pain recurs, Bentyl will be prescribed. She is instructed to inform immediately if the pain returns so that it can be closely monitored.    3. Seizure Disorder.  Her condition remains stable with no recent seizure activity. Continued monitoring is recommended.    4. Hypertension.  Her hypertension is well-managed. She should continue her current medication regimen and monitor her blood pressure regularly.    5. Pleural Effusion.  She had a pleural effusion but  "currently reports no dyspnea. Continued monitoring is recommended. If she experiences shortness of breath, an immediate x-ray is necessary. A consultation with a pulmonologist is also recommended.       This note was generated with the assistance of ambient listening technology. Verbal consent was obtained by the patient and accompanying visitor(s) for the recording of patient appointment to facilitate this note. I attest to having reviewed and edited the generated note for accuracy, though some syntax or spelling errors may persist. Please contact the author of this note for any clarification.       274}      The patient location is:  Patient Home [place]  The chief complaint leading to consultation is:   Chief Complaint   Patient presents with    Abdominal Pain     Total time spent with patient: 20 minutes     Visit type: Virtual visit with synchronous audio and video  Each patient to whom he or she provides medical services by telemedicine is:  (1) informed of the relationship between the physician and patient and the respective role of any other health care provider with respect to management of the patient; and (2) notified that he or she may decline to receive medical services by telemedicine and may withdraw from such care at any time.    This service was not originating from a related E/M service provided within the previous 7 days nor will  to an E/M service or procedure within the next 24 hours or my soonest available appointment.  Prevailing standard of care was able to be met in this synchronous audio and video visit    Documentation entered by me for this encounter may have been done in part using speech-recognition technology. Although I have made an effort to ensure accuracy, "sound like" errors may exist and should be interpreted in context.     1. Cystitis without hematuria    2. Abdominal pain, unspecified abdominal location       Medication List with Changes/Refills   New Medications    " "CEPHALEXIN (KEFLEX) 500 MG CAPSULE    Take 1 capsule (500 mg total) by mouth every 12 (twelve) hours. for 5 days    DICYCLOMINE (BENTYL) 10 MG CAPSULE    Take 1 capsule (10 mg total) by mouth 3 (three) times daily as needed (abdominal pain).   Current Medications    AMMONIUM LACTATE (LAC-HYDRIN) 12 % LOTION    Apply topically as needed for Dry Skin.    ATORVASTATIN (LIPITOR) 40 MG TABLET    TAKE 1 TABLET BY MOUTH EVERY DAY    CHOLECALCIFEROL, VITAMIN D3, 10 MCG (400 UNIT) CAP CAPSULE    Take 1 capsule (400 Units total) by mouth once daily.    CLOPIDOGREL (PLAVIX) 75 MG TABLET    Take 75 mg by mouth once daily.    CYANOCOBALAMIN (VITAMIN B-12) 1000 MCG TABLET    Take 1 tablet (1,000 mcg total) by mouth once daily.    DULOXETINE (CYMBALTA) 20 MG CAPSULE    TAKE 2 CAPSULES BY MOUTH EVERY DAY    ESTRADIOL (ESTRACE) 0.01 % (0.1 MG/GRAM) VAGINAL CREAM    Place 1 g vaginally 3 (three) times a week.    FAMOTIDINE (PEPCID) 20 MG TABLET    Take 1 tablet (20 mg total) by mouth once daily.    FUROSEMIDE (LASIX) 20 MG TABLET    Take 1 tablet (20 mg total) by mouth once daily.    HYDROCORTISONE 2.5 % CREAM    Apply topically 2 (two) times daily. Apply to red areas of lower legs for 7 days    LOSARTAN (COZAAR) 25 MG TABLET    Take 1 tablet (25 mg total) by mouth once daily.    NITROFURANTOIN (MACRODANTIN) 50 MG CAPSULE    Take 1 capsule (50 mg total) by mouth 4 (four) times daily.    POTASSIUM CHLORIDE SA (K-DUR,KLOR-CON) 20 MEQ TABLET    Take 1 tablet (20 mEq total) by mouth 2 (two) times daily.    PROPRANOLOL (INDERAL) 20 MG TABLET    Take 20 mg by mouth.    TOPIRAMATE (TOPAMAX) 100 MG TABLET    Take 1 tablet (100 mg total) by mouth 2 (two) times daily.     Modified Medications    No medications on file       Piyush Sharif MD  05/07/2025     Documentation entered by me for this encounter may have been done in part using speech-recognition technology. Although I have made an effort to ensure accuracy, "sound like" errors may " exist and should be interpreted in context.    If you are due for any health screening(s) below please notify me so we can arrange them to be ordered and scheduled to maintain your health. Most healthy patients at your age complete them, but you are free to accept or refuse.   All of your core healthy metrics are met.

## 2025-05-07 NOTE — DISCHARGE INSTRUCTIONS
Incidental findings noted on imaging to be discussed with your doctor regarding need for further workup or monitoring include the following:      Urinary bladder wall thickening, mild stool burden in the colon, cholelithiasis, worsening left-sided pleural effusion with passive atelectasis or airspace disease in the left lung base, borderline enlargement of the heart, small hiatal hernia, duodenal diverticulum.

## 2025-05-07 NOTE — ED PROVIDER NOTES
Encounter Date: 5/7/2025       History     Chief Complaint   Patient presents with    Abdominal Pain     Patient c/o pain to LLQ that began x 1 hour ago     HPI  74-year-old woman presents for evaluation of acute onset left lower quadrant abdominal pain.  She was being treated for a UTI.  She said she had been feeling better.  She has 1 more day of antibiotics.  She was feeling well when she went to bed then woke up with the acute onset left lower quadrant pain.  Denies fever chills chest pain shortness of breath.  Has had nausea without vomiting.  Denies change in urination or change in bowel habits.  Review of patient's allergies indicates:   Allergen Reactions    Codeine      Other reaction(s): throat tightness    Dilantin [phenytoin sodium extended]     Phenobarbital     Tegretol [carbamazepine]      Past Medical History:   Diagnosis Date    Arthritis     Coronary artery disease     Encounter for blood transfusion     Epilepsy     GERD (gastroesophageal reflux disease)     Headache     Hypertension     MI, old     MS (multiple sclerosis)     Seizures     epilepsy     Past Surgical History:   Procedure Laterality Date    APPENDECTOMY      BACK SURGERY      L5 discectomy    BREAST BIOPSY Right 15 yrs ago    benign    CATARACT EXTRACTION Bilateral     COLONOSCOPY N/A 09/16/2015    Procedure: COLONOSCOPY;  Surgeon: Henry Malcolm MD;  Location: [facility];  Service: Endoscopy;  Laterality: N/A;    CORONARY STENT PLACEMENT      IMPLANTATION OF PERMANENT SACRAL NERVE STIMULATOR N/A 10/11/2024    Procedure: INSERTION, NEUROSTIMULATOR, PERMANENT, SACRAL;  Surgeon: Abel Rosales MD;  Location: [facility] OR;  Service: Urology;  Laterality: N/A;  Mack RUIZ    INSERTION, NEUROSTIMULATOR, TEMPORARY, SACRAL N/A 9/27/2024    Procedure: INSERTION, NEUROSTIMULATOR, TEMPORARY, SACRAL;  Surgeon: Abel Rosales MD;  Location: [facility] OR;  Service: Urology;  Laterality: N/A;  1 hour 45 minutes Mack RUIZ    right knee  arthroscopy       Family History   Problem Relation Name Age of Onset    Scleroderma Mother      Heart disease Father          MI, CAD    Cancer Neg Hx      Diabetes Neg Hx      Stroke Neg Hx      Melanoma Neg Hx      Psoriasis Neg Hx      Lupus Neg Hx      Eczema Neg Hx       Social History[1]  Review of Systems   All other systems reviewed and are negative.      Physical Exam     Initial Vitals [05/07/25 0408]   BP Pulse Resp Temp SpO2   (!) 179/100 70 20 99.2 °F (37.3 °C) (!) 94 %      MAP       --         Physical Exam    Nursing note and vitals reviewed.  Constitutional: She appears well-developed. She is not diaphoretic.   HENT:   Head: Normocephalic and atraumatic.   Eyes: Right eye exhibits no discharge. Left eye exhibits no discharge.   Neck: No tracheal deviation present.   Cardiovascular:  Normal rate, regular rhythm and intact distal pulses.           Pulmonary/Chest: Breath sounds normal. No stridor. No respiratory distress.   Abdominal: Abdomen is soft. There is abdominal tenderness.   Left lower quadrant tenderness to palpation   Musculoskeletal:         General: No tenderness.     Neurological: She is alert and oriented to person, place, and time.   Skin: Skin is warm and dry.         ED Course   Procedures  Labs Reviewed   COMPREHENSIVE METABOLIC PANEL - Abnormal       Result Value    Sodium 140      Potassium 3.8      Chloride 111 (*)     CO2 18 (*)     Glucose 141 (*)     BUN 17      Creatinine 0.7      Calcium 8.9      Protein Total 7.1      Albumin 3.8      Bilirubin Total 0.4       (*)     AST 11      ALT 10      Anion Gap 11      eGFR >60     URINALYSIS, REFLEX TO URINE CULTURE - Abnormal    Color, UA Yellow      Appearance, UA Clear      Spec Grav UA >=1.030 (*)     pH, UA 7.0      Protein, UA Negative      Glucose, UA Negative      Ketones, UA Negative      Blood, UA Negative      Bilirubin, UA Negative      Urobilinogen, UA Negative      Nitrites, UA Negative      Leukocyte Esterase,  UA 2+ (*)    CBC WITH DIFFERENTIAL - Abnormal    WBC 11.33      RBC 4.75      Hgb 12.6      Hct 39.8      MCV 84      MCH 26.5 (*)     MCHC 31.7 (*)     RDW 14.7 (*)     Platelet Count 237      MPV 10.9      Nucleated RBC 0      Neut % 76.5 (*)     Lymph % 14.8 (*)     Mono % 6.2      Eos % 1.7      Basophil % 0.4      Imm Grans % 0.4      Neut # 8.7 (*)     Lymph # 1.68      Mono # 0.70      Eos # 0.19      Baso # 0.05      Imm Grans # 0.05 (*)    URINALYSIS MICROSCOPIC - Abnormal    RBC, UA 5 (*)     WBC, UA 33 (*)     Bacteria, UA Rare      Squamous Epithelial Cells, UA 4      Microscopic Comment       LIPASE - Normal    Lipase Level 31     CULTURE, URINE   CBC W/ AUTO DIFFERENTIAL    Narrative:     The following orders were created for panel order CBC auto differential.  Procedure                               Abnormality         Status                     ---------                               -----------         ------                     CBC with Differential[[id number]]       Abnormal            Final result                 Please view results for these tests on the individual orders.   EXTRA TUBES    Narrative:     The following orders were created for panel order EXTRA TUBES.  Procedure                               Abnormality         Status                     ---------                               -----------         ------                     Light Blue Top Hold[[id number]]                             In process                 Lavender Top Hold[[id number]]                               In process                   Please view results for these tests on the individual orders.   LIGHT BLUE TOP HOLD   LAVENDER TOP HOLD          Imaging Results              CT Abdomen Pelvis With IV Contrast NO Oral Contrast (Final result)  Result time 05/07/25 06:52:58      Final result by Koby Hong MD (05/07/25 06:52:58)                   Impression:      Urinary bladder wall thickening, more notable to the  left of midline.  Suggest correlation for cystitis.  Cystoscopy follow-up could be considered given the asymmetric appearance of bladder wall thickening.    Mild stool burden in the colon.    Cholelithiasis.    Worsening left-sided pleural effusion with passive atelectasis or airspace disease in the left lung base.    Borderline enlargement of the heart.    Small hiatal hernia.    Other incidental findings discussed in the body of the report.      Electronically signed by: Koby Hong MD  Date:    05/07/2025  Time:    06:52               Narrative:    EXAMINATION:  CT ABDOMEN PELVIS WITH IV CONTRAST    CLINICAL HISTORY:  abd pain;    TECHNIQUE:  Low dose axial images, sagittal and coronal reformations were obtained from the lung bases to the pubic symphysis following the IV administration of 100 mL of Omnipaque 350 .  Oral contrast was not given.    COMPARISON:  CT abdomen pelvis with contrast, 04/02/2023.  CT abdomen pelvis without contrast, 04/24/2025.    FINDINGS:  Lower Chest:    Heart is at the upper limit of normal in size.  Small hiatal hernia and patulous appearance of the lower esophagus with trace fluid in the lower esophagus.  Worsening small to moderate left-sided pleural effusion.  Passive atelectasis in the left lung base.  Developing infectious/inflammatory process difficult to exclude in the left lung base.  Mild bibasilar pulmonary emphysema.  Detailed evaluation of the lung bases limited by respiratory motion.    Abdomen:    Liver is similar in size and contour.  No worrisome liver mass identified on this single phase study.  Gallbladder is minimally distended.  There is a 1.3 cm partially calcified stone in the region of the gallbladder neck.  No overt pericholecystic edema.  No intrahepatic biliary ductal dilatation.    Spleen, adrenals, and pancreas are stable and negative for acute finding.    The kidneys are symmetric.  No hydronephrosis. No asymmetric perinephric fat  stranding.    Prominent duodenal diverticulum.  No small bowel obstruction.  Colonic diverticulosis.  No convincing inflammatory changes identified in bowel, allowing for motion.  Mild stool burden in the colon.    No pneumoperitoneum or organized fluid collection.    No bulky retroperitoneal lymphadenopathy.    Abdominal aorta is normal in caliber with mild calcific atherosclerosis.    Portal, splenic, and superior mesenteric veins are patent. No portal venous gas.    Pelvis:    Mild urinary bladder wall thickening, more notable to the left of midline (coronal image 51 and axial image 186).  Mild stool burden in the rectum.  Uterus is stable.  No significant pelvic free fluid.  No bulky pelvic lymphadenopathy.    Bones and soft tissues:    No aggressive osseous lesions.  Degenerative changes in the spine.  Sacral stimulator device is present.  Mild body wall edema.  Soft tissue thickening in the anterior abdominal wall potentially from prior injection sites.                                       Medications   ondansetron injection 4 mg (4 mg Intravenous Given 5/7/25 0519)   morphine injection 4 mg (4 mg Intravenous Given 5/7/25 0519)   0.9% NaCl infusion (0 mLs Intravenous Stopped 5/7/25 0834)   iohexoL (OMNIPAQUE 350) injection 100 mL (100 mLs Intravenous Given 5/7/25 0616)     Medical Decision Making  74-year-old woman presents for left lower quadrant abdominal pain.  She is afebrile.  She is not tachycardic.  She is hypertensive.  She is tender to palpation left lower quadrant and uncomfortable appearing.  Differential includes kidney stone UTI diverticulitis partial obstruction other intra-abdominal pathology not excluded.    Signed out pending workup for LLQ pain to Dr. Marrero    Amount and/or Complexity of Data Reviewed  Independent Historian: spouse     Details: Reports that the pain started acutely tonight  Labs: ordered.  Radiology: ordered and independent interpretation performed. Decision-making  details documented in ED Course.    Risk  Prescription drug management.  Parenteral controlled substances.  Decision regarding hospitalization.               ED Course as of 05/07/25 1315   Wed May 07, 2025   0832 Patient is reassessed and resting comfortably after single dose of analgesia and antiemetic.  On my exam there is minimal right mid to lower abdomen tenderness.  There is no voluntary or involuntary guarding.  There is no rebound tenderness or significant distention.  No right upper quadrant tenderness and negative Hopkins sign.  No tenderness at McBurney's point.  No palpable mass or hernia.  No flank tenderness to palpation.  Extensive workup including laboratories and imaging results reviewed with patient.  CT without acute, life-threatening process evident.  Nonspecific findings reviewed in detail with the patient and included on discharge instructions.  Potential positive markers for UTI discussed.  She is currently finishing course of antibiotics and denies ongoing urinary symptoms.  I did discuss additional antibiotic versus waiting on urine culture with close follow up.  She prefers the ladder.  I do not think she requires admission for IV antibiotics.  I doubt sepsis.  I doubt other life-threatening intra-abdominal process such as diverticulitis, abscess, atypical appendicitis, cholecystitis, obstruction, perforated viscus.  Follow up closely with PCP for reassessment.  Detailed return precautions reviewed. [MR]      ED Course User Index  [MR] Arcenio Marrero MD                           Clinical Impression:  Final diagnoses:  [R10.32] LLQ pain (Primary)  [R11.0] Nausea  [R10.32] Left lower quadrant abdominal pain          ED Disposition Condition    Discharge Stable          ED Prescriptions    None       Follow-up Information       Follow up With Specialties Details Why Contact Info    Piyush Sharif MD Family Medicine  3-5 days [address] [facility] [address]  [phone]                  [1]   Social History  Tobacco Use    Smoking status: Former     Current packs/day: 0.00     Average packs/day: 2.0 packs/day for 42.0 years (84.0 ttl pk-yrs)     Types: Cigarettes     Start date: 1968     Quit date: 2010     Years since quitting: 15.3     Passive exposure: Past    Smokeless tobacco: Never   Substance Use Topics    Alcohol use: Not Currently     Comment: rarely    Drug use: No        Marcos Rodriguez MD  05/07/25 [id number]

## 2025-05-08 ENCOUNTER — TELEPHONE (OUTPATIENT)
Dept: CARDIOLOGY | Facility: CLINIC | Age: 75
End: 2025-05-08
Payer: MEDICARE

## 2025-05-08 ENCOUNTER — HOSPITAL ENCOUNTER (INPATIENT)
Facility: HOSPITAL | Age: 75
LOS: 3 days | Discharge: HOME | DRG: 871 | End: 2025-05-11
Attending: EMERGENCY MEDICINE | Admitting: STUDENT IN AN ORGANIZED HEALTH CARE EDUCATION/TRAINING PROGRAM
Payer: MEDICARE

## 2025-05-08 ENCOUNTER — OFFICE VISIT (OUTPATIENT)
Dept: CARDIOLOGY | Facility: CLINIC | Age: 75
End: 2025-05-08
Payer: MEDICARE

## 2025-05-08 VITALS
DIASTOLIC BLOOD PRESSURE: 46 MMHG | HEART RATE: 61 BPM | WEIGHT: 190.06 LBS | BODY MASS INDEX: 33.68 KG/M2 | SYSTOLIC BLOOD PRESSURE: 86 MMHG | HEIGHT: 63 IN

## 2025-05-08 DIAGNOSIS — E66.09 CLASS 1 OBESITY DUE TO EXCESS CALORIES WITH SERIOUS COMORBIDITY AND BODY MASS INDEX (BMI) OF 34.0 TO 34.9 IN ADULT: ICD-10-CM

## 2025-05-08 DIAGNOSIS — R41.82 ALTERED MENTAL STATUS, UNSPECIFIED ALTERED MENTAL STATUS TYPE: Primary | ICD-10-CM

## 2025-05-08 DIAGNOSIS — Z78.9 IMPAIRED INSTRUMENTAL ACTIVITIES OF DAILY LIVING (IADL): Chronic | ICD-10-CM

## 2025-05-08 DIAGNOSIS — R40.0 SOMNOLENCE: ICD-10-CM

## 2025-05-08 DIAGNOSIS — I87.2 VENOUS STASIS DERMATITIS OF BOTH LOWER EXTREMITIES: ICD-10-CM

## 2025-05-08 DIAGNOSIS — Z13.6 SCREENING FOR CARDIOVASCULAR CONDITION: ICD-10-CM

## 2025-05-08 DIAGNOSIS — G35 MS (MULTIPLE SCLEROSIS): ICD-10-CM

## 2025-05-08 DIAGNOSIS — I25.2 HISTORY OF MI (MYOCARDIAL INFARCTION): ICD-10-CM

## 2025-05-08 DIAGNOSIS — I70.0 AORTIC ATHEROSCLEROSIS: ICD-10-CM

## 2025-05-08 DIAGNOSIS — E66.811 CLASS 1 OBESITY DUE TO EXCESS CALORIES WITH SERIOUS COMORBIDITY AND BODY MASS INDEX (BMI) OF 34.0 TO 34.9 IN ADULT: ICD-10-CM

## 2025-05-08 DIAGNOSIS — I25.10 CORONARY ARTERY DISEASE INVOLVING NATIVE CORONARY ARTERY OF NATIVE HEART WITHOUT ANGINA PECTORIS: ICD-10-CM

## 2025-05-08 DIAGNOSIS — R07.9 CHEST PAIN: ICD-10-CM

## 2025-05-08 DIAGNOSIS — R53.1 GENERAL WEAKNESS: ICD-10-CM

## 2025-05-08 DIAGNOSIS — Z95.5 PRESENCE OF CORONARY ANGIOPLASTY IMPLANT AND GRAFT: ICD-10-CM

## 2025-05-08 DIAGNOSIS — G35 MULTIPLE SCLEROSIS: Primary | ICD-10-CM

## 2025-05-08 DIAGNOSIS — E78.2 MIXED HYPERLIPIDEMIA: ICD-10-CM

## 2025-05-08 DIAGNOSIS — I95.89 OTHER SPECIFIED HYPOTENSION: ICD-10-CM

## 2025-05-08 PROBLEM — I95.9 HYPOTENSION: Status: ACTIVE | Noted: 2025-05-08

## 2025-05-08 PROBLEM — J96.01 ACUTE HYPOXIC RESPIRATORY FAILURE: Status: ACTIVE | Noted: 2025-05-08

## 2025-05-08 PROBLEM — N39.0 UTI (URINARY TRACT INFECTION): Status: ACTIVE | Noted: 2025-05-08

## 2025-05-08 LAB
ABSOLUTE EOSINOPHIL (OHS): 0.31 K/UL
ABSOLUTE MONOCYTE (OHS): 0.91 K/UL (ref 0.3–1)
ABSOLUTE NEUTROPHIL COUNT (OHS): 7.92 K/UL (ref 1.8–7.7)
ALBUMIN SERPL BCP-MCNC: 2.9 G/DL (ref 3.5–5.2)
ALP SERPL-CCNC: 130 UNIT/L (ref 40–150)
ALT SERPL W/O P-5'-P-CCNC: 10 UNIT/L (ref 10–44)
ANION GAP (OHS): 8 MMOL/L (ref 8–16)
AST SERPL-CCNC: 12 UNIT/L (ref 11–45)
BACTERIA #/AREA URNS AUTO: ABNORMAL /HPF
BASOPHILS # BLD AUTO: 0.06 K/UL
BASOPHILS NFR BLD AUTO: 0.5 %
BILIRUB SERPL-MCNC: 0.7 MG/DL (ref 0.1–1)
BILIRUB UR QL STRIP.AUTO: ABNORMAL
BIPAP: 0
BNP SERPL-MCNC: 167 PG/ML (ref 0–99)
BUN SERPL-MCNC: 19 MG/DL (ref 8–23)
CALCIUM SERPL-MCNC: 8.1 MG/DL (ref 8.7–10.5)
CHLORIDE SERPL-SCNC: 109 MMOL/L (ref 95–110)
CLARITY UR: CLEAR
CO2 SERPL-SCNC: 20 MMOL/L (ref 23–29)
COLOR UR AUTO: ABNORMAL
CORRECTED TEMPERATURE (PCO2): 42.1 MMHG
CORRECTED TEMPERATURE (PH): 7.29
CORRECTED TEMPERATURE (PO2): 52 MMHG
CREAT SERPL-MCNC: 0.9 MG/DL (ref 0.5–1.4)
CRP SERPL-MCNC: 157.3 MG/L
ERYTHROCYTE [DISTWIDTH] IN BLOOD BY AUTOMATED COUNT: 14.9 % (ref 11.5–14.5)
FIO2: 21 %
FLUAV AG UPPER RESP QL IA.RAPID: NEGATIVE
FLUBV AG UPPER RESP QL IA.RAPID: NEGATIVE
GFR SERPLBLD CREATININE-BSD FMLA CKD-EPI: >60 ML/MIN/1.73/M2
GLUCOSE SERPL-MCNC: 117 MG/DL (ref 70–110)
GLUCOSE UR QL STRIP: NEGATIVE
HCT VFR BLD AUTO: 38.3 % (ref 37–48.5)
HCT VFR BLD CALC: 37.7 %
HGB BLD-MCNC: 11.5 GM/DL (ref 12–16)
HGB UR QL STRIP: NEGATIVE
HOLD SPECIMEN: NORMAL
IMM GRANULOCYTES # BLD AUTO: 0.05 K/UL (ref 0–0.04)
IMM GRANULOCYTES NFR BLD AUTO: 0.4 % (ref 0–0.5)
KETONES UR QL STRIP: NEGATIVE
LACTATE SERPL-SCNC: 1.2 MMOL/L (ref 0.5–2.2)
LDH SERPL L TO P-CCNC: 1.1 MMOL/L (ref 0.5–2.2)
LEUKOCYTE ESTERASE UR QL STRIP: ABNORMAL
LYMPHOCYTES # BLD AUTO: 1.93 K/UL (ref 1–4.8)
MCH RBC QN AUTO: 26.1 PG (ref 27–31)
MCHC RBC AUTO-ENTMCNC: 30 G/DL (ref 32–36)
MCV RBC AUTO: 87 FL (ref 82–98)
MICROSCOPIC COMMENT: ABNORMAL
NITRITE UR QL STRIP: POSITIVE
NUCLEATED RBC (/100WBC) (OHS): 0 /100 WBC
OHS QRS DURATION: 70 MS
OHS QTC CALCULATION: 438 MS
PCO2 BLDA: 42.1 MMHG
PH SMN: 7.29 [PH]
PH UR STRIP: 6 [PH]
PLATELET # BLD AUTO: 228 K/UL (ref 150–450)
PMV BLD AUTO: 11.3 FL (ref 9.2–12.9)
PO2 BLDA: 52 MMHG
POC BASE DEFICIT: -6.3 MMOL/L
POC HCO3: 19 MMOL/L
POC IONIZED CALCIUM: 1.1 MMOL/L (ref 1.06–1.42)
POC PERFORMED BY: NORMAL
POC TEMPERATURE: 37 C
POTASSIUM BLD-SCNC: 3.6 MMOL/L (ref 3.5–5.1)
POTASSIUM SERPL-SCNC: 3.6 MMOL/L (ref 3.5–5.1)
PROCALCITONIN SERPL-MCNC: 0.05 NG/ML
PROT SERPL-MCNC: 6.4 GM/DL (ref 6–8.4)
PROT UR QL STRIP: ABNORMAL
RBC # BLD AUTO: 4.4 M/UL (ref 4–5.4)
RBC #/AREA URNS AUTO: 11 /HPF (ref 0–4)
RELATIVE EOSINOPHIL (OHS): 2.8 %
RELATIVE LYMPHOCYTE (OHS): 17.3 % (ref 18–48)
RELATIVE MONOCYTE (OHS): 8.1 % (ref 4–15)
RELATIVE NEUTROPHIL (OHS): 70.9 % (ref 38–73)
RSV A 5' UTR RNA NPH QL NAA+PROBE: NEGATIVE
SALICYLATES SERPL-MCNC: <5 MG/DL (ref 15–30)
SARS-COV-2 RNA RESP QL NAA+PROBE: NEGATIVE
SODIUM BLD-SCNC: 139 MMOL/L (ref 136–145)
SODIUM SERPL-SCNC: 137 MMOL/L (ref 136–145)
SP GR UR STRIP: >=1.03
SPECIMEN SOURCE: NORMAL
SQUAMOUS #/AREA URNS AUTO: 9 /HPF
TROPONIN I SERPL HS-MCNC: 8 NG/L
UROBILINOGEN UR STRIP-ACNC: ABNORMAL EU/DL
WBC # BLD AUTO: 11.18 K/UL (ref 3.9–12.7)
WBC #/AREA URNS AUTO: 51 /HPF (ref 0–5)
YEAST UR QL AUTO: ABNORMAL /HPF

## 2025-05-08 PROCEDURE — 83605 ASSAY OF LACTIC ACID: CPT | Performed by: STUDENT IN AN ORGANIZED HEALTH CARE EDUCATION/TRAINING PROGRAM

## 2025-05-08 PROCEDURE — 87040 BLOOD CULTURE FOR BACTERIA: CPT

## 2025-05-08 PROCEDURE — 96367 TX/PROPH/DG ADDL SEQ IV INF: CPT

## 2025-05-08 PROCEDURE — 82040 ASSAY OF SERUM ALBUMIN: CPT

## 2025-05-08 PROCEDURE — 93010 ELECTROCARDIOGRAM REPORT: CPT | Mod: ,,, | Performed by: INTERNAL MEDICINE

## 2025-05-08 PROCEDURE — 81003 URINALYSIS AUTO W/O SCOPE: CPT

## 2025-05-08 PROCEDURE — 80179 DRUG ASSAY SALICYLATE: CPT

## 2025-05-08 PROCEDURE — 27000221 HC OXYGEN, UP TO 24 HOURS

## 2025-05-08 PROCEDURE — 63600175 PHARM REV CODE 636 W HCPCS: Performed by: STUDENT IN AN ORGANIZED HEALTH CARE EDUCATION/TRAINING PROGRAM

## 2025-05-08 PROCEDURE — 96365 THER/PROPH/DIAG IV INF INIT: CPT

## 2025-05-08 PROCEDURE — 96366 THER/PROPH/DIAG IV INF ADDON: CPT

## 2025-05-08 PROCEDURE — 99999 PR PBB SHADOW E&M-EST. PATIENT-LVL IV: CPT | Mod: PBBFAC,,, | Performed by: INTERNAL MEDICINE

## 2025-05-08 PROCEDURE — 83880 ASSAY OF NATRIURETIC PEPTIDE: CPT

## 2025-05-08 PROCEDURE — 84145 PROCALCITONIN (PCT): CPT

## 2025-05-08 PROCEDURE — 94761 N-INVAS EAR/PLS OXIMETRY MLT: CPT | Mod: XB

## 2025-05-08 PROCEDURE — 99900035 HC TECH TIME PER 15 MIN (STAT)

## 2025-05-08 PROCEDURE — 25000003 PHARM REV CODE 250: Performed by: STUDENT IN AN ORGANIZED HEALTH CARE EDUCATION/TRAINING PROGRAM

## 2025-05-08 PROCEDURE — 84484 ASSAY OF TROPONIN QUANT: CPT

## 2025-05-08 PROCEDURE — 21400001 HC TELEMETRY ROOM

## 2025-05-08 PROCEDURE — 86140 C-REACTIVE PROTEIN: CPT | Performed by: STUDENT IN AN ORGANIZED HEALTH CARE EDUCATION/TRAINING PROGRAM

## 2025-05-08 PROCEDURE — 25000003 PHARM REV CODE 250

## 2025-05-08 PROCEDURE — 87086 URINE CULTURE/COLONY COUNT: CPT

## 2025-05-08 PROCEDURE — 99223 1ST HOSP IP/OBS HIGH 75: CPT | Mod: GC,,, | Performed by: INTERNAL MEDICINE

## 2025-05-08 PROCEDURE — 93005 ELECTROCARDIOGRAM TRACING: CPT

## 2025-05-08 PROCEDURE — 0241U SARS-COV2 (COVID) WITH FLU/RSV BY PCR: CPT

## 2025-05-08 PROCEDURE — 25500020 PHARM REV CODE 255: Performed by: EMERGENCY MEDICINE

## 2025-05-08 PROCEDURE — 83605 ASSAY OF LACTIC ACID: CPT

## 2025-05-08 PROCEDURE — 63600175 PHARM REV CODE 636 W HCPCS

## 2025-05-08 PROCEDURE — 82803 BLOOD GASES ANY COMBINATION: CPT

## 2025-05-08 PROCEDURE — 85025 COMPLETE CBC W/AUTO DIFF WBC: CPT

## 2025-05-08 PROCEDURE — 99285 EMERGENCY DEPT VISIT HI MDM: CPT | Mod: 25

## 2025-05-08 RX ORDER — ATORVASTATIN CALCIUM 40 MG/1
40 TABLET, FILM COATED ORAL DAILY
Status: DISCONTINUED | OUTPATIENT
Start: 2025-05-09 | End: 2025-05-11 | Stop reason: HOSPADM

## 2025-05-08 RX ORDER — CHOLECALCIFEROL (VITAMIN D3) 10(400)/ML
400 DROPS ORAL DAILY
Status: DISCONTINUED | OUTPATIENT
Start: 2025-05-08 | End: 2025-05-11 | Stop reason: HOSPADM

## 2025-05-08 RX ORDER — FAMOTIDINE 20 MG/1
20 TABLET, FILM COATED ORAL DAILY
Status: DISCONTINUED | OUTPATIENT
Start: 2025-05-08 | End: 2025-05-11 | Stop reason: HOSPADM

## 2025-05-08 RX ORDER — NALOXONE HCL 0.4 MG/ML
0.02 VIAL (ML) INJECTION
Status: DISCONTINUED | OUTPATIENT
Start: 2025-05-08 | End: 2025-05-11 | Stop reason: HOSPADM

## 2025-05-08 RX ORDER — IBUPROFEN 200 MG
24 TABLET ORAL
Status: DISCONTINUED | OUTPATIENT
Start: 2025-05-08 | End: 2025-05-11 | Stop reason: HOSPADM

## 2025-05-08 RX ORDER — CALCIUM CARB/VITAMIN D3/VIT K1 500-500-40
400 TABLET,CHEWABLE ORAL DAILY
Status: DISCONTINUED | OUTPATIENT
Start: 2025-05-08 | End: 2025-05-08

## 2025-05-08 RX ORDER — GLUCAGON 1 MG
1 KIT INJECTION
Status: DISCONTINUED | OUTPATIENT
Start: 2025-05-08 | End: 2025-05-11 | Stop reason: HOSPADM

## 2025-05-08 RX ORDER — CLOPIDOGREL BISULFATE 75 MG/1
75 TABLET ORAL DAILY
Status: DISCONTINUED | OUTPATIENT
Start: 2025-05-08 | End: 2025-05-11 | Stop reason: HOSPADM

## 2025-05-08 RX ORDER — DULOXETIN HYDROCHLORIDE 20 MG/1
40 CAPSULE, DELAYED RELEASE ORAL DAILY
Status: DISCONTINUED | OUTPATIENT
Start: 2025-05-08 | End: 2025-05-11 | Stop reason: HOSPADM

## 2025-05-08 RX ORDER — LANOLIN ALCOHOL/MO/W.PET/CERES
1000 CREAM (GRAM) TOPICAL DAILY
Status: DISCONTINUED | OUTPATIENT
Start: 2025-05-08 | End: 2025-05-11 | Stop reason: HOSPADM

## 2025-05-08 RX ORDER — TOPIRAMATE 100 MG/1
100 TABLET, FILM COATED ORAL 2 TIMES DAILY
Status: DISCONTINUED | OUTPATIENT
Start: 2025-05-08 | End: 2025-05-11 | Stop reason: HOSPADM

## 2025-05-08 RX ORDER — ENOXAPARIN SODIUM 100 MG/ML
40 INJECTION SUBCUTANEOUS EVERY 24 HOURS
Status: DISCONTINUED | OUTPATIENT
Start: 2025-05-08 | End: 2025-05-11 | Stop reason: HOSPADM

## 2025-05-08 RX ORDER — SENNOSIDES 8.6 MG/1
8.6 TABLET ORAL 2 TIMES DAILY
Status: DISCONTINUED | OUTPATIENT
Start: 2025-05-08 | End: 2025-05-11 | Stop reason: HOSPADM

## 2025-05-08 RX ORDER — SODIUM CHLORIDE 0.9 % (FLUSH) 0.9 %
10 SYRINGE (ML) INJECTION EVERY 12 HOURS PRN
Status: DISCONTINUED | OUTPATIENT
Start: 2025-05-08 | End: 2025-05-11 | Stop reason: HOSPADM

## 2025-05-08 RX ORDER — VANCOMYCIN 2 GRAM/500 ML IN 0.9 % SODIUM CHLORIDE INTRAVENOUS
2000
Status: COMPLETED | OUTPATIENT
Start: 2025-05-08 | End: 2025-05-08

## 2025-05-08 RX ORDER — POLYETHYLENE GLYCOL 3350 17 G/17G
17 POWDER, FOR SOLUTION ORAL DAILY
Status: DISCONTINUED | OUTPATIENT
Start: 2025-05-08 | End: 2025-05-11 | Stop reason: HOSPADM

## 2025-05-08 RX ORDER — POTASSIUM CHLORIDE 20 MEQ/1
40 TABLET, EXTENDED RELEASE ORAL ONCE
Status: COMPLETED | OUTPATIENT
Start: 2025-05-08 | End: 2025-05-08

## 2025-05-08 RX ORDER — IBUPROFEN 200 MG
16 TABLET ORAL
Status: DISCONTINUED | OUTPATIENT
Start: 2025-05-08 | End: 2025-05-11 | Stop reason: HOSPADM

## 2025-05-08 RX ORDER — VANCOMYCIN 1.75 GRAM/500 ML IN 0.9 % SODIUM CHLORIDE INTRAVENOUS
1750
Status: DISCONTINUED | OUTPATIENT
Start: 2025-05-08 | End: 2025-05-10

## 2025-05-08 RX ORDER — DICYCLOMINE HYDROCHLORIDE 10 MG/1
10 CAPSULE ORAL 3 TIMES DAILY PRN
Status: DISCONTINUED | OUTPATIENT
Start: 2025-05-08 | End: 2025-05-11 | Stop reason: HOSPADM

## 2025-05-08 RX ADMIN — POTASSIUM CHLORIDE 40 MEQ: 1500 TABLET, EXTENDED RELEASE ORAL at 02:05

## 2025-05-08 RX ADMIN — SENNOSIDES 8.6 MG: 8.6 TABLET, FILM COATED ORAL at 10:05

## 2025-05-08 RX ADMIN — FAMOTIDINE 20 MG: 20 TABLET, FILM COATED ORAL at 02:05

## 2025-05-08 RX ADMIN — DULOXETINE HYDROCHLORIDE 40 MG: 20 CAPSULE, DELAYED RELEASE ORAL at 02:05

## 2025-05-08 RX ADMIN — TOPIRAMATE 100 MG: 100 TABLET, FILM COATED ORAL at 10:05

## 2025-05-08 RX ADMIN — VANCOMYCIN HYDROCHLORIDE 2000 MG: 10 INJECTION, POWDER, LYOPHILIZED, FOR SOLUTION INTRAVENOUS at 11:05

## 2025-05-08 RX ADMIN — PIPERACILLIN SODIUM AND TAZOBACTAM SODIUM 4.5 G: 4; .5 INJECTION, POWDER, FOR SOLUTION INTRAVENOUS at 10:05

## 2025-05-08 RX ADMIN — CYANOCOBALAMIN TAB 1000 MCG 1000 MCG: 1000 TAB at 02:05

## 2025-05-08 RX ADMIN — IOHEXOL 75 ML: 350 INJECTION, SOLUTION INTRAVENOUS at 12:05

## 2025-05-08 RX ADMIN — CLOPIDOGREL 75 MG: 75 TABLET ORAL at 02:05

## 2025-05-08 RX ADMIN — SODIUM CHLORIDE, POTASSIUM CHLORIDE, SODIUM LACTATE AND CALCIUM CHLORIDE 1000 ML: 600; 310; 30; 20 INJECTION, SOLUTION INTRAVENOUS at 11:05

## 2025-05-08 RX ADMIN — VANCOMYCIN HYDROCHLORIDE 1750 MG: 10 INJECTION, POWDER, LYOPHILIZED, FOR SOLUTION INTRAVENOUS at 10:05

## 2025-05-08 RX ADMIN — Medication 400 UNITS: at 02:05

## 2025-05-08 NOTE — RESPIRATORY THERAPY
"RAPID RESPONSE RESPIRATORY THERAPY NOTE             Code Status: Prior   : 1950  Bed: ED :   MRN: [id number]  Time page Received: 0931  Time Rapid Response RT at Bedside: 09  Time Rapid Response RT left Bedside: 0950    SITUATION    Evaluated patient for: Hypoxia    BACKGROUND    Why is the patient in the hospital?: <principal problem not specified>    Patient has a past medical history of Arthritis, Coronary artery disease, Encounter for blood transfusion, Epilepsy, GERD (gastroesophageal reflux disease), Headache, Hypertension, MI, old, MS (multiple sclerosis), and Seizures.    24 Hours Vitals Range:  Temp:  [98.5 °F (36.9 °C)]   Pulse:  [58-61]   Resp:  [16]   BP: (86)/(46-47)   SpO2:  [95 %]     Labs:    Recent Labs     25  0507      K 3.8   *   CO2 18*   BUN 17   CREATININE 0.7   *        No results for input(s): "PH", "PCO2", "PO2", "HCO3", "POCSATURATED", "BE" in the last 72 hours.    ASSESSMENT/INTERVENTIONS  Patient alert and oriented. Upon arrival she was on 3L NC, physician said she was hypoxic on RA possibly from medications. Transported patient to ED with no adverse events    Last Vitals: Temp: 98.5 °F (36.9 °C) (945)  Pulse: 58 (945)  Resp: 16 (945)  BP: 86/47 (945)  SpO2: 95 % (945)  Level of Consciousness: Level of Consciousness (AVPU): alert  Respiratory Effort:    Expansion/Accessory Muscle Usage:    All Lung Field Breath Sounds:    O2 Device/Concentration: 3L NC  NIPPV: No Surgical airway: No Vent: No  ETCO2 monitored:    Ambu at bedside: Yes    Active Orders   There are no active orders of the following types: Respiratory Care.       RECOMMENDATIONS  ?  We recommend: RRT Recs: Continue POC per primary team.      FOLLOW-UP    Disposition: Tx to ER bed 12.    Please call back the Rapid Response RT, Madeleine Ibarra, RRT at x [phone] for any questions or concerns.              "

## 2025-05-08 NOTE — ASSESSMENT & PLAN NOTE
Sepsis iso PNA    Patient with Hypoxic Respiratory failure which is Acute.  she is not on home oxygen. Supplemental oxygen was provided and noted-      .   Signs/symptoms of respiratory failure include- tachypnea. Contributing diagnoses includes - Pleural effusion and Pneumonia Labs and images were reviewed. Patient Has recent ABG, which has been reviewed. Will treat underlying causes and adjust management of respiratory failure as follows-     - Follow Bcx  - Sputum culture  - Procal normal   - Lactate pending  - ESR/CRP  - COVID flu RSV negative  -    - Trop 8  -CXR  5/8: Pulmonary findings suggest left pleural effusion and edema.  Developing left lower lung zone consolidation  - CTA chest 5/8: No PE, moderate L pleural effusion new when compared to 02/19/2025, and associated left lower lobe atelectasis. Tracheobronchomalacia.   Mild mediastinal and bilateral hilar lymphadenopathy, new when compared to 02/19/2025. Scattered pulmonary nodules measuring up to 7 mm, unchanged when compared to 10/03/2024.  A follow-up CT 18-24 months     Plan:     IV fluids on admit 1L  Abx -c/w tato and zosyn  Will consider ID consult if not improving  SLP consult  Pulm consult to assess for thoracentesis  IS  Chest PT  Hold diuresis as hypotensive on admit and needed fluids

## 2025-05-08 NOTE — PROGRESS NOTES
"Pharmacokinetic Initial Assessment & Plan: IV Vancomycin    IV Vancomycin 2000 mg x once given in the ED on 5/08 @ 1100  Then Vancomycin 1750 mg every 24 hours, starting at 2300 on 5/8  Obtain a Vancomycin trough 30 mins prior to the 3rd dose on 5/9 @ 2230   Desired empiric serum trough concentration is 15 to 20 mcg/mL    Pharmacy will continue to follow and monitor vancomycin.    [id number] with any questions regarding this assessment.     Thank you for the consult,   Burke Christiansen         Patient brief summary:  Alyssa John is a 74 y.o. female initiated on antimicrobial therapy with IV Vancomycin for treatment of suspected Pneumonia    Drug Allergies:   Review of patient's allergies indicates:   Allergen Reactions    Codeine      Other reaction(s): throat tightness    Dilantin [phenytoin sodium extended]     Phenobarbital     Tegretol [carbamazepine]        Actual Body Weight:   86.2 kg    Renal Function:   Estimated Creatinine Clearance: 57.1 mL/min (based on SCr of 0.9 mg/dL).,     CBC (last 72 hours):  Recent Labs   Lab Result Units 05/07/25  0507 05/08/25  1024   WBC K/uL 11.33 11.18   Hgb gm/dL 12.6  --    HGB gm/dL  --  11.5*   Hct % 39.8  --    HCT %  --  38.3   Platelet Count K/uL 237 228   Lymph % %  --  17.3*   Mono % % 6.2 8.1   Eos % % 1.7 2.8   Basophil % % 0.4 0.5       Metabolic Panel (last 72 hours):  Recent Labs   Lab Result Units 05/07/25  0507 05/07/25  0735 05/08/25  1024   Sodium mmol/L 140  --  137   Potassium mmol/L 3.8  --  3.6   Chloride mmol/L 111*  --  109   CO2 mmol/L 18*  --  20*   Glucose mg/dL 141*  --  117*   Glucose, UA   --  Negative  --    BUN mg/dL 17  --  19   Creatinine mg/dL 0.7  --  0.9   Albumin g/dL 3.8  --  2.9*   Bilirubin Total mg/dL 0.4  --  0.7   ALP unit/L 137*  --  130   AST unit/L 11  --  12   ALT unit/L 10  --  10       Drug levels (last 3 results):  No results for input(s): "VANCOMYCINRA", "VANCORANDOM", "VANCOMYCINPE", "VANCOPEAK", "VANCOMYCINTR", " ""[facility]" in the last 72 hours.    Microbiologic Results:  Microbiology Results (last 7 days)       Procedure Component Value Units Date/Time    Culture, Respiratory with Gram Stain [[id number]]     Order Status: Sent Specimen: Respiratory     Blood culture x two cultures. Draw prior to antibiotics. [[id number]] Collected: 05/08/25 1024    Order Status: Resulted Specimen: Blood from Peripheral, Hand, Left Updated: 05/08/25 1053    Blood culture x two cultures. Draw prior to antibiotics. [[id number]] Collected: 05/08/25 1024    Order Status: Resulted Specimen: Blood from Peripheral, Hand, Right Updated: 05/08/25 1052            "

## 2025-05-08 NOTE — PROGRESS NOTES
"[facility]      Chief Complaint   Patient presents with    Venous stasis dermatitis of both lower extremities       Patient ID: Alyssa John is a 74 y.o. female with HTN, MS, seizures, obesity, who presents for an initial appointment. Pertinent history/events are as follows:     -Pt kindly referred by Dr. Rodrigues for evaluation of venous stasis dermatitis of both lower extremities (per his note on 4/22/2025):  "Than today to follow-up hospitalization for in fluids and lower extremity venous stasis with cellulitis.  And hypertension.  She has history of stent 2010 by Dr. Mcmillan. She had ECHO which revealed normal left ventricular ejection fraction and mild-to-moderate tricuspid regurgitation.  She has had no cardiovascular problems since.  She has been wheelchair-bound and has had chronic edema in the lower extremities since.  She was treated for cellulitis in both are extremities while hospitalized the fluid when down.  She is placed on amlodipine for hypertension.  Went to rehab for 3 weeks and the fluid is gradually returned.  She has been off amlodipine for about a month.    HPI:  Mrs. John is somnolent on exam with blood pressure 86/46. Her  states he gave her a pill containing Hydrocodone and acetaminophen at 7:30 am this morning that he got from his daughter. States she has been very sleepy since that time. I placed pt on pulse oximeter with oxygen saturation noted at 88%. Placed pt on 3 liters oxygen via nasal canula and sats improved to 95%. Blood pressure up to 92 mmHg systolic. She continues to be somnolent on exam, therefore, the rapid response team was called for immediate evaluation and ED transport.     Past Medical History:   Diagnosis Date    Arthritis     Coronary artery disease     Encounter for blood transfusion     Epilepsy     GERD (gastroesophageal reflux disease)     Headache     Hypertension     MI, old     MS (multiple sclerosis)     Seizures     epilepsy "     Past Surgical History:   Procedure Laterality Date    APPENDECTOMY      BACK SURGERY      L5 discectomy    BREAST BIOPSY Right 15 yrs ago    benign    CATARACT EXTRACTION Bilateral     COLONOSCOPY N/A 09/16/2015    Procedure: COLONOSCOPY;  Surgeon: Henry Malcolm MD;  Location: [facility];  Service: Endoscopy;  Laterality: N/A;    CORONARY STENT PLACEMENT      IMPLANTATION OF PERMANENT SACRAL NERVE STIMULATOR N/A 10/11/2024    Procedure: INSERTION, NEUROSTIMULATOR, PERMANENT, SACRAL;  Surgeon: Abel Rosales MD;  Location: [facility];  Service: Urology;  Laterality: N/A;  Mack RUIZ    INSERTION, NEUROSTIMULATOR, TEMPORARY, SACRAL N/A 9/27/2024    Procedure: INSERTION, NEUROSTIMULATOR, TEMPORARY, SACRAL;  Surgeon: Abel Rosales MD;  Location: [facility];  Service: Urology;  Laterality: N/A;  1 hour 45 minutes Mack RUIZ    right knee arthroscopy       Social History[1]  Family History   Problem Relation Name Age of Onset    Scleroderma Mother      Heart disease Father          MI, CAD    Cancer Neg Hx      Diabetes Neg Hx      Stroke Neg Hx      Melanoma Neg Hx      Psoriasis Neg Hx      Lupus Neg Hx      Eczema Neg Hx         Review of patient's allergies indicates:   Allergen Reactions    Codeine      Other reaction(s): throat tightness    Dilantin [phenytoin sodium extended]     Phenobarbital     Tegretol [carbamazepine]        Medication List with Changes/Refills   Current Medications    AMMONIUM LACTATE (LAC-HYDRIN) 12 % LOTION    Apply topically as needed for Dry Skin.    ATORVASTATIN (LIPITOR) 40 MG TABLET    TAKE 1 TABLET BY MOUTH EVERY DAY    CEPHALEXIN (KEFLEX) 500 MG CAPSULE    Take 1 capsule (500 mg total) by mouth every 12 (twelve) hours. for 5 days    CHOLECALCIFEROL, VITAMIN D3, 10 MCG (400 UNIT) CAP CAPSULE    Take 1 capsule (400 Units total) by mouth once daily.    CLOPIDOGREL (PLAVIX) 75 MG TABLET    Take 75 mg by mouth once daily.    CYANOCOBALAMIN (VITAMIN B-12) 1000 MCG TABLET   "  Take 1 tablet (1,000 mcg total) by mouth once daily.    DICYCLOMINE (BENTYL) 10 MG CAPSULE    Take 1 capsule (10 mg total) by mouth 3 (three) times daily as needed (abdominal pain).    DULOXETINE (CYMBALTA) 20 MG CAPSULE    TAKE 2 CAPSULES BY MOUTH EVERY DAY    ESTRADIOL (ESTRACE) 0.01 % (0.1 MG/GRAM) VAGINAL CREAM    Place 1 g vaginally 3 (three) times a week.    FAMOTIDINE (PEPCID) 20 MG TABLET    Take 1 tablet (20 mg total) by mouth once daily.    FUROSEMIDE (LASIX) 20 MG TABLET    Take 1 tablet (20 mg total) by mouth once daily.    HYDROCORTISONE 2.5 % CREAM    Apply topically 2 (two) times daily. Apply to red areas of lower legs for 7 days    LOSARTAN (COZAAR) 25 MG TABLET    Take 1 tablet (25 mg total) by mouth once daily.    NITROFURANTOIN (MACRODANTIN) 50 MG CAPSULE    Take 1 capsule (50 mg total) by mouth 4 (four) times daily.    POTASSIUM CHLORIDE SA (K-DUR,KLOR-CON) 20 MEQ TABLET    Take 1 tablet (20 mEq total) by mouth 2 (two) times daily.    PROPRANOLOL (INDERAL) 20 MG TABLET    Take 20 mg by mouth.    TOPIRAMATE (TOPAMAX) 100 MG TABLET    Take 1 tablet (100 mg total) by mouth 2 (two) times daily.       Review of Systems  Constitution: Denies chills, fever, and sweats.  HENT: Denies headaches or blurry vision.  Cardiovascular: Denies chest pain or irregular heart beat.  Respiratory: Denies cough or shortness of breath.  Gastrointestinal: Denies abdominal pain, nausea, or vomiting.  Musculoskeletal: Denies muscle cramps.  Neurological: Denies dizziness or focal weakness.  Psychiatric/Behavioral: Normal mental status.  Hematologic/Lymphatic: Denies bleeding problem or easy bruising/bleeding.  Skin: Denies rash or suspicious lesions    Physical Examination  Ht 5' 3" (1.6 m)   Wt 86.2 kg (190 lb 0.6 oz)   BMI 33.66 kg/m²     Constitutional: No acute distress, conversant  HEENT: Sclera anicteric, Pupils equal, round and reactive to light, extraocular motions intact, Oropharynx clear  Neck: No JVD, no " carotid bruits  Cardiovascular: regular rate and rhythm, no murmur, rubs or gallops, normal S1/S2  Pulmonary: Clear to auscultation bilaterally  Abdominal: Abdomen soft, nontender, nondistended, positive bowel sounds  Extremities: No lower extremity edema,   Pulses:  Carotid pulses are 2+ on the right side, and 2+ on the left side.  Radial pulses are 2+ on the right side, and 2+ on the left side.   Femoral pulses are 2+ on the right side, and 2+ on the left side.  Popliteal pulses are 2+ on the right side, and 2+ on the left side.   Dorsalis pedis pulses are 2+ on the right side, and 2+ on the left side.   Posterior tibial pulses are 2+ on the right side, and 2+ on the left side.    Skin: No ecchymosis, erythema, or ulcers  Psych: Alert and oriented x 3, appropriate affect  Neuro: CNII-XII intact, no focal deficits    Labs:  Most Recent Data  CBC:   Lab Results   Component Value Date    WBC 11.33 05/07/2025    HGB 12.6 05/07/2025    HCT 39.8 05/07/2025     05/07/2025    MCV 84 05/07/2025    RDW 14.7 (H) 05/07/2025     BMP:   Lab Results   Component Value Date     05/07/2025    K 3.8 05/07/2025     (H) 05/07/2025    CO2 18 (L) 05/07/2025    BUN 17 05/07/2025    CREATININE 0.7 05/07/2025     (H) 05/07/2025    CALCIUM 8.9 05/07/2025    MG 2.0 02/19/2025    PHOS 3.0 01/24/2025     LFTS;   Lab Results   Component Value Date    PROT 7.1 05/07/2025    ALBUMIN 3.8 05/07/2025    BILITOT 0.4 05/07/2025    AST 11 05/07/2025    ALKPHOS 137 (H) 05/07/2025    ALT 10 05/07/2025     COAGS:   Lab Results   Component Value Date    INR 0.9 09/17/2024     FLP:   Lab Results   Component Value Date    CHOL 163 04/16/2025    HDL 46 04/16/2025    LDLCALC 90.6 04/16/2025    TRIG 132 04/16/2025    CHOLHDL 28.2 04/16/2025     CARDIAC:   Lab Results   Component Value Date    TROPONINI 0.045 (H) 01/17/2025    BNP 70 02/19/2025       Imaging:    CT Abd/Pelvis 5/7/2025:  Urinary bladder wall thickening, more notable to  the left of midline.  Suggest correlation for cystitis.  Cystoscopy follow-up could be considered given the asymmetric appearance of bladder wall thickening.   Mild stool burden in the colon.   Cholelithiasis.   Worsening left-sided pleural effusion with passive atelectasis or airspace disease in the left lung base.   Borderline enlargement of the heart.   Small hiatal hernia.    Echo 1/17/2025:    Left Ventricle: The left ventricle is normal in size. Normal wall thickness. There is normal systolic function. There is normal diastolic function. E/A ratio is 1.16.    Right Ventricle: Normal right ventricular cavity size. Systolic function is normal.    Mitral Valve: There is mild regurgitation.    Tricuspid Valve: There is mild to moderate regurgitation. The estimated PA systolic pressure is at least 27 mmHg.    Overall the study quality was technically difficult. The study was difficult due to patient's poor acoustic windows.    Assessment/Plan:  Alyssa John is a 74 y.o. female with HTN, MS, seizures, obesity, who presents for an initial appointment.    Venous stasis dermatitis of both lower extremities- Mrs. John is somnolent on exam with blood pressure 86/46. Her  states he gave her a pill containing Hydrocodone and acetaminophen at 7:30 am this morning that he got from his daughter. States she has been very sleepy since that time. I placed pt on pulse oximeter with oxygen saturation noted at 88%. Placed pt on 3 liters oxygen via nasal canula and sats improved to 95%. Blood pressure up to 92 mmHg systolic. She continues to be somnolent on exam, therefore, the rapid response team was called for immediate evaluation and ED transport. Current presentation likely due opioid suppression of pt's respiratory drive with resultant Co2 retention. She is also currently being treated for a UTI. Clinic visit will be rescheduled.     Total duration of face to face visit time 45 minutes.  Total time spent  counseling greater than fifty percent of total visit time.  Counseling included discussion regarding imaging findings, diagnosis, possibilities, treatment options, risks and benefits.  The patient had many questions regarding the options and long-term effects.    Adrien House MD, PhD  Interventional Cardiology         [1]   Social History  Socioeconomic History    Marital status:    Tobacco Use    Smoking status: Former     Current packs/day: 0.00     Average packs/day: 2.0 packs/day for 42.0 years (84.0 ttl pk-yrs)     Types: Cigarettes     Start date: 1968     Quit date: 2010     Years since quitting: 15.3     Passive exposure: Past    Smokeless tobacco: Never   Substance and Sexual Activity    Alcohol use: Not Currently     Comment: rarely    Drug use: No    Sexual activity: Not Currently     Partners: Male     Social Drivers of Health     Financial Resource Strain: Low Risk  (2/24/2025)    Overall Financial Resource Strain (CARDIA)     Difficulty of Paying Living Expenses: Not hard at all   Food Insecurity: No Food Insecurity (2/24/2025)    Hunger Vital Sign     Worried About Running Out of Food in the Last Year: Never true     Ran Out of Food in the Last Year: Never true   Transportation Needs: No Transportation Needs (2/24/2025)    PRAPARE - Transportation     Lack of Transportation (Medical): No     Lack of Transportation (Non-Medical): No   Physical Activity: Inactive (2/24/2025)    Exercise Vital Sign     Days of Exercise per Week: 0 days     Minutes of Exercise per Session: 0 min   Stress: No Stress Concern Present (2/24/2025)    [employer] - Occupational Stress Questionnaire     Feeling of Stress : Not at all   Housing Stability: Low Risk  (2/24/2025)    Housing Stability Vital Sign     Unable to Pay for Housing in the Last Year: No     Number of Times Moved in the Last Year: 0     Homeless in the Last Year: No

## 2025-05-08 NOTE — ED NOTES
Resumed care from MARY Millan. Patient resting comfortably, No complaints at this time. POC continues.

## 2025-05-08 NOTE — ASSESSMENT & PLAN NOTE
Patient's blood pressure range in the last 24 hours was: BP  Min: 86/46  Max: 152/65.The patient's inpatient anti-hypertensive regimen is listed below:  Current Antihypertensives       Plan  - BP is uncontrolled, will adjust as follows: Hold propranolol 20 daily and losartan 25 daily  - Monitor - restart as able

## 2025-05-08 NOTE — ASSESSMENT & PLAN NOTE
74 year old with history as above who presents for altered mentation, hypotension, and hypoxia. Found to have possible pneumonia vs UTI as source of sepsis. Has a new pleural effusion on the left that is moderate in size. Concern for possible parapneumonic effusion so pulm consulted to evaluate for thoracentesis.     She is on plavix for CAD. Last received on admission. Platelets WNL.

## 2025-05-08 NOTE — H&P
[facility] - Emergency Dept  Hospital Medicine  History & Physical    Patient Name: Alyssa John  MRN: [id number]  Patient Class: IP- Inpatient  Admission Date: 5/8/2025  Attending Physician: Manjeet Holman MD   Primary Care Provider: Piyush Sharif MD         Patient information was obtained from patient, spouse/SO, and ER records.     Subjective:     Principal Problem:Acute hypoxic respiratory failure    Chief Complaint:   Chief Complaint   Patient presents with    Hypotension     Pt arrives via rapid response from clinic, currently being treated for UTI, took husbands opioids for pain, now drowsy.         HPI:  Alyssa John is a 74 y.o. female with HTN, MS, seizures, obesity, who presented to vascular clinic at [facility] for initial appointment for b/l LE venous stasis . She was found to be somnolent and confused with hypotension and was send to ED for workup.     Significant other at bedside. Reports that she had been having abdominal pain for which she was started on treatment for UTI after a CT abdomen in [place]. Due to campy pain in the abdomen he gave the patient some of his left over Norco from dental visit. She was fine on the way and slept but was confused when she wokeup on arrival for vascular appointment at [facility]. She was hypotensive with SBP 80s to 90s and oxygen saturation was 88%. She was sent to ED for evalution. Ct head was normal. ABG did not show significant hypercapnia. CTA chest showed L pleural effusion (also seen at OSH on recent Ct abdomen) and concern for PNA. UA pending, started on abx for PNA and UTI. Hospital medicine admission for AHRF with PNA, pleural effusion and UTI.     Past Medical History:   Diagnosis Date    Arthritis     Coronary artery disease     Encounter for blood transfusion     Epilepsy     GERD (gastroesophageal reflux disease)     Headache     Hypertension     MI, old     MS (multiple sclerosis)     Seizures     epilepsy       Past Surgical History:    Procedure Laterality Date    APPENDECTOMY      BACK SURGERY      L5 discectomy    BREAST BIOPSY Right 15 yrs ago    benign    CATARACT EXTRACTION Bilateral     COLONOSCOPY N/A 09/16/2015    Procedure: COLONOSCOPY;  Surgeon: Henry Malcolm MD;  Location: [facility];  Service: Endoscopy;  Laterality: N/A;    CORONARY STENT PLACEMENT      IMPLANTATION OF PERMANENT SACRAL NERVE STIMULATOR N/A 10/11/2024    Procedure: INSERTION, NEUROSTIMULATOR, PERMANENT, SACRAL;  Surgeon: Abel Rosales MD;  Location: [facility] OR;  Service: Urology;  Laterality: N/A;  Mack RUIZ    INSERTION, NEUROSTIMULATOR, TEMPORARY, SACRAL N/A 9/27/2024    Procedure: INSERTION, NEUROSTIMULATOR, TEMPORARY, SACRAL;  Surgeon: Abel Rosales MD;  Location: [facility] OR;  Service: Urology;  Laterality: N/A;  1 hour 45 minutes Mack RUIZ    right knee arthroscopy         Review of patient's allergies indicates:   Allergen Reactions    Codeine      Other reaction(s): throat tightness    Dilantin [phenytoin sodium extended]     Phenobarbital     Tegretol [carbamazepine]        No current facility-administered medications on file prior to encounter.     Current Outpatient Medications on File Prior to Encounter   Medication Sig    ammonium lactate (LAC-HYDRIN) 12 % lotion Apply topically as needed for Dry Skin.    atorvastatin (LIPITOR) 40 MG tablet TAKE 1 TABLET BY MOUTH EVERY DAY    cephALEXin (KEFLEX) 500 MG capsule Take 1 capsule (500 mg total) by mouth every 12 (twelve) hours. for 5 days    cholecalciferol, vitamin D3, 10 mcg (400 unit) Cap capsule Take 1 capsule (400 Units total) by mouth once daily.    clopidogrel (PLAVIX) 75 mg tablet Take 75 mg by mouth once daily.    cyanocobalamin (VITAMIN B-12) 1000 MCG tablet Take 1 tablet (1,000 mcg total) by mouth once daily.    dicyclomine (BENTYL) 10 MG capsule Take 1 capsule (10 mg total) by mouth 3 (three) times daily as needed (abdominal pain).    DULoxetine (CYMBALTA) 20 MG capsule TAKE 2  CAPSULES BY MOUTH EVERY DAY    estradioL (ESTRACE) 0.01 % (0.1 mg/gram) vaginal cream Place 1 g vaginally 3 (three) times a week.    famotidine (PEPCID) 20 MG tablet Take 1 tablet (20 mg total) by mouth once daily.    furosemide (LASIX) 20 MG tablet Take 1 tablet (20 mg total) by mouth once daily.    hydrocortisone 2.5 % cream Apply topically 2 (two) times daily. Apply to red areas of lower legs for 7 days (Patient not taking: Reported on 4/22/2025)    losartan (COZAAR) 25 MG tablet Take 1 tablet (25 mg total) by mouth once daily.    nitrofurantoin (MACRODANTIN) 50 MG capsule Take 1 capsule (50 mg total) by mouth 4 (four) times daily.    potassium chloride SA (K-DUR,KLOR-CON) 20 MEQ tablet Take 1 tablet (20 mEq total) by mouth 2 (two) times daily.    propranoloL (INDERAL) 20 MG tablet Take 20 mg by mouth.    topiramate (TOPAMAX) 100 MG tablet Take 1 tablet (100 mg total) by mouth 2 (two) times daily.     Family History       Problem Relation (Age of Onset)    Heart disease Father    Scleroderma Mother          Tobacco Use    Smoking status: Former     Current packs/day: 0.00     Average packs/day: 2.0 packs/day for 42.0 years (84.0 ttl pk-yrs)     Types: Cigarettes     Start date: 1968     Quit date: 2010     Years since quitting: 15.3     Passive exposure: Past    Smokeless tobacco: Never   Substance and Sexual Activity    Alcohol use: Not Currently     Comment: rarely    Drug use: No    Sexual activity: Not Currently     Partners: Male     Review of Systems   Constitutional:  Positive for activity change. Negative for fever.   Respiratory:  Positive for shortness of breath.    Cardiovascular:  Positive for leg swelling.   Gastrointestinal:  Positive for abdominal pain.   Neurological:  Positive for dizziness.   Psychiatric/Behavioral:  Positive for confusion.      Objective:     Vital Signs (Most Recent):  Temp: 98.5 °F (36.9 °C) (05/08/25 0945)  Pulse: 68 (05/08/25 1500)  Resp: (!) 21 (05/08/25 1406)  BP: (!)  141/64 (05/08/25 1406)  SpO2: 98 % (05/08/25 1500) Vital Signs (24h Range):  Temp:  [98.5 °F (36.9 °C)] 98.5 °F (36.9 °C)  Pulse:  [30-72] 68  Resp:  [14-24] 21  SpO2:  [93 %-99 %] 98 %  BP: ()/(46-74) 141/64     Weight: 86.2 kg (190 lb)  Body mass index is 33.66 kg/m².     Physical Exam  Constitutional:       Appearance: She is obese. She is ill-appearing.   HENT:      Head: Normocephalic and atraumatic.      Mouth/Throat:      Mouth: Mucous membranes are moist.      Pharynx: Oropharynx is clear.   Eyes:      Extraocular Movements: Extraocular movements intact.   Cardiovascular:      Rate and Rhythm: Normal rate.   Pulmonary:      Effort: Pulmonary effort is normal. No respiratory distress.      Breath sounds: Rhonchi present.   Abdominal:      General: Bowel sounds are normal. There is distension.      Palpations: Abdomen is soft.      Tenderness: There is no abdominal tenderness.   Musculoskeletal:         General: Normal range of motion.      Cervical back: Normal range of motion. No rigidity or tenderness.      Right lower leg: Edema present.      Left lower leg: Edema present.      Comments: Bilateral ane trior shin erythema, without increase in warmth as compared to surrounding skin   Skin:     General: Skin is warm and dry.   Neurological:      General: No focal deficit present.      Mental Status: She is alert.      Comments: Oriented to self and Time. Thought she is the hospital at [facility]   Psychiatric:         Mood and Affect: Mood normal.         Behavior: Behavior normal.                Significant Labs: All pertinent labs within the past 24 hours have been reviewed.    Significant Imaging: I have reviewed all pertinent imaging results/findings within the past 24 hours.  Assessment/Plan:     Assessment & Plan  Acute hypoxic respiratory failure  Sepsis iso PNA    Patient with Hypoxic Respiratory failure which is Acute.  she is not on home oxygen. Supplemental oxygen was provided and noted-      .    Signs/symptoms of respiratory failure include- tachypnea. Contributing diagnoses includes - Pleural effusion and Pneumonia Labs and images were reviewed. Patient Has recent ABG, which has been reviewed. Will treat underlying causes and adjust management of respiratory failure as follows-     - Follow Bcx  - Sputum culture  - Procal normal   - Lactate pending  - ESR/CRP  - COVID flu RSV negative  -    - Trop 8  -CXR  5/8: Pulmonary findings suggest left pleural effusion and edema.  Developing left lower lung zone consolidation  - CTA chest 5/8: No PE, moderate L pleural effusion new when compared to 02/19/2025, and associated left lower lobe atelectasis. Tracheobronchomalacia.   Mild mediastinal and bilateral hilar lymphadenopathy, new when compared to 02/19/2025. Scattered pulmonary nodules measuring up to 7 mm, unchanged when compared to 10/03/2024.  A follow-up CT 18-24 months     Plan:     IV fluids on admit 1L  Abx -c/w vanc and zosyn  Will consider ID consult if not improving  SLP consult  Pulm consult to assess for thoracentesis  IS  Chest PT  Hold diuresis as hypotensive on admit and needed fluids  Multiple sclerosis  Hx noted    CAD (coronary artery disease)  C/w plavix  Seizure disorder  C/w home topiramate    Hypertension  Patient's blood pressure range in the last 24 hours was: BP  Min: 86/46  Max: 152/65.The patient's inpatient anti-hypertensive regimen is listed below:  Current Antihypertensives       Plan  - BP is uncontrolled, will adjust as follows: Hold propranolol 20 daily and losartan 25 daily  - Monitor - restart as able  Hypotension  Iso norco and infection  S/p 1 L IV fluids in ED  Monitor off antihypertensives  Abx as above    AMS (altered mental status)  Likely iso infection and opiate use (non prescription)  - given by  for pain    Improved  CT head normal  ABG without hypercapnia  Treatment of infection as above  UTI (urinary tract infection)  Was being treated with keflex  for UTI as OP    Recent Ct abdomen with bladder thickening concerning for UTI  Abx per AHFR section    VTE Risk Mitigation (From admission, onward)           Ordered     enoxaparin injection 40 mg  Daily         05/08/25 1314     IP VTE HIGH RISK PATIENT  Once         05/08/25 1314     Place sequential compression device  Until discontinued         05/08/25 1314                               Pharmacokinetic Initial Assessment & Plan: IV Vancomycin    IV Vancomycin 2000 mg x once given in the ED on 5/08 @ 1100  Then Vancomycin 1750 mg every 24 hours, starting at 2300 on 5/8  Obtain a Vancomycin trough 30 mins prior to the 3rd dose on 5/9 @ 2230   Desired empiric serum trough concentration is 15 to 20 mcg/mL    Pharmacy will continue to follow and monitor vancomycin.    [id number] with any questions regarding this assessment.     Thank you for the consult,   Burke Christiansen         Patient brief summary:  Alyssa John is a 74 y.o. female initiated on antimicrobial therapy with IV Vancomycin for treatment of suspected Pneumonia    Drug Allergies:   Review of patient's allergies indicates:   Allergen Reactions    Codeine      Other reaction(s): throat tightness    Dilantin [phenytoin sodium extended]     Phenobarbital     Tegretol [carbamazepine]        Actual Body Weight:   86.2 kg    Renal Function:   Estimated Creatinine Clearance: 57.1 mL/min (based on SCr of 0.9 mg/dL).,     CBC (last 72 hours):  Recent Labs   Lab Result Units 05/07/25  0507 05/08/25  1024   WBC K/uL 11.33 11.18   Hgb gm/dL 12.6  --    HGB gm/dL  --  11.5*   Hct % 39.8  --    HCT %  --  38.3   Platelet Count K/uL 237 228   Lymph % %  --  17.3*   Mono % % 6.2 8.1   Eos % % 1.7 2.8   Basophil % % 0.4 0.5       Metabolic Panel (last 72 hours):  Recent Labs   Lab Result Units 05/07/25  0507 05/07/25  0735 05/08/25  1024   Sodium mmol/L 140  --  137   Potassium mmol/L 3.8  --  3.6   Chloride mmol/L 111*  --  109   CO2 mmol/L 18*  --  20*   Glucose  "mg/dL 141*  --  117*   Glucose, UA   --  Negative  --    BUN mg/dL 17  --  19   Creatinine mg/dL 0.7  --  0.9   Albumin g/dL 3.8  --  2.9*   Bilirubin Total mg/dL 0.4  --  0.7   ALP unit/L 137*  --  130   AST unit/L 11  --  12   ALT unit/L 10  --  10       Drug levels (last 3 results):  No results for input(s): "VANCOMYCINRA", "VANCORANDOM", "VANCOMYCINPE", "VANCOPEAK", "VANCOMYCINTR", "VANCOTROUGH" in the last 72 hours.    Microbiologic Results:  Microbiology Results (last 7 days)       Procedure Component Value Units Date/Time    Culture, Respiratory with Gram Stain [[id number]]     Order Status: Sent Specimen: Respiratory     Blood culture x two cultures. Draw prior to antibiotics. [[id number]] Collected: 05/08/25 1024    Order Status: Resulted Specimen: Blood from Peripheral, Hand, Left Updated: 05/08/25 1053    Blood culture x two cultures. Draw prior to antibiotics. [[id number]] Collected: 05/08/25 1024    Order Status: Resulted Specimen: Blood from Peripheral, Hand, Right Updated: 05/08/25 1052              Manjeet Holman MD  Department of Hospital Medicine  [facility] Emergency Dept          "

## 2025-05-08 NOTE — SUBJECTIVE & OBJECTIVE
Past Medical History:   Diagnosis Date    Arthritis     Coronary artery disease     Encounter for blood transfusion     Epilepsy     GERD (gastroesophageal reflux disease)     Headache     Hypertension     MI, old     MS (multiple sclerosis)     Seizures     epilepsy       Past Surgical History:   Procedure Laterality Date    APPENDECTOMY      BACK SURGERY      L5 discectomy    BREAST BIOPSY Right 15 yrs ago    benign    CATARACT EXTRACTION Bilateral     COLONOSCOPY N/A 09/16/2015    Procedure: COLONOSCOPY;  Surgeon: Henry Malcolm MD;  Location: [facility];  Service: Endoscopy;  Laterality: N/A;    CORONARY STENT PLACEMENT      IMPLANTATION OF PERMANENT SACRAL NERVE STIMULATOR N/A 10/11/2024    Procedure: INSERTION, NEUROSTIMULATOR, PERMANENT, SACRAL;  Surgeon: Abel Rosales MD;  Location: [facility] OR;  Service: Urology;  Laterality: N/A;  Mack RUIZ    INSERTION, NEUROSTIMULATOR, TEMPORARY, SACRAL N/A 9/27/2024    Procedure: INSERTION, NEUROSTIMULATOR, TEMPORARY, SACRAL;  Surgeon: Abel Rosales MD;  Location: [facility] OR;  Service: Urology;  Laterality: N/A;  1 hour 45 minutes Mack RUIZ    right knee arthroscopy         Review of patient's allergies indicates:   Allergen Reactions    Codeine      Other reaction(s): throat tightness    Dilantin [phenytoin sodium extended]     Phenobarbital     Tegretol [carbamazepine]        Family History       Problem Relation (Age of Onset)    Heart disease Father    Scleroderma Mother          Tobacco Use    Smoking status: Former     Current packs/day: 0.00     Average packs/day: 2.0 packs/day for 42.0 years (84.0 ttl pk-yrs)     Types: Cigarettes     Start date: 1968     Quit date: 2010     Years since quitting: 15.3     Passive exposure: Past    Smokeless tobacco: Never   Substance and Sexual Activity    Alcohol use: Not Currently     Comment: rarely    Drug use: No    Sexual activity: Not Currently     Partners: Male         Review of Systems  Objective:      Vital Signs (Most Recent):  Temp: 98.5 °F (36.9 °C) (05/08/25 0945)  Pulse: 70 (05/08/25 1518)  Resp: (!) 22 (05/08/25 1518)  BP: (!) 121/104 (05/08/25 1518)  SpO2: 99 % (05/08/25 1518) Vital Signs (24h Range):  Temp:  [98.5 °F (36.9 °C)] 98.5 °F (36.9 °C)  Pulse:  [30-72] 70  Resp:  [14-24] 22  SpO2:  [93 %-99 %] 99 %  BP: ()/() 121/104     Weight: 86.2 kg (190 lb)  Body mass index is 33.66 kg/m².      Intake/Output Summary (Last 24 hours) at 5/8/2025 1559  Last data filed at 5/8/2025 1415  Gross per 24 hour   Intake 1608.2 ml   Output --   Net 1608.2 ml        Physical Exam     Vents:       Lines/Drains/Airways       Drain  Duration             Female External Urinary Catheter w/ Suction 05/08/25 1040 <1 day              Peripheral Intravenous Line  Duration                  Peripheral IV - Single Lumen 05/08/25 1024 20 G Left Hand <1 day                    Significant Labs:    CBC/Anemia Profile:  Recent Labs   Lab 05/07/25  0507 05/08/25  1024 05/08/25  1031   WBC 11.33 11.18  --    HGB 12.6 11.5*  --    HCT 39.8 38.3 37.7    228  --    MCV 84 87  --    RDW 14.7* 14.9*  --         Chemistries:  Recent Labs   Lab 05/07/25  0507 05/08/25  1024    137   K 3.8 3.6   * 109   CO2 18* 20*   BUN 17 19   CREATININE 0.7 0.9   CALCIUM 8.9 8.1*   ALBUMIN 3.8 2.9*   PROT 7.1 6.4   BILITOT 0.4 0.7   ALKPHOS 137* 130   ALT 10 10   AST 11 12       All pertinent labs within the past 24 hours have been reviewed.    Significant Imaging:   I have reviewed all pertinent imaging results/findings within the past 24 hours.

## 2025-05-08 NOTE — HPI
Ms. John is a 74 year old female with a history of CAD, Seizures, HTN, history of tobacco use (80+ pack years, quit ~15 years ago) who presents for altered mentation and hypotension. Patient reported to have taken left over Norco for crampy abdominal pain prior to being altered at vascular clinic. CTA Chest shows new moderate left sided pleural effusion (compared to 2/19/25 and CT abd 4/24/25). She required 3L oxygen satting 93-94%.  She was admitted for treatment of pneumonia, hypoxic respiratory failure. She was started on vanc/zosyn and received 1L LR. Patient is on Plavix for her CAD and this was continued on admission. Last echo was 1/17 w/ reported normal EF and diastolic function.  Pulm consulted for thoracentesis.

## 2025-05-08 NOTE — ED TRIAGE NOTES
Patient identifiers for Alyssa John 74 y.o. female checked and correct.    Patient arrives to ED via Rapid Response from cardiology clinic for hypotension and hypoxia. Patient arrives 80s/50s and 93% on 3L. Patient currently being treated for UTI. Per  at bedside, patient given norco for bladder spasms prior to appointment. Patient denies NVD, fever, chills. Endorsing nonproductive cough and BLE edema/erythema. Hx of MS    Chief Complaint   Patient presents with    Hypotension     Pt arrives via rapid response from clinic, currently being treated for UTI, took husbands opioids for pain, now drowsy.      Past Medical History:   Diagnosis Date    Arthritis     Coronary artery disease     Encounter for blood transfusion     Epilepsy     GERD (gastroesophageal reflux disease)     Headache     Hypertension     MI, old     MS (multiple sclerosis)     Seizures     epilepsy     Allergies reported:   Review of patient's allergies indicates:   Allergen Reactions    Codeine      Other reaction(s): throat tightness    Dilantin [phenytoin sodium extended]     Phenobarbital     Tegretol [carbamazepine]

## 2025-05-08 NOTE — ASSESSMENT & PLAN NOTE
Likely iso infection and opiate use (non prescription)  - given by  for pain    Improved  CT head normal  ABG without hypercapnia  Treatment of infection as above

## 2025-05-08 NOTE — CODE/ RAPID DOCUMENTATION
RAPID RESPONSE NURSE NOTE        Admit Date: 2025  LOS: 0  Code Status: Prior   Date of Consult: 2025  : 1950  Age: 74 y.o.  Weight:   Wt Readings from Last 1 Encounters:   25 86.2 kg (190 lb)     Sex: female  Race: White   Bed: ED :   MRN: [id number]  Time Rapid Response Team page Received: 09  Time Rapid Response Team at Bedside: 09  Time Rapid Response Team left Bedside: 09  Was the patient discharged from an ICU this admission? No  Was the patient discharged from a PACU within last 24 hours? No   Did the patient receive conscious sedation/general anesthesia in last 24 hours? No  Was the patient in the ED within the past 24 hours? No  Was the patient on NIPPV within the past 24 hours? No   Did this progress into an ARC or CPA: No  Attending Physician: ALLEGRA  Primary Service: NA    SITUATION    Notified by overhead page.  Reason for alert: Hypoxia, Hypotension  Called to evaluate the patient for Respiratory    BACKGROUND     Why is the patient in the hospital?: Cardiology Clinic Appointment    Patient has a past medical history of Arthritis, Coronary artery disease, Encounter for blood transfusion, Epilepsy, GERD (gastroesophageal reflux disease), Headache, Hypertension, MI, old, MS (multiple sclerosis), and Seizures.    ASSESSMENT/INTERVENTIONS    What did you find: Patient sitting up in wheelchair with MD on 3L NC. MD reports patient hypotensive on arrival.  reports patient is being treated for a UTI and taking hydrocodone. Spo2 93 on 3L, airway open and patent. Patient transported to ED for evaluation.    RECOMMENDATIONS    We recommend: ED transfer and evaluation.    PROVIDER ESCALATION    Orders received and case discussed with LEO Francis.    FOLLOW UP    Call the Rapid Response Nurse, Loreta Lange RN at [phone] for additional questions or concerns.

## 2025-05-08 NOTE — ED PROVIDER NOTES
"Encounter Date: 5/8/2025       History     Chief Complaint   Patient presents with    Hypotension     Pt arrives via rapid response from clinic, currently being treated for UTI, took husbands opioids for pain, now drowsy.      Patient patient is a 74-year-old female with a past medical history of recent UTI, CAD, GERD, HTN, MS arrives for evaluation of hypotension.  Patient has been appearing to be" stoned" per her .  States patient has been being treated with antibiotics for UTI.  Patient was evaluated yesterday at ED with CT abdomen and pelvis without contrast with no acute findings.  At visit to interventional cardiology this morning, patient was found to be hypotensive.  Has been denies patient complaining ordered chest pain, back pain,, unilateral leg swelling, recent surgery, recent travel, use of anticoagulants. Hugo was called and patient was taken to ED for evaluation.  At ED, patient denies any chest pain, back pain, abdominal pain.      Review of patient's allergies indicates:   Allergen Reactions    Codeine      Other reaction(s): throat tightness    Dilantin [phenytoin sodium extended]     Phenobarbital     Tegretol [carbamazepine]      Past Medical History:   Diagnosis Date    Arthritis     Coronary artery disease     Encounter for blood transfusion     Epilepsy     GERD (gastroesophageal reflux disease)     Headache     Hypertension     MI, old     MS (multiple sclerosis)     Seizures     epilepsy     Past Surgical History:   Procedure Laterality Date    APPENDECTOMY      BACK SURGERY      L5 discectomy    BREAST BIOPSY Right 15 yrs ago    benign    CATARACT EXTRACTION Bilateral     COLONOSCOPY N/A 09/16/2015    Procedure: COLONOSCOPY;  Surgeon: Henry Malcolm MD;  Location: [facility];  Service: Endoscopy;  Laterality: N/A;    CORONARY STENT PLACEMENT      IMPLANTATION OF PERMANENT SACRAL NERVE STIMULATOR N/A 10/11/2024    Procedure: INSERTION, NEUROSTIMULATOR, PERMANENT, SACRAL;  " Surgeon: Abel Rosales MD;  Location: [facility] OR;  Service: Urology;  Laterality: N/A;  Mack RUIZ    INSERTION, NEUROSTIMULATOR, TEMPORARY, SACRAL N/A 9/27/2024    Procedure: INSERTION, NEUROSTIMULATOR, TEMPORARY, SACRAL;  Surgeon: Abel Rosales MD;  Location: [facility] OR;  Service: Urology;  Laterality: N/A;  1 hour 45 minutes Mack RUIZ    right knee arthroscopy       Family History   Problem Relation Name Age of Onset    Scleroderma Mother      Heart disease Father          MI, CAD    Cancer Neg Hx      Diabetes Neg Hx      Stroke Neg Hx      Melanoma Neg Hx      Psoriasis Neg Hx      Lupus Neg Hx      Eczema Neg Hx       Social History[1]  Review of Systems    Physical Exam     Initial Vitals   BP Pulse Resp Temp SpO2   05/08/25 0945 05/08/25 0931 05/08/25 0931 05/08/25 0945 05/08/25 0931   (!) 86/47 (!) 30 16 98.5 °F (36.9 °C) (!) 93 %      MAP       --                Physical Exam    Nursing note and vitals reviewed.  Constitutional: She appears well-developed and well-nourished. She is not diaphoretic. No distress.   HENT:   Head: Normocephalic and atraumatic.   Right Ear: External ear normal.   Left Ear: External ear normal.   Nose: Nose normal.   Eyes: Conjunctivae are normal. Right eye exhibits no discharge. Left eye exhibits no discharge. No scleral icterus.   Cardiovascular:  Regular rhythm and normal heart sounds.           No murmur heard.  Bradycardia.   Pulmonary/Chest: Breath sounds normal. No respiratory distress. She has no wheezes. She has no rhonchi.   Bilateral crackles in lower lung fields.   Abdominal: Abdomen is soft. She exhibits no distension. There is no abdominal tenderness. There is no rebound and no guarding.   Musculoskeletal:         General: Edema present. Normal range of motion.      Comments: Bilateral lower extremity edema and blanchable erythema.  Increased warmth in bilateral lower extremities.     Neurological: She is alert.   Following commands appropriately.  Not  answering questions appropriately.   Skin: Capillary refill takes less than 2 seconds. No abscess noted. There is erythema. No pallor.   Psychiatric: She has a normal mood and affect.         ED Course   Procedures  Labs Reviewed   COMPREHENSIVE METABOLIC PANEL - Abnormal       Result Value    Sodium 137      Potassium 3.6      Chloride 109      CO2 20 (*)     Glucose 117 (*)     BUN 19      Creatinine 0.9      Calcium 8.1 (*)     Protein Total 6.4      Albumin 2.9 (*)     Bilirubin Total 0.7            AST 12      ALT 10      Anion Gap 8      eGFR >60     URINALYSIS, REFLEX TO URINE CULTURE - Abnormal    Color, UA Orange (*)     Appearance, UA Clear      pH, UA 6.0      Spec Grav UA >=1.030 (*)     Protein, UA Trace (*)     Glucose, UA Negative      Ketones, UA Negative      Bilirubin, UA 1+ (*)     Blood, UA Negative      Nitrites, UA Positive (*)     Urobilinogen, UA 2.0-3.0 (*)     Leukocyte Esterase, UA 3+ (*)    B-TYPE NATRIURETIC PEPTIDE - Abnormal     (*)    CBC WITH DIFFERENTIAL - Abnormal    WBC 11.18      RBC 4.40      HGB 11.5 (*)     HCT 38.3      MCV 87      MCH 26.1 (*)     MCHC 30.0 (*)     RDW 14.9 (*)     Platelet Count 228      MPV 11.3      Nucleated RBC 0      Neut % 70.9      Lymph % 17.3 (*)     Mono % 8.1      Eos % 2.8      Basophil % 0.5      Imm Grans % 0.4      Neut # 7.92 (*)     Lymph # 1.93      Mono # 0.91      Eos # 0.31      Baso # 0.06      Imm Grans # 0.05 (*)    SALICYLATE LEVEL - Abnormal    Salicylate Level <5.0 (*)    C-REACTIVE PROTEIN - Abnormal    .3 (*)    URINALYSIS MICROSCOPIC - Abnormal    RBC, UA 11 (*)     WBC, UA 51 (*)     Bacteria, UA Rare      Yeast, UA Occasional (*)     Squamous Epithelial Cells, UA 9      Microscopic Comment       TROPONIN I HIGH SENSITIVITY - Normal    Troponin High Sensitive 8     PROCALCITONIN - Normal    Procalcitonin 0.05     SARS-COV2 (COVID) WITH FLU/RSV BY PCR - Normal    INFLUENZA A BY PCR Negative      INFLUENZA B  BY PCR Negative      Respiratory Syncytial Virus PCR Negative      SARS-CoV-2 PCR Negative     LACTIC ACID, PLASMA - Normal    Lactic Acid Level 1.2      Narrative:     Falsely low lactic acid results can be found in samples containing >=13.0 mg/dL total bilirubin and/or >=3.5 mg/dL direct bilirubin.    CULTURE, BLOOD   CULTURE, BLOOD   CULTURE, RESPIRATORY   CULTURE, URINE   CBC W/ AUTO DIFFERENTIAL    Narrative:     The following orders were created for panel order CBC auto differential.  Procedure                               Abnormality         Status                     ---------                               -----------         ------                     CBC with Differential[[id number]]       Abnormal            Final result                 Please view results for these tests on the individual orders.   GREY TOP URINE HOLD    Extra Tube Hold for add-ons.     POCT GLUCOSE MONITORING CONTINUOUS        ECG Results              EKG 12-lead (Final result)        Collection Time Result Time QRS Duration OHS QTC Calculation    05/08/25 09:59:52 05/08/25 10:50:08 70 438                     Final result by Interface, Lab In [facility] (05/08/25 11:43:09)                   Narrative:    Test Reason : Z13.6,    Vent. Rate :  61 BPM     Atrial Rate :  61 BPM     P-R Int : 140 ms          QRS Dur :  70 ms      QT Int : 436 ms       P-R-T Axes :  30  -3  39 degrees    QTcB Int : 438 ms    Normal sinus rhythm  Minimal voltage criteria for LVH, may be normal variant  Possible  Inferior infarct ,age undetermined  Abnormal R wave progression in the precordial leads - Cannot rule out  Anterior infarct (cited on or before vs lead placement  Abnormal ECG  When compared with ECG of 22-Apr-2025 14:07,  Inferior infarct is now slightly more possible  Confirmed by James Hernandez ([id number]) on 5/8/2025 10:50:06 AM    Referred By:            Confirmed By: James Hernandez                                  Imaging Results              CTA Chest  Non-Coronary (PE Studies) (Final result)  Result time 05/08/25 12:48:49      Final result by Ludivina Nguyen MD (05/08/25 12:48:49)                   Impression:      1. Satisfactory only to the proximal segmental pulmonary arteries due to significant respiratory motion artifact.  2. Within this limitation, no pulmonary embolism.  3. Moderate left pleural effusion, new when compared to 02/19/2025, and associated left lower lobe atelectasis.  4. CT findings of tracheobronchomalacia.  5. Scattered pulmonary nodules measuring up to 7 mm, unchanged when compared to 10/03/2024.  A follow-up CT 18-24 months after discovery is recommended, per Fleischner guidelines.  6. Mild mediastinal and bilateral hilar lymphadenopathy, new when compared to 02/19/2025.  Clinical considerations will determine further workup.      Electronically signed by: Ludivina Nguyen  Date:    05/08/2025  Time:    12:48               Narrative:    EXAMINATION:  CTA CHEST NON CORONARY (PE STUDIES)    CLINICAL HISTORY:  Pulmonary embolism (PE) suspected, unknown D-dimer;    TECHNIQUE:  Low dose axial images, sagittal and coronal reformations were obtained from the thoracic inlet to the lung bases following the IV administration of 75 mL of Omnipaque 350.  Contrast timing was optimized to evaluate the pulmonary arteries.  MIP images were performed.    COMPARISON:  02/19/2025 and additional priors    FINDINGS:  Exam quality: Satisfactory only to the proximal segmental pulmonary arteries due to significant respiratory motion artifact.    Pulmonary embolism: Within this limitation, no pulmonary embolism    Central pulmonary arteries: Normal caliber.    Aorta: Normal caliber.    Heart: No right heart strain. Normal size.    Coronary arteries: No calcifications.    Pericardium: Normal. No effusion, thickening, or calcification.    Support tubes and lines: None.    Base of neck/thyroid: Normal.    Lymph nodes: There is mild mediastinal and bilateral  hilar lymphadenopathy, new when compared to 02/19/2025.    Esophagus: Normal.    Pleura: A moderate left pleural effusion is present, new when compared to 02/19/2025.    Upper abdomen: Unremarkable    Body wall: Unremarkable    Airways: There is posterior bowing of the trachea, likely indicating the patient is being in imaged in expiration.  Compared with the exam from 02/19/2025, which was likely with the patient in inspiration, there is greater than 50% loss of the AP diameter with expiration.    Lungs: Emphysema is present.  Diffuse bilateral ground glass opacification is present, likely given that the patient is imaged in expiration.  Scarring in the lung apices is unchanged going back at least as far as 10/03/2024.  A 7 mm right upper lobe nodule (series 3, image 136) is unchanged when compared to 10/03/2024.  A 5 mm right upper lobe nodule (series 3, image 106) is unchanged when compared to 10/03/2024.  additional scattered bilateral pulmonary nodules are unchanged when compared to 10/03/2024.  There is left lower lobe atelectasis.    Bones: No focal lesion.                                       CT Head Without Contrast (Final result)  Result time 05/08/25 12:24:43      Final result by Moshe Mendoza DO (05/08/25 12:24:43)                   Impression:      No acute intracranial findings as detailed above specifically without evidence for acute intracranial hemorrhage or evidence for large territory recent infarction.  Clinical correlation and further evaluation as warranted.      Electronically signed by: Moshe Mendoza DO  Date:    05/08/2025  Time:    12:24               Narrative:    EXAMINATION:  CT HEAD WITHOUT CONTRAST    CLINICAL HISTORY:  Mental status change, unknown cause;    TECHNIQUE:  Multiple sequential 5 mm axial images of the head without contrast.  Coronal and sagittal reformatted imaging from the axial acquisition.    COMPARISON:  09/16/2024    FINDINGS:  Generalized cerebral volume loss.   Compensatory enlargement ventricles sulci and cisterns without hydrocephalus.  Patchy and confluent decreased attenuation supratentorial white matter similar to prior concerning for advanced chronic microvascular ischemic change.  Stable encephalomalacia right caudate compatible with remote lacunar-type infarction.  No new abnormal parenchymal attenuation.  No acute intracranial hemorrhage.  Visualized paranasal sinuses and mastoid air cells are clear.  Question bilateral globe proptosis similar to prior.  Remote operative change bilateral lens replacement.                                       X-Ray Chest AP Portable (Final result)  Result time 05/08/25 11:56:36      Final result by Kulwant Elizabeth MD (05/08/25 11:56:36)                   Impression:      1. Pulmonary findings suggest left pleural effusion and edema.  Developing left lower lung zone consolidation not excluded in this hypoventilatory exam.      Electronically signed by: Kulwant Elizabeth MD  Date:    05/08/2025  Time:    11:56               Narrative:    EXAMINATION:  XR CHEST AP PORTABLE    CLINICAL HISTORY:  Sepsis;    TECHNIQUE:  Single frontal view of the chest was performed.    COMPARISON:  01/16/2025    FINDINGS:  The cardiomediastinal silhouette is prominent, magnified by technique..  There is obscuration of the left costophrenic angle suggesting effusion.  The trachea is midline.  The lungs are symmetrically expanded bilaterally with mildly coarse central hilar interstitial attenuation, may reflect edema..  Developing left lower lung zone consolidation not excluded.  There is no pneumothorax.  The osseous structures are remarkable for degenerative change..                                       Medications   piperacillin-tazobactam (ZOSYN) 4.5 g in D5W 100 mL IVPB (MB+) (has no administration in time range)   atorvastatin tablet 40 mg (has no administration in time range)   cyanocobalamin tablet 1,000 mcg (1,000 mcg Oral Given 5/8/25 1431)    dicyclomine capsule 10 mg (has no administration in time range)   DULoxetine DR capsule 40 mg (40 mg Oral Given 5/8/25 1432)   famotidine tablet 20 mg (20 mg Oral Given 5/8/25 1431)   topiramate tablet 100 mg (has no administration in time range)   clopidogreL tablet 75 mg (75 mg Oral Given 5/8/25 1431)   sodium chloride 0.9% flush 10 mL (has no administration in time range)   naloxone 0.4 mg/mL injection 0.02 mg (has no administration in time range)   glucose chewable tablet 16 g (has no administration in time range)   glucose chewable tablet 24 g (has no administration in time range)   dextrose 50% injection 12.5 g (has no administration in time range)   dextrose 50% injection 25 g (has no administration in time range)   glucagon (human recombinant) injection 1 mg (has no administration in time range)   enoxaparin injection 40 mg (has no administration in time range)   cholecalciferol (vitamin D3) 400 units/mL oral liquid (400 Units Oral Given 5/8/25 1432)   vancomycin - pharmacy to dose (has no administration in time range)   polyethylene glycol packet 17 g (17 g Oral Not Given 5/8/25 1600)   senna tablet 8.6 mg (has no administration in time range)   vancomycin 1750 mg in 0.9% sodium chloride 500 mL IVPB (1,750 mg Intravenous Trough Due As Scheduled Before Dose 5/9/25 2230)   lactated ringers bolus 1,000 mL (0 mLs Intravenous Stopped 5/8/25 1319)   vancomycin 2 g in 0.9% sodium chloride 500 mL IVPB (0 mg Intravenous Stopped 5/8/25 1415)   piperacillin-tazobactam (ZOSYN) 4.5 g in D5W 100 mL IVPB (MB+) (0 g Intravenous Stopped 5/8/25 1108)   iohexoL (OMNIPAQUE 350) injection 75 mL (75 mLs Intravenous Given 5/8/25 1214)   potassium chloride SA CR tablet 40 mEq (40 mEq Oral Given 5/8/25 1431)     Medical Decision Making  Patient is a 74-year-old female with history as stated above.    Differential diagnosis includes, but is not limited to:      -UTI  -electrolyte disturbance  -polypharmacy  -sepsis  -salicylates  toxicity  -PE    Management:     Patient provided with vanc and Zosyn and IV fluids.  PH of 7.2 with CO2 within normal limits.  Metabolic acidosis.  Performed PE study due to patient's having new oxygen requirements; at 4 L NC at ED with no use of oxygen at home.  On labs, urinalysis with findings concerning for UTI, no major electrolyte disturbances, troponin within normal limits, no leukocytosis.  PE study with no findings concerning for PE.  CT head with no acute intracranial abnormalities.  Portable chest x-ray with findings suggestive of possible pneumonia.  Case discussed with hospital medicine who admitted patient for further management.    Amount and/or Complexity of Data Reviewed  Independent Historian:      Details: Collateral history obtained from  who states that patient is confused currently since this morning.  External Data Reviewed:      Details: === Results for orders placed during the hospital encounter of 01/16/25 ===    Echo    - Interpretation Summary -  ·  Left Ventricle: The left ventricle is normal in size. Normal wall thickness. There is normal systolic function. There is normal diastolic function. E/A ratio is 1.16.  ·  Right Ventricle: Normal right ventricular cavity size. Systolic function is normal.  ·  Mitral Valve: There is mild regurgitation.  ·  Tricuspid Valve: There is mild to moderate regurgitation. The estimated PA systolic pressure is at least 27 mmHg.  ·  Overall the study quality was technically difficult. The study was difficult due to patient's poor acoustic windows.     Labs: ordered. Decision-making details documented in ED Course.     Details: CBC CMP salicylate level  Radiology: ordered.     Details: CT head noncontrast  ECG/medicine tests: ordered.    Risk  Prescription drug management.  Decision regarding hospitalization.               ED Course as of 05/08/25 1658   Thu May 08, 2025   1024    1106 CBC auto differential(!)  No leukocytosis.  Normocytic anemia  not requiring acute intervention. [AG]      ED Course User Index  [AG] Jolly Gaming MD                           Clinical Impression:  Final diagnoses:  [Z13.6] Screening for cardiovascular condition  [R41.82] Altered mental status, unspecified altered mental status type (Primary)          ED Disposition Condition    Admit                   [1]   Social History  Tobacco Use    Smoking status: Former     Current packs/day: 0.00     Average packs/day: 2.0 packs/day for 42.0 years (84.0 ttl pk-yrs)     Types: Cigarettes     Start date: 1968     Quit date: 2010     Years since quitting: 15.3     Passive exposure: Past    Smokeless tobacco: Never   Substance Use Topics    Alcohol use: Not Currently     Comment: rarely    Drug use: No        Jolly Gaming MD  Resident  05/08/25 3604

## 2025-05-08 NOTE — PATIENT INSTRUCTIONS
Assessment/Plan:  Alyssa John is a 74 y.o. female with HTN, MS, seizures, obesity, who presents for an initial appointment.    Venous stasis dermatitis of both lower extremities- Mrs. John is somnolent on exam with blood pressure 86/46. Her  states he gave her a pill containing Hydrocodone and acetaminophen at 7:30 am this morning that he got from his daughter. States she has been very sleepy since that time. I placed pt on pulse oximeter with oxygen saturation noted at 88%. Placed pt on 3 liters oxygen via nasal canula and sats improved to 95%. Blood pressure up to 92 mmHg systolic. She continues to be somnolent on exam, therefore, the rapid response team was called for immediate evaluation and ED transport. Current presentation likely due opioid suppression of pt's respiratory drive with resultant Co2 retention. She is also currently being treated for a UTI. Clinic visit will be rescheduled.

## 2025-05-08 NOTE — HPI
Alyssa John is a 74 y.o. female with HTN, MS, seizures, obesity, who presented to vascular clinic at [place] for initial appointment for b/l LE venous stasis . She was found to be somnolent and confused with hypotension and was send to ED for workup.     Significant other at bedside. Reports that she had been having abdominal pain for which she was started on treatment for UTI after a CT abdomen in [place]. Due to campy pain in the abdomen he gave the patient some of his left over [place] from dental visit. She was fine on the way and slept but was confused when she wokeup on arrival for vascular appointment at [place]. She was hypotensive with SBP 80s to 90s and oxygen saturation was 88%. She was sent to ED for evalution. Ct head was normal. ABG did not show significant hypercapnia. CTA chest showed L pleural effusion (also seen at OSH on recent Ct abdomen) and concern for PNA. UA pending, started on abx for PNA and UTI. Hospital medicine admission for AHRF with PNA, pleural effusion and UTI.

## 2025-05-08 NOTE — ASSESSMENT & PLAN NOTE
Was being treated with keflex for UTI as OP    Recent Ct abdomen with bladder thickening concerning for UTI  Abx per AHFR section

## 2025-05-08 NOTE — TELEPHONE ENCOUNTER
Told that pt was having resp issues. Took pain meds this Am. Dr House at bedside stimulating pt and called for oxygen. Pt in W/c. Pt started on oxygen 3lpm per NC w. sats at 93% now, lips pink. Needs ongoing stimulation to keep her awake. Appears oriented.  at bedside. Voice only a whisper, barely opening eyes but answering questions appropriately. SBP stable 92, HR high 50s. Rapid response called and she went with the rapid response team to the ER, on oxygen/ w/c.

## 2025-05-09 LAB
ABSOLUTE EOSINOPHIL (OHS): 0.26 K/UL
ABSOLUTE MONOCYTE (OHS): 0.66 K/UL (ref 0.3–1)
ABSOLUTE NEUTROPHIL COUNT (OHS): 6.86 K/UL (ref 1.8–7.7)
ALBUMIN SERPL BCP-MCNC: 2.7 G/DL (ref 3.5–5.2)
ALP SERPL-CCNC: 124 UNIT/L (ref 40–150)
ALT SERPL W/O P-5'-P-CCNC: 11 UNIT/L (ref 10–44)
ANION GAP (OHS): 9 MMOL/L (ref 8–16)
AST SERPL-CCNC: 14 UNIT/L (ref 11–45)
BACTERIA UR CULT: NO GROWTH
BASOPHILS # BLD AUTO: 0.04 K/UL
BASOPHILS NFR BLD AUTO: 0.4 %
BILIRUB SERPL-MCNC: 0.6 MG/DL (ref 0.1–1)
BUN SERPL-MCNC: 17 MG/DL (ref 8–23)
CALCIUM SERPL-MCNC: 8.1 MG/DL (ref 8.7–10.5)
CHLORIDE SERPL-SCNC: 112 MMOL/L (ref 95–110)
CO2 SERPL-SCNC: 18 MMOL/L (ref 23–29)
CREAT SERPL-MCNC: 0.8 MG/DL (ref 0.5–1.4)
ERYTHROCYTE [DISTWIDTH] IN BLOOD BY AUTOMATED COUNT: 14.7 % (ref 11.5–14.5)
ERYTHROCYTE [SEDIMENTATION RATE] IN BLOOD BY PHOTOMETRIC METHOD: 44 MM/HR
GFR SERPLBLD CREATININE-BSD FMLA CKD-EPI: >60 ML/MIN/1.73/M2
GLUCOSE SERPL-MCNC: 86 MG/DL (ref 70–110)
HCT VFR BLD AUTO: 35.9 % (ref 37–48.5)
HGB BLD-MCNC: 10.8 GM/DL (ref 12–16)
IMM GRANULOCYTES # BLD AUTO: 0.07 K/UL (ref 0–0.04)
IMM GRANULOCYTES NFR BLD AUTO: 0.7 % (ref 0–0.5)
LYMPHOCYTES # BLD AUTO: 1.65 K/UL (ref 1–4.8)
MAGNESIUM SERPL-MCNC: 2 MG/DL (ref 1.6–2.6)
MCH RBC QN AUTO: 25.8 PG (ref 27–31)
MCHC RBC AUTO-ENTMCNC: 30.1 G/DL (ref 32–36)
MCV RBC AUTO: 86 FL (ref 82–98)
MRSA PCR SCRN (OHS): NOT DETECTED
NUCLEATED RBC (/100WBC) (OHS): 0 /100 WBC
PHOSPHATE SERPL-MCNC: 2.3 MG/DL (ref 2.7–4.5)
PLATELET # BLD AUTO: 217 K/UL (ref 150–450)
PMV BLD AUTO: 11.5 FL (ref 9.2–12.9)
POTASSIUM SERPL-SCNC: 3.8 MMOL/L (ref 3.5–5.1)
PROT SERPL-MCNC: 6.1 GM/DL (ref 6–8.4)
RBC # BLD AUTO: 4.19 M/UL (ref 4–5.4)
RELATIVE EOSINOPHIL (OHS): 2.7 %
RELATIVE LYMPHOCYTE (OHS): 17.3 % (ref 18–48)
RELATIVE MONOCYTE (OHS): 6.9 % (ref 4–15)
RELATIVE NEUTROPHIL (OHS): 72 % (ref 38–73)
SODIUM SERPL-SCNC: 139 MMOL/L (ref 136–145)
VANCOMYCIN TROUGH SERPL-MCNC: 19.8 UG/ML (ref 10–22)
WBC # BLD AUTO: 9.54 K/UL (ref 3.9–12.7)

## 2025-05-09 PROCEDURE — 83735 ASSAY OF MAGNESIUM: CPT | Performed by: STUDENT IN AN ORGANIZED HEALTH CARE EDUCATION/TRAINING PROGRAM

## 2025-05-09 PROCEDURE — 87641 MR-STAPH DNA AMP PROBE: CPT | Performed by: STUDENT IN AN ORGANIZED HEALTH CARE EDUCATION/TRAINING PROGRAM

## 2025-05-09 PROCEDURE — 25000003 PHARM REV CODE 250: Performed by: STUDENT IN AN ORGANIZED HEALTH CARE EDUCATION/TRAINING PROGRAM

## 2025-05-09 PROCEDURE — 84100 ASSAY OF PHOSPHORUS: CPT | Performed by: STUDENT IN AN ORGANIZED HEALTH CARE EDUCATION/TRAINING PROGRAM

## 2025-05-09 PROCEDURE — 80053 COMPREHEN METABOLIC PANEL: CPT | Performed by: STUDENT IN AN ORGANIZED HEALTH CARE EDUCATION/TRAINING PROGRAM

## 2025-05-09 PROCEDURE — 63600175 PHARM REV CODE 636 W HCPCS: Performed by: STUDENT IN AN ORGANIZED HEALTH CARE EDUCATION/TRAINING PROGRAM

## 2025-05-09 PROCEDURE — 85652 RBC SED RATE AUTOMATED: CPT | Performed by: STUDENT IN AN ORGANIZED HEALTH CARE EDUCATION/TRAINING PROGRAM

## 2025-05-09 PROCEDURE — 94761 N-INVAS EAR/PLS OXIMETRY MLT: CPT

## 2025-05-09 PROCEDURE — 97535 SELF CARE MNGMENT TRAINING: CPT

## 2025-05-09 PROCEDURE — 80202 ASSAY OF VANCOMYCIN: CPT | Performed by: STUDENT IN AN ORGANIZED HEALTH CARE EDUCATION/TRAINING PROGRAM

## 2025-05-09 PROCEDURE — 94664 DEMO&/EVAL PT USE INHALER: CPT

## 2025-05-09 PROCEDURE — 21400001 HC TELEMETRY ROOM

## 2025-05-09 PROCEDURE — 94668 MNPJ CHEST WALL SBSQ: CPT

## 2025-05-09 PROCEDURE — 94799 UNLISTED PULMONARY SVC/PX: CPT

## 2025-05-09 PROCEDURE — 99223 1ST HOSP IP/OBS HIGH 75: CPT | Mod: ,,, | Performed by: PSYCHIATRY & NEUROLOGY

## 2025-05-09 PROCEDURE — 92610 EVALUATE SWALLOWING FUNCTION: CPT

## 2025-05-09 PROCEDURE — 99900035 HC TECH TIME PER 15 MIN (STAT)

## 2025-05-09 PROCEDURE — 85025 COMPLETE CBC W/AUTO DIFF WBC: CPT | Performed by: STUDENT IN AN ORGANIZED HEALTH CARE EDUCATION/TRAINING PROGRAM

## 2025-05-09 PROCEDURE — 27000221 HC OXYGEN, UP TO 24 HOURS

## 2025-05-09 PROCEDURE — 27000646 HC AEROBIKA DEVICE

## 2025-05-09 PROCEDURE — 36415 COLL VENOUS BLD VENIPUNCTURE: CPT | Performed by: STUDENT IN AN ORGANIZED HEALTH CARE EDUCATION/TRAINING PROGRAM

## 2025-05-09 RX ORDER — POTASSIUM CHLORIDE 750 MG/1
30 CAPSULE, EXTENDED RELEASE ORAL ONCE
Status: COMPLETED | OUTPATIENT
Start: 2025-05-09 | End: 2025-05-09

## 2025-05-09 RX ORDER — PROPRANOLOL HYDROCHLORIDE 20 MG/1
20 TABLET ORAL DAILY
Status: DISCONTINUED | OUTPATIENT
Start: 2025-05-10 | End: 2025-05-11 | Stop reason: HOSPADM

## 2025-05-09 RX ORDER — FUROSEMIDE 10 MG/ML
60 INJECTION INTRAMUSCULAR; INTRAVENOUS 2 TIMES DAILY
Status: DISCONTINUED | OUTPATIENT
Start: 2025-05-09 | End: 2025-05-11 | Stop reason: HOSPADM

## 2025-05-09 RX ORDER — PROPRANOLOL HYDROCHLORIDE 20 MG/1
20 TABLET ORAL 2 TIMES DAILY
Status: DISCONTINUED | OUTPATIENT
Start: 2025-05-09 | End: 2025-05-09

## 2025-05-09 RX ORDER — LOSARTAN POTASSIUM 25 MG/1
25 TABLET ORAL DAILY
Status: DISCONTINUED | OUTPATIENT
Start: 2025-05-09 | End: 2025-05-11 | Stop reason: HOSPADM

## 2025-05-09 RX ADMIN — CLOPIDOGREL 75 MG: 75 TABLET ORAL at 09:05

## 2025-05-09 RX ADMIN — FUROSEMIDE 60 MG: 10 INJECTION, SOLUTION INTRAMUSCULAR; INTRAVENOUS at 04:05

## 2025-05-09 RX ADMIN — DIBASIC SODIUM PHOSPHATE, MONOBASIC POTASSIUM PHOSPHATE AND MONOBASIC SODIUM PHOSPHATE 1 TABLET: 852; 155; 130 TABLET ORAL at 11:05

## 2025-05-09 RX ADMIN — FUROSEMIDE 60 MG: 10 INJECTION, SOLUTION INTRAMUSCULAR; INTRAVENOUS at 09:05

## 2025-05-09 RX ADMIN — PROPRANOLOL HYDROCHLORIDE 20 MG: 20 TABLET ORAL at 09:05

## 2025-05-09 RX ADMIN — FAMOTIDINE 20 MG: 20 TABLET, FILM COATED ORAL at 09:05

## 2025-05-09 RX ADMIN — SENNOSIDES 8.6 MG: 8.6 TABLET, FILM COATED ORAL at 09:05

## 2025-05-09 RX ADMIN — POTASSIUM CHLORIDE 30 MEQ: 750 CAPSULE, EXTENDED RELEASE ORAL at 09:05

## 2025-05-09 RX ADMIN — DULOXETINE HYDROCHLORIDE 40 MG: 20 CAPSULE, DELAYED RELEASE ORAL at 09:05

## 2025-05-09 RX ADMIN — PIPERACILLIN SODIUM AND TAZOBACTAM SODIUM 4.5 G: 4; .5 INJECTION, POWDER, FOR SOLUTION INTRAVENOUS at 09:05

## 2025-05-09 RX ADMIN — CYANOCOBALAMIN TAB 1000 MCG 1000 MCG: 1000 TAB at 09:05

## 2025-05-09 RX ADMIN — POLYETHYLENE GLYCOL 3350 17 G: 17 POWDER, FOR SOLUTION ORAL at 09:05

## 2025-05-09 RX ADMIN — ATORVASTATIN CALCIUM 40 MG: 40 TABLET, FILM COATED ORAL at 09:05

## 2025-05-09 RX ADMIN — LOSARTAN POTASSIUM 25 MG: 25 TABLET, FILM COATED ORAL at 09:05

## 2025-05-09 RX ADMIN — BACITRACIN ZINC AND POLYMYXIN B SULFATE: 500; 10000 OINTMENT TOPICAL at 05:05

## 2025-05-09 RX ADMIN — DIBASIC SODIUM PHOSPHATE, MONOBASIC POTASSIUM PHOSPHATE AND MONOBASIC SODIUM PHOSPHATE 1 TABLET: 852; 155; 130 TABLET ORAL at 04:05

## 2025-05-09 RX ADMIN — TOPIRAMATE 100 MG: 100 TABLET, FILM COATED ORAL at 09:05

## 2025-05-09 RX ADMIN — PIPERACILLIN SODIUM AND TAZOBACTAM SODIUM 4.5 G: 4; .5 INJECTION, POWDER, FOR SOLUTION INTRAVENOUS at 02:05

## 2025-05-09 RX ADMIN — PIPERACILLIN SODIUM AND TAZOBACTAM SODIUM 4.5 G: 4; .5 INJECTION, POWDER, FOR SOLUTION INTRAVENOUS at 05:05

## 2025-05-09 RX ADMIN — Medication 400 UNITS: at 09:05

## 2025-05-09 RX ADMIN — ENOXAPARIN SODIUM 40 MG: 40 INJECTION SUBCUTANEOUS at 04:05

## 2025-05-09 NOTE — CONSULTS
[facility] Med Surg  Wound Care    Patient Name:  Alyssa John   MRN:  [id number]  Date: 5/9/2025  Diagnosis: AMS (altered mental status)    Pt seen for wound consult- cellulitis to BLE. Pt sleeping in bed, easily woke to name. Pt agreeable to wound assessment; no open wounds at present to legs but there is warmth, redness, edema, and potential for wound to develop. Legs cleaned with no rinse and heels floated with a pillow. Discussed skin care with pt and  and suggested use of Bacitracin to BLE. Pt denies any other wounds.     HALEY Gaona, RN,[facility]  5/9/25 05/09/25 1525   WOCN Assessment   WOCN Total Time (mins) 30   Visit Date 05/09/25   Visit Time 1525   Consult Type New   WOCN Speciality Wound   Wound cellulitis   Number of Wounds 0   Intervention assessed;applied;chart review;coordination of care;orders   Teaching on-going     History:     Past Medical History:   Diagnosis Date    Arthritis     Coronary artery disease     Encounter for blood transfusion     Epilepsy     GERD (gastroesophageal reflux disease)     Headache     Hypertension     MI, old     MS (multiple sclerosis)     Seizures     epilepsy       Social History[1]    Precautions:     Allergies as of 05/08/2025 - Reviewed 05/08/2025   Allergen Reaction Noted    Codeine  04/02/2009    Dilantin [phenytoin sodium extended]  12/12/2011    Phenobarbital      Tegretol [carbamazepine]  09/16/2015       [facility] Assessment Details/Treatment   See above       [1]   Social History  Socioeconomic History    Marital status:    Tobacco Use    Smoking status: Former     Current packs/day: 0.00     Average packs/day: 2.0 packs/day for 42.0 years (84.0 ttl pk-yrs)     Types: Cigarettes     Start date: 1968     Quit date: 2010     Years since quitting: 15.3     Passive exposure: Past    Smokeless tobacco: Never   Substance and Sexual Activity    Alcohol use: Not Currently     Comment: rarely    Drug use: No     Sexual activity: Not Currently     Partners: Male     Social Drivers of Health     Financial Resource Strain: Low Risk  (5/9/2025)    Overall Financial Resource Strain (CARDIA)     Difficulty of Paying Living Expenses: Not hard at all   Food Insecurity: No Food Insecurity (5/9/2025)    Hunger Vital Sign     Worried About Running Out of Food in the Last Year: Never true     Ran Out of Food in the Last Year: Never true   Transportation Needs: No Transportation Needs (5/9/2025)    PRAPARE - Transportation     Lack of Transportation (Medical): No     Lack of Transportation (Non-Medical): No   Physical Activity: Inactive (5/9/2025)    Exercise Vital Sign     Days of Exercise per Week: 0 days     Minutes of Exercise per Session: 0 min   Stress: No Stress Concern Present (5/9/2025)    [employer] - Occupational Stress Questionnaire     Feeling of Stress : Not at all   Housing Stability: Low Risk  (5/9/2025)    Housing Stability Vital Sign     Unable to Pay for Housing in the Last Year: No     Number of Times Moved in the Last Year: 0     Homeless in the Last Year: No

## 2025-05-09 NOTE — ASSESSMENT & PLAN NOTE
C/w home topiramate  Neurology consulted for concern for seizure episode 5/9 (self resolving - characterized by decreased responsiveness and then blank stare lasting 3-5 minutes), appreciate recs

## 2025-05-09 NOTE — ASSESSMENT & PLAN NOTE
Ms. Alyssa John is a 74 y.o. patient here abdominal pain/outpatient evaluation of BL LE stasis. Had an episode of unresponsiveness lasting less than 5 minutes with blank stare. No GTC like activity. Immediate return to baseline. Patient had her TPM dose the night prior and today and has been compliant with AED at home. Possible provoking factor may have been an infectious process UTI/pneumonia, which the patient is already being treated for. Presentation more consistent with provoked seizures/AMS. No focality noted on examination.      Recommendations:   -Continue  BID    -No acute neurological interventions at the moment, since patient is back at her baseline.  -Thank you for this consult. Neurology will signoff

## 2025-05-09 NOTE — CONSULTS
"[facility] Surg  Neurology  Consult Note    Patient Name: Alyssa John  MRN: [id number]  Admission Date: 5/8/2025  Hospital Length of Stay: 1 days  Code Status: Full Code   Attending Provider: Manjeet Holman MD   Consulting Provider: Avni Anderson MD  Primary Care Physician: Piyush Sharif MD  Principal Problem:AMS (altered mental status)    Inpatient consult to General Neurology  Consult performed by: Avni Anderson MD  Consult ordered by: Manjeet Holman MD  Reason for consult: "Seizure like activity"         Subjective:     Chief Complaint:  "Seizure like activity"      HPI:   Ms. Alyssa John is a 74 y.o. patient here for abdominal pain. History of MS (Diagnosed in 2000) followed outpatient with Dr. Apodaca and seizures (Dr. Arnold - 2024). Initially presented for an outpatient vascular surgery appointment for bilateral LE stasis.  at bedside giving history. Patient initially had crampy abdominal pain, which was later found to have some UTI and concerns of pneumonia given hypoxic and hypotensive episode. Started empirically on vanc/zosyn.     On 5/9, patient had an episode of possible seizure like activity. Was unresponsive for about 3 minutes with blank stare. Patient returned to baseline afterwards. Apparently hypertensive during episode. She has been seizure free for several years after she started the TPM. Patient not remembering the episode.     Followed outpatient with Epilepsy (Dr. Arnold) back in 2024 with plans on continuing on the TPM, given improvement and no side effects. Documented two types of seizures, as petit mal and grand mal. Previously on Dilantin, VPA (no success), tegretol, Klonopin. On my initial evaluation, patient apparently at her baseline, no confusion noted. Weakness on BL LE from her stasis.     Neurology consulted for seizures.      Past Medical History:   Diagnosis Date    Arthritis     Coronary artery disease     Encounter for " blood transfusion     Epilepsy     GERD (gastroesophageal reflux disease)     Headache     Hypertension     MI, old     MS (multiple sclerosis)     Seizures     epilepsy       Past Surgical History:   Procedure Laterality Date    APPENDECTOMY      BACK SURGERY      L5 discectomy    BREAST BIOPSY Right 15 yrs ago    benign    CATARACT EXTRACTION Bilateral     COLONOSCOPY N/A 09/16/2015    Procedure: COLONOSCOPY;  Surgeon: Henry Malcolm MD;  Location: [facility] ENDO;  Service: Endoscopy;  Laterality: N/A;    CORONARY STENT PLACEMENT      IMPLANTATION OF PERMANENT SACRAL NERVE STIMULATOR N/A 10/11/2024    Procedure: INSERTION, NEUROSTIMULATOR, PERMANENT, SACRAL;  Surgeon: Abel Rosales MD;  Location: [facility] OR;  Service: Urology;  Laterality: N/A;  Mack RUIZ    INSERTION, NEUROSTIMULATOR, TEMPORARY, SACRAL N/A 9/27/2024    Procedure: INSERTION, NEUROSTIMULATOR, TEMPORARY, SACRAL;  Surgeon: Abel Rosales MD;  Location: [facility] OR;  Service: Urology;  Laterality: N/A;  1 hour 45 minutes Mack RUIZ    right knee arthroscopy         Review of patient's allergies indicates:   Allergen Reactions    Codeine      Other reaction(s): throat tightness    Dilantin [phenytoin sodium extended]     Phenobarbital     Tegretol [carbamazepine]        No current facility-administered medications on file prior to encounter.     Current Outpatient Medications on File Prior to Encounter   Medication Sig    ammonium lactate (LAC-HYDRIN) 12 % lotion Apply topically as needed for Dry Skin.    atorvastatin (LIPITOR) 40 MG tablet TAKE 1 TABLET BY MOUTH EVERY DAY    cephALEXin (KEFLEX) 500 MG capsule Take 1 capsule (500 mg total) by mouth every 12 (twelve) hours. for 5 days    cholecalciferol, vitamin D3, 10 mcg (400 unit) Cap capsule Take 1 capsule (400 Units total) by mouth once daily.    clopidogrel (PLAVIX) 75 mg tablet Take 75 mg by mouth once daily.    cyanocobalamin (VITAMIN B-12) 1000 MCG tablet Take 1 tablet (1,000 mcg  total) by mouth once daily.    dicyclomine (BENTYL) 10 MG capsule Take 1 capsule (10 mg total) by mouth 3 (three) times daily as needed (abdominal pain).    DULoxetine (CYMBALTA) 20 MG capsule TAKE 2 CAPSULES BY MOUTH EVERY DAY    estradioL (ESTRACE) 0.01 % (0.1 mg/gram) vaginal cream Place 1 g vaginally 3 (three) times a week.    famotidine (PEPCID) 20 MG tablet Take 1 tablet (20 mg total) by mouth once daily.    furosemide (LASIX) 20 MG tablet Take 1 tablet (20 mg total) by mouth once daily.    hydrocortisone 2.5 % cream Apply topically 2 (two) times daily. Apply to red areas of lower legs for 7 days (Patient not taking: Reported on 4/22/2025)    losartan (COZAAR) 25 MG tablet Take 1 tablet (25 mg total) by mouth once daily.    nitrofurantoin (MACRODANTIN) 50 MG capsule Take 1 capsule (50 mg total) by mouth 4 (four) times daily.    potassium chloride SA (K-DUR,KLOR-CON) 20 MEQ tablet Take 1 tablet (20 mEq total) by mouth 2 (two) times daily.    propranoloL (INDERAL) 20 MG tablet Take 20 mg by mouth.    topiramate (TOPAMAX) 100 MG tablet Take 1 tablet (100 mg total) by mouth 2 (two) times daily.     Family History       Problem Relation (Age of Onset)    Heart disease Father    Scleroderma Mother          Tobacco Use    Smoking status: Former     Current packs/day: 0.00     Average packs/day: 2.0 packs/day for 42.0 years (84.0 ttl pk-yrs)     Types: Cigarettes     Start date: 1968     Quit date: 2010     Years since quitting: 15.3     Passive exposure: Past    Smokeless tobacco: Never   Substance and Sexual Activity    Alcohol use: Not Currently     Comment: rarely    Drug use: No    Sexual activity: Not Currently     Partners: Male     Review of Systems   Neurological:  Positive for weakness. Negative for seizures.     Objective:     Vital Signs (Most Recent):  Temp: 97.4 °F (36.3 °C) (05/09/25 1522)  Pulse: 62 (05/09/25 1522)  Resp: 18 (05/09/25 1522)  BP: 139/63 (05/09/25 1522)  SpO2: 96 % (05/09/25 1522) Vital  Signs (24h Range):  Temp:  [97.3 °F (36.3 °C)-98.3 °F (36.8 °C)] 97.4 °F (36.3 °C)  Pulse:  [59-85] 62  Resp:  [18-20] 18  SpO2:  [93 %-97 %] 96 %  BP: (139-185)/(57-86) 139/63     Weight: 86.2 kg (190 lb)  Body mass index is 33.66 kg/m².     Physical Exam  Neurological:      Mental Status: She is oriented to person, place, and time.      Cranial Nerves: Cranial nerves 2-12 are intact.      Comments: See Neurological Exam.           NEUROLOGICAL EXAMINATION:     MENTAL STATUS   Oriented to person, place, and time.   Level of consciousness: alert    CRANIAL NERVES   Cranial nerves II through XII intact.     MOTOR EXAM   Muscle bulk: normal    Strength   Right deltoid: 5/5  Left deltoid: 5/5  Right biceps: 5/5  Left biceps: 5/5  Right triceps: 5/5  Left triceps: 5/5  Right quadriceps: 3/5  Left quadriceps: 3/5  Right hamstring: 3/5  Left hamstring: 3/5  Right anterior tibial: 3/5  Left anterior tibial: 3/5  Right posterior tibial: 3/5  Left posterior tibial: 3/5       BL LE weakness from swelling/edema from stasis.        Significant Labs: All pertinent lab results from the past 24 hours have been reviewed.    Significant Imaging: I have reviewed all pertinent imaging results/findings within the past 24 hours.  Assessment and Plan:     * AMS (altered mental status)  Ms. Alyssa John is a 74 y.o. patient here abdominal pain/outpatient evaluation of BL LE stasis. Had an episode of unresponsiveness lasting less than 5 minutes with blank stare. No GTC like activity. Immediate return to baseline. Patient had her TPM dose the night prior and today and has been compliant with AED at home. Possible provoking factor may have been an infectious process UTI/pneumonia, which the patient is already being treated for. Presentation more consistent with provoked seizures/AMS. No focality noted on examination.      Recommendations:   -Continue  BID    -No acute neurological interventions at the moment, since patient is  back at her baseline.  -Thank you for this consult. Neurology will signoff       Avni Andersno MD  Neurology

## 2025-05-09 NOTE — PLAN OF CARE
Problem: SLP  Goal: SLP Goal  Description: Speech Language Pathology Goals  Goals expected to be met by 5/16/25    1. Pt will tolerate regular textures with thin liquids w/o overt S/S aspiration, MOD I  2. Educate Pt and family on aspiration precautions and SLP POC    Outcome: Progressing     SLP Bedside Swallow Evaluation initiated. Pt presents with swallow fx near baseline. Risk of aspiration remains due to decreased endurance and respiratory status.  Full report to follow. REC; continue current regular textures, thin liquids, medications whole one at a time or crushed in puree, provided Pt is awake/alert/attentive, vital signs stable, upright with all PO, single bites/sips, remain upright at least 30 minutes following PO intake and monitor for S/S aspiration. Continue to monitor for signs and symptoms of aspiration and discontinue oral feeding should you notice any of the following: watery eyes, reddened facial area, wet vocal quality, increased work of breathing, change in respiratory status, increased congestion, coughing, fever and/or change in level of alertness. ST to f/u to monitor.     5/9/2025

## 2025-05-09 NOTE — CONSULTS
History & Physical  [facility]    SUBJECTIVE:     Chief Complaint:   Leg swelling    History of Present Illness:  Alyssa John is a 74 y.o. female who was presenting to her outpt appt with cardiology today. She was found to have some confusion & was sent to the ED.     The pt reports a chronic cough but denied any acute cough symptoms, fevers, or chest pain. Pt's primary complaint was of leg swelling.    The pt is a former smoker who quit 16 years ago.    PMH: frequent UTIs, MS, non-ambulatory over the past year uses a wheelchair for mobility    Review of patient's allergies indicates:   Allergen Reactions    Codeine      Other reaction(s): throat tightness    Dilantin [phenytoin sodium extended]     Phenobarbital     Tegretol [carbamazepine]        Past Medical History:   Diagnosis Date    Arthritis     Coronary artery disease     Encounter for blood transfusion     Epilepsy     GERD (gastroesophageal reflux disease)     Headache     Hypertension     MI, old     MS (multiple sclerosis)     Seizures     epilepsy     Past Surgical History:   Procedure Laterality Date    APPENDECTOMY      BACK SURGERY      L5 discectomy    BREAST BIOPSY Right 15 yrs ago    benign    CATARACT EXTRACTION Bilateral     COLONOSCOPY N/A 09/16/2015    Procedure: COLONOSCOPY;  Surgeon: Henry Malcolm MD;  Location: [facility];  Service: Endoscopy;  Laterality: N/A;    CORONARY STENT PLACEMENT      IMPLANTATION OF PERMANENT SACRAL NERVE STIMULATOR N/A 10/11/2024    Procedure: INSERTION, NEUROSTIMULATOR, PERMANENT, SACRAL;  Surgeon: Abel Rosales MD;  Location: [facility] OR;  Service: Urology;  Laterality: N/A;  Mack RUIZ    INSERTION, NEUROSTIMULATOR, TEMPORARY, SACRAL N/A 9/27/2024    Procedure: INSERTION, NEUROSTIMULATOR, TEMPORARY, SACRAL;  Surgeon: Abel Rosales MD;  Location: [facility] OR;  Service: Urology;  Laterality: N/A;  1 hour 45 minutes Mack RUIZ    right knee arthroscopy       Family History    Problem Relation Name Age of Onset    Scleroderma Mother      Heart disease Father          MI, CAD    Cancer Neg Hx      Diabetes Neg Hx      Stroke Neg Hx      Melanoma Neg Hx      Psoriasis Neg Hx      Lupus Neg Hx      Eczema Neg Hx       Social History[1]  Review of Systems:  CV: no syncope  ENT: no sore throat  Resp: per hpi  Eyes: no eye pain  Gastrointestinal: no nausea  Integument/Breast: +erythema both lower legs  Musculoskeletal: +lower leg pain  Neurological: no headaches  Behavioral/Psych: confusion improved  Heme: no bleeding    OBJECTIVE:     Vital Signs  Vitals:    05/08/25 1630 05/08/25 1645 05/08/25 1900 05/08/25 1940   BP: (!) 151/67 (!) 161/67  (!) 178/86   BP Location: Right arm Right arm     Patient Position: Lying Lying     Pulse: 61 (!) 59 69 74   Resp: 20 18     Temp:    98.2 °F (36.8 °C)   TempSrc:    Oral   SpO2: 97% 97%  (!) 93%   Weight:       Height:         Body mass index is 33.66 kg/m².    Physical Exam:  General: no distress  Eyes:  conjunctivae/corneas clear  Nose: no discharge  Neck: trachea midline with no masses appreciated  Lungs:  normal respiratory effort, upper airway wheezing (position dependent), no lower airway wheezing, diminished at bilateral bases  Heart: regular rate and rhythm and no murmur  Abdomen: non-distended  Extremities: no cyanosis, no edema, no clubbing  Skin: No rashes or lesions. good skin turgor  Neurologic: alert, oriented, thought content appropriate    Laboratory:  Lab Results   Component Value Date    WBC 11.18 05/08/2025    HGB 11.5 (L) 05/08/2025    HCT 37.7 05/08/2025    MCV 87 05/08/2025     05/08/2025     Chest Imaging, My Impression:   CT chest: Left pleural effusion, +emphysema, +GG suggestive of pulmonary edema    Diagnostic Results:  TTE reviewed    ASSESSMENT/PLAN:     Pleural Effusion  CAD s/p Stent, adherent to plavix  Former smoker, quit 16 years ago  Acute on chronic diastolic heart failure    - BNP is elevated. Bilateral  dependent edema on exam. Pleural effusion & GG on CT. These findings are most consistent with heart failure. HPI & CT findings are not typical of pneumonia.  - Recommend diuresis.  - Would continue abx, particularly with signs of UTI & cellulitis.  - Recommend against thoracentesis at this time as patients on plavix therapy are at increased risk for bleeding complications from thoracentesis.    Emphysema lung   Lung nodules  - Moving air well on exam.  - Follow up with Dr Montague (pt's outpt pulmonologist).    True Alamo MD  [facility]         [1]   Social History  Socioeconomic History    Marital status:    Tobacco Use    Smoking status: Former     Current packs/day: 0.00     Average packs/day: 2.0 packs/day for 42.0 years (84.0 ttl pk-yrs)     Types: Cigarettes     Start date: 1968     Quit date: 2010     Years since quitting: 15.3     Passive exposure: Past    Smokeless tobacco: Never   Substance and Sexual Activity    Alcohol use: Not Currently     Comment: rarely    Drug use: No    Sexual activity: Not Currently     Partners: Male     Social Drivers of Health     Financial Resource Strain: Low Risk  (2/24/2025)    Overall Financial Resource Strain (CARDIA)     Difficulty of Paying Living Expenses: Not hard at all   Food Insecurity: No Food Insecurity (2/24/2025)    Hunger Vital Sign     Worried About Running Out of Food in the Last Year: Never true     Ran Out of Food in the Last Year: Never true   Transportation Needs: No Transportation Needs (2/24/2025)    PRAPARE - Transportation     Lack of Transportation (Medical): No     Lack of Transportation (Non-Medical): No   Physical Activity: Inactive (2/24/2025)    Exercise Vital Sign     Days of Exercise per Week: 0 days     Minutes of Exercise per Session: 0 min   Stress: No Stress Concern Present (2/24/2025)    [employer] - Occupational Stress Questionnaire     Feeling of Stress : Not at all   Housing  Stability: Low Risk  (2/24/2025)    Housing Stability Vital Sign     Unable to Pay for Housing in the Last Year: No     Number of Times Moved in the Last Year: 0     Homeless in the Last Year: No

## 2025-05-09 NOTE — PLAN OF CARE
[facility] - Med Surg  Initial Discharge Assessment       Primary Care Provider: Piyush Sharif MD    Admission Diagnosis: Screening for cardiovascular condition [Z13.6]  Chest pain [R07.9]  Altered mental status, unspecified altered mental status type [R41.82]    Admission Date: 5/8/2025  Expected Discharge Date: 5/11/2025    Transition of Care Barriers: None    Payor: [employer] / Plan: [employer] CHO[employer] / Product Type: Medicare Advantage /     Extended Emergency Contact Information  Primary Emergency Contact: Ap John  Address: [address] [place] [address] [facility]  Home Phone: [phone]  Mobile Phone: [phone]  Relation: Spouse  Preferred language: English   needed? No  Secondary Emergency Contact: Amparo Vega  Address: Unknown           [place] [address] [place]  Mobile Phone: [phone]  Relation: Sister  Preferred language: English   needed? No    Discharge Plan A: Home, Home with family, Other (Resume outpatient PT/OT with [facility].)  Discharge Plan B: Skilled Nursing Facility      [id number] - [place], [place] - [address]  [address]  [place] [address]  Phone: [phone] Fax: [phone]    [facility] [place], [place] - [address]  [address] [facility] [address]  Phone: [phone] Fax: [phone]    [facility] - [place], [place] - [address]  [address]  [facility] [address]  Phone: [phone] Fax: [phone]    [facility]  [address]  [place] [address]  Phone: [phone] Fax: [phone]      Initial Assessment (most recent)       Adult Discharge Assessment - 05/09/25 1148          Discharge Assessment    Assessment Type Discharge Planning Assessment     Confirmed/corrected address, phone number and insurance Yes      Confirmed Demographics Correct on Facesheet     Source of Information patient;family     When was your last doctors appointment? --   Pt reported her last PCP appointment was 2 days ago; verified pt's PCP is Dr. Piyush Sharif.    Communicated ROGERIO with patient/caregiver Yes     Reason For Admission Acute hypoxic respiratory failure     People in Home spouse     Facility Arrived From: Pt reported she was sent over from the clinic.     Do you expect to return to your current living situation? Yes     Do you have help at home or someone to help you manage your care at home? Yes     Who are your caregiver(s) and their phone number(s)? Ap JohnEkqhlxxed-Wwqxvo-[phone]     Prior to hospitilization cognitive status: Alert/Oriented     Current cognitive status: Alert/Oriented     Walking or Climbing Stairs Difficulty yes     Walking or Climbing Stairs ambulation difficulty, requires equipment;stair climbing difficulty, requires equipment     Mobility Management Walker, cane, wheel chair, transport wheelchair,     Dressing/Bathing Difficulty yes     Dressing/Bathing bathing difficulty, requires equipment;bathing difficulty, assistance 1 person;dressing difficulty, assistance 1 person     Dressing/Bathing Management Grab bars and shower chair     Readmission within 30 days? No     Patient currently being followed by outpatient case management? No     Do you currently have service(s) that help you manage your care at home? No   Pt reported she currently participates in outpatient PT/OT.    Do you take prescription medications? Yes     Do you have prescription coverage? Yes     Coverage [employer]     Do you have any problems affording any of your prescribed medications? No     Is the patient taking medications as prescribed? yes     Who is going to help you get home at discharge? Pt's  will provide transportation home.     Are you on dialysis? No     Do you take coumadin? No     Discharge Plan  A Home;Home with family;Other   Resume outpatient PT/OT with [facility].    Discharge Plan B Skilled Nursing Facility     DME Needed Upon Discharge  none     Discharge Plan discussed with: Patient;Spouse/sig other     Name(s) and Number(s) [phone]     Transition of Care Barriers None        Physical Activity    On average, how many days per week do you engage in moderate to strenuous exercise (like a brisk walk)? 0 days     On average, how many minutes do you engage in exercise at this level? 0 min        Financial Resource Strain    How hard is it for you to pay for the very basics like food, housing, medical care, and heating? Not hard at all        Housing Stability    In the last 12 months, was there a time when you were not able to pay the mortgage or rent on time? No     At any time in the past 12 months, were you homeless or living in a shelter (including now)? No        Transportation Needs    In the past 12 months, has lack of transportation kept you from medical appointments or from getting medications? No     In the past 12 months, has lack of transportation kept you from meetings, work, or from getting things needed for daily living? No        Food Insecurity    Within the past 12 months, you worried that your food would run out before you got the money to buy more. Never true     Within the past 12 months, the food you bought just didn't last and you didn't have money to get more. Never true        Stress    Do you feel stress - tense, restless, nervous, or anxious, or unable to sleep at night because your mind is troubled all the time - these days? Not at all        Social Isolation    How often do you feel lonely or isolated from those around you?  Never        Alcohol Use    Q1: How often do you have a drink containing alcohol? Never     Q2: How many drinks containing alcohol do you have on a typical day when you are drinking? Patient does not drink     Q3: How often do  you have six or more drinks on one occasion? Never        Utilities    In the past 12 months has the electric, gas, oil, or water company threatened to shut off services in your home? No        Health Literacy    How often do you need to have someone help you when you read instructions, pamphlets, or other written material from your doctor or pharmacy? Never        OTHER    Name(s) of People in Home Ap-Spouse [phone]                   SW completed initial discharge assessment with patient and spouse (Ap).  Discharge planning booklet was provided.  No hospital readmissions within the last 30 days.  Pt arrived at ED from the clinic.  Pt's  will provide transportation home.      Pt lives with her , adult daughter, and pt's sister.  Pt and  reported pt has not been able to ambulate independently since last June.  Pt reported she ambulates 20 feet with assistive devices Pt uses the following DME:  Rolling Walker, wheelchair, and transport wheelchair, shower chair, and grab bars.  Pt able to take care of her ADLs.  Pt has good family support.      Pt does not receive coumadin (takes plavixx), no dialysis, no home health services.  Pt reported she is enrolled in [facility] outpatient therapy program.       Disp:  1. Home w/ family and resume Outpatient PT/OT vs. 2. SNF    Discharge Plan A and Plan B have been determined by review of patient's clinical status, future medical and therapeutic needs, and coverage/benefits for post-acute care in coordination with multidisciplinary team members.    Nasrin Minor LMSW  Part-Time-  [facility]  Ext. [phone]

## 2025-05-09 NOTE — ASSESSMENT & PLAN NOTE
Patient's blood pressure range in the last 24 hours was: BP  Min: 112/56  Max: 185/82.The patient's inpatient anti-hypertensive regimen is listed below:  Current Antihypertensives  losartan tablet 25 mg, Daily, Oral  furosemide injection 60 mg, 2 times daily, Intravenous  propranoloL tablet 20 mg, Daily, Oral    Plan  - BP is uncontrolled, will adjust as follows: restart propranolol 20 daily and losartan 25 daily  - Monitor

## 2025-05-09 NOTE — SUBJECTIVE & OBJECTIVE
Past Medical History:   Diagnosis Date    Arthritis     Coronary artery disease     Encounter for blood transfusion     Epilepsy     GERD (gastroesophageal reflux disease)     Headache     Hypertension     MI, old     MS (multiple sclerosis)     Seizures     epilepsy       Past Surgical History:   Procedure Laterality Date    APPENDECTOMY      BACK SURGERY      L5 discectomy    BREAST BIOPSY Right 15 yrs ago    benign    CATARACT EXTRACTION Bilateral     COLONOSCOPY N/A 09/16/2015    Procedure: COLONOSCOPY;  Surgeon: Henry Malcolm MD;  Location: [facility] ENDO;  Service: Endoscopy;  Laterality: N/A;    CORONARY STENT PLACEMENT      IMPLANTATION OF PERMANENT SACRAL NERVE STIMULATOR N/A 10/11/2024    Procedure: INSERTION, NEUROSTIMULATOR, PERMANENT, SACRAL;  Surgeon: Abel Rosales MD;  Location: [facility] OR;  Service: Urology;  Laterality: N/A;  Mack RUIZ    INSERTION, NEUROSTIMULATOR, TEMPORARY, SACRAL N/A 9/27/2024    Procedure: INSERTION, NEUROSTIMULATOR, TEMPORARY, SACRAL;  Surgeon: Abel Rosales MD;  Location: [facility] OR;  Service: Urology;  Laterality: N/A;  1 hour 45 minutes Mack RUIZ    right knee arthroscopy         Review of patient's allergies indicates:   Allergen Reactions    Codeine      Other reaction(s): throat tightness    Dilantin [phenytoin sodium extended]     Phenobarbital     Tegretol [carbamazepine]        No current facility-administered medications on file prior to encounter.     Current Outpatient Medications on File Prior to Encounter   Medication Sig    ammonium lactate (LAC-HYDRIN) 12 % lotion Apply topically as needed for Dry Skin.    atorvastatin (LIPITOR) 40 MG tablet TAKE 1 TABLET BY MOUTH EVERY DAY    cephALEXin (KEFLEX) 500 MG capsule Take 1 capsule (500 mg total) by mouth every 12 (twelve) hours. for 5 days    cholecalciferol, vitamin D3, 10 mcg (400 unit) Cap capsule Take 1 capsule (400 Units total) by mouth once daily.    clopidogrel (PLAVIX) 75 mg tablet Take 75 mg  by mouth once daily.    cyanocobalamin (VITAMIN B-12) 1000 MCG tablet Take 1 tablet (1,000 mcg total) by mouth once daily.    dicyclomine (BENTYL) 10 MG capsule Take 1 capsule (10 mg total) by mouth 3 (three) times daily as needed (abdominal pain).    DULoxetine (CYMBALTA) 20 MG capsule TAKE 2 CAPSULES BY MOUTH EVERY DAY    estradioL (ESTRACE) 0.01 % (0.1 mg/gram) vaginal cream Place 1 g vaginally 3 (three) times a week.    famotidine (PEPCID) 20 MG tablet Take 1 tablet (20 mg total) by mouth once daily.    furosemide (LASIX) 20 MG tablet Take 1 tablet (20 mg total) by mouth once daily.    hydrocortisone 2.5 % cream Apply topically 2 (two) times daily. Apply to red areas of lower legs for 7 days (Patient not taking: Reported on 4/22/2025)    losartan (COZAAR) 25 MG tablet Take 1 tablet (25 mg total) by mouth once daily.    nitrofurantoin (MACRODANTIN) 50 MG capsule Take 1 capsule (50 mg total) by mouth 4 (four) times daily.    potassium chloride SA (K-DUR,KLOR-CON) 20 MEQ tablet Take 1 tablet (20 mEq total) by mouth 2 (two) times daily.    propranoloL (INDERAL) 20 MG tablet Take 20 mg by mouth.    topiramate (TOPAMAX) 100 MG tablet Take 1 tablet (100 mg total) by mouth 2 (two) times daily.     Family History       Problem Relation (Age of Onset)    Heart disease Father    Scleroderma Mother          Tobacco Use    Smoking status: Former     Current packs/day: 0.00     Average packs/day: 2.0 packs/day for 42.0 years (84.0 ttl pk-yrs)     Types: Cigarettes     Start date: 1968     Quit date: 2010     Years since quitting: 15.3     Passive exposure: Past    Smokeless tobacco: Never   Substance and Sexual Activity    Alcohol use: Not Currently     Comment: rarely    Drug use: No    Sexual activity: Not Currently     Partners: Male     Review of Systems   Neurological:  Positive for weakness. Negative for seizures.     Objective:     Vital Signs (Most Recent):  Temp: 97.4 °F (36.3 °C) (05/09/25 1522)  Pulse: 62 (05/09/25  1522)  Resp: 18 (05/09/25 1522)  BP: 139/63 (05/09/25 1522)  SpO2: 96 % (05/09/25 1522) Vital Signs (24h Range):  Temp:  [97.3 °F (36.3 °C)-98.3 °F (36.8 °C)] 97.4 °F (36.3 °C)  Pulse:  [59-85] 62  Resp:  [18-20] 18  SpO2:  [93 %-97 %] 96 %  BP: (139-185)/(57-86) 139/63     Weight: 86.2 kg (190 lb)  Body mass index is 33.66 kg/m².     Physical Exam  Neurological:      Mental Status: She is oriented to person, place, and time.      Cranial Nerves: Cranial nerves 2-12 are intact.      Comments: See Neurological Exam.           NEUROLOGICAL EXAMINATION:     MENTAL STATUS   Oriented to person, place, and time.   Level of consciousness: alert    CRANIAL NERVES   Cranial nerves II through XII intact.     MOTOR EXAM   Muscle bulk: normal    Strength   Right deltoid: 5/5  Left deltoid: 5/5  Right biceps: 5/5  Left biceps: 5/5  Right triceps: 5/5  Left triceps: 5/5  Right quadriceps: 3/5  Left quadriceps: 3/5  Right hamstring: 3/5  Left hamstring: 3/5  Right anterior tibial: 3/5  Left anterior tibial: 3/5  Right posterior tibial: 3/5  Left posterior tibial: 3/5       BL LE weakness from swelling/edema from stasis.        Significant Labs: All pertinent lab results from the past 24 hours have been reviewed.    Significant Imaging: I have reviewed all pertinent imaging results/findings within the past 24 hours.

## 2025-05-09 NOTE — PT/OT/SLP EVAL
Speech Language Pathology Evaluation  Bedside Swallow    Patient Name:  Alyssa John   MRN:  [id number]  Admitting Diagnosis: Acute hypoxic respiratory failure    Recommendations:                 General Recommendations:  ST to follow up to monitor tolerance of diet   Diet recommendations:  Regular Diet - IDDSI Level 7, Thin liquids - IDDSI Level 0   Aspiration Precautions: 1 bite/sip at a time, Avoid talking while eating, Eliminate distractions, Frequent oral care, HOB to 90 degrees, Meds whole 1 at a time, Monitor for s/s of aspiration, and Remain upright 30 minutes post meal Continue to monitor for signs and symptoms of aspiration and discontinue oral feeding should you notice any of the following: watery eyes, reddened facial area, wet vocal quality, increased work of breathing, change in respiratory status, increased congestion, coughing, fever and/or change in level of alertness  General Precautions: Standard, aspiration, fall  Communication strategies:  go to room if call light pushed    Assessment:     Alyssa John is a 74 y.o. female with an SLP diagnosis of oropharyngeal swallow function near baseline.  She presents with risk of aspiration due to respiratory status, decreased endurance and known underlying GERD. Strict aspiration precautions recommended. ST to f/u to monitor.     History:     Past Medical History:   Diagnosis Date    Arthritis     Coronary artery disease     Encounter for blood transfusion     Epilepsy     GERD (gastroesophageal reflux disease)     Headache     Hypertension     MI, old     MS (multiple sclerosis)     Seizures     epilepsy       Past Surgical History:   Procedure Laterality Date    APPENDECTOMY      BACK SURGERY      L5 discectomy    BREAST BIOPSY Right 15 yrs ago    benign    CATARACT EXTRACTION Bilateral     COLONOSCOPY N/A 09/16/2015    Procedure: COLONOSCOPY;  Surgeon: Henry Malcolm MD;  Location: [facility];  Service: Endoscopy;  Laterality: N/A;     "CORONARY STENT PLACEMENT      IMPLANTATION OF PERMANENT SACRAL NERVE STIMULATOR N/A 10/11/2024    Procedure: INSERTION, NEUROSTIMULATOR, PERMANENT, SACRAL;  Surgeon: Abel Rosales MD;  Location: [facility] OR;  Service: Urology;  Laterality: N/A;  Mack RUIZ    INSERTION, NEUROSTIMULATOR, TEMPORARY, SACRAL N/A 9/27/2024    Procedure: INSERTION, NEUROSTIMULATOR, TEMPORARY, SACRAL;  Surgeon: Abel Rosales MD;  Location: [facility] OR;  Service: Urology;  Laterality: N/A;  1 hour 45 minutes Mack RUIZ    right knee arthroscopy         Social History: Patient lives with her Spouse.    Prior Intubation HX:  none this admission     Modified Barium Swallow: none prior at this facility    Chest X-Rays: 5/8/25: 1. Pulmonary findings suggest left pleural effusion and edema.  Developing left lower lung zone consolidation not excluded in this hypoventilatory exam.    Prior diet: regular, thin.    Subjective     SLP Reviewed Pt with RN, RN explained Pt tolerating meals and medications  Pt presents calm  She explains, "I wake up with a cough sometimes"     Pain/Comfort:  Pain Rating 1: other (see comments) (did not rate)    Respiratory Status: Room air    Objective:     Oral Musculature Evaluation  Oral Musculature: general weakness  Dentition: upper dentures  Secretion Management: adequate  Mucosal Quality: adequate  Mandibular Strength and Mobility: WNL  Oral Labial Strength and Mobility: WNL  Lingual Strength and Mobility: WNL  Volitional Cough: present and productive  Volitional Swallow: elicited  Voice Prior to PO Intake: clear, adequate intensity    Bedside Swallow Eval:   Consistencies Assessed:  Thin liquids straw sips x3  Solids bites of cracker x5     Oral Phase:   WNL    Pharyngeal Phase:   Pt with cough on first sip thin liquids. No overt S/s aspiration with subsequent straw sips thin liquids or bites of solids     Compensatory Strategies  Single bites/sips, refrain from talking while chewing     Treatment: Pt " found awake and upright in bed with Spouse in the room. She endorsed history of productive cough in am and in isolation of PO intake.  She denied abdominal pain during assessment though confirmed pain was intermittent and came and went.  She willingly accepted PO trials. She was educated on SLP Role, definition and risk of aspiration, aspiration and GERD precautions, S/S Aspiration, diet recommendations and ongoing SLP POC.  No additional questions. She remained in bed, Spouse in the room, upon SLP exit.     Goals:   Multidisciplinary Problems       SLP Goals          Problem: SLP    Goal Priority Disciplines Outcome   SLP Goal     SLP Progressing   Description: Speech Language Pathology Goals  Goals expected to be met by 5/16/25    1. Pt will tolerate regular textures with thin liquids w/o overt S/S aspiration, MOD I  2. Educate Pt and family on aspiration precautions and SLP POC                         Plan:     Patient to be seen:  2 x/week   Plan of Care expires:  06/08/25  Plan of Care reviewed with:  patient, spouse   SLP Follow-Up:  Yes       Discharge recommendations:   (per progress)     Time Tracking:     SLP Treatment Date:   05/09/25  Speech Start Time:  1026  Speech Stop Time:  1052     Speech Total Time (min):  26 min    Billable Minutes: Eval Swallow and Oral Function 10 and Self Care/Home Management Training 16    05/09/2025

## 2025-05-09 NOTE — ASSESSMENT & PLAN NOTE
Sepsis iso PNA    Patient with Hypoxic Respiratory failure which is Acute.  she is not on home oxygen. Supplemental oxygen was provided and noted-      .   Signs/symptoms of respiratory failure include- tachypnea. Contributing diagnoses includes - Pleural effusion and Pneumonia Labs and images were reviewed. Patient Has recent ABG, which has been reviewed. Will treat underlying causes and adjust management of respiratory failure as follows-     - Follow Bcx  - Sputum culture  - Procal normal   - Lactate pending  - ESR/CRP elevated on admission  - COVID flu RSV negative  -    - Trop 8  -CXR  5/8: Pulmonary findings suggest left pleural effusion and edema.  Developing left lower lung zone consolidation  - CTA chest 5/8: No PE, moderate L pleural effusion new when compared to 02/19/2025, and associated left lower lobe atelectasis. Tracheobronchomalacia.   Mild mediastinal and bilateral hilar lymphadenopathy, new when compared to 02/19/2025. Scattered pulmonary nodules measuring up to 7 mm, unchanged when compared to 10/03/2024.  A follow-up CT 18-24 months     Plan:     S/p IV fluids on admit 1L  Abx -c/w vanc and zosyn - getting MRSA screen  SLP consult  Pulm consult - recommend against thoracentesis and recommending diuresis  Start lasix 60 IV BID - monitor UOP and renal function as well as electrolytes  Encourage IS  Chest PT  Hold diuresis as hypotensive on admit and needed fluids

## 2025-05-09 NOTE — SUBJECTIVE & OBJECTIVE
Interval History:     Appeared to be much better and at her baseline. Oriented x 3. Seen by pulm, diuresis started.   Later RN reached out for concern for seizure. Patient was back to normal on my assessment.  at bedside - reported that she went unresponsive and then had a blank stare at the window for 3-5 minutes before mentation normalized.       Past Medical History:   Diagnosis Date    Arthritis     Coronary artery disease     Encounter for blood transfusion     Epilepsy     GERD (gastroesophageal reflux disease)     Headache     Hypertension     MI, old     MS (multiple sclerosis)     Seizures     epilepsy       Past Surgical History:   Procedure Laterality Date    APPENDECTOMY      BACK SURGERY      L5 discectomy    BREAST BIOPSY Right 15 yrs ago    benign    CATARACT EXTRACTION Bilateral     COLONOSCOPY N/A 09/16/2015    Procedure: COLONOSCOPY;  Surgeon: Henry Malcolm MD;  Location: [facility] ENDO;  Service: Endoscopy;  Laterality: N/A;    CORONARY STENT PLACEMENT      IMPLANTATION OF PERMANENT SACRAL NERVE STIMULATOR N/A 10/11/2024    Procedure: INSERTION, NEUROSTIMULATOR, PERMANENT, SACRAL;  Surgeon: Abel Rosales MD;  Location: [facility] OR;  Service: Urology;  Laterality: N/A;  Mack RUIZ    INSERTION, NEUROSTIMULATOR, TEMPORARY, SACRAL N/A 9/27/2024    Procedure: INSERTION, NEUROSTIMULATOR, TEMPORARY, SACRAL;  Surgeon: Abel Rosales MD;  Location: [facility] OR;  Service: Urology;  Laterality: N/A;  1 hour 45 minutes Mack RUIZ    right knee arthroscopy         Review of patient's allergies indicates:   Allergen Reactions    Codeine      Other reaction(s): throat tightness    Dilantin [phenytoin sodium extended]     Phenobarbital     Tegretol [carbamazepine]        No current facility-administered medications on file prior to encounter.     Current Outpatient Medications on File Prior to Encounter   Medication Sig    ammonium lactate (LAC-HYDRIN) 12 % lotion Apply topically as needed for  Dry Skin.    atorvastatin (LIPITOR) 40 MG tablet TAKE 1 TABLET BY MOUTH EVERY DAY    cephALEXin (KEFLEX) 500 MG capsule Take 1 capsule (500 mg total) by mouth every 12 (twelve) hours. for 5 days    cholecalciferol, vitamin D3, 10 mcg (400 unit) Cap capsule Take 1 capsule (400 Units total) by mouth once daily.    clopidogrel (PLAVIX) 75 mg tablet Take 75 mg by mouth once daily.    cyanocobalamin (VITAMIN B-12) 1000 MCG tablet Take 1 tablet (1,000 mcg total) by mouth once daily.    dicyclomine (BENTYL) 10 MG capsule Take 1 capsule (10 mg total) by mouth 3 (three) times daily as needed (abdominal pain).    DULoxetine (CYMBALTA) 20 MG capsule TAKE 2 CAPSULES BY MOUTH EVERY DAY    estradioL (ESTRACE) 0.01 % (0.1 mg/gram) vaginal cream Place 1 g vaginally 3 (three) times a week.    famotidine (PEPCID) 20 MG tablet Take 1 tablet (20 mg total) by mouth once daily.    furosemide (LASIX) 20 MG tablet Take 1 tablet (20 mg total) by mouth once daily.    hydrocortisone 2.5 % cream Apply topically 2 (two) times daily. Apply to red areas of lower legs for 7 days (Patient not taking: Reported on 4/22/2025)    losartan (COZAAR) 25 MG tablet Take 1 tablet (25 mg total) by mouth once daily.    nitrofurantoin (MACRODANTIN) 50 MG capsule Take 1 capsule (50 mg total) by mouth 4 (four) times daily.    potassium chloride SA (K-DUR,KLOR-CON) 20 MEQ tablet Take 1 tablet (20 mEq total) by mouth 2 (two) times daily.    propranoloL (INDERAL) 20 MG tablet Take 20 mg by mouth.    topiramate (TOPAMAX) 100 MG tablet Take 1 tablet (100 mg total) by mouth 2 (two) times daily.     Family History       Problem Relation (Age of Onset)    Heart disease Father    Scleroderma Mother          Tobacco Use    Smoking status: Former     Current packs/day: 0.00     Average packs/day: 2.0 packs/day for 42.0 years (84.0 ttl pk-yrs)     Types: Cigarettes     Start date: 1968     Quit date: 2010     Years since quitting: 15.3     Passive exposure: Past    Smokeless  tobacco: Never   Substance and Sexual Activity    Alcohol use: Not Currently     Comment: rarely    Drug use: No    Sexual activity: Not Currently     Partners: Male     Review of Systems   Constitutional:  Positive for activity change. Negative for fever.   Respiratory:  Positive for shortness of breath.    Cardiovascular:  Positive for leg swelling.   Gastrointestinal:  Positive for abdominal pain.   Neurological:  Positive for dizziness.   Psychiatric/Behavioral:  Positive for confusion.      Objective:     Vital Signs (Most Recent):  Temp: 97.3 °F (36.3 °C) (05/09/25 1057)  Pulse: 62 (05/09/25 1057)  Resp: 18 (05/09/25 1057)  BP: (!) 154/70 (05/09/25 1057)  SpO2: 97 % (05/09/25 1057) Vital Signs (24h Range):  Temp:  [97.3 °F (36.3 °C)-98.3 °F (36.8 °C)] 97.3 °F (36.3 °C)  Pulse:  [59-85] 62  Resp:  [18-24] 18  SpO2:  [93 %-99 %] 97 %  BP: (112-185)/() 154/70     Weight: 86.2 kg (190 lb)  Body mass index is 33.66 kg/m².     Physical Exam  Constitutional:       Appearance: She is obese. She is ill-appearing.   HENT:      Head: Normocephalic and atraumatic.      Mouth/Throat:      Mouth: Mucous membranes are moist.      Pharynx: Oropharynx is clear.   Eyes:      Extraocular Movements: Extraocular movements intact.   Cardiovascular:      Rate and Rhythm: Normal rate.   Pulmonary:      Effort: Pulmonary effort is normal. No respiratory distress.      Breath sounds: Rhonchi present.   Abdominal:      General: Bowel sounds are normal. There is distension.      Palpations: Abdomen is soft.      Tenderness: There is no abdominal tenderness.   Musculoskeletal:         General: Normal range of motion.      Cervical back: Normal range of motion. No rigidity or tenderness.      Right lower leg: Edema present.      Left lower leg: Edema present.      Comments: Bilateral ane trior shin erythema, without increase in warmth as compared to surrounding skin   Skin:     General: Skin is warm and dry.   Neurological:       General: No focal deficit present.      Mental Status: She is alert.      Comments: Oriented to self and Time. Thought she is the hospital at [place]   Psychiatric:         Mood and Affect: Mood normal.         Behavior: Behavior normal.                Significant Labs: All pertinent labs within the past 24 hours have been reviewed.    Significant Imaging: I have reviewed all pertinent imaging results/findings within the past 24 hours.

## 2025-05-09 NOTE — HPI
Ms. Alyssa John is a 74 y.o. patient here for abdominal pain. History of MS (Diagnosed in 2000) followed outpatient with Dr. Apodaca and seizures (Dr. Arnold - 2024). Initially presented for an outpatient vascular surgery appointment for bilateral LE stasis.  at bedside giving history. Patient initially had crampy abdominal pain, which was later found to have some UTI and concerns of pneumonia given hypoxic and hypotensive episode. Started empirically on vanc/zosyn.     On 5/9, patient had an episode of possible seizure like activity. Was unresponsive for about 3 minutes with blank stare. Patient returned to baseline afterwards. Apparently hypertensive during episode. She has been seizure free for several years after she started the TPM. Patient not remembering the episode.     Followed outpatient with Epilepsy (Dr. Arnold) back in 2024 with plans on continuing on the TPM, given improvement and no side effects. Documented two types of seizures, as petit mal and grand mal. Previously on Dilantin, VPA (no success), tegretol, Klonopin. On my initial evaluation, patient apparently at her baseline, no confusion noted. Weakness on BL LE from her stasis.     Neurology consulted for seizures.

## 2025-05-09 NOTE — ASSESSMENT & PLAN NOTE
Likely iso infection and opiate use (non prescription)  - given by  for pain. ? Seizure related    Improved  CT head normal  ABG without hypercapnia  Treatment of infection as above  Seizure plan as below

## 2025-05-09 NOTE — PROGRESS NOTES
[facility] Medicine  Progress Note    Patient Name: Alyssa John  MRN: [id number]  Patient Class: IP- Inpatient   Admission Date: 5/8/2025  Length of Stay: 1 days  Attending Physician: Manjeet Holman MD  Primary Care Provider: Piyush Sharif MD        Subjective     Principal Problem:Acute hypoxic respiratory failure        HPI:   Aylssa John is a 74 y.o. female with HTN, MS, seizures, obesity, who presented to vascular clinic at [facility] for initial appointment for b/l LE venous stasis . She was found to be somnolent and confused with hypotension and was send to ED for workup.     Significant other at bedside. Reports that she had been having abdominal pain for which she was started on treatment for UTI after a CT abdomen in [place]. Due to campy pain in the abdomen he gave the patient some of his left over Norco from dental visit. She was fine on the way and slept but was confused when she wokeup on arrival for vascular appointment at [facility]. She was hypotensive with SBP 80s to 90s and oxygen saturation was 88%. She was sent to ED for evalution. Ct head was normal. ABG did not show significant hypercapnia. CTA chest showed L pleural effusion (also seen at OSH on recent Ct abdomen) and concern for PNA. UA pending, started on abx for PNA and UTI. Hospital medicine admission for AHRF with PNA, pleural effusion and UTI.     Overview/Hospital Course:  No notes on file    Interval History:     Appeared to be much better and at her baseline. Oriented x 3. Seen by pulm, diuresis started.   Later RN reached out for concern for seizure. Patient was back to normal on my assessment.  at bedside - reported that she went unresponsive and then had a blank stare at the window for 3-5 minutes before mentation normalized.       Past Medical History:   Diagnosis Date    Arthritis     Coronary artery disease     Encounter for blood transfusion     Epilepsy     GERD (gastroesophageal reflux  disease)     Headache     Hypertension     MI, old     MS (multiple sclerosis)     Seizures     epilepsy       Past Surgical History:   Procedure Laterality Date    APPENDECTOMY      BACK SURGERY      L5 discectomy    BREAST BIOPSY Right 15 yrs ago    benign    CATARACT EXTRACTION Bilateral     COLONOSCOPY N/A 09/16/2015    Procedure: COLONOSCOPY;  Surgeon: Henry Malcolm MD;  Location: [facility];  Service: Endoscopy;  Laterality: N/A;    CORONARY STENT PLACEMENT      IMPLANTATION OF PERMANENT SACRAL NERVE STIMULATOR N/A 10/11/2024    Procedure: INSERTION, NEUROSTIMULATOR, PERMANENT, SACRAL;  Surgeon: Abel Rosales MD;  Location: [facility] OR;  Service: Urology;  Laterality: N/A;  Mack RUIZ    INSERTION, NEUROSTIMULATOR, TEMPORARY, SACRAL N/A 9/27/2024    Procedure: INSERTION, NEUROSTIMULATOR, TEMPORARY, SACRAL;  Surgeon: Abel Rosales MD;  Location: [facility] OR;  Service: Urology;  Laterality: N/A;  1 hour 45 minutes Mack RUIZ    right knee arthroscopy         Review of patient's allergies indicates:   Allergen Reactions    Codeine      Other reaction(s): throat tightness    Dilantin [phenytoin sodium extended]     Phenobarbital     Tegretol [carbamazepine]        No current facility-administered medications on file prior to encounter.     Current Outpatient Medications on File Prior to Encounter   Medication Sig    ammonium lactate (LAC-HYDRIN) 12 % lotion Apply topically as needed for Dry Skin.    atorvastatin (LIPITOR) 40 MG tablet TAKE 1 TABLET BY MOUTH EVERY DAY    cephALEXin (KEFLEX) 500 MG capsule Take 1 capsule (500 mg total) by mouth every 12 (twelve) hours. for 5 days    cholecalciferol, vitamin D3, 10 mcg (400 unit) Cap capsule Take 1 capsule (400 Units total) by mouth once daily.    clopidogrel (PLAVIX) 75 mg tablet Take 75 mg by mouth once daily.    cyanocobalamin (VITAMIN B-12) 1000 MCG tablet Take 1 tablet (1,000 mcg total) by mouth once daily.    dicyclomine (BENTYL) 10 MG capsule  Take 1 capsule (10 mg total) by mouth 3 (three) times daily as needed (abdominal pain).    DULoxetine (CYMBALTA) 20 MG capsule TAKE 2 CAPSULES BY MOUTH EVERY DAY    estradioL (ESTRACE) 0.01 % (0.1 mg/gram) vaginal cream Place 1 g vaginally 3 (three) times a week.    famotidine (PEPCID) 20 MG tablet Take 1 tablet (20 mg total) by mouth once daily.    furosemide (LASIX) 20 MG tablet Take 1 tablet (20 mg total) by mouth once daily.    hydrocortisone 2.5 % cream Apply topically 2 (two) times daily. Apply to red areas of lower legs for 7 days (Patient not taking: Reported on 4/22/2025)    losartan (COZAAR) 25 MG tablet Take 1 tablet (25 mg total) by mouth once daily.    nitrofurantoin (MACRODANTIN) 50 MG capsule Take 1 capsule (50 mg total) by mouth 4 (four) times daily.    potassium chloride SA (K-DUR,KLOR-CON) 20 MEQ tablet Take 1 tablet (20 mEq total) by mouth 2 (two) times daily.    propranoloL (INDERAL) 20 MG tablet Take 20 mg by mouth.    topiramate (TOPAMAX) 100 MG tablet Take 1 tablet (100 mg total) by mouth 2 (two) times daily.     Family History       Problem Relation (Age of Onset)    Heart disease Father    Scleroderma Mother          Tobacco Use    Smoking status: Former     Current packs/day: 0.00     Average packs/day: 2.0 packs/day for 42.0 years (84.0 ttl pk-yrs)     Types: Cigarettes     Start date: 1968     Quit date: 2010     Years since quitting: 15.3     Passive exposure: Past    Smokeless tobacco: Never   Substance and Sexual Activity    Alcohol use: Not Currently     Comment: rarely    Drug use: No    Sexual activity: Not Currently     Partners: Male     Review of Systems   Constitutional:  Positive for activity change. Negative for fever.   Respiratory:  Positive for shortness of breath.    Cardiovascular:  Positive for leg swelling.   Gastrointestinal:  Positive for abdominal pain.   Neurological:  Positive for dizziness.   Psychiatric/Behavioral:  Positive for confusion.      Objective:      Vital Signs (Most Recent):  Temp: 97.3 °F (36.3 °C) (05/09/25 1057)  Pulse: 62 (05/09/25 1057)  Resp: 18 (05/09/25 1057)  BP: (!) 154/70 (05/09/25 1057)  SpO2: 97 % (05/09/25 1057) Vital Signs (24h Range):  Temp:  [97.3 °F (36.3 °C)-98.3 °F (36.8 °C)] 97.3 °F (36.3 °C)  Pulse:  [59-85] 62  Resp:  [18-24] 18  SpO2:  [93 %-99 %] 97 %  BP: (112-185)/() 154/70     Weight: 86.2 kg (190 lb)  Body mass index is 33.66 kg/m².     Physical Exam  Constitutional:       Appearance: She is obese. She is ill-appearing.   HENT:      Head: Normocephalic and atraumatic.      Mouth/Throat:      Mouth: Mucous membranes are moist.      Pharynx: Oropharynx is clear.   Eyes:      Extraocular Movements: Extraocular movements intact.   Cardiovascular:      Rate and Rhythm: Normal rate.   Pulmonary:      Effort: Pulmonary effort is normal. No respiratory distress.      Breath sounds: Rhonchi present.   Abdominal:      General: Bowel sounds are normal. There is distension.      Palpations: Abdomen is soft.      Tenderness: There is no abdominal tenderness.   Musculoskeletal:         General: Normal range of motion.      Cervical back: Normal range of motion. No rigidity or tenderness.      Right lower leg: Edema present.      Left lower leg: Edema present.      Comments: Bilateral ane trior shin erythema, without increase in warmth as compared to surrounding skin   Skin:     General: Skin is warm and dry.   Neurological:      General: No focal deficit present.      Mental Status: She is alert.      Comments: Oriented to self and Time. Thought she is the hospital at [place]   Psychiatric:         Mood and Affect: Mood normal.         Behavior: Behavior normal.                Significant Labs: All pertinent labs within the past 24 hours have been reviewed.    Significant Imaging: I have reviewed all pertinent imaging results/findings within the past 24 hours.      Assessment & Plan  Acute hypoxic respiratory failure  Sepsis iso  PNA    Patient with Hypoxic Respiratory failure which is Acute.  she is not on home oxygen. Supplemental oxygen was provided and noted-      .   Signs/symptoms of respiratory failure include- tachypnea. Contributing diagnoses includes - Pleural effusion and Pneumonia Labs and images were reviewed. Patient Has recent ABG, which has been reviewed. Will treat underlying causes and adjust management of respiratory failure as follows-     - Follow Bcx  - Sputum culture  - Procal normal   - Lactate pending  - ESR/CRP elevated on admission  - COVID flu RSV negative  -    - Trop 8  -CXR  5/8: Pulmonary findings suggest left pleural effusion and edema.  Developing left lower lung zone consolidation  - CTA chest 5/8: No PE, moderate L pleural effusion new when compared to 02/19/2025, and associated left lower lobe atelectasis. Tracheobronchomalacia.   Mild mediastinal and bilateral hilar lymphadenopathy, new when compared to 02/19/2025. Scattered pulmonary nodules measuring up to 7 mm, unchanged when compared to 10/03/2024.  A follow-up CT 18-24 months     Plan:     S/p IV fluids on admit 1L  Abx -c/w vanc and zosyn - getting MRSA screen  SLP consult  Pulm consult - recommend against thoracentesis and recommending diuresis  Start lasix 60 IV BID - monitor UOP and renal function as well as electrolytes  Encourage IS  Chest PT  Hold diuresis as hypotensive on admit and needed fluids  Multiple sclerosis  Hx noted    CAD (coronary artery disease)  C/w home plavix  Seizure disorder  C/w home topiramate  Neurology consulted for concern for seizure episode 5/9 (self resolving - characterized by decreased responsiveness and then blank stare lasting 3-5 minutes), appreciate recs    Hypertension  Patient's blood pressure range in the last 24 hours was: BP  Min: 112/56  Max: 185/82.The patient's inpatient anti-hypertensive regimen is listed below:  Current Antihypertensives  losartan tablet 25 mg, Daily, Oral  furosemide  injection 60 mg, 2 times daily, Intravenous  propranoloL tablet 20 mg, Daily, Oral    Plan  - BP is uncontrolled, will adjust as follows: restart propranolol 20 daily and losartan 25 daily  - Monitor  Hypotension  Iso norco and infection on admission  S/p 1 L IV fluids in ED  Antihypertensive plan as above      AMS (altered mental status)  Likely iso infection and opiate use (non prescription)  - given by  for pain. ? Seizure related    Improved  CT head normal  ABG without hypercapnia  Treatment of infection as above  Seizure plan as below  UTI (urinary tract infection)  Was being treated with keflex for UTI as OP    Recent Ct abdomen with bladder thickening concerning for UTI  Abx per AHFR section    VTE Risk Mitigation (From admission, onward)           Ordered     enoxaparin injection 40 mg  Daily         05/08/25 1314     IP VTE HIGH RISK PATIENT  Once         05/08/25 1314     Place sequential compression device  Until discontinued         05/08/25 1314                    Discharge Planning   ROGERIO: 5/11/2025     Code Status: Full Code   Medical Readiness for Discharge Date:                            Manjeet Holman MD  Department of Hospital Medicine   [facility]

## 2025-05-10 LAB
ABSOLUTE EOSINOPHIL (OHS): 0.29 K/UL
ABSOLUTE MONOCYTE (OHS): 0.6 K/UL (ref 0.3–1)
ABSOLUTE NEUTROPHIL COUNT (OHS): 4.8 K/UL (ref 1.8–7.7)
ALBUMIN SERPL BCP-MCNC: 2.7 G/DL (ref 3.5–5.2)
ALP SERPL-CCNC: 117 UNIT/L (ref 40–150)
ALT SERPL W/O P-5'-P-CCNC: 11 UNIT/L (ref 10–44)
ANION GAP (OHS): 11 MMOL/L (ref 8–16)
AST SERPL-CCNC: 16 UNIT/L (ref 11–45)
BASOPHILS # BLD AUTO: 0.04 K/UL
BASOPHILS NFR BLD AUTO: 0.5 %
BILIRUB SERPL-MCNC: 0.7 MG/DL (ref 0.1–1)
BUN SERPL-MCNC: 17 MG/DL (ref 8–23)
CALCIUM SERPL-MCNC: 7.9 MG/DL (ref 8.7–10.5)
CHLORIDE SERPL-SCNC: 108 MMOL/L (ref 95–110)
CO2 SERPL-SCNC: 22 MMOL/L (ref 23–29)
CREAT SERPL-MCNC: 0.9 MG/DL (ref 0.5–1.4)
ERYTHROCYTE [DISTWIDTH] IN BLOOD BY AUTOMATED COUNT: 14.6 % (ref 11.5–14.5)
GFR SERPLBLD CREATININE-BSD FMLA CKD-EPI: >60 ML/MIN/1.73/M2
GLUCOSE SERPL-MCNC: 92 MG/DL (ref 70–110)
HCT VFR BLD AUTO: 37.1 % (ref 37–48.5)
HGB BLD-MCNC: 11.6 GM/DL (ref 12–16)
IMM GRANULOCYTES # BLD AUTO: 0.03 K/UL (ref 0–0.04)
IMM GRANULOCYTES NFR BLD AUTO: 0.4 % (ref 0–0.5)
LYMPHOCYTES # BLD AUTO: 1.98 K/UL (ref 1–4.8)
MAGNESIUM SERPL-MCNC: 1.9 MG/DL (ref 1.6–2.6)
MCH RBC QN AUTO: 25.8 PG (ref 27–31)
MCHC RBC AUTO-ENTMCNC: 31.3 G/DL (ref 32–36)
MCV RBC AUTO: 82 FL (ref 82–98)
NUCLEATED RBC (/100WBC) (OHS): 0 /100 WBC
PHOSPHATE SERPL-MCNC: 3.6 MG/DL (ref 2.7–4.5)
PLATELET # BLD AUTO: 246 K/UL (ref 150–450)
PMV BLD AUTO: 11.3 FL (ref 9.2–12.9)
POTASSIUM SERPL-SCNC: 3.1 MMOL/L (ref 3.5–5.1)
PROT SERPL-MCNC: 6.3 GM/DL (ref 6–8.4)
RBC # BLD AUTO: 4.5 M/UL (ref 4–5.4)
RELATIVE EOSINOPHIL (OHS): 3.7 %
RELATIVE LYMPHOCYTE (OHS): 25.6 % (ref 18–48)
RELATIVE MONOCYTE (OHS): 7.8 % (ref 4–15)
RELATIVE NEUTROPHIL (OHS): 62 % (ref 38–73)
SODIUM SERPL-SCNC: 141 MMOL/L (ref 136–145)
WBC # BLD AUTO: 7.74 K/UL (ref 3.9–12.7)

## 2025-05-10 PROCEDURE — 84100 ASSAY OF PHOSPHORUS: CPT | Performed by: STUDENT IN AN ORGANIZED HEALTH CARE EDUCATION/TRAINING PROGRAM

## 2025-05-10 PROCEDURE — 83735 ASSAY OF MAGNESIUM: CPT | Performed by: STUDENT IN AN ORGANIZED HEALTH CARE EDUCATION/TRAINING PROGRAM

## 2025-05-10 PROCEDURE — 36415 COLL VENOUS BLD VENIPUNCTURE: CPT | Performed by: STUDENT IN AN ORGANIZED HEALTH CARE EDUCATION/TRAINING PROGRAM

## 2025-05-10 PROCEDURE — 21400001 HC TELEMETRY ROOM

## 2025-05-10 PROCEDURE — 85025 COMPLETE CBC W/AUTO DIFF WBC: CPT | Performed by: STUDENT IN AN ORGANIZED HEALTH CARE EDUCATION/TRAINING PROGRAM

## 2025-05-10 PROCEDURE — 63600175 PHARM REV CODE 636 W HCPCS: Performed by: STUDENT IN AN ORGANIZED HEALTH CARE EDUCATION/TRAINING PROGRAM

## 2025-05-10 PROCEDURE — 25000003 PHARM REV CODE 250: Performed by: INTERNAL MEDICINE

## 2025-05-10 PROCEDURE — 84460 ALANINE AMINO (ALT) (SGPT): CPT | Performed by: STUDENT IN AN ORGANIZED HEALTH CARE EDUCATION/TRAINING PROGRAM

## 2025-05-10 PROCEDURE — 87070 CULTURE OTHR SPECIMN AEROBIC: CPT | Performed by: STUDENT IN AN ORGANIZED HEALTH CARE EDUCATION/TRAINING PROGRAM

## 2025-05-10 PROCEDURE — 25000003 PHARM REV CODE 250: Performed by: STUDENT IN AN ORGANIZED HEALTH CARE EDUCATION/TRAINING PROGRAM

## 2025-05-10 RX ORDER — POTASSIUM CHLORIDE 20 MEQ/1
40 TABLET, EXTENDED RELEASE ORAL
Status: COMPLETED | OUTPATIENT
Start: 2025-05-10 | End: 2025-05-10

## 2025-05-10 RX ADMIN — CYANOCOBALAMIN TAB 1000 MCG 1000 MCG: 1000 TAB at 09:05

## 2025-05-10 RX ADMIN — TOPIRAMATE 100 MG: 100 TABLET, FILM COATED ORAL at 09:05

## 2025-05-10 RX ADMIN — DULOXETINE HYDROCHLORIDE 40 MG: 20 CAPSULE, DELAYED RELEASE ORAL at 09:05

## 2025-05-10 RX ADMIN — PROPRANOLOL HYDROCHLORIDE 20 MG: 20 TABLET ORAL at 09:05

## 2025-05-10 RX ADMIN — ATORVASTATIN CALCIUM 40 MG: 40 TABLET, FILM COATED ORAL at 09:05

## 2025-05-10 RX ADMIN — PIPERACILLIN SODIUM AND TAZOBACTAM SODIUM 4.5 G: 4; .5 INJECTION, POWDER, FOR SOLUTION INTRAVENOUS at 04:05

## 2025-05-10 RX ADMIN — LOSARTAN POTASSIUM 25 MG: 25 TABLET, FILM COATED ORAL at 09:05

## 2025-05-10 RX ADMIN — SENNOSIDES 8.6 MG: 8.6 TABLET, FILM COATED ORAL at 08:05

## 2025-05-10 RX ADMIN — FUROSEMIDE 60 MG: 10 INJECTION, SOLUTION INTRAMUSCULAR; INTRAVENOUS at 10:05

## 2025-05-10 RX ADMIN — FAMOTIDINE 20 MG: 20 TABLET, FILM COATED ORAL at 09:05

## 2025-05-10 RX ADMIN — PIPERACILLIN SODIUM AND TAZOBACTAM SODIUM 4.5 G: 4; .5 INJECTION, POWDER, FOR SOLUTION INTRAVENOUS at 06:05

## 2025-05-10 RX ADMIN — CLOPIDOGREL 75 MG: 75 TABLET ORAL at 09:05

## 2025-05-10 RX ADMIN — SENNOSIDES 8.6 MG: 8.6 TABLET, FILM COATED ORAL at 09:05

## 2025-05-10 RX ADMIN — BACITRACIN ZINC AND POLYMYXIN B SULFATE: 500; 10000 OINTMENT TOPICAL at 09:05

## 2025-05-10 RX ADMIN — POTASSIUM CHLORIDE 40 MEQ: 1500 TABLET, EXTENDED RELEASE ORAL at 09:05

## 2025-05-10 RX ADMIN — VANCOMYCIN HYDROCHLORIDE 1750 MG: 10 INJECTION, POWDER, LYOPHILIZED, FOR SOLUTION INTRAVENOUS at 12:05

## 2025-05-10 RX ADMIN — POTASSIUM CHLORIDE 40 MEQ: 1500 TABLET, EXTENDED RELEASE ORAL at 11:05

## 2025-05-10 RX ADMIN — FUROSEMIDE 60 MG: 10 INJECTION, SOLUTION INTRAMUSCULAR; INTRAVENOUS at 06:05

## 2025-05-10 RX ADMIN — TOPIRAMATE 100 MG: 100 TABLET, FILM COATED ORAL at 08:05

## 2025-05-10 RX ADMIN — Medication 400 UNITS: at 09:05

## 2025-05-10 NOTE — SUBJECTIVE & OBJECTIVE
Interval History: NAEON. Diuresing well. MRSA PCR negative, vanc discontinued. Mental status at baseline.      Past Medical History:   Diagnosis Date    Arthritis     Coronary artery disease     Encounter for blood transfusion     Epilepsy     GERD (gastroesophageal reflux disease)     Headache     Hypertension     MI, old     MS (multiple sclerosis)     Seizures     epilepsy       Past Surgical History:   Procedure Laterality Date    APPENDECTOMY      BACK SURGERY      L5 discectomy    BREAST BIOPSY Right 15 yrs ago    benign    CATARACT EXTRACTION Bilateral     COLONOSCOPY N/A 09/16/2015    Procedure: COLONOSCOPY;  Surgeon: Henry Malcolm MD;  Location: [facility];  Service: Endoscopy;  Laterality: N/A;    CORONARY STENT PLACEMENT      IMPLANTATION OF PERMANENT SACRAL NERVE STIMULATOR N/A 10/11/2024    Procedure: INSERTION, NEUROSTIMULATOR, PERMANENT, SACRAL;  Surgeon: Abel Rosales MD;  Location: [facility] OR;  Service: Urology;  Laterality: N/A;  Mack RUIZ    INSERTION, NEUROSTIMULATOR, TEMPORARY, SACRAL N/A 9/27/2024    Procedure: INSERTION, NEUROSTIMULATOR, TEMPORARY, SACRAL;  Surgeon: Abel Rosales MD;  Location: [facility] OR;  Service: Urology;  Laterality: N/A;  1 hour 45 minutes Mack RUIZ    right knee arthroscopy         Review of patient's allergies indicates:   Allergen Reactions    Codeine      Other reaction(s): throat tightness    Dilantin [phenytoin sodium extended]     Phenobarbital     Tegretol [carbamazepine]        No current facility-administered medications on file prior to encounter.     Current Outpatient Medications on File Prior to Encounter   Medication Sig    ammonium lactate (LAC-HYDRIN) 12 % lotion Apply topically as needed for Dry Skin.    atorvastatin (LIPITOR) 40 MG tablet TAKE 1 TABLET BY MOUTH EVERY DAY    cephALEXin (KEFLEX) 500 MG capsule Take 1 capsule (500 mg total) by mouth every 12 (twelve) hours. for 5 days    cholecalciferol, vitamin D3, 10 mcg (400 unit) Cap  capsule Take 1 capsule (400 Units total) by mouth once daily.    clopidogrel (PLAVIX) 75 mg tablet Take 75 mg by mouth once daily.    cyanocobalamin (VITAMIN B-12) 1000 MCG tablet Take 1 tablet (1,000 mcg total) by mouth once daily.    dicyclomine (BENTYL) 10 MG capsule Take 1 capsule (10 mg total) by mouth 3 (three) times daily as needed (abdominal pain).    DULoxetine (CYMBALTA) 20 MG capsule TAKE 2 CAPSULES BY MOUTH EVERY DAY    estradioL (ESTRACE) 0.01 % (0.1 mg/gram) vaginal cream Place 1 g vaginally 3 (three) times a week.    famotidine (PEPCID) 20 MG tablet Take 1 tablet (20 mg total) by mouth once daily.    furosemide (LASIX) 20 MG tablet Take 1 tablet (20 mg total) by mouth once daily.    hydrocortisone 2.5 % cream Apply topically 2 (two) times daily. Apply to red areas of lower legs for 7 days (Patient not taking: Reported on 4/22/2025)    losartan (COZAAR) 25 MG tablet Take 1 tablet (25 mg total) by mouth once daily.    nitrofurantoin (MACRODANTIN) 50 MG capsule Take 1 capsule (50 mg total) by mouth 4 (four) times daily.    potassium chloride SA (K-DUR,KLOR-CON) 20 MEQ tablet Take 1 tablet (20 mEq total) by mouth 2 (two) times daily.    propranoloL (INDERAL) 20 MG tablet Take 20 mg by mouth.    topiramate (TOPAMAX) 100 MG tablet Take 1 tablet (100 mg total) by mouth 2 (two) times daily.     Family History       Problem Relation (Age of Onset)    Heart disease Father    Scleroderma Mother          Tobacco Use    Smoking status: Former     Current packs/day: 0.00     Average packs/day: 2.0 packs/day for 42.0 years (84.0 ttl pk-yrs)     Types: Cigarettes     Start date: 1968     Quit date: 2010     Years since quitting: 15.3     Passive exposure: Past    Smokeless tobacco: Never   Substance and Sexual Activity    Alcohol use: Not Currently     Comment: rarely    Drug use: No    Sexual activity: Not Currently     Partners: Male     Review of Systems   Constitutional:  Positive for activity change. Negative  for fever.   Respiratory:  Positive for shortness of breath.    Cardiovascular:  Positive for leg swelling.   Gastrointestinal:  Positive for abdominal pain.   Neurological:  Positive for dizziness.   Psychiatric/Behavioral:  Positive for confusion.      Objective:     Vital Signs (Most Recent):  Temp: 97.6 °F (36.4 °C) (05/10/25 1128)  Pulse: (!) 55 (05/10/25 1128)  Resp: 17 (05/10/25 1128)  BP: 103/63 (05/10/25 1128)  SpO2: (!) 94 % (05/10/25 1128) Vital Signs (24h Range):  Temp:  [97.4 °F (36.3 °C)-98.4 °F (36.9 °C)] 97.6 °F (36.4 °C)  Pulse:  [55-75] 55  Resp:  [16-18] 17  SpO2:  [93 %-96 %] 94 %  BP: (103-160)/(63-76) 103/63     Weight: 86.2 kg (190 lb)  Body mass index is 33.66 kg/m².     Physical Exam  Constitutional:       Appearance: She is obese. She is ill-appearing.   HENT:      Head: Normocephalic and atraumatic.      Mouth/Throat:      Mouth: Mucous membranes are moist.      Pharynx: Oropharynx is clear.   Eyes:      Extraocular Movements: Extraocular movements intact.   Cardiovascular:      Rate and Rhythm: Normal rate.   Pulmonary:      Effort: Pulmonary effort is normal. No respiratory distress.      Breath sounds: Rhonchi present.   Abdominal:      General: Bowel sounds are normal. There is distension.      Palpations: Abdomen is soft.      Tenderness: There is no abdominal tenderness.   Musculoskeletal:         General: Normal range of motion.      Cervical back: Normal range of motion. No rigidity or tenderness.      Right lower leg: Edema present.      Left lower leg: Edema present.      Comments: Bilateral ane trior shin erythema, without increase in warmth as compared to surrounding skin   Skin:     General: Skin is warm and dry.   Neurological:      General: No focal deficit present.      Mental Status: She is alert.      Comments: Oriented to self and Time. Thought she is the hospital at [place]   Psychiatric:         Mood and Affect: Mood normal.         Behavior: Behavior normal.                 Significant Labs: All pertinent labs within the past 24 hours have been reviewed.    Significant Imaging: I have reviewed all pertinent imaging results/findings within the past 24 hours.

## 2025-05-10 NOTE — ASSESSMENT & PLAN NOTE
Was being treated with keflex for UTI as OP    Recent Ct abdomen with bladder thickening concerning for UTI  Cont zosyn and follow culture

## 2025-05-10 NOTE — PROGRESS NOTES
[facility] Medicine  Progress Note    Patient Name: Alyssa John  MRN: [id number]  Patient Class: IP- Inpatient   Admission Date: 5/8/2025  Length of Stay: 2 days  Attending Physician: Bill Moe MD  Primary Care Provider: Piyush Sharif MD        Subjective     Principal Problem:AMS (altered mental status)        HPI:   Alyssa John is a 74 y.o. female with HTN, MS, seizures, obesity, who presented to vascular clinic at [facility] for initial appointment for b/l LE venous stasis . She was found to be somnolent and confused with hypotension and was send to ED for workup.     Significant other at bedside. Reports that she had been having abdominal pain for which she was started on treatment for UTI after a CT abdomen in [place]. Due to campy pain in the abdomen he gave the patient some of his left over [place] from dental visit. She was fine on the way and slept but was confused when she wokeup on arrival for vascular appointment at [facility]. She was hypotensive with SBP 80s to 90s and oxygen saturation was 88%. She was sent to ED for evalution. Ct head was normal. ABG did not show significant hypercapnia. CTA chest showed L pleural effusion (also seen at OSH on recent Ct abdomen) and concern for PNA. UA pending, started on abx for PNA and UTI. Hospital medicine admission for AHRF with PNA, pleural effusion and UTI.     Overview/Hospital Course:  No notes on file    Interval History: NAEON. Diuresing well. MRSA PCR negative, vanc discontinued. Mental status at baseline.      Past Medical History:   Diagnosis Date    Arthritis     Coronary artery disease     Encounter for blood transfusion     Epilepsy     GERD (gastroesophageal reflux disease)     Headache     Hypertension     MI, old     MS (multiple sclerosis)     Seizures     epilepsy       Past Surgical History:   Procedure Laterality Date    APPENDECTOMY      BACK SURGERY      L5 discectomy    BREAST BIOPSY Right 15 yrs ago     benign    CATARACT EXTRACTION Bilateral     COLONOSCOPY N/A 09/16/2015    Procedure: COLONOSCOPY;  Surgeon: Henry Malcolm MD;  Location: [facility];  Service: Endoscopy;  Laterality: N/A;    CORONARY STENT PLACEMENT      IMPLANTATION OF PERMANENT SACRAL NERVE STIMULATOR N/A 10/11/2024    Procedure: INSERTION, NEUROSTIMULATOR, PERMANENT, SACRAL;  Surgeon: Abel Rosales MD;  Location: [facility] OR;  Service: Urology;  Laterality: N/A;  Mack RUIZ    INSERTION, NEUROSTIMULATOR, TEMPORARY, SACRAL N/A 9/27/2024    Procedure: INSERTION, NEUROSTIMULATOR, TEMPORARY, SACRAL;  Surgeon: Abel Rosales MD;  Location: [facility] OR;  Service: Urology;  Laterality: N/A;  1 hour 45 minutes Mack RUIZ    right knee arthroscopy         Review of patient's allergies indicates:   Allergen Reactions    Codeine      Other reaction(s): throat tightness    Dilantin [phenytoin sodium extended]     Phenobarbital     Tegretol [carbamazepine]        No current facility-administered medications on file prior to encounter.     Current Outpatient Medications on File Prior to Encounter   Medication Sig    ammonium lactate (LAC-HYDRIN) 12 % lotion Apply topically as needed for Dry Skin.    atorvastatin (LIPITOR) 40 MG tablet TAKE 1 TABLET BY MOUTH EVERY DAY    cephALEXin (KEFLEX) 500 MG capsule Take 1 capsule (500 mg total) by mouth every 12 (twelve) hours. for 5 days    cholecalciferol, vitamin D3, 10 mcg (400 unit) Cap capsule Take 1 capsule (400 Units total) by mouth once daily.    clopidogrel (PLAVIX) 75 mg tablet Take 75 mg by mouth once daily.    cyanocobalamin (VITAMIN B-12) 1000 MCG tablet Take 1 tablet (1,000 mcg total) by mouth once daily.    dicyclomine (BENTYL) 10 MG capsule Take 1 capsule (10 mg total) by mouth 3 (three) times daily as needed (abdominal pain).    DULoxetine (CYMBALTA) 20 MG capsule TAKE 2 CAPSULES BY MOUTH EVERY DAY    estradioL (ESTRACE) 0.01 % (0.1 mg/gram) vaginal cream Place 1 g vaginally 3 (three) times  a week.    famotidine (PEPCID) 20 MG tablet Take 1 tablet (20 mg total) by mouth once daily.    furosemide (LASIX) 20 MG tablet Take 1 tablet (20 mg total) by mouth once daily.    hydrocortisone 2.5 % cream Apply topically 2 (two) times daily. Apply to red areas of lower legs for 7 days (Patient not taking: Reported on 4/22/2025)    losartan (COZAAR) 25 MG tablet Take 1 tablet (25 mg total) by mouth once daily.    nitrofurantoin (MACRODANTIN) 50 MG capsule Take 1 capsule (50 mg total) by mouth 4 (four) times daily.    potassium chloride SA (K-DUR,KLOR-CON) 20 MEQ tablet Take 1 tablet (20 mEq total) by mouth 2 (two) times daily.    propranoloL (INDERAL) 20 MG tablet Take 20 mg by mouth.    topiramate (TOPAMAX) 100 MG tablet Take 1 tablet (100 mg total) by mouth 2 (two) times daily.     Family History       Problem Relation (Age of Onset)    Heart disease Father    Scleroderma Mother          Tobacco Use    Smoking status: Former     Current packs/day: 0.00     Average packs/day: 2.0 packs/day for 42.0 years (84.0 ttl pk-yrs)     Types: Cigarettes     Start date: 1968     Quit date: 2010     Years since quitting: 15.3     Passive exposure: Past    Smokeless tobacco: Never   Substance and Sexual Activity    Alcohol use: Not Currently     Comment: rarely    Drug use: No    Sexual activity: Not Currently     Partners: Male     Review of Systems   Constitutional:  Positive for activity change. Negative for fever.   Respiratory:  Positive for shortness of breath.    Cardiovascular:  Positive for leg swelling.   Gastrointestinal:  Positive for abdominal pain.   Neurological:  Positive for dizziness.   Psychiatric/Behavioral:  Positive for confusion.      Objective:     Vital Signs (Most Recent):  Temp: 97.6 °F (36.4 °C) (05/10/25 1128)  Pulse: (!) 55 (05/10/25 1128)  Resp: 17 (05/10/25 1128)  BP: 103/63 (05/10/25 1128)  SpO2: (!) 94 % (05/10/25 1128) Vital Signs (24h Range):  Temp:  [97.4 °F (36.3 °C)-98.4 °F (36.9 °C)]  97.6 °F (36.4 °C)  Pulse:  [55-75] 55  Resp:  [16-18] 17  SpO2:  [93 %-96 %] 94 %  BP: (103-160)/(63-76) 103/63     Weight: 86.2 kg (190 lb)  Body mass index is 33.66 kg/m².     Physical Exam  Constitutional:       Appearance: She is obese. She is ill-appearing.   HENT:      Head: Normocephalic and atraumatic.      Mouth/Throat:      Mouth: Mucous membranes are moist.      Pharynx: Oropharynx is clear.   Eyes:      Extraocular Movements: Extraocular movements intact.   Cardiovascular:      Rate and Rhythm: Normal rate.   Pulmonary:      Effort: Pulmonary effort is normal. No respiratory distress.      Breath sounds: Rhonchi present.   Abdominal:      General: Bowel sounds are normal. There is distension.      Palpations: Abdomen is soft.      Tenderness: There is no abdominal tenderness.   Musculoskeletal:         General: Normal range of motion.      Cervical back: Normal range of motion. No rigidity or tenderness.      Right lower leg: Edema present.      Left lower leg: Edema present.      Comments: Bilateral ane trior shin erythema, without increase in warmth as compared to surrounding skin   Skin:     General: Skin is warm and dry.   Neurological:      General: No focal deficit present.      Mental Status: She is alert.      Comments: Oriented to self and Time. Thought she is the hospital at [place]   Psychiatric:         Mood and Affect: Mood normal.         Behavior: Behavior normal.                Significant Labs: All pertinent labs within the past 24 hours have been reviewed.    Significant Imaging: I have reviewed all pertinent imaging results/findings within the past 24 hours.      Assessment & Plan  Acute hypoxic respiratory failure  Sepsis iso PNA    Patient with Hypoxic Respiratory failure which is Acute.  she is not on home oxygen. Supplemental oxygen was provided and noted-      .   Signs/symptoms of respiratory failure include- tachypnea. Contributing diagnoses includes - Pleural effusion and  Pneumonia Labs and images were reviewed. Patient Has recent ABG, which has been reviewed. Will treat underlying causes and adjust management of respiratory failure as follows-     - Follow Bcx  - Sputum culture  - Procal normal   - Lactate pending  - ESR/CRP elevated on admission  - COVID flu RSV negative  -    - Trop 8  -CXR  5/8: Pulmonary findings suggest left pleural effusion and edema.  Developing left lower lung zone consolidation  - CTA chest 5/8: No PE, moderate L pleural effusion new when compared to 02/19/2025, and associated left lower lobe atelectasis. Tracheobronchomalacia.   Mild mediastinal and bilateral hilar lymphadenopathy, new when compared to 02/19/2025. Scattered pulmonary nodules measuring up to 7 mm, unchanged when compared to 10/03/2024.  A follow-up CT 18-24 months     Plan:     S/p IV fluids on admit 1L  Abx -c/w zosyn, vanc discontinued due to neg MRSA PCR  Cont SLP  Pulm consult - recommend against thoracentesis and recommending diuresis  Cont lasix 60 IV BID - monitor UOP and renal function as well as electrolytes  Encourage IS  Chest PT  Multiple sclerosis  Hx noted    CAD (coronary artery disease)  C/w home plavix  Seizure disorder  C/w home topiramate  Concern for seizure episode 5/9 (self resolving - characterized by decreased responsiveness and then blank stare lasting 3-5 minutes), appreciate recs  Neurology consulted, recommend no adjustment of AED  Cont to monitor  Seizure precautions    Hypertension  Patient's blood pressure range in the last 24 hours was: BP  Min: 103/63  Max: 160/76.The patient's inpatient anti-hypertensive regimen is listed below:  Current Antihypertensives  losartan tablet 25 mg, Daily, Oral  furosemide injection 60 mg, 2 times daily, Intravenous  propranoloL tablet 20 mg, Daily, Oral    Plan  - BP is uncontrolled, will adjust as follows: restart propranolol 20 daily and losartan 25 daily  - Monitor  Hypotension  Iso norco and infection on  admission  S/p 1 L IV fluids in ED  Antihypertensive plan as above      AMS (altered mental status)  Likely iso infection and opiate use (non prescription)  - given by  for pain. ? Seizure related    Improved  CT head normal  ABG without hypercapnia  Treatment of infection as above  Seizure plan as below  UTI (urinary tract infection)  Was being treated with keflex for UTI as OP    Recent Ct abdomen with bladder thickening concerning for UTI  Cont zosyn and follow culture    VTE Risk Mitigation (From admission, onward)           Ordered     enoxaparin injection 40 mg  Daily         05/08/25 1314     IP VTE HIGH RISK PATIENT  Once         05/08/25 1314     Place sequential compression device  Until discontinued         05/08/25 1314                    Discharge Planning   ROGERIO: 5/11/2025     Code Status: Full Code   Medical Readiness for Discharge Date:   Discharge Plan A: Home, Home with family, Other ([facility]                        Bill Moe MD  Department of Hospital Medicine   [facility]

## 2025-05-10 NOTE — ASSESSMENT & PLAN NOTE
Sepsis iso PNA    Patient with Hypoxic Respiratory failure which is Acute.  she is not on home oxygen. Supplemental oxygen was provided and noted-      .   Signs/symptoms of respiratory failure include- tachypnea. Contributing diagnoses includes - Pleural effusion and Pneumonia Labs and images were reviewed. Patient Has recent ABG, which has been reviewed. Will treat underlying causes and adjust management of respiratory failure as follows-     - Follow Bcx  - Sputum culture  - Procal normal   - Lactate pending  - ESR/CRP elevated on admission  - COVID flu RSV negative  -    - Trop 8  -CXR  5/8: Pulmonary findings suggest left pleural effusion and edema.  Developing left lower lung zone consolidation  - CTA chest 5/8: No PE, moderate L pleural effusion new when compared to 02/19/2025, and associated left lower lobe atelectasis. Tracheobronchomalacia.   Mild mediastinal and bilateral hilar lymphadenopathy, new when compared to 02/19/2025. Scattered pulmonary nodules measuring up to 7 mm, unchanged when compared to 10/03/2024.  A follow-up CT 18-24 months     Plan:     S/p IV fluids on admit 1L  Abx -c/w zosyn, vanc discontinued due to neg MRSA PCR  Cont SLP  Pulm consult - recommend against thoracentesis and recommending diuresis  Cont lasix 60 IV BID - monitor UOP and renal function as well as electrolytes  Encourage IS  Chest PT

## 2025-05-10 NOTE — ASSESSMENT & PLAN NOTE
C/w home topiramate  Concern for seizure episode 5/9 (self resolving - characterized by decreased responsiveness and then blank stare lasting 3-5 minutes), appreciate recs  Neurology consulted, recommend no adjustment of AED  Cont to monitor  Seizure precautions

## 2025-05-10 NOTE — ASSESSMENT & PLAN NOTE
Patient's blood pressure range in the last 24 hours was: BP  Min: 103/63  Max: 160/76.The patient's inpatient anti-hypertensive regimen is listed below:  Current Antihypertensives  losartan tablet 25 mg, Daily, Oral  furosemide injection 60 mg, 2 times daily, Intravenous  propranoloL tablet 20 mg, Daily, Oral    Plan  - BP is uncontrolled, will adjust as follows: restart propranolol 20 daily and losartan 25 daily  - Monitor

## 2025-05-11 VITALS
RESPIRATION RATE: 17 BRPM | HEART RATE: 62 BPM | HEIGHT: 63 IN | DIASTOLIC BLOOD PRESSURE: 66 MMHG | WEIGHT: 190 LBS | SYSTOLIC BLOOD PRESSURE: 145 MMHG | BODY MASS INDEX: 33.66 KG/M2 | OXYGEN SATURATION: 95 % | TEMPERATURE: 98 F

## 2025-05-11 PROBLEM — L03.119 CELLULITIS OF LOWER EXTREMITY: Status: ACTIVE | Noted: 2025-05-11

## 2025-05-11 LAB
ABSOLUTE EOSINOPHIL (OHS): 0.36 K/UL
ABSOLUTE MONOCYTE (OHS): 0.68 K/UL (ref 0.3–1)
ABSOLUTE NEUTROPHIL COUNT (OHS): 4.15 K/UL (ref 1.8–7.7)
ALBUMIN SERPL BCP-MCNC: 2.9 G/DL (ref 3.5–5.2)
ALP SERPL-CCNC: 123 UNIT/L (ref 40–150)
ALT SERPL W/O P-5'-P-CCNC: 13 UNIT/L (ref 10–44)
ANION GAP (OHS): 14 MMOL/L (ref 8–16)
AST SERPL-CCNC: 14 UNIT/L (ref 11–45)
BACTERIA UR CULT: ABNORMAL
BASOPHILS # BLD AUTO: 0.07 K/UL
BASOPHILS NFR BLD AUTO: 0.9 %
BILIRUB SERPL-MCNC: 0.5 MG/DL (ref 0.1–1)
BUN SERPL-MCNC: 16 MG/DL (ref 8–23)
CALCIUM SERPL-MCNC: 8.4 MG/DL (ref 8.7–10.5)
CHLORIDE SERPL-SCNC: 109 MMOL/L (ref 95–110)
CO2 SERPL-SCNC: 18 MMOL/L (ref 23–29)
CREAT SERPL-MCNC: 1 MG/DL (ref 0.5–1.4)
ERYTHROCYTE [DISTWIDTH] IN BLOOD BY AUTOMATED COUNT: 14.7 % (ref 11.5–14.5)
GFR SERPLBLD CREATININE-BSD FMLA CKD-EPI: 59 ML/MIN/1.73/M2
GLUCOSE SERPL-MCNC: 89 MG/DL (ref 70–110)
HCT VFR BLD AUTO: 39.7 % (ref 37–48.5)
HGB BLD-MCNC: 12.5 GM/DL (ref 12–16)
IMM GRANULOCYTES # BLD AUTO: 0.02 K/UL (ref 0–0.04)
IMM GRANULOCYTES NFR BLD AUTO: 0.3 % (ref 0–0.5)
LYMPHOCYTES # BLD AUTO: 2.42 K/UL (ref 1–4.8)
MAGNESIUM SERPL-MCNC: 2 MG/DL (ref 1.6–2.6)
MCH RBC QN AUTO: 26.3 PG (ref 27–31)
MCHC RBC AUTO-ENTMCNC: 31.5 G/DL (ref 32–36)
MCV RBC AUTO: 84 FL (ref 82–98)
NUCLEATED RBC (/100WBC) (OHS): 0 /100 WBC
PHOSPHATE SERPL-MCNC: 3.8 MG/DL (ref 2.7–4.5)
PLATELET # BLD AUTO: 298 K/UL (ref 150–450)
PMV BLD AUTO: 11.2 FL (ref 9.2–12.9)
POTASSIUM SERPL-SCNC: 3.5 MMOL/L (ref 3.5–5.1)
PROT SERPL-MCNC: 6.8 GM/DL (ref 6–8.4)
RBC # BLD AUTO: 4.75 M/UL (ref 4–5.4)
RELATIVE EOSINOPHIL (OHS): 4.7 %
RELATIVE LYMPHOCYTE (OHS): 31.4 % (ref 18–48)
RELATIVE MONOCYTE (OHS): 8.8 % (ref 4–15)
RELATIVE NEUTROPHIL (OHS): 53.9 % (ref 38–73)
SODIUM SERPL-SCNC: 141 MMOL/L (ref 136–145)
WBC # BLD AUTO: 7.7 K/UL (ref 3.9–12.7)

## 2025-05-11 PROCEDURE — 82947 ASSAY GLUCOSE BLOOD QUANT: CPT | Performed by: STUDENT IN AN ORGANIZED HEALTH CARE EDUCATION/TRAINING PROGRAM

## 2025-05-11 PROCEDURE — 36415 COLL VENOUS BLD VENIPUNCTURE: CPT | Performed by: STUDENT IN AN ORGANIZED HEALTH CARE EDUCATION/TRAINING PROGRAM

## 2025-05-11 PROCEDURE — 84100 ASSAY OF PHOSPHORUS: CPT | Performed by: STUDENT IN AN ORGANIZED HEALTH CARE EDUCATION/TRAINING PROGRAM

## 2025-05-11 PROCEDURE — 85025 COMPLETE CBC W/AUTO DIFF WBC: CPT | Performed by: STUDENT IN AN ORGANIZED HEALTH CARE EDUCATION/TRAINING PROGRAM

## 2025-05-11 PROCEDURE — 83735 ASSAY OF MAGNESIUM: CPT | Performed by: STUDENT IN AN ORGANIZED HEALTH CARE EDUCATION/TRAINING PROGRAM

## 2025-05-11 PROCEDURE — 25000003 PHARM REV CODE 250: Performed by: STUDENT IN AN ORGANIZED HEALTH CARE EDUCATION/TRAINING PROGRAM

## 2025-05-11 PROCEDURE — 25000003 PHARM REV CODE 250: Performed by: INTERNAL MEDICINE

## 2025-05-11 PROCEDURE — 63600175 PHARM REV CODE 636 W HCPCS: Performed by: STUDENT IN AN ORGANIZED HEALTH CARE EDUCATION/TRAINING PROGRAM

## 2025-05-11 RX ORDER — AMOXICILLIN AND CLAVULANATE POTASSIUM 875; 125 MG/1; MG/1
1 TABLET, FILM COATED ORAL EVERY 12 HOURS
Qty: 9 TABLET | Refills: 0 | Status: SHIPPED | OUTPATIENT
Start: 2025-05-11 | End: 2025-05-16

## 2025-05-11 RX ORDER — AMOXICILLIN AND CLAVULANATE POTASSIUM 875; 125 MG/1; MG/1
1 TABLET, FILM COATED ORAL EVERY 12 HOURS
Status: DISCONTINUED | OUTPATIENT
Start: 2025-05-11 | End: 2025-05-11 | Stop reason: HOSPADM

## 2025-05-11 RX ORDER — POLYETHYLENE GLYCOL 3350 17 G/17G
17 POWDER, FOR SOLUTION ORAL DAILY PRN
Qty: 30 EACH | Refills: 0 | Status: SHIPPED | OUTPATIENT
Start: 2025-05-11

## 2025-05-11 RX ORDER — CLOPIDOGREL BISULFATE 75 MG/1
75 TABLET ORAL DAILY
Qty: 90 TABLET | Refills: 0 | Status: SHIPPED | OUTPATIENT
Start: 2025-05-11

## 2025-05-11 RX ORDER — FUROSEMIDE 40 MG/1
40 TABLET ORAL DAILY
Qty: 90 TABLET | Refills: 0 | Status: SHIPPED | OUTPATIENT
Start: 2025-05-11 | End: 2026-05-11

## 2025-05-11 RX ADMIN — AMOXICILLIN AND CLAVULANATE POTASSIUM 1 TABLET: 875; 125 TABLET, FILM COATED ORAL at 09:05

## 2025-05-11 RX ADMIN — SENNOSIDES 8.6 MG: 8.6 TABLET, FILM COATED ORAL at 09:05

## 2025-05-11 RX ADMIN — BACITRACIN ZINC AND POLYMYXIN B SULFATE: 500; 10000 OINTMENT TOPICAL at 10:05

## 2025-05-11 RX ADMIN — LOSARTAN POTASSIUM 25 MG: 25 TABLET, FILM COATED ORAL at 09:05

## 2025-05-11 RX ADMIN — DULOXETINE HYDROCHLORIDE 40 MG: 20 CAPSULE, DELAYED RELEASE ORAL at 09:05

## 2025-05-11 RX ADMIN — PROPRANOLOL HYDROCHLORIDE 20 MG: 20 TABLET ORAL at 09:05

## 2025-05-11 RX ADMIN — TOPIRAMATE 100 MG: 100 TABLET, FILM COATED ORAL at 09:05

## 2025-05-11 RX ADMIN — CLOPIDOGREL 75 MG: 75 TABLET ORAL at 09:05

## 2025-05-11 RX ADMIN — ATORVASTATIN CALCIUM 40 MG: 40 TABLET, FILM COATED ORAL at 09:05

## 2025-05-11 RX ADMIN — CYANOCOBALAMIN TAB 1000 MCG 1000 MCG: 1000 TAB at 09:05

## 2025-05-11 RX ADMIN — PIPERACILLIN SODIUM AND TAZOBACTAM SODIUM 4.5 G: 4; .5 INJECTION, POWDER, FOR SOLUTION INTRAVENOUS at 12:05

## 2025-05-11 RX ADMIN — POLYETHYLENE GLYCOL 3350 17 G: 17 POWDER, FOR SOLUTION ORAL at 09:05

## 2025-05-11 RX ADMIN — Medication 400 UNITS: at 09:05

## 2025-05-11 RX ADMIN — FAMOTIDINE 20 MG: 20 TABLET, FILM COATED ORAL at 09:05

## 2025-05-11 RX ADMIN — FUROSEMIDE 60 MG: 10 INJECTION, SOLUTION INTRAMUSCULAR; INTRAVENOUS at 09:05

## 2025-05-11 NOTE — ASSESSMENT & PLAN NOTE
Sepsis iso PNA    Patient with Hypoxic Respiratory failure which is Acute.  she is not on home oxygen. Supplemental oxygen was provided and noted-      .   Signs/symptoms of respiratory failure include- tachypnea. Contributing diagnoses includes - Pleural effusion and Pneumonia Labs and images were reviewed. Patient Has recent ABG, which has been reviewed. Will treat underlying causes and adjust management of respiratory failure as follows-     - Follow Bcx  - Sputum culture  - Procal normal   - Lactate pending  - ESR/CRP elevated on admission  - COVID flu RSV negative  -    - Trop 8  -CXR  5/8: Pulmonary findings suggest left pleural effusion and edema.  Developing left lower lung zone consolidation  - CTA chest 5/8: No PE, moderate L pleural effusion new when compared to 02/19/2025, and associated left lower lobe atelectasis. Tracheobronchomalacia.   Mild mediastinal and bilateral hilar lymphadenopathy, new when compared to 02/19/2025. Scattered pulmonary nodules measuring up to 7 mm, unchanged when compared to 10/03/2024.  A follow-up CT 18-24 months     Plan:     S/p IV fluids on admit 1L  Abx -c/w zosyn, vanc discontinued due to neg MRSA PCR  Cont SLP  Pulm consult - recommend against thoracentesis and recommending diuresis  Encourage IS  Chest PT  Improved with IV diuresis, lasix transitioned to PO

## 2025-05-11 NOTE — ASSESSMENT & PLAN NOTE
Likely iso infection and opiate use (non prescription)  - given by  for pain. ? Seizure related    Improved  CT head normal  ABG without hypercapnia  Treatment of infection as above  Seizure plan as below  Resolved, mental status back to baseline

## 2025-05-11 NOTE — ASSESSMENT & PLAN NOTE
Patient's blood pressure range in the last 24 hours was: BP  Min: 120/69  Max: 137/79.The patient's inpatient anti-hypertensive regimen is listed below:  Current Antihypertensives  losartan tablet 25 mg, Daily, Oral  furosemide injection 60 mg, 2 times daily, Intravenous  propranoloL tablet 20 mg, Daily, Oral  furosemide (LASIX) tablet, Daily, Oral    Plan  - BP is uncontrolled, will adjust as follows: restart propranolol 20 daily and losartan 25 daily  - Monitor

## 2025-05-11 NOTE — DISCHARGE INSTRUCTIONS
.Our goal at [facility] is to always give you outstanding care and exceptional service. You may receive a survey from [employer] by mail, text or e-mail in the next 24-48 hours asking about the care you received with us. The survey should only take 5-10 minutes to complete and is very important to us.     Your feedback provides us with a way to recognize our staff who work tirelessly to provide the best care! Also, your responses help us learn how to improve when your experience was below our aspiration of excellence. We are always looking for ways to improve your stay. We WILL use your feedback to continue making improvements to help us provide the highest quality care. We keep your personal information and feedback confidential. We appreciate your time completing this survey and can't wait to hear from you!!!    We look forward to your continued care with us! Thanks so much for choosing [facility] for your healthcare needs!

## 2025-05-11 NOTE — ASSESSMENT & PLAN NOTE
Was being treated with keflex for UTI as OP  Recent Ct abdomen with bladder thickening concerning for UTI  Urine culture growing enterococcus faecalis   Abx switched to augmentin, complete 5 day course

## 2025-05-11 NOTE — ASSESSMENT & PLAN NOTE
C/w home topiramate  Concern for seizure episode 5/9 (self resolving - characterized by decreased responsiveness and then blank stare lasting 3-5 minutes), appreciate recs  Neurology consulted, recommended no adjustment of AED  Cont to monitor  Seizure precautions

## 2025-05-11 NOTE — NURSING
Pt and spouse received d/c instructions/education. No concerns verbalized. IV caths removed, pt tolerated well. IV caths intact, pt tolerated well. Pt to d/c with belongings. Transport provided by spouse.

## 2025-05-11 NOTE — PLAN OF CARE
Future Appointments   Date Time Provider Department Center   5/12/2025  2:30 PM Danyelle Benoit, PTA [facility] [place] [facility]   5/14/2025  1:45 PM Madeleine Malcolm, PT [facility] [place] [facility]   5/15/2025  1:00 PM Leonel Dunbar MD [facility] at [facility]   5/19/2025  1:45 PM Danyelle Benoit, PTA [facility] [place] [facility]   5/21/2025  1:45 PM Madeleine Malcolm, PT [facility] [place] [facility]   5/28/2025  3:40 PM Abel Rosales MD [facility] [place]   5/29/2025  3:00 PM Piyush Sharif MD [facility] MED [place]   6/2/2025  3:00 PM Genny Apodaca MD [facility]   6/10/2025  3:15 PM Verna Montague MD [facility]   7/22/2025  2:40 PM Piyush Rodrigues MD [facility] at [facility]         Gen Cards appointment scheduled.          Kirti Barcenas, CHW, RSW, BSW  Case Management  [phone]

## 2025-05-11 NOTE — DISCHARGE SUMMARY
[facility] Medicine  Discharge Summary      Patient Name: Alyssa John  MRN: [id number]  NAVEED: [id number]  Patient Class: IP- Inpatient  Admission Date: 5/8/2025  Hospital Length of Stay: 3 days  Discharge Date and Time: 05/11/2025 12:43 PM  Attending Physician: Bill Moe MD   Discharging Provider: Bill Moe MD  Primary Care Provider: Piyush Sharif MD  Hospital Medicine Team: [facility] Bill Moe MD  Primary Care Team: [facility]    HPI:    Alyssa John is a 74 y.o. female with HTN, MS, seizures, obesity, who presented to vascular clinic at [facility] for initial appointment for b/l LE venous stasis . She was found to be somnolent and confused with hypotension and was send to ED for workup.     Significant other at bedside. Reports that she had been having abdominal pain for which she was started on treatment for UTI after a CT abdomen in [place]. Due to campy pain in the abdomen he gave the patient some of his left over Norco from dental visit. She was fine on the way and slept but was confused when she wokeup on arrival for vascular appointment at [facility]. She was hypotensive with SBP 80s to 90s and oxygen saturation was 88%. She was sent to ED for evalution. Ct head was normal. ABG did not show significant hypercapnia. CTA chest showed L pleural effusion (also seen at OSH on recent Ct abdomen) and concern for PNA. UA pending, started on abx for PNA and UTI. Hospital medicine admission for AHRF with PNA, pleural effusion and UTI.     * No surgery found *      Hospital Course:   See individual problem list.     Goals of Care Treatment Preferences:  Code Status: Full Code      SDOH Screening:  The patient was screened for utility difficulties, food insecurity, transport difficulties, housing insecurity, and interpersonal safety and there were no concerns identified this admission.     Consults:   Consults (From admission, onward)          Status Ordering  Provider     Inpatient consult to General Neurology  Once        Provider:  (Not yet assigned)    Completed MASON, OMEGA     Inpatient consult to Pulmonology  Once        Provider:  (Not yet assigned)    Completed OMEGA CUEVAS          Physical Exam  Constitutional:       Appearance: She is obese. She is ill-appearing.   HENT:      Head: Normocephalic and atraumatic.      Mouth/Throat:      Mouth: Mucous membranes are moist.      Pharynx: Oropharynx is clear.   Eyes:      Extraocular Movements: Extraocular movements intact.   Cardiovascular:      Rate and Rhythm: Normal rate.   Pulmonary:      Effort: Pulmonary effort is normal. No respiratory distress.      Breath sounds: Rhonchi present.   Abdominal:      General: Bowel sounds are normal.      Palpations: Abdomen is soft.      Tenderness: There is no abdominal tenderness.   Musculoskeletal:         General: Normal range of motion.      Cervical back: Normal range of motion. No rigidity or tenderness.      Right lower leg: Edema present.      Left lower leg: Edema present.      Comments: Bilateral anterior shin erythema, without increase in warmth as compared to surrounding skin   Skin:     General: Skin is warm and dry.   Neurological:      General: No focal deficit present.      Mental Status: She is alert.      Comments: A&O x4  Psychiatric:         Mood and Affect: Mood normal.         Behavior: Behavior normal.   Assessment & Plan  Acute hypoxic respiratory failure  Sepsis iso PNA    Patient with Hypoxic Respiratory failure which is Acute.  she is not on home oxygen. Supplemental oxygen was provided and noted-      .   Signs/symptoms of respiratory failure include- tachypnea. Contributing diagnoses includes - Pleural effusion and Pneumonia Labs and images were reviewed. Patient Has recent ABG, which has been reviewed. Will treat underlying causes and adjust management of respiratory failure as follows-     - Follow Bcx  - Sputum culture  - Procal normal   -  Lactate pending  - ESR/CRP elevated on admission  - COVID flu RSV negative  -    - Trop 8  -CXR  5/8: Pulmonary findings suggest left pleural effusion and edema.  Developing left lower lung zone consolidation  - CTA chest 5/8: No PE, moderate L pleural effusion new when compared to 02/19/2025, and associated left lower lobe atelectasis. Tracheobronchomalacia.   Mild mediastinal and bilateral hilar lymphadenopathy, new when compared to 02/19/2025. Scattered pulmonary nodules measuring up to 7 mm, unchanged when compared to 10/03/2024.  A follow-up CT 18-24 months     Plan:     S/p IV fluids on admit 1L  Abx -c/w zosyn, vanc discontinued due to neg MRSA PCR  Cont SLP  Pulm consult - recommend against thoracentesis and recommending diuresis  Encourage IS  Chest PT  Improved with IV diuresis, lasix transitioned to PO   Multiple sclerosis  Hx noted    CAD (coronary artery disease)  C/w home plavix  Seizure disorder  C/w home topiramate  Concern for seizure episode 5/9 (self resolving - characterized by decreased responsiveness and then blank stare lasting 3-5 minutes), appreciate recs  Neurology consulted, recommended no adjustment of AED  Cont to monitor  Seizure precautions    Hypertension  Patient's blood pressure range in the last 24 hours was: BP  Min: 120/69  Max: 137/79.The patient's inpatient anti-hypertensive regimen is listed below:  Current Antihypertensives  losartan tablet 25 mg, Daily, Oral  furosemide injection 60 mg, 2 times daily, Intravenous  propranoloL tablet 20 mg, Daily, Oral  furosemide (LASIX) tablet, Daily, Oral    Plan  - BP is uncontrolled, will adjust as follows: restart propranolol 20 daily and losartan 25 daily  - Monitor  Hypotension  Iso norco and infection on admission  S/p 1 L IV fluids in ED  Antihypertensive plan as above  AMS (altered mental status)  Likely iso infection and opiate use (non prescription)  - given by  for pain. ? Seizure related    Improved  CT head  normal  ABG without hypercapnia  Treatment of infection as above  Seizure plan as below  Resolved, mental status back to baseline  UTI (urinary tract infection)  Was being treated with keflex for UTI as OP  Recent Ct abdomen with bladder thickening concerning for UTI  Urine culture growing enterococcus faecalis   Abx switched to augmentin, complete 5 day course        Cellulitis of lower extremity  Zosyn transitioned to augmentin, complete 7 day course.    Final Active Diagnoses:    Diagnosis Date Noted POA    PRINCIPAL PROBLEM:  AMS (altered mental status) [R41.82] 05/08/2025 Yes    Cellulitis of lower extremity [L03.119] 05/11/2025 Yes    Acute hypoxic respiratory failure [J96.01] 05/08/2025 Yes    Hypotension [I95.9] 05/08/2025 Yes    UTI (urinary tract infection) [N39.0] 05/08/2025 Yes    Hypertension [I10]  Yes    Seizure disorder [G40.909]  Yes    CAD (coronary artery disease) [I25.10] 02/23/2013 Yes    Multiple sclerosis [G35] 02/23/2013 Yes      Problems Resolved During this Admission:       Discharged Condition: good    Disposition: Home or Self Care    Follow Up:    Patient Instructions:      Basic metabolic panel   Standing Status: Future Standing Exp. Date: 08/09/26     Order Specific Question Answer Comments   Send normal result to authorizing provider's In Basket if patient is active on MyChart: Yes      Ambulatory referral/consult to Cardiology   Standing Status: Future   Referral Priority: Routine Referral Type: Consultation   Referral Reason: Specialty Services Required   Requested Specialty: Cardiology   Number of Visits Requested: 1     Diet Cardiac     Notify your health care provider if you experience any of the following:  temperature >100.4     Notify your health care provider if you experience any of the following:  persistent nausea and vomiting or diarrhea     Notify your health care provider if you experience any of the following:  difficulty breathing or increased cough     Notify your  health care provider if you experience any of the following:  persistent dizziness, light-headedness, or visual disturbances     Notify your health care provider if you experience any of the following:  increased confusion or weakness     Activity as tolerated       Significant Diagnostic Studies: Labs: All labs within the past 24 hours have been reviewed    Pending Diagnostic Studies:       None           Medications:  Reconciled Home Medications:      Medication List        START taking these medications      amoxicillin-clavulanate 875-125mg 875-125 mg per tablet  Commonly known as: AUGMENTIN  Take 1 tablet by mouth every 12 (twelve) hours. for 9 doses     polyethylene glycol 17 gram Pwpk  Commonly known as: GLYCOLAX  Take 17 g by mouth daily as needed for Constipation.            CHANGE how you take these medications      furosemide 40 MG tablet  Commonly known as: LASIX  Take 1 tablet (40 mg total) by mouth once daily.  What changed:   medication strength  how much to take            CONTINUE taking these medications      ammonium lactate 12 % lotion  Commonly known as: LAC-HYDRIN  Apply topically as needed for Dry Skin.     atorvastatin 40 MG tablet  Commonly known as: LIPITOR  TAKE 1 TABLET BY MOUTH EVERY DAY     cholecalciferol (vitamin D3) 10 mcg (400 unit) Cap capsule  Take 1 capsule (400 Units total) by mouth once daily.     clopidogreL 75 mg tablet  Commonly known as: PLAVIX  Take 1 tablet (75 mg total) by mouth once daily.     cyanocobalamin 1000 MCG tablet  Commonly known as: VITAMIN B-12  Take 1 tablet (1,000 mcg total) by mouth once daily.     dicyclomine 10 MG capsule  Commonly known as: BENTYL  Take 1 capsule (10 mg total) by mouth 3 (three) times daily as needed (abdominal pain).     DULoxetine 20 MG capsule  Commonly known as: CYMBALTA  TAKE 2 CAPSULES BY MOUTH EVERY DAY     estradioL 0.01 % (0.1 mg/gram) vaginal cream  Commonly known as: ESTRACE  Place 1 g vaginally 3 (three) times a week.      famotidine 20 MG tablet  Commonly known as: PEPCID  Take 1 tablet (20 mg total) by mouth once daily.     losartan 25 MG tablet  Commonly known as: COZAAR  Take 1 tablet (25 mg total) by mouth once daily.     nitrofurantoin 50 MG capsule  Commonly known as: MACRODANTIN  Take 1 capsule (50 mg total) by mouth 4 (four) times daily.     potassium chloride SA 20 MEQ tablet  Commonly known as: K-DUR,KLOR-CON  Take 1 tablet (20 mEq total) by mouth 2 (two) times daily.     propranoloL 20 MG tablet  Commonly known as: INDERAL  Take 20 mg by mouth.     topiramate 100 MG tablet  Commonly known as: TOPAMAX  Take 1 tablet (100 mg total) by mouth 2 (two) times daily.            STOP taking these medications      cephALEXin 500 MG capsule  Commonly known as: KEFLEX     hydrocortisone 2.5 % cream              Indwelling Lines/Drains at time of discharge:   Lines/Drains/Airways       Drain  Duration             Female External Urinary Catheter w/ Suction 05/08/25 1040 3 days                    Time spent on the discharge of patient: 35 minutes         Bill Moe MD  Department of Hospital Medicine  [facility]

## 2025-05-11 NOTE — PLAN OF CARE
[facility] - Med Surg  Discharge Final Note    Primary Care Provider: Piyush Sharif MD    Expected Discharge Date: 5/11/2025    Final Discharge Note (most recent)       Final Note - 05/11/25 1158          Final Note    Assessment Type Final Discharge Note     Anticipated Discharge Disposition Home or Self Care     What phone number can be called within the next 1-3 days to see how you are doing after discharge? [phone]     Hospital Resources/Appts/Education Provided Provided education on problems/symptoms using teachback;Appointments scheduled and added to AVS   per CHW       Post-Acute Status    Post-Acute Authorization Other     Other Status No Post-Acute Service Needs     Discharge Delays None known at this time                     Important Message from Medicare         Discharge Plan A and Plan B have been determined by review of patient's clinical status, future medical and therapeutic needs, and coverage/benefits for post-acute care in coordination with multidisciplinary team members.     Cm spoke with patient at bedside, reviewed discharge POC is agreeable with POC. States will have son come to pick her up. Bedside/virtual nurse to review discharge paperwork and instructions. No additional CM needs at this time.     Karma Day RN  Case Management  [phone]

## 2025-05-11 NOTE — PLAN OF CARE
Problem: Adult Inpatient Plan of Care  Goal: Plan of Care Review  Outcome: Not Progressing  Goal: Patient-Specific Goal (Individualized)  Outcome: Not Progressing  Goal: Absence of Hospital-Acquired Illness or Injury  Outcome: Not Progressing  Goal: Optimal Comfort and Wellbeing  Outcome: Not Progressing  Goal: Readiness for Transition of Care  Outcome: Not Progressing     Problem: Wound  Goal: Optimal Coping  Outcome: Not Progressing  Goal: Optimal Functional Ability  Outcome: Not Progressing  Goal: Absence of Infection Signs and Symptoms  Outcome: Not Progressing  Goal: Improved Oral Intake  Outcome: Not Progressing  Goal: Optimal Pain Control and Function  Outcome: Not Progressing  Goal: Skin Health and Integrity  Outcome: Not Progressing  Goal: Optimal Wound Healing  Outcome: Not Progressing     Problem: Skin Injury Risk Increased  Goal: Skin Health and Integrity  Outcome: Not Progressing     Problem: Fall Injury Risk  Goal: Absence of Fall and Fall-Related Injury  Outcome: Not Progressing     Problem: Infection  Goal: Absence of Infection Signs and Symptoms  Outcome: Not Progressing     Problem: Gas Exchange Impaired  Goal: Optimal Gas Exchange  Outcome: Not Progressing     Problem: Confusion Chronic  Goal: Optimal Cognitive Function  Outcome: Not Progressing     Problem: UTI (Urinary Tract Infection)  Goal: Improved Infection Symptoms  Outcome: Not Progressing

## 2025-05-12 ENCOUNTER — CLINICAL SUPPORT (OUTPATIENT)
Dept: REHABILITATION | Facility: HOSPITAL | Age: 75
End: 2025-05-12
Payer: MEDICARE

## 2025-05-12 VITALS — HEART RATE: 78 BPM | DIASTOLIC BLOOD PRESSURE: 86 MMHG | SYSTOLIC BLOOD PRESSURE: 132 MMHG | OXYGEN SATURATION: 96 %

## 2025-05-12 DIAGNOSIS — Z74.09 IMPAIRED FUNCTIONAL MOBILITY, BALANCE, AND ENDURANCE: Primary | ICD-10-CM

## 2025-05-12 PROCEDURE — 97110 THERAPEUTIC EXERCISES: CPT | Mod: PO,CQ

## 2025-05-12 NOTE — PROGRESS NOTES
"  Outpatient Rehab    Physical Therapy Visit    Patient Name: Alyssa John  MRN: [id number]  YOB: 1950  Encounter Date: 5/12/2025    Therapy Diagnosis:   Encounter Diagnosis   Name Primary?    Impaired functional mobility, balance, and endurance Yes     Physician: Piyush Sharif MD    Physician Orders: Eval and Treat  Medical Diagnosis: Arthritis  Gait disturbance  Physical deconditioning    Visit # / Visits Authorized:  8 / 20  Insurance Authorization Period: 4/7/2025 to 12/31/2025  Date of Evaluation: 4/4/2025  Plan of Care Certification:  4/10/2025 to 7/1/2025      PT/PTA: PTA   Number of PTA visits since last PT visit:2  Time In: 1435   Time Out: 1505  Total Time (in minutes): 30   Total Billable Time (in minutes): 30    FOTO:  Intake Score:  %  Survey Score 2:  %  Survey Score 3:  %    Precautions:       Subjective    stating Patient in hospital last 4 days from pneumonia, patient stating fatigue..  Family / care giver present for this visit:  ( in waiting area)  Pain reported as 0/10. Reporting "stabbing" pain in L flank during rest break from sitting exercises.    Objective   Vital Signs  /86   Pulse 78   SpO2 96%   BP Location: Right arm  BP Position: Sitting  BP Cuff Size: Adult               Treatment:  Therapeutic Exercise  TE 1: seated BLE exercises 1 minute: B ankle pmps, LAQ R L alternating, March with verbal cues to remain on task.    (Activity time includes skilled set-up and positioning as well as therapeutic rest, taking BP and pulse Oximeter)    Time Entry(in minutes):  Therapeutic Exercise Time Entry: 30    Assessment & Plan   Assessment: Alyssa with poor attention to task, needing  multiple cues to remain on task, stated L flank pain during rest period after exercises, treatment ended at this point stating same to patient and .  Encouraged patient and  to spend some time during the day, atleast an hour, to elevate legs during the " day, stated understanding. Madeleine Physical Therapist was consulted 2nd to patient just discharged from hospital yesterday.   Evaluation/Treatment Tolerance: Patient limited by fatigue.     Patient will continue to benefit from skilled outpatient physical therapy to address the deficits listed in the problem list box on initial evaluation, provide pt/family education and to maximize pt's level of independence in the home and community environment.     Patient's spiritual, cultural, and educational needs considered and patient agreeable to plan of care and goals.           Plan: Continue with POC to increase activities as patient tolerates.    Goals:     Danyelle Benoit, PTA

## 2025-05-13 ENCOUNTER — PATIENT MESSAGE (OUTPATIENT)
Dept: PSYCHIATRY | Facility: CLINIC | Age: 75
End: 2025-05-13
Payer: MEDICARE

## 2025-05-13 ENCOUNTER — PATIENT OUTREACH (OUTPATIENT)
Dept: ADMINISTRATIVE | Facility: CLINIC | Age: 75
End: 2025-05-13
Payer: MEDICARE

## 2025-05-13 ENCOUNTER — PATIENT MESSAGE (OUTPATIENT)
Dept: ADMINISTRATIVE | Facility: CLINIC | Age: 75
End: 2025-05-13
Payer: MEDICARE

## 2025-05-13 LAB
BACTERIA BLD CULT: NORMAL
BACTERIA BLD CULT: NORMAL
BACTERIA SPT CULT: NORMAL
GRAM STN SPEC: NORMAL

## 2025-05-13 NOTE — PROGRESS NOTES
C3 nurse attempted to contact Alyssa John for a TCC post hospital discharge follow up call. No answer. Left voicemail with callback information. The patient has a scheduled HOSFU appointment with Piyush Sharif MD on 05/29/2025 @ 0779.

## 2025-05-14 ENCOUNTER — PATIENT MESSAGE (OUTPATIENT)
Dept: FAMILY MEDICINE | Facility: CLINIC | Age: 75
End: 2025-05-14

## 2025-05-14 ENCOUNTER — HOSPITAL ENCOUNTER (OUTPATIENT)
Dept: RADIOLOGY | Facility: HOSPITAL | Age: 75
Discharge: HOME | End: 2025-05-14
Attending: STUDENT IN AN ORGANIZED HEALTH CARE EDUCATION/TRAINING PROGRAM
Payer: MEDICARE

## 2025-05-14 ENCOUNTER — OFFICE VISIT (OUTPATIENT)
Dept: FAMILY MEDICINE | Facility: CLINIC | Age: 75
End: 2025-05-14
Payer: MEDICARE

## 2025-05-14 ENCOUNTER — RESULTS FOLLOW-UP (OUTPATIENT)
Dept: FAMILY MEDICINE | Facility: CLINIC | Age: 75
End: 2025-05-14

## 2025-05-14 DIAGNOSIS — F51.8 OTHER SLEEP DISORDERS NOT DUE TO A SUBSTANCE OR KNOWN PHYSIOLOGICAL CONDITION: ICD-10-CM

## 2025-05-14 DIAGNOSIS — R05.2 SUBACUTE COUGH: ICD-10-CM

## 2025-05-14 DIAGNOSIS — N30.90 CYSTITIS WITHOUT HEMATURIA: ICD-10-CM

## 2025-05-14 DIAGNOSIS — R05.2 SUBACUTE COUGH: Primary | ICD-10-CM

## 2025-05-14 DIAGNOSIS — G35 MULTIPLE SCLEROSIS: ICD-10-CM

## 2025-05-14 DIAGNOSIS — I10 PRIMARY HYPERTENSION: ICD-10-CM

## 2025-05-14 DIAGNOSIS — R10.9 ABDOMINAL SPASMS: Primary | ICD-10-CM

## 2025-05-14 DIAGNOSIS — G40.909 SEIZURE DISORDER: ICD-10-CM

## 2025-05-14 PROCEDURE — G2211 COMPLEX E/M VISIT ADD ON: HCPCS | Mod: 95,,, | Performed by: STUDENT IN AN ORGANIZED HEALTH CARE EDUCATION/TRAINING PROGRAM

## 2025-05-14 PROCEDURE — 98006 SYNCH AUDIO-VIDEO EST MOD 30: CPT | Mod: 95,,, | Performed by: STUDENT IN AN ORGANIZED HEALTH CARE EDUCATION/TRAINING PROGRAM

## 2025-05-14 PROCEDURE — 71046 X-RAY EXAM CHEST 2 VIEWS: CPT | Mod: 26,,, | Performed by: RADIOLOGY

## 2025-05-14 PROCEDURE — 71046 X-RAY EXAM CHEST 2 VIEWS: CPT | Mod: TC,PO

## 2025-05-14 PROCEDURE — 4010F ACE/ARB THERAPY RXD/TAKEN: CPT | Mod: CPTII,95,, | Performed by: STUDENT IN AN ORGANIZED HEALTH CARE EDUCATION/TRAINING PROGRAM

## 2025-05-14 RX ORDER — DOXYCYCLINE HYCLATE 100 MG
100 TABLET ORAL 2 TIMES DAILY
Qty: 14 TABLET | Refills: 0 | Status: SHIPPED | OUTPATIENT
Start: 2025-05-14 | End: 2025-05-21

## 2025-05-14 RX ORDER — HYOSCYAMINE SULFATE 0.12 MG/1
0.12 TABLET SUBLINGUAL EVERY 6 HOURS PRN
Qty: 30 TABLET | Refills: 1 | Status: SHIPPED | OUTPATIENT
Start: 2025-05-14

## 2025-05-14 NOTE — PROGRESS NOTES
C3 nurse spoke with patient's spouse, Ap, who is unable to complete the call at this time. Patient's spouse requested a callback. Patient has a HOSFU with Piyush Sharif MD on 05/14/2025 @ 1000.

## 2025-05-14 NOTE — PROGRESS NOTES
C3 nurse spoke with Alyssa Sandrakhang Avilesyazan (Spouse, Ap) for a TCC post hospital discharge follow up call. The patient has completed a scheduled HOSFU appointment with Piyush Sharif MD on 05/14/2025 @ 2936.

## 2025-05-14 NOTE — PROGRESS NOTES
[facility] Primary Care Note    Subjective:    Chief Complaint:   Chief Complaint   Patient presents with    Abdominal Cramping      274}    The HPI and pertinent ROS is included in the Diagnostic Impression Remarks section at the end of the note. Please see below for further details.     Alyssa is a pleasant intelligent patient who is here for evaluation.     The following portions of the patient's history were reviewed and updated as appropriate: allergies, current medications, past family history, past medical history, past social history, past surgical history and problem list.    She  has a past medical history of Arthritis, Coronary artery disease, Encounter for blood transfusion, Epilepsy, GERD (gastroesophageal reflux disease), Headache, Hypertension, MI, old, MS (multiple sclerosis), and Seizures.  She  has a past surgical history that includes Appendectomy; Back surgery; Coronary stent placement; Colonoscopy (N/A, 09/16/2015); right knee arthroscopy; Cataract extraction (Bilateral); Breast biopsy (Right, 15 yrs ago); insertion, neurostimulator, temporary, sacral (N/A, 9/27/2024); and Implantation of permanent sacral nerve stimulator (N/A, 10/11/2024).  She  reports that she quit smoking about 15 years ago. Her smoking use included cigarettes. She started smoking about 57 years ago. She has a 84 pack-year smoking history. She has been exposed to tobacco smoke. She has never used smokeless tobacco. She reports that she does not currently use alcohol. She reports that she does not use drugs.  She family history includes Heart disease in her father; Scleroderma in her mother.    Review of patient's allergies indicates:   Allergen Reactions    Codeine      Other reaction(s): throat tightness    Dilantin [phenytoin sodium extended]     Phenobarbital     Tegretol [carbamazepine]        Physical Examination  There were no vitals taken for this visit.   274}  Wt Readings from Last 3 Encounters:   05/08/25 86.2  "kg (190 lb)   05/08/25 86.2 kg (190 lb 0.6 oz)   05/07/25 86.2 kg (190 lb)     BP Readings from Last 3 Encounters:   05/12/25 132/86   05/11/25 (!) 145/66   05/08/25 (!) 86/46     Estimated body mass index is 33.66 kg/m² as calculated from the following:    Height as of 5/8/25: 5' 3" (1.6 m).    Weight as of 5/8/25: 86.2 kg (190 lb).     CONSTITUTIONAL: No apparent distress. Does not appear acutely ill or septic. Appears adequately hydrated.  PULM: Breathing unlabored.  PSYCHIATRIC: Alert and conversant and grossly oriented. Mood is grossly neutral. Affect appropriate. Judgment and insight grossly intact.  Integument: normal coloration and turgor, no rashes, no suspicious skin lesions noted.    Data reviewed 274}  Previous medical records reviewed and summarized in HPI.     Laboratory  274}  I have reviewed old labs below:  Lab Results   Component Value Date    WBC 7.70 05/11/2025    HGB 12.5 05/11/2025    HCT 39.7 05/11/2025    MCV 84 05/11/2025     05/11/2025     05/11/2025    K 3.5 05/11/2025     05/11/2025    CALCIUM 8.4 (L) 05/11/2025    PHOS 3.8 05/11/2025    CO2 18 (L) 05/11/2025    GLU 89 05/11/2025    BUN 16 05/11/2025    CREATININE 1.0 05/11/2025    ANIONGAP 14 05/11/2025    ESTGFRAFRICA >60.0 11/17/2021    EGFRNONAA >60.0 11/17/2021    PROT 6.8 05/11/2025    ALBUMIN 2.9 (L) 05/11/2025    BILITOT 0.5 05/11/2025    ALKPHOS 123 05/11/2025    ALT 13 05/11/2025    AST 14 05/11/2025    INR 0.9 09/17/2024    CHOL 163 04/16/2025    TRIG 132 04/16/2025    HDL 46 04/16/2025    LDLCALC 90.6 04/16/2025    TSH 2.224 08/12/2024    HGBA1C 6.0 09/18/2024     Lab reviewed by me: Particular labs of significance that I will monitor, workup, or treat to improve are mentioned below in diagnostic impression remarks.  :[id number]}274}  Imaging/EKG: I have reviewed the pertinent results/findings and my personal findings are noted below in diagnostic impression remarks.  274}    CC:   Chief Complaint   Patient " presents with    Abdominal Cramping        274}    History of Present Illness  The patient is a 74-year-old female who presents via virtual visit for follow-up.    She continues to experience spasms and pain in her left abdominal region, which persist for several minutes. These symptoms have been consistent since her initial hospitalization and have persisted through two subsequent hospitalizations. She reports no fever. Bentyl has been prescribed but has not provided any relief. Her sleep is frequently interrupted due to the spasms.    She has a history of recurrent urinary tract infections, with approximately one episode per month since 2020. She is currently on Augmentin, scheduled to complete the course on Friday. She also uses estradiol vaginal cream three times a week and takes an over-the-counter prebiotic, Culturelle. She was previously on D-mannose but it was discontinued due to suspected adverse effects.    This morning, her blood oxygen level was recorded at 89, which increased to 90 upon standing. Her initial blood pressure reading was 89/65, with a pulse rate of 90. Upon rechecking, her blood pressure was 113/65, her SpO2 was 90, and her pulse was 73. She reports no wheezing but has a productive cough with sputum production. She underwent two chest x-rays at [place] and was treated with intravenous antibiotics for pneumonia over several days.    Her Lasix dosage was increased from 20 mg to 40 mg during her hospital stay, administered intravenously. She experienced significant diuresis with each dose, resulting in fluid reduction from her legs and heart. However, since starting the 40 mg dose at home yesterday, her urine output has decreased. The swelling in her legs has significantly reduced. She was filling up an 800 mL container every 30 minutes in the hospital, but last night, her brief was not as wet as it has been in the past. She maintains normal eating and drinking habits.    She appears  excessively sleepy and difficult to keep awake. She does not use a CPAP at night and has not had a recent sleep study. She is on home oxygen.     Assessment/Plan  Alyssa John is a 74 y.o. female who presents with:    1. Abdominal spasms    2. Cystitis without hematuria    3. Multiple sclerosis    4. Seizure disorder    5. Primary hypertension    6. Other sleep disorders not due to a substance or known physiological condition    7. Subacute cough        Diagnostic Impression Remarks + HPI    Assessment & Plan  1. Abdominal spasm.  - Suprapubic abdominal spasms are present, currently managed with antibiotics.  - No reported flank pain or fevers.  - Suspected etiology is bladder infection or cystitis; CT scan shows no renal abnormalities.  - Continue antibiotic regimen and monitoring; new medication sent to pharmacy for pain management.    2. Fatigue.  - Fatigue appears multifactorial, with suspicion of concurrent sleep apnea.  - Sleep study will be conducted for further evaluation.  - No need for repeat imaging or blood work at this time.    3. Recurrent urinary tract infections.  - Continue current regimen of estrogen and d-mannose.  - Ongoing monitoring.    4. Effusion.  - No reported worsening respiratory symptoms.  - Continue Lasix treatment.  - Ongoing monitoring.    5. Hyperlipidemia.  - Condition remains stable.  - Continue statin therapy.  - Ongoing monitoring.       This note was generated with the assistance of ambient listening technology. Verbal consent was obtained by the patient and accompanying visitor(s) for the recording of patient appointment to facilitate this note. I attest to having reviewed and edited the generated note for accuracy, though some syntax or spelling errors may persist. Please contact the author of this note for any clarification.       274}      The patient location is:  Patient [facility]  The chief complaint leading to consultation is:   Chief Complaint   Patient  "presents with    Abdominal Cramping     Total time spent with patient: 15 minutes     Visit type: Virtual visit with synchronous audio and video  Each patient to whom he or she provides medical services by telemedicine is:  (1) informed of the relationship between the physician and patient and the respective role of any other health care provider with respect to management of the patient; and (2) notified that he or she may decline to receive medical services by telemedicine and may withdraw from such care at any time.    This service was not originating from a related E/M service provided within the previous 7 days nor will  to an E/M service or procedure within the next 24 hours or my soonest available appointment.  Prevailing standard of care was able to be met in this synchronous audio and video visit    Documentation entered by me for this encounter may have been done in part using speech-recognition technology. Although I have made an effort to ensure accuracy, "sound like" errors may exist and should be interpreted in context.     1. Abdominal spasms  - hyoscyamine 0.125 mg Subl; Place 1 tablet (0.125 mg total) under the tongue every 6 (six) hours as needed (abdominal pain).  Dispense: 30 tablet; Refill: 1    2. Cystitis without hematuria    3. Multiple sclerosis    4. Seizure disorder    5. Primary hypertension    6. Other sleep disorders not due to a substance or known physiological condition  - Home Sleep Study; Future    7. Subacute cough  - X-Ray Chest PA And Lateral; Future       Medication List with Changes/Refills   New Medications    HYOSCYAMINE 0.125 MG SUBL    Place 1 tablet (0.125 mg total) under the tongue every 6 (six) hours as needed (abdominal pain).   Current Medications    AMMONIUM LACTATE (LAC-HYDRIN) 12 % LOTION    Apply topically as needed for Dry Skin.    AMOXICILLIN-CLAVULANATE 875-125MG (AUGMENTIN) 875-125 MG PER TABLET    Take 1 tablet by mouth every 12 (twelve) hours. for 9 " "doses    ATORVASTATIN (LIPITOR) 40 MG TABLET    TAKE 1 TABLET BY MOUTH EVERY DAY    CHOLECALCIFEROL, VITAMIN D3, 10 MCG (400 UNIT) CAP CAPSULE    Take 1 capsule (400 Units total) by mouth once daily.    CLOPIDOGREL (PLAVIX) 75 MG TABLET    Take 1 tablet (75 mg total) by mouth once daily.    CYANOCOBALAMIN (VITAMIN B-12) 1000 MCG TABLET    Take 1 tablet (1,000 mcg total) by mouth once daily.    DICYCLOMINE (BENTYL) 10 MG CAPSULE    Take 1 capsule (10 mg total) by mouth 3 (three) times daily as needed (abdominal pain).    DULOXETINE (CYMBALTA) 20 MG CAPSULE    TAKE 2 CAPSULES BY MOUTH EVERY DAY    ESTRADIOL (ESTRACE) 0.01 % (0.1 MG/GRAM) VAGINAL CREAM    Place 1 g vaginally 3 (three) times a week.    FAMOTIDINE (PEPCID) 20 MG TABLET    Take 1 tablet (20 mg total) by mouth once daily.    FUROSEMIDE (LASIX) 40 MG TABLET    Take 1 tablet (40 mg total) by mouth once daily.    LOSARTAN (COZAAR) 25 MG TABLET    Take 1 tablet (25 mg total) by mouth once daily.    NITROFURANTOIN (MACRODANTIN) 50 MG CAPSULE    Take 1 capsule (50 mg total) by mouth 4 (four) times daily.    POLYETHYLENE GLYCOL (GLYCOLAX) 17 GRAM PWPK    Take 17 g by mouth daily as needed for Constipation.    POTASSIUM CHLORIDE SA (K-DUR,KLOR-CON) 20 MEQ TABLET    Take 1 tablet (20 mEq total) by mouth 2 (two) times daily.    PROPRANOLOL (INDERAL) 20 MG TABLET    Take 20 mg by mouth.    TOPIRAMATE (TOPAMAX) 100 MG TABLET    Take 1 tablet (100 mg total) by mouth 2 (two) times daily.     Modified Medications    No medications on file       Piyush Sharif MD  05/14/2025     Documentation entered by me for this encounter may have been done in part using speech-recognition technology. Although I have made an effort to ensure accuracy, "sound like" errors may exist and should be interpreted in context.    If you are due for any health screening(s) below please notify me so we can arrange them to be ordered and scheduled to maintain your health. Most healthy patients at " your age complete them, but you are free to accept or refuse.   All of your core healthy metrics are met.

## 2025-05-19 ENCOUNTER — CLINICAL SUPPORT (OUTPATIENT)
Dept: REHABILITATION | Facility: HOSPITAL | Age: 75
End: 2025-05-19
Payer: MEDICARE

## 2025-05-19 DIAGNOSIS — R53.1 GENERAL WEAKNESS: ICD-10-CM

## 2025-05-19 DIAGNOSIS — Z74.09 IMPAIRED FUNCTIONAL MOBILITY, BALANCE, GAIT, AND ENDURANCE: Primary | ICD-10-CM

## 2025-05-19 PROCEDURE — 97530 THERAPEUTIC ACTIVITIES: CPT | Mod: PO,CQ

## 2025-05-19 PROCEDURE — 97110 THERAPEUTIC EXERCISES: CPT | Mod: PO,CQ

## 2025-05-19 NOTE — PROGRESS NOTES
Outpatient Rehab    Physical Therapy Visit    Patient Name: Alyssa John  MRN: [id number]  YOB: 1950  Encounter Date: 5/19/2025    Therapy Diagnosis:   Encounter Diagnoses   Name Primary?    Impaired functional mobility, balance, gait, and endurance Yes    General weakness      Physician: Piyush Sharif MD    Physician Orders: Eval and Treat  Medical Diagnosis: Arthritis  Gait disturbance  Physical deconditioning    Visit # / Visits Authorized:  9 / 20  Insurance Authorization Period: 4/7/2025 to 12/31/2025  Date of Evaluation: 4/10/2025  Plan of Care Certification: 4/13/2025 to 7/1/2025      PT/PTA: PTA   Number of PTA visits since last PT visit:3  Time In:     Time Out:    Total Time (in minutes):     Total Billable Time (in minutes):      FOTO:  Intake Score:  %  Survey Score 2:  %  Survey Score 3:  %    Precautions:       Subjective   Without complaints.  Family / care giver present for this visit:  (caregiver in waiting area.)  Pain reported as 0/10.      Objective            Treatment:  Therapeutic Exercise  TE 1: NuStep Stepper Level 3 with BUE use, occasional assistance to increase range 10 minutes   Therapeutic Activity  TA 1: WC<>NuStep squat pivot transfer maxA  TA 2: WC mobility TA  TA 3: Sit<>Stand maxA  TA 4: Ambulation with RW with WC to follow 15 feet TA for forward progression, Max VC to improve step length    Time Entry(in minutes):       Assessment & Plan   Assessment: Alyssa with poor tolerance to activities today, needed assistance to increase SPM on NuStep Stepper to keep panel powered, needed MaxA for transfers with VC for anterior scoot with fair follow through.  Ambulated with RW MaxA for forward progression, patient distracted and talking not tending to task and started to sit without notice, was able to stop progression and place chair behind her.       Patient will continue to benefit from skilled outpatient physical therapy to address the deficits listed in  the problem list box on initial evaluation, provide pt/family education and to maximize pt's level of independence in the home and community environment.     Patient's spiritual, cultural, and educational needs considered and patient agreeable to plan of care and goals.           Plan:      Goals:   Active       Long Term Goals       Patient will report compliance with updated home exercise program for 5 days a week or more to maintain improvements post discharge.  (Progressing)       Start:  04/10/25    Expected End:  07/01/25            Patient will demonstrate improved bed mobility by performing supine<>sit with contact guard assistance. (Progressing)       Start:  04/10/25    Expected End:  07/01/25            Patient will perform stand pivot transfer wheelchair to/from mat with contact guard assistance to improve independence. (Progressing)       Start:  04/10/25    Expected End:  07/01/25               Short Term Goals       Patient will report compliance with home exercise program 5 days a week to improve carry over.  (Progressing)       Start:  04/10/25    Expected End:  07/01/25            Patient to improve bed mobility demonstrating rolling bilaterally with supervision. (Progressing)       Start:  04/10/25    Expected End:  07/01/25            PT will assess standing tolerance and gait to set appropriate goals. (Progressing)       Start:  04/10/25    Expected End:  07/01/25                Danyelle Benoit, PTA

## 2025-05-21 ENCOUNTER — CLINICAL SUPPORT (OUTPATIENT)
Dept: REHABILITATION | Facility: HOSPITAL | Age: 75
End: 2025-05-21
Payer: MEDICARE

## 2025-05-21 DIAGNOSIS — Z74.09 IMPAIRED FUNCTIONAL MOBILITY, BALANCE, GAIT, AND ENDURANCE: Primary | ICD-10-CM

## 2025-05-21 DIAGNOSIS — R53.1 GENERAL WEAKNESS: ICD-10-CM

## 2025-05-21 PROCEDURE — 97110 THERAPEUTIC EXERCISES: CPT | Mod: PO

## 2025-05-21 PROCEDURE — 97530 THERAPEUTIC ACTIVITIES: CPT | Mod: PO

## 2025-05-22 ENCOUNTER — TELEPHONE (OUTPATIENT)
Dept: FAMILY MEDICINE | Facility: CLINIC | Age: 75
End: 2025-05-22
Payer: MEDICARE

## 2025-05-22 NOTE — TELEPHONE ENCOUNTER
Spoke to pt who states she was recently discharged and completed all meds. Pt is not feeling any better and req to see Dr. Sharif sooner than scheduled follow up on 5/29. Sooner appt scheduled.

## 2025-05-22 NOTE — TELEPHONE ENCOUNTER
----- Message from Anabel sent at 5/22/2025  9:42 AM CDT -----  Contact:  [phone]  Caller is requesting an earlier appointment then we can schedule.  Caller is requesting a message be sent to the provider.If this is for urgent care symptoms, did you offer other providers at this location, providers at other locations, or [facility]? (yes, no, n/a):  yesIf this is for the patients physical, did you offer to schedule next available and put on wait list, or to see NP or PA for their physical?  (yes, no, n/a):  yesWhen is the next available appointment with their provider:  June 17Reason for the appointment:  hosp fu (bad cough)Patient preference of timeframe to be scheduled:  today or tomarWould the patient like a call back, or a response through their [facility] portal?:   call backComments:

## 2025-05-23 ENCOUNTER — OFFICE VISIT (OUTPATIENT)
Dept: FAMILY MEDICINE | Facility: CLINIC | Age: 75
End: 2025-05-23
Payer: MEDICARE

## 2025-05-23 ENCOUNTER — HOSPITAL ENCOUNTER (OUTPATIENT)
Dept: RADIOLOGY | Facility: CLINIC | Age: 75
Discharge: HOME | End: 2025-05-23
Attending: STUDENT IN AN ORGANIZED HEALTH CARE EDUCATION/TRAINING PROGRAM
Payer: MEDICARE

## 2025-05-23 ENCOUNTER — RESULTS FOLLOW-UP (OUTPATIENT)
Dept: FAMILY MEDICINE | Facility: CLINIC | Age: 75
End: 2025-05-23

## 2025-05-23 ENCOUNTER — PATIENT MESSAGE (OUTPATIENT)
Dept: FAMILY MEDICINE | Facility: CLINIC | Age: 75
End: 2025-05-23

## 2025-05-23 VITALS
HEART RATE: 64 BPM | HEIGHT: 63 IN | OXYGEN SATURATION: 94 % | RESPIRATION RATE: 16 BRPM | BODY MASS INDEX: 32.15 KG/M2 | TEMPERATURE: 98 F | WEIGHT: 181.44 LBS

## 2025-05-23 DIAGNOSIS — G40.909 SEIZURE DISORDER: ICD-10-CM

## 2025-05-23 DIAGNOSIS — R30.0 DYSURIA: ICD-10-CM

## 2025-05-23 DIAGNOSIS — R05.2 SUBACUTE COUGH: ICD-10-CM

## 2025-05-23 DIAGNOSIS — F32.A ANXIETY AND DEPRESSION: ICD-10-CM

## 2025-05-23 DIAGNOSIS — R53.83 FATIGUE, UNSPECIFIED TYPE: ICD-10-CM

## 2025-05-23 DIAGNOSIS — R60.9 EDEMA, UNSPECIFIED TYPE: ICD-10-CM

## 2025-05-23 DIAGNOSIS — G35 MULTIPLE SCLEROSIS: ICD-10-CM

## 2025-05-23 DIAGNOSIS — Z09 HOSPITAL DISCHARGE FOLLOW-UP: Primary | ICD-10-CM

## 2025-05-23 DIAGNOSIS — R79.9 ABNORMAL FINDING OF BLOOD CHEMISTRY, UNSPECIFIED: ICD-10-CM

## 2025-05-23 DIAGNOSIS — F41.9 ANXIETY AND DEPRESSION: ICD-10-CM

## 2025-05-23 DIAGNOSIS — I87.2 STASIS DERMATITIS: ICD-10-CM

## 2025-05-23 DIAGNOSIS — D69.2 SENILE PURPURA: ICD-10-CM

## 2025-05-23 DIAGNOSIS — I10 PRIMARY HYPERTENSION: ICD-10-CM

## 2025-05-23 DIAGNOSIS — R06.00 DYSPNEA, UNSPECIFIED TYPE: Primary | ICD-10-CM

## 2025-05-23 PROCEDURE — 99999 PR PBB SHADOW E&M-EST. PATIENT-LVL IV: CPT | Mod: PBBFAC,,, | Performed by: STUDENT IN AN ORGANIZED HEALTH CARE EDUCATION/TRAINING PROGRAM

## 2025-05-23 PROCEDURE — 71045 X-RAY EXAM CHEST 1 VIEW: CPT | Mod: TC,FY,PO

## 2025-05-23 PROCEDURE — 71045 X-RAY EXAM CHEST 1 VIEW: CPT | Mod: 26,,, | Performed by: RADIOLOGY

## 2025-05-23 RX ORDER — DOXYCYCLINE HYCLATE 100 MG
100 TABLET ORAL 2 TIMES DAILY
Qty: 14 TABLET | Refills: 0 | Status: SHIPPED | OUTPATIENT
Start: 2025-05-23 | End: 2025-05-30

## 2025-05-23 NOTE — PROGRESS NOTES
[facility] Note    Subjective:    The HPI and pertinent ROS is included in the Diagnostic Impression Remarks section at the end of the note. Please see below for further details. Chief complaint is at end of note.     Alyssa is a pleasant intelligent patient who is here for evaluation.     Modified Medications    No medications on file       Data reviewed 274}  Previous medical records reviewed and summarized in plan section at end of note.      If you are due for any health screening(s) below please notify me so we can arrange them to be ordered and scheduled. Most healthy patients at your age complete them, but you are free to accept or refuse. If you can't do it, I'll definitely understand. If you can, I'd certainly appreciate it!     All of your core healthy metrics are met.        The following portions of the patient's history were reviewed and updated as appropriate: allergies, current medications, past family history, past medical history, past social history, past surgical history and problem list.    She  has a past medical history of Arthritis, Coronary artery disease, Encounter for blood transfusion, Epilepsy, GERD (gastroesophageal reflux disease), Headache, Hypertension, MI, old, MS (multiple sclerosis), and Seizures.  She  has a past surgical history that includes Appendectomy; Back surgery; Coronary stent placement; Colonoscopy (N/A, 09/16/2015); right knee arthroscopy; Cataract extraction (Bilateral); Breast biopsy (Right, 15 yrs ago); insertion, neurostimulator, temporary, sacral (N/A, 9/27/2024); and Implantation of permanent sacral nerve stimulator (N/A, 10/11/2024).    She  reports that she quit smoking about 15 years ago. Her smoking use included cigarettes. She started smoking about 57 years ago. She has a 84 pack-year smoking history. She has been exposed to tobacco smoke. She has never used smokeless tobacco. She reports that she does not currently use alcohol. She reports  "that she does not use drugs.  She family history includes Heart disease in her father; Scleroderma in her mother.    Review of patient's allergies indicates:   Allergen Reactions    Codeine      Other reaction(s): throat tightness    Dilantin [phenytoin sodium extended]     Phenobarbital     Tegretol [carbamazepine]        Tobacco Use: Medium Risk (5/23/2025)    Patient History     Smoking Tobacco Use: Former     Smokeless Tobacco Use: Never     Passive Exposure: Past     Physical Examination  Physical Exam    General: No acute distress. Well-developed. Well-nourished.  Eyes: EOMI. Sclerae anicteric.  HENT: Normocephalic. Atraumatic. Nares patent. Moist oral mucosa.  Cardiovascular: Regular rate. Regular rhythm. No murmurs. No rubs. No gallops. Normal S1, S2.  Respiratory: Normal respiratory effort. Clear to auscultation bilaterally. No rales. No rhonchi. No wheezing. Productive cough.  Musculoskeletal: No  obvious deformity.  Extremities: Bilateral leg edema.  Neurological: Alert & oriented x3. No slurred speech. Normal gait.  Psychiatric: Normal mood. Normal affect. Good insight. Good judgment.  Skin: Warm. Dry. No rash. Stasis dermatitis bilaterally.              BP Readings from Last 3 Encounters:   05/12/25 132/86   05/11/25 (!) 145/66   05/08/25 (!) 86/46     Wt Readings from Last 3 Encounters:   05/23/25 82.3 kg (181 lb 7 oz)   05/08/25 86.2 kg (190 lb)   05/08/25 86.2 kg (190 lb 0.6 oz)     Pulse 64   Temp 98.4 °F (36.9 °C) (Oral)   Resp 16   Ht 5' 3" (1.6 m)   Wt 82.3 kg (181 lb 7 oz)   SpO2 (!) 94%   BMI 32.14 kg/m²    274}  Laboratory: I have reviewed old labs below:    274}    Lab Results   Component Value Date    WBC 7.70 05/11/2025    HGB 12.5 05/11/2025    HCT 39.7 05/11/2025    MCV 84 05/11/2025     05/11/2025     05/11/2025    K 3.5 05/11/2025     05/11/2025    CALCIUM 8.4 (L) 05/11/2025    PHOS 3.8 05/11/2025    CO2 18 (L) 05/11/2025    GLUCOSE 112 (H) 02/22/2013    BUN 16 " 05/11/2025    CREATININE 1.0 05/11/2025    EGFRNORACEVR 59 (L) 05/11/2025    LABPROT 10.4 09/17/2024    ALBUMIN 2.9 (L) 05/11/2025    BILITOT 0.5 05/11/2025    ALKPHOS 123 05/11/2025    ALT 13 05/11/2025    AST 14 05/11/2025    INR 0.9 09/17/2024    CHOL 163 04/16/2025    TRIG 132 04/16/2025    HDL 46 04/16/2025    LDLCALC 90.6 04/16/2025    TSH 2.224 08/12/2024    HGBA1C 6.0 09/18/2024      Lab reviewed by me: Particular labs of significance that I will monitor, workup, or treat to improve are mentioned below in diagnostic impression remarks.      Imaging/EKG: I have reviewed the pertinent results and my findings are noted in remarks.  274}    CC:   Chief Complaint   Patient presents with    Hospital Follow Up    Leg Swelling    Cough        274}    History of Present Illness    CHIEF COMPLAINT:  Patient presents today for follow up of cough and swelling after recent hospitalization.    RESPIRATORY:  She reports a wet, productive cough that wakes her at night. The cough is particularly severe with concerns about choking when supine. She has been using an adjustable bed to elevate her upper body while sleeping, which has provided some relief. Despite this intervention, she continues to experience nocturnal awakenings due to coughing episodes.    EDEMA:  She reports improvement in leg swelling with elevation, though asymmetric swelling persists with one leg remaining more swollen than the other. She continues Lasix 40mg for management.    GENITOURINARY:  She reports dysuria.    NEUROLOGICAL:  She experienced an episode of confusion yesterday, during which family members noted she appeared unwell.    RECENT TREATMENT COURSE:  She completed prescribed course of antibiotics with resolution of pain. She has not required pain medications in the past 2 days.    CURRENT MEDICATIONS:  She continues potassium supplements as prescribed.      ROS:  Constitutional: -fevers, +fatigue  Respiratory: -shortness of breath,  +productive cough, +waking at night coughing  Cardiovascular: -chest pain, +lower extremity edema  Genitourinary: -flank pain, +painful urination  Neurological: -confusion or disorientation      Mandeep leg swelling and erythema mandeep     Assessment/Plan  Alyssa John is a 74 y.o. female who presents to clinic with:  1. Hospital discharge follow-up    2. Subacute cough    3. Multiple sclerosis    4. Fatigue, unspecified type    5. Primary hypertension    6. Senile purpura    7. Stasis dermatitis    8. Dysuria    9. Abnormal finding of blood chemistry, unspecified    10. Edema, unspecified type    11. Seizure disorder    12. Anxiety and depression       274}    Assessment & Plan    G35 Multiple sclerosis  Z09 Hospital discharge follow-up  R05.2 Subacute cough  R53.83 Fatigue, unspecified type  I10 Primary hypertension  D69.2 Senile purpura  I87.2 Stasis dermatitis  R30.0 Dysuria  R79.9 Abnormal finding of blood chemistry, unspecified    PLAN SUMMARY:  - Order chest XR to investigate and compare with previous imaging  - Order urinalysis with home collection option  - Order labs to check potassium and renal function  - Continue potassium supplement  - Apply prescribed cream on affected areas  - Maintain low-salt diet to control fluid retention and swelling  - Elevate head of bed to help with coughing and prevent choking during sleep  - Use leg elevation and compression stockings for managing edema  - Consider antibiotic treatment pending X-ray results  - Review test results when available  - Contact office if swelling worsens or weight gain occurs    HOSPITAL DISCHARGE FOLLOW-UP:  - Assessed improvement from recent hospitalization and antibiotic treatment.    SUBACUTE COUGH:  - Evaluated persistent cough, considering potential causes such as fluid retention or infection.  - Ordered chest XR to investigate and compare with previous imaging; will review results today.  - Considering antibiotic treatment pending XR  findings, given complex medical history.  - Recommend head of bed elevation to help with coughing and prevent choking during sleep.    STASIS DERMATITIS:  - Assessed leg swelling and bilateral redness, determining it likely to be stasis dermatitis rather than cellulitis.  - Explained importance of leg elevation and compression stockings for managing edema.  - Patient to continue using prescribed cream on affected areas.    DYSURIA:  - Evaluated for potential UTI due to reported urinary pain.  - Ordered urinalysis with home collection option provided if unable to provide sample during visit.  - Will review results when available.    ABNORMAL FINDING OF BLOOD CHEMISTRY, UNSPECIFIED:  - Continued potassium supplement.  - Ordered labs to check potassium and renal function due to previous borderline low level.  - Will review results when available.    LIFESTYLE CHANGES:  - Discussed that high salt intake (such as from chips) can worsen fluid retention and swelling.  - Recommend maintaining a low-salt diet to help control this issue.  - Contact the office if swelling worsens or weight gain occurs.           Edema-patient did have some fluid in the lungs previous x-ray will get echocardiogram no orthopnea or PND    Senile purpura-stable monitor blood counts     Will send in an antibiotic cover for any possible pneumonia due to productive cough has some risk factors    Reviewed x-ray that was done sent in antibiotic will get echocardiogram no chest pain today     Seizure disorder- stable continue current medication. Avoid medications that lower seizure threshold. Recommend seizure precautions.     Major depression disorder-stable continue current meds and will monitor no SI/plan.      Altogether 41 minutes of total time spent on the encounter, which includes face to face time and non-face to face time preparing to see the patient (eg, review of tests), Obtaining and/or reviewing separately obtained history, Documenting  clinical information in the electronic or other health record, Independently interpreting results (not separately reported) and communicating results to the patient/family/caregiver, or Care coordination (not separately reported). I also counseled her on common and the most usual side effect of prescribed medications. Risk and benefits of the medication were also discussed. I reviewed previous notes to better coordinate patient's care. She test results and lifestyle changes were also discussed. All questions were answered, and patient voiced understanding.     This note was generated with the assistance of ambient listening technology. Verbal consent was obtained by the patient and accompanying visitor(s) for the recording of patient appointment to facilitate this note. I attest to having reviewed and edited the generated note for accuracy, though some syntax or spelling errors may persist. Please contact the author of this note for any clarification.       1. Hospital discharge follow-up  - Basic Metabolic Panel; Future    2. Subacute cough    3. Multiple sclerosis    4. Fatigue, unspecified type  - Magnesium, RBC; Future  - Echo; Future    5. Primary hypertension    6. Senile purpura    7. Stasis dermatitis    8. Dysuria  - POCT Urinalysis(Instrument); Future  - Urine culture; Future  - Urinalysis Microscopic; Future    9. Abnormal finding of blood chemistry, unspecified  - Magnesium, RBC; Future  - CBC Auto Differential; Future  - Calcium, Ionized; Future  - Methylmalonic Acid, Serum; Future    10. Edema, unspecified type  - Echo; Future    11. Seizure disorder    12. Anxiety and depression      Piyush Sharif MD   274}    If you are due for any health screening(s) below please notify me so we can arrange them to be ordered and scheduled. Most healthy patients at your age complete them, but you are free to accept or refuse.     If you can't do it, I'll definitely understand. If you can, I'd certainly appreciate  it!   All of your core healthy metrics are met.

## 2025-05-26 ENCOUNTER — TELEPHONE (OUTPATIENT)
Dept: CARDIOLOGY | Facility: HOSPITAL | Age: 75
End: 2025-05-26

## 2025-05-27 ENCOUNTER — CLINICAL SUPPORT (OUTPATIENT)
Dept: CARDIOLOGY | Facility: HOSPITAL | Age: 75
End: 2025-05-27
Attending: STUDENT IN AN ORGANIZED HEALTH CARE EDUCATION/TRAINING PROGRAM
Payer: MEDICARE

## 2025-05-27 DIAGNOSIS — R53.83 FATIGUE, UNSPECIFIED TYPE: ICD-10-CM

## 2025-05-27 DIAGNOSIS — R60.9 EDEMA, UNSPECIFIED TYPE: ICD-10-CM

## 2025-05-27 PROCEDURE — 93306 TTE W/DOPPLER COMPLETE: CPT | Mod: 26,,, | Performed by: GENERAL PRACTICE

## 2025-05-27 PROCEDURE — 93306 TTE W/DOPPLER COMPLETE: CPT

## 2025-05-28 ENCOUNTER — HOSPITAL ENCOUNTER (EMERGENCY)
Facility: HOSPITAL | Age: 75
Discharge: HOME | End: 2025-05-28
Attending: EMERGENCY MEDICINE
Payer: MEDICARE

## 2025-05-28 ENCOUNTER — TELEPHONE (OUTPATIENT)
Dept: FAMILY MEDICINE | Facility: CLINIC | Age: 75
End: 2025-05-28
Payer: MEDICARE

## 2025-05-28 VITALS
SYSTOLIC BLOOD PRESSURE: 144 MMHG | RESPIRATION RATE: 18 BRPM | DIASTOLIC BLOOD PRESSURE: 65 MMHG | TEMPERATURE: 98 F | WEIGHT: 180 LBS | HEIGHT: 63 IN | OXYGEN SATURATION: 97 % | BODY MASS INDEX: 31.89 KG/M2 | HEART RATE: 82 BPM

## 2025-05-28 DIAGNOSIS — K52.9 GASTROENTERITIS: Primary | ICD-10-CM

## 2025-05-28 DIAGNOSIS — R11.2 N&V (NAUSEA AND VOMITING): ICD-10-CM

## 2025-05-28 LAB
ABSOLUTE EOSINOPHIL (SMH): 0.13 K/UL
ABSOLUTE MONOCYTE (SMH): 0.53 K/UL (ref 0.3–1)
ABSOLUTE NEUTROPHIL COUNT (SMH): 7 K/UL (ref 1.8–7.7)
ALBUMIN SERPL-MCNC: 3.5 G/DL (ref 3.5–5.2)
ALP SERPL-CCNC: 166 UNIT/L (ref 55–135)
ALT SERPL-CCNC: 11 UNIT/L (ref 10–44)
ANION GAP (SMH): 10 MMOL/L (ref 8–16)
AORTIC ROOT ANNULUS: 2.4 CM
AORTIC VALVE CUSP SEPERATION: 1.8 CM
APICAL FOUR CHAMBER EJECTION FRACTION: 67 %
APICAL TWO CHAMBER EJECTION FRACTION: 71 %
AST SERPL-CCNC: 10 UNIT/L (ref 10–40)
AV INDEX (PROSTH): 1
AV MEAN GRADIENT: 3 MMHG
AV PEAK GRADIENT: 6 MMHG
AV VALVE AREA BY VELOCITY RATIO: 2.8 CM²
AV VALVE AREA: 2.8 CM²
AV VELOCITY RATIO: 1
BASOPHILS # BLD AUTO: 0.04 K/UL
BASOPHILS NFR BLD AUTO: 0.4 %
BILIRUB SERPL-MCNC: 0.5 MG/DL (ref 0.1–1)
BILIRUB UR QL STRIP.AUTO: NEGATIVE
BUN SERPL-MCNC: 24 MG/DL (ref 8–23)
CALCIUM SERPL-MCNC: 9.3 MG/DL (ref 8.7–10.5)
CHLORIDE SERPL-SCNC: 108 MMOL/L (ref 95–110)
CLARITY UR: CLEAR
CO2 SERPL-SCNC: 23 MMOL/L (ref 23–29)
COLOR UR AUTO: YELLOW
CREAT SERPL-MCNC: 0.8 MG/DL (ref 0.5–1.4)
CV ECHO LV RWT: 0.56 CM
DOP CALC AO PEAK VEL: 1.2 M/S
DOP CALC AO VTI: 25.8 CM
DOP CALC LVOT AREA: 2.8 CM2
DOP CALC LVOT DIAMETER: 1.9 CM
DOP CALC LVOT PEAK VEL: 1.2 M/S
DOP CALC LVOT STROKE VOLUME: 73.1 CM3
DOP CALC MV VTI: 21.3 CM
DOP CALCLVOT PEAK VEL VTI: 25.8 CM
E WAVE DECELERATION TIME: 264 MSEC
E/A RATIO: 0.79
E/E' RATIO: 9 M/S
ECHO LV POSTERIOR WALL: 1.1 CM (ref 0.6–1.1)
ERYTHROCYTE [DISTWIDTH] IN BLOOD BY AUTOMATED COUNT: 14.4 % (ref 11.5–14.5)
FRACTIONAL SHORTENING: 33.3 % (ref 28–44)
GFR SERPLBLD CREATININE-BSD FMLA CKD-EPI: >60 ML/MIN/1.73/M2
GLUCOSE SERPL-MCNC: 101 MG/DL (ref 70–110)
GLUCOSE UR QL STRIP: NEGATIVE
HCT VFR BLD AUTO: 40.2 % (ref 37–48.5)
HGB BLD-MCNC: 12.6 GM/DL (ref 12–16)
HGB UR QL STRIP: NEGATIVE
IMM GRANULOCYTES # BLD AUTO: 0.06 K/UL (ref 0–0.04)
IMM GRANULOCYTES NFR BLD AUTO: 0.6 % (ref 0–0.5)
INTERVENTRICULAR SEPTUM: 1 CM (ref 0.6–1.1)
IVC DIAMETER: 1.9 CM
KETONES UR QL STRIP: NEGATIVE
LDH SERPL L TO P-CCNC: 1.1 MMOL/L (ref 0.5–2.2)
LEFT ATRIUM AREA SYSTOLIC (APICAL 2 CHAMBER): 13.4 CM2
LEFT INTERNAL DIMENSION IN SYSTOLE: 2.6 CM (ref 2.1–4)
LEFT VENTRICLE DIASTOLIC VOLUME: 66 ML
LEFT VENTRICLE END DIASTOLIC VOLUME APICAL 2 CHAMBER: 48.7 ML
LEFT VENTRICLE END DIASTOLIC VOLUME APICAL 4 CHAMBER: 39.2 ML
LEFT VENTRICLE END SYSTOLIC VOLUME APICAL 2 CHAMBER: 31.3 ML
LEFT VENTRICLE SYSTOLIC VOLUME: 25 ML
LEFT VENTRICULAR INTERNAL DIMENSION IN DIASTOLE: 3.9 CM (ref 3.5–6)
LEFT VENTRICULAR MASS: 131 G
LEUKOCYTE ESTERASE UR QL STRIP: ABNORMAL
LIPASE SERPL-CCNC: 15 U/L (ref 4–60)
LV LATERAL E/E' RATIO: 9.1 M/S
LV SEPTAL E/E' RATIO: 9.1 M/S
LVED V (TEICH): 65.9 ML
LVES V (TEICH): 24.6 ML
LVOT MG: 3 MMHG
LVOT MV: 0.78 CM/S
LYMPHOCYTES # BLD AUTO: 2.3 K/UL (ref 1–4.8)
MCH RBC QN AUTO: 26.6 PG (ref 27–31)
MCHC RBC AUTO-ENTMCNC: 31.3 G/DL (ref 32–36)
MCV RBC AUTO: 85 FL (ref 82–98)
MICROSCOPIC COMMENT: ABNORMAL
MV MEAN GRADIENT: 1 MMHG
MV PEAK A VEL: 0.81 M/S
MV PEAK E VEL: 0.64 M/S
MV PEAK GRADIENT: 3 MMHG
MV VALVE AREA BY CONTINUITY EQUATION: 3.43 CM2
NITRITE UR QL STRIP: NEGATIVE
NUCLEATED RBC (/100WBC) (SMH): 0 /100 WBC
OHS LV EJECTION FRACTION SIMPSONS BIPLANE MOD: 67 %
PH UR STRIP: 7 [PH]
PHOSPHATE SERPL-MCNC: 3.5 MG/DL (ref 2.7–4.5)
PISA MRMAX VEL: 3.29 M/S
PISA TR MAX VEL: 1.8 M/S
PLATELET # BLD AUTO: 328 K/UL (ref 150–450)
PMV BLD AUTO: 10.5 FL (ref 9.2–12.9)
POTASSIUM SERPL-SCNC: 3.9 MMOL/L (ref 3.5–5.1)
PROT SERPL-MCNC: 7.5 GM/DL (ref 6–8.4)
PROT UR QL STRIP: ABNORMAL
PV MV: 0.55 M/S
PV PEAK GRADIENT: 3 MMHG
PV PEAK VELOCITY: 0.83 M/S
RA PRESSURE ESTIMATED: 3 MMHG
RBC # BLD AUTO: 4.74 M/UL (ref 4–5.4)
RBC #/AREA URNS AUTO: 2 /HPF
RELATIVE EOSINOPHIL (SMH): 1.3 % (ref 0–8)
RELATIVE LYMPHOCYTE (SMH): 23 % (ref 18–48)
RELATIVE MONOCYTE (SMH): 5.3 % (ref 4–15)
RELATIVE NEUTROPHIL (SMH): 69.4 % (ref 38–73)
RV TB RVSP: 5 MMHG
RV TISSUE DOPPLER FREE WALL SYSTOLIC VELOCITY 1 (APICAL 4 CHAMBER VIEW): 11.4 CM/S
SAMPLE: NORMAL
SODIUM SERPL-SCNC: 141 MMOL/L (ref 136–145)
SP GR UR STRIP: 1.02
SQUAMOUS #/AREA URNS AUTO: 1 /HPF
TDI LATERAL: 0.07 M/S
TDI SEPTAL: 0.07 M/S
TDI: 0.07 M/S
TR MAX PG: 13 MMHG
TROPONIN HIGH SENSITIVE (SMH): 7.8 PG/ML
TV REST PULMONARY ARTERY PRESSURE: 16 MMHG
UROBILINOGEN UR STRIP-ACNC: NEGATIVE EU/DL
WBC # BLD AUTO: 10.02 K/UL (ref 3.9–12.7)
WBC #/AREA URNS AUTO: >100 /HPF

## 2025-05-28 PROCEDURE — 96374 THER/PROPH/DIAG INJ IV PUSH: CPT

## 2025-05-28 PROCEDURE — 84100 ASSAY OF PHOSPHORUS: CPT

## 2025-05-28 PROCEDURE — 83690 ASSAY OF LIPASE: CPT

## 2025-05-28 PROCEDURE — 99285 EMERGENCY DEPT VISIT HI MDM: CPT | Mod: 25

## 2025-05-28 PROCEDURE — 85025 COMPLETE CBC W/AUTO DIFF WBC: CPT | Performed by: PHYSICIAN ASSISTANT

## 2025-05-28 PROCEDURE — 84484 ASSAY OF TROPONIN QUANT: CPT

## 2025-05-28 PROCEDURE — 87086 URINE CULTURE/COLONY COUNT: CPT

## 2025-05-28 PROCEDURE — 93010 ELECTROCARDIOGRAM REPORT: CPT | Mod: ,,, | Performed by: GENERAL PRACTICE

## 2025-05-28 PROCEDURE — 80053 COMPREHEN METABOLIC PANEL: CPT | Performed by: PHYSICIAN ASSISTANT

## 2025-05-28 PROCEDURE — 93005 ELECTROCARDIOGRAM TRACING: CPT | Performed by: GENERAL PRACTICE

## 2025-05-28 PROCEDURE — 81003 URINALYSIS AUTO W/O SCOPE: CPT

## 2025-05-28 PROCEDURE — 96361 HYDRATE IV INFUSION ADD-ON: CPT

## 2025-05-28 PROCEDURE — 63600175 PHARM REV CODE 636 W HCPCS

## 2025-05-28 PROCEDURE — 87046 STOOL CULTR AEROBIC BACT EA: CPT | Performed by: EMERGENCY MEDICINE

## 2025-05-28 RX ORDER — ONDANSETRON HYDROCHLORIDE 2 MG/ML
4 INJECTION, SOLUTION INTRAVENOUS
Status: DISCONTINUED | OUTPATIENT
Start: 2025-05-28 | End: 2025-05-28

## 2025-05-28 RX ORDER — ONDANSETRON 4 MG/1
4 TABLET, ORALLY DISINTEGRATING ORAL EVERY 6 HOURS PRN
Qty: 20 TABLET | Refills: 0 | Status: SHIPPED | OUTPATIENT
Start: 2025-05-28

## 2025-05-28 RX ORDER — ONDANSETRON HYDROCHLORIDE 2 MG/ML
8 INJECTION, SOLUTION INTRAVENOUS
Status: COMPLETED | OUTPATIENT
Start: 2025-05-28 | End: 2025-05-28

## 2025-05-28 RX ORDER — ONDANSETRON 4 MG/1
4 TABLET, ORALLY DISINTEGRATING ORAL EVERY 6 HOURS PRN
Qty: 20 TABLET | Refills: 0 | Status: SHIPPED | OUTPATIENT
Start: 2025-05-28 | End: 2025-05-28

## 2025-05-28 RX ADMIN — SODIUM CHLORIDE, POTASSIUM CHLORIDE, SODIUM LACTATE AND CALCIUM CHLORIDE 1000 ML: 600; 310; 30; 20 INJECTION, SOLUTION INTRAVENOUS at 08:05

## 2025-05-28 RX ADMIN — ONDANSETRON 8 MG: 2 INJECTION INTRAMUSCULAR; INTRAVENOUS at 08:05

## 2025-05-28 NOTE — TELEPHONE ENCOUNTER
----- Message from Nacho sent at 5/28/2025  3:34 PM CDT -----  Contact: Joseph [phone]  Type:  Needs Medical AdviceWho Called: Pt  JosephSymptoms (please be specific): vomiting, pt unable to keep anything downHow long has patient had these symptoms: 3 daysPharmacy name and phone #:  [id number] [place] - [address] [address]: [phone] Fax: [phone] the patient rather a call back or a response via [facility]? CallBest Call Back Number: [phone] Additional Information: Pt has been on a variety of antibiotics and has been having vomiting for 3 days, no diarrhea. Pls call back and adv. Thank you.

## 2025-05-28 NOTE — TELEPHONE ENCOUNTER
Spoke to pt  who states they are on there way home from [place] and pt has been vomiting for 3 days straight and unable to hold anything down. He also states pt has been on multiple antibiotics recently and feels like that is upsetting her gut also. Advised pt to go to UC or ED for evaluation as pt may likely need fluids as well.Pt  agreed and advised they will be stopping at home to change pt's shirt and going in to UC or ED for evaluation

## 2025-05-29 ENCOUNTER — PATIENT OUTREACH (OUTPATIENT)
Facility: OTHER | Age: 75
End: 2025-05-29
Payer: MEDICARE

## 2025-05-29 ENCOUNTER — TELEPHONE (OUTPATIENT)
Dept: FAMILY MEDICINE | Facility: CLINIC | Age: 75
End: 2025-05-29
Payer: MEDICARE

## 2025-05-29 DIAGNOSIS — R19.7 DIARRHEA, UNSPECIFIED TYPE: Primary | ICD-10-CM

## 2025-05-29 NOTE — PROGRESS NOTES
Patient was seen in the ED on 5/28/25. Phoned patient to assist with Post ED Discharge Navigation. Spoke with Patients , who declined assistance scheduling a PCP follow-up appointment.  Yo El

## 2025-05-29 NOTE — TELEPHONE ENCOUNTER
Patient  came to clinic, because she was at the hospital yesterday with diarrhea and vomiting he requested a stool test for C-diff but hospital did not do it he wants to know if you don't mind to place the order

## 2025-05-31 LAB — BACTERIA STL CULT: NORMAL

## 2025-06-01 ENCOUNTER — HOSPITAL ENCOUNTER (EMERGENCY)
Facility: HOSPITAL | Age: 75
Discharge: HOME | End: 2025-06-02
Attending: EMERGENCY MEDICINE
Payer: MEDICARE

## 2025-06-01 DIAGNOSIS — R56.9 SEIZURE: ICD-10-CM

## 2025-06-01 DIAGNOSIS — R41.82 ALTERED MENTAL STATUS, UNSPECIFIED ALTERED MENTAL STATUS TYPE: Primary | ICD-10-CM

## 2025-06-01 LAB
ABSOLUTE EOSINOPHIL (SMH): 0.33 K/UL
ABSOLUTE MONOCYTE (SMH): 0.43 K/UL (ref 0.3–1)
ABSOLUTE NEUTROPHIL COUNT (SMH): 5 K/UL (ref 1.8–7.7)
ALBUMIN SERPL-MCNC: 3.3 G/DL (ref 3.5–5.2)
ALP SERPL-CCNC: 153 UNIT/L (ref 55–135)
ALT SERPL-CCNC: 10 UNIT/L (ref 10–44)
ANION GAP (SMH): 5 MMOL/L (ref 8–16)
AST SERPL-CCNC: 11 UNIT/L (ref 10–40)
BACTERIA UR CULT: NO GROWTH
BASOPHILS # BLD AUTO: 0.07 K/UL
BASOPHILS NFR BLD AUTO: 0.8 %
BILIRUB SERPL-MCNC: 0.4 MG/DL (ref 0.1–1)
BUN SERPL-MCNC: 14 MG/DL (ref 8–23)
CALCIUM SERPL-MCNC: 8.6 MG/DL (ref 8.7–10.5)
CHLORIDE SERPL-SCNC: 110 MMOL/L (ref 95–110)
CO2 SERPL-SCNC: 23 MMOL/L (ref 23–29)
CREAT SERPL-MCNC: 0.9 MG/DL (ref 0.5–1.4)
ERYTHROCYTE [DISTWIDTH] IN BLOOD BY AUTOMATED COUNT: 14.6 % (ref 11.5–14.5)
GFR SERPLBLD CREATININE-BSD FMLA CKD-EPI: >60 ML/MIN/1.73/M2
GLUCOSE SERPL-MCNC: 94 MG/DL (ref 70–110)
HCT VFR BLD AUTO: 39.6 % (ref 37–48.5)
HGB BLD-MCNC: 12 GM/DL (ref 12–16)
IMM GRANULOCYTES # BLD AUTO: 0.04 K/UL (ref 0–0.04)
IMM GRANULOCYTES NFR BLD AUTO: 0.5 % (ref 0–0.5)
LYMPHOCYTES # BLD AUTO: 2.66 K/UL (ref 1–4.8)
MCH RBC QN AUTO: 26.3 PG (ref 27–31)
MCHC RBC AUTO-ENTMCNC: 30.3 G/DL (ref 32–36)
MCV RBC AUTO: 87 FL (ref 82–98)
NUCLEATED RBC (/100WBC) (SMH): 0 /100 WBC
PLATELET # BLD AUTO: 257 K/UL (ref 150–450)
PMV BLD AUTO: 10.2 FL (ref 9.2–12.9)
POTASSIUM SERPL-SCNC: 4.3 MMOL/L (ref 3.5–5.1)
PROT SERPL-MCNC: 7.1 GM/DL (ref 6–8.4)
RBC # BLD AUTO: 4.56 M/UL (ref 4–5.4)
RELATIVE EOSINOPHIL (SMH): 3.9 % (ref 0–8)
RELATIVE LYMPHOCYTE (SMH): 31.2 % (ref 18–48)
RELATIVE MONOCYTE (SMH): 5 % (ref 4–15)
RELATIVE NEUTROPHIL (SMH): 58.6 % (ref 38–73)
SODIUM SERPL-SCNC: 138 MMOL/L (ref 136–145)
WBC # BLD AUTO: 8.53 K/UL (ref 3.9–12.7)

## 2025-06-01 PROCEDURE — 93005 ELECTROCARDIOGRAM TRACING: CPT | Performed by: INTERNAL MEDICINE

## 2025-06-01 PROCEDURE — 85025 COMPLETE CBC W/AUTO DIFF WBC: CPT | Performed by: EMERGENCY MEDICINE

## 2025-06-01 PROCEDURE — 99285 EMERGENCY DEPT VISIT HI MDM: CPT | Mod: 25

## 2025-06-01 PROCEDURE — 93010 ELECTROCARDIOGRAM REPORT: CPT | Mod: ,,, | Performed by: INTERNAL MEDICINE

## 2025-06-01 PROCEDURE — 80053 COMPREHEN METABOLIC PANEL: CPT | Performed by: EMERGENCY MEDICINE

## 2025-06-02 ENCOUNTER — OFFICE VISIT (OUTPATIENT)
Dept: NEUROLOGY | Facility: CLINIC | Age: 75
End: 2025-06-02
Payer: MEDICARE

## 2025-06-02 VITALS
DIASTOLIC BLOOD PRESSURE: 74 MMHG | WEIGHT: 180 LBS | RESPIRATION RATE: 20 BRPM | OXYGEN SATURATION: 97 % | HEIGHT: 63 IN | BODY MASS INDEX: 31.89 KG/M2 | TEMPERATURE: 98 F | SYSTOLIC BLOOD PRESSURE: 168 MMHG | HEART RATE: 71 BPM

## 2025-06-02 VITALS
WEIGHT: 182.88 LBS | BODY MASS INDEX: 32.4 KG/M2 | HEART RATE: 64 BPM | DIASTOLIC BLOOD PRESSURE: 69 MMHG | HEIGHT: 63 IN | SYSTOLIC BLOOD PRESSURE: 145 MMHG

## 2025-06-02 DIAGNOSIS — Z71.89 COUNSELING REGARDING GOALS OF CARE: ICD-10-CM

## 2025-06-02 DIAGNOSIS — N31.9 NEUROGENIC BLADDER: ICD-10-CM

## 2025-06-02 DIAGNOSIS — G40.909 NONINTRACTABLE EPILEPSY WITHOUT STATUS EPILEPTICUS, UNSPECIFIED EPILEPSY TYPE: Primary | ICD-10-CM

## 2025-06-02 DIAGNOSIS — G40.909 SEIZURE DISORDER: ICD-10-CM

## 2025-06-02 DIAGNOSIS — Z74.09 IMPAIRED MOBILITY AND ADLS: ICD-10-CM

## 2025-06-02 DIAGNOSIS — G35 MS (MULTIPLE SCLEROSIS): ICD-10-CM

## 2025-06-02 DIAGNOSIS — R26.9 GAIT DISTURBANCE: ICD-10-CM

## 2025-06-02 DIAGNOSIS — Z78.9 IMPAIRED MOBILITY AND ADLS: ICD-10-CM

## 2025-06-02 PROCEDURE — 3008F BODY MASS INDEX DOCD: CPT | Mod: CPTII,S$GLB,, | Performed by: PSYCHIATRY & NEUROLOGY

## 2025-06-02 PROCEDURE — 3077F SYST BP >= 140 MM HG: CPT | Mod: CPTII,S$GLB,, | Performed by: PSYCHIATRY & NEUROLOGY

## 2025-06-02 PROCEDURE — 99215 OFFICE O/P EST HI 40 MIN: CPT | Mod: S$GLB,,, | Performed by: PSYCHIATRY & NEUROLOGY

## 2025-06-02 PROCEDURE — 3078F DIAST BP <80 MM HG: CPT | Mod: CPTII,S$GLB,, | Performed by: PSYCHIATRY & NEUROLOGY

## 2025-06-02 PROCEDURE — 1111F DSCHRG MED/CURRENT MED MERGE: CPT | Mod: CPTII,S$GLB,, | Performed by: PSYCHIATRY & NEUROLOGY

## 2025-06-02 PROCEDURE — 4010F ACE/ARB THERAPY RXD/TAKEN: CPT | Mod: CPTII,S$GLB,, | Performed by: PSYCHIATRY & NEUROLOGY

## 2025-06-02 PROCEDURE — 3288F FALL RISK ASSESSMENT DOCD: CPT | Mod: CPTII,S$GLB,, | Performed by: PSYCHIATRY & NEUROLOGY

## 2025-06-02 PROCEDURE — 99999 PR PBB SHADOW E&M-EST. PATIENT-LVL III: CPT | Mod: PBBFAC,,, | Performed by: PSYCHIATRY & NEUROLOGY

## 2025-06-02 PROCEDURE — 1160F RVW MEDS BY RX/DR IN RCRD: CPT | Mod: CPTII,S$GLB,, | Performed by: PSYCHIATRY & NEUROLOGY

## 2025-06-02 PROCEDURE — 1101F PT FALLS ASSESS-DOCD LE1/YR: CPT | Mod: CPTII,S$GLB,, | Performed by: PSYCHIATRY & NEUROLOGY

## 2025-06-02 PROCEDURE — 1159F MED LIST DOCD IN RCRD: CPT | Mod: CPTII,S$GLB,, | Performed by: PSYCHIATRY & NEUROLOGY

## 2025-06-02 RX ORDER — TOPIRAMATE 25 MG/1
25 TABLET, FILM COATED ORAL 2 TIMES DAILY
Qty: 60 TABLET | Refills: 11 | Status: SHIPPED | OUTPATIENT
Start: 2025-06-02 | End: 2026-06-02

## 2025-06-02 NOTE — PROGRESS NOTES
"Subjective:          Patient ID: Alyssa John is a 74 y.o. female who presents today for a routine  visit for MS.   She comes in with her     NEURO MULTIPLE SCLEROISIS SUMMARY:   Disease type currently:  Secondarily Progressive MS  Current Therapy:  None       MS HPI:  DMT: None  Has been having more frequent seizures - on Topamax 100mg BID.  Typically has one when she misses her dose   Has had multiple admissions recently for CHF, infections, and seizures;   Was able to walk in outpatient PT (with belt)  Recently received a "[employer]" Lift Chair;  It's been very helpful  Her  is taking a 12 week leave of absence from his job to care for her  In general, care is provided by her  and her sister  She has manual wheelchair at home;  Not interested in power assist; shower needs are met;   Transfers are stand / pivot with max assist from her   Just finished a course of home health PT/OT/ST   Plans to see urology, Dr. Rosalse soon. She has a sacral stimulator;  Wears depends;  Has a new sleep number bed    Medications:  Current Outpatient Medications   Medication Sig    ammonium lactate (LAC-HYDRIN) 12 % lotion Apply topically as needed for Dry Skin.    atorvastatin (LIPITOR) 40 MG tablet TAKE 1 TABLET BY MOUTH EVERY DAY    cholecalciferol, vitamin D3, 10 mcg (400 unit) Cap capsule Take 1 capsule (400 Units total) by mouth once daily.    clopidogreL (PLAVIX) 75 mg tablet Take 1 tablet (75 mg total) by mouth once daily.    cyanocobalamin (VITAMIN B-12) 1000 MCG tablet Take 1 tablet (1,000 mcg total) by mouth once daily.    DULoxetine (CYMBALTA) 20 MG capsule TAKE 2 CAPSULES BY MOUTH EVERY DAY    estradioL (ESTRACE) 0.01 % (0.1 mg/gram) vaginal cream Place 1 g vaginally 3 (three) times a week.    famotidine (PEPCID) 20 MG tablet Take 1 tablet (20 mg total) by mouth once daily.    furosemide (LASIX) 40 MG tablet Take 1 tablet (40 mg total) by mouth once daily.    losartan " (COZAAR) 25 MG tablet Take 1 tablet (25 mg total) by mouth once daily.    ondansetron (ZOFRAN-ODT) 4 MG TbDL Take 1 tablet (4 mg total) by mouth every 6 (six) hours as needed.    potassium chloride SA (K-DUR,KLOR-CON) 20 MEQ tablet Take 1 tablet (20 mEq total) by mouth 2 (two) times daily.    propranoloL (INDERAL) 20 MG tablet Take 20 mg by mouth.    topiramate (TOPAMAX) 100 MG tablet Take 1 tablet (100 mg total) by mouth 2 (two) times daily.    hyoscyamine 0.125 mg Subl Place 1 tablet (0.125 mg total) under the tongue every 6 (six) hours as needed (abdominal pain). (Patient not taking: Reported on 6/2/2025)    nitrofurantoin (MACRODANTIN) 50 MG capsule Take 1 capsule (50 mg total) by mouth 4 (four) times daily. (Patient not taking: Reported on 5/23/2025)    polyethylene glycol (GLYCOLAX) 17 gram PwPk Take 17 g by mouth daily as needed for Constipation. (Patient not taking: Reported on 6/2/2025)    topiramate (TOPAMAX) 25 MG tablet Take 1 tablet (25 mg total) by mouth 2 (two) times daily.     No current facility-administered medications for this visit.       SOCIAL HISTORY  Social History[1]    Living arrangements - the patient lives with their spouse.            5/3/2022     1:54 PM   REVIEW OF SYMPTOMS   Do you feel abnormally tired on most days? Yes    Do you feel you generally sleep well? Yes    Do you have difficulty controlling your bladder?  No    Do you have difficulty controlling your bowels?  No    Do you have frequent muscle cramps, tightness or spasms in your limbs?  No    Do you have new visual symptoms?  No    Do you have worsening difficulty with your memory or thinking? Yes    Do you have worsening symptoms of anxiety or depression?  Yes    For patients who walk, Do you have more difficulty walking?  No    Have you fallen since your last visit?  No    For patients who use wheelchairs: Do you have any skin wounds or breakdown? Yes    Do you have difficulty using your hands?  Yes    Do you have  shooting or burning pain? Yes    Do you have difficulty with sexual function?  Yes    If you are sexually active, are you using birth control? Y/N  N/A Not Applicable    Do you often choke when swallowing liquids or solid food?  No    Do you experience worsening symptoms when overheated? No    Do you need any new equipment such as a wheelchair, walker or shower chair? Yes    Do you receive co-pay financial assistance for your principal MS medicine? Yes    Would you be interested in participating in an MS research trial in the future? Yes    For patients on Gilenya, Tecfidera, Aubagio, Rituxan, Ocrevus, Tysabri, Lemtrada or Methotrexate, are you aware that you should NOT receive live virus vaccines?  No    Do you feel you have adequate family/friend support?  Yes    Do you have health insurance?   Yes    Are you currently employed? No    Do you receive SSDI/SSI?  Not Applicable    Do you use marijuana or cannabis products? No    Have you been diagnosed with a urinary tract infection since your last visit here? No    Have you been diagnosed with a respiratory tract infection since your last visit here? No    Have you been to the emergency room since your last visit here? No    Have you been hospitalized since your last visit here?  No        Proxy-reported           6/25/2021     8:09 PM   FSS SCORE & INTERPRETATION   FSS SCORE  63   FSS SCORE INTERPRETATION May be suffering from fatigue         6/25/2021     8:07 PM   MS NENITA-D SCORE & INTERPRETATION   NENITA-D SCORE  26   NENITA-D INTERPRETATION  Possibility of major Depression         6/25/2021     7:57 PM   MS RANJITH-7 SCORE & INTERPRETATION   RANJITH-7 SCORE  9   RANJITH-7 SCORE INTERPRETATION Mild Anxiety         6/25/2021     8:13 PM   PEQ MS MOS PAIN EFFECTS SCORE & INTERPRETATION   PES SCORE 20   PES SCORE INTERPRETATION Scores can range from 6-30.  Items are scaled so that higher scores indicate a greater impact of pain on a patients mood and behavior.         6/25/2021      8:26 PM   PEQ MS SEXUAL SATISFACTION SCORE & INTERPRETATION   SSS SCORE  24   SSS SCORE INTERPRETATION Scores can range from 4-24.  Higher scores indicate greater problems with sexual satisfaction.         6/25/2021     8:35 PM   MS BLADDER CONTROL SCORE & INTERPRETATION   BLCS SCORE 20   BLCS SCORE INTERPRETATION  Scores can range from 0-22, with higher scores indicating greater bladder control problems.         6/25/2021     8:50 PM   MS BOWEL CONTROL SCORE & INTERPRETATION   BWCS SCORE 16   BWCS SCORE INTERPRETATION Scores can range from 0-26, with higher scores indicating greater bowel control problems.         6/25/2021     8:37 PM   PEQ MS IMPACT OF VISUAL IMPAIRMENT SCORE & INTERPRETATION   YURY SCALE SCORE  0   YURY SCORE INTERPRETATION Scores can range from 0-15, with higher scores indicating greater impact of visual problems on daily activites.         6/25/2021     8:46 PM   MS PDQ SCORE & INTERPRETATION   PDQ RETROSPECTIVE MEMORY SUBSCALE 9   PDQ ATTENTION/CONCENTRATION SUBSCALE 11   PDQ PROSPECTIVE MEMORY SUBSCALE 7   PDQ PLANNING/ORGANIZATION SUBSCALE 5   PDQ TOTAL SCORE 32   PDQ SCORE INTERPRETATION Scores can range from 0-80, with higher scores indicating greater perceived cognitive impairment.         6/25/2021     8:56 PM   MSSS SCORE & INTERPRETATION   MSSS TANGIBLE SUPPORT SUBSCALE 93.75   MSSS EMOTIONAL/INFORMATIONAL SUPPORT SUBSCALE 93.75   MSSS AFFECTIONATE SUPPORT SUBSCALE 100   MSSS POSITIVE SOCIAL INTERACTION SUBSCALE 100   MSSS TOTAL SCORE 96.88   MSSS SCORE INTERPRETATION Scores can range from 0-100, with higher scores indicating greater perceived support.               Objective:              4/13/2023     1:00 PM 2/6/2024     1:40 PM   Timed 25 Foot Walk:   Did patient wear an AFO? No No   Was assistive device used? Yes Yes   Assistive device used (toña one): Bilateral Assistance Bilateral Assistance   Bilateral device used Walker/Rollator Walker/Rollator   Time for 25 Foot Walk  (seconds) 9.6 11.2   Time for 25 Foot Walk (seconds) 9.4 10.6         Neurological Exam    MENTAL STATUS: bradyphrenia; difficulty with short term memory      CRANIAL NERVE EXAM:  There is no JOSE LUIS.  Extraocular muscles are intact. No facial asymmetry. Facial sensation is intact bilaterally. There is no dysarthria.      MOTOR EXAM:  LLE 3/5 in flexors;      SENSORY EXAM: moderate decrease in vibration     COORDINATION: kinetic tremor R>L     GAIT:  in wheelchair; unable  Imaging:         Results for orders placed during the hospital encounter of 04/10/21    MRI Brain Demyelinating W W/O Contrast    Impression  1. There is a stable marked burden of white matter disease consistent with the provided diagnosis of multiple sclerosis.  These findings are other extensive.  There are no regions of restricted diffusion and there is no abnormal enhancement.  There are no findings to suggest interval progression of disease or active demyelination.      Electronically signed by: Kingsley Bass MD  Date:    04/11/2021  Time:    20:22    Results for orders placed during the hospital encounter of 04/10/21    MRI Cervical Spine Demyelinating W W/O Contrast    Impression  1. There are multiple regions of abnormal STIR and T2 hyperintense signal in the cord which, considering slight difference in technique, slice selection and volume averaging as well as mild motion are essentially stable compared to the prior study.  The findings are felt to represent stable demyelinating disease without any definite new lesions identified.  2. There is multilevel degenerative disc and facet disease discussed in detail by level above.  There is some degree of disc space narrowing, disc osteophyte complex, uncovertebral spurring and/or facet joint arthropathy at multiple levels.  There is mild spinal stenosis at several levels.  There is multilevel foraminal stenosis which for the most part are stable compared to the prior study with the exception  of some interval progression of right foraminal stenosis at the C4-5 level.  3. There is no abnormal enhancement within the vertebral bodies, discs, cord or epidural space.      Electronically signed by: Kingsley Bass MD  Date:    04/11/2021  Time:    21:09    No results found for this or any previous visit.        Labs:     Lab Results   Component Value Date    PSEQASTB38DM 35 08/26/2020    PUARIJBW02YW 97 (H) 08/20/2019    MILCUXVS90XE 68 07/19/2018     Lab Results   Component Value Date    JCVINDEX SEE COMMENT (A) 12/09/2015    JCVANTIBODY SEE COMMENT 12/09/2015     @resufast(NEUROLGTCHN:3)   Lab Results   Component Value Date    QG4CLLOA 73.7 03/30/2021    ABSOLUTECD3 1099 03/30/2021    TB1BJWVR 23.7 03/30/2021    ABSOLUTECD8 353 03/30/2021    BZ5ROHUW 48.9 03/30/2021    ABSOLUTECD4 729 03/30/2021    LABCD48 2.06 03/30/2021     Lab Results   Component Value Date    WBC 8.53 06/01/2025    RBC 4.56 06/01/2025    HGB 12.0 06/01/2025    HCT 39.6 06/01/2025    MCV 87 06/01/2025    MCH 26.3 (L) 06/01/2025    MCHC 30.3 (L) 06/01/2025    RDW 14.6 (H) 06/01/2025     06/01/2025    MPV 10.2 06/01/2025    GRAN 4.6 02/19/2025    GRAN 61.3 02/19/2025    LYMPH 27.8 05/23/2025    LYMPH 2.81 05/23/2025    MONO 7.7 05/23/2025    MONO 0.78 05/23/2025    EOS 2.8 05/23/2025    EOS 0.28 05/23/2025    BASO 0.03 02/19/2025    EOSINOPHIL 3.2 02/19/2025    BASOPHIL 0.9 05/23/2025    BASOPHIL 0.09 05/23/2025     Sodium   Date Value Ref Range Status   06/01/2025 138 136 - 145 mmol/L Final     Potassium   Date Value Ref Range Status   06/01/2025 4.3 3.5 - 5.1 mmol/L Final     Chloride   Date Value Ref Range Status   06/01/2025 110 95 - 110 mmol/L Final     CO2   Date Value Ref Range Status   06/01/2025 23 23 - 29 mmol/L Final     Carbon Monoxide, Blood   Date Value Ref Range Status   10/19/2016 2 % Final     Comment:     -------------------REFERENCE VALUE--------------------------  0-2 Normal (Non-smoker) , < or = 9 Normal  (Smoker), > or   = 20 (Toxic concentration)  Test Performed by:  [facility] - [address] [address]  : Adrien Norton II, M.D., Ph.D.       Glucose   Date Value Ref Range Status   06/01/2025 94 70 - 110 mg/dL Final     BUN   Date Value Ref Range Status   06/01/2025 14 8 - 23 mg/dL Final   05/23/2025 20 8 - 23 mg/dL Final     Creatinine   Date Value Ref Range Status   06/01/2025 0.9 0.5 - 1.4 mg/dL Final   05/23/2025 0.8 0.5 - 1.4 mg/dL Final   02/22/2013 0.8 0.5 - 1.4 mg/dL Final     Calcium   Date Value Ref Range Status   06/01/2025 8.6 (L) 8.7 - 10.5 mg/dL Final   02/22/2013 8.7 8.7 - 10.5 mg/dL Final     Protein Total   Date Value Ref Range Status   06/01/2025 7.1 6.0 - 8.4 gm/dL Final     Albumin   Date Value Ref Range Status   06/01/2025 3.3 (L) 3.5 - 5.2 g/dL Final     Bilirubin Total   Date Value Ref Range Status   06/01/2025 0.4 0.1 - 1.0 mg/dL Final     Comment:     For infants and newborns, interpretation of results should be based   on gestational age, weight and in agreement with clinical   observations.    Premature Infant recommended reference ranges:   0-24 hours:  <8.0 mg/dL   24-48 hours: <12.0 mg/dL   3-5 days:    <15.0 mg/dL   6-29 days:   <15.0 mg/dL     Alkaline Phosphatase   Date Value Ref Range Status   02/22/2013 107 55 - 135 U/L Final     ALP   Date Value Ref Range Status   06/01/2025 153 (H) 55 - 135 unit/L Final     AST   Date Value Ref Range Status   06/01/2025 11 10 - 40 unit/L Final   02/22/2013 18 10 - 40 U/L Final     ALT   Date Value Ref Range Status   06/01/2025 10 10 - 44 unit/L Final     Anion Gap   Date Value Ref Range Status   06/01/2025 5 (L) 8 - 16 mmol/L Final   05/23/2025 12 8 - 16 mmol/L Final   02/22/2013 11 5 - 15 meq/L Final     eGFR if    Date Value Ref Range Status   11/17/2021 >60.0 >60 mL/min/1.73 m^2 Final     eGFR if non    Date Value Ref Range Status    11/17/2021 >60.0 >60 mL/min/1.73 m^2 Final     Comment:     Calculation used to obtain the estimated glomerular filtration  rate (eGFR) is the CKD-EPI equation.        Lab Results   Component Value Date    HEPBSAG Non-Reactive 04/16/2025           MS Impression and Plan:     NEURO MULTIPLE SCLEROSIS IMPRESSION:   Clinical Progression:  Worsened  Type:  Progressive  MS Classification:  Secondarily Progressive MS  Current DMT: none  DMT:  No change in management  Symptom Management:  Implement change in symptom management  Implement Change in Symptom Management:  Cognitive  Implement Change in Symptom Management comment: Will f/u on referral for NP testing which I think she needs to have soon.        Additional Impressions:   Will increase Topamax to 125mg BID;   Refer to epilepsy neurology on [facility] as I don't manage AEDs chronically     F/u Saira Daley cNS         Problem List Items Addressed This Visit          Unprioritized    Impaired mobility and ADLs (Chronic)    Gait disturbance    Seizure disorder     Other Visit Diagnoses         Nonintractable epilepsy without status epilepticus, unspecified epilepsy type    -  Primary    Relevant Medications    topiramate (TOPAMAX) 25 MG tablet      MS (multiple sclerosis)          Counseling regarding goals of care          Neurogenic bladder                Genny Apodaca MD    I spent a total of 45 minutes on the day of the visit.This includes face to face time and non-face to face time preparing to see the patient (eg, review of tests), obtaining and/or reviewing separately obtained history, documenting clinical information in the electronic or other health record, independently interpreting results and communicating results to the patient/family/caregiver, or care coordinator.  Visit today included increased complexity associated with the care of the episodic problem : chronic immunotherapy; addressed and managing the longitudinal care of the patient due to  the serious and/or complex managed problem(s) MS.         [1]   Social History  Tobacco Use    Smoking status: Former     Current packs/day: 0.00     Average packs/day: 2.0 packs/day for 42.0 years (84.0 ttl pk-yrs)     Types: Cigarettes     Start date: 1968     Quit date: 2010     Years since quitting: 15.4     Passive exposure: Past    Smokeless tobacco: Never   Substance Use Topics    Alcohol use: Not Currently     Comment: rarely    Drug use: No

## 2025-06-02 NOTE — Clinical Note
Virgilio Stout  We send NP referral back in December for the patient. Seeing her now, and her  states he has not heard from y'all.  Just checking in about this. They prefer to do it in [place].  Thanks

## 2025-06-03 ENCOUNTER — OFFICE VISIT (OUTPATIENT)
Dept: CARDIOLOGY | Facility: CLINIC | Age: 75
End: 2025-06-03
Payer: MEDICARE

## 2025-06-03 VITALS
SYSTOLIC BLOOD PRESSURE: 120 MMHG | HEIGHT: 63 IN | DIASTOLIC BLOOD PRESSURE: 70 MMHG | HEART RATE: 63 BPM | BODY MASS INDEX: 32.39 KG/M2 | OXYGEN SATURATION: 97 %

## 2025-06-03 DIAGNOSIS — R41.82 ALTERED MENTAL STATUS, UNSPECIFIED ALTERED MENTAL STATUS TYPE: ICD-10-CM

## 2025-06-03 DIAGNOSIS — R60.9 EDEMA, UNSPECIFIED TYPE: Primary | ICD-10-CM

## 2025-06-03 DIAGNOSIS — G35 MULTIPLE SCLEROSIS: ICD-10-CM

## 2025-06-03 DIAGNOSIS — D50.9 HYPOCHROMIC MICROCYTIC ANEMIA: ICD-10-CM

## 2025-06-03 LAB
OHS QRS DURATION: 64 MS
OHS QTC CALCULATION: 389 MS

## 2025-06-03 PROCEDURE — 3078F DIAST BP <80 MM HG: CPT | Mod: CPTII,S$GLB,, | Performed by: INTERNAL MEDICINE

## 2025-06-03 PROCEDURE — 1159F MED LIST DOCD IN RCRD: CPT | Mod: CPTII,S$GLB,, | Performed by: INTERNAL MEDICINE

## 2025-06-03 PROCEDURE — 1126F AMNT PAIN NOTED NONE PRSNT: CPT | Mod: CPTII,S$GLB,, | Performed by: INTERNAL MEDICINE

## 2025-06-03 PROCEDURE — 4010F ACE/ARB THERAPY RXD/TAKEN: CPT | Mod: CPTII,S$GLB,, | Performed by: INTERNAL MEDICINE

## 2025-06-03 PROCEDURE — 99999 PR PBB SHADOW E&M-EST. PATIENT-LVL IV: CPT | Mod: PBBFAC,,, | Performed by: INTERNAL MEDICINE

## 2025-06-03 PROCEDURE — 1111F DSCHRG MED/CURRENT MED MERGE: CPT | Mod: CPTII,S$GLB,, | Performed by: INTERNAL MEDICINE

## 2025-06-03 PROCEDURE — 3288F FALL RISK ASSESSMENT DOCD: CPT | Mod: CPTII,S$GLB,, | Performed by: INTERNAL MEDICINE

## 2025-06-03 PROCEDURE — 1160F RVW MEDS BY RX/DR IN RCRD: CPT | Mod: CPTII,S$GLB,, | Performed by: INTERNAL MEDICINE

## 2025-06-03 PROCEDURE — 1101F PT FALLS ASSESS-DOCD LE1/YR: CPT | Mod: CPTII,S$GLB,, | Performed by: INTERNAL MEDICINE

## 2025-06-03 PROCEDURE — 3074F SYST BP LT 130 MM HG: CPT | Mod: CPTII,S$GLB,, | Performed by: INTERNAL MEDICINE

## 2025-06-03 PROCEDURE — 3008F BODY MASS INDEX DOCD: CPT | Mod: CPTII,S$GLB,, | Performed by: INTERNAL MEDICINE

## 2025-06-03 PROCEDURE — 99214 OFFICE O/P EST MOD 30 MIN: CPT | Mod: S$GLB,,, | Performed by: INTERNAL MEDICINE

## 2025-06-06 DIAGNOSIS — F32.A DEPRESSION, UNSPECIFIED DEPRESSION TYPE: ICD-10-CM

## 2025-06-06 DIAGNOSIS — M79.2 NEUROPATHIC PAIN: ICD-10-CM

## 2025-06-07 LAB
OHS QRS DURATION: 64 MS
OHS QTC CALCULATION: 472 MS

## 2025-06-09 ENCOUNTER — DOCUMENT SCAN (OUTPATIENT)
Dept: HOME HEALTH SERVICES | Facility: HOSPITAL | Age: 75
End: 2025-06-09
Payer: MEDICARE

## 2025-06-09 ENCOUNTER — OFFICE VISIT (OUTPATIENT)
Dept: PULMONOLOGY | Facility: CLINIC | Age: 75
End: 2025-06-09
Payer: MEDICARE

## 2025-06-09 VITALS
WEIGHT: 176 LBS | HEIGHT: 63 IN | OXYGEN SATURATION: 96 % | DIASTOLIC BLOOD PRESSURE: 70 MMHG | SYSTOLIC BLOOD PRESSURE: 120 MMHG | BODY MASS INDEX: 31.18 KG/M2 | HEART RATE: 71 BPM

## 2025-06-09 DIAGNOSIS — J90 PLEURAL EFFUSION: Primary | ICD-10-CM

## 2025-06-09 PROCEDURE — 3078F DIAST BP <80 MM HG: CPT | Mod: CPTII,S$GLB,, | Performed by: INTERNAL MEDICINE

## 2025-06-09 PROCEDURE — 3074F SYST BP LT 130 MM HG: CPT | Mod: CPTII,S$GLB,, | Performed by: INTERNAL MEDICINE

## 2025-06-09 PROCEDURE — 1111F DSCHRG MED/CURRENT MED MERGE: CPT | Mod: CPTII,S$GLB,, | Performed by: INTERNAL MEDICINE

## 2025-06-09 PROCEDURE — 99214 OFFICE O/P EST MOD 30 MIN: CPT | Mod: S$GLB,,, | Performed by: INTERNAL MEDICINE

## 2025-06-09 PROCEDURE — 4010F ACE/ARB THERAPY RXD/TAKEN: CPT | Mod: CPTII,S$GLB,, | Performed by: INTERNAL MEDICINE

## 2025-06-09 PROCEDURE — 3008F BODY MASS INDEX DOCD: CPT | Mod: CPTII,S$GLB,, | Performed by: INTERNAL MEDICINE

## 2025-06-09 PROCEDURE — 1126F AMNT PAIN NOTED NONE PRSNT: CPT | Mod: CPTII,S$GLB,, | Performed by: INTERNAL MEDICINE

## 2025-06-09 PROCEDURE — 99999 PR PBB SHADOW E&M-EST. PATIENT-LVL IV: CPT | Mod: PBBFAC,,, | Performed by: INTERNAL MEDICINE

## 2025-06-09 PROCEDURE — 1159F MED LIST DOCD IN RCRD: CPT | Mod: CPTII,S$GLB,, | Performed by: INTERNAL MEDICINE

## 2025-06-09 RX ORDER — DULOXETIN HYDROCHLORIDE 20 MG/1
40 CAPSULE, DELAYED RELEASE ORAL
Qty: 180 CAPSULE | Refills: 1 | Status: SHIPPED | OUTPATIENT
Start: 2025-06-09

## 2025-06-09 NOTE — PROGRESS NOTES
SUBJECTIVE:    Patient ID: Alyssa John is a 74 y.o. female.    Chief Complaint: Follow-up    Follow-up     The patient was in the ER for multiple visits.  She was found to have a UTI and left lower lobe atelectasis/pneumonia a week after a seizure.  She has been started on Lasix and this is help both her pedal edema and her pleural effusion.  Her last chest x-ray was on the .  It showed an improving pleural effusion and left lower lobe process.  The patient continues to expectorate mucus.  She is distraught that she has emphysema.  Her PFTs only showed a diffusion defect.  Discussed less sodium in her diet.  She does not like this concept.  Discussed oral hygiene so that if she is indeed having seizures she will have less pathogens to aspirate.  Past Medical History:   Diagnosis Date    Arthritis     Coronary artery disease     Encounter for blood transfusion     Epilepsy     GERD (gastroesophageal reflux disease)     Headache     Hypertension     MI, old     MS (multiple sclerosis)     Seizures     epilepsy     Past Surgical History:   Procedure Laterality Date    APPENDECTOMY  1970    BACK SURGERY      L5 discectomy    BREAST BIOPSY Right 15 yrs ago    benign    CATARACT EXTRACTION Bilateral      SECTION  1986    COLONOSCOPY N/A 2015    Procedure: COLONOSCOPY;  Surgeon: Henry Malcolm MD;  Location: [facility];  Service: Endoscopy;  Laterality: N/A;    CORONARY STENT PLACEMENT      EYE SURGERY      IMPLANTATION OF PERMANENT SACRAL NERVE STIMULATOR N/A 10/11/2024    Procedure: INSERTION, NEUROSTIMULATOR, PERMANENT, SACRAL;  Surgeon: Abel Rosales MD;  Location: [facility];  Service: Urology;  Laterality: N/A;  Mack RUIZ    INSERTION, NEUROSTIMULATOR, TEMPORARY, SACRAL N/A 2024    Procedure: INSERTION, NEUROSTIMULATOR, TEMPORARY, SACRAL;  Surgeon: Abel Rosales MD;  Location: [facility] OR;  Service: Urology;  Laterality: N/A;  1 hour 45 minutes Mack RUIZ     right knee arthroscopy      SPINE SURGERY  1986    TUBAL LIGATION  1986     Family History   Problem Relation Name Age of Onset    Scleroderma Mother      Heart disease Father Adrien Vega         MI, CAD    Cancer Neg Hx      Diabetes Neg Hx      Stroke Neg Hx      Melanoma Neg Hx      Psoriasis Neg Hx      Lupus Neg Hx      Eczema Neg Hx          Social History:   Marital Status:   Occupation:  before retiring  Alcohol History:  reports that she does not currently use alcohol.  Tobacco History:  reports that she quit smoking about 15 years ago. Her smoking use included cigarettes. She started smoking about 57 years ago. She has a 84 pack-year smoking history. She has been exposed to tobacco smoke. She has never used smokeless tobacco. 80 pk yr smoking history  Drug History:  reports no history of drug use.    Review of patient's allergies indicates:   Allergen Reactions    Ciprofloxacin Other (See Comments)     Seizure     Codeine      Other reaction(s): throat tightness    Dilantin [phenytoin sodium extended]     Phenobarbital     Tegretol [carbamazepine]        Current Outpatient Medications   Medication Sig Dispense Refill    ammonium lactate (LAC-HYDRIN) 12 % lotion Apply topically as needed for Dry Skin. 225 g 0    atorvastatin (LIPITOR) 40 MG tablet TAKE 1 TABLET BY MOUTH EVERY DAY 90 tablet 0    cholecalciferol, vitamin D3, 10 mcg (400 unit) Cap capsule Take 1 capsule (400 Units total) by mouth once daily. 90 capsule 1    clopidogreL (PLAVIX) 75 mg tablet Take 1 tablet (75 mg total) by mouth once daily. 90 tablet 0    cyanocobalamin (VITAMIN B-12) 1000 MCG tablet Take 1 tablet (1,000 mcg total) by mouth once daily. 90 tablet 3    DULoxetine (CYMBALTA) 20 MG capsule TAKE 2 CAPSULES BY MOUTH EVERY  capsule 1    estradioL (ESTRACE) 0.01 % (0.1 mg/gram) vaginal cream Place 1 g vaginally 3 (three) times a week. 42.5 g 3    famotidine (PEPCID) 20 MG tablet Take 1 tablet (20 mg total) by  mouth once daily. 90 tablet 3    furosemide (LASIX) 40 MG tablet Take 1 tablet (40 mg total) by mouth once daily. 90 tablet 0    losartan (COZAAR) 25 MG tablet Take 1 tablet (25 mg total) by mouth once daily. 90 tablet 3    ondansetron (ZOFRAN-ODT) 4 MG TbDL Take 1 tablet (4 mg total) by mouth every 6 (six) hours as needed. 20 tablet 0    potassium chloride SA (K-DUR,KLOR-CON) 20 MEQ tablet Take 1 tablet (20 mEq total) by mouth 2 (two) times daily. 270 tablet 1    propranoloL (INDERAL) 20 MG tablet Take 20 mg by mouth.      topiramate (TOPAMAX) 100 MG tablet Take 1 tablet (100 mg total) by mouth 2 (two) times daily. 180 tablet 3    topiramate (TOPAMAX) 25 MG tablet Take 1 tablet (25 mg total) by mouth 2 (two) times daily. 60 tablet 11    hyoscyamine 0.125 mg Subl Place 1 tablet (0.125 mg total) under the tongue every 6 (six) hours as needed (abdominal pain). (Patient not taking: Reported on 6/2/2025) 30 tablet 1    nitrofurantoin (MACRODANTIN) 50 MG capsule Take 1 capsule (50 mg total) by mouth 4 (four) times daily. (Patient not taking: Reported on 5/23/2025) 360 capsule 1    polyethylene glycol (GLYCOLAX) 17 gram PwPk Take 17 g by mouth daily as needed for Constipation. (Patient not taking: Reported on 6/2/2025) 30 each 0     No current facility-administered medications for this visit.       Alpha-1 Antitrypsin:  Last PFT:   Last CT:    Review of Systems  General: Feeling good  Eyes: Vision is good. She wears reading glasses   ENT:  No sinusitis or pharyngitis.   Heart:: No chest pain or palpitations.  Lungs: coughs up phlegm daily but less so now  GI: No Nausea, vomiting, constipation, diarrhea, or reflux.  : Incontinent  Musculoskeletal: knee hurts sometimes, fingers hurt, Dupuchenne's  Skin: venous insufficiency  Neuro: headaches  Lymph: edema to legs is better  Psych: anxiety  Endo: weight is down with Lasix    OBJECTIVE:      /70 (BP Location: Left arm, Patient Position: Sitting)   Pulse 71   Ht 5'  "3" (1.6 m)   Wt 79.8 kg (176 lb)   SpO2 96%   BMI 31.18 kg/m²     Physical Exam  GENERAL: Older obese patient in no distress, sitting in a wheelchair  HEENT: Pupils equal and reactive. Extraocular movements intact. Nose intact. Pharynx moist.  NECK: Supple.   HEART: Regular rate and rhythm. No murmur or gallop auscultated.  LUNGS: Clear to auscultation and percussion. Lung excursion symmetrical. No change in fremitus. No adventitial noises.  ABDOMEN:  Obese Bowel sounds present. Non-tender, no masses palpated.  EXTREMITIES: Normal muscle tone and joint movement, no cyanosis or clubbing.  There are swan-neck deformities to the hands bilaterally  LYMPHATICS: No adenopathy palpated, 2+ edema.  SKIN:  Very dry, intact.  NEURO: Cranial nerves II-XII intact. Motor strength not formally tested but the  states he has to stand her up and pivot her to a wheelchair or to the bathroom.  PSYCH: Appropriate affect.    Assessment:       1. Pleural effusion          The patient has lost 15 lb with the Lasix and does hopeful that her left pleural effusion and left lower lobe atelectasis is continuing to respond favorably.  Discussed a low-sodium diet and the use of potassium chloride for salt substitute using less because it is salt here.  The less fluid in her lungs the less she is likely to expectorate all day.  Discussed her stable emphysema which she finds distressing but it is stable and she no longer smokes and her PFTs only show a diffusion defect.  The pulmonary nodules she has are small and are unchanged.  Will repeat her CT in a year.  Will get another chest x-ray in the next 2 weeks which will be a month on the Lasix and see if her left lower lobe has not cleared.  Her multiple sclerosis and epilepsy are closely followed.  Cardiology is following her diastolic heart failure.  Plan:       Pleural effusion  -     X-Ray Chest PA And Lateral; Future; Expected date: 06/23/2025         Call patient with CXR with " results  Low sodium diet  Follow up in about 6 months (around 12/9/2025).

## 2025-06-11 ENCOUNTER — OFFICE VISIT (OUTPATIENT)
Dept: FAMILY MEDICINE | Facility: CLINIC | Age: 75
End: 2025-06-11
Payer: MEDICARE

## 2025-06-11 VITALS
HEART RATE: 71 BPM | OXYGEN SATURATION: 97 % | BODY MASS INDEX: 30.86 KG/M2 | RESPIRATION RATE: 16 BRPM | WEIGHT: 174.19 LBS | TEMPERATURE: 98 F | SYSTOLIC BLOOD PRESSURE: 110 MMHG | DIASTOLIC BLOOD PRESSURE: 54 MMHG | HEIGHT: 63 IN

## 2025-06-11 DIAGNOSIS — G40.909 SEIZURE DISORDER: ICD-10-CM

## 2025-06-11 DIAGNOSIS — G43.719 INTRACTABLE CHRONIC MIGRAINE WITHOUT AURA AND WITHOUT STATUS MIGRAINOSUS: ICD-10-CM

## 2025-06-11 DIAGNOSIS — E78.2 MIXED HYPERLIPIDEMIA: ICD-10-CM

## 2025-06-11 DIAGNOSIS — J90 PLEURAL EFFUSION: ICD-10-CM

## 2025-06-11 DIAGNOSIS — I10 PRIMARY HYPERTENSION: ICD-10-CM

## 2025-06-11 DIAGNOSIS — Z00.00 HEALTHCARE MAINTENANCE: Primary | ICD-10-CM

## 2025-06-11 DIAGNOSIS — R53.83 FATIGUE, UNSPECIFIED TYPE: ICD-10-CM

## 2025-06-11 DIAGNOSIS — G35 MULTIPLE SCLEROSIS: ICD-10-CM

## 2025-06-11 DIAGNOSIS — R60.9 EDEMA, UNSPECIFIED TYPE: ICD-10-CM

## 2025-06-11 PROCEDURE — 99999 PR PBB SHADOW E&M-EST. PATIENT-LVL IV: CPT | Mod: PBBFAC,,, | Performed by: STUDENT IN AN ORGANIZED HEALTH CARE EDUCATION/TRAINING PROGRAM

## 2025-06-11 NOTE — PROGRESS NOTES
[facility] Note    Subjective:    The HPI and pertinent ROS is included in the Diagnostic Impression Remarks section at the end of the note. Please see below for further details. Chief complaint is at end of note.     Alyssa is a pleasant intelligent patient who is here for evaluation.     Modified Medications    No medications on file       Data reviewed 274}  Previous medical records reviewed and summarized in plan section at end of note.      If you are due for any health screening(s) below please notify me so we can arrange them to be ordered and scheduled. Most healthy patients at your age complete them, but you are free to accept or refuse. If you can't do it, I'll definitely understand. If you can, I'd certainly appreciate it!     All of your core healthy metrics are met.      The following portions of the patient's history were reviewed and updated as appropriate: allergies, current medications, past family history, past medical history, past social history, past surgical history and problem list.    She  has a past medical history of Arthritis, Coronary artery disease, Encounter for blood transfusion, Epilepsy, GERD (gastroesophageal reflux disease), Headache, Hypertension, MI, old, MS (multiple sclerosis), and Seizures.  She  has a past surgical history that includes Appendectomy (); Back surgery; Coronary stent placement; Colonoscopy (N/A, 2015); right knee arthroscopy; Cataract extraction (Bilateral); Breast biopsy (Right, 15 yrs ago); insertion, neurostimulator, temporary, sacral (N/A, 2024); Implantation of permanent sacral nerve stimulator (N/A, 10/11/2024); Tubal ligation (); Eye surgery (); Spine surgery (); and  section (1986).    She  reports that she quit smoking about 15 years ago. Her smoking use included cigarettes. She started smoking about 57 years ago. She has a 84 pack-year smoking history. She has been exposed to tobacco smoke.  "She has never used smokeless tobacco. She reports that she does not currently use alcohol. She reports that she does not use drugs.  She family history includes Heart disease in her father; Scleroderma in her mother.    Review of patient's allergies indicates:   Allergen Reactions    Ciprofloxacin Other (See Comments)     Seizure     Codeine      Other reaction(s): throat tightness    Dilantin [phenytoin sodium extended]     Phenobarbital     Tegretol [carbamazepine]        Tobacco Use: Medium Risk (6/11/2025)    Patient History     Smoking Tobacco Use: Former     Smokeless Tobacco Use: Never     Passive Exposure: Past     Physical Examination  Physical Exam    General: No acute distress. Well-developed. Well-nourished.  Eyes: EOMI. Sclerae anicteric.  HENT: Normocephalic. Atraumatic. Nares patent. Moist oral mucosa.  Cardiovascular: Regular rate. Regular rhythm. No murmurs. No rubs. No gallops. Normal S1, S2.  Respiratory: Normal respiratory effort. Clear to auscultation bilaterally. No rales. No rhonchi. No wheezing.  Musculoskeletal: No  obvious deformity.  Extremities: No lower extremity edema.  Neurological: Alert & oriented x3. No slurred speech. Normal gait.  Psychiatric: Normal mood. Normal affect. Good insight. Good judgment.  Skin: Warm. Dry. No rash.              BP Readings from Last 3 Encounters:   06/11/25 (!) 110/54   06/09/25 120/70   06/03/25 120/70     Wt Readings from Last 3 Encounters:   06/11/25 79 kg (174 lb 2.6 oz)   06/09/25 79.8 kg (176 lb)   06/02/25 83 kg (182 lb 14 oz)     BP (!) 110/54 (BP Location: Right arm, Patient Position: Sitting)   Pulse 71   Temp 97.8 °F (36.6 °C) (Oral)   Resp 16   Ht 5' 3" (1.6 m)   Wt 79 kg (174 lb 2.6 oz)   SpO2 97%   BMI 30.85 kg/m²    274}  Laboratory: I have reviewed old labs below:    274}    Lab Results   Component Value Date    WBC 8.53 06/01/2025    HGB 12.0 06/01/2025    HCT 39.6 06/01/2025    MCV 87 06/01/2025     06/01/2025     " 06/01/2025    K 4.3 06/01/2025     06/01/2025    CALCIUM 8.6 (L) 06/01/2025    PHOS 3.5 05/28/2025    CO2 23 06/01/2025    GLUCOSE 112 (H) 02/22/2013    BUN 14 06/01/2025    CREATININE 0.9 06/01/2025    EGFRNORACEVR >60 06/01/2025    LABPROT 10.4 09/17/2024    ALBUMIN 3.3 (L) 06/01/2025    BILITOT 0.4 06/01/2025    ALKPHOS 153 (H) 06/01/2025    ALT 10 06/01/2025    AST 11 06/01/2025    INR 0.9 09/17/2024    CHOL 163 04/16/2025    TRIG 132 04/16/2025    HDL 46 04/16/2025    LDLCALC 90.6 04/16/2025    TSH 2.224 08/12/2024    HGBA1C 6.0 09/18/2024      Lab reviewed by me: Particular labs of significance that I will monitor, workup, or treat to improve are mentioned below in diagnostic impression remarks.      Imaging/EKG: I have reviewed the pertinent results and my findings are noted in remarks.  274}    CC:   Chief Complaint   Patient presents with    Follow-up        274}    History of Present Illness    CHIEF COMPLAINT:  Patient presents today for follow up.    CURRENT SYMPTOMS:  She reports improvement in energy levels, though continues to experience some fatigue. Her edema symptoms have improved with Lasix.    RESPIRATORY:  She has a known pleural effusion but is currently asymptomatic, denying productive cough, shortness of breath, and chest pain. She is following up with pulmonology and has a scheduled chest XR.    REVIEW OF SYSTEMS:  She denies recent seizures, dysuria, or pain with urination.      ROS:  Constitutional: -fevers, +fatigue, +increased energy levels  Respiratory: -shortness of breath  Cardiovascular: -chest pain  Genitourinary: -dysuria  Neurological: -seizures          Assessment/Plan  Alyssa John is a 74 y.o. female who presents to clinic with:  1. Healthcare maintenance    2. Multiple sclerosis    3. Seizure disorder    4. Primary hypertension    5. Intractable chronic migraine without aura and without status migrainosus    6. Mixed hyperlipidemia    7. Pleural effusion    8.  Fatigue, unspecified type    9. Edema, unspecified type       274}    Assessment & Plan    G35 Multiple sclerosis  G40.909 Seizure disorder  Z00.00 Healthcare maintenance  I10 Primary hypertension  G43.719 Intractable chronic migraine without aura and without status migrainosus  E78.2 Mixed hyperlipidemia  J90 Pleural effusion  R53.83 Fatigue, unspecified type  R60.9 Edema, unspecified type    PLAN SUMMARY:  - Continue Lasix for edema management  - Ordered repeat imaging  - Patient to maintain home BP log  - Continue potassium supplementation  - Added magnesium supplementation    MULTIPLE SCLEROSIS:  - Monitored creatinine levels for multiple conditions including MS, depression, blood control, and seizure disorder.    SEIZURE DISORDER:  - Continued monitoring as part of overall creatinine level assessment.    PRIMARY HYPERTENSION:  - Assessed BP, which was low during visit.  - Patient instructed to check BP at home and maintain a log.    PLEURAL EFFUSION:  - Currently asymptomatic.  - Ordered repeat imaging for monitoring.    EDEMA, UNSPECIFIED TYPE:  - Continued Lasix for edema management, which is effective in reducing symptoms.  - Maintained potassium supplementation and added magnesium supplementation.             This note was generated with the assistance of ambient listening technology. Verbal consent was obtained by the patient and accompanying visitor(s) for the recording of patient appointment to facilitate this note. I attest to having reviewed and edited the generated note for accuracy, though some syntax or spelling errors may persist. Please contact the author of this note for any clarification.       1. Healthcare maintenance    2. Multiple sclerosis    3. Seizure disorder    4. Primary hypertension    5. Intractable chronic migraine without aura and without status migrainosus    6. Mixed hyperlipidemia    7. Pleural effusion    8. Fatigue, unspecified type    9. Edema, unspecified type      Piyush  Kole RODRÍGUEZ   274}    If you are due for any health screening(s) below please notify me so we can arrange them to be ordered and scheduled. Most healthy patients at your age complete them, but you are free to accept or refuse.     If you can't do it, I'll definitely understand. If you can, I'd certainly appreciate it!   All of your core healthy metrics are met.

## 2025-06-16 NOTE — PROGRESS NOTES
NEUROPSYCHOLOGY CONSULT    Name Alyssa John   MRN [id number]    1950   Age 74 y.o.   Gender female   Ref Provider  Genny Apodaca MD  [address] [place] [address]   CC/Med. Necessity Cognitive concerns   Visit Type  Virtual visit with synchronous audio and video   Time Spent 55-min   Telemedicine Each patient to whom he or she provides medical services by telemedicine is:  (1) informed of the relationship between the physician and patient and the respective role of any other health care provider with respect to management of the patient; and (2) notified that he or she may decline to receive medical services by telemedicine and may withdraw from such care at any time   Consent The patient expressed an understanding of the purpose of the evaluation and consented to all procedures.       SUMMARY/TREATMENT PLAN   Results from the interview indicate the following diagnoses and treatment plan recommendations. This was discussed and the patient needs help following a treatment plan   independently.    Diagnoses/Plan:  1. Major neurocognitive disorder  Assessment & Plan:  Assessment:  -Onset/Course: Around 3-years ago, gradual onset and mild worsening of processing speed and word finding. Sxs have been mild but noticeable. Around 12-months ago, sharper  cognitive worsening from around 2024 through May 2025. Sxs included: confusion, short-term memory trouble, and less drive. Medically, she has had various UTIs and hospitalizations (review notes) with most recent in May 2025 (UTI, Acute hypoxic respiratory failure with AMS (somnolent and confused with hypotension)    In the past 2-weeks, sxs have significantly improved but nowhere near baseline. She continues to have significantly slowed mental speed, word finding trouble, short-term memory trouble, and executive dysfunction (motivation/drive, defers to others for decisions). Recently, started reading a book for the first time in quite  a while.      Day to day sxs are better from 11am-2pm but worse in evening and slow to get going in the early morning.   Plan:  -Neuropsych testing to assess cognitive status, differential diagnosis and treatment plan        2. Multiple sclerosis         HPI AND CURRENT SYMPTOMS   Ms. John is a 74 y.o. White female with a history of secondary progressive MS, seizure disorder, chronic migraine. IN the past year, she's had serial infections/UTIs causing debility. Then, further health issues (over spring 2025) with various admissions most recently May 2025. She was hospitalized for Acute hypoxic respiratory failure with AMS (somnolent and confused with hypotension)  Was being treated for UTI, bc pt reporting cramping pain in abdoman  gave her his left over Norco  hypotensive with SBP 80s to 90s and oxygen saturation was 88%     Active problems noted below.     Cognitive Symptoms   Onset/Course: Around 3-years ago, gradual onset and mild worsening of processing speed and word finding. Sxs have been mild but noticeable. Around 12-months ago, sharper  cognitive worsening from around Fall 2024 through May 2025. Sxs included: confusion, short-term memory trouble, and less drive. Medically, she has had various UTIs and hospitalizations (review notes) with most recent in May 2025 (UTI, Acute hypoxic respiratory failure with AMS (somnolent and confused with hypotension)    In the past 2-weeks, sxs have significantly improved but nowhere near baseline. She continues to have significantly slowed mental speed, word finding trouble, short-term memory trouble, and executive dysfunction (motivation/drive, defers to others for decisions). Recently, started reading a book for the first time in quite a while.      Day to day sxs are better from 11am-2pm but worse in evening and slow to get going in the early morning.      Current Functional Status/Needs   ADLs:   Self-Care Eating Dressing/Mobility Safety   Bathing:  Dependent  Bathroom: Needs significant assistance  Other: Needs some help with fine motor Needs help completely; largely in wheel chair    Needs help     Instrumental IADLs:    Driving  Medications/Health Household  Finances    Not driving   manages but they have disrupted sleep schedules (sleeping late)  As a result, her meds may be delayed quite a bit.   handles   handles now but she used to handle   He took over around 2-years ago     Current Psychiatric and Behavioral Symptoms   Mood:    Depression/Dysphoria Anxiety/Fearfulness Irritability   None aside from normal ebbs/flows given health issues None None     Behavior:   Agitation/Resistance Delusions/Paranoia Hallucinations   None None None   Apathy/Motivation Repetitive/Restlessness Other   Yes None None     Neurovegetative:   Sleep/Nighttime  Appetite Energy    Irregular sleep schedule for years. They often go to bed late and wake up late.   More napping as well, which impacts her nighttime sleep.  Discussed need to regularize sleep schedule.  Varies Very low but improving      Current Physical Concerns   Motor: Sensory Other   kinetic tremor R>L   See other neuro shelly Normal hearing  NA     PERTINENT BACKGROUND INFORMATION   Social History   Family Status  for 42-years   2-adult daughters; 1 daughter has mild ID and CP    Current Living Situation: Home   Primary Source of Support , another adult daughter   Daily Activities: Minimal right now but encouraged a schedule today   Stressors Health issues   Educational Status Developmental: No gestational or later developmental concerns.  Academic:  Attention/Learning Difficulties: Uncertain but reports some vague trouble  Educational Attainment: HS (average student) + 1 year of college   Occupational Status Retired in 2003   mostly    Family History   Neurologic History Dementia (sister onset in 70s)   Psychiatric History No known heritable risk factors     Relevant  Neurologic History   Falls Yes   TBIs fell in the bathroom (passed out) and hit her face - EMS, hospital. Had full work up for syncope with no obvious cause was identified.    Seizures NA   CVAs NA   MS Secondary progressive MS: stable marked burden of white matter disease    Other frequent UTIs   --hospitalized for Acute hypoxic respiratory failure with AMS (somnolent and confused with hypotension)     Pertinent Psychiatric History   Sxs:   Longstanding: NA  Intermittent: NA  Substance Use:  Current Use:  Minimal  Alcohol: occasional  Prior Problems: Prior smoker-15 yrs, quit in  Treatment Hx:  Medication: Yes  Current: Cymbalta but  uncertain why  Pertinent prior:NA  Therapy: None  Current: NA  Pertinent prior: NA  Inpatient: None     MEDICAL STATUS   Problem List[1]  Past Medical History:   Diagnosis Date    Arthritis     Coronary artery disease     Encounter for blood transfusion     Epilepsy     GERD (gastroesophageal reflux disease)     Headache     Hypertension     MI, old     MS (multiple sclerosis)     Seizures     epilepsy     Past Surgical History:   Procedure Laterality Date    APPENDECTOMY  1970    BACK SURGERY      L5 discectomy    BREAST BIOPSY Right 15 yrs ago    benign    CATARACT EXTRACTION Bilateral      SECTION  1986    COLONOSCOPY N/A 2015    Procedure: COLONOSCOPY;  Surgeon: Henry Malcolm MD;  Location: [facility];  Service: Endoscopy;  Laterality: N/A;    CORONARY STENT PLACEMENT      EYE SURGERY      IMPLANTATION OF PERMANENT SACRAL NERVE STIMULATOR N/A 10/11/2024    Procedure: INSERTION, NEUROSTIMULATOR, PERMANENT, SACRAL;  Surgeon: Abel Rosales MD;  Location: [facility] OR;  Service: Urology;  Laterality: N/A;  Mack RUIZ    INSERTION, NEUROSTIMULATOR, TEMPORARY, SACRAL N/A 2024    Procedure: INSERTION, NEUROSTIMULATOR, TEMPORARY, SACRAL;  Surgeon: Abel Rosales MD;  Location: [facility] OR;  Service: Urology;  Laterality: N/A;  1  "hour 45 minutes Mack BILLINGSLEY MDT    right knee arthroscopy      SPINE SURGERY  1986    TUBAL LIGATION  1986        Relevant Labs   Lab Results   Component Value Date    PIMXBHKG88 1065 (H) 12/30/2024     No results found for: "RPR"  No results found for: "FOLATE"  Lab Results   Component Value Date    TSH 2.224 08/12/2024    X3PDBVG 6.0 10/19/2016     Lab Results   Component Value Date    HGBA1C 6.0 09/18/2024     Lab Results   Component Value Date    [id number] Non-Reactive 04/24/2025        No results found for: "XSSWFVRL456", "ADEVLRATIO", "CFZK841", "ATNPNFL"      Neurodiagnostics   Results for orders placed or performed during the hospital encounter of 06/01/25   CT Head Without Contrast    Narrative    EXAMINATION:  CT HEAD WITHOUT CONTRAST    CLINICAL HISTORY:  Seizure;    TECHNIQUE:  Low dose axial CT images obtained throughout the head without intravenous contrast. Sagittal and coronal reconstructions were performed.    COMPARISON:  CT head from 05/08/2025.    FINDINGS:  Intracranial compartment:    Mild global cerebral atrophy is present.  The ventricles are normal in size for age without evidence of hydrocephalus. No extra-axial blood or fluid collections.    Chronic ischemic small vessel disease is present involving the cerebral white matter.  Lacunar infarctions are noted involving the bilateral basal ganglia.  No parenchymal mass, hemorrhage, edema or major vascular distribution infarct.    Skull/extracranial contents (limited evaluation):    No fracture. Mastoid air cells and paranasal sinuses are essentially clear.  The native ocular lenses are absent.  The orbits are otherwise unremarkable.      Impression    No evidence of acute intracranial abnormality.      Electronically signed by: Yosef Ervin  Date:    06/01/2025  Time:    21:03   Results for orders placed or performed during the hospital encounter of 09/16/24   EEG    Narrative    Sanchez Garcia MD     9/18/2024  2:49 PM  EEG " REPORT    NAME: Alyssa John  : 1950  MRN: [id number]    DATE of EE2024    CLINICAL INDICATION: This is a 73 y.o. female being evaluated for   episodes of unresponsiveness.    MEDICATIONS:    atorvastatin  40 mg Oral QHS    clopidogreL  75 mg Oral Daily    enoxparin  40 mg Subcutaneous Daily    famotidine  20 mg Oral BID    propranoloL  20 mg Oral Daily    topiramate  100 mg Oral BID         EEG DESCRIPTION:  A 16-channel EEG was performed with electrode   placement in 10/20 International System. Longitudinal bipolar and   referential montages were utilized in analysis.    This is a technically adequate study with minor muscle and motion   artifacts.    The dominant posterior background rhythm was a well modulated,   symmetric, synchronous, reactive,6-7 Hz rhythm.  At times there   was diffuse generalized slowing in the lower theta range    The record was reactive to eye opening and closure. Anterior   derivations showed the expected admixture of low-voltage beta and   theta frequencies as well as intermittent eye blink artifact.    Sleep architecture was not seen    Photic stimulation, performed elicited no driving response.   Hyperventilation  was not performed.    No epileptiform discharges were recorded. No electrographic or   electroclinical seizures were recorded.    DIAGNOSIS: This is an abnormal EEG due to the presence of mild   generalised slowing. No lateralizing or epileptiform features are   noted. No electrographic or electroclinical seizures are   recorded.      CLINICAL INTERPRETATION:  This is an abnormal EEG indicating mild   encephalopathy.  No seizures or epileptiform activity noted.         Sanchez Garcia MD  Neurology       *Note: Due to a large number of results and/or encounters for the requested time period, some results have not been displayed. A complete set of results can be found in Results Review.         Medications   Current Medications[2]     BEHAVIORAL  OBSERVATIONS   APPEARANCE Casually dressed and adequate grooming/hygiene   ALERTNESS/ORIENTATION Attentive and alert.   GAIT Not assessed   SENSORY Not assessed   MOTOR  Not assessed   SPEECH/LANGUAGE Normal in rate, rhythm, tone, and volume. No significant word finding difficulty. Expressive and receptive language was normal.   STATED MOOD/AFFECT Stated mood was fione with congruent affect.   INTERPERSONAL BEHAVIOR Rapport was quickly and easily established   SI/HI None reported   HALLUCINATIONS/DELUSIONS None evidenced or endorsed   THOUGHT PROCESSES/INSIGHT Thoughts seemed logical and goal-directed   TEST TAKING BEHAVIOR / VALIDITY NA     BILLING/CODING   Service Description CPT Code Minutes Units   Psychiatric diagnostic evaluation by physician [phone]   Neurobehavioral status exam by physician [id number]  0   Each additional hour by physician [id number]  0     TECHNICIAN NOTES:    Proposed Battery: [place] Cognitive Assessment (MoCA), Wide Range Achievement Test - Fourth Edition (WRAT-5) Reading Test; Wechsler Adult Intelligence Scale-IV (Digit Span, Similarities); Trail Making Test, parts A and B (Sabas et al., 2004 norms); NAB Naming Test  Category and Letter-cued verbal fluency (animal naming/FAS; Sabas et al., 2004 norms); Beard Verbal Learning Test - Revised (Form-1); WMS-IV LM; SDMT; SaydaLo (Short) PHQ-9 RANJITH-7.   Feedback Type Feedback Time   VV, Phone, In-Clinic  30-min             [1]   Patient Active Problem List  Diagnosis    Multiple sclerosis    CAD (coronary artery disease)    Hyperlipemia    Gait disturbance    Vitamin D deficiency    Chronic fatigue    Seizure disorder    Hypertension    GERD (gastroesophageal reflux disease)    Arthritis    Encounter for long-term (current) use of high-risk medication    Gait instability    Gait abnormality    Impaired functional mobility, balance, and endurance    Internal carotid artery stenosis, right    Intractable chronic migraine without aura and without  status migrainosus    Tremor    Anxiety and depression    Former smoker, stopped smoking in distant past    History of MI (myocardial infarction)    Osteopenia determined by x-ray    Impaired mobility and ADLs    Coordination impairment    Impaired instrumental activities of daily living (IADL)    Cognitive communication disorder    Hand weakness    Physical deconditioning    Debility    Limitation of joint motion    Presence of aortocoronary bypass graft    Presence of coronary angioplasty implant and graft    Class 1 obesity due to excess calories with serious comorbidity and body mass index (BMI) of 34.0 to 34.9 in adult    Aortic atherosclerosis    Syncope and collapse    Urgency-frequency syndrome    Lower extremity edema    Urgency incontinence    Decreased independence with activities of daily living    Severe obesity (BMI 35.0-39.9) with comorbidity    Venous insufficiency    Impaired functional mobility, balance, gait, and endurance    General weakness    Hypotension    Somnolence    Acute hypoxic respiratory failure    AMS (altered mental status)    UTI (urinary tract infection)    Cellulitis of lower extremity    Lymphedema    Diastolic dysfunction    Major neurocognitive disorder   [2]   Current Outpatient Medications:     ammonium lactate (LAC-HYDRIN) 12 % lotion, Apply topically as needed for Dry Skin., Disp: 225 g, Rfl: 0    atorvastatin (LIPITOR) 40 MG tablet, TAKE 1 TABLET BY MOUTH EVERY DAY, Disp: 90 tablet, Rfl: 0    cholecalciferol, vitamin D3, 10 mcg (400 unit) Cap capsule, Take 1 capsule (400 Units total) by mouth once daily., Disp: 90 capsule, Rfl: 1    clopidogreL (PLAVIX) 75 mg tablet, Take 1 tablet (75 mg total) by mouth once daily., Disp: 90 tablet, Rfl: 0    cyanocobalamin (VITAMIN B-12) 1000 MCG tablet, Take 1 tablet (1,000 mcg total) by mouth once daily., Disp: 90 tablet, Rfl: 3    DULoxetine (CYMBALTA) 20 MG capsule, TAKE 2 CAPSULES BY MOUTH EVERY DAY (Patient taking differently: Take  20 mg by mouth once daily.), Disp: 180 capsule, Rfl: 1    estradioL (ESTRACE) 0.01 % (0.1 mg/gram) vaginal cream, Place 1 g vaginally 3 (three) times a week., Disp: 42.5 g, Rfl: 3    famotidine (PEPCID) 20 MG tablet, Take 1 tablet (20 mg total) by mouth once daily., Disp: 90 tablet, Rfl: 3    furosemide (LASIX) 40 MG tablet, Take 1 tablet (40 mg total) by mouth once daily., Disp: 90 tablet, Rfl: 0    hyoscyamine 0.125 mg Subl, Place 1 tablet (0.125 mg total) under the tongue every 6 (six) hours as needed (abdominal pain)., Disp: 30 tablet, Rfl: 1    losartan (COZAAR) 25 MG tablet, Take 1 tablet (25 mg total) by mouth once daily., Disp: 90 tablet, Rfl: 3    ondansetron (ZOFRAN-ODT) 4 MG TbDL, Take 1 tablet (4 mg total) by mouth every 6 (six) hours as needed., Disp: 20 tablet, Rfl: 0    polyethylene glycol (GLYCOLAX) 17 gram PwPk, Take 17 g by mouth daily as needed for Constipation., Disp: 30 each, Rfl: 0    potassium chloride SA (K-DUR,KLOR-CON) 20 MEQ tablet, Take 1 tablet (20 mEq total) by mouth 2 (two) times daily., Disp: 270 tablet, Rfl: 1    propranoloL (INDERAL) 20 MG tablet, Take 20 mg by mouth Daily., Disp: , Rfl:     topiramate (TOPAMAX) 100 MG tablet, Take 1 tablet (100 mg total) by mouth 2 (two) times daily., Disp: 180 tablet, Rfl: 3    topiramate (TOPAMAX) 25 MG tablet, Take 1 tablet (25 mg total) by mouth 2 (two) times daily., Disp: 60 tablet, Rfl: 11

## 2025-06-19 ENCOUNTER — PATIENT MESSAGE (OUTPATIENT)
Dept: PSYCHIATRY | Facility: CLINIC | Age: 75
End: 2025-06-19
Payer: MEDICARE

## 2025-06-19 ENCOUNTER — OFFICE VISIT (OUTPATIENT)
Dept: NEUROLOGY | Facility: CLINIC | Age: 75
End: 2025-06-19
Payer: MEDICARE

## 2025-06-19 DIAGNOSIS — G35 MULTIPLE SCLEROSIS: ICD-10-CM

## 2025-06-19 DIAGNOSIS — F03.90 MAJOR NEUROCOGNITIVE DISORDER: Primary | ICD-10-CM

## 2025-06-19 PROCEDURE — 90791 PSYCH DIAGNOSTIC EVALUATION: CPT | Mod: 95,,, | Performed by: CLINICAL NEUROPSYCHOLOGIST

## 2025-06-23 ENCOUNTER — OFFICE VISIT (OUTPATIENT)
Dept: CARDIOLOGY | Facility: CLINIC | Age: 75
End: 2025-06-23
Payer: MEDICARE

## 2025-06-23 VITALS
HEIGHT: 63 IN | WEIGHT: 180.13 LBS | HEART RATE: 68 BPM | DIASTOLIC BLOOD PRESSURE: 64 MMHG | BODY MASS INDEX: 31.92 KG/M2 | SYSTOLIC BLOOD PRESSURE: 110 MMHG

## 2025-06-23 DIAGNOSIS — I25.10 CORONARY ARTERY DISEASE WITHOUT ANGINA PECTORIS, UNSPECIFIED VESSEL OR LESION TYPE, UNSPECIFIED WHETHER NATIVE OR TRANSPLANTED HEART: ICD-10-CM

## 2025-06-23 DIAGNOSIS — M54.16 LUMBAR RADICULOPATHY: ICD-10-CM

## 2025-06-23 DIAGNOSIS — M48.07 SPINAL STENOSIS, LUMBOSACRAL REGION: ICD-10-CM

## 2025-06-23 DIAGNOSIS — I89.0 LYMPHEDEMA: ICD-10-CM

## 2025-06-23 DIAGNOSIS — I87.2 VENOUS INSUFFICIENCY: Primary | ICD-10-CM

## 2025-06-23 DIAGNOSIS — M54.9 DORSALGIA, UNSPECIFIED: ICD-10-CM

## 2025-06-23 DIAGNOSIS — R60.0 LOWER EXTREMITY EDEMA: ICD-10-CM

## 2025-06-23 DIAGNOSIS — I10 PRIMARY HYPERTENSION: ICD-10-CM

## 2025-06-23 DIAGNOSIS — E66.09 CLASS 1 OBESITY DUE TO EXCESS CALORIES WITH SERIOUS COMORBIDITY AND BODY MASS INDEX (BMI) OF 34.0 TO 34.9 IN ADULT: ICD-10-CM

## 2025-06-23 DIAGNOSIS — I51.89 DIASTOLIC DYSFUNCTION: ICD-10-CM

## 2025-06-23 DIAGNOSIS — E78.2 MIXED HYPERLIPIDEMIA: ICD-10-CM

## 2025-06-23 DIAGNOSIS — I70.0 AORTIC ATHEROSCLEROSIS: ICD-10-CM

## 2025-06-23 DIAGNOSIS — E66.811 CLASS 1 OBESITY DUE TO EXCESS CALORIES WITH SERIOUS COMORBIDITY AND BODY MASS INDEX (BMI) OF 34.0 TO 34.9 IN ADULT: ICD-10-CM

## 2025-06-23 PROCEDURE — 3074F SYST BP LT 130 MM HG: CPT | Mod: CPTII,S$GLB,, | Performed by: INTERNAL MEDICINE

## 2025-06-23 PROCEDURE — 4010F ACE/ARB THERAPY RXD/TAKEN: CPT | Mod: CPTII,S$GLB,, | Performed by: INTERNAL MEDICINE

## 2025-06-23 PROCEDURE — 3008F BODY MASS INDEX DOCD: CPT | Mod: CPTII,S$GLB,, | Performed by: INTERNAL MEDICINE

## 2025-06-23 PROCEDURE — 3078F DIAST BP <80 MM HG: CPT | Mod: CPTII,S$GLB,, | Performed by: INTERNAL MEDICINE

## 2025-06-23 PROCEDURE — 1101F PT FALLS ASSESS-DOCD LE1/YR: CPT | Mod: CPTII,S$GLB,, | Performed by: INTERNAL MEDICINE

## 2025-06-23 PROCEDURE — 1159F MED LIST DOCD IN RCRD: CPT | Mod: CPTII,S$GLB,, | Performed by: INTERNAL MEDICINE

## 2025-06-23 PROCEDURE — 99999 PR PBB SHADOW E&M-EST. PATIENT-LVL IV: CPT | Mod: PBBFAC,,, | Performed by: INTERNAL MEDICINE

## 2025-06-23 PROCEDURE — 99215 OFFICE O/P EST HI 40 MIN: CPT | Mod: S$GLB,,, | Performed by: INTERNAL MEDICINE

## 2025-06-23 PROCEDURE — 1126F AMNT PAIN NOTED NONE PRSNT: CPT | Mod: CPTII,S$GLB,, | Performed by: INTERNAL MEDICINE

## 2025-06-23 PROCEDURE — 3288F FALL RISK ASSESSMENT DOCD: CPT | Mod: CPTII,S$GLB,, | Performed by: INTERNAL MEDICINE

## 2025-06-23 NOTE — PROGRESS NOTES
"[facility]      Chief Complaint   Patient presents with    venous statis dermatitis       Patient ID: Alyssa John is a 74 y.o. female with HTN, MS, seizures, obesity, who presents for an initial appointment. Pertinent history/events are as follows:     -Pt kindly referred by Dr. Rodrigues for evaluation of venous stasis dermatitis of both lower extremities (per his note on 4/22/2025):  "Than today to follow-up hospitalization for in fluids and lower extremity venous stasis with cellulitis.  And hypertension.  She has history of stent 2010 by Dr. Mcmillan. She had ECHO which revealed normal left ventricular ejection fraction and mild-to-moderate tricuspid regurgitation.  She has had no cardiovascular problems since.  She has been wheelchair-bound and has had chronic edema in the lower extremities since.  She was treated for cellulitis in both are extremities while hospitalized the fluid when down.  She is placed on amlodipine for hypertension.  Went to rehab for 3 weeks and the fluid is gradually returned.  She has been off amlodipine for about a month.    At our initial appointment 5/8/2025, Mrs. John is somnolent on exam with blood pressure 86/46. Her  states he gave her a pill containing Hydrocodone and acetaminophen at 7:30 am this morning that he got from his daughter. States she has been very sleepy since that time. I placed pt on pulse oximeter with oxygen saturation noted at 88%. Placed pt on 3 liters oxygen via nasal canula and sats improved to 95%. Blood pressure up to 92 mmHg systolic. She continues to be somnolent on exam, therefore, the rapid response team was called for immediate evaluation and ED transport.   Plan:   Venous stasis dermatitis of both lower extremities- Mrs. John is somnolent on exam with blood pressure 86/46. Her  states he gave her a pill containing Hydrocodone and acetaminophen at 7:30 am this morning that he got from his daughter. States she " has been very sleepy since that time. I placed pt on pulse oximeter with oxygen saturation noted at 88%. Placed pt on 3 liters oxygen via nasal canula and sats improved to 95%. Blood pressure up to 92 mmHg systolic. She continues to be somnolent on exam, therefore, the rapid response team was called for immediate evaluation and ED transport. Current presentation likely due opioid suppression of pt's respiratory drive with resultant Co2 retention. She is also currently being treated for a UTI. Clinic visit will be rescheduled.    HPI:  Mrs. John who accompanied with her  reports doing well after the hospitalization.  She was hospitalized after the initial appointment for significant hypercapnia in the setting of opoid use and sepsis secondary to pneumonia. CTA chest showed L pleural effusiona nd concern for PNA. She had thoracentesis and was placed on diuretics. Her  and echo study 5/27/2025 revealed overall the study quality was technically difficult. The study was difficult due to patient's clinical status, body habitus and poor endocardial visualization. The left ventricle is normal in size. Normal wall thickness. There is normal systolic function with a visually estimated ejection fraction of 65 - 70%. Grade I diastolic dysfunction which was unchanged from previous echo.   Currently still has some residual cough. She reports having lower extremity swelling with discoloration. She has been elevating her legs which help the swelling. Reports no blistering or venous ulcers. No chest pain or SOB.     Past Medical History:   Diagnosis Date    Arthritis     Coronary artery disease     Encounter for blood transfusion     Epilepsy     GERD (gastroesophageal reflux disease)     Headache     Hypertension     MI, old     MS (multiple sclerosis)     Seizures     epilepsy     Past Surgical History:   Procedure Laterality Date    APPENDECTOMY  1970    BACK SURGERY      L5 discectomy    BREAST BIOPSY Right 15  yrs ago    benign    CATARACT EXTRACTION Bilateral      SECTION  1986    COLONOSCOPY N/A 2015    Procedure: COLONOSCOPY;  Surgeon: Henry Malcolm MD;  Location: [facility] ENDO;  Service: Endoscopy;  Laterality: N/A;    CORONARY STENT PLACEMENT      EYE SURGERY      IMPLANTATION OF PERMANENT SACRAL NERVE STIMULATOR N/A 10/11/2024    Procedure: INSERTION, NEUROSTIMULATOR, PERMANENT, SACRAL;  Surgeon: Abel Rosales MD;  Location: [facility] OR;  Service: Urology;  Laterality: N/A;  Mack RUIZ    INSERTION, NEUROSTIMULATOR, TEMPORARY, SACRAL N/A 2024    Procedure: INSERTION, NEUROSTIMULATOR, TEMPORARY, SACRAL;  Surgeon: Abel Rosales MD;  Location: [facility] OR;  Service: Urology;  Laterality: N/A;  1 hour 45 minutes Mack RUIZ    right knee arthroscopy      SPINE SURGERY      TUBAL LIGATION       Social History[1]  Family History   Problem Relation Name Age of Onset    Scleroderma Mother      Heart disease Father Adrien Vega         MI, CAD    Cancer Neg Hx      Diabetes Neg Hx      Stroke Neg Hx      Melanoma Neg Hx      Psoriasis Neg Hx      Lupus Neg Hx      Eczema Neg Hx         Review of patient's allergies indicates:   Allergen Reactions    Ciprofloxacin Other (See Comments)     Seizure     Codeine      Other reaction(s): throat tightness    Dilantin [phenytoin sodium extended]     Phenobarbital     Tegretol [carbamazepine]        Medication List with Changes/Refills   Current Medications    AMMONIUM LACTATE (LAC-HYDRIN) 12 % LOTION    Apply topically as needed for Dry Skin.    ATORVASTATIN (LIPITOR) 40 MG TABLET    TAKE 1 TABLET BY MOUTH EVERY DAY    CHOLECALCIFEROL, VITAMIN D3, 10 MCG (400 UNIT) CAP CAPSULE    Take 1 capsule (400 Units total) by mouth once daily.    CLOPIDOGREL (PLAVIX) 75 MG TABLET    Take 1 tablet (75 mg total) by mouth once daily.    CYANOCOBALAMIN (VITAMIN B-12) 1000 MCG TABLET    Take 1 tablet (1,000 mcg total) by mouth once daily.     "DULOXETINE (CYMBALTA) 20 MG CAPSULE    TAKE 2 CAPSULES BY MOUTH EVERY DAY    ESTRADIOL (ESTRACE) 0.01 % (0.1 MG/GRAM) VAGINAL CREAM    Place 1 g vaginally 3 (three) times a week.    FAMOTIDINE (PEPCID) 20 MG TABLET    Take 1 tablet (20 mg total) by mouth once daily.    FUROSEMIDE (LASIX) 40 MG TABLET    Take 1 tablet (40 mg total) by mouth once daily.    HYOSCYAMINE 0.125 MG SUBL    Place 1 tablet (0.125 mg total) under the tongue every 6 (six) hours as needed (abdominal pain).    LOSARTAN (COZAAR) 25 MG TABLET    Take 1 tablet (25 mg total) by mouth once daily.    ONDANSETRON (ZOFRAN-ODT) 4 MG TBDL    Take 1 tablet (4 mg total) by mouth every 6 (six) hours as needed.    POLYETHYLENE GLYCOL (GLYCOLAX) 17 GRAM PWPK    Take 17 g by mouth daily as needed for Constipation.    POTASSIUM CHLORIDE SA (K-DUR,KLOR-CON) 20 MEQ TABLET    Take 1 tablet (20 mEq total) by mouth 2 (two) times daily.    PROPRANOLOL (INDERAL) 20 MG TABLET    Take 20 mg by mouth Daily.    TOPIRAMATE (TOPAMAX) 100 MG TABLET    Take 1 tablet (100 mg total) by mouth 2 (two) times daily.    TOPIRAMATE (TOPAMAX) 25 MG TABLET    Take 1 tablet (25 mg total) by mouth 2 (two) times daily.   Discontinued Medications    NITROFURANTOIN (MACRODANTIN) 50 MG CAPSULE    Take 1 capsule (50 mg total) by mouth 4 (four) times daily.       Review of Systems  Constitution: Denies chills, fever, and sweats.  HENT: Denies headaches or blurry vision.  Cardiovascular: Denies chest pain or irregular heart beat.  Respiratory: Denies cough or shortness of breath.  Gastrointestinal: Denies abdominal pain, nausea, or vomiting.  Musculoskeletal: Denies muscle cramps.  Neurological: Denies dizziness or focal weakness.  Psychiatric/Behavioral: Normal mental status.  Hematologic/Lymphatic: Denies bleeding problem or easy bruising/bleeding.  Skin: Denies rash or suspicious lesions    Physical Examination  /64   Pulse 68   Ht 5' 3" (1.6 m)   Wt 81.7 kg (180 lb 1.9 oz)   BMI " 31.91 kg/m²     Constitutional: No acute distress, conversant  HEENT: Sclera anicteric, Pupils equal, round and reactive to light, extraocular motions intact, Oropharynx clear  Neck: No JVD, no carotid bruits  Cardiovascular: regular rate and rhythm, no murmur, rubs or gallops, normal S1/S2  Pulmonary: Clear to auscultation bilaterally  Abdominal: Abdomen soft, nontender, nondistended, positive bowel sounds  Extremities: No lower extremity edema,   Pulses:  Carotid pulses are 2+ on the right side, and 2+ on the left side.  Radial pulses are 2+ on the right side, and 2+ on the left side.   Femoral pulses are 2+ on the right side, and 2+ on the left side.  Popliteal pulses are 2+ on the right side, and 2+ on the left side.   Dorsalis pedis pulses are 2+ on the right side, and 2+ on the left side.   Posterior tibial pulses are 2+ on the right side, and 2+ on the left side.    Skin: No ecchymosis, erythema, or ulcers  Psych: Alert and oriented x 3, appropriate affect  Neuro: CNII-XII intact, no focal deficits    Labs:  Most Recent Data  CBC:   Lab Results   Component Value Date    WBC 8.53 06/01/2025    HGB 12.0 06/01/2025    HCT 39.6 06/01/2025     06/01/2025    MCV 87 06/01/2025    RDW 14.6 (H) 06/01/2025     BMP:   Lab Results   Component Value Date     06/01/2025    K 4.3 06/01/2025     06/01/2025    CO2 23 06/01/2025    BUN 14 06/01/2025    CREATININE 0.9 06/01/2025    GLU 94 06/01/2025    CALCIUM 8.6 (L) 06/01/2025    MG 2.0 05/11/2025    PHOS 3.5 05/28/2025     LFTS;   Lab Results   Component Value Date    PROT 7.1 06/01/2025    ALBUMIN 3.3 (L) 06/01/2025    BILITOT 0.4 06/01/2025    AST 11 06/01/2025    ALKPHOS 153 (H) 06/01/2025    ALT 10 06/01/2025     COAGS:   Lab Results   Component Value Date    INR 0.9 09/17/2024     FLP:   Lab Results   Component Value Date    CHOL 163 04/16/2025    HDL 46 04/16/2025    LDLCALC 90.6 04/16/2025    TRIG 132 04/16/2025    CHOLHDL 28.2 04/16/2025      CARDIAC:   Lab Results   Component Value Date    TROPONINI 0.045 (H) 01/17/2025     (H) 05/08/2025       Imaging:    Echo Study 5/27/2025:     Overall the study quality was technically difficult. The study was difficult due to patient's clinical status, body habitus and poor endocardial visualization.    Left Ventricle: The left ventricle is normal in size. Normal wall thickness. There is normal systolic function with a visually estimated ejection fraction of 65 - 70%. Grade I diastolic dysfunction. E/A ratio is 0.79.    Right Ventricle: The right ventricle is normal in size Systolic function is normal.    Mitral Valve: There is mild regurgitation.    Tricuspid Valve: There is mild regurgitation.    IVC/SVC: Normal venous pressure at 3 mmHg.    CT Abd/Pelvis 5/7/2025:  Urinary bladder wall thickening, more notable to the left of midline.  Suggest correlation for cystitis.  Cystoscopy follow-up could be considered given the asymmetric appearance of bladder wall thickening.   Mild stool burden in the colon.   Cholelithiasis.   Worsening left-sided pleural effusion with passive atelectasis or airspace disease in the left lung base.   Borderline enlargement of the heart.   Small hiatal hernia.    Echo 1/17/2025:    Left Ventricle: The left ventricle is normal in size. Normal wall thickness. There is normal systolic function. There is normal diastolic function. E/A ratio is 1.16.    Right Ventricle: Normal right ventricular cavity size. Systolic function is normal.    Mitral Valve: There is mild regurgitation.    Tricuspid Valve: There is mild to moderate regurgitation. The estimated PA systolic pressure is at least 27 mmHg.    Overall the study quality was technically difficult. The study was difficult due to patient's poor acoustic windows.    Assessment/Plan:  Alyssa John is a 74 y.o. female with HTN, MS, seizures, obesity, who presents for an initial appointment.    Lower extremity edema - likely  venous stasis and moderate lymphedema of both lower extremities- Unable to wear compression stocking at this time due to pain. Check venous reflux and arterial ultrasound. Elevate legs when resting. Limit salt intake <2000 mg/day.     2. Lower Extremity Pain - Likely Neuropathic- Check MRI lumbar without contrast and refer to Neurosurgery.     2. HLD - continue medications. LDL 90 on 04/2025.     3. HTN - Continue Medications      Follow up in 3 months with Venous Relfux and Arterial ultrasound.     Total duration of face to face visit time 45 minutes.  Total time spent counseling greater than fifty percent of total visit time.  Counseling included discussion regarding imaging findings, diagnosis, possibilities, treatment options, risks and benefits.  The patient had many questions regarding the options and long-term effects.    Patient seen and discussed with Dr. House. Further recommendations per the attending addendum.    Jl Jaffe MD  PGY IV - Vascular Medicine Fellow   [facility]             [1]   Social History  Socioeconomic History    Marital status:    Tobacco Use    Smoking status: Former     Current packs/day: 0.00     Average packs/day: 2.0 packs/day for 42.0 years (84.0 ttl pk-yrs)     Types: Cigarettes     Start date: 1968     Quit date: 2010     Years since quitting: 15.4     Passive exposure: Past    Smokeless tobacco: Never   Substance and Sexual Activity    Alcohol use: Not Currently     Comment: rarely    Drug use: No    Sexual activity: Not Currently     Partners: Male     Social Drivers of Health     Financial Resource Strain: Low Risk  (5/9/2025)    Overall Financial Resource Strain (CARDIA)     Difficulty of Paying Living Expenses: Not hard at all   Food Insecurity: No Food Insecurity (5/9/2025)    Hunger Vital Sign     Worried About Running Out of Food in the Last Year: Never true     Ran Out of Food in the Last Year: Never true   Transportation Needs: No  Transportation Needs (5/9/2025)    PRAPARE - Transportation     Lack of Transportation (Medical): No     Lack of Transportation (Non-Medical): No   Physical Activity: Inactive (5/9/2025)    Exercise Vital Sign     Days of Exercise per Week: 0 days     Minutes of Exercise per Session: 0 min   Stress: No Stress Concern Present (5/9/2025)    [employer] [place] [employer] - Occupational Stress Questionnaire     Feeling of Stress : Not at all   Housing Stability: Low Risk  (5/9/2025)    Housing Stability Vital Sign     Unable to Pay for Housing in the Last Year: No     Number of Times Moved in the Last Year: 0     Homeless in the Last Year: No

## 2025-06-23 NOTE — PATIENT INSTRUCTIONS
Alyssa John is a 74 y.o. female with HTN, MS, seizures, obesity, who presents for an initial appointment.    Lower extremity edema - likely venous stasis and moderate lymphedema of both lower extremities- Unable to wear compression stocking at this time due to pain. Check venous reflux and arterial ultrasound. Elevate legs when resting. Limit salt intake <2000 mg/day.     2. Lower Extremity Pain - Likely Neuropathic- Check MRI lumbar without contrast and refer to Neurosurgery.     2. HLD - continue medications. LDL 90 on 04/2025.     3. HTN - Continue Medications      Follow up in 3 months with Venous Relfux and Arterial ultrasound.

## 2025-06-25 ENCOUNTER — TELEPHONE (OUTPATIENT)
Dept: PULMONOLOGY | Facility: CLINIC | Age: 75
End: 2025-06-25
Payer: MEDICARE

## 2025-06-25 ENCOUNTER — HOSPITAL ENCOUNTER (OUTPATIENT)
Dept: RADIOLOGY | Facility: HOSPITAL | Age: 75
Discharge: HOME | End: 2025-06-25
Attending: INTERNAL MEDICINE
Payer: MEDICARE

## 2025-06-25 ENCOUNTER — RESULTS FOLLOW-UP (OUTPATIENT)
Dept: PULMONOLOGY | Facility: CLINIC | Age: 75
End: 2025-06-25

## 2025-06-25 DIAGNOSIS — J90 PLEURAL EFFUSION: ICD-10-CM

## 2025-06-25 DIAGNOSIS — J90 PLEURAL EFFUSION: Primary | ICD-10-CM

## 2025-06-25 PROCEDURE — 71046 X-RAY EXAM CHEST 2 VIEWS: CPT | Mod: TC,PO

## 2025-06-25 PROCEDURE — 71046 X-RAY EXAM CHEST 2 VIEWS: CPT | Mod: 26,,, | Performed by: RADIOLOGY

## 2025-06-26 NOTE — TELEPHONE ENCOUNTER
Left message informing patient her CXR is stable and we will repeat it in 3 months.  If you have any questions please call us at [phone].

## 2025-06-29 ENCOUNTER — HOSPITAL ENCOUNTER (OUTPATIENT)
Dept: RADIOLOGY | Facility: HOSPITAL | Age: 75
Discharge: HOME | End: 2025-06-29
Attending: INTERNAL MEDICINE
Payer: MEDICARE

## 2025-06-29 DIAGNOSIS — M48.07 SPINAL STENOSIS, LUMBOSACRAL REGION: ICD-10-CM

## 2025-06-29 DIAGNOSIS — M54.16 LUMBAR RADICULOPATHY: ICD-10-CM

## 2025-06-29 DIAGNOSIS — M54.9 DORSALGIA, UNSPECIFIED: ICD-10-CM

## 2025-06-29 PROCEDURE — 72148 MRI LUMBAR SPINE W/O DYE: CPT | Mod: 26,,, | Performed by: RADIOLOGY

## 2025-06-29 PROCEDURE — 72148 MRI LUMBAR SPINE W/O DYE: CPT | Mod: TC

## 2025-06-30 PROBLEM — F03.90 MAJOR NEUROCOGNITIVE DISORDER: Status: ACTIVE | Noted: 2025-06-30

## 2025-06-30 NOTE — ASSESSMENT & PLAN NOTE
Assessment:  -Onset/Course: Around 3-years ago, gradual onset and mild worsening of processing speed and word finding. Sxs have been mild but noticeable. Around 12-months ago, sharper  cognitive worsening from around Fall 2024 through May 2025. Sxs included: confusion, short-term memory trouble, and less drive. Medically, she has had various UTIs and hospitalizations (review notes) with most recent in May 2025 (UTI, Acute hypoxic respiratory failure with AMS (somnolent and confused with hypotension)    In the past 2-weeks, sxs have significantly improved but nowhere near baseline. She continues to have significantly slowed mental speed, word finding trouble, short-term memory trouble, and executive dysfunction (motivation/drive, defers to others for decisions). Recently, started reading a book for the first time in quite a while.      Day to day sxs are better from 11am-2pm but worse in evening and slow to get going in the early morning.   Plan:  -Neuropsych testing to assess cognitive status, differential diagnosis and treatment plan

## 2025-07-18 ENCOUNTER — HOSPITAL ENCOUNTER (OUTPATIENT)
Dept: CARDIOLOGY | Facility: HOSPITAL | Age: 75
Discharge: HOME | End: 2025-07-18
Attending: INTERNAL MEDICINE
Payer: MEDICARE

## 2025-07-18 DIAGNOSIS — I87.2 VENOUS INSUFFICIENCY: ICD-10-CM

## 2025-07-18 LAB
LEFT GREAT SAPHENOUS DISTAL THIGH DIA: 0.38 CM
LEFT GREAT SAPHENOUS JUNCTION DIA: 0.47 CM
LEFT GREAT SAPHENOUS KNEE DIA: 0.27 CM
LEFT GREAT SAPHENOUS MIDDLE THIGH DIA: 0.46 CM
LEFT GREAT SAPHENOUS PROXIMAL CALF DIA: 0.22 CM
LEFT GREAT SAPHENOUS PROXIMAL CALF REFLUX: 1473 MS
LEFT SMALL SAPHENOUS KNEE DIA: 0.24 CM
LEFT SMALL SAPHENOUS SPJ DIA: 0.24 CM
RIGHT GREAT SAPHENOUS DISTAL THIGH DIA: 0.44 CM
RIGHT GREAT SAPHENOUS DISTAL THIGH REFLUX: 691 MS
RIGHT GREAT SAPHENOUS JUNCTION DIA: 0.54 CM
RIGHT GREAT SAPHENOUS KNEE DIA: 0.32 CM
RIGHT GREAT SAPHENOUS MIDDLE THIGH DIA: 0.45 CM
RIGHT GREAT SAPHENOUS PROXIMAL CALF DIA: 0.21 CM
RIGHT SMALL SAPHENOUS KNEE DIA: 0.27 CM
RIGHT SMALL SAPHENOUS SPJ DIA: 0.29 CM

## 2025-07-18 PROCEDURE — 93970 EXTREMITY STUDY: CPT | Mod: 26,,, | Performed by: INTERNAL MEDICINE

## 2025-07-18 PROCEDURE — 93970 EXTREMITY STUDY: CPT | Mod: TC

## 2025-07-21 ENCOUNTER — HOSPITAL ENCOUNTER (OUTPATIENT)
Dept: CARDIOLOGY | Facility: HOSPITAL | Age: 75
Discharge: HOME | End: 2025-07-21
Attending: INTERNAL MEDICINE
Payer: MEDICARE

## 2025-07-21 DIAGNOSIS — I87.2 VENOUS INSUFFICIENCY: ICD-10-CM

## 2025-07-21 LAB
LEFT ANT TIBIAL SYS PSV: 19 CM/S
LEFT CFA PSV: 154 CM/S
LEFT EXTERNAL ILIAC PSV: 131 CM/S
LEFT PERONEAL SYS PSV: 43 CM/S
LEFT POPLITEAL PSV: 75 CM/S
LEFT POST TIBIAL SYS PSV: 75 CM/S
LEFT PROFUNDA SYS PSV: 74 CM/S
LEFT SUPER FEMORAL DIST SYS PSV: 494 CM/S
LEFT SUPER FEMORAL MID SYS PSV: 94 CM/S
LEFT SUPER FEMORAL OSTIAL SYS PSV: 104 CM/S
LEFT SUPER FEMORAL PROX SYS PSV: 90 CM/S
LEFT TIB/PER TRUNK SYS PSV: 64 CM/S
RIGHT ANT TIBIAL SYS PSV: 39 CM/S
RIGHT CFA PSV: 156 CM/S
RIGHT EXTERNAL ILLIAC PSV: 176 CM/S
RIGHT PERONEAL SYS PSV: 73 CM/S
RIGHT POPLITEAL PSV: 65 CM/S
RIGHT POST TIBIAL SYS PSV: 108 CM/S
RIGHT PROFUNDA SYS PSV: 92 CM/S
RIGHT SUPER FEMORAL DIST SYS PSV: 140 CM/S
RIGHT SUPER FEMORAL MID SYS PSV: 112 CM/S
RIGHT SUPER FEMORAL OSTIAL SYS PSV: 173 CM/S
RIGHT SUPER FEMORAL PROX SYS PSV: 130 CM/S
RIGHT TIB/PER TRUNK SYS PSV: 78 CM/S

## 2025-07-21 PROCEDURE — 93925 LOWER EXTREMITY STUDY: CPT | Mod: 26,,, | Performed by: INTERNAL MEDICINE

## 2025-07-21 PROCEDURE — 93925 LOWER EXTREMITY STUDY: CPT

## 2025-07-23 ENCOUNTER — PROCEDURE VISIT (OUTPATIENT)
Dept: SLEEP MEDICINE | Facility: HOSPITAL | Age: 75
End: 2025-07-23
Attending: STUDENT IN AN ORGANIZED HEALTH CARE EDUCATION/TRAINING PROGRAM
Payer: MEDICARE

## 2025-07-23 DIAGNOSIS — F51.8 OTHER SLEEP DISORDERS NOT DUE TO A SUBSTANCE OR KNOWN PHYSIOLOGICAL CONDITION: ICD-10-CM

## 2025-07-23 PROCEDURE — 95806 SLEEP STUDY UNATT&RESP EFFT: CPT

## 2025-07-27 ENCOUNTER — ON-DEMAND VIRTUAL (OUTPATIENT)
Dept: URGENT CARE | Facility: CLINIC | Age: 75
End: 2025-07-27
Payer: MEDICARE

## 2025-07-27 DIAGNOSIS — R30.0 DYSURIA: Primary | ICD-10-CM

## 2025-07-27 PROCEDURE — 98005 SYNCH AUDIO-VIDEO EST LOW 20: CPT | Mod: 95,,, | Performed by: NURSE PRACTITIONER

## 2025-07-27 RX ORDER — SULFAMETHOXAZOLE AND TRIMETHOPRIM 800; 160 MG/1; MG/1
1 TABLET ORAL 2 TIMES DAILY
Qty: 10 TABLET | Refills: 0 | Status: SHIPPED | OUTPATIENT
Start: 2025-07-27 | End: 2025-08-01

## 2025-07-27 NOTE — PROGRESS NOTES
Subjective:      Patient ID: Alyssa John is a 74 y.o. female.    Vitals:  vitals were not taken for this visit.     Chief Complaint: Dysuria      Visit Type: TELE AUDIOVISUAL - This visit was conducted virtually based on  subjective information and limited objective exam    Present with the patient at the time of consultation: TELEMED PRESENT WITH PATIENT: None  LOCATION OF PATIENT [facility]  Two patient identifiers used to verify patient- saying out date of birth and full name.       Past Medical History:   Diagnosis Date    Arthritis     Coronary artery disease     Encounter for blood transfusion     Epilepsy     GERD (gastroesophageal reflux disease)     Headache     Hypertension     MI, old     MS (multiple sclerosis)     Seizures     epilepsy     Past Surgical History:   Procedure Laterality Date    APPENDECTOMY  1970    BACK SURGERY      L5 discectomy    BREAST BIOPSY Right 15 yrs ago    benign    CATARACT EXTRACTION Bilateral      SECTION  1986    COLONOSCOPY N/A 2015    Procedure: COLONOSCOPY;  Surgeon: Henry Malcolm MD;  Location: [facility] ENDO;  Service: Endoscopy;  Laterality: N/A;    CORONARY STENT PLACEMENT      EYE SURGERY      IMPLANTATION OF PERMANENT SACRAL NERVE STIMULATOR N/A 10/11/2024    Procedure: INSERTION, NEUROSTIMULATOR, PERMANENT, SACRAL;  Surgeon: Abel Rosales MD;  Location: [facility] OR;  Service: Urology;  Laterality: N/A;  Mack RUIZ    INSERTION, NEUROSTIMULATOR, TEMPORARY, SACRAL N/A 2024    Procedure: INSERTION, NEUROSTIMULATOR, TEMPORARY, SACRAL;  Surgeon: Abel Rosales MD;  Location: [facility] OR;  Service: Urology;  Laterality: N/A;  1 hour 45 minutes Mack RUIZ    right knee arthroscopy      SPINE SURGERY      TUBAL LIGATION       Review of patient's allergies indicates:   Allergen Reactions    Ciprofloxacin Other (See Comments)     Seizure     Codeine      Other reaction(s): throat tightness    Dilantin [phenytoin  sodium extended]     Phenobarbital     Tegretol [carbamazepine]      Medications Ordered Prior to Encounter[1]  Family History   Problem Relation Name Age of Onset    Scleroderma Mother      Heart disease Father Adrien Vega         MI, CAD    Cancer Neg Hx      Diabetes Neg Hx      Stroke Neg Hx      Melanoma Neg Hx      Psoriasis Neg Hx      Lupus Neg Hx      Eczema Neg Hx         Medications Ordered                [id number] - [place] - [address]   [address] [place] [place] [address]    Telephone: [phone]   Fax: [phone]   Hours: Not open 24 hours                         E-Prescribed (1 of 1)              sulfamethoxazole-trimethoprim 800-160mg (BACTRIM DS) 800-160 mg Tab    Sig: Take 1 tablet by mouth 2 (two) times daily. for 5 days       Start: 7/27/25     Quantity: 10 tablet Refills: 0                           Ohs Peq Odvv Intake    7/27/2025  9:44 AM CDT - Filed by Patient   What is your current physical address in the event of a medical emergency? [address] [place] [address]   Are you able to take your vital signs? Yes   Systolic Blood Pressure: 149   Diastolic Blood Pressure: 82   Weight: 190   Height: 63.5   Pulse: 70   Temperature: 97.2   Respiration rate: 16   Pulse Oxygen: 97   Please attach any relevant images or files    Is your employer contracted with [facility]? No         75 yo female with c/o uti symptoms. She states urine is cloudy and having burning on urination. She states cloudy urine started several days ago. She states burning since last night. She denies hematuria. She denies abdominal and back pain. She denies fever.         Constitution: Negative. Negative for fever.   HENT: Negative.     Neck: neck negative.   Cardiovascular: Negative.    Respiratory: Negative.     Gastrointestinal: Negative.  Negative for abdominal pain, nausea and vomiting.   Endocrine: negative.   Genitourinary:  Positive for dysuria, frequency and urgency. Negative for  vaginal discharge, vaginal odor and genital sore.   Musculoskeletal: Negative.  Negative for back pain.   Skin: Negative.    Neurological: Negative.         Objective:   The physical exam was conducted virtually.    AAO x 3 ; no acute distress noted; appearance normal; mood and behavior normal; thought process normal  Head- normocephalic  Nose- appears normal, no discharge or erythema  Eyes- pupils appear normal in size, no drainage, no erythema  Ears- normal appearing; no discharge, no erythema  Mouth- appears normal  Oropharynx- no erythema, lesions  Lungs- breathing at a normal rate, no acute distress noted  Heart- no reports of tachycardia, palpitations, chest pain  Abdomen- non distended, non tender reported by patient  Skin- warm and dry, no erythema or edema noted by patient or visualized  Psych- as above; no si/hi      Assessment:     1. Dysuria        Plan:     PLEASE READ YOUR DISCHARGE INSTRUCTIONS ENTIRELY AS IT CONTAINS IMPORTANT INFORMATION.      Take the antibiotics to completion.     Drink plenty of fluids, wipe front to back, take showers not baths, no scented soaps, wear breathable cotton underwear, urinate after sexual intercourse.     Please go to Urgent Care or the ER for worsening symptoms including fever, worsening flank pain, vomiting, etc.       Please return or see your primary care doctor if you develop new or worsening symptoms.     Please arrange follow up with your primary medical clinic as soon as possible. You must understand that you've received an Urgent Care treatment only and that you may be released before all of your medical problems are known or treated. You, the patient, will arrange for follow up as instructed. If your symptoms worsen or fail to improve you should go to the Emergency Room.  WE CANNOT RULE OUT ALL POSSIBLE CAUSES OF YOUR SYMPTOMS IN THE URGENT CARE SETTING PLEASE GO TO THE ER IF YOU FEELS YOUR CONDITION IS WORSENING OR YOU WOULD LIKE EMERGENT  EVALUATION.      Thank you for choosing [facility]!    Our goal in the [facility] is to always provide outstanding medical care. You may receive a survey by mail or e-mail in the next week regarding your experience today. We would greatly appreciate you completing and returning the survey. Your feedback provides us with a way to recognize our staff who provide very good care, and it helps us learn how to improve when your experience was below our aspiration of excellence.         We appreciate you trusting us with your medical care. We hope you feel better soon. We will be happy to take care of you for all of your future medical needs.    You must understand that you've received an Urgent Care treatment only and that you may be released before all your medical problems are known or treated. You, the patient, will arrange for follow up care as instructed.    Follow up with your PCP or specialty clinic as directed in the next 1-2 weeks if not improved or as needed.  You can call [phone] to schedule an appointment with the appropriate provider.    If your condition worsens we recommend that you receive another evaluation in person, with your primary care provider, urgent care or at the emergency room immediately or contact your primary medical clinics after hours call service to discuss your concerns.         Dysuria  -     sulfamethoxazole-trimethoprim 800-160mg (BACTRIM DS) 800-160 mg Tab; Take 1 tablet by mouth 2 (two) times daily. for 5 days  Dispense: 10 tablet; Refill: 0                         [1]   Current Outpatient Medications on File Prior to Visit   Medication Sig Dispense Refill    ammonium lactate (LAC-HYDRIN) 12 % lotion Apply topically as needed for Dry Skin. 225 g 0    atorvastatin (LIPITOR) 40 MG tablet TAKE 1 TABLET BY MOUTH EVERY DAY 90 tablet 0    cholecalciferol, vitamin D3, 10 mcg (400 unit) Cap capsule Take 1 capsule (400 Units total) by mouth once  daily. 90 capsule 1    clopidogreL (PLAVIX) 75 mg tablet Take 1 tablet (75 mg total) by mouth once daily. 90 tablet 0    cyanocobalamin (VITAMIN B-12) 1000 MCG tablet Take 1 tablet (1,000 mcg total) by mouth once daily. 90 tablet 3    DULoxetine (CYMBALTA) 20 MG capsule TAKE 2 CAPSULES BY MOUTH EVERY DAY (Patient taking differently: Take 20 mg by mouth once daily.) 180 capsule 1    estradioL (ESTRACE) 0.01 % (0.1 mg/gram) vaginal cream Place 1 g vaginally 3 (three) times a week. 42.5 g 3    famotidine (PEPCID) 20 MG tablet Take 1 tablet (20 mg total) by mouth once daily. 90 tablet 3    furosemide (LASIX) 40 MG tablet Take 1 tablet (40 mg total) by mouth once daily. 90 tablet 0    hyoscyamine 0.125 mg Subl Place 1 tablet (0.125 mg total) under the tongue every 6 (six) hours as needed (abdominal pain). 30 tablet 1    losartan (COZAAR) 25 MG tablet Take 1 tablet (25 mg total) by mouth once daily. 90 tablet 3    ondansetron (ZOFRAN-ODT) 4 MG TbDL Take 1 tablet (4 mg total) by mouth every 6 (six) hours as needed. 20 tablet 0    polyethylene glycol (GLYCOLAX) 17 gram PwPk Take 17 g by mouth daily as needed for Constipation. 30 each 0    potassium chloride SA (K-DUR,KLOR-CON) 20 MEQ tablet Take 1 tablet (20 mEq total) by mouth 2 (two) times daily. 270 tablet 1    propranoloL (INDERAL) 20 MG tablet Take 20 mg by mouth Daily.      topiramate (TOPAMAX) 100 MG tablet Take 1 tablet (100 mg total) by mouth 2 (two) times daily. 180 tablet 3    topiramate (TOPAMAX) 25 MG tablet Take 1 tablet (25 mg total) by mouth 2 (two) times daily. 60 tablet 11     No current facility-administered medications on file prior to visit.

## 2025-07-28 ENCOUNTER — TELEPHONE (OUTPATIENT)
Dept: FAMILY MEDICINE | Facility: CLINIC | Age: 75
End: 2025-07-28
Payer: MEDICARE

## 2025-07-28 DIAGNOSIS — Z91.89 AT RISK FOR OBSTRUCTIVE SLEEP APNEA: Primary | ICD-10-CM

## 2025-07-30 ENCOUNTER — PATIENT MESSAGE (OUTPATIENT)
Dept: PSYCHIATRY | Facility: CLINIC | Age: 75
End: 2025-07-30
Payer: MEDICARE

## 2025-08-01 ENCOUNTER — PATIENT MESSAGE (OUTPATIENT)
Dept: PSYCHIATRY | Facility: CLINIC | Age: 75
End: 2025-08-01
Payer: MEDICARE

## 2025-08-06 DIAGNOSIS — I87.2 VENOUS STASIS DERMATITIS OF BOTH LOWER EXTREMITIES: ICD-10-CM

## 2025-08-06 RX ORDER — FUROSEMIDE 20 MG/1
20 TABLET ORAL
Qty: 90 TABLET | Refills: 1 | Status: SHIPPED | OUTPATIENT
Start: 2025-08-06

## 2025-08-08 DIAGNOSIS — E53.8 B12 DEFICIENCY: ICD-10-CM

## 2025-08-08 RX ORDER — ZINC GLUCONATE 50 MG
1000 TABLET ORAL
Qty: 90 TABLET | Refills: 3 | Status: SHIPPED | OUTPATIENT
Start: 2025-08-08

## 2025-08-12 DIAGNOSIS — I87.2 VENOUS STASIS DERMATITIS OF BOTH LOWER EXTREMITIES: ICD-10-CM

## 2025-08-12 RX ORDER — FUROSEMIDE 20 MG/1
20 TABLET ORAL
Qty: 90 TABLET | Refills: 1 | Status: SHIPPED | OUTPATIENT
Start: 2025-08-12

## 2025-08-14 ENCOUNTER — OFFICE VISIT (OUTPATIENT)
Dept: NEUROSURGERY | Facility: CLINIC | Age: 75
End: 2025-08-14
Payer: MEDICARE

## 2025-08-14 DIAGNOSIS — M48.07 SPINAL STENOSIS, LUMBOSACRAL REGION: ICD-10-CM

## 2025-08-14 DIAGNOSIS — M54.16 LUMBAR RADICULOPATHY: ICD-10-CM

## 2025-08-14 PROCEDURE — 99203 OFFICE O/P NEW LOW 30 MIN: CPT | Mod: S$GLB,,, | Performed by: STUDENT IN AN ORGANIZED HEALTH CARE EDUCATION/TRAINING PROGRAM

## 2025-08-14 PROCEDURE — 1126F AMNT PAIN NOTED NONE PRSNT: CPT | Mod: CPTII,S$GLB,, | Performed by: STUDENT IN AN ORGANIZED HEALTH CARE EDUCATION/TRAINING PROGRAM

## 2025-08-14 PROCEDURE — 1101F PT FALLS ASSESS-DOCD LE1/YR: CPT | Mod: CPTII,S$GLB,, | Performed by: STUDENT IN AN ORGANIZED HEALTH CARE EDUCATION/TRAINING PROGRAM

## 2025-08-14 PROCEDURE — 1159F MED LIST DOCD IN RCRD: CPT | Mod: CPTII,S$GLB,, | Performed by: STUDENT IN AN ORGANIZED HEALTH CARE EDUCATION/TRAINING PROGRAM

## 2025-08-14 PROCEDURE — 4010F ACE/ARB THERAPY RXD/TAKEN: CPT | Mod: CPTII,S$GLB,, | Performed by: STUDENT IN AN ORGANIZED HEALTH CARE EDUCATION/TRAINING PROGRAM

## 2025-08-14 PROCEDURE — 3288F FALL RISK ASSESSMENT DOCD: CPT | Mod: CPTII,S$GLB,, | Performed by: STUDENT IN AN ORGANIZED HEALTH CARE EDUCATION/TRAINING PROGRAM

## 2025-08-14 PROCEDURE — 99999 PR PBB SHADOW E&M-EST. PATIENT-LVL III: CPT | Mod: PBBFAC,,, | Performed by: STUDENT IN AN ORGANIZED HEALTH CARE EDUCATION/TRAINING PROGRAM

## 2025-08-22 DIAGNOSIS — G40.909 SEIZURE DISORDER: ICD-10-CM

## 2025-08-23 RX ORDER — TOPIRAMATE 100 MG/1
100 TABLET, FILM COATED ORAL 2 TIMES DAILY
Qty: 180 TABLET | Refills: 0 | Status: SHIPPED | OUTPATIENT
Start: 2025-08-23

## 2025-08-25 ENCOUNTER — LAB VISIT (OUTPATIENT)
Dept: LAB | Facility: HOSPITAL | Age: 75
End: 2025-08-25
Attending: STUDENT IN AN ORGANIZED HEALTH CARE EDUCATION/TRAINING PROGRAM
Payer: MEDICARE

## 2025-08-25 ENCOUNTER — OFFICE VISIT (OUTPATIENT)
Dept: FAMILY MEDICINE | Facility: CLINIC | Age: 75
End: 2025-08-25
Payer: MEDICARE

## 2025-08-25 VITALS
SYSTOLIC BLOOD PRESSURE: 132 MMHG | OXYGEN SATURATION: 98 % | WEIGHT: 183.19 LBS | RESPIRATION RATE: 16 BRPM | HEIGHT: 63 IN | DIASTOLIC BLOOD PRESSURE: 68 MMHG | TEMPERATURE: 98 F | BODY MASS INDEX: 32.46 KG/M2 | HEART RATE: 56 BPM

## 2025-08-25 DIAGNOSIS — I25.10 CORONARY ARTERY DISEASE INVOLVING NATIVE CORONARY ARTERY OF NATIVE HEART WITHOUT ANGINA PECTORIS: ICD-10-CM

## 2025-08-25 DIAGNOSIS — T78.40XA ALLERGIC REACTION, INITIAL ENCOUNTER: ICD-10-CM

## 2025-08-25 DIAGNOSIS — Z00.00 HEALTHCARE MAINTENANCE: ICD-10-CM

## 2025-08-25 DIAGNOSIS — Z00.00 HEALTHCARE MAINTENANCE: Primary | ICD-10-CM

## 2025-08-25 DIAGNOSIS — G40.909 SEIZURE DISORDER: ICD-10-CM

## 2025-08-25 DIAGNOSIS — G35 MULTIPLE SCLEROSIS: ICD-10-CM

## 2025-08-25 DIAGNOSIS — Z12.31 ENCOUNTER FOR SCREENING MAMMOGRAM FOR MALIGNANT NEOPLASM OF BREAST: ICD-10-CM

## 2025-08-25 DIAGNOSIS — I10 PRIMARY HYPERTENSION: ICD-10-CM

## 2025-08-25 DIAGNOSIS — E78.2 MIXED HYPERLIPIDEMIA: ICD-10-CM

## 2025-08-25 LAB
BACTERIA #/AREA URNS AUTO: ABNORMAL /HPF
BILIRUB UR QL STRIP.AUTO: NEGATIVE
CLARITY UR: ABNORMAL
COLOR UR AUTO: YELLOW
GLUCOSE UR QL STRIP: NEGATIVE
HGB UR QL STRIP: ABNORMAL
HYALINE CASTS UR QL AUTO: 2 /LPF (ref 0–1)
KETONES UR QL STRIP: NEGATIVE
LEUKOCYTE ESTERASE UR QL STRIP: ABNORMAL
MICROSCOPIC COMMENT: ABNORMAL
NITRITE UR QL STRIP: NEGATIVE
PH UR STRIP: 6 [PH]
PROT UR QL STRIP: ABNORMAL
RBC #/AREA URNS AUTO: 52 /HPF (ref 0–4)
SP GR UR STRIP: 1.02
SQUAMOUS #/AREA URNS AUTO: 6 /HPF
UROBILINOGEN UR STRIP-ACNC: NEGATIVE EU/DL
WBC #/AREA URNS AUTO: >100 /HPF (ref 0–5)
WBC CLUMPS UR QL AUTO: ABNORMAL

## 2025-08-25 PROCEDURE — 1159F MED LIST DOCD IN RCRD: CPT | Mod: CPTII,S$GLB,, | Performed by: STUDENT IN AN ORGANIZED HEALTH CARE EDUCATION/TRAINING PROGRAM

## 2025-08-25 PROCEDURE — 1101F PT FALLS ASSESS-DOCD LE1/YR: CPT | Mod: CPTII,S$GLB,, | Performed by: STUDENT IN AN ORGANIZED HEALTH CARE EDUCATION/TRAINING PROGRAM

## 2025-08-25 PROCEDURE — 99999 PR PBB SHADOW E&M-EST. PATIENT-LVL V: CPT | Mod: PBBFAC,,, | Performed by: STUDENT IN AN ORGANIZED HEALTH CARE EDUCATION/TRAINING PROGRAM

## 2025-08-25 PROCEDURE — 4010F ACE/ARB THERAPY RXD/TAKEN: CPT | Mod: CPTII,S$GLB,, | Performed by: STUDENT IN AN ORGANIZED HEALTH CARE EDUCATION/TRAINING PROGRAM

## 2025-08-25 PROCEDURE — 3008F BODY MASS INDEX DOCD: CPT | Mod: CPTII,S$GLB,, | Performed by: STUDENT IN AN ORGANIZED HEALTH CARE EDUCATION/TRAINING PROGRAM

## 2025-08-25 PROCEDURE — 3078F DIAST BP <80 MM HG: CPT | Mod: CPTII,S$GLB,, | Performed by: STUDENT IN AN ORGANIZED HEALTH CARE EDUCATION/TRAINING PROGRAM

## 2025-08-25 PROCEDURE — 3075F SYST BP GE 130 - 139MM HG: CPT | Mod: CPTII,S$GLB,, | Performed by: STUDENT IN AN ORGANIZED HEALTH CARE EDUCATION/TRAINING PROGRAM

## 2025-08-25 PROCEDURE — G2211 COMPLEX E/M VISIT ADD ON: HCPCS | Mod: S$GLB,,, | Performed by: STUDENT IN AN ORGANIZED HEALTH CARE EDUCATION/TRAINING PROGRAM

## 2025-08-25 PROCEDURE — 3288F FALL RISK ASSESSMENT DOCD: CPT | Mod: CPTII,S$GLB,, | Performed by: STUDENT IN AN ORGANIZED HEALTH CARE EDUCATION/TRAINING PROGRAM

## 2025-08-25 PROCEDURE — 1126F AMNT PAIN NOTED NONE PRSNT: CPT | Mod: CPTII,S$GLB,, | Performed by: STUDENT IN AN ORGANIZED HEALTH CARE EDUCATION/TRAINING PROGRAM

## 2025-08-25 PROCEDURE — 87086 URINE CULTURE/COLONY COUNT: CPT

## 2025-08-25 PROCEDURE — 99214 OFFICE O/P EST MOD 30 MIN: CPT | Mod: S$GLB,,, | Performed by: STUDENT IN AN ORGANIZED HEALTH CARE EDUCATION/TRAINING PROGRAM

## 2025-08-25 PROCEDURE — 81001 URINALYSIS AUTO W/SCOPE: CPT

## 2025-08-25 RX ORDER — SPIRONOLACTONE 25 MG/1
25 TABLET ORAL DAILY
COMMUNITY
Start: 2025-07-22

## 2025-08-26 ENCOUNTER — PATIENT MESSAGE (OUTPATIENT)
Dept: FAMILY MEDICINE | Facility: CLINIC | Age: 75
End: 2025-08-26
Payer: MEDICARE

## 2025-08-26 DIAGNOSIS — N30.90 CYSTITIS WITHOUT HEMATURIA: Primary | ICD-10-CM

## 2025-08-26 LAB — HOLD SPECIMEN: NORMAL

## 2025-08-26 RX ORDER — NITROFURANTOIN 25; 75 MG/1; MG/1
100 CAPSULE ORAL 2 TIMES DAILY
Qty: 10 CAPSULE | Refills: 0 | Status: SHIPPED | OUTPATIENT
Start: 2025-08-26 | End: 2025-08-31

## 2025-08-27 LAB — BACTERIA UR CULT: NORMAL

## 2025-09-03 ENCOUNTER — HOSPITAL ENCOUNTER (OUTPATIENT)
Dept: RADIOLOGY | Facility: HOSPITAL | Age: 75
Discharge: HOME | End: 2025-09-03
Attending: STUDENT IN AN ORGANIZED HEALTH CARE EDUCATION/TRAINING PROGRAM
Payer: MEDICARE

## 2025-09-03 ENCOUNTER — TELEPHONE (OUTPATIENT)
Dept: NEUROLOGY | Facility: CLINIC | Age: 75
End: 2025-09-03
Payer: MEDICARE

## 2025-09-03 DIAGNOSIS — Z12.31 ENCOUNTER FOR SCREENING MAMMOGRAM FOR MALIGNANT NEOPLASM OF BREAST: ICD-10-CM

## 2025-09-03 PROCEDURE — 77067 SCR MAMMO BI INCL CAD: CPT | Mod: 26,,, | Performed by: RADIOLOGY

## 2025-09-03 PROCEDURE — 77063 BREAST TOMOSYNTHESIS BI: CPT | Mod: 26,,, | Performed by: RADIOLOGY

## 2025-09-03 PROCEDURE — 77067 SCR MAMMO BI INCL CAD: CPT | Mod: TC,PO

## 2025-09-05 ENCOUNTER — OFFICE VISIT (OUTPATIENT)
Dept: FAMILY MEDICINE | Facility: CLINIC | Age: 75
End: 2025-09-05
Payer: MEDICARE

## 2025-09-05 DIAGNOSIS — N30.90 CYSTITIS WITHOUT HEMATURIA: Primary | ICD-10-CM

## 2025-09-05 RX ORDER — SULFAMETHOXAZOLE AND TRIMETHOPRIM 800; 160 MG/1; MG/1
1 TABLET ORAL 2 TIMES DAILY
Qty: 14 TABLET | Refills: 0 | Status: SHIPPED | OUTPATIENT
Start: 2025-09-05 | End: 2025-09-12

## (undated) DEVICE — SUT CTD VICRYL VIL BR UR-6

## (undated) DEVICE — GLOVE SENSICARE PI GRN 8

## (undated) DEVICE — DRAPE CESAREAN IOBAN POUCH

## (undated) DEVICE — DRAPE C-ARMOR EQUIPMENT COVER

## (undated) DEVICE — HANDSET INTERSTIM X COMM

## (undated) DEVICE — TRAY MINOR GEN SURG OMC

## (undated) DEVICE — SYR B-D DISP CONTROL 10CC100/C

## (undated) DEVICE — SCREENER ISTIM EXT NEROSTIMLTR

## (undated) DEVICE — KIT INTERSTIM II PERC LEAD EXT

## (undated) DEVICE — NDL HYPO REG 25G X 1 1/2

## (undated) DEVICE — GLOVE SENSICARE PI SURG 8

## (undated) DEVICE — TOWEL OR NONABSORB ADH 17X26

## (undated) DEVICE — DRESSING TRANS 2X2 TEGADERM

## (undated) DEVICE — DRESSING ANTIMICROBIAL 3/4 IN

## (undated) DEVICE — ELECTRODE REM PLYHSV RETURN 9

## (undated) DEVICE — DRAPE C-ARM ELAS CLIP 42X120IN

## (undated) DEVICE — APPLICATOR CHLORAPREP ORN 26ML

## (undated) DEVICE — TUBING SUCTION CONNECTING 10FT

## (undated) DEVICE — DRESSING TRANS 4X4 TEGADERM

## (undated) DEVICE — ADHESIVE DERMABOND ADVANCED

## (undated) DEVICE — TUBING SUCTION 3/16X10 2 CONN

## (undated) DEVICE — SYR IRRIGATION BULB STER 60ML

## (undated) DEVICE — TUBE SUCTION YANKAUER

## (undated) DEVICE — SUT MONOCRYL 3-0 SH U/D